# Patient Record
Sex: MALE | Race: WHITE | Employment: OTHER | RURAL
[De-identification: names, ages, dates, MRNs, and addresses within clinical notes are randomized per-mention and may not be internally consistent; named-entity substitution may affect disease eponyms.]

---

## 2017-01-09 ENCOUNTER — TELEPHONE (OUTPATIENT)
Dept: PRIMARY CARE CLINIC | Age: 72
End: 2017-01-09

## 2017-01-09 DIAGNOSIS — I10 ESSENTIAL HYPERTENSION: ICD-10-CM

## 2017-01-09 DIAGNOSIS — E08.00 DIABETES MELLITUS DUE TO UNDERLYING CONDITION WITH HYPEROSMOLARITY WITHOUT COMA, WITH LONG-TERM CURRENT USE OF INSULIN (HCC): ICD-10-CM

## 2017-01-09 DIAGNOSIS — Z79.4 DIABETES MELLITUS DUE TO UNDERLYING CONDITION WITH HYPEROSMOLARITY WITHOUT COMA, WITH LONG-TERM CURRENT USE OF INSULIN (HCC): ICD-10-CM

## 2017-01-11 RX ORDER — DOXAZOSIN MESYLATE 4 MG/1
4 TABLET ORAL NIGHTLY
Qty: 90 TABLET | Refills: 3 | Status: SHIPPED | OUTPATIENT
Start: 2017-01-11 | End: 2017-10-05 | Stop reason: SDUPTHER

## 2017-01-11 RX ORDER — PRASUGREL 10 MG/1
10 TABLET, FILM COATED ORAL DAILY
Qty: 90 TABLET | Refills: 3 | Status: SHIPPED | OUTPATIENT
Start: 2017-01-11 | End: 2017-10-05 | Stop reason: SDUPTHER

## 2017-01-11 RX ORDER — ATORVASTATIN CALCIUM 40 MG/1
40 TABLET, FILM COATED ORAL NIGHTLY
Qty: 90 TABLET | Refills: 3 | Status: SHIPPED | OUTPATIENT
Start: 2017-01-11 | End: 2017-10-05 | Stop reason: SDUPTHER

## 2017-01-11 RX ORDER — CARVEDILOL 12.5 MG/1
12.5 TABLET ORAL 2 TIMES DAILY WITH MEALS
Qty: 180 TABLET | Refills: 3 | Status: SHIPPED | OUTPATIENT
Start: 2017-01-11 | End: 2017-02-27 | Stop reason: SDUPTHER

## 2017-01-11 RX ORDER — PANTOPRAZOLE SODIUM 40 MG/1
TABLET, DELAYED RELEASE ORAL
Qty: 90 TABLET | Refills: 3 | Status: SHIPPED | OUTPATIENT
Start: 2017-01-11 | End: 2017-01-18 | Stop reason: SDUPTHER

## 2017-01-13 DIAGNOSIS — E08.00 DIABETES MELLITUS DUE TO UNDERLYING CONDITION WITH HYPEROSMOLARITY WITHOUT COMA, WITH LONG-TERM CURRENT USE OF INSULIN (HCC): ICD-10-CM

## 2017-01-13 DIAGNOSIS — I10 ESSENTIAL HYPERTENSION: ICD-10-CM

## 2017-01-13 DIAGNOSIS — Z79.4 DIABETES MELLITUS DUE TO UNDERLYING CONDITION WITH HYPEROSMOLARITY WITHOUT COMA, WITH LONG-TERM CURRENT USE OF INSULIN (HCC): ICD-10-CM

## 2017-01-13 RX ORDER — SITAGLIPTIN 100 MG/1
TABLET, FILM COATED ORAL
Qty: 90 TABLET | Refills: 3 | Status: SHIPPED | OUTPATIENT
Start: 2017-01-13 | End: 2018-02-15 | Stop reason: SDUPTHER

## 2017-01-18 DIAGNOSIS — I10 ESSENTIAL HYPERTENSION: ICD-10-CM

## 2017-01-18 DIAGNOSIS — E11.9 DM TYPE 2 WITHOUT RETINOPATHY (HCC): ICD-10-CM

## 2017-01-19 RX ORDER — GABAPENTIN 300 MG/1
300 CAPSULE ORAL NIGHTLY
Qty: 90 CAPSULE | Refills: 3 | Status: SHIPPED | OUTPATIENT
Start: 2017-01-19 | End: 2017-08-03 | Stop reason: SDUPTHER

## 2017-01-19 RX ORDER — FUROSEMIDE 20 MG/1
20 TABLET ORAL DAILY
Qty: 90 TABLET | Refills: 3 | Status: SHIPPED | OUTPATIENT
Start: 2017-01-19 | End: 2018-02-15 | Stop reason: SDUPTHER

## 2017-01-19 RX ORDER — FINASTERIDE 5 MG/1
5 TABLET, FILM COATED ORAL DAILY
Qty: 90 TABLET | Refills: 3 | Status: SHIPPED | OUTPATIENT
Start: 2017-01-19 | End: 2021-01-08 | Stop reason: SDUPTHER

## 2017-01-19 RX ORDER — CITALOPRAM 40 MG/1
40 TABLET ORAL DAILY
Qty: 90 TABLET | Refills: 3 | Status: SHIPPED | OUTPATIENT
Start: 2017-01-19 | End: 2017-10-05 | Stop reason: SDUPTHER

## 2017-01-19 RX ORDER — LISINOPRIL 40 MG/1
TABLET ORAL
Qty: 90 TABLET | Refills: 3 | Status: SHIPPED | OUTPATIENT
Start: 2017-01-19 | End: 2017-10-05 | Stop reason: SDUPTHER

## 2017-01-19 RX ORDER — PANTOPRAZOLE SODIUM 40 MG/1
TABLET, DELAYED RELEASE ORAL
Qty: 90 TABLET | Refills: 3 | Status: SHIPPED | OUTPATIENT
Start: 2017-01-19 | End: 2017-10-05 | Stop reason: SDUPTHER

## 2017-01-19 RX ORDER — AMLODIPINE BESYLATE 10 MG/1
10 TABLET ORAL DAILY
Qty: 90 TABLET | Refills: 3 | Status: SHIPPED | OUTPATIENT
Start: 2017-01-19 | End: 2017-10-05 | Stop reason: SDUPTHER

## 2017-02-22 ENCOUNTER — HOSPITAL ENCOUNTER (OUTPATIENT)
Dept: OTHER | Age: 72
Discharge: HOME | End: 2017-02-22
Attending: NURSE PRACTITIONER | Admitting: NURSE PRACTITIONER

## 2017-02-22 ENCOUNTER — TRANSCRIBE ORDERS (OUTPATIENT)
Dept: CT IMAGING | Facility: HOSPITAL | Age: 72
End: 2017-02-22

## 2017-02-22 DIAGNOSIS — D31.60 BENIGN NEOPLASM OF ORBIT, UNSPECIFIED LATERALITY: Primary | ICD-10-CM

## 2017-02-22 DIAGNOSIS — E78.00 PURE HYPERCHOLESTEROLEMIA: ICD-10-CM

## 2017-02-22 DIAGNOSIS — Z79.4 DIABETES MELLITUS DUE TO UNDERLYING CONDITION WITH HYPEROSMOLARITY WITHOUT COMA, WITH LONG-TERM CURRENT USE OF INSULIN (HCC): ICD-10-CM

## 2017-02-22 DIAGNOSIS — E08.00 DIABETES MELLITUS DUE TO UNDERLYING CONDITION WITH HYPEROSMOLARITY WITHOUT COMA, WITH LONG-TERM CURRENT USE OF INSULIN (HCC): ICD-10-CM

## 2017-02-22 LAB
A/G RATIO: 1.8 (ref 0.8–2)
ALBUMIN SERPL-MCNC: 4.2 G/DL (ref 3.4–4.8)
ALP BLD-CCNC: 44 U/L (ref 25–100)
ALT SERPL-CCNC: 19 U/L (ref 4–36)
ANION GAP SERPL CALCULATED.3IONS-SCNC: 14 MMOL/L (ref 3–16)
AST SERPL-CCNC: 14 U/L (ref 8–33)
BILIRUB SERPL-MCNC: 0.3 MG/DL (ref 0.3–1.2)
BUN BLDV-MCNC: 37 MG/DL (ref 6–20)
CALCIUM SERPL-MCNC: 9.4 MG/DL (ref 8.5–10.5)
CHLORIDE BLD-SCNC: 99 MMOL/L (ref 98–107)
CHOLESTEROL, TOTAL: 196 MG/DL (ref 0–200)
CO2: 27 MMOL/L (ref 20–30)
CREAT SERPL-MCNC: 1.5 MG/DL (ref 0.4–1.2)
GFR AFRICAN AMERICAN: 56
GFR NON-AFRICAN AMERICAN: 46
GLOBULIN: 2.4 G/DL
GLUCOSE BLD-MCNC: 174 MG/DL (ref 74–106)
HBA1C MFR BLD: 8.4 %
HCT VFR BLD CALC: 39.2 % (ref 40–54)
HDLC SERPL-MCNC: 35 MG/DL (ref 40–60)
HEMOGLOBIN: 12.9 G/DL (ref 13–18)
LDL CHOLESTEROL CALCULATED: 85 MG/DL
MCH RBC QN AUTO: 28.7 PG (ref 27–32)
MCHC RBC AUTO-ENTMCNC: 32.9 G/DL (ref 31–35)
MCV RBC AUTO: 87.1 FL (ref 80–100)
PDW BLD-RTO: 12.6 % (ref 11–16)
PLATELET # BLD: 313 K/UL (ref 150–400)
PMV BLD AUTO: 10.6 FL (ref 6–10)
POTASSIUM SERPL-SCNC: 4.3 MMOL/L (ref 3.4–5.1)
RBC # BLD: 4.5 M/UL (ref 4.5–6)
SODIUM BLD-SCNC: 140 MMOL/L (ref 136–145)
TOTAL PROTEIN: 6.6 G/DL (ref 6.4–8.3)
TRIGL SERPL-MCNC: 378 MG/DL (ref 0–249)
VLDLC SERPL CALC-MCNC: 76 MG/DL
WBC # BLD: 9.1 K/UL (ref 4–11)

## 2017-02-27 ENCOUNTER — OFFICE VISIT (OUTPATIENT)
Dept: PRIMARY CARE CLINIC | Age: 72
End: 2017-02-27
Payer: MEDICARE

## 2017-02-27 VITALS
OXYGEN SATURATION: 97 % | HEIGHT: 67 IN | SYSTOLIC BLOOD PRESSURE: 124 MMHG | HEART RATE: 64 BPM | WEIGHT: 214.4 LBS | DIASTOLIC BLOOD PRESSURE: 76 MMHG | BODY MASS INDEX: 33.65 KG/M2 | RESPIRATION RATE: 20 BRPM

## 2017-02-27 DIAGNOSIS — E11.9 TYPE 2 DIABETES MELLITUS WITHOUT COMPLICATION, WITH LONG-TERM CURRENT USE OF INSULIN (HCC): Primary | ICD-10-CM

## 2017-02-27 DIAGNOSIS — Z79.4 TYPE 2 DIABETES MELLITUS WITHOUT COMPLICATION, WITH LONG-TERM CURRENT USE OF INSULIN (HCC): Primary | ICD-10-CM

## 2017-02-27 DIAGNOSIS — I10 ESSENTIAL HYPERTENSION: ICD-10-CM

## 2017-02-27 PROCEDURE — 3017F COLORECTAL CA SCREEN DOC REV: CPT | Performed by: NURSE PRACTITIONER

## 2017-02-27 PROCEDURE — 3045F PR MOST RECENT HEMOGLOBIN A1C LEVEL 7.0-9.0%: CPT | Performed by: NURSE PRACTITIONER

## 2017-02-27 PROCEDURE — G8427 DOCREV CUR MEDS BY ELIG CLIN: HCPCS | Performed by: NURSE PRACTITIONER

## 2017-02-27 PROCEDURE — G8599 NO ASA/ANTIPLAT THER USE RNG: HCPCS | Performed by: NURSE PRACTITIONER

## 2017-02-27 PROCEDURE — 99214 OFFICE O/P EST MOD 30 MIN: CPT | Performed by: NURSE PRACTITIONER

## 2017-02-27 PROCEDURE — 1123F ACP DISCUSS/DSCN MKR DOCD: CPT | Performed by: NURSE PRACTITIONER

## 2017-02-27 PROCEDURE — 1036F TOBACCO NON-USER: CPT | Performed by: NURSE PRACTITIONER

## 2017-02-27 PROCEDURE — G8417 CALC BMI ABV UP PARAM F/U: HCPCS | Performed by: NURSE PRACTITIONER

## 2017-02-27 PROCEDURE — G8484 FLU IMMUNIZE NO ADMIN: HCPCS | Performed by: NURSE PRACTITIONER

## 2017-02-27 PROCEDURE — 4040F PNEUMOC VAC/ADMIN/RCVD: CPT | Performed by: NURSE PRACTITIONER

## 2017-02-27 RX ORDER — GLUCOSAMINE HCL/CHONDROITIN SU 500-400 MG
CAPSULE ORAL
Qty: 300 STRIP | Refills: 3 | Status: SHIPPED | OUTPATIENT
Start: 2017-02-27 | End: 2018-05-03 | Stop reason: SDUPTHER

## 2017-02-27 RX ORDER — PEN NEEDLE, DIABETIC 30 GX5/16"
NEEDLE, DISPOSABLE MISCELLANEOUS
Qty: 90 EACH | Refills: 3 | Status: SHIPPED | OUTPATIENT
Start: 2017-02-27 | End: 2017-11-30 | Stop reason: SDUPTHER

## 2017-02-27 RX ORDER — CARVEDILOL 12.5 MG/1
6.25 TABLET ORAL 2 TIMES DAILY
Qty: 180 TABLET | Refills: 3
Start: 2017-02-27 | End: 2018-02-15 | Stop reason: SDUPTHER

## 2017-02-27 ASSESSMENT — ENCOUNTER SYMPTOMS
RESPIRATORY NEGATIVE: 1
GASTROINTESTINAL NEGATIVE: 1
EYES NEGATIVE: 1

## 2017-02-28 ENCOUNTER — HOSPITAL ENCOUNTER (OUTPATIENT)
Dept: CT IMAGING | Facility: HOSPITAL | Age: 72
Discharge: HOME | End: 2017-02-28
Admitting: OPHTHALMOLOGY

## 2017-02-28 DIAGNOSIS — D31.60 BENIGN NEOPLASM OF ORBIT, UNSPECIFIED LATERALITY: ICD-10-CM

## 2017-02-28 LAB
ALBUMIN SERPL-MCNC: 4.3 G/DL (ref 3.5–5)
ALBUMIN/GLOB SERPL: 1.3 G/DL (ref 1–2)
ALP SERPL-CCNC: 46 U/L (ref 38–126)
ALT SERPL W P-5'-P-CCNC: 31 U/L (ref 13–69)
ANION GAP SERPL CALCULATED.3IONS-SCNC: 13.6 MMOL/L
AST SERPL-CCNC: 19 U/L (ref 15–46)
BILIRUB SERPL-MCNC: 0.5 MG/DL (ref 0.2–1.3)
BUN BLD-MCNC: 30 MG/DL (ref 7–20)
BUN/CREAT SERPL: 21.4 (ref 6.3–21.9)
CALCIUM SPEC-SCNC: 9.1 MG/DL (ref 8.4–10.2)
CHLORIDE SERPL-SCNC: 109 MMOL/L (ref 98–107)
CO2 SERPL-SCNC: 26 MMOL/L (ref 26–30)
CREAT BLD-MCNC: 1.4 MG/DL (ref 0.6–1.3)
GFR SERPL CREATININE-BSD FRML MDRD: 50 ML/MIN/1.73
GLOBULIN UR ELPH-MCNC: 3.2 GM/DL
GLUCOSE BLD-MCNC: 139 MG/DL (ref 74–98)
POTASSIUM BLD-SCNC: 4.6 MMOL/L (ref 3.5–5.1)
PROT SERPL-MCNC: 7.5 G/DL (ref 6.3–8.2)
SODIUM BLD-SCNC: 144 MMOL/L (ref 137–145)

## 2017-02-28 PROCEDURE — 80053 COMPREHEN METABOLIC PANEL: CPT | Performed by: OPHTHALMOLOGY

## 2017-02-28 PROCEDURE — 70481 CT ORBIT/EAR/FOSSA W/DYE: CPT

## 2017-02-28 PROCEDURE — 0 IOPAMIDOL 61 % SOLUTION: Performed by: OPHTHALMOLOGY

## 2017-02-28 RX ADMIN — IOPAMIDOL 100 ML: 612 INJECTION, SOLUTION INTRAVENOUS at 11:30

## 2017-03-03 ENCOUNTER — HOSPITAL ENCOUNTER (OUTPATIENT)
Dept: GENERAL RADIOLOGY | Facility: HOSPITAL | Age: 72
Discharge: HOME | End: 2017-03-03
Admitting: UROLOGY

## 2017-03-03 ENCOUNTER — TRANSCRIBE ORDERS (OUTPATIENT)
Dept: ADMINISTRATIVE | Facility: HOSPITAL | Age: 72
End: 2017-03-03

## 2017-03-03 ENCOUNTER — APPOINTMENT (OUTPATIENT)
Dept: LAB | Facility: HOSPITAL | Age: 72
End: 2017-03-03

## 2017-03-03 DIAGNOSIS — R97.20 ELEVATED PROSTATE SPECIFIC ANTIGEN (PSA): Primary | ICD-10-CM

## 2017-03-03 LAB
ALBUMIN SERPL-MCNC: 4.2 G/DL (ref 3.5–5)
ALBUMIN/GLOB SERPL: 1.4 G/DL (ref 1–2)
ALP SERPL-CCNC: 51 U/L (ref 38–126)
ALT SERPL W P-5'-P-CCNC: 30 U/L (ref 13–69)
ANION GAP SERPL CALCULATED.3IONS-SCNC: 11.9 MMOL/L
AST SERPL-CCNC: 20 U/L (ref 15–46)
BILIRUB SERPL-MCNC: 0.6 MG/DL (ref 0.2–1.3)
BUN BLD-MCNC: 27 MG/DL (ref 7–20)
BUN/CREAT SERPL: 20.8 (ref 6.3–21.9)
CALCIUM SPEC-SCNC: 9.1 MG/DL (ref 8.4–10.2)
CHLORIDE SERPL-SCNC: 106 MMOL/L (ref 98–107)
CO2 SERPL-SCNC: 30 MMOL/L (ref 26–30)
CREAT BLD-MCNC: 1.3 MG/DL (ref 0.6–1.3)
GFR SERPL CREATININE-BSD FRML MDRD: 54 ML/MIN/1.73
GLOBULIN UR ELPH-MCNC: 3.1 GM/DL
GLUCOSE BLD-MCNC: 259 MG/DL (ref 74–98)
POTASSIUM BLD-SCNC: 4.9 MMOL/L (ref 3.5–5.1)
PROT SERPL-MCNC: 7.3 G/DL (ref 6.3–8.2)
SODIUM BLD-SCNC: 143 MMOL/L (ref 137–145)

## 2017-03-03 PROCEDURE — 36415 COLL VENOUS BLD VENIPUNCTURE: CPT | Performed by: UROLOGY

## 2017-03-03 PROCEDURE — 74000 HC ABDOMEN KUB: CPT

## 2017-03-03 PROCEDURE — 84153 ASSAY OF PSA TOTAL: CPT | Performed by: UROLOGY

## 2017-03-03 PROCEDURE — 80053 COMPREHEN METABOLIC PANEL: CPT | Performed by: UROLOGY

## 2017-03-03 PROCEDURE — 84154 ASSAY OF PSA FREE: CPT | Performed by: UROLOGY

## 2017-03-04 LAB
PSA FREE MFR SERPL: 35.7 %
PSA FREE SERPL-MCNC: 0.25 NG/ML
PSA SERPL-MCNC: 0.7 NG/ML (ref 0–4)

## 2017-03-29 ENCOUNTER — TELEPHONE (OUTPATIENT)
Dept: PRIMARY CARE CLINIC | Age: 72
End: 2017-03-29

## 2017-05-18 ENCOUNTER — OFFICE VISIT (OUTPATIENT)
Dept: CARDIOLOGY | Facility: CLINIC | Age: 72
End: 2017-05-18

## 2017-05-18 VITALS
DIASTOLIC BLOOD PRESSURE: 50 MMHG | BODY MASS INDEX: 34.3 KG/M2 | SYSTOLIC BLOOD PRESSURE: 120 MMHG | HEIGHT: 66 IN | WEIGHT: 213.4 LBS

## 2017-05-18 DIAGNOSIS — E78.00 HYPERCHOLESTEROLEMIA: ICD-10-CM

## 2017-05-18 DIAGNOSIS — I25.810 CORONARY ARTERY DISEASE INVOLVING CORONARY BYPASS GRAFT OF NATIVE HEART WITHOUT ANGINA PECTORIS: Primary | ICD-10-CM

## 2017-05-18 DIAGNOSIS — I10 BENIGN HYPERTENSION: ICD-10-CM

## 2017-05-18 PROCEDURE — 99213 OFFICE O/P EST LOW 20 MIN: CPT | Performed by: INTERNAL MEDICINE

## 2017-05-18 RX ORDER — ATORVASTATIN CALCIUM 40 MG/1
40 TABLET, FILM COATED ORAL DAILY
COMMUNITY
End: 2018-06-15 | Stop reason: DRUGHIGH

## 2017-06-05 ENCOUNTER — HOSPITAL ENCOUNTER (OUTPATIENT)
Dept: OTHER | Age: 72
Discharge: HOME | End: 2017-06-05
Attending: NURSE PRACTITIONER | Admitting: NURSE PRACTITIONER

## 2017-06-05 ENCOUNTER — OFFICE VISIT (OUTPATIENT)
Dept: PRIMARY CARE CLINIC | Age: 72
End: 2017-06-05
Payer: MEDICARE

## 2017-06-05 VITALS
WEIGHT: 201.5 LBS | DIASTOLIC BLOOD PRESSURE: 76 MMHG | HEART RATE: 80 BPM | BODY MASS INDEX: 31.63 KG/M2 | SYSTOLIC BLOOD PRESSURE: 130 MMHG | HEIGHT: 67 IN | OXYGEN SATURATION: 98 % | RESPIRATION RATE: 20 BRPM

## 2017-06-05 DIAGNOSIS — Z79.4 TYPE 2 DIABETES MELLITUS WITHOUT COMPLICATION, WITH LONG-TERM CURRENT USE OF INSULIN (HCC): ICD-10-CM

## 2017-06-05 DIAGNOSIS — Z79.4 TYPE 2 DIABETES MELLITUS WITHOUT COMPLICATION, WITH LONG-TERM CURRENT USE OF INSULIN (HCC): Primary | ICD-10-CM

## 2017-06-05 DIAGNOSIS — E11.9 TYPE 2 DIABETES MELLITUS WITHOUT COMPLICATION, WITH LONG-TERM CURRENT USE OF INSULIN (HCC): Primary | ICD-10-CM

## 2017-06-05 DIAGNOSIS — I10 ESSENTIAL HYPERTENSION: ICD-10-CM

## 2017-06-05 DIAGNOSIS — E78.00 PURE HYPERCHOLESTEROLEMIA: ICD-10-CM

## 2017-06-05 DIAGNOSIS — E11.9 TYPE 2 DIABETES MELLITUS WITHOUT COMPLICATION, WITH LONG-TERM CURRENT USE OF INSULIN (HCC): ICD-10-CM

## 2017-06-05 LAB
A/G RATIO: 1.5 (ref 0.8–2)
ALBUMIN SERPL-MCNC: 4 G/DL (ref 3.4–4.8)
ALP BLD-CCNC: 43 U/L (ref 25–100)
ALT SERPL-CCNC: 20 U/L (ref 4–36)
ANION GAP SERPL CALCULATED.3IONS-SCNC: 13 MMOL/L (ref 3–16)
AST SERPL-CCNC: 15 U/L (ref 8–33)
BILIRUB SERPL-MCNC: 0.3 MG/DL (ref 0.3–1.2)
BUN BLDV-MCNC: 24 MG/DL (ref 6–20)
CALCIUM SERPL-MCNC: 9.1 MG/DL (ref 8.5–10.5)
CHLORIDE BLD-SCNC: 100 MMOL/L (ref 98–107)
CO2: 26 MMOL/L (ref 20–30)
CREAT SERPL-MCNC: 1.2 MG/DL (ref 0.4–1.2)
CREATININE URINE: 132.7 MG/DL (ref 1.5–300)
GFR AFRICAN AMERICAN: >59
GFR NON-AFRICAN AMERICAN: >59
GLOBULIN: 2.7 G/DL
GLUCOSE BLD-MCNC: 176 MG/DL (ref 74–106)
HBA1C MFR BLD: 8.3 %
HCT VFR BLD CALC: 37.2 % (ref 40–54)
HEMOGLOBIN: 12.8 G/DL (ref 13–18)
MCH RBC QN AUTO: 29.8 PG (ref 27–32)
MCHC RBC AUTO-ENTMCNC: 34.4 G/DL (ref 31–35)
MCV RBC AUTO: 86.5 FL (ref 80–100)
MICROALBUMIN UR-MCNC: 1.5 MG/DL (ref 0–22)
MICROALBUMIN/CREAT UR-RTO: 11.3 MG/G (ref 0–30)
PDW BLD-RTO: 12.7 % (ref 11–16)
PLATELET # BLD: 300 K/UL (ref 150–400)
PMV BLD AUTO: 10.5 FL (ref 6–10)
POTASSIUM SERPL-SCNC: 4.4 MMOL/L (ref 3.4–5.1)
RBC # BLD: 4.3 M/UL (ref 4.5–6)
SODIUM BLD-SCNC: 139 MMOL/L (ref 136–145)
TOTAL PROTEIN: 6.7 G/DL (ref 6.4–8.3)
WBC # BLD: 8.2 K/UL (ref 4–11)

## 2017-06-05 PROCEDURE — G8599 NO ASA/ANTIPLAT THER USE RNG: HCPCS | Performed by: NURSE PRACTITIONER

## 2017-06-05 PROCEDURE — G8417 CALC BMI ABV UP PARAM F/U: HCPCS | Performed by: NURSE PRACTITIONER

## 2017-06-05 PROCEDURE — 99213 OFFICE O/P EST LOW 20 MIN: CPT | Performed by: NURSE PRACTITIONER

## 2017-06-05 PROCEDURE — 1123F ACP DISCUSS/DSCN MKR DOCD: CPT | Performed by: NURSE PRACTITIONER

## 2017-06-05 PROCEDURE — 4040F PNEUMOC VAC/ADMIN/RCVD: CPT | Performed by: NURSE PRACTITIONER

## 2017-06-05 PROCEDURE — G8427 DOCREV CUR MEDS BY ELIG CLIN: HCPCS | Performed by: NURSE PRACTITIONER

## 2017-06-05 PROCEDURE — 3046F HEMOGLOBIN A1C LEVEL >9.0%: CPT | Performed by: NURSE PRACTITIONER

## 2017-06-05 PROCEDURE — 3017F COLORECTAL CA SCREEN DOC REV: CPT | Performed by: NURSE PRACTITIONER

## 2017-06-05 PROCEDURE — 1036F TOBACCO NON-USER: CPT | Performed by: NURSE PRACTITIONER

## 2017-06-05 ASSESSMENT — PATIENT HEALTH QUESTIONNAIRE - PHQ9
2. FEELING DOWN, DEPRESSED OR HOPELESS: 0
SUM OF ALL RESPONSES TO PHQ QUESTIONS 1-9: 1
1. LITTLE INTEREST OR PLEASURE IN DOING THINGS: 1
SUM OF ALL RESPONSES TO PHQ9 QUESTIONS 1 & 2: 1

## 2017-06-05 ASSESSMENT — ENCOUNTER SYMPTOMS
RESPIRATORY NEGATIVE: 1
EYES NEGATIVE: 1
GASTROINTESTINAL NEGATIVE: 1

## 2017-06-12 ENCOUNTER — TELEPHONE (OUTPATIENT)
Dept: PRIMARY CARE CLINIC | Age: 72
End: 2017-06-12

## 2017-07-10 ENCOUNTER — OFFICE VISIT (OUTPATIENT)
Dept: PRIMARY CARE CLINIC | Age: 72
End: 2017-07-10
Payer: MEDICARE

## 2017-07-10 VITALS
HEART RATE: 71 BPM | BODY MASS INDEX: 33.15 KG/M2 | HEIGHT: 67 IN | DIASTOLIC BLOOD PRESSURE: 60 MMHG | OXYGEN SATURATION: 97 % | RESPIRATION RATE: 20 BRPM | SYSTOLIC BLOOD PRESSURE: 110 MMHG | WEIGHT: 211.2 LBS

## 2017-07-10 DIAGNOSIS — R09.81 SINUS CONGESTION: ICD-10-CM

## 2017-07-10 DIAGNOSIS — M54.50 ACUTE LEFT-SIDED LOW BACK PAIN WITHOUT SCIATICA: Primary | ICD-10-CM

## 2017-07-10 PROCEDURE — 1036F TOBACCO NON-USER: CPT | Performed by: NURSE PRACTITIONER

## 2017-07-10 PROCEDURE — G8417 CALC BMI ABV UP PARAM F/U: HCPCS | Performed by: NURSE PRACTITIONER

## 2017-07-10 PROCEDURE — 1123F ACP DISCUSS/DSCN MKR DOCD: CPT | Performed by: NURSE PRACTITIONER

## 2017-07-10 PROCEDURE — 4040F PNEUMOC VAC/ADMIN/RCVD: CPT | Performed by: NURSE PRACTITIONER

## 2017-07-10 PROCEDURE — 99213 OFFICE O/P EST LOW 20 MIN: CPT | Performed by: NURSE PRACTITIONER

## 2017-07-10 PROCEDURE — G8599 NO ASA/ANTIPLAT THER USE RNG: HCPCS | Performed by: NURSE PRACTITIONER

## 2017-07-10 PROCEDURE — 3017F COLORECTAL CA SCREEN DOC REV: CPT | Performed by: NURSE PRACTITIONER

## 2017-07-10 PROCEDURE — G8427 DOCREV CUR MEDS BY ELIG CLIN: HCPCS | Performed by: NURSE PRACTITIONER

## 2017-07-10 RX ORDER — LORATADINE 10 MG/1
10 TABLET ORAL DAILY
Qty: 30 TABLET | Refills: 3 | Status: SHIPPED | OUTPATIENT
Start: 2017-07-10 | End: 2017-12-06 | Stop reason: SDUPTHER

## 2017-07-10 RX ORDER — HYDROCODONE BITARTRATE AND ACETAMINOPHEN 5; 325 MG/1; MG/1
1 TABLET ORAL EVERY 6 HOURS PRN
Qty: 12 TABLET | Refills: 0 | Status: SHIPPED | OUTPATIENT
Start: 2017-07-10 | End: 2017-07-13

## 2017-07-10 RX ORDER — BACLOFEN 10 MG/1
5 TABLET ORAL 2 TIMES DAILY
Qty: 20 TABLET | Refills: 0 | Status: SHIPPED | OUTPATIENT
Start: 2017-07-10 | End: 2017-07-20

## 2017-07-10 RX ORDER — BACLOFEN 10 MG/1
5 TABLET ORAL 2 TIMES DAILY
Qty: 20 TABLET | Refills: 0 | Status: SHIPPED | OUTPATIENT
Start: 2017-07-10 | End: 2017-07-10

## 2017-07-10 ASSESSMENT — ENCOUNTER SYMPTOMS
EYES NEGATIVE: 1
BACK PAIN: 1
RESPIRATORY NEGATIVE: 1
GASTROINTESTINAL NEGATIVE: 1

## 2017-08-04 RX ORDER — GABAPENTIN 300 MG/1
CAPSULE ORAL
Qty: 90 CAPSULE | Refills: 3 | Status: SHIPPED | OUTPATIENT
Start: 2017-08-04 | End: 2017-10-05 | Stop reason: SDUPTHER

## 2017-09-08 ENCOUNTER — HOSPITAL ENCOUNTER (OUTPATIENT)
Dept: GENERAL RADIOLOGY | Facility: HOSPITAL | Age: 72
Discharge: HOME | End: 2017-09-08
Admitting: UROLOGY

## 2017-09-08 ENCOUNTER — APPOINTMENT (OUTPATIENT)
Dept: LAB | Facility: HOSPITAL | Age: 72
End: 2017-09-08

## 2017-09-08 ENCOUNTER — TRANSCRIBE ORDERS (OUTPATIENT)
Dept: ADMINISTRATIVE | Facility: HOSPITAL | Age: 72
End: 2017-09-08

## 2017-09-08 DIAGNOSIS — N20.0 URIC ACID NEPHROLITHIASIS: Primary | ICD-10-CM

## 2017-09-08 LAB
ALBUMIN SERPL-MCNC: 4.1 G/DL (ref 3.5–5)
ALBUMIN/GLOB SERPL: 1.5 G/DL (ref 1–2)
ALP SERPL-CCNC: 46 U/L (ref 38–126)
ALT SERPL W P-5'-P-CCNC: 41 U/L (ref 13–69)
ANION GAP SERPL CALCULATED.3IONS-SCNC: 13.9 MMOL/L
AST SERPL-CCNC: 20 U/L (ref 15–46)
BILIRUB SERPL-MCNC: 0.5 MG/DL (ref 0.2–1.3)
BUN BLD-MCNC: 26 MG/DL (ref 7–20)
BUN/CREAT SERPL: 20 (ref 6.3–21.9)
CALCIUM SPEC-SCNC: 9.2 MG/DL (ref 8.4–10.2)
CHLORIDE SERPL-SCNC: 105 MMOL/L (ref 98–107)
CO2 SERPL-SCNC: 27 MMOL/L (ref 26–30)
CREAT BLD-MCNC: 1.3 MG/DL (ref 0.6–1.3)
GFR SERPL CREATININE-BSD FRML MDRD: 54 ML/MIN/1.73
GLOBULIN UR ELPH-MCNC: 2.8 GM/DL
GLUCOSE BLD-MCNC: 219 MG/DL (ref 74–98)
POTASSIUM BLD-SCNC: 4.9 MMOL/L (ref 3.5–5.1)
PROT SERPL-MCNC: 6.9 G/DL (ref 6.3–8.2)
PSA SERPL-MCNC: 0.57 NG/ML (ref 0.06–4)
SODIUM BLD-SCNC: 141 MMOL/L (ref 137–145)

## 2017-09-08 PROCEDURE — 74000 HC ABDOMEN KUB: CPT

## 2017-09-08 PROCEDURE — 36415 COLL VENOUS BLD VENIPUNCTURE: CPT | Performed by: UROLOGY

## 2017-09-08 PROCEDURE — 80053 COMPREHEN METABOLIC PANEL: CPT | Performed by: UROLOGY

## 2017-09-08 PROCEDURE — 84153 ASSAY OF PSA TOTAL: CPT | Performed by: UROLOGY

## 2017-10-05 ENCOUNTER — OFFICE VISIT (OUTPATIENT)
Dept: PRIMARY CARE CLINIC | Age: 72
End: 2017-10-05
Payer: MEDICARE

## 2017-10-05 VITALS
BODY MASS INDEX: 33.21 KG/M2 | HEIGHT: 67 IN | SYSTOLIC BLOOD PRESSURE: 126 MMHG | OXYGEN SATURATION: 97 % | RESPIRATION RATE: 20 BRPM | WEIGHT: 211.6 LBS | DIASTOLIC BLOOD PRESSURE: 70 MMHG | HEART RATE: 78 BPM

## 2017-10-05 DIAGNOSIS — E78.00 PURE HYPERCHOLESTEROLEMIA: ICD-10-CM

## 2017-10-05 DIAGNOSIS — I10 ESSENTIAL HYPERTENSION: ICD-10-CM

## 2017-10-05 DIAGNOSIS — E11.9 DM TYPE 2 WITHOUT RETINOPATHY (HCC): Primary | ICD-10-CM

## 2017-10-05 PROCEDURE — 3046F HEMOGLOBIN A1C LEVEL >9.0%: CPT | Performed by: NURSE PRACTITIONER

## 2017-10-05 PROCEDURE — 1123F ACP DISCUSS/DSCN MKR DOCD: CPT | Performed by: NURSE PRACTITIONER

## 2017-10-05 PROCEDURE — G8484 FLU IMMUNIZE NO ADMIN: HCPCS | Performed by: NURSE PRACTITIONER

## 2017-10-05 PROCEDURE — 3017F COLORECTAL CA SCREEN DOC REV: CPT | Performed by: NURSE PRACTITIONER

## 2017-10-05 PROCEDURE — G8599 NO ASA/ANTIPLAT THER USE RNG: HCPCS | Performed by: NURSE PRACTITIONER

## 2017-10-05 PROCEDURE — 1036F TOBACCO NON-USER: CPT | Performed by: NURSE PRACTITIONER

## 2017-10-05 PROCEDURE — G8427 DOCREV CUR MEDS BY ELIG CLIN: HCPCS | Performed by: NURSE PRACTITIONER

## 2017-10-05 PROCEDURE — 99214 OFFICE O/P EST MOD 30 MIN: CPT | Performed by: NURSE PRACTITIONER

## 2017-10-05 PROCEDURE — G8417 CALC BMI ABV UP PARAM F/U: HCPCS | Performed by: NURSE PRACTITIONER

## 2017-10-05 PROCEDURE — 4040F PNEUMOC VAC/ADMIN/RCVD: CPT | Performed by: NURSE PRACTITIONER

## 2017-10-05 RX ORDER — PANTOPRAZOLE SODIUM 40 MG/1
TABLET, DELAYED RELEASE ORAL
Qty: 90 TABLET | Refills: 3 | Status: SHIPPED | OUTPATIENT
Start: 2017-10-05 | End: 2018-02-15 | Stop reason: SDUPTHER

## 2017-10-05 RX ORDER — CITALOPRAM 40 MG/1
40 TABLET ORAL DAILY
Qty: 90 TABLET | Refills: 3 | Status: SHIPPED | OUTPATIENT
Start: 2017-10-05 | End: 2018-06-15 | Stop reason: SDUPTHER

## 2017-10-05 RX ORDER — DOXAZOSIN MESYLATE 4 MG/1
4 TABLET ORAL NIGHTLY
Qty: 90 TABLET | Refills: 3 | Status: SHIPPED | OUTPATIENT
Start: 2017-10-05 | End: 2018-08-15 | Stop reason: SDUPTHER

## 2017-10-05 RX ORDER — LISINOPRIL 40 MG/1
TABLET ORAL
Qty: 90 TABLET | Refills: 3 | Status: SHIPPED | OUTPATIENT
Start: 2017-10-05 | End: 2018-08-15 | Stop reason: SDUPTHER

## 2017-10-05 RX ORDER — GABAPENTIN 300 MG/1
CAPSULE ORAL
Qty: 90 CAPSULE | Refills: 3 | Status: SHIPPED | OUTPATIENT
Start: 2017-10-05 | End: 2018-02-15 | Stop reason: SDUPTHER

## 2017-10-05 RX ORDER — AMLODIPINE BESYLATE 10 MG/1
10 TABLET ORAL DAILY
Qty: 90 TABLET | Refills: 3 | Status: SHIPPED | OUTPATIENT
Start: 2017-10-05 | End: 2018-06-15 | Stop reason: SDUPTHER

## 2017-10-05 RX ORDER — PRASUGREL 10 MG/1
10 TABLET, FILM COATED ORAL DAILY
Qty: 90 TABLET | Refills: 3 | Status: SHIPPED | OUTPATIENT
Start: 2017-10-05 | End: 2018-06-15 | Stop reason: SDUPTHER

## 2017-10-05 RX ORDER — ATORVASTATIN CALCIUM 40 MG/1
40 TABLET, FILM COATED ORAL NIGHTLY
Qty: 90 TABLET | Refills: 3 | Status: SHIPPED | OUTPATIENT
Start: 2017-10-05 | End: 2018-06-15 | Stop reason: SDUPTHER

## 2017-10-05 NOTE — PROGRESS NOTES
SUBJECTIVE:    Patient ID: Goldie Gomez is a 71 y.o. male. Chief Complaint   Patient presents with    Hypertension    Hyperlipidemia    Diabetes         HPI:  He presents today for follow up on his diabetes hypertension hyperlipidemia. He's been doing well on his current medications. His wife is present with him today. She says his diet is about the same. She says he has been slightly more active he's been working on [employer]. He denies any problems with hyper hypo glycemic episodes. He does not routinely check his blood sugar. He does not routinely check his blood pressure. His wife states that he needs refills on his medications. Patient's medications, allergies, past medical, surgical, social and family histories were reviewed and updated as appropriate.      Current Outpatient Prescriptions:     prasugrel (EFFIENT) 10 MG TABS, Take 1 tablet by mouth daily, Disp: 90 tablet, Rfl: 3    gabapentin (NEURONTIN) 300 MG capsule, TAKE 1 CAPSULE NIGHTLY, Disp: 90 capsule, Rfl: 3    citalopram (CELEXA) 40 MG tablet, Take 1 tablet by mouth daily, Disp: 90 tablet, Rfl: 3    lisinopril (PRINIVIL;ZESTRIL) 40 MG tablet, TAKE 1 TABLET DAILY, Disp: 90 tablet, Rfl: 3    doxazosin (CARDURA) 4 MG tablet, Take 1 tablet by mouth nightly, Disp: 90 tablet, Rfl: 3    atorvastatin (LIPITOR) 40 MG tablet, Take 1 tablet by mouth nightly, Disp: 90 tablet, Rfl: 3    canagliflozin (INVOKANA) 300 MG TABS tablet, One po daily, Disp: 90 tablet, Rfl: 3    pantoprazole (PROTONIX) 40 MG tablet, TAKE 1 TABLET DAILY, Disp: 90 tablet, Rfl: 3    amLODIPine (NORVASC) 10 MG tablet, Take 1 tablet by mouth daily Indications: High Blood Pressure Disorder, Disp: 90 tablet, Rfl: 3    diclofenac sodium 1 % GEL, Apply 2 g topically 2 times daily, Disp: 1 Tube, Rfl: 3    loratadine (CLARITIN) 10 MG tablet, Take 1 tablet by mouth daily, Disp: 30 tablet, Rfl: 3    Insulin Pen Needle (PEN NEEDLES 3/16\") 31G X 5 MM MISC, Please dispense 31 G, 5 mm, mini 3/16 inch needles for use with Opti Click Pen. Patient to use once daily for 250.00, Disp: 90 each, Rfl: 3    Glucose Blood (BLOOD GLUCOSE TEST STRIPS) STRP, TID and prn DX: IDDM 250.01 True Metrix, Disp: 300 strip, Rfl: 3    SITagliptin (JANUVIA) 50 MG tablet, Take 1 tablet by mouth daily, Disp: 30 tablet, Rfl: 3    carvedilol (COREG) 12.5 MG tablet, Take 0.5 tablets by mouth 2 times daily, Disp: 180 tablet, Rfl: 3    furosemide (LASIX) 20 MG tablet, Take 1 tablet by mouth daily, Disp: 90 tablet, Rfl: 3    finasteride (PROSCAR) 5 MG tablet, Take 1 tablet by mouth daily, Disp: 90 tablet, Rfl: 3    JANUVIA 100 MG tablet, TAKE 1 TABLET DAILY, Disp: 90 tablet, Rfl: 3    insulin glargine (LANTUS) 100 UNIT/ML injection pen, 70 units nightly. 90 day supply, Disp: 3 Pen, Rfl: 3    nitroGLYCERIN (NITROSTAT) 0.4 MG SL tablet, Place 1 tablet under the tongue every 5 minutes as needed, Disp: 25 tablet, Rfl: 5    tamsulosin (FLOMAX) 0.4 MG capsule, Take 0.4 mg by mouth daily. , Disp: , Rfl:     MICROLET LANCETS MISC, 1 each by Does not apply route 3 times daily. DX: DMII, Disp: 400 each, Rfl: 3    [DISCONTINUED] omeprazole (PRILOSEC) 20 MG capsule, Take 1 capsule by mouth 2 times daily. , Disp: 180 capsule, Rfl: 3    aspirin  MG EC tablet, Take 1 tablet by mouth daily. , Disp: 30 tablet, Rfl: 11    mometasone (NASONEX) 50 MCG/ACT nasal spray, 2 sprays by Nasal route daily. , Disp: 1 Inhaler, Rfl: 5    therapeutic multivitamin-minerals (THERAGRAN-M) tablet, Take 1 tablet by mouth daily. , Disp: , Rfl:     Review of Systems   Constitutional: Negative. HENT: Negative. Eyes: Negative. Respiratory: Negative. Cardiovascular: Negative. Gastrointestinal: Negative. Genitourinary: Negative. Musculoskeletal: Negative. Skin: Negative. Neurological: Negative. Psychiatric/Behavioral: Negative.         OBJECTIVE:  /70  Pulse 78  Resp 20  Ht 5' 7\" (1.702 m)  Wt 211 lb 9.6 oz (96 kg)  SpO2 97%  BMI 33.14 kg/m2   Physical Exam   Constitutional: He is oriented to person, place, and time. He appears well-developed and well-nourished. HENT:   Head: Normocephalic and atraumatic. Eyes: Conjunctivae and EOM are normal. Pupils are equal, round, and reactive to light. Neck: Normal range of motion. Neck supple. No JVD present. No thyromegaly present. Cardiovascular: Normal rate and regular rhythm. Exam reveals no gallop and no friction rub. No murmur heard. Pulmonary/Chest: Effort normal and breath sounds normal. No respiratory distress. He has no wheezes. He has no rales. Abdominal: Soft. Bowel sounds are normal. He exhibits no distension. There is no tenderness. There is no guarding. Musculoskeletal: He exhibits no edema or tenderness. Neurological: He is alert and oriented to person, place, and time. Skin: Skin is warm and dry. No rash noted. Psychiatric: He has a normal mood and affect. Judgment normal.   Nursing note and vitals reviewed.       Results in Past 30 Days  Result Component Current Result Ref Range Previous Result Ref Range   Alb 3.9 (10/6/2017) 3.4 - 4.8 g/dL Not in Time Range    Albumin/Globulin Ratio 1.4 (10/6/2017) 0.8 - 2.0 Not in Time Range    Alkaline Phosphatase 41 (10/6/2017) 25 - 100 U/L Not in Time Range    ALT 17 (10/6/2017) 4 - 36 U/L Not in Time Range    AST 13 (10/6/2017) 8 - 33 U/L Not in Time Range    BUN 30 (H) (10/6/2017) 6 - 20 mg/dL Not in Time Range    Calcium 9.2 (10/6/2017) 8.5 - 10.5 mg/dL Not in Time Range    Chloride 104 (10/6/2017) 98 - 107 mmol/L Not in Time Range    CO2 28 (10/6/2017) 20 - 30 mmol/L Not in Time Range    CREATININE 1.4 (H) (10/6/2017) 0.4 - 1.2 mg/dL Not in Time Range    GFR  >59 (10/6/2017) >59 Not in Time Range    GFR Non- 50 (L) (10/6/2017) >59 Not in Time Range    Globulin 2.8 (10/6/2017) g/dL Not in Time Range    Glucose 134 (H) (10/6/2017) 74 - 106 mg/dL Not in Time Range Potassium 4.6 (10/6/2017) 3.4 - 5.1 mmol/L Not in Time Range    Sodium 142 (10/6/2017) 136 - 145 mmol/L Not in Time Range    Total Bilirubin 0.3 (10/6/2017) 0.3 - 1.2 mg/dL Not in Time Range    Total Protein 6.7 (10/6/2017) 6.4 - 8.3 g/dL Not in Time Range        Hemoglobin A1C (%)   Date Value   10/06/2017 7.7 (H)     MICROALBUMIN, RANDOM URINE (mg/dL)   Date Value   06/05/2017 1.50     LDL Calculated (mg/dL)   Date Value   10/06/2017 82         Lab Results   Component Value Date    WBC 8.1 10/06/2017    NEUTROABS 7.0 06/29/2015    HGB 12.5 10/06/2017    HCT 36.8 10/06/2017    MCV 87.0 10/06/2017     10/06/2017       Lab Results   Component Value Date    TSH 2.19 10/27/2016         ASSESSMENT/PLAN:     1. 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 I advised him regarding diabetic diet, exercise and weight control. Also, I advised him to stay on the current medication, monitor his fingerstick closely. I am going to check the A1c every few months. I will check microalbumin on a yearly basis. I have also advised him to have a yearly eye exam and to monitor his feet closely. Along with instruction of possible complications related to diabetes, even with good control. A1C will be checked  in few months. Lab Results   Component Value Date    LABA1C 7.7 (H) 10/06/2017          - canagliflozin (INVOKANA) 300 MG TABS tablet; One po daily  Dispense: 90 tablet; Refill: 3  - CBC; Future  - Comprehensive Metabolic Panel; Future  - Hemoglobin A1C; Future    2. Essential hypertension  BP is stable. I have advised him on low-sodium diet, exercise and weight control. I am going to continue current medication . Will monitor his renal function every few months, have advised him to check blood pressure frequently and to keep a record of this. - doxazosin (CARDURA) 4 MG tablet; Take 1 tablet by mouth nightly  Dispense: 90 tablet; Refill: 3  - amLODIPine (NORVASC) 10 MG tablet;  Take 1 tablet by mouth daily

## 2017-10-05 NOTE — MR AVS SNAPSHOT
cancers. BMI is not perfect. It may overestimate body fat in athletes and people who are more muscular. Even a small weight loss (between 5 and 10 percent of your current weight) by decreasing your calorie intake and becoming more physically active will help lower your risk of developing or worsening diseases associated with obesity. Learn more at: [employer]          Instructions    · Keep a list of your medicines with you. List all of the prescription medicines, nonprescription medicines, supplements, natural remedies, and vitamins that you take. Tell your healthcare providers who treat you about all of the products you are taking. Your provider can provide you with a form to keep track of them. Just ask. · Follow the directions that come with your medicine, including information about food or alcohol. Make sure you know how and when to take your medicine. Do not take more or less than you are supposed to take. · Keep all medicines out of the reach of children. · Store medicines according to the directions on the label. · Monitor yourself. Learn to know how your body reacts to your new medicine and keep track of how it makes you feel before attempting (If your provider has allowed you to do so) to drive or go to work. · Seek emergency medical attention if you think you have used too much of this medicine. An overdose of any prescription medicine can be fatal. Overdose symptoms may include extreme drowsiness, muscle weakness, confusion, cold and clammy skin, pinpoint pupils, shallow breathing, slow heart rate, fainting, or coma. · Don't share prescription medicines with others, even when they seem to have the same symptoms. What may be good for you may be harmful to others. · If you are no longer taking a prescribed medication and you have pills left please take your pills out of their original containers.  Mix crushed pills with an undesirable substance, such as cat litter or used coffee grounds. Put the mixture into a disposable container with a lid, such as an empty margarine tub, or into a sealable bag. Cover up or remove any of your personal information on the empty containers by covering it with black permanent marker or duct tape. Place the sealed container with the mixture, and the empty drug containers, in the trash. · If you use a medication that is in the form of a patch, dispose of used patches by folding them in half so that the sticky sides meet, and then flushing them down a toilet. They should not be placed in the household trash where children or pets can find them. · If you have any questions, ask your provider or pharmacist for more information. · Be sure to keep all appointments for provider visits or tests. We are committed to providing you with the best care possible. In order to help us achieve these goals please remember to bring all medications, herbal products, and over the counter supplements with you to each visit. If your provider has ordered testing for you, please be sure to follow up with our office if you have not received results within 7 days after the testing took place. *If you receive a survey after visiting one of our offices, please take time to share your experience concerning your physician office visit. These surveys are confidential and no health information about you is shared. We are eager to improve for you and we are counting on your feedback to help make that happen. Today's Medication Changes          These changes are accurate as of: 10/5/17 11:05 AM.  If you have any questions, ask your nurse or doctor. CHANGE how you take these medications           gabapentin 300 MG capsule   Commonly known as:  NEURONTIN   Instructions:  TAKE 1 CAPSULE NIGHTLY   Quantity:  90 capsule   Refills:  3   What changed:  See the new instructions. nitroGLYCERIN (NITROSTAT) 0.4 MG SL tablet Place 1 tablet under the tongue every 5 minutes as needed    tamsulosin (FLOMAX) 0.4 MG capsule Take 0.4 mg by mouth daily. MICROLET LANCETS MISC 1 each by Does not apply route 3 times daily. DX: DMII    omeprazole (PRILOSEC) 20 MG capsule (Discontinued) Take 1 capsule by mouth 2 times daily. aspirin  MG EC tablet Take 1 tablet by mouth daily. mometasone (NASONEX) 50 MCG/ACT nasal spray 2 sprays by Nasal route daily. therapeutic multivitamin-minerals (THERAGRAN-M) tablet Take 1 tablet by mouth daily. Allergies              Exenatide     nausea    Glucophage [Metformin Hydrochloride]     nausea    Zocor [Simvastatin]     Muscle pain         Additional Information        Basic Information     Date Of Birth Sex Race Ethnicity Preferred Language Preferred Written Language    1945 Male White Non-/Non  English English      Problem List as of 10/5/2017  Date Reviewed: 2/27/2017                Abdominal bloating    Type 2 diabetes mellitus without complication ([facility] [address]    Hyperlipidemia    CAD (coronary artery disease)    Hypertension    Depression      Preventive Care        Date Due    Hepatitis C screening is recommended for all adults regardless of risk factors born between [birth year] and 1965 at least once (lifetime) who have never been tested.  1945    Diabetic Foot Exam 2/27/1956    Tetanus Combination Vaccine (1 - Tdap) 2/27/1965    Pneumococcal Vaccines (two) for all adults aged 72 and over (1 of 2 - PCV13) 2/27/2011    Yearly Flu Vaccine (1) 9/1/2017    Eye Exam By An Eye Doctor 11/16/2017    Cholesterol Screening 2/22/2018    Hemoglobin A1C (Test For Long-Term Glucose Control) 6/5/2018    Urine Check For Kidney Problems 6/5/2018    Colonoscopy 9/12/2024            MyChart Signup           [employer] allows you to send messages to your doctor, view your test results, renew your prescriptions, schedule appointments, view visit

## 2017-10-05 NOTE — PROGRESS NOTES
I have recommended that this patient have a immunization for pneumonia and influenza but he declines at this time. I have discussed the risks and benefits of this examination with him. The patient verbalizes understanding.

## 2017-10-05 NOTE — PROGRESS NOTES
Have you seen any other physician or provider since your last visit? no    Have you had any other diagnostic tests since your last visit? no    Have you changed or stopped any medications since your last visit including any over-the-counter medicines, vitamins, or herbal medicines? no     Are you taking all your prescribed medications? Yes  If NO, why? -  N/A    Patient here for follow up on htn, hyperlipidemia and dm, he did not have labs done.

## 2017-10-06 ENCOUNTER — HOSPITAL ENCOUNTER (OUTPATIENT)
Dept: OTHER | Age: 72
Discharge: HOME | End: 2017-10-06
Attending: NURSE PRACTITIONER | Admitting: NURSE PRACTITIONER

## 2017-10-06 DIAGNOSIS — I10 ESSENTIAL HYPERTENSION: ICD-10-CM

## 2017-10-06 DIAGNOSIS — E11.9 DM TYPE 2 WITHOUT RETINOPATHY (HCC): ICD-10-CM

## 2017-10-06 LAB
A/G RATIO: 1.4 (ref 0.8–2)
ALBUMIN SERPL-MCNC: 3.9 G/DL (ref 3.4–4.8)
ALP BLD-CCNC: 41 U/L (ref 25–100)
ALT SERPL-CCNC: 17 U/L (ref 4–36)
ANION GAP SERPL CALCULATED.3IONS-SCNC: 10 MMOL/L (ref 3–16)
AST SERPL-CCNC: 13 U/L (ref 8–33)
BILIRUB SERPL-MCNC: 0.3 MG/DL (ref 0.3–1.2)
BUN BLDV-MCNC: 30 MG/DL (ref 6–20)
CALCIUM SERPL-MCNC: 9.2 MG/DL (ref 8.5–10.5)
CHLORIDE BLD-SCNC: 104 MMOL/L (ref 98–107)
CHOLESTEROL, TOTAL: 177 MG/DL (ref 0–200)
CO2: 28 MMOL/L (ref 20–30)
CREAT SERPL-MCNC: 1.4 MG/DL (ref 0.4–1.2)
GFR AFRICAN AMERICAN: >59
GFR NON-AFRICAN AMERICAN: 50
GLOBULIN: 2.8 G/DL
GLUCOSE BLD-MCNC: 134 MG/DL (ref 74–106)
HBA1C MFR BLD: 7.7 %
HCT VFR BLD CALC: 36.8 % (ref 40–54)
HDLC SERPL-MCNC: 35 MG/DL (ref 40–60)
HEMOGLOBIN: 12.5 G/DL (ref 13–18)
LDL CHOLESTEROL CALCULATED: 82 MG/DL
MCH RBC QN AUTO: 29.6 PG (ref 27–32)
MCHC RBC AUTO-ENTMCNC: 34 G/DL (ref 31–35)
MCV RBC AUTO: 87 FL (ref 80–100)
PDW BLD-RTO: 12.8 % (ref 11–16)
PLATELET # BLD: 293 K/UL (ref 150–400)
PMV BLD AUTO: 10.5 FL (ref 6–10)
POTASSIUM SERPL-SCNC: 4.6 MMOL/L (ref 3.4–5.1)
RBC # BLD: 4.23 M/UL (ref 4.5–6)
SODIUM BLD-SCNC: 142 MMOL/L (ref 136–145)
TOTAL PROTEIN: 6.7 G/DL (ref 6.4–8.3)
TRIGL SERPL-MCNC: 299 MG/DL (ref 0–249)
VLDLC SERPL CALC-MCNC: 60 MG/DL
WBC # BLD: 8.1 K/UL (ref 4–11)

## 2017-10-06 ASSESSMENT — ENCOUNTER SYMPTOMS
EYES NEGATIVE: 1
GASTROINTESTINAL NEGATIVE: 1
RESPIRATORY NEGATIVE: 1

## 2017-10-09 ENCOUNTER — TELEPHONE (OUTPATIENT)
Dept: PRIMARY CARE CLINIC | Age: 72
End: 2017-10-09

## 2017-11-30 DIAGNOSIS — Z79.4 TYPE 2 DIABETES MELLITUS WITHOUT COMPLICATION, WITH LONG-TERM CURRENT USE OF INSULIN (HCC): Primary | ICD-10-CM

## 2017-11-30 DIAGNOSIS — E11.9 TYPE 2 DIABETES MELLITUS WITHOUT COMPLICATION, WITH LONG-TERM CURRENT USE OF INSULIN (HCC): Primary | ICD-10-CM

## 2017-11-30 RX ORDER — BLOOD SUGAR DIAGNOSTIC
STRIP MISCELLANEOUS
Qty: 100 EACH | Refills: 3 | Status: SHIPPED | OUTPATIENT
Start: 2017-11-30 | End: 2018-11-09 | Stop reason: SDUPTHER

## 2017-12-06 DIAGNOSIS — R09.81 SINUS CONGESTION: ICD-10-CM

## 2017-12-08 RX ORDER — LORATADINE 10 MG/1
TABLET ORAL
Qty: 30 TABLET | Refills: 3 | Status: SHIPPED | OUTPATIENT
Start: 2017-12-08 | End: 2021-08-09 | Stop reason: ALTCHOICE

## 2018-02-01 ENCOUNTER — HOSPITAL ENCOUNTER (OUTPATIENT)
Dept: OTHER | Age: 73
Discharge: HOME | End: 2018-02-01
Attending: NURSE PRACTITIONER | Admitting: NURSE PRACTITIONER

## 2018-02-01 DIAGNOSIS — E78.5 HYPERLIPIDEMIA, UNSPECIFIED HYPERLIPIDEMIA TYPE: ICD-10-CM

## 2018-02-01 DIAGNOSIS — E11.8 TYPE 2 DIABETES MELLITUS WITH COMPLICATION, UNSPECIFIED LONG TERM INSULIN USE STATUS: ICD-10-CM

## 2018-02-01 DIAGNOSIS — I10 ESSENTIAL HYPERTENSION: ICD-10-CM

## 2018-02-01 DIAGNOSIS — E11.8 TYPE 2 DIABETES MELLITUS WITH COMPLICATION, UNSPECIFIED LONG TERM INSULIN USE STATUS: Primary | ICD-10-CM

## 2018-02-01 LAB
A/G RATIO: 1.4 (ref 0.8–2)
ALBUMIN SERPL-MCNC: 3.9 G/DL (ref 3.4–4.8)
ALP BLD-CCNC: 39 U/L (ref 25–100)
ALT SERPL-CCNC: 20 U/L (ref 4–36)
ANION GAP SERPL CALCULATED.3IONS-SCNC: 10 MMOL/L (ref 3–16)
AST SERPL-CCNC: 15 U/L (ref 8–33)
BILIRUB SERPL-MCNC: 0.4 MG/DL (ref 0.3–1.2)
BUN BLDV-MCNC: 27 MG/DL (ref 6–20)
CALCIUM SERPL-MCNC: 8.8 MG/DL (ref 8.5–10.5)
CHLORIDE BLD-SCNC: 103 MMOL/L (ref 98–107)
CHOLESTEROL, TOTAL: 182 MG/DL (ref 0–200)
CO2: 29 MMOL/L (ref 20–30)
CREAT SERPL-MCNC: 1.2 MG/DL (ref 0.4–1.2)
GFR AFRICAN AMERICAN: >59
GFR NON-AFRICAN AMERICAN: >59
GLOBULIN: 2.7 G/DL
GLUCOSE BLD-MCNC: 139 MG/DL (ref 74–106)
HBA1C MFR BLD: 7.9 %
HCT VFR BLD CALC: 37.3 % (ref 40–54)
HDLC SERPL-MCNC: 39 MG/DL (ref 40–60)
HEMOGLOBIN: 12.3 G/DL (ref 13–18)
LDL CHOLESTEROL CALCULATED: 82 MG/DL
MCH RBC QN AUTO: 29 PG (ref 27–32)
MCHC RBC AUTO-ENTMCNC: 33 G/DL (ref 31–35)
MCV RBC AUTO: 88 FL (ref 80–100)
PDW BLD-RTO: 12.3 % (ref 11–16)
PLATELET # BLD: 295 K/UL (ref 150–400)
PMV BLD AUTO: 10.1 FL (ref 6–10)
POTASSIUM SERPL-SCNC: 4.7 MMOL/L (ref 3.4–5.1)
RBC # BLD: 4.24 M/UL (ref 4.5–6)
SODIUM BLD-SCNC: 142 MMOL/L (ref 136–145)
TOTAL PROTEIN: 6.6 G/DL (ref 6.4–8.3)
TRIGL SERPL-MCNC: 305 MG/DL (ref 0–249)
VLDLC SERPL CALC-MCNC: 61 MG/DL
WBC # BLD: 7.5 K/UL (ref 4–11)

## 2018-02-02 RX ORDER — PANTOPRAZOLE SODIUM 40 MG/1
TABLET, DELAYED RELEASE ORAL
Qty: 90 TABLET | Refills: 5 | Status: SHIPPED | OUTPATIENT
Start: 2018-02-02 | End: 2018-06-15 | Stop reason: SDUPTHER

## 2018-02-05 ENCOUNTER — TRANSCRIBE ORDERS (OUTPATIENT)
Dept: ADMINISTRATIVE | Facility: HOSPITAL | Age: 73
End: 2018-02-05

## 2018-02-05 ENCOUNTER — APPOINTMENT (OUTPATIENT)
Dept: LAB | Facility: HOSPITAL | Age: 73
End: 2018-02-05

## 2018-02-05 ENCOUNTER — HOSPITAL ENCOUNTER (OUTPATIENT)
Dept: GENERAL RADIOLOGY | Facility: HOSPITAL | Age: 73
Discharge: HOME | End: 2018-02-05
Admitting: UROLOGY

## 2018-02-05 DIAGNOSIS — R33.8 HYPERPLASIA OF PROSTATE WITH URINARY RETENTION: Primary | ICD-10-CM

## 2018-02-05 DIAGNOSIS — N40.1 HYPERPLASIA OF PROSTATE WITH URINARY RETENTION: Primary | ICD-10-CM

## 2018-02-05 LAB
ALBUMIN SERPL-MCNC: 4 G/DL (ref 3.5–5)
ALBUMIN/GLOB SERPL: 1.4 G/DL (ref 1–2)
ALP SERPL-CCNC: 45 U/L (ref 38–126)
ALT SERPL W P-5'-P-CCNC: 36 U/L (ref 13–69)
ANION GAP SERPL CALCULATED.3IONS-SCNC: 15.7 MMOL/L
AST SERPL-CCNC: 24 U/L (ref 15–46)
BACTERIA UR QL AUTO: ABNORMAL /HPF
BILIRUB SERPL-MCNC: 0.5 MG/DL (ref 0.2–1.3)
BILIRUB UR QL STRIP: NEGATIVE
BUN BLD-MCNC: 25 MG/DL (ref 7–20)
BUN/CREAT SERPL: 20.8 (ref 6.3–21.9)
CALCIUM SPEC-SCNC: 9.1 MG/DL (ref 8.4–10.2)
CHLORIDE SERPL-SCNC: 104 MMOL/L (ref 98–107)
CLARITY UR: CLEAR
CO2 SERPL-SCNC: 28 MMOL/L (ref 26–30)
COLOR UR: YELLOW
CREAT BLD-MCNC: 1.2 MG/DL (ref 0.6–1.3)
GFR SERPL CREATININE-BSD FRML MDRD: 60 ML/MIN/1.73
GLOBULIN UR ELPH-MCNC: 2.9 GM/DL
GLUCOSE BLD-MCNC: 223 MG/DL (ref 74–98)
GLUCOSE UR STRIP-MCNC: ABNORMAL MG/DL
HGB UR QL STRIP.AUTO: NEGATIVE
HYALINE CASTS UR QL AUTO: ABNORMAL /LPF
KETONES UR QL STRIP: NEGATIVE
LEUKOCYTE ESTERASE UR QL STRIP.AUTO: NEGATIVE
MUCOUS THREADS URNS QL MICRO: ABNORMAL /HPF
NITRITE UR QL STRIP: NEGATIVE
PH UR STRIP.AUTO: <=5 [PH] (ref 5–8)
POTASSIUM BLD-SCNC: 4.7 MMOL/L (ref 3.5–5.1)
PROT SERPL-MCNC: 6.9 G/DL (ref 6.3–8.2)
PROT UR QL STRIP: NEGATIVE
RBC # UR: ABNORMAL /HPF
REF LAB TEST METHOD: ABNORMAL
SODIUM BLD-SCNC: 143 MMOL/L (ref 137–145)
SP GR UR STRIP: 1.01 (ref 1–1.03)
SQUAMOUS #/AREA URNS HPF: ABNORMAL /HPF
UROBILINOGEN UR QL STRIP: ABNORMAL
WBC UR QL AUTO: ABNORMAL /HPF

## 2018-02-05 PROCEDURE — 36415 COLL VENOUS BLD VENIPUNCTURE: CPT | Performed by: UROLOGY

## 2018-02-05 PROCEDURE — 80053 COMPREHEN METABOLIC PANEL: CPT | Performed by: UROLOGY

## 2018-02-05 PROCEDURE — 74018 RADEX ABDOMEN 1 VIEW: CPT

## 2018-02-05 PROCEDURE — 81001 URINALYSIS AUTO W/SCOPE: CPT | Performed by: UROLOGY

## 2018-02-15 ENCOUNTER — OFFICE VISIT (OUTPATIENT)
Dept: PRIMARY CARE CLINIC | Age: 73
End: 2018-02-15
Payer: MEDICARE

## 2018-02-15 VITALS
DIASTOLIC BLOOD PRESSURE: 50 MMHG | TEMPERATURE: 98.7 F | RESPIRATION RATE: 16 BRPM | HEIGHT: 67 IN | SYSTOLIC BLOOD PRESSURE: 112 MMHG | BODY MASS INDEX: 33.81 KG/M2 | OXYGEN SATURATION: 96 % | WEIGHT: 215.4 LBS | HEART RATE: 65 BPM

## 2018-02-15 DIAGNOSIS — E55.9 VITAMIN D DEFICIENCY: ICD-10-CM

## 2018-02-15 DIAGNOSIS — G47.30 SLEEP APNEA, UNSPECIFIED TYPE: ICD-10-CM

## 2018-02-15 DIAGNOSIS — M25.50 ARTHRALGIA, UNSPECIFIED JOINT: ICD-10-CM

## 2018-02-15 DIAGNOSIS — E08.00 DIABETES MELLITUS DUE TO UNDERLYING CONDITION WITH HYPEROSMOLARITY WITHOUT COMA, WITH LONG-TERM CURRENT USE OF INSULIN (HCC): Primary | ICD-10-CM

## 2018-02-15 DIAGNOSIS — Z79.4 DIABETES MELLITUS DUE TO UNDERLYING CONDITION WITH HYPEROSMOLARITY WITHOUT COMA, WITH LONG-TERM CURRENT USE OF INSULIN (HCC): Primary | ICD-10-CM

## 2018-02-15 DIAGNOSIS — I10 ESSENTIAL HYPERTENSION: ICD-10-CM

## 2018-02-15 PROCEDURE — 99214 OFFICE O/P EST MOD 30 MIN: CPT | Performed by: NURSE PRACTITIONER

## 2018-02-15 PROCEDURE — G8417 CALC BMI ABV UP PARAM F/U: HCPCS | Performed by: NURSE PRACTITIONER

## 2018-02-15 PROCEDURE — 4040F PNEUMOC VAC/ADMIN/RCVD: CPT | Performed by: NURSE PRACTITIONER

## 2018-02-15 PROCEDURE — G8484 FLU IMMUNIZE NO ADMIN: HCPCS | Performed by: NURSE PRACTITIONER

## 2018-02-15 PROCEDURE — 1123F ACP DISCUSS/DSCN MKR DOCD: CPT | Performed by: NURSE PRACTITIONER

## 2018-02-15 PROCEDURE — 3017F COLORECTAL CA SCREEN DOC REV: CPT | Performed by: NURSE PRACTITIONER

## 2018-02-15 PROCEDURE — 1036F TOBACCO NON-USER: CPT | Performed by: NURSE PRACTITIONER

## 2018-02-15 PROCEDURE — G8427 DOCREV CUR MEDS BY ELIG CLIN: HCPCS | Performed by: NURSE PRACTITIONER

## 2018-02-15 PROCEDURE — G8599 NO ASA/ANTIPLAT THER USE RNG: HCPCS | Performed by: NURSE PRACTITIONER

## 2018-02-15 RX ORDER — FUROSEMIDE 20 MG/1
20 TABLET ORAL DAILY
Qty: 90 TABLET | Refills: 3 | Status: SHIPPED | OUTPATIENT
Start: 2018-02-15 | End: 2018-06-28 | Stop reason: ALTCHOICE

## 2018-02-15 RX ORDER — CARVEDILOL 12.5 MG/1
6.25 TABLET ORAL 2 TIMES DAILY
Qty: 180 TABLET | Refills: 3 | Status: SHIPPED | OUTPATIENT
Start: 2018-02-15 | End: 2019-01-17 | Stop reason: SDUPTHER

## 2018-02-15 RX ORDER — LANCETS
1 EACH MISCELLANEOUS 3 TIMES DAILY
Qty: 400 EACH | Refills: 3 | Status: SHIPPED | OUTPATIENT
Start: 2018-02-15 | End: 2020-02-17 | Stop reason: SDUPTHER

## 2018-02-15 RX ORDER — GABAPENTIN 300 MG/1
CAPSULE ORAL
Qty: 90 CAPSULE | Refills: 3 | Status: SHIPPED | OUTPATIENT
Start: 2018-02-15 | End: 2018-06-15 | Stop reason: SDUPTHER

## 2018-03-04 ASSESSMENT — ENCOUNTER SYMPTOMS
GASTROINTESTINAL NEGATIVE: 1
EYES NEGATIVE: 1
RESPIRATORY NEGATIVE: 1

## 2018-03-05 NOTE — PROGRESS NOTES
SUBJECTIVE:    Patient ID: Melissa Loyd is a 68 y.o. male. Chief Complaint   Patient presents with    3 Month Follow-Up    Diabetes    Hypertension    Hyperlipidemia         HPI:  he presents for follow up on his diabetes his cholesterol is requesting refills today is blood pressures been doing pretty well he checks his blood sugar 2 to 3 times a week and it's been ranging hundred and 5.hundred and 29. He is having some insurance problems paying for his Lantus and he is also been using the same CPAP for the last 20 years series requested about getting a new CPAP machine. Patient's medications, allergies, past medical, surgical, social and family histories were reviewed and updated as appropriate.       Current Outpatient Prescriptions:     SITagliptin (JANUVIA) 100 MG tablet, TAKE 1 TABLET DAILY, Disp: 90 tablet, Rfl: 3    gabapentin (NEURONTIN) 300 MG capsule, TAKE 1 CAPSULE NIGHTLY., Disp: 90 capsule, Rfl: 3    furosemide (LASIX) 20 MG tablet, Take 1 tablet by mouth daily, Disp: 90 tablet, Rfl: 3    carvedilol (COREG) 12.5 MG tablet, Take 0.5 tablets by mouth 2 times daily, Disp: 180 tablet, Rfl: 3    MICROLET LANCETS MISC, 1 each by Does not apply route 3 times daily DX: DMII, Disp: 400 each, Rfl: 3    Insulin Degludec (TRESIBA FLEXTOUCH) 100 UNIT/ML SOPN, Inject 70 Units into the skin daily, Disp: 25 pen, Rfl: 3    diclofenac sodium 1 % GEL, Apply 4 g topically 2 times daily, Disp: 3 Tube, Rfl: 3    pantoprazole (PROTONIX) 40 MG tablet, TAKE 1 TABLET DAILY, Disp: 90 tablet, Rfl: 5    loratadine (CLARITIN) 10 MG tablet, TAKE ONE TABLET BY MOUTH EVERY DAY, Disp: 30 tablet, Rfl: 3    ADVOCATE INSULIN PEN NEEDLES 31G X 5 MM MISC, USE ONCE DAILY FOR INSULIN INJECTIONS, Disp: 100 each, Rfl: 3    Insulin Pen Needle 31G X 5 MM MISC, 1 each by Does not apply route daily Dx: e11.9, Disp: 100 each, Rfl: 5    prasugrel (EFFIENT) 10 MG TABS, Take 1 tablet by mouth daily, Disp: 90 tablet, Rfl: 3   Dispense: 400 each; Refill: 3  - Insulin Degludec (TRESIBA FLEXTOUCH) 100 UNIT/ML SOPN; Inject 70 Units into the skin daily  Dispense: 25 pen; Refill: 3  - CBC; Future  - Comprehensive Metabolic Panel; Future  - Hemoglobin A1C; Future    2. Essential hypertension  BP is stable. I have advised him on low-sodium diet, exercise and weight control. I am going to continue current medication . Will monitor his renal function every few months, have advised him to check blood pressure frequently and to keep a record of this.     - SITagliptin (JANUVIA) 100 MG tablet; TAKE 1 TABLET DAILY  Dispense: 90 tablet; Refill: 3  - furosemide (LASIX) 20 MG tablet; Take 1 tablet by mouth daily  Dispense: 90 tablet; Refill: 3  - carvedilol (COREG) 12.5 MG tablet; Take 0.5 tablets by mouth 2 times daily  Dispense: 180 tablet; Refill: 3  - Lipid Panel; Future    3. Sleep apnea, unspecified type    - External Referral To Pulmonology    4. Arthralgia, unspecified joint    - diclofenac sodium 1 % GEL; Apply 4 g topically 2 times daily  Dispense: 3 Tube; Refill: 3    5. Vitamin D deficiency    - Vitamin D 25 Hydroxy;  Future

## 2018-05-03 DIAGNOSIS — E11.9 TYPE 2 DIABETES MELLITUS WITHOUT COMPLICATION, WITH LONG-TERM CURRENT USE OF INSULIN (HCC): ICD-10-CM

## 2018-05-03 DIAGNOSIS — Z79.4 TYPE 2 DIABETES MELLITUS WITHOUT COMPLICATION, WITH LONG-TERM CURRENT USE OF INSULIN (HCC): ICD-10-CM

## 2018-05-03 RX ORDER — CALCIUM CITRATE/VITAMIN D3 200MG-6.25
TABLET ORAL
Qty: 300 STRIP | Refills: 3 | Status: SHIPPED | OUTPATIENT
Start: 2018-05-03 | End: 2019-07-29 | Stop reason: SDUPTHER

## 2018-06-12 ENCOUNTER — HOSPITAL ENCOUNTER (OUTPATIENT)
Dept: OTHER | Age: 73
Discharge: HOME | End: 2018-06-12
Attending: NURSE PRACTITIONER | Admitting: NURSE PRACTITIONER

## 2018-06-12 DIAGNOSIS — E08.00 DIABETES MELLITUS DUE TO UNDERLYING CONDITION WITH HYPEROSMOLARITY WITHOUT COMA, WITH LONG-TERM CURRENT USE OF INSULIN (HCC): ICD-10-CM

## 2018-06-12 DIAGNOSIS — I10 ESSENTIAL HYPERTENSION: ICD-10-CM

## 2018-06-12 DIAGNOSIS — Z79.4 DIABETES MELLITUS DUE TO UNDERLYING CONDITION WITH HYPEROSMOLARITY WITHOUT COMA, WITH LONG-TERM CURRENT USE OF INSULIN (HCC): ICD-10-CM

## 2018-06-12 DIAGNOSIS — E55.9 VITAMIN D DEFICIENCY: ICD-10-CM

## 2018-06-12 LAB
A/G RATIO: 1.7 (ref 0.8–2)
ALBUMIN SERPL-MCNC: 4.1 G/DL (ref 3.4–4.8)
ALP BLD-CCNC: 44 U/L (ref 25–100)
ALT SERPL-CCNC: 14 U/L (ref 4–36)
ANION GAP SERPL CALCULATED.3IONS-SCNC: 12 MMOL/L (ref 3–16)
AST SERPL-CCNC: 14 U/L (ref 8–33)
BILIRUB SERPL-MCNC: <0.2 MG/DL (ref 0.3–1.2)
BUN BLDV-MCNC: 34 MG/DL (ref 6–20)
CALCIUM SERPL-MCNC: 9.3 MG/DL (ref 8.5–10.5)
CHLORIDE BLD-SCNC: 102 MMOL/L (ref 98–107)
CHOLESTEROL, TOTAL: 194 MG/DL (ref 0–200)
CO2: 27 MMOL/L (ref 20–30)
CREAT SERPL-MCNC: 1.4 MG/DL (ref 0.4–1.2)
GFR AFRICAN AMERICAN: >59
GFR NON-AFRICAN AMERICAN: 50
GLOBULIN: 2.4 G/DL
GLUCOSE BLD-MCNC: 157 MG/DL (ref 74–106)
HBA1C MFR BLD: 7.3 %
HCT VFR BLD CALC: 37.7 % (ref 40–54)
HDLC SERPL-MCNC: 34 MG/DL (ref 40–60)
HEMOGLOBIN: 12.5 G/DL (ref 13–18)
LDL CHOLESTEROL CALCULATED: 98 MG/DL
MCH RBC QN AUTO: 29.5 PG (ref 27–32)
MCHC RBC AUTO-ENTMCNC: 33.2 G/DL (ref 31–35)
MCV RBC AUTO: 88.9 FL (ref 80–100)
PDW BLD-RTO: 12.4 % (ref 11–16)
PLATELET # BLD: 303 K/UL (ref 150–400)
PMV BLD AUTO: 10.3 FL (ref 6–10)
POTASSIUM SERPL-SCNC: 4.5 MMOL/L (ref 3.4–5.1)
RBC # BLD: 4.24 M/UL (ref 4.5–6)
SODIUM BLD-SCNC: 141 MMOL/L (ref 136–145)
TOTAL PROTEIN: 6.5 G/DL (ref 6.4–8.3)
TRIGL SERPL-MCNC: 312 MG/DL (ref 0–249)
VITAMIN D 25-HYDROXY: 23.8 (ref 32–100)
VLDLC SERPL CALC-MCNC: 62 MG/DL
WBC # BLD: 8.2 K/UL (ref 4–11)

## 2018-06-15 ENCOUNTER — OFFICE VISIT (OUTPATIENT)
Dept: PRIMARY CARE CLINIC | Age: 73
End: 2018-06-15
Payer: MEDICARE

## 2018-06-15 ENCOUNTER — OFFICE VISIT (OUTPATIENT)
Dept: CARDIOLOGY | Facility: CLINIC | Age: 73
End: 2018-06-15

## 2018-06-15 VITALS
HEART RATE: 59 BPM | WEIGHT: 216.2 LBS | BODY MASS INDEX: 33.93 KG/M2 | HEIGHT: 67 IN | SYSTOLIC BLOOD PRESSURE: 118 MMHG | DIASTOLIC BLOOD PRESSURE: 50 MMHG

## 2018-06-15 VITALS
OXYGEN SATURATION: 95 % | BODY MASS INDEX: 33.68 KG/M2 | SYSTOLIC BLOOD PRESSURE: 104 MMHG | DIASTOLIC BLOOD PRESSURE: 48 MMHG | HEART RATE: 55 BPM | WEIGHT: 214.6 LBS | TEMPERATURE: 98 F | HEIGHT: 67 IN | RESPIRATION RATE: 16 BRPM

## 2018-06-15 DIAGNOSIS — E78.5 HYPERLIPIDEMIA LDL GOAL <70: ICD-10-CM

## 2018-06-15 DIAGNOSIS — N18.2 CHRONIC RENAL FAILURE, STAGE 2 (MILD): ICD-10-CM

## 2018-06-15 DIAGNOSIS — F33.42 RECURRENT MAJOR DEPRESSIVE DISORDER, IN FULL REMISSION (HCC): ICD-10-CM

## 2018-06-15 DIAGNOSIS — M25.552 BILATERAL HIP PAIN: ICD-10-CM

## 2018-06-15 DIAGNOSIS — I10 ESSENTIAL HYPERTENSION: ICD-10-CM

## 2018-06-15 DIAGNOSIS — Z13.29 THYROID DISORDER SCREEN: ICD-10-CM

## 2018-06-15 DIAGNOSIS — M25.551 BILATERAL HIP PAIN: ICD-10-CM

## 2018-06-15 DIAGNOSIS — G62.9 NEUROPATHY: ICD-10-CM

## 2018-06-15 DIAGNOSIS — E78.5 HYPERLIPIDEMIA, UNSPECIFIED HYPERLIPIDEMIA TYPE: ICD-10-CM

## 2018-06-15 DIAGNOSIS — E11.9 TYPE 2 DIABETES MELLITUS WITHOUT COMPLICATION, WITH LONG-TERM CURRENT USE OF INSULIN (HCC): Primary | ICD-10-CM

## 2018-06-15 DIAGNOSIS — E55.9 VITAMIN D DEFICIENCY: ICD-10-CM

## 2018-06-15 DIAGNOSIS — I25.810 CORONARY ARTERY DISEASE INVOLVING CORONARY BYPASS GRAFT OF NATIVE HEART WITHOUT ANGINA PECTORIS: Primary | ICD-10-CM

## 2018-06-15 DIAGNOSIS — I10 BENIGN HYPERTENSION: ICD-10-CM

## 2018-06-15 DIAGNOSIS — F33.42 RECURRENT MAJOR DEPRESSION IN FULL REMISSION (HCC): ICD-10-CM

## 2018-06-15 DIAGNOSIS — Z79.4 TYPE 2 DIABETES MELLITUS WITHOUT COMPLICATION, WITH LONG-TERM CURRENT USE OF INSULIN (HCC): Primary | ICD-10-CM

## 2018-06-15 DIAGNOSIS — K21.9 GASTROESOPHAGEAL REFLUX DISEASE WITHOUT ESOPHAGITIS: ICD-10-CM

## 2018-06-15 PROCEDURE — G8427 DOCREV CUR MEDS BY ELIG CLIN: HCPCS | Performed by: NURSE PRACTITIONER

## 2018-06-15 PROCEDURE — 99213 OFFICE O/P EST LOW 20 MIN: CPT | Performed by: INTERNAL MEDICINE

## 2018-06-15 PROCEDURE — 1123F ACP DISCUSS/DSCN MKR DOCD: CPT | Performed by: NURSE PRACTITIONER

## 2018-06-15 PROCEDURE — G8599 NO ASA/ANTIPLAT THER USE RNG: HCPCS | Performed by: NURSE PRACTITIONER

## 2018-06-15 PROCEDURE — 2022F DILAT RTA XM EVC RTNOPTHY: CPT | Performed by: NURSE PRACTITIONER

## 2018-06-15 PROCEDURE — 1036F TOBACCO NON-USER: CPT | Performed by: NURSE PRACTITIONER

## 2018-06-15 PROCEDURE — 99214 OFFICE O/P EST MOD 30 MIN: CPT | Performed by: NURSE PRACTITIONER

## 2018-06-15 PROCEDURE — G8417 CALC BMI ABV UP PARAM F/U: HCPCS | Performed by: NURSE PRACTITIONER

## 2018-06-15 PROCEDURE — 3017F COLORECTAL CA SCREEN DOC REV: CPT | Performed by: NURSE PRACTITIONER

## 2018-06-15 PROCEDURE — 3045F PR MOST RECENT HEMOGLOBIN A1C LEVEL 7.0-9.0%: CPT | Performed by: NURSE PRACTITIONER

## 2018-06-15 PROCEDURE — 4040F PNEUMOC VAC/ADMIN/RCVD: CPT | Performed by: NURSE PRACTITIONER

## 2018-06-15 RX ORDER — PANTOPRAZOLE SODIUM 40 MG/1
TABLET, DELAYED RELEASE ORAL
Qty: 90 TABLET | Refills: 3 | Status: SHIPPED | OUTPATIENT
Start: 2018-06-15 | End: 2019-09-05 | Stop reason: ALTCHOICE

## 2018-06-15 RX ORDER — NITROGLYCERIN 0.4 MG/1
0.4 TABLET SUBLINGUAL
Qty: 25 TABLET | Refills: 11 | Status: SHIPPED | OUTPATIENT
Start: 2018-06-15

## 2018-06-15 RX ORDER — ATORVASTATIN CALCIUM 40 MG/1
40 TABLET, FILM COATED ORAL NIGHTLY
Qty: 90 TABLET | Refills: 3 | Status: SHIPPED | OUTPATIENT
Start: 2018-06-15 | End: 2019-04-17 | Stop reason: SDUPTHER

## 2018-06-15 RX ORDER — PRASUGREL 10 MG/1
10 TABLET, FILM COATED ORAL DAILY
Qty: 90 TABLET | Refills: 3 | Status: SHIPPED | OUTPATIENT
Start: 2018-06-15 | End: 2019-04-17 | Stop reason: SDUPTHER

## 2018-06-15 RX ORDER — GABAPENTIN 300 MG/1
CAPSULE ORAL
Qty: 90 CAPSULE | Refills: 3 | Status: SHIPPED | OUTPATIENT
Start: 2018-06-15 | End: 2019-01-17 | Stop reason: SDUPTHER

## 2018-06-15 RX ORDER — CITALOPRAM 40 MG/1
40 TABLET ORAL DAILY
Qty: 90 TABLET | Refills: 3 | Status: SHIPPED | OUTPATIENT
Start: 2018-06-15 | End: 2019-07-17 | Stop reason: SDUPTHER

## 2018-06-15 RX ORDER — ATORVASTATIN CALCIUM 80 MG/1
80 TABLET, FILM COATED ORAL DAILY
Qty: 90 TABLET | Refills: 1 | Status: SHIPPED | OUTPATIENT
Start: 2018-06-15 | End: 2022-03-17

## 2018-06-15 RX ORDER — AMLODIPINE BESYLATE 10 MG/1
10 TABLET ORAL DAILY
Qty: 90 TABLET | Refills: 3 | Status: SHIPPED | OUTPATIENT
Start: 2018-06-15 | End: 2019-04-17 | Stop reason: SDUPTHER

## 2018-06-15 ASSESSMENT — PATIENT HEALTH QUESTIONNAIRE - PHQ9
SUM OF ALL RESPONSES TO PHQ QUESTIONS 1-9: 0
2. FEELING DOWN, DEPRESSED OR HOPELESS: 0
1. LITTLE INTEREST OR PLEASURE IN DOING THINGS: 0
SUM OF ALL RESPONSES TO PHQ9 QUESTIONS 1 & 2: 0

## 2018-06-15 ASSESSMENT — ENCOUNTER SYMPTOMS
EYES NEGATIVE: 1
RESPIRATORY NEGATIVE: 1

## 2018-06-15 NOTE — PROGRESS NOTES
Donny Jerry  1945  [phone]      06/15/2018    Anum Alonso, APRREX    Chief Complaint   Patient presents with   • Coronary Artery Disease       Problem List:   1. Coronary artery disease:  a.  CABG x3 in 2001, [facility] by Dr. Peng. LIMA to the LAD, SVG to obtuse marginal, SVG to the PDA.  b. Stress echocardiogram, 11/13/2007, showing no wall motion  abnormalities.  No evidence of ischemia.  Normal EF.  c. A 2-D echocardiogram, 11/13/2007, mild concentric LVH, trace of MR and TR.    d. Adenosine Cardiolite, 05/07/2009:  Normal LVEF with a stress induced mid and distal inferior defect compatible with  ischemia.  e. [facility], 06/03/2009, Dr. Russo:  LVEF 45% to 50%.  Overlapping Cypher TAWANNA 2.5 x 28 and 2.5 x 18 to the SVG to obtuse marginal graft.  SVG to PDA graft  had a proximal stenosis of 60% with a distal stenosis of 50% to 60%.  f. [facility], 07/06/2009:  PTCA and Taxus TAWANNA (2.25 x 12 mm) to the mid PDA; Cypher TAWANNA (2.5 x 18mm) to the distal SVG to the PDA;  and Cypher TAWANNA (3.0 x 28mm) to the proximal SVG to the PDA.  g. [facility] for recurrent chest pain, 01/29/2010: Revealing patent stents. Ranexa initiated 500 mg q.12 h.   h. [facility],  08/16/2011: LVEF of 45% to 50% with an occluded SVG to an obtuse marginal branch which could not be revascularized, patent LIMA to the LAD, noncritical graft disease in the SVG to the PDA, multiple small areas of distal vessel disease and collateral flow were seen.   i. [facility] for recurrent anginal symptoms, 04/23/2010, with PTCA and Promus TAWANNA (3.0 x 28mm) to the proximal SVG to the PDA for in-stent restenosis.    j. [facility], 07/06/2010,  Dr. Russo:  EF 40% to 45%.  3.5 x 23 mm Promus drug-eluting stent in the proximal SVG to OM, 2.5 x 18 mm Promus drug-eluting stent to distal SVG to PDA due to in-stent restenosis.    k. Left heart catheterization, 11/16/2010, with placement of  a 3.0 x 16 mm Taxus drug-eluting stent to the SVG to the RCA proximally and a  2.5 x 16 mm paclitaxel stent distally in the SVG to the RCA.  l. Left heart catheterization, 3.0 x 24-mm Promus element drug-eluting stent to a 90% lesion in the mid portion  of the SVG to the PDA.    m. Left heart catheterization for recurrent angina, 06/24/2014, Dr. Russo:  PTCA of the SVG to the PDA using a 3 x 12 mm NC TREK balloon.    2. Benign hypertension.  3. Hypercholesterolemia.  4. Type 2 diabetes, diagnosed 2 years ago.  5. Obstructive sleep apnea/CPAP.  6. Enlarged prostate with anticipated  TURP with Dr. Bernal.  7. Obesity.  8. Depression.  9. Surgical history:  a.  CABG x3, Dr. Peng, [facility], 2001.  b. Negative colonoscopy, 2014.     Allergies   Allergen Reactions   • Bisoprolol Other (See Comments)     Agitation     • Byetta 10 Mcg Pen [Exenatide]      nausea   • Crestor [Rosuvastatin Calcium] Myalgia   • Metformin And Related    • Plavix [Clopidogrel Bisulfate] Other (See Comments)     resistance   • Zocor [Simvastatin] Myalgia       Current Medications:      Current Outpatient Prescriptions:   •  amLODIPine (NORVASC) 10 MG tablet, Take 10 mg by mouth Daily., Disp: , Rfl:   •  aspirin 325 MG tablet, Take 325 mg by mouth Daily., Disp: , Rfl:   •  atorvastatin (LIPITOR) 40 MG tablet, Take 40 mg by mouth Daily., Disp: , Rfl:   •  Canagliflozin (INVOKANA) 300 MG tablet, Take 300 mg by mouth Daily., Disp: , Rfl:   •  carvedilol (COREG) 12.5 MG tablet, Take 6.25 mg by mouth 2 (Two) Times a Day With Meals. Pt takes 0.5 tab BID, Disp: , Rfl:   •  citalopram (CeleXA) 40 MG tablet, Take 40 mg by mouth Daily., Disp: , Rfl:   •  doxazosin (CARDURA) 4 MG tablet, Take 4 mg by mouth Every Night., Disp: , Rfl:   •  finasteride (PROSCAR) 5 MG tablet, Take 5 mg by mouth Daily., Disp: , Rfl:   •  furosemide (LASIX) 20 MG tablet, Take 20 mg by mouth Daily., Disp: , Rfl:   •  gabapentin (NEURONTIN) 300 MG capsule, Take 300 mg by mouth Daily., Disp: , Rfl:   •  insulin degludec (TRESIBA FLEXTOUCH)  "100 UNIT/ML solution pen-injector injection, Inject 70 Units under the skin Every Night., Disp: , Rfl:   •  lisinopril (PRINIVIL,ZESTRIL) 40 MG tablet, Take 40 mg by mouth Daily., Disp: , Rfl:   •  Multiple Vitamin (MULTI VITAMIN DAILY PO), Take 1 tablet by mouth Daily., Disp: , Rfl:   •  nitroglycerin (NITROSTAT) 0.4 MG SL tablet, Place 0.4 mg under the tongue Every 5 (Five) Minutes As Needed for chest pain. Take no more than 3 doses in 15 minutes., Disp: , Rfl:   •  pantoprazole (PROTONIX) 40 MG EC tablet, Take 40 mg by mouth Daily., Disp: , Rfl:   •  prasugrel (EFFIENT) 10 MG tablet, Take 10 mg by mouth Daily., Disp: , Rfl:   •  SITagliptin (JANUVIA) 100 MG tablet, Take 100 mg by mouth Daily. 1/2 tab daily, Disp: , Rfl:   •  tamsulosin (FLOMAX) 0.4 MG capsule 24 hr capsule, Take 1 capsule by mouth Every Night., Disp: , Rfl:     HPI    Donny Jerry presents today for 12 month follow up of coronary artery disease, hypertension, and hyperlipidemia. Since last visit, patient has been feeling well overall from a cardiovascular standpoint. He denies chest pain or discomfort. He states that his breathing has been stable. Patient denies chest pain, palpitations, shortness of breath, edema, PND, orthopnea, dizziness, and syncope. He was told by ASHLEIGH Nava, that he is dehydrated. He will have labs drawn again in 4 months.    The following portions of the patient's history were reviewed and updated as appropriate: allergies, current medications and problem list.    Pertinent positives as listed in the HPI.  All other systems reviewed are negative.    Vitals:    06/15/18 1335   BP: 118/50   BP Location: Right arm   Patient Position: Sitting   Pulse: 59   Weight: 98.1 kg (216 lb 3.2 oz)   Height: 170.2 cm (67\")       Physical Exam:  General: Alert and oriented to person, place, and time.  Neck: Jugular venous pressure is within normal limits. Carotids have normal upstrokes without bruits.   Cardiovascular: Regular " rate and rhythm without murmur gallop or rub.  Lungs: Clear without rales or wheezes. Equal expansion is noted.   Extremities: Show no edema.  Skin: warm and dry.  Neurologic: nonfocal    Diagnostic Data:    Labs (6/12/2018):  · , Trig 312, HDL 34, LDL 98  · Sodium 141, Potassium 4.5, Glucose 157, BUN 34, Creatinine 1.4, AST 14, ALT 14  · HGB A1c 7.3  · WBC 8.2, RBC 4.24, HGB 12.5, HCT 37.7, MCV 88.9,     Procedures    Assessment:      ICD-10-CM ICD-9-CM   1. Coronary artery disease involving coronary bypass graft of native heart without angina pectoris I25.810 414.05   2. Benign hypertension I10 401.1   3. Hyperlipidemia LDL goal <70 E78.5 272.4       Plan:    1. Discontinue Lasix due to dehydration and poor kidney function. keep to take prn.  2. Increase Lipitor from 40 mg daily to 80 mg daily.  3. Continue current medications.  4. F/up in 12 months or sooner if needed.    Scribed for Liz Russo MD by Cedric Mix. 6/15/2018  1:47 PM     I Liz Russo MD personally performed the services described in this documentation as scribed by the above individual in my presence, and it is both accurate and complete.    Liz Russo MD, [facility]

## 2018-06-28 ENCOUNTER — OFFICE VISIT (OUTPATIENT)
Dept: PRIMARY CARE CLINIC | Age: 73
End: 2018-06-28
Payer: MEDICARE

## 2018-06-28 ENCOUNTER — HOSPITAL ENCOUNTER (OUTPATIENT)
Dept: OTHER | Age: 73
Discharge: HOME | End: 2018-06-28
Attending: NURSE PRACTITIONER | Admitting: NURSE PRACTITIONER

## 2018-06-28 VITALS
DIASTOLIC BLOOD PRESSURE: 48 MMHG | TEMPERATURE: 97.7 F | OXYGEN SATURATION: 96 % | HEIGHT: 67 IN | HEART RATE: 58 BPM | RESPIRATION RATE: 16 BRPM | SYSTOLIC BLOOD PRESSURE: 124 MMHG | WEIGHT: 216.8 LBS | BODY MASS INDEX: 34.03 KG/M2

## 2018-06-28 DIAGNOSIS — M62.838 MUSCLE SPASM: ICD-10-CM

## 2018-06-28 DIAGNOSIS — I10 ESSENTIAL HYPERTENSION: Primary | ICD-10-CM

## 2018-06-28 DIAGNOSIS — I10 ESSENTIAL HYPERTENSION: ICD-10-CM

## 2018-06-28 DIAGNOSIS — N18.30 CHRONIC RENAL FAILURE, STAGE 3 (MODERATE) (HCC): ICD-10-CM

## 2018-06-28 DIAGNOSIS — G89.29 CHRONIC NECK PAIN: ICD-10-CM

## 2018-06-28 DIAGNOSIS — M54.2 CHRONIC NECK PAIN: ICD-10-CM

## 2018-06-28 PROBLEM — F33.42 RECURRENT MAJOR DEPRESSIVE DISORDER, IN FULL REMISSION (HCC): Status: ACTIVE | Noted: 2018-06-28

## 2018-06-28 LAB
ANION GAP SERPL CALCULATED.3IONS-SCNC: 11 MMOL/L (ref 3–16)
BUN BLDV-MCNC: 31 MG/DL (ref 6–20)
CALCIUM SERPL-MCNC: 9.2 MG/DL (ref 8.5–10.5)
CHLORIDE BLD-SCNC: 104 MMOL/L (ref 98–107)
CO2: 26 MMOL/L (ref 20–30)
CREAT SERPL-MCNC: 1.2 MG/DL (ref 0.4–1.2)
GFR AFRICAN AMERICAN: >59
GFR NON-AFRICAN AMERICAN: >59
GLUCOSE BLD-MCNC: 129 MG/DL (ref 74–106)
POTASSIUM SERPL-SCNC: 5 MMOL/L (ref 3.4–5.1)
SODIUM BLD-SCNC: 141 MMOL/L (ref 136–145)

## 2018-06-28 PROCEDURE — G8599 NO ASA/ANTIPLAT THER USE RNG: HCPCS | Performed by: NURSE PRACTITIONER

## 2018-06-28 PROCEDURE — 3017F COLORECTAL CA SCREEN DOC REV: CPT | Performed by: NURSE PRACTITIONER

## 2018-06-28 PROCEDURE — 1123F ACP DISCUSS/DSCN MKR DOCD: CPT | Performed by: NURSE PRACTITIONER

## 2018-06-28 PROCEDURE — 99213 OFFICE O/P EST LOW 20 MIN: CPT | Performed by: NURSE PRACTITIONER

## 2018-06-28 PROCEDURE — 1036F TOBACCO NON-USER: CPT | Performed by: NURSE PRACTITIONER

## 2018-06-28 PROCEDURE — G8427 DOCREV CUR MEDS BY ELIG CLIN: HCPCS | Performed by: NURSE PRACTITIONER

## 2018-06-28 PROCEDURE — 4040F PNEUMOC VAC/ADMIN/RCVD: CPT | Performed by: NURSE PRACTITIONER

## 2018-06-28 PROCEDURE — G8417 CALC BMI ABV UP PARAM F/U: HCPCS | Performed by: NURSE PRACTITIONER

## 2018-06-28 RX ORDER — METHOCARBAMOL 500 MG/1
500 TABLET, FILM COATED ORAL NIGHTLY
Qty: 30 TABLET | Refills: 0 | Status: SHIPPED | OUTPATIENT
Start: 2018-06-28 | End: 2018-07-28

## 2018-06-28 RX ORDER — ACETAMINOPHEN 160 MG
2 TABLET,DISINTEGRATING ORAL DAILY
COMMUNITY

## 2018-06-28 ASSESSMENT — ENCOUNTER SYMPTOMS
ABDOMINAL DISTENTION: 1
BACK PAIN: 1

## 2018-07-07 NOTE — PROGRESS NOTES
(6/28/2018) 98 - 107 mmol/L 102 (6/12/2018) 98 - 107 mmol/L   CO2 26 (6/28/2018) 20 - 30 mmol/L 27 (6/12/2018) 20 - 30 mmol/L   CREATININE 1.2 (6/28/2018) 0.4 - 1.2 mg/dL 1.4 (H) (6/12/2018) 0.4 - 1.2 mg/dL   GFR  >59 (6/28/2018) >59 >59 (6/12/2018) >59   GFR Non- >59 (6/28/2018) >59 50 (L) (6/12/2018) >59   Globulin 2.4 (6/12/2018) g/dL Not in Time Range    Glucose 129 (H) (6/28/2018) 74 - 106 mg/dL 157 (H) (6/12/2018) 74 - 106 mg/dL   Potassium 5.0 (6/28/2018) 3.4 - 5.1 mmol/L 4.5 (6/12/2018) 3.4 - 5.1 mmol/L   Sodium 141 (6/28/2018) 136 - 145 mmol/L 141 (6/12/2018) 136 - 145 mmol/L   Total Bilirubin <0.2 (L) (6/12/2018) 0.3 - 1.2 mg/dL Not in Time Range    Total Protein 6.5 (6/12/2018) 6.4 - 8.3 g/dL Not in Time Range        Hemoglobin A1C (%)   Date Value   06/12/2018 7.3 (H)     Microalbumin, Random Urine (mg/dL)   Date Value   06/05/2017 1.50     LDL Calculated (mg/dL)   Date Value   06/12/2018 98         Lab Results   Component Value Date    WBC 8.2 06/12/2018    NEUTROABS 7.0 06/29/2015    HGB 12.5 06/12/2018    HCT 37.7 06/12/2018    MCV 88.9 06/12/2018     06/12/2018       Lab Results   Component Value Date    TSH 2.19 10/27/2016         ASSESSMENT/PLAN:     Morena Naylor was seen today for hypertension and edema. Diagnoses and all orders for this visit:    Essential hypertension  -     BASIC METABOLIC PANEL; Future    Chronic renal failure, stage 3 (moderate)  -     BASIC METABOLIC PANEL; Future    Chronic neck pain  -     methocarbamol (ROBAXIN) 500 MG tablet; Take 1 tablet by mouth nightly    Muscle spasm  -     methocarbamol (ROBAXIN) 500 MG tablet;  Take 1 tablet by mouth nightly      Medications Discontinued During This Encounter   Medication Reason    furosemide (LASIX) 20 MG tablet DISCONTINUED BY ANOTHER CLINICIAN

## 2018-08-11 ENCOUNTER — APPOINTMENT (OUTPATIENT)
Dept: GENERAL RADIOLOGY | Facility: HOSPITAL | Age: 73
End: 2018-08-11

## 2018-08-11 ENCOUNTER — HOSPITAL ENCOUNTER (EMERGENCY)
Facility: HOSPITAL | Age: 73
Discharge: HOME | End: 2018-08-11
Attending: EMERGENCY MEDICINE | Admitting: EMERGENCY MEDICINE

## 2018-08-11 VITALS
BODY MASS INDEX: 31.99 KG/M2 | TEMPERATURE: 98.9 F | OXYGEN SATURATION: 96 % | DIASTOLIC BLOOD PRESSURE: 64 MMHG | SYSTOLIC BLOOD PRESSURE: 157 MMHG | RESPIRATION RATE: 16 BRPM | WEIGHT: 216 LBS | HEIGHT: 69 IN | HEART RATE: 67 BPM

## 2018-08-11 DIAGNOSIS — R55 NEAR SYNCOPE: ICD-10-CM

## 2018-08-11 DIAGNOSIS — R07.81 RIB PAIN: ICD-10-CM
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
## 2018-08-11 LAB
ALBUMIN SERPL-MCNC: 3.3 G/DL (ref 3.5–5)
ALBUMIN/GLOB SERPL: 1.1 G/DL (ref 1–2)
ALP SERPL-CCNC: 38 U/L (ref 38–126)
ALT SERPL W P-5'-P-CCNC: 27 U/L (ref 13–69)
ANION GAP SERPL CALCULATED.3IONS-SCNC: 12.7 MMOL/L (ref 10–20)
AST SERPL-CCNC: 20 U/L (ref 15–46)
BASOPHILS # BLD AUTO: 0.07 10*3/MM3 (ref 0–0.2)
BASOPHILS NFR BLD AUTO: 0.9 % (ref 0–2.5)
BILIRUB SERPL-MCNC: 0.7 MG/DL (ref 0.2–1.3)
BILIRUB UR QL STRIP: NEGATIVE
BUN BLD-MCNC: 29 MG/DL (ref 7–20)
BUN/CREAT SERPL: 20.7 (ref 6.3–21.9)
CALCIUM SPEC-SCNC: 8.2 MG/DL (ref 8.4–10.2)
CHLORIDE SERPL-SCNC: 109 MMOL/L (ref 98–107)
CLARITY UR: CLEAR
CO2 SERPL-SCNC: 23 MMOL/L (ref 26–30)
COLOR UR: YELLOW
CREAT BLD-MCNC: 1.4 MG/DL (ref 0.6–1.3)
DEPRECATED RDW RBC AUTO: 39.6 FL (ref 37–54)
EOSINOPHIL # BLD AUTO: 0.18 10*3/MM3 (ref 0–0.7)
EOSINOPHIL NFR BLD AUTO: 2.2 % (ref 0–7)
ERYTHROCYTE [DISTWIDTH] IN BLOOD BY AUTOMATED COUNT: 12.2 % (ref 11.5–14.5)
GFR SERPL CREATININE-BSD FRML MDRD: 50 ML/MIN/1.73
GLOBULIN UR ELPH-MCNC: 3.1 GM/DL
GLUCOSE BLD-MCNC: 182 MG/DL (ref 74–98)
GLUCOSE UR STRIP-MCNC: ABNORMAL MG/DL
HCT VFR BLD AUTO: 31.9 % (ref 42–52)
HGB BLD-MCNC: 10.9 G/DL (ref 14–18)
HGB UR QL STRIP.AUTO: NEGATIVE
IMM GRANULOCYTES # BLD: 0.03 10*3/MM3 (ref 0–0.06)
IMM GRANULOCYTES NFR BLD: 0.4 % (ref 0–0.6)
KETONES UR QL STRIP: NEGATIVE
LEUKOCYTE ESTERASE UR QL STRIP.AUTO: NEGATIVE
LYMPHOCYTES # BLD AUTO: 1.13 10*3/MM3 (ref 0.6–3.4)
LYMPHOCYTES NFR BLD AUTO: 13.9 % (ref 10–50)
MCH RBC QN AUTO: 30.2 PG (ref 27–31)
MCHC RBC AUTO-ENTMCNC: 34.2 G/DL (ref 30–37)
MCV RBC AUTO: 88.4 FL (ref 80–94)
MONOCYTES # BLD AUTO: 0.56 10*3/MM3 (ref 0–0.9)
MONOCYTES NFR BLD AUTO: 6.9 % (ref 0–12)
NEUTROPHILS # BLD AUTO: 6.14 10*3/MM3 (ref 2–6.9)
NEUTROPHILS NFR BLD AUTO: 75.7 % (ref 37–80)
NITRITE UR QL STRIP: NEGATIVE
NRBC BLD MANUAL-RTO: 0 /100 WBC (ref 0–0)
PH UR STRIP.AUTO: 5.5 [PH] (ref 5–8)
PLATELET # BLD AUTO: 246 10*3/MM3 (ref 130–400)
PMV BLD AUTO: 10 FL (ref 6–12)
POTASSIUM BLD-SCNC: 4.7 MMOL/L (ref 3.5–5.1)
PROT SERPL-MCNC: 6.4 G/DL (ref 6.3–8.2)
PROT UR QL STRIP: NEGATIVE
RBC # BLD AUTO: 3.61 10*6/MM3 (ref 4.7–6.1)
SODIUM BLD-SCNC: 140 MMOL/L (ref 137–145)
SP GR UR STRIP: 1.02 (ref 1–1.03)
TROPONIN I SERPL-MCNC: <0.012 NG/ML (ref 0–0.03)
UROBILINOGEN UR QL STRIP: ABNORMAL
WBC NRBC COR # BLD: 8.11 10*3/MM3 (ref 4.8–10.8)

## 2018-08-11 PROCEDURE — 99284 EMERGENCY DEPT VISIT MOD MDM: CPT

## 2018-08-11 PROCEDURE — 84484 ASSAY OF TROPONIN QUANT: CPT | Performed by: EMERGENCY MEDICINE

## 2018-08-11 PROCEDURE — 85025 COMPLETE CBC W/AUTO DIFF WBC: CPT | Performed by: EMERGENCY MEDICINE

## 2018-08-11 PROCEDURE — 96360 HYDRATION IV INFUSION INIT: CPT

## 2018-08-11 PROCEDURE — 80053 COMPREHEN METABOLIC PANEL: CPT | Performed by: EMERGENCY MEDICINE

## 2018-08-11 PROCEDURE — 81003 URINALYSIS AUTO W/O SCOPE: CPT | Performed by: EMERGENCY MEDICINE

## 2018-08-11 PROCEDURE — 71101 X-RAY EXAM UNILAT RIBS/CHEST: CPT

## 2018-08-11 PROCEDURE — 93005 ELECTROCARDIOGRAM TRACING: CPT | Performed by: EMERGENCY MEDICINE

## 2018-08-11 RX ORDER — TRAMADOL HYDROCHLORIDE 50 MG/1
50 TABLET ORAL EVERY 8 HOURS PRN
Qty: 9 TABLET | Refills: 0 | Status: SHIPPED | OUTPATIENT
Start: 2018-08-11 | End: 2019-06-20

## 2018-08-11 RX ORDER — HYDROCODONE BITARTRATE AND ACETAMINOPHEN 5; 325 MG/1; MG/1
1 TABLET ORAL ONCE
Status: COMPLETED | OUTPATIENT
Start: 2018-08-11 | End: 2018-08-11

## 2018-08-11 RX ADMIN — HYDROCODONE BITARTRATE AND ACETAMINOPHEN 1 TABLET: 5; 325 TABLET ORAL at 17:40

## 2018-08-11 RX ADMIN — SODIUM CHLORIDE 1000 ML: 9 INJECTION, SOLUTION INTRAVENOUS at 16:32

## 2018-08-11 NOTE — ED PROVIDER NOTES
TRIAGE CHIEF COMPLAINT:     Nursing and triage notes reviewed    Chief Complaint   Patient presents with   • Syncope      HPI: Donny Jerry is a 73 y.o. male who presents to the emergency department complaining of a fall and left rib pain.  Patient states earlier in the day he felt dizzy which caused him to fall over and hit his ribs on the way down.  Patient denies hitting his head or losing consciousness.  When asked to describe the dizzy feeling patient just states that he walks too fast and fell over.  He denied feeling like he was going to pass out and he denied a sensation that the room was spinning around him.  He denies any complaints at all currently other than some pain in the left side of his ribs where he landed.  Denies pain in his extremities, abdomen.  He denies shortness of breath.    REVIEW OF SYSTEMS: All other systems reviewed and are negative     PAST MEDICAL HISTORY:   Past Medical History:   Diagnosis Date   • Benign hypertension    • Coronary artery disease    • Depression    • Enlarged prostate     Enlarged prostate with anticipated TURP with Dr. Bernal.   • GERD (gastroesophageal reflux disease)    • Hypercholesterolemia    • Obesity    • Obstructive sleep apnea on CPAP    • Type 2 diabetes mellitus (CMS/[facility])         FAMILY HISTORY:   Family History   Problem Relation Age of Onset   • Heart attack Mother    • Ulcers Father         SOCIAL HISTORY:   Social History     Social History   • Marital status:      Spouse name: N/A   • Number of children: N/A   • Years of education: N/A     Occupational History   • Not on file.     Social History Main Topics   • Smoking status: Never Smoker   • Smokeless tobacco: Never Used   • Alcohol use No   • Drug use: No   • Sexual activity: Defer     Other Topics Concern   • Not on file     Social History Narrative   • No narrative on file        SURGICAL HISTORY:   Past Surgical History:   Procedure Laterality Date   • CORONARY ANGIOPLASTY WITH STENT  PLACEMENT Left 06/03/2009    Left heart catheterization, 06/03/2009, Dr. Russo:  LVEF 45% to 50%.  Placement of overlapping Cypher drug-eluting stent 2.5 x 28 and 2.5 x 18 to the SVG to the obtuse marginal branch.  SVG to the PDA had a proximal stenosis estimated at 60% with a distal stenosis of 50% to 60%.   • CORONARY ANGIOPLASTY WITH STENT PLACEMENT Left 07/06/2009    Left heart catheterization, 07/06/2009:  PTCA and stent placement in the mid PDA using a 2.25 x 12 mm Taxus drug-eluting stent with stent placement in the distal SVG to the PDA using a 2.5 x 18 mm Cypher drug-eluting stent and stenting of the proximal SVG to the PDA using a 3.0 x 28 mm Cypher drug-eluting stent.   • CORONARY ANGIOPLASTY WITH STENT PLACEMENT  04/23/2010    Cardiac catheterization for recurrent anginal symptoms, 04/23/2010, with PTCA and stent placement in the proximal SVG to the PDA using 3.0 x 28 mm Promus drug-eluting stent for in-stent restenosis.   • CORONARY ANGIOPLASTY WITH STENT PLACEMENT Left 07/06/2010    Left heart catheterization, 07/06/2010, Dr. Russo:  EF 40% to 45%.  3.5 x 23 mm Promus drug-eluting stent in the proximal SVG to OM, 2.5 x 18 mm Promus drug-eluting stent to distal SVG to PDA due to in-stent restenosis.     • CORONARY ANGIOPLASTY WITH STENT PLACEMENT Left 11/16/2010    Left heart catheterization, 11/16/2010, with placement of a 3.0 x 16 mm Taxus drug-eluting stent to the SVG to the RCA proximally and a 2.5 x 16 mm paclitaxel stent distally in the SVG to the RCA.   • CORONARY ANGIOPLASTY WITH STENT PLACEMENT Left     Left heart catheterization, 3.0 x 24-mm Promus element drug-eluting stent to a 90% lesion in the mid portion of the SVG to the PDA.    • CORONARY ANGIOPLASTY WITH STENT PLACEMENT Left 06/24/2014    Left heart catheterization for recurrent angina, 06/24/2014, Dr. Russo:  PTCA of the SVG to the PDA using a 3 x 12 mm NC TREK balloon.     • CORONARY ARTERY BYPASS GRAFT       CABG x3, Dr. Peng, [facility], 2001.        CURRENT MEDICATIONS:      Medication List      ASK your doctor about these medications    amLODIPine 10 MG tablet  Commonly known as:  NORVASC     aspirin 325 MG tablet     atorvastatin 80 MG tablet  Commonly known as:  LIPITOR  Take 1 tablet by mouth Daily.     carvedilol 12.5 MG tablet  Commonly known as:  COREG     citalopram 40 MG tablet  Commonly known as:  CeleXA     doxazosin 4 MG tablet  Commonly known as:  CARDURA     finasteride 5 MG tablet  Commonly known as:  PROSCAR     furosemide 20 MG tablet  Commonly known as:  LASIX     gabapentin 300 MG capsule  Commonly known as:  NEURONTIN     INVOKANA 300 MG tablet  Generic drug:  Canagliflozin     lisinopril 40 MG tablet  Commonly known as:  PRINIVIL,ZESTRIL     MULTI VITAMIN DAILY PO     nitroglycerin 0.4 MG SL tablet  Commonly known as:  NITROSTAT  Place 1 tablet under the tongue Every 5 (Five) Minutes As Needed for Chest   Pain. Take no more than 3 doses in 15 minutes.     pantoprazole 40 MG EC tablet  Commonly known as:  PROTONIX     prasugrel 10 MG tablet  Commonly known as:  EFFIENT     SITagliptin 100 MG tablet  Commonly known as:  JANUVIA     tamsulosin 0.4 MG capsule 24 hr capsule  Commonly known as:  FLOMAX     TRESIBA FLEXTOUCH 100 UNIT/ML solution pen-injector injection  Generic drug:  insulin degludec           ALLERGIES: Bisoprolol; Byetta 10 mcg pen [exenatide]; Crestor [rosuvastatin calcium]; Metformin and related; Plavix [clopidogrel bisulfate]; and Zocor [simvastatin]     PHYSICAL EXAM:   VITAL SIGNS:   Vitals:    08/11/18 1529   BP: 91/46   Pulse: 63   Resp:    Temp:    SpO2:       CONSTITUTIONAL: Awake, oriented, appears non-toxic   HENT: Atraumatic, normocephalic, oral mucosa pink and moist, airway patent.   EYES: Conjunctiva clear, EOMI, PERRL, no obvious nystagmus with lateral gaze   NECK: Trachea midline, non-tender, supple   CARDIOVASCULAR: Normal heart rate, Normal rhythm, No  murmurs, rubs, gallops   PULMONARY/CHEST: Clear to auscultation, no rhonchi, wheezes, or rales. Symmetrical breath sounds.  Tenderness in the left lateral chest wall and ribs.  No obvious deformity is appreciated.  ABDOMINAL: Non-distended, soft, non-tender - no rebound or guarding.  NEUROLOGIC: Non-focal, moving all four extremities, no gross sensory or motor deficits.   EXTREMITIES: No clubbing, cyanosis, or edema   SKIN: Warm, Dry, No erythema, No rash     ED COURSE / MEDICAL DECISION MAKING:   Donny Jerry is a 73 y.o. male who presents to the emergency department for evaluation of fall.  Patient nondistressed on arrival was stable vital signs.  Exam is unremarkable aside from some mild tenderness in the left lateral chest wall.  EKG on arrival which I interpreted reveals sinus rhythm with rate of 59 bpm.  There are some nonspecific ST-T wave changes.  Abnormal EKG.  EKG is unchanged when compared to previous EKGs.  Chest x-ray does not reveal any acute abnormalities.  Laboratory testing including cardiac enzymes within normal ranges.  Patient remained asymptomatic in the emergency department.  Rib x-rays per radiology interpretation did not reveal any acute abnormalities.  At this point time patient requesting to go home.  Not sure patient had presyncope or if he fell, it is unclear by history.  Given patient remained asymptomatic I was agreeable to allowing him to return home but did ask that he return for any new or worsening symptoms.    DECISION TO DISCHARGE/ADMIT: see ED care timeline     FINAL IMPRESSION:   1 -- fall   2 -- pain  3 --     Electronically signed by: Tahmina Barksdale MD, 8/11/2018 3:35 PM       Tahmina Barksdale MD  08/11/18 [id number]

## 2018-08-15 ENCOUNTER — OFFICE VISIT (OUTPATIENT)
Dept: PRIMARY CARE CLINIC | Age: 73
End: 2018-08-15
Payer: MEDICARE

## 2018-08-15 VITALS
RESPIRATION RATE: 16 BRPM | OXYGEN SATURATION: 98 % | WEIGHT: 215.6 LBS | SYSTOLIC BLOOD PRESSURE: 112 MMHG | BODY MASS INDEX: 33.84 KG/M2 | TEMPERATURE: 98.2 F | DIASTOLIC BLOOD PRESSURE: 58 MMHG | HEIGHT: 67 IN | HEART RATE: 68 BPM

## 2018-08-15 DIAGNOSIS — I95.1 ORTHOSTATIC HYPOTENSION: ICD-10-CM

## 2018-08-15 DIAGNOSIS — R55 SYNCOPE, UNSPECIFIED SYNCOPE TYPE: Primary | ICD-10-CM

## 2018-08-15 DIAGNOSIS — E11.9 DM TYPE 2 WITHOUT RETINOPATHY (HCC): ICD-10-CM

## 2018-08-15 DIAGNOSIS — I10 ESSENTIAL HYPERTENSION: ICD-10-CM

## 2018-08-15 PROCEDURE — 99214 OFFICE O/P EST MOD 30 MIN: CPT | Performed by: NURSE PRACTITIONER

## 2018-08-15 PROCEDURE — 1123F ACP DISCUSS/DSCN MKR DOCD: CPT | Performed by: NURSE PRACTITIONER

## 2018-08-15 PROCEDURE — G8427 DOCREV CUR MEDS BY ELIG CLIN: HCPCS | Performed by: NURSE PRACTITIONER

## 2018-08-15 PROCEDURE — 2022F DILAT RTA XM EVC RTNOPTHY: CPT | Performed by: NURSE PRACTITIONER

## 2018-08-15 PROCEDURE — 3017F COLORECTAL CA SCREEN DOC REV: CPT | Performed by: NURSE PRACTITIONER

## 2018-08-15 PROCEDURE — 4040F PNEUMOC VAC/ADMIN/RCVD: CPT | Performed by: NURSE PRACTITIONER

## 2018-08-15 PROCEDURE — G8417 CALC BMI ABV UP PARAM F/U: HCPCS | Performed by: NURSE PRACTITIONER

## 2018-08-15 PROCEDURE — 1101F PT FALLS ASSESS-DOCD LE1/YR: CPT | Performed by: NURSE PRACTITIONER

## 2018-08-15 PROCEDURE — 1036F TOBACCO NON-USER: CPT | Performed by: NURSE PRACTITIONER

## 2018-08-15 PROCEDURE — 3045F PR MOST RECENT HEMOGLOBIN A1C LEVEL 7.0-9.0%: CPT | Performed by: NURSE PRACTITIONER

## 2018-08-15 PROCEDURE — G8599 NO ASA/ANTIPLAT THER USE RNG: HCPCS | Performed by: NURSE PRACTITIONER

## 2018-08-15 RX ORDER — LISINOPRIL 30 MG/1
30 TABLET ORAL DAILY
Qty: 30 TABLET | Refills: 0 | Status: SHIPPED | OUTPATIENT
Start: 2018-08-15 | End: 2018-09-13 | Stop reason: SDUPTHER

## 2018-08-15 RX ORDER — LISINOPRIL 40 MG/1
TABLET ORAL
Qty: 90 TABLET | Refills: 3 | Status: SHIPPED | OUTPATIENT
Start: 2018-08-15 | End: 2018-09-17 | Stop reason: DRUGHIGH

## 2018-08-15 RX ORDER — FUROSEMIDE 20 MG/1
20 TABLET ORAL SEE ADMIN INSTRUCTIONS
COMMUNITY
End: 2019-01-17 | Stop reason: SDUPTHER

## 2018-08-15 RX ORDER — DOXAZOSIN MESYLATE 4 MG/1
4 TABLET ORAL NIGHTLY
Qty: 90 TABLET | Refills: 3 | Status: SHIPPED | OUTPATIENT
Start: 2018-08-15 | End: 2019-07-17 | Stop reason: SDUPTHER

## 2018-08-15 ASSESSMENT — ENCOUNTER SYMPTOMS
RESPIRATORY NEGATIVE: 1
GASTROINTESTINAL NEGATIVE: 1
EYES NEGATIVE: 1

## 2018-08-15 NOTE — PROGRESS NOTES
SUBJECTIVE:    Patient ID: Hillary Pool is a 68 y.o. male. Chief Complaint   Patient presents with    Follow-Up from [address] [place] [address]         HPI:  His wife states that they went to [place]. He walked quickly in on a hot day and fell   Patient's medications, allergies, past medical, surgical, social and family histories were reviewed and updated as appropriate. .  Current Outpatient Prescriptions on File Prior to Visit   Medication Sig Dispense Refill    Cholecalciferol (VITAMIN D3) 2000 units CAPS Take 1 capsule by mouth daily      pantoprazole (PROTONIX) 40 MG tablet TAKE 1 TABLET DAILY 90 tablet 3    gabapentin (NEURONTIN) 300 MG capsule TAKE 1 CAPSULE NIGHTLY.  90 capsule 3    citalopram (CELEXA) 40 MG tablet Take 1 tablet by mouth daily 90 tablet 3    atorvastatin (LIPITOR) 40 MG tablet Take 1 tablet by mouth nightly 90 tablet 3    amLODIPine (NORVASC) 10 MG tablet Take 1 tablet by mouth daily 90 tablet 3    prasugrel (EFFIENT) 10 MG TABS Take 1 tablet by mouth daily 90 tablet 3    TRUE METRIX BLOOD GLUCOSE TEST strip USE TO TEST BLOOD SUGAR THREE TIMES DAILY AND AS NEEDED 300 strip 3    carvedilol (COREG) 12.5 MG tablet Take 0.5 tablets by mouth 2 times daily 180 tablet 3    MICROLET LANCETS MISC 1 each by Does not apply route 3 times daily DX: DMII 400 each 3    Insulin Degludec (TRESIBA FLEXTOUCH) 100 UNIT/ML SOPN Inject 70 Units into the skin daily 25 pen 3    loratadine (CLARITIN) 10 MG tablet TAKE ONE TABLET BY MOUTH EVERY DAY 30 tablet 3    ADVOCATE INSULIN PEN NEEDLES 31G X 5 MM MISC USE ONCE DAILY FOR INSULIN INJECTIONS 100 each 3    Insulin Pen Needle 31G X 5 MM MISC 1 each by Does not apply route daily Dx: e11.9 100 each 5    lisinopril (PRINIVIL;ZESTRIL) 40 MG tablet TAKE 1 TABLET DAILY 90 tablet 3    doxazosin (CARDURA) 4 MG tablet Take 1 tablet by mouth nightly 90 tablet 3    canagliflozin (INVOKANA) 300 MG TABS tablet One po daily 90 tablet 3   

## 2018-08-15 NOTE — PROGRESS NOTES
Pulmonary/Chest: Effort normal and breath sounds normal. No respiratory distress. He has no wheezes. Left breast exhibits tenderness (lateral ribs). Abdominal: Soft. Bowel sounds are normal.   Musculoskeletal: Normal range of motion. He exhibits no edema. Neurological: He is alert and oriented to person, place, and time. Skin: Skin is warm and dry. Psychiatric: He has a normal mood and affect. His behavior is normal. Judgment and thought content normal.       No results found for requested labs within last 30 days. Hemoglobin A1C (%)   Date Value   06/12/2018 7.3 (H)     Microalbumin, Random Urine (mg/dL)   Date Value   06/05/2017 1.50     LDL Calculated (mg/dL)   Date Value   06/12/2018 98         Lab Results   Component Value Date    WBC 8.2 06/12/2018    NEUTROABS 7.0 06/29/2015    HGB 12.5 06/12/2018    HCT 37.7 06/12/2018    MCV 88.9 06/12/2018     06/12/2018       Lab Results   Component Value Date    TSH 2.19 10/27/2016         ASSESSMENT/PLAN:     Liliana Burroughs was seen today for follow-up from hospital and fall. Diagnoses and all orders for this visit:    Syncope, unspecified syncope type  Will decrease the invokana may have contributed to his low blood pressure and so. Essential hypertension  -     doxazosin (CARDURA) 4 MG tablet; Take 1 tablet by mouth nightly  -     lisinopril (ZESTRIL) 30 MG tablet; Take 1 tablet by mouth daily  decreases blood pressure medicines have him monitor his blood pressure closely titrate blood pressure medication as needed. DM type 2 without retinopathy (HCC)  -    -     canagliflozin (INVOKANA) 100 MG TABS tablet; Take 1 tablet by mouth every morning (before breakfast)    Orthostatic hypotension  -     lisinopril (ZESTRIL) 30 MG tablet; Take 1 tablet by mouth daily  decreases blood pressure change something is diabetes medicine and monitors blood pressure closely.   Other orders  -     lisinopril (PRINIVIL;ZESTRIL) 40 MG tablet; TAKE 1 TABLET DAILY      Medications Discontinued During This Encounter   Medication Reason    lisinopril (PRINIVIL;ZESTRIL) 40 MG tablet REORDER    doxazosin (CARDURA) 4 MG tablet REORDER    canagliflozin (INVOKANA) 300 MG TABS tablet REORDER

## 2018-09-01 ENCOUNTER — APPOINTMENT (OUTPATIENT)
Dept: GENERAL RADIOLOGY | Facility: HOSPITAL | Age: 73
End: 2018-09-01
Payer: MEDICARE

## 2018-09-01 ENCOUNTER — HOSPITAL ENCOUNTER (EMERGENCY)
Facility: HOSPITAL | Age: 73
Discharge: HOME | End: 2018-09-01
Attending: HOSPITALIST
Payer: MEDICARE

## 2018-09-01 VITALS
BODY MASS INDEX: 33.74 KG/M2 | TEMPERATURE: 98.8 F | RESPIRATION RATE: 18 BRPM | OXYGEN SATURATION: 95 % | WEIGHT: 215 LBS | DIASTOLIC BLOOD PRESSURE: 58 MMHG | HEART RATE: 72 BPM | HEIGHT: 67 IN | SYSTOLIC BLOOD PRESSURE: 173 MMHG

## 2018-09-01 DIAGNOSIS — M25.531 RIGHT WRIST PAIN: Primary | ICD-10-CM

## 2018-09-01 PROCEDURE — 73110 X-RAY EXAM OF WRIST: CPT

## 2018-09-01 PROCEDURE — 99284 EMERGENCY DEPT VISIT MOD MDM: CPT

## 2018-09-01 ASSESSMENT — PAIN SCALES - GENERAL: PAINLEVEL_OUTOF10: 5

## 2018-09-01 ASSESSMENT — PAIN DESCRIPTION - LOCATION: LOCATION: WRIST

## 2018-09-01 ASSESSMENT — PAIN DESCRIPTION - PAIN TYPE: TYPE: ACUTE PAIN

## 2018-09-01 ASSESSMENT — PAIN DESCRIPTION - DESCRIPTORS: DESCRIPTORS: NAGGING

## 2018-09-01 ASSESSMENT — PAIN DESCRIPTION - FREQUENCY: FREQUENCY: CONTINUOUS

## 2018-09-01 ASSESSMENT — PAIN DESCRIPTION - ORIENTATION: ORIENTATION: RIGHT

## 2018-09-01 NOTE — ED PROVIDER NOTES
weakness  Psychiatric:  No anxiety  Genitourinary:  No dysuria, no hematuria    Except as noted above the remainder of the review of systems was reviewed and negative. PAST MEDICAL HISTORY     Past Medical History:   Diagnosis Date    Anxiety     CAD (coronary artery disease)     Cancer ([facility] [address]     malignant colon polyp    Depression     Enlarged prostate     GERD (gastroesophageal reflux disease)     Hyperlipidemia     Hypertension     Sleep apnea     Type II or unspecified type diabetes mellitus without mention of complication, not stated as uncontrolled          SURGICAL HISTORY       Past Surgical History:   Procedure Laterality Date    CORONARY ANGIOPLASTY WITH STENT PLACEMENT  04/2013    Dr. Zane Elkins, stents in 2009, 2011, 2013, ptca 2014    CORONARY ARTERY BYPASS GRAFT  2001    x3    POLYPECTOMY  1999    colon ca         CURRENT MEDICATIONS       Previous Medications    ADVOCATE INSULIN PEN NEEDLES 31G X 5 MM MISC    USE ONCE DAILY FOR INSULIN INJECTIONS    AMLODIPINE (NORVASC) 10 MG TABLET    Take 1 tablet by mouth daily    ASPIRIN  MG EC TABLET    Take 1 tablet by mouth daily. ATORVASTATIN (LIPITOR) 40 MG TABLET    Take 1 tablet by mouth nightly    CANAGLIFLOZIN (INVOKANA) 100 MG TABS TABLET    Take 1 tablet by mouth every morning (before breakfast)    CANAGLIFLOZIN (INVOKANA) 300 MG TABS TABLET    One po daily    CARVEDILOL (COREG) 12.5 MG TABLET    Take 0.5 tablets by mouth 2 times daily    CHOLECALCIFEROL (VITAMIN D3) 2000 UNITS CAPS    Take 1 capsule by mouth daily    CITALOPRAM (CELEXA) 40 MG TABLET    Take 1 tablet by mouth daily    DOXAZOSIN (CARDURA) 4 MG TABLET    Take 1 tablet by mouth nightly    FINASTERIDE (PROSCAR) 5 MG TABLET    Take 1 tablet by mouth daily    FUROSEMIDE (LASIX) 20 MG TABLET    Take 20 mg by mouth See Admin Instructions Twice a week     GABAPENTIN (NEURONTIN) 300 MG CAPSULE    TAKE 1 CAPSULE NIGHTLY.     GLUCOSE BLOOD VI TEST STRIPS (EXACTECH TEST) STRIP    Please dispense glucometer test strips for patient's device. Patient test bid and prn. Dx: 250.00    INSULIN DEGLUDEC (TRESIBA FLEXTOUCH) 100 UNIT/ML SOPN    Inject 70 Units into the skin daily    INSULIN PEN NEEDLE 31G X 5 MM MISC    1 each by Does not apply route daily Dx: e11.9    LISINOPRIL (PRINIVIL;ZESTRIL) 40 MG TABLET    TAKE 1 TABLET DAILY    LISINOPRIL (ZESTRIL) 30 MG TABLET    Take 1 tablet by mouth daily    LORATADINE (CLARITIN) 10 MG TABLET    TAKE ONE TABLET BY MOUTH EVERY DAY    MICROLET LANCETS MISC    1 each by Does not apply route 3 times daily DX: DMII    NITROGLYCERIN (NITROSTAT) 0.4 MG SL TABLET    Place 1 tablet under the tongue every 5 minutes as needed    PANTOPRAZOLE (PROTONIX) 40 MG TABLET    TAKE 1 TABLET DAILY    PRASUGREL (EFFIENT) 10 MG TABS    Take 1 tablet by mouth daily    SITAGLIPTIN (JANUVIA) 50 MG TABLET    Take 1 tablet by mouth daily    TAMSULOSIN (FLOMAX) 0.4 MG CAPSULE    Take 0.4 mg by mouth daily. THERAPEUTIC MULTIVITAMIN-MINERALS (THERAGRAN-M) TABLET    Take 1 tablet by mouth daily. TRUE METRIX BLOOD GLUCOSE TEST STRIP    USE TO TEST BLOOD SUGAR THREE TIMES DAILY AND AS NEEDED       ALLERGIES     Bisoprolol; Clopidogrel bisulfate; Exenatide; Glucophage [metformin hydrochloride];  Rosuvastatin calcium; and Zocor [simvastatin]    FAMILY HISTORY       Family History   Problem Relation Age of Onset    Heart Attack Mother     Heart Disease Father     Diabetes Sister           SOCIAL HISTORY       Social History     Social History    Marital status:      Spouse name: N/A    Number of children: N/A    Years of education: N/A     Social History Main Topics    Smoking status: Never Smoker    Smokeless tobacco: Never Used    Alcohol use No    Drug use: No    Sexual activity: Not Asked     Other Topics Concern    None     Social History Narrative    None         PHYSICAL EXAM    (up to 7 for level 4, 8 or more for level 5)     ED Triage Vitals

## 2018-09-01 NOTE — ED NOTES
Patient states that he fell at the [employer] in [place] about 3 weeks ago, patient went to [facility] was evaluated, had some bruised ribs, wasn't complaining of his right wrist, now he has pain in his right wrist.     Mickey Serrano RN  09/01/18 3959

## 2018-09-13 DIAGNOSIS — E11.9 DM TYPE 2 WITHOUT RETINOPATHY (HCC): ICD-10-CM

## 2018-09-13 DIAGNOSIS — I10 ESSENTIAL HYPERTENSION: ICD-10-CM

## 2018-09-13 DIAGNOSIS — I95.1 ORTHOSTATIC HYPOTENSION: ICD-10-CM

## 2018-09-13 RX ORDER — CANAGLIFLOZIN 100 MG/1
TABLET, FILM COATED ORAL
Qty: 30 TABLET | Refills: 0 | Status: SHIPPED | OUTPATIENT
Start: 2018-09-13 | End: 2018-09-17 | Stop reason: SDUPTHER

## 2018-09-13 RX ORDER — LISINOPRIL 30 MG/1
TABLET ORAL
Qty: 30 TABLET | Refills: 0 | Status: SHIPPED | OUTPATIENT
Start: 2018-09-13 | End: 2018-09-17 | Stop reason: SDUPTHER

## 2018-09-14 ENCOUNTER — HOSPITAL ENCOUNTER (OUTPATIENT)
Facility: HOSPITAL | Age: 73
Discharge: HOME | End: 2018-09-14
Payer: MEDICARE

## 2018-09-14 DIAGNOSIS — E55.9 VITAMIN D DEFICIENCY: ICD-10-CM

## 2018-09-14 DIAGNOSIS — Z13.29 THYROID DISORDER SCREEN: ICD-10-CM

## 2018-09-14 DIAGNOSIS — E78.5 HYPERLIPIDEMIA, UNSPECIFIED HYPERLIPIDEMIA TYPE: ICD-10-CM

## 2018-09-14 DIAGNOSIS — E11.9 TYPE 2 DIABETES MELLITUS WITHOUT COMPLICATION, WITH LONG-TERM CURRENT USE OF INSULIN (HCC): ICD-10-CM

## 2018-09-14 DIAGNOSIS — Z79.4 TYPE 2 DIABETES MELLITUS WITHOUT COMPLICATION, WITH LONG-TERM CURRENT USE OF INSULIN (HCC): ICD-10-CM

## 2018-09-14 LAB
A/G RATIO: 1.4 (ref 0.8–2)
ALBUMIN SERPL-MCNC: 3.8 G/DL (ref 3.4–4.8)
ALP BLD-CCNC: 48 U/L (ref 25–100)
ALT SERPL-CCNC: 17 U/L (ref 4–36)
ANION GAP SERPL CALCULATED.3IONS-SCNC: 10 MMOL/L (ref 3–16)
AST SERPL-CCNC: 17 U/L (ref 8–33)
BILIRUB SERPL-MCNC: 0.4 MG/DL (ref 0.3–1.2)
BUN BLDV-MCNC: 21 MG/DL (ref 6–20)
CALCIUM SERPL-MCNC: 9.2 MG/DL (ref 8.5–10.5)
CHLORIDE BLD-SCNC: 107 MMOL/L (ref 98–107)
CHOLESTEROL, TOTAL: 171 MG/DL (ref 0–200)
CO2: 27 MMOL/L (ref 20–30)
CREAT SERPL-MCNC: 1.2 MG/DL (ref 0.4–1.2)
GFR AFRICAN AMERICAN: >59
GFR NON-AFRICAN AMERICAN: >59
GLOBULIN: 2.8 G/DL
GLUCOSE BLD-MCNC: 107 MG/DL (ref 74–106)
HBA1C MFR BLD: 8.1 %
HCT VFR BLD CALC: 36 % (ref 40–54)
HDLC SERPL-MCNC: 36 MG/DL (ref 40–60)
HEMOGLOBIN: 11.7 G/DL (ref 13–18)
LDL CHOLESTEROL CALCULATED: 92 MG/DL
MCH RBC QN AUTO: 28.7 PG (ref 27–32)
MCHC RBC AUTO-ENTMCNC: 32.5 G/DL (ref 31–35)
MCV RBC AUTO: 88.2 FL (ref 80–100)
PDW BLD-RTO: 12.6 % (ref 11–16)
PLATELET # BLD: 308 K/UL (ref 150–400)
PMV BLD AUTO: 10.4 FL (ref 6–10)
POTASSIUM SERPL-SCNC: 4.4 MMOL/L (ref 3.4–5.1)
RBC # BLD: 4.08 M/UL (ref 4.5–6)
SODIUM BLD-SCNC: 144 MMOL/L (ref 136–145)
TOTAL PROTEIN: 6.6 G/DL (ref 6.4–8.3)
TRIGL SERPL-MCNC: 216 MG/DL (ref 0–249)
TSH SERPL DL<=0.05 MIU/L-ACNC: 2.16 UIU/ML (ref 0.35–5.5)
VITAMIN D 25-HYDROXY: 31.2 (ref 32–100)
VLDLC SERPL CALC-MCNC: 43 MG/DL
WBC # BLD: 7.9 K/UL (ref 4–11)

## 2018-09-14 PROCEDURE — 85027 COMPLETE CBC AUTOMATED: CPT

## 2018-09-14 PROCEDURE — 80061 LIPID PANEL: CPT

## 2018-09-14 PROCEDURE — 80053 COMPREHEN METABOLIC PANEL: CPT

## 2018-09-14 PROCEDURE — 83036 HEMOGLOBIN GLYCOSYLATED A1C: CPT

## 2018-09-14 PROCEDURE — 84443 ASSAY THYROID STIM HORMONE: CPT

## 2018-09-14 PROCEDURE — 36415 COLL VENOUS BLD VENIPUNCTURE: CPT

## 2018-09-14 PROCEDURE — 82306 VITAMIN D 25 HYDROXY: CPT

## 2018-09-17 ENCOUNTER — OFFICE VISIT (OUTPATIENT)
Dept: PRIMARY CARE CLINIC | Age: 73
End: 2018-09-17
Payer: MEDICARE

## 2018-09-17 ENCOUNTER — LAB (OUTPATIENT)
Dept: LAB | Facility: HOSPITAL | Age: 73
End: 2018-09-17

## 2018-09-17 ENCOUNTER — TRANSCRIBE ORDERS (OUTPATIENT)
Dept: ADMINISTRATIVE | Facility: HOSPITAL | Age: 73
End: 2018-09-17

## 2018-09-17 VITALS
TEMPERATURE: 98.1 F | SYSTOLIC BLOOD PRESSURE: 132 MMHG | BODY MASS INDEX: 34.72 KG/M2 | HEIGHT: 67 IN | HEART RATE: 60 BPM | DIASTOLIC BLOOD PRESSURE: 60 MMHG | WEIGHT: 221.2 LBS | OXYGEN SATURATION: 95 % | RESPIRATION RATE: 16 BRPM

## 2018-09-17 DIAGNOSIS — I95.1 ORTHOSTATIC HYPOTENSION: ICD-10-CM

## 2018-09-17 DIAGNOSIS — E55.9 VITAMIN D DEFICIENCY: ICD-10-CM

## 2018-09-17 DIAGNOSIS — E11.9 DM TYPE 2 WITHOUT RETINOPATHY (HCC): Primary | ICD-10-CM

## 2018-09-17 DIAGNOSIS — D64.9 ANEMIA, UNSPECIFIED TYPE: ICD-10-CM

## 2018-09-17 DIAGNOSIS — N19 RENAL FAILURE, UNSPECIFIED CHRONICITY: ICD-10-CM

## 2018-09-17 DIAGNOSIS — N19 RENAL FAILURE, UNSPECIFIED CHRONICITY: Primary | ICD-10-CM

## 2018-09-17 DIAGNOSIS — I10 ESSENTIAL HYPERTENSION: ICD-10-CM

## 2018-09-17 LAB — PSA SERPL-MCNC: 0.56 NG/ML (ref 0.06–4)

## 2018-09-17 PROCEDURE — 1036F TOBACCO NON-USER: CPT | Performed by: NURSE PRACTITIONER

## 2018-09-17 PROCEDURE — 84153 ASSAY OF PSA TOTAL: CPT

## 2018-09-17 PROCEDURE — 99214 OFFICE O/P EST MOD 30 MIN: CPT | Performed by: NURSE PRACTITIONER

## 2018-09-17 PROCEDURE — G8417 CALC BMI ABV UP PARAM F/U: HCPCS | Performed by: NURSE PRACTITIONER

## 2018-09-17 PROCEDURE — 36415 COLL VENOUS BLD VENIPUNCTURE: CPT

## 2018-09-17 PROCEDURE — 2022F DILAT RTA XM EVC RTNOPTHY: CPT | Performed by: NURSE PRACTITIONER

## 2018-09-17 PROCEDURE — G8599 NO ASA/ANTIPLAT THER USE RNG: HCPCS | Performed by: NURSE PRACTITIONER

## 2018-09-17 PROCEDURE — 3017F COLORECTAL CA SCREEN DOC REV: CPT | Performed by: NURSE PRACTITIONER

## 2018-09-17 PROCEDURE — 1123F ACP DISCUSS/DSCN MKR DOCD: CPT | Performed by: NURSE PRACTITIONER

## 2018-09-17 PROCEDURE — 3045F PR MOST RECENT HEMOGLOBIN A1C LEVEL 7.0-9.0%: CPT | Performed by: NURSE PRACTITIONER

## 2018-09-17 PROCEDURE — G8427 DOCREV CUR MEDS BY ELIG CLIN: HCPCS | Performed by: NURSE PRACTITIONER

## 2018-09-17 PROCEDURE — 4040F PNEUMOC VAC/ADMIN/RCVD: CPT | Performed by: NURSE PRACTITIONER

## 2018-09-17 PROCEDURE — 1101F PT FALLS ASSESS-DOCD LE1/YR: CPT | Performed by: NURSE PRACTITIONER

## 2018-09-17 RX ORDER — LISINOPRIL 30 MG/1
TABLET ORAL
Qty: 90 TABLET | Refills: 3 | Status: SHIPPED | OUTPATIENT
Start: 2018-09-17 | End: 2019-07-05 | Stop reason: SDUPTHER

## 2018-09-17 ASSESSMENT — ENCOUNTER SYMPTOMS
GASTROINTESTINAL NEGATIVE: 1
EYES NEGATIVE: 1
RESPIRATORY NEGATIVE: 1

## 2018-09-17 NOTE — PROGRESS NOTES
SUBJECTIVE:    Patient ID: Hillary Pool is a 68 y.o. male. Chief Complaint   Patient presents with    Follow-up     3 months    Hypertension    Diabetes         HPI:  He comes in for follow up on his hypertension in his diabetes. He has had some changes to his blood pressure medication because he was having some near syncopal episodes. He has recently been on vacation he hasn't had anymore syncopal type episodes. He is taking all his blood sugar medication as prescribed he hasn't been checking his blood sugar recently. He is not taking his Lasix daily anymore. He is taken it generally about twice a week. He has been slightly anemic for several years but unknown direct cause. He had a colonoscopy in 2014 no polyps on the test.   Patient's medications, allergies, past medical, surgical, social and family histories were reviewed and updated as appropriate. .  Current Outpatient Prescriptions on File Prior to Visit   Medication Sig Dispense Refill    doxazosin (CARDURA) 4 MG tablet Take 1 tablet by mouth nightly 90 tablet 3    furosemide (LASIX) 20 MG tablet Take 20 mg by mouth See Admin Instructions Twice a week       Cholecalciferol (VITAMIN D3) 2000 units CAPS Take 1 capsule by mouth daily      pantoprazole (PROTONIX) 40 MG tablet TAKE 1 TABLET DAILY 90 tablet 3    gabapentin (NEURONTIN) 300 MG capsule TAKE 1 CAPSULE NIGHTLY.  90 capsule 3    citalopram (CELEXA) 40 MG tablet Take 1 tablet by mouth daily 90 tablet 3    atorvastatin (LIPITOR) 40 MG tablet Take 1 tablet by mouth nightly 90 tablet 3    amLODIPine (NORVASC) 10 MG tablet Take 1 tablet by mouth daily 90 tablet 3    prasugrel (EFFIENT) 10 MG TABS Take 1 tablet by mouth daily 90 tablet 3    TRUE METRIX BLOOD GLUCOSE TEST strip USE TO TEST BLOOD SUGAR THREE TIMES DAILY AND AS NEEDED 300 strip 3    carvedilol (COREG) 12.5 MG tablet Take 0.5 tablets by mouth 2 times daily 180 tablet 3    MICROLET LANCETS MISC 1 each by Does not apply route 3 times daily DX: DMII 400 each 3    Insulin Degludec (TRESIBA FLEXTOUCH) 100 UNIT/ML SOPN Inject 70 Units into the skin daily 25 pen 3    loratadine (CLARITIN) 10 MG tablet TAKE ONE TABLET BY MOUTH EVERY DAY 30 tablet 3    ADVOCATE INSULIN PEN NEEDLES 31G X 5 MM MISC USE ONCE DAILY FOR INSULIN INJECTIONS 100 each 3    Insulin Pen Needle 31G X 5 MM MISC 1 each by Does not apply route daily Dx: e11.9 100 each 5    SITagliptin (JANUVIA) 50 MG tablet Take 1 tablet by mouth daily 30 tablet 3    finasteride (PROSCAR) 5 MG tablet Take 1 tablet by mouth daily 90 tablet 3    nitroGLYCERIN (NITROSTAT) 0.4 MG SL tablet Place 1 tablet under the tongue every 5 minutes as needed 25 tablet 5    tamsulosin (FLOMAX) 0.4 MG capsule Take 0.4 mg by mouth daily.  aspirin  MG EC tablet Take 1 tablet by mouth daily. 30 tablet 11    glucose blood VI test strips (EXACTECH TEST) strip Please dispense glucometer test strips for patient's device. Patient test bid and prn. Dx: 250.00 100 strip 5    therapeutic multivitamin-minerals (THERAGRAN-M) tablet Take 1 tablet by mouth daily.  [DISCONTINUED] omeprazole (PRILOSEC) 20 MG capsule Take 1 capsule by mouth 2 times daily. 180 capsule 3     No current facility-administered medications on file prior to visit. Review of Systems   Constitutional: Negative. HENT: Negative. Eyes: Negative. Respiratory: Negative. Cardiovascular: Negative. Gastrointestinal: Negative. Genitourinary: Negative. Musculoskeletal: Negative. Skin: Negative. Neurological: Negative. Psychiatric/Behavioral: Negative. OBJECTIVE:  /60 (Site: Right Upper Arm, Position: Sitting, Cuff Size: Medium Adult)   Pulse 60   Temp 98.1 °F (36.7 °C) (Oral)   Resp 16   Ht 5' 7\" (1.702 m)   Wt 221 lb 3.2 oz (100.3 kg)   SpO2 95% Comment: room air  BMI 34.64 kg/m²    Physical Exam   Constitutional: He is oriented to person, place, and time.  He appears Bilirubin 0.4 (9/14/2018) 0.3 - 1.2 mg/dL Not in Time Range    Total Protein 6.6 (9/14/2018) 6.4 - 8.3 g/dL Not in Time Range        Hemoglobin A1C (%)   Date Value   09/14/2018 8.1 (H)     Microalbumin, Random Urine (mg/dL)   Date Value   06/05/2017 1.50     LDL Calculated (mg/dL)   Date Value   09/14/2018 92         Lab Results   Component Value Date    WBC 7.9 09/14/2018    NEUTROABS 7.0 06/29/2015    HGB 11.7 09/14/2018    HCT 36.0 09/14/2018    MCV 88.2 09/14/2018     09/14/2018       Lab Results   Component Value Date    TSH 2.16 09/14/2018         ASSESSMENT/PLAN:     Jesus Marino was seen today for follow-up, hypertension and diabetes. Diagnoses and all orders for this visit:    DM type 2 without retinopathy ([facility]  -     canagliflozin (INVOKANA) 100 MG TABS tablet; TAKE ONE TABLET BY MOUTH EVERY MORNING BEFORE BREAKFAST  -     CBC; Future  -     Comprehensive Metabolic Panel; Future  -     Hemoglobin A1C; Future  He is doing well in the lower dose continue to monitor his blood sugars. Advised to be more diligent on his diet. Anemia, unspecified type  he has had a long history of anemia he's had extensive work up in the past. Will continue to monitor if it worsens will need to send him to the heme oncology doctor. Essential hypertension  -     lisinopril (PRINIVIL;ZESTRIL) 30 MG tablet; TAKE ONE TABLET BY MOUTH EVERY DAY  blood pressures been stable down the roof adjusted his medications I advised him to continue on his current blood pressure medicine monitor his renal function. Orthostatic hypotension  -     lisinopril (PRINIVIL;ZESTRIL) 30 MG tablet; TAKE ONE TABLET BY MOUTH EVERY DAY  stable he hasn't had anymore syncopal type episodes. Vitamin D deficiency  -     Vitamin D 25 Hydroxy;  Future      Medications Discontinued During This Encounter   Medication Reason    lisinopril (PRINIVIL;ZESTRIL) 40 MG tablet DOSE ADJUSTMENT    canagliflozin (INVOKANA) 300 MG TABS tablet DOSE ADJUSTMENT    INVOKANA 100 MG TABS tablet REORDER    lisinopril (PRINIVIL;ZESTRIL) 30 MG tablet REORDER

## 2018-10-04 ENCOUNTER — OFFICE VISIT (OUTPATIENT)
Dept: PRIMARY CARE CLINIC | Age: 73
End: 2018-10-04
Payer: MEDICARE

## 2018-10-04 VITALS
BODY MASS INDEX: 34.34 KG/M2 | DIASTOLIC BLOOD PRESSURE: 60 MMHG | RESPIRATION RATE: 16 BRPM | HEIGHT: 67 IN | SYSTOLIC BLOOD PRESSURE: 136 MMHG | TEMPERATURE: 98.4 F | WEIGHT: 218.8 LBS | HEART RATE: 65 BPM | OXYGEN SATURATION: 96 %

## 2018-10-04 DIAGNOSIS — Z00.00 MEDICARE ANNUAL WELLNESS VISIT, SUBSEQUENT: Primary | ICD-10-CM

## 2018-10-04 PROCEDURE — 4040F PNEUMOC VAC/ADMIN/RCVD: CPT | Performed by: NURSE PRACTITIONER

## 2018-10-04 PROCEDURE — G8599 NO ASA/ANTIPLAT THER USE RNG: HCPCS | Performed by: NURSE PRACTITIONER

## 2018-10-04 PROCEDURE — G8484 FLU IMMUNIZE NO ADMIN: HCPCS | Performed by: NURSE PRACTITIONER

## 2018-10-04 PROCEDURE — G0439 PPPS, SUBSEQ VISIT: HCPCS | Performed by: NURSE PRACTITIONER

## 2018-10-04 ASSESSMENT — LIFESTYLE VARIABLES: HOW OFTEN DO YOU HAVE A DRINK CONTAINING ALCOHOL: 0

## 2018-10-04 ASSESSMENT — PATIENT HEALTH QUESTIONNAIRE - PHQ9
SUM OF ALL RESPONSES TO PHQ QUESTIONS 1-9: 2
SUM OF ALL RESPONSES TO PHQ QUESTIONS 1-9: 2

## 2018-10-04 ASSESSMENT — ANXIETY QUESTIONNAIRES: GAD7 TOTAL SCORE: 0

## 2018-10-04 NOTE — PROGRESS NOTES
Medicare Annual Wellness Visit  Name: Jr Mullins Date: 10/4/2018   MRN: [id number] Sex: Male   Age: 68 y.o. Ethnicity: Non-/Non    : 1945 Race: James Morgan is here for Medicare AWV    Screenings for behavioral, psychosocial and functional/safety risks, and cognitive dysfunction are all negative except as indicated below. These results, as well as other patient data from the [address] [facility] [address] form, are documented in Flowsheets linked to this Encounter. Allergies   Allergen Reactions    Bisoprolol Other (See Comments)     Agitation    Clopidogrel Bisulfate Other (See Comments)     resistance    Exenatide      nausea    Glucophage [Metformin Hydrochloride]      nausea    Rosuvastatin Calcium Other (See Comments)    Zocor [Simvastatin]      Muscle pain     Prior to Visit Medications    Medication Sig Taking? Authorizing Provider   canagliflozin (INVOKANA) 100 MG TABS tablet TAKE ONE TABLET BY MOUTH EVERY MORNING BEFORE BREAKFAST Yes Maine Half, APRN   lisinopril (PRINIVIL;ZESTRIL) 30 MG tablet TAKE ONE TABLET BY MOUTH EVERY DAY Yes Humberto Half, APRN   doxazosin (CARDURA) 4 MG tablet Take 1 tablet by mouth nightly Yes Humberto Half, APRN   furosemide (LASIX) 20 MG tablet Take 20 mg by mouth See Admin Instructions Twice a week  Yes Historical Provider, MD   Cholecalciferol (VITAMIN D3) 2000 units CAPS Take 1 capsule by mouth daily Yes Historical Provider, MD   pantoprazole (PROTONIX) 40 MG tablet TAKE 1 TABLET DAILY Yes Maine Half, APRN   gabapentin (NEURONTIN) 300 MG capsule TAKE 1 CAPSULE NIGHTLY.  Yes Humberto Half, APRN   citalopram (CELEXA) 40 MG tablet Take 1 tablet by mouth daily Yes Humberto Half, APRN   atorvastatin (LIPITOR) 40 MG tablet Take 1 tablet by mouth nightly Yes Maine Half, APRN   amLODIPine (NORVASC) 10 MG tablet Take 1 tablet by mouth daily Yes Maine Half, APRN   prasugrel (EFFIENT) 10 MG TABS Take 1 tablet by mouth daily Yes MERCEDES Garza   TRUE METRIX BLOOD GLUCOSE TEST strip USE TO TEST BLOOD SUGAR THREE TIMES DAILY AND AS NEEDED Yes MERCEDES Garza   carvedilol (COREG) 12.5 MG tablet Take 0.5 tablets by mouth 2 times daily Yes MERCEDES Garza   MICROLET LANCETS MISC 1 each by Does not apply route 3 times daily DX: DMII Yes MERCEDES Garza   Insulin Degludec (TRESIBA FLEXTOUCH) 100 UNIT/ML SOPN Inject 70 Units into the skin daily Yes MERCEDES Garza   loratadine (CLARITIN) 10 MG tablet TAKE ONE TABLET BY MOUTH EVERY DAY Yes MERCEDES Garza   ADVOCATE INSULIN PEN NEEDLES 31G X 5 MM MISC USE ONCE DAILY FOR INSULIN INJECTIONS Yes MERCEDES Garza   Insulin Pen Needle 31G X 5 MM MISC 1 each by Does not apply route daily Dx: e11.9 Yes MERCEDES Garza   SITagliptin (JANUVIA) 50 MG tablet Take 1 tablet by mouth daily Yes MERCEDES Garza   finasteride (PROSCAR) 5 MG tablet Take 1 tablet by mouth daily Yes MERCEDES Garza   nitroGLYCERIN (NITROSTAT) 0.4 MG SL tablet Place 1 tablet under the tongue every 5 minutes as needed Yes MERCEDES Garza   tamsulosin (FLOMAX) 0.4 MG capsule Take 0.4 mg by mouth daily. Yes Historical Provider, MD   aspirin  MG EC tablet Take 1 tablet by mouth daily. Yes Aidee Lindsay MD   glucose blood VI test strips (EXACTECH TEST) strip Please dispense glucometer test strips for patient's device. Patient test bid and prn. Dx: 250.00 Yes Aidee Lindsay MD   therapeutic multivitamin-minerals (THERAGRAN-M) tablet Take 1 tablet by mouth daily. Yes Historical Provider, MD   omeprazole (PRILOSEC) 20 MG capsule Take 1 capsule by mouth 2 times daily.   Aidee Lindsay MD     Past Medical History:   Diagnosis Date    Anxiety     CAD (coronary artery disease)     Cancer ([facility] [address]     malignant colon polyp    Depression     Enlarged prostate     GERD (gastroesophageal reflux disease)     Hyperlipidemia     Hypertension     Sleep apnea     Type II or unspecified type diabetes mellitus without mention of complication, not stated as uncontrolled      Past Surgical History:   Procedure Laterality Date    CORONARY ANGIOPLASTY WITH STENT PLACEMENT  04/2013    Dr. Glen Yepez, stents in 2009, 2011, 2013, ptca 2014    CORONARY ARTERY BYPASS GRAFT  2001    x3    POLYPECTOMY  1999    colon ca     Family History   Problem Relation Age of Onset    Heart Attack Mother     Heart Disease Father     Diabetes Sister        CareTeam (Including outside providers/suppliers regularly involved in providing care):   Patient Care Team:  Layvonne Goldberg, APRN as PCP - Lucia Osborn MD as Consulting Physician (Cardiology)  Arnold Gracia MD as Consulting Physician  Lani Rico (Family Medicine)    Wt Readings from Last 3 Encounters:   10/04/18 218 lb 12.8 oz (99.2 kg)   09/17/18 221 lb 3.2 oz (100.3 kg)   09/01/18 215 lb (97.5 kg)     Vitals:    10/04/18 1411   BP: 136/60   Site: Right Upper Arm   Position: Sitting   Cuff Size: Medium Adult   Pulse: 65   Resp: 16   Temp: 98.4 °F (36.9 °C)   TempSrc: Oral   SpO2: 96%   Weight: 218 lb 12.8 oz (99.2 kg)   Height: 5' 7\" (1.702 m)     Body mass index is 34.27 kg/m².     General Appearance: alert and oriented to person, place and time, well developed and well- nourished, in no acute distress  Skin: warm and dry, no rash or erythema  Head: normocephalic and atraumatic  Eyes: pupils equal, round, and reactive to light, extraocular eye movements intact, conjunctivae normal  ENT: tympanic membrane, external ear and ear canal normal bilaterally, nose without deformity, nasal mucosa and turbinates normal without polyps  Neck: supple and non-tender without mass, no thyromegaly or thyroid nodules, no cervical lymphadenopathy  Pulmonary/Chest: clear to auscultation bilaterally- no wheezes, rales or rhonchi, normal air movement, no respiratory distress  Cardiovascular: normal rate, regular rhythm, normal S1 and S2, no

## 2018-11-09 DIAGNOSIS — Z79.4 TYPE 2 DIABETES MELLITUS WITHOUT COMPLICATION, WITH LONG-TERM CURRENT USE OF INSULIN (HCC): ICD-10-CM

## 2018-11-09 DIAGNOSIS — E11.9 TYPE 2 DIABETES MELLITUS WITHOUT COMPLICATION, WITH LONG-TERM CURRENT USE OF INSULIN (HCC): ICD-10-CM

## 2018-11-09 RX ORDER — BLOOD SUGAR DIAGNOSTIC
STRIP MISCELLANEOUS
Qty: 100 EACH | Refills: 3 | Status: SHIPPED | OUTPATIENT
Start: 2018-11-09 | End: 2020-01-27

## 2018-11-16 ENCOUNTER — HOSPITAL ENCOUNTER (OUTPATIENT)
Dept: SLEEP CENTER | Facility: HOSPITAL | Age: 73
Discharge: HOME | End: 2018-11-16
Payer: MEDICARE

## 2018-11-16 PROCEDURE — 95811 POLYSOM 6/>YRS CPAP 4/> PARM: CPT

## 2019-01-13 ENCOUNTER — OFFICE VISIT (OUTPATIENT)
Dept: PRIMARY CARE CLINIC | Age: 74
End: 2019-01-13
Payer: MEDICARE

## 2019-01-13 VITALS — OXYGEN SATURATION: 92 % | TEMPERATURE: 98.1 F | WEIGHT: 222 LBS | BODY MASS INDEX: 34.77 KG/M2 | HEART RATE: 81 BPM

## 2019-01-13 DIAGNOSIS — J06.9 UPPER RESPIRATORY TRACT INFECTION, UNSPECIFIED TYPE: Primary | ICD-10-CM

## 2019-01-13 PROCEDURE — G8484 FLU IMMUNIZE NO ADMIN: HCPCS | Performed by: NURSE PRACTITIONER

## 2019-01-13 PROCEDURE — 1036F TOBACCO NON-USER: CPT | Performed by: NURSE PRACTITIONER

## 2019-01-13 PROCEDURE — 4040F PNEUMOC VAC/ADMIN/RCVD: CPT | Performed by: NURSE PRACTITIONER

## 2019-01-13 PROCEDURE — 3017F COLORECTAL CA SCREEN DOC REV: CPT | Performed by: NURSE PRACTITIONER

## 2019-01-13 PROCEDURE — G8417 CALC BMI ABV UP PARAM F/U: HCPCS | Performed by: NURSE PRACTITIONER

## 2019-01-13 PROCEDURE — 1101F PT FALLS ASSESS-DOCD LE1/YR: CPT | Performed by: NURSE PRACTITIONER

## 2019-01-13 PROCEDURE — 1123F ACP DISCUSS/DSCN MKR DOCD: CPT | Performed by: NURSE PRACTITIONER

## 2019-01-13 PROCEDURE — 99213 OFFICE O/P EST LOW 20 MIN: CPT | Performed by: NURSE PRACTITIONER

## 2019-01-13 PROCEDURE — G8599 NO ASA/ANTIPLAT THER USE RNG: HCPCS | Performed by: NURSE PRACTITIONER

## 2019-01-13 PROCEDURE — G8427 DOCREV CUR MEDS BY ELIG CLIN: HCPCS | Performed by: NURSE PRACTITIONER

## 2019-01-13 RX ORDER — BENZONATATE 100 MG/1
100 CAPSULE ORAL 2 TIMES DAILY PRN
Qty: 20 CAPSULE | Refills: 0 | Status: SHIPPED | OUTPATIENT
Start: 2019-01-13 | End: 2019-01-20

## 2019-01-13 RX ORDER — CEFDINIR 300 MG/1
300 CAPSULE ORAL 2 TIMES DAILY
Qty: 20 CAPSULE | Refills: 0 | Status: SHIPPED | OUTPATIENT
Start: 2019-01-13 | End: 2019-01-23

## 2019-01-13 ASSESSMENT — ENCOUNTER SYMPTOMS
DIARRHEA: 0
ABDOMINAL DISTENTION: 0
WHEEZING: 0
BLOOD IN STOOL: 0
COUGH: 1
ABDOMINAL PAIN: 0
NAUSEA: 0
CHEST TIGHTNESS: 1
SINUS PAIN: 1
SORE THROAT: 1
BACK PAIN: 0
RHINORRHEA: 1
SHORTNESS OF BREATH: 0
SINUS PRESSURE: 0
EYE ITCHING: 0
CONSTIPATION: 0
EYE REDNESS: 0

## 2019-01-17 ENCOUNTER — OFFICE VISIT (OUTPATIENT)
Dept: PRIMARY CARE CLINIC | Age: 74
End: 2019-01-17
Payer: MEDICARE

## 2019-01-17 ENCOUNTER — HOSPITAL ENCOUNTER (OUTPATIENT)
Facility: HOSPITAL | Age: 74
Discharge: HOME | End: 2019-01-17
Payer: MEDICARE

## 2019-01-17 VITALS
RESPIRATION RATE: 16 BRPM | SYSTOLIC BLOOD PRESSURE: 120 MMHG | DIASTOLIC BLOOD PRESSURE: 52 MMHG | HEIGHT: 67 IN | BODY MASS INDEX: 34.37 KG/M2 | HEART RATE: 64 BPM | OXYGEN SATURATION: 94 % | WEIGHT: 219 LBS | TEMPERATURE: 97.5 F

## 2019-01-17 DIAGNOSIS — I10 ESSENTIAL HYPERTENSION: ICD-10-CM

## 2019-01-17 DIAGNOSIS — Z79.4 TYPE 2 DIABETES MELLITUS WITHOUT COMPLICATION, WITH LONG-TERM CURRENT USE OF INSULIN (HCC): ICD-10-CM

## 2019-01-17 DIAGNOSIS — J20.9 ACUTE BRONCHITIS, UNSPECIFIED ORGANISM: ICD-10-CM

## 2019-01-17 DIAGNOSIS — G62.9 NEUROPATHY: ICD-10-CM

## 2019-01-17 DIAGNOSIS — E11.9 DM TYPE 2 WITHOUT RETINOPATHY (HCC): ICD-10-CM

## 2019-01-17 DIAGNOSIS — E55.9 VITAMIN D DEFICIENCY: ICD-10-CM

## 2019-01-17 DIAGNOSIS — D64.9 ANEMIA, UNSPECIFIED TYPE: Primary | ICD-10-CM

## 2019-01-17 DIAGNOSIS — Z91.81 AT HIGH RISK FOR FALLS: ICD-10-CM

## 2019-01-17 DIAGNOSIS — E83.51 HYPOCALCEMIA: ICD-10-CM

## 2019-01-17 DIAGNOSIS — D64.9 ANEMIA, UNSPECIFIED TYPE: ICD-10-CM

## 2019-01-17 DIAGNOSIS — E11.42 TYPE 2 DIABETES MELLITUS WITH DIABETIC POLYNEUROPATHY, WITHOUT LONG-TERM CURRENT USE OF INSULIN (HCC): ICD-10-CM

## 2019-01-17 DIAGNOSIS — E11.9 TYPE 2 DIABETES MELLITUS WITHOUT COMPLICATION, WITH LONG-TERM CURRENT USE OF INSULIN (HCC): ICD-10-CM

## 2019-01-17 LAB
A/G RATIO: 1.4 (ref 0.8–2)
ALBUMIN SERPL-MCNC: 3.4 G/DL (ref 3.4–4.8)
ALP BLD-CCNC: 41 U/L (ref 25–100)
ALT SERPL-CCNC: 28 U/L (ref 4–36)
ANION GAP SERPL CALCULATED.3IONS-SCNC: 11 MMOL/L (ref 3–16)
AST SERPL-CCNC: 25 U/L (ref 8–33)
BASOPHILS ABSOLUTE: 0 K/UL (ref 0–0.1)
BASOPHILS RELATIVE PERCENT: 0.4 %
BILIRUB SERPL-MCNC: 0.5 MG/DL (ref 0.3–1.2)
BUN BLDV-MCNC: 27 MG/DL (ref 6–20)
CALCIUM SERPL-MCNC: 8.1 MG/DL (ref 8.5–10.5)
CHLORIDE BLD-SCNC: 103 MMOL/L (ref 98–107)
CO2: 25 MMOL/L (ref 20–30)
CREAT SERPL-MCNC: 1.5 MG/DL (ref 0.4–1.2)
EOSINOPHILS ABSOLUTE: 0.1 K/UL (ref 0–0.4)
EOSINOPHILS RELATIVE PERCENT: 0.7 %
GFR AFRICAN AMERICAN: 56
GFR NON-AFRICAN AMERICAN: 46
GLOBULIN: 2.5 G/DL
GLUCOSE BLD-MCNC: 186 MG/DL (ref 74–106)
HBA1C MFR BLD: 8.3 %
HCT VFR BLD CALC: 37 % (ref 40.5–52.5)
HEMOGLOBIN: 11.8 G/DL (ref 13–18)
IMMATURE GRANULOCYTES #: 0 K/UL
IMMATURE GRANULOCYTES %: 0.3 % (ref 0–5)
IMMATURE RETIC FRACT: 0.26 (ref 0.21–0.37)
LYMPHOCYTES ABSOLUTE: 0.9 K/UL (ref 1.5–4)
LYMPHOCYTES RELATIVE PERCENT: 12.2 %
MCH RBC QN AUTO: 28.4 PG (ref 27–32)
MCHC RBC AUTO-ENTMCNC: 31.5 G/DL (ref 31–35)
MCV RBC AUTO: 90.4 FL (ref 80–100)
MONOCYTES ABSOLUTE: 0.5 K/UL (ref 0.2–0.8)
MONOCYTES RELATIVE PERCENT: 7.5 %
NEUTROPHILS ABSOLUTE: 5.5 K/UL (ref 2–7.5)
NEUTROPHILS RELATIVE PERCENT: 78.9 %
PDW BLD-RTO: 12.3 % (ref 11–16)
PLATELET # BLD: 264 K/UL (ref 150–400)
PMV BLD AUTO: 10.6 FL (ref 6–10)
POTASSIUM SERPL-SCNC: 5.1 MMOL/L (ref 3.4–5.1)
RBC # BLD: 4.15 M/UL (ref 4.5–6)
RETICULOCYTE ABSOLUTE COUNT: 0.04 M/UL
RETICULOCYTE COUNT PCT: 0.87 % (ref 0.5–2.18)
SODIUM BLD-SCNC: 139 MMOL/L (ref 136–145)
TOTAL PROTEIN: 5.9 G/DL (ref 6.4–8.3)
VITAMIN D 25-HYDROXY: 29.4 (ref 32–100)
WBC # BLD: 7 K/UL (ref 4–11)

## 2019-01-17 PROCEDURE — 4040F PNEUMOC VAC/ADMIN/RCVD: CPT | Performed by: NURSE PRACTITIONER

## 2019-01-17 PROCEDURE — 2022F DILAT RTA XM EVC RTNOPTHY: CPT | Performed by: NURSE PRACTITIONER

## 2019-01-17 PROCEDURE — G8484 FLU IMMUNIZE NO ADMIN: HCPCS | Performed by: NURSE PRACTITIONER

## 2019-01-17 PROCEDURE — 3017F COLORECTAL CA SCREEN DOC REV: CPT | Performed by: NURSE PRACTITIONER

## 2019-01-17 PROCEDURE — G8427 DOCREV CUR MEDS BY ELIG CLIN: HCPCS | Performed by: NURSE PRACTITIONER

## 2019-01-17 PROCEDURE — 80053 COMPREHEN METABOLIC PANEL: CPT

## 2019-01-17 PROCEDURE — 1101F PT FALLS ASSESS-DOCD LE1/YR: CPT | Performed by: NURSE PRACTITIONER

## 2019-01-17 PROCEDURE — 99214 OFFICE O/P EST MOD 30 MIN: CPT | Performed by: NURSE PRACTITIONER

## 2019-01-17 PROCEDURE — 3045F PR MOST RECENT HEMOGLOBIN A1C LEVEL 7.0-9.0%: CPT | Performed by: NURSE PRACTITIONER

## 2019-01-17 PROCEDURE — 1036F TOBACCO NON-USER: CPT | Performed by: NURSE PRACTITIONER

## 2019-01-17 PROCEDURE — 85045 AUTOMATED RETICULOCYTE COUNT: CPT

## 2019-01-17 PROCEDURE — 83036 HEMOGLOBIN GLYCOSYLATED A1C: CPT

## 2019-01-17 PROCEDURE — 1123F ACP DISCUSS/DSCN MKR DOCD: CPT | Performed by: NURSE PRACTITIONER

## 2019-01-17 PROCEDURE — G8599 NO ASA/ANTIPLAT THER USE RNG: HCPCS | Performed by: NURSE PRACTITIONER

## 2019-01-17 PROCEDURE — G8417 CALC BMI ABV UP PARAM F/U: HCPCS | Performed by: NURSE PRACTITIONER

## 2019-01-17 PROCEDURE — 82306 VITAMIN D 25 HYDROXY: CPT

## 2019-01-17 PROCEDURE — 85025 COMPLETE CBC W/AUTO DIFF WBC: CPT

## 2019-01-17 RX ORDER — AMOXICILLIN AND CLAVULANATE POTASSIUM 875; 125 MG/1; MG/1
1 TABLET, FILM COATED ORAL 2 TIMES DAILY
Qty: 20 TABLET | Refills: 0 | Status: SHIPPED | OUTPATIENT
Start: 2019-01-17 | End: 2019-01-27

## 2019-01-17 RX ORDER — FUROSEMIDE 20 MG/1
20 TABLET ORAL SEE ADMIN INSTRUCTIONS
Qty: 180 TABLET | Refills: 3 | Status: SHIPPED | OUTPATIENT
Start: 2019-01-17 | End: 2019-10-23 | Stop reason: SDUPTHER

## 2019-01-17 RX ORDER — PREDNISONE 20 MG/1
TABLET ORAL
Qty: 7 TABLET | Refills: 0 | Status: SHIPPED | OUTPATIENT
Start: 2019-01-17 | End: 2019-04-17 | Stop reason: ALTCHOICE

## 2019-01-17 RX ORDER — GABAPENTIN 300 MG/1
CAPSULE ORAL
Qty: 90 CAPSULE | Refills: 3 | Status: SHIPPED | OUTPATIENT
Start: 2019-01-17 | End: 2019-07-17 | Stop reason: SDUPTHER

## 2019-01-17 RX ORDER — CARVEDILOL 12.5 MG/1
6.25 TABLET ORAL 2 TIMES DAILY
Qty: 180 TABLET | Refills: 3 | Status: SHIPPED | OUTPATIENT
Start: 2019-01-17 | End: 2019-10-23 | Stop reason: SDUPTHER

## 2019-01-21 ENCOUNTER — NURSE ONLY (OUTPATIENT)
Dept: PRIMARY CARE CLINIC | Age: 74
End: 2019-01-21
Payer: MEDICARE

## 2019-01-21 ENCOUNTER — HOSPITAL ENCOUNTER (OUTPATIENT)
Facility: HOSPITAL | Age: 74
Discharge: HOME | End: 2019-01-21
Payer: MEDICARE

## 2019-01-21 DIAGNOSIS — E83.51 HYPOCALCEMIA: ICD-10-CM

## 2019-01-21 DIAGNOSIS — Z12.11 COLON CANCER SCREENING: Primary | ICD-10-CM

## 2019-01-21 LAB
CONTROL: ABNORMAL
HEMOCCULT STL QL: ABNORMAL
PARATHYROID HORMONE INTACT: 24.1 PG/ML (ref 14–72)
PHOSPHORUS: 3.8 MG/DL (ref 2.5–4.5)

## 2019-01-21 PROCEDURE — 82274 ASSAY TEST FOR BLOOD FECAL: CPT | Performed by: NURSE PRACTITIONER

## 2019-01-21 PROCEDURE — 82330 ASSAY OF CALCIUM: CPT

## 2019-01-21 PROCEDURE — 84100 ASSAY OF PHOSPHORUS: CPT

## 2019-01-21 PROCEDURE — 83970 ASSAY OF PARATHORMONE: CPT

## 2019-01-23 LAB
CALCIUM IONIZED, CALC AT PH 7.4: 1.27 MMOL/L (ref 1.11–1.3)
CALCIUM IONIZED: 1.25 MMOL/L (ref 1.11–1.3)

## 2019-01-24 ENCOUNTER — TELEPHONE (OUTPATIENT)
Dept: PRIMARY CARE CLINIC | Age: 74
End: 2019-01-24

## 2019-01-24 DIAGNOSIS — R19.5 POSITIVE FIT (FECAL IMMUNOCHEMICAL TEST): Primary | ICD-10-CM

## 2019-01-27 ASSESSMENT — ENCOUNTER SYMPTOMS
CHEST TIGHTNESS: 1
COUGH: 1
SINUS PRESSURE: 1
EYES NEGATIVE: 1
GASTROINTESTINAL NEGATIVE: 1

## 2019-03-14 ENCOUNTER — TRANSCRIBE ORDERS (OUTPATIENT)
Dept: ADMINISTRATIVE | Facility: HOSPITAL | Age: 74
End: 2019-03-14

## 2019-03-14 ENCOUNTER — HOSPITAL ENCOUNTER (OUTPATIENT)
Dept: GENERAL RADIOLOGY | Facility: HOSPITAL | Age: 74
End: 2019-03-14

## 2019-03-14 ENCOUNTER — APPOINTMENT (OUTPATIENT)
Dept: LAB | Facility: HOSPITAL | Age: 74
End: 2019-03-14

## 2019-03-14 DIAGNOSIS — N40.1 ENLARGED PROSTATE WITH URINARY OBSTRUCTION: Primary | ICD-10-CM

## 2019-03-14 DIAGNOSIS — N13.8 ENLARGED PROSTATE WITH URINARY OBSTRUCTION: Primary | ICD-10-CM

## 2019-03-14 LAB
ALBUMIN SERPL-MCNC: 3.8 G/DL (ref 3.5–5)
ALBUMIN/GLOB SERPL: 1.4 G/DL (ref 1–2)
ALP SERPL-CCNC: 54 U/L (ref 38–126)
ALT SERPL W P-5'-P-CCNC: 26 U/L (ref 13–69)
ANION GAP SERPL CALCULATED.3IONS-SCNC: 11.9 MMOL/L (ref 10–20)
AST SERPL-CCNC: 18 U/L (ref 15–46)
BILIRUB SERPL-MCNC: 0.3 MG/DL (ref 0.2–1.3)
BUN BLD-MCNC: 28 MG/DL (ref 7–20)
BUN/CREAT SERPL: 21.5 (ref 6.3–21.9)
CALCIUM SPEC-SCNC: 9.1 MG/DL (ref 8.4–10.2)
CHLORIDE SERPL-SCNC: 103 MMOL/L (ref 98–107)
CO2 SERPL-SCNC: 28 MMOL/L (ref 26–30)
CREAT BLD-MCNC: 1.3 MG/DL (ref 0.6–1.3)
GFR SERPL CREATININE-BSD FRML MDRD: 54 ML/MIN/1.73
GLOBULIN UR ELPH-MCNC: 2.8 GM/DL
GLUCOSE BLD-MCNC: 300 MG/DL (ref 74–98)
POTASSIUM BLD-SCNC: 4.9 MMOL/L (ref 3.5–5.1)
PROT SERPL-MCNC: 6.6 G/DL (ref 6.3–8.2)
SODIUM BLD-SCNC: 138 MMOL/L (ref 137–145)

## 2019-03-14 PROCEDURE — 80053 COMPREHEN METABOLIC PANEL: CPT | Performed by: UROLOGY

## 2019-03-14 PROCEDURE — 36415 COLL VENOUS BLD VENIPUNCTURE: CPT | Performed by: UROLOGY

## 2019-03-15 ENCOUNTER — HOSPITAL ENCOUNTER (OUTPATIENT)
Dept: GENERAL RADIOLOGY | Facility: HOSPITAL | Age: 74
Discharge: HOME | End: 2019-03-15
Admitting: UROLOGY

## 2019-03-15 PROCEDURE — 74018 RADEX ABDOMEN 1 VIEW: CPT

## 2019-04-15 ENCOUNTER — HOSPITAL ENCOUNTER (OUTPATIENT)
Facility: HOSPITAL | Age: 74
Discharge: HOME | End: 2019-04-15
Payer: MEDICARE

## 2019-04-15 DIAGNOSIS — D64.9 ANEMIA, UNSPECIFIED TYPE: ICD-10-CM

## 2019-04-15 LAB
BASOPHILS ABSOLUTE: 0.1 K/UL (ref 0–0.1)
BASOPHILS RELATIVE PERCENT: 1.2 %
EOSINOPHILS ABSOLUTE: 0.3 K/UL (ref 0–0.4)
EOSINOPHILS RELATIVE PERCENT: 3.7 %
HCT VFR BLD CALC: 36.4 % (ref 40–54)
HEMOGLOBIN: 12.1 G/DL (ref 13–18)
IMMATURE GRANULOCYTES #: 0 K/UL
IMMATURE GRANULOCYTES %: 0.1 % (ref 0–5)
LYMPHOCYTES ABSOLUTE: 1.4 K/UL (ref 1.5–4)
LYMPHOCYTES RELATIVE PERCENT: 19.8 %
MCH RBC QN AUTO: 28.9 PG (ref 27–32)
MCHC RBC AUTO-ENTMCNC: 33.2 G/DL (ref 31–35)
MCV RBC AUTO: 87.1 FL (ref 80–100)
MONOCYTES ABSOLUTE: 0.7 K/UL (ref 0.2–0.8)
MONOCYTES RELATIVE PERCENT: 10.2 %
NEUTROPHILS ABSOLUTE: 4.7 K/UL (ref 2–7.5)
NEUTROPHILS RELATIVE PERCENT: 65 %
PDW BLD-RTO: 12.7 % (ref 11–16)
PLATELET # BLD: 269 K/UL (ref 150–400)
PMV BLD AUTO: 11 FL (ref 6–10)
RBC # BLD: 4.18 M/UL (ref 4.5–6)
WBC # BLD: 7.2 K/UL (ref 4–11)

## 2019-04-15 PROCEDURE — 85025 COMPLETE CBC W/AUTO DIFF WBC: CPT

## 2019-04-17 ENCOUNTER — OFFICE VISIT (OUTPATIENT)
Dept: PRIMARY CARE CLINIC | Age: 74
End: 2019-04-17
Payer: MEDICARE

## 2019-04-17 VITALS
HEIGHT: 67 IN | BODY MASS INDEX: 34.69 KG/M2 | RESPIRATION RATE: 16 BRPM | DIASTOLIC BLOOD PRESSURE: 52 MMHG | SYSTOLIC BLOOD PRESSURE: 146 MMHG | TEMPERATURE: 98.4 F | WEIGHT: 221 LBS | HEART RATE: 65 BPM | OXYGEN SATURATION: 96 %

## 2019-04-17 DIAGNOSIS — Z79.4 TYPE 2 DIABETES MELLITUS WITHOUT COMPLICATION, WITH LONG-TERM CURRENT USE OF INSULIN (HCC): Primary | ICD-10-CM

## 2019-04-17 DIAGNOSIS — I10 ESSENTIAL HYPERTENSION: ICD-10-CM

## 2019-04-17 DIAGNOSIS — R26.89 BALANCE PROBLEM: ICD-10-CM

## 2019-04-17 DIAGNOSIS — E78.5 HYPERLIPIDEMIA, UNSPECIFIED HYPERLIPIDEMIA TYPE: ICD-10-CM

## 2019-04-17 DIAGNOSIS — E11.9 TYPE 2 DIABETES MELLITUS WITHOUT COMPLICATION, WITH LONG-TERM CURRENT USE OF INSULIN (HCC): Primary | ICD-10-CM

## 2019-04-17 DIAGNOSIS — K21.9 GASTROESOPHAGEAL REFLUX DISEASE WITHOUT ESOPHAGITIS: ICD-10-CM

## 2019-04-17 LAB — HBA1C MFR BLD: 8.9 %

## 2019-04-17 PROCEDURE — G8417 CALC BMI ABV UP PARAM F/U: HCPCS | Performed by: NURSE PRACTITIONER

## 2019-04-17 PROCEDURE — 1123F ACP DISCUSS/DSCN MKR DOCD: CPT | Performed by: NURSE PRACTITIONER

## 2019-04-17 PROCEDURE — 83036 HEMOGLOBIN GLYCOSYLATED A1C: CPT | Performed by: NURSE PRACTITIONER

## 2019-04-17 PROCEDURE — 3017F COLORECTAL CA SCREEN DOC REV: CPT | Performed by: NURSE PRACTITIONER

## 2019-04-17 PROCEDURE — 2022F DILAT RTA XM EVC RTNOPTHY: CPT | Performed by: NURSE PRACTITIONER

## 2019-04-17 PROCEDURE — G8599 NO ASA/ANTIPLAT THER USE RNG: HCPCS | Performed by: NURSE PRACTITIONER

## 2019-04-17 PROCEDURE — 3045F PR MOST RECENT HEMOGLOBIN A1C LEVEL 7.0-9.0%: CPT | Performed by: NURSE PRACTITIONER

## 2019-04-17 PROCEDURE — 1036F TOBACCO NON-USER: CPT | Performed by: NURSE PRACTITIONER

## 2019-04-17 PROCEDURE — 99214 OFFICE O/P EST MOD 30 MIN: CPT | Performed by: NURSE PRACTITIONER

## 2019-04-17 PROCEDURE — G8427 DOCREV CUR MEDS BY ELIG CLIN: HCPCS | Performed by: NURSE PRACTITIONER

## 2019-04-17 PROCEDURE — 4040F PNEUMOC VAC/ADMIN/RCVD: CPT | Performed by: NURSE PRACTITIONER

## 2019-04-17 RX ORDER — PANTOPRAZOLE SODIUM 40 MG/1
TABLET, DELAYED RELEASE ORAL
Qty: 90 TABLET | Refills: 3 | Status: CANCELLED | OUTPATIENT
Start: 2019-04-17

## 2019-04-17 RX ORDER — AMLODIPINE BESYLATE 10 MG/1
10 TABLET ORAL DAILY
Qty: 90 TABLET | Refills: 3 | Status: SHIPPED | OUTPATIENT
Start: 2019-04-17 | End: 2020-06-17 | Stop reason: SDUPTHER

## 2019-04-17 RX ORDER — DEXLANSOPRAZOLE 60 MG/1
60 CAPSULE, DELAYED RELEASE ORAL DAILY
Qty: 90 CAPSULE | Refills: 3 | Status: SHIPPED | OUTPATIENT
Start: 2019-04-17 | End: 2020-02-17 | Stop reason: SDUPTHER

## 2019-04-17 RX ORDER — DICYCLOMINE HCL 20 MG
20 TABLET ORAL 3 TIMES DAILY PRN
Qty: 90 TABLET | Refills: 0 | Status: SHIPPED | OUTPATIENT
Start: 2019-04-17 | End: 2019-07-17 | Stop reason: ALTCHOICE

## 2019-04-17 RX ORDER — ATORVASTATIN CALCIUM 40 MG/1
40 TABLET, FILM COATED ORAL NIGHTLY
Qty: 90 TABLET | Refills: 3 | Status: SHIPPED | OUTPATIENT
Start: 2019-04-17 | End: 2020-06-17 | Stop reason: SDUPTHER

## 2019-04-17 RX ORDER — DEXLANSOPRAZOLE 60 MG/1
60 CAPSULE, DELAYED RELEASE ORAL DAILY
Qty: 30 CAPSULE | Refills: 1 | Status: SHIPPED | OUTPATIENT
Start: 2019-04-17 | End: 2019-05-08 | Stop reason: SDUPTHER

## 2019-04-17 RX ORDER — PRASUGREL 10 MG/1
10 TABLET, FILM COATED ORAL DAILY
Qty: 90 TABLET | Refills: 3 | Status: SHIPPED | OUTPATIENT
Start: 2019-04-17 | End: 2019-07-17 | Stop reason: SDUPTHER

## 2019-04-17 NOTE — PROGRESS NOTES
Ref SUBJECTIVE:    Patient ID: Yas Carter is a 69 y.o. male. Chief Complaint   Patient presents with    3 Month Follow-Up    Anemia         HPI:  He has been to get a colonoscopy due to a positive fit test.  He had an EGD. He had a bleeding ulcer that she was able to stop the bleeding. This most recent for his anemia. He is taking all his diabetic medication as prescribed doesn't really check his fingerstick that often. He denies any hypo or hyperglycemic events. He is on the pressure medicine he isn't having any chest pain he denies any shortness of breath. His wife's with him today she says he is not Gradie Corrente active. She tries to get him to do more than what he does. is complaining of balance problems. If you setup tries to do very much he says he has problems with his balance. He says this is been going on for a long time. His wife thinks that he has problems with weakness. He has not had any falls. ``  Patient's medications,allergies, past medical, surgical, social and family histories were reviewed and updated as appropriate. .  Current Outpatient Medications on File Prior to Visit   Medication Sig Dispense Refill    Insulin Degludec (TRESIBA FLEXTOUCH) 100 UNIT/ML SOPN Inject 70 Units into the skin daily 25 pen 3    Insulin Pen Needle 31G X 5 MM MISC 1 each by Does not apply route daily Dx: e11.9 300 each 3    furosemide (LASIX) 20 MG tablet Take 1 tablet by mouth See Admin Instructions Twice a week 180 tablet 3    carvedilol (COREG) 12.5 MG tablet Take 0.5 tablets by mouth 2 times daily 180 tablet 3    gabapentin (NEURONTIN) 300 MG capsule TAKE 1 CAPSULE NIGHTLY.  90 capsule 3    SITagliptin (JANUVIA) 50 MG tablet Take 1 tablet by mouth daily 90 tablet 3    ADVOCATE INSULIN PEN NEEDLES 31G X 5 MM MISC USE FOR ONCE DAILY INSULIN INJECTIONS 100 each 3    canagliflozin (INVOKANA) 100 MG TABS tablet TAKE ONE TABLET BY MOUTH EVERY MORNING BEFORE BREAKFAST 90 tablet 3    lisinopril (PRINIVIL;ZESTRIL) 30 MG tablet TAKE ONE TABLET BY MOUTH EVERY DAY 90 tablet 3    doxazosin (CARDURA) 4 MG tablet Take 1 tablet by mouth nightly 90 tablet 3    Cholecalciferol (VITAMIN D3) 2000 units CAPS Take 1 capsule by mouth daily      pantoprazole (PROTONIX) 40 MG tablet TAKE 1 TABLET DAILY 90 tablet 3    citalopram (CELEXA) 40 MG tablet Take 1 tablet by mouth daily 90 tablet 3    TRUE METRIX BLOOD GLUCOSE TEST strip USE TO TEST BLOOD SUGAR THREE TIMES DAILY AND AS NEEDED 300 strip 3    MICROLET LANCETS MISC 1 each by Does not apply route 3 times daily DX: DMII 400 each 3    loratadine (CLARITIN) 10 MG tablet TAKE ONE TABLET BY MOUTH EVERY DAY 30 tablet 3    finasteride (PROSCAR) 5 MG tablet Take 1 tablet by mouth daily 90 tablet 3    nitroGLYCERIN (NITROSTAT) 0.4 MG SL tablet Place 1 tablet under the tongue every 5 minutes as needed 25 tablet 5    tamsulosin (FLOMAX) 0.4 MG capsule Take 0.4 mg by mouth daily.  glucose blood VI test strips (EXACTECH TEST) strip Please dispense glucometer test strips for patient's device. Patient test bid and prn. Dx: 250.00 100 strip 5    therapeutic multivitamin-minerals (THERAGRAN-M) tablet Take 1 tablet by mouth daily.  [DISCONTINUED] omeprazole (PRILOSEC) 20 MG capsule Take 1 capsule by mouth 2 times daily. 180 capsule 3    aspirin  MG EC tablet Take 1 tablet by mouth daily. 30 tablet 11     No current facility-administered medications on file prior to visit. Review of Systems   Constitutional: Negative. HENT: Negative. Eyes: Negative. Respiratory: Negative. Cardiovascular: Negative. Gastrointestinal: Negative. Genitourinary: Negative. Musculoskeletal: Positive for gait problem. Skin: Negative. Psychiatric/Behavioral: Negative.         OBJECTIVE:  BP (!) 146/52 (Site: Right Upper Arm, Position: Sitting, Cuff Size: Medium Adult)   Pulse 65   Temp 98.4 °F (36.9 °C) (Oral)   Resp 16   Ht 5' 7\" (1.702 m)   Wt 221 lb (100.2 kg)   SpO2 96% Comment: room air  BMI 34.61 kg/m²    Physical Exam   Constitutional: He is oriented to person, place, and time. He appears well-developed and well-nourished. HENT:   Head: Normocephalic and atraumatic. Eyes: Pupils are equal, round, and reactive to light. Conjunctivae and EOM are normal.   Neck: Normal range of motion. Neck supple. No JVD present. No thyromegaly present. Cardiovascular: Normal rate and regular rhythm. Exam reveals no gallop and no friction rub. No murmur heard. Pulmonary/Chest: Effort normal and breath sounds normal. No respiratory distress. He has no wheezes. He has no rales. Abdominal: Soft. Bowel sounds are normal. He exhibits no distension. There is no tenderness. There is no guarding. Musculoskeletal: He exhibits no edema or tenderness. Neurological: He is alert and oriented to person, place, and time. No cranial nerve deficit or sensory deficit. Coordination (OVERALL WEAKNESS) and gait abnormal.   Skin: Skin is warm and dry. No rash noted. Psychiatric: He has a normal mood and affect. Judgment normal.   Nursing note and vitals reviewed. No results found for requested labs within last 30 days. Hemoglobin A1C (%)   Date Value   04/17/2019 8.9     Microalbumin, Random Urine (mg/dL)   Date Value   06/05/2017 1.50     LDL Calculated (mg/dL)   Date Value   09/14/2018 92         Lab Results   Component Value Date    WBC 7.2 04/15/2019    NEUTROABS 4.7 04/15/2019    HGB 12.1 04/15/2019    HCT 36.4 04/15/2019    MCV 87.1 04/15/2019     04/15/2019       Lab Results   Component Value Date    TSH 2.16 09/14/2018         ASSESSMENT/PLAN:     Kedar Aldridge was seen today for 3 month follow-up and anemia. Diagnoses and all orders for this visit:    Type 2 diabetes mellitus without complication, with long-term current use of insulin ([facility])  -     dapagliflozin (FARXIGA) 5 MG tablet;  Take 1 tablet by mouth every morning  -     Hemoglobin A1C; Future  -     Comprehensive Metabolic Panel; Future  -     CBC; Future  -     POCT glycosylated hemoglobin (Hb A1C)    Essential hypertension  -     prasugrel (EFFIENT) 10 MG TABS; Take 1 tablet by mouth daily  -     amLODIPine (NORVASC) 10 MG tablet; Take 1 tablet by mouth daily Indications: High Blood Pressure Disorder    Gastroesophageal reflux disease without esophagitis  Has undergone EGD. Will start on Dexilant. Hyperlipidemia, unspecified hyperlipidemia type  -     atorvastatin (LIPITOR) 40 MG tablet; Take 1 tablet by mouth nightly  -     Lipid Panel; Future    Balance problem  -     External Referral To Physical Therapy    Other orders  -     dexlansoprazole (DEXILANT) 60 MG CPDR delayed release capsule; Take 1 capsule by mouth daily  -     dicyclomine (BENTYL) 20 MG tablet; Take 1 tablet by mouth 3 times daily as needed (bloating)  -     dexlansoprazole (DEXILANT) 60 MG CPDR delayed release capsule;  Take 1 capsule by mouth daily      Medications Discontinued During This Encounter   Medication Reason    predniSONE (DELTASONE) 20 MG tablet Therapy completed    prasugrel (EFFIENT) 10 MG TABS REORDER    amLODIPine (NORVASC) 10 MG tablet REORDER    dapagliflozin (FARXIGA) 5 MG tablet REORDER    atorvastatin (LIPITOR) 40 MG tablet REORDER

## 2019-04-28 ASSESSMENT — ENCOUNTER SYMPTOMS
RESPIRATORY NEGATIVE: 1
EYES NEGATIVE: 1
GASTROINTESTINAL NEGATIVE: 1

## 2019-04-29 ENCOUNTER — HOSPITAL ENCOUNTER (OUTPATIENT)
Dept: PHYSICAL THERAPY | Facility: HOSPITAL | Age: 74
Setting detail: THERAPIES SERIES
Discharge: HOME | End: 2019-04-29
Payer: MEDICARE

## 2019-04-29 PROCEDURE — 97535 SELF CARE MNGMENT TRAINING: CPT

## 2019-04-29 PROCEDURE — 97161 PT EVAL LOW COMPLEX 20 MIN: CPT

## 2019-04-29 RX ORDER — SITAGLIPTIN 100 MG/1
TABLET, FILM COATED ORAL
Qty: 90 TABLET | Refills: 3 | Status: SHIPPED | OUTPATIENT
Start: 2019-04-29 | End: 2019-07-17 | Stop reason: SDUPTHER

## 2019-04-29 NOTE — PROGRESS NOTES
Physical Therapy  Initial Assessment  Date: 2019  Patient Name: Ananth Ivan  MRN: [id number]  : 1945     Treatment Diagnosis: Recent falls; balance disorder    Restrictions  Restrictions/Precautions  Restrictions/Precautions: General Precautions  Required Braces or Orthoses?: No    Subjective   General  Chart Reviewed: Yes  Patient assessed for rehabilitation services?: Yes  Response To Previous Treatment: Not applicable  Family / Caregiver Present: No  Referring Practitioner: MERCEDES Enriquez  Diagnosis: Balance Problem  Follows Commands: Within Functional Limits  PT Visit Information  PT Insurance Information: Medicare, [facility]  Subjective  Subjective: Patient reports: he gets off balance intermittently, occurring daily over the past several weeks -- notes he has had 2 falls, stating he lost his balance- no injuries; describes a \"swimmy-headed\" sensation, usually occurs when he gets up in the morning, and feels better as the day goes on; denies recent diagnostics for dizziness.   Pain Screening  Patient Currently in Pain: Denies  Vital Signs  Patient Currently in Pain: Denies    Vision/Hearing  Vision  Vision: Within Functional Limits  Hearing  Hearing: Within functional limits    Orientation  Orientation  Overall Orientation Status: Within Normal Limits    Social/Functional History  Social/Functional History  Lives With: Spouse  Ambulation Assistance: Independent  Transfer Assistance: Independent  Active : Yes  Mode of Transportation: Car  Leisure & Hobbies: watches TV    Objective     Observation/Palpation  Posture: Good  Observation: elderly male, nad; gait: mild wide CHRISTIN, steady    AROM RLE (degrees)  RLE AROM: WFL  AROM LLE (degrees)  LLE AROM : WFL  AROM RUE (degrees)  RUE AROM : WFL  AROM LUE (degrees)  LUE AROM : WFL    Strength RLE  Strength RLE: WFL  Strength LLE  Strength LLE: WFL  Tone RLE  RLE Tone: Normotonic  Motor Control  Gross Motor?: WFL  Additional 1: Report a 50%+ decrease in dizziness/balance deficit episodes. Long term goal 2: Report a 50% + decrease in fear of falling. Long term goal 3: Ambulate community distances and terrains without LOB or falls. Patient Goals   Patient goals : improve balance       Therapy Time   Individual Concurrent Group Co-treatment   Time In 2848         Time Out 1000         Minutes 41                 Electronically signed by Daisy Angelucci, PT on 4/29/2019 at 60:43 AM      Certification of Medical Necessity: It will be understood that this treatment plan is certified medically necessary by the documenting therapist and referring physician mentioned in this report. Unless the physician indicated otherwise through written correspondence with our office, all further referrals will act as certification of medical necessity on this treatment plan. Thank you for this referral.  If you have questions regarding this plan of care, please call [phone].           Revisions to this plan (optional):                     Please sign and return this plan to:   FAX: [phone]      Signature:                                 Date:

## 2019-05-01 ENCOUNTER — HOSPITAL ENCOUNTER (OUTPATIENT)
Dept: PHYSICAL THERAPY | Facility: HOSPITAL | Age: 74
Setting detail: THERAPIES SERIES
Discharge: HOME | End: 2019-05-01
Payer: MEDICARE

## 2019-05-01 PROCEDURE — 97110 THERAPEUTIC EXERCISES: CPT

## 2019-05-01 PROCEDURE — 97150 GROUP THERAPEUTIC PROCEDURES: CPT

## 2019-05-01 PROCEDURE — 97530 THERAPEUTIC ACTIVITIES: CPT

## 2019-05-01 NOTE — FLOWSHEET NOTE
Physical Therapy Daily Treatment Note   Date:  2019    TIme In:      930                Time Out:      7854    Patient Name:  Maria Luisa Alberto    :  1945  MRN: [id number]    Restrictions/Precautions:    Pertinent Medical History:  Medical/Treatment Diagnosis Information:  ·   Recent falls; balance disorder  ·    Insurance/Certification information:     Medicare, [facility]  Physician Information:    MERCEDES Lenz  Plan of care signed (Y/N):    Visit# / total visits:     2/    G-Code (if applicable):      Date / Visit # G-Code Applied:         Progress Note: []  Yes  [x]  No  Next due by: Visit #10      Pain level:   stiff/10  Subjective: Pt with no new complaints since IE.      Objective:  Observation:   Test measurements:    Palpation:    Exercises:  Exercise Resistance/Repetitions Other comments   Nustep 10' L5 1   Standing marching w/ support 20\" x 5 1   Sit to stand 5 x 5  1   Side stepping with support 1' x 5 1   Standing heel-toe raises 2x15 1   Seated trunk AROM: a/p, lat 3 x 10 each 1   SLS w/ support 15\" x 5 1   Seated cervical AROM: horizontal/vertical 3 x 10 each 1   Seated cervical AROM: horizontal/vertical with eyes fixed on dot (VOR) 3 x 10 each 1                    Other Therapeutic Activities:      Manual Treatments:      Modalities:        Timed Code Treatment Minutes:  55      Total Treatment Minutes:   65    Treatment/Activity Tolerance:  [x] Patient tolerated treatment well [] Patient limited by fatigue  [] Patient limited by pain  [] Patient limited by other medical complications  [] Other:     Pain after treatment:      0/10    Prognosis: [x] Good [] Fair  [] Poor    Patient Requires Follow-up: [x] Yes  [] No    Plan:   [x] Continue per plan of care [] Alter current plan (see comments)  [] Plan of care initiated [] Hold pending MD visit [] Discharge    Plan for Next Session:        Electronically signed by:  Elaine Bernard PTA

## 2019-05-06 ENCOUNTER — HOSPITAL ENCOUNTER (OUTPATIENT)
Dept: PHYSICAL THERAPY | Facility: HOSPITAL | Age: 74
Setting detail: THERAPIES SERIES
Discharge: HOME | End: 2019-05-06
Payer: MEDICARE

## 2019-05-06 PROCEDURE — 97110 THERAPEUTIC EXERCISES: CPT

## 2019-05-06 PROCEDURE — 97530 THERAPEUTIC ACTIVITIES: CPT

## 2019-05-06 PROCEDURE — 97150 GROUP THERAPEUTIC PROCEDURES: CPT

## 2019-05-06 NOTE — FLOWSHEET NOTE
Physical Therapy Daily Treatment Note   Date:  2019    TIme In:      830                Time Out:      935    Patient Name:  Kt Simeon    :  1945  MRN: [id number]    Restrictions/Precautions:    Pertinent Medical History:  Medical/Treatment Diagnosis Information:  ·   Recent falls; balance disorder     Insurance/Certification information:     Medicare, [facility]  Physician Information:    MERCEDES Victoria  Plan of care signed (Y/N):    Visit# / total visits:     3/    G-Code (if applicable):      Date / Visit # G-Code Applied:         Progress Note: []  Yes  [x]  No  Next due by: Visit #10      Pain level:   0/10  Subjective: Pt reported his balance in uneasy today. Objective:  Observation:   Test measurements:    Palpation:    Exercises:  Exercise Resistance/Repetitions Other comments   Nustep 10' L5 6   Standing marching w/ support 20\" x 5 6   Sit to stand 5 x 5  6   Side stepping with support 1' x 5 6   Standing heel-toe raises 2x15 6   Seated trunk AROM: a/p, lat 3 x 10 each 6   SLS w/ support 15\" x 5 6   Seated cervical AROM: horizontal/vertical 3 x 10 each 6   Seated cervical AROM: horizontal/vertical with eyes fixed on dot (VOR) 3 x 10 each 6                    Other Therapeutic Activities:      Manual Treatments:      Modalities:        Timed Code Treatment Minutes:  55      Total Treatment Minutes:   65    Treatment/Activity Tolerance:  [x] Patient tolerated treatment well [] Patient limited by fatigue  [] Patient limited by pain  [] Patient limited by other medical complications  [x] Other:  Pt required occasional rest breaks today.     Pain after treatment:      0/10    Prognosis: [x] Good [] Fair  [] Poor    Patient Requires Follow-up: [x] Yes  [] No    Plan:   [x] Continue per plan of care [] Alter current plan (see comments)  [] Plan of care initiated [] Hold pending MD visit [] Discharge    Plan for Next Session:        Electronically signed by:  Zina Man PTA

## 2019-05-08 ENCOUNTER — HOSPITAL ENCOUNTER (OUTPATIENT)
Dept: PHYSICAL THERAPY | Facility: HOSPITAL | Age: 74
Setting detail: THERAPIES SERIES
Discharge: HOME | End: 2019-05-08
Payer: MEDICARE

## 2019-05-08 DIAGNOSIS — Z79.4 TYPE 2 DIABETES MELLITUS WITHOUT COMPLICATION, WITH LONG-TERM CURRENT USE OF INSULIN (HCC): ICD-10-CM

## 2019-05-08 DIAGNOSIS — E11.9 TYPE 2 DIABETES MELLITUS WITHOUT COMPLICATION, WITH LONG-TERM CURRENT USE OF INSULIN (HCC): ICD-10-CM

## 2019-05-08 PROCEDURE — 97150 GROUP THERAPEUTIC PROCEDURES: CPT

## 2019-05-08 PROCEDURE — 97110 THERAPEUTIC EXERCISES: CPT

## 2019-05-08 PROCEDURE — 97530 THERAPEUTIC ACTIVITIES: CPT

## 2019-05-08 RX ORDER — DEXLANSOPRAZOLE 60 MG/1
60 CAPSULE, DELAYED RELEASE ORAL DAILY
Qty: 30 CAPSULE | Refills: 1 | Status: SHIPPED | OUTPATIENT
Start: 2019-05-08 | End: 2019-07-17 | Stop reason: SDUPTHER

## 2019-05-08 NOTE — FLOWSHEET NOTE
Physical Therapy Daily Treatment Note   Date:  2019    TIme In:      830                Time Out:      930    Patient Name:  Elder Sams    :  1945  MRN: [id number]    Restrictions/Precautions:    Pertinent Medical History:  Medical/Treatment Diagnosis Information:  ·   Recent falls; balance disorder     Insurance/Certification information:     Medicare, [facility]  Physician Information:    MERCEDES Valverde  Plan of care signed (Y/N):    Visit# / total visits:     4/    G-Code (if applicable):      Date / Visit # G-Code Applied:         Progress Note: []  Yes  [x]  No  Next due by: Visit #10      Pain level:   0/10  Subjective: Pt reported his hips are feeling better. Objective:  Observation:   Test measurements:    Palpation:    Exercises:  Exercise Resistance/Repetitions Other comments   Nustep 10' L5 8   Standing marching w/ support 20\" x 5 8   Sit to stand 5 x 5  8   Side stepping with support 1' x 5 8   Standing heel-toe raises 2x15 8   Seated trunk AROM: a/p, lat 3 x 10 each 8   SLS w/ support 15\" x 5 8   Seated cervical AROM: horizontal/vertical 3 x 10 each 8   Seated cervical AROM: horizontal/vertical with eyes fixed on dot (VOR) 3 x 10 each 8                    Other Therapeutic Activities:      Manual Treatments:      Modalities:        Timed Code Treatment Minutes:  55      Total Treatment Minutes:   60    Treatment/Activity Tolerance:  [x] Patient tolerated treatment well [] Patient limited by fatigue  [] Patient limited by pain  [] Patient limited by other medical complications  [x] Other:  Pt required occasional rest breaks.     Pain after treatment:      0/10    Prognosis: [x] Good [] Fair  [] Poor    Patient Requires Follow-up: [x] Yes  [] No    Plan:   [x] Continue per plan of care [] Alter current plan (see comments)  [] Plan of care initiated [] Hold pending MD visit [] Discharge    Plan for Next Session:        Electronically signed by:  Sakshi Cain

## 2019-05-13 ENCOUNTER — HOSPITAL ENCOUNTER (OUTPATIENT)
Dept: PHYSICAL THERAPY | Facility: HOSPITAL | Age: 74
Setting detail: THERAPIES SERIES
Discharge: HOME | End: 2019-05-13
Payer: MEDICARE

## 2019-05-13 PROCEDURE — 97110 THERAPEUTIC EXERCISES: CPT

## 2019-05-13 NOTE — FLOWSHEET NOTE
Physical Therapy Daily Treatment Note   Date:  2019    TIme In:      0900                Time Out:      7628    Patient Name:  Vania Dai    :  1945  MRN: [id number]    Restrictions/Precautions:    Pertinent Medical History:  Medical/Treatment Diagnosis Information:  ·   Recent falls; balance disorder     Insurance/Certification information:     Medicare, [facility]  Physician Information:    MERCEDES Desai  Plan of care signed (Y/N):    Visit# / total visits:     5 /    G-Code (if applicable):      Date / Visit # G-Code Applied:         Progress Note: []  Yes  [x]  No  Next due by: Visit #10      Pain level:   0/10    Subjective: Pt reports: feels a little \"wobbly\" this morning, but doing ok overall. Objective:  Observation:   Test measurements:    Palpation:    Exercises:  Exercise Resistance/Repetitions Other comments   Nustep 10' L5 13   Standing marching w/ support 20\" x 5 13   Sit to stand 5 x 5  13   Side stepping with support 1' x 5 13   Standing heel-toe raises 2x15 13   Seated trunk AROM: a/p, lat 3 x 10 each 13   SLS w/ support 15\" x 5 13   Seated cervical AROM: horizontal/vertical 3 x 10 each 13   Seated cervical AROM: horizontal/vertical with eyes fixed on dot (VOR) 3 x 10 each 13                    Other Therapeutic Activities:      Manual Treatments:      Modalities:        Timed Code Treatment Minutes:  40      Total Treatment Minutes:   46'    Treatment/Activity Tolerance:  [x] Patient tolerated treatment well [] Patient limited by fatigue  [] Patient limited by pain  [] Patient limited by other medical complications  [x] Other:  Pt required occasional rest breaks.     Pain after treatment:      0/10    Prognosis: [x] Good [] Fair  [] Poor    Patient Requires Follow-up: [x] Yes  [] No    Plan:   [x] Continue per plan of care [] Alter current plan (see comments)  [] Plan of care initiated [] Hold pending MD visit [] Discharge    Plan for Next Session: Electronically signed by:  Electronically signed by Pao Sauer PT on 5/14/2019 at 1:29 PM

## 2019-05-15 ENCOUNTER — HOSPITAL ENCOUNTER (OUTPATIENT)
Dept: PHYSICAL THERAPY | Facility: HOSPITAL | Age: 74
Setting detail: THERAPIES SERIES
Discharge: HOME | End: 2019-05-15
Payer: MEDICARE

## 2019-05-15 PROCEDURE — 97150 GROUP THERAPEUTIC PROCEDURES: CPT

## 2019-05-15 PROCEDURE — 97110 THERAPEUTIC EXERCISES: CPT

## 2019-05-20 ENCOUNTER — HOSPITAL ENCOUNTER (OUTPATIENT)
Dept: PHYSICAL THERAPY | Facility: HOSPITAL | Age: 74
Setting detail: THERAPIES SERIES
Discharge: HOME | End: 2019-05-20
Payer: MEDICARE

## 2019-05-20 PROCEDURE — 97110 THERAPEUTIC EXERCISES: CPT

## 2019-05-20 PROCEDURE — 97150 GROUP THERAPEUTIC PROCEDURES: CPT

## 2019-05-20 PROCEDURE — 97530 THERAPEUTIC ACTIVITIES: CPT

## 2019-05-20 NOTE — FLOWSHEET NOTE
Physical Therapy Daily Treatment Note   Date:  2019    TIme In:      825                Time Out:      9722    Patient Name:  Zay Lopez    :  1945  MRN: [id number]    Restrictions/Precautions:    Pertinent Medical History:  Medical/Treatment Diagnosis Information:  ·   Recent falls; balance disorder     Insurance/Certification information:     Medicare, [facility]  Physician Information:    MERCEDES De Luna  Plan of care signed (Y/N):    Visit# / total visits:     7/    G-Code (if applicable):      Date / Visit # G-Code Applied:         Progress Note: []  Yes  [x]  No  Next due by: Visit #10      Pain level:   0/10    Subjective: Pt reports: feels a little better; no new c/o. Objective:  Observation:   Test measurements:    Palpation:    Exercises:  Exercise Resistance/Repetitions Other comments   Nustep 10' L5 20   Standing marching w/ support 20\" x 5 20   Sit to stand 5 x 5  20   Side stepping with support 1' x 5 20   Standing heel-toe raises 2x15 20   Seated trunk AROM: a/p, lat 3 x 10 each 20   SLS w/ support 15\" x 5 20   Seated cervical AROM: horizontal/vertical 3 x 10 each 20   Seated cervical AROM: horizontal/vertical with eyes fixed on dot (VOR) 3 x 10 each 20                    Other Therapeutic Activities:      Manual Treatments:      Modalities:        Timed Code Treatment Minutes: 40 and rest grouped       Total Treatment Minutes:  60    Treatment/Activity Tolerance:  [x] Patient tolerated treatment well [] Patient limited by fatigue  [] Patient limited by pain  [] Patient limited by other medical complications  [x] Other:  Pt required occasional rest breaks.     Pain after treatment:      0/10    Prognosis: [x] Good [] Fair  [] Poor    Patient Requires Follow-up: [x] Yes  [] No    Plan:   [x] Continue per plan of care [] Alter current plan (see comments)  [] Plan of care initiated [] Hold pending MD visit [] Discharge    Plan for Next Session:        Electronically signed by:  Electronically signed by Wayne Redman PTA on 5/20/2019 at 4:28 PM

## 2019-05-22 ENCOUNTER — HOSPITAL ENCOUNTER (OUTPATIENT)
Dept: PHYSICAL THERAPY | Facility: HOSPITAL | Age: 74
Setting detail: THERAPIES SERIES
Discharge: HOME | End: 2019-05-22
Payer: MEDICARE

## 2019-05-22 PROCEDURE — 97150 GROUP THERAPEUTIC PROCEDURES: CPT

## 2019-05-22 PROCEDURE — 97530 THERAPEUTIC ACTIVITIES: CPT

## 2019-05-22 PROCEDURE — 97110 THERAPEUTIC EXERCISES: CPT

## 2019-05-28 ENCOUNTER — HOSPITAL ENCOUNTER (OUTPATIENT)
Dept: PHYSICAL THERAPY | Facility: HOSPITAL | Age: 74
Setting detail: THERAPIES SERIES
Discharge: HOME | End: 2019-05-28
Payer: MEDICARE

## 2019-05-28 PROCEDURE — 97150 GROUP THERAPEUTIC PROCEDURES: CPT

## 2019-05-28 PROCEDURE — 97110 THERAPEUTIC EXERCISES: CPT

## 2019-05-28 NOTE — FLOWSHEET NOTE
Physical Therapy Re-Assessment   Date:  2019    TIme In: 828                     Time Out:   915       Patient Name:  Milagro Alcaraz    :  1945  MRN: [id number]    Restrictions/Precautions:    Pertinent Medical History:  Medical/Treatment Diagnosis Information:  ·   Recent falls; balance disorder     Insurance/Certification information:     Medicare, [facility]  Physician Information:    MERCEDES Boone  Plan of care signed (Y/N):    Visit# / total visits:     9/    G-Code (if applicable):      Date / Visit # G-Code Applied:         Progress Note: [x]  Yes  []  No  Next due by: 19     Pain level:   10    Subjective: Pt reports: he has seen improvement since starting PT. He has not had any falls since starting PT. Objective:  Observation:   Test measurements:    Palpation:    Exercises:  Exercise Resistance/Repetitions Other comments   Nustep 10' L5 28   Standing marching w/ support 20\" x 5 28   Sit to stand 5 x 5  28   Side stepping with support 1' x 5 22   Standing heel-toe raises 2x15 22   Seated trunk AROM: a/p, lat 3 x 10 each 22   SLS w/ support 15\" x 5 22   Seated cervical AROM: horizontal/vertical 3 x 10 each 22   Seated cervical AROM: horizontal/vertical with eyes fixed on dot (VOR) 3 x 10 each 22                    Other Therapeutic Activities:      Manual Treatments:      Modalities:        Timed Code Treatment Minutes: 39' - 15' group      Total Treatment Minutes:  39'    Treatment/Activity Tolerance:  [x] Patient tolerated treatment well [] Patient limited by fatigue  [] Patient limited by pain  [] Patient limited by other medical complications  [x] Other:  Pt reports noticing at least a 50% subjective improvement in fear of falling and overall well being since starting PT. He will benefit from 2-4 more weeks of skilled PT to further address balance, strength, and coordination to restore optimal functional level.       Pain after treatment: 0/10    Prognosis: [x] Good [] Fair  [] Poor    Patient Requires Follow-up: [x] Yes  [] No    Plan:   [x] Continue per plan of care [] Alter current plan (see comments)  [] Plan of care initiated [] Hold pending MD visit [] Discharge    Plan for Next Session:      Goals  Long term goals  Time Frame for Long term goals : 4-6 weeks  Long term goal 1: Report a 50%+ decrease in dizziness/balance deficit episodes. -MET  Long term goal 2: Report a 50% + decrease in fear of falling. -MET  Long term goal 3: Ambulate community distances and terrains without LOB or falls.       Electronically signed by:  Electronically signed by Gil Mayes PT on 0/11/3011 at 8:44 AM

## 2019-05-30 ENCOUNTER — HOSPITAL ENCOUNTER (OUTPATIENT)
Dept: PHYSICAL THERAPY | Facility: HOSPITAL | Age: 74
Setting detail: THERAPIES SERIES
Discharge: HOME | End: 2019-05-30
Payer: MEDICARE

## 2019-05-30 PROCEDURE — 97110 THERAPEUTIC EXERCISES: CPT

## 2019-05-30 NOTE — FLOWSHEET NOTE
Physical Therapy Daily Note   Date:  2019    TIme In:  0832                    Time Out:      6480    Patient Name:  Gretchen Joyner    :  1945  MRN: [id number]    Restrictions/Precautions:    Pertinent Medical History:  Medical/Treatment Diagnosis Information:  ·   Recent falls; balance disorder     Insurance/Certification information:     Medicare, [facility]  Physician Information:    MERCEDES Miramontes  Plan of care signed (Y/N):    Visit# / total visits:     10/    G-Code (if applicable):      Date / Visit # G-Code Applied:         Progress Note: []  Yes  [x]  No  Next due by: 19     Pain level:   0/10    Subjective: Pt reports he gets tired easy. Objective:  Observation:   Test measurements:    Palpation:    Exercises:  Exercise Resistance/Repetitions Other comments   Nustep 10' L5 30   Standing marching w/ support 20\" x 5 30   Sit to stand 5 x 5  30   Side stepping with support 1' x 5 30   Standing heel-toe raises 2x15 30   Seated trunk AROM: a/p, lat 3 x 10 each 30   SLS w/ support 15\" x 5 30   Seated cervical AROM: horizontal/vertical 3 x 10 each 30   Seated cervical AROM: horizontal/vertical with eyes fixed on dot (VOR) 3 x 10 each 30                    Other Therapeutic Activities:      Manual Treatments:      Modalities:        Timed Code Treatment Minutes: 40      Total Treatment Minutes:  50    Treatment/Activity Tolerance:  [x] Patient tolerated treatment well [] Patient limited by fatigue  [] Patient limited by pain  [] Patient limited by other medical complications  [x] Other:  Pt completed tx with few rest breaks due to low endurance but had minimal loss of balance during todays activity.     Pain after treatment:      0/10    Prognosis: [x] Good [] Fair  [] Poor    Patient Requires Follow-up: [x] Yes  [] No    Plan:   [x] Continue per plan of care [] Alter current plan (see comments)  [] Plan of care initiated [] Hold pending MD visit [] Discharge    Plan for Next

## 2019-06-04 ENCOUNTER — HOSPITAL ENCOUNTER (OUTPATIENT)
Dept: PHYSICAL THERAPY | Facility: HOSPITAL | Age: 74
Setting detail: THERAPIES SERIES
Discharge: HOME | End: 2019-06-04
Payer: MEDICARE

## 2019-06-04 PROCEDURE — 97150 GROUP THERAPEUTIC PROCEDURES: CPT

## 2019-06-04 PROCEDURE — 97110 THERAPEUTIC EXERCISES: CPT

## 2019-06-04 NOTE — FLOWSHEET NOTE
Physical Therapy Daily Note   Date:  2019    TIme In:      830                    Time Out:      920    Patient Name:  Frantz Sykes    :  1945  MRN: [id number]    Restrictions/Precautions:    Pertinent Medical History:  Medical/Treatment Diagnosis Information:  ·   Recent falls; balance disorder     Insurance/Certification information:     Medicare, [facility]  Physician Information:    MERCEDES Mittal  Plan of care signed (Y/N):    Visit# / total visits:     11/    G-Code (if applicable):      Date / Visit # G-Code Applied:         Progress Note: []  Yes  [x]  No  Next due by: 19     Pain level:   0/10    Subjective: Pt reports he gets tired easy. Objective:  Observation:   Test measurements:    Palpation:    Exercises:  Exercise Resistance/Repetitions Other comments   Nustep 10' L5 4   Standing marching w/ support 20\" x 5 4   Sit to stand 5 x 5  4   Side stepping with support 1' x 5 4   Standing heel-toe raises 2x15 4   Seated trunk AROM: a/p, lat 3 x 10 each 4   SLS w/ support 15\" x 5 4   Seated cervical AROM: horizontal/vertical 3 x 10 each 4   Seated cervical AROM: horizontal/vertical with eyes fixed on dot (VOR) 3 x 10 each 4                    Other Therapeutic Activities:      Manual Treatments:      Modalities:        Timed Code Treatment Minutes: 40      Total Treatment Minutes:  50    Treatment/Activity Tolerance:  [x] Patient tolerated treatment well [] Patient limited by fatigue  [] Patient limited by pain  [] Patient limited by other medical complications  [x] Other:  Pt completed tx with fewer rest breaks, no LOB episode today.     Pain after treatment:      0/10    Prognosis: [x] Good [] Fair  [] Poor    Patient Requires Follow-up: [x] Yes  [] No    Plan:   [x] Continue per plan of care [] Alter current plan (see comments)  [] Plan of care initiated [] Hold pending MD visit [] Discharge    Plan for Next Session:      Goals  Long term goals  Time Frame for Long term goals : 4-6 weeks  Long term goal 1: Report a 50%+ decrease in dizziness/balance deficit episodes. -MET  Long term goal 2: Report a 50% + decrease in fear of falling. -MET  Long term goal 3: Ambulate community distances and terrains without LOB or falls.       Electronically signed by:  Electronically signed by Daysi Sanchez PTA on 6/4/2019 at 10:39 AM

## 2019-06-06 ENCOUNTER — HOSPITAL ENCOUNTER (OUTPATIENT)
Dept: PHYSICAL THERAPY | Facility: HOSPITAL | Age: 74
Setting detail: THERAPIES SERIES
Discharge: HOME | End: 2019-06-06
Payer: MEDICARE

## 2019-06-06 PROCEDURE — 97110 THERAPEUTIC EXERCISES: CPT

## 2019-06-06 PROCEDURE — 97112 NEUROMUSCULAR REEDUCATION: CPT

## 2019-06-11 ENCOUNTER — HOSPITAL ENCOUNTER (OUTPATIENT)
Dept: PHYSICAL THERAPY | Facility: HOSPITAL | Age: 74
Setting detail: THERAPIES SERIES
Discharge: HOME | End: 2019-06-11
Payer: MEDICARE

## 2019-06-11 PROCEDURE — 97150 GROUP THERAPEUTIC PROCEDURES: CPT

## 2019-06-11 PROCEDURE — 97110 THERAPEUTIC EXERCISES: CPT

## 2019-06-11 NOTE — FLOWSHEET NOTE
Physical Therapy Daily Note   Date:  2019    TIme In:      0930                    Time Out:      1010    Patient Name:  oJrdin Whitt    :  1945  MRN: [id number]    Restrictions/Precautions:    Pertinent Medical History:  Medical/Treatment Diagnosis Information:  ·   Recent falls; balance disorder     Insurance/Certification information:     Medicare, [facility]  Physician Information:    MERCEDES Nugent  Plan of care signed (Y/N):    Visit# / total visits:     13/    G-Code (if applicable):      Date / Visit # G-Code Applied:         Progress Note: []  Yes  [x]  No  Next due by: 19     Pain level:   0/10    Subjective: Pt reported mild c/o fatigue today.      Objective:  Observation:   Test measurements:    Palpation:    Exercises:  Exercise Resistance/Repetitions Other comments   Nustep 10' L5 11   Standing marching w/ support 20\" x 5 11   Sit to stand 5 x 5  11   Side stepping with support 1' x 5 11   Standing heel-toe raises 2x15 11   Seated trunk AROM: a/p, lat 3 x 10 each 11   SLS w/ support 15\" x 5 11   Seated cervical AROM: horizontal/vertical 3 x 10 each 11   Seated cervical AROM: horizontal/vertical with eyes fixed on dot (VOR) 3 x 10 each 11                    Other Therapeutic Activities:      Manual Treatments:      Modalities:        Timed Code Treatment Minutes: 40      Total Treatment Minutes:  40    Treatment/Activity Tolerance:  [x] Patient tolerated treatment well [] Patient limited by fatigue  [] Patient limited by pain  [] Patient limited by other medical complications  [] Other:      Pain after treatment:      0/10    Prognosis: [x] Good [] Fair  [] Poor    Patient Requires Follow-up: [x] Yes  [] No    Plan:   [x] Continue per plan of care [] Alter current plan (see comments)  [] Plan of care initiated [] Hold pending MD visit [] Discharge    Plan for Next Session:      Goals  Long term goals  Time Frame for Long term goals : 4-6 weeks  Long term goal 1: Report a 50%+ decrease in dizziness/balance deficit episodes. -MET  Long term goal 2: Report a 50% + decrease in fear of falling. -MET  Long term goal 3: Ambulate community distances and terrains without LOB or falls.       Electronically signed by:  Electronically signed by Luna Serrano PTA on 6/11/2019 at 10:27 AM

## 2019-06-13 ENCOUNTER — HOSPITAL ENCOUNTER (OUTPATIENT)
Dept: PHYSICAL THERAPY | Facility: HOSPITAL | Age: 74
Setting detail: THERAPIES SERIES
Discharge: HOME | End: 2019-06-13
Payer: MEDICARE

## 2019-06-13 PROCEDURE — 97150 GROUP THERAPEUTIC PROCEDURES: CPT

## 2019-06-13 NOTE — FLOWSHEET NOTE
Physical Therapy Daily Note   Date:  2019    TIme In:  7766                        Time Out:   [address]       Patient Name:  [place] Card    :  1945  MRN: [id number]    Restrictions/Precautions:    Pertinent Medical History:  Medical/Treatment Diagnosis Information:  ·   Recent falls; balance disorder     Insurance/Certification information:     Medicare, [facility]  Physician Information:    MERCEDES De Luna  Plan of care signed (Y/N):    Visit# / total visits:     14/    G-Code (if applicable):      Date / Visit # G-Code Applied:         Progress Note: []  Yes  [x]  No  Next due by: 19     Pain level:   0/10    Subjective: Pt reported he has been seeing improvement overall.       Objective:  Observation:   Test measurements:    Palpation:    Exercises:  Exercise Resistance/Repetitions Other comments   Nustep 10' L5 13   Standing marching w/ support 20\" x 5 13   Sit to stand 5 x 5  13   Side stepping with support 1' x 5 13   Standing heel-toe raises 2x15 13   Seated trunk AROM: a/p, lat 3 x 10 each 13   SLS w/ support 15\" x 5 13   Seated cervical AROM: horizontal/vertical 3 x 10 each 13   Seated cervical AROM: horizontal/vertical with eyes fixed on dot (VOR) 3 x 10 each 13                    Other Therapeutic Activities:      Manual Treatments:      Modalities:        Timed Code Treatment Minutes: 39 - all group      Total Treatment Minutes:  45    Treatment/Activity Tolerance:  [x] Patient tolerated treatment well [] Patient limited by fatigue  [] Patient limited by pain  [] Patient limited by other medical complications  [] Other:      Pain after treatment:      0/10    Prognosis: [x] Good [] Fair  [] Poor    Patient Requires Follow-up: [x] Yes  [] No    Plan:   [x] Continue per plan of care [] Alter current plan (see comments)  [] Plan of care initiated [] Hold pending MD visit [] Discharge    Plan for Next Session:      Goals  Long term goals  Time Frame for Long term goals : 4-6 weeks  Long term goal 1: Report a 50%+ decrease in dizziness/balance deficit episodes. -MET  Long term goal 2: Report a 50% + decrease in fear of falling. -MET  Long term goal 3: Ambulate community distances and terrains without LOB or falls.       Electronically signed by:  Electronically signed by Enrique Gonzalez PT on 0/73/8719 at 10:43 AM

## 2019-06-18 ENCOUNTER — HOSPITAL ENCOUNTER (OUTPATIENT)
Dept: PHYSICAL THERAPY | Facility: HOSPITAL | Age: 74
Setting detail: THERAPIES SERIES
Discharge: HOME | End: 2019-06-18
Payer: MEDICARE

## 2019-06-18 PROCEDURE — 97150 GROUP THERAPEUTIC PROCEDURES: CPT

## 2019-06-18 PROCEDURE — 97110 THERAPEUTIC EXERCISES: CPT

## 2019-06-18 NOTE — FLOWSHEET NOTE
Physical Therapy Daily Note   Date:  2019    TIme In:  830                        Time Out:   915      Patient Name:  Zay Lopez    :  1945  MRN: [id number]    Restrictions/Precautions:    Pertinent Medical History:  Medical/Treatment Diagnosis Information:  ·   Recent falls; balance disorder     Insurance/Certification information:     Medicare, [facility]  Physician Information:    MERCEDES De Luna  Plan of care signed (Y/N):    Visit# / total visits:     15/    G-Code (if applicable):      Date / Visit # G-Code Applied:         Progress Note: []  Yes  [x]  No  Next due by: 19     Pain level:   0/10    Subjective: Pt reported he has been seeing improvement.       Objective:  Observation:   Test measurements:    Palpation:    Exercises:  Exercise Resistance/Repetitions Other comments   Nustep 10' L5 18   Standing marching w/ support 20\" x 5 18   Sit to stand 5 x 5  18   Side stepping with support 1' x 5 18   Standing heel-toe raises 2x15 18   Seated trunk AROM: a/p, lat 3 x 10 each 18   SLS w/ support 15\" x 5 18   Seated cervical AROM: horizontal/vertical 3 x 10 each 18   Seated cervical AROM: horizontal/vertical with eyes fixed on dot (VOR) 3 x 10 each 18                    Other Therapeutic Activities:      Manual Treatments:      Modalities:        Timed Code Treatment Minutes:  25 and rest grouped      Total Treatment Minutes:  45    Treatment/Activity Tolerance:  [x] Patient tolerated treatment well [] Patient limited by fatigue  [] Patient limited by pain  [] Patient limited by other medical complications  [] Other:      Pain after treatment:      0/10    Prognosis: [x] Good [] Fair  [] Poor    Patient Requires Follow-up: [x] Yes  [] No    Plan:   [x] Continue per plan of care [] Alter current plan (see comments)  [] Plan of care initiated [] Hold pending MD visit [] Discharge    Plan for Next Session:      Goals  Long term goals  Time Frame for Long term goals : 4-6 weeks  Long term goal 1: Report a 50%+ decrease in dizziness/balance deficit episodes. -MET  Long term goal 2: Report a 50% + decrease in fear of falling. -MET  Long term goal 3: Ambulate community distances and terrains without LOB or falls.       Electronically signed by:  Electronically signed by Luly Velasquez PTA on 2019 at 10:35 AM

## 2019-06-20 ENCOUNTER — OFFICE VISIT (OUTPATIENT)
Dept: CARDIOLOGY | Facility: CLINIC | Age: 74
End: 2019-06-20

## 2019-06-20 ENCOUNTER — HOSPITAL ENCOUNTER (OUTPATIENT)
Dept: PHYSICAL THERAPY | Facility: HOSPITAL | Age: 74
Setting detail: THERAPIES SERIES
Discharge: HOME | End: 2019-06-20
Payer: MEDICARE

## 2019-06-20 VITALS
SYSTOLIC BLOOD PRESSURE: 154 MMHG | WEIGHT: 221 LBS | BODY MASS INDEX: 34.69 KG/M2 | HEART RATE: 71 BPM | DIASTOLIC BLOOD PRESSURE: 54 MMHG | OXYGEN SATURATION: 97 % | HEIGHT: 67 IN

## 2019-06-20 DIAGNOSIS — I25.810 CORONARY ARTERY DISEASE INVOLVING CORONARY BYPASS GRAFT OF NATIVE HEART WITHOUT ANGINA PECTORIS: Primary | ICD-10-CM

## 2019-06-20 DIAGNOSIS — I10 BENIGN HYPERTENSION: ICD-10-CM

## 2019-06-20 DIAGNOSIS — E78.5 HYPERLIPIDEMIA LDL GOAL <70: ICD-10-CM

## 2019-06-20 PROCEDURE — 97150 GROUP THERAPEUTIC PROCEDURES: CPT

## 2019-06-20 PROCEDURE — 99214 OFFICE O/P EST MOD 30 MIN: CPT | Performed by: NURSE PRACTITIONER

## 2019-06-20 PROCEDURE — 97110 THERAPEUTIC EXERCISES: CPT

## 2019-06-20 RX ORDER — DEXLANSOPRAZOLE 60 MG/1
60 CAPSULE, DELAYED RELEASE ORAL DAILY
COMMUNITY
End: 2022-03-17

## 2019-06-20 NOTE — PROGRESS NOTES
Donny Jerry  1945  [phone]  Work phone not available    06/20/2019    Anum Alonso, APRREX    Chief Complaint   Patient presents with   • Coronary Artery Disease     Problem List  1. Coronary artery disease:  a.  CABG x3 in 2001, [facility] by Dr. Peng. LIMA to the LAD, SVG to obtuse marginal, SVG to the PDA.  b. Stress echocardiogram, 11/13/2007, showing no wall motion  abnormalities.  No evidence of ischemia.  Normal EF.  c. A 2-D echocardiogram, 11/13/2007, mild concentric LVH, trace of MR and TR.    d. Adenosine Cardiolite, 05/07/2009:  Normal LVEF with a stress induced mid and distal inferior defect compatible with  ischemia.  e. [facility], 06/03/2009, Dr. Russo:  LVEF 45% to 50%.  Overlapping Cypher TAWANNA 2.5 x 28 and 2.5 x 18 to the SVG to obtuse marginal graft.  SVG to PDA graft  had a proximal stenosis of 60% with a distal stenosis of 50% to 60%.  f. [facility], 07/06/2009:  PTCA and Taxus TAWANNA (2.25 x 12 mm) to the mid PDA; Cypher TAWANNA (2.5 x 18mm) to the distal SVG to the PDA;  and Cypher TAWANNA (3.0 x 28mm) to the proximal SVG to the PDA.  g. [facility] for recurrent chest pain, 01/29/2010: Revealing patent stents. Ranexa initiated 500 mg q.12 h.   h. [facility],  08/16/2011: LVEF of 45% to 50% with an occluded SVG to an obtuse marginal branch which could not be revascularized, patent LIMA to the LAD, noncritical graft disease in the SVG to the PDA, multiple small areas of distal vessel disease and collateral flow were seen.   i. [facility] for recurrent anginal symptoms, 04/23/2010, with PTCA and Promus TAWANNA (3.0 x 28mm) to the proximal SVG to the PDA for in-stent restenosis.    j. [facility], 07/06/2010,  Dr. Russo:  EF 40% to 45%.  3.5 x 23 mm Promus drug-eluting stent in the proximal SVG to OM, 2.5 x 18 mm Promus drug-eluting stent to distal SVG to PDA due to in-stent restenosis.    k. Left heart catheterization, 11/16/2010, with placement of  a 3.0 x 16 mm Taxus drug-eluting stent to the SVG to the  RCA proximally and a 2.5 x 16 mm paclitaxel stent distally in the SVG to the RCA.  l. Left heart catheterization, 3.0 x 24-mm Promus element drug-eluting stent to a 90% lesion in the mid portion  of the SVG to the PDA.    m. Left heart catheterization for recurrent angina, 06/24/2014, Dr. Russo:  PTCA of the SVG to the PDA using a 3 x 12 mm NC TREK balloon.    2. Benign hypertension.  3. Hypercholesterolemia.  4. Type 2 diabetes, diagnosed 2 years ago.  5. Obstructive sleep apnea/CPAP.  6. Enlarged prostate with anticipated  TURP with Dr. Bernal.  7. Obesity.  8. Depression.  9. Surgical history:  a.  CABG x3, Dr. Peng, [facility], 2001.  b. Negative colonoscopy, 2014.     Allergies   Allergen Reactions   • Bisoprolol Other (See Comments)     Agitation     • Byetta 10 Mcg Pen [Exenatide]      nausea   • Crestor [Rosuvastatin Calcium] Myalgia   • Metformin And Related    • Plavix [Clopidogrel Bisulfate] Other (See Comments)     resistance   • Zocor [Simvastatin] Myalgia       Current Medications    Current Outpatient Medications:   •  amLODIPine (NORVASC) 10 MG tablet, Take 10 mg by mouth Daily., Disp: , Rfl:   •  atorvastatin (LIPITOR) 80 MG tablet, Take 1 tablet by mouth Daily., Disp: 90 tablet, Rfl: 1  •  carvedilol (COREG) 12.5 MG tablet, Take 6.25 mg by mouth 2 (Two) Times a Day With Meals. Pt takes 0.5 tab BID, Disp: , Rfl:   •  citalopram (CeleXA) 40 MG tablet, Take 40 mg by mouth Daily., Disp: , Rfl:   •  dapagliflozin (FARXIGA) 5 MG tablet tablet, Take 5 mg by mouth Daily., Disp: , Rfl:   •  dexlansoprazole (DEXILANT) 60 MG capsule, Take 60 mg by mouth Daily., Disp: , Rfl:   •  doxazosin (CARDURA) 4 MG tablet, Take 4 mg by mouth Every Night., Disp: , Rfl:   •  finasteride (PROSCAR) 5 MG tablet, Take 5 mg by mouth Daily., Disp: , Rfl:   •  furosemide (LASIX) 20 MG tablet, Take 20 mg by mouth Daily., Disp: , Rfl:   •  gabapentin (NEURONTIN) 300 MG capsule, Take 300 mg by mouth Daily., Disp:  , Rfl:   •  insulin degludec (TRESIBA FLEXTOUCH) 100 UNIT/ML solution pen-injector injection, Inject 70 Units under the skin Every Night., Disp: , Rfl:   •  lisinopril (PRINIVIL,ZESTRIL) 40 MG tablet, Take 40 mg by mouth Daily., Disp: , Rfl:   •  Multiple Vitamin (MULTI VITAMIN DAILY PO), Take 1 tablet by mouth Daily., Disp: , Rfl:   •  nitroglycerin (NITROSTAT) 0.4 MG SL tablet, Place 1 tablet under the tongue Every 5 (Five) Minutes As Needed for Chest Pain. Take no more than 3 doses in 15 minutes., Disp: 25 tablet, Rfl: 11  •  prasugrel (EFFIENT) 10 MG tablet, Take 10 mg by mouth Daily., Disp: , Rfl:   •  SITagliptin (JANUVIA) 100 MG tablet, Take 100 mg by mouth Daily. 1/2 tab daily, Disp: , Rfl:   •  tamsulosin (FLOMAX) 0.4 MG capsule 24 hr capsule, Take 1 capsule by mouth Every Night., Disp: , Rfl:     History of Present Illness   HPI  Patient is a pleasant 74-year-old male with the above-noted medical history presents today for his annual follow-up of coronary artery disease, hypertension and hyperlipidemia.  Since he was seen last he has been feeling well from the cardiac standpoint.  Unfortunately he has been off his aspirin since March of this year due to a bleeding ulcer.  He has however continued with Effient 10 mg daily.  I explained to he and his wife at that he must either remain on Effient or aspirin in regards to his coronary disease for cardiac protection.  He was told not to take any aspirin or aspirin containing products in leave of the situation.   He denies having any chest pain, shortness of breath, dyspnea on exertion, edema, fatigue, palpitations, dizziness and syncope.  He has quite a sedentary lifestyle and gets little to no exercise.  His blood pressure tends to run in the 130s to 140s at home.  According to this range, his blood pressure and heart rate are adequately controlled on current medical therapy.  Overall, he seems to be doing well from the cardiac standpoint.    The following  "portions of the patient's history were reviewed and updated as appropriate: allergies, current medications and problem list.    Pertinent positives as listed in the HPI.  All other systems reviewed are negative.    Vitals:    06/20/19 1353   BP: 154/54   BP Location: Left arm   Patient Position: Sitting   Pulse: 71   SpO2: 97%   Weight: 100 kg (221 lb)   Height: 170.2 cm (67\")       Physical Exam  GENERAL: well-developed, well-nourished; in no acute distress.   NECK: Carotid upstrokes are 2+ and  symmetrical without bruits.   LUNGS: Clear to auscultation bilaterally without wheezing, rhonchi, or rales noted.   CARDIOVASCULAR: The heart has a regular rate with a normal S1 and S2. There is no murmur, gallop, rub, or click appreciated. The PMI is nondisplaced.   ABDOMEN: Soft and nontender  NEUROLOGICAL: Nonfocal; Alert and oriented  PERIPHERAL VASCULAR:  Posterior tibial pulses are 2+ and symmetrical. There is trace peripheral edema.   SKIN:  Warm and dry  PSYCHIATRIC: normal mood and affect; behavior appropriate    Diagnostic Data:  Procedures    Assessment:      ICD-10-CM ICD-9-CM   1. Coronary artery disease involving coronary bypass graft of native heart without angina pectoris I25.810 414.05   2. Benign hypertension I10 401.1   3. Hyperlipidemia LDL goal <70 E78.5 272.4       Plan:  Continue Effient daily as he is not on aspirin 81 mg daily due to recent peptic ulcer bleed  Encouraged routine exercise and dietary modifications  Continue amlodipine, lisinopril and doxazosin for hypertension  Continue metoprolol for rate control  Continue all other medications as directed  F/up in 6 months or sooner if needed.      Seen independently by ASHLEIGH Gallardo on June 20, 2019 @ 3286      "

## 2019-06-20 NOTE — FLOWSHEET NOTE
Physical Therapy Daily Note   Date:  2019    TIme In:  830                        Time Out:   915      Patient Name:  Gilmar Crook    :  1945  MRN: [id number]    Restrictions/Precautions:    Pertinent Medical History:  Medical/Treatment Diagnosis Information:  ·   Recent falls; balance disorder     Insurance/Certification information:     Medicare, [facility]  Physician Information:    MERCEDES Hartley  Plan of care signed (Y/N):    Visit# / total visits:     16/    G-Code (if applicable):      Date / Visit # G-Code Applied:         Progress Note: []  Yes  [x]  No  Next due by: 19     Pain level:   0/10    Subjective: Pt reported no new complaints.      Objective:  Observation:   Test measurements:    Palpation:    Exercises:  Exercise Resistance/Repetitions Other comments   Nustep 10' L5 20   Standing marching w/ support 20\" x 5 20   Sit to stand 5 x 5  20   Side stepping with support 1' x 5 20   Standing heel-toe raises 2x15 20   Seated trunk AROM: a/p, lat 3 x 10 each 20   SLS w/ support 15\" x 5 20   Seated cervical AROM: horizontal/vertical 3 x 10 each 20   Seated cervical AROM: horizontal/vertical with eyes fixed on dot (VOR) 3 x 10 each 20                    Other Therapeutic Activities:      Manual Treatments:      Modalities:        Timed Code Treatment Minutes:  25 and rest grouped      Total Treatment Minutes:  45    Treatment/Activity Tolerance:  [x] Patient tolerated treatment well [] Patient limited by fatigue  [] Patient limited by pain  [] Patient limited by other medical complications  [] Other:      Pain after treatment:      0/10    Prognosis: [x] Good [] Fair  [] Poor    Patient Requires Follow-up: [x] Yes  [] No    Plan:   [x] Continue per plan of care [] Alter current plan (see comments)  [] Plan of care initiated [] Hold pending MD visit [] Discharge    Plan for Next Session:      Goals  Long term goals  Time Frame for Long term goals : 4-6 weeks  Long term goal 1: Report a 50%+ decrease in dizziness/balance deficit episodes. -MET  Long term goal 2: Report a 50% + decrease in fear of falling. -MET  Long term goal 3: Ambulate community distances and terrains without LOB or falls.       Electronically signed by:  Electronically signed by Zina aMn PTA on 6/20/2019 at 1:36 PM

## 2019-06-25 ENCOUNTER — HOSPITAL ENCOUNTER (OUTPATIENT)
Dept: PHYSICAL THERAPY | Facility: HOSPITAL | Age: 74
Setting detail: THERAPIES SERIES
End: 2019-06-25
Payer: MEDICARE

## 2019-06-27 ENCOUNTER — HOSPITAL ENCOUNTER (OUTPATIENT)
Dept: PHYSICAL THERAPY | Facility: HOSPITAL | Age: 74
Setting detail: THERAPIES SERIES
Discharge: HOME | End: 2019-06-27
Payer: MEDICARE

## 2019-06-27 PROCEDURE — 97150 GROUP THERAPEUTIC PROCEDURES: CPT

## 2019-06-27 NOTE — FLOWSHEET NOTE
Physical Therapy Daily Note   Date:  2019    TIme In:  0900                        Time Out:   4105     Patient Name:  Jordin Whitt    :  1945  MRN: [id number]    Restrictions/Precautions:    Pertinent Medical History:  Medical/Treatment Diagnosis Information:  ·   Recent falls; balance disorder     Insurance/Certification information:     Medicare, [facility]  Physician Information:    MERCEDES Nugent  Plan of care signed (Y/N):    Visit# / total visits:     17/    G-Code (if applicable):      Date / Visit # G-Code Applied:         Progress Note: []  Yes  [x]  No  Next due by: 19     Pain level:   0/10    Subjective: Pt reports doing well today. Objective:  Observation:   Test measurements:    Palpation:    Exercises:  Exercise Resistance/Repetitions Other comments   Nustep 10' L5 27   Standing marching w/ support 20\" x 5 27   Sit to stand 5 x 5  27   Side stepping with support 1' x 5 27   Standing heel-toe raises 2x15 27   Seated trunk AROM: a/p, lat 3 x 10 each 27   SLS w/ support 15\" x 5 27   Seated cervical AROM: horizontal/vertical 3 x 10 each 27   Seated cervical AROM: horizontal/vertical with eyes fixed on dot (VOR) 3 x 10 each 27                    Other Therapeutic Activities:      Manual Treatments:      Modalities:        Timed Code Treatment Minutes:  44 - all grouped      Total Treatment Minutes:  44    Treatment/Activity Tolerance:  [x] Patient tolerated treatment well [] Patient limited by fatigue  [] Patient limited by pain  [] Patient limited by other medical complications  [x] Other:  Pt progressed through activities well today; plan to re-assess at next visit.      Pain after treatment:      0/10    Prognosis: [x] Good [] Fair  [] Poor    Patient Requires Follow-up: [x] Yes  [] No    Plan:   [x] Continue per plan of care [] Alter current plan (see comments)  [] Plan of care initiated [] Hold pending MD visit [] Discharge    Plan for Next Session: Goals  Long term goals  Time Frame for Long term goals : 4-6 weeks  Long term goal 1: Report a 50%+ decrease in dizziness/balance deficit episodes. -MET  Long term goal 2: Report a 50% + decrease in fear of falling. -MET  Long term goal 3: Ambulate community distances and terrains without LOB or falls.       Electronically signed by:  Electronically signed by Shanta Araiza PT on 2/85/4755 at 5:47 PM

## 2019-07-02 ENCOUNTER — HOSPITAL ENCOUNTER (OUTPATIENT)
Dept: PHYSICAL THERAPY | Facility: HOSPITAL | Age: 74
Setting detail: THERAPIES SERIES
Discharge: HOME | End: 2019-07-02
Payer: MEDICARE

## 2019-07-02 PROCEDURE — 97110 THERAPEUTIC EXERCISES: CPT

## 2019-07-02 PROCEDURE — 97150 GROUP THERAPEUTIC PROCEDURES: CPT

## 2019-07-02 NOTE — FLOWSHEET NOTE
Physical Therapy Daily Note   Date:  2019    TIme In:  1100                        Time Out:   0940    Patient Name:  Nikhil Moya    :  1945  MRN: [id number]    Restrictions/Precautions:    Pertinent Medical History:  Medical/Treatment Diagnosis Information:  ·   Recent falls; balance disorder     Insurance/Certification information:     Medicare, [facility]  Physician Information:    MERCEDES Augustin  Plan of care signed (Y/N):    Visit# / total visits:     18/    G-Code (if applicable):      Date / Visit # G-Code Applied:         Progress Note: []  Yes  [x]  No  Next due by: 19     Pain level:   0/10    Subjective: Pt reports doing well today. Objective:  Observation:   Test measurements:    Palpation:    Exercises:  Exercise Resistance/Repetitions Other comments   Nustep 10' L5 2   Standing marching w/ support 20\" x 5 2   Sit to stand 5 x 5  2   Side stepping with support 1' x 5 2   Standing heel-toe raises 2x15 2   Seated trunk AROM: a/p, lat 3 x 10 each 2   SLS w/ support 15\" x 5 2   Seated cervical AROM: horizontal/vertical 3 x 10 each 2   Seated cervical AROM: horizontal/vertical with eyes fixed on dot (VOR) 3 x 10 each 2                    Other Therapeutic Activities:      Manual Treatments:      Modalities:        Timed Code Treatment Minutes:  10 and rest grouped      Total Treatment Minutes:  44    Treatment/Activity Tolerance:  [x] Patient tolerated treatment well [] Patient limited by fatigue  [] Patient limited by pain  [] Patient limited by other medical complications  [x] Other:  Pt progressed through activities well today; plan to re-assess at next visit.      Pain after treatment:      0/10    Prognosis: [x] Good [] Fair  [] Poor    Patient Requires Follow-up: [x] Yes  [] No    Plan:   [x] Continue per plan of care [] Alter current plan (see comments)  [] Plan of care initiated [] Hold pending MD visit [] Discharge    Plan for Next Session:      Goals  Long

## 2019-07-09 ENCOUNTER — HOSPITAL ENCOUNTER (OUTPATIENT)
Dept: PHYSICAL THERAPY | Facility: HOSPITAL | Age: 74
Setting detail: THERAPIES SERIES
Discharge: HOME | End: 2019-07-09
Payer: MEDICARE

## 2019-07-09 PROCEDURE — 97150 GROUP THERAPEUTIC PROCEDURES: CPT

## 2019-07-09 PROCEDURE — 97110 THERAPEUTIC EXERCISES: CPT

## 2019-07-11 ENCOUNTER — HOSPITAL ENCOUNTER (OUTPATIENT)
Dept: PHYSICAL THERAPY | Facility: HOSPITAL | Age: 74
Setting detail: THERAPIES SERIES
Discharge: HOME | End: 2019-07-11
Payer: MEDICARE

## 2019-07-11 PROCEDURE — 97150 GROUP THERAPEUTIC PROCEDURES: CPT

## 2019-07-11 PROCEDURE — 97530 THERAPEUTIC ACTIVITIES: CPT

## 2019-07-11 PROCEDURE — 97110 THERAPEUTIC EXERCISES: CPT

## 2019-07-11 NOTE — FLOWSHEET NOTE
Alter current plan (see comments)  [] Plan of care initiated [] Hold pending MD visit [] Discharge    Plan for Next Session:      Goals  Long term goals  Time Frame for Long term goals : 4-6 weeks  Long term goal 1: Report a 50%+ decrease in dizziness/balance deficit episodes. -MET  Long term goal 2: Report a 50% + decrease in fear of falling. -MET  Long term goal 3: Ambulate community distances and terrains without LOB or falls.  -MET      Electronically signed by:  Electronically signed by Tomi Kawasaki, PTA on 7/11/2019 at 1:42 PM

## 2019-07-15 ENCOUNTER — HOSPITAL ENCOUNTER (OUTPATIENT)
Facility: HOSPITAL | Age: 74
Discharge: HOME | End: 2019-07-15
Payer: MEDICARE

## 2019-07-15 DIAGNOSIS — E11.9 TYPE 2 DIABETES MELLITUS WITHOUT COMPLICATION, WITH LONG-TERM CURRENT USE OF INSULIN (HCC): ICD-10-CM

## 2019-07-15 DIAGNOSIS — Z79.4 TYPE 2 DIABETES MELLITUS WITHOUT COMPLICATION, WITH LONG-TERM CURRENT USE OF INSULIN (HCC): ICD-10-CM

## 2019-07-15 DIAGNOSIS — E78.5 HYPERLIPIDEMIA, UNSPECIFIED HYPERLIPIDEMIA TYPE: ICD-10-CM

## 2019-07-15 LAB
A/G RATIO: 1.7 (ref 0.8–2)
ALBUMIN SERPL-MCNC: 4 G/DL (ref 3.4–4.8)
ALP BLD-CCNC: 47 U/L (ref 25–100)
ALT SERPL-CCNC: 14 U/L (ref 4–36)
ANION GAP SERPL CALCULATED.3IONS-SCNC: 11 MMOL/L (ref 3–16)
AST SERPL-CCNC: 14 U/L (ref 8–33)
BILIRUB SERPL-MCNC: 0.3 MG/DL (ref 0.3–1.2)
BUN BLDV-MCNC: 21 MG/DL (ref 6–20)
CALCIUM SERPL-MCNC: 9.2 MG/DL (ref 8.5–10.5)
CHLORIDE BLD-SCNC: 105 MMOL/L (ref 98–107)
CHOLESTEROL, TOTAL: 160 MG/DL (ref 0–200)
CO2: 26 MMOL/L (ref 20–30)
CREAT SERPL-MCNC: 1.1 MG/DL (ref 0.4–1.2)
GFR AFRICAN AMERICAN: >59
GFR NON-AFRICAN AMERICAN: >59
GLOBULIN: 2.4 G/DL
GLUCOSE BLD-MCNC: 145 MG/DL (ref 74–106)
HBA1C MFR BLD: 9.3 %
HCT VFR BLD CALC: 38.2 % (ref 40–54)
HDLC SERPL-MCNC: 34 MG/DL (ref 40–60)
HEMOGLOBIN: 12.6 G/DL (ref 13–18)
LDL CHOLESTEROL CALCULATED: 79 MG/DL
MCH RBC QN AUTO: 28.9 PG (ref 27–32)
MCHC RBC AUTO-ENTMCNC: 33 G/DL (ref 31–35)
MCV RBC AUTO: 87.6 FL (ref 80–100)
PDW BLD-RTO: 12.8 % (ref 11–16)
PLATELET # BLD: 282 K/UL (ref 150–400)
PMV BLD AUTO: 11 FL (ref 6–10)
POTASSIUM SERPL-SCNC: 4.5 MMOL/L (ref 3.4–5.1)
RBC # BLD: 4.36 M/UL (ref 4.5–6)
SODIUM BLD-SCNC: 142 MMOL/L (ref 136–145)
TOTAL PROTEIN: 6.4 G/DL (ref 6.4–8.3)
TRIGL SERPL-MCNC: 235 MG/DL (ref 0–249)
VLDLC SERPL CALC-MCNC: 47 MG/DL
WBC # BLD: 7.7 K/UL (ref 4–11)

## 2019-07-15 PROCEDURE — 80053 COMPREHEN METABOLIC PANEL: CPT

## 2019-07-15 PROCEDURE — 36415 COLL VENOUS BLD VENIPUNCTURE: CPT

## 2019-07-15 PROCEDURE — 80061 LIPID PANEL: CPT

## 2019-07-15 PROCEDURE — 85027 COMPLETE CBC AUTOMATED: CPT

## 2019-07-15 PROCEDURE — 83036 HEMOGLOBIN GLYCOSYLATED A1C: CPT

## 2019-07-16 ENCOUNTER — APPOINTMENT (OUTPATIENT)
Dept: PHYSICAL THERAPY | Facility: HOSPITAL | Age: 74
End: 2019-07-16
Payer: MEDICARE

## 2019-07-17 ENCOUNTER — HOSPITAL ENCOUNTER (OUTPATIENT)
Facility: HOSPITAL | Age: 74
Discharge: HOME | End: 2019-07-17
Payer: MEDICARE

## 2019-07-17 ENCOUNTER — HOSPITAL ENCOUNTER (OUTPATIENT)
Dept: GENERAL RADIOLOGY | Facility: HOSPITAL | Age: 74
Discharge: HOME | End: 2019-07-17
Payer: MEDICARE

## 2019-07-17 ENCOUNTER — OFFICE VISIT (OUTPATIENT)
Dept: PRIMARY CARE CLINIC | Age: 74
End: 2019-07-17
Payer: MEDICARE

## 2019-07-17 VITALS
HEART RATE: 56 BPM | OXYGEN SATURATION: 99 % | TEMPERATURE: 97.5 F | WEIGHT: 218 LBS | RESPIRATION RATE: 16 BRPM | HEIGHT: 67 IN | SYSTOLIC BLOOD PRESSURE: 122 MMHG | BODY MASS INDEX: 34.21 KG/M2 | DIASTOLIC BLOOD PRESSURE: 52 MMHG

## 2019-07-17 DIAGNOSIS — M25.512 CHRONIC LEFT SHOULDER PAIN: ICD-10-CM

## 2019-07-17 DIAGNOSIS — G89.29 CHRONIC LEFT SHOULDER PAIN: ICD-10-CM

## 2019-07-17 DIAGNOSIS — G62.9 NEUROPATHY: ICD-10-CM

## 2019-07-17 DIAGNOSIS — E11.42 TYPE 2 DIABETES MELLITUS WITH DIABETIC POLYNEUROPATHY, WITHOUT LONG-TERM CURRENT USE OF INSULIN (HCC): Primary | ICD-10-CM

## 2019-07-17 DIAGNOSIS — I10 ESSENTIAL HYPERTENSION: ICD-10-CM

## 2019-07-17 DIAGNOSIS — F33.42 RECURRENT MAJOR DEPRESSIVE DISORDER, IN FULL REMISSION (HCC): ICD-10-CM

## 2019-07-17 DIAGNOSIS — I95.1 ORTHOSTATIC HYPOTENSION: ICD-10-CM

## 2019-07-17 PROCEDURE — 2022F DILAT RTA XM EVC RTNOPTHY: CPT | Performed by: NURSE PRACTITIONER

## 2019-07-17 PROCEDURE — 73030 X-RAY EXAM OF SHOULDER: CPT

## 2019-07-17 PROCEDURE — 99214 OFFICE O/P EST MOD 30 MIN: CPT | Performed by: NURSE PRACTITIONER

## 2019-07-17 PROCEDURE — G8417 CALC BMI ABV UP PARAM F/U: HCPCS | Performed by: NURSE PRACTITIONER

## 2019-07-17 PROCEDURE — 1036F TOBACCO NON-USER: CPT | Performed by: NURSE PRACTITIONER

## 2019-07-17 PROCEDURE — 3017F COLORECTAL CA SCREEN DOC REV: CPT | Performed by: NURSE PRACTITIONER

## 2019-07-17 PROCEDURE — 3046F HEMOGLOBIN A1C LEVEL >9.0%: CPT | Performed by: NURSE PRACTITIONER

## 2019-07-17 PROCEDURE — G8598 ASA/ANTIPLAT THER USED: HCPCS | Performed by: NURSE PRACTITIONER

## 2019-07-17 PROCEDURE — 1123F ACP DISCUSS/DSCN MKR DOCD: CPT | Performed by: NURSE PRACTITIONER

## 2019-07-17 PROCEDURE — 4040F PNEUMOC VAC/ADMIN/RCVD: CPT | Performed by: NURSE PRACTITIONER

## 2019-07-17 PROCEDURE — G8427 DOCREV CUR MEDS BY ELIG CLIN: HCPCS | Performed by: NURSE PRACTITIONER

## 2019-07-17 RX ORDER — LISINOPRIL 30 MG/1
TABLET ORAL
Qty: 90 TABLET | Refills: 3 | Status: SHIPPED | OUTPATIENT
Start: 2019-07-17 | End: 2019-07-17 | Stop reason: SDUPTHER

## 2019-07-17 RX ORDER — CITALOPRAM 40 MG/1
40 TABLET ORAL DAILY
Qty: 90 TABLET | Refills: 3 | Status: SHIPPED | OUTPATIENT
Start: 2019-07-17 | End: 2020-06-17 | Stop reason: SDUPTHER

## 2019-07-17 RX ORDER — DOXAZOSIN MESYLATE 4 MG/1
4 TABLET ORAL NIGHTLY
Qty: 90 TABLET | Refills: 3 | Status: SHIPPED | OUTPATIENT
Start: 2019-07-17 | End: 2019-07-17 | Stop reason: SDUPTHER

## 2019-07-17 RX ORDER — CITALOPRAM 40 MG/1
40 TABLET ORAL DAILY
Qty: 90 TABLET | Refills: 3 | Status: SHIPPED | OUTPATIENT
Start: 2019-07-17 | End: 2019-07-17 | Stop reason: SDUPTHER

## 2019-07-17 RX ORDER — GABAPENTIN 300 MG/1
CAPSULE ORAL
Qty: 90 CAPSULE | Refills: 3 | Status: SHIPPED | OUTPATIENT
Start: 2019-07-17 | End: 2019-07-17 | Stop reason: SDUPTHER

## 2019-07-17 RX ORDER — PRASUGREL 10 MG/1
10 TABLET, FILM COATED ORAL DAILY
Qty: 90 TABLET | Refills: 3 | Status: SHIPPED | OUTPATIENT
Start: 2019-07-17 | End: 2020-06-17 | Stop reason: SDUPTHER

## 2019-07-17 RX ORDER — LISINOPRIL 30 MG/1
TABLET ORAL
Qty: 90 TABLET | Refills: 3 | Status: SHIPPED | OUTPATIENT
Start: 2019-07-17 | End: 2020-09-15

## 2019-07-17 RX ORDER — DOXAZOSIN MESYLATE 4 MG/1
4 TABLET ORAL NIGHTLY
Qty: 90 TABLET | Refills: 3 | Status: SHIPPED | OUTPATIENT
Start: 2019-07-17 | End: 2020-06-17 | Stop reason: SDUPTHER

## 2019-07-17 RX ORDER — GABAPENTIN 300 MG/1
CAPSULE ORAL
Qty: 90 CAPSULE | Refills: 0 | Status: SHIPPED | OUTPATIENT
Start: 2019-07-17 | End: 2019-10-23 | Stop reason: SDUPTHER

## 2019-07-17 RX ORDER — PRASUGREL 10 MG/1
10 TABLET, FILM COATED ORAL DAILY
Qty: 90 TABLET | Refills: 3 | Status: SHIPPED | OUTPATIENT
Start: 2019-07-17 | End: 2019-07-17 | Stop reason: SDUPTHER

## 2019-07-18 ENCOUNTER — TELEPHONE (OUTPATIENT)
Dept: PRIMARY CARE CLINIC | Age: 74
End: 2019-07-18

## 2019-07-18 ENCOUNTER — APPOINTMENT (OUTPATIENT)
Dept: PHYSICAL THERAPY | Facility: HOSPITAL | Age: 74
End: 2019-07-18
Payer: MEDICARE

## 2019-07-18 ENCOUNTER — TELEPHONE (OUTPATIENT)
Dept: CARDIOLOGY | Facility: CLINIC | Age: 74
End: 2019-07-18

## 2019-07-18 NOTE — TELEPHONE ENCOUNTER
PT is seeing Dr. Bernal in August to eval for TURP- I have explained to their office that he can not stop his Effient-     They are requesting clearance for TURP so they can have it on file for the appointment-

## 2019-07-18 NOTE — TELEPHONE ENCOUNTER
Per Dr. Russo, pt will need a stress test prior to clearance- I relayed this to dottie- she would like to have him go ahead and proceed with stress test so that clearance is available at the appt-     I did reiterate the fact that PT would not be able to come off Effient for procedure and to please make sure that Dr. Bernal still wants to proceed on Effient- Dr. Russo did say that PT could switch to ASA for 1 week but he must be on either Effient or ASA. PT has a history of bleeding ulcer, but PT was told to not take any products containing ASA, so I am not sure that he would be willing to take ASA, even for a short period of time-     Dottie d/w Dr. Bernal and they have decided to meet with the PT first in the office and then will let me know if PT needs a stress test or not-

## 2019-07-29 DIAGNOSIS — Z79.4 TYPE 2 DIABETES MELLITUS WITHOUT COMPLICATION, WITH LONG-TERM CURRENT USE OF INSULIN (HCC): ICD-10-CM

## 2019-07-29 DIAGNOSIS — E11.9 TYPE 2 DIABETES MELLITUS WITHOUT COMPLICATION, WITH LONG-TERM CURRENT USE OF INSULIN (HCC): ICD-10-CM

## 2019-07-29 RX ORDER — CALCIUM CITRATE/VITAMIN D3 200MG-6.25
TABLET ORAL
Qty: 300 STRIP | Refills: 3 | Status: SHIPPED | OUTPATIENT
Start: 2019-07-29 | End: 2021-02-01

## 2019-08-01 ENCOUNTER — HOSPITAL ENCOUNTER (OUTPATIENT)
Dept: PHYSICAL THERAPY | Facility: HOSPITAL | Age: 74
Setting detail: THERAPIES SERIES
Discharge: HOME | End: 2019-08-01
Payer: MEDICARE

## 2019-08-01 PROCEDURE — 97140 MANUAL THERAPY 1/> REGIONS: CPT

## 2019-08-01 PROCEDURE — 97110 THERAPEUTIC EXERCISES: CPT

## 2019-08-01 PROCEDURE — 97161 PT EVAL LOW COMPLEX 20 MIN: CPT

## 2019-08-01 ASSESSMENT — PAIN DESCRIPTION - PAIN TYPE: TYPE: CHRONIC PAIN

## 2019-08-01 ASSESSMENT — PAIN DESCRIPTION - ONSET: ONSET: SUDDEN

## 2019-08-01 ASSESSMENT — PAIN DESCRIPTION - PROGRESSION: CLINICAL_PROGRESSION: GRADUALLY WORSENING

## 2019-08-01 ASSESSMENT — PAIN - FUNCTIONAL ASSESSMENT: PAIN_FUNCTIONAL_ASSESSMENT: PREVENTS OR INTERFERES SOME ACTIVE ACTIVITIES AND ADLS

## 2019-08-01 ASSESSMENT — PAIN DESCRIPTION - ORIENTATION: ORIENTATION: LEFT

## 2019-08-01 ASSESSMENT — PAIN DESCRIPTION - LOCATION: LOCATION: SHOULDER

## 2019-08-01 ASSESSMENT — PAIN SCALES - GENERAL: PAINLEVEL_OUTOF10: 3

## 2019-08-01 ASSESSMENT — PAIN DESCRIPTION - FREQUENCY: FREQUENCY: INTERMITTENT

## 2019-08-01 ASSESSMENT — PAIN DESCRIPTION - DESCRIPTORS: DESCRIPTORS: ACHING;SHARP;SORE;DISCOMFORT

## 2019-08-01 NOTE — PROGRESS NOTES
Tolerance: Patient Tolerated treatment well         Plan   Plan  Times per week: 2 x week  Plan weeks: 6-8 weeks  Current Treatment Recommendations: Strengthening, ROM, Neuromuscular Re-education, Pain Management, Home Exercise Program, Manual Therapy - Soft Tissue Mobilization, Manual Therapy - Joint Manipulation, Modalities               Goals  Long term goals  Time Frame for Long term goals : 6-8 weeks  Long term goal 1: Report a 75% decrease in left shoulder pain with routine daily activities. Long term goal 2: Achieve left shoulder AROM: FF = 145, ABd = 130, IR = 70, ER = 70. Long term goal 3: Achieve 4+/5 left shoulder strength in MMG's. Long term goal 4: Independent with HEP. Long term goal 5: Achieve a Quick DASH score of 30 or less. Patient Goals   Patient goals : reduce, alleviate left shoulder pain       Therapy Time   Individual Concurrent Group Co-treatment   Time In 1059         Time Out 2811         Minutes 49                 Electronically signed by Vic Workman PT on 8/1/2019 at 8:66 PM      Certification of Medical Necessity: It will be understood that this treatment plan is certified medically necessary by the documenting therapist and referring physician mentioned in this report. Unless the physician indicated otherwise through written correspondence with our office, all further referrals will act as certification of medical necessity on this treatment plan. Thank you for this referral.  If you have questions regarding this plan of care, please call [phone].           Revisions to this plan (optional):                     Please sign and return this plan to:   FAX: [phone]      Signature:                                 Date:

## 2019-08-04 ASSESSMENT — ENCOUNTER SYMPTOMS
RESPIRATORY NEGATIVE: 1
GASTROINTESTINAL NEGATIVE: 1
EYES NEGATIVE: 1

## 2019-08-05 ENCOUNTER — HOSPITAL ENCOUNTER (OUTPATIENT)
Dept: PHYSICAL THERAPY | Facility: HOSPITAL | Age: 74
Setting detail: THERAPIES SERIES
Discharge: HOME | End: 2019-08-05
Payer: MEDICARE

## 2019-08-05 PROCEDURE — G0283 ELEC STIM OTHER THAN WOUND: HCPCS

## 2019-08-05 PROCEDURE — 97140 MANUAL THERAPY 1/> REGIONS: CPT

## 2019-08-05 PROCEDURE — 97150 GROUP THERAPEUTIC PROCEDURES: CPT

## 2019-08-05 PROCEDURE — 97110 THERAPEUTIC EXERCISES: CPT

## 2019-08-07 ENCOUNTER — HOSPITAL ENCOUNTER (OUTPATIENT)
Dept: PHYSICAL THERAPY | Facility: HOSPITAL | Age: 74
Setting detail: THERAPIES SERIES
Discharge: HOME | End: 2019-08-07
Payer: MEDICARE

## 2019-08-07 PROCEDURE — 97110 THERAPEUTIC EXERCISES: CPT

## 2019-08-07 PROCEDURE — 97140 MANUAL THERAPY 1/> REGIONS: CPT

## 2019-08-07 NOTE — FLOWSHEET NOTE
Physical Therapy Daily Treatment Note   Date:  2019    TIme In:     1530                 Time Out:    1620    Patient Name:  Rob Sierra    :  1945  MRN: [id number]    Restrictions/Precautions:    Pertinent Medical History:  Medical/Treatment Diagnosis Information:  ·   Left shoulder pain, RTC tendinitis; joint stiffness     Insurance/Certification information:    Medicare / [facility]  Physician Information:    MERCEDES Aguirre  Plan of care signed (Y/N):    Visit# / total visits:     3/    G-Code (if applicable):      Date / Visit # G-Code Applied:         Progress Note: []  Yes  [x]  No  Next due by: Visit #10      Pain level:   0/10  Subjective: Pt stated he is sore from last visit.     Objective:  Observation:   Test measurements:    Palpation:    Exercises:  Exercise Resistance/Repetitions Other comments   Scap squeezes 2x15 7   Left bent over row w/ DB 2x15 2# 7   Pulleys 2'x2 7   Seated left shoulder ER AROM 2x15 7   Left shoulder isometrics ext/ER/IR 5x10 7   Pendulum  1' each CW/CCW 7                                   Other Therapeutic Activities:      Manual Treatments:  PROM, left shoulder mobs grade I-III, pec minor stretch; 15 min    Modalities:  IFC with moist heat, left shoulder -- not today      Timed Code Treatment Minutes:   40      Total Treatment Minutes:  45    Treatment/Activity Tolerance:  [x] Patient tolerated treatment well [] Patient limited by fatigue  [] Patient limited by pain  [] Patient limited by other medical complications  [] Other:     Pain after treatment:      0/10    Prognosis: [x] Good [] Fair  [] Poor    Patient Requires Follow-up: [x] Yes  [] No    Plan:   [x] Continue per plan of care [] Alter current plan (see comments)  [] Plan of care initiated [] Hold pending MD visit [] Discharge    Plan for Next Session:        Electronically signed by:  Broderick Bowie PTA

## 2019-08-13 ENCOUNTER — HOSPITAL ENCOUNTER (OUTPATIENT)
Dept: PHYSICAL THERAPY | Facility: HOSPITAL | Age: 74
Setting detail: THERAPIES SERIES
Discharge: HOME | End: 2019-08-13
Payer: MEDICARE

## 2019-08-13 PROCEDURE — 97140 MANUAL THERAPY 1/> REGIONS: CPT

## 2019-08-13 PROCEDURE — 97110 THERAPEUTIC EXERCISES: CPT

## 2019-08-15 ENCOUNTER — HOSPITAL ENCOUNTER (OUTPATIENT)
Dept: PHYSICAL THERAPY | Facility: HOSPITAL | Age: 74
Setting detail: THERAPIES SERIES
Discharge: HOME | End: 2019-08-15
Payer: MEDICARE

## 2019-08-15 PROCEDURE — 97150 GROUP THERAPEUTIC PROCEDURES: CPT

## 2019-08-15 PROCEDURE — 97110 THERAPEUTIC EXERCISES: CPT

## 2019-08-15 PROCEDURE — 97140 MANUAL THERAPY 1/> REGIONS: CPT

## 2019-08-15 NOTE — FLOWSHEET NOTE
Physical Therapy Daily Treatment Note   Date:  8/15/2019    TIme In:     1400                 Time Out:    [address]    Patient Name:  Steve Hamilton    :  1945  MRN: [id number]    Restrictions/Precautions:    Pertinent Medical History:  Medical/Treatment Diagnosis Information:  ·   Left shoulder pain, RTC tendinitis; joint stiffness     Insurance/Certification information:    Medicare / [facility]  Physician Information:    Aubry Essex, APRN  Plan of care signed (Y/N):    Visit# / total visits:     5 /    G-Code (if applicable):      Date / Visit # G-Code Applied:         Progress Note: []  Yes  [x]  No  Next due by: Visit #10      Pain level:   0 /10    Subjective:  Patient reports: mild shoulder pain today; no new c/o. Betha Lute     Objective:  Observation:   Test measurements:    Palpation:    Exercises:  Exercise Resistance/Repetitions Other comments   Scap squeezes 2x15 15   Left bent over row w/ DB 2x15 2# 15   Pulleys 2'x2 15   Seated left shoulder ER AROM 2x15 15   Left shoulder isometrics ext/ER/IR 5x10 15   Pendulum  1' each CW/CCW 15        Big ball roll on table - left shoulder FF 2 x 30 15                         Other Therapeutic Activities:      Manual Treatments:  PROM, left shoulder mobs grade I-III, pec minor stretch; 15 min    Modalities:  IFC with moist heat, left shoulder -- not today      Timed Code Treatment Minutes:   40      Total Treatment Minutes:  45    Treatment/Activity Tolerance:  [x] Patient tolerated treatment well [] Patient limited by fatigue  [] Patient limited by pain  [] Patient limited by other medical complications  [] Other:     Pain after treatment:      1/10    Prognosis: [x] Good [] Fair  [] Poor    Patient Requires Follow-up: [x] Yes  [] No    Plan:   [x] Continue per plan of care [] Alter current plan (see comments)  [] Plan of care initiated [] Hold pending MD visit [] Discharge    Plan for Next Session:        Electronically signed by:  Polly Guerrero

## 2019-08-21 ENCOUNTER — HOSPITAL ENCOUNTER (OUTPATIENT)
Dept: PHYSICAL THERAPY | Facility: HOSPITAL | Age: 74
Setting detail: THERAPIES SERIES
Discharge: HOME | End: 2019-08-21
Payer: MEDICARE

## 2019-08-21 PROCEDURE — 97140 MANUAL THERAPY 1/> REGIONS: CPT

## 2019-08-21 PROCEDURE — 97110 THERAPEUTIC EXERCISES: CPT

## 2019-08-21 NOTE — FLOWSHEET NOTE
Physical Therapy Daily Treatment Note   Date:  2019    TIme In:     0932                 Time Out:    1018    Patient Name:  Janine Conway    :  1945  MRN: [id number]    Restrictions/Precautions:    Pertinent Medical History:  Medical/Treatment Diagnosis Information:  ·   Left shoulder pain, RTC tendinitis; joint stiffness     Insurance/Certification information:    Medicare / [facility]  Physician Information:    MERCEDES Murry  Plan of care signed (Y/N):    Visit# / total visits:     5 /    G-Code (if applicable):      Date / Visit # G-Code Applied:         Progress Note: []  Yes  [x]  No  Next due by: Visit #10      Pain level:   3 /10    Subjective:  Patient reports: mild shoulder pain today; no new c/o. Bashir Guerrier     Objective:  Observation:   Test measurements:    Palpation:    Exercises:  Exercise Resistance/Repetitions Other comments   Scap squeezes 2x15 21   Left bent over row w/ DB 2x15 2# 21   Pulleys 2'x2 21   Seated left shoulder ER AROM 2x15 21   Left shoulder isometrics ext/ER/IR 5x10 21   Pendulum  1' each CW/CCW 21        Big ball roll on table - left shoulder FF 2 x 30 21                         Other Therapeutic Activities:      Manual Treatments:  PROM, left shoulder mobs grade I-III, pec minor stretch; 15 min    Modalities:  IFC with moist heat, left shoulder -- not today      Timed Code Treatment Minutes:   36'      Total Treatment Minutes:  55'    Treatment/Activity Tolerance:  [x] Patient tolerated treatment well [] Patient limited by fatigue  [] Patient limited by pain  [] Patient limited by other medical complications  [] Other:     Pain after treatment:      0/10    Prognosis: [x] Good [] Fair  [] Poor    Patient Requires Follow-up: [x] Yes  [] No    Plan:   [x] Continue per plan of care [] Alter current plan (see comments)  [] Plan of care initiated [] Hold pending MD visit [] Discharge    Plan for Next Session:        Electronically signed by:  Nolen Scheuermann,

## 2019-08-23 ENCOUNTER — HOSPITAL ENCOUNTER (OUTPATIENT)
Dept: PHYSICAL THERAPY | Facility: HOSPITAL | Age: 74
Setting detail: THERAPIES SERIES
Discharge: HOME | End: 2019-08-23
Payer: MEDICARE

## 2019-08-23 PROCEDURE — 97140 MANUAL THERAPY 1/> REGIONS: CPT

## 2019-08-23 PROCEDURE — 97110 THERAPEUTIC EXERCISES: CPT

## 2019-08-23 NOTE — FLOWSHEET NOTE
Physical Therapy Daily Treatment Note   Date:  2019    TIme In:     1030                 Time Out:    1015    Patient Name:  Neris Bangura    :  1945  MRN: [id number]    Restrictions/Precautions:    Pertinent Medical History:  Medical/Treatment Diagnosis Information:  ·   Left shoulder pain, RTC tendinitis; joint stiffness     Insurance/Certification information:    Medicare / [facility]  Physician Information:    MERCEDES George  Plan of care signed (Y/N):    Visit# / total visits:     6 /    G-Code (if applicable):      Date / Visit # G-Code Applied:         Progress Note: []  Yes  [x]  No  Next due by: Visit #10      Pain level:   0 /10    Subjective:  Patient reports: no shoulder pain today; sore when he wakes up because he sleeps mainly on left side.     Objective:  Observation:   Test measurements:    Palpation:    Exercises:  Exercise Resistance/Repetitions Other comments   Scap squeezes 2x15 23   Left bent over row w/ DB 2x15 2# 23   Pulleys 2'x2 23   Seated left shoulder ER AROM 2x15 23   Left shoulder isometrics ext/ER/IR 5x10 23   Pendulum  1' each CW/CCW 23        Big ball roll on table - left shoulder FF 2 x 30 23                         Other Therapeutic Activities:      Manual Treatments:  PROM, left shoulder mobs grade I-III, pec minor stretch; 15 min    Modalities:  IFC with moist heat, left shoulder -- not today      Timed Code Treatment Minutes:   40      Total Treatment Minutes:  45    Treatment/Activity Tolerance:  [x] Patient tolerated treatment well [] Patient limited by fatigue  [] Patient limited by pain  [] Patient limited by other medical complications  [] Other:     Pain after treatment:      1/10    Prognosis: [x] Good [] Fair  [] Poor    Patient Requires Follow-up: [x] Yes  [] No    Plan:   [x] Continue per plan of care [] Alter current plan (see comments)  [] Plan of care initiated [] Hold pending MD visit [] Discharge    Plan for Next Session: Electronically signed by:  Wayne Kirkpatrick PTA

## 2019-08-26 ENCOUNTER — HOSPITAL ENCOUNTER (OUTPATIENT)
Dept: PHYSICAL THERAPY | Facility: HOSPITAL | Age: 74
Setting detail: THERAPIES SERIES
Discharge: HOME | End: 2019-08-26
Payer: MEDICARE

## 2019-08-26 PROCEDURE — 97110 THERAPEUTIC EXERCISES: CPT

## 2019-08-26 PROCEDURE — 97140 MANUAL THERAPY 1/> REGIONS: CPT

## 2019-08-29 ENCOUNTER — HOSPITAL ENCOUNTER (OUTPATIENT)
Dept: PHYSICAL THERAPY | Facility: HOSPITAL | Age: 74
Setting detail: THERAPIES SERIES
Discharge: HOME | End: 2019-08-29
Payer: MEDICARE

## 2019-08-29 PROCEDURE — 97150 GROUP THERAPEUTIC PROCEDURES: CPT

## 2019-08-29 PROCEDURE — 97140 MANUAL THERAPY 1/> REGIONS: CPT

## 2019-08-29 PROCEDURE — 97110 THERAPEUTIC EXERCISES: CPT

## 2019-08-29 NOTE — FLOWSHEET NOTE
Physical Therapy Daily Treatment Note   Date:  2019    TIme In:   1100                  Time Out:  1145    Patient Name:  Omar Michaud    :  1945  MRN: [id number]    Restrictions/Precautions:    Pertinent Medical History:  Medical/Treatment Diagnosis Information:  ·   Left shoulder pain, RTC tendinitis; joint stiffness     Insurance/Certification information:    Medicare / [facility]  Physician Information:    Harman Frankel, APRN  Plan of care signed (Y/N):    Visit# / total visits:     8 /    G-Code (if applicable):      Date / Visit # G-Code Applied:         Progress Note: []  Yes  [x]  No  Next due by: Visit #10      Pain level:   0 /10    Subjective:  Patient reports: having mild pain currently, shoulder is getting better. Objective:  Observation:   Test measurements:    Palpation:    Exercises:  Exercise Resistance/Repetitions Other comments   Scap squeezes 2x15 29   Left bent over row w/ DB 2x15 2# 29   Pulleys 2'x2 29   Seated left shoulder ER AROM 2x15 29   Left shoulder isometrics ext/ER/IR 5x10 29   Pendulum  1' each CW/CCW 29     29   Big ball roll on table - left shoulder FF 2 x 30 29                         Other Therapeutic Activities:      Manual Treatments:  PROM, left shoulder mobs grade I-III, pec minor stretch; 15 min    Modalities:  IFC with moist heat, left shoulder -- not today      Timed Code Treatment Minutes: 40      Total Treatment Minutes:  40    Treatment/Activity Tolerance:  [x] Patient tolerated treatment well [] Patient limited by fatigue  [] Patient limited by pain  [] Patient limited by other medical complications  [x] Other: Pt responded well to treatment, no pain at the end of session today.     Pain after treatment:      0/10    Prognosis: [x] Good [] Fair  [] Poor    Patient Requires Follow-up: [x] Yes  [] No    Plan:   [x] Continue per plan of care [] Alter current plan (see comments)  [] Plan of care initiated [] Hold pending MD visit []

## 2019-09-04 ENCOUNTER — HOSPITAL ENCOUNTER (OUTPATIENT)
Dept: PHYSICAL THERAPY | Facility: HOSPITAL | Age: 74
Setting detail: THERAPIES SERIES
Discharge: HOME | End: 2019-09-04
Payer: MEDICARE

## 2019-09-04 PROCEDURE — 97140 MANUAL THERAPY 1/> REGIONS: CPT

## 2019-09-04 PROCEDURE — 97150 GROUP THERAPEUTIC PROCEDURES: CPT

## 2019-09-04 PROCEDURE — 97110 THERAPEUTIC EXERCISES: CPT

## 2019-09-05 ENCOUNTER — OFFICE VISIT (OUTPATIENT)
Dept: PRIMARY CARE CLINIC | Age: 74
End: 2019-09-05
Payer: MEDICARE

## 2019-09-05 VITALS
OXYGEN SATURATION: 98 % | HEART RATE: 74 BPM | WEIGHT: 215 LBS | BODY MASS INDEX: 33.67 KG/M2 | SYSTOLIC BLOOD PRESSURE: 108 MMHG | DIASTOLIC BLOOD PRESSURE: 60 MMHG

## 2019-09-05 DIAGNOSIS — R11.0 NAUSEA: ICD-10-CM

## 2019-09-05 DIAGNOSIS — R10.9 ABDOMINAL PAIN, UNSPECIFIED ABDOMINAL LOCATION: Primary | ICD-10-CM

## 2019-09-05 LAB
BILIRUBIN, POC: NORMAL
BLOOD URINE, POC: NORMAL
CLARITY, POC: CLEAR
COLOR, POC: YELLOW
GLUCOSE URINE, POC: NORMAL
KETONES, POC: NORMAL
LEUKOCYTE EST, POC: NORMAL
NITRITE, POC: NORMAL
PH, POC: 6
PROTEIN, POC: NORMAL
SPECIFIC GRAVITY, POC: 1
UROBILINOGEN, POC: 0.2

## 2019-09-05 PROCEDURE — 81002 URINALYSIS NONAUTO W/O SCOPE: CPT | Performed by: NURSE PRACTITIONER

## 2019-09-05 PROCEDURE — 99213 OFFICE O/P EST LOW 20 MIN: CPT | Performed by: NURSE PRACTITIONER

## 2019-09-05 PROCEDURE — G8427 DOCREV CUR MEDS BY ELIG CLIN: HCPCS | Performed by: NURSE PRACTITIONER

## 2019-09-05 PROCEDURE — 4040F PNEUMOC VAC/ADMIN/RCVD: CPT | Performed by: NURSE PRACTITIONER

## 2019-09-05 PROCEDURE — 1036F TOBACCO NON-USER: CPT | Performed by: NURSE PRACTITIONER

## 2019-09-05 PROCEDURE — 1123F ACP DISCUSS/DSCN MKR DOCD: CPT | Performed by: NURSE PRACTITIONER

## 2019-09-05 PROCEDURE — G8598 ASA/ANTIPLAT THER USED: HCPCS | Performed by: NURSE PRACTITIONER

## 2019-09-05 PROCEDURE — 3017F COLORECTAL CA SCREEN DOC REV: CPT | Performed by: NURSE PRACTITIONER

## 2019-09-05 PROCEDURE — G8417 CALC BMI ABV UP PARAM F/U: HCPCS | Performed by: NURSE PRACTITIONER

## 2019-09-05 RX ORDER — ONDANSETRON 4 MG/1
4 TABLET, FILM COATED ORAL 3 TIMES DAILY PRN
Qty: 15 TABLET | Refills: 0 | Status: SHIPPED | OUTPATIENT
Start: 2019-09-05 | End: 2019-10-07 | Stop reason: ALTCHOICE

## 2019-09-05 RX ORDER — CIPROFLOXACIN 500 MG/1
500 TABLET, FILM COATED ORAL 2 TIMES DAILY
Qty: 14 TABLET | Refills: 0 | Status: SHIPPED | OUTPATIENT
Start: 2019-09-05 | End: 2019-09-12

## 2019-09-05 ASSESSMENT — ENCOUNTER SYMPTOMS
EYE PAIN: 0
NAUSEA: 1
VOMITING: 0
SHORTNESS OF BREATH: 0
COUGH: 0
SORE THROAT: 0

## 2019-09-06 ENCOUNTER — HOSPITAL ENCOUNTER (OUTPATIENT)
Dept: PHYSICAL THERAPY | Facility: HOSPITAL | Age: 74
Setting detail: THERAPIES SERIES
Discharge: HOME | End: 2019-09-06
Payer: MEDICARE

## 2019-09-06 PROCEDURE — 97110 THERAPEUTIC EXERCISES: CPT

## 2019-09-06 PROCEDURE — 97140 MANUAL THERAPY 1/> REGIONS: CPT

## 2019-09-06 NOTE — FLOWSHEET NOTE
tolerated treatment well [] Patient limited by fatigue  [] Patient limited by pain  [] Patient limited by other medical complications  [x] Other: Pt making good progress with increased strength and ROM. Pain after treatment:      0/10    Prognosis: [x] Good [] Fair  [] Poor    Patient Requires Follow-up: [x] Yes  [] No       Goals  Long term goals  Time Frame for Long term goals : 6-8 weeks  Long term goal 1: Report a 75% decrease in left shoulder pain with routine daily activities. - met  Long term goal 2: Achieve left shoulder AROM: FF = 145, ABd = 130, IR = 70, ER = 70. - partially met  Long term goal 3: Achieve 4+/5 left shoulder strength in MMG's. - partially met  Long term goal 4: Independent with HEP. - met  Long term goal 5: Achieve a Quick DASH score of 30 or less. - not met  Patient Goals   Patient goals : reduce, alleviate left shoulder pain    Patient is showing steady progress; responding well to current treatment; has met or is progressing toward his LTG's.       Plan:   [x] Continue per plan of care [] Alter current plan (see comments)  [] Plan of care initiated [] Hold pending MD visit [] Discharge    Plan for Next Session:        Electronically signed by:  Kayla Cm PTA      Electronically signed by Dieter Bunn PT on 9/10/2019 at 1:22 PM

## 2019-09-13 ENCOUNTER — HOSPITAL ENCOUNTER (OUTPATIENT)
Dept: PHYSICAL THERAPY | Facility: HOSPITAL | Age: 74
Setting detail: THERAPIES SERIES
End: 2019-09-13
Payer: MEDICARE

## 2019-09-16 ASSESSMENT — ENCOUNTER SYMPTOMS: ABDOMINAL PAIN: 1

## 2019-09-16 NOTE — PROGRESS NOTES
Orders Placed This Encounter   Procedures    POCT Urinalysis no Micro     CT scan if no improvement in 2-3 days. Keep f/u.

## 2019-09-17 ENCOUNTER — HOSPITAL ENCOUNTER (OUTPATIENT)
Dept: PHYSICAL THERAPY | Facility: HOSPITAL | Age: 74
Setting detail: THERAPIES SERIES
Discharge: HOME | End: 2019-09-17
Payer: MEDICARE

## 2019-09-17 PROCEDURE — 97140 MANUAL THERAPY 1/> REGIONS: CPT

## 2019-09-17 PROCEDURE — 97110 THERAPEUTIC EXERCISES: CPT

## 2019-09-19 ENCOUNTER — HOSPITAL ENCOUNTER (OUTPATIENT)
Dept: PHYSICAL THERAPY | Facility: HOSPITAL | Age: 74
Setting detail: THERAPIES SERIES
Discharge: HOME | End: 2019-09-19
Payer: MEDICARE

## 2019-09-19 PROCEDURE — 97140 MANUAL THERAPY 1/> REGIONS: CPT

## 2019-09-19 PROCEDURE — 97110 THERAPEUTIC EXERCISES: CPT

## 2019-09-19 NOTE — FLOWSHEET NOTE
fatigue  [] Patient limited by pain  [] Patient limited by other medical complications  [] Other:     Pain after treatment:      0/10    Prognosis: [x] Good [] Fair  [] Poor    Patient Requires Follow-up: [x] Yes  [] No       Goals  Long term goals  Time Frame for Long term goals : 6-8 weeks  Long term goal 1: Report a 75% decrease in left shoulder pain with routine daily activities. - met  Long term goal 2: Achieve left shoulder AROM: FF = 145, ABd = 130, IR = 70, ER = 70. - partially met  Long term goal 3: Achieve 4+/5 left shoulder strength in MMG's. - partially met  Long term goal 4: Independent with HEP. - met  Long term goal 5: Achieve a Quick DASH score of 30 or less. - not met  Patient Goals   Patient goals : reduce, alleviate left shoulder pain    Patient is showing steady progress; responding well to current treatment; has met or is progressing toward his LTG's.       Plan:   [x] Continue per plan of care [] Alter current plan (see comments)  [] Plan of care initiated [] Hold pending MD visit [] Discharge    Plan for Next Session:        Electronically signed by:  Sai Oscar PTA      Electronically signed by Sai Oscar PTA on 9/19/2019 at 1:37 PM

## 2019-09-24 ENCOUNTER — HOSPITAL ENCOUNTER (OUTPATIENT)
Dept: PHYSICAL THERAPY | Facility: HOSPITAL | Age: 74
Setting detail: THERAPIES SERIES
Discharge: HOME | End: 2019-09-24
Payer: MEDICARE

## 2019-09-24 PROCEDURE — 97140 MANUAL THERAPY 1/> REGIONS: CPT

## 2019-09-24 PROCEDURE — 97110 THERAPEUTIC EXERCISES: CPT

## 2019-09-24 PROCEDURE — G0283 ELEC STIM OTHER THAN WOUND: HCPCS

## 2019-09-26 ENCOUNTER — HOSPITAL ENCOUNTER (OUTPATIENT)
Dept: PHYSICAL THERAPY | Facility: HOSPITAL | Age: 74
Setting detail: THERAPIES SERIES
Discharge: HOME | End: 2019-09-26
Payer: MEDICARE

## 2019-09-26 PROCEDURE — 97140 MANUAL THERAPY 1/> REGIONS: CPT

## 2019-09-26 PROCEDURE — G0283 ELEC STIM OTHER THAN WOUND: HCPCS

## 2019-09-26 PROCEDURE — 97110 THERAPEUTIC EXERCISES: CPT

## 2019-10-01 ENCOUNTER — HOSPITAL ENCOUNTER (OUTPATIENT)
Dept: PHYSICAL THERAPY | Facility: HOSPITAL | Age: 74
Setting detail: THERAPIES SERIES
Discharge: HOME | End: 2019-10-01
Payer: MEDICARE

## 2019-10-01 PROCEDURE — 97140 MANUAL THERAPY 1/> REGIONS: CPT

## 2019-10-01 PROCEDURE — 97110 THERAPEUTIC EXERCISES: CPT

## 2019-10-03 ENCOUNTER — HOSPITAL ENCOUNTER (OUTPATIENT)
Dept: PHYSICAL THERAPY | Facility: HOSPITAL | Age: 74
Setting detail: THERAPIES SERIES
Discharge: HOME | End: 2019-10-03
Payer: MEDICARE

## 2019-10-03 PROCEDURE — G0283 ELEC STIM OTHER THAN WOUND: HCPCS

## 2019-10-03 PROCEDURE — 97110 THERAPEUTIC EXERCISES: CPT

## 2019-10-03 PROCEDURE — 97140 MANUAL THERAPY 1/> REGIONS: CPT

## 2019-10-07 ENCOUNTER — OFFICE VISIT (OUTPATIENT)
Dept: PRIMARY CARE CLINIC | Age: 74
End: 2019-10-07
Payer: MEDICARE

## 2019-10-07 VITALS
OXYGEN SATURATION: 95 % | HEIGHT: 67 IN | SYSTOLIC BLOOD PRESSURE: 120 MMHG | DIASTOLIC BLOOD PRESSURE: 60 MMHG | TEMPERATURE: 97.8 F | HEART RATE: 56 BPM | RESPIRATION RATE: 16 BRPM | WEIGHT: 218.6 LBS | BODY MASS INDEX: 34.31 KG/M2

## 2019-10-07 DIAGNOSIS — Z00.00 ROUTINE GENERAL MEDICAL EXAMINATION AT A HEALTH CARE FACILITY: ICD-10-CM

## 2019-10-07 DIAGNOSIS — Z00.00 MEDICARE ANNUAL WELLNESS VISIT, SUBSEQUENT: Primary | ICD-10-CM

## 2019-10-07 DIAGNOSIS — Z79.4 TYPE 2 DIABETES MELLITUS WITHOUT COMPLICATION, WITH LONG-TERM CURRENT USE OF INSULIN (HCC): ICD-10-CM

## 2019-10-07 DIAGNOSIS — M25.512 CHRONIC LEFT SHOULDER PAIN: ICD-10-CM

## 2019-10-07 DIAGNOSIS — G89.29 CHRONIC LEFT SHOULDER PAIN: ICD-10-CM

## 2019-10-07 DIAGNOSIS — E78.5 HYPERLIPIDEMIA, UNSPECIFIED HYPERLIPIDEMIA TYPE: ICD-10-CM

## 2019-10-07 DIAGNOSIS — E11.9 TYPE 2 DIABETES MELLITUS WITHOUT COMPLICATION, WITH LONG-TERM CURRENT USE OF INSULIN (HCC): ICD-10-CM

## 2019-10-07 LAB — HBA1C MFR BLD: 8.8 %

## 2019-10-07 PROCEDURE — 1123F ACP DISCUSS/DSCN MKR DOCD: CPT | Performed by: NURSE PRACTITIONER

## 2019-10-07 PROCEDURE — 3017F COLORECTAL CA SCREEN DOC REV: CPT | Performed by: NURSE PRACTITIONER

## 2019-10-07 PROCEDURE — 4040F PNEUMOC VAC/ADMIN/RCVD: CPT | Performed by: NURSE PRACTITIONER

## 2019-10-07 PROCEDURE — G0438 PPPS, INITIAL VISIT: HCPCS | Performed by: NURSE PRACTITIONER

## 2019-10-07 PROCEDURE — 83036 HEMOGLOBIN GLYCOSYLATED A1C: CPT | Performed by: NURSE PRACTITIONER

## 2019-10-07 PROCEDURE — G8484 FLU IMMUNIZE NO ADMIN: HCPCS | Performed by: NURSE PRACTITIONER

## 2019-10-07 PROCEDURE — G8598 ASA/ANTIPLAT THER USED: HCPCS | Performed by: NURSE PRACTITIONER

## 2019-10-07 PROCEDURE — 3045F PR MOST RECENT HEMOGLOBIN A1C LEVEL 7.0-9.0%: CPT | Performed by: NURSE PRACTITIONER

## 2019-10-07 ASSESSMENT — LIFESTYLE VARIABLES: HOW OFTEN DO YOU HAVE A DRINK CONTAINING ALCOHOL: 0

## 2019-10-07 ASSESSMENT — PATIENT HEALTH QUESTIONNAIRE - PHQ9: SUM OF ALL RESPONSES TO PHQ QUESTIONS 1-9: 4

## 2019-10-08 ENCOUNTER — HOSPITAL ENCOUNTER (OUTPATIENT)
Dept: PHYSICAL THERAPY | Facility: HOSPITAL | Age: 74
Setting detail: THERAPIES SERIES
Discharge: HOME | End: 2019-10-08
Payer: MEDICARE

## 2019-10-08 PROCEDURE — 97150 GROUP THERAPEUTIC PROCEDURES: CPT

## 2019-10-08 PROCEDURE — 97140 MANUAL THERAPY 1/> REGIONS: CPT

## 2019-10-08 PROCEDURE — 97110 THERAPEUTIC EXERCISES: CPT

## 2019-10-10 ENCOUNTER — HOSPITAL ENCOUNTER (OUTPATIENT)
Dept: PHYSICAL THERAPY | Facility: HOSPITAL | Age: 74
Setting detail: THERAPIES SERIES
Discharge: HOME | End: 2019-10-10
Payer: MEDICARE

## 2019-10-10 PROCEDURE — 97110 THERAPEUTIC EXERCISES: CPT

## 2019-10-10 PROCEDURE — 97150 GROUP THERAPEUTIC PROCEDURES: CPT

## 2019-10-10 PROCEDURE — 97140 MANUAL THERAPY 1/> REGIONS: CPT

## 2019-10-21 ENCOUNTER — HOSPITAL ENCOUNTER (OUTPATIENT)
Facility: HOSPITAL | Age: 74
Discharge: HOME | End: 2019-10-21
Payer: MEDICARE

## 2019-10-21 DIAGNOSIS — E11.9 TYPE 2 DIABETES MELLITUS WITHOUT COMPLICATION, WITH LONG-TERM CURRENT USE OF INSULIN (HCC): ICD-10-CM

## 2019-10-21 DIAGNOSIS — E78.5 HYPERLIPIDEMIA, UNSPECIFIED HYPERLIPIDEMIA TYPE: ICD-10-CM

## 2019-10-21 DIAGNOSIS — Z79.4 TYPE 2 DIABETES MELLITUS WITHOUT COMPLICATION, WITH LONG-TERM CURRENT USE OF INSULIN (HCC): ICD-10-CM

## 2019-10-21 LAB
A/G RATIO: 1.6 (ref 0.8–2)
ALBUMIN SERPL-MCNC: 3.9 G/DL (ref 3.4–4.8)
ALP BLD-CCNC: 44 U/L (ref 25–100)
ALT SERPL-CCNC: 15 U/L (ref 4–36)
ANION GAP SERPL CALCULATED.3IONS-SCNC: 13 MMOL/L (ref 3–16)
AST SERPL-CCNC: 14 U/L (ref 8–33)
BILIRUB SERPL-MCNC: 0.3 MG/DL (ref 0.3–1.2)
BUN BLDV-MCNC: 28 MG/DL (ref 6–20)
CALCIUM SERPL-MCNC: 9.1 MG/DL (ref 8.5–10.5)
CHLORIDE BLD-SCNC: 105 MMOL/L (ref 98–107)
CHOLESTEROL, TOTAL: 163 MG/DL (ref 0–200)
CO2: 27 MMOL/L (ref 20–30)
CREAT SERPL-MCNC: 1.2 MG/DL (ref 0.4–1.2)
GFR AFRICAN AMERICAN: >59
GFR NON-AFRICAN AMERICAN: >59
GLOBULIN: 2.5 G/DL
GLUCOSE BLD-MCNC: 132 MG/DL (ref 74–106)
HCT VFR BLD CALC: 38.4 % (ref 40–54)
HDLC SERPL-MCNC: 35 MG/DL (ref 40–60)
HEMOGLOBIN: 12.7 G/DL (ref 13–18)
LDL CHOLESTEROL CALCULATED: 76 MG/DL
MCH RBC QN AUTO: 29.5 PG (ref 27–32)
MCHC RBC AUTO-ENTMCNC: 33.1 G/DL (ref 31–35)
MCV RBC AUTO: 89.1 FL (ref 80–100)
PDW BLD-RTO: 12.7 % (ref 11–16)
PLATELET # BLD: 272 K/UL (ref 150–400)
PMV BLD AUTO: 10.8 FL (ref 6–10)
POTASSIUM SERPL-SCNC: 4.6 MMOL/L (ref 3.4–5.1)
RBC # BLD: 4.31 M/UL (ref 4.5–6)
SODIUM BLD-SCNC: 145 MMOL/L (ref 136–145)
TOTAL PROTEIN: 6.4 G/DL (ref 6.4–8.3)
TRIGL SERPL-MCNC: 258 MG/DL (ref 0–249)
VLDLC SERPL CALC-MCNC: 52 MG/DL
WBC # BLD: 8.5 K/UL (ref 4–11)

## 2019-10-21 PROCEDURE — 80053 COMPREHEN METABOLIC PANEL: CPT

## 2019-10-21 PROCEDURE — 80061 LIPID PANEL: CPT

## 2019-10-21 PROCEDURE — 85027 COMPLETE CBC AUTOMATED: CPT

## 2019-10-23 ENCOUNTER — OFFICE VISIT (OUTPATIENT)
Dept: PRIMARY CARE CLINIC | Age: 74
End: 2019-10-23
Payer: MEDICARE

## 2019-10-23 VITALS
DIASTOLIC BLOOD PRESSURE: 54 MMHG | SYSTOLIC BLOOD PRESSURE: 138 MMHG | TEMPERATURE: 97.8 F | BODY MASS INDEX: 34.15 KG/M2 | RESPIRATION RATE: 16 BRPM | OXYGEN SATURATION: 95 % | HEIGHT: 67 IN | WEIGHT: 217.6 LBS | HEART RATE: 56 BPM

## 2019-10-23 DIAGNOSIS — E55.9 VITAMIN D DEFICIENCY: ICD-10-CM

## 2019-10-23 DIAGNOSIS — Z79.4 TYPE 2 DIABETES MELLITUS WITHOUT COMPLICATION, WITH LONG-TERM CURRENT USE OF INSULIN (HCC): Primary | ICD-10-CM

## 2019-10-23 DIAGNOSIS — E11.9 TYPE 2 DIABETES MELLITUS WITHOUT COMPLICATION, WITH LONG-TERM CURRENT USE OF INSULIN (HCC): Primary | ICD-10-CM

## 2019-10-23 DIAGNOSIS — I10 ESSENTIAL HYPERTENSION: ICD-10-CM

## 2019-10-23 DIAGNOSIS — G62.9 NEUROPATHY: ICD-10-CM

## 2019-10-23 PROCEDURE — 4040F PNEUMOC VAC/ADMIN/RCVD: CPT | Performed by: NURSE PRACTITIONER

## 2019-10-23 PROCEDURE — G8598 ASA/ANTIPLAT THER USED: HCPCS | Performed by: NURSE PRACTITIONER

## 2019-10-23 PROCEDURE — 1123F ACP DISCUSS/DSCN MKR DOCD: CPT | Performed by: NURSE PRACTITIONER

## 2019-10-23 PROCEDURE — 1036F TOBACCO NON-USER: CPT | Performed by: NURSE PRACTITIONER

## 2019-10-23 PROCEDURE — G8427 DOCREV CUR MEDS BY ELIG CLIN: HCPCS | Performed by: NURSE PRACTITIONER

## 2019-10-23 PROCEDURE — 3017F COLORECTAL CA SCREEN DOC REV: CPT | Performed by: NURSE PRACTITIONER

## 2019-10-23 PROCEDURE — 2022F DILAT RTA XM EVC RTNOPTHY: CPT | Performed by: NURSE PRACTITIONER

## 2019-10-23 PROCEDURE — 99214 OFFICE O/P EST MOD 30 MIN: CPT | Performed by: NURSE PRACTITIONER

## 2019-10-23 PROCEDURE — G8417 CALC BMI ABV UP PARAM F/U: HCPCS | Performed by: NURSE PRACTITIONER

## 2019-10-23 PROCEDURE — G8484 FLU IMMUNIZE NO ADMIN: HCPCS | Performed by: NURSE PRACTITIONER

## 2019-10-23 RX ORDER — GABAPENTIN 300 MG/1
CAPSULE ORAL
Qty: 90 CAPSULE | Refills: 2 | Status: SHIPPED | OUTPATIENT
Start: 2019-10-23 | End: 2020-02-17 | Stop reason: SDUPTHER

## 2019-10-23 RX ORDER — FUROSEMIDE 20 MG/1
20 TABLET ORAL SEE ADMIN INSTRUCTIONS
Qty: 180 TABLET | Refills: 3 | Status: SHIPPED | OUTPATIENT
Start: 2019-10-23 | End: 2020-02-17 | Stop reason: SDUPTHER

## 2019-10-23 RX ORDER — CARVEDILOL 12.5 MG/1
6.25 TABLET ORAL 2 TIMES DAILY
Qty: 180 TABLET | Refills: 3 | Status: SHIPPED | OUTPATIENT
Start: 2019-10-23 | End: 2021-01-08 | Stop reason: SDUPTHER

## 2019-10-28 ENCOUNTER — OFFICE VISIT (OUTPATIENT)
Dept: ORTHOPEDIC SURGERY | Facility: CLINIC | Age: 74
End: 2019-10-28

## 2019-10-28 VITALS — WEIGHT: 221 LBS | BODY MASS INDEX: 34.69 KG/M2 | RESPIRATION RATE: 18 BRPM | HEIGHT: 67 IN

## 2019-10-28 DIAGNOSIS — IMO0002 BURSITIS/TENDONITIS, SHOULDER: Primary | ICD-10-CM

## 2019-10-28 DIAGNOSIS — M19.012 PRIMARY LOCALIZED OSTEOARTHROSIS OF LEFT SHOULDER REGION: ICD-10-CM

## 2019-10-28 PROCEDURE — 99203 OFFICE O/P NEW LOW 30 MIN: CPT | Performed by: ORTHOPAEDIC SURGERY

## 2019-10-28 RX ORDER — ACETAMINOPHEN 160 MG
2 TABLET,DISINTEGRATING ORAL
COMMUNITY

## 2019-10-28 NOTE — PROGRESS NOTES
Subjective   Patient ID: Donny Jerry is a 74 y.o. male  Pain of the Left Shoulder (Patient is here today for left shoulder pain, he denies any recent injuries or trauma,but states last year he fell. Patients also says he has had therapy and just finished it last week with no improvement.)             History of Present Illness  74-year-old right-hand-dominant labile diabetic with severe cardiac disease unable to have surgical treatment even for an enlarged prostate due to his severity of his coronary artery disease, fell a year ago into a wall and his left side had some pain no initial bruising had subsequent pain with lifting and reaching underwent therapy x-rays at [facility] were negative for acute fracture but positive for glenohumeral osteoarthritis.  He continues to have lateral shoulder pain with lifting and reaching occasional night pain has improved range of motion after therapy has no associated neck pain or paresthesias does not complain of any weakness or restriction daily activities.      Review of Systems   Constitutional: Negative for fever.   HENT: Negative for dental problem and voice change.    Eyes: Negative for visual disturbance.   Respiratory: Negative for shortness of breath.    Cardiovascular: Negative for chest pain.   Gastrointestinal: Negative for abdominal pain.   Genitourinary: Negative for dysuria.   Musculoskeletal: Positive for arthralgias. Negative for gait problem and joint swelling.   Skin: Negative for rash.   Neurological: Negative for speech difficulty.   Hematological: Does not bruise/bleed easily.   Psychiatric/Behavioral: Negative for confusion.       Past Medical History:   Diagnosis Date   • Benign hypertension    • Coronary artery disease    • Depression    • Enlarged prostate     Enlarged prostate with anticipated TURP with Dr. Bernal.   • GERD (gastroesophageal reflux disease)    • Hypercholesterolemia    • Obesity    • Obstructive sleep apnea on CPAP    • Type 2  diabetes mellitus (CMS/[facility])         Past Surgical History:   Procedure Laterality Date   • CORONARY ANGIOPLASTY WITH STENT PLACEMENT Left 06/03/2009    Left heart catheterization, 06/03/2009, Dr. Russo:  LVEF 45% to 50%.  Placement of overlapping Cypher drug-eluting stent 2.5 x 28 and 2.5 x 18 to the SVG to the obtuse marginal branch.  SVG to the PDA had a proximal stenosis estimated at 60% with a distal stenosis of 50% to 60%.   • CORONARY ANGIOPLASTY WITH STENT PLACEMENT Left 07/06/2009    Left heart catheterization, 07/06/2009:  PTCA and stent placement in the mid PDA using a 2.25 x 12 mm Taxus drug-eluting stent with stent placement in the distal SVG to the PDA using a 2.5 x 18 mm Cypher drug-eluting stent and stenting of the proximal SVG to the PDA using a 3.0 x 28 mm Cypher drug-eluting stent.   • CORONARY ANGIOPLASTY WITH STENT PLACEMENT  04/23/2010    Cardiac catheterization for recurrent anginal symptoms, 04/23/2010, with PTCA and stent placement in the proximal SVG to the PDA using 3.0 x 28 mm Promus drug-eluting stent for in-stent restenosis.   • CORONARY ANGIOPLASTY WITH STENT PLACEMENT Left 07/06/2010    Left heart catheterization, 07/06/2010, Dr. Russo:  EF 40% to 45%.  3.5 x 23 mm Promus drug-eluting stent in the proximal SVG to OM, 2.5 x 18 mm Promus drug-eluting stent to distal SVG to PDA due to in-stent restenosis.     • CORONARY ANGIOPLASTY WITH STENT PLACEMENT Left 11/16/2010    Left heart catheterization, 11/16/2010, with placement of a 3.0 x 16 mm Taxus drug-eluting stent to the SVG to the RCA proximally and a 2.5 x 16 mm paclitaxel stent distally in the SVG to the RCA.   • CORONARY ANGIOPLASTY WITH STENT PLACEMENT Left     Left heart catheterization, 3.0 x 24-mm Promus element drug-eluting stent to a 90% lesion in the mid portion of the SVG to the PDA.    • CORONARY ANGIOPLASTY WITH STENT PLACEMENT Left 06/24/2014    Left heart catheterization for recurrent angina, 06/24/2014,  Dr. Russo:  PTCA of the SVG to the PDA using a 3 x 12 mm NC TREK balloon.     • CORONARY ARTERY BYPASS GRAFT      CABG x3, Dr. Peng, [facility], 2001.       Family History   Problem Relation Age of Onset   • Heart attack Mother    • Ulcers Father        Social History     Socioeconomic History   • Marital status:      Spouse name: Not on file   • Number of children: Not on file   • Years of education: Not on file   • Highest education level: Not on file   Tobacco Use   • Smoking status: Never Smoker   • Smokeless tobacco: Never Used   Substance and Sexual Activity   • Alcohol use: No   • Drug use: No   • Sexual activity: Defer       I have reviewed the above medical and surgical history, family history, social history, medications, allergies and review of systems.    Allergies   Allergen Reactions   • Bisoprolol Other (See Comments)     Agitation     • Byetta 10 Mcg Pen [Exenatide]      nausea   • Crestor [Rosuvastatin Calcium] Myalgia   • Metformin And Related    • Plavix [Clopidogrel Bisulfate] Other (See Comments)     resistance   • Zocor [Simvastatin] Myalgia         Current Outpatient Medications:   •  amLODIPine (NORVASC) 10 MG tablet, Take 10 mg by mouth Daily., Disp: , Rfl:   •  atorvastatin (LIPITOR) 80 MG tablet, Take 1 tablet by mouth Daily., Disp: 90 tablet, Rfl: 1  •  carvedilol (COREG) 12.5 MG tablet, Take 6.25 mg by mouth 2 (Two) Times a Day With Meals. Pt takes 0.5 tab BID, Disp: , Rfl:   •  Cholecalciferol (VITAMIN D3) 50 MCG (2000 UT) capsule, Take 2 capsules by mouth., Disp: , Rfl:   •  citalopram (CeleXA) 40 MG tablet, Take 40 mg by mouth Daily., Disp: , Rfl:   •  dapagliflozin (FARXIGA) 5 MG tablet tablet, Take 5 mg by mouth Daily., Disp: , Rfl:   •  dexlansoprazole (DEXILANT) 60 MG capsule, Take 60 mg by mouth Daily., Disp: , Rfl:   •  doxazosin (CARDURA) 4 MG tablet, Take 4 mg by mouth Every Night., Disp: , Rfl:   •  finasteride (PROSCAR) 5 MG tablet, Take 5 mg by  "mouth Daily., Disp: , Rfl:   •  furosemide (LASIX) 20 MG tablet, Take 20 mg by mouth Daily., Disp: , Rfl:   •  gabapentin (NEURONTIN) 300 MG capsule, Take 300 mg by mouth Daily., Disp: , Rfl:   •  insulin degludec (TRESIBA FLEXTOUCH) 100 UNIT/ML solution pen-injector injection, Inject 70 Units under the skin Every Night., Disp: , Rfl:   •  lisinopril (PRINIVIL,ZESTRIL) 40 MG tablet, Take 40 mg by mouth Daily., Disp: , Rfl:   •  Multiple Vitamin (MULTI VITAMIN DAILY PO), Take 1 tablet by mouth Daily., Disp: , Rfl:   •  nitroglycerin (NITROSTAT) 0.4 MG SL tablet, Place 1 tablet under the tongue Every 5 (Five) Minutes As Needed for Chest Pain. Take no more than 3 doses in 15 minutes., Disp: 25 tablet, Rfl: 11  •  prasugrel (EFFIENT) 10 MG tablet, Take 10 mg by mouth Daily., Disp: , Rfl:   •  SITagliptin (JANUVIA) 100 MG tablet, Take 100 mg by mouth Daily. 1/2 tab daily, Disp: , Rfl:   •  tamsulosin (FLOMAX) 0.4 MG capsule 24 hr capsule, Take 1 capsule by mouth Every Night., Disp: , Rfl:     Objective   Resp 18   Ht 170.2 cm (67\")   Wt 100 kg (221 lb)   BMI 34.61 kg/m²    Physical Exam  Constitutional: Patient is oriented to person, place, and time. Patient appears well-developed and well-nourished.   HENT:Head: Normocephalic and atraumatic.   Eyes: EOM are normal. Pupils are equal, round, and reactive to light.   Neck: Normal range of motion. Neck supple.   Cardiovascular: Normal rate.    Pulmonary/Chest: Effort normal and breath sounds normal.   Abdominal: Soft.   Neurological: Patient is alert and oriented to person, place, and time.   Skin: Skin is warm and dry.   Psychiatric: Patient has a normal mood and affect.   Nursing note and vitals reviewed.       [unfilled]   Left shoulder: Minimal tenderness anterolateral acromial area, no focal atrophy ecchymosis abrasions contusions skin appears normal.  Forward elevation 160 abduction 140 internal rotation near full external rotation 20 degrees loss with minimal " pain, no focal weakness minimal signs of impingement no tenderness proximal biceps tendon or AC joint.  Left arm neurovascularly intact.    Assessment/Plan   Review of Radiographic Studies:    No new imaging done today.  Outside radiographic images 3 views left shoulder were directly examined positive for glenohumeral osteoarthritis      Procedures     Donny was seen today for pain.    Diagnoses and all orders for this visit:    Bursitis/tendonitis, shoulder    Primary localized osteoarthrosis of left shoulder region       Orthopedic activities reviewed and patient expressed appreciation, Risk, benefits, and merits of treatment alternatives reviewed with the patient and questions answered and Using illustrations and models, the nature of the pathology was explained to the patient      Recommendations/Plan:   Work/Activity Status: May perform usual activities as tolerated    Patient agreeable to call or return sooner for any concerns.         Discussed the condition of glenohumeral osteoarthritis treatment options indications for surgical care.  Made recommendation for nonsurgical treatment Home exercise modification of activities.  Directly reviewed and made recommendations regarding home exercise    Impression:  Glenohumeral osteoarthritis, labile diabetes, cardiac condition precludes surgical treatment  Plan:  Home exercise modification of activities return when necessary

## 2019-11-09 ASSESSMENT — ENCOUNTER SYMPTOMS
BACK PAIN: 1
RESPIRATORY NEGATIVE: 1
EYES NEGATIVE: 1
GASTROINTESTINAL NEGATIVE: 1

## 2020-01-02 ENCOUNTER — OFFICE VISIT (OUTPATIENT)
Dept: CARDIOLOGY | Facility: CLINIC | Age: 75
End: 2020-01-02

## 2020-01-02 VITALS
WEIGHT: 223.4 LBS | HEIGHT: 66 IN | OXYGEN SATURATION: 96 % | BODY MASS INDEX: 35.9 KG/M2 | RESPIRATION RATE: 18 BRPM | SYSTOLIC BLOOD PRESSURE: 140 MMHG | HEART RATE: 55 BPM | DIASTOLIC BLOOD PRESSURE: 52 MMHG

## 2020-01-02 DIAGNOSIS — E78.5 HYPERLIPIDEMIA LDL GOAL <70: ICD-10-CM

## 2020-01-02 DIAGNOSIS — I10 ESSENTIAL HYPERTENSION: ICD-10-CM

## 2020-01-02 DIAGNOSIS — I25.810 CORONARY ARTERY DISEASE INVOLVING CORONARY BYPASS GRAFT OF NATIVE HEART WITHOUT ANGINA PECTORIS: Primary | ICD-10-CM

## 2020-01-02 DIAGNOSIS — R60.0 BILATERAL LOWER EXTREMITY EDEMA: ICD-10-CM

## 2020-01-02 DIAGNOSIS — R01.1 HEART MURMUR: ICD-10-CM

## 2020-01-02 DIAGNOSIS — E66.9 OBESITY, CLASS II, BMI 35-39.9: ICD-10-CM

## 2020-01-02 PROCEDURE — 99214 OFFICE O/P EST MOD 30 MIN: CPT | Performed by: INTERNAL MEDICINE

## 2020-01-02 NOTE — PROGRESS NOTES
[facility]  Donny Jerry  1945  74 y.o.  [phone]  [phone]      Date: 01/02/2020    PCP: Anum Alonso APRN    Chief Complaint   Patient presents with   • Coronary Artery Disease       Problem List:  1. Coronary artery disease:  a. CABG x3 in 2001, [facility] by Dr. Peng. LIMA to the LAD, SVG to obtuse marginal, SVG to the PDA.  b. Stress echocardiogram, 11/13/2007: No wall motion abnormalities. No evidence of ischemia. Normal EF.  c. Echocardiogram, 11/13/2007, mild concentric LVH, trace of MR and TR.    d. [facility], 05/07/2009:  Normal LVEF with a stress induced mid and distal inferior defect compatible with  ischemia.  e. [facility], 06/03/2009, Dr. Russo: EF 45-50%. Overlapping Cypher TAWANNA 2.5 x 28 and 2.5 x 18 to the SVG to OM. SVG to PDA had a proximal stenosis of 60% with a distal stenosis of 50-60%.  f. [facility], 07/06/2009: PTCA and Taxus TAWANNA (2.25 x 12 mm) to the mid PDA; Cypher TAWANNA (2.5 x 18mm) to the distal SVG to the PDA;  and Cypher TAWANNA (3.0 x 28mm) to the proximal SVG to the PDA.  g. [facility] for recurrent chest pain, 01/29/2010: Revealing patent stents. Ranexa initiated 500 mg q.12 h.   h. [facility],  08/16/2011: LVEF of 45% to 50% with an occluded SVG to an obtuse marginal branch which could not be revascularized, patent LIMA to the LAD, noncritical graft disease in the SVG to the PDA, multiple small areas of distal vessel disease and collateral flow were seen.   i. [facility] for recurrent anginal symptoms, 04/23/2010, with PTCA and Promus TAWANNA (3.0 x 28mm) to the proximal SVG to the PDA for in-stent restenosis.    j. [facility], 07/06/2010,  Dr. Russo: EF 40-45%. 3.5 x 23 mm Promus TAWANNA in the proximal SVG to OM, 2.5 x 18 mm Promus TAWANNA to distal SVG to PDA due to ISR.    k. [facility], 11/16/2010, with placement of  a 3.0 x 16 mm Taxus TAWANNA to the SVG to the RCA proximally and a 2.5 x 16 mm paclitaxel stent distally in the SVG to the  RCA.  l. [facility], 3.0 x 24-mm Promus element TAWANNA to a 90% lesion in the mid portion  of the SVG to the PDA.   [facility] Dr. Russo:  PTCA of the SVG to the PDA using a 3 x 12 mm NC TREK balloon.    2. Hypertension.  3. Hypercholesterolemia.  4. Type 2 diabetes, diagnosed 2 years ago.  5. Obstructive sleep apnea/CPAP.  6. Enlarged prostate with anticipated TURP with Dr. Bernal.  7. Obesity.  8. Depression.  9. Surgical history:  a.  CABG x3, Dr. Peng, [facility], 2001.  b. Negative colonoscopy, 2014.    Allergies   Allergen Reactions   • Bisoprolol Other (See Comments)     Agitation     • Byetta 10 Mcg Pen [Exenatide]      nausea   • Crestor [Rosuvastatin Calcium] Myalgia   • Metformin And Related    • Plavix [Clopidogrel Bisulfate] Other (See Comments)     resistance   • Zocor [Simvastatin] Myalgia       Current Medications:      Current Outpatient Medications:   •  amLODIPine (NORVASC) 10 MG tablet, Take 10 mg by mouth Daily., Disp: , Rfl:   •  atorvastatin (LIPITOR) 80 MG tablet, Take 1 tablet by mouth Daily. (Patient taking differently: Take 40 mg by mouth Daily.), Disp: 90 tablet, Rfl: 1  •  carvedilol (COREG) 12.5 MG tablet, Take 6.25 mg by mouth 2 (Two) Times a Day With Meals. Pt takes 0.5 tab BID, Disp: , Rfl:   •  Cholecalciferol (VITAMIN D3) 50 MCG (2000 UT) capsule, Take 2 capsules by mouth., Disp: , Rfl:   •  citalopram (CeleXA) 40 MG tablet, Take 40 mg by mouth Daily., Disp: , Rfl:   •  Dapagliflozin Propanediol (FARXIGA) 10 MG tablet, Take 10 mg by mouth Daily., Disp: , Rfl:   •  dexlansoprazole (DEXILANT) 60 MG capsule, Take 60 mg by mouth Daily., Disp: , Rfl:   •  doxazosin (CARDURA) 4 MG tablet, Take 4 mg by mouth Every Night., Disp: , Rfl:   •  finasteride (PROSCAR) 5 MG tablet, Take 5 mg by mouth Daily., Disp: , Rfl:   •  furosemide (LASIX) 20 MG tablet, Take 20 mg by mouth Daily. Monday, Wed,Friday, Disp: , Rfl:   •  gabapentin (NEURONTIN) 300 MG  "capsule, Take 300 mg by mouth Daily., Disp: , Rfl:   •  insulin degludec (TRESIBA FLEXTOUCH) 100 UNIT/ML solution pen-injector injection, Inject 70 Units under the skin Every Night., Disp: , Rfl:   •  lisinopril (PRINIVIL,ZESTRIL) 30 MG tablet, Take 30 mg by mouth Daily., Disp: , Rfl:   •  Multiple Vitamin (MULTI VITAMIN DAILY PO), Take 1 tablet by mouth Daily., Disp: , Rfl:   •  nitroglycerin (NITROSTAT) 0.4 MG SL tablet, Place 1 tablet under the tongue Every 5 (Five) Minutes As Needed for Chest Pain. Take no more than 3 doses in 15 minutes., Disp: 25 tablet, Rfl: 11  •  prasugrel (EFFIENT) 10 MG tablet, Take 10 mg by mouth Daily., Disp: , Rfl:   •  SITagliptin (JANUVIA) 100 MG tablet, Take 100 mg by mouth Daily. 1/2 tab daily, Disp: , Rfl:   •  tamsulosin (FLOMAX) 0.4 MG capsule 24 hr capsule, Take 1 capsule by mouth Every Night., Disp: , Rfl:     HPI    Donny Jerry is a 74 y.o. male who presents today for 6 month follow up of coronary artery dsiease, hypertension, and hyperlipidemia. Since last visit, he has been feeling well overall from a cardiovascular standpoint. He has been taking atorvastatin 40 mg daily instead of 80 mg because he feels that he is \"on too much medicine\". Pt admits he does not exercise, due to his hip pain which makes it difficult for him to walk. Patient denies chest pain, palpitations, shortness of breath, dizziness, and syncope.        The following portions of the patient's history were reviewed and updated as appropriate: allergies, current medications and problem list.    Pertinent positives as listed in the HPI.  All other systems reviewed are negative.    Vitals:    01/02/20 0954   BP: 140/52   BP Location: Left arm   Patient Position: Sitting   Pulse: 55   Resp: 18   SpO2: 96%   Weight: 101 kg (223 lb 6.4 oz)   Height: 167.6 cm (66\")       Physical Exam:    General: Alert and oriented.  Neck: Jugular venous pressure is within normal limits. Carotids have normal upstrokes without " bruits.   Cardiovascular: Heart has a nondisplaced focal PMI. Regular rate and rhythm with 1/6 ANGELES RUSB with slight radiation to the right carotid. No gallop or rub.  Lungs: Clear without rales or wheezes. Equal expansion is noted.   Extremities: Trace BLE edema.  Skin: Warm and dry.  Neurologic: Nonfocal.     Diagnostic Data:  Lab Results   Component Value Date    CHLPL 163 10/21/2019    TRIG 258 (H) 10/21/2019    HDL 35 (L) 10/21/2019    LDL 76 10/21/2019      Lab Results   Component Value Date    WBC 8.5 10/21/2019    HGB 12.7 (L) 10/21/2019    HCT 38.4 (L) 10/21/2019    MCV 89.1 10/21/2019     10/21/2019       Procedures    Assessment:      ICD-10-CM ICD-9-CM   1. Coronary artery disease involving coronary bypass graft of native heart without angina pectoris I25.810 414.05   2. Essential hypertension I10 401.9   3. Hyperlipidemia LDL goal <70 E78.5 272.4   4. Obesity, Class II, BMI 35-39.9 E66.9 278.00   5. Bilateral lower extremity edema R60.0 782.3   6. Heart murmur R01.1 785.2     Lab results found above were reviewed with the patient.    Plan:    1. Schedule echocardiogram for murmur.  2. Continue atorvastatin 40 mg for HLP  3. Begin routine aerobic exercise, gradually working up to 30 minutes, 4 days per week.   4. Continue Effient for coronary artery disease.  5. Continue Lasix for hypertension.  6. Continue amlodipine, carvedilol, doxazosin, and lisinopril for hypertension.  7. Continue all other current medications.  8. F/up in 12 months or sooner if needed.      Scribed for Liz Russo MD by Audrey Ratliff. 1/2/2020  10:23 AM     I Liz Russo MD personally performed the services described in this documentation as scribed by the above individual in my presence, and it is both accurate and complete.    Liz Russo MD, [facility]

## 2020-02-06 ENCOUNTER — LAB (OUTPATIENT)
Dept: LAB | Facility: HOSPITAL | Age: 75
End: 2020-02-06

## 2020-02-06 ENCOUNTER — TRANSCRIBE ORDERS (OUTPATIENT)
Dept: LAB | Facility: HOSPITAL | Age: 75
End: 2020-02-06

## 2020-02-06 ENCOUNTER — HOSPITAL ENCOUNTER (OUTPATIENT)
Dept: GENERAL RADIOLOGY | Facility: HOSPITAL | Age: 75
Discharge: HOME | End: 2020-02-06
Admitting: UROLOGY

## 2020-02-06 DIAGNOSIS — N40.1 ENLARGED PROSTATE WITH URINARY OBSTRUCTION: ICD-10-CM

## 2020-02-06 DIAGNOSIS — N13.8 ENLARGED PROSTATE WITH URINARY OBSTRUCTION: ICD-10-CM

## 2020-02-06 DIAGNOSIS — N40.1 ENLARGED PROSTATE WITH URINARY OBSTRUCTION: Primary | ICD-10-CM

## 2020-02-06 DIAGNOSIS — N13.8 ENLARGED PROSTATE WITH URINARY OBSTRUCTION: Primary | ICD-10-CM

## 2020-02-06 LAB
ALBUMIN SERPL-MCNC: 3.7 G/DL (ref 3.5–5.2)
ALBUMIN/GLOB SERPL: 1.5 G/DL
ALP SERPL-CCNC: 47 U/L (ref 39–117)
ALT SERPL W P-5'-P-CCNC: 16 U/L (ref 1–41)
ANION GAP SERPL CALCULATED.3IONS-SCNC: 11.8 MMOL/L (ref 5–15)
AST SERPL-CCNC: 16 U/L (ref 1–40)
BILIRUB SERPL-MCNC: 0.3 MG/DL (ref 0.2–1.2)
BUN BLD-MCNC: 27 MG/DL (ref 8–23)
BUN/CREAT SERPL: 19.9 (ref 7–25)
CALCIUM SPEC-SCNC: 8.7 MG/DL (ref 8.6–10.5)
CHLORIDE SERPL-SCNC: 103 MMOL/L (ref 98–107)
CO2 SERPL-SCNC: 22.2 MMOL/L (ref 22–29)
CREAT BLD-MCNC: 1.36 MG/DL (ref 0.76–1.27)
GFR SERPL CREATININE-BSD FRML MDRD: 51 ML/MIN/1.73
GLOBULIN UR ELPH-MCNC: 2.5 GM/DL
GLUCOSE BLD-MCNC: 161 MG/DL (ref 65–99)
POTASSIUM BLD-SCNC: 4.9 MMOL/L (ref 3.5–5.2)
PROT SERPL-MCNC: 6.2 G/DL (ref 6–8.5)
PSA SERPL-MCNC: 0.41 NG/ML (ref 0–4)
SODIUM BLD-SCNC: 137 MMOL/L (ref 136–145)

## 2020-02-06 PROCEDURE — G0103 PSA SCREENING: HCPCS

## 2020-02-06 PROCEDURE — 74018 RADEX ABDOMEN 1 VIEW: CPT

## 2020-02-06 PROCEDURE — 80053 COMPREHEN METABOLIC PANEL: CPT

## 2020-02-06 PROCEDURE — 36415 COLL VENOUS BLD VENIPUNCTURE: CPT

## 2020-02-08 ENCOUNTER — OFFICE VISIT (OUTPATIENT)
Dept: PRIMARY CARE CLINIC | Age: 75
End: 2020-02-08
Payer: MEDICARE

## 2020-02-08 VITALS
SYSTOLIC BLOOD PRESSURE: 145 MMHG | HEART RATE: 67 BPM | WEIGHT: 219 LBS | BODY MASS INDEX: 34.37 KG/M2 | DIASTOLIC BLOOD PRESSURE: 57 MMHG | OXYGEN SATURATION: 95 % | TEMPERATURE: 98.6 F | HEIGHT: 67 IN

## 2020-02-08 PROCEDURE — 1123F ACP DISCUSS/DSCN MKR DOCD: CPT | Performed by: NURSE PRACTITIONER

## 2020-02-08 PROCEDURE — 3017F COLORECTAL CA SCREEN DOC REV: CPT | Performed by: NURSE PRACTITIONER

## 2020-02-08 PROCEDURE — 1036F TOBACCO NON-USER: CPT | Performed by: NURSE PRACTITIONER

## 2020-02-08 PROCEDURE — G8427 DOCREV CUR MEDS BY ELIG CLIN: HCPCS | Performed by: NURSE PRACTITIONER

## 2020-02-08 PROCEDURE — 4040F PNEUMOC VAC/ADMIN/RCVD: CPT | Performed by: NURSE PRACTITIONER

## 2020-02-08 PROCEDURE — G8417 CALC BMI ABV UP PARAM F/U: HCPCS | Performed by: NURSE PRACTITIONER

## 2020-02-08 PROCEDURE — G8484 FLU IMMUNIZE NO ADMIN: HCPCS | Performed by: NURSE PRACTITIONER

## 2020-02-08 PROCEDURE — 99213 OFFICE O/P EST LOW 20 MIN: CPT | Performed by: NURSE PRACTITIONER

## 2020-02-08 RX ORDER — CEFDINIR 300 MG/1
300 CAPSULE ORAL 2 TIMES DAILY
Qty: 10 CAPSULE | Refills: 0 | Status: SHIPPED | OUTPATIENT
Start: 2020-02-08 | End: 2020-02-13

## 2020-02-08 RX ORDER — GUAIFENESIN 600 MG/1
1200 TABLET, EXTENDED RELEASE ORAL 2 TIMES DAILY PRN
Qty: 40 TABLET | Refills: 0 | COMMUNITY
Start: 2020-02-08 | End: 2020-02-18

## 2020-02-08 ASSESSMENT — PATIENT HEALTH QUESTIONNAIRE - PHQ9: DEPRESSION UNABLE TO ASSESS: URGENT/EMERGENT SITUATION

## 2020-02-08 ASSESSMENT — ENCOUNTER SYMPTOMS
COUGH: 1
EYES NEGATIVE: 1
GASTROINTESTINAL NEGATIVE: 1

## 2020-02-11 ENCOUNTER — HOSPITAL ENCOUNTER (OUTPATIENT)
Facility: HOSPITAL | Age: 75
Discharge: HOME | End: 2020-02-11
Payer: MEDICARE

## 2020-02-11 LAB
A/G RATIO: 1.6 (ref 0.8–2)
ALBUMIN SERPL-MCNC: 3.9 G/DL (ref 3.4–4.8)
ALP BLD-CCNC: 49 U/L (ref 25–100)
ALT SERPL-CCNC: 15 U/L (ref 4–36)
ANION GAP SERPL CALCULATED.3IONS-SCNC: 9 MMOL/L (ref 3–16)
AST SERPL-CCNC: 13 U/L (ref 8–33)
BILIRUB SERPL-MCNC: <0.2 MG/DL (ref 0.3–1.2)
BUN BLDV-MCNC: 25 MG/DL (ref 6–20)
CALCIUM SERPL-MCNC: 8.9 MG/DL (ref 8.5–10.5)
CHLORIDE BLD-SCNC: 108 MMOL/L (ref 98–107)
CHOLESTEROL, TOTAL: 164 MG/DL (ref 0–200)
CO2: 28 MMOL/L (ref 20–30)
CREAT SERPL-MCNC: 1.2 MG/DL (ref 0.4–1.2)
FOLATE: 19.86 NG/ML
GFR AFRICAN AMERICAN: >59
GFR NON-AFRICAN AMERICAN: >59
GLOBULIN: 2.5 G/DL
GLUCOSE BLD-MCNC: 119 MG/DL (ref 74–106)
HBA1C MFR BLD: 8.1 %
HCT VFR BLD CALC: 38.2 % (ref 40–54)
HDLC SERPL-MCNC: 32 MG/DL (ref 40–60)
HEMOGLOBIN: 12.3 G/DL (ref 13–18)
LDL CHOLESTEROL CALCULATED: 80 MG/DL
MCH RBC QN AUTO: 29 PG (ref 27–32)
MCHC RBC AUTO-ENTMCNC: 32.2 G/DL (ref 31–35)
MCV RBC AUTO: 90.1 FL (ref 80–100)
PDW BLD-RTO: 12.4 % (ref 11–16)
PLATELET # BLD: 317 K/UL (ref 150–400)
PMV BLD AUTO: 10.8 FL (ref 6–10)
POTASSIUM SERPL-SCNC: 4.6 MMOL/L (ref 3.4–5.1)
RBC # BLD: 4.24 M/UL (ref 4.5–6)
SODIUM BLD-SCNC: 145 MMOL/L (ref 136–145)
TOTAL PROTEIN: 6.4 G/DL (ref 6.4–8.3)
TRIGL SERPL-MCNC: 262 MG/DL (ref 0–249)
VITAMIN B-12: 878 PG/ML (ref 211–911)
VITAMIN D 25-HYDROXY: 40.5 (ref 32–100)
VLDLC SERPL CALC-MCNC: 52 MG/DL
WBC # BLD: 8.5 K/UL (ref 4–11)

## 2020-02-11 PROCEDURE — 82306 VITAMIN D 25 HYDROXY: CPT

## 2020-02-11 PROCEDURE — 80061 LIPID PANEL: CPT

## 2020-02-11 PROCEDURE — 82607 VITAMIN B-12: CPT

## 2020-02-11 PROCEDURE — 80053 COMPREHEN METABOLIC PANEL: CPT

## 2020-02-11 PROCEDURE — 85027 COMPLETE CBC AUTOMATED: CPT

## 2020-02-11 PROCEDURE — 83036 HEMOGLOBIN GLYCOSYLATED A1C: CPT

## 2020-02-11 PROCEDURE — 82746 ASSAY OF FOLIC ACID SERUM: CPT

## 2020-02-17 ENCOUNTER — OFFICE VISIT (OUTPATIENT)
Dept: PRIMARY CARE CLINIC | Age: 75
End: 2020-02-17
Payer: MEDICARE

## 2020-02-17 VITALS
RESPIRATION RATE: 18 BRPM | OXYGEN SATURATION: 98 % | HEART RATE: 61 BPM | TEMPERATURE: 97.9 F | BODY MASS INDEX: 34.55 KG/M2 | DIASTOLIC BLOOD PRESSURE: 48 MMHG | WEIGHT: 220.6 LBS | SYSTOLIC BLOOD PRESSURE: 120 MMHG

## 2020-02-17 PROCEDURE — 4040F PNEUMOC VAC/ADMIN/RCVD: CPT | Performed by: NURSE PRACTITIONER

## 2020-02-17 PROCEDURE — 3017F COLORECTAL CA SCREEN DOC REV: CPT | Performed by: NURSE PRACTITIONER

## 2020-02-17 PROCEDURE — G8417 CALC BMI ABV UP PARAM F/U: HCPCS | Performed by: NURSE PRACTITIONER

## 2020-02-17 PROCEDURE — 3052F HG A1C>EQUAL 8.0%<EQUAL 9.0%: CPT | Performed by: NURSE PRACTITIONER

## 2020-02-17 PROCEDURE — G8427 DOCREV CUR MEDS BY ELIG CLIN: HCPCS | Performed by: NURSE PRACTITIONER

## 2020-02-17 PROCEDURE — 2022F DILAT RTA XM EVC RTNOPTHY: CPT | Performed by: NURSE PRACTITIONER

## 2020-02-17 PROCEDURE — G8484 FLU IMMUNIZE NO ADMIN: HCPCS | Performed by: NURSE PRACTITIONER

## 2020-02-17 PROCEDURE — 1123F ACP DISCUSS/DSCN MKR DOCD: CPT | Performed by: NURSE PRACTITIONER

## 2020-02-17 PROCEDURE — 1036F TOBACCO NON-USER: CPT | Performed by: NURSE PRACTITIONER

## 2020-02-17 PROCEDURE — 99214 OFFICE O/P EST MOD 30 MIN: CPT | Performed by: NURSE PRACTITIONER

## 2020-02-17 RX ORDER — SUCRALFATE 1 G/1
1 TABLET ORAL 4 TIMES DAILY
Qty: 120 TABLET | Refills: 1 | Status: SHIPPED | OUTPATIENT
Start: 2020-02-17 | End: 2021-01-08

## 2020-02-17 RX ORDER — LANCETS
1 EACH MISCELLANEOUS 3 TIMES DAILY
Qty: 400 EACH | Refills: 3 | Status: SHIPPED | OUTPATIENT
Start: 2020-02-17

## 2020-02-17 RX ORDER — FUROSEMIDE 20 MG/1
20 TABLET ORAL SEE ADMIN INSTRUCTIONS
Qty: 180 TABLET | Refills: 3 | Status: SHIPPED | OUTPATIENT
Start: 2020-02-17 | End: 2021-01-08 | Stop reason: SDUPTHER

## 2020-02-17 RX ORDER — DEXLANSOPRAZOLE 60 MG/1
60 CAPSULE, DELAYED RELEASE ORAL DAILY
Qty: 90 CAPSULE | Refills: 3 | Status: SHIPPED | OUTPATIENT
Start: 2020-02-17 | End: 2021-05-06 | Stop reason: SDUPTHER

## 2020-02-17 RX ORDER — GABAPENTIN 300 MG/1
CAPSULE ORAL
Qty: 90 CAPSULE | Refills: 2 | Status: SHIPPED | OUTPATIENT
Start: 2020-02-17 | End: 2020-10-08 | Stop reason: SDUPTHER

## 2020-02-17 ASSESSMENT — ENCOUNTER SYMPTOMS
RESPIRATORY NEGATIVE: 1
ABDOMINAL PAIN: 1
EYES NEGATIVE: 1

## 2020-02-17 NOTE — PROGRESS NOTES
(LIPITOR) 40 MG tablet Take 1 tablet by mouth nightly 90 tablet 3    Cholecalciferol (VITAMIN D3) 2000 units CAPS Take 2 capsules by mouth daily       loratadine (CLARITIN) 10 MG tablet TAKE ONE TABLET BY MOUTH EVERY DAY 30 tablet 3    finasteride (PROSCAR) 5 MG tablet Take 1 tablet by mouth daily 90 tablet 3    tamsulosin (FLOMAX) 0.4 MG capsule Take 0.4 mg by mouth daily       therapeutic multivitamin-minerals (THERAGRAN-M) tablet Take 1 tablet by mouth daily.  guaiFENesin (MUCINEX) 600 MG extended release tablet Take 2 tablets by mouth 2 times daily as needed for Congestion (Patient not taking: Reported on 2/17/2020) 40 tablet 0    nitroGLYCERIN (NITROSTAT) 0.4 MG SL tablet Place 1 tablet under the tongue every 5 minutes as needed (Patient not taking: Reported on 2/8/2020) 25 tablet 5    [DISCONTINUED] omeprazole (PRILOSEC) 20 MG capsule Take 1 capsule by mouth 2 times daily. 180 capsule 3    glucose blood VI test strips (EXACTECH TEST) strip Please dispense glucometer test strips for patient's device. Patient test bid and prn. Dx: 250.00 100 strip 5     No current facility-administered medications on file prior to visit. Review of Systems   Constitutional: Negative. HENT: Negative. Eyes: Negative. Respiratory: Negative. Cardiovascular: Negative. Gastrointestinal: Positive for abdominal pain (bloat). Genitourinary: Negative. Musculoskeletal: Negative. Skin: Negative. Neurological: Negative. Psychiatric/Behavioral: Negative. OBJECTIVE:  BP (!) 120/48   Pulse 61   Temp 97.9 °F (36.6 °C) (Oral)   Resp 18   Wt 220 lb 9.6 oz (100.1 kg)   SpO2 98%   BMI 34.55 kg/m²    Physical Exam  Vitals signs and nursing note reviewed. Constitutional:       Appearance: He is well-developed. HENT:      Head: Normocephalic and atraumatic. Eyes:      Conjunctiva/sclera: Conjunctivae normal.      Pupils: Pupils are equal, round, and reactive to light.    Neck: Musculoskeletal: Normal range of motion and neck supple. Thyroid: No thyromegaly. Vascular: No JVD. Cardiovascular:      Rate and Rhythm: Normal rate and regular rhythm. Heart sounds: No murmur. No friction rub. No gallop. Pulmonary:      Effort: Pulmonary effort is normal. No respiratory distress. Breath sounds: Normal breath sounds. No wheezing or rales. Abdominal:      General: Bowel sounds are normal. There is no distension. Palpations: Abdomen is soft. Tenderness: There is no abdominal tenderness. There is no guarding. Musculoskeletal:         General: No tenderness. Skin:     General: Skin is warm and dry. Findings: No rash. Neurological:      Mental Status: He is alert and oriented to person, place, and time.    Psychiatric:         Judgment: Judgment normal.         Results in Past 30 Days  Result Component Current Result Ref Range Previous Result Ref Range   Alb 3.9 (2/11/2020) 3.4 - 4.8 g/dL Not in Time Range    Albumin/Globulin Ratio 1.6 (2/11/2020) 0.8 - 2.0 Not in Time Range    Alkaline Phosphatase 49 (2/11/2020) 25 - 100 U/L Not in Time Range    ALT 15 (2/11/2020) 4 - 36 U/L Not in Time Range    AST 13 (2/11/2020) 8 - 33 U/L Not in Time Range    BUN 25 (H) (2/11/2020) 6 - 20 mg/dL Not in Time Range    Calcium 8.9 (2/11/2020) 8.5 - 10.5 mg/dL Not in Time Range    Chloride 108 (H) (2/11/2020) 98 - 107 mmol/L Not in Time Range    CO2 28 (2/11/2020) 20 - 30 mmol/L Not in Time Range    CREATININE 1.2 (2/11/2020) 0.4 - 1.2 mg/dL Not in Time Range    GFR  >59 (2/11/2020) >59 Not in Time Range    GFR Non- >59 (2/11/2020) >59 Not in Time Range    Globulin 2.5 (2/11/2020) g/dL Not in Time Range    Glucose 119 (H) (2/11/2020) 74 - 106 mg/dL Not in Time Range    Potassium 4.6 (2/11/2020) 3.4 - 5.1 mmol/L Not in Time Range    Sodium 145 (2/11/2020) 136 - 145 mmol/L Not in Time Range    Total Bilirubin <0.2 (L) (2/11/2020) 0.3 - 1.2 mg/dL Not in Time Range    Total Protein 6.4 (2/11/2020) 6.4 - 8.3 g/dL Not in Time Range        Hemoglobin A1C (%)   Date Value   02/11/2020 8.1 (H)     Microalbumin, Random Urine (mg/dL)   Date Value   06/05/2017 1.50     LDL Calculated (mg/dL)   Date Value   02/11/2020 80           Lab Results   Component Value Date    TSH 2.16 09/14/2018         ASSESSMENT/PLAN:     Dev Triana was seen today for diabetes and hypertension. Diagnoses and all orders for this visit:    Gastroesophageal reflux disease with esophagitis  -     sucralfate (CARAFATE) 1 GM tablet; Take 1 tablet by mouth 4 times daily  Advised of dietary changes. Continue Dexilant and start Carafate. Type 2 diabetes mellitus with diabetic polyneuropathy, without long-term current use of insulin (HCC)  -     Insulin Degludec (TRESIBA FLEXTOUCH) 100 UNIT/ML SOPN; Inject 70 Units into the skin daily DX: E11.42 KX  -     CBC; Future  -     Comprehensive Metabolic Panel; Future  -     Hemoglobin A1C; Future  Stable. I advised him regarding diabetic diet, exercise and weight control. Also, I advised him to stay on the current medication, monitor his fingerstick closely. I am going to check the A1c every few months. I will check microalbumin on a yearly basis. I have also advised him to have a yearly eye exam and to monitor his feet closely. Along with instruction of possible complications related to diabetes, even with good control. A1C will be checked  in few months. Lab Results   Component Value Date    LABA1C 8.1 (H) 02/11/2020       Neuropathy  -     gabapentin (NEURONTIN) 300 MG capsule; TAKE 1 CAPSULE NIGHTLY    Swelling  -     furosemide (LASIX) 20 MG tablet; Take 1 tablet by mouth See Admin Instructions Indications: takes on monday and friday Twice a week    Vitamin D deficiency  -     Vitamin D 25 Hydroxy; Future    Thyroid disorder screen  -     TSH without Reflex; Future    Pure hypercholesterolemia  -     Lipid Panel;  Future    Other orders  - dexlansoprazole (DEXILANT) 60 MG CPDR delayed release capsule;  Take 1 capsule by mouth daily  -     Insulin Pen Needle (ADVOCATE INSULIN PEN NEEDLES) 31G X 5 MM MISC; USE TO INJECT INSULIN EVERY DAY DX: E11.42 KX  -     Microlet Lancets MISC; 1 each by Does not apply route 3 times daily DX: E11.42 KX      Medications Discontinued During This Encounter   Medication Reason    furosemide (LASIX) 20 MG tablet REORDER    gabapentin (NEURONTIN) 300 MG capsule REORDER    dexlansoprazole (DEXILANT) 60 MG CPDR delayed release capsule REORDER    Insulin Pen Needle (ADVOCATE INSULIN PEN NEEDLES) 31G X 5 MM MISC REORDER    Insulin Degludec (TRESIBA FLEXTOUCH) 100 UNIT/ML SOPN REORDER    MICROLET LANCETS MISC REORDER

## 2020-02-17 NOTE — PATIENT INSTRUCTIONS
dispose of used patches by folding them in half so that the sticky sides meet, and then flushing them down a toilet. They should not be placed in the household trash where children or pets can find them. · If you have any questions, ask your provider or pharmacist for more information. · Be sure to keep all appointments for provider visits or tests. Thank you for enrolling in [facility]. Please follow the instructions below to securely access your online medical record. [facility] allows you to send messages to your doctor, view your test results, renew your prescriptions, schedule appointments, and more. How Do I Sign Up? In your Internet browser, go to [email or web address]  Click on the Sign Up Now link in the Sign In box. You will see the New Member Sign Up page. Enter your [facility] Access Code exactly as it appears below. You will not need to use this code after youve completed the sign-up process. If you do not sign up before the expiration date, you must request a new code. [facility] Access Code: [email or web address]  Expires: 3/24/2020  3:31 PM    Enter your Social Security Number ([id number]) and Date of Birth (mm/dd/yyyy) as indicated and click Submit. You will be taken to the next sign-up page. Create a [facility] ID. This will be your [facility] login ID and cannot be changed, so think of one that is secure and easy to remember. Create a [facility] password. You can change your password at any time. Enter your Password Reset Question and Answer. This can be used at a later time if you forget your password. Enter your e-mail address. You will receive e-mail notification when new information is available in [facility]. Click Sign Up. You can now view your medical record. Additional Information  If you have questions, please contact your physician practice where you receive care. Remember, [facility] is NOT to be used for urgent needs. For medical emergencies, dial 911.   ·   ·

## 2020-05-18 ENCOUNTER — TELEPHONE (OUTPATIENT)
Dept: PRIMARY CARE CLINIC | Age: 75
End: 2020-05-18

## 2020-05-18 RX ORDER — CEPHALEXIN 500 MG/1
500 CAPSULE ORAL 3 TIMES DAILY
Qty: 21 CAPSULE | Refills: 0 | Status: SHIPPED | OUTPATIENT
Start: 2020-05-18 | End: 2020-05-25

## 2020-05-18 NOTE — TELEPHONE ENCOUNTER
Patients wife called and stated he has a infected ingrown toe nail and would like to have a antibiotic.

## 2020-06-08 ENCOUNTER — HOSPITAL ENCOUNTER (OUTPATIENT)
Facility: HOSPITAL | Age: 75
Discharge: HOME | End: 2020-06-08
Payer: MEDICARE

## 2020-06-08 LAB
A/G RATIO: 1.6 (ref 0.8–2)
ALBUMIN SERPL-MCNC: 4 G/DL (ref 3.4–4.8)
ALP BLD-CCNC: 48 U/L (ref 25–100)
ALT SERPL-CCNC: 14 U/L (ref 4–36)
ANION GAP SERPL CALCULATED.3IONS-SCNC: 12 MMOL/L (ref 3–16)
AST SERPL-CCNC: 13 U/L (ref 8–33)
BILIRUB SERPL-MCNC: 0.3 MG/DL (ref 0.3–1.2)
BUN BLDV-MCNC: 27 MG/DL (ref 6–20)
CALCIUM SERPL-MCNC: 9.1 MG/DL (ref 8.5–10.5)
CHLORIDE BLD-SCNC: 103 MMOL/L (ref 98–107)
CHOLESTEROL, TOTAL: 188 MG/DL (ref 0–200)
CO2: 26 MMOL/L (ref 20–30)
CREAT SERPL-MCNC: 1.1 MG/DL (ref 0.4–1.2)
GFR AFRICAN AMERICAN: >59
GFR NON-AFRICAN AMERICAN: >59
GLOBULIN: 2.5 G/DL
GLUCOSE BLD-MCNC: 161 MG/DL (ref 74–106)
HBA1C MFR BLD: 8.1 %
HCT VFR BLD CALC: 37.2 % (ref 40–54)
HDLC SERPL-MCNC: 32 MG/DL (ref 40–60)
HEMOGLOBIN: 12.3 G/DL (ref 13–18)
LDL CHOLESTEROL CALCULATED: ABNORMAL MG/DL
LDL CHOLESTEROL DIRECT: 78 MG/DL
MCH RBC QN AUTO: 28.6 PG (ref 27–32)
MCHC RBC AUTO-ENTMCNC: 33.1 G/DL (ref 31–35)
MCV RBC AUTO: 86.5 FL (ref 80–100)
PDW BLD-RTO: 12.5 % (ref 11–16)
PLATELET # BLD: 266 K/UL (ref 150–400)
PMV BLD AUTO: 9.9 FL (ref 6–10)
POTASSIUM SERPL-SCNC: 4.3 MMOL/L (ref 3.4–5.1)
RBC # BLD: 4.3 M/UL (ref 4.5–6)
SODIUM BLD-SCNC: 141 MMOL/L (ref 136–145)
TOTAL PROTEIN: 6.5 G/DL (ref 6.4–8.3)
TRIGL SERPL-MCNC: 452 MG/DL (ref 0–249)
TSH SERPL DL<=0.05 MIU/L-ACNC: 2.64 UIU/ML (ref 0.35–5.5)
VITAMIN D 25-HYDROXY: 37.1 (ref 32–100)
VLDLC SERPL CALC-MCNC: ABNORMAL MG/DL
WBC # BLD: 9 K/UL (ref 4–11)

## 2020-06-08 PROCEDURE — 80053 COMPREHEN METABOLIC PANEL: CPT

## 2020-06-08 PROCEDURE — 85027 COMPLETE CBC AUTOMATED: CPT

## 2020-06-08 PROCEDURE — 84443 ASSAY THYROID STIM HORMONE: CPT

## 2020-06-08 PROCEDURE — 36415 COLL VENOUS BLD VENIPUNCTURE: CPT

## 2020-06-08 PROCEDURE — 80061 LIPID PANEL: CPT

## 2020-06-08 PROCEDURE — 82306 VITAMIN D 25 HYDROXY: CPT

## 2020-06-08 PROCEDURE — 83036 HEMOGLOBIN GLYCOSYLATED A1C: CPT

## 2020-06-17 ENCOUNTER — OFFICE VISIT (OUTPATIENT)
Dept: PRIMARY CARE CLINIC | Age: 75
End: 2020-06-17
Payer: MEDICARE

## 2020-06-17 VITALS
RESPIRATION RATE: 16 BRPM | HEIGHT: 67 IN | HEART RATE: 53 BPM | BODY MASS INDEX: 33.97 KG/M2 | DIASTOLIC BLOOD PRESSURE: 50 MMHG | TEMPERATURE: 97.6 F | OXYGEN SATURATION: 97 % | WEIGHT: 216.4 LBS | SYSTOLIC BLOOD PRESSURE: 138 MMHG

## 2020-06-17 PROCEDURE — 99214 OFFICE O/P EST MOD 30 MIN: CPT | Performed by: NURSE PRACTITIONER

## 2020-06-17 PROCEDURE — 4040F PNEUMOC VAC/ADMIN/RCVD: CPT | Performed by: NURSE PRACTITIONER

## 2020-06-17 PROCEDURE — G8417 CALC BMI ABV UP PARAM F/U: HCPCS | Performed by: NURSE PRACTITIONER

## 2020-06-17 PROCEDURE — 3017F COLORECTAL CA SCREEN DOC REV: CPT | Performed by: NURSE PRACTITIONER

## 2020-06-17 PROCEDURE — 3052F HG A1C>EQUAL 8.0%<EQUAL 9.0%: CPT | Performed by: NURSE PRACTITIONER

## 2020-06-17 PROCEDURE — G8427 DOCREV CUR MEDS BY ELIG CLIN: HCPCS | Performed by: NURSE PRACTITIONER

## 2020-06-17 PROCEDURE — 2022F DILAT RTA XM EVC RTNOPTHY: CPT | Performed by: NURSE PRACTITIONER

## 2020-06-17 PROCEDURE — 1036F TOBACCO NON-USER: CPT | Performed by: NURSE PRACTITIONER

## 2020-06-17 PROCEDURE — 1123F ACP DISCUSS/DSCN MKR DOCD: CPT | Performed by: NURSE PRACTITIONER

## 2020-06-17 RX ORDER — DOXAZOSIN MESYLATE 4 MG/1
4 TABLET ORAL NIGHTLY
Qty: 90 TABLET | Refills: 3 | Status: SHIPPED | OUTPATIENT
Start: 2020-06-17 | End: 2021-06-23 | Stop reason: SDUPTHER

## 2020-06-17 RX ORDER — AMLODIPINE BESYLATE 10 MG/1
10 TABLET ORAL DAILY
Qty: 90 TABLET | Refills: 3 | Status: SHIPPED | OUTPATIENT
Start: 2020-06-17 | End: 2021-06-07 | Stop reason: SDUPTHER

## 2020-06-17 RX ORDER — ATORVASTATIN CALCIUM 40 MG/1
40 TABLET, FILM COATED ORAL NIGHTLY
Qty: 90 TABLET | Refills: 3 | Status: SHIPPED | OUTPATIENT
Start: 2020-06-17 | End: 2020-08-20 | Stop reason: SDUPTHER

## 2020-06-17 RX ORDER — CITALOPRAM 40 MG/1
40 TABLET ORAL DAILY
Qty: 90 TABLET | Refills: 3 | Status: SHIPPED | OUTPATIENT
Start: 2020-06-17 | End: 2020-10-08 | Stop reason: SDUPTHER

## 2020-06-17 RX ORDER — PRASUGREL 10 MG/1
10 TABLET, FILM COATED ORAL DAILY
Qty: 90 TABLET | Refills: 3 | Status: SHIPPED | OUTPATIENT
Start: 2020-06-17 | End: 2021-06-07 | Stop reason: SDUPTHER

## 2020-06-17 ASSESSMENT — ENCOUNTER SYMPTOMS
RESPIRATORY NEGATIVE: 1
EYES NEGATIVE: 1

## 2020-06-17 NOTE — PROGRESS NOTES
Chief Complaint   Patient presents with    Follow-up    Diabetes    Discuss Labs       Have you seen any other physician or provider since your last visit no    Have you had any other diagnostic tests since your last visit? yes - labs    Have you changed or stopped any medications since your last visit? no     I have recommended that this patient have a immunization for pneumonia but he declines at this time. I have discussed the risks and benefits of this examination with him. The patient verbalizes understanding. Diabetic retinal exam completed this year? No                       * If yes please have patient sign a records release to obtain record to update Health Maintenance                       * If no, please order referral for patient to be scheduled, patient has appointment with Dr Je Mathews July 2. Patient is here to follow up on labs for diabetes. His sugar has not been taken very often. He did take it last week and it was 98 fasting and 110 the next morning.

## 2020-06-28 ASSESSMENT — ENCOUNTER SYMPTOMS: ABDOMINAL PAIN: 1

## 2020-08-20 ENCOUNTER — TELEPHONE (OUTPATIENT)
Dept: PRIMARY CARE CLINIC | Age: 75
End: 2020-08-20

## 2020-08-20 RX ORDER — ATORVASTATIN CALCIUM 40 MG/1
40 TABLET, FILM COATED ORAL NIGHTLY
Qty: 90 TABLET | Refills: 1 | Status: SHIPPED | OUTPATIENT
Start: 2020-08-20 | End: 2021-06-07 | Stop reason: SDUPTHER

## 2020-09-15 RX ORDER — LISINOPRIL 30 MG/1
TABLET ORAL
Qty: 90 TABLET | Refills: 3 | Status: SHIPPED | OUTPATIENT
Start: 2020-09-15 | End: 2021-06-07 | Stop reason: ALTCHOICE

## 2020-10-07 ENCOUNTER — HOSPITAL ENCOUNTER (OUTPATIENT)
Facility: HOSPITAL | Age: 75
Discharge: HOME | End: 2020-10-07
Payer: MEDICARE

## 2020-10-07 LAB
A/G RATIO: 1.7 (ref 0.8–2)
ALBUMIN SERPL-MCNC: 4 G/DL (ref 3.4–4.8)
ALP BLD-CCNC: 52 U/L (ref 25–100)
ALT SERPL-CCNC: 16 U/L (ref 4–36)
ANION GAP SERPL CALCULATED.3IONS-SCNC: 9 MMOL/L (ref 3–16)
AST SERPL-CCNC: 12 U/L (ref 8–33)
BILIRUB SERPL-MCNC: 0.3 MG/DL (ref 0.3–1.2)
BUN BLDV-MCNC: 27 MG/DL (ref 6–20)
CALCIUM SERPL-MCNC: 9.2 MG/DL (ref 8.5–10.5)
CHLORIDE BLD-SCNC: 102 MMOL/L (ref 98–107)
CHOLESTEROL, TOTAL: 194 MG/DL (ref 0–200)
CO2: 29 MMOL/L (ref 20–30)
CREAT SERPL-MCNC: 1.3 MG/DL (ref 0.4–1.2)
GFR AFRICAN AMERICAN: >59
GFR NON-AFRICAN AMERICAN: 54
GLOBULIN: 2.4 G/DL
GLUCOSE BLD-MCNC: 183 MG/DL (ref 74–106)
HBA1C MFR BLD: 8.9 %
HCT VFR BLD CALC: 39.2 % (ref 40–54)
HDLC SERPL-MCNC: 33 MG/DL (ref 40–60)
HEMOGLOBIN: 12.6 G/DL (ref 13–18)
LDL CHOLESTEROL CALCULATED: ABNORMAL MG/DL
LDL CHOLESTEROL DIRECT: 82 MG/DL
MCH RBC QN AUTO: 29.2 PG (ref 27–32)
MCHC RBC AUTO-ENTMCNC: 32.1 G/DL (ref 31–35)
MCV RBC AUTO: 90.7 FL (ref 80–100)
PDW BLD-RTO: 12.4 % (ref 11–16)
PLATELET # BLD: 280 K/UL (ref 150–400)
PMV BLD AUTO: 10.6 FL (ref 6–10)
POTASSIUM SERPL-SCNC: 4.4 MMOL/L (ref 3.4–5.1)
RBC # BLD: 4.32 M/UL (ref 4.5–6)
SODIUM BLD-SCNC: 140 MMOL/L (ref 136–145)
TOTAL PROTEIN: 6.4 G/DL (ref 6.4–8.3)
TRIGL SERPL-MCNC: 423 MG/DL (ref 0–249)
TSH SERPL DL<=0.05 MIU/L-ACNC: 2.3 UIU/ML (ref 0.35–5.5)
VITAMIN D 25-HYDROXY: 41.9 (ref 32–100)
VLDLC SERPL CALC-MCNC: ABNORMAL MG/DL
WBC # BLD: 8.2 K/UL (ref 4–11)

## 2020-10-07 PROCEDURE — 82306 VITAMIN D 25 HYDROXY: CPT

## 2020-10-07 PROCEDURE — 85027 COMPLETE CBC AUTOMATED: CPT

## 2020-10-07 PROCEDURE — 80053 COMPREHEN METABOLIC PANEL: CPT

## 2020-10-07 PROCEDURE — 84443 ASSAY THYROID STIM HORMONE: CPT

## 2020-10-07 PROCEDURE — 80061 LIPID PANEL: CPT

## 2020-10-07 PROCEDURE — 83036 HEMOGLOBIN GLYCOSYLATED A1C: CPT

## 2020-10-08 ENCOUNTER — OFFICE VISIT (OUTPATIENT)
Dept: PRIMARY CARE CLINIC | Age: 75
End: 2020-10-08
Payer: MEDICARE

## 2020-10-08 VITALS
OXYGEN SATURATION: 96 % | TEMPERATURE: 98.2 F | HEIGHT: 67 IN | RESPIRATION RATE: 16 BRPM | DIASTOLIC BLOOD PRESSURE: 78 MMHG | WEIGHT: 219.2 LBS | BODY MASS INDEX: 34.4 KG/M2 | SYSTOLIC BLOOD PRESSURE: 138 MMHG | HEART RATE: 59 BPM

## 2020-10-08 PROCEDURE — 1123F ACP DISCUSS/DSCN MKR DOCD: CPT | Performed by: NURSE PRACTITIONER

## 2020-10-08 PROCEDURE — G0439 PPPS, SUBSEQ VISIT: HCPCS | Performed by: NURSE PRACTITIONER

## 2020-10-08 PROCEDURE — 4040F PNEUMOC VAC/ADMIN/RCVD: CPT | Performed by: NURSE PRACTITIONER

## 2020-10-08 PROCEDURE — 3052F HG A1C>EQUAL 8.0%<EQUAL 9.0%: CPT | Performed by: NURSE PRACTITIONER

## 2020-10-08 PROCEDURE — 3017F COLORECTAL CA SCREEN DOC REV: CPT | Performed by: NURSE PRACTITIONER

## 2020-10-08 PROCEDURE — G8484 FLU IMMUNIZE NO ADMIN: HCPCS | Performed by: NURSE PRACTITIONER

## 2020-10-08 RX ORDER — TAMSULOSIN HYDROCHLORIDE 0.4 MG/1
0.4 CAPSULE ORAL DAILY
Qty: 90 CAPSULE | Refills: 3 | Status: SHIPPED | OUTPATIENT
Start: 2020-10-08 | End: 2021-10-27

## 2020-10-08 RX ORDER — CITALOPRAM 40 MG/1
40 TABLET ORAL DAILY
Qty: 90 TABLET | Refills: 3 | Status: SHIPPED | OUTPATIENT
Start: 2020-10-08 | End: 2020-10-23 | Stop reason: SDUPTHER

## 2020-10-08 RX ORDER — GABAPENTIN 300 MG/1
CAPSULE ORAL
Qty: 90 CAPSULE | Refills: 2 | Status: SHIPPED | OUTPATIENT
Start: 2020-10-08 | End: 2021-04-16 | Stop reason: SDUPTHER

## 2020-10-08 ASSESSMENT — PATIENT HEALTH QUESTIONNAIRE - PHQ9
2. FEELING DOWN, DEPRESSED OR HOPELESS: 1
SUM OF ALL RESPONSES TO PHQ QUESTIONS 1-9: 2
1. LITTLE INTEREST OR PLEASURE IN DOING THINGS: 1
SUM OF ALL RESPONSES TO PHQ QUESTIONS 1-9: 2
SUM OF ALL RESPONSES TO PHQ9 QUESTIONS 1 & 2: 2

## 2020-10-08 ASSESSMENT — LIFESTYLE VARIABLES: HOW OFTEN DO YOU HAVE A DRINK CONTAINING ALCOHOL: 0

## 2020-10-08 NOTE — PATIENT INSTRUCTIONS
dispose of used patches by folding them in half so that the sticky sides meet, and then flushing them down a toilet. They should not be placed in the household trash where children or pets can find them. · If you have any questions, ask your provider or pharmacist for more information. · Be sure to keep all appointments for provider visits or tests. We are committed to providing you with the best care possible. In order to help us achieve these goals please remember to bring all medications, herbal products, and over the counter supplements with you to each visit. If your provider has ordered testing for you, please be sure to follow up with our office if you have not received results within 7 days after the testing took place. *If you receive a survey after visiting one of our offices, please take time to share your experience concerning your physician office visit. These surveys are confidential and no health information about you is shared. We are eager to improve for you and we are counting on your feedback to help make that happen. Personalized Preventive Plan for Ingrid Frank - 10/8/2020  Medicare offers a range of preventive health benefits. Some of the tests and screenings are paid in full while other may be subject to a deductible, co-insurance, and/or copay. Some of these benefits include a comprehensive review of your medical history including lifestyle, illnesses that may run in your family, and various assessments and screenings as appropriate. After reviewing your medical record and screening and assessments performed today your provider may have ordered immunizations, labs, imaging, and/or referrals for you. A list of these orders (if applicable) as well as your Preventive Care list are included within your After Visit Summary for your review.     Other Preventive Recommendations:    A preventive eye exam performed by an eye specialist is recommended every 1-2 years to screen for glaucoma; cataracts, macular degeneration, and other eye disorders. A preventive dental visit is recommended every 6 months. Try to get at least 150 minutes of exercise per week or 10,000 steps per day on a pedometer . Order or download the FREE \"Exercise & Physical Activity: Your Everyday Guide\" from [employer]. Call 3-405.463.4822 or search [employer] online. You need 0329-9477 mg of calcium and 4369-5195 IU of vitamin D per day. It is possible to meet your calcium requirement with diet alone, but a vitamin D supplement is usually necessary to meet this goal.  When exposed to the sun, use a sunscreen that protects against both UVA and UVB radiation with an SPF of 30 or greater. Reapply every 2 to 3 hours or after sweating, drying off with a towel, or swimming. Always wear a seat belt when traveling in a car. Always wear a helmet when riding a bicycle or motorcycle.

## 2020-10-08 NOTE — PROGRESS NOTES
Chief Complaint   Patient presents with    Medicare AWV       Have you seen any other physician or provider since your last visit yes - podiatrist     Have you had any other diagnostic tests since your last visit? no    Have you changed or stopped any medications since your last visit? no     I have recommended that this patient have a immunization for pneumonia and flu but he declines at this time. I have discussed the risks and benefits of this examination with him. The patient verbalizes understanding. Diabetic retinal exam completed this year? Yes                       * If yes please have patient sign a records release to obtain record to update Health Maintenance                       * If no, please order referral for patient to be scheduled     Patient is here for AWV. He is needing refills today.

## 2020-10-08 NOTE — PROGRESS NOTES
Medicare Annual Wellness Visit  Name: Lis Horton Date: 10/8/2020   MRN: [id number] Sex: Male   Age: 77 y.o. Ethnicity: Non-/Non    : 1945 Race: Pastor Clifford is here for Medicare AWV    Screenings for behavioral, psychosocial and functional/safety risks, and cognitive dysfunction are all negative except as indicated below. These results, as well as other patient data from the [address] form, are documented in Flowsheets linked to this Encounter. Allergies   Allergen Reactions    Bisoprolol Other (See Comments)     Agitation    Clopidogrel Bisulfate Other (See Comments)     resistance    Exenatide      nausea    Glucophage [Metformin Hydrochloride]      nausea    Rosuvastatin Calcium Other (See Comments)    Zocor [Simvastatin]      Muscle pain         Prior to Visit Medications    Medication Sig Taking?  Authorizing Provider   gabapentin (NEURONTIN) 300 MG capsule TAKE 1 CAPSULE NIGHTLY Yes MERCEDES Howard   citalopram (CELEXA) 40 MG tablet Take 1 tablet by mouth daily Yes MERCEDES Howard   SITagliptin (JANUVIA) 50 MG tablet Take 1 tablet by mouth daily Yes MERCEDES Howard   tamsulosin (FLOMAX) 0.4 MG capsule Take 1 capsule by mouth daily Yes MERCEDES Howard   lisinopril (PRINIVIL;ZESTRIL) 30 MG tablet TAKE 1 TABLET DAILY Yes MERCEDES Howard   atorvastatin (LIPITOR) 40 MG tablet Take 1 tablet by mouth nightly Yes MERCEDES Howard   Thalia, Zingiber officinalis, (THALIA ROOT) 250 MG CAPS TAKE 1 CAPSULE BY MOUTH TWO TIMES A DAY BEFORE MEALS Yes MERCEDES Howard   doxazosin (CARDURA) 4 MG tablet Take 1 tablet by mouth nightly Yes MERCEDES Howard   dapagliflozin (FARXIGA) 10 MG tablet Take 1 tablet by mouth every morning Yes MERCEDES Howard   amLODIPine (NORVASC) 10 MG tablet Take 1 tablet by mouth daily Indications: High Blood Pressure Disorder Yes MERCEDES Howard   prasugrel (EFFIENT) 10 MG TABS MOUTH EVERY DAY  Patient not taking: Reported on 10/8/2020  MERCEDES Collier   omeprazole (PRILOSEC) 20 MG capsule Take 1 capsule by mouth 2 times daily.   Raghav Laura MD         Past Medical History:   Diagnosis Date    Anemia 2019    Anxiety     Bleeding stomach ulcer 2019    Dr Jenny Ho CAD (coronary artery disease)     Cancer ([facility] [address]     malignant colon polyp    Depression     Enlarged prostate     GERD (gastroesophageal reflux disease)     Hyperlipidemia     Hypertension     Sleep apnea     Type II or unspecified type diabetes mellitus without mention of complication, not stated as uncontrolled        Past Surgical History:   Procedure Laterality Date    CORONARY ANGIOPLASTY WITH STENT PLACEMENT  04/2013    Dr. Radu Chiang, stents in 2009, 2011, 2013, ptca 2014    CORONARY ARTERY BYPASS GRAFT  2001    x3    POLYPECTOMY  1999    colon ca         Family History   Problem Relation Age of Onset    Heart Attack Mother     Heart Disease Father     Diabetes Sister        CareTeam (Including outside providers/suppliers regularly involved in providing care):   Patient Care Team:  MERCEDES Collier as PCP - General  MERCEDES Collier as PCP - [address] [place] [address] Provider  Mi Michael MD as Consulting Physician (Cardiology)  Yamile Washburn MD as Consulting Physician  Alexus Quezada (Family Medicine)  Yamile Washburn MD as Consulting Physician  Alexus Quezada (Urology)  [facility] Readings from Last 3 Encounters:   10/08/20 219 lb 3.2 oz (99.4 kg)   06/17/20 216 lb 6.4 oz (98.2 kg)   02/17/20 220 lb 9.6 oz (100.1 kg)     Vitals:    10/08/20 1405 10/08/20 1506   BP: (!) 150/42 138/78   Site: Left Upper Arm Left Upper Arm   Position: Sitting Sitting   Cuff Size: Medium Adult Medium Adult   Pulse: 59    Resp: 16    Temp: 98.2 °F (36.8 °C)    TempSrc: Temporal    SpO2: 96%    Weight: 219 lb 3.2 oz (99.4 kg)    Height: 5' 7\" (1.702 m)      Body mass index is 34.33 Interventions:  · Nutritional issues:  poor diet, and poor activity    Hearing/Vision:  No exam data present  Hearing/Vision  Do you or your family notice any trouble with your hearing?: (!) Yes  Do you have difficulty driving, watching TV, or doing any of your daily activities because of your eyesight?: No  Have you had an eye exam within the past year?: Yes(Dr Aniceto El)  Hearing/Vision Interventions:  · he has hearing aides but wont wear them. ADL:  ADLs  In the past 7 days, did you need help from others to perform any of the following everyday activities? Eating, dressing, grooming, bathing, toileting, or walking/balance?: None  In the past 7 days, did you need help from others to take care of any of the following? Laundry, housekeeping, banking/finances, shopping, telephone use, food preparation, transportation, or taking medications?: [employer], Housekeeping, Food Preparation  ADL Interventions:  · patients wife does every thing. Personalized Preventive Plan   Current Health Maintenance Status    There is no immunization history on file for this patient. Health Maintenance   Topic Date Due    Hepatitis C screen  1945    DTaP/Tdap/Td vaccine (1 - Tdap) 02/27/1965    Shingles Vaccine (1 of 2) 02/27/1996    Pneumococcal 65+ years Vaccine (1 of 1 - PPSV23) 02/27/2011    Annual Wellness Visit (AWV)  05/29/2019    Diabetic foot exam  07/17/2020    Flu vaccine (1) 09/01/2020    A1C test (Diabetic or Prediabetic)  06/08/2021    Lipid screen  06/08/2021    Potassium monitoring  06/08/2021    Creatinine monitoring  06/08/2021    Diabetic retinal exam  07/02/2021    Colon cancer screen colonoscopy  03/05/2024    Hepatitis A vaccine  Aged Out    Hib vaccine  Aged Out    Meningococcal (ACWY) vaccine  Aged Out     Recommendations for [employer] Due: see orders and patient instructions/AVS.  .   Recommended screening schedule for the next 5-10 years is provided to the patient in written form: see Patient Instructions/AVS.    Woodrow Gupta was seen today for medicare awv. Diagnoses and all orders for this visit:    Medicare annual wellness visit, subsequent    Need for prophylactic vaccination against Streptococcus pneumoniae (pneumococcus)    Neuropathy  -     gabapentin (NEURONTIN) 300 MG capsule; TAKE 1 CAPSULE NIGHTLY    Recurrent major depressive disorder, in full remission ([facility] [address]  -     citalopram (CELEXA) 40 MG tablet; Take 1 tablet by mouth daily    Type 2 diabetes mellitus without complication, with long-term current use of insulin ([facility])  -      DIABETES FOOT EXAM  -     SITagliptin (JANUVIA) 50 MG tablet; Take 1 tablet by mouth daily  -     CBC; Future  -     Comprehensive Metabolic Panel; Future  -     Hemoglobin A1C; Future    Chronic renal failure, stage 2 (mild)  -     tamsulosin (FLOMAX) 0.4 MG capsule; Take 1 capsule by mouth daily  -     BASIC METABOLIC PANEL; Future    Routine general medical examination at a health care facility    Vitamin D deficiency  -     Vitamin B12 & Folate; Future  -     Vitamin D 25 Hydroxy; Future    Pure hypercholesterolemia  -     Lipid Panel; Future    Thyroid disorder screen  -     TSH without Reflex;  Future

## 2020-10-23 RX ORDER — CITALOPRAM 40 MG/1
40 TABLET ORAL DAILY
Qty: 90 TABLET | Refills: 3 | Status: SHIPPED | OUTPATIENT
Start: 2020-10-23 | End: 2021-08-25 | Stop reason: ALTCHOICE

## 2020-11-09 RX ORDER — PEN NEEDLE, DIABETIC 31 GX5/16"
NEEDLE, DISPOSABLE MISCELLANEOUS
Qty: 100 EACH | Refills: 2 | Status: SHIPPED | OUTPATIENT
Start: 2020-11-09 | End: 2022-06-22 | Stop reason: SDUPTHER

## 2020-11-17 ENCOUNTER — TELEPHONE (OUTPATIENT)
Dept: PRIMARY CARE CLINIC | Age: 75
End: 2020-11-17

## 2020-11-25 RX ORDER — AMOXICILLIN AND CLAVULANATE POTASSIUM 875; 125 MG/1; MG/1
1 TABLET, FILM COATED ORAL 2 TIMES DAILY
Qty: 14 TABLET | Refills: 0 | Status: SHIPPED | OUTPATIENT
Start: 2020-11-25 | End: 2020-12-02

## 2020-12-15 ENCOUNTER — HOSPITAL ENCOUNTER (OUTPATIENT)
Facility: HOSPITAL | Age: 75
Discharge: HOME | End: 2020-12-15
Payer: MEDICARE

## 2020-12-15 LAB
A/G RATIO: 1.7 (ref 0.8–2)
ALBUMIN SERPL-MCNC: 3.9 G/DL (ref 3.4–4.8)
ALP BLD-CCNC: 55 U/L (ref 25–100)
ALT SERPL-CCNC: 14 U/L (ref 4–36)
ANION GAP SERPL CALCULATED.3IONS-SCNC: 8 MMOL/L (ref 3–16)
AST SERPL-CCNC: 15 U/L (ref 8–33)
BILIRUB SERPL-MCNC: 0.3 MG/DL (ref 0.3–1.2)
BUN BLDV-MCNC: 24 MG/DL (ref 6–20)
CALCIUM SERPL-MCNC: 8.7 MG/DL (ref 8.5–10.5)
CHLORIDE BLD-SCNC: 104 MMOL/L (ref 98–107)
CHOLESTEROL, TOTAL: 167 MG/DL (ref 0–200)
CO2: 28 MMOL/L (ref 20–30)
CREAT SERPL-MCNC: 1.3 MG/DL (ref 0.4–1.2)
FOLATE: >20 NG/ML
GFR AFRICAN AMERICAN: >59
GFR NON-AFRICAN AMERICAN: 54
GLOBULIN: 2.3 G/DL
GLUCOSE BLD-MCNC: 123 MG/DL (ref 74–106)
HCT VFR BLD CALC: 37.3 % (ref 40–54)
HDLC SERPL-MCNC: 35 MG/DL (ref 40–60)
HEMOGLOBIN: 12.1 G/DL (ref 13–18)
LDL CHOLESTEROL CALCULATED: 82 MG/DL
MCH RBC QN AUTO: 29 PG (ref 27–32)
MCHC RBC AUTO-ENTMCNC: 32.4 G/DL (ref 31–35)
MCV RBC AUTO: 89.4 FL (ref 80–100)
PDW BLD-RTO: 12.5 % (ref 11–16)
PLATELET # BLD: 295 K/UL (ref 150–400)
PMV BLD AUTO: 10.5 FL (ref 6–10)
POTASSIUM SERPL-SCNC: 4.4 MMOL/L (ref 3.4–5.1)
RBC # BLD: 4.17 M/UL (ref 4.5–6)
SODIUM BLD-SCNC: 140 MMOL/L (ref 136–145)
TOTAL PROTEIN: 6.2 G/DL (ref 6.4–8.3)
TRIGL SERPL-MCNC: 248 MG/DL (ref 0–249)
TSH SERPL DL<=0.05 MIU/L-ACNC: 1.9 UIU/ML (ref 0.27–4.2)
VITAMIN B-12: 786 PG/ML (ref 211–911)
VITAMIN D 25-HYDROXY: 45.1 (ref 32–100)
VLDLC SERPL CALC-MCNC: 50 MG/DL
WBC # BLD: 8.6 K/UL (ref 4–11)

## 2020-12-15 PROCEDURE — 82607 VITAMIN B-12: CPT

## 2020-12-15 PROCEDURE — 82306 VITAMIN D 25 HYDROXY: CPT

## 2020-12-15 PROCEDURE — 85027 COMPLETE CBC AUTOMATED: CPT

## 2020-12-15 PROCEDURE — 82746 ASSAY OF FOLIC ACID SERUM: CPT

## 2020-12-15 PROCEDURE — 80061 LIPID PANEL: CPT

## 2020-12-15 PROCEDURE — 84443 ASSAY THYROID STIM HORMONE: CPT

## 2020-12-15 PROCEDURE — 80053 COMPREHEN METABOLIC PANEL: CPT

## 2021-01-04 ENCOUNTER — HOSPITAL ENCOUNTER (OUTPATIENT)
Dept: NON INVASIVE DIAGNOSTICS | Facility: HOSPITAL | Age: 76
Discharge: HOME | End: 2021-01-04
Payer: MEDICARE

## 2021-01-04 DIAGNOSIS — R01.1 HEART MURMUR: Primary | ICD-10-CM

## 2021-01-04 PROCEDURE — 93306 TTE W/DOPPLER COMPLETE: CPT

## 2021-01-08 ENCOUNTER — OFFICE VISIT (OUTPATIENT)
Dept: PRIMARY CARE CLINIC | Age: 76
End: 2021-01-08
Payer: MEDICARE

## 2021-01-08 VITALS
WEIGHT: 224 LBS | HEART RATE: 76 BPM | RESPIRATION RATE: 16 BRPM | OXYGEN SATURATION: 97 % | SYSTOLIC BLOOD PRESSURE: 138 MMHG | BODY MASS INDEX: 35.16 KG/M2 | TEMPERATURE: 98.4 F | HEIGHT: 67 IN | DIASTOLIC BLOOD PRESSURE: 80 MMHG

## 2021-01-08 DIAGNOSIS — R60.9 SWELLING: ICD-10-CM

## 2021-01-08 DIAGNOSIS — I10 ESSENTIAL HYPERTENSION: ICD-10-CM

## 2021-01-08 DIAGNOSIS — E11.42 TYPE 2 DIABETES MELLITUS WITH DIABETIC POLYNEUROPATHY, WITHOUT LONG-TERM CURRENT USE OF INSULIN (HCC): ICD-10-CM

## 2021-01-08 PROCEDURE — 1036F TOBACCO NON-USER: CPT | Performed by: NURSE PRACTITIONER

## 2021-01-08 PROCEDURE — G8484 FLU IMMUNIZE NO ADMIN: HCPCS | Performed by: NURSE PRACTITIONER

## 2021-01-08 PROCEDURE — 2022F DILAT RTA XM EVC RTNOPTHY: CPT | Performed by: NURSE PRACTITIONER

## 2021-01-08 PROCEDURE — 4040F PNEUMOC VAC/ADMIN/RCVD: CPT | Performed by: NURSE PRACTITIONER

## 2021-01-08 PROCEDURE — 1123F ACP DISCUSS/DSCN MKR DOCD: CPT | Performed by: NURSE PRACTITIONER

## 2021-01-08 PROCEDURE — 3046F HEMOGLOBIN A1C LEVEL >9.0%: CPT | Performed by: NURSE PRACTITIONER

## 2021-01-08 PROCEDURE — 3017F COLORECTAL CA SCREEN DOC REV: CPT | Performed by: NURSE PRACTITIONER

## 2021-01-08 PROCEDURE — G8417 CALC BMI ABV UP PARAM F/U: HCPCS | Performed by: NURSE PRACTITIONER

## 2021-01-08 PROCEDURE — G8427 DOCREV CUR MEDS BY ELIG CLIN: HCPCS | Performed by: NURSE PRACTITIONER

## 2021-01-08 PROCEDURE — 99214 OFFICE O/P EST MOD 30 MIN: CPT | Performed by: NURSE PRACTITIONER

## 2021-01-08 RX ORDER — FINASTERIDE 5 MG/1
5 TABLET, FILM COATED ORAL DAILY
Qty: 90 TABLET | Refills: 3 | Status: SHIPPED | OUTPATIENT
Start: 2021-01-08

## 2021-01-08 RX ORDER — INSULIN DEGLUDEC INJECTION 100 U/ML
70 INJECTION, SOLUTION SUBCUTANEOUS DAILY
Qty: 25 PEN | Refills: 3 | Status: SHIPPED | OUTPATIENT
Start: 2021-01-08 | End: 2021-10-06 | Stop reason: SDUPTHER

## 2021-01-08 RX ORDER — CARVEDILOL 12.5 MG/1
6.25 TABLET ORAL 2 TIMES DAILY
Qty: 180 TABLET | Refills: 3 | Status: SHIPPED | OUTPATIENT
Start: 2021-01-08 | End: 2021-12-06 | Stop reason: SDUPTHER

## 2021-01-08 RX ORDER — FUROSEMIDE 20 MG/1
20 TABLET ORAL SEE ADMIN INSTRUCTIONS
Qty: 180 TABLET | Refills: 3 | Status: SHIPPED | OUTPATIENT
Start: 2021-01-08 | End: 2021-08-25 | Stop reason: DRUGHIGH

## 2021-01-08 ASSESSMENT — ENCOUNTER SYMPTOMS
EYES NEGATIVE: 1
GASTROINTESTINAL NEGATIVE: 1
RESPIRATORY NEGATIVE: 1

## 2021-01-08 NOTE — PROGRESS NOTES
(GINGER ROOT) 250 MG CAPS TAKE ONE CAPSULE BY MOUTH TWO TIMES A DAY BEFORE MEALS Yes MERCEDES Pearson   citalopram (CELEXA) 40 MG tablet Take 1 tablet by mouth daily Yes MERCEDES Pearson   gabapentin (NEURONTIN) 300 MG capsule TAKE 1 CAPSULE NIGHTLY Yes MERCEDES Pearson   tamsulosin (FLOMAX) 0.4 MG capsule Take 1 capsule by mouth daily Yes MERCEDES Pearson   lisinopril (PRINIVIL;ZESTRIL) 30 MG tablet TAKE 1 TABLET DAILY Yes MERCEDES Pearson   atorvastatin (LIPITOR) 40 MG tablet Take 1 tablet by mouth nightly Yes MERCEDES Pearson   doxazosin (CARDURA) 4 MG tablet Take 1 tablet by mouth nightly Yes MERCEDES Pearson   dapagliflozin (FARXIGA) 10 MG tablet Take 1 tablet by mouth every morning Yes MERCEDES Pearson   prasugrel (EFFIENT) 10 MG TABS Take 1 tablet by mouth daily Yes MERCEDES Pearson   dexlansoprazole (DEXILANT) 60 MG CPDR delayed release capsule Take 1 capsule by mouth daily Yes MERCEDES Pearson   Insulin Pen Needle (ADVOCATE INSULIN PEN NEEDLES) 31G X 5 MM MISC USE TO INJECT INSULIN EVERY DAY DX: E11.42 KX Yes MERCEDES Pearson   Microlet Lancets MISC 1 each by Does not apply route 3 times daily DX: E11.42 KX Yes MERCEDES Pearson   Insulin Pen Needle 31G X 5 MM MISC USE TO INJECT INSULIN EVERY DAY Yes MERCEDES Pearson   TRUE METRIX BLOOD GLUCOSE TEST strip USE TO TEST BLOOD SUGAR THREE TIMES DAILY AND AS NEEDED Yes MERCEDES Pearson   loratadine (CLARITIN) 10 MG tablet TAKE ONE TABLET BY MOUTH EVERY DAY Yes MERCEDES Pearson   nitroGLYCERIN (NITROSTAT) 0.4 MG SL tablet Place 1 tablet under the tongue every 5 minutes as needed Yes MERCEDES Pearson   glucose blood VI test strips (EXACTECH TEST) strip Please dispense glucometer test strips for patient's device. Patient test bid and prn. Dx: 250.00 Yes Aj Adhikari MD   therapeutic multivitamin-minerals (THERAGRAN-M) tablet Take 1 tablet by mouth daily.  Yes Historical Provider, MD B-D UF III MINI PEN NEEDLES 31G X 5 MM MISC USE TO INJECT INSULIN EVERYDAY  MERCEDES De La O   amLODIPine (NORVASC) 10 MG tablet Take 1 tablet by mouth daily Indications: High Blood Pressure Disorder  MERCEDES De La O   Cholecalciferol (VITAMIN D3) 2000 units CAPS Take 2 capsules by mouth daily   Historical Provider, MD   omeprazole (PRILOSEC) 20 MG capsule Take 1 capsule by mouth 2 times daily.   Bartolo Rico MD        Allergies   Allergen Reactions    Bisoprolol Other (See Comments)     Agitation    Clopidogrel Bisulfate Other (See Comments)     resistance    Exenatide      nausea    Glucophage [Metformin Hydrochloride]      nausea    Rosuvastatin Calcium Other (See Comments)    Zocor [Simvastatin]      Muscle pain       Past Medical History:   Diagnosis Date    Anemia 2019    Anxiety     Bleeding stomach ulcer 2019    Dr Evangelina Benavidez CAD (coronary artery disease)     Cancer ([facility] [address]     malignant colon polyp    Depression     Enlarged prostate     GERD (gastroesophageal reflux disease)     Hyperlipidemia     Hypertension     Sleep apnea     Type II or unspecified type diabetes mellitus without mention of complication, not stated as uncontrolled        Past Surgical History:   Procedure Laterality Date    CORONARY ANGIOPLASTY WITH STENT PLACEMENT  04/2013    Dr. Liane Unger, stents in 2009, 2011, 2013, ptca 2014    CORONARY ARTERY BYPASS GRAFT  2001    x3    POLYPECTOMY  1999    colon ca       Social History     Socioeconomic History    Marital status:      Spouse name: Not on file    Number of children: Not on file    Years of education: Not on file    Highest education level: Not on file   Occupational History    Not on file   Social Needs    Financial resource strain: Not on file    Food insecurity     Worry: Not on file     Inability: Not on file    Transportation needs     Medical: Not on file     Non-medical: Not on file   Tobacco Use    Smoking status: Never Smoker    Smokeless tobacco: Never Used   Substance and Sexual Activity    Alcohol use: No    Drug use: No    Sexual activity: Not on file   Lifestyle    Physical activity     Days per week: Not on file     Minutes per session: Not on file    Stress: Not on file   Relationships    Social connections     Talks on phone: Not on file     Gets together: Not on file     Attends [employer] service: Not on file     Active member of club or organization: Not on file     Attends meetings of clubs or organizations: Not on file     Relationship status: Not on file    Intimate partner violence     Fear of current or ex partner: Not on file     Emotionally abused: Not on file     Physically abused: Not on file     Forced sexual activity: Not on file   Other Topics Concern    Not on file   Social History Narrative    Not on file        Family History   Problem Relation Age of Onset    Heart Attack Mother     Heart Disease Father     Diabetes Sister        ADVANCE DIRECTIVE: N, <no information>    Vitals:    01/08/21 1351   BP: 138/80   Site: Right Upper Arm   Position: Sitting   Pulse: 76   Resp: 16   Temp: 98.4 °F (36.9 °C)   TempSrc: Temporal   SpO2: 97%   Weight: 224 lb (101.6 kg)   Height: 5' 7\" (1.702 m)     Estimated body mass index is 35.08 kg/m² as calculated from the following:    Height as of this encounter: 5' 7\" (1.702 m). Weight as of this encounter: 224 lb (101.6 kg). Physical Exam  Vitals signs and nursing note reviewed. Constitutional:       Appearance: He is well-developed. HENT:      Head: Normocephalic and atraumatic. Eyes:      Conjunctiva/sclera: Conjunctivae normal.      Pupils: Pupils are equal, round, and reactive to light. Neck:      Musculoskeletal: Normal range of motion and neck supple. Thyroid: No thyromegaly. Vascular: No JVD. Cardiovascular:      Rate and Rhythm: Normal rate and regular rhythm. Heart sounds: No murmur. No friction rub. No gallop. Pulmonary:      Effort: Pulmonary effort is normal. No respiratory distress. Breath sounds: Normal breath sounds. No wheezing or rales. Abdominal:      General: Bowel sounds are normal. There is no distension. Palpations: Abdomen is soft. Tenderness: There is no abdominal tenderness. There is no guarding. Musculoskeletal:         General: No tenderness. Skin:     General: Skin is warm and dry. Findings: No rash. Neurological:      Mental Status: He is alert and oriented to person, place, and time. Psychiatric:         Judgment: Judgment normal.         No flowsheet data found. Lab Results   Component Value Date    CHOL 167 12/15/2020    CHOL 194 10/07/2020    CHOL 188 06/08/2020    TRIG 248 12/15/2020    TRIG 423 10/07/2020    TRIG 452 06/08/2020    HDL 35 12/15/2020    HDL 33 10/07/2020    HDL 32 06/08/2020    LDLCHOLESTEROL 50.0 03/10/2011    LDLCALC 82 12/15/2020    LDLCALC see below 10/07/2020    LDLCALC see below 06/08/2020    GLUCOSE 123 12/15/2020    GLUCOSE 120 03/01/2011    LABA1C 8.9 10/07/2020    LABA1C 8.1 06/08/2020    LABA1C 8.1 02/11/2020       The 10-year ASCVD risk score (Benigno Chowdhury, et al., 2013) is: 52%    Values used to calculate the score:      Age: 77 years      Sex: Male      Is Non- : No      Diabetic: Yes      Tobacco smoker: No      Systolic Blood Pressure: 080 mmHg      Is BP treated: Yes      HDL Cholesterol: 35 mg/dL      Total Cholesterol: 167 mg/dL      There is no immunization history on file for this patient.     Health Maintenance   Topic Date Due    Hepatitis C screen  1945    DTaP/Tdap/Td vaccine (1 - Tdap) 02/27/1965    Shingles Vaccine (1 of 2) 02/27/1996    Pneumococcal 65+ years Vaccine (1 of 1 - PPSV23) 02/27/2011    Flu vaccine (1) 09/01/2020    Diabetic retinal exam  07/02/2021    A1C test (Diabetic or Prediabetic)  10/07/2021    Diabetic foot exam  10/08/2021    Annual Wellness Visit (AWV) 10/09/2021    Lipid screen  12/15/2021    Potassium monitoring  12/15/2021    Creatinine monitoring  12/15/2021    Colon cancer screen colonoscopy  03/05/2024    Hepatitis A vaccine  Aged Out    Hib vaccine  Aged Out    Meningococcal (ACWY) vaccine  Aged Out       ASSESSMENT/PLAN:  1. Type 2 diabetes mellitus with diabetic polyneuropathy, without long-term current use of insulin (HCC)  -     Insulin Degludec (TRESIBA FLEXTOUCH) 100 UNIT/ML SOPN; Inject 70 Units into the skin daily DX: E11.42 KX, Disp-25 pen, R-3Print  2. Essential hypertension  -     carvedilol (COREG) 12.5 MG tablet; Take 0.5 tablets by mouth 2 times daily, Disp-180 tablet, R-3Print  3. Swelling  -     furosemide (LASIX) 20 MG tablet; Take 1 tablet by mouth See Admin Instructions Indications: takes on monday and friday Twice a week, Disp-180 tablet, R-3Print      Return in about 4 months (around 5/8/2021). An electronic signature was used to authenticate this note.     --MERCEDES Kaur on 1/8/2021 at 4:55 PM

## 2021-01-08 NOTE — PROGRESS NOTES
Chief Complaint   Patient presents with    3 Month Follow-Up    Hypertension    Diabetes     Pt here today for 3 mo f/u on HTN and DM    Have you seen any other physician or provider since your last visit yes - Aaron Hutchinson, opthalmology    Have you had any other diagnostic tests since your last visit? no    Have you changed or stopped any medications since your last visit? no

## 2021-01-12 ENCOUNTER — HOSPITAL ENCOUNTER (OUTPATIENT)
Dept: NON INVASIVE DIAGNOSTICS | Facility: HOSPITAL | Age: 76
Discharge: HOME | End: 2021-01-12
Payer: MEDICARE

## 2021-01-12 ENCOUNTER — OUTSIDE FACILITY SERVICE (OUTPATIENT)
Dept: CARDIOLOGY | Facility: CLINIC | Age: 76
End: 2021-01-12

## 2021-01-12 LAB
LV EF: 58 %
LVEF MODALITY: NORMAL

## 2021-01-12 PROCEDURE — 93308 TTE F-UP OR LMTD: CPT | Performed by: INTERNAL MEDICINE

## 2021-01-12 PROCEDURE — 93306 TTE W/DOPPLER COMPLETE: CPT

## 2021-01-21 ENCOUNTER — OFFICE VISIT (OUTPATIENT)
Dept: CARDIOLOGY | Facility: CLINIC | Age: 76
End: 2021-01-21

## 2021-01-21 VITALS
HEIGHT: 66 IN | WEIGHT: 222 LBS | DIASTOLIC BLOOD PRESSURE: 40 MMHG | SYSTOLIC BLOOD PRESSURE: 160 MMHG | HEART RATE: 57 BPM | BODY MASS INDEX: 35.68 KG/M2

## 2021-01-21 DIAGNOSIS — E66.9 OBESITY, CLASS II, BMI 35-39.9: ICD-10-CM

## 2021-01-21 DIAGNOSIS — I25.810 CORONARY ARTERY DISEASE INVOLVING CORONARY BYPASS GRAFT OF NATIVE HEART WITHOUT ANGINA PECTORIS: Primary | ICD-10-CM

## 2021-01-21 DIAGNOSIS — E78.5 HYPERLIPIDEMIA LDL GOAL <70: ICD-10-CM

## 2021-01-21 DIAGNOSIS — I10 ESSENTIAL HYPERTENSION: ICD-10-CM

## 2021-01-21 PROCEDURE — 99214 OFFICE O/P EST MOD 30 MIN: CPT | Performed by: INTERNAL MEDICINE

## 2021-01-21 RX ORDER — ASPIRIN 81 MG/1
81 TABLET ORAL DAILY
Status: ON HOLD | COMMUNITY
End: 2021-10-19

## 2021-01-21 NOTE — PROGRESS NOTES
[facility] Cardiology    Patient ID: Donny Jerry is a 75 y.o. male.  : 1945   Contact: [phone]    Encounter date: 2021    PCP: Anum Alonso APRN      Chief complaint:   Chief Complaint   Patient presents with   • Coronary Artery Disease       Problem List:  1. Coronary artery disease:  a. CABG x3 in , [facility] by Dr. Peng. LIMA to the LAD, SVG to obtuse marginal, SVG to the PDA.  b. Stress echocardiogram, 2007: No wall motion abnormalities. No evidence of ischemia. Normal EF.  c. Echocardiogram, 2007, mild concentric LVH, trace of MR and TR.    d. [facility]en[facility]iol[facility], 2009:  Normal LVEF with a stress induced mid and distal inferior defect compatible with  ischemia.  e. [facility], 2009, Dr. Russo: EF 45-50%. Overlapping Cypher TAWANNA 2.5 x 28 and 2.5 x 18 to the SVG to OM. SVG to PDA had a proximal stenosis of 60% with a distal stenosis of 50-60%.  f. [facility], 2009: PTCA and Taxus TAWANNA (2.25 x 12 mm) to the mid PDA; Cypher TAWANNA (2.5 x 18mm) to the distal SVG to the PDA;  and Cypher TAWANNA (3.0 x 28mm) to the proximal SVG to the PDA.  g. [facility] for recurrent chest pain, 2010: Revealing patent stents. Ranexa initiated 500 mg q.12 h.   h. [facility],  2011: LVEF of 45% to 50% with an occluded SVG to an obtuse marginal branch which could not be revascularized, patent LIMA to the LAD, noncritical graft disease in the SVG to the PDA, multiple small areas of distal vessel disease and collateral flow were seen.   i. [facility] for recurrent anginal symptoms, 2010, with PTCA and Promus TAWANNA (3.0 x 28mm) to the proximal SVG to the PDA for in-stent restenosis.    j. [facility], 2010,  Dr. Russo: EF 40-45%. 3.5 x 23 mm Promus TAWANNA in the proximal SVG to OM, 2.5 x 18 mm Promus TAWANNA to distal SVG to PDA due to ISR.    k. [facility], 2010, with placement of  a 3.0 x 16 mm Taxus TAWANNA to the SVG to the RCA proximally and a 2.5 x 16  mm paclitaxel stent distally in the SVG to the RCA.  [facility] 24-mm Promus element TAWANNA to a 90% lesion in the mid portion  of the SVG to the PDA.   [facility] for recurrent angina, 06/24/2014, Dr. Russo:  PTCA of the SVG to the PDA using a 3 x 12 mm NC TREK balloon.    2. Hypertension.  3. Hypercholesterolemia.  4. Type 2 diabetes, diagnosed 2 years ago.  5. Obstructive sleep apnea/CPAP.  6. Enlarged prostate with anticipated TURP with Dr. Bernal.  7. Obesity.  8. Depression.  9. Surgical history:  a.  CABG x3, Dr. Peng, [facility], 2001.  b. Negative colonoscopy, 2014.    Allergies   Allergen Reactions   • Bisoprolol Other (See Comments)     Agitation     • Byetta 10 Mcg Pen [Exenatide]      nausea   • Crestor [Rosuvastatin Calcium] Myalgia   • Metformin And Related    • Plavix [Clopidogrel Bisulfate] Other (See Comments)     resistance   • Zocor [Simvastatin] Myalgia       Current Medications:    Current Outpatient Medications:   •  amLODIPine (NORVASC) 10 MG tablet, Take 10 mg by mouth Daily., Disp: , Rfl:   •  aspirin 81 MG EC tablet, Take 81 mg by mouth Daily., Disp: , Rfl:   •  atorvastatin (LIPITOR) 80 MG tablet, Take 1 tablet by mouth Daily. (Patient taking differently: Take 40 mg by mouth Daily.), Disp: 90 tablet, Rfl: 1  •  carvedilol (COREG) 12.5 MG tablet, Take 6.25 mg by mouth 2 (Two) Times a Day With Meals. Pt takes 0.5 tab BID, Disp: , Rfl:   •  Cholecalciferol (VITAMIN D3) 50 MCG (2000 UT) capsule, Take 2 capsules by mouth., Disp: , Rfl:   •  citalopram (CeleXA) 40 MG tablet, Take 40 mg by mouth Daily., Disp: , Rfl:   •  Dapagliflozin Propanediol (FARXIGA) 10 MG tablet, Take 10 mg by mouth Daily., Disp: , Rfl:   •  dexlansoprazole (DEXILANT) 60 MG capsule, Take 60 mg by mouth Daily., Disp: , Rfl:   •  doxazosin (CARDURA) 4 MG tablet, Take 4 mg by mouth Every Night., Disp: , Rfl:   •  finasteride (PROSCAR) 5 MG tablet, Take 5 mg by mouth Daily., Disp: , Rfl:   •  furosemide  (LASIX) 20 MG tablet, Take 20 mg by mouth Daily. Monday, Wed,Friday, Disp: , Rfl:   •  gabapentin (NEURONTIN) 300 MG capsule, Take 300 mg by mouth Daily., Disp: , Rfl:   •  insulin degludec (TRESIBA FLEXTOUCH) 100 UNIT/ML solution pen-injector injection, Inject 70 Units under the skin Every Night., Disp: , Rfl:   •  lisinopril (PRINIVIL,ZESTRIL) 30 MG tablet, Take 30 mg by mouth Daily., Disp: , Rfl:   •  Multiple Vitamin (MULTI VITAMIN DAILY PO), Take 1 tablet by mouth Daily., Disp: , Rfl:   •  nitroglycerin (NITROSTAT) 0.4 MG SL tablet, Place 1 tablet under the tongue Every 5 (Five) Minutes As Needed for Chest Pain. Take no more than 3 doses in 15 minutes., Disp: 25 tablet, Rfl: 11  •  prasugrel (EFFIENT) 10 MG tablet, Take 10 mg by mouth Daily., Disp: , Rfl:   •  SITagliptin (JANUVIA) 100 MG tablet, Take 100 mg by mouth Daily. 1/2 tab daily, Disp: , Rfl:   •  tamsulosin (FLOMAX) 0.4 MG capsule 24 hr capsule, Take 1 capsule by mouth Every Night., Disp: , Rfl:     HPI    Donny Jrery is a 75 y.o. male who presents today for an annual follow up of CAD, murmur, edema, and cardiac risk factors. Since last visit, he has been doing well from a cardiovascular standpoint. He has not been following an exercise routine and notes that this is due to hip pain. He has been taking atorvastatin 40mg rather than 80mg due to the higher dose causing muscle aches. He is scheduled for eye surgery on 2/9/2021 and came with questions regarding his medications that he should withhold prior to surgery. Patient otherwise denies chest pain, shortness of breath, PND, palpitations, syncope, or presyncope at this time.       The following portions of the patient's history were reviewed and updated as appropriate: allergies, current medications and problem list.    Pertinent positives as listed in the HPI.  All other systems reviewed are negative.         Vitals:    01/21/21 0953   BP: 160/40   BP Location: Right arm   Patient Position:  "Sitting   Pulse: 57   Weight: 101 kg (222 lb)   Height: 167.6 cm (66\")       Physical Exam:  General: Alert and oriented.  Neck: Jugular venous pressure is within normal limits. Carotids have normal upstrokes without bruits.   Cardiovascular: Heart has a nondisplaced focal PMI. Regular rate and rhythm. No gallop or rub. 1/6 SE murmur at RUSB.   Lungs: Clear, no rales or wheezes. Equal expansion is noted.   Extremities: 1+ edema on LE.   Skin: Warm and dry.  Neurologic: Nonfocal.     Diagnostic Data (reviewed with patient):  Lab Results   Component Value Date    GLUCOSE 161 (H) 02/06/2020    BUN 27 (H) 02/06/2020    CREATININE 1.36 (H) 02/06/2020    EGFRIFNONA 51 (L) 02/06/2020    BCR 19.9 02/06/2020     02/06/2020    K 4.9 02/06/2020     02/06/2020    CO2 22.2 02/06/2020    CALCIUM 8.7 02/06/2020    ALBUMIN 3.70 02/06/2020    ALKPHOS 47 02/06/2020    AST 16 02/06/2020    ALT 16 02/06/2020     Lab Results   Component Value Date    CHLPL 167 12/15/2020    TRIG 248 12/15/2020    HDL 35 (L) 12/15/2020    LDL 82 12/15/2020      Lab Results   Component Value Date    WBC 8.6 12/15/2020    RBC 4.17 (L) 12/15/2020    HGB 12.1 (L) 12/15/2020    HCT 37.3 (L) 12/15/2020    MCV 89.4 12/15/2020     12/15/2020         Procedures      Assessment:    ICD-10-CM ICD-9-CM   1. Coronary artery disease involving coronary bypass graft of native heart without angina pectoris  I25.810 414.05   2. Essential hypertension  I10 401.9   3. Hyperlipidemia LDL goal <70  E78.5 272.4   4. Obesity, Class II, BMI 35-39.9  E66.9 278.00         Plan:  1. Begin aerobic exercise for at least 30 minutes 5 days per week.   2. Withhold taking  Efient 10mg for 3 days prior and 3 days after eye surgery for APT.   3. Continue aspirin 81mg for CAD.   4. Continue amlodipine 10mg, carvedilol 12.5mg, doxazosin 4mg, Lasix 20mg, and lisinopril 30mg for hypertension.   5. Continue atorvastatin 80mg for hyperlipidemia.   6. Continue all other current " medications.  7. F/up in 12 months, sooner if needed.    Scribed for Liz Russo MD by Oscar Fofana. 1/21/2021  10:20 EST     I Liz Russo MD personally performed the services described in this documentation as scribed by the above individual in my presence, and it is both accurate and complete.    Liz Russo MD, FACC

## 2021-01-29 DIAGNOSIS — E11.9 TYPE 2 DIABETES MELLITUS WITHOUT COMPLICATION, WITH LONG-TERM CURRENT USE OF INSULIN (HCC): ICD-10-CM

## 2021-01-29 DIAGNOSIS — Z79.4 TYPE 2 DIABETES MELLITUS WITHOUT COMPLICATION, WITH LONG-TERM CURRENT USE OF INSULIN (HCC): ICD-10-CM

## 2021-02-01 RX ORDER — CALCIUM CITRATE/VITAMIN D3 200MG-6.25
TABLET ORAL
Qty: 300 STRIP | Refills: 3 | Status: SHIPPED | OUTPATIENT
Start: 2021-02-01 | End: 2021-09-20 | Stop reason: SDUPTHER

## 2021-02-02 ENCOUNTER — HOSPITAL ENCOUNTER (OUTPATIENT)
Facility: HOSPITAL | Age: 76
Discharge: HOME | End: 2021-02-02
Payer: MEDICARE

## 2021-02-02 LAB
ANION GAP SERPL CALCULATED.3IONS-SCNC: 8 MMOL/L (ref 3–16)
BUN BLDV-MCNC: 28 MG/DL (ref 6–20)
CALCIUM SERPL-MCNC: 9.2 MG/DL (ref 8.5–10.5)
CHLORIDE BLD-SCNC: 107 MMOL/L (ref 98–107)
CO2: 27 MMOL/L (ref 20–30)
CREAT SERPL-MCNC: 1.3 MG/DL (ref 0.4–1.2)
EKG ATRIAL RATE: 53 BPM
EKG DIAGNOSIS: NORMAL
EKG P AXIS: 52 DEGREES
EKG P-R INTERVAL: 178 MS
EKG Q-T INTERVAL: 444 MS
EKG QRS DURATION: 100 MS
EKG QTC CALCULATION (BAZETT): 416 MS
EKG R AXIS: 58 DEGREES
EKG T AXIS: 161 DEGREES
EKG VENTRICULAR RATE: 53 BPM
GFR AFRICAN AMERICAN: >59
GFR NON-AFRICAN AMERICAN: 54
GLUCOSE BLD-MCNC: 112 MG/DL (ref 74–106)
POTASSIUM SERPL-SCNC: 4.7 MMOL/L (ref 3.4–5.1)
SODIUM BLD-SCNC: 142 MMOL/L (ref 136–145)

## 2021-02-02 PROCEDURE — 80048 BASIC METABOLIC PNL TOTAL CA: CPT

## 2021-02-02 PROCEDURE — 93005 ELECTROCARDIOGRAM TRACING: CPT

## 2021-02-02 PROCEDURE — 36415 COLL VENOUS BLD VENIPUNCTURE: CPT

## 2021-03-10 ENCOUNTER — TELEPHONE (OUTPATIENT)
Dept: PRIMARY CARE CLINIC | Age: 76
End: 2021-03-10

## 2021-03-10 NOTE — TELEPHONE ENCOUNTER
Pt called stating that his CPAP is too strong and is asking that  Settings be lowered.  States he hasnt seen pulmonologist since Re[place] de [place]

## 2021-03-11 NOTE — TELEPHONE ENCOUNTER
I spoke with Adali Wheeler and his wife. He got the CPAP from [facility]  In [place] thru Dr Yanleis Abraham. He has not seen her in over a year. He states she was suppose to turn the settings dowm but turned them up instead. It is blowing his mouth open.

## 2021-03-11 NOTE — TELEPHONE ENCOUNTER
I spoke with Nikkie Clinton and he had his CPAP since 2018. They need an order with new settings from [place] and they can change the settings remotely.

## 2021-03-22 NOTE — TELEPHONE ENCOUNTER
Patient wife stated that she did not know if it was 15 or 15, but she is going to make him an appointment with Dr Jesus Damon.

## 2021-04-13 DIAGNOSIS — G62.9 NEUROPATHY: ICD-10-CM

## 2021-04-15 ENCOUNTER — OFFICE VISIT (OUTPATIENT)
Dept: PULMONOLOGY | Facility: CLINIC | Age: 76
End: 2021-04-15

## 2021-04-15 VITALS
WEIGHT: 225 LBS | BODY MASS INDEX: 36.16 KG/M2 | RESPIRATION RATE: 16 BRPM | HEART RATE: 53 BPM | OXYGEN SATURATION: 96 % | HEIGHT: 66 IN | DIASTOLIC BLOOD PRESSURE: 60 MMHG | SYSTOLIC BLOOD PRESSURE: 130 MMHG

## 2021-04-15 DIAGNOSIS — Z72.821 POOR SLEEP HYGIENE: ICD-10-CM

## 2021-04-15 DIAGNOSIS — G25.81 RESTLESS LEGS SYNDROME (RLS): ICD-10-CM

## 2021-04-15 DIAGNOSIS — G47.33 OSA (OBSTRUCTIVE SLEEP APNEA): Primary | ICD-10-CM

## 2021-04-15 PROCEDURE — 99214 OFFICE O/P EST MOD 30 MIN: CPT | Performed by: NURSE PRACTITIONER

## 2021-04-15 RX ORDER — PRAMIPEXOLE DIHYDROCHLORIDE 1 MG/1
1 TABLET ORAL NIGHTLY
Qty: 30 TABLET | Refills: 5 | Status: SHIPPED | OUTPATIENT
Start: 2021-04-15 | End: 2021-08-17 | Stop reason: SDUPTHER

## 2021-04-15 NOTE — PROGRESS NOTES
CONSULT NOTE    Requested by:   No ref. provider found   Anum Alonso APRN      Chief Complaint   Patient presents with   • Consult   • Sleeping Problem       Subjective:  Donny Jerry is a 76 y.o. male.     History of Present Illness   Patient comes in today for consultation of obstructive sleep apnea.    He previously followed with Dr. Antony and has been on CPAP therapy for around 5 years.    The patient says he uses his CPAP machine every night but he feels the pressure is too strong.  He states the pressure was increased by Dr. Antony's office about a year ago.    He states he continues to wake about every 2 hours even using the machine and his mouth gets extremely dry.    He goes to bed between 11 PM and 1 AM if he is watching TV.  He gets up between 6 and 7 AM.  He feels rested some days upon awakening.    He drinks 1 caffeinated beverage a day.    He is on gabapentin secondary to restless leg symptoms and states he has been on this medication 2 to 3 years.  The patient states his legs do not bother him at night but he does not know if this is due to the medication or not.  His wife states that the gabapentin does not seem to help because the patient continues to jerk his legs during sleep.    [place] sleep score 16/24.    He is also treated for hypertension, coronary artery disease, and diabetes mellitus.    The following portions of the patient's history were reviewed and updated as appropriate: allergies, current medications, past family history, past medical history, past social history and past surgical history.    Review of Systems   Constitutional: Negative for chills, fatigue and fever.   HENT: Positive for rhinorrhea, sinus pressure, sinus pain and sneezing. Negative for sore throat.    Respiratory: Negative for cough, chest tightness, shortness of breath and wheezing.    Cardiovascular: Negative for palpitations and leg swelling.   Psychiatric/Behavioral: Negative for sleep disturbance.  "  All other systems reviewed and are negative.      Past Medical History:   Diagnosis Date   • Benign hypertension    • Coronary artery disease    • Depression    • Enlarged prostate     Enlarged prostate with anticipated TURP with Dr. Bernal.   • GERD (gastroesophageal reflux disease)    • Hypercholesterolemia    • Obesity    • Obstructive sleep apnea on CPAP    • Type 2 diabetes mellitus (CMS/HCC)        Social History     Tobacco Use   • Smoking status: Never Smoker   • Smokeless tobacco: Never Used   Substance Use Topics   • Alcohol use: No         Objective:  Visit Vitals  /60   Pulse 53   Resp 16   Ht 167.6 cm (66\")   Wt 102 kg (225 lb)   SpO2 96%   BMI 36.32 kg/m²       Physical Exam  Vitals reviewed.   Constitutional:       Appearance: He is well-developed.   HENT:      Head: Atraumatic.      Mouth/Throat:      Mouth: Mucous membranes are moist.      Comments: Crowded oropharynx.  Dentures present.  Eyes:      Extraocular Movements: Extraocular movements intact.   Pulmonary:      Effort: Pulmonary effort is normal. No respiratory distress.      Breath sounds: No wheezing.   Abdominal:      Comments: Obese abdomen.   Musculoskeletal:      Comments: Gait slow.   Skin:     General: Skin is warm.   Neurological:      Mental Status: He is alert and oriented to person, place, and time.            Assessment/Plan:  Diagnoses and all orders for this visit:    1. USHA (obstructive sleep apnea) (Primary)    2. Poor sleep hygiene    3. Restless legs syndrome (RLS)    Other orders  -     pramipexole (Mirapex) 1 MG tablet; Take 1 tablet by mouth Every Night.  Dispense: 30 tablet; Refill: 5        Return in about 4 months (around 8/15/2021) for Recheck, For Dr. Bob.    DISCUSSION(if any):  I will asked the office staff to request records from Dr. Antony's office including the patient's last sleep study.    Continue treatment with CPAP at a pressure of 15, with a full-face mask.    Patient's compliance data was " reviewed and the compliance is 90%.  He continues to have an AHI above 5.  On his current compliance the AHI of 7.4/h.  There is some leak noted on the compliance report.    Humidification setup, hose and mask care discussed.  He does not know how to adjust the humidity so I have recommended he call [employer] and ask them to walk him through how to adjust the humidity setting.    Weight loss advised.    Use every night for at least 4 hours stressed.    For the restless leg issues I have recommended the patient wean off of gabapentin and start Mirapex.  I have asked him to take 1 tablet every other day for 1 week and stop the medication.  He is only taking this medication at bedtime for restless leg syndrome.    He is not UTD on flu and PNA vaccines. He does not want either.     Dictated utilizing Dragon dictation.    This document was electronically signed by ASHLEIGH Saavedra on 04/15/21 at 13:33 EDT

## 2021-04-16 RX ORDER — GABAPENTIN 300 MG/1
CAPSULE ORAL
Qty: 90 CAPSULE | Refills: 2 | Status: SHIPPED | OUTPATIENT
Start: 2021-04-16 | End: 2021-04-30

## 2021-04-30 ENCOUNTER — OFFICE VISIT (OUTPATIENT)
Dept: PRIMARY CARE CLINIC | Age: 76
End: 2021-04-30
Payer: MEDICARE

## 2021-04-30 VITALS
WEIGHT: 218.8 LBS | DIASTOLIC BLOOD PRESSURE: 55 MMHG | HEIGHT: 67 IN | BODY MASS INDEX: 34.34 KG/M2 | TEMPERATURE: 97.7 F | SYSTOLIC BLOOD PRESSURE: 160 MMHG | OXYGEN SATURATION: 97 % | HEART RATE: 56 BPM

## 2021-04-30 DIAGNOSIS — M62.838 TRAPEZIUS MUSCLE SPASM: ICD-10-CM

## 2021-04-30 DIAGNOSIS — M19.022 OSTEOARTHRITIS INVOLVING JOINT OF LEFT UPPER ARM: Primary | ICD-10-CM

## 2021-04-30 DIAGNOSIS — J30.1 SEASONAL ALLERGIC RHINITIS DUE TO POLLEN: ICD-10-CM

## 2021-04-30 PROCEDURE — 96372 THER/PROPH/DIAG INJ SC/IM: CPT | Performed by: PHYSICIAN ASSISTANT

## 2021-04-30 PROCEDURE — 99213 OFFICE O/P EST LOW 20 MIN: CPT | Performed by: PHYSICIAN ASSISTANT

## 2021-04-30 RX ORDER — BACLOFEN 10 MG/1
5 TABLET ORAL 3 TIMES DAILY PRN
Qty: 15 TABLET | Refills: 0 | Status: SHIPPED | OUTPATIENT
Start: 2021-04-30 | End: 2021-06-07 | Stop reason: ALTCHOICE

## 2021-04-30 RX ORDER — PRAMIPEXOLE DIHYDROCHLORIDE 1 MG/1
1 TABLET ORAL NIGHTLY
COMMUNITY
Start: 2021-04-15 | End: 2022-06-22 | Stop reason: SDUPTHER

## 2021-04-30 RX ORDER — ASPIRIN 81 MG/1
81 TABLET ORAL DAILY
COMMUNITY
End: 2021-04-30

## 2021-04-30 RX ORDER — KETOROLAC TROMETHAMINE 30 MG/ML
60 INJECTION, SOLUTION INTRAMUSCULAR; INTRAVENOUS ONCE
Status: COMPLETED | OUTPATIENT
Start: 2021-04-30 | End: 2021-04-30

## 2021-04-30 RX ADMIN — KETOROLAC TROMETHAMINE 60 MG: 30 INJECTION, SOLUTION INTRAMUSCULAR; INTRAVENOUS at 16:25

## 2021-04-30 NOTE — PROGRESS NOTES
Erin Solares 77 y.o. presents today for   Chief Complaint   Patient presents with    Joint Pain    Abdominal Pain        HPI:  Erin Solares states he hurt shoulder three years ago and has been having left shoulder, elbow and left wrist pain for the last few days. He also reports soreness/pain across his shoulders/upper back as well. Patient doesn't take chronic nsaids due to CKD and hx gastric ulcer a few years ago. He doesn't take aspirin either. Patient reports some abdominal pain (generalized), not severe due to coughing from allergies. Clear sputum production, no wheezing, never smoked. NO fever or fatigue.     Family History   Problem Relation Age of Onset    Heart Attack Mother     Heart Disease Father     Diabetes Sister         Social History     Socioeconomic History    Marital status:      Spouse name: Not on file    Number of children: Not on file    Years of education: Not on file    Highest education level: Not on file   Occupational History    Not on file   Social Needs    Financial resource strain: Not on file    Food insecurity     Worry: Not on file     Inability: Not on file    Transportation needs     Medical: Not on file     Non-medical: Not on file   Tobacco Use    Smoking status: Never Smoker    Smokeless tobacco: Never Used   Substance and Sexual Activity    Alcohol use: No    Drug use: No    Sexual activity: Not on file   Lifestyle    Physical activity     Days per week: Not on file     Minutes per session: Not on file    Stress: Not on file   Relationships    Social connections     Talks on phone: Not on file     Gets together: Not on file     Attends Shinto service: Not on file     Active member of club or organization: Not on file     Attends meetings of clubs or organizations: Not on file     Relationship status: Not on file    Intimate partner violence     Fear of current or ex partner: Not on file     Emotionally abused: Not on file Physically abused: Not on file     Forced sexual activity: Not on file   Other Topics Concern    Not on file   Social History Narrative    Not on file        Past Surgical History:   Procedure Laterality Date    CORONARY ANGIOPLASTY WITH STENT PLACEMENT  04/2013    Dr. Marielena Galindo, stents in 2009, 2011, 2013, ptca 2014   [address] GRAFT  2001    x3    POLYPECTOMY  1999    colon ca        Past Medical History:   Diagnosis Date    Anemia 2019    Anxiety     Bleeding stomach ulcer 2019    Dr Michela Barrow CAD (coronary artery disease)     Cancer ([facility] [address]     malignant colon polyp    Depression     Enlarged prostate     GERD (gastroesophageal reflux disease)     Hyperlipidemia     Hypertension     Sleep apnea     Type II or unspecified type diabetes mellitus without mention of complication, not stated as uncontrolled         Current Outpatient Medications   Medication Sig Dispense Refill    pramipexole (MIRAPEX) 1 MG tablet Take 1 mg by mouth nightly      baclofen (LIORESAL) 10 MG tablet Take 0.5 tablets by mouth 3 times daily as needed (muscle spasms) 15 tablet 0    TRUE METRIX BLOOD GLUCOSE TEST strip USE TO TEST BLOOD SUGAR THREE TIMES DAILY AND AS NEEDED 300 strip 3    furosemide (LASIX) 20 MG tablet Take 1 tablet by mouth See Admin Instructions Indications: takes on monday and friday Twice a week 180 tablet 3    Insulin Degludec (TRESIBA FLEXTOUCH) 100 UNIT/ML SOPN Inject 70 Units into the skin daily DX: E11.42 KX 25 pen 3    finasteride (PROSCAR) 5 MG tablet Take 1 tablet by mouth daily 90 tablet 3    carvedilol (COREG) 12.5 MG tablet Take 0.5 tablets by mouth 2 times daily 180 tablet 3    Ginger, Zingiber officinalis, (GINGER ROOT) 250 MG CAPS One po daily.  90 capsule 3    SITagliptin (JANUVIA) 50 MG tablet Take 1 tablet by mouth daily 90 tablet 3    Ginger, Zingiber officinalis, (GINGER ROOT) 250 MG CAPS TAKE ONE CAPSULE BY MOUTH TWO TIMES A DAY BEFORE MEALS 60 capsule 2    B-D UF III MINI PEN NEEDLES 31G X 5 MM MISC USE TO INJECT INSULIN EVERYDAY 100 each 2    citalopram (CELEXA) 40 MG tablet Take 1 tablet by mouth daily 90 tablet 3    tamsulosin (FLOMAX) 0.4 MG capsule Take 1 capsule by mouth daily 90 capsule 3    lisinopril (PRINIVIL;ZESTRIL) 30 MG tablet TAKE 1 TABLET DAILY 90 tablet 3    atorvastatin (LIPITOR) 40 MG tablet Take 1 tablet by mouth nightly 90 tablet 1    doxazosin (CARDURA) 4 MG tablet Take 1 tablet by mouth nightly 90 tablet 3    dapagliflozin (FARXIGA) 10 MG tablet Take 1 tablet by mouth every morning 90 tablet 3    amLODIPine (NORVASC) 10 MG tablet Take 1 tablet by mouth daily Indications: High Blood Pressure Disorder 90 tablet 3    prasugrel (EFFIENT) 10 MG TABS Take 1 tablet by mouth daily 90 tablet 3    dexlansoprazole (DEXILANT) 60 MG CPDR delayed release capsule Take 1 capsule by mouth daily 90 capsule 3    Insulin Pen Needle (ADVOCATE INSULIN PEN NEEDLES) 31G X 5 MM MISC USE TO INJECT INSULIN EVERY DAY DX: E11.42  each 3    Microlet Lancets MISC 1 each by Does not apply route 3 times daily DX: E11.42  each 3    Insulin Pen Needle 31G X 5 MM MISC USE TO INJECT INSULIN EVERY  each 3    Cholecalciferol (VITAMIN D3) 2000 units CAPS Take 2 capsules by mouth daily       loratadine (CLARITIN) 10 MG tablet TAKE ONE TABLET BY MOUTH EVERY DAY 30 tablet 3    nitroGLYCERIN (NITROSTAT) 0.4 MG SL tablet Place 1 tablet under the tongue every 5 minutes as needed 25 tablet 5    glucose blood VI test strips (EXACTECH TEST) strip Please dispense glucometer test strips for patient's device. Patient test bid and prn. Dx: 250.00 100 strip 5    therapeutic multivitamin-minerals (THERAGRAN-M) tablet Take 1 tablet by mouth daily.        Current Facility-Administered Medications   Medication Dose Route Frequency Provider Last Rate Last Admin    ketorolac (TORADOL) injection 60 mg  60 mg Intramuscular Once MARCIO De La Cruz            Review of involving joint of left upper arm  One dose of toradol for joint pain and muscle spasm, patient to continue to avoid chronic nsaids and aspirin  - ketorolac (TORADOL) injection 60 mg    2. Seasonal allergic rhinitis due to pollen  Recommended taking claritin daily to help with cough/drainage    3. Trapezius muscle spasm  OTC biofreeze for pain, massage, apply heat as needed    - baclofen (LIORESAL) 10 MG tablet; Take 0.5 tablets by mouth 3 times daily as needed (muscle spasms)  Dispense: 15 tablet;  Refill: 0  - ketorolac (TORADOL) injection 60 mg             [place], [place]

## 2021-05-06 DIAGNOSIS — K21.00 GASTROESOPHAGEAL REFLUX DISEASE WITH ESOPHAGITIS: ICD-10-CM

## 2021-05-06 RX ORDER — DEXLANSOPRAZOLE 60 MG/1
60 CAPSULE, DELAYED RELEASE ORAL DAILY
Qty: 90 CAPSULE | Refills: 1 | Status: SHIPPED | OUTPATIENT
Start: 2021-05-06 | End: 2021-08-25 | Stop reason: SDUPTHER

## 2021-05-22 ENCOUNTER — OFFICE VISIT (OUTPATIENT)
Dept: PRIMARY CARE CLINIC | Age: 76
End: 2021-05-22
Payer: MEDICARE

## 2021-05-22 VITALS
TEMPERATURE: 97.7 F | SYSTOLIC BLOOD PRESSURE: 167 MMHG | RESPIRATION RATE: 16 BRPM | OXYGEN SATURATION: 95 % | HEART RATE: 57 BPM | DIASTOLIC BLOOD PRESSURE: 58 MMHG

## 2021-05-22 DIAGNOSIS — M19.022 OSTEOARTHRITIS INVOLVING JOINT OF LEFT UPPER ARM: Primary | ICD-10-CM

## 2021-05-22 PROCEDURE — 99212 OFFICE O/P EST SF 10 MIN: CPT | Performed by: PHYSICIAN ASSISTANT

## 2021-05-22 NOTE — PROGRESS NOTES
Mary Alice Archer 77 y.o. presents today for   Chief Complaint   Patient presents with    Joint Pain        HPI:  Mary Alice Archer states he is still having joint pain left shoulder, wrist and elbow. He reports the shot I gave him last month helped a lot. Patient has just been using otc biofreeze for pain again without any po medications. Family History   Problem Relation Age of Onset    Heart Attack Mother     Heart Disease Father     Diabetes Sister         Social History     Socioeconomic History    Marital status:      Spouse name: Not on file    Number of children: Not on file    Years of education: Not on file    Highest education level: Not on file   Occupational History    Not on file   Tobacco Use    Smoking status: Never Smoker    Smokeless tobacco: Never Used   Substance and Sexual Activity    Alcohol use: No    Drug use: No    Sexual activity: Not on file   Other Topics Concern    Not on file   Social History Narrative    Not on file     Social Determinants of Health     Financial Resource Strain:     Difficulty of Paying Living Expenses:    Food Insecurity:     Worried About Running Out of Food in the Last Year:     [address] in the Last Year:    Transportation Needs:     Lack of Transportation (Medical):      Lack of Transportation (Non-Medical):    Physical Activity:     Days of Exercise per Week:     Minutes of Exercise per Session:    Stress:     Feeling of Stress :    Social Connections:     Frequency of Communication with Friends and Family:     Frequency of Social Gatherings with Friends and Family:     Attends [employer] Services:     Active Member of Clubs or Organizations:     Attends Club or Organization Meetings:     Marital Status:    Intimate Partner Violence:     Fear of Current or Ex-Partner:     Emotionally Abused:     Physically Abused:     Sexually Abused:         Past Surgical History:   Procedure Laterality Date    CORONARY ANGIOPLASTY WITH STENT PLACEMENT  04/2013    Dr. Gisela Reynoso, stents in 2009, 2011, 2013, ptca 2014    CORONARY ARTERY BYPASS GRAFT  2001    x3    POLYPECTOMY  1999    colon ca        Past Medical History:   Diagnosis Date    Anemia 2019    Anxiety     Bleeding stomach ulcer 2019    Dr Guanakito Arceo CAD (coronary artery disease)     Cancer ([facility] [address]     malignant colon polyp    Depression     Enlarged prostate     GERD (gastroesophageal reflux disease)     Hyperlipidemia     Hypertension     Sleep apnea     Type II or unspecified type diabetes mellitus without mention of complication, not stated as uncontrolled         Current Outpatient Medications   Medication Sig Dispense Refill    dexlansoprazole (DEXILANT) 60 MG CPDR delayed release capsule Take 1 capsule by mouth daily 90 capsule 1    pramipexole (MIRAPEX) 1 MG tablet Take 1 mg by mouth nightly      baclofen (LIORESAL) 10 MG tablet Take 0.5 tablets by mouth 3 times daily as needed (muscle spasms) 15 tablet 0    TRUE METRIX BLOOD GLUCOSE TEST strip USE TO TEST BLOOD SUGAR THREE TIMES DAILY AND AS NEEDED 300 strip 3    furosemide (LASIX) 20 MG tablet Take 1 tablet by mouth See Admin Instructions Indications: takes on monday and friday Twice a week 180 tablet 3    Insulin Degludec (TRESIBA FLEXTOUCH) 100 UNIT/ML SOPN Inject 70 Units into the skin daily DX: E11.42 KX 25 pen 3    finasteride (PROSCAR) 5 MG tablet Take 1 tablet by mouth daily 90 tablet 3    carvedilol (COREG) 12.5 MG tablet Take 0.5 tablets by mouth 2 times daily 180 tablet 3    Ginger, Zingiber officinalis, (GINGER ROOT) 250 MG CAPS One po daily.  90 capsule 3    SITagliptin (JANUVIA) 50 MG tablet Take 1 tablet by mouth daily 90 tablet 3    Ginger, Zingiber officinalis, (GINGER ROOT) 250 MG CAPS TAKE ONE CAPSULE BY MOUTH TWO TIMES A DAY BEFORE MEALS 60 capsule 2    B-D UF III MINI PEN NEEDLES 31G X 5 MM MISC USE TO INJECT INSULIN EVERYDAY 100 each 2    citalopram (CELEXA) 40 MG tablet Take 1 tablet by mouth daily 90 tablet 3    tamsulosin (FLOMAX) 0.4 MG capsule Take 1 capsule by mouth daily 90 capsule 3    lisinopril (PRINIVIL;ZESTRIL) 30 MG tablet TAKE 1 TABLET DAILY 90 tablet 3    atorvastatin (LIPITOR) 40 MG tablet Take 1 tablet by mouth nightly 90 tablet 1    doxazosin (CARDURA) 4 MG tablet Take 1 tablet by mouth nightly 90 tablet 3    dapagliflozin (FARXIGA) 10 MG tablet Take 1 tablet by mouth every morning 90 tablet 3    amLODIPine (NORVASC) 10 MG tablet Take 1 tablet by mouth daily Indications: High Blood Pressure Disorder 90 tablet 3    prasugrel (EFFIENT) 10 MG TABS Take 1 tablet by mouth daily 90 tablet 3    Insulin Pen Needle (ADVOCATE INSULIN PEN NEEDLES) 31G X 5 MM MISC USE TO INJECT INSULIN EVERY DAY DX: E11.42  each 3    Microlet Lancets MISC 1 each by Does not apply route 3 times daily DX: E11.42  each 3    Insulin Pen Needle 31G X 5 MM MISC USE TO INJECT INSULIN EVERY  each 3    Cholecalciferol (VITAMIN D3) 2000 units CAPS Take 2 capsules by mouth daily       loratadine (CLARITIN) 10 MG tablet TAKE ONE TABLET BY MOUTH EVERY DAY 30 tablet 3    nitroGLYCERIN (NITROSTAT) 0.4 MG SL tablet Place 1 tablet under the tongue every 5 minutes as needed 25 tablet 5    therapeutic multivitamin-minerals (THERAGRAN-M) tablet Take 1 tablet by mouth daily.  glucose blood VI test strips (EXACTECH TEST) strip Please dispense glucometer test strips for patient's device. Patient test bid and prn. Dx: 250.00 100 strip 5     No current facility-administered medications for this visit. Review of Systems   See above    BP (!) 167/58   Pulse 57   Temp 97.7 °F (36.5 °C) (Temporal)   Resp 16   SpO2 95%      Physical Exam  Constitutional:       Appearance: Normal appearance. He is obese. HENT:      Head: Normocephalic and atraumatic.       Right Ear: External ear normal.      Left Ear: External ear normal.      Nose: Nose normal.      Mouth/Throat: Mouth: Mucous membranes are moist.      Pharynx: Oropharynx is clear. Eyes:      Extraocular Movements: Extraocular movements intact. Conjunctiva/sclera: Conjunctivae normal.   Cardiovascular:      Rate and Rhythm: Normal rate and regular rhythm. Heart sounds: No murmur heard. No gallop. Pulmonary:      Effort: Pulmonary effort is normal.      Breath sounds: Normal breath sounds. No wheezing. Musculoskeletal:         General: No swelling or tenderness. Normal range of motion. Right lower leg: No edema. Left lower leg: No edema. Comments: No erythema at elbow, shoulder or wrist joints   Skin:     General: Skin is warm and dry. Neurological:      General: No focal deficit present. Mental Status: He is alert and oriented to person, place, and time. Psychiatric:         Mood and Affect: Mood normal.         Behavior: Behavior normal.          ASSESSMENT/PLAN    1. 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 I recommended otc tylenol 500 mg 1 tab po qid for arthritis pain. I mentioned low dose diclofenac gel, but want his pcp to decide if she thinks it's appropriate for this patient. Patient has fu in two weeks.           MARCIO Rojas

## 2021-06-07 ENCOUNTER — OFFICE VISIT (OUTPATIENT)
Dept: PRIMARY CARE CLINIC | Age: 76
End: 2021-06-07
Payer: MEDICARE

## 2021-06-07 VITALS
OXYGEN SATURATION: 98 % | TEMPERATURE: 97.6 F | BODY MASS INDEX: 34.88 KG/M2 | HEIGHT: 67 IN | RESPIRATION RATE: 16 BRPM | DIASTOLIC BLOOD PRESSURE: 46 MMHG | WEIGHT: 222.2 LBS | HEART RATE: 54 BPM | SYSTOLIC BLOOD PRESSURE: 150 MMHG

## 2021-06-07 DIAGNOSIS — E78.5 HYPERLIPIDEMIA, UNSPECIFIED HYPERLIPIDEMIA TYPE: ICD-10-CM

## 2021-06-07 DIAGNOSIS — F39 MOOD DISORDER (HCC): ICD-10-CM

## 2021-06-07 DIAGNOSIS — I10 ESSENTIAL HYPERTENSION: ICD-10-CM

## 2021-06-07 DIAGNOSIS — M19.90 ARTHRITIS: ICD-10-CM

## 2021-06-07 DIAGNOSIS — E55.9 VITAMIN D DEFICIENCY: ICD-10-CM

## 2021-06-07 DIAGNOSIS — Z13.29 THYROID DISORDER SCREEN: ICD-10-CM

## 2021-06-07 DIAGNOSIS — E11.42 TYPE 2 DIABETES MELLITUS WITH DIABETIC POLYNEUROPATHY, WITHOUT LONG-TERM CURRENT USE OF INSULIN (HCC): ICD-10-CM

## 2021-06-07 DIAGNOSIS — K31.84 GASTROPARESIS: Primary | ICD-10-CM

## 2021-06-07 DIAGNOSIS — I95.1 ORTHOSTATIC HYPOTENSION: ICD-10-CM

## 2021-06-07 PROCEDURE — 1123F ACP DISCUSS/DSCN MKR DOCD: CPT | Performed by: NURSE PRACTITIONER

## 2021-06-07 PROCEDURE — 99214 OFFICE O/P EST MOD 30 MIN: CPT | Performed by: NURSE PRACTITIONER

## 2021-06-07 PROCEDURE — 1036F TOBACCO NON-USER: CPT | Performed by: NURSE PRACTITIONER

## 2021-06-07 PROCEDURE — G8427 DOCREV CUR MEDS BY ELIG CLIN: HCPCS | Performed by: NURSE PRACTITIONER

## 2021-06-07 PROCEDURE — 3017F COLORECTAL CA SCREEN DOC REV: CPT | Performed by: NURSE PRACTITIONER

## 2021-06-07 PROCEDURE — 2022F DILAT RTA XM EVC RTNOPTHY: CPT | Performed by: NURSE PRACTITIONER

## 2021-06-07 PROCEDURE — 3046F HEMOGLOBIN A1C LEVEL >9.0%: CPT | Performed by: NURSE PRACTITIONER

## 2021-06-07 PROCEDURE — 4040F PNEUMOC VAC/ADMIN/RCVD: CPT | Performed by: NURSE PRACTITIONER

## 2021-06-07 PROCEDURE — G8417 CALC BMI ABV UP PARAM F/U: HCPCS | Performed by: NURSE PRACTITIONER

## 2021-06-07 RX ORDER — NITROGLYCERIN 0.4 MG/1
0.4 TABLET SUBLINGUAL EVERY 5 MIN PRN
Qty: 25 TABLET | Refills: 3 | Status: SHIPPED | OUTPATIENT
Start: 2021-06-07

## 2021-06-07 RX ORDER — PRASUGREL 10 MG/1
10 TABLET, FILM COATED ORAL DAILY
Qty: 90 TABLET | Refills: 3 | Status: SHIPPED | OUTPATIENT
Start: 2021-06-07 | End: 2022-06-22 | Stop reason: SDUPTHER

## 2021-06-07 RX ORDER — ATORVASTATIN CALCIUM 40 MG/1
40 TABLET, FILM COATED ORAL NIGHTLY
Qty: 90 TABLET | Refills: 3 | Status: SHIPPED | OUTPATIENT
Start: 2021-06-07 | End: 2022-06-22 | Stop reason: SDUPTHER

## 2021-06-07 RX ORDER — PRASUGREL 10 MG/1
10 TABLET, FILM COATED ORAL DAILY
Qty: 90 TABLET | Refills: 3 | Status: SHIPPED | OUTPATIENT
Start: 2021-06-07 | End: 2021-06-07 | Stop reason: SDUPTHER

## 2021-06-07 RX ORDER — LISINOPRIL 30 MG/1
TABLET ORAL
Qty: 90 TABLET | Refills: 3 | Status: SHIPPED | OUTPATIENT
Start: 2021-06-07 | End: 2021-06-07 | Stop reason: SDUPTHER

## 2021-06-07 RX ORDER — NITROGLYCERIN 0.4 MG/1
0.4 TABLET SUBLINGUAL EVERY 5 MIN PRN
Qty: 25 TABLET | Refills: 3 | Status: SHIPPED | OUTPATIENT
Start: 2021-06-07 | End: 2021-06-07 | Stop reason: SDUPTHER

## 2021-06-07 RX ORDER — ATORVASTATIN CALCIUM 40 MG/1
40 TABLET, FILM COATED ORAL NIGHTLY
Qty: 90 TABLET | Refills: 3 | Status: SHIPPED | OUTPATIENT
Start: 2021-06-07 | End: 2021-06-07 | Stop reason: SDUPTHER

## 2021-06-07 RX ORDER — AMLODIPINE BESYLATE 10 MG/1
10 TABLET ORAL DAILY
Qty: 90 TABLET | Refills: 3 | Status: SHIPPED | OUTPATIENT
Start: 2021-06-07 | End: 2021-07-23 | Stop reason: ALTCHOICE

## 2021-06-07 RX ORDER — AMLODIPINE BESYLATE 10 MG/1
10 TABLET ORAL DAILY
Qty: 90 TABLET | Refills: 3 | Status: SHIPPED | OUTPATIENT
Start: 2021-06-07 | End: 2021-06-07 | Stop reason: SDUPTHER

## 2021-06-07 RX ORDER — LISINOPRIL 30 MG/1
TABLET ORAL
Qty: 90 TABLET | Refills: 3 | Status: SHIPPED | OUTPATIENT
Start: 2021-06-07 | End: 2021-06-07 | Stop reason: ALTCHOICE

## 2021-06-07 RX ORDER — IRBESARTAN 150 MG/1
150 TABLET ORAL DAILY
Qty: 30 TABLET | Refills: 3 | Status: SHIPPED | OUTPATIENT
Start: 2021-06-07 | End: 2021-07-23 | Stop reason: ALTCHOICE

## 2021-06-07 ASSESSMENT — ENCOUNTER SYMPTOMS
NAUSEA: 1
EYES NEGATIVE: 1
BACK PAIN: 1
RESPIRATORY NEGATIVE: 1
ABDOMINAL DISTENTION: 1

## 2021-06-07 NOTE — PROGRESS NOTES
Chief Complaint   Patient presents with    Follow-up    Diabetes    Hypertension       Have you seen any other physician or provider since your last visit yes - Dr Jael Samuel, Dr Brent Goltz     Have you had any other diagnostic tests since your last visit? yes - labs for Dr Brent Goltz     Have you changed or stopped any medications since your last visit? yes - stopped Neurontin and started Mirapex      I have recommended that this patient have a immunization for pneumonia but he declines at this time. I have discussed the risks and benefits of this examination with him. The patient verbalizes understanding. Diabetic retinal exam completed this year? Yes                       * If yes please have patient sign a records release to obtain record to update Health Maintenance, Dr Brent Goltz                          Patient is here to follow up on diabetes and medication management. His sugar has been  in the mornings. Patient has been nervous and easily agitated.  Wife is asking for a change from Citalopram.

## 2021-06-07 NOTE — PROGRESS NOTES
CAPS One po daily. 90 capsule 3    B-D UF III MINI PEN NEEDLES 31G X 5 MM MISC USE TO INJECT INSULIN EVERYDAY 100 each 2    citalopram (CELEXA) 40 MG tablet Take 1 tablet by mouth daily 90 tablet 3    tamsulosin (FLOMAX) 0.4 MG capsule Take 1 capsule by mouth daily 90 capsule 3    doxazosin (CARDURA) 4 MG tablet Take 1 tablet by mouth nightly 90 tablet 3    Insulin Pen Needle (ADVOCATE INSULIN PEN NEEDLES) 31G X 5 MM MISC USE TO INJECT INSULIN EVERY DAY DX: E11.42  each 3    Microlet Lancets MISC 1 each by Does not apply route 3 times daily DX: E11.42  each 3    Insulin Pen Needle 31G X 5 MM MISC USE TO INJECT INSULIN EVERY  each 3    Cholecalciferol (VITAMIN D3) 2000 units CAPS Take 2 capsules by mouth daily       loratadine (CLARITIN) 10 MG tablet TAKE ONE TABLET BY MOUTH EVERY DAY 30 tablet 3    therapeutic multivitamin-minerals (THERAGRAN-M) tablet Take 1 tablet by mouth daily.  [DISCONTINUED] omeprazole (PRILOSEC) 20 MG capsule Take 1 capsule by mouth 2 times daily. 180 capsule 3     No current facility-administered medications on file prior to visit. Review of Systems   Constitutional: Negative. HENT: Negative. Eyes: Negative. Respiratory: Negative. Cardiovascular: Negative. Gastrointestinal: Positive for abdominal distention (bloating) and nausea. Genitourinary: Negative. Musculoskeletal: Positive for arthralgias (left shoulder) and back pain. Skin: Negative. Neurological: Negative. Psychiatric/Behavioral: Positive for agitation and behavioral problems. OBJECTIVE:  BP (!) 150/46 (Site: Right Upper Arm, Position: Sitting, Cuff Size: Medium Adult)   Pulse 54   Temp 97.6 °F (36.4 °C) (Temporal)   Resp 16   Ht 5' 7\" (1.702 m)   Wt 222 lb 3.2 oz (100.8 kg)   SpO2 98% Comment: room air  BMI 34.80 kg/m²    Physical Exam  Vitals and nursing note reviewed. Constitutional:       Appearance: He is well-developed.    HENT:      Head: Normocephalic and atraumatic. Eyes:      Conjunctiva/sclera: Conjunctivae normal.      Pupils: Pupils are equal, round, and reactive to light. Neck:      Thyroid: No thyromegaly. Vascular: No JVD. Cardiovascular:      Rate and Rhythm: Normal rate and regular rhythm. Heart sounds: No murmur heard. No friction rub. No gallop. Pulmonary:      Effort: Pulmonary effort is normal. No respiratory distress. Breath sounds: Normal breath sounds. No wheezing or rales. Abdominal:      General: Bowel sounds are normal. There is distension (mild). Palpations: Abdomen is soft. Tenderness: There is no abdominal tenderness. There is no guarding. Musculoskeletal:         General: No tenderness. Cervical back: Normal range of motion and neck supple. Skin:     General: Skin is warm and dry. Findings: No rash. Neurological:      Mental Status: He is alert and oriented to person, place, and time. Psychiatric:         Mood and Affect: Affect is flat. Judgment: Judgment normal.         No results found for requested labs within last 30 days. Hemoglobin A1C (%)   Date Value   10/07/2020 8.9 (H)     Microalbumin, Random Urine (mg/dL)   Date Value   06/05/2017 1.50     LDL Calculated (mg/dL)   Date Value   12/15/2020 82           Lab Results   Component Value Date    TSH 1.90 12/15/2020         ASSESSMENT/PLAN:     Cleola Schaumann was seen today for follow-up, diabetes and hypertension. Diagnoses and all orders for this visit:    Gastroparesis  -     lipase-protease-amylase (CREON) 10470-49794 units delayed release capsule; Take by mouth 3 times daily (with meals)    Essential hypertension  -    mouth daily Indications: High Blood Pressure Disorder  -     irbesartan (AVAPRO) 150 MG tablet; Take 1 tablet by mouth daily  -     Comprehensive Metabolic Panel;  Future  -     prasugrel (EFFIENT) 10 MG TABS; Take 1 tablet by mouth daily  -     Discontinue: lisinopril (PRINIVIL;ZESTRIL) 30 MG tablet; TAKE 1 TABLET DAILY  -     amLODIPine (NORVASC) 10 MG tablet; Take 1 tablet by mouth daily Indications: High Blood Pressure Disorder  Stop Lisionpril and start Avapro 150 mg may need 300 mg. Hyperlipidemia, unspecified hyperlipidemia type  -     Discontinue: atorvastatin (LIPITOR) 40 MG tablet; Take 1 tablet by mouth nightly  -     atorvastatin (LIPITOR) 40 MG tablet; Take 1 tablet by mouth nightly    ESTRIL) 30 MG tablet; TAKE 1 TABLET DAILY    Type 2 diabetes mellitus with diabetic polyneuropathy, without long-term current use of insulin ([facility] [address]  Stop Januvia as studies do not support the use of both Januvia and Phil Erichsen together.   -     CBC; Future  -     Lipid Panel; Future  -     Hemoglobin A1C; Future  -     dapagliflozin (FARXIGA) 10 MG tablet; Take 1 tablet by mouth every morning    Mood disorder (HCC)  -     sertraline (ZOLOFT) 50 MG tablet; Take 1 tablet by mouth daily    Arthritis  -     External Referral To Rheumatology    Thyroid disorder screen  -     TSH without Reflex; Future    Vitamin D deficiency  -     Vitamin B12 & Folate; Future  -     Vitamin D 25 Hydroxy;  Future    Other orders  -     Discontinue: nitroGLYCERIN (NITROSTAT) 0.4 MG SL tablet; Place 1 tablet under the tongue every 5 minutes as needed for Chest pain  -     nitroGLYCERIN (NITROSTAT) 0.4 MG SL tablet; Place 1 tablet under the tongue every 5 minutes as needed for Chest pain      Medications Discontinued During This Encounter   Medication Reason    Ginger, Zingiber officinalis, (GINGER ROOT) 432 MG CAPS DUPLICATE    glucose blood VI test strips (EXACTECH TEST) strip DUPLICATE    baclofen (LIORESAL) 10 MG tablet Therapy completed    SITagliptin (JANUVIA) 50 MG tablet Therapy completed    lisinopril (PRINIVIL;ZESTRIL) 30 MG tablet Therapy completed    nitroGLYCERIN (NITROSTAT) 0.4 MG SL tablet REORDER    dapagliflozin (FARXIGA) 10 MG tablet REORDER    amLODIPine (NORVASC) 10 MG tablet REORDER    prasugrel

## 2021-06-08 ENCOUNTER — TELEPHONE (OUTPATIENT)
Dept: PRIMARY CARE CLINIC | Age: 76
End: 2021-06-08

## 2021-06-23 DIAGNOSIS — I10 ESSENTIAL HYPERTENSION: ICD-10-CM

## 2021-06-23 RX ORDER — DOXAZOSIN MESYLATE 4 MG/1
4 TABLET ORAL NIGHTLY
Qty: 90 TABLET | Refills: 1 | Status: SHIPPED | OUTPATIENT
Start: 2021-06-23 | End: 2021-10-04 | Stop reason: SDUPTHER

## 2021-07-15 ENCOUNTER — HOSPITAL ENCOUNTER (OUTPATIENT)
Facility: HOSPITAL | Age: 76
Discharge: HOME | End: 2021-07-15
Payer: MEDICARE

## 2021-07-15 DIAGNOSIS — E11.42 TYPE 2 DIABETES MELLITUS WITH DIABETIC POLYNEUROPATHY, WITHOUT LONG-TERM CURRENT USE OF INSULIN (HCC): ICD-10-CM

## 2021-07-15 DIAGNOSIS — E55.9 VITAMIN D DEFICIENCY: ICD-10-CM

## 2021-07-15 DIAGNOSIS — Z13.29 THYROID DISORDER SCREEN: ICD-10-CM

## 2021-07-15 DIAGNOSIS — I10 ESSENTIAL HYPERTENSION: ICD-10-CM

## 2021-07-15 LAB
A/G RATIO: 1.5 (ref 0.8–2)
ALBUMIN SERPL-MCNC: 3.8 G/DL (ref 3.4–4.8)
ALP BLD-CCNC: 48 U/L (ref 25–100)
ALT SERPL-CCNC: 17 U/L (ref 4–36)
ANION GAP SERPL CALCULATED.3IONS-SCNC: 8 MMOL/L (ref 3–16)
AST SERPL-CCNC: 14 U/L (ref 8–33)
BILIRUB SERPL-MCNC: 0.3 MG/DL (ref 0.3–1.2)
BUN BLDV-MCNC: 26 MG/DL (ref 6–20)
CALCIUM SERPL-MCNC: 9.2 MG/DL (ref 8.5–10.5)
CHLORIDE BLD-SCNC: 104 MMOL/L (ref 98–107)
CHOLESTEROL, TOTAL: 159 MG/DL (ref 0–200)
CO2: 30 MMOL/L (ref 20–30)
CREAT SERPL-MCNC: 1.2 MG/DL (ref 0.4–1.2)
FOLATE: >20 NG/ML
GFR AFRICAN AMERICAN: >59
GFR NON-AFRICAN AMERICAN: 59
GLOBULIN: 2.5 G/DL
GLUCOSE BLD-MCNC: 155 MG/DL (ref 74–106)
HBA1C MFR BLD: 9.1 %
HCT VFR BLD CALC: 36.8 % (ref 40–54)
HDLC SERPL-MCNC: 31 MG/DL (ref 40–60)
HEMOGLOBIN: 12 G/DL (ref 13–18)
LDL CHOLESTEROL CALCULATED: 70 MG/DL
MCH RBC QN AUTO: 28.8 PG (ref 27–32)
MCHC RBC AUTO-ENTMCNC: 32.6 G/DL (ref 31–35)
MCV RBC AUTO: 88.2 FL (ref 80–100)
PDW BLD-RTO: 12.6 % (ref 11–16)
PLATELET # BLD: 283 K/UL (ref 150–400)
PMV BLD AUTO: 11.1 FL (ref 6–10)
POTASSIUM SERPL-SCNC: 4.5 MMOL/L (ref 3.4–5.1)
RBC # BLD: 4.17 M/UL (ref 4.5–6)
SODIUM BLD-SCNC: 142 MMOL/L (ref 136–145)
TOTAL PROTEIN: 6.3 G/DL (ref 6.4–8.3)
TRIGL SERPL-MCNC: 288 MG/DL (ref 0–249)
TSH SERPL DL<=0.05 MIU/L-ACNC: 1.79 UIU/ML (ref 0.27–4.2)
VITAMIN B-12: 730 PG/ML (ref 211–911)
VITAMIN D 25-HYDROXY: 44.3 (ref 32–100)
VLDLC SERPL CALC-MCNC: 58 MG/DL
WBC # BLD: 8.5 K/UL (ref 4–11)

## 2021-07-15 PROCEDURE — 80061 LIPID PANEL: CPT

## 2021-07-15 PROCEDURE — 83036 HEMOGLOBIN GLYCOSYLATED A1C: CPT

## 2021-07-15 PROCEDURE — 85027 COMPLETE CBC AUTOMATED: CPT

## 2021-07-15 PROCEDURE — 82607 VITAMIN B-12: CPT

## 2021-07-15 PROCEDURE — 84443 ASSAY THYROID STIM HORMONE: CPT

## 2021-07-15 PROCEDURE — 82746 ASSAY OF FOLIC ACID SERUM: CPT

## 2021-07-15 PROCEDURE — 82306 VITAMIN D 25 HYDROXY: CPT

## 2021-07-15 PROCEDURE — 80053 COMPREHEN METABOLIC PANEL: CPT

## 2021-07-19 ENCOUNTER — OFFICE VISIT (OUTPATIENT)
Dept: PRIMARY CARE CLINIC | Age: 76
End: 2021-07-19
Payer: MEDICARE

## 2021-07-19 VITALS
OXYGEN SATURATION: 98 % | TEMPERATURE: 97.6 F | BODY MASS INDEX: 35.27 KG/M2 | WEIGHT: 225.2 LBS | DIASTOLIC BLOOD PRESSURE: 50 MMHG | HEART RATE: 59 BPM | SYSTOLIC BLOOD PRESSURE: 172 MMHG

## 2021-07-19 DIAGNOSIS — E11.42 TYPE 2 DIABETES MELLITUS WITH DIABETIC POLYNEUROPATHY, WITHOUT LONG-TERM CURRENT USE OF INSULIN (HCC): ICD-10-CM

## 2021-07-19 DIAGNOSIS — F39 MOOD DISORDER (HCC): ICD-10-CM

## 2021-07-19 DIAGNOSIS — M25.50 ARTHRALGIA, UNSPECIFIED JOINT: ICD-10-CM

## 2021-07-19 DIAGNOSIS — E66.01 SEVERE OBESITY (BMI 35.0-35.9 WITH COMORBIDITY) (HCC): ICD-10-CM

## 2021-07-19 DIAGNOSIS — I10 ESSENTIAL HYPERTENSION: Primary | ICD-10-CM

## 2021-07-19 PROCEDURE — 4040F PNEUMOC VAC/ADMIN/RCVD: CPT | Performed by: NURSE PRACTITIONER

## 2021-07-19 PROCEDURE — 3017F COLORECTAL CA SCREEN DOC REV: CPT | Performed by: NURSE PRACTITIONER

## 2021-07-19 PROCEDURE — 3046F HEMOGLOBIN A1C LEVEL >9.0%: CPT | Performed by: NURSE PRACTITIONER

## 2021-07-19 PROCEDURE — 1123F ACP DISCUSS/DSCN MKR DOCD: CPT | Performed by: NURSE PRACTITIONER

## 2021-07-19 PROCEDURE — 1036F TOBACCO NON-USER: CPT | Performed by: NURSE PRACTITIONER

## 2021-07-19 PROCEDURE — 2022F DILAT RTA XM EVC RTNOPTHY: CPT | Performed by: NURSE PRACTITIONER

## 2021-07-19 PROCEDURE — 99214 OFFICE O/P EST MOD 30 MIN: CPT | Performed by: NURSE PRACTITIONER

## 2021-07-19 PROCEDURE — G8417 CALC BMI ABV UP PARAM F/U: HCPCS | Performed by: NURSE PRACTITIONER

## 2021-07-19 PROCEDURE — G8427 DOCREV CUR MEDS BY ELIG CLIN: HCPCS | Performed by: NURSE PRACTITIONER

## 2021-07-19 RX ORDER — HYDROCODONE BITARTRATE AND ACETAMINOPHEN 5; 325 MG/1; MG/1
1 TABLET ORAL EVERY 4 HOURS PRN
Qty: 18 TABLET | Refills: 0 | Status: SHIPPED | OUTPATIENT
Start: 2021-07-19 | End: 2021-07-22

## 2021-07-19 RX ORDER — IRBESARTAN 300 MG/1
300 TABLET ORAL DAILY
Qty: 90 TABLET | Refills: 3 | Status: SHIPPED | OUTPATIENT
Start: 2021-07-19 | End: 2021-10-04 | Stop reason: ALTCHOICE

## 2021-07-19 NOTE — PROGRESS NOTES
SUBJECTIVE:    Patient ID: Emigdio Wilkerson is a 77 y.o. male. Chief Complaint   Patient presents with    Follow-up    Diabetes    Hypertension    Hyperlipidemia         HPI:  He comes in with his wife about his diabetes hypertension hyperlipidemia. He is having more problems with his blood pressure. He is taking his blood pressure medication but continues to have elevated blood pressure readings. He denies any chest pain shortness of breath. Does have some elevated blood sugars. His wife says he refuses to work on his diet he eats [employer] almost daily he exist primarily on hamburgers and [employer] fries. By this his blood sugar has not been extremely elevated. He continues to complain of chronic joint pain he has and has his appointment for rheumatology scheduled yet. He does have his lab work completed. Patient's medications,allergies, past medical, surgical, social and family histories were reviewed and updated as appropriate.   .  Current Outpatient Medications on File Prior to Visit   Medication Sig Dispense Refill    doxazosin (CARDURA) 4 MG tablet Take 1 tablet by mouth nightly 90 tablet 1    prasugrel (EFFIENT) 10 MG TABS Take 1 tablet by mouth daily 90 tablet 3    atorvastatin (LIPITOR) 40 MG tablet Take 1 tablet by mouth nightly 90 tablet 3    nitroGLYCERIN (NITROSTAT) 0.4 MG SL tablet Place 1 tablet under the tongue every 5 minutes as needed for Chest pain 25 tablet 3    dapagliflozin (FARXIGA) 10 MG tablet Take 1 tablet by mouth every morning 90 tablet 3    dexlansoprazole (DEXILANT) 60 MG CPDR delayed release capsule Take 1 capsule by mouth daily 90 capsule 1    pramipexole (MIRAPEX) 1 MG tablet Take 1 mg by mouth nightly      TRUE METRIX BLOOD GLUCOSE TEST strip USE TO TEST BLOOD SUGAR THREE TIMES DAILY AND AS NEEDED 300 strip 3    furosemide (LASIX) 20 MG tablet Take 1 tablet by mouth See Admin Instructions Indications: takes on monday and friday Twice a week (Patient taking differently: Take 20 mg by mouth See Admin Instructions Indications: takes on monday and friday 40 mg three times a week) 180 tablet 3    Insulin Degludec (TRESIBA FLEXTOUCH) 100 UNIT/ML SOPN Inject 70 Units into the skin daily DX: E11.42 KX (Patient taking differently: Inject 70 Units into the skin daily 72 units) 25 pen 3    finasteride (PROSCAR) 5 MG tablet Take 1 tablet by mouth daily 90 tablet 3    carvedilol (COREG) 12.5 MG tablet Take 0.5 tablets by mouth 2 times daily 180 tablet 3    Thalia, Zingiber officinalis, (THALIA ROOT) 250 MG CAPS One po daily. 90 capsule 3    B-D UF III MINI PEN NEEDLES 31G X 5 MM MISC USE TO INJECT INSULIN EVERYDAY 100 each 2    tamsulosin (FLOMAX) 0.4 MG capsule Take 1 capsule by mouth daily 90 capsule 3    Insulin Pen Needle (ADVOCATE INSULIN PEN NEEDLES) 31G X 5 MM MISC USE TO INJECT INSULIN EVERY DAY DX: E11.42  each 3    Microlet Lancets MISC 1 each by Does not apply route 3 times daily DX: E11.42  each 3    Insulin Pen Needle 31G X 5 MM MISC USE TO INJECT INSULIN EVERY  each 3    Cholecalciferol (VITAMIN D3) 2000 units CAPS Take 2 capsules by mouth daily       loratadine (CLARITIN) 10 MG tablet TAKE ONE TABLET BY MOUTH EVERY DAY 30 tablet 3    therapeutic multivitamin-minerals (THERAGRAN-M) tablet Take 1 tablet by mouth daily.  lipase-protease-amylase (CREON) 13138-66014 units delayed release capsule Take by mouth 3 times daily (with meals) 90 capsule 2    citalopram (CELEXA) 40 MG tablet Take 1 tablet by mouth daily 90 tablet 3    [DISCONTINUED] omeprazole (PRILOSEC) 20 MG capsule Take 1 capsule by mouth 2 times daily. 180 capsule 3     No current facility-administered medications on file prior to visit.        Review of Systems    OBJECTIVE:  BP (!) 172/50 (Site: Left Upper Arm, Position: Sitting, Cuff Size: Large Adult)   Pulse 59   Temp 97.6 °F (36.4 °C) (Temporal)   Wt 225 lb 3.2 oz (102.2 kg)   SpO2 98% Comment: room air  BMI 35.27 kg/m²    Physical Exam    Results in Past 30 Days  Result Component Current Result Ref Range Previous Result Ref Range   Albumin/Globulin Ratio 1.5 (7/15/2021) 0.8 - 2.0 Not in Time Range    Albumin 3.8 (7/15/2021) 3.4 - 4.8 g/dL Not in Time Range    Alkaline Phosphatase 48 (7/15/2021) 25 - 100 U/L Not in Time Range    ALT 17 (7/15/2021) 4 - 36 U/L Not in Time Range    AST 14 (7/15/2021) 8 - 33 U/L Not in Time Range    BUN 26 (H) (7/15/2021) 6 - 20 mg/dL Not in Time Range    Calcium 9.2 (7/15/2021) 8.5 - 10.5 mg/dL Not in Time Range    Chloride 104 (7/15/2021) 98 - 107 mmol/L Not in Time Range    CO2 30 (7/15/2021) 20 - 30 mmol/L Not in Time Range    CREATININE 1.2 (7/15/2021) 0.4 - 1.2 mg/dL Not in Time Range    GFR  >59 (7/15/2021) >59 Not in Time Range    GFR Non- 59 (L) (7/15/2021) >59 Not in Time Range    Globulin 2.5 (7/15/2021) g/dL Not in Time Range    Glucose 155 (H) (7/15/2021) 74 - 106 mg/dL Not in Time Range    Potassium 4.5 (7/15/2021) 3.4 - 5.1 mmol/L Not in Time Range    Sodium 142 (7/15/2021) 136 - 145 mmol/L Not in Time Range    Total Bilirubin 0.3 (7/15/2021) 0.3 - 1.2 mg/dL Not in Time Range    Total Protein 6.3 (L) (7/15/2021) 6.4 - 8.3 g/dL Not in Time Range        Hemoglobin A1C (%)   Date Value   07/15/2021 9.1 (H)     Microalbumin, Random Urine (mg/dL)   Date Value   06/05/2017 1.50     LDL Calculated (mg/dL)   Date Value   07/15/2021 70           Lab Results   Component Value Date    TSH 1.79 07/15/2021         ASSESSMENT/PLAN:     Edis Whitehead was seen today for follow-up, diabetes, hypertension and hyperlipidemia. Diagnoses and all orders for this visit:    Essential hypertension  -     irbesartan (AVAPRO) 300 MG tablet; Take 1 tablet by mouth daily  We will increase his Avapro. And have him check his blood pressures at home. Type 2 diabetes mellitus with diabetic polyneuropathy, without long-term current use of insulin (HCC)  Stable.  I advised him regarding diabetic diet, exercise and weight control. Also, I advised him to stay on the current medication, monitor his fingerstick closely. I am going to check the A1c every few months. I will check microalbumin on a yearly basis. I have also advised him to have a yearly eye exam and to monitor his feet closely. Along with instruction of possible complications related to diabetes, even with good control. A1C will be checked  in few months. Lab Results   Component Value Date    LABA1C 9.1 (H) 07/15/2021       Arthralgia, unspecified joint  -     HYDROcodone-acetaminophen (NORCO) 5-325 MG per tablet; Take 1 tablet by mouth every 4 hours as needed for Pain for up to 3 days. Intended supply: 3 days. Take lowest dose possible to manage pain    Mood disorder (HCC)  -     sertraline (ZOLOFT) 50 MG tablet; Take 1 tablet by mouth daily    Severe obesity (BMI 35.0-35.9 with comorbidity) ([address]  Had a long discussion with him about his diet and needing to lose weight.     Medications Discontinued During This Encounter   Medication Reason    sertraline (ZOLOFT) 50 MG tablet REORDER

## 2021-07-19 NOTE — PROGRESS NOTES
Chief Complaint   Patient presents with    Follow-up    Diabetes    Hypertension    Hyperlipidemia       Have you seen any other physician or provider since your last visit Yes -     Have you had any other diagnostic tests since your last visit? yes - Labs    Have you changed or stopped any medications since your last visit? yes - Stopped Eugeniaon     Patient is here for 6 week follow-up medication changes. Patient is still waiting on Rheumatology appointment.

## 2021-07-23 ENCOUNTER — OFFICE VISIT (OUTPATIENT)
Dept: PRIMARY CARE CLINIC | Age: 76
End: 2021-07-23
Payer: MEDICARE

## 2021-07-23 VITALS
WEIGHT: 225 LBS | OXYGEN SATURATION: 97 % | DIASTOLIC BLOOD PRESSURE: 55 MMHG | SYSTOLIC BLOOD PRESSURE: 169 MMHG | HEART RATE: 60 BPM | TEMPERATURE: 97 F | BODY MASS INDEX: 35.24 KG/M2

## 2021-07-23 DIAGNOSIS — I10 ESSENTIAL HYPERTENSION: Primary | ICD-10-CM

## 2021-07-23 DIAGNOSIS — I10 ESSENTIAL HYPERTENSION: ICD-10-CM

## 2021-07-23 DIAGNOSIS — I50.9 ACUTE ON CHRONIC CONGESTIVE HEART FAILURE, UNSPECIFIED HEART FAILURE TYPE (HCC): ICD-10-CM

## 2021-07-23 PROCEDURE — 99214 OFFICE O/P EST MOD 30 MIN: CPT | Performed by: PHYSICIAN ASSISTANT

## 2021-07-23 RX ORDER — FUROSEMIDE 40 MG/1
40 TABLET ORAL DAILY
Qty: 90 TABLET | Refills: 1 | Status: SHIPPED | OUTPATIENT
Start: 2021-07-23 | End: 2021-08-25 | Stop reason: SDUPTHER

## 2021-07-23 RX ORDER — NIFEDIPINE 90 MG/1
90 TABLET, EXTENDED RELEASE ORAL DAILY
Qty: 90 TABLET | Refills: 1 | Status: SHIPPED | OUTPATIENT
Start: 2021-07-23 | End: 2021-10-27

## 2021-07-23 NOTE — PROGRESS NOTES
Sapna Spears 77 y.o. presents today for   Chief Complaint   Patient presents with    Leg Swelling    Hypertension        HPI:  Sapna Spears states he has had leg swelling for a month with elevated blood pressure and was told by pcp to return if symptoms hadn't improved. Patient has been taking increased dose of avapro and lasix 40 mg three times a week now but edema hasn't improved. Patient denies shortness of breath or chest pain. No leg redness or pain. Blood pressures 023-856 systolic at home. Family History   Problem Relation Age of Onset    Heart Attack Mother     Heart Disease Father     Diabetes Sister         Social History     Socioeconomic History    Marital status:      Spouse name: Not on file    Number of children: Not on file    Years of education: Not on file    Highest education level: Not on file   Occupational History    Not on file   Tobacco Use    Smoking status: Never Smoker    Smokeless tobacco: Never Used   Substance and Sexual Activity    Alcohol use: No    Drug use: No    Sexual activity: Not on file   Other Topics Concern    Not on file   Social History Narrative    Not on file     Social Determinants of Health     Financial Resource Strain:     Difficulty of Paying Living Expenses:    Food Insecurity:     Worried About Running Out of Food in the Last Year:     [address] in the Last Year:    Transportation Needs:     Lack of Transportation (Medical):      Lack of Transportation (Non-Medical):    Physical Activity:     Days of Exercise per Week:     Minutes of Exercise per Session:    Stress:     Feeling of Stress :    Social Connections:     Frequency of Communication with Friends and Family:     Frequency of Social Gatherings with Friends and Family:     Attends Sabianist Services:     Active Member of Clubs or Organizations:     Attends Club or Organization Meetings:     Marital Status:    Intimate Partner Violence:     Fear of Current or Ex-Partner:     Emotionally Abused:     Physically Abused:     Sexually Abused:         Past Surgical History:   Procedure Laterality Date    CORONARY ANGIOPLASTY WITH STENT PLACEMENT  04/2013    Dr. Cintia Talamantes, stents in 2009, 2011, 2013, ptca 2014    CORONARY ARTERY BYPASS GRAFT  2001    x3    EYE SURGERY Left     POLYPECTOMY  1999    colon ca        Past Medical History:   Diagnosis Date    Anemia 2019    Anxiety     Bleeding stomach ulcer 2019    Dr Sweetie Arias CAD (coronary artery disease)     Cancer ([facility] [address]     malignant colon polyp    Depression     Enlarged prostate     GERD (gastroesophageal reflux disease)     Hyperlipidemia     Hypertension     Sleep apnea     Type II or unspecified type diabetes mellitus without mention of complication, not stated as uncontrolled         Current Outpatient Medications   Medication Sig Dispense Refill    furosemide (LASIX) 40 MG tablet Take 1 tablet by mouth daily 90 tablet 1    NIFEdipine (PROCARDIA XL) 90 MG extended release tablet Take 1 tablet by mouth daily 90 tablet 1    irbesartan (AVAPRO) 300 MG tablet Take 1 tablet by mouth daily 90 tablet 3    sertraline (ZOLOFT) 50 MG tablet Take 1 tablet by mouth daily 90 tablet 3    doxazosin (CARDURA) 4 MG tablet Take 1 tablet by mouth nightly 90 tablet 1    lipase-protease-amylase (CREON) 15180-33083 units delayed release capsule Take by mouth 3 times daily (with meals) 90 capsule 2    prasugrel (EFFIENT) 10 MG TABS Take 1 tablet by mouth daily 90 tablet 3    atorvastatin (LIPITOR) 40 MG tablet Take 1 tablet by mouth nightly 90 tablet 3    nitroGLYCERIN (NITROSTAT) 0.4 MG SL tablet Place 1 tablet under the tongue every 5 minutes as needed for Chest pain 25 tablet 3    dapagliflozin (FARXIGA) 10 MG tablet Take 1 tablet by mouth every morning 90 tablet 3    dexlansoprazole (DEXILANT) 60 MG CPDR delayed release capsule Take 1 capsule by mouth daily 90 capsule 1    pramipexole (MIRAPEX) 1 MG tablet Take 1 mg by mouth nightly      TRUE METRIX BLOOD GLUCOSE TEST strip USE TO TEST BLOOD SUGAR THREE TIMES DAILY AND AS NEEDED 300 strip 3    furosemide (LASIX) 20 MG tablet Take 1 tablet by mouth See Admin Instructions Indications: takes on monday and friday Twice a week (Patient taking differently: Take 20 mg by mouth See Admin Instructions Indications: takes on monday and friday 40 mg three times a week) 180 tablet 3    Insulin Degludec (TRESIBA FLEXTOUCH) 100 UNIT/ML SOPN Inject 70 Units into the skin daily DX: E11.42 KX (Patient taking differently: Inject 70 Units into the skin daily 72 units) 25 pen 3    finasteride (PROSCAR) 5 MG tablet Take 1 tablet by mouth daily 90 tablet 3    carvedilol (COREG) 12.5 MG tablet Take 0.5 tablets by mouth 2 times daily 180 tablet 3    Ginger, Zingiber officinalis, (GINGER ROOT) 250 MG CAPS One po daily. 90 capsule 3    B-D UF III MINI PEN NEEDLES 31G X 5 MM MISC USE TO INJECT INSULIN EVERYDAY 100 each 2    citalopram (CELEXA) 40 MG tablet Take 1 tablet by mouth daily 90 tablet 3    tamsulosin (FLOMAX) 0.4 MG capsule Take 1 capsule by mouth daily 90 capsule 3    Insulin Pen Needle (ADVOCATE INSULIN PEN NEEDLES) 31G X 5 MM MISC USE TO INJECT INSULIN EVERY DAY DX: E11.42  each 3    Microlet Lancets MISC 1 each by Does not apply route 3 times daily DX: E11.42  each 3    Insulin Pen Needle 31G X 5 MM MISC USE TO INJECT INSULIN EVERY  each 3    Cholecalciferol (VITAMIN D3) 2000 units CAPS Take 2 capsules by mouth daily       loratadine (CLARITIN) 10 MG tablet TAKE ONE TABLET BY MOUTH EVERY DAY 30 tablet 3    therapeutic multivitamin-minerals (THERAGRAN-M) tablet Take 1 tablet by mouth daily. No current facility-administered medications for this visit.       Lab Results   Component Value Date     07/15/2021    K 4.5 07/15/2021     07/15/2021    CO2 30 07/15/2021    BUN 26 07/15/2021    CREATININE 1.2 07/15/2021 CREATININE 1.0 03/01/2011    GLUCOSE 155 07/15/2021    GLUCOSE 120 03/01/2011    CALCIUM 9.2 07/15/2021        Review of Systems   No chest pain or shortness of breath but diffuse muscle aches      BP (!) 169/55 (Site: Left Upper Arm, Position: Sitting, Cuff Size: Large Adult)   Pulse 60   Temp 97 °F (36.1 °C) (Temporal)   Wt 225 lb (102.1 kg)   SpO2 97% Comment: room air  BMI 35.24 kg/m²      Physical Exam  Constitutional:       Appearance: Normal appearance. He is obese. He is not ill-appearing or diaphoretic. HENT:      Head: Normocephalic and atraumatic. Right Ear: External ear normal.      Left Ear: External ear normal.      Nose: Nose normal.      Mouth/Throat:      Mouth: Mucous membranes are moist.      Pharynx: Oropharynx is clear. Eyes:      Extraocular Movements: Extraocular movements intact. Conjunctiva/sclera: Conjunctivae normal.   Neck:      Comments: No jvd  Cardiovascular:      Rate and Rhythm: Normal rate and regular rhythm. Pulses: Normal pulses. Pulmonary:      Effort: Pulmonary effort is normal.      Breath sounds: Normal breath sounds. Musculoskeletal:         General: Normal range of motion. Cervical back: Normal range of motion and neck supple. Right lower leg: Edema present. Left lower leg: Edema (1-2 plus bilaterally pitting) present. Skin:     General: Skin is warm and dry. Neurological:      General: No focal deficit present. Mental Status: He is alert and oriented to person, place, and time. Mental status is at baseline. Psychiatric:         Mood and Affect: Mood normal.         Thought Content: Thought content normal.         Judgment: Judgment normal.            ASSESSMENT/PLAN    1. Essential hypertension  Stop amlodipine and start procardia  Continue to monitor at home and return if not improving in seven days  - furosemide (LASIX) 40 MG tablet; Take 1 tablet by mouth daily  Dispense: 90 tablet;  Refill: 1  - NIFEdipine (PROCARDIA XL) 90 MG extended release tablet; Take 1 tablet by mouth daily  Dispense: 90 tablet; Refill: 1    2. Acute on chronic congestive heart failure, unspecified heart failure type ([facility]  Watch fluid intake, decrease soda intake, start taking lasix daily until edema has resolved, control blood pressure  - furosemide (LASIX) 40 MG tablet; Take 1 tablet by mouth daily  Dispense: 90 tablet;  Refill: [facility], [place]

## 2021-07-30 ENCOUNTER — TELEPHONE (OUTPATIENT)
Dept: PRIMARY CARE CLINIC | Age: 76
End: 2021-07-30

## 2021-07-30 DIAGNOSIS — I10 HYPERTENSION, UNCONTROLLED: Primary | ICD-10-CM

## 2021-07-30 RX ORDER — HYDRALAZINE HYDROCHLORIDE 25 MG/1
25 TABLET, FILM COATED ORAL 3 TIMES DAILY
Qty: 90 TABLET | Refills: 0 | Status: SHIPPED | OUTPATIENT
Start: 2021-07-30 | End: 2021-08-25 | Stop reason: ALTCHOICE

## 2021-07-30 NOTE — TELEPHONE ENCOUNTER
Aníbal Kirk called and spoke with patient's wife. She is afraid that Binta Patiño has Covid. Per Aníbal Kirk he is coming to the same day clinic tomorrow to get tested. Aníbal Kirk started him on Hydralazine 25 mg tid.

## 2021-07-30 NOTE — TELEPHONE ENCOUNTER
Patient wife called this morning stating that Aquilino's feet and legs were still swollen and he is no better.

## 2021-07-31 ENCOUNTER — OFFICE VISIT (OUTPATIENT)
Dept: PRIMARY CARE CLINIC | Age: 76
End: 2021-07-31
Payer: MEDICARE

## 2021-07-31 VITALS — OXYGEN SATURATION: 98 % | TEMPERATURE: 97.5 F | HEART RATE: 66 BPM

## 2021-07-31 DIAGNOSIS — Z20.822 CLOSE EXPOSURE TO COVID-19 VIRUS: Primary | ICD-10-CM

## 2021-07-31 DIAGNOSIS — J30.2 SEASONAL ALLERGIES: ICD-10-CM

## 2021-07-31 LAB
Lab: NORMAL
PERFORMING INSTRUMENT: NORMAL
QC PASS/FAIL: NORMAL
SARS-COV-2, POC: NORMAL

## 2021-07-31 PROCEDURE — 3017F COLORECTAL CA SCREEN DOC REV: CPT | Performed by: NURSE PRACTITIONER

## 2021-07-31 PROCEDURE — G8417 CALC BMI ABV UP PARAM F/U: HCPCS | Performed by: NURSE PRACTITIONER

## 2021-07-31 PROCEDURE — 1123F ACP DISCUSS/DSCN MKR DOCD: CPT | Performed by: NURSE PRACTITIONER

## 2021-07-31 PROCEDURE — G8427 DOCREV CUR MEDS BY ELIG CLIN: HCPCS | Performed by: NURSE PRACTITIONER

## 2021-07-31 PROCEDURE — 4040F PNEUMOC VAC/ADMIN/RCVD: CPT | Performed by: NURSE PRACTITIONER

## 2021-07-31 PROCEDURE — 1036F TOBACCO NON-USER: CPT | Performed by: NURSE PRACTITIONER

## 2021-07-31 PROCEDURE — 87426 SARSCOV CORONAVIRUS AG IA: CPT | Performed by: NURSE PRACTITIONER

## 2021-07-31 PROCEDURE — 99213 OFFICE O/P EST LOW 20 MIN: CPT | Performed by: NURSE PRACTITIONER

## 2021-07-31 RX ORDER — FLUTICASONE PROPIONATE 50 MCG
1 SPRAY, SUSPENSION (ML) NASAL DAILY
Qty: 2 BOTTLE | Refills: 1 | Status: SHIPPED | OUTPATIENT
Start: 2021-07-31

## 2021-07-31 NOTE — PROGRESS NOTES
Chief Complaint   Patient presents with    Covid Testing           2021    Ashley Martinez (:  1945) is a 77 y.o. male, here requesting COVID-19 testing    History of Present Illness  Close contact with a lab confirmed COVID-19 patient within 14 days of symptom onset     Vitals:    21 1149   BP: Comment: BP Cuff Unable to Read   Pulse: 66   Temp: 97.5 °F (36.4 °C)   TempSrc: Temporal   SpO2: 98%       ASSESSMENT  Screening for COVID-19/ Viral disease    PLAN  POCT influenza testing Not Tested  COVID-19 sample collected and submitted  Patient given detailed CDC instructions contained within After Visit Summary       An  electronic signature was used to authenticate this note.     --Reginaldo Pandya on 2021 at 11:49 AM

## 2021-08-04 ENCOUNTER — TELEPHONE (OUTPATIENT)
Dept: CARDIOLOGY | Facility: CLINIC | Age: 76
End: 2021-08-04

## 2021-08-04 NOTE — TELEPHONE ENCOUNTER
Tried to call pt/wife back regarding message she left- problems with BP-     No answer, no vm to leave a msg. Will try again later

## 2021-08-09 ENCOUNTER — OFFICE VISIT (OUTPATIENT)
Dept: PRIMARY CARE CLINIC | Age: 76
End: 2021-08-09
Payer: MEDICARE

## 2021-08-09 VITALS
HEART RATE: 65 BPM | DIASTOLIC BLOOD PRESSURE: 50 MMHG | RESPIRATION RATE: 16 BRPM | BODY MASS INDEX: 34.09 KG/M2 | HEIGHT: 67 IN | WEIGHT: 217.2 LBS | OXYGEN SATURATION: 95 % | TEMPERATURE: 97.6 F | SYSTOLIC BLOOD PRESSURE: 136 MMHG

## 2021-08-09 DIAGNOSIS — R60.9 SWELLING: ICD-10-CM

## 2021-08-09 DIAGNOSIS — E11.42 TYPE 2 DIABETES MELLITUS WITH DIABETIC POLYNEUROPATHY, WITHOUT LONG-TERM CURRENT USE OF INSULIN (HCC): ICD-10-CM

## 2021-08-09 DIAGNOSIS — I10 ESSENTIAL HYPERTENSION: Primary | ICD-10-CM

## 2021-08-09 PROCEDURE — 1123F ACP DISCUSS/DSCN MKR DOCD: CPT | Performed by: NURSE PRACTITIONER

## 2021-08-09 PROCEDURE — 4040F PNEUMOC VAC/ADMIN/RCVD: CPT | Performed by: NURSE PRACTITIONER

## 2021-08-09 PROCEDURE — 3017F COLORECTAL CA SCREEN DOC REV: CPT | Performed by: NURSE PRACTITIONER

## 2021-08-09 PROCEDURE — G8417 CALC BMI ABV UP PARAM F/U: HCPCS | Performed by: NURSE PRACTITIONER

## 2021-08-09 PROCEDURE — 1036F TOBACCO NON-USER: CPT | Performed by: NURSE PRACTITIONER

## 2021-08-09 PROCEDURE — G8427 DOCREV CUR MEDS BY ELIG CLIN: HCPCS | Performed by: NURSE PRACTITIONER

## 2021-08-09 PROCEDURE — 99214 OFFICE O/P EST MOD 30 MIN: CPT | Performed by: NURSE PRACTITIONER

## 2021-08-09 PROCEDURE — 2022F DILAT RTA XM EVC RTNOPTHY: CPT | Performed by: NURSE PRACTITIONER

## 2021-08-09 PROCEDURE — 3046F HEMOGLOBIN A1C LEVEL >9.0%: CPT | Performed by: NURSE PRACTITIONER

## 2021-08-09 RX ORDER — POTASSIUM CHLORIDE 20 MEQ/1
20 TABLET, EXTENDED RELEASE ORAL DAILY
Qty: 30 TABLET | Refills: 0 | Status: SHIPPED | OUTPATIENT
Start: 2021-08-09 | End: 2021-08-25 | Stop reason: SDUPTHER

## 2021-08-09 RX ORDER — HYDRALAZINE HYDROCHLORIDE 50 MG/1
50 TABLET, FILM COATED ORAL 3 TIMES DAILY
Qty: 90 TABLET | Refills: 3 | Status: SHIPPED | OUTPATIENT
Start: 2021-08-09 | End: 2021-08-25 | Stop reason: SDUPTHER

## 2021-08-09 SDOH — ECONOMIC STABILITY: FOOD INSECURITY: WITHIN THE PAST 12 MONTHS, THE FOOD YOU BOUGHT JUST DIDN'T LAST AND YOU DIDN'T HAVE MONEY TO GET MORE.: NEVER TRUE

## 2021-08-09 SDOH — ECONOMIC STABILITY: FOOD INSECURITY: WITHIN THE PAST 12 MONTHS, YOU WORRIED THAT YOUR FOOD WOULD RUN OUT BEFORE YOU GOT MONEY TO BUY MORE.: NEVER TRUE

## 2021-08-09 ASSESSMENT — SOCIAL DETERMINANTS OF HEALTH (SDOH): HOW HARD IS IT FOR YOU TO PAY FOR THE VERY BASICS LIKE FOOD, HOUSING, MEDICAL CARE, AND HEATING?: NOT HARD AT ALL

## 2021-08-09 ASSESSMENT — ENCOUNTER SYMPTOMS
GASTROINTESTINAL NEGATIVE: 1
EYES NEGATIVE: 1
RESPIRATORY NEGATIVE: 1

## 2021-08-09 NOTE — PROGRESS NOTES
SUBJECTIVE:    Patient ID: Dayna Espinosa is a 77 y.o. male. Chief Complaint   Patient presents with    Hypertension    Diabetes         HPI:  He is down 8 pounds from last visit. He is taking Lasix 40 mg dialy. He states he has been feeling much better since he got the fluid off. He is still having some elevated blood pressure and they have noticed that his blood pressure is higher in the left arm than the right. He is taking the new medication with any side effects or problems. He still continues to eat mostly fried food diet wants a hamburger and [employer] fries. He is taking the Zoloft but is still having some problems with irritability. Patient's medications,allergies, past medical, surgical, social and family histories were reviewed and updated as appropriate.   .  Current Outpatient Medications on File Prior to Visit   Medication Sig Dispense Refill    fluticasone (FLONASE) 50 MCG/ACT nasal spray 1 spray by Each Nostril route daily 2 Bottle 1    hydrALAZINE (APRESOLINE) 25 MG tablet Take 1 tablet by mouth 3 times daily 90 tablet 0    furosemide (LASIX) 40 MG tablet Take 1 tablet by mouth daily 90 tablet 1    NIFEdipine (PROCARDIA XL) 90 MG extended release tablet Take 1 tablet by mouth daily 90 tablet 1    irbesartan (AVAPRO) 300 MG tablet Take 1 tablet by mouth daily 90 tablet 3    sertraline (ZOLOFT) 50 MG tablet Take 1 tablet by mouth daily 90 tablet 3    doxazosin (CARDURA) 4 MG tablet Take 1 tablet by mouth nightly 90 tablet 1    prasugrel (EFFIENT) 10 MG TABS Take 1 tablet by mouth daily 90 tablet 3    atorvastatin (LIPITOR) 40 MG tablet Take 1 tablet by mouth nightly 90 tablet 3    nitroGLYCERIN (NITROSTAT) 0.4 MG SL tablet Place 1 tablet under the tongue every 5 minutes as needed for Chest pain 25 tablet 3    dapagliflozin (FARXIGA) 10 MG tablet Take 1 tablet by mouth every morning 90 tablet 3    dexlansoprazole (DEXILANT) 60 MG CPDR delayed release capsule Take 1 capsule by mouth daily 90 capsule 1    pramipexole (MIRAPEX) 1 MG tablet Take 1 mg by mouth nightly      TRUE METRIX BLOOD GLUCOSE TEST strip USE TO TEST BLOOD SUGAR THREE TIMES DAILY AND AS NEEDED 300 strip 3    Insulin Degludec (TRESIBA FLEXTOUCH) 100 UNIT/ML SOPN Inject 70 Units into the skin daily DX: E11.42 KX (Patient taking differently: Inject 70 Units into the skin daily 72 units) 25 pen 3    finasteride (PROSCAR) 5 MG tablet Take 1 tablet by mouth daily 90 tablet 3    carvedilol (COREG) 12.5 MG tablet Take 0.5 tablets by mouth 2 times daily 180 tablet 3    Ginger, Zingiber officinalis, (GINGER ROOT) 250 MG CAPS One po daily. 90 capsule 3    B-D UF III MINI PEN NEEDLES 31G X 5 MM MISC USE TO INJECT INSULIN EVERYDAY 100 each 2    tamsulosin (FLOMAX) 0.4 MG capsule Take 1 capsule by mouth daily 90 capsule 3    Insulin Pen Needle (ADVOCATE INSULIN PEN NEEDLES) 31G X 5 MM MISC USE TO INJECT INSULIN EVERY DAY DX: E11.42  each 3    Microlet Lancets MISC 1 each by Does not apply route 3 times daily DX: E11.42  each 3    Insulin Pen Needle 31G X 5 MM MISC USE TO INJECT INSULIN EVERY  each 3    Cholecalciferol (VITAMIN D3) 2000 units CAPS Take 2 capsules by mouth daily       therapeutic multivitamin-minerals (THERAGRAN-M) tablet Take 1 tablet by mouth daily.  furosemide (LASIX) 20 MG tablet Take 1 tablet by mouth See Admin Instructions Indications: takes on monday and friday Twice a week (Patient not taking: Reported on 8/9/2021) 180 tablet 3    citalopram (CELEXA) 40 MG tablet Take 1 tablet by mouth daily 90 tablet 3    [DISCONTINUED] omeprazole (PRILOSEC) 20 MG capsule Take 1 capsule by mouth 2 times daily. 180 capsule 3     No current facility-administered medications on file prior to visit. Review of Systems   Constitutional: Negative. HENT: Negative. Eyes: Negative. Respiratory: Negative. Cardiovascular: Negative. Gastrointestinal: Negative. Genitourinary: Negative. Musculoskeletal: Negative. Skin: Negative. Neurological: Negative. Psychiatric/Behavioral: Negative. OBJECTIVE:  BP (!) 136/50 (Site: Right Upper Arm, Position: Sitting, Cuff Size: Medium Adult)   Pulse 65   Temp 97.6 °F (36.4 °C) (Temporal)   Resp 16   Ht 5' 7\" (1.702 m)   Wt 217 lb 3.2 oz (98.5 kg)   SpO2 95%   BMI 34.02 kg/m²    Physical Exam  Vitals and nursing note reviewed. Constitutional:       Appearance: He is well-developed. HENT:      Head: Normocephalic and atraumatic. Eyes:      Conjunctiva/sclera: Conjunctivae normal.      Pupils: Pupils are equal, round, and reactive to light. Neck:      Thyroid: No thyromegaly. Vascular: No JVD. Cardiovascular:      Rate and Rhythm: Normal rate and regular rhythm. Heart sounds: No murmur heard. No friction rub. No gallop. Pulmonary:      Effort: Pulmonary effort is normal. No respiratory distress. Breath sounds: Normal breath sounds. No wheezing or rales. Abdominal:      General: Bowel sounds are normal. There is no distension. Palpations: Abdomen is soft. Tenderness: There is no abdominal tenderness. There is no guarding. Musculoskeletal:         General: No tenderness. Cervical back: Normal range of motion and neck supple. Skin:     General: Skin is warm and dry. Findings: No rash. Neurological:      Mental Status: He is alert and oriented to person, place, and time.    Psychiatric:         Judgment: Judgment normal.         Results in Past 30 Days  Result Component Current Result Ref Range Previous Result Ref Range   Albumin/Globulin Ratio 1.5 (7/15/2021) 0.8 - 2.0 Not in Time Range    Albumin 3.8 (7/15/2021) 3.4 - 4.8 g/dL Not in Time Range    Alkaline Phosphatase 48 (7/15/2021) 25 - 100 U/L Not in Time Range    ALT 17 (7/15/2021) 4 - 36 U/L Not in Time Range    AST 14 (7/15/2021) 8 - 33 U/L Not in Time Range    BUN 26 (H) (7/15/2021) 6 - 20 mg/dL Not in Time Range    Calcium 9.2 (7/15/2021) 8.5 - 10.5 mg/dL Not in Time Range    Chloride 104 (7/15/2021) 98 - 107 mmol/L Not in Time Range    CO2 30 (7/15/2021) 20 - 30 mmol/L Not in Time Range    CREATININE 1.2 (7/15/2021) 0.4 - 1.2 mg/dL Not in Time Range    GFR  >59 (7/15/2021) >59 Not in Time Range    GFR Non- 59 (L) (7/15/2021) >59 Not in Time Range    Globulin 2.5 (7/15/2021) g/dL Not in Time Range    Glucose 155 (H) (7/15/2021) 74 - 106 mg/dL Not in Time Range    Potassium 4.5 (7/15/2021) 3.4 - 5.1 mmol/L Not in Time Range    Sodium 142 (7/15/2021) 136 - 145 mmol/L Not in Time Range    Total Bilirubin 0.3 (7/15/2021) 0.3 - 1.2 mg/dL Not in Time Range    Total Protein 6.3 (L) (7/15/2021) 6.4 - 8.3 g/dL Not in Time Range        Hemoglobin A1C (%)   Date Value   07/15/2021 9.1 (H)     Microalbumin, Random Urine (mg/dL)   Date Value   06/05/2017 1.50     LDL Calculated (mg/dL)   Date Value   07/15/2021 70           Lab Results   Component Value Date    TSH 1.79 07/15/2021         ASSESSMENT/PLAN:     Natalie Villegas was seen today for hypertension and diabetes. Diagnoses and all orders for this visit:    Essential hypertension  -     hydrALAZINE (APRESOLINE) 50 MG tablet; Take 1 tablet by mouth 3 times daily  Advised to increase the hydralazine dose to 50 mg 3 times a day monitor blood pressure closely. Specifically go with the blood pressure is in the left arm since it has been more elevated than the right arm. Swelling  -     BASIC METABOLIC PANEL; Future  -     potassium chloride (KLOR-CON M) 20 MEQ extended release tablet; Take 1 tablet by mouth daily  I have advised his wife who manages his medications to decrease his Lasix to 40 mg 3 times a week and 20 mg otherwise if he continues to take on fluid to go back to the 40 mg. We will reevaluate in 2 weeks.   Type 2 diabetes mellitus with diabetic polyneuropathy, without long-term current use of insulin ([address]  Brings in with him readings today that are much better. Continue the insulin and the [place].     Medications Discontinued During This Encounter   Medication Reason    loratadine (CLARITIN) 10 MG tablet Alternate therapy    lipase-protease-amylase (CREON) 63948-51988 units delayed release capsule Therapy completed

## 2021-08-09 NOTE — PROGRESS NOTES
Chief Complaint   Patient presents with    Hypertension    Diabetes       Have you seen any other physician or provider since your last visit yes - weekend provider    Have you had any other diagnostic tests since your last visit? no    Have you changed or stopped any medications since your last visit? yes - increased lasix,Avapro,and Petra Yu     I have recommended that this patient have a immunization for pneumonia but he declines at this time. I have discussed the risks and benefits of this examination with him. The patient verbalizes understanding. Patient has been having difficulty controlling his sugar and blood pressure. His sugar readings have not changed much since increasing Tresiba to 72 units. His numbers have been [phone]. His blood pressure has been ranging 174//64.

## 2021-08-17 ENCOUNTER — OFFICE VISIT (OUTPATIENT)
Dept: PULMONOLOGY | Facility: CLINIC | Age: 76
End: 2021-08-17

## 2021-08-17 VITALS
HEART RATE: 61 BPM | WEIGHT: 220 LBS | HEIGHT: 66 IN | BODY MASS INDEX: 35.36 KG/M2 | DIASTOLIC BLOOD PRESSURE: 58 MMHG | RESPIRATION RATE: 16 BRPM | OXYGEN SATURATION: 97 % | SYSTOLIC BLOOD PRESSURE: 118 MMHG

## 2021-08-17 DIAGNOSIS — G47.33 OSA (OBSTRUCTIVE SLEEP APNEA): Primary | ICD-10-CM

## 2021-08-17 DIAGNOSIS — G25.81 RESTLESS LEGS SYNDROME (RLS): ICD-10-CM

## 2021-08-17 PROCEDURE — 99214 OFFICE O/P EST MOD 30 MIN: CPT | Performed by: INTERNAL MEDICINE

## 2021-08-17 RX ORDER — FLUTICASONE PROPIONATE 50 MCG
1 SPRAY, SUSPENSION (ML) NASAL
COMMUNITY
Start: 2021-07-31 | End: 2022-08-18 | Stop reason: SDUPTHER

## 2021-08-17 RX ORDER — IRBESARTAN 300 MG/1
300 TABLET ORAL
Status: ON HOLD | COMMUNITY
Start: 2021-07-19 | End: 2021-10-19

## 2021-08-17 RX ORDER — PRAMIPEXOLE DIHYDROCHLORIDE 1 MG/1
1 TABLET ORAL NIGHTLY
Qty: 90 TABLET | Refills: 2 | Status: SHIPPED | OUTPATIENT
Start: 2021-08-17 | End: 2021-12-16 | Stop reason: SDUPTHER

## 2021-08-17 RX ORDER — HYDRALAZINE HYDROCHLORIDE 50 MG/1
50 TABLET, FILM COATED ORAL 3 TIMES DAILY
Status: ON HOLD | COMMUNITY
Start: 2021-08-09 | End: 2021-10-19 | Stop reason: SDUPTHER

## 2021-08-17 RX ORDER — NIFEDIPINE 90 MG/1
90 TABLET, EXTENDED RELEASE ORAL
COMMUNITY
Start: 2021-07-23 | End: 2021-10-19 | Stop reason: HOSPADM

## 2021-08-17 NOTE — PROGRESS NOTES
"  Chief Complaint   Patient presents with   • Follow-up   • Sleeping Problem       Subjective   Donny Jerry is a 76 y.o. male.     History of Present Illness   Patient was evaluated today for follow up of Sleep apnea and RLS.     Patient doesn't report any issues with the PAP device. The patient describes no significant issues with his mask either.     Patient says that the compliance with the use of the equipment is good.     Patient says that his symptoms of fatigue & daytime sleepiness have been helped greatly with the use of PAP, as prescribed.     He is still having dryness, despite using the heated tubing and humidifier.    RLS is better with Mirapex.       The following portions of the patient's history were reviewed and updated as appropriate: allergies, current medications, past family history, past medical history, past social history and past surgical history.    Review of Systems   Constitutional: Negative for chills and fever.   HENT: Positive for rhinorrhea and sinus pressure. Negative for sneezing and sore throat.    Respiratory: Negative for cough, shortness of breath and wheezing.    Psychiatric/Behavioral: Negative for sleep disturbance.       Objective   Visit Vitals  /58   Pulse 61   Resp 16   Ht 167.6 cm (66\")   Wt 99.8 kg (220 lb)   SpO2 97%   BMI 35.51 kg/m²       Physical Exam  Vitals reviewed.   Constitutional:       Appearance: He is well-developed.   HENT:      Head: Atraumatic.      Mouth/Throat:      Comments: Oropharynx was crowded.  Musculoskeletal:      Comments: Gait was normal.   Neurological:      Mental Status: He is alert and oriented to person, place, and time.         Assessment/Plan   Diagnoses and all orders for this visit:    1. USHA (obstructive sleep apnea) (Primary)    2. Restless legs syndrome (RLS)    Other orders  -     pramipexole (Mirapex) 1 MG tablet; Take 1 tablet by mouth Every Night.  Dispense: 90 tablet; Refill: 2         Return in about 4 months (around " 12/17/2021) for Myriam/Carolann, ....Also 10 mths w/ Dr. Bob, Give Recall info.    DISCUSSION (if any):  We will try to obtain his last sleep study from either [facility] or Dr. Antony.     I told the patient that his symptoms are consistent with partially controlled sleep apnea.    The patient appears to have issues with excessive dryness which could be from multiple etiologies including his medications.  He is already on heated tubing.    We will empirically decrease the pressure by 1 cm, to a total of 14.    If he continues to have issues, either with dryness OR his AHI worsens or his symptoms worsen on 14, we may consider a BiPAP.     Patient was advised to continue using PAP for at least 4 hours every night.    Patient was advised to call this office with any issues.    We will continue Mirapex 1 mg PO QHS, as he has responded really well to it.     he was informed about the side effects in great detail.    Patient was informed about the voluntary recall issued by [employer] for some of their PAP devices.  Patient was also urged to register the device with [employer], on their website, to obtain up-to-date information on repair is a replacement.    It appears that as per the recent recommendations, the patient's need to avoid ozone based cleaning methods and also avoid exposure of the devices to high humidity or high temperatures.    Patients with certain diseases such as cardiovascular diseases, COPD, congestive heart failure, among others, are at high risk for complications if their obstructive sleep apnea remains untreated therefore it is believed that he should continue treatment with CPAP until replacement device can be obtained or device made by different company can be obtained.  he was encouraged to contact their DME company to inquire about available options.    It is not possible to make any predictions with regards to increase in the possibility of health related complications, in the event  "obstructive sleep apnea is not treated even for short time.    Patients were urged to contact their DME company, regarding their concern for their current PAP devices. We informed them, that we can only order \"PAP devices\" but have no control over which brand of device they receive/received from their DME company. A change in the device will have to be determined by their DME company and insurance company.     We will keep following American Academy of sleep medicine and American thoracic Society guidelines and update recommendations as they are released.      Dictated utilizing Dragon dictation.    This document was electronically signed by Hansel Bob MD on 08/17/21 at 11:46 EDT  "

## 2021-08-23 ENCOUNTER — HOSPITAL ENCOUNTER (OUTPATIENT)
Facility: HOSPITAL | Age: 76
Discharge: HOME | End: 2021-08-23
Payer: MEDICARE

## 2021-08-23 DIAGNOSIS — R60.9 SWELLING: ICD-10-CM

## 2021-08-23 LAB
ANION GAP SERPL CALCULATED.3IONS-SCNC: 11 MMOL/L (ref 3–16)
BUN BLDV-MCNC: 39 MG/DL (ref 6–20)
CALCIUM SERPL-MCNC: 9.2 MG/DL (ref 8.5–10.5)
CHLORIDE BLD-SCNC: 105 MMOL/L (ref 98–107)
CO2: 23 MMOL/L (ref 20–30)
CREAT SERPL-MCNC: 1.3 MG/DL (ref 0.4–1.2)
GFR AFRICAN AMERICAN: >59
GFR NON-AFRICAN AMERICAN: 54
GLUCOSE BLD-MCNC: 211 MG/DL (ref 74–106)
POTASSIUM SERPL-SCNC: 4.6 MMOL/L (ref 3.4–5.1)
SODIUM BLD-SCNC: 139 MMOL/L (ref 136–145)

## 2021-08-23 PROCEDURE — 80048 BASIC METABOLIC PNL TOTAL CA: CPT

## 2021-08-25 ENCOUNTER — OFFICE VISIT (OUTPATIENT)
Dept: PRIMARY CARE CLINIC | Age: 76
End: 2021-08-25
Payer: MEDICARE

## 2021-08-25 VITALS
SYSTOLIC BLOOD PRESSURE: 160 MMHG | HEIGHT: 67 IN | TEMPERATURE: 97.5 F | WEIGHT: 222.2 LBS | RESPIRATION RATE: 16 BRPM | HEART RATE: 63 BPM | OXYGEN SATURATION: 97 % | BODY MASS INDEX: 34.88 KG/M2 | DIASTOLIC BLOOD PRESSURE: 40 MMHG

## 2021-08-25 DIAGNOSIS — I10 ESSENTIAL HYPERTENSION: Primary | ICD-10-CM

## 2021-08-25 DIAGNOSIS — K21.00 GASTROESOPHAGEAL REFLUX DISEASE WITH ESOPHAGITIS WITHOUT HEMORRHAGE: ICD-10-CM

## 2021-08-25 DIAGNOSIS — R60.9 SWELLING: ICD-10-CM

## 2021-08-25 DIAGNOSIS — I50.9 ACUTE ON CHRONIC CONGESTIVE HEART FAILURE, UNSPECIFIED HEART FAILURE TYPE (HCC): ICD-10-CM

## 2021-08-25 PROCEDURE — G8427 DOCREV CUR MEDS BY ELIG CLIN: HCPCS | Performed by: NURSE PRACTITIONER

## 2021-08-25 PROCEDURE — G8417 CALC BMI ABV UP PARAM F/U: HCPCS | Performed by: NURSE PRACTITIONER

## 2021-08-25 PROCEDURE — 3017F COLORECTAL CA SCREEN DOC REV: CPT | Performed by: NURSE PRACTITIONER

## 2021-08-25 PROCEDURE — 1036F TOBACCO NON-USER: CPT | Performed by: NURSE PRACTITIONER

## 2021-08-25 PROCEDURE — 1123F ACP DISCUSS/DSCN MKR DOCD: CPT | Performed by: NURSE PRACTITIONER

## 2021-08-25 PROCEDURE — 99214 OFFICE O/P EST MOD 30 MIN: CPT | Performed by: NURSE PRACTITIONER

## 2021-08-25 PROCEDURE — 4040F PNEUMOC VAC/ADMIN/RCVD: CPT | Performed by: NURSE PRACTITIONER

## 2021-08-25 RX ORDER — POTASSIUM CHLORIDE 20 MEQ/1
20 TABLET, EXTENDED RELEASE ORAL DAILY
Qty: 30 TABLET | Refills: 0 | Status: SHIPPED | OUTPATIENT
Start: 2021-08-25 | End: 2021-10-04 | Stop reason: SDUPTHER

## 2021-08-25 RX ORDER — DEXLANSOPRAZOLE 60 MG/1
60 CAPSULE, DELAYED RELEASE ORAL DAILY
Qty: 90 CAPSULE | Refills: 1 | Status: SHIPPED | OUTPATIENT
Start: 2021-08-25 | End: 2021-10-04 | Stop reason: SDUPTHER

## 2021-08-25 RX ORDER — FUROSEMIDE 40 MG/1
40 TABLET ORAL DAILY
Qty: 90 TABLET | Refills: 1 | Status: SHIPPED | OUTPATIENT
Start: 2021-08-25 | End: 2022-06-22 | Stop reason: SDUPTHER

## 2021-08-25 RX ORDER — AZILSARTAN KAMEDOXOMIL 80 MG/1
TABLET ORAL
Qty: 30 TABLET | Refills: 0 | Status: SHIPPED | OUTPATIENT
Start: 2021-08-25 | End: 2021-08-27

## 2021-08-25 RX ORDER — HYDRALAZINE HYDROCHLORIDE 50 MG/1
50 TABLET, FILM COATED ORAL 3 TIMES DAILY
Qty: 270 TABLET | Refills: 3 | Status: SHIPPED | OUTPATIENT
Start: 2021-08-25 | End: 2021-10-27

## 2021-08-25 RX ORDER — CLONIDINE HYDROCHLORIDE 0.1 MG/1
0.1 TABLET ORAL DAILY
Qty: 30 TABLET | Refills: 0 | Status: SHIPPED | OUTPATIENT
Start: 2021-08-25 | End: 2021-10-04 | Stop reason: ALTCHOICE

## 2021-08-25 ASSESSMENT — ENCOUNTER SYMPTOMS
EYES NEGATIVE: 1
RESPIRATORY NEGATIVE: 1
ALLERGIC/IMMUNOLOGIC NEGATIVE: 1
GASTROINTESTINAL NEGATIVE: 1

## 2021-08-25 NOTE — PROGRESS NOTES
Chief Complaint   Patient presents with   [facility]    Hypertension    Arthritis     osteo       Have you seen any other physician or provider since your last visit yes - chiropractor and pulmonologist    Have you had any other diagnostic tests since your last visit? yes - labs    Have you changed or stopped any medications since your last visit? Yes stopped Celexa and started Zoloft      I have recommended that this patient have a sigmoidoscopy but he declines at this time. I have discussed the risks and benefits of this examination with him. The patient verbalizes understanding. SUBJECTIVE:    Patient ID:Aquilino Isbell Shown is a 77 y.o. male. Chief Complaint   Patient presents with    Discuss Labs    Hypertension    Arthritis     osteo     HPI:  Patient has had hypertension for several years. He has been compliant with taking medications, without side effects from it. He has been following a low-sodium, is not active and never exercises. Weight is stable, compared to last visit. His blood pressure is elevated 154/42 at this time. He has brought his BP readings with him today. His swelling is so much better. His wife who manages his medications says she has reduced his Lasix back down. He says he feels better since his swelling has improved. He has an appointment for rheumatology for his chronic pain but is not until October. He does have an appointment with a chiropractor about his hip. The wife states that he has been having a tremendous amount of hip pain. MENDOZA trujillo has had hyperlipidemia for several years. He has been compliant with low fat diet. He has been compliant with taking the medications, without side effects. His weight is up compared to last visit. He is not active, and never exercises. Patient's medications, allergies, past medical, surgical, social and family histories were reviewed and updated as appropriate. Review of Systems   Constitutional: Negative. Eyes: Negative. Respiratory: Negative. Gastrointestinal: Negative. Endocrine: Negative. Genitourinary: Negative. Musculoskeletal: Positive for arthralgias (Left hip) and back pain. Skin: Negative. Allergic/Immunologic: Negative. Neurological: Negative. Hematological: Negative. Psychiatric/Behavioral: Negative. OBJECTIVE:  BP (!) 160/40 (Site: Right Upper Arm, Position: Sitting, Cuff Size: Medium Adult)   Pulse 63   Temp 97.5 °F (36.4 °C) (Temporal)   Resp 16   Ht 5' 7\" (1.702 m)   Wt 222 lb 3.2 oz (100.8 kg)   SpO2 97% Comment: room air  BMI 34.80 kg/m²    Physical Exam  Vitals and nursing note reviewed. Constitutional:       Appearance: He is well-developed. HENT:      Head: Normocephalic and atraumatic. Eyes:      Conjunctiva/sclera: Conjunctivae normal.      Pupils: Pupils are equal, round, and reactive to light. Neck:      Thyroid: No thyromegaly. Vascular: No JVD. Cardiovascular:      Rate and Rhythm: Normal rate and regular rhythm. Heart sounds: No murmur heard. No friction rub. No gallop. Pulmonary:      Effort: Pulmonary effort is normal. No respiratory distress. Breath sounds: Normal breath sounds. No wheezing or rales. Abdominal:      General: Bowel sounds are normal. There is no distension. Palpations: Abdomen is soft. Tenderness: There is no abdominal tenderness. There is no guarding. Musculoskeletal:      Cervical back: Normal range of motion and neck supple. Left hip: Tenderness present. Decreased range of motion. Skin:     General: Skin is warm and dry. Findings: No rash. Neurological:      Mental Status: He is alert and oriented to person, place, and time.    Psychiatric:         Judgment: Judgment normal.         Results in Past 30 Days  Result Component Current Result Ref Range Previous Result Ref Range   BUN 39 (H) (8/23/2021) 6 - 20 mg/dL Not in Time Range    Calcium 9.2 (8/23/2021) 8.5 - 10.5 cloNIDine (CATAPRES) 0.1 MG tablet; Take 1 tablet by mouth daily  Dispense: 30 tablet; Refill: 0  - Azilsartan Medoxomil (EDARBI) 80 MG TABS; One po daily. Dispense: 30 tablet; Refill: 0    2. Swelling  Swelling has improved we will continue him with the potassium with his Lasix dose. - potassium chloride (KLOR-CON M) 20 MEQ extended release tablet; Take 1 tablet by mouth daily  Dispense: 30 tablet; Refill: 0    3. Acute on chronic congestive heart failure, unspecified heart failure type (HCC)    - furosemide (LASIX) 40 MG tablet; Take 1 tablet by mouth daily  Dispense: 90 tablet; Refill: 1    4. Gastroesophageal reflux disease with esophagitis    - dexlansoprazole (DEXILANT) 60 MG CPDR delayed release capsule; Take 1 capsule by mouth daily  Dispense: 90 capsule;  Refill: 1       Written by Priya NO, acting as a scribe for Allsion Mccarty on 8/25/2021 at 3:44 PM.

## 2021-08-26 DIAGNOSIS — I10 ESSENTIAL HYPERTENSION: ICD-10-CM

## 2021-08-26 ASSESSMENT — ENCOUNTER SYMPTOMS: BACK PAIN: 1

## 2021-08-27 ENCOUNTER — TELEPHONE (OUTPATIENT)
Dept: CARDIOLOGY | Facility: CLINIC | Age: 76
End: 2021-08-27

## 2021-08-27 RX ORDER — DOXAZOSIN MESYLATE 4 MG/1
4 TABLET ORAL EVERY 12 HOURS
Qty: 60 TABLET | Refills: 1 | Status: ON HOLD | OUTPATIENT
Start: 2021-08-27 | End: 2021-10-19 | Stop reason: SDUPTHER

## 2021-08-27 RX ORDER — AZILSARTAN KAMEDOXOMIL 80 MG/1
TABLET ORAL
Qty: 30 TABLET | Refills: 0 | Status: SHIPPED | OUTPATIENT
Start: 2021-08-27 | End: 2021-10-04 | Stop reason: SDUPTHER

## 2021-08-27 NOTE — TELEPHONE ENCOUNTER
PT's wife calls with concerns over elevated BP- she states that PCP has been trying to adjust meds but she is concerned- PCP has made some changes, including stopping Lisinopril and Amlodipine. Adding Nifedipine ER 90 mg Daily, hydralazine 50 mg TID and Clonidine 0.1 mg daily-     PT's wife reports that his BP is running 150's5/0's - did not report HR readings.     He is taking his meds all throughout the day. We discussed an eaiser med regimen. Will also have him to increase Doxazosin, decrease Hydralazine and holding clonidine.     New med regimen for BP    Cardura 4 mg every 12 hours (10&10)  Nifedipine ER 90 mg at noon  Coreg 6.25 mg every 12 hours (10&10)  Hydralazine 50 mg every 12 hours (10&10)    They will check BP and HR in both arms at noon, or around noon and then again in the evening. They will call me next Wednesday with readings or sooner if needed

## 2021-08-30 ENCOUNTER — HOSPITAL ENCOUNTER (OUTPATIENT)
Dept: ULTRASOUND IMAGING | Facility: HOSPITAL | Age: 76
Discharge: HOME | End: 2021-08-30
Payer: MEDICARE

## 2021-08-30 DIAGNOSIS — I10 ESSENTIAL HYPERTENSION: ICD-10-CM

## 2021-08-30 PROCEDURE — 76770 US EXAM ABDO BACK WALL COMP: CPT

## 2021-09-01 ENCOUNTER — TELEPHONE (OUTPATIENT)
Dept: PRIMARY CARE CLINIC | Age: 76
End: 2021-09-01

## 2021-09-01 NOTE — TELEPHONE ENCOUNTER
Patient wife called stating that she could not get an appointment for [facility] with Dr Nilda Mora. She talked to the PA on the phone and she changed his Doxazosin to 8 mg BID. She is afraid to start him on this without speaking to you. She is unhappy with the care that has been given by Dr Nilda Mora for her .

## 2021-09-02 NOTE — TELEPHONE ENCOUNTER
Do not increase the doxazosin ask her if there is a cardiologist she wants to see and will get her an appointment for him. Ask her if she has heard about the Edarbi blood pressure pill that I wanted him to switch to.

## 2021-09-20 DIAGNOSIS — Z79.4 TYPE 2 DIABETES MELLITUS WITHOUT COMPLICATION, WITH LONG-TERM CURRENT USE OF INSULIN (HCC): ICD-10-CM

## 2021-09-20 DIAGNOSIS — E11.9 TYPE 2 DIABETES MELLITUS WITHOUT COMPLICATION, WITH LONG-TERM CURRENT USE OF INSULIN (HCC): ICD-10-CM

## 2021-09-20 RX ORDER — CALCIUM CITRATE/VITAMIN D3 200MG-6.25
TABLET ORAL
Qty: 300 STRIP | Refills: 3 | Status: SHIPPED | OUTPATIENT
Start: 2021-09-20 | End: 2022-06-22 | Stop reason: SDUPTHER

## 2021-09-27 ENCOUNTER — TELEPHONE (OUTPATIENT)
Dept: CARDIOLOGY | Facility: CLINIC | Age: 76
End: 2021-09-27

## 2021-09-27 DIAGNOSIS — Z01.818 PRE-OPERATIVE CLEARANCE: ICD-10-CM

## 2021-09-27 DIAGNOSIS — I25.10 CAD IN NATIVE ARTERY: Primary | ICD-10-CM

## 2021-09-27 NOTE — TELEPHONE ENCOUNTER
Scheduled for TURP with green light laser under general anesthesia- Hx CAD- will need stress test and will go ahead and do echo that was ordered for 2022-     Celena aware that he will need to remain on his ASA, as he has stent placement- he will be able to hold Effient, as long as stress is normal- if abnormal and needs [facility], stent will need to be bare- metal -

## 2021-10-04 ENCOUNTER — OFFICE VISIT (OUTPATIENT)
Dept: PRIMARY CARE CLINIC | Age: 76
End: 2021-10-04
Payer: MEDICARE

## 2021-10-04 VITALS
BODY MASS INDEX: 35.12 KG/M2 | SYSTOLIC BLOOD PRESSURE: 132 MMHG | TEMPERATURE: 97.5 F | RESPIRATION RATE: 16 BRPM | OXYGEN SATURATION: 97 % | WEIGHT: 223.8 LBS | HEIGHT: 67 IN | HEART RATE: 53 BPM | DIASTOLIC BLOOD PRESSURE: 52 MMHG

## 2021-10-04 DIAGNOSIS — I10 ESSENTIAL HYPERTENSION: ICD-10-CM

## 2021-10-04 DIAGNOSIS — I73.9 PVD (PERIPHERAL VASCULAR DISEASE) WITH CLAUDICATION (HCC): ICD-10-CM

## 2021-10-04 DIAGNOSIS — Z79.4 TYPE 2 DIABETES MELLITUS WITHOUT COMPLICATION, WITH LONG-TERM CURRENT USE OF INSULIN (HCC): Primary | ICD-10-CM

## 2021-10-04 DIAGNOSIS — R60.9 SWELLING: ICD-10-CM

## 2021-10-04 DIAGNOSIS — K21.00 GASTROESOPHAGEAL REFLUX DISEASE WITH ESOPHAGITIS WITHOUT HEMORRHAGE: ICD-10-CM

## 2021-10-04 DIAGNOSIS — M79.89 LEG SWELLING: ICD-10-CM

## 2021-10-04 DIAGNOSIS — I73.9 INTERMITTENT CLAUDICATION (HCC): ICD-10-CM

## 2021-10-04 DIAGNOSIS — E11.9 TYPE 2 DIABETES MELLITUS WITHOUT COMPLICATION, WITH LONG-TERM CURRENT USE OF INSULIN (HCC): Primary | ICD-10-CM

## 2021-10-04 PROCEDURE — 3046F HEMOGLOBIN A1C LEVEL >9.0%: CPT | Performed by: NURSE PRACTITIONER

## 2021-10-04 PROCEDURE — 3017F COLORECTAL CA SCREEN DOC REV: CPT | Performed by: NURSE PRACTITIONER

## 2021-10-04 PROCEDURE — 2022F DILAT RTA XM EVC RTNOPTHY: CPT | Performed by: NURSE PRACTITIONER

## 2021-10-04 PROCEDURE — 1123F ACP DISCUSS/DSCN MKR DOCD: CPT | Performed by: NURSE PRACTITIONER

## 2021-10-04 PROCEDURE — G8484 FLU IMMUNIZE NO ADMIN: HCPCS | Performed by: NURSE PRACTITIONER

## 2021-10-04 PROCEDURE — 99214 OFFICE O/P EST MOD 30 MIN: CPT | Performed by: NURSE PRACTITIONER

## 2021-10-04 PROCEDURE — G8427 DOCREV CUR MEDS BY ELIG CLIN: HCPCS | Performed by: NURSE PRACTITIONER

## 2021-10-04 PROCEDURE — 1036F TOBACCO NON-USER: CPT | Performed by: NURSE PRACTITIONER

## 2021-10-04 PROCEDURE — G8417 CALC BMI ABV UP PARAM F/U: HCPCS | Performed by: NURSE PRACTITIONER

## 2021-10-04 PROCEDURE — 4040F PNEUMOC VAC/ADMIN/RCVD: CPT | Performed by: NURSE PRACTITIONER

## 2021-10-04 RX ORDER — AZILSARTAN KAMEDOXOMIL 80 MG/1
TABLET ORAL
Qty: 90 TABLET | Refills: 3 | Status: SHIPPED | OUTPATIENT
Start: 2021-10-04 | End: 2021-12-06 | Stop reason: SDUPTHER

## 2021-10-04 RX ORDER — DEXLANSOPRAZOLE 60 MG/1
60 CAPSULE, DELAYED RELEASE ORAL DAILY
Qty: 90 CAPSULE | Refills: 1 | Status: SHIPPED | OUTPATIENT
Start: 2021-10-04 | End: 2021-10-27 | Stop reason: ALTCHOICE

## 2021-10-04 RX ORDER — DOXAZOSIN MESYLATE 4 MG/1
4 TABLET ORAL NIGHTLY
Qty: 90 TABLET | Refills: 1 | Status: SHIPPED | OUTPATIENT
Start: 2021-10-04 | End: 2021-10-27

## 2021-10-04 RX ORDER — POTASSIUM CHLORIDE 20 MEQ/1
20 TABLET, EXTENDED RELEASE ORAL DAILY
Qty: 90 TABLET | Refills: 0 | Status: SHIPPED | OUTPATIENT
Start: 2021-10-04 | End: 2021-12-06 | Stop reason: SDUPTHER

## 2021-10-04 ASSESSMENT — ENCOUNTER SYMPTOMS
EYES NEGATIVE: 1
RESPIRATORY NEGATIVE: 1
GASTROINTESTINAL NEGATIVE: 1
ALLERGIC/IMMUNOLOGIC NEGATIVE: 1

## 2021-10-04 NOTE — PROGRESS NOTES
Chief Complaint   Patient presents with    Follow-up    Hypertension       Have you seen any other physician or provider since your last visit yes - Dr Shayna Alfaro     Have you had any other diagnostic tests since your last visit? yes - ultrasound,CT    Have you changed or stopped any medications since your last visit? Yes stopped Clonidine      I have recommended that this patient have a immunization for pneumonia but he declines at this time. I have discussed the risks and benefits of this examination with him. The patient verbalizes understanding. SUBJECTIVE:    Patient ID:Aquilino Cuellar is a 77 y.o. male. Chief Complaint   Patient presents with    Follow-up    Hypertension     HPI:  He has been to   Patient has had hypertension for several years. He has been compliant with taking medications, without side effects from it. He has been following a low-sodium, is active and never exercises. Weight is stable, compared to last visit. His blood pressure is stable 132/52 at this time. He has been on the [place] since August. His blood pressure has been doing better but is still elevated. Patient has had diabetes for the past several years. He has been compliant with the medications and denies any side effects from it. He has been monitoring fingersticks on a daily basis PRN. His fingerstick range is between 120. He denies any hypoglycemic symptoms. He has been following a diabetic diet and has been active. His last eye exam was than a year ago. He is using oral meds and Insulin. He is on [place] 72 units at bedtime. Patient's medications, allergies, past medical, surgical, social and family histories were reviewed and updated as appropriate. Review of Systems   Constitutional: Negative. HENT: Negative. Eyes: Negative. Respiratory: Negative. Cardiovascular: Negative. Gastrointestinal: Negative. Endocrine: Negative. Genitourinary: Negative.     Musculoskeletal: Positive for arthralgias, back pain and gait problem. Skin: Negative. Allergic/Immunologic: Negative. Neurological: Positive for weakness. Hematological: Negative. Psychiatric/Behavioral: Negative. OBJECTIVE:  BP (!) 132/52 (Site: Left Upper Arm, Position: Sitting, Cuff Size: Medium Adult)   Pulse 53   Temp 97.5 °F (36.4 °C) (Temporal)   Resp 16   Ht 5' 7\" (1.702 m)   Wt 223 lb 12.8 oz (101.5 kg)   SpO2 97% Comment: room air  BMI 35.05 kg/m²    Physical Exam  Vitals and nursing note reviewed. Constitutional:       Appearance: He is well-developed. HENT:      Head: Normocephalic and atraumatic. Eyes:      Conjunctiva/sclera: Conjunctivae normal.      Pupils: Pupils are equal, round, and reactive to light. Neck:      Thyroid: No thyromegaly. Vascular: No JVD. Cardiovascular:      Rate and Rhythm: Normal rate and regular rhythm. Heart sounds: No murmur heard. No friction rub. No gallop. Pulmonary:      Effort: Pulmonary effort is normal. No respiratory distress. Breath sounds: Normal breath sounds. No wheezing or rales. Abdominal:      General: Bowel sounds are normal. There is no distension. Palpations: Abdomen is soft. Tenderness: There is no abdominal tenderness. There is no guarding. Musculoskeletal:         General: No tenderness. Cervical back: Normal range of motion and neck supple. Skin:     General: Skin is warm and dry. Findings: No rash. Neurological:      Mental Status: He is alert and oriented to person, place, and time. Psychiatric:         Judgment: Judgment normal.         No results found for requested labs within last 30 days.        Hemoglobin A1C (%)   Date Value   07/15/2021 9.1 (H)     Microalbumin, Random Urine (mg/dL)   Date Value   06/05/2017 1.50     LDL Calculated (mg/dL)   Date Value   07/15/2021 70         Lab Results   Component Value Date    WBC 8.5 07/15/2021    NEUTROABS 4.7 04/15/2019    HGB 12.0 07/15/2021    HCT 36.8 07/15/2021    MCV 88.2 07/15/2021     07/15/2021       Lab Results   Component Value Date    TSH 1.79 07/15/2021         ASSESSMENT/PLAN:     1. Type 2 diabetes mellitus without complication, with long-term current use of insulin (HCC)  Stable. I advised him regarding diabetic diet, exercise and weight control. Also, I advised him to stay on the current medication, monitor his fingerstick closely. I am going to check the A1c every few months. I will check microalbumin on a yearly basis. I have also advised him to have a yearly eye exam and to monitor his feet closely. Along with instruction of possible complications related to diabetes, even with good control. A1C will be checked  in few months. Lab Results   Component Value Date    LABA1C 9.1 (H) 07/15/2021       - POCT glycosylated hemoglobin (Hb A1C)    2. Swelling    - potassium chloride (KLOR-CON M) 20 MEQ extended release tablet; Take 1 tablet by mouth daily  Dispense: 90 tablet; Refill: 0    3. Gastroesophageal reflux disease with esophagitis without hemorrhage    - dexlansoprazole (DEXILANT) 60 MG CPDR delayed release capsule; Take 1 capsule by mouth daily  Dispense: 90 capsule; Refill: 1    4. Essential hypertension  Add increase to Cardura to 8 mg.   - doxazosin (CARDURA) 4 MG tablet; Take 1 tablet by mouth nightly  Dispense: 90 tablet; Refill: 1  - Azilsartan Medoxomil (EDARBI) 80 MG TABS; TAKE ONE TABLET BY MOUTH EVERY DAY  Dispense: 90 tablet; Refill: 3    5. Intermittent claudication ([address]  Arterial vascular study. Has significant stenosis. Will need referral to vascular surgeon. 6. Leg swelling  Continue lasix every other day.         Written by Celia NO, acting as a scribe for Rut Fraser on 10/4/2021 at 11:23 PM.

## 2021-10-05 ENCOUNTER — HOSPITAL ENCOUNTER (OUTPATIENT)
Dept: NON INVASIVE DIAGNOSTICS | Facility: HOSPITAL | Age: 76
Discharge: HOME | End: 2021-10-05
Payer: MEDICARE

## 2021-10-06 DIAGNOSIS — E11.42 TYPE 2 DIABETES MELLITUS WITH DIABETIC POLYNEUROPATHY, WITHOUT LONG-TERM CURRENT USE OF INSULIN (HCC): ICD-10-CM

## 2021-10-06 RX ORDER — INSULIN DEGLUDEC INJECTION 100 U/ML
72 INJECTION, SOLUTION SUBCUTANEOUS DAILY
Qty: 25 PEN | Refills: 3 | Status: SHIPPED | OUTPATIENT
Start: 2021-10-06 | End: 2022-02-18

## 2021-10-07 ENCOUNTER — OUTSIDE FACILITY SERVICE (OUTPATIENT)
Dept: CARDIOLOGY | Facility: CLINIC | Age: 76
End: 2021-10-07

## 2021-10-07 ENCOUNTER — HOSPITAL ENCOUNTER (OUTPATIENT)
Dept: NON INVASIVE DIAGNOSTICS | Facility: HOSPITAL | Age: 76
Discharge: HOME | End: 2021-10-07
Payer: MEDICARE

## 2021-10-07 ENCOUNTER — HOSPITAL ENCOUNTER (OUTPATIENT)
Dept: ULTRASOUND IMAGING | Facility: HOSPITAL | Age: 76
Discharge: HOME | End: 2021-10-07
Payer: MEDICARE

## 2021-10-07 DIAGNOSIS — I73.9 INTERMITTENT CLAUDICATION (HCC): ICD-10-CM

## 2021-10-07 LAB
LV EF: 45 %
LVEF MODALITY: NORMAL

## 2021-10-07 PROCEDURE — 78452 HT MUSCLE IMAGE SPECT MULT: CPT

## 2021-10-07 PROCEDURE — 93018 CV STRESS TEST I&R ONLY: CPT | Performed by: INTERNAL MEDICINE

## 2021-10-07 PROCEDURE — 93925 LOWER EXTREMITY STUDY: CPT

## 2021-10-07 PROCEDURE — 6360000002 HC RX W HCPCS: Performed by: INTERNAL MEDICINE

## 2021-10-07 PROCEDURE — 93017 CV STRESS TEST TRACING ONLY: CPT

## 2021-10-07 PROCEDURE — 78452 HT MUSCLE IMAGE SPECT MULT: CPT | Performed by: INTERNAL MEDICINE

## 2021-10-07 PROCEDURE — 3430000000 HC RX DIAGNOSTIC RADIOPHARMACEUTICAL: Performed by: INTERNAL MEDICINE

## 2021-10-07 PROCEDURE — 96374 THER/PROPH/DIAG INJ IV PUSH: CPT

## 2021-10-07 PROCEDURE — A9500 TC99M SESTAMIBI: HCPCS | Performed by: INTERNAL MEDICINE

## 2021-10-07 RX ADMIN — TETRAKIS(2-METHOXYISOBUTYLISOCYANIDE)COPPER(I) TETRAFLUOROBORATE 9.6 MILLICURIE: 1 INJECTION, POWDER, LYOPHILIZED, FOR SOLUTION INTRAVENOUS at 11:59

## 2021-10-07 RX ADMIN — TETRAKIS(2-METHOXYISOBUTYLISOCYANIDE)COPPER(I) TETRAFLUOROBORATE 30.1 MILLICURIE: 1 INJECTION, POWDER, LYOPHILIZED, FOR SOLUTION INTRAVENOUS at 11:58

## 2021-10-07 RX ADMIN — REGADENOSON 0.4 MG: 0.08 INJECTION, SOLUTION INTRAVENOUS at 11:38

## 2021-10-07 ASSESSMENT — ENCOUNTER SYMPTOMS: BACK PAIN: 1

## 2021-10-08 ENCOUNTER — TELEPHONE (OUTPATIENT)
Dept: PRIMARY CARE CLINIC | Age: 76
End: 2021-10-08

## 2021-10-08 ENCOUNTER — TELEPHONE (OUTPATIENT)
Dept: CARDIOLOGY | Facility: CLINIC | Age: 76
End: 2021-10-08

## 2021-10-08 DIAGNOSIS — I25.10 CAD IN NATIVE ARTERY: Primary | ICD-10-CM

## 2021-10-08 DIAGNOSIS — R94.39 ABNORMAL STRESS TEST: ICD-10-CM

## 2021-10-08 RX ORDER — INSULIN DETEMIR 100 [IU]/ML
72 INJECTION, SOLUTION SUBCUTANEOUS NIGHTLY
Qty: 15 PEN | Refills: 1 | Status: SHIPPED | OUTPATIENT
Start: 2021-10-08 | End: 2021-12-06 | Stop reason: ALTCHOICE

## 2021-10-08 NOTE — TELEPHONE ENCOUNTER
Called pt to go over his stress test performed in [place]- it is abnormal and Dr. Russo recommends LHC prior to proceeding with any surgery. She did talk with Dr. Bernal, as they were planning for TURP- Dr. Bernal may be using green light laser, so he would be able to do the procedure on ASA and Effient.       I discussed the results with pt's wife, Chely. She verbalized understanding and they agree to proceed. PT is hard of hearing, so should talk with wife to schedule LHC.

## 2021-10-08 NOTE — TELEPHONE ENCOUNTER
Pts pharmacy called to report that Catherine Calderon is on backorder and have no ideal when they will get it, asking if you want to give alternative

## 2021-10-11 PROBLEM — R94.39 ABNORMAL STRESS TEST: Status: ACTIVE | Noted: 2021-10-11

## 2021-10-15 ENCOUNTER — PREP FOR SURGERY (OUTPATIENT)
Dept: OTHER | Facility: HOSPITAL | Age: 76
End: 2021-10-15

## 2021-10-15 DIAGNOSIS — R94.39 ABNORMAL STRESS TEST: ICD-10-CM

## 2021-10-15 DIAGNOSIS — R79.9 ABNORMAL FINDING OF BLOOD CHEMISTRY, UNSPECIFIED: ICD-10-CM

## 2021-10-15 DIAGNOSIS — I25.10 CAD IN NATIVE ARTERY: Primary | ICD-10-CM

## 2021-10-15 RX ORDER — SODIUM CHLORIDE 0.9 % (FLUSH) 0.9 %
3-10 SYRINGE (ML) INJECTION AS NEEDED
Status: CANCELLED | OUTPATIENT
Start: 2021-10-15

## 2021-10-15 RX ORDER — SODIUM CHLORIDE 0.9 % (FLUSH) 0.9 %
3 SYRINGE (ML) INJECTION EVERY 12 HOURS SCHEDULED
Status: CANCELLED | OUTPATIENT
Start: 2021-10-15

## 2021-10-19 ENCOUNTER — HOSPITAL ENCOUNTER (OUTPATIENT)
Facility: HOSPITAL | Age: 76
Setting detail: HOSPITAL OUTPATIENT SURGERY
Discharge: HOME | End: 2021-10-19
Attending: INTERNAL MEDICINE | Admitting: INTERNAL MEDICINE

## 2021-10-19 VITALS
BODY MASS INDEX: 35.62 KG/M2 | WEIGHT: 221.6 LBS | HEIGHT: 66 IN | RESPIRATION RATE: 16 BRPM | OXYGEN SATURATION: 95 % | DIASTOLIC BLOOD PRESSURE: 64 MMHG | SYSTOLIC BLOOD PRESSURE: 185 MMHG | HEART RATE: 56 BPM

## 2021-10-19 DIAGNOSIS — I25.10 CAD IN NATIVE ARTERY: ICD-10-CM

## 2021-10-19 DIAGNOSIS — R94.39 ABNORMAL STRESS TEST: ICD-10-CM

## 2021-10-19 LAB
ANION GAP SERPL CALCULATED.3IONS-SCNC: 12 MMOL/L (ref 5–15)
BASOPHILS # BLD AUTO: 0.07 10*3/MM3 (ref 0–0.2)
BASOPHILS NFR BLD AUTO: 0.8 % (ref 0–1.5)
BUN SERPL-MCNC: 36 MG/DL (ref 8–23)
BUN/CREAT SERPL: 25 (ref 7–25)
CALCIUM SPEC-SCNC: 8.5 MG/DL (ref 8.6–10.5)
CHLORIDE SERPL-SCNC: 107 MMOL/L (ref 98–107)
CHOLEST SERPL-MCNC: 160 MG/DL (ref 0–200)
CO2 SERPL-SCNC: 24 MMOL/L (ref 22–29)
CREAT BLDA-MCNC: 1.5 MG/DL (ref 0.6–1.3)
CREAT SERPL-MCNC: 1.44 MG/DL (ref 0.76–1.27)
DEPRECATED RDW RBC AUTO: 41.5 FL (ref 37–54)
EOSINOPHIL # BLD AUTO: 0.22 10*3/MM3 (ref 0–0.4)
EOSINOPHIL NFR BLD AUTO: 2.6 % (ref 0.3–6.2)
ERYTHROCYTE [DISTWIDTH] IN BLOOD BY AUTOMATED COUNT: 13 % (ref 12.3–15.4)
GFR SERPL CREATININE-BSD FRML MDRD: 48 ML/MIN/1.73
GLUCOSE BLDC GLUCOMTR-MCNC: 175 MG/DL (ref 70–130)
GLUCOSE SERPL-MCNC: 185 MG/DL (ref 65–99)
HBA1C MFR BLD: 10.1 % (ref 4.8–5.6)
HCT VFR BLD AUTO: 34.4 % (ref 37.5–51)
HDLC SERPL-MCNC: 31 MG/DL (ref 40–60)
HGB BLD-MCNC: 11.6 G/DL (ref 13–17.7)
IMM GRANULOCYTES # BLD AUTO: 0.03 10*3/MM3 (ref 0–0.05)
IMM GRANULOCYTES NFR BLD AUTO: 0.4 % (ref 0–0.5)
INR PPP: 1.01 (ref 0.85–1.16)
LDLC SERPL CALC-MCNC: 79 MG/DL (ref 0–100)
LDLC/HDLC SERPL: 2.21 {RATIO}
LYMPHOCYTES # BLD AUTO: 1.33 10*3/MM3 (ref 0.7–3.1)
LYMPHOCYTES NFR BLD AUTO: 16 % (ref 19.6–45.3)
MAGNESIUM SERPL-MCNC: 2.6 MG/DL (ref 1.6–2.4)
MCH RBC QN AUTO: 29.7 PG (ref 26.6–33)
MCHC RBC AUTO-ENTMCNC: 33.7 G/DL (ref 31.5–35.7)
MCV RBC AUTO: 88 FL (ref 79–97)
MONOCYTES # BLD AUTO: 0.64 10*3/MM3 (ref 0.1–0.9)
MONOCYTES NFR BLD AUTO: 7.7 % (ref 5–12)
NEUTROPHILS NFR BLD AUTO: 6.04 10*3/MM3 (ref 1.7–7)
NEUTROPHILS NFR BLD AUTO: 72.5 % (ref 42.7–76)
NRBC BLD AUTO-RTO: 0 /100 WBC (ref 0–0.2)
PLATELET # BLD AUTO: 245 10*3/MM3 (ref 140–450)
PMV BLD AUTO: 10.6 FL (ref 6–12)
POTASSIUM SERPL-SCNC: 4.4 MMOL/L (ref 3.5–5.2)
PROTHROMBIN TIME: 13 SECONDS (ref 11.4–14.4)
RBC # BLD AUTO: 3.91 10*6/MM3 (ref 4.14–5.8)
SODIUM SERPL-SCNC: 143 MMOL/L (ref 136–145)
TRIGL SERPL-MCNC: 303 MG/DL (ref 0–150)
VLDLC SERPL-MCNC: 50 MG/DL (ref 5–40)
WBC # BLD AUTO: 8.33 10*3/MM3 (ref 3.4–10.8)

## 2021-10-19 PROCEDURE — 80061 LIPID PANEL: CPT | Performed by: NURSE PRACTITIONER

## 2021-10-19 PROCEDURE — 83735 ASSAY OF MAGNESIUM: CPT | Performed by: NURSE PRACTITIONER

## 2021-10-19 PROCEDURE — 85610 PROTHROMBIN TIME: CPT | Performed by: NURSE PRACTITIONER

## 2021-10-19 PROCEDURE — C1894 INTRO/SHEATH, NON-LASER: HCPCS | Performed by: INTERNAL MEDICINE

## 2021-10-19 PROCEDURE — 25010000002 FENTANYL CITRATE (PF) 50 MCG/ML SOLUTION: Performed by: INTERNAL MEDICINE

## 2021-10-19 PROCEDURE — 25010000003 LIDOCAINE 1 % SOLUTION: Performed by: INTERNAL MEDICINE

## 2021-10-19 PROCEDURE — 80048 BASIC METABOLIC PNL TOTAL CA: CPT | Performed by: NURSE PRACTITIONER

## 2021-10-19 PROCEDURE — 25010000002 MIDAZOLAM PER 1 MG: Performed by: INTERNAL MEDICINE

## 2021-10-19 PROCEDURE — 82962 GLUCOSE BLOOD TEST: CPT

## 2021-10-19 PROCEDURE — 83036 HEMOGLOBIN GLYCOSYLATED A1C: CPT | Performed by: NURSE PRACTITIONER

## 2021-10-19 PROCEDURE — 93459 L HRT ART/GRFT ANGIO: CPT | Performed by: INTERNAL MEDICINE

## 2021-10-19 PROCEDURE — 85025 COMPLETE CBC W/AUTO DIFF WBC: CPT | Performed by: NURSE PRACTITIONER

## 2021-10-19 PROCEDURE — 82565 ASSAY OF CREATININE: CPT

## 2021-10-19 PROCEDURE — 0 IOPAMIDOL PER 1 ML: Performed by: INTERNAL MEDICINE

## 2021-10-19 RX ORDER — ERGOCALCIFEROL 1.25 MG/1
50000 CAPSULE ORAL WEEKLY
COMMUNITY
End: 2022-08-18

## 2021-10-19 RX ORDER — DOXAZOSIN 8 MG/1
8 TABLET ORAL EVERY 12 HOURS
Qty: 60 TABLET | Refills: 11 | Status: SHIPPED | OUTPATIENT
Start: 2021-10-19

## 2021-10-19 RX ORDER — LIDOCAINE HYDROCHLORIDE 10 MG/ML
INJECTION, SOLUTION INFILTRATION; PERINEURAL AS NEEDED
Status: DISCONTINUED | OUTPATIENT
Start: 2021-10-19 | End: 2021-10-19 | Stop reason: HOSPADM

## 2021-10-19 RX ORDER — SODIUM CHLORIDE 9 MG/ML
250 INJECTION, SOLUTION INTRAVENOUS ONCE AS NEEDED
Status: DISCONTINUED | OUTPATIENT
Start: 2021-10-19 | End: 2021-10-19 | Stop reason: HOSPADM

## 2021-10-19 RX ORDER — SODIUM CHLORIDE 9 MG/ML
100 INJECTION, SOLUTION INTRAVENOUS CONTINUOUS
Status: ACTIVE | OUTPATIENT
Start: 2021-10-19 | End: 2021-10-19

## 2021-10-19 RX ORDER — SODIUM CHLORIDE 0.9 % (FLUSH) 0.9 %
3-10 SYRINGE (ML) INJECTION AS NEEDED
Status: DISCONTINUED | OUTPATIENT
Start: 2021-10-19 | End: 2021-10-19 | Stop reason: HOSPADM

## 2021-10-19 RX ORDER — HYDROCODONE BITARTRATE AND ACETAMINOPHEN 7.5; 325 MG/1; MG/1
1 TABLET ORAL EVERY 4 HOURS PRN
Status: DISCONTINUED | OUTPATIENT
Start: 2021-10-19 | End: 2021-10-19 | Stop reason: HOSPADM

## 2021-10-19 RX ORDER — HYDRALAZINE HYDROCHLORIDE 100 MG/1
100 TABLET, FILM COATED ORAL 3 TIMES DAILY
Qty: 90 TABLET | Refills: 11 | Status: SHIPPED | OUTPATIENT
Start: 2021-10-19

## 2021-10-19 RX ORDER — MORPHINE SULFATE 2 MG/ML
1 INJECTION, SOLUTION INTRAMUSCULAR; INTRAVENOUS EVERY 4 HOURS PRN
Status: DISCONTINUED | OUTPATIENT
Start: 2021-10-19 | End: 2021-10-19 | Stop reason: HOSPADM

## 2021-10-19 RX ORDER — ALPRAZOLAM 0.25 MG/1
0.25 TABLET ORAL 3 TIMES DAILY PRN
Status: DISCONTINUED | OUTPATIENT
Start: 2021-10-19 | End: 2021-10-19 | Stop reason: HOSPADM

## 2021-10-19 RX ORDER — FENTANYL CITRATE 50 UG/ML
INJECTION, SOLUTION INTRAMUSCULAR; INTRAVENOUS AS NEEDED
Status: DISCONTINUED | OUTPATIENT
Start: 2021-10-19 | End: 2021-10-19 | Stop reason: HOSPADM

## 2021-10-19 RX ORDER — SODIUM CHLORIDE 0.9 % (FLUSH) 0.9 %
3 SYRINGE (ML) INJECTION EVERY 12 HOURS SCHEDULED
Status: DISCONTINUED | OUTPATIENT
Start: 2021-10-19 | End: 2021-10-19 | Stop reason: HOSPADM

## 2021-10-19 RX ORDER — ACETAMINOPHEN 325 MG/1
650 TABLET ORAL EVERY 4 HOURS PRN
Status: DISCONTINUED | OUTPATIENT
Start: 2021-10-19 | End: 2021-10-19 | Stop reason: HOSPADM

## 2021-10-19 RX ORDER — NALOXONE HCL 0.4 MG/ML
0.4 VIAL (ML) INJECTION
Status: DISCONTINUED | OUTPATIENT
Start: 2021-10-19 | End: 2021-10-19 | Stop reason: HOSPADM

## 2021-10-19 RX ORDER — MIDAZOLAM HYDROCHLORIDE 1 MG/ML
INJECTION INTRAMUSCULAR; INTRAVENOUS AS NEEDED
Status: DISCONTINUED | OUTPATIENT
Start: 2021-10-19 | End: 2021-10-19 | Stop reason: HOSPADM

## 2021-10-19 NOTE — PROGRESS NOTES
The patient's wife states that he has been more short of breath recently.  He is also had significant lower extremity edema.  He apparently had a significant issue with his blood pressure recently and nifedipine 90 mg was added.  He has a history of edema with amlodipine.    We will discharge him on doxazosin 8 mg every 12 hours and hydralazine 100 mg every 8 hours.  Will discontinue his nifedipine.    Liz Russo MD, FACC

## 2021-10-19 NOTE — H&P
Donny Jerry  [id number]  1945   LOS: 0 days   Patient Care Team:  HEALTHCARE PROVIDER: ASHLEIGH Segovia  UROLOGIST: Rubin Bernal MD   PULMONOLOGIST: Hansel Bob MD/ASHLEIGH Saavedra    Mr. Jerry is a 76-year-old  white male from [place], [place].    Chief Complaint: Abnormal stress test    Problem List:  1. Coronary artery disease:  a. CABG x3 in 2001, [facility] by Dr. Peng. LIMA to the LAD, SVG to obtuse marginal, SVG to the PDA.  b. Stress echocardiogram, 11/13/2007: No wall motion abnormalities. No evidence of ischemia. Normal EF.  c. Echocardiogram, 11/13/2007, mild concentric LVH, trace of MR and TR.  d. Adenosine Cardiolite, 05/07/2009:  Normal LVEF with a stress induced mid and distal inferior defect compatible with  ischemia.  e. [facility], 06/03/2009, Dr. Russo: EF 45-50%. Overlapping Cypher TAWANNA 2.5 x 28 and 2.5 x 18 to the SVG to OM. SVG to PDA had a proximal stenosis of 60% with a distal stenosis of 50-60%.  f. [facility], 07/06/2009: PTCA and Taxus TAWANNA (2.25 x 12 mm) to the mid PDA; Cypher TAWANNA (2.5 x 18mm) to the distal SVG to the PDA;  and Cypher TAWANNA (3.0 x 28mm) to the proximal SVG to the PDA.  g. [facility] for recurrent chest pain, 01/29/2010: Revealing patent stents. Ranexa initiated 500 mg q.12 h.   h. [facility],  08/16/2011: LVEF of 45% to 50% with an occluded SVG to an obtuse marginal branch which could not be revascularized, patent LIMA to the LAD, noncritical graft disease in the SVG to the PDA, multiple small areas of distal vessel disease and collateral flow were seen.   i. [facility] for recurrent anginal symptoms, 04/23/2010, with PTCA and Promus TAWANNA (3.0 x 28mm) to the proximal SVG to the PDA for in-stent restenosis.    j. [facility], 07/06/2010,  Dr. Russo: EF 40-45%. 3.5 x 23 mm Promus TAWANNA in the proximal SVG to OM, 2.5 x 18 mm Promus TAWANNA to distal SVG to PDA due to ISR.    k. [facility], 11/16/2010, with placement of  a 3.0 x 16 mm Taxus TAWANNA to  the SVG to the RCA proximally and a 2.5 x 16 mm paclitaxel stent distally in the SVG to the RCA.  l. LHC, 3.0 x 24-mm Promus element TAWANNA to a 90% lesion in the mid portion  of the SVG to the PDA.   [facility] for recurrent angina, 06/24/2014, Dr. Russo:  PTCA of the SVG to the PDA using a 3 x 12 mm NC TREK balloon.    n. Abnormal stress test 10/07/2021:mild ST depression with infusion and had a normal hemodynamic response to regadenoson.  He was found to have a large posterior lateral stress-induced defect.  Severe small fixed anterior defect, EF 45% with CCS class I chest discomfort/NYHA class II dyspnea on exertion symptoms  2. Hypertension.  3. Hypercholesterolemia.  4. Type 2 diabetes, diagnosed 2 years ago.  5. Obstructive sleep apnea/CPAP.  6. Enlarged prostate with anticipated TURP with Dr. Bernal with retroperitoneal ultrasound 8/31/2021 showing prostatomegaly (5.4 x 3.9 x 4.2 cm) with mass-effect on the base of the bladder, normal size kidneys  7. Obesity.  8. Depression.  9. Anemia.  10. Surgical history:  a. CABG x3, Dr. Peng, [facility], 2001.  b. Negative colonoscopy, 2014.       Allergies   Allergen Reactions   • Bisoprolol Other (See Comments)     Agitation     • Byetta 10 Mcg Pen [Exenatide]      nausea   • Crestor [Rosuvastatin Calcium] Myalgia   • Metformin And Related    • Plavix [Clopidogrel Bisulfate] Other (See Comments)     resistance   • Zocor [Simvastatin] Myalgia     Medications Prior to Admission   Medication Sig Dispense Refill Last Dose   • amLODIPine (NORVASC) 10 MG tablet Take 10 mg by mouth Daily.      • aspirin 81 MG EC tablet Take 81 mg by mouth Daily.      • atorvastatin (LIPITOR) 80 MG tablet Take 1 tablet by mouth Daily. (Patient taking differently: Take 40 mg by mouth Daily.) 90 tablet 1    • carvedilol (COREG) 12.5 MG tablet Take 6.25 mg by mouth 2 (Two) Times a Day With Meals. Pt takes 0.5 tab BID      • Cholecalciferol (VITAMIN D3) 50 MCG (2000 UT) capsule  Take 2 capsules by mouth.      • citalopram (CeleXA) 40 MG tablet Take 40 mg by mouth Daily.      • Dapagliflozin Propanediol (FARXIGA) 10 MG tablet Take 10 mg by mouth Daily.      • dexlansoprazole (DEXILANT) 60 MG capsule Take 60 mg by mouth Daily.      • doxazosin (CARDURA) 4 MG tablet Take 1 tablet by mouth Every 12 (Twelve) Hours. 60 tablet 1    • finasteride (PROSCAR) 5 MG tablet Take 5 mg by mouth Daily.      • fluticasone (FLONASE) 50 MCG/ACT nasal spray 1 spray.      • furosemide (LASIX) 20 MG tablet Take 20 mg by mouth Daily. Monday, Wed,Friday      • hydrALAZINE (APRESOLINE) 50 MG tablet Take 50 mg by mouth 3 (Three) Times a Day.      • insulin degludec (TRESIBA FLEXTOUCH) 100 UNIT/ML solution pen-injector injection Inject 70 Units under the skin Every Night.      • irbesartan (AVAPRO) 300 MG tablet Take 300 mg by mouth.      • lisinopril (PRINIVIL,ZESTRIL) 30 MG tablet Take 30 mg by mouth Daily.      • Multiple Vitamin (MULTI VITAMIN DAILY PO) Take 1 tablet by mouth Daily.      • NIFEdipine XL (PROCARDIA XL) 90 MG 24 hr tablet Take 90 mg by mouth.      • nitroglycerin (NITROSTAT) 0.4 MG SL tablet Place 1 tablet under the tongue Every 5 (Five) Minutes As Needed for Chest Pain. Take no more than 3 doses in 15 minutes. 25 tablet 11    • pramipexole (Mirapex) 1 MG tablet Take 1 tablet by mouth Every Night. 90 tablet 2    • prasugrel (EFFIENT) 10 MG tablet Take 10 mg by mouth Daily.      • sertraline (ZOLOFT) 50 MG tablet Take 50 mg by mouth.      • SITagliptin (JANUVIA) 100 MG tablet Take 100 mg by mouth Daily. 1/2 tab daily      • tamsulosin (FLOMAX) 0.4 MG capsule 24 hr capsule Take 1 capsule by mouth Every Night.        Scheduled Meds:  Continuous Infusions:No current facility-administered medications for this encounter.    PRN Meds:.       History of Present Illness:   This is a 76-year-old white male who presents to [facility] 10/19/2021 for scheduled left heart catheterization +/-catheter-based intervention  after having an abnormal stress test 10/7/2021.  Stress test demonstrated mild ST depression with infusion and had a normal hemodynamic response to regadenoson.  He was found to have a large posterior lateral stress-induced defect.  Severe small fixed anterior defect, EF 45%.  The patient has an upcoming TURP with greenlight laser with his urologist.  It was discussed with the patient via telephone call earlier this month that if he requires stent placement today, that it will need to be a bare-metal stent.  The patient denies any chest pain, palpitations, dizziness, presyncope, or syncope.  He has noticed more dyspnea on exertion particularly over the past few months.  Occasionally he will have some lower extremity swelling and claudication and has been told that he has blockages in both of his legs.  The patient has known sleep apnea and is compliant with CPAP nightly.    Cardiac risk factors: advanced age (older than 55 for men, 65 for women), diabetes mellitus, dyslipidemia, hypertension, male gender, obesity (BMI >= 30 kg/m2) and sedentary lifestyle.    Social History     Socioeconomic History   • Marital status:    Tobacco Use   • Smoking status: Never Smoker   • Smokeless tobacco: Never Used   Substance and Sexual Activity   • Alcohol use: No   • Drug use: No   • Sexual activity: Defer     Family History   Problem Relation Age of Onset   • Heart attack Mother    • Ulcers Father        Review of Systems  10 point review of systems was completed, positives outlined in the HPI, and otherwise all other systems are negative.      Objective:       Physical Exam  There were no vitals taken for this visit.  There were no vitals filed for this visit.  There is no height or weight on file to calculate BMI.  No intake or output data in the 24 hours ending 10/19/21 0705  bp 147/65, HR 58bpm, RR 14, 97% O2 sat on RA  General Appearance:  Alert, cooperative, no distress, appears stated age   Head:  Normocephalic,  without obvious abnormality, atraumatic   Neck: Supple, symmetrical, trachea midline, no adenopathy, thyroid: not enlarged, symmetric, no tenderness/mass/nodules, no carotid bruit or JVD   Lungs:    Diminished breath sounds to auscultation bilaterally, respirations unlabored   Heart:   Sinus bradycardia, S1, S2 normal, no murmur, rub or gallop   Abdomen:   Soft, non-tender, no masses, no organomegaly, bowel sounds audible x4   Extremities: No edema, normal range of motion   Pulses: 1+ and symmetric   Skin: Skin color, texture, turgor normal, no rashes or lesions   Neurologic: Normal       Cardiographics  EKG: Sinus bradycardia on telemetry    Imaging  Chest x-ray: No chest x-rays to review    Lab Review                           Assessment:   Patient with recent abnormal stress test showing mild ST depression with infusion and had a normal hemodynamic response to regadenoson.  He was found to have a large posterior lateral stress-induced defect, severe small fixed anterior defect, EF 45%.  Left heart catheterization procedures, risks, and complications discussed with the patient and he is agreeable to proceed.  If he requires stent placement, it will be a bare-metal stent, as the patient has an upcoming TURP surgery.  No new labs to review yet this morning.          Plan:   1. LHC +/-CBI  2. Follow up with Dr. Russo in the [place] office    Electronically signed by ASHLEIGH Castañeda, 10/19/21, 7:12 AM EDT.    I Liz Russo MD personally performed the services described in this documentation as scribed by the above individual in my presence, and it is both accurate and complete.    Liz Russo MD, FACC

## 2021-10-27 ENCOUNTER — NURSE TRIAGE (OUTPATIENT)
Dept: OTHER | Facility: CLINIC | Age: 76
End: 2021-10-27

## 2021-10-27 ENCOUNTER — OFFICE VISIT (OUTPATIENT)
Dept: PRIMARY CARE CLINIC | Age: 76
End: 2021-10-27
Payer: MEDICARE

## 2021-10-27 VITALS
DIASTOLIC BLOOD PRESSURE: 55 MMHG | SYSTOLIC BLOOD PRESSURE: 151 MMHG | TEMPERATURE: 96.9 F | OXYGEN SATURATION: 97 % | WEIGHT: 220 LBS | HEART RATE: 83 BPM | BODY MASS INDEX: 34.46 KG/M2

## 2021-10-27 DIAGNOSIS — R14.1 GAS PAIN: ICD-10-CM

## 2021-10-27 DIAGNOSIS — R06.02 SOB (SHORTNESS OF BREATH) ON EXERTION: ICD-10-CM

## 2021-10-27 DIAGNOSIS — K21.9 GASTROESOPHAGEAL REFLUX DISEASE, UNSPECIFIED WHETHER ESOPHAGITIS PRESENT: Primary | ICD-10-CM

## 2021-10-27 PROCEDURE — 1036F TOBACCO NON-USER: CPT | Performed by: NURSE PRACTITIONER

## 2021-10-27 PROCEDURE — 1123F ACP DISCUSS/DSCN MKR DOCD: CPT | Performed by: NURSE PRACTITIONER

## 2021-10-27 PROCEDURE — 99214 OFFICE O/P EST MOD 30 MIN: CPT | Performed by: NURSE PRACTITIONER

## 2021-10-27 PROCEDURE — G8427 DOCREV CUR MEDS BY ELIG CLIN: HCPCS | Performed by: NURSE PRACTITIONER

## 2021-10-27 PROCEDURE — G8417 CALC BMI ABV UP PARAM F/U: HCPCS | Performed by: NURSE PRACTITIONER

## 2021-10-27 PROCEDURE — G8484 FLU IMMUNIZE NO ADMIN: HCPCS | Performed by: NURSE PRACTITIONER

## 2021-10-27 PROCEDURE — 4040F PNEUMOC VAC/ADMIN/RCVD: CPT | Performed by: NURSE PRACTITIONER

## 2021-10-27 PROCEDURE — 3017F COLORECTAL CA SCREEN DOC REV: CPT | Performed by: NURSE PRACTITIONER

## 2021-10-27 RX ORDER — DOXAZOSIN 8 MG/1
TABLET ORAL
COMMUNITY
Start: 2021-10-19 | End: 2021-12-06 | Stop reason: SDUPTHER

## 2021-10-27 RX ORDER — PANTOPRAZOLE SODIUM 40 MG/1
40 TABLET, DELAYED RELEASE ORAL
Qty: 60 TABLET | Refills: 2 | Status: SHIPPED | OUTPATIENT
Start: 2021-10-27 | End: 2021-12-06 | Stop reason: SDUPTHER

## 2021-10-27 RX ORDER — SIMETHICONE 80 MG
80 TABLET,CHEWABLE ORAL EVERY 6 HOURS PRN
Qty: 60 TABLET | Refills: 1 | Status: SHIPPED | OUTPATIENT
Start: 2021-10-27 | End: 2021-12-06 | Stop reason: SDUPTHER

## 2021-10-27 RX ORDER — HYDRALAZINE HYDROCHLORIDE 100 MG/1
TABLET, FILM COATED ORAL
COMMUNITY
Start: 2021-10-19 | End: 2021-12-06 | Stop reason: SDUPTHER

## 2021-10-27 RX ORDER — ALBUTEROL SULFATE 90 UG/1
2 AEROSOL, METERED RESPIRATORY (INHALATION) EVERY 6 HOURS PRN
Qty: 18 G | Refills: 1 | Status: SHIPPED | OUTPATIENT
Start: 2021-10-27 | End: 2022-06-29

## 2021-10-27 NOTE — PROGRESS NOTES
Chief Complaint   Patient presents with    Shortness of Breath    Chest Pain       Have you seen any other physician or provider since your last visit yes - Dr. David Luna    Have you had any other diagnostic tests since your last visit? yes -     Have you changed or stopped any medications since your last visit?  yes -

## 2021-10-27 NOTE — TELEPHONE ENCOUNTER
Received call from [facility] with Red Flag Complaint. Brief description of triage: Pain in upper chest for a few weeks, negative stress test, negative heart cath      Triage indicates for patient to be seen today, Did advise of [facility] or walk in clinic if no appointments available. Care advice provided, patient verbalizes understanding; denies any other questions or concerns; instructed to call back for any new or worsening symptoms. Writer provided warm transfer to [facility] for appointment scheduling. Attention Provider: Thank you for allowing me to participate in the care of your patient. The patient was connected to triage in response to information provided to the ECC/PSC. Please do not respond through this encounter as the response is not directed to a shared pool. Reason for Disposition   Patient wants to be seen    Answer Assessment - Initial Assessment Questions  1. LOCATION: \"Where does it hurt? \"        Under his throat, in the middle of chest       2. RADIATION: \"Does the pain go anywhere else? \" (e.g., into neck, jaw, arms, back)      States he hurts all over doesn't know if it radiates     3. ONSET: \"When did the chest pain begin? \" (Minutes, hours or days)       A few weeks     4. PATTERN \"Does the pain come and go, or has it been constant since it started? \"  \"Does it get worse with exertion? \"       Comes and goes     5. DURATION: \"How long does it last\" (e.g., seconds, minutes, hours)      All day    6. SEVERITY: \"How bad is the pain? \"  (e.g., Scale 1-10; mild, moderate, or severe)     - MILD (1-3): doesn't interfere with normal activities      - MODERATE (4-7): interferes with normal activities or awakens from sleep     - SEVERE (8-10): excruciating pain, unable to do any normal activities        5/10    7. CARDIAC RISK FACTORS: \"Do you have any history of heart problems or risk factors for heart disease? \" (e.g., angina, prior heart attack; diabetes, high blood pressure, high cholesterol, smoker, or strong family history of heart disease)      Htn, diabetes     8. PULMONARY RISK FACTORS: \"Do you have any history of lung disease? \"  (e.g., blood clots in lung, asthma, emphysema, birth control pills)      Denies    9. CAUSE: \"What do you think is causing the chest pain? \"      Does not know     10. OTHER SYMPTOMS: \"Do you have any other symptoms? \" (e.g., dizziness, nausea, vomiting, sweating, fever, difficulty breathing, cough)        Sob, dizzy     11. PREGNANCY: \"Is there any chance you are pregnant? \" \"When was your last menstrual period? \"        N/a    Protocols used: CHEST PAIN-ADULT-[place]

## 2021-10-27 NOTE — PROGRESS NOTES
SUBJECTIVE:    Patient ID: Moriah Chamberlain is a 77 y. o.male. Chief Complaint   Patient presents with    Shortness of Breath    Chest Pain         HPI:    Pt c/o SOB, CP several months. Had stress test 10/9 and LHC 10/19 by Dr Benja Humphreys. Discharged stable after LHC and f/u cardiology [place] location. Med changes include doxazosin 8 mg BID and hydralazine 100 mg TID. Rates CP 5/10 occasional chest pressure. Does not radiate to jaw, arm. No vomiting, nausea or vertigo. No palpitations or syncope. Also c/o abdominal bloating and worsening GERD symptoms with heartburn. Of note: Abnormal stress test 10/07/2021:mild ST depression with infusion and had a normal hemodynamic response to regadenoson. He was found to have a large posterior lateral stress-induced defect. Severe small fixed anterior defect, EF 45% with CCS class I chest discomfort/NYHA class II dyspnea on exertion symptoms       Patient's medications, allergies, past medical, surgical, social and family histories were reviewed and updated as appropriate in electronic medical record. Outpatient Medications Marked as Taking for the 10/27/21 encounter (Office Visit) with MERCEDES Garcia CNP   Medication Sig Dispense Refill    hydrALAZINE (APRESOLINE) 100 MG tablet TAKE 1 TABLET BY MOUTH 3 (THREE) TIMES A DAY.  doxazosin (CARDURA) 8 MG tablet TAKE 1 TABLET BY MOUTH EVERY 12 (TWELVE) HOURS.       insulin detemir (LEVEMIR FLEXTOUCH) 100 UNIT/ML injection pen Inject 72 Units into the skin nightly 15 pen 1    Insulin Degludec (TRESIBA FLEXTOUCH) 100 UNIT/ML SOPN Inject 72 Units into the skin daily DX: E11.42 KX 25 pen 3    potassium chloride (KLOR-CON M) 20 MEQ extended release tablet Take 1 tablet by mouth daily 90 tablet 0    dexlansoprazole (DEXILANT) 60 MG CPDR delayed release capsule Take 1 capsule by mouth daily 90 capsule 1    Azilsartan Medoxomil (EDARBI) 80 MG TABS TAKE ONE TABLET BY MOUTH EVERY DAY 90 tablet 3    blood glucose test strips (TRUE METRIX BLOOD GLUCOSE TEST) strip USE TO TEST BLOOD SUGAR THREE TIMES DAILY AND AS NEEDED DX E11.9 300 strip 3    furosemide (LASIX) 40 MG tablet Take 1 tablet by mouth daily 90 tablet 1    fluticasone (FLONASE) 50 MCG/ACT nasal spray 1 spray by Each Nostril route daily 2 Bottle 1    sertraline (ZOLOFT) 50 MG tablet Take 1 tablet by mouth daily 90 tablet 3    prasugrel (EFFIENT) 10 MG TABS Take 1 tablet by mouth daily 90 tablet 3    atorvastatin (LIPITOR) 40 MG tablet Take 1 tablet by mouth nightly 90 tablet 3    nitroGLYCERIN (NITROSTAT) 0.4 MG SL tablet Place 1 tablet under the tongue every 5 minutes as needed for Chest pain 25 tablet 3    dapagliflozin (FARXIGA) 10 MG tablet Take 1 tablet by mouth every morning 90 tablet 3    pramipexole (MIRAPEX) 1 MG tablet Take 1 mg by mouth nightly      finasteride (PROSCAR) 5 MG tablet Take 1 tablet by mouth daily 90 tablet 3    carvedilol (COREG) 12.5 MG tablet Take 0.5 tablets by mouth 2 times daily 180 tablet 3    Ginger, Zingiber officinalis, (GINGER ROOT) 250 MG CAPS One po daily. 90 capsule 3    B-D UF III MINI PEN NEEDLES 31G X 5 MM MISC USE TO INJECT INSULIN EVERYDAY 100 each 2    Insulin Pen Needle (ADVOCATE INSULIN PEN NEEDLES) 31G X 5 MM MISC USE TO INJECT INSULIN EVERY DAY DX: E11.42  each 3    Microlet Lancets MISC 1 each by Does not apply route 3 times daily DX: E11.42  each 3    Insulin Pen Needle 31G X 5 MM MISC USE TO INJECT INSULIN EVERY  each 3    Cholecalciferol (VITAMIN D3) 2000 units CAPS Take 2 capsules by mouth daily       therapeutic multivitamin-minerals (THERAGRAN-M) tablet Take 1 tablet by mouth daily. Review of Systems   HENT: Negative. Respiratory: Positive for shortness of breath. Negative for cough. Cardiovascular: Positive for chest pain. Negative for palpitations and leg swelling. Gastrointestinal: Negative for diarrhea and nausea.         Gerd, abdominal bloating All other systems reviewed and are negative. Past Medical History:   Diagnosis Date    Anemia 2019    Anxiety     Bleeding stomach ulcer 2019    Dr Yair Cabello CAD (coronary artery disease)     Cancer ([facility] [address]     malignant colon polyp    Depression     Enlarged prostate     GERD (gastroesophageal reflux disease)     Hyperlipidemia     Hypertension     Sleep apnea     Type II or unspecified type diabetes mellitus without mention of complication, not stated as uncontrolled      Past Surgical History:   Procedure Laterality Date    CORONARY ANGIOPLASTY WITH STENT PLACEMENT  04/2013    Dr. Adán Gillis, stents in 2009, 2011, 2013, ptca 2014    CORONARY ARTERY BYPASS GRAFT  2001    x3    EYE SURGERY Left     POLYPECTOMY  1999    colon ca     Family History   Problem Relation Age of Onset    Heart Attack Mother     Heart Disease Father     Diabetes Sister       Social History     Tobacco Use   Smoking Status Never Smoker   Smokeless Tobacco Never Used       OBJECTIVE:   Wt Readings from Last 3 Encounters:   10/27/21 220 lb (99.8 kg)   10/04/21 223 lb 12.8 oz (101.5 kg)   08/25/21 222 lb 3.2 oz (100.8 kg)     BP Readings from Last 3 Encounters:   10/27/21 (!) 151/55   10/04/21 (!) 132/52   08/25/21 (!) 160/40       BP (!) 151/55   Pulse 83   Temp 96.9 °F (36.1 °C)   Wt 220 lb (99.8 kg)   SpO2 97%   BMI 34.46 kg/m²      Physical Exam  Vitals and nursing note reviewed. Constitutional:       General: He is not in acute distress. Appearance: Normal appearance. HENT:      Head: Normocephalic. Eyes:      Conjunctiva/sclera: Conjunctivae normal.      Pupils: Pupils are equal, round, and reactive to light. Cardiovascular:      Rate and Rhythm: Normal rate and regular rhythm. Pulmonary:      Effort: Pulmonary effort is normal. No respiratory distress. Breath sounds: Normal breath sounds. No wheezing or rales.    Abdominal:      General: Bowel sounds are normal.      Palpations: Abdomen is soft. Tenderness: There is abdominal tenderness. There is no guarding. Comments: Epigastric TTP   Musculoskeletal:         General: Normal range of motion. Cervical back: Normal range of motion. Right lower leg: No edema. Left lower leg: No edema. Lymphadenopathy:      Cervical: No cervical adenopathy. Neurological:      General: No focal deficit present. Mental Status: He is alert and oriented to person, place, and time. Motor: No weakness. Gait: Gait normal.   Psychiatric:         Mood and Affect: Mood normal.         Thought Content: Thought content normal.         Lab Results   Component Value Date     08/23/2021    K 4.6 08/23/2021     08/23/2021    CO2 23 08/23/2021    GLUCOSE 211 08/23/2021    GLUCOSE 120 03/01/2011    BUN 39 08/23/2021    CREATININE 1.3 08/23/2021    CREATININE 1.0 03/01/2011    CALCIUM 9.2 08/23/2021    PROT 6.3 07/15/2021    LABALBU 3.8 07/15/2021    LABALBU 4.2 11/14/2011    BILITOT 0.3 07/15/2021    BILITOT 0.4 11/14/2011    ALT 17 07/15/2021    AST 14 07/15/2021       Hemoglobin A1C (%)   Date Value   07/15/2021 9.1 (H)     Microalbumin, Random Urine (mg/dL)   Date Value   06/05/2017 1.50     LDL Calculated (mg/dL)   Date Value   07/15/2021 70         Lab Results   Component Value Date    WBC 8.5 07/15/2021    NEUTROABS 4.7 04/15/2019    HGB 12.0 07/15/2021    HCT 36.8 07/15/2021    MCV 88.2 07/15/2021     07/15/2021       Lab Results   Component Value Date    TSH 1.79 07/15/2021         ASSESSMENT/PLAN:     1. Gastroesophageal reflux disease, unspecified whether esophagitis present  Take medications as prescribed. Continue home medications. Discussed symptom management. Return to clinic S&S worsen or no improvement noted. Patient verbalized understanding.   - pantoprazole (PROTONIX) 40 MG tablet; Take 1 tablet by mouth 2 times daily (before meals)  Dispense: 60 tablet; Refill: 2    2.  Gas pain  - simethicone (MYLICON) 80 MG chewable tablet; Take 1 tablet by mouth every 6 hours as needed for Flatulence (gas pain) Take 3 times daily for first 2 days then as needed  Dispense: 60 tablet; Refill: 1    3. SOB (shortness of breath) on exertion  Inhaler as directed. Keep f/u cardiology. If worsening, ED.     - albuterol sulfate  (90 Base) MCG/ACT inhaler;  Inhale 2 puffs into the lungs every 6 hours as needed for Wheezing or Shortness of Breath  Dispense: 18 g; Refill: 1        Orders Placed This Encounter   Medications    pantoprazole (PROTONIX) 40 MG tablet     Sig: Take 1 tablet by mouth 2 times daily (before meals)     Dispense:  60 tablet     Refill:  2    simethicone (MYLICON) 80 MG chewable tablet     Sig: Take 1 tablet by mouth every 6 hours as needed for Flatulence (gas pain) Take 3 times daily for first 2 days then as needed     Dispense:  60 tablet     Refill:  1    albuterol sulfate  (90 Base) MCG/ACT inhaler     Sig: Inhale 2 puffs into the lungs every 6 hours as needed for Wheezing or Shortness of Breath     Dispense:  18 g     Refill:  1

## 2021-10-27 NOTE — PATIENT INSTRUCTIONS

## 2021-11-23 ASSESSMENT — ENCOUNTER SYMPTOMS
COUGH: 0
NAUSEA: 0
SHORTNESS OF BREATH: 1
DIARRHEA: 0

## 2021-12-06 ENCOUNTER — OFFICE VISIT (OUTPATIENT)
Dept: PRIMARY CARE CLINIC | Age: 76
End: 2021-12-06
Payer: MEDICARE

## 2021-12-06 VITALS
HEART RATE: 78 BPM | WEIGHT: 220 LBS | SYSTOLIC BLOOD PRESSURE: 130 MMHG | TEMPERATURE: 97.9 F | DIASTOLIC BLOOD PRESSURE: 42 MMHG | OXYGEN SATURATION: 96 % | RESPIRATION RATE: 16 BRPM | HEIGHT: 67 IN | BODY MASS INDEX: 34.53 KG/M2

## 2021-12-06 DIAGNOSIS — M17.0 PRIMARY OSTEOARTHRITIS OF BOTH KNEES: Primary | ICD-10-CM

## 2021-12-06 DIAGNOSIS — I10 ESSENTIAL HYPERTENSION: ICD-10-CM

## 2021-12-06 DIAGNOSIS — R60.9 SWELLING: ICD-10-CM

## 2021-12-06 DIAGNOSIS — E11.42 TYPE 2 DIABETES MELLITUS WITH DIABETIC POLYNEUROPATHY, WITHOUT LONG-TERM CURRENT USE OF INSULIN (HCC): ICD-10-CM

## 2021-12-06 DIAGNOSIS — K21.9 GASTROESOPHAGEAL REFLUX DISEASE, UNSPECIFIED WHETHER ESOPHAGITIS PRESENT: ICD-10-CM

## 2021-12-06 DIAGNOSIS — R14.1 GAS PAIN: ICD-10-CM

## 2021-12-06 PROCEDURE — 3017F COLORECTAL CA SCREEN DOC REV: CPT | Performed by: NURSE PRACTITIONER

## 2021-12-06 PROCEDURE — G8417 CALC BMI ABV UP PARAM F/U: HCPCS | Performed by: NURSE PRACTITIONER

## 2021-12-06 PROCEDURE — 1123F ACP DISCUSS/DSCN MKR DOCD: CPT | Performed by: NURSE PRACTITIONER

## 2021-12-06 PROCEDURE — G8427 DOCREV CUR MEDS BY ELIG CLIN: HCPCS | Performed by: NURSE PRACTITIONER

## 2021-12-06 PROCEDURE — 2022F DILAT RTA XM EVC RTNOPTHY: CPT | Performed by: NURSE PRACTITIONER

## 2021-12-06 PROCEDURE — 3046F HEMOGLOBIN A1C LEVEL >9.0%: CPT | Performed by: NURSE PRACTITIONER

## 2021-12-06 PROCEDURE — G8484 FLU IMMUNIZE NO ADMIN: HCPCS | Performed by: NURSE PRACTITIONER

## 2021-12-06 PROCEDURE — 1036F TOBACCO NON-USER: CPT | Performed by: NURSE PRACTITIONER

## 2021-12-06 PROCEDURE — 99214 OFFICE O/P EST MOD 30 MIN: CPT | Performed by: NURSE PRACTITIONER

## 2021-12-06 PROCEDURE — 4040F PNEUMOC VAC/ADMIN/RCVD: CPT | Performed by: NURSE PRACTITIONER

## 2021-12-06 RX ORDER — DULOXETIN HYDROCHLORIDE 30 MG/1
CAPSULE, DELAYED RELEASE ORAL
COMMUNITY
Start: 2021-11-08 | End: 2021-12-06 | Stop reason: SDUPTHER

## 2021-12-06 RX ORDER — POTASSIUM CHLORIDE 20 MEQ/1
20 TABLET, EXTENDED RELEASE ORAL DAILY
Qty: 90 TABLET | Refills: 3 | Status: SHIPPED | OUTPATIENT
Start: 2021-12-06 | End: 2022-10-24 | Stop reason: SDUPTHER

## 2021-12-06 RX ORDER — DULOXETIN HYDROCHLORIDE 30 MG/1
CAPSULE, DELAYED RELEASE ORAL
Qty: 90 CAPSULE | Refills: 3 | Status: SHIPPED | OUTPATIENT
Start: 2021-12-06 | End: 2022-02-16 | Stop reason: ALTCHOICE

## 2021-12-06 RX ORDER — CARVEDILOL 12.5 MG/1
6.25 TABLET ORAL 2 TIMES DAILY
Qty: 180 TABLET | Refills: 3 | Status: SHIPPED | OUTPATIENT
Start: 2021-12-06 | End: 2022-06-22 | Stop reason: SDUPTHER

## 2021-12-06 RX ORDER — DOXAZOSIN 8 MG/1
TABLET ORAL
Qty: 180 TABLET | Refills: 3 | Status: SHIPPED | OUTPATIENT
Start: 2021-12-06

## 2021-12-06 RX ORDER — HYDRALAZINE HYDROCHLORIDE 100 MG/1
TABLET, FILM COATED ORAL
Qty: 270 TABLET | Refills: 3 | Status: SHIPPED | OUTPATIENT
Start: 2021-12-06 | End: 2022-10-24 | Stop reason: SDUPTHER

## 2021-12-06 RX ORDER — PANTOPRAZOLE SODIUM 40 MG/1
40 TABLET, DELAYED RELEASE ORAL
Qty: 180 TABLET | Refills: 3 | Status: SHIPPED | OUTPATIENT
Start: 2021-12-06

## 2021-12-06 RX ORDER — AZILSARTAN KAMEDOXOMIL 80 MG/1
TABLET ORAL
Qty: 90 TABLET | Refills: 3 | Status: SHIPPED | OUTPATIENT
Start: 2021-12-06 | End: 2022-10-24 | Stop reason: SDUPTHER

## 2021-12-06 RX ORDER — SIMETHICONE 80 MG
80 TABLET,CHEWABLE ORAL EVERY 6 HOURS PRN
Qty: 180 TABLET | Refills: 3 | Status: SHIPPED | OUTPATIENT
Start: 2021-12-06 | End: 2022-02-08

## 2021-12-06 RX ORDER — FUROSEMIDE 20 MG/1
20 TABLET ORAL SEE ADMIN INSTRUCTIONS
COMMUNITY
End: 2022-06-22 | Stop reason: SDUPTHER

## 2021-12-06 RX ORDER — TAMSULOSIN HYDROCHLORIDE 0.4 MG/1
0.4 CAPSULE ORAL 2 TIMES DAILY
COMMUNITY
Start: 2021-11-09 | End: 2022-10-24 | Stop reason: SDUPTHER

## 2021-12-06 ASSESSMENT — ENCOUNTER SYMPTOMS
EYES NEGATIVE: 1
ALLERGIC/IMMUNOLOGIC NEGATIVE: 1
RESPIRATORY NEGATIVE: 1
GASTROINTESTINAL NEGATIVE: 1

## 2021-12-06 NOTE — PROGRESS NOTES
Chief Complaint   Patient presents with    Follow-up    Diabetes    Hypertension       Have you seen any other physician or provider since your last visit yes - urology,cardiology,rheumatology,vascular     Have you had any other diagnostic tests since your last visit? yes - echo,stress test,ultrasound duplex, labs for Dr Neymar Bangura and his A1c was 10.10    Have you changed or stopped any medications since your last visit? yes -  Stopped Zoloft    I have recommended that this patient have a immunization for pneumonia but he declines at this time. I have discussed the risks and benefits of this examination with him. The patient verbalizes understanding. Diabetic retinal exam completed this year? Yes                       * If yes please have patient sign a records release to obtain record to update Health Maintenance                       * If no, please order referral for patient to be scheduled           SUBJECTIVE:    Patient ID:Aquilino Flores is a 77 y.o. male. Chief Complaint   Patient presents with    Follow-up    Diabetes    Hypertension     HPI:  Patient has had hypertension for several years. He has been compliant with taking medications, without side effects from it. He has been following a low-sodium, is active and rarely exercises. Weight is stable, compared to last visit. His blood pressure is stable 130/42 at this time. Patient has had diabetes for the past several years. He has been ompliant with the medications and denies any side effects from it. He has been monitoring fingersticks on a daily basis. His fingerstick range is between 120. He denies any hypoglycemic symptoms. He has been following a diabetic diet and has been active. His last eye exam was less than a year ago. He is using oral meds and Insulin. He is on 72 units of Tresiba at bedtime. He does have leg blockage in both legs. He may have a procedure in the future. He had a heart cath.   He is getting collateral blood flow. He is going back in Feb about prostate surgery. He did go to Dr Annie English and started on Cymbalta and stopped the Zoloft   Patient's medications, allergies, past medical, surgical, social and family histories were reviewed and updated as appropriate. Review of Systems   Constitutional: Negative. HENT: Negative. Eyes: Negative. Respiratory: Negative. Cardiovascular: Negative. Gastrointestinal: Negative. Endocrine: Negative. Genitourinary: Negative. Musculoskeletal: Negative. Skin: Negative. Allergic/Immunologic: Negative. Neurological: Negative. Hematological: Negative. Psychiatric/Behavioral: Negative. OBJECTIVE:  BP (!) 130/42 (Site: Right Upper Arm, Position: Sitting, Cuff Size: Medium Adult)   Pulse 78   Temp 97.9 °F (36.6 °C) (Temporal)   Resp 16   Ht 5' 7\" (1.702 m)   Wt 220 lb (99.8 kg)   SpO2 96% Comment: room air  BMI 34.46 kg/m²    Physical Exam  Vitals and nursing note reviewed. Constitutional:       Appearance: He is well-developed. HENT:      Head: Normocephalic and atraumatic. Eyes:      Conjunctiva/sclera: Conjunctivae normal.      Pupils: Pupils are equal, round, and reactive to light. Neck:      Thyroid: No thyromegaly. Vascular: No JVD. Cardiovascular:      Rate and Rhythm: Normal rate and regular rhythm. Heart sounds: No murmur heard. No friction rub. No gallop. Pulmonary:      Effort: Pulmonary effort is normal. No respiratory distress. Breath sounds: Normal breath sounds. No wheezing or rales. Abdominal:      General: Bowel sounds are normal. There is no distension. Palpations: Abdomen is soft. Tenderness: There is no abdominal tenderness. There is no guarding. Musculoskeletal:         General: No tenderness. Cervical back: Normal range of motion and neck supple. Skin:     General: Skin is warm and dry. Findings: No rash.    Neurological:      Mental Status: He is alert and oriented to person, place, and time. Psychiatric:         Judgment: Judgment normal.         No results found for requested labs within last 30 days. Hemoglobin A1C (%)   Date Value   07/15/2021 9.1 (H)     Microalbumin, Random Urine (mg/dL)   Date Value   06/05/2017 1.50     LDL Calculated (mg/dL)   Date Value   07/15/2021 70         Lab Results   Component Value Date    WBC 8.5 07/15/2021    NEUTROABS 4.7 04/15/2019    HGB 12.0 07/15/2021    HCT 36.8 07/15/2021    MCV 88.2 07/15/2021     07/15/2021       Lab Results   Component Value Date    TSH 1.79 07/15/2021         ASSESSMENT/PLAN:     1. Essential hypertension    - carvedilol (COREG) 12.5 MG tablet; Take 0.5 tablets by mouth 2 times daily  Dispense: 180 tablet; Refill: 3  - Azilsartan Medoxomil (EDARBI) 80 MG TABS; TAKE ONE TABLET BY MOUTH EVERY DAY  Dispense: 90 tablet; Refill: 3  - Hemoglobin A1C; Future  - Lipid Panel; Future    2. Gas pain    - simethicone (MYLICON) 80 MG chewable tablet; Take 1 tablet by mouth every 6 hours as needed for Flatulence (gas pain) Take 3 times daily for first 2 days then as needed  Dispense: 180 tablet; Refill: 3    3. Swelling  Continue Lasix 40 g daily. - potassium chloride (KLOR-CON M) 20 MEQ extended release tablet; Take 1 tablet by mouth daily  Dispense: 90 tablet; Refill: 3    4. Type 2 diabetes mellitus with diabetic polyneuropathy, without long-term current use of insulin (HCC)  Stable. I advised him regarding diabetic diet, exercise and weight control. Also, I advised him to stay on the current medication, monitor his fingerstick closely. I am going to check the A1c every few months. I will check microalbumin on a yearly basis. I have also advised him to have a yearly eye exam and to monitor his feet closely. Along with instruction of possible complications related to diabetes, even with good control. A1C will be checked  in few months.    Lab Results   Component Value Date    LABA1C 9.1 (H) 07/15/2021       - dapagliflozin (FARXIGA) 10 MG tablet; Take 1 tablet by mouth every morning  Dispense: 90 tablet; Refill: 3  - Hemoglobin A1C; Future  - Comprehensive Metabolic Panel; Future  - CBC; Future  - Microalbumin / Creatinine Urine Ratio; Future    5. Gastroesophageal reflux disease, unspecified whether esophagitis present    - pantoprazole (PROTONIX) 40 MG tablet; Take 1 tablet by mouth 2 times daily (before meals)  Dispense: 180 tablet; Refill: 3    6. Primary osteoarthritis of both knees  . He is going to the orthopedist to have injections.        Written by Rhys NO, acting as a scribe for Delfin Donovan on 12/6/2021 at 10:08 PM.

## 2021-12-16 ENCOUNTER — OFFICE VISIT (OUTPATIENT)
Dept: PULMONOLOGY | Facility: CLINIC | Age: 76
End: 2021-12-16

## 2021-12-16 VITALS
WEIGHT: 222 LBS | SYSTOLIC BLOOD PRESSURE: 122 MMHG | HEIGHT: 66 IN | BODY MASS INDEX: 35.68 KG/M2 | DIASTOLIC BLOOD PRESSURE: 72 MMHG | RESPIRATION RATE: 18 BRPM | OXYGEN SATURATION: 96 % | HEART RATE: 76 BPM

## 2021-12-16 DIAGNOSIS — G47.33 OSA (OBSTRUCTIVE SLEEP APNEA): Primary | ICD-10-CM

## 2021-12-16 DIAGNOSIS — R06.02 SOB (SHORTNESS OF BREATH): ICD-10-CM

## 2021-12-16 DIAGNOSIS — R05.9 COUGH: ICD-10-CM

## 2021-12-16 DIAGNOSIS — G25.81 RESTLESS LEGS SYNDROME (RLS): ICD-10-CM

## 2021-12-16 PROCEDURE — 99214 OFFICE O/P EST MOD 30 MIN: CPT | Performed by: NURSE PRACTITIONER

## 2021-12-16 RX ORDER — PRAMIPEXOLE DIHYDROCHLORIDE 1 MG/1
1 TABLET ORAL NIGHTLY
Qty: 90 TABLET | Refills: 2 | Status: SHIPPED | OUTPATIENT
Start: 2021-12-16 | End: 2022-08-18 | Stop reason: SDUPTHER

## 2022-02-08 DIAGNOSIS — R14.1 GAS PAIN: ICD-10-CM

## 2022-02-08 RX ORDER — SIMETHICONE 80 MG/1
TABLET, CHEWABLE ORAL
Qty: 60 TABLET | Refills: 1 | Status: SHIPPED | OUTPATIENT
Start: 2022-02-08

## 2022-02-09 ENCOUNTER — OFFICE VISIT (OUTPATIENT)
Dept: ORTHOPEDIC SURGERY | Facility: CLINIC | Age: 77
End: 2022-02-09

## 2022-02-09 VITALS — TEMPERATURE: 98 F | BODY MASS INDEX: 35.35 KG/M2 | HEIGHT: 67 IN | WEIGHT: 225.2 LBS

## 2022-02-09 DIAGNOSIS — M17.0 PRIMARY OSTEOARTHRITIS OF BOTH KNEES: Primary | ICD-10-CM

## 2022-02-09 DIAGNOSIS — M25.561 ARTHRALGIA OF BOTH KNEES: ICD-10-CM

## 2022-02-09 DIAGNOSIS — M25.562 ARTHRALGIA OF BOTH KNEES: ICD-10-CM

## 2022-02-09 PROCEDURE — 99213 OFFICE O/P EST LOW 20 MIN: CPT | Performed by: PHYSICIAN ASSISTANT

## 2022-02-09 PROCEDURE — 20610 DRAIN/INJ JOINT/BURSA W/O US: CPT | Performed by: PHYSICIAN ASSISTANT

## 2022-02-09 RX ORDER — METHYLPREDNISOLONE ACETATE 40 MG/ML
40 INJECTION, SUSPENSION INTRA-ARTICULAR; INTRALESIONAL; INTRAMUSCULAR; SOFT TISSUE
Status: COMPLETED | OUTPATIENT
Start: 2022-02-09 | End: 2022-02-09

## 2022-02-09 RX ORDER — ATORVASTATIN CALCIUM 40 MG/1
40 TABLET, FILM COATED ORAL DAILY
COMMUNITY
Start: 2022-01-01

## 2022-02-09 RX ORDER — PANTOPRAZOLE SODIUM 40 MG/1
TABLET, DELAYED RELEASE ORAL
COMMUNITY
Start: 2021-12-23

## 2022-02-09 RX ORDER — LIDOCAINE HYDROCHLORIDE 10 MG/ML
2 INJECTION, SOLUTION INFILTRATION; PERINEURAL
Status: COMPLETED | OUTPATIENT
Start: 2022-02-09 | End: 2022-02-09

## 2022-02-09 RX ADMIN — LIDOCAINE HYDROCHLORIDE 2 ML: 10 INJECTION, SOLUTION INFILTRATION; PERINEURAL at 11:09

## 2022-02-09 RX ADMIN — METHYLPREDNISOLONE ACETATE 40 MG: 40 INJECTION, SUSPENSION INTRA-ARTICULAR; INTRALESIONAL; INTRAMUSCULAR; SOFT TISSUE at 11:09

## 2022-02-09 NOTE — PROGRESS NOTES
Subjective   Patient ID: Donny Jerry is a 76 y.o. right hand dominant male  Pain of the Left Knee and Pain of the Right Knee (Reports ashley knee pain ongoing for several years, seen at arthritis center but did not have injections due to having to bee seen elsewhere for other medical issues, no tx)         History of Present Illness  Patient presents with chronic bilateral knee pain ongoing for at least 2 years.   Denies recent injury/trauma, denies swelling or redness.  He reports morning stiffness upon awakening.                                                 Past Medical History:   Diagnosis Date   • Benign hypertension    • Coronary artery disease    • Depression    • Enlarged prostate     Enlarged prostate with anticipated TURP with Dr. Bernal.   • GERD (gastroesophageal reflux disease)    • Hypercholesterolemia    • Obesity    • Obstructive sleep apnea on CPAP    • Type 2 diabetes mellitus (HCC)         Past Surgical History:   Procedure Laterality Date   • CARDIAC CATHETERIZATION     • CARDIAC CATHETERIZATION Left 10/19/2021    Procedure: Left Heart Cath;  Surgeon: Liz Russo MD;  Location: [facility] CATH INVASIVE LOCATION;  Service: Cardiology;  Laterality: Left;   • COLONOSCOPY W/ POLYPECTOMY     • CORONARY ANGIOPLASTY WITH STENT PLACEMENT Left 06/03/2009    Left heart catheterization, 06/03/2009, Dr. Russo:  LVEF 45% to 50%.  Placement of overlapping Cypher drug-eluting stent 2.5 x 28 and 2.5 x 18 to the SVG to the obtuse marginal branch.  SVG to the PDA had a proximal stenosis estimated at 60% with a distal stenosis of 50% to 60%.   • CORONARY ANGIOPLASTY WITH STENT PLACEMENT Left 07/06/2009    Left heart catheterization, 07/06/2009:  PTCA and stent placement in the mid PDA using a 2.25 x 12 mm Taxus drug-eluting stent with stent placement in the distal SVG to the PDA using a 2.5 x 18 mm Cypher drug-eluting stent and stenting of the proximal SVG to the PDA using a 3.0 x 28 mm Cypher  drug-eluting stent.   • CORONARY ANGIOPLASTY WITH STENT PLACEMENT  04/23/2010    Cardiac catheterization for recurrent anginal symptoms, 04/23/2010, with PTCA and stent placement in the proximal SVG to the PDA using 3.0 x 28 mm Promus drug-eluting stent for in-stent restenosis.   • CORONARY ANGIOPLASTY WITH STENT PLACEMENT Left 07/06/2010    Left heart catheterization, 07/06/2010, Dr. Russo:  EF 40% to 45%.  3.5 x 23 mm Promus drug-eluting stent in the proximal SVG to OM, 2.5 x 18 mm Promus drug-eluting stent to distal SVG to PDA due to in-stent restenosis.     • CORONARY ANGIOPLASTY WITH STENT PLACEMENT Left 11/16/2010    Left heart catheterization, 11/16/2010, with placement of a 3.0 x 16 mm Taxus drug-eluting stent to the SVG to the RCA proximally and a 2.5 x 16 mm paclitaxel stent distally in the SVG to the RCA.   • CORONARY ANGIOPLASTY WITH STENT PLACEMENT Left     Left heart catheterization, 3.0 x 24-mm Promus element drug-eluting stent to a 90% lesion in the mid portion of the SVG to the PDA.    • CORONARY ANGIOPLASTY WITH STENT PLACEMENT Left 06/24/2014    Left heart catheterization for recurrent angina, 06/24/2014, Dr. Russo:  PTCA of the SVG to the PDA using a 3 x 12 mm NC TREK balloon.     • CORONARY ARTERY BYPASS GRAFT      CABG x3, Dr. Peng, [facility], 2001.       Family History   Problem Relation Age of Onset   • Heart attack Mother    • Ulcers Father        Social History     Socioeconomic History   • Marital status:    Tobacco Use   • Smoking status: Never Smoker   • Smokeless tobacco: Never Used   Vaping Use   • Vaping Use: Never used   Substance and Sexual Activity   • Alcohol use: No   • Drug use: No   • Sexual activity: Defer         Current Outpatient Medications:   •  atorvastatin (LIPITOR) 40 MG tablet, , Disp: , Rfl:   •  atorvastatin (LIPITOR) 80 MG tablet, Take 1 tablet by mouth Daily. (Patient taking differently: Take 40 mg by mouth Daily.), Disp: 90  tablet, Rfl: 1  •  azilsartan medoxomil (Edarbi) 80 MG tablet tablet, Take 80 mg by mouth Daily., Disp: , Rfl:   •  carvedilol (COREG) 12.5 MG tablet, Take 6.25 mg by mouth 2 (Two) Times a Day With Meals. Pt takes 0.5 tab BID, Disp: , Rfl:   •  Cholecalciferol (VITAMIN D3) 50 MCG (2000 UT) capsule, Take 2 capsules by mouth., Disp: , Rfl:   •  Dapagliflozin Propanediol (FARXIGA) 10 MG tablet, Take 10 mg by mouth Daily., Disp: , Rfl:   •  dexlansoprazole (DEXILANT) 60 MG capsule, Take 60 mg by mouth Daily., Disp: , Rfl:   •  doxazosin (CARDURA) 8 MG tablet, Take 1 tablet by mouth Every 12 (Twelve) Hours., Disp: 60 tablet, Rfl: 11  •  finasteride (PROSCAR) 5 MG tablet, Take 5 mg by mouth Daily., Disp: , Rfl:   •  fluticasone (FLONASE) 50 MCG/ACT nasal spray, 1 spray., Disp: , Rfl:   •  Fluticasone Furoate-Vilanterol (Breo Ellipta) 100-25 MCG/INH inhaler, Inhale 1 puff Daily. Rinse mouth with water after use., Disp: 1 each, Rfl: 5  •  furosemide (LASIX) 20 MG tablet, Take 20 mg by mouth Daily. Monday, Wed,Friday, Disp: , Rfl:   •  hydrALAZINE (APRESOLINE) 100 MG tablet, Take 1 tablet by mouth 3 (Three) Times a Day., Disp: 90 tablet, Rfl: 11  •  insulin degludec (TRESIBA FLEXTOUCH) 100 UNIT/ML solution pen-injector injection, Inject 70 Units under the skin Every Night., Disp: , Rfl:   •  Multiple Vitamin (MULTI VITAMIN DAILY PO), Take 1 tablet by mouth Daily., Disp: , Rfl:   •  nitroglycerin (NITROSTAT) 0.4 MG SL tablet, Place 1 tablet under the tongue Every 5 (Five) Minutes As Needed for Chest Pain. Take no more than 3 doses in 15 minutes., Disp: 25 tablet, Rfl: 11  •  pantoprazole (PROTONIX) 40 MG EC tablet, TAKE 1 TABLET BY MOUTH 2 TIMES DAILY (BEFORE MEALS), Disp: , Rfl:   •  pramipexole (Mirapex) 1 MG tablet, Take 1 tablet by mouth Every Night., Disp: 90 tablet, Rfl: 2  •  prasugrel (EFFIENT) 10 MG tablet, Take 10 mg by mouth Daily., Disp: , Rfl:   •  sertraline (ZOLOFT) 50 MG tablet, Take 50 mg by mouth., Disp: ,  "Rfl:   •  tamsulosin (FLOMAX) 0.4 MG capsule 24 hr capsule, Take 1 capsule by mouth Every Night., Disp: , Rfl:   •  vitamin D (ERGOCALCIFEROL) 1.25 MG (55970 UT) capsule capsule, Take 50,000 Units by mouth 1 (One) Time Per Week., Disp: , Rfl:     Allergies   Allergen Reactions   • Bisoprolol Other (See Comments)     Agitation     • Byetta 10 Mcg Pen [Exenatide]      nausea   • Crestor [Rosuvastatin Calcium] Myalgia   • Metformin And Related    • Plavix [Clopidogrel Bisulfate] Other (See Comments)     resistance   • Zocor [Simvastatin] Myalgia       Review of Systems   Constitutional: Negative for diaphoresis, fever and unexpected weight change.   HENT: Negative for dental problem and sore throat.    Eyes: Negative for visual disturbance.   Respiratory: Negative for shortness of breath.    Cardiovascular: Negative for chest pain.   Gastrointestinal: Negative for abdominal pain, constipation, diarrhea, nausea and vomiting.   Genitourinary: Negative for difficulty urinating and frequency.   Musculoskeletal: Positive for arthralgias (ashley knee).   Neurological: Negative for headaches.   Hematological: Does not bruise/bleed easily.       I have reviewed the medical and surgical history, family history, social history, medications, and/or allergies, and the review of systems of this report.    Objective   Temp 98 °F (36.7 °C)   Ht 170.2 cm (67\")   Wt 102 kg (225 lb 3.2 oz)   BMI 35.27 kg/m²    Physical Exam  Right Knee Exam     Range of Motion   Extension: 5   Flexion: 110       Left Knee Exam     Range of Motion   Extension: 5   Flexion: 100              Neurologic Exam     Positive bilateral knee patella crepitus  Bilateral knee extensor mechanisms are intact  Assessment/Plan   Independent Review of Radiographic Studies:    X-ray of bilateral knee 2 view weightbearing performed in the office for the evaluation knee pain.  No comparison films are available to view.  No acute fracture or dislocation.  Does appear to be " tricompartmental degenerative changes with patellofemoral degenerative changes      Large Joint Arthrocentesis: R knee  Date/Time: 2/9/2022 11:09 AM  Consent given by: patient  Site marked: site marked  Timeout: Immediately prior to procedure a time out was called to verify the correct patient, procedure, equipment, support staff and site/side marked as required   Supporting Documentation  Indications: pain   Procedure Details  Location: knee - R knee  Preparation: Patient was prepped and draped in the usual sterile fashion  Needle size: 22 G  Approach: anterolateral  Medications administered: 40 mg methylPREDNISolone acetate 40 MG/ML; 2 mL lidocaine 1 %  Patient tolerance: patient tolerated the procedure well with no immediate complications    Large Joint Arthrocentesis: L knee  Date/Time: 2/9/2022 11:09 AM  Consent given by: patient  Site marked: site marked  Timeout: Immediately prior to procedure a time out was called to verify the correct patient, procedure, equipment, support staff and site/side marked as required   Supporting Documentation  Indications: pain   Procedure Details  Location: knee - L knee  Preparation: Patient was prepped and draped in the usual sterile fashion  Needle size: 22 G  Approach: anterolateral  Medications administered: 40 mg methylPREDNISolone acetate 40 MG/ML; 2 mL lidocaine 1 %  Patient tolerance: patient tolerated the procedure well with no immediate complications             Diagnoses and all orders for this visit:    1. Arthralgia of both knees (Primary)  -     XR Knee 3 View Bilateral; Future    2. Primary osteoarthritis of both knees    Other orders  -     Large Joint Arthrocentesis: R knee  -     Large Joint Arthrocentesis: L knee       Orthopedic activities reviewed and patient expressed appreciation  Discussion of orthopedic goals  Risk, benefits, and merits of treatment alternatives reviewed with the patient and questions answered  Call or notify for any adverse effect  from injection therapy    Recommendations/Plan:  Exercise, medications, injections, other patient advice, and return appointment as noted.  Patient is encouraged to call or return for any issues or concerns.      Patient agreeable to call or return sooner for any concerns.  Follow-up in 3 months             EMR Dragon-transcription disclaimer:  This encounter note is an electronic transcription of spoken language to printed text.  Electronic transcription of spoken language may permit erroneous or at times nonsensical words or phrases to be inadvertently transcribed.  Although I have reviewed the note for such errors, some may still exist

## 2022-02-10 ENCOUNTER — HOSPITAL ENCOUNTER (OUTPATIENT)
Facility: HOSPITAL | Age: 77
Discharge: HOME | End: 2022-02-10
Payer: MEDICARE

## 2022-02-10 DIAGNOSIS — I10 ESSENTIAL HYPERTENSION: ICD-10-CM

## 2022-02-10 DIAGNOSIS — E11.42 TYPE 2 DIABETES MELLITUS WITH DIABETIC POLYNEUROPATHY, WITHOUT LONG-TERM CURRENT USE OF INSULIN (HCC): ICD-10-CM

## 2022-02-10 LAB
A/G RATIO: 1.6 (ref 0.8–2)
ALBUMIN SERPL-MCNC: 4.1 G/DL (ref 3.4–4.8)
ALP BLD-CCNC: 53 U/L (ref 25–100)
ALT SERPL-CCNC: 23 U/L (ref 4–36)
ANION GAP SERPL CALCULATED.3IONS-SCNC: 13 MMOL/L (ref 3–16)
AST SERPL-CCNC: 17 U/L (ref 8–33)
BILIRUB SERPL-MCNC: 0.4 MG/DL (ref 0.3–1.2)
BUN BLDV-MCNC: 39 MG/DL (ref 6–20)
CALCIUM SERPL-MCNC: 9.4 MG/DL (ref 8.5–10.5)
CHLORIDE BLD-SCNC: 100 MMOL/L (ref 98–107)
CHOLESTEROL, TOTAL: 194 MG/DL (ref 0–200)
CO2: 27 MMOL/L (ref 20–30)
CREAT SERPL-MCNC: 1.4 MG/DL (ref 0.4–1.2)
CREATININE URINE: 94.8 MG/DL (ref 1.5–300)
GFR AFRICAN AMERICAN: >59
GFR NON-AFRICAN AMERICAN: 49
GLOBULIN: 2.5 G/DL
GLUCOSE BLD-MCNC: 127 MG/DL (ref 74–106)
HBA1C MFR BLD: 11.3 %
HCT VFR BLD CALC: 35.8 % (ref 40–54)
HDLC SERPL-MCNC: 38 MG/DL (ref 40–60)
HEMOGLOBIN: 11.9 G/DL (ref 13–18)
LDL CHOLESTEROL CALCULATED: 91 MG/DL
MCH RBC QN AUTO: 29.2 PG (ref 27–32)
MCHC RBC AUTO-ENTMCNC: 33.2 G/DL (ref 31–35)
MCV RBC AUTO: 87.7 FL (ref 80–100)
MICROALBUMIN UR-MCNC: 8.7 MG/DL (ref 0–22)
MICROALBUMIN/CREAT UR-RTO: 91.8 MG/G (ref 0–30)
PDW BLD-RTO: 12.2 % (ref 11–16)
PLATELET # BLD: 259 K/UL (ref 150–400)
PMV BLD AUTO: 11.3 FL (ref 6–10)
POTASSIUM SERPL-SCNC: 4.4 MMOL/L (ref 3.4–5.1)
RBC # BLD: 4.08 M/UL (ref 4.5–6)
SODIUM BLD-SCNC: 140 MMOL/L (ref 136–145)
TOTAL PROTEIN: 6.6 G/DL (ref 6.4–8.3)
TRIGL SERPL-MCNC: 325 MG/DL (ref 0–249)
VLDLC SERPL CALC-MCNC: 65 MG/DL
WBC # BLD: 10.2 K/UL (ref 4–11)

## 2022-02-10 PROCEDURE — 83036 HEMOGLOBIN GLYCOSYLATED A1C: CPT

## 2022-02-10 PROCEDURE — 80053 COMPREHEN METABOLIC PANEL: CPT

## 2022-02-10 PROCEDURE — 80061 LIPID PANEL: CPT

## 2022-02-10 PROCEDURE — 82570 ASSAY OF URINE CREATININE: CPT

## 2022-02-10 PROCEDURE — 85027 COMPLETE CBC AUTOMATED: CPT

## 2022-02-10 PROCEDURE — 82043 UR ALBUMIN QUANTITATIVE: CPT

## 2022-02-16 ENCOUNTER — OFFICE VISIT (OUTPATIENT)
Dept: PRIMARY CARE CLINIC | Age: 77
End: 2022-02-16
Payer: MEDICARE

## 2022-02-16 VITALS
BODY MASS INDEX: 34.81 KG/M2 | RESPIRATION RATE: 16 BRPM | SYSTOLIC BLOOD PRESSURE: 136 MMHG | TEMPERATURE: 97.8 F | HEART RATE: 60 BPM | DIASTOLIC BLOOD PRESSURE: 51 MMHG | HEIGHT: 67 IN | WEIGHT: 221.8 LBS | OXYGEN SATURATION: 97 %

## 2022-02-16 DIAGNOSIS — E11.42 TYPE 2 DIABETES MELLITUS WITH DIABETIC POLYNEUROPATHY, WITHOUT LONG-TERM CURRENT USE OF INSULIN (HCC): Primary | ICD-10-CM

## 2022-02-16 DIAGNOSIS — I50.9 ACUTE ON CHRONIC CONGESTIVE HEART FAILURE, UNSPECIFIED HEART FAILURE TYPE (HCC): ICD-10-CM

## 2022-02-16 DIAGNOSIS — N18.2 CHRONIC RENAL FAILURE, STAGE 2 (MILD): ICD-10-CM

## 2022-02-16 DIAGNOSIS — I73.9 INTERMITTENT CLAUDICATION (HCC): ICD-10-CM

## 2022-02-16 DIAGNOSIS — F39 MOOD DISORDER (HCC): ICD-10-CM

## 2022-02-16 PROCEDURE — 1036F TOBACCO NON-USER: CPT | Performed by: NURSE PRACTITIONER

## 2022-02-16 PROCEDURE — 3017F COLORECTAL CA SCREEN DOC REV: CPT | Performed by: NURSE PRACTITIONER

## 2022-02-16 PROCEDURE — 2022F DILAT RTA XM EVC RTNOPTHY: CPT | Performed by: NURSE PRACTITIONER

## 2022-02-16 PROCEDURE — 99214 OFFICE O/P EST MOD 30 MIN: CPT | Performed by: NURSE PRACTITIONER

## 2022-02-16 PROCEDURE — G8427 DOCREV CUR MEDS BY ELIG CLIN: HCPCS | Performed by: NURSE PRACTITIONER

## 2022-02-16 PROCEDURE — 4040F PNEUMOC VAC/ADMIN/RCVD: CPT | Performed by: NURSE PRACTITIONER

## 2022-02-16 PROCEDURE — G8484 FLU IMMUNIZE NO ADMIN: HCPCS | Performed by: NURSE PRACTITIONER

## 2022-02-16 PROCEDURE — 3046F HEMOGLOBIN A1C LEVEL >9.0%: CPT | Performed by: NURSE PRACTITIONER

## 2022-02-16 PROCEDURE — G8417 CALC BMI ABV UP PARAM F/U: HCPCS | Performed by: NURSE PRACTITIONER

## 2022-02-16 PROCEDURE — 1123F ACP DISCUSS/DSCN MKR DOCD: CPT | Performed by: NURSE PRACTITIONER

## 2022-02-16 RX ORDER — DULAGLUTIDE 0.75 MG/.5ML
0.75 INJECTION, SOLUTION SUBCUTANEOUS WEEKLY
Qty: 4 PEN | Refills: 0 | Status: SHIPPED | OUTPATIENT
Start: 2022-02-16 | End: 2022-05-09 | Stop reason: SDUPTHER

## 2022-02-16 RX ORDER — INSULIN DEGLUDEC 200 U/ML
INJECTION, SOLUTION SUBCUTANEOUS
Qty: 20 PEN | Refills: 3 | Status: SHIPPED | OUTPATIENT
Start: 2022-02-16 | End: 2022-02-18

## 2022-02-16 RX ORDER — FLUTICASONE FUROATE AND VILANTEROL TRIFENATATE 100; 25 UG/1; UG/1
POWDER RESPIRATORY (INHALATION)
COMMUNITY
Start: 2022-01-20

## 2022-02-16 ASSESSMENT — ENCOUNTER SYMPTOMS
ALLERGIC/IMMUNOLOGIC NEGATIVE: 1
RESPIRATORY NEGATIVE: 1
EYES NEGATIVE: 1
GASTROINTESTINAL NEGATIVE: 1

## 2022-02-16 ASSESSMENT — PATIENT HEALTH QUESTIONNAIRE - PHQ9
7. TROUBLE CONCENTRATING ON THINGS, SUCH AS READING THE NEWSPAPER OR WATCHING TELEVISION: 0
8. MOVING OR SPEAKING SO SLOWLY THAT OTHER PEOPLE COULD HAVE NOTICED. OR THE OPPOSITE, BEING SO FIGETY OR RESTLESS THAT YOU HAVE BEEN MOVING AROUND A LOT MORE THAN USUAL: 0
10. IF YOU CHECKED OFF ANY PROBLEMS, HOW DIFFICULT HAVE THESE PROBLEMS MADE IT FOR YOU TO DO YOUR WORK, TAKE CARE OF THINGS AT HOME, OR GET ALONG WITH OTHER PEOPLE: 0
9. THOUGHTS THAT YOU WOULD BE BETTER OFF DEAD, OR OF HURTING YOURSELF: 0
3. TROUBLE FALLING OR STAYING ASLEEP: 0
SUM OF ALL RESPONSES TO PHQ QUESTIONS 1-9: 3
SUM OF ALL RESPONSES TO PHQ9 QUESTIONS 1 & 2: 2
SUM OF ALL RESPONSES TO PHQ QUESTIONS 1-9: 3
4. FEELING TIRED OR HAVING LITTLE ENERGY: 0
2. FEELING DOWN, DEPRESSED OR HOPELESS: 1
SUM OF ALL RESPONSES TO PHQ QUESTIONS 1-9: 3
6. FEELING BAD ABOUT YOURSELF - OR THAT YOU ARE A FAILURE OR HAVE LET YOURSELF OR YOUR FAMILY DOWN: 0
5. POOR APPETITE OR OVEREATING: 1
SUM OF ALL RESPONSES TO PHQ QUESTIONS 1-9: 3
1. LITTLE INTEREST OR PLEASURE IN DOING THINGS: 1

## 2022-02-16 NOTE — PROGRESS NOTES
Chief Complaint   Patient presents with    Follow-up    Diabetes    Hypertension    Discuss Labs       Have you seen any other physician or provider since your last visit yes - Dr Ronna Ahumada    Have you had any other diagnostic tests since your last visit? yes    Have you changed or stopped any medications since your last visit? yes -      I have recommended that this patient have a immunization for pneumonia and sigmoidoscopy but he declines at this time. I have discussed the risks and benefits of this examination with him. The patient verbalizes understanding. Diabetic retinal exam completed this year? No                       * If yes please have patient sign a records release to obtain record to update Health Maintenance                       * If no, please order referral for patient to be scheduled         SUBJECTIVE:    Patient ID:Aquilino Leon is a 69 y.o. male. Chief Complaint   Patient presents with    Follow-up    Diabetes    Hypertension    Discuss Labs     HPI:  Patient has had diabetes for the past few several years. He has been compliant with the medications and denies any side effects from it. He has been monitoring fingersticks on a daily basis. His fingerstick range is between . He denies any hypoglycemic symptoms. He has  been following a diabetic diet and has been active. His last eye exam was more than a year ago. He is using oral meds and Insulin. He is on 72 units of Tresiba at night. He has had cortisone injections. He says the shots have helped some. Dr. Carlos Segovia but him on Cymbalta and he stopped the Zoloft but he had to go back to the Zoloft due to agitation. Patient has had hypertension for several years. He has been compliant with taking medications, without side effects from it. He has been following a low-sodium, is active and rarely exercises. Weight is stable, compared to last visit. His blood pressure is stable 136/51 at this time.     Patient has had a nerve problem for long time. His sx had recently gotten worse, but patient wife stopped the Cymbalta and went back to Zoloft. He easily get agitated and nervous. He has no difficulties falling and maintaining sleep at time. He denies any suicidal ideation. He has supportive family. He is on Breo for the shortness of breath that is helping. Patient's medications, allergies, past medical, surgical, social and family histories were reviewed and updated as appropriate. Review of Systems   Constitutional: Negative. HENT: Positive for hearing loss. Eyes: Negative. Respiratory: Negative. Cardiovascular: Negative. Gastrointestinal: Negative. Endocrine: Negative. Genitourinary: Negative. Musculoskeletal: Negative. Skin: Negative. Allergic/Immunologic: Negative. Neurological: Negative. Hematological: Negative. Psychiatric/Behavioral: Positive for agitation. OBJECTIVE:  BP (!) 136/51 (Site: Right Upper Arm, Position: Sitting, Cuff Size: Medium Adult)   Pulse 60   Temp 97.8 °F (36.6 °C) (Temporal)   Resp 16   Ht 5' 7\" (1.702 m)   Wt 221 lb 12.8 oz (100.6 kg)   SpO2 97% Comment: room air  BMI 34.74 kg/m²    Physical Exam  Vitals and nursing note reviewed. Constitutional:       Appearance: He is well-developed. HENT:      Head: Normocephalic and atraumatic. Eyes:      Conjunctiva/sclera: Conjunctivae normal.      Pupils: Pupils are equal, round, and reactive to light. Neck:      Thyroid: No thyromegaly. Vascular: No JVD. Cardiovascular:      Rate and Rhythm: Normal rate and regular rhythm. Heart sounds: No murmur heard. No friction rub. No gallop. Pulmonary:      Effort: Pulmonary effort is normal. No respiratory distress. Breath sounds: Normal breath sounds. No wheezing or rales. Abdominal:      General: Bowel sounds are normal. There is no distension. Palpations: Abdomen is soft. Tenderness:  There is no abdominal tenderness. There is no guarding. Musculoskeletal:         General: No tenderness. Cervical back: Normal range of motion and neck supple. Skin:     General: Skin is warm and dry. Findings: No rash. Neurological:      Mental Status: He is alert and oriented to person, place, and time.    Psychiatric:         Judgment: Judgment normal.         Results in Past 30 Days  Result Component Current Result Ref Range Previous Result Ref Range   Albumin/Globulin Ratio 1.2 (3/3/2022) 0.8 - 2.0 1.6 (2/10/2022) 0.8 - 2.0   Albumin 3.7 (3/3/2022) 3.4 - 4.8 g/dL 4.1 (2/10/2022) 3.4 - 4.8 g/dL   Alkaline Phosphatase 51 (3/3/2022) 25 - 100 U/L 53 (2/10/2022) 25 - 100 U/L   ALT 21 (3/3/2022) 4 - 36 U/L 23 (2/10/2022) 4 - 36 U/L   AST 15 (3/3/2022) 8 - 33 U/L 17 (2/10/2022) 8 - 33 U/L   BUN 47 (H) (3/3/2022) 6 - 20 mg/dL 39 (H) (2/10/2022) 6 - 20 mg/dL   Calcium 8.8 (3/3/2022) 8.5 - 10.5 mg/dL 9.4 (2/10/2022) 8.5 - 10.5 mg/dL   Chloride 104 (3/3/2022) 98 - 107 mmol/L 100 (2/10/2022) 98 - 107 mmol/L   CO2 21 (3/3/2022) 20 - 30 mmol/L 27 (2/10/2022) 20 - 30 mmol/L   CREATININE 1.5 (H) (3/3/2022) 0.4 - 1.2 mg/dL 1.4 (H) (2/10/2022) 0.4 - 1.2 mg/dL   GFR  55 (L) (3/3/2022) >59 >59 (2/10/2022) >59   GFR Non- 45 (L) (3/3/2022) >59 49 (L) (2/10/2022) >59   Globulin 3.0 (3/3/2022) Not Established g/dL 2.5 (2/10/2022) Not Established g/dL   Glucose 268 (H) (3/3/2022) 74 - 106 mg/dL 127 (H) (2/10/2022) 74 - 106 mg/dL   Potassium 4.9 (3/3/2022) 3.4 - 5.1 mmol/L 4.4 (2/10/2022) 3.4 - 5.1 mmol/L   Sodium 133 (L) (3/3/2022) 136 - 145 mmol/L 140 (2/10/2022) 136 - 145 mmol/L   Total Bilirubin <0.2 (L) (3/3/2022) 0.3 - 1.2 mg/dL 0.4 (2/10/2022) 0.3 - 1.2 mg/dL   Total Protein 6.7 (3/3/2022) 6.4 - 8.3 g/dL 6.6 (2/10/2022) 6.4 - 8.3 g/dL       Hemoglobin A1C (%)   Date Value   02/10/2022 11.3 (H)     Microalbumin, Random Urine (mg/dL)   Date Value   02/10/2022 8.70     LDL Calculated (mg/dL)   Date Value 02/10/2022 91         Lab Results   Component Value Date    WBC 14.0 03/03/2022    NEUTROABS 12.0 03/03/2022    HGB 11.0 03/03/2022    HCT 33.9 03/03/2022    MCV 90.2 03/03/2022     03/03/2022       Lab Results   Component Value Date    TSH 1.79 07/15/2021         ASSESSMENT/PLAN:     1. Type 2 diabetes mellitus with diabetic polyneuropathy, without long-term current use of insulin (HCC)  Lab Results   Component Value Date    LABA1C 11.3 (H) 02/10/2022     No results found for: EAG  His A1c is worsening and his diet is poor we will continue on all medications and add Trulicity. We will advance as tolerated. - Dulaglutide (TRULICITY) 7.23 BI/9.3FN SOPN; Inject 0.75 mg into the skin once a week  Dispense: 4 pen; Refill: 0    2. Chronic renal failure, stage 2 (mild)  Baseline  Slightly worsening. I have advised him to avoid any nephrotoxic agents. I am going to monitor renal function periodically. I will make sure that the blood pressure and other medical problems are under good control. Will refer to nephrology if the renal function should begin to deteriorate. Patient to continue follow-up with nephrology. Creatinine will be checked today  in few weeks  months. Last Creatinine is   Lab Results   Component Value Date    CREATININE 1.5 (H) 03/03/2022     He is on Farxiga 10 mg.     3. Acute on chronic congestive heart failure, unspecified heart failure type ([address]  Stable on current medications follow with cardiology. 4. Intermittent claudication ([address]  He is on effietnt and ASA he is followed by vscular. 5. Mood disorder (HCC)  Improved on Zoloft 50 mg.         Written by Jaymie NO, acting as a scribe for Jose Guadalupe Nevarez on 2/16/2022 at 10:25 PM.

## 2022-02-18 ENCOUNTER — TELEPHONE (OUTPATIENT)
Dept: PRIMARY CARE CLINIC | Age: 77
End: 2022-02-18

## 2022-02-18 DIAGNOSIS — E11.42 TYPE 2 DIABETES MELLITUS WITH DIABETIC POLYNEUROPATHY, WITHOUT LONG-TERM CURRENT USE OF INSULIN (HCC): ICD-10-CM

## 2022-02-18 RX ORDER — INSULIN DEGLUDEC 200 U/ML
INJECTION, SOLUTION SUBCUTANEOUS
Qty: 20 PEN | Refills: 3 | Status: SHIPPED | OUTPATIENT
Start: 2022-02-18 | End: 2022-10-07 | Stop reason: SDUPTHER

## 2022-02-18 NOTE — TELEPHONE ENCOUNTER
----- Message from Flash Prakash sent at 2/18/2022  1:50 PM EST -----  Subject: Message to Provider    QUESTIONS  Information for Provider? [facility] pharmacy? has questions   allergy medication Trulicity, clearify instructions for [employer]   please call Usman Cantu @ [phone] ref# [id number]  ---------------------------------------------------------------------------  --------------  Jose Antonio Smith INFO  What is the best way for the office to contact you? OK to leave message on   voicemail  Preferred Call Back Phone Number? [phone]  ---------------------------------------------------------------------------  --------------  SCRIPT ANSWERS  Relationship to Patient? Third Party  Representative Name?  [facility]

## 2022-02-18 NOTE — TELEPHONE ENCOUNTER
Called and fixed the problem. Patient had nausea to one to the ingredients that is similar to trulicty.

## 2022-03-03 ENCOUNTER — APPOINTMENT (OUTPATIENT)
Dept: GENERAL RADIOLOGY | Facility: HOSPITAL | Age: 77
End: 2022-03-03
Payer: MEDICARE

## 2022-03-03 ENCOUNTER — HOSPITAL ENCOUNTER (EMERGENCY)
Facility: HOSPITAL | Age: 77
Discharge: HOME | End: 2022-03-03
Attending: EMERGENCY MEDICINE
Payer: MEDICARE

## 2022-03-03 VITALS
TEMPERATURE: 98.6 F | OXYGEN SATURATION: 98 % | BODY MASS INDEX: 34.69 KG/M2 | WEIGHT: 221 LBS | RESPIRATION RATE: 24 BRPM | SYSTOLIC BLOOD PRESSURE: 177 MMHG | DIASTOLIC BLOOD PRESSURE: 82 MMHG | HEIGHT: 67 IN | HEART RATE: 69 BPM

## 2022-03-03 DIAGNOSIS — I25.110 CORONARY ARTERY DISEASE INVOLVING NATIVE HEART WITH UNSTABLE ANGINA PECTORIS, UNSPECIFIED VESSEL OR LESION TYPE (HCC): Primary | ICD-10-CM

## 2022-03-03 DIAGNOSIS — R07.2 PRECORDIAL CHEST PAIN: ICD-10-CM

## 2022-03-03 LAB
A/G RATIO: 1.2 (ref 0.8–2)
ALBUMIN SERPL-MCNC: 3.7 G/DL (ref 3.4–4.8)
ALP BLD-CCNC: 51 U/L (ref 25–100)
ALT SERPL-CCNC: 21 U/L (ref 4–36)
ANION GAP SERPL CALCULATED.3IONS-SCNC: 8 MMOL/L (ref 3–16)
AST SERPL-CCNC: 15 U/L (ref 8–33)
BASOPHILS ABSOLUTE: 0.1 K/UL (ref 0–0.1)
BASOPHILS RELATIVE PERCENT: 0.4 %
BILIRUB SERPL-MCNC: <0.2 MG/DL (ref 0.3–1.2)
BUN BLDV-MCNC: 47 MG/DL (ref 6–20)
CALCIUM SERPL-MCNC: 8.8 MG/DL (ref 8.5–10.5)
CHLORIDE BLD-SCNC: 104 MMOL/L (ref 98–107)
CO2: 21 MMOL/L (ref 20–30)
CREAT SERPL-MCNC: 1.5 MG/DL (ref 0.4–1.2)
EOSINOPHILS ABSOLUTE: 0.1 K/UL (ref 0–0.4)
EOSINOPHILS RELATIVE PERCENT: 0.9 %
GFR AFRICAN AMERICAN: 55
GFR NON-AFRICAN AMERICAN: 45
GLOBULIN: 3 G/DL
GLUCOSE BLD-MCNC: 268 MG/DL (ref 74–106)
HCT VFR BLD CALC: 33.9 % (ref 40–54)
HEMOGLOBIN: 11 G/DL (ref 13–18)
IMMATURE GRANULOCYTES #: 0.1 K/UL
IMMATURE GRANULOCYTES %: 0.5 % (ref 0–5)
LYMPHOCYTES ABSOLUTE: 0.8 K/UL (ref 1.5–4)
LYMPHOCYTES RELATIVE PERCENT: 5.9 %
MCH RBC QN AUTO: 29.3 PG (ref 27–32)
MCHC RBC AUTO-ENTMCNC: 32.4 G/DL (ref 31–35)
MCV RBC AUTO: 90.2 FL (ref 80–100)
MONOCYTES ABSOLUTE: 0.9 K/UL (ref 0.2–0.8)
MONOCYTES RELATIVE PERCENT: 6.6 %
NEUTROPHILS ABSOLUTE: 12 K/UL (ref 2–7.5)
NEUTROPHILS RELATIVE PERCENT: 85.7 %
PDW BLD-RTO: 13 % (ref 11–16)
PLATELET # BLD: 241 K/UL (ref 150–400)
PMV BLD AUTO: 10.5 FL (ref 6–10)
POTASSIUM SERPL-SCNC: 4.9 MMOL/L (ref 3.4–5.1)
RBC # BLD: 3.76 M/UL (ref 4.5–6)
SODIUM BLD-SCNC: 133 MMOL/L (ref 136–145)
TOTAL PROTEIN: 6.7 G/DL (ref 6.4–8.3)
TROPONIN: <0.3 NG/ML
WBC # BLD: 14 K/UL (ref 4–11)

## 2022-03-03 PROCEDURE — 6370000000 HC RX 637 (ALT 250 FOR IP): Performed by: EMERGENCY MEDICINE

## 2022-03-03 PROCEDURE — 6370000000 HC RX 637 (ALT 250 FOR IP)

## 2022-03-03 PROCEDURE — 80053 COMPREHEN METABOLIC PANEL: CPT

## 2022-03-03 PROCEDURE — 84484 ASSAY OF TROPONIN QUANT: CPT

## 2022-03-03 PROCEDURE — 36415 COLL VENOUS BLD VENIPUNCTURE: CPT

## 2022-03-03 PROCEDURE — 93005 ELECTROCARDIOGRAM TRACING: CPT

## 2022-03-03 PROCEDURE — 85025 COMPLETE CBC W/AUTO DIFF WBC: CPT

## 2022-03-03 PROCEDURE — 71045 X-RAY EXAM CHEST 1 VIEW: CPT

## 2022-03-03 PROCEDURE — 99285 EMERGENCY DEPT VISIT HI MDM: CPT

## 2022-03-03 RX ORDER — NITROGLYCERIN 0.4 MG/1
0.4 TABLET SUBLINGUAL EVERY 5 MIN PRN
Status: COMPLETED | OUTPATIENT
Start: 2022-03-03 | End: 2022-03-03

## 2022-03-03 RX ORDER — NITROGLYCERIN 0.4 MG/1
TABLET SUBLINGUAL
Status: COMPLETED
Start: 2022-03-03 | End: 2022-03-03

## 2022-03-03 RX ADMIN — Medication 0.4 MG: at 18:35

## 2022-03-03 RX ADMIN — Medication 0.4 MG: at 18:46

## 2022-03-03 ASSESSMENT — PAIN SCALES - GENERAL
PAINLEVEL_OUTOF10: 5
PAINLEVEL_OUTOF10: 1
PAINLEVEL_OUTOF10: 1
PAINLEVEL_OUTOF10: 3

## 2022-03-03 ASSESSMENT — PAIN DESCRIPTION - LOCATION
LOCATION: CHEST

## 2022-03-03 ASSESSMENT — PAIN DESCRIPTION - ORIENTATION
ORIENTATION: MID

## 2022-03-03 ASSESSMENT — PAIN DESCRIPTION - FREQUENCY
FREQUENCY: CONTINUOUS

## 2022-03-03 ASSESSMENT — PAIN DESCRIPTION - DESCRIPTORS
DESCRIPTORS: DULL;PRESSURE
DESCRIPTORS: SHARP
DESCRIPTORS: DULL

## 2022-03-03 ASSESSMENT — PAIN DESCRIPTION - PAIN TYPE
TYPE: ACUTE PAIN

## 2022-03-03 ASSESSMENT — PAIN DESCRIPTION - DIRECTION: RADIATING_TOWARDS: TO BACK

## 2022-03-03 NOTE — ED NOTES
Pt reports that he took 1 SL nitro PTA. Reports that it helped with his pain.         Maryjo Whitaker RN  03/03/22 [id number]

## 2022-03-03 NOTE — ED PROVIDER NOTES
[address] [facility] ENCOUNTER      Pt Name: Yvette Suarez  MRN: [id number]  YOB: 1945  Date of evaluation: 3/3/2022  Provider: Jaqueline Perez [address] [place] [address]       Chief Complaint   Patient presents with    Chest Pain         HISTORY OF PRESENT ILLNESS  (Location/Symptom, Timing/Onset, Context/Setting, Quality, Duration, Modifying Factors, Severity.)   Yvette Suarez is a 69 y.o. male who presents to the emergency department with a chief complaint of chest pain on and off for the last 2 days. Patient had previous coronary artery bypass graft surgery and 8 stents. Patient had stress test in October by Dr. Marielena Galindo. Patient took nitroglycerin prior to coming to the emergency department states is helped some but patient still having pain. Nursing notes were reviewed. REVIEW OFSYSTEMS    (2-9 systems for level 4, 10 or more for level 5)   ROS:  General:  No fevers, no chills, no weakness  Cardiovascular:  No chest pain, no palpitations  Respiratory:  No shortness of breath, no cough, no wheezing  Gastrointestinal:  No pain, no nausea, no vomiting, no diarrhea  Musculoskeletal:  No muscle pain, no joint pain  Skin:  No rash, no easy bruising  Neurologic:  No speech problems, no headache, no extremity weakness  Psychiatric:  No anxiety  Genitourinary:  No dysuria, no hematuria    Except as noted above the remainder of the review of systems was reviewed and negative.        PAST MEDICAL HISTORY     Past Medical History:   Diagnosis Date    Anemia 2019    Anxiety     Bleeding stomach ulcer 2019    Dr Michela Barrow CAD (coronary artery disease)     Cancer ([facility] [address]     malignant colon polyp    Depression     Enlarged prostate     GERD (gastroesophageal reflux disease)     Hyperlipidemia     Hypertension     Sleep apnea     Type II or unspecified type diabetes mellitus without mention of complication, not stated as uncontrolled SURGICAL HISTORY       Past Surgical History:   Procedure Laterality Date    CORONARY ANGIOPLASTY WITH STENT PLACEMENT  04/2013    Dr. Ani Gooden, stents in 2009, 2011, 2013, ptca 2014    CORONARY ARTERY BYPASS GRAFT  2001    x3    EYE SURGERY Left     POLYPECTOMY  1999    colon ca         CURRENT MEDICATIONS       Previous Medications    ALBUTEROL SULFATE  (90 BASE) MCG/ACT INHALER    Inhale 2 puffs into the lungs every 6 hours as needed for Wheezing or Shortness of Breath    ATORVASTATIN (LIPITOR) 40 MG TABLET    Take 1 tablet by mouth nightly    AZILSARTAN MEDOXOMIL (EDARBI) 80 MG TABS    TAKE ONE TABLET BY MOUTH EVERY DAY    B-D UF III MINI PEN NEEDLES 31G X 5 MM MISC    USE TO INJECT INSULIN EVERYDAY    BLOOD GLUCOSE TEST STRIPS (TRUE METRIX BLOOD GLUCOSE TEST) STRIP    USE TO TEST BLOOD SUGAR THREE TIMES DAILY AND AS NEEDED DX E11.9    BREO ELLIPTA 100-25 MCG/INH AEPB INHALER        CARVEDILOL (COREG) 12.5 MG TABLET    Take 0.5 tablets by mouth 2 times daily    CHOLECALCIFEROL (VITAMIN D3) 2000 UNITS CAPS    Take 2 capsules by mouth daily     DAPAGLIFLOZIN (FARXIGA) 10 MG TABLET    Take 1 tablet by mouth every morning    DOXAZOSIN (CARDURA) 8 MG TABLET    TAKE 1 TABLET BY MOUTH EVERY 12 (TWELVE) HOURS. DULAGLUTIDE (TRULICITY) 1.30 OB/5.1KJ SOPN    Inject 0.75 mg into the skin once a week    FINASTERIDE (PROSCAR) 5 MG TABLET    Take 1 tablet by mouth daily    FLUTICASONE (FLONASE) 50 MCG/ACT NASAL SPRAY    1 spray by Each Nostril route daily    FUROSEMIDE (LASIX) 20 MG TABLET    Take 20 mg by mouth See Admin Instructions Take Tuesday and Thursday    FUROSEMIDE (LASIX) 40 MG TABLET    Take 1 tablet by mouth daily    HYDRALAZINE (APRESOLINE) 100 MG TABLET    TAKE 1 TABLET BY MOUTH 3 (THREE) TIMES A DAY.     INSULIN DEGLUDEC (TRESIBA FLEXTOUCH) 200 UNIT/ML SOPN    30 units in the am and 74 units in the pm    INSULIN PEN NEEDLE (ADVOCATE INSULIN PEN NEEDLES) 31G X 5 MM MISC    USE TO INJECT INSULIN EVERY DAY DX: E11.42 KX    INSULIN PEN NEEDLE 31G X 5 MM MISC    USE TO INJECT INSULIN EVERY DAY    MI-ACID GAS RELIEF 80 MG CHEWABLE TABLET    TAKE 1 TABLET BY MOUTH EVERY 6-8 HOURS FOR 2 DAYS, THEN AS NEEDED FOR FLATULENCE    MICROLET LANCETS MISC    1 each by Does not apply route 3 times daily DX: E11.42 KX    NITROGLYCERIN (NITROSTAT) 0.4 MG SL TABLET    Place 1 tablet under the tongue every 5 minutes as needed for Chest pain    PANTOPRAZOLE (PROTONIX) 40 MG TABLET    Take 1 tablet by mouth 2 times daily (before meals)    POTASSIUM CHLORIDE (KLOR-CON M) 20 MEQ EXTENDED RELEASE TABLET    Take 1 tablet by mouth daily    PRAMIPEXOLE (MIRAPEX) 1 MG TABLET    Take 1 mg by mouth nightly    PRASUGREL (EFFIENT) 10 MG TABS    Take 1 tablet by mouth daily    SERTRALINE (ZOLOFT) 50 MG TABLET    Take 50 mg by mouth daily    TAMSULOSIN (FLOMAX) 0.4 MG CAPSULE        THERAPEUTIC MULTIVITAMIN-MINERALS (THERAGRAN-M) TABLET    Take 1 tablet by mouth daily.        ALLERGIES     Bisoprolol, Clopidogrel bisulfate, Exenatide, Glucophage [metformin hydrochloride], Rosuvastatin calcium, and Zocor [simvastatin]    FAMILY HISTORY       Family History   Problem Relation Age of Onset    Heart Attack Mother     Heart Disease Father     Diabetes Sister           SOCIAL HISTORY       Social History     Socioeconomic History    Marital status:      Spouse name: None    Number of children: None    Years of education: None    Highest education level: None   Occupational History    None   Tobacco Use    Smoking status: Never Smoker    Smokeless tobacco: Never Used   Substance and Sexual Activity    Alcohol use: No    Drug use: No    Sexual activity: None   Other Topics Concern    None   Social History Narrative    None     Social Determinants of Health     Financial Resource Strain: Low Risk     Difficulty of Paying Living Expenses: Not hard at all   Food Insecurity: No Food Insecurity    Worried About Running Out of Food in the Last Year: Never true    [address] in the Last Year: Never true   Transportation Needs:     Lack of Transportation (Medical): Not on file    Lack of Transportation (Non-Medical): Not on file   Physical Activity:     Days of Exercise per Week: Not on file    Minutes of Exercise per Session: Not on file   Stress:     Feeling of Stress : Not on file   Social Connections:     Frequency of Communication with Friends and Family: Not on file    Frequency of Social Gatherings with Friends and Family: Not on file    Attends Jain Services: Not on file    Active Member of [address] [place] [address] or Organizations: Not on file    Attends Club or Organization Meetings: Not on file    Marital Status: Not on file   Intimate Partner Violence:     Fear of Current or Ex-Partner: Not on file    Emotionally Abused: Not on file    Physically Abused: Not on file    Sexually Abused: Not on file   Housing Stability:     Unable to Pay for Housing in the Last Year: Not on file    Number of [place] in the Last Year: Not on file    Unstable Housing in the Last Year: Not on file         PHYSICAL EXAM    (up to 7 for level 4, 8 or more for level 5)     ED Triage Vitals [03/03/22 1823]   BP Temp Temp src Pulse Resp SpO2 Height Weight   (!) 189/96 98.6 °F (37 °C) -- 83 -- 96 % -- --       Physical Exam  General :Patient is awake, alert, oriented, in no acute distress, nontoxic appearing  HEENT: Pupils are equally round and reactive to light, EOMI, conjunctivae clear. Oral mucosa is moist, no exudate. Uvula is midline  Neck: Neck is supple, full range of motion, trachea midline  Cardiac: Heart regular rate, rhythm, no murmurs, rubs, or gallops  Lungs: Lungs are clear to auscultation, there is no wheezing, rhonchi, or rales. There is no use of accessory muscles. Chest wall: There is no tenderness to palpation over the chest wall or over ribs  Abdomen: Abdomen is soft, nontender, nondistended.  There is no firm or pulsatile masses, no rebound rigidity or guarding. Musculoskeletal: Moves all 4 extremities. No focal muscle deficits are appreciated  Neuro: Motor intact, sensory intact, level of consciousness is normal.  Dermatology: Skin is warm and dry  Psych: Mentation is grossly normal, cognition is grossly normal. Affect is appropriate. DIAGNOSTIC RESULTS     EKG: All EKG's are interpreted by the Emergency Department Physician who either signs or Co-signs this chart in the [address] a cardiologist.    The EKG interpreted by me shows sinus rhythm rate of 87 right axis normal OR interval normal QT interval EKG was compared to previous EKG of 2/2/2021 ST depression was noted on EKG of 2-21 in leads II, III, and aVF as well as leads V4 through V6 ST depression was previously noted in these leads as well however this ST depression appears much more marked than previous also patient has had a change from normal axis to the right axis. Theses changes are concerning for ischemia.     RADIOLOGY:   Non-plain film images such as CT, Ultrasound and MRI are read by the radiologist. Plain radiographic images are visualized and preliminarily interpreted by the emergency physician with the below findings:      ? Radiologist's Report Reviewed:  XR CHEST PORTABLE    (Results Pending)         ED BEDSIDE ULTRASOUND:   Performed by ED Physician - none    LABS:    I have reviewed and interpreted all of the currently available lab results from this visit (ifapplicable):  Results for orders placed or performed during the hospital encounter of 03/03/22   CBC with Auto Differential   Result Value Ref Range    WBC 14.0 (H) 4.0 - 11.0 K/uL    RBC 3.76 (L) 4.50 - 6.00 M/uL    Hemoglobin 11.0 (L) 13.0 - 18.0 g/dL    Hematocrit 33.9 (L) 40.0 - 54.0 %    MCV 90.2 80.0 - 100.0 fL    MCH 29.3 27.0 - 32.0 pg    MCHC 32.4 31.0 - 35.0 g/dL    RDW 13.0 11.0 - 16.0 %    Platelets 938 367 - 538 K/uL    MPV 10.5 (H) 6.0 - 10.0 fL    Neutrophils % 85.7 % Immature Granulocytes % 0.5 0.0 - 5.0 %    Lymphocytes % 5.9 %    Monocytes % 6.6 %    Eosinophils % 0.9 %    Basophils % 0.4 %    Neutrophils Absolute 12.0 (H) 2.0 - 7.5 K/uL    Immature Granulocytes # 0.1 K/uL    Lymphocytes Absolute 0.8 (L) 1.5 - 4.0 K/uL    Monocytes Absolute 0.9 (H) 0.2 - 0.8 K/uL    Eosinophils Absolute 0.1 0.0 - 0.4 K/uL    Basophils Absolute 0.1 0.0 - 0.1 K/uL   Comprehensive Metabolic Panel   Result Value Ref Range    Sodium 133 (L) 136 - 145 mmol/L    Potassium 4.9 3.4 - 5.1 mmol/L    Chloride 104 98 - 107 mmol/L    CO2 21 20 - 30 mmol/L    Anion Gap 8 3 - 16    Glucose 268 (H) 74 - 106 mg/dL    BUN 47 (H) 6 - 20 mg/dL    CREATININE 1.5 (H) 0.4 - 1.2 mg/dL    GFR Non-African American 45 (L) >59    GFR  55 (L) >59    Calcium 8.8 8.5 - 10.5 mg/dL    Total Protein 6.7 6.4 - 8.3 g/dL    Albumin 3.7 3.4 - 4.8 g/dL    Albumin/Globulin Ratio 1.2 0.8 - 2.0    Total Bilirubin <0.2 (L) 0.3 - 1.2 mg/dL    Alkaline Phosphatase 51 25 - 100 U/L    ALT 21 4 - 36 U/L    AST 15 8 - 33 U/L    Globulin 3.0 Not Established g/dL   Troponin   Result Value Ref Range    Troponin <0.30 <0.30 ng/mL        All other labs were within normal range or not returned as of this dictation. EMERGENCY DEPARTMENT COURSE and DIFFERENTIAL DIAGNOSIS/MDM:   Vitals:    Vitals:    03/03/22 1830 03/03/22 1838 03/03/22 1845 03/03/22 1900   BP: (!) 171/58  (!) 156/52 (!) 155/50   Pulse: 78  77 80   Resp:       Temp:       SpO2: 99%  99% 99%   Weight:  221 lb (100.2 kg)     Height:  5' 7\" (1.702 m)         MEDICATIONS ADMINISTERED IN ED:  Medications   nitroGLYCERIN (NITROSTAT) SL tablet 0.4 mg (0.4 mg SubLINGual Given 3/3/22 3636)       Turned over to Dr. Bia Watson at shift change. Discussed all relevant history physical laboratory and EKG. Clinically patient has angina. With his history of concern for unstable angina.  Recommended either admission or transfer depending on repeat troponin and clinical course in ER.    The patient will follow-up with their PCP in 1-2 days for reevaluation. If the patient or family members have anyfurther concerns or any worsening symptoms they will return to the ED for reevaluation. CONSULTS:  None    PROCEDURES:  Procedures    CRITICAL CARE TIME    Total Critical Care time was 0 minutes, excluding separately reportable procedures. There was a high probability of clinically significant/life threatening deterioration in the patient's condition which required my urgent intervention. FINAL IMPRESSION      1. Unstable angina pectoris (HCC)          DISPOSITION/PLAN   DISPOSITION        PATIENT REFERRED TO:  No follow-up provider specified. DISCHARGE MEDICATIONS:  New Prescriptions    No medications on file       Comment: Please note this report has been produced using speech recognition software and may contain errorsrelated to that system including errors in grammar, punctuation, and spelling, as well as words and phrases that may be inappropriate. If there are any questions or concerns please feel free to contact the dictating providerfor clarification.     Ngozi Roberts DO  Attending Emergency Physician              Ngozi Roberts DO  03/03/22 [id number]

## 2022-03-04 NOTE — ED PROVIDER NOTES
8:39 PM EDUARDO Sanderson was checked out to me by Dr. Alonso Garvin. Please see his/her initial documentation for details of the patient's ED presentation, physical exam and completed studies. In brief, Gali Sanderson is a 69 y.o. male that presents to ED for having 2 day history of chest pain, pt describes it as central substernal, radiating into his back. Pt is a vasculopath with history of triple bypass and about 7 stents in the past. He is followed by Dr. Kenrick Estrada. He had recent left heart cath about 3 months ago and was cleared for surgery TURP. Pt ended up taking a nitro at home. He was still having mild chest pain and ended up with 2 more nitro here. He is unable to take plavix, he is also not taking aspirin cause he had a GI bleed and they told him \"to never take aspirin again\". Pt is still having slight 2/10 pain.  Some SOA but no change with exertion or at rest.     I have reviewed and interpreted all of the currently available lab results from this visit (if applicable):  Results for orders placed or performed during the hospital encounter of 03/03/22   CBC with Auto Differential   Result Value Ref Range    WBC 14.0 (H) 4.0 - 11.0 K/uL    RBC 3.76 (L) 4.50 - 6.00 M/uL    Hemoglobin 11.0 (L) 13.0 - 18.0 g/dL    Hematocrit 33.9 (L) 40.0 - 54.0 %    MCV 90.2 80.0 - 100.0 fL    MCH 29.3 27.0 - 32.0 pg    MCHC 32.4 31.0 - 35.0 g/dL    RDW 13.0 11.0 - 16.0 %    Platelets 269 888 - 460 K/uL    MPV 10.5 (H) 6.0 - 10.0 fL    Neutrophils % 85.7 %    Immature Granulocytes % 0.5 0.0 - 5.0 %    Lymphocytes % 5.9 %    Monocytes % 6.6 %    Eosinophils % 0.9 %    Basophils % 0.4 %    Neutrophils Absolute 12.0 (H) 2.0 - 7.5 K/uL    Immature Granulocytes # 0.1 K/uL    Lymphocytes Absolute 0.8 (L) 1.5 - 4.0 K/uL    Monocytes Absolute 0.9 (H) 0.2 - 0.8 K/uL    Eosinophils Absolute 0.1 0.0 - 0.4 K/uL    Basophils Absolute 0.1 0.0 - 0.1 K/uL   Comprehensive Metabolic Panel   Result Value Ref Range    Sodium 133 (L) 136 - 145 mmol/L    Potassium 4.9 3.4 - 5.1 mmol/L    Chloride 104 98 - 107 mmol/L    CO2 21 20 - 30 mmol/L    Anion Gap 8 3 - 16    Glucose 268 (H) 74 - 106 mg/dL    BUN 47 (H) 6 - 20 mg/dL    CREATININE 1.5 (H) 0.4 - 1.2 mg/dL    GFR Non-African American 45 (L) >59    GFR  55 (L) >59    Calcium 8.8 8.5 - 10.5 mg/dL    Total Protein 6.7 6.4 - 8.3 g/dL    Albumin 3.7 3.4 - 4.8 g/dL    Albumin/Globulin Ratio 1.2 0.8 - 2.0    Total Bilirubin <0.2 (L) 0.3 - 1.2 mg/dL    Alkaline Phosphatase 51 25 - 100 U/L    ALT 21 4 - 36 U/L    AST 15 8 - 33 U/L    Globulin 3.0 Not Established g/dL   Troponin   Result Value Ref Range    Troponin <0.30 <0.30 ng/mL       MDM:  Pt with negative Troponin. Pt with no acute STEMI on ECG but has LVH with reciprocal changes showing a global ischemia. Pt wanting to go home. Discussed that with chest pain that he hasn't been having \"except when he had his bypass\". Took 3 nitro to get his chest pain better. Would best be served to call Cardiology. Called [facility] and discussed case with them. Recommended admission but would really need higher level of care since he has such an extensive cardiac history with multiple risk factors. [facility] patient [facility] Pt wanting to go home and discussed it with his wife. Discussed risk of possible MI, unstable anginal symptoms since having it at rest despite multiple nitro and though [facility] was \"clear per patient that was told to them\", it showed significant disease. Discussed risk up to death and what to return for at any time. They agreed. Will still send home all paperwork to discharge with. Final Impression:  1. Coronary artery disease involving native heart with unstable angina pectoris, unspecified vessel or lesion type ([address]    2.  Precordial chest pain New Problem     Pt left AMA with recommendation of transfer to higher level of care      (Please note that portions of this note may have been completed with a voice recognition program. Efforts were made to edit the dictations but occasionally words are mis-transcribed.)    Kerri Mina, [address], DO  03/04/22 5353

## 2022-03-09 ENCOUNTER — OFFICE VISIT (OUTPATIENT)
Dept: PRIMARY CARE CLINIC | Age: 77
End: 2022-03-09
Payer: MEDICARE

## 2022-03-09 VITALS
HEART RATE: 61 BPM | OXYGEN SATURATION: 98 % | SYSTOLIC BLOOD PRESSURE: 159 MMHG | RESPIRATION RATE: 18 BRPM | HEIGHT: 67 IN | BODY MASS INDEX: 34.61 KG/M2 | TEMPERATURE: 98.1 F | DIASTOLIC BLOOD PRESSURE: 61 MMHG

## 2022-03-09 DIAGNOSIS — I25.119 CORONARY ARTERY DISEASE INVOLVING NATIVE HEART WITH ANGINA PECTORIS, UNSPECIFIED VESSEL OR LESION TYPE (HCC): Primary | ICD-10-CM

## 2022-03-09 DIAGNOSIS — M19.90 ARTHRITIS: ICD-10-CM

## 2022-03-09 DIAGNOSIS — I50.9 ACUTE ON CHRONIC CONGESTIVE HEART FAILURE, UNSPECIFIED HEART FAILURE TYPE (HCC): ICD-10-CM

## 2022-03-09 PROCEDURE — 1123F ACP DISCUSS/DSCN MKR DOCD: CPT | Performed by: NURSE PRACTITIONER

## 2022-03-09 PROCEDURE — 99214 OFFICE O/P EST MOD 30 MIN: CPT | Performed by: NURSE PRACTITIONER

## 2022-03-09 PROCEDURE — 1036F TOBACCO NON-USER: CPT | Performed by: NURSE PRACTITIONER

## 2022-03-09 PROCEDURE — 4040F PNEUMOC VAC/ADMIN/RCVD: CPT | Performed by: NURSE PRACTITIONER

## 2022-03-09 PROCEDURE — G8427 DOCREV CUR MEDS BY ELIG CLIN: HCPCS | Performed by: NURSE PRACTITIONER

## 2022-03-09 PROCEDURE — G8484 FLU IMMUNIZE NO ADMIN: HCPCS | Performed by: NURSE PRACTITIONER

## 2022-03-09 PROCEDURE — G8417 CALC BMI ABV UP PARAM F/U: HCPCS | Performed by: NURSE PRACTITIONER

## 2022-03-09 RX ORDER — LIDOCAINE 4 G/G
1 PATCH TOPICAL DAILY
Qty: 30 PATCH | Refills: 0 | Status: SHIPPED | OUTPATIENT
Start: 2022-03-09 | End: 2022-04-08

## 2022-03-09 NOTE — PROGRESS NOTES
SUBJECTIVE:    Patient ID: Nate Madrigal is a 69 y.o.male. Chief Complaint   Patient presents with    Follow-Up from Hospital    Generalized Body Aches         HPI:    Pt presents with wife for ED f/u for CP. Records reviewed. In ED, reported 2 day history of chest pain, pt describes it as central substernal, radiating into his back. Pt history of triple bypass and about 7 stents in the past. He is followed by Dr. Shayan Ortega. He had recent left heart cath about 3 months ago and was cleared for TURP surgery. Pt took nitro with little relief. Reports cannot take plavix, he is also not taking aspirin cause he had a GI bleed and they told him \"to never take aspirin again\". Reports intermittent chest pressure and rates it 2/10 today. Other S&S SOB, fatigue. No worsening CP. No palpitations, swelling, vomiting or diaphoresis. In ED, diagnosed CAD unstable angina and precordial CP. In ED, Pt left AMA with recommendation of transfer to higher level of care. Wife reports called cardiology Dr Shayan Ortega and unable to see patient for 1 month. Overall, CP better and trying to get into cardiology. Mainly having worsening arthritis pain now. C/o worsening arthritis. Seen dr Rochele Cabot and had cortisone shots and felt better but already wearing off. Patient's medications, allergies, past medical, surgical, social and family histories were reviewed and updated as appropriate in electronic medical record. No outpatient medications have been marked as taking for the 3/9/22 encounter (Appointment) with MERCEDES Canales CNP. Review of Systems   Constitutional: Positive for fatigue. Respiratory: Positive for shortness of breath. Cardiovascular: Positive for chest pain. Musculoskeletal: Positive for arthralgias and myalgias. All other systems reviewed and are negative.       Past Medical History:   Diagnosis Date    Anemia 2019    Anxiety     Bleeding stomach ulcer 2019    Dr Sumit White CAD (coronary artery disease)     Cancer (HCC)     malignant colon polyp    Depression     Enlarged prostate     GERD (gastroesophageal reflux disease)     Hyperlipidemia     Hypertension     Sleep apnea     Type II or unspecified type diabetes mellitus without mention of complication, not stated as uncontrolled      Past Surgical History:   Procedure Laterality Date    CORONARY ANGIOPLASTY WITH STENT PLACEMENT  04/2013    Dr. Fox Samples, stents in 2009, 2011, 2013, ptca 2014    CORONARY ARTERY BYPASS GRAFT  2001    x3    EYE SURGERY Left     POLYPECTOMY  1999    colon ca     Family History   Problem Relation Age of Onset    Heart Attack Mother     Heart Disease Father     Diabetes Sister       Social History     Tobacco Use   Smoking Status Never Smoker   Smokeless Tobacco Never Used       OBJECTIVE:   Wt Readings from Last 3 Encounters:   03/03/22 221 lb (100.2 kg)   02/16/22 221 lb 12.8 oz (100.6 kg)   12/06/21 220 lb (99.8 kg)     BP Readings from Last 3 Encounters:   03/03/22 (!) 177/82   02/16/22 (!) 136/51   12/06/21 (!) 130/42       BP (!) 159/61 (Site: Right Upper Arm, Position: Sitting, Cuff Size: Large Adult)   Pulse 61   Temp 98.1 °F (36.7 °C) (Temporal)   Resp 18   Ht 5' 7\" (1.702 m)   SpO2 98%   BMI 34.61 kg/m²      Physical Exam  Vitals and nursing note reviewed. Constitutional:       General: He is not in acute distress. Appearance: Normal appearance. HENT:      Head: Normocephalic. Eyes:      Extraocular Movements: Extraocular movements intact. Conjunctiva/sclera: Conjunctivae normal.      Pupils: Pupils are equal, round, and reactive to light. Cardiovascular:      Rate and Rhythm: Normal rate and regular rhythm. Pulmonary:      Effort: Pulmonary effort is normal.      Breath sounds: Normal breath sounds. Abdominal:      General: Bowel sounds are normal.      Palpations: Abdomen is soft. Tenderness: There is no guarding.    Musculoskeletal: General: Normal range of motion. Cervical back: Normal range of motion. Right lower leg: No edema. Left lower leg: No edema. Skin:     General: Skin is warm. Neurological:      General: No focal deficit present. Mental Status: He is alert and oriented to person, place, and time. Sensory: No sensory deficit. Motor: No weakness. Coordination: Coordination normal.      Gait: Gait normal.   Psychiatric:         Mood and Affect: Mood normal.         Behavior: Behavior normal.         Thought Content: Thought content normal.         Lab Results   Component Value Date     03/03/2022    K 4.9 03/03/2022     03/03/2022    CO2 21 03/03/2022    GLUCOSE 268 03/03/2022    GLUCOSE 120 03/01/2011    BUN 47 03/03/2022    CREATININE 1.5 03/03/2022    CREATININE 1.0 03/01/2011    CALCIUM 8.8 03/03/2022    PROT 6.7 03/03/2022    LABALBU 3.7 03/03/2022    LABALBU 4.2 11/14/2011    BILITOT <0.2 03/03/2022    BILITOT 0.4 11/14/2011    ALT 21 03/03/2022    AST 15 03/03/2022       Hemoglobin A1C (%)   Date Value   02/10/2022 11.3 (H)     Microalbumin, Random Urine (mg/dL)   Date Value   02/10/2022 8.70     LDL Calculated (mg/dL)   Date Value   02/10/2022 91         Lab Results   Component Value Date    WBC 14.0 03/03/2022    NEUTROABS 12.0 03/03/2022    HGB 11.0 03/03/2022    HCT 33.9 03/03/2022    MCV 90.2 03/03/2022     03/03/2022       Lab Results   Component Value Date    TSH 1.79 07/15/2021         ASSESSMENT/PLAN:     1. Arthritis  Cont home med. Add patch. Symptom management. F/u dr Mike Ogden for further plans of care. - lidocaine 4 % external patch; Place 1 patch onto the skin daily For pain  Dispense: 30 patch; Refill: 0    2. Coronary artery disease involving native heart with angina pectoris, unspecified vessel or lesion type ([facility] [address]  3. Acute on chronic congestive heart failure, unspecified heart failure type [facility])  F/u cardiology asap. If worsening S&S, ED.  Cont home meds. Cardiac diet.      - External Referral To Cardiology        Orders Placed This Encounter   Medications    lidocaine 4 % external patch     Sig: Place 1 patch onto the skin daily For pain     Dispense:  30 patch     Refill:  0

## 2022-03-11 ENCOUNTER — TELEPHONE (OUTPATIENT)
Dept: PRIMARY CARE CLINIC | Age: 77
End: 2022-03-11

## 2022-03-17 ENCOUNTER — OFFICE VISIT (OUTPATIENT)
Dept: CARDIOLOGY | Facility: CLINIC | Age: 77
End: 2022-03-17

## 2022-03-17 VITALS
BODY MASS INDEX: 36.41 KG/M2 | HEART RATE: 77 BPM | OXYGEN SATURATION: 96 % | SYSTOLIC BLOOD PRESSURE: 170 MMHG | HEIGHT: 67 IN | WEIGHT: 232 LBS | DIASTOLIC BLOOD PRESSURE: 90 MMHG

## 2022-03-17 DIAGNOSIS — E78.5 HYPERLIPIDEMIA LDL GOAL <70: ICD-10-CM

## 2022-03-17 DIAGNOSIS — I10 ESSENTIAL HYPERTENSION: ICD-10-CM

## 2022-03-17 DIAGNOSIS — I25.810 CORONARY ARTERY DISEASE INVOLVING CORONARY BYPASS GRAFT OF NATIVE HEART WITHOUT ANGINA PECTORIS: Primary | ICD-10-CM

## 2022-03-17 PROCEDURE — 99214 OFFICE O/P EST MOD 30 MIN: CPT | Performed by: INTERNAL MEDICINE

## 2022-03-17 RX ORDER — POTASSIUM CHLORIDE 20 MEQ/1
20 TABLET, EXTENDED RELEASE ORAL DAILY
COMMUNITY

## 2022-03-17 RX ORDER — FUROSEMIDE 40 MG/1
40 TABLET ORAL 3 TIMES WEEKLY
COMMUNITY

## 2022-03-17 RX ORDER — ALBUTEROL SULFATE 90 UG/1
2 AEROSOL, METERED RESPIRATORY (INHALATION) EVERY 4 HOURS PRN
COMMUNITY

## 2022-03-17 NOTE — PROGRESS NOTES
[facility] Cardiology    Patient ID: Donny Jerry is a 77 y.o. male.  : 1945   Contact: [phone]    Encounter date: 2022    PCP: Anum Alonso APRN      Chief complaint:   Chief Complaint   Patient presents with   • Coronary Artery Disease       Problem List:  1. Coronary artery disease:  a. CABG x3 in , [facility] by Dr. Peng. LIMA to the LAD, SVG to obtuse marginal, SVG to the PDA.  b. Stress echocardiogram, 2007: No wall motion abnormalities. No evidence of ischemia. Normal EF.  c. Echocardiogram, 2007, mild concentric LVH, trace of MR and TR.    d. Adenosine Cardiolite, 2009:  Normal LVEF with a stress induced mid and distal inferior defect compatible with  ischemia.  e. [facility], 2009, Dr. Russo: EF 45-50%. Overlapping Cypher TAWANNA 2.5 x 28 and 2.5 x 18 to the SVG to OM. SVG to PDA had a proximal stenosis of 60% with a distal stenosis of 50-60%.  f. [facility], 2009: PTCA and Taxus TAWANNA (2.25 x 12 mm) to the mid PDA; Cypher TAWANNA (2.5 x 18mm) to the distal SVG to the PDA;  and Cypher TAWANNA (3.0 x 28mm) to the proximal SVG to the PDA.  g. [facility] for recurrent chest pain, 2010: Revealing patent stents. Ranexa initiated 500 mg q.12 h.   h. [facility],  2011: LVEF of 45% to 50% with an occluded SVG to an obtuse marginal branch which could not be revascularized, patent LIMA to the LAD, noncritical graft disease in the SVG to the PDA, multiple small areas of distal vessel disease and collateral flow were seen.   i. [facility] for recurrent anginal symptoms, 2010, with PTCA and Promus TAWANNA (3.0 x 28mm) to the proximal SVG to the PDA for in-stent restenosis.    j. [facility], 2010,  Dr. Russo: EF 40-45%. 3.5 x 23 mm Promus TAWANNA in the proximal SVG to OM, 2.5 x 18 mm Promus TAWANNA to distal SVG to PDA due to ISR.    k. [facility], 2010, with placement of  a 3.0 x 16 mm Taxus TAWANNA to the SVG to the RCA proximally and a 2.5 x 16  mm paclitaxel stent distally in the SVG to the RCA.  l. [facility], 3.0 x 24-mm Promus element TAWANNA to a 90% lesion in the mid portion  of the SVG to the PDA.   m. [facility] for recurrent angina, 06/24/2014, Dr. Russo:  PTCA of the SVG to the PDA using a 3 x 12 mm NC TREK balloon.    n. Abnormal stress test, 10/07/2021: Mild ST depression with infusion and had a normal hemodynamic response to regadenoson. He was found to have a large posterior lateral stress-induced defect. Severe small fixed anterior defect, EF 45% with CCS class I chest discomfort/NYHA class II dyspnea on exertion symptoms.   o. [facility], 10/19/2021: EF 50%. LAD occluded following the takeoff of a first diagonal branch and a septal . and LCx occluded after the takeoff of a small-sized to moderate-sized, OM1 branch. RCA dominant for the posterior circulation. RCA was occluded in the mid-segment of the vessel. Collateral flow from one of the RV marginal branches was seen to supply the distal LCx including 2 OM branches. No significant disease appeared to be present within the distal LCx circulation. LIMA to the LAD was widely patent. SVG to LCx occluded at origin. SVG to PDA has been stented extensively. Area of mild in-stent narrowing in the mid segment of up to 40 to 50%. Good retrograde flow into posterolateral branch and into the circumflex vessels.  2. Hypertension.  3. Hypercholesterolemia.  4. Type 2 diabetes, diagnosed 2 years ago.  5. Obstructive sleep apnea/CPAP.  6. Enlarged prostate with anticipated TURP with Dr. Bernal with retroperitoneal ultrasound 8/31/2021 showing prostatomegaly (5.4 x 3.9 x 4.2 cm) with mass-effect on the base of the bladder, normal size kidneys  7. Obesity.  8. Depression.  9. Anemia  10. Surgical history:  a. CABG x3, Dr. Peng, [facility], 2001.  b. Negative colonoscopy, 2014.    Allergies   Allergen Reactions   • Bisoprolol Other (See Comments)     Agitation     • Byetta 10 Mcg Pen [Exenatide]       nausea   • Crestor [Rosuvastatin Calcium] Myalgia   • Metformin And Related    • Plavix [Clopidogrel Bisulfate] Other (See Comments)     resistance   • Zocor [Simvastatin] Myalgia       Current Medications:    Current Outpatient Medications:   •  albuterol sulfate  (90 Base) MCG/ACT inhaler, Inhale 2 puffs Every 4 (Four) Hours As Needed for Wheezing., Disp: , Rfl:   •  atorvastatin (LIPITOR) 40 MG tablet, Take 40 mg by mouth Daily., Disp: , Rfl:   •  azilsartan medoxomil (EDARBI) 80 MG tablet tablet, Take 80 mg by mouth Daily., Disp: , Rfl:   •  carvedilol (COREG) 12.5 MG tablet, Take 6.25 mg by mouth 2 (Two) Times a Day With Meals. Pt takes 0.5 tab BID, Disp: , Rfl:   •  Cholecalciferol (VITAMIN D3) 50 MCG (2000 UT) capsule, Take 2 capsules by mouth., Disp: , Rfl:   •  Dapagliflozin Propanediol 10 MG tablet, Take 10 mg by mouth Daily., Disp: , Rfl:   •  doxazosin (CARDURA) 8 MG tablet, Take 1 tablet by mouth Every 12 (Twelve) Hours., Disp: 60 tablet, Rfl: 11  •  finasteride (PROSCAR) 5 MG tablet, Take 5 mg by mouth Daily., Disp: , Rfl:   •  fluticasone (FLONASE) 50 MCG/ACT nasal spray, 1 spray., Disp: , Rfl:   •  Fluticasone Furoate-Vilanterol (Breo Ellipta) 100-25 MCG/INH inhaler, Inhale 1 puff Daily. Rinse mouth with water after use., Disp: 1 each, Rfl: 5  •  furosemide (LASIX) 20 MG tablet, Take 20 mg by mouth 2 (Two) Times a Week. Tuesday and Thursday, Disp: , Rfl:   •  furosemide (LASIX) 40 MG tablet, Take 40 mg by mouth 3 (Three) Times a Week. Monday, Wednesday, Friday, Disp: , Rfl:   •  hydrALAZINE (APRESOLINE) 100 MG tablet, Take 1 tablet by mouth 3 (Three) Times a Day., Disp: 90 tablet, Rfl: 11  •  insulin degludec (TRESIBA FLEXTOUCH) 100 UNIT/ML solution pen-injector injection, Inject 70 Units under the skin Every Night., Disp: , Rfl:   •  Multiple Vitamin (MULTI VITAMIN DAILY PO), Take 1 tablet by mouth Daily., Disp: , Rfl:   •  nitroglycerin (NITROSTAT) 0.4 MG SL tablet, Place 1 tablet under the  tongue Every 5 (Five) Minutes As Needed for Chest Pain. Take no more than 3 doses in 15 minutes., Disp: 25 tablet, Rfl: 11  •  pantoprazole (PROTONIX) 40 MG EC tablet, TAKE 1 TABLET BY MOUTH 2 TIMES DAILY (BEFORE MEALS), Disp: , Rfl:   •  potassium chloride (K-DUR,KLOR-CON) 20 MEQ CR tablet, Take 20 mEq by mouth Daily., Disp: , Rfl:   •  pramipexole (Mirapex) 1 MG tablet, Take 1 tablet by mouth Every Night., Disp: 90 tablet, Rfl: 2  •  prasugrel (EFFIENT) 10 MG tablet, Take 10 mg by mouth Daily., Disp: , Rfl:   •  sertraline (ZOLOFT) 50 MG tablet, Take 50 mg by mouth., Disp: , Rfl:   •  tamsulosin (FLOMAX) 0.4 MG capsule 24 hr capsule, Take 1 capsule by mouth Every Night., Disp: , Rfl:   •  vitamin D (ERGOCALCIFEROL) 1.25 MG (49424 UT) capsule capsule, Take 50,000 Units by mouth 1 (One) Time Per Week., Disp: , Rfl:     HPI    Donny Jerry is a 77 y.o. male who presents today for a 3 month follow up of coronary artery disease s/p [facility] (10/2021) and cardiac risk factors. Since last visit, patient presented to ED 3/3 with complaint of chest pain radiating to back and took three nitroglycerin. He is intolerant to Plavix and was not taking aspirin due to bleeding from ulcer. He is on Effient.   It was recommended he be admitted but patient declined due to the wait time for a bed. At home his systolic blood pressure ranges between 155-176 mmHg. He's had one episode of chest pain since ED. His PCP decreased carvedilol to half tablet twice a day due to fatigue. He's never been on clonidine. Patient denies shortness of breath, palpitations, edema, dizziness, and syncope.     The following portions of the patient's history were reviewed and updated as appropriate: allergies, current medications and problem list.    Pertinent positives as listed in the HPI.  All other systems reviewed are negative.         Vitals:    03/17/22 1509   BP: 170/90   BP Location: Left arm   Patient Position: Sitting   Pulse: 77   SpO2: 96%  "  Weight: 105 kg (232 lb)   Height: 170.2 cm (67\")       Physical Exam:  General: Alert and oriented.  Neck: Jugular venous pressure is within normal limits. Carotids have normal upstrokes without bruits.   Cardiovascular: Heart has a nondisplaced focal PMI. Regular rate and rhythm. No murmur, gallop or rub.  Lungs: Clear, no rales or wheezes. Equal expansion is noted.   Extremities: Show no edema.  Skin: Warm and dry.  Neurologic: Nonfocal.     Diagnostic Data (reviewed with patient):    Lab date: 3/3/2022  • CMP: Glu 268, BUN 47, Creat 1.5, eGF 45,Na 133, K 4.9, Cl 104, CO2 21, Ca 8.8, Alk Phos 51, AST 15, ALT 21    Lab Results   Component Value Date    CHLPL 194 02/10/2022    TRIG 325 (H) 02/10/2022    HDL 38 (L) 02/10/2022    LDL 91 02/10/2022      Lab Results   Component Value Date    WBC 14.0 (H) 03/03/2022    RBC 3.76 (L) 03/03/2022    HGB 11.0 (L) 03/03/2022    HCT 33.9 (L) 03/03/2022    MCV 90.2 03/03/2022     03/03/2022      Lab Results   Component Value Date    TSH 1.79 07/15/2021        Procedures      Assessment:    ICD-10-CM ICD-9-CM   1. Coronary artery disease involving coronary bypass graft of native heart without angina pectoris  I25.810 414.05   2. Essential hypertension  I10 401.9   3. Hyperlipidemia LDL goal <70  E78.5 272.4         Plan:  1. Resume carvedilol 12.5 mg BID and if it does not cause fatigue he should remain on full dose. He follows up with his PCP next week.  If SBPO remains > 140 he will call and we will make additional changes.  2. Continue on Effient 10 mg daily for antiplatelet therapy.   3. Continue on doxazosin 8 mg, Edarbi 80 mg, and hydralazine 100 mg for hypertension.   4. Continue on atorvastatin 40 mg daily for hyperlipidemia.   5. Continue all other current medications.  6. F/up in 4 months, sooner if needed.    Scribed for Liz Russo MD by Sarah Garcia. 3/17/2022 15:29 EDT      I Liz Russo MD personally performed the services described in " this documentation as scribed by the above individual in my presence, and it is both accurate and complete.    Liz Russo MD, FACC

## 2022-03-22 ENCOUNTER — OFFICE VISIT (OUTPATIENT)
Dept: PRIMARY CARE CLINIC | Age: 77
End: 2022-03-22
Payer: MEDICARE

## 2022-03-22 VITALS
SYSTOLIC BLOOD PRESSURE: 136 MMHG | OXYGEN SATURATION: 98 % | BODY MASS INDEX: 36.57 KG/M2 | WEIGHT: 233 LBS | TEMPERATURE: 97.9 F | RESPIRATION RATE: 15 BRPM | HEIGHT: 67 IN | DIASTOLIC BLOOD PRESSURE: 80 MMHG | HEART RATE: 78 BPM

## 2022-03-22 DIAGNOSIS — E66.01 SEVERE OBESITY (BMI 35.0-39.9) WITH COMORBIDITY (HCC): ICD-10-CM

## 2022-03-22 DIAGNOSIS — I10 PRIMARY HYPERTENSION: ICD-10-CM

## 2022-03-22 DIAGNOSIS — E78.5 HYPERLIPIDEMIA, UNSPECIFIED HYPERLIPIDEMIA TYPE: ICD-10-CM

## 2022-03-22 DIAGNOSIS — E11.42 TYPE 2 DIABETES MELLITUS WITH DIABETIC POLYNEUROPATHY, WITHOUT LONG-TERM CURRENT USE OF INSULIN (HCC): Primary | ICD-10-CM

## 2022-03-22 DIAGNOSIS — N18.2 CHRONIC RENAL FAILURE, STAGE 2 (MILD): ICD-10-CM

## 2022-03-22 PROCEDURE — 4040F PNEUMOC VAC/ADMIN/RCVD: CPT | Performed by: NURSE PRACTITIONER

## 2022-03-22 PROCEDURE — G8417 CALC BMI ABV UP PARAM F/U: HCPCS | Performed by: NURSE PRACTITIONER

## 2022-03-22 PROCEDURE — G8427 DOCREV CUR MEDS BY ELIG CLIN: HCPCS | Performed by: NURSE PRACTITIONER

## 2022-03-22 PROCEDURE — 3046F HEMOGLOBIN A1C LEVEL >9.0%: CPT | Performed by: NURSE PRACTITIONER

## 2022-03-22 PROCEDURE — G8484 FLU IMMUNIZE NO ADMIN: HCPCS | Performed by: NURSE PRACTITIONER

## 2022-03-22 PROCEDURE — 99214 OFFICE O/P EST MOD 30 MIN: CPT | Performed by: NURSE PRACTITIONER

## 2022-03-22 PROCEDURE — 1123F ACP DISCUSS/DSCN MKR DOCD: CPT | Performed by: NURSE PRACTITIONER

## 2022-03-22 PROCEDURE — 1036F TOBACCO NON-USER: CPT | Performed by: NURSE PRACTITIONER

## 2022-03-22 ASSESSMENT — ENCOUNTER SYMPTOMS
EYES NEGATIVE: 1
GASTROINTESTINAL NEGATIVE: 1
RESPIRATORY NEGATIVE: 1

## 2022-03-22 NOTE — PROGRESS NOTES
SUBJECTIVE:    Patient ID: Charls Eisenmenger is a 69 y.o. male. Chief Complaint   Patient presents with    Diabetes    Hypertension         HPI:  He comes in about his diabetes. He was taking 30 units of Tresiba and 70 units at night. His wife has been checking his blood sugar at night is lower she has decreased to 30 - 60 mg. He went to Dr. Yesenia Medley and his carvedilol was increased back to 12.5 mg. He says he can not tell the difference withthe fatigue. His wife says she thinks he has been doing better. She manages all his medications. She is baking his food. He still eats hamburger and fries for lunch but she says he did cut down to a small bai. He has been taking better control with his diet. He has been having some pain in his shoulder and the Lidocaine patch has been helping. He goes to Pulmonology next month. He is taking lasix 20 mg twice a week and 40 mg otherwise. Patient's medications,allergies, past medical, surgical, social and family histories were reviewed and updated as appropriate.   .  Current Outpatient Medications on File Prior to Visit   Medication Sig Dispense Refill    lidocaine 4 % external patch Place 1 patch onto the skin daily For pain 30 patch 0    Insulin Degludec (TRESIBA FLEXTOUCH) 200 UNIT/ML SOPN 30 units in the am and 74 units in the pm 20 pen 3    BREO ELLIPTA 100-25 MCG/INH AEPB inhaler       sertraline (ZOLOFT) 50 MG tablet Take 50 mg by mouth daily      Dulaglutide (TRULICITY) 7.49 KS/4.6HS SOPN Inject 0.75 mg into the skin once a week 4 pen 0    MI-ACID GAS RELIEF 80 MG chewable tablet TAKE 1 TABLET BY MOUTH EVERY 6-8 HOURS FOR 2 DAYS, THEN AS NEEDED FOR FLATULENCE 60 tablet 1    tamsulosin (FLOMAX) 0.4 MG capsule       furosemide (LASIX) 20 MG tablet Take 20 mg by mouth See Admin Instructions Take Tuesday and Thursday      carvedilol (COREG) 12.5 MG tablet Take 0.5 tablets by mouth 2 times daily 180 tablet 3    doxazosin (CARDURA) 8 MG tablet TAKE 1 TABLET BY MOUTH EVERY 12 (TWELVE) HOURS. 180 tablet 3    hydrALAZINE (APRESOLINE) 100 MG tablet TAKE 1 TABLET BY MOUTH 3 (THREE) TIMES A DAY.  270 tablet 3    potassium chloride (KLOR-CON M) 20 MEQ extended release tablet Take 1 tablet by mouth daily 90 tablet 3    dapagliflozin (FARXIGA) 10 MG tablet Take 1 tablet by mouth every morning 90 tablet 3    pantoprazole (PROTONIX) 40 MG tablet Take 1 tablet by mouth 2 times daily (before meals) 180 tablet 3    Azilsartan Medoxomil (EDARBI) 80 MG TABS TAKE ONE TABLET BY MOUTH EVERY DAY 90 tablet 3    albuterol sulfate  (90 Base) MCG/ACT inhaler Inhale 2 puffs into the lungs every 6 hours as needed for Wheezing or Shortness of Breath 18 g 1    blood glucose test strips (TRUE METRIX BLOOD GLUCOSE TEST) strip USE TO TEST BLOOD SUGAR THREE TIMES DAILY AND AS NEEDED DX E11.9 300 strip 3    furosemide (LASIX) 40 MG tablet Take 1 tablet by mouth daily 90 tablet 1    fluticasone (FLONASE) 50 MCG/ACT nasal spray 1 spray by Each Nostril route daily 2 Bottle 1    prasugrel (EFFIENT) 10 MG TABS Take 1 tablet by mouth daily 90 tablet 3    atorvastatin (LIPITOR) 40 MG tablet Take 1 tablet by mouth nightly 90 tablet 3    nitroGLYCERIN (NITROSTAT) 0.4 MG SL tablet Place 1 tablet under the tongue every 5 minutes as needed for Chest pain 25 tablet 3    pramipexole (MIRAPEX) 1 MG tablet Take 1 mg by mouth nightly      finasteride (PROSCAR) 5 MG tablet Take 1 tablet by mouth daily 90 tablet 3    B-D UF III MINI PEN NEEDLES 31G X 5 MM MISC USE TO INJECT INSULIN EVERYDAY 100 each 2    Insulin Pen Needle (ADVOCATE INSULIN PEN NEEDLES) 31G X 5 MM MISC USE TO INJECT INSULIN EVERY DAY DX: E11.42  each 3    Microlet Lancets MISC 1 each by Does not apply route 3 times daily DX: E11.42  each 3    Insulin Pen Needle 31G X 5 MM MISC USE TO INJECT INSULIN EVERY  each 3    Cholecalciferol (VITAMIN D3) 2000 units CAPS Take 2 capsules by mouth daily       therapeutic multivitamin-minerals (THERAGRAN-M) tablet Take 1 tablet by mouth daily.  [DISCONTINUED] omeprazole (PRILOSEC) 20 MG capsule Take 1 capsule by mouth 2 times daily. 180 capsule 3     No current facility-administered medications on file prior to visit. Review of Systems   Constitutional: Negative. HENT: Negative. Eyes: Negative. Respiratory: Negative. Cardiovascular: Negative. Gastrointestinal: Negative. Genitourinary: Negative. Musculoskeletal: Negative. Skin: Negative. Neurological: Negative. Psychiatric/Behavioral: Negative. OBJECTIVE:  /80 (Site: Left Upper Arm, Position: Sitting, Cuff Size: Large Adult)   Pulse 78   Temp 97.9 °F (36.6 °C) (Oral)   Resp 15   Ht 5' 7\" (1.702 m)   Wt 233 lb (105.7 kg)   SpO2 98% Comment: room air  BMI 36.49 kg/m²    Physical Exam  Vitals and nursing note reviewed. Constitutional:       Appearance: He is well-developed. HENT:      Head: Normocephalic and atraumatic. Eyes:      Conjunctiva/sclera: Conjunctivae normal.      Pupils: Pupils are equal, round, and reactive to light. Neck:      Thyroid: No thyromegaly. Vascular: No JVD. Cardiovascular:      Rate and Rhythm: Normal rate and regular rhythm. Heart sounds: No murmur heard. No friction rub. No gallop. Pulmonary:      Effort: Pulmonary effort is normal. No respiratory distress. Breath sounds: Normal breath sounds. No wheezing or rales. Abdominal:      General: Bowel sounds are normal. There is no distension. Palpations: Abdomen is soft. Tenderness: There is no abdominal tenderness. There is no guarding. Musculoskeletal:         General: No tenderness. Cervical back: Normal range of motion and neck supple. Skin:     General: Skin is warm and dry. Findings: No rash. Neurological:      Mental Status: He is alert and oriented to person, place, and time.    Psychiatric:         Judgment: Judgment normal. Results in Past 30 Days  Result Component Current Result Ref Range Previous Result Ref Range   Albumin/Globulin Ratio 1.2 (3/3/2022) 0.8 - 2.0 Not in Time Range    Albumin 3.7 (3/3/2022) 3.4 - 4.8 g/dL Not in Time Range    Alkaline Phosphatase 51 (3/3/2022) 25 - 100 U/L Not in Time Range    ALT 21 (3/3/2022) 4 - 36 U/L Not in Time Range    AST 15 (3/3/2022) 8 - 33 U/L Not in Time Range    BUN 47 (H) (3/3/2022) 6 - 20 mg/dL Not in Time Range    Calcium 8.8 (3/3/2022) 8.5 - 10.5 mg/dL Not in Time Range    Chloride 104 (3/3/2022) 98 - 107 mmol/L Not in Time Range    CO2 21 (3/3/2022) 20 - 30 mmol/L Not in Time Range    CREATININE 1.5 (H) (3/3/2022) 0.4 - 1.2 mg/dL Not in Time Range    GFR  55 (L) (3/3/2022) >59 Not in Time Range    GFR Non- 45 (L) (3/3/2022) >59 Not in Time Range    Globulin 3.0 (3/3/2022) Not Established g/dL Not in Time Range    Glucose 268 (H) (3/3/2022) 74 - 106 mg/dL Not in Time Range    Potassium 4.9 (3/3/2022) 3.4 - 5.1 mmol/L Not in Time Range    Sodium 133 (L) (3/3/2022) 136 - 145 mmol/L Not in Time Range    Total Bilirubin <0.2 (L) (3/3/2022) 0.3 - 1.2 mg/dL Not in Time Range    Total Protein 6.7 (3/3/2022) 6.4 - 8.3 g/dL Not in Time Range        Hemoglobin A1C (%)   Date Value   02/10/2022 11.3 (H)     Microalbumin, Random Urine (mg/dL)   Date Value   02/10/2022 8.70     LDL Calculated (mg/dL)   Date Value   02/10/2022 91           Lab Results   Component Value Date    TSH 1.79 07/15/2021         ASSESSMENT/PLAN:     Sol Rockwell was seen today for diabetes and hypertension. Diagnoses and all orders for this visit:    Type 2 diabetes mellitus with diabetic polyneuropathy, without long-term current use of insulin (HCC)  -     Hemoglobin A1C; Future  -     Comprehensive Metabolic Panel; Future  -     CBC; Future  Continue [place], Trulicity and Start on Farxiga 10 mg  Chronic renal failure, stage 2 (mild)  Start Farxiga 10 mg daily.    Lab Results Component Value Date    LABA1C 11.3 (H) 02/10/2022     No results found for: EAG    Hyperlipidemia, unspecified hyperlipidemia type  I have advised him on low-fat diet, exercise and weight control. I am going to continue on current medication. I have also advised him on the possible side effects from the medication. I will monitor his liver functions and lipid profile every few months. Lipid well controlled. Lab Results   Component Value Date    LDLCALC 91 02/10/2022    LDLCHOLESTEROL 50.0 03/10/2011    LDLDIRECT 82 10/07/2020     Follow with cardiology. Primary hypertension  He is on multiple medication he is on Coreg Cardura hydralazine Edarbi continues on Lasix 40 mg and 20 mg alternating. Severe obesity (BMI 35.0-39. 9) with comorbidity ([address]  Advised weight reduction dietary restrictions  There are no discontinued medications.

## 2022-03-22 NOTE — PROGRESS NOTES
Chief Complaint   Patient presents with    Diabetes    Hypertension       Have you seen any other physician or provider since your last visit yes     Have you had any other diagnostic tests since your last visit? no    Have you changed or stopped any medications since your last visit? no   .    Diabetic retinal exam completed this year?  Yes                       * If yes please have patient sign a records release to obtain record to update Health Maintenance                       * If no, please order referral for patient to be scheduled

## 2022-03-30 ASSESSMENT — ENCOUNTER SYMPTOMS: SHORTNESS OF BREATH: 1

## 2022-04-18 ENCOUNTER — TELEPHONE (OUTPATIENT)
Dept: PRIMARY CARE CLINIC | Age: 77
End: 2022-04-18

## 2022-04-18 ENCOUNTER — OFFICE VISIT (OUTPATIENT)
Dept: PULMONOLOGY | Facility: CLINIC | Age: 77
End: 2022-04-18

## 2022-04-18 VITALS
SYSTOLIC BLOOD PRESSURE: 118 MMHG | BODY MASS INDEX: 35.79 KG/M2 | WEIGHT: 228 LBS | OXYGEN SATURATION: 98 % | HEIGHT: 67 IN | DIASTOLIC BLOOD PRESSURE: 72 MMHG | HEART RATE: 66 BPM | RESPIRATION RATE: 18 BRPM

## 2022-04-18 DIAGNOSIS — J30.89 OTHER ALLERGIC RHINITIS: ICD-10-CM

## 2022-04-18 DIAGNOSIS — J45.40 MODERATE PERSISTENT ASTHMA WITHOUT COMPLICATION: ICD-10-CM

## 2022-04-18 DIAGNOSIS — G47.33 OSA (OBSTRUCTIVE SLEEP APNEA): ICD-10-CM

## 2022-04-18 DIAGNOSIS — G25.81 RESTLESS LEGS SYNDROME (RLS): ICD-10-CM

## 2022-04-18 DIAGNOSIS — R06.02 SOB (SHORTNESS OF BREATH): Primary | ICD-10-CM

## 2022-04-18 PROCEDURE — 99214 OFFICE O/P EST MOD 30 MIN: CPT | Performed by: INTERNAL MEDICINE

## 2022-04-18 NOTE — PROGRESS NOTES
"  Chief Complaint   Patient presents with   • Follow-up   • Sleeping Problem       Subjective   Donny Jeryr is a 77 y.o. male.     History of Present Illness   Patient was evaluated today for follow up of Sleep apnea, RLS and shortness of breath.     Patient doesn't report any issues with the PAP device. The patient describes no significant issues with his mask either.     Patient says that the compliance with the use of the equipment is good.     Is using Mirapex for RLS.     Patient also comes today for follow up of shortness of breath. Patient says that his symptoms have been similar to slightly worse since the last clinic visit.     He also has limited exercise capacity due to knee issues.     He is using Breo but not using albuterol as needed.     He also says that his bends down, his \"wind cuts off\".    Not using Flonase as prescribed.     The following portions of the patient's history were reviewed and updated as appropriate: allergies, current medications, past family history, past medical history, past social history and past surgical history.    Review of Systems   HENT: Negative for sinus pressure, sneezing and sore throat.    Respiratory: Positive for cough and shortness of breath. Negative for chest tightness and wheezing.        Objective   Visit Vitals  /72   Pulse 66   Resp 18   Ht 170.2 cm (67.01\")   Wt 103 kg (228 lb)   SpO2 98%   BMI 35.70 kg/m²       Physical Exam  Vitals reviewed.   Constitutional:       Appearance: He is well-developed.   HENT:      Head: Atraumatic.      Mouth/Throat:      Comments: Oropharynx was crowded.  Neck:      Comments: Increased neck circumference noted.  Abdominal:      Comments: Significant truncal obesity noted.    Musculoskeletal:      Comments: Gait was slow.   Neurological:      Mental Status: He is alert and oriented to person, place, and time.         Assessment/Plan   Diagnoses and all orders for this visit:    1. SOB (shortness of breath) " (Primary)  -     Pulmonary Function Test; Future    2. USHA (obstructive sleep apnea)    3. Restless legs syndrome (RLS)    4. Moderate persistent asthma without complication  -     Pulmonary Function Test; Future    5. Other allergic rhinitis    Other orders  -     Fluticasone-Umeclidin-Vilant (Trelegy Ellipta) 200-62.5-25 MCG/INH inhaler; Inhale 1 puff Daily. Rinse mouth with water after use.  Dispense: 60 each; Refill: 0           Return in about 4 months (around 8/18/2022) for Recheck, PFT F/U, For Carolann Chandler).    DISCUSSION (if any):  Trelegy instead of Breo for 2-4 weeks.     Patient was advised to use rescue inhaler for when necessary purposes    Patient was also advised to keep a log of the use of rescue inhaler.      I told the patient that although he could have possible asthma, especially given his FENO level of 20 upon last visit in December, I am not sure if asthma is the sole/primary etiology.    I do feel that his truncal obesity is definitely causing restriction and may be responsible for some of his symptoms as well.    PFTs will be ordered upon follow-up visit as it could not be performed today, as we unfortunately do not have respiratory therapist in the clinic at this time.    Patient was advised to continue his nasal spray on a seasonal basis, since his symptoms are consistent with seasonal allergic rhinitis.    I have asked my office staff to try to get sleep study from [employer] or his previous sleep physician.     Continue treatment with CPAP at a pressure of 15.    Patient seems to be compliant with PAP device, based on the available data and his account of improved symptoms.     Compliance data will be obtained from the [employer] device/DME company.    Sleep hygiene measures were discussed in great detail including need to follow a strict bedtime and to avoid electronic devices in bed and close to bedtime.    he was also asked to avoid caffeinated or alcoholic beverages within 4 to 6 hours  of bedtime.    Weight loss advised.    The patient was once again reminded to continue using the PAP device regularly, every night for atleast 4 hours.    The patient symptoms of restless leg syndrome are under fair control with Mirapex and I will continue the same.      Dictated utilizing Dragon dictation.    This document was electronically signed by Hansel Bob MD on 04/18/22 at 11:02 EDT

## 2022-04-18 NOTE — TELEPHONE ENCOUNTER
----- Message from Daniel Caballero sent at 4/15/2022 10:23 AM EDT -----  Subject: Message to Provider    QUESTIONS  Information for Provider? Patient calling with questions about the   strength of the Insulin Degludec (TRESIBA FLEXTOUCH) 200 UNIT/ML SOPN. Patient is supposed to take 100 unit and the prescription is written for   200. Please call back as soon as possible to clarify.   ---------------------------------------------------------------------------  --------------  CALL BACK INFO  What is the best way for the office to contact you? OK to leave message on   voicemail  Preferred Call Back Phone Number? [phone]  ---------------------------------------------------------------------------  --------------  SCRIPT ANSWERS  Relationship to Patient? Other  Representative Name? Evy Caraballo  Is the Representative on the appropriate HIPAA document in Epic?  Yes

## 2022-04-28 DIAGNOSIS — J45.40 MODERATE PERSISTENT ASTHMA WITHOUT COMPLICATION: Primary | ICD-10-CM

## 2022-05-09 DIAGNOSIS — E11.42 TYPE 2 DIABETES MELLITUS WITH DIABETIC POLYNEUROPATHY, WITHOUT LONG-TERM CURRENT USE OF INSULIN (HCC): ICD-10-CM

## 2022-05-09 RX ORDER — DULAGLUTIDE 0.75 MG/.5ML
0.75 INJECTION, SOLUTION SUBCUTANEOUS WEEKLY
Qty: 12 PEN | Refills: 1 | Status: SHIPPED | OUTPATIENT
Start: 2022-05-09 | End: 2022-10-24 | Stop reason: SDUPTHER

## 2022-05-25 ENCOUNTER — TELEPHONE (OUTPATIENT)
Dept: ORTHOPEDIC SURGERY | Facility: CLINIC | Age: 77
End: 2022-05-25

## 2022-05-25 ENCOUNTER — TELEPHONE (OUTPATIENT)
Dept: ORTHOPEDICS | Facility: OTHER | Age: 77
End: 2022-05-25

## 2022-05-25 DIAGNOSIS — J45.40 MODERATE PERSISTENT ASTHMA WITHOUT COMPLICATION: ICD-10-CM

## 2022-05-25 NOTE — TELEPHONE ENCOUNTER
Caller: Chely Jerry    Relationship to patient: Emergency Contact    Best call back number: [phone]    Chief complaint: BILATERAL KNEE PAIN    Type of visit: INJECTIONS    Requested date: UNKNOWN, PATIENT NOT SURE IF HE CAN HAVE MORE INJECTIONS YET BUT WOULD LIKE THEM.

## 2022-05-26 ENCOUNTER — OFFICE VISIT (OUTPATIENT)
Dept: ORTHOPEDIC SURGERY | Facility: CLINIC | Age: 77
End: 2022-05-26

## 2022-05-26 VITALS — WEIGHT: 231.6 LBS | HEIGHT: 67 IN | RESPIRATION RATE: 18 BRPM | BODY MASS INDEX: 36.35 KG/M2

## 2022-05-26 DIAGNOSIS — M25.561 ARTHRALGIA OF BOTH KNEES: ICD-10-CM

## 2022-05-26 DIAGNOSIS — M17.0 PRIMARY OSTEOARTHRITIS OF BOTH KNEES: Primary | ICD-10-CM

## 2022-05-26 DIAGNOSIS — M25.562 ARTHRALGIA OF BOTH KNEES: ICD-10-CM

## 2022-05-26 PROCEDURE — 20610 DRAIN/INJ JOINT/BURSA W/O US: CPT | Performed by: PHYSICIAN ASSISTANT

## 2022-05-26 RX ORDER — METHYLPREDNISOLONE ACETATE 40 MG/ML
40 INJECTION, SUSPENSION INTRA-ARTICULAR; INTRALESIONAL; INTRAMUSCULAR; SOFT TISSUE
Status: COMPLETED | OUTPATIENT
Start: 2022-05-26 | End: 2022-05-26

## 2022-05-26 RX ORDER — LIDOCAINE HYDROCHLORIDE 20 MG/ML
2 INJECTION, SOLUTION INFILTRATION; PERINEURAL
Status: COMPLETED | OUTPATIENT
Start: 2022-05-26 | End: 2022-05-26

## 2022-05-26 RX ORDER — DULAGLUTIDE 0.75 MG/.5ML
INJECTION, SOLUTION SUBCUTANEOUS
COMMUNITY
Start: 2022-05-23

## 2022-05-26 RX ORDER — INSULIN DEGLUDEC 200 U/ML
INJECTION, SOLUTION SUBCUTANEOUS
COMMUNITY
Start: 2022-04-29

## 2022-05-26 RX ADMIN — LIDOCAINE HYDROCHLORIDE 2 ML: 20 INJECTION, SOLUTION INFILTRATION; PERINEURAL at 13:22

## 2022-05-26 RX ADMIN — METHYLPREDNISOLONE ACETATE 40 MG: 40 INJECTION, SUSPENSION INTRA-ARTICULAR; INTRALESIONAL; INTRAMUSCULAR; SOFT TISSUE at 13:22

## 2022-05-26 RX ADMIN — METHYLPREDNISOLONE ACETATE 40 MG: 40 INJECTION, SUSPENSION INTRA-ARTICULAR; INTRALESIONAL; INTRAMUSCULAR; SOFT TISSUE at 13:21

## 2022-05-26 RX ADMIN — LIDOCAINE HYDROCHLORIDE 2 ML: 20 INJECTION, SOLUTION INFILTRATION; PERINEURAL at 13:21

## 2022-05-26 NOTE — PROGRESS NOTES
Subjective   Donny Jerry is a 77 y.o. male here today for injection therapy.    No chief complaint on file.      Past Medical History:   Diagnosis Date   • Benign hypertension    • Coronary artery disease    • Depression    • Enlarged prostate     Enlarged prostate with anticipated TURP with Dr. Bernal.   • GERD (gastroesophageal reflux disease)    • Hypercholesterolemia    • Obesity    • Obstructive sleep apnea on CPAP    • Type 2 diabetes mellitus (HCC)          Past Surgical History:   Procedure Laterality Date   • CARDIAC CATHETERIZATION     • CARDIAC CATHETERIZATION Left 10/19/2021    Procedure: Left Heart Cath;  Surgeon: Liz Russo MD;  [facility];  Service: Cardiology;  Laterality: Left;   • COLONOSCOPY W/ POLYPECTOMY     • CORONARY ANGIOPLASTY WITH STENT PLACEMENT Left 06/03/2009    Left heart catheterization, 06/03/2009, Dr. Russo:  LVEF 45% to 50%.  Placement of overlapping Cypher drug-eluting stent 2.5 x 28 and 2.5 x 18 to the SVG to the obtuse marginal branch.  SVG to the PDA had a proximal stenosis estimated at 60% with a distal stenosis of 50% to 60%.   • CORONARY ANGIOPLASTY WITH STENT PLACEMENT Left 07/06/2009    Left heart catheterization, 07/06/2009:  PTCA and stent placement in the mid PDA using a 2.25 x 12 mm Taxus drug-eluting stent with stent placement in the distal SVG to the PDA using a 2.5 x 18 mm Cypher drug-eluting stent and stenting of the proximal SVG to the PDA using a 3.0 x 28 mm Cypher drug-eluting stent.   • CORONARY ANGIOPLASTY WITH STENT PLACEMENT  04/23/2010    Cardiac catheterization for recurrent anginal symptoms, 04/23/2010, with PTCA and stent placement in the proximal SVG to the PDA using 3.0 x 28 mm Promus drug-eluting stent for in-stent restenosis.   • CORONARY ANGIOPLASTY WITH STENT PLACEMENT Left 07/06/2010    Left heart catheterization, 07/06/2010, Dr. Russo:  EF 40% to 45%.  3.5 x 23 mm Promus drug-eluting  "stent in the proximal SVG to OM, 2.5 x 18 mm Promus drug-eluting stent to distal SVG to PDA due to in-stent restenosis.     • CORONARY ANGIOPLASTY WITH STENT PLACEMENT Left 11/16/2010    Left heart catheterization, 11/16/2010, with placement of a 3.0 x 16 mm Taxus drug-eluting stent to the SVG to the RCA proximally and a 2.5 x 16 mm paclitaxel stent distally in the SVG to the RCA.   • CORONARY ANGIOPLASTY WITH STENT PLACEMENT Left     Left heart catheterization, 3.0 x 24-mm Promus element drug-eluting stent to a 90% lesion in the mid portion of the SVG to the PDA.    • CORONARY ANGIOPLASTY WITH STENT PLACEMENT Left 06/24/2014    Left heart catheterization for recurrent angina, 06/24/2014, Dr. Russo:  PTCA of the SVG to the PDA using a 3 x 12 mm NC TREK balloon.     • CORONARY ARTERY BYPASS GRAFT      CABG x3, Dr. Peng, [facility], 2001.       Allergies   Allergen Reactions   • Bisoprolol Other (See Comments)     Agitation     • Byetta 10 Mcg Pen [Exenatide]      nausea   • Crestor [Rosuvastatin Calcium] Myalgia   • Metformin And Related    • Plavix [Clopidogrel Bisulfate] Other (See Comments)     resistance   • Zocor [Simvastatin] Myalgia       Objective   There were no vitals taken for this visit.   Physical Exam    {16TC Visco PE:95883::\"Skin exam stable with no erythema, ecchymosis or rash.\",\"No new swelling.\",\"No motor or sensory changes.\",\"Distal pulse intact.\"}    Procedures    Assessment & Plan     No diagnosis found.    {18TC Post-Injection Advice:55891::\"Discussion of orthopaedic goals and activities and patient and/or guardian expressed appreciation.\",\"Guided on proper techniques for mobility, strength, agility and/or conditioning exercises\",\"Ice, heat, and/or modalities as beneficial\",\"Watch for signs and symptoms of infection\",\"Call or notify for any adverse effect from injection therapy\"}    Recommendations:  {16TC Work/Activity Status:92262::\"Usual activities,\",\"routine exercise as " "tolerated,\",\"light physical work as tolerated,\",\"no strenuous activity.\"}    No follow-ups on file.  Patient agreeable to call or return sooner for any concerns.       "

## 2022-05-26 NOTE — PROGRESS NOTES
Subjective   Donny Jerry is a 77 y.o. male here today for injection therapy.    Chief Complaint   Patient presents with   • Left Knee - Injections   • Right Knee - Injections   patient presents for repeat bilateral knee cortisone injections.    Past Medical History:   Diagnosis Date   • Benign hypertension    • Coronary artery disease    • Depression    • Enlarged prostate     Enlarged prostate with anticipated TURP with Dr. Bernal.   • GERD (gastroesophageal reflux disease)    • Hypercholesterolemia    • Obesity    • Obstructive sleep apnea on CPAP    • Type 2 diabetes mellitus (HCC)          Past Surgical History:   Procedure Laterality Date   • CARDIAC CATHETERIZATION     • CARDIAC CATHETERIZATION Left 10/19/2021    Procedure: Left Heart Cath;  Surgeon: Liz Russo MD;  Location: [facility] CATH INVASIVE LOCATION;  Service: Cardiology;  Laterality: Left;   • COLONOSCOPY W/ POLYPECTOMY     • CORONARY ANGIOPLASTY WITH STENT PLACEMENT Left 06/03/2009    Left heart catheterization, 06/03/2009, Dr. Russo:  LVEF 45% to 50%.  Placement of overlapping Cypher drug-eluting stent 2.5 x 28 and 2.5 x 18 to the SVG to the obtuse marginal branch.  SVG to the PDA had a proximal stenosis estimated at 60% with a distal stenosis of 50% to 60%.   • CORONARY ANGIOPLASTY WITH STENT PLACEMENT Left 07/06/2009    Left heart catheterization, 07/06/2009:  PTCA and stent placement in the mid PDA using a 2.25 x 12 mm Taxus drug-eluting stent with stent placement in the distal SVG to the PDA using a 2.5 x 18 mm Cypher drug-eluting stent and stenting of the proximal SVG to the PDA using a 3.0 x 28 mm Cypher drug-eluting stent.   • CORONARY ANGIOPLASTY WITH STENT PLACEMENT  04/23/2010    Cardiac catheterization for recurrent anginal symptoms, 04/23/2010, with PTCA and stent placement in the proximal SVG to the PDA using 3.0 x 28 mm Promus drug-eluting stent for in-stent restenosis.   • CORONARY ANGIOPLASTY WITH STENT  "PLACEMENT Left 07/06/2010    Left heart catheterization, 07/06/2010, Dr. Russo:  EF 40% to 45%.  3.5 x 23 mm Promus drug-eluting stent in the proximal SVG to OM, 2.5 x 18 mm Promus drug-eluting stent to distal SVG to PDA due to in-stent restenosis.     • CORONARY ANGIOPLASTY WITH STENT PLACEMENT Left 11/16/2010    Left heart catheterization, 11/16/2010, with placement of a 3.0 x 16 mm Taxus drug-eluting stent to the SVG to the RCA proximally and a 2.5 x 16 mm paclitaxel stent distally in the SVG to the RCA.   • CORONARY ANGIOPLASTY WITH STENT PLACEMENT Left     Left heart catheterization, 3.0 x 24-mm Promus element drug-eluting stent to a 90% lesion in the mid portion of the SVG to the PDA.    • CORONARY ANGIOPLASTY WITH STENT PLACEMENT Left 06/24/2014    Left heart catheterization for recurrent angina, 06/24/2014, Dr. Russo:  PTCA of the SVG to the PDA using a 3 x 12 mm NC TREK balloon.     • CORONARY ARTERY BYPASS GRAFT      CABG x3, Dr. Peng, [facility], 2001.       Allergies   Allergen Reactions   • Bisoprolol Other (See Comments)     Agitation     • Byetta 10 Mcg Pen [Exenatide]      nausea   • Crestor [Rosuvastatin Calcium] Myalgia   • Metformin And Related    • Plavix [Clopidogrel Bisulfate] Other (See Comments)     resistance   • Zocor [Simvastatin] Myalgia       Objective   Resp 18   Ht 170.2 cm (67.01\")   Wt 105 kg (231 lb 9.6 oz)   BMI 36.26 kg/m²    Physical Exam    Skin exam stable with no erythema, ecchymosis or rash.  No new swelling.  No motor or sensory changes.  Distal pulse intact.    Large Joint Arthrocentesis: R knee  Date/Time: 5/26/2022 1:21 PM  Consent given by: patient  Site marked: site marked  Timeout: Immediately prior to procedure a time out was called to verify the correct patient, procedure, equipment, support staff and site/side marked as required   Supporting Documentation  Indications: pain   Procedure Details  Location: knee - R knee  Preparation: " Patient was prepped and draped in the usual sterile fashion  Needle size: 22 G  Approach: anterolateral  Medications administered: 2 mL lidocaine 2%; 40 mg methylPREDNISolone acetate 40 MG/ML  Patient tolerance: patient tolerated the procedure well with no immediate complications    Large Joint Arthrocentesis: L knee  Date/Time: 5/26/2022 1:22 PM  Consent given by: patient  Site marked: site marked  Timeout: Immediately prior to procedure a time out was called to verify the correct patient, procedure, equipment, support staff and site/side marked as required   Supporting Documentation  Indications: pain   Procedure Details  Location: knee - L knee  Preparation: Patient was prepped and draped in the usual sterile fashion  Needle size: 22 G  Approach: anterolateral  Medications administered: 2 mL lidocaine 2%; 40 mg methylPREDNISolone acetate 40 MG/ML  Patient tolerance: patient tolerated the procedure well with no immediate complications          Assessment & Plan      Diagnosis Plan   1. Primary osteoarthritis of both knees  Large Joint Arthrocentesis: R knee    Large Joint Arthrocentesis: L knee   2. Arthralgia of both knees  Large Joint Arthrocentesis: R knee    Large Joint Arthrocentesis: L knee       Discussion of orthopaedic goals and activities and patient and/or guardian expressed appreciation.  Guided on proper techniques for mobility, strength, agility and/or conditioning exercises  Ice, heat, and/or modalities as beneficial  Watch for signs and symptoms of infection  Call or notify for any adverse effect from injection therapy    Recommendations:  Follow up in 3 months or prn  Patient agreeable to call or return sooner for any concerns.

## 2022-06-20 ENCOUNTER — HOSPITAL ENCOUNTER (OUTPATIENT)
Facility: HOSPITAL | Age: 77
Discharge: HOME | End: 2022-06-20
Payer: MEDICARE

## 2022-06-20 DIAGNOSIS — E11.42 TYPE 2 DIABETES MELLITUS WITH DIABETIC POLYNEUROPATHY, WITHOUT LONG-TERM CURRENT USE OF INSULIN (HCC): ICD-10-CM

## 2022-06-20 LAB
A/G RATIO: 1.6 (ref 0.8–2)
ALBUMIN SERPL-MCNC: 4.1 G/DL (ref 3.4–4.8)
ALP BLD-CCNC: 47 U/L (ref 25–100)
ALT SERPL-CCNC: 17 U/L (ref 4–36)
ANION GAP SERPL CALCULATED.3IONS-SCNC: 9 MMOL/L (ref 3–16)
AST SERPL-CCNC: 15 U/L (ref 8–33)
BILIRUB SERPL-MCNC: <0.2 MG/DL (ref 0.3–1.2)
BUN BLDV-MCNC: 46 MG/DL (ref 6–20)
CALCIUM SERPL-MCNC: 9.2 MG/DL (ref 8.5–10.5)
CHLORIDE BLD-SCNC: 106 MMOL/L (ref 98–107)
CO2: 27 MMOL/L (ref 20–30)
CREAT SERPL-MCNC: 1.5 MG/DL (ref 0.4–1.2)
GFR AFRICAN AMERICAN: 55
GFR NON-AFRICAN AMERICAN: 45
GLOBULIN: 2.5 G/DL
GLUCOSE BLD-MCNC: 70 MG/DL (ref 74–106)
HBA1C MFR BLD: 7.3 %
HCT VFR BLD CALC: 36.7 % (ref 40–54)
HEMOGLOBIN: 11.8 G/DL (ref 13–18)
MCH RBC QN AUTO: 29.1 PG (ref 27–32)
MCHC RBC AUTO-ENTMCNC: 32.2 G/DL (ref 31–35)
MCV RBC AUTO: 90.4 FL (ref 80–100)
PDW BLD-RTO: 13 % (ref 11–16)
PLATELET # BLD: 272 K/UL (ref 150–400)
PMV BLD AUTO: 10.6 FL (ref 6–10)
POTASSIUM SERPL-SCNC: 5.1 MMOL/L (ref 3.4–5.1)
RBC # BLD: 4.06 M/UL (ref 4.5–6)
SODIUM BLD-SCNC: 142 MMOL/L (ref 136–145)
TOTAL PROTEIN: 6.6 G/DL (ref 6.4–8.3)
WBC # BLD: 8.2 K/UL (ref 4–11)

## 2022-06-20 PROCEDURE — 83036 HEMOGLOBIN GLYCOSYLATED A1C: CPT

## 2022-06-20 PROCEDURE — 85027 COMPLETE CBC AUTOMATED: CPT

## 2022-06-20 PROCEDURE — 80053 COMPREHEN METABOLIC PANEL: CPT

## 2022-06-22 ENCOUNTER — OFFICE VISIT (OUTPATIENT)
Dept: PRIMARY CARE CLINIC | Age: 77
End: 2022-06-22
Payer: MEDICARE

## 2022-06-22 VITALS
TEMPERATURE: 97.6 F | DIASTOLIC BLOOD PRESSURE: 54 MMHG | BODY MASS INDEX: 34.94 KG/M2 | SYSTOLIC BLOOD PRESSURE: 137 MMHG | WEIGHT: 222.6 LBS | HEART RATE: 64 BPM | RESPIRATION RATE: 14 BRPM | OXYGEN SATURATION: 98 % | HEIGHT: 67 IN

## 2022-06-22 DIAGNOSIS — Z00.00 MEDICARE ANNUAL WELLNESS VISIT, SUBSEQUENT: Primary | ICD-10-CM

## 2022-06-22 DIAGNOSIS — E78.5 HYPERLIPIDEMIA, UNSPECIFIED HYPERLIPIDEMIA TYPE: ICD-10-CM

## 2022-06-22 DIAGNOSIS — E11.9 TYPE 2 DIABETES MELLITUS WITHOUT COMPLICATION, WITH LONG-TERM CURRENT USE OF INSULIN (HCC): ICD-10-CM

## 2022-06-22 DIAGNOSIS — I50.9 ACUTE ON CHRONIC CONGESTIVE HEART FAILURE, UNSPECIFIED HEART FAILURE TYPE (HCC): ICD-10-CM

## 2022-06-22 DIAGNOSIS — Z79.4 TYPE 2 DIABETES MELLITUS WITHOUT COMPLICATION, WITH LONG-TERM CURRENT USE OF INSULIN (HCC): ICD-10-CM

## 2022-06-22 DIAGNOSIS — I10 ESSENTIAL HYPERTENSION: ICD-10-CM

## 2022-06-22 PROCEDURE — 3051F HG A1C>EQUAL 7.0%<8.0%: CPT | Performed by: NURSE PRACTITIONER

## 2022-06-22 PROCEDURE — G0439 PPPS, SUBSEQ VISIT: HCPCS | Performed by: NURSE PRACTITIONER

## 2022-06-22 PROCEDURE — 1123F ACP DISCUSS/DSCN MKR DOCD: CPT | Performed by: NURSE PRACTITIONER

## 2022-06-22 RX ORDER — PRASUGREL 10 MG/1
10 TABLET, FILM COATED ORAL DAILY
Qty: 90 TABLET | Refills: 3 | Status: SHIPPED | OUTPATIENT
Start: 2022-06-22

## 2022-06-22 RX ORDER — FUROSEMIDE 20 MG/1
20 TABLET ORAL SEE ADMIN INSTRUCTIONS
Qty: 180 TABLET | Refills: 3 | Status: SHIPPED | OUTPATIENT
Start: 2022-06-22

## 2022-06-22 RX ORDER — FUROSEMIDE 40 MG/1
40 TABLET ORAL DAILY
Qty: 90 TABLET | Refills: 3 | Status: SHIPPED | OUTPATIENT
Start: 2022-06-22

## 2022-06-22 RX ORDER — PEN NEEDLE, DIABETIC 31 GX5/16"
NEEDLE, DISPOSABLE MISCELLANEOUS
Qty: 100 EACH | Refills: 3 | Status: SHIPPED | OUTPATIENT
Start: 2022-06-22

## 2022-06-22 RX ORDER — PRAMIPEXOLE DIHYDROCHLORIDE 1 MG/1
1 TABLET ORAL NIGHTLY
Qty: 90 TABLET | Refills: 3 | Status: SHIPPED | OUTPATIENT
Start: 2022-06-22

## 2022-06-22 RX ORDER — CALCIUM CITRATE/VITAMIN D3 200MG-6.25
TABLET ORAL
Qty: 300 STRIP | Refills: 3 | Status: SHIPPED | OUTPATIENT
Start: 2022-06-22

## 2022-06-22 RX ORDER — CARVEDILOL 12.5 MG/1
12.5 TABLET ORAL 2 TIMES DAILY
Qty: 180 TABLET | Refills: 3 | Status: SHIPPED | OUTPATIENT
Start: 2022-06-22

## 2022-06-22 RX ORDER — ATORVASTATIN CALCIUM 40 MG/1
40 TABLET, FILM COATED ORAL NIGHTLY
Qty: 90 TABLET | Refills: 3 | Status: SHIPPED | OUTPATIENT
Start: 2022-06-22

## 2022-06-22 RX ORDER — FLUTICASONE FUROATE, UMECLIDINIUM BROMIDE AND VILANTEROL TRIFENATATE 200; 62.5; 25 UG/1; UG/1; UG/1
POWDER RESPIRATORY (INHALATION)
COMMUNITY
Start: 2022-05-26

## 2022-06-22 ASSESSMENT — PATIENT HEALTH QUESTIONNAIRE - PHQ9
4. FEELING TIRED OR HAVING LITTLE ENERGY: 1
9. THOUGHTS THAT YOU WOULD BE BETTER OFF DEAD, OR OF HURTING YOURSELF: 0
2. FEELING DOWN, DEPRESSED OR HOPELESS: 0
1. LITTLE INTEREST OR PLEASURE IN DOING THINGS: 0
5. POOR APPETITE OR OVEREATING: 0
SUM OF ALL RESPONSES TO PHQ QUESTIONS 1-9: 1
3. TROUBLE FALLING OR STAYING ASLEEP: 0
SUM OF ALL RESPONSES TO PHQ QUESTIONS 1-9: 1
SUM OF ALL RESPONSES TO PHQ QUESTIONS 1-9: 1
10. IF YOU CHECKED OFF ANY PROBLEMS, HOW DIFFICULT HAVE THESE PROBLEMS MADE IT FOR YOU TO DO YOUR WORK, TAKE CARE OF THINGS AT HOME, OR GET ALONG WITH OTHER PEOPLE: 0
7. TROUBLE CONCENTRATING ON THINGS, SUCH AS READING THE NEWSPAPER OR WATCHING TELEVISION: 0
6. FEELING BAD ABOUT YOURSELF - OR THAT YOU ARE A FAILURE OR HAVE LET YOURSELF OR YOUR FAMILY DOWN: 0
SUM OF ALL RESPONSES TO PHQ QUESTIONS 1-9: 1
SUM OF ALL RESPONSES TO PHQ9 QUESTIONS 1 & 2: 0
8. MOVING OR SPEAKING SO SLOWLY THAT OTHER PEOPLE COULD HAVE NOTICED. OR THE OPPOSITE, BEING SO FIGETY OR RESTLESS THAT YOU HAVE BEEN MOVING AROUND A LOT MORE THAN USUAL: 0

## 2022-06-22 ASSESSMENT — LIFESTYLE VARIABLES: HOW OFTEN DO YOU HAVE A DRINK CONTAINING ALCOHOL: NEVER

## 2022-06-22 NOTE — PROGRESS NOTES
Chief Complaint   Patient presents with    Medicare AWV       Have you seen any other physician or provider since your last visit no    Have you had any other diagnostic tests since your last visit? yes - labs    Have you changed or stopped any medications since your last visit? no     I have recommended that this patient have a immunization for pneumonia but he declines at this time. I have discussed the risks and benefits of this examination with him. The patient verbalizes understanding. Diabetic retinal exam completed this year?  Yes                       * If yes please have patient sign a records release to obtain record to update Health Maintenance                       * If no, please order referral for patient to be scheduled

## 2022-06-22 NOTE — PATIENT INSTRUCTIONS
· Keep a list of your medicines with you. List all of the prescription medicines, nonprescription medicines, supplements, natural remedies, and vitamins that you take. Tell your healthcare providers who treat you about all of the products you are taking. Your provider can provide you with a form to keep track of them. Just ask. · Follow the directions that come with your medicine, including information about food or alcohol. Make sure you know how and when to take your medicine. Do not take more or less than you are supposed to take. · Keep all medicines out of the reach of children. · Store medicines according to the directions on the label. · Monitor yourself. Learn to know how your body reacts to your new medicine and keep track of how it makes you feel before attempting (If your provider has allowed you to do so) to drive or go to work. · Seek emergency medical attention if you think you have used too much of this medicine. An overdose of any prescription medicine can be fatal. Overdose symptoms may include extreme drowsiness, muscle weakness, confusion, cold and clammy skin, pinpoint pupils, shallow breathing, slow heart rate, fainting, or coma. · Don't share prescription medicines with others, even when they seem to have the same symptoms. What may be good for you may be harmful to others. · If you are no longer taking a prescribed medication and you have pills left please take your pills out of their original containers. Mix crushed pills with an undesirable substance, such as cat litter or used coffee grounds. Put the mixture into a disposable container with a lid, such as an empty margarine tub, or into a sealable bag. Cover up or remove any of your personal information on the empty containers by covering it with black permanent marker or duct tape. Place the sealed container with the mixture, and the empty drug containers, in the trash.    · If you use a medication that is in the form of a patch, dispose of used patches by folding them in half so that the sticky sides meet, and then flushing them down a toilet. They should not be placed in the household trash where children or pets can find them. · If you have any questions, ask your provider or pharmacist for more information. · Be sure to keep all appointments for provider visits or tests. We are committed to providing you with the best care possible. In order to help us achieve these goals please remember to bring all medications, herbal products, and over the counter supplements with you to each visit. If your provider has ordered testing for you, please be sure to follow up with our office if you have not received results within 7 days after the testing took place. *If you receive a survey after visiting one of our offices, please take time to share your experience concerning your physician office visit. These surveys are confidential and no health information about you is shared. We are eager to improve for you and we are counting on your feedback to help make that happen. Personalized Preventive Plan for [facility] - 6/22/2022  Medicare offers a range of preventive health benefits. Some of the tests and screenings are paid in full while other may be subject to a deductible, co-insurance, and/or copay. Some of these benefits include a comprehensive review of your medical history including lifestyle, illnesses that may run in your family, and various assessments and screenings as appropriate. After reviewing your medical record and screening and assessments performed today your provider may have ordered immunizations, labs, imaging, and/or referrals for you. A list of these orders (if applicable) as well as your Preventive Care list are included within your After Visit Summary for your review.     Other Preventive Recommendations:    A preventive eye exam performed by an eye specialist is recommended every 1-2 years to screen for glaucoma; cataracts, macular degeneration, and other eye disorders. A preventive dental visit is recommended every 6 months. Try to get at least 150 minutes of exercise per week or 10,000 steps per day on a pedometer . Order or download the FREE \"Exercise & Physical Activity: Your Everyday Guide\" from [employer]. Call 2-338.153.8058 or search [employer] online. You need 8420-0985 mg of calcium and 9249-3009 IU of vitamin D per day. It is possible to meet your calcium requirement with diet alone, but a vitamin D supplement is usually necessary to meet this goal.  When exposed to the sun, use a sunscreen that protects against both UVA and UVB radiation with an SPF of 30 or greater. Reapply every 2 to 3 hours or after sweating, drying off with a towel, or swimming. Always wear a seat belt when traveling in a car. Always wear a helmet when riding a bicycle or motorcycle.

## 2022-06-22 NOTE — PROGRESS NOTES
safety              General Health and ACP:  General  In general, how would you say your health is?: Fair  In the past 7 days, have you experienced any of the following: New or Increased Pain, New or Increased Fatigue, Loneliness, Social Isolation, Stress or Anger?: (!) Yes  Select all that apply: (!) New or Increased Pain  Do you get the social and emotional support that you need?: Yes  Do you have a Living Will?: Yes    Advance Directives     Power of  Living Will ACP-Advance Directive ACP-Power of     Not on File Filed on 09/25/19 Filed Not on File      General Health Risk Interventions:  · Fatigue: patient declines any further evaluation/treatment for this issue    Health Habits/Nutrition:     Physical Activity: Inactive    Days of Exercise per Week: 0 days    Minutes of Exercise per Session: 0 min     Have you lost any weight without trying in the past 3 months?: No  Body mass index: (!) 34.86  Have you seen the dentist within the past year?: N/A - wear dentures    Health Habits/Nutrition Interventions:  · Inadequate physical activity:  patient is not ready to increase his/her physical activity level at this time, encouraged ro walk             Objective   Vitals:    06/22/22 1017   BP: (!) 137/54   Site: Right Upper Arm   Position: Sitting   Cuff Size: Medium Adult   Pulse: 64   Resp: 14   Temp: 97.6 °F (36.4 °C)   TempSrc: Temporal   SpO2: 98%   Weight: 222 lb 9.6 oz (101 kg)   Height: 5' 7\" (1.702 m)      Body mass index is 34.86 kg/m². Allergies   Allergen Reactions    Bisoprolol Other (See Comments)     Agitation    Clopidogrel Bisulfate Other (See Comments)     resistance    Exenatide      nausea    Glucophage [Metformin Hydrochloride]      nausea    Rosuvastatin Calcium Other (See Comments)    Zocor [Simvastatin]      Muscle pain     Prior to Visit Medications    Medication Sig Taking?  Authorizing Provider   [address] [id number] MCG/INH AEPB  Yes Historical Provider, MD   blood glucose test strips (TRUE METRIX BLOOD GLUCOSE TEST) strip USE TO TEST BLOOD SUGAR THREE TIMES DAILY AND AS NEEDED DX E11.9 Yes MERCEDES Haile   Insulin Pen Needle (B-D UF III MINI PEN NEEDLES) 31G X 5 MM MISC USE TO INJECT INSULIN EVERYDAY Yes MERCEDES Haile   carvedilol (COREG) 12.5 MG tablet Take 1 tablet by mouth 2 times daily Yes MERCEDES Haile   furosemide (LASIX) 20 MG tablet Take 1 tablet by mouth See Admin Instructions Take Tuesday and Thursday Take 20 mg by mouth See Admin Instructions Take Tuesday and Thursday Yes MERCEDES Haile   furosemide (LASIX) 40 MG tablet Take 1 tablet by mouth daily Yes MERCEDES Haile   pramipexole (MIRAPEX) 1 MG tablet Take 1 tablet by mouth nightly Take 1 mg by mouth nightly Yes MERCEDES Haile   atorvastatin (LIPITOR) 40 MG tablet Take 1 tablet by mouth nightly Yes MERCEDES Haile   prasugrel (EFFIENT) 10 MG TABS Take 1 tablet by mouth daily Yes MERCEDES Haile   Dulaglutide (TRULICITY) 9.03 PV/7.7AU SOPN Inject 0.75 mg into the skin once a week Yes MERCEDES Haile   sertraline (ZOLOFT) 50 MG tablet Take 1 tablet by mouth daily Yes MERCEDES Haile   Insulin Degludec (TRESIBA FLEXTOUCH) 200 UNIT/ML SOPN 30 units in the am and 74 units in the pm Yes MERCEDES Haile   BREO ELLIPTA 100-25 MCG/INH AEPB inhaler  Yes Historical Provider, MD   MI-ACID GAS RELIEF 80 MG chewable tablet TAKE 1 TABLET BY MOUTH EVERY 6-8 HOURS FOR 2 DAYS, THEN AS NEEDED FOR FLATULENCE Yes MERCEDES Abbasi - CNP   tamsulosin (FLOMAX) 0.4 MG capsule Take 0.4 mg by mouth in the morning and at bedtime  Yes Historical Provider, MD   doxazosin (CARDURA) 8 MG tablet TAKE 1 TABLET BY MOUTH EVERY 12 (TWELVE) HOURS. Yes MERCEDES Haile   hydrALAZINE (APRESOLINE) 100 MG tablet TAKE 1 TABLET BY MOUTH 3 (THREE) TIMES A DAY.  Yes MERCEDES Haile   potassium chloride (KLOR-CON M) 20 MEQ extended release tablet Take 1 tablet by mouth daily Yes MERCEDES Bragg   dapagliflozin (FARXIGA) 10 MG tablet Take 1 tablet by mouth every morning Yes MERCEDES Bragg   pantoprazole (PROTONIX) 40 MG tablet Take 1 tablet by mouth 2 times daily (before meals) Yes MERCEDES Bragg   Azilsartan Medoxomil (EDARBI) 80 MG TABS TAKE ONE TABLET BY MOUTH EVERY DAY Yes MERCEDES Bragg   albuterol sulfate  (90 Base) MCG/ACT inhaler Inhale 2 puffs into the lungs every 6 hours as needed for Wheezing or Shortness of Breath Yes Will RiceMERCEDES - CNP   fluticasone (FLONASE) 50 MCG/ACT nasal spray 1 spray by Each Nostril route daily Yes Gildardo DarrenMERCEDES dacosta CNP   nitroGLYCERIN (NITROSTAT) 0.4 MG SL tablet Place 1 tablet under the tongue every 5 minutes as needed for Chest pain Yes MERCEDES Bragg   Insulin Pen Needle (ADVOCATE INSULIN PEN NEEDLES) 31G X 5 MM MISC USE TO INJECT INSULIN EVERY DAY DX: E11.42 KX Yes MERCEDES Bragg   Microlet Lancets MISC 1 each by Does not apply route 3 times daily DX: E11.42 KX Yes MERCEDES Bragg   Insulin Pen Needle 31G X 5 MM MISC USE TO INJECT INSULIN EVERY DAY Yes MERCEDES Bragg   Cholecalciferol (VITAMIN D3) 2000 units CAPS Take 2 capsules by mouth daily  Yes Historical Provider, MD   therapeutic multivitamin-minerals (THERAGRAN-M) tablet Take 1 tablet by mouth daily. Yes Historical Provider, MD   finasteride (PROSCAR) 5 MG tablet Take 1 tablet by mouth daily  Patient not taking: Reported on 6/22/2022  MERCEDES Bragg   omeprazole (PRILOSEC) 20 MG capsule Take 1 capsule by mouth 2 times daily.   Elmer Arnett MD       [facility] (Including outside providers/suppliers regularly involved in providing care):   Patient Care Team:  MERCEDES Bragg as PCP - General  MERCEDES Bragg as PCP - [facility] Empaneled Provider  Michel Branham MD as Consulting Physician (Cardiology)  Spencer Naqvi MD as Consulting Physician  Christelle Pereyra (Family Medicine)  Lupe Patton MD as Consulting Physician  Sue Guadalupe (Urology)  Nicko Bo     Reviewed and updated this visit:  Tobacco  Allergies  Meds  Med Hx  Surg Hx  Soc Hx  Fam Hx

## 2022-06-29 DIAGNOSIS — R06.02 SOB (SHORTNESS OF BREATH) ON EXERTION: ICD-10-CM

## 2022-06-29 RX ORDER — ALBUTEROL SULFATE 90 UG/1
2 AEROSOL, METERED RESPIRATORY (INHALATION) EVERY 6 HOURS PRN
Qty: 8.5 G | Refills: 1 | Status: SHIPPED | OUTPATIENT
Start: 2022-06-29

## 2022-07-27 ENCOUNTER — TELEPHONE (OUTPATIENT)
Dept: PRIMARY CARE CLINIC | Age: 77
End: 2022-07-27

## 2022-08-05 ENCOUNTER — HOSPITAL ENCOUNTER (OUTPATIENT)
Dept: GENERAL RADIOLOGY | Facility: HOSPITAL | Age: 77
Discharge: HOME | End: 2022-08-05
Payer: MEDICARE

## 2022-08-05 ENCOUNTER — HOSPITAL ENCOUNTER (OUTPATIENT)
Facility: HOSPITAL | Age: 77
Discharge: HOME | End: 2022-08-05
Payer: MEDICARE

## 2022-08-05 DIAGNOSIS — N40.1 ENLARGED PROSTATE WITH URINARY OBSTRUCTION: ICD-10-CM

## 2022-08-05 DIAGNOSIS — N13.8 ENLARGED PROSTATE WITH URINARY OBSTRUCTION: ICD-10-CM

## 2022-08-05 LAB
A/G RATIO: 1.9 (ref 0.8–2)
ALBUMIN SERPL-MCNC: 4.5 G/DL (ref 3.4–4.8)
ALP BLD-CCNC: 47 U/L (ref 25–100)
ALT SERPL-CCNC: 19 U/L (ref 4–36)
ANION GAP SERPL CALCULATED.3IONS-SCNC: 12 MMOL/L (ref 3–16)
AST SERPL-CCNC: 12 U/L (ref 8–33)
BILIRUB SERPL-MCNC: 0.3 MG/DL (ref 0.3–1.2)
BILIRUBIN URINE: NEGATIVE
BLOOD, URINE: NEGATIVE
BUN BLDV-MCNC: 42 MG/DL (ref 6–20)
CALCIUM SERPL-MCNC: 9.5 MG/DL (ref 8.5–10.5)
CHLORIDE BLD-SCNC: 101 MMOL/L (ref 98–107)
CLARITY: CLEAR
CO2: 28 MMOL/L (ref 20–30)
COLOR: YELLOW
CREAT SERPL-MCNC: 1.8 MG/DL (ref 0.4–1.2)
GFR AFRICAN AMERICAN: 45
GFR NON-AFRICAN AMERICAN: 37
GLOBULIN: 2.4 G/DL
GLUCOSE BLD-MCNC: 180 MG/DL (ref 74–106)
GLUCOSE URINE: 250 MG/DL
KETONES, URINE: NEGATIVE MG/DL
LEUKOCYTE ESTERASE, URINE: NEGATIVE
MICROSCOPIC EXAMINATION: ABNORMAL
NITRITE, URINE: NEGATIVE
PH UA: 5 (ref 5–8)
POTASSIUM SERPL-SCNC: 4.7 MMOL/L (ref 3.4–5.1)
PROSTATE SPECIFIC ANTIGEN: 1.53 NG/ML (ref 0–4)
PROTEIN UA: NEGATIVE MG/DL
SODIUM BLD-SCNC: 141 MMOL/L (ref 136–145)
SPECIFIC GRAVITY UA: 1.01 (ref 1–1.03)
TOTAL PROTEIN: 6.9 G/DL (ref 6.4–8.3)
URINE TYPE: ABNORMAL
UROBILINOGEN, URINE: 0.2 E.U./DL

## 2022-08-05 PROCEDURE — 74018 RADEX ABDOMEN 1 VIEW: CPT

## 2022-08-05 PROCEDURE — 81003 URINALYSIS AUTO W/O SCOPE: CPT

## 2022-08-05 PROCEDURE — 80053 COMPREHEN METABOLIC PANEL: CPT

## 2022-08-05 PROCEDURE — 84153 ASSAY OF PSA TOTAL: CPT

## 2022-08-05 PROCEDURE — 87086 URINE CULTURE/COLONY COUNT: CPT

## 2022-08-05 PROCEDURE — 36415 COLL VENOUS BLD VENIPUNCTURE: CPT

## 2022-08-06 ENCOUNTER — OFFICE VISIT (OUTPATIENT)
Dept: PRIMARY CARE CLINIC | Age: 77
End: 2022-08-06
Payer: MEDICARE

## 2022-08-06 VITALS
WEIGHT: 222 LBS | TEMPERATURE: 98.6 F | OXYGEN SATURATION: 97 % | RESPIRATION RATE: 13 BRPM | BODY MASS INDEX: 34.77 KG/M2

## 2022-08-06 DIAGNOSIS — N18.31 STAGE 3A CHRONIC KIDNEY DISEASE (HCC): ICD-10-CM

## 2022-08-06 DIAGNOSIS — R09.81 SINUS CONGESTION: ICD-10-CM

## 2022-08-06 DIAGNOSIS — I50.22 CHRONIC SYSTOLIC (CONGESTIVE) HEART FAILURE (HCC): ICD-10-CM

## 2022-08-06 DIAGNOSIS — J06.9 UPPER RESPIRATORY TRACT INFECTION, UNSPECIFIED TYPE: Primary | ICD-10-CM

## 2022-08-06 PROBLEM — N18.30 CHRONIC RENAL DISEASE, STAGE III (HCC): Status: ACTIVE | Noted: 2022-08-06

## 2022-08-06 PROCEDURE — 1123F ACP DISCUSS/DSCN MKR DOCD: CPT

## 2022-08-06 PROCEDURE — G8427 DOCREV CUR MEDS BY ELIG CLIN: HCPCS

## 2022-08-06 PROCEDURE — 1036F TOBACCO NON-USER: CPT

## 2022-08-06 PROCEDURE — G8417 CALC BMI ABV UP PARAM F/U: HCPCS

## 2022-08-06 PROCEDURE — 99213 OFFICE O/P EST LOW 20 MIN: CPT

## 2022-08-06 RX ORDER — GUAIFENESIN 600 MG/1
600 TABLET, EXTENDED RELEASE ORAL 2 TIMES DAILY
Qty: 30 TABLET | Refills: 0 | Status: SHIPPED | OUTPATIENT
Start: 2022-08-06 | End: 2022-08-21

## 2022-08-06 ASSESSMENT — ENCOUNTER SYMPTOMS
GASTROINTESTINAL NEGATIVE: 1
SINUS PAIN: 1
SORE THROAT: 0
SINUS PRESSURE: 1
RHINORRHEA: 0
RESPIRATORY NEGATIVE: 1
TROUBLE SWALLOWING: 0
EYES NEGATIVE: 1

## 2022-08-06 NOTE — PROGRESS NOTES
69year old with complaint of sinus pressure x 2 days, has been using flonase and otc allergy meds, says this does help some

## 2022-08-06 NOTE — PROGRESS NOTES
1999    colon ca        Past Medical History:   Diagnosis Date    Anemia 2019    Anxiety     Bleeding stomach ulcer 2019    Dr Ismael Granger    CAD (coronary artery disease)     Cancer [facility])     malignant colon polyp    Depression     Enlarged prostate     GERD (gastroesophageal reflux disease)     Hyperlipidemia     Hypertension     Sleep apnea     Type II or unspecified type diabetes mellitus without mention of complication, not stated as uncontrolled         Current Outpatient Medications   Medication Sig Dispense Refill    guaiFENesin (MUCINEX) 600 MG extended release tablet Take 1 tablet by mouth in the morning and 1 tablet before bedtime. Do all this for 15 days.  30 tablet 0    albuterol sulfate HFA (PROVENTIL;VENTOLIN;PROAIR) 108 (90 Base) MCG/ACT inhaler INHALE 2 PUFFS INTO THE LUNGS EVERY 6 HOURS AS NEEDED FOR WHEEZING OR SHORTNESS OF BREATH 8.5 g 1    TRELEGY ELLIPTA 200-62.5-25 MCG/INH AEPB       blood glucose test strips (TRUE METRIX BLOOD GLUCOSE TEST) strip USE TO TEST BLOOD SUGAR THREE TIMES DAILY AND AS NEEDED DX E11.9 300 strip 3    Insulin Pen Needle (B-D UF III MINI PEN NEEDLES) 31G X 5 MM MISC USE TO INJECT INSULIN EVERYDAY 100 each 3    carvedilol (COREG) 12.5 MG tablet Take 1 tablet by mouth 2 times daily 180 tablet 3    furosemide (LASIX) 20 MG tablet Take 1 tablet by mouth See Admin Instructions Take Tuesday and Thursday Take 20 mg by mouth See Admin Instructions Take Tuesday and Thursday 180 tablet 3    furosemide (LASIX) 40 MG tablet Take 1 tablet by mouth daily 90 tablet 3    pramipexole (MIRAPEX) 1 MG tablet Take 1 tablet by mouth nightly Take 1 mg by mouth nightly 90 tablet 3    atorvastatin (LIPITOR) 40 MG tablet Take 1 tablet by mouth nightly 90 tablet 3    prasugrel (EFFIENT) 10 MG TABS Take 1 tablet by mouth daily 90 tablet 3    Dulaglutide (TRULICITY) 8.80 AS/7.3IJ SOPN Inject 0.75 mg into the skin once a week 12 pen 1    sertraline (ZOLOFT) 50 MG tablet Take 1 tablet by mouth daily 90 tablet 1    Insulin Degludec (TRESIBA FLEXTOUCH) 200 UNIT/ML SOPN 30 units in the am and 74 units in the pm 20 pen 3    BREO ELLIPTA 100-25 MCG/INH AEPB inhaler       MI-ACID GAS RELIEF 80 MG chewable tablet TAKE 1 TABLET BY MOUTH EVERY 6-8 HOURS FOR 2 DAYS, THEN AS NEEDED FOR FLATULENCE 60 tablet 1    tamsulosin (FLOMAX) 0.4 MG capsule Take 0.4 mg by mouth in the morning and at bedtime       doxazosin (CARDURA) 8 MG tablet TAKE 1 TABLET BY MOUTH EVERY 12 (TWELVE) HOURS. 180 tablet 3    hydrALAZINE (APRESOLINE) 100 MG tablet TAKE 1 TABLET BY MOUTH 3 (THREE) TIMES A DAY. 270 tablet 3    potassium chloride (KLOR-CON M) 20 MEQ extended release tablet Take 1 tablet by mouth daily 90 tablet 3    dapagliflozin (FARXIGA) 10 MG tablet Take 1 tablet by mouth every morning 90 tablet 3    pantoprazole (PROTONIX) 40 MG tablet Take 1 tablet by mouth 2 times daily (before meals) 180 tablet 3    Azilsartan Medoxomil (EDARBI) 80 MG TABS TAKE ONE TABLET BY MOUTH EVERY DAY 90 tablet 3    fluticasone (FLONASE) 50 MCG/ACT nasal spray 1 spray by Each Nostril route daily 2 Bottle 1    nitroGLYCERIN (NITROSTAT) 0.4 MG SL tablet Place 1 tablet under the tongue every 5 minutes as needed for Chest pain 25 tablet 3    finasteride (PROSCAR) 5 MG tablet Take 1 tablet by mouth daily (Patient not taking: Reported on 6/22/2022) 90 tablet 3    Insulin Pen Needle (ADVOCATE INSULIN PEN NEEDLES) 31G X 5 MM MISC USE TO INJECT INSULIN EVERY DAY DX: E11.42  each 3    Microlet Lancets MISC 1 each by Does not apply route 3 times daily DX: E11.42  each 3    Insulin Pen Needle 31G X 5 MM MISC USE TO INJECT INSULIN EVERY  each 3    Cholecalciferol (VITAMIN D3) 2000 units CAPS Take 2 capsules by mouth daily       therapeutic multivitamin-minerals (THERAGRAN-M) tablet Take 1 tablet by mouth daily. No current facility-administered medications for this visit.         Review of Systems   Constitutional:  Negative for activity change, appetite change, chills, diaphoresis and fever. HENT:  Positive for congestion, postnasal drip, sinus pressure and sinus pain. Negative for ear pain, rhinorrhea, sore throat and trouble swallowing. Eyes: Negative. Respiratory: Negative. Cardiovascular: Negative. Gastrointestinal: Negative. Endocrine: Negative. Genitourinary: Negative. Musculoskeletal: Negative. Skin: Negative. Allergic/Immunologic: Positive for environmental allergies. Neurological: Negative. Hematological: Negative. Psychiatric/Behavioral: Negative. Temp 98.6 °F (37 °C) (Temporal)   Resp 13   Wt 222 lb (100.7 kg)   SpO2 97%   BMI 34.77 kg/m²      Physical Exam  Constitutional:       Appearance: Normal appearance. He is not ill-appearing. HENT:      Head: Normocephalic and atraumatic. Right Ear: Tympanic membrane, ear canal and external ear normal.      Left Ear: Tympanic membrane, ear canal and external ear normal.      Nose: Congestion present. No rhinorrhea. Mouth/Throat:      Mouth: Mucous membranes are moist.      Pharynx: Oropharynx is clear. Eyes:      Extraocular Movements: Extraocular movements intact. Conjunctiva/sclera: Conjunctivae normal.      Pupils: Pupils are equal, round, and reactive to light. Cardiovascular:      Rate and Rhythm: Normal rate and regular rhythm. Pulses: Normal pulses. Heart sounds: Normal heart sounds. Pulmonary:      Effort: Pulmonary effort is normal.      Breath sounds: Normal breath sounds. Musculoskeletal:         General: Normal range of motion. Cervical back: Normal range of motion and neck supple. Skin:     General: Skin is warm and dry. Neurological:      General: No focal deficit present. Mental Status: He is alert and oriented to person, place, and time. Psychiatric:         Mood and Affect: Mood normal.         Behavior: Behavior normal.        ASSESSMENT/PLAN    1.  Upper respiratory tract infection,

## 2022-08-07 LAB — URINE CULTURE, ROUTINE: NORMAL

## 2022-08-18 ENCOUNTER — OFFICE VISIT (OUTPATIENT)
Dept: PULMONOLOGY | Facility: CLINIC | Age: 77
End: 2022-08-18

## 2022-08-18 VITALS
DIASTOLIC BLOOD PRESSURE: 86 MMHG | HEART RATE: 59 BPM | HEIGHT: 67 IN | SYSTOLIC BLOOD PRESSURE: 132 MMHG | BODY MASS INDEX: 35.47 KG/M2 | OXYGEN SATURATION: 98 % | WEIGHT: 226 LBS | TEMPERATURE: 97 F

## 2022-08-18 DIAGNOSIS — J30.89 OTHER ALLERGIC RHINITIS: ICD-10-CM

## 2022-08-18 DIAGNOSIS — R06.02 SOB (SHORTNESS OF BREATH): ICD-10-CM

## 2022-08-18 DIAGNOSIS — J45.40 MODERATE PERSISTENT ASTHMA WITHOUT COMPLICATION: ICD-10-CM

## 2022-08-18 DIAGNOSIS — J98.4 RESTRICTIVE LUNG DISEASE: ICD-10-CM

## 2022-08-18 DIAGNOSIS — E66.9 OBESITY (BMI 30-39.9): ICD-10-CM

## 2022-08-18 DIAGNOSIS — J45.40 MODERATE PERSISTENT ASTHMA WITHOUT COMPLICATION: Primary | ICD-10-CM

## 2022-08-18 DIAGNOSIS — G47.33 OSA (OBSTRUCTIVE SLEEP APNEA): ICD-10-CM

## 2022-08-18 DIAGNOSIS — G25.81 RESTLESS LEGS SYNDROME (RLS): ICD-10-CM

## 2022-08-18 PROCEDURE — 94729 DIFFUSING CAPACITY: CPT | Performed by: INTERNAL MEDICINE

## 2022-08-18 PROCEDURE — 94618 PULMONARY STRESS TESTING: CPT | Performed by: NURSE PRACTITIONER

## 2022-08-18 PROCEDURE — 94726 PLETHYSMOGRAPHY LUNG VOLUMES: CPT | Performed by: INTERNAL MEDICINE

## 2022-08-18 PROCEDURE — 94060 EVALUATION OF WHEEZING: CPT | Performed by: INTERNAL MEDICINE

## 2022-08-18 PROCEDURE — 99214 OFFICE O/P EST MOD 30 MIN: CPT | Performed by: NURSE PRACTITIONER

## 2022-08-18 RX ORDER — HYDRALAZINE HYDROCHLORIDE 100 MG/1
TABLET, FILM COATED ORAL EVERY 12 HOURS SCHEDULED
COMMUNITY
End: 2022-08-18 | Stop reason: SDUPTHER

## 2022-08-18 RX ORDER — DOXAZOSIN 8 MG/1
TABLET ORAL
COMMUNITY
End: 2022-08-18 | Stop reason: SDUPTHER

## 2022-08-18 RX ORDER — FLUTICASONE PROPIONATE 50 MCG
1 SPRAY, SUSPENSION (ML) NASAL DAILY
Qty: 48 G | Refills: 2 | Status: SHIPPED | OUTPATIENT
Start: 2022-08-18

## 2022-08-18 RX ORDER — PRAMIPEXOLE DIHYDROCHLORIDE 1 MG/1
1 TABLET ORAL NIGHTLY
Qty: 90 TABLET | Refills: 2 | Status: SHIPPED | OUTPATIENT
Start: 2022-08-18

## 2022-08-18 NOTE — PROGRESS NOTES
Chief Complaint   Patient presents with   • Sleeping Problem     Follow-up 4 month         Subjective   Donny Jerry is a 77 y.o. male.     History of Present Illness   Patient is here today for follow up of asthma and shortness of breath.     Patient says that since the last office visit he has had no recent exacerbations. he denies any emergency room visits or hospitalizations, due to his asthma.     He was given Trelegy to try in place of Breo for a few weeks but he does not think this made a difference. He has continued using Trelegy, but he does not use it every day. His wife feels Trelegy has made a difference.    He does not use albuterol on a regular basis.     He does not use an inhaler regularly but does complain that he is SOB all the time.     His wife feels he can walk more without being as SOB since starting Trelegy.     He has never smoked.     The following portions of the patient's history were reviewed and updated as appropriate:   Current Outpatient Medications   Medication Sig Dispense Refill   • albuterol sulfate  (90 Base) MCG/ACT inhaler Inhale 2 puffs Every 4 (Four) Hours As Needed for Wheezing.     • atorvastatin (LIPITOR) 40 MG tablet Take 40 mg by mouth Daily.     • azilsartan medoxomil (EDARBI) 80 MG tablet tablet Take 80 mg by mouth Daily.     • carvedilol (COREG) 12.5 MG tablet Take 6.25 mg by mouth 2 (Two) Times a Day With Meals. Pt takes 0.5 tab BID     • Cholecalciferol (VITAMIN D3) 50 MCG (2000 UT) capsule Take 2 capsules by mouth.     • Dapagliflozin Propanediol 10 MG tablet Take 10 mg by mouth Daily.     • doxazosin (CARDURA) 8 MG tablet Take 1 tablet by mouth Every 12 (Twelve) Hours. 60 tablet 11   • fluticasone (FLONASE) 50 MCG/ACT nasal spray 1 spray into the nostril(s) as directed by provider Daily. 48 g 2   • Fluticasone-Umeclidin-Vilant (Trelegy Ellipta) 200-62.5-25 MCG/INH inhaler Inhale 1 puff Daily. Rinse mouth with water after use. 90 each 2   • furosemide  (LASIX) 20 MG tablet Take 20 mg by mouth 2 (Two) Times a Week. Tuesday and Thursday     • furosemide (LASIX) 40 MG tablet Take 40 mg by mouth 3 (Three) Times a Week. Monday, Wednesday, Friday     • hydrALAZINE (APRESOLINE) 100 MG tablet Take 1 tablet by mouth 3 (Three) Times a Day. 90 tablet 11   • Multiple Vitamin (MULTI VITAMIN DAILY PO) Take 1 tablet by mouth Daily.     • nitroglycerin (NITROSTAT) 0.4 MG SL tablet Place 1 tablet under the tongue Every 5 (Five) Minutes As Needed for Chest Pain. Take no more than 3 doses in 15 minutes. 25 tablet 11   • pantoprazole (PROTONIX) 40 MG EC tablet TAKE 1 TABLET BY MOUTH 2 TIMES DAILY (BEFORE MEALS)     • potassium chloride (K-DUR,KLOR-CON) 20 MEQ CR tablet Take 20 mEq by mouth Daily.     • pramipexole (Mirapex) 1 MG tablet Take 1 tablet by mouth Every Night. 90 tablet 2   • prasugrel (EFFIENT) 10 MG tablet Take 10 mg by mouth Daily.     • sertraline (ZOLOFT) 50 MG tablet Take 30 mg by mouth.     • tamsulosin (FLOMAX) 0.4 MG capsule 24 hr capsule Take 1 capsule by mouth Every Night.     • Tresiba FlexTouch 200 UNIT/ML solution pen-injector pen injection      • Trulicity 0.75 MG/0.5ML solution pen-injector      • finasteride (PROSCAR) 5 MG tablet Take 5 mg by mouth Daily.       No current facility-administered medications for this visit.   .    Review of Systems   HENT: Positive for dental problem and hearing loss. Negative for congestion, drooling, ear discharge, ear pain, facial swelling, mouth sores, nosebleeds, postnasal drip, rhinorrhea, sinus pressure, sinus pain, sneezing, sore throat, tinnitus, trouble swallowing and voice change.    Respiratory: Positive for apnea, shortness of breath and wheezing. Negative for cough, choking, chest tightness and stridor.    Cardiovascular: Negative for chest pain, palpitations and leg swelling.       Objective   Visit Vitals  /86 (BP Location: Left arm, Patient Position: Sitting, Cuff Size: Adult)   Pulse 59   Temp 97 °F  "(36.1 °C) (Temporal)   Ht 170.2 cm (67\")   Wt 103 kg (226 lb)   SpO2 98%   BMI 35.40 kg/m²     ============================  ============================    6 MINUTE WALK TEST    Donny Jerry   1945             BASELINE   SpO2%: 98 % RA   Heart Rate 59   Blood Pressure 132/86     EXERCISE SpO2% HEART RATE RA or O2 @ LPM   1 MINUTE 97 67 RA   2 MINUTES 96 76 RA   3 MINUTES N/A N/A N/A   4 MINUTES N/A N/A N/A   5 MINUTES N/A N/A N/A   6 MINUTES N/A N/A N/A   (Number of laps: 2 X 36 meters + Final partial lap:  meters = 72 meters)            Distance Walked:  72 Meters   SpO2% Post Exercise:  96 %   HR Post Exercise:  76     Reason to stop (if applicable):   ____ Chest Pain   ____ Light Headedness   ____ Dyspnea Unrelieved by Rest   ____ Abnormal Gait Pattern   ____ Severe Fatigue   ____ Other (Specify: __________________________)    Tech Comments (if any): Pt did not want to continue past the 2 minute jm      Test performed by: BB    ============================  ============================      Physical Exam  Vitals reviewed.   HENT:      Head: Atraumatic.      Mouth/Throat:      Mouth: Mucous membranes are moist.      Comments: Crowded oropharynx.  Edentulous.  Eyes:      Extraocular Movements: Extraocular movements intact.   Cardiovascular:      Rate and Rhythm: Normal rate and regular rhythm.   Pulmonary:      Effort: Pulmonary effort is normal. No respiratory distress.      Comments: No wheezing noted.  Minimal rales noted in bases.  Musculoskeletal:      Cervical back: Neck supple.      Comments: Gait slow.   Skin:     General: Skin is warm.   Neurological:      Mental Status: He is alert and oriented to person, place, and time.           Assessment & Plan   Diagnoses and all orders for this visit:    1. Moderate persistent asthma without complication (Primary)  -     Fluticasone-Umeclidin-Vilant (Trelegy Ellipta) 200-62.5-25 MCG/INH inhaler; Inhale 1 puff Daily. Rinse mouth with water after use.  " Dispense: 90 each; Refill: 2    2. USHA (obstructive sleep apnea)    3. Other allergic rhinitis    4. Restless legs syndrome (RLS)    5. Obesity (BMI 30-39.9)    6. Restrictive lung disease  -     CT Chest Hi Resolution Diagnostic; Future    7. SOB (shortness of breath)  -     Converted Six Minute Walk    Other orders  -     fluticasone (FLONASE) 50 MCG/ACT nasal spray; 1 spray into the nostril(s) as directed by provider Daily.  Dispense: 48 g; Refill: 2  -     pramipexole (Mirapex) 1 MG tablet; Take 1 tablet by mouth Every Night.  Dispense: 90 tablet; Refill: 2           Return in about 5 months (around 1/18/2023) for Recheck, For Dr. Bob.    DISCUSSION (if any):  I reviewed his PFT results today and discussed the results with him.  PFT does not show obstruction.  Restriction noted without air trapping suggested. Diffusion capacity decreased.    Will ask office staff to get echo from [facility] and sleep study from [facility]. He f/w Dr. Russo.     On 6 MWT, he did not display exercise hypoxemia.  However he only walked 72 m.  He states he has chronic hip pain.    I have discussed the breathing test in detail and suggested a high-resolution CT scan due to restriction noted on PFT.  The patient and his wife verbalizes understanding.    I have asked him to continue high-dose Trelegy since there has been improvement in his breathing and ability to walk according to his wife.  I have strongly encourage the patient to use it every single day.    I have discussed when to use the rescue inhaler so that he may get maximum benefit.  I feel they are likely opportunities for him to use the rescue inhaler.    He should continue using CPAP at the current pressure of 15.  He has apparently been on this pressure for a long time.  I will again asked the office staff to obtain his  sleep study.  The most likely can get this from his [employer] company since these companies have to have a copy of the sleep study as well.    He may need  changes to CPAP especially since his residual AHI is somewhat elevated at 9.    He does not complain of inability to tolerate CPAP or any issues during the night using the machine.  When asked if he feels rested he states he feels okay.  He does nap during the day on a regular basis according to his wife.    Dictated utilizing Dragon dictation.    This document was electronically signed by ASHLEIGH Leyva August 18, 2022  10:12 EDT

## 2022-08-30 ENCOUNTER — OFFICE VISIT (OUTPATIENT)
Dept: ORTHOPEDIC SURGERY | Facility: CLINIC | Age: 77
End: 2022-08-30

## 2022-08-30 VITALS — WEIGHT: 226 LBS | HEIGHT: 67 IN | BODY MASS INDEX: 35.47 KG/M2

## 2022-08-30 DIAGNOSIS — M25.561 ARTHRALGIA OF BOTH KNEES: ICD-10-CM

## 2022-08-30 DIAGNOSIS — M25.562 ARTHRALGIA OF BOTH KNEES: ICD-10-CM

## 2022-08-30 DIAGNOSIS — M17.0 PRIMARY OSTEOARTHRITIS OF BOTH KNEES: Primary | ICD-10-CM

## 2022-08-30 PROCEDURE — 20610 DRAIN/INJ JOINT/BURSA W/O US: CPT | Performed by: PHYSICIAN ASSISTANT

## 2022-08-30 NOTE — PROGRESS NOTES
Subjective   Donny Jerry is a 77 y.o. male here today for injection therapy.    Chief Complaint   Patient presents with   • Left Knee - Injections     Last injection 5/26/2022.   • Right Knee - Injections   patient would like to have visco injections.     Past Medical History:   Diagnosis Date   • Benign hypertension    • Coronary artery disease    • Depression    • Enlarged prostate     Enlarged prostate with anticipated TURP with Dr. Bernal.   • GERD (gastroesophageal reflux disease)    • Hypercholesterolemia    • Obesity    • Obstructive sleep apnea on CPAP    • Type 2 diabetes mellitus (HCC)          Past Surgical History:   Procedure Laterality Date   • CARDIAC CATHETERIZATION     • CARDIAC CATHETERIZATION Left 10/19/2021    Procedure: Left Heart Cath;  Surgeon: Liz Russo MD;  Location: [facility] CATH INVASIVE LOCATION;  Service: Cardiology;  Laterality: Left;   • COLONOSCOPY W/ POLYPECTOMY     • CORONARY ANGIOPLASTY WITH STENT PLACEMENT Left 06/03/2009    Left heart catheterization, 06/03/2009, Dr. Russo:  LVEF 45% to 50%.  Placement of overlapping Cypher drug-eluting stent 2.5 x 28 and 2.5 x 18 to the SVG to the obtuse marginal branch.  SVG to the PDA had a proximal stenosis estimated at 60% with a distal stenosis of 50% to 60%.   • CORONARY ANGIOPLASTY WITH STENT PLACEMENT Left 07/06/2009    Left heart catheterization, 07/06/2009:  PTCA and stent placement in the mid PDA using a 2.25 x 12 mm Taxus drug-eluting stent with stent placement in the distal SVG to the PDA using a 2.5 x 18 mm Cypher drug-eluting stent and stenting of the proximal SVG to the PDA using a 3.0 x 28 mm Cypher drug-eluting stent.   • CORONARY ANGIOPLASTY WITH STENT PLACEMENT  04/23/2010    Cardiac catheterization for recurrent anginal symptoms, 04/23/2010, with PTCA and stent placement in the proximal SVG to the PDA using 3.0 x 28 mm Promus drug-eluting stent for in-stent restenosis.   • CORONARY ANGIOPLASTY WITH  "STENT PLACEMENT Left 07/06/2010    Left heart catheterization, 07/06/2010, Dr. Russo:  EF 40% to 45%.  3.5 x 23 mm Promus drug-eluting stent in the proximal SVG to OM, 2.5 x 18 mm Promus drug-eluting stent to distal SVG to PDA due to in-stent restenosis.     • CORONARY ANGIOPLASTY WITH STENT PLACEMENT Left 11/16/2010    Left heart catheterization, 11/16/2010, with placement of a 3.0 x 16 mm Taxus drug-eluting stent to the SVG to the RCA proximally and a 2.5 x 16 mm paclitaxel stent distally in the SVG to the RCA.   • CORONARY ANGIOPLASTY WITH STENT PLACEMENT Left     Left heart catheterization, 3.0 x 24-mm Promus element drug-eluting stent to a 90% lesion in the mid portion of the SVG to the PDA.    • CORONARY ANGIOPLASTY WITH STENT PLACEMENT Left 06/24/2014    Left heart catheterization for recurrent angina, 06/24/2014, Dr. Russo:  PTCA of the SVG to the PDA using a 3 x 12 mm NC TREK balloon.     • CORONARY ARTERY BYPASS GRAFT      CABG x3, Dr. Peng, [facility], 2001.       Allergies   Allergen Reactions   • Zocor [Simvastatin] Myalgia   • Bisoprolol Other (See Comments)     Agitation     • Byetta 10 Mcg Pen [Exenatide] Nausea Only     nausea   • Crestor [Rosuvastatin Calcium] Myalgia   • Metformin And Related Nausea Only   • Plavix [Clopidogrel Bisulfate] Other (See Comments)     resistance       Objective   Ht 170.2 cm (67.01\")   Wt 103 kg (226 lb)   BMI 35.39 kg/m²    Physical Exam    Skin exam stable with no erythema, ecchymosis or rash.  No new swelling.  No motor or sensory changes.  Distal pulse intact.    Large Joint Arthrocentesis: R knee  Date/Time: 8/30/2022 11:22 AM  Consent given by: patient  Site marked: site marked  Timeout: Immediately prior to procedure a time out was called to verify the correct patient, procedure, equipment, support staff and site/side marked as required   Supporting Documentation  Indications: pain   Procedure Details  Location: knee - R " knee  Preparation: Patient was prepped and draped in the usual sterile fashion  Needle size: 22 G  Medications administered: 25 mg sodium hyaluronate 25 MG/2.5ML  Patient tolerance: patient tolerated the procedure well with no immediate complications    Large Joint Arthrocentesis: L knee  Date/Time: 8/30/2022 11:23 AM  Consent given by: patient  Site marked: site marked  Timeout: Immediately prior to procedure a time out was called to verify the correct patient, procedure, equipment, support staff and site/side marked as required   Supporting Documentation  Indications: pain   Procedure Details  Location: knee - L knee  Preparation: Patient was prepped and draped in the usual sterile fashion  Needle size: 22 G  Medications administered: 25 mg sodium hyaluronate 25 MG/2.5ML  Patient tolerance: patient tolerated the procedure well with no immediate complications      X-ray of bilateral knee 2 view weightbearing performed in the office for the evaluation knee pain.  No comparison films are available to view.  No acute fracture or dislocation.  Does appear to be tricompartmental degenerative changes with patellofemoral degenerative changes     Assessment & Plan      Diagnosis Plan   1. Primary osteoarthritis of both knees     2. Arthralgia of both knees         Discussion of orthopaedic goals and activities and patient expressed appreciation.  Guided on proper techniques for mobility, strength, agility and/or conditioning exercises  Ice, heat, and/or modalities as beneficial  Watch for signs and symptoms of infection  Call or notify for any adverse effect from injection therapy    Recommendations:  Follow up in 1 week    Patient agreeable to call or return sooner for any concerns.

## 2022-09-01 ENCOUNTER — OFFICE VISIT (OUTPATIENT)
Dept: CARDIOLOGY | Facility: CLINIC | Age: 77
End: 2022-09-01

## 2022-09-01 VITALS
OXYGEN SATURATION: 97 % | SYSTOLIC BLOOD PRESSURE: 142 MMHG | BODY MASS INDEX: 35.79 KG/M2 | HEIGHT: 67 IN | DIASTOLIC BLOOD PRESSURE: 64 MMHG | WEIGHT: 228 LBS | HEART RATE: 70 BPM

## 2022-09-01 DIAGNOSIS — E78.5 HYPERLIPIDEMIA LDL GOAL <70: ICD-10-CM

## 2022-09-01 DIAGNOSIS — I25.810 CORONARY ARTERY DISEASE INVOLVING CORONARY BYPASS GRAFT OF NATIVE HEART WITHOUT ANGINA PECTORIS: Primary | ICD-10-CM

## 2022-09-01 DIAGNOSIS — I10 ESSENTIAL HYPERTENSION: ICD-10-CM

## 2022-09-01 PROCEDURE — 99214 OFFICE O/P EST MOD 30 MIN: CPT | Performed by: INTERNAL MEDICINE

## 2022-09-01 RX ORDER — LORATADINE 10 MG/1
10 TABLET ORAL DAILY
COMMUNITY

## 2022-09-06 ENCOUNTER — HOSPITAL ENCOUNTER (OUTPATIENT)
Dept: CT IMAGING | Facility: HOSPITAL | Age: 77
Discharge: HOME | End: 2022-09-06
Admitting: NURSE PRACTITIONER

## 2022-09-06 DIAGNOSIS — J98.4 RESTRICTIVE LUNG DISEASE: ICD-10-CM

## 2022-09-06 PROCEDURE — 71250 CT THORAX DX C-: CPT

## 2022-09-07 ENCOUNTER — OFFICE VISIT (OUTPATIENT)
Dept: ORTHOPEDIC SURGERY | Facility: CLINIC | Age: 77
End: 2022-09-07

## 2022-09-07 VITALS — BODY MASS INDEX: 35.79 KG/M2 | TEMPERATURE: 97.2 F | WEIGHT: 228 LBS | HEIGHT: 67 IN

## 2022-09-07 DIAGNOSIS — M17.0 PRIMARY OSTEOARTHRITIS OF BOTH KNEES: Primary | ICD-10-CM

## 2022-09-07 DIAGNOSIS — M25.562 ARTHRALGIA OF BOTH KNEES: ICD-10-CM

## 2022-09-07 DIAGNOSIS — M25.561 ARTHRALGIA OF BOTH KNEES: ICD-10-CM

## 2022-09-07 PROCEDURE — 20610 DRAIN/INJ JOINT/BURSA W/O US: CPT | Performed by: PHYSICIAN ASSISTANT

## 2022-09-07 NOTE — PROGRESS NOTES
Subjective   Donny Jerry is a 77 y.o. male here today for injection therapy.    Chief Complaint   Patient presents with   • Left Knee - Injections     Supartz #2.   • Right Knee - Injections   patient presents for bilateral knee visco injection # 2    Past Medical History:   Diagnosis Date   • Benign hypertension    • Coronary artery disease    • Depression    • Enlarged prostate     Enlarged prostate with anticipated TURP with Dr. Bernal.   • GERD (gastroesophageal reflux disease)    • Hypercholesterolemia    • Obesity    • Obstructive sleep apnea on CPAP    • Type 2 diabetes mellitus (HCC)          Past Surgical History:   Procedure Laterality Date   • CARDIAC CATHETERIZATION     • CARDIAC CATHETERIZATION Left 10/19/2021    Procedure: Left Heart Cath;  Surgeon: Liz Russo MD;  Location: [facility] CATH INVASIVE LOCATION;  Service: Cardiology;  Laterality: Left;   • COLONOSCOPY W/ POLYPECTOMY     • CORONARY ANGIOPLASTY WITH STENT PLACEMENT Left 06/03/2009    Left heart catheterization, 06/03/2009, Dr. Russo:  LVEF 45% to 50%.  Placement of overlapping Cypher drug-eluting stent 2.5 x 28 and 2.5 x 18 to the SVG to the obtuse marginal branch.  SVG to the PDA had a proximal stenosis estimated at 60% with a distal stenosis of 50% to 60%.   • CORONARY ANGIOPLASTY WITH STENT PLACEMENT Left 07/06/2009    Left heart catheterization, 07/06/2009:  PTCA and stent placement in the mid PDA using a 2.25 x 12 mm Taxus drug-eluting stent with stent placement in the distal SVG to the PDA using a 2.5 x 18 mm Cypher drug-eluting stent and stenting of the proximal SVG to the PDA using a 3.0 x 28 mm Cypher drug-eluting stent.   • CORONARY ANGIOPLASTY WITH STENT PLACEMENT  04/23/2010    Cardiac catheterization for recurrent anginal symptoms, 04/23/2010, with PTCA and stent placement in the proximal SVG to the PDA using 3.0 x 28 mm Promus drug-eluting stent for in-stent restenosis.   • CORONARY ANGIOPLASTY WITH STENT  "PLACEMENT Left 07/06/2010    Left heart catheterization, 07/06/2010, Dr. Russo:  EF 40% to 45%.  3.5 x 23 mm Promus drug-eluting stent in the proximal SVG to OM, 2.5 x 18 mm Promus drug-eluting stent to distal SVG to PDA due to in-stent restenosis.     • CORONARY ANGIOPLASTY WITH STENT PLACEMENT Left 11/16/2010    Left heart catheterization, 11/16/2010, with placement of a 3.0 x 16 mm Taxus drug-eluting stent to the SVG to the RCA proximally and a 2.5 x 16 mm paclitaxel stent distally in the SVG to the RCA.   • CORONARY ANGIOPLASTY WITH STENT PLACEMENT Left     Left heart catheterization, 3.0 x 24-mm Promus element drug-eluting stent to a 90% lesion in the mid portion of the SVG to the PDA.    • CORONARY ANGIOPLASTY WITH STENT PLACEMENT Left 06/24/2014    Left heart catheterization for recurrent angina, 06/24/2014, Dr. Russo:  PTCA of the SVG to the PDA using a 3 x 12 mm NC TREK balloon.     • CORONARY ARTERY BYPASS GRAFT      CABG x3, Dr. Peng, [facility], 2001.       Allergies   Allergen Reactions   • Zocor [Simvastatin] Myalgia   • Bisoprolol Other (See Comments)     Agitation     • Byetta 10 Mcg Pen [Exenatide] Nausea Only     nausea   • Crestor [Rosuvastatin Calcium] Myalgia   • Metformin And Related Nausea Only   • Plavix [Clopidogrel Bisulfate] Other (See Comments)     resistance       Objective   Temp 97.2 °F (36.2 °C)   Ht 170.2 cm (67.01\")   Wt 103 kg (228 lb)   BMI 35.70 kg/m²    Physical Exam    Skin exam stable with no erythema, ecchymosis or rash.  No new swelling.  No motor or sensory changes.  Distal pulse intact.    Large Joint Arthrocentesis: R knee  Date/Time: 9/7/2022 10:38 AM  Consent given by: patient  Site marked: site marked  Timeout: Immediately prior to procedure a time out was called to verify the correct patient, procedure, equipment, support staff and site/side marked as required   Supporting Documentation  Indications: pain   Procedure Details  Location: " knee - R knee  Preparation: Patient was prepped and draped in the usual sterile fashion  Needle size: 22 G  Medications administered: 25 mg sodium hyaluronate 25 MG/2.5ML  Patient tolerance: patient tolerated the procedure well with no immediate complications    Large Joint Arthrocentesis: L knee  Date/Time: 9/7/2022 10:39 AM  Consent given by: patient  Site marked: site marked  Timeout: Immediately prior to procedure a time out was called to verify the correct patient, procedure, equipment, support staff and site/side marked as required   Supporting Documentation  Indications: pain   Procedure Details  Location: knee - L knee  Preparation: Patient was prepped and draped in the usual sterile fashion  Needle size: 22 G  Approach: anterolateral  Medications administered: 25 mg sodium hyaluronate 25 MG/2.5ML  Patient tolerance: patient tolerated the procedure well with no immediate complications          Assessment & Plan      Diagnosis Plan   1. Primary osteoarthritis of both knees     2. Arthralgia of both knees         Discussion of orthopaedic goals and activities and patient expressed appreciation.  Guided on proper techniques for mobility, strength, agility and/or conditioning exercises  Ice, heat, and/or modalities as beneficial  Watch for signs and symptoms of infection  Call or notify for any adverse effect from injection therapy    Recommendations:  Follow up in 1 week    Patient agreeable to call or return sooner for any concerns.

## 2022-09-07 NOTE — PROGRESS NOTES
Please call the patient regarding his CT result.  No suspicious findings on recent high-resolution CT scan.  No evidence of interstitial lung disease.

## 2022-09-08 DIAGNOSIS — Z79.4 TYPE 2 DIABETES MELLITUS WITHOUT COMPLICATION, WITH LONG-TERM CURRENT USE OF INSULIN (HCC): Primary | ICD-10-CM

## 2022-09-08 DIAGNOSIS — E11.9 TYPE 2 DIABETES MELLITUS WITHOUT COMPLICATION, WITH LONG-TERM CURRENT USE OF INSULIN (HCC): Primary | ICD-10-CM

## 2022-09-08 DIAGNOSIS — E11.42 TYPE 2 DIABETES MELLITUS WITH DIABETIC POLYNEUROPATHY, WITHOUT LONG-TERM CURRENT USE OF INSULIN (HCC): ICD-10-CM

## 2022-09-08 RX ORDER — LANCETS 30 GAUGE
1 EACH MISCELLANEOUS 3 TIMES DAILY
Qty: 200 EACH | Refills: 5 | Status: SHIPPED | OUTPATIENT
Start: 2022-09-08 | End: 2022-10-24 | Stop reason: SDUPTHER

## 2022-09-14 ENCOUNTER — OFFICE VISIT (OUTPATIENT)
Dept: ORTHOPEDIC SURGERY | Facility: CLINIC | Age: 77
End: 2022-09-14

## 2022-09-14 VITALS — HEIGHT: 67 IN | WEIGHT: 228 LBS | TEMPERATURE: 96.4 F | BODY MASS INDEX: 35.79 KG/M2

## 2022-09-14 DIAGNOSIS — M17.0 PRIMARY OSTEOARTHRITIS OF BOTH KNEES: Primary | ICD-10-CM

## 2022-09-14 DIAGNOSIS — M25.561 ARTHRALGIA OF BOTH KNEES: ICD-10-CM

## 2022-09-14 DIAGNOSIS — M25.562 ARTHRALGIA OF BOTH KNEES: ICD-10-CM

## 2022-09-14 PROCEDURE — 20610 DRAIN/INJ JOINT/BURSA W/O US: CPT | Performed by: PHYSICIAN ASSISTANT

## 2022-09-14 RX ORDER — GLUCOSAM/CHON-MSM1/C/MANG/BOSW 500-416.6
TABLET ORAL
COMMUNITY
Start: 2022-09-09

## 2022-09-14 NOTE — PROGRESS NOTES
Subjective   Donny Jerry is a 77 y.o. male here today for injection therapy.    Chief Complaint   Patient presents with   • Left Knee - Injections   • Right Knee - Injections     Supartz # 3   patient presents for bilateral knee visco injection # 3    Past Medical History:   Diagnosis Date   • Benign hypertension    • Coronary artery disease    • Depression    • Enlarged prostate     Enlarged prostate with anticipated TURP with Dr. Bernal.   • GERD (gastroesophageal reflux disease)    • Hypercholesterolemia    • Obesity    • Obstructive sleep apnea on CPAP    • Type 2 diabetes mellitus (HCC)          Past Surgical History:   Procedure Laterality Date   • CARDIAC CATHETERIZATION     • CARDIAC CATHETERIZATION Left 10/19/2021    Procedure: Left Heart Cath;  Surgeon: Liz Russo MD;  Location: [facility] CATH INVASIVE LOCATION;  Service: Cardiology;  Laterality: Left;   • COLONOSCOPY W/ POLYPECTOMY     • CORONARY ANGIOPLASTY WITH STENT PLACEMENT Left 06/03/2009    Left heart catheterization, 06/03/2009, Dr. Russo:  LVEF 45% to 50%.  Placement of overlapping Cypher drug-eluting stent 2.5 x 28 and 2.5 x 18 to the SVG to the obtuse marginal branch.  SVG to the PDA had a proximal stenosis estimated at 60% with a distal stenosis of 50% to 60%.   • CORONARY ANGIOPLASTY WITH STENT PLACEMENT Left 07/06/2009    Left heart catheterization, 07/06/2009:  PTCA and stent placement in the mid PDA using a 2.25 x 12 mm Taxus drug-eluting stent with stent placement in the distal SVG to the PDA using a 2.5 x 18 mm Cypher drug-eluting stent and stenting of the proximal SVG to the PDA using a 3.0 x 28 mm Cypher drug-eluting stent.   • CORONARY ANGIOPLASTY WITH STENT PLACEMENT  04/23/2010    Cardiac catheterization for recurrent anginal symptoms, 04/23/2010, with PTCA and stent placement in the proximal SVG to the PDA using 3.0 x 28 mm Promus drug-eluting stent for in-stent restenosis.   • CORONARY ANGIOPLASTY WITH STENT  "PLACEMENT Left 07/06/2010    Left heart catheterization, 07/06/2010, Dr. Russo:  EF 40% to 45%.  3.5 x 23 mm Promus drug-eluting stent in the proximal SVG to OM, 2.5 x 18 mm Promus drug-eluting stent to distal SVG to PDA due to in-stent restenosis.     • CORONARY ANGIOPLASTY WITH STENT PLACEMENT Left 11/16/2010    Left heart catheterization, 11/16/2010, with placement of a 3.0 x 16 mm Taxus drug-eluting stent to the SVG to the RCA proximally and a 2.5 x 16 mm paclitaxel stent distally in the SVG to the RCA.   • CORONARY ANGIOPLASTY WITH STENT PLACEMENT Left     Left heart catheterization, 3.0 x 24-mm Promus element drug-eluting stent to a 90% lesion in the mid portion of the SVG to the PDA.    • CORONARY ANGIOPLASTY WITH STENT PLACEMENT Left 06/24/2014    Left heart catheterization for recurrent angina, 06/24/2014, Dr. Russo:  PTCA of the SVG to the PDA using a 3 x 12 mm NC TREK balloon.     • CORONARY ARTERY BYPASS GRAFT      CABG x3, Dr. Peng, [facility], 2001.       Allergies   Allergen Reactions   • Zocor [Simvastatin] Myalgia   • Bisoprolol Other (See Comments)     Agitation     • Byetta 10 Mcg Pen [Exenatide] Nausea Only     nausea   • Crestor [Rosuvastatin Calcium] Myalgia   • Metformin And Related Nausea Only   • Plavix [Clopidogrel Bisulfate] Other (See Comments)     resistance       Objective   Temp 96.4 °F (35.8 °C)   Ht 170.2 cm (67.01\")   Wt 103 kg (228 lb)   BMI 35.70 kg/m²    Physical Exam    Skin exam stable with no erythema, ecchymosis or rash.  No new swelling.  No motor or sensory changes.  Distal pulse intact.    Large Joint Arthrocentesis: R knee  Date/Time: 9/14/2022 10:32 AM  Consent given by: patient  Site marked: site marked  Timeout: Immediately prior to procedure a time out was called to verify the correct patient, procedure, equipment, support staff and site/side marked as required   Supporting Documentation  Indications: pain   Procedure Details  Location: " knee - R knee  Preparation: Patient was prepped and draped in the usual sterile fashion  Needle size: 22 G  Approach: anterolateral  Medications administered: 25 mg sodium hyaluronate 25 MG/2.5ML  Patient tolerance: patient tolerated the procedure well with no immediate complications    Large Joint Arthrocentesis: L knee  Date/Time: 9/14/2022 10:32 AM  Consent given by: patient  Site marked: site marked  Timeout: Immediately prior to procedure a time out was called to verify the correct patient, procedure, equipment, support staff and site/side marked as required   Supporting Documentation  Indications: pain   Procedure Details  Location: knee - L knee  Preparation: Patient was prepped and draped in the usual sterile fashion  Needle size: 22 G  Approach: anterolateral  Medications administered: 25 mg sodium hyaluronate 25 MG/2.5ML  Patient tolerance: patient tolerated the procedure well with no immediate complications          Assessment & Plan      Diagnosis Plan   1. Primary osteoarthritis of both knees     2. Arthralgia of both knees         Discussion of orthopaedic goals and activities and patient expressed appreciation.  Guided on proper techniques for mobility, strength, agility and/or conditioning exercises  Ice, heat, and/or modalities as beneficial  Watch for signs and symptoms of infection  Call or notify for any adverse effect from injection therapy    Recommendations:  Follow up in 1 week    Patient agreeable to call or return sooner for any concerns.

## 2022-09-21 ENCOUNTER — OFFICE VISIT (OUTPATIENT)
Dept: ORTHOPEDIC SURGERY | Facility: CLINIC | Age: 77
End: 2022-09-21

## 2022-09-21 VITALS — HEIGHT: 67 IN | WEIGHT: 228 LBS | BODY MASS INDEX: 35.79 KG/M2

## 2022-09-21 DIAGNOSIS — M25.562 ARTHRALGIA OF BOTH KNEES: ICD-10-CM

## 2022-09-21 DIAGNOSIS — M17.0 PRIMARY OSTEOARTHRITIS OF BOTH KNEES: Primary | ICD-10-CM

## 2022-09-21 DIAGNOSIS — M25.561 ARTHRALGIA OF BOTH KNEES: ICD-10-CM

## 2022-09-21 PROCEDURE — 20610 DRAIN/INJ JOINT/BURSA W/O US: CPT | Performed by: PHYSICIAN ASSISTANT

## 2022-09-21 NOTE — PROGRESS NOTES
Subjective   Donny Jerry is a 77 y.o. male here today for injection therapy.    Chief Complaint   Patient presents with   • Left Knee - Injections   • Right Knee - Injections     Bilateral Supartz # 4     Patient presents for bilateral knee visco injection # 4      Past Medical History:   Diagnosis Date   • Benign hypertension    • Coronary artery disease    • Depression    • Enlarged prostate     Enlarged prostate with anticipated TURP with Dr. Bernal.   • GERD (gastroesophageal reflux disease)    • Hypercholesterolemia    • Obesity    • Obstructive sleep apnea on CPAP    • Type 2 diabetes mellitus (HCC)          Past Surgical History:   Procedure Laterality Date   • CARDIAC CATHETERIZATION     • CARDIAC CATHETERIZATION Left 10/19/2021    Procedure: Left Heart Cath;  Surgeon: Liz Russo MD;  Location: [facility] CATH INVASIVE LOCATION;  Service: Cardiology;  Laterality: Left;   • COLONOSCOPY W/ POLYPECTOMY     • CORONARY ANGIOPLASTY WITH STENT PLACEMENT Left 06/03/2009    Left heart catheterization, 06/03/2009, Dr. Russo:  LVEF 45% to 50%.  Placement of overlapping Cypher drug-eluting stent 2.5 x 28 and 2.5 x 18 to the SVG to the obtuse marginal branch.  SVG to the PDA had a proximal stenosis estimated at 60% with a distal stenosis of 50% to 60%.   • CORONARY ANGIOPLASTY WITH STENT PLACEMENT Left 07/06/2009    Left heart catheterization, 07/06/2009:  PTCA and stent placement in the mid PDA using a 2.25 x 12 mm Taxus drug-eluting stent with stent placement in the distal SVG to the PDA using a 2.5 x 18 mm Cypher drug-eluting stent and stenting of the proximal SVG to the PDA using a 3.0 x 28 mm Cypher drug-eluting stent.   • CORONARY ANGIOPLASTY WITH STENT PLACEMENT  04/23/2010    Cardiac catheterization for recurrent anginal symptoms, 04/23/2010, with PTCA and stent placement in the proximal SVG to the PDA using 3.0 x 28 mm Promus drug-eluting stent for in-stent restenosis.   • CORONARY  "ANGIOPLASTY WITH STENT PLACEMENT Left 07/06/2010    Left heart catheterization, 07/06/2010, Dr. Russo:  EF 40% to 45%.  3.5 x 23 mm Promus drug-eluting stent in the proximal SVG to OM, 2.5 x 18 mm Promus drug-eluting stent to distal SVG to PDA due to in-stent restenosis.     • CORONARY ANGIOPLASTY WITH STENT PLACEMENT Left 11/16/2010    Left heart catheterization, 11/16/2010, with placement of a 3.0 x 16 mm Taxus drug-eluting stent to the SVG to the RCA proximally and a 2.5 x 16 mm paclitaxel stent distally in the SVG to the RCA.   • CORONARY ANGIOPLASTY WITH STENT PLACEMENT Left     Left heart catheterization, 3.0 x 24-mm Promus element drug-eluting stent to a 90% lesion in the mid portion of the SVG to the PDA.    • CORONARY ANGIOPLASTY WITH STENT PLACEMENT Left 06/24/2014    Left heart catheterization for recurrent angina, 06/24/2014, Dr. Russo:  PTCA of the SVG to the PDA using a 3 x 12 mm NC TREK balloon.     • CORONARY ARTERY BYPASS GRAFT      CABG x3, Dr. Peng, [facility], 2001.       Allergies   Allergen Reactions   • Zocor [Simvastatin] Myalgia   • Bisoprolol Other (See Comments)     Agitation     • Byetta 10 Mcg Pen [Exenatide] Nausea Only     nausea   • Crestor [Rosuvastatin Calcium] Myalgia   • Metformin And Related Nausea Only   • Plavix [Clopidogrel Bisulfate] Other (See Comments)     resistance       Objective   Ht 170.2 cm (67.01\")   Wt 103 kg (228 lb)   BMI 35.70 kg/m²    Physical Exam    Skin exam stable with no erythema, ecchymosis or rash.  No new swelling.  No motor or sensory changes.  Distal pulse intact.    Large Joint Arthrocentesis: R knee  Date/Time: 9/21/2022 1:08 PM  Consent given by: patient  Site marked: site marked  Timeout: Immediately prior to procedure a time out was called to verify the correct patient, procedure, equipment, support staff and site/side marked as required   Supporting Documentation  Indications: pain   Procedure Details  Location: knee " - R knee  Preparation: Patient was prepped and draped in the usual sterile fashion  Needle gauge: 21g.  Medications administered: 25 mg sodium hyaluronate 25 MG/2.5ML  Patient tolerance: patient tolerated the procedure well with no immediate complications    Large Joint Arthrocentesis: L knee  Date/Time: 9/21/2022 1:08 PM  Consent given by: patient  Site marked: site marked  Timeout: Immediately prior to procedure a time out was called to verify the correct patient, procedure, equipment, support staff and site/side marked as required   Supporting Documentation  Indications: pain   Procedure Details  Location: knee - L knee  Preparation: Patient was prepped and draped in the usual sterile fashion  Needle gauge: 21g.  Medications administered: 25 mg sodium hyaluronate 25 MG/2.5ML  Patient tolerance: patient tolerated the procedure well with no immediate complications          Assessment & Plan      Diagnosis Plan   1. Primary osteoarthritis of both knees     2. Arthralgia of both knees         Discussion of orthopaedic goals and activities and patient expressed appreciation.  Guided on proper techniques for mobility, strength, agility and/or conditioning exercises  Ice, heat, and/or modalities as beneficial  Watch for signs and symptoms of infection  Call or notify for any adverse effect from injection therapy    Recommendations:    Patient agreeable to call or return sooner for any concerns.

## 2022-09-28 ENCOUNTER — OFFICE VISIT (OUTPATIENT)
Dept: ORTHOPEDIC SURGERY | Facility: CLINIC | Age: 77
End: 2022-09-28

## 2022-09-28 VITALS — HEIGHT: 67 IN | BODY MASS INDEX: 35.79 KG/M2 | TEMPERATURE: 96.9 F | WEIGHT: 228 LBS

## 2022-09-28 DIAGNOSIS — M17.0 PRIMARY OSTEOARTHRITIS OF BOTH KNEES: Primary | ICD-10-CM

## 2022-09-28 DIAGNOSIS — M25.561 ARTHRALGIA OF BOTH KNEES: ICD-10-CM

## 2022-09-28 DIAGNOSIS — M25.562 ARTHRALGIA OF BOTH KNEES: ICD-10-CM

## 2022-09-28 PROCEDURE — 20610 DRAIN/INJ JOINT/BURSA W/O US: CPT | Performed by: PHYSICIAN ASSISTANT

## 2022-09-28 NOTE — PROGRESS NOTES
Subjective   Donny Jerry is a 77 y.o. male here today for injection therapy.    Chief Complaint   Patient presents with   • Left Knee - Injections   • Right Knee - Injections     Supartz # 5 bilateral knee   patient presents for bilateral knee visco injection # 5      Past Medical History:   Diagnosis Date   • Benign hypertension    • Coronary artery disease    • Depression    • Enlarged prostate     Enlarged prostate with anticipated TURP with Dr. Bernal.   • GERD (gastroesophageal reflux disease)    • Hypercholesterolemia    • Obesity    • Obstructive sleep apnea on CPAP    • Type 2 diabetes mellitus (HCC)          Past Surgical History:   Procedure Laterality Date   • CARDIAC CATHETERIZATION     • CARDIAC CATHETERIZATION Left 10/19/2021    Procedure: Left Heart Cath;  Surgeon: Liz Russo MD;  Location: [facility] CATH INVASIVE LOCATION;  Service: Cardiology;  Laterality: Left;   • COLONOSCOPY W/ POLYPECTOMY     • CORONARY ANGIOPLASTY WITH STENT PLACEMENT Left 06/03/2009    Left heart catheterization, 06/03/2009, Dr. Russo:  LVEF 45% to 50%.  Placement of overlapping Cypher drug-eluting stent 2.5 x 28 and 2.5 x 18 to the SVG to the obtuse marginal branch.  SVG to the PDA had a proximal stenosis estimated at 60% with a distal stenosis of 50% to 60%.   • CORONARY ANGIOPLASTY WITH STENT PLACEMENT Left 07/06/2009    Left heart catheterization, 07/06/2009:  PTCA and stent placement in the mid PDA using a 2.25 x 12 mm Taxus drug-eluting stent with stent placement in the distal SVG to the PDA using a 2.5 x 18 mm Cypher drug-eluting stent and stenting of the proximal SVG to the PDA using a 3.0 x 28 mm Cypher drug-eluting stent.   • CORONARY ANGIOPLASTY WITH STENT PLACEMENT  04/23/2010    Cardiac catheterization for recurrent anginal symptoms, 04/23/2010, with PTCA and stent placement in the proximal SVG to the PDA using 3.0 x 28 mm Promus drug-eluting stent for in-stent restenosis.   • CORONARY  "ANGIOPLASTY WITH STENT PLACEMENT Left 07/06/2010    Left heart catheterization, 07/06/2010, Dr. Russo:  EF 40% to 45%.  3.5 x 23 mm Promus drug-eluting stent in the proximal SVG to OM, 2.5 x 18 mm Promus drug-eluting stent to distal SVG to PDA due to in-stent restenosis.     • CORONARY ANGIOPLASTY WITH STENT PLACEMENT Left 11/16/2010    Left heart catheterization, 11/16/2010, with placement of a 3.0 x 16 mm Taxus drug-eluting stent to the SVG to the RCA proximally and a 2.5 x 16 mm paclitaxel stent distally in the SVG to the RCA.   • CORONARY ANGIOPLASTY WITH STENT PLACEMENT Left     Left heart catheterization, 3.0 x 24-mm Promus element drug-eluting stent to a 90% lesion in the mid portion of the SVG to the PDA.    • CORONARY ANGIOPLASTY WITH STENT PLACEMENT Left 06/24/2014    Left heart catheterization for recurrent angina, 06/24/2014, Dr. Russo:  PTCA of the SVG to the PDA using a 3 x 12 mm NC TREK balloon.     • CORONARY ARTERY BYPASS GRAFT      CABG x3, Dr. Peng, [facility], 2001.       Allergies   Allergen Reactions   • Zocor [Simvastatin] Myalgia   • Bisoprolol Other (See Comments)     Agitation     • Byetta 10 Mcg Pen [Exenatide] Nausea Only     nausea   • Crestor [Rosuvastatin Calcium] Myalgia   • Metformin And Related Nausea Only   • Plavix [Clopidogrel Bisulfate] Other (See Comments)     resistance       Objective   Temp 96.9 °F (36.1 °C)   Ht 170.2 cm (67.01\")   Wt 103 kg (228 lb)   BMI 35.70 kg/m²    Physical Exam    Skin exam stable with no erythema, ecchymosis or rash.  No new swelling.  No motor or sensory changes.  Distal pulse intact.    Large Joint Arthrocentesis: R knee  Date/Time: 9/28/2022 1:44 PM  Consent given by: patient  Site marked: site marked  Timeout: Immediately prior to procedure a time out was called to verify the correct patient, procedure, equipment, support staff and site/side marked as required   Supporting Documentation  Indications: pain "   Procedure Details  Location: knee - R knee  Preparation: Patient was prepped and draped in the usual sterile fashion  Needle gauge: 21g.  Approach: anterolateral  Medications administered: 25 mg sodium hyaluronate 25 MG/2.5ML  Patient tolerance: patient tolerated the procedure well with no immediate complications    Large Joint Arthrocentesis: L knee  Date/Time: 9/28/2022 1:44 PM  Consent given by: patient  Site marked: site marked  Timeout: Immediately prior to procedure a time out was called to verify the correct patient, procedure, equipment, support staff and site/side marked as required   Supporting Documentation  Indications: pain   Procedure Details  Location: knee - L knee  Preparation: Patient was prepped and draped in the usual sterile fashion  Needle gauge: 21g.  Approach: anterolateral  Medications administered: 25 mg sodium hyaluronate 25 MG/2.5ML  Patient tolerance: patient tolerated the procedure well with no immediate complications          Assessment & Plan      Diagnosis Plan   1. Primary osteoarthritis of both knees     2. Arthralgia of both knees         Discussion of orthopaedic goals and activities and patient expressed appreciation.  Guided on proper techniques for mobility, strength, agility and/or conditioning exercises  Ice, heat, and/or modalities as beneficial  Watch for signs and symptoms of infection  Call or notify for any adverse effect from injection therapy    Recommendations:  Patient agreeable to call or return sooner for any concerns.

## 2022-10-03 ENCOUNTER — TELEPHONE (OUTPATIENT)
Dept: PRIMARY CARE CLINIC | Age: 77
End: 2022-10-03

## 2022-10-03 NOTE — TELEPHONE ENCOUNTER
----- Message from Burke Merlin sent at 10/3/2022 10:30 AM EDT -----  Subject: Referral Request    Reason for referral request? PT's wife called in to request imaging orders   for the PT's back. The [address] provider recommended he have x-rays done.   Provider patient wants to be referred to(if known):     Provider Phone Number(if known):[phone]    Additional Information for Provider?   ---------------------------------------------------------------------------  --------------  [address] [facility]    [phone]; OK to leave message on voicemail  ---------------------------------------------------------------------------  --------------

## 2022-10-06 ENCOUNTER — TELEPHONE (OUTPATIENT)
Dept: PRIMARY CARE CLINIC | Age: 77
End: 2022-10-06

## 2022-10-06 ENCOUNTER — OFFICE VISIT (OUTPATIENT)
Dept: PRIMARY CARE CLINIC | Age: 77
End: 2022-10-06
Payer: MEDICARE

## 2022-10-06 VITALS
OXYGEN SATURATION: 97 % | SYSTOLIC BLOOD PRESSURE: 156 MMHG | HEIGHT: 67 IN | HEART RATE: 72 BPM | TEMPERATURE: 97.8 F | BODY MASS INDEX: 35 KG/M2 | DIASTOLIC BLOOD PRESSURE: 56 MMHG | WEIGHT: 223 LBS

## 2022-10-06 DIAGNOSIS — M25.552 CHRONIC LEFT HIP PAIN: ICD-10-CM

## 2022-10-06 DIAGNOSIS — G89.29 CHRONIC LEFT HIP PAIN: ICD-10-CM

## 2022-10-06 DIAGNOSIS — M51.36 DDD (DEGENERATIVE DISC DISEASE), LUMBAR: Primary | ICD-10-CM

## 2022-10-06 PROCEDURE — G8484 FLU IMMUNIZE NO ADMIN: HCPCS | Performed by: NURSE PRACTITIONER

## 2022-10-06 PROCEDURE — 3078F DIAST BP <80 MM HG: CPT | Performed by: NURSE PRACTITIONER

## 2022-10-06 PROCEDURE — 1123F ACP DISCUSS/DSCN MKR DOCD: CPT | Performed by: NURSE PRACTITIONER

## 2022-10-06 PROCEDURE — 1036F TOBACCO NON-USER: CPT | Performed by: NURSE PRACTITIONER

## 2022-10-06 PROCEDURE — G8417 CALC BMI ABV UP PARAM F/U: HCPCS | Performed by: NURSE PRACTITIONER

## 2022-10-06 PROCEDURE — 99213 OFFICE O/P EST LOW 20 MIN: CPT | Performed by: NURSE PRACTITIONER

## 2022-10-06 PROCEDURE — 3074F SYST BP LT 130 MM HG: CPT | Performed by: NURSE PRACTITIONER

## 2022-10-06 PROCEDURE — G8427 DOCREV CUR MEDS BY ELIG CLIN: HCPCS | Performed by: NURSE PRACTITIONER

## 2022-10-06 RX ORDER — BACLOFEN 10 MG/1
10 TABLET ORAL 2 TIMES DAILY PRN
Qty: 30 TABLET | Refills: 1 | Status: SHIPPED | OUTPATIENT
Start: 2022-10-06

## 2022-10-06 RX ORDER — LIDOCAINE 4 G/G
1 PATCH TOPICAL DAILY
Qty: 30 PATCH | Refills: 0 | Status: SHIPPED | OUTPATIENT
Start: 2022-10-06 | End: 2022-11-05

## 2022-10-06 SDOH — ECONOMIC STABILITY: FOOD INSECURITY: WITHIN THE PAST 12 MONTHS, THE FOOD YOU BOUGHT JUST DIDN'T LAST AND YOU DIDN'T HAVE MONEY TO GET MORE.: NEVER TRUE

## 2022-10-06 SDOH — ECONOMIC STABILITY: FOOD INSECURITY: WITHIN THE PAST 12 MONTHS, YOU WORRIED THAT YOUR FOOD WOULD RUN OUT BEFORE YOU GOT MONEY TO BUY MORE.: NEVER TRUE

## 2022-10-06 ASSESSMENT — SOCIAL DETERMINANTS OF HEALTH (SDOH): HOW HARD IS IT FOR YOU TO PAY FOR THE VERY BASICS LIKE FOOD, HOUSING, MEDICAL CARE, AND HEATING?: NOT HARD AT ALL

## 2022-10-06 NOTE — PROGRESS NOTES
Chief Complaint   Patient presents with    Hip Pain     X 1 month. He has been going to chiropractor and it usually helps but hasn't this time.        Back Pain

## 2022-10-06 NOTE — TELEPHONE ENCOUNTER
----- Message from Sabrina Lang sent at 10/6/2022 10:46 AM EDT -----  Subject: Message to Provider    QUESTIONS  Information for Provider? Patient was seen by [facility] on 09/26 and provider   recommended he have x-rays done on his back due to having so much pain. The chiropractor informed the patient that he should reach out to his PCP   for an order for an x-ray. Can we please put this in asap someone put this   same message in on 10/3 but as a referral request. Please contact the   patient and advise. Please try both numbers that are on file.   ---------------------------------------------------------------------------  --------------  Vinny BULLARD  [phone]; OK to leave message on voicemail  ---------------------------------------------------------------------------  --------------  SCRIPT ANSWERS  Relationship to Patient? Other  Representative Name? wife   Is the Representative on the appropriate HIPAA document in Epic?  Yes

## 2022-10-06 NOTE — PROGRESS NOTES
SUBJECTIVE:    Patient ID: Mando Tello is a 69 y.o.male. Chief Complaint   Patient presents with    Hip Pain     X 1 month. He has been going to chiropractor and it usually helps but hasn't this time. Back Pain         HPI:    Pt c/o worsening left hip pain 1 month. Sees chiropractor; Adjusted by chiropractor and usually helpful but not this time. Sharp pain radiates across low back and down left leg as well. No numbness or tingling in legs. Last lumbar xray 5 years ago showing lumbar DDD. Would like xray. No fall or injury. No swelling. No bowel/bladder issues. No fevers. Followed by ortho for chronic knee pain and receives joint injections. SBP 150s and he reports due to pain. Patient's medications, allergies, past medical, surgical, social and family histories were reviewed and updated as appropriate in electronic medical record.         Outpatient Medications Marked as Taking for the 10/6/22 encounter (Office Visit) with MERCEDES Laguna CNP   Medication Sig Dispense Refill    Lancets MISC 1 each by Does not apply route 3 times daily 200 each 5    albuterol sulfate HFA (PROVENTIL;VENTOLIN;PROAIR) 108 (90 Base) MCG/ACT inhaler INHALE 2 PUFFS INTO THE LUNGS EVERY 6 HOURS AS NEEDED FOR WHEEZING OR SHORTNESS OF BREATH 8.5 g 1    TRELEGY ELLIPTA 200-62.5-25 MCG/INH AEPB       blood glucose test strips (TRUE METRIX BLOOD GLUCOSE TEST) strip USE TO TEST BLOOD SUGAR THREE TIMES DAILY AND AS NEEDED DX E11.9 300 strip 3    Insulin Pen Needle (B-D UF III MINI PEN NEEDLES) 31G X 5 MM MISC USE TO INJECT INSULIN EVERYDAY 100 each 3    carvedilol (COREG) 12.5 MG tablet Take 1 tablet by mouth 2 times daily 180 tablet 3    furosemide (LASIX) 20 MG tablet Take 1 tablet by mouth See Admin Instructions Take Tuesday and Thursday Take 20 mg by mouth See Admin Instructions Take Tuesday and Thursday 180 tablet 3    furosemide (LASIX) 40 MG tablet Take 1 tablet by mouth daily 90 tablet 3    pramipexole (MIRAPEX) 1 MG tablet Take 1 tablet by mouth nightly Take 1 mg by mouth nightly 90 tablet 3    atorvastatin (LIPITOR) 40 MG tablet Take 1 tablet by mouth nightly 90 tablet 3    prasugrel (EFFIENT) 10 MG TABS Take 1 tablet by mouth daily 90 tablet 3    Dulaglutide (TRULICITY) 8.83 QW/4.5BN SOPN Inject 0.75 mg into the skin once a week 12 pen 1    sertraline (ZOLOFT) 50 MG tablet Take 1 tablet by mouth daily 90 tablet 1    Insulin Degludec (TRESIBA FLEXTOUCH) 200 UNIT/ML SOPN 30 units in the am and 74 units in the pm 20 pen 3    BREO ELLIPTA 100-25 MCG/INH AEPB inhaler       MI-ACID GAS RELIEF 80 MG chewable tablet TAKE 1 TABLET BY MOUTH EVERY 6-8 HOURS FOR 2 DAYS, THEN AS NEEDED FOR FLATULENCE 60 tablet 1    tamsulosin (FLOMAX) 0.4 MG capsule Take 0.4 mg by mouth in the morning and at bedtime       doxazosin (CARDURA) 8 MG tablet TAKE 1 TABLET BY MOUTH EVERY 12 (TWELVE) HOURS. 180 tablet 3    hydrALAZINE (APRESOLINE) 100 MG tablet TAKE 1 TABLET BY MOUTH 3 (THREE) TIMES A DAY.  270 tablet 3    potassium chloride (KLOR-CON M) 20 MEQ extended release tablet Take 1 tablet by mouth daily 90 tablet 3    dapagliflozin (FARXIGA) 10 MG tablet Take 1 tablet by mouth every morning 90 tablet 3    pantoprazole (PROTONIX) 40 MG tablet Take 1 tablet by mouth 2 times daily (before meals) 180 tablet 3    Azilsartan Medoxomil (EDARBI) 80 MG TABS TAKE ONE TABLET BY MOUTH EVERY DAY 90 tablet 3    fluticasone (FLONASE) 50 MCG/ACT nasal spray 1 spray by Each Nostril route daily 2 Bottle 1    nitroGLYCERIN (NITROSTAT) 0.4 MG SL tablet Place 1 tablet under the tongue every 5 minutes as needed for Chest pain 25 tablet 3    finasteride (PROSCAR) 5 MG tablet Take 1 tablet by mouth daily 90 tablet 3    Insulin Pen Needle (ADVOCATE INSULIN PEN NEEDLES) 31G X 5 MM MISC USE TO INJECT INSULIN EVERY DAY DX: E11.42  each 3    Microlet Lancets MISC 1 each by Does not apply route 3 times daily DX: E11.42  each 3    Insulin Pen Needle 31G X 5 MM MISC USE TO INJECT INSULIN EVERY  each 3    Cholecalciferol (VITAMIN D3) 2000 units CAPS Take 2 capsules by mouth daily       therapeutic multivitamin-minerals (THERAGRAN-M) tablet Take 1 tablet by mouth daily. Review of Systems   Musculoskeletal:  Positive for arthralgias, back pain and myalgias. All other systems reviewed and are negative. Past Medical History:   Diagnosis Date    Anemia 2019    Anxiety     Bleeding stomach ulcer 2019    Dr Kamila Solano    CAD (coronary artery disease)     Cancer [facility])     malignant colon polyp    Depression     Enlarged prostate     GERD (gastroesophageal reflux disease)     Hyperlipidemia     Hypertension     Sleep apnea     Type II or unspecified type diabetes mellitus without mention of complication, not stated as uncontrolled      Past Surgical History:   Procedure Laterality Date    CORONARY ANGIOPLASTY WITH STENT PLACEMENT  04/2013    Dr. Georgia Bernard, stents in 2009, 2011, 2013, ptca 2014    CORONARY ARTERY BYPASS GRAFT  2001    x3    EYE SURGERY Left     POLYPECTOMY  1999    colon ca     Family History   Problem Relation Age of Onset    Heart Attack Mother     Heart Disease Father     Diabetes Sister       Social History     Tobacco Use   Smoking Status Never   Smokeless Tobacco Never       OBJECTIVE:   Wt Readings from Last 3 Encounters:   10/06/22 223 lb (101.2 kg)   08/06/22 222 lb (100.7 kg)   06/22/22 222 lb 9.6 oz (101 kg)     BP Readings from Last 3 Encounters:   10/06/22 (!) 156/56   06/22/22 (!) 137/54   03/22/22 136/80       BP (!) 156/56   Pulse 72   Temp 97.8 °F (36.6 °C)   Ht 5' 7\" (1.702 m)   Wt 223 lb (101.2 kg)   SpO2 97%   BMI 34.93 kg/m²      Physical Exam  Vitals and nursing note reviewed. Constitutional:       Appearance: Normal appearance. HENT:      Head: Normocephalic. Eyes:      Conjunctiva/sclera: Conjunctivae normal.   Cardiovascular:      Rate and Rhythm: Normal rate.    Pulmonary:      Effort: Pulmonary effort is normal. No respiratory distress. Musculoskeletal:         General: Tenderness present. No deformity or signs of injury. Cervical back: Normal range of motion. Right lower leg: No edema. Left lower leg: No edema. Comments: Left hip TTP with LROM due to pain. Skin:     General: Skin is warm. Capillary Refill: Capillary refill takes 2 to 3 seconds. Findings: No bruising, erythema or rash. Neurological:      General: No focal deficit present. Mental Status: He is alert and oriented to person, place, and time. Sensory: No sensory deficit. Motor: No weakness.       Coordination: Coordination normal.      Gait: Gait normal.   Psychiatric:         Mood and Affect: Mood normal.         Behavior: Behavior normal.       Lab Results   Component Value Date/Time     08/05/2022 02:20 PM    K 4.7 08/05/2022 02:20 PM     08/05/2022 02:20 PM    CO2 28 08/05/2022 02:20 PM    GLUCOSE 180 08/05/2022 02:20 PM    GLUCOSE 120 03/01/2011 12:00 AM    BUN 42 08/05/2022 02:20 PM    CREATININE 1.8 08/05/2022 02:20 PM    CREATININE 1.0 03/01/2011 12:00 AM    CALCIUM 9.5 08/05/2022 02:20 PM    PROT 6.9 08/05/2022 02:20 PM    LABALBU 4.5 08/05/2022 02:20 PM    LABALBU 4.2 11/14/2011 12:00 AM    BILITOT 0.3 08/05/2022 02:20 PM    BILITOT 0.4 11/14/2011 12:00 AM    ALT 19 08/05/2022 02:20 PM    AST 12 08/05/2022 02:20 PM       Hemoglobin A1C (%)   Date Value   06/20/2022 7.3 (H)     Microscopic Examination (no units)   Date Value   08/05/2022 Not Indicated     LDL Calculated (mg/dL)   Date Value   02/10/2022 91         Lab Results   Component Value Date/Time    WBC 8.2 06/20/2022 08:42 AM    NEUTROABS 12.0 03/03/2022 06:18 PM    HGB 11.8 06/20/2022 08:42 AM    HCT 36.7 06/20/2022 08:42 AM    MCV 90.4 06/20/2022 08:42 AM     06/20/2022 08:42 AM       Lab Results   Component Value Date    TSH 1.79 07/15/2021         3/7/2016 Lumbar xray     Impression   IMPRESSION:   1. 2+ L4-L5 degenerative disc disease. 2. Otherwise unremarkable exam.               ASSESSMENT/PLAN:     1. DDD (degenerative disc disease), lumbar  2. Chronic left hip pain  Xray today, will follow. Take medications as prescribed. Continue home medications. Discussed symptom management. Return to clinic S&S worsen or no improvement noted. Patient verbalized understanding.      - baclofen (LIORESAL) 10 MG tablet; Take 1 tablet by mouth 2 times daily as needed (back, hip pain, muscle spasms)  Dispense: 30 tablet; Refill: 1  - XR HIP 2-3 VW W PELVIS LEFT;  Future      Orders Placed This Encounter   Medications    baclofen (LIORESAL) 10 MG tablet     Sig: Take 1 tablet by mouth 2 times daily as needed (back, hip pain, muscle spasms)     Dispense:  30 tablet     Refill:  1    lidocaine 4 % external patch     Sig: Place 1 patch onto the skin daily     Dispense:  30 patch     Refill:  0

## 2022-10-07 ENCOUNTER — HOSPITAL ENCOUNTER (OUTPATIENT)
Dept: GENERAL RADIOLOGY | Facility: HOSPITAL | Age: 77
Discharge: HOME | End: 2022-10-07
Payer: MEDICARE

## 2022-10-07 ENCOUNTER — HOSPITAL ENCOUNTER (OUTPATIENT)
Facility: HOSPITAL | Age: 77
Discharge: HOME | End: 2022-10-07
Payer: MEDICARE

## 2022-10-07 DIAGNOSIS — E11.42 TYPE 2 DIABETES MELLITUS WITH DIABETIC POLYNEUROPATHY, WITHOUT LONG-TERM CURRENT USE OF INSULIN (HCC): ICD-10-CM

## 2022-10-07 DIAGNOSIS — M51.36 DDD (DEGENERATIVE DISC DISEASE), LUMBAR: ICD-10-CM

## 2022-10-07 DIAGNOSIS — G89.29 CHRONIC LEFT HIP PAIN: ICD-10-CM

## 2022-10-07 DIAGNOSIS — M25.552 CHRONIC LEFT HIP PAIN: ICD-10-CM

## 2022-10-07 PROCEDURE — 73502 X-RAY EXAM HIP UNI 2-3 VIEWS: CPT

## 2022-10-07 PROCEDURE — 72100 X-RAY EXAM L-S SPINE 2/3 VWS: CPT

## 2022-10-07 RX ORDER — INSULIN DEGLUDEC 200 U/ML
INJECTION, SOLUTION SUBCUTANEOUS
Qty: 20 ADJUSTABLE DOSE PRE-FILLED PEN SYRINGE | Refills: 1 | Status: SHIPPED | OUTPATIENT
Start: 2022-10-07

## 2022-10-24 ENCOUNTER — OFFICE VISIT (OUTPATIENT)
Dept: PRIMARY CARE CLINIC | Age: 77
End: 2022-10-24
Payer: MEDICARE

## 2022-10-24 VITALS
DIASTOLIC BLOOD PRESSURE: 52 MMHG | HEIGHT: 67 IN | HEART RATE: 65 BPM | SYSTOLIC BLOOD PRESSURE: 139 MMHG | BODY MASS INDEX: 35.85 KG/M2 | TEMPERATURE: 97.4 F | OXYGEN SATURATION: 98 % | WEIGHT: 228.4 LBS | RESPIRATION RATE: 16 BRPM

## 2022-10-24 DIAGNOSIS — N40.1 BENIGN PROSTATIC HYPERPLASIA WITH URINARY HESITANCY: ICD-10-CM

## 2022-10-24 DIAGNOSIS — E55.9 VITAMIN D DEFICIENCY: ICD-10-CM

## 2022-10-24 DIAGNOSIS — I10 ESSENTIAL HYPERTENSION: ICD-10-CM

## 2022-10-24 DIAGNOSIS — R39.11 BENIGN PROSTATIC HYPERPLASIA WITH URINARY HESITANCY: ICD-10-CM

## 2022-10-24 DIAGNOSIS — M25.551 CHRONIC PAIN OF BOTH HIPS: ICD-10-CM

## 2022-10-24 DIAGNOSIS — Z13.29 THYROID DISORDER SCREEN: ICD-10-CM

## 2022-10-24 DIAGNOSIS — I50.22 CHRONIC SYSTOLIC (CONGESTIVE) HEART FAILURE (HCC): ICD-10-CM

## 2022-10-24 DIAGNOSIS — R60.9 SWELLING: ICD-10-CM

## 2022-10-24 DIAGNOSIS — G89.29 CHRONIC PAIN OF BOTH HIPS: ICD-10-CM

## 2022-10-24 DIAGNOSIS — M25.552 CHRONIC PAIN OF BOTH HIPS: ICD-10-CM

## 2022-10-24 DIAGNOSIS — N18.2 CHRONIC RENAL FAILURE, STAGE 2 (MILD): ICD-10-CM

## 2022-10-24 DIAGNOSIS — E11.42 TYPE 2 DIABETES MELLITUS WITH DIABETIC POLYNEUROPATHY, WITHOUT LONG-TERM CURRENT USE OF INSULIN (HCC): Primary | ICD-10-CM

## 2022-10-24 PROCEDURE — G8427 DOCREV CUR MEDS BY ELIG CLIN: HCPCS | Performed by: NURSE PRACTITIONER

## 2022-10-24 PROCEDURE — 1123F ACP DISCUSS/DSCN MKR DOCD: CPT | Performed by: NURSE PRACTITIONER

## 2022-10-24 PROCEDURE — G8484 FLU IMMUNIZE NO ADMIN: HCPCS | Performed by: NURSE PRACTITIONER

## 2022-10-24 PROCEDURE — G8417 CALC BMI ABV UP PARAM F/U: HCPCS | Performed by: NURSE PRACTITIONER

## 2022-10-24 PROCEDURE — 3051F HG A1C>EQUAL 7.0%<8.0%: CPT | Performed by: NURSE PRACTITIONER

## 2022-10-24 PROCEDURE — 99214 OFFICE O/P EST MOD 30 MIN: CPT | Performed by: NURSE PRACTITIONER

## 2022-10-24 PROCEDURE — 1036F TOBACCO NON-USER: CPT | Performed by: NURSE PRACTITIONER

## 2022-10-24 RX ORDER — DULAGLUTIDE 0.75 MG/.5ML
0.75 INJECTION, SOLUTION SUBCUTANEOUS WEEKLY
Qty: 12 ADJUSTABLE DOSE PRE-FILLED PEN SYRINGE | Refills: 3 | Status: SHIPPED | OUTPATIENT
Start: 2022-10-24

## 2022-10-24 RX ORDER — AZILSARTAN KAMEDOXOMIL 80 MG/1
TABLET ORAL
Qty: 90 TABLET | Refills: 3 | Status: SHIPPED | OUTPATIENT
Start: 2022-10-24

## 2022-10-24 RX ORDER — POTASSIUM CHLORIDE 20 MEQ/1
20 TABLET, EXTENDED RELEASE ORAL DAILY
Qty: 90 TABLET | Refills: 3 | Status: SHIPPED | OUTPATIENT
Start: 2022-10-24

## 2022-10-24 RX ORDER — HYDRALAZINE HYDROCHLORIDE 100 MG/1
TABLET, FILM COATED ORAL
Qty: 270 TABLET | Refills: 3 | Status: SHIPPED | OUTPATIENT
Start: 2022-10-24

## 2022-10-24 RX ORDER — TRAMADOL HYDROCHLORIDE 50 MG/1
50 TABLET ORAL 2 TIMES DAILY PRN
Qty: 30 TABLET | Refills: 0 | Status: SHIPPED | OUTPATIENT
Start: 2022-10-24 | End: 2022-11-23

## 2022-10-24 RX ORDER — TAMSULOSIN HYDROCHLORIDE 0.4 MG/1
0.4 CAPSULE ORAL 2 TIMES DAILY
Qty: 90 CAPSULE | Refills: 3 | Status: SHIPPED | OUTPATIENT
Start: 2022-10-24

## 2022-10-24 ASSESSMENT — ENCOUNTER SYMPTOMS
RESPIRATORY NEGATIVE: 1
ALLERGIC/IMMUNOLOGIC NEGATIVE: 1
BACK PAIN: 1
GASTROINTESTINAL NEGATIVE: 1
EYES NEGATIVE: 1

## 2022-10-24 NOTE — PROGRESS NOTES
Chief Complaint   Patient presents with    Diabetes    Hypertension    Back Pain       Have you seen any other physician or provider since your last visit yes - Dr Cherie Quezada    Have you had any other diagnostic tests since your last visit? yes - labs and x rays    Have you changed or stopped any medications since your last visit? yes - not taking  Finasteride    I have recommended that this patient have a immunization for pneumonia and flu but he due to refusal reason: not comfortable with test   I have discussed the risks and benefits of this examination with him. The patient verbalizes understanding. Provider will be informed of refusal.      Diabetic retinal exam completed this year? Yes                       * If yes please have patient sign a records release to obtain record to update Health Maintenance                       * If no, please order referral for patient to be scheduled     SUBJECTIVE:    Patient ID:Aquilino Horton Mt is a 69 y.o. male. Chief Complaint   Patient presents with    Diabetes    Hypertension    Back Pain     HPI:  Patient has had hypertension for several years. He has been compliant with taking medications, without side effects from it. He has been following a low-sodium, is  active and never exercises. Weight is fluctuating a bit, compared to last visit. His blood pressure is stable 139/52 at this time. Patient has had diabetes for the past several years. He has been compliant with the medications and denies any side effects from it. He has been monitoring fingersticks on a daily basis. His fingerstick range is between . He denies any hypoglycemic symptoms. He has been following a diabetic diet and has been active. His last eye exam was less than a year ago. He is using oral meds and Insulin. Patient still has pain in his back and hip. He was recently seen by [facility] and had x rays. He complains about chronic back and hip pain.   Complains of stiffness. He says it is worse in the cold. He has been to Urology  suppose to be on Tamsolosin and Finasteride but the pharmacy doesn't want to fill both. Patient's medications, allergies, past medical, surgical, social and family histories were reviewed and updated as appropriate. Review of Systems   Constitutional: Negative. HENT: Negative. Eyes: Negative. Respiratory: Negative. Cardiovascular: Negative. Gastrointestinal: Negative. Endocrine: Negative. Genitourinary: Negative. Musculoskeletal:  Positive for arthralgias and back pain. Skin: Negative. Allergic/Immunologic: Negative. Neurological: Negative. Hematological: Negative. Psychiatric/Behavioral: Negative. OBJECTIVE:  BP (!) 139/52 (Site: Right Upper Arm, Position: Sitting, Cuff Size: Medium Adult)   Pulse 65   Temp 97.4 °F (36.3 °C) (Temporal)   Resp 16   Ht 5' 7\" (1.702 m)   Wt 228 lb 6.4 oz (103.6 kg)   SpO2 98% Comment: room air  BMI 35.77 kg/m²    Physical Exam  Vitals and nursing note reviewed. Constitutional:       Appearance: He is well-developed. HENT:      Head: Normocephalic and atraumatic. Eyes:      Conjunctiva/sclera: Conjunctivae normal.      Pupils: Pupils are equal, round, and reactive to light. Neck:      Thyroid: No thyromegaly. Vascular: No JVD. Cardiovascular:      Rate and Rhythm: Normal rate and regular rhythm. Heart sounds: No murmur heard. No friction rub. No gallop. Pulmonary:      Effort: Pulmonary effort is normal. No respiratory distress. Breath sounds: Normal breath sounds. No wheezing or rales. Abdominal:      General: Bowel sounds are normal. There is no distension. Palpations: Abdomen is soft. Tenderness: There is no abdominal tenderness. There is no guarding. Musculoskeletal:      Cervical back: Normal range of motion and neck supple. Lumbar back: Spasms and tenderness present. Decreased range of motion. Right hip: Tenderness present. Left hip: Tenderness present. Skin:     General: Skin is warm and dry. Findings: No rash. Neurological:      Mental Status: He is alert and oriented to person, place, and time. Psychiatric:         Judgment: Judgment normal.       No results found for requested labs within last 30 days. Hemoglobin A1C (%)   Date Value   06/20/2022 7.3 (H)     Microscopic Examination (no units)   Date Value   08/05/2022 Not Indicated     LDL Calculated (mg/dL)   Date Value   02/10/2022 91         Lab Results   Component Value Date/Time    WBC 8.2 06/20/2022 08:42 AM    NEUTROABS 12.0 03/03/2022 06:18 PM    HGB 11.8 06/20/2022 08:42 AM    HCT 36.7 06/20/2022 08:42 AM    MCV 90.4 06/20/2022 08:42 AM     06/20/2022 08:42 AM       Lab Results   Component Value Date    TSH 1.79 07/15/2021         ASSESSMENT/PLAN:     1. Type 2 diabetes mellitus with diabetic polyneuropathy, without long-term current use of insulin ([facility] [address]  Continue Charlene Greene and Trulicity. Hemoglobin A1C   Date Value Ref Range Status   06/20/2022 7.3 (H) See comment % Final     Comment:     Comment:  Diagnosis of Diabetes: > or = 6.5%  Increased risk of diabetes (Prediabetes): 5.7-6.4%  Glycemic Control: Nonpregnant Adults: <7.0%                    Pregnant: <6.0%           - Dulaglutide (TRULICITY) 6.28 VC/8.3ZO SOPN; Inject 0.75 mg into the skin once a week  Dispense: 12 Adjustable Dose Pre-filled Pen Syringe; Refill: 3    2. Essential hypertension    - hydrALAZINE (APRESOLINE) 100 MG tablet; TAKE 1 TABLET BY MOUTH 3 (THREE) TIMES A DAY. Dispense: 270 tablet; Refill: 3  - Azilsartan Medoxomil (EDARBI) 80 MG TABS; TAKE ONE TABLET BY MOUTH EVERY DAY  Dispense: 90 tablet; Refill: 3  - Lipid Panel; Future  - Comprehensive Metabolic Panel; Future  - CBC; Future    3. Chronic systolic (congestive) heart failure (HCC)  Coreg, Cardura, and Hydralazine.       4. Swelling  Lasix 40 mg add additional 20 mg as needed. - potassium chloride (KLOR-CON M) 20 MEQ extended release tablet; Take 1 tablet by mouth daily  Dispense: 90 tablet; Refill: 3    5. Chronic renal failure, stage 2 (mild)  Last cr more elevated. Monitor closely avoid nephrotoxic agents. 6. Chronic pain of both hips  Advised to try prn Tramadol.    - traMADol (ULTRAM) 50 MG tablet; Take 1 tablet by mouth 2 times daily as needed for Pain for up to 30 days. Intended supply: 7 days. Take lowest dose possible to manage pain  Dispense: 30 tablet; Refill: 0    7. Benign prostatic hyperplasia with urinary hesitancy    - tamsulosin (FLOMAX) 0.4 MG capsule; Take 1 capsule by mouth in the morning and at bedtime Take 0.4 mg by mouth in the morning and at bedtime  Dispense: 90 capsule; Refill: 3    8. Vitamin D deficiency    - Vitamin B12 & Folate; Future  - Vitamin D 25 Hydroxy; Future    9. Thyroid screen  - TSH;  Future     Written by Juana NO, acting as a scribe for [facility] on 10/24/2022 at 11:10 AM.

## 2022-10-27 DIAGNOSIS — E11.42 TYPE 2 DIABETES MELLITUS WITH DIABETIC POLYNEUROPATHY, WITHOUT LONG-TERM CURRENT USE OF INSULIN (HCC): ICD-10-CM

## 2022-10-28 RX ORDER — DAPAGLIFLOZIN 10 MG/1
TABLET, FILM COATED ORAL
Qty: 90 TABLET | Refills: 3 | OUTPATIENT
Start: 2022-10-28

## 2022-11-03 ASSESSMENT — ENCOUNTER SYMPTOMS: BACK PAIN: 1

## 2022-11-10 DIAGNOSIS — R06.02 SOB (SHORTNESS OF BREATH) ON EXERTION: ICD-10-CM

## 2022-11-10 RX ORDER — ALBUTEROL SULFATE 90 UG/1
2 AEROSOL, METERED RESPIRATORY (INHALATION) EVERY 6 HOURS PRN
Qty: 8.5 G | Refills: 1 | Status: SHIPPED | OUTPATIENT
Start: 2022-11-10

## 2022-12-21 DIAGNOSIS — R60.9 SWELLING: ICD-10-CM

## 2022-12-21 DIAGNOSIS — I10 ESSENTIAL HYPERTENSION: ICD-10-CM

## 2022-12-21 RX ORDER — POTASSIUM CHLORIDE 20 MEQ/1
20 TABLET, EXTENDED RELEASE ORAL DAILY
Qty: 90 TABLET | Refills: 3 | Status: SHIPPED | OUTPATIENT
Start: 2022-12-21

## 2022-12-21 RX ORDER — HYDRALAZINE HYDROCHLORIDE 100 MG/1
TABLET, FILM COATED ORAL
Qty: 270 TABLET | Refills: 3 | Status: SHIPPED | OUTPATIENT
Start: 2022-12-21

## 2022-12-27 DIAGNOSIS — K21.9 GASTROESOPHAGEAL REFLUX DISEASE, UNSPECIFIED WHETHER ESOPHAGITIS PRESENT: ICD-10-CM

## 2022-12-27 RX ORDER — DOXAZOSIN 8 MG/1
TABLET ORAL
Qty: 180 TABLET | Refills: 1 | Status: SHIPPED | OUTPATIENT
Start: 2022-12-27

## 2022-12-27 RX ORDER — PANTOPRAZOLE SODIUM 40 MG/1
TABLET, DELAYED RELEASE ORAL
Qty: 180 TABLET | Refills: 3 | OUTPATIENT
Start: 2022-12-27

## 2022-12-27 RX ORDER — PANTOPRAZOLE SODIUM 40 MG/1
40 TABLET, DELAYED RELEASE ORAL
Qty: 180 TABLET | Refills: 1 | Status: SHIPPED | OUTPATIENT
Start: 2022-12-27

## 2023-01-03 DIAGNOSIS — I10 ESSENTIAL HYPERTENSION: ICD-10-CM

## 2023-01-03 RX ORDER — AZILSARTAN KAMEDOXOMIL 80 MG/1
TABLET ORAL
Qty: 90 TABLET | Refills: 1 | Status: SHIPPED | OUTPATIENT
Start: 2023-01-03

## 2023-01-17 DIAGNOSIS — Z79.4 TYPE 2 DIABETES MELLITUS WITHOUT COMPLICATION, WITH LONG-TERM CURRENT USE OF INSULIN (HCC): ICD-10-CM

## 2023-01-17 DIAGNOSIS — E11.9 TYPE 2 DIABETES MELLITUS WITHOUT COMPLICATION, WITH LONG-TERM CURRENT USE OF INSULIN (HCC): ICD-10-CM

## 2023-01-17 RX ORDER — PEN NEEDLE, DIABETIC 31 GX5/16"
NEEDLE, DISPOSABLE MISCELLANEOUS
Qty: 180 EACH | Refills: 1 | Status: SHIPPED | OUTPATIENT
Start: 2023-01-17

## 2023-01-17 RX ORDER — CALCIUM CITRATE/VITAMIN D3 200MG-6.25
TABLET ORAL
Qty: 300 STRIP | Refills: 3 | Status: SHIPPED | OUTPATIENT
Start: 2023-01-17

## 2023-02-17 ENCOUNTER — HOSPITAL ENCOUNTER (OUTPATIENT)
Facility: HOSPITAL | Age: 78
Discharge: HOME | End: 2023-02-17
Payer: MEDICARE

## 2023-02-17 DIAGNOSIS — I10 ESSENTIAL HYPERTENSION: ICD-10-CM

## 2023-02-17 DIAGNOSIS — E55.9 VITAMIN D DEFICIENCY: ICD-10-CM

## 2023-02-17 LAB
A/G RATIO: 1.7 (ref 0.8–2)
ALBUMIN SERPL-MCNC: 4.2 G/DL (ref 3.4–4.8)
ALP BLD-CCNC: 46 U/L (ref 25–100)
ALT SERPL-CCNC: 22 U/L (ref 4–36)
ANION GAP SERPL CALCULATED.3IONS-SCNC: 12 MMOL/L (ref 3–16)
AST SERPL-CCNC: 15 U/L (ref 8–33)
BILIRUB SERPL-MCNC: 0.3 MG/DL (ref 0.3–1.2)
BUN BLDV-MCNC: 62 MG/DL (ref 6–20)
CALCIUM SERPL-MCNC: 9.3 MG/DL (ref 8.5–10.5)
CHLORIDE BLD-SCNC: 106 MMOL/L (ref 98–107)
CHOLESTEROL, TOTAL: 149 MG/DL (ref 0–200)
CO2: 26 MMOL/L (ref 20–30)
CREAT SERPL-MCNC: 1.9 MG/DL (ref 0.4–1.2)
FOLATE: >20 NG/ML
GFR SERPL CREATININE-BSD FRML MDRD: 36 ML/MIN/{1.73_M2}
GLOBULIN: 2.5 G/DL
GLUCOSE BLD-MCNC: 60 MG/DL (ref 74–106)
HCT VFR BLD CALC: 33.5 % (ref 40–54)
HDLC SERPL-MCNC: 30 MG/DL (ref 40–60)
HEMOGLOBIN: 10.7 G/DL (ref 13–18)
LDL CHOLESTEROL CALCULATED: 67 MG/DL
MCH RBC QN AUTO: 29 PG (ref 27–32)
MCHC RBC AUTO-ENTMCNC: 31.9 G/DL (ref 31–35)
MCV RBC AUTO: 90.8 FL (ref 80–100)
PDW BLD-RTO: 12.8 % (ref 11–16)
PLATELET # BLD: 276 K/UL (ref 150–400)
PMV BLD AUTO: 10 FL (ref 6–10)
POTASSIUM SERPL-SCNC: 5 MMOL/L (ref 3.4–5.1)
RBC # BLD: 3.69 M/UL (ref 4.5–6)
SODIUM BLD-SCNC: 144 MMOL/L (ref 136–145)
TOTAL PROTEIN: 6.7 G/DL (ref 6.4–8.3)
TRIGL SERPL-MCNC: 258 MG/DL (ref 0–249)
TSH SERPL DL<=0.05 MIU/L-ACNC: 2.69 UIU/ML (ref 0.27–4.2)
VITAMIN B-12: 917 PG/ML (ref 211–911)
VITAMIN D 25-HYDROXY: 41.4 (ref 32–100)
VLDLC SERPL CALC-MCNC: 52 MG/DL
WBC # BLD: 8.2 K/UL (ref 4–11)

## 2023-02-17 PROCEDURE — 80053 COMPREHEN METABOLIC PANEL: CPT

## 2023-02-17 PROCEDURE — 85027 COMPLETE CBC AUTOMATED: CPT

## 2023-02-17 PROCEDURE — 82746 ASSAY OF FOLIC ACID SERUM: CPT

## 2023-02-17 PROCEDURE — 82607 VITAMIN B-12: CPT

## 2023-02-17 PROCEDURE — 84443 ASSAY THYROID STIM HORMONE: CPT

## 2023-02-17 PROCEDURE — 80061 LIPID PANEL: CPT

## 2023-02-17 PROCEDURE — 82306 VITAMIN D 25 HYDROXY: CPT

## 2023-02-20 ENCOUNTER — OFFICE VISIT (OUTPATIENT)
Dept: PRIMARY CARE CLINIC | Age: 78
End: 2023-02-20
Payer: MEDICARE

## 2023-02-20 VITALS
BODY MASS INDEX: 36.41 KG/M2 | WEIGHT: 232 LBS | RESPIRATION RATE: 16 BRPM | HEART RATE: 64 BPM | SYSTOLIC BLOOD PRESSURE: 138 MMHG | DIASTOLIC BLOOD PRESSURE: 44 MMHG | HEIGHT: 67 IN | TEMPERATURE: 97.5 F | OXYGEN SATURATION: 99 %

## 2023-02-20 DIAGNOSIS — E66.01 SEVERE OBESITY (BMI 35.0-39.9) WITH COMORBIDITY (HCC): ICD-10-CM

## 2023-02-20 DIAGNOSIS — I50.22 CHRONIC SYSTOLIC (CONGESTIVE) HEART FAILURE (HCC): ICD-10-CM

## 2023-02-20 DIAGNOSIS — I25.119 CORONARY ARTERY DISEASE INVOLVING NATIVE HEART WITH ANGINA PECTORIS, UNSPECIFIED VESSEL OR LESION TYPE (HCC): ICD-10-CM

## 2023-02-20 DIAGNOSIS — N18.30 CRF (CHRONIC RENAL FAILURE), STAGE 3 (MODERATE) (HCC): ICD-10-CM

## 2023-02-20 DIAGNOSIS — Z79.4 TYPE 2 DIABETES MELLITUS WITHOUT COMPLICATION, WITH LONG-TERM CURRENT USE OF INSULIN (HCC): Primary | ICD-10-CM

## 2023-02-20 DIAGNOSIS — I73.9 INTERMITTENT CLAUDICATION (HCC): ICD-10-CM

## 2023-02-20 DIAGNOSIS — I10 ESSENTIAL HYPERTENSION: ICD-10-CM

## 2023-02-20 DIAGNOSIS — E11.42 TYPE 2 DIABETES MELLITUS WITH DIABETIC POLYNEUROPATHY, WITHOUT LONG-TERM CURRENT USE OF INSULIN (HCC): ICD-10-CM

## 2023-02-20 DIAGNOSIS — R39.11 BENIGN PROSTATIC HYPERPLASIA WITH URINARY HESITANCY: ICD-10-CM

## 2023-02-20 DIAGNOSIS — E11.9 TYPE 2 DIABETES MELLITUS WITHOUT COMPLICATION, WITH LONG-TERM CURRENT USE OF INSULIN (HCC): Primary | ICD-10-CM

## 2023-02-20 DIAGNOSIS — E11.22 TYPE 2 DIABETES MELLITUS WITH STAGE 3B CHRONIC KIDNEY DISEASE, WITHOUT LONG-TERM CURRENT USE OF INSULIN (HCC): ICD-10-CM

## 2023-02-20 DIAGNOSIS — N40.1 BENIGN PROSTATIC HYPERPLASIA WITH URINARY HESITANCY: ICD-10-CM

## 2023-02-20 DIAGNOSIS — N18.32 TYPE 2 DIABETES MELLITUS WITH STAGE 3B CHRONIC KIDNEY DISEASE, WITHOUT LONG-TERM CURRENT USE OF INSULIN (HCC): ICD-10-CM

## 2023-02-20 DIAGNOSIS — F39 MOOD DISORDER (HCC): ICD-10-CM

## 2023-02-20 LAB — HBA1C MFR BLD: 7.6 %

## 2023-02-20 PROCEDURE — 99214 OFFICE O/P EST MOD 30 MIN: CPT | Performed by: NURSE PRACTITIONER

## 2023-02-20 PROCEDURE — 3078F DIAST BP <80 MM HG: CPT | Performed by: NURSE PRACTITIONER

## 2023-02-20 PROCEDURE — 1123F ACP DISCUSS/DSCN MKR DOCD: CPT | Performed by: NURSE PRACTITIONER

## 2023-02-20 PROCEDURE — 3051F HG A1C>EQUAL 7.0%<8.0%: CPT | Performed by: NURSE PRACTITIONER

## 2023-02-20 PROCEDURE — G8484 FLU IMMUNIZE NO ADMIN: HCPCS | Performed by: NURSE PRACTITIONER

## 2023-02-20 PROCEDURE — 1036F TOBACCO NON-USER: CPT | Performed by: NURSE PRACTITIONER

## 2023-02-20 PROCEDURE — G8427 DOCREV CUR MEDS BY ELIG CLIN: HCPCS | Performed by: NURSE PRACTITIONER

## 2023-02-20 PROCEDURE — 3074F SYST BP LT 130 MM HG: CPT | Performed by: NURSE PRACTITIONER

## 2023-02-20 PROCEDURE — G8417 CALC BMI ABV UP PARAM F/U: HCPCS | Performed by: NURSE PRACTITIONER

## 2023-02-20 PROCEDURE — 83036 HEMOGLOBIN GLYCOSYLATED A1C: CPT | Performed by: NURSE PRACTITIONER

## 2023-02-20 RX ORDER — PEN NEEDLE, DIABETIC 31 GX5/16"
1 NEEDLE, DISPOSABLE MISCELLANEOUS 2 TIMES DAILY
Qty: 180 EACH | Refills: 3 | Status: SHIPPED | OUTPATIENT
Start: 2023-02-20

## 2023-02-20 RX ORDER — TAMSULOSIN HYDROCHLORIDE 0.4 MG/1
0.4 CAPSULE ORAL 2 TIMES DAILY
Qty: 180 CAPSULE | Refills: 3 | Status: SHIPPED | OUTPATIENT
Start: 2023-02-20

## 2023-02-20 RX ORDER — NITROGLYCERIN 0.4 MG/1
0.4 TABLET SUBLINGUAL EVERY 5 MIN PRN
Qty: 25 TABLET | Refills: 3 | Status: SHIPPED | OUTPATIENT
Start: 2023-02-20

## 2023-02-20 SDOH — ECONOMIC STABILITY: FOOD INSECURITY: WITHIN THE PAST 12 MONTHS, THE FOOD YOU BOUGHT JUST DIDN'T LAST AND YOU DIDN'T HAVE MONEY TO GET MORE.: NEVER TRUE

## 2023-02-20 SDOH — ECONOMIC STABILITY: INCOME INSECURITY: HOW HARD IS IT FOR YOU TO PAY FOR THE VERY BASICS LIKE FOOD, HOUSING, MEDICAL CARE, AND HEATING?: NOT HARD AT ALL

## 2023-02-20 SDOH — ECONOMIC STABILITY: HOUSING INSECURITY
IN THE LAST 12 MONTHS, WAS THERE A TIME WHEN YOU DID NOT HAVE A STEADY PLACE TO SLEEP OR SLEPT IN A SHELTER (INCLUDING NOW)?: NO

## 2023-02-20 SDOH — ECONOMIC STABILITY: FOOD INSECURITY: WITHIN THE PAST 12 MONTHS, YOU WORRIED THAT YOUR FOOD WOULD RUN OUT BEFORE YOU GOT MONEY TO BUY MORE.: NEVER TRUE

## 2023-02-20 ASSESSMENT — PATIENT HEALTH QUESTIONNAIRE - PHQ9
5. POOR APPETITE OR OVEREATING: 0
2. FEELING DOWN, DEPRESSED OR HOPELESS: 0
9. THOUGHTS THAT YOU WOULD BE BETTER OFF DEAD, OR OF HURTING YOURSELF: 0
SUM OF ALL RESPONSES TO PHQ QUESTIONS 1-9: 0
SUM OF ALL RESPONSES TO PHQ9 QUESTIONS 1 & 2: 0
SUM OF ALL RESPONSES TO PHQ QUESTIONS 1-9: 0
3. TROUBLE FALLING OR STAYING ASLEEP: 0
1. LITTLE INTEREST OR PLEASURE IN DOING THINGS: 0
10. IF YOU CHECKED OFF ANY PROBLEMS, HOW DIFFICULT HAVE THESE PROBLEMS MADE IT FOR YOU TO DO YOUR WORK, TAKE CARE OF THINGS AT HOME, OR GET ALONG WITH OTHER PEOPLE: 0
SUM OF ALL RESPONSES TO PHQ QUESTIONS 1-9: 0
SUM OF ALL RESPONSES TO PHQ QUESTIONS 1-9: 0
6. FEELING BAD ABOUT YOURSELF - OR THAT YOU ARE A FAILURE OR HAVE LET YOURSELF OR YOUR FAMILY DOWN: 0
4. FEELING TIRED OR HAVING LITTLE ENERGY: 0
7. TROUBLE CONCENTRATING ON THINGS, SUCH AS READING THE NEWSPAPER OR WATCHING TELEVISION: 0
8. MOVING OR SPEAKING SO SLOWLY THAT OTHER PEOPLE COULD HAVE NOTICED. OR THE OPPOSITE, BEING SO FIGETY OR RESTLESS THAT YOU HAVE BEEN MOVING AROUND A LOT MORE THAN USUAL: 0

## 2023-02-20 ASSESSMENT — ENCOUNTER SYMPTOMS
GASTROINTESTINAL NEGATIVE: 1
ALLERGIC/IMMUNOLOGIC NEGATIVE: 1
EYES NEGATIVE: 1
RESPIRATORY NEGATIVE: 1

## 2023-02-20 NOTE — PROGRESS NOTES
Chief Complaint   Patient presents with    Diabetes    Hypertension    Discuss Labs       Have you seen any other physician or provider since your last visit no    Have you had any other diagnostic tests since your last visit? yes - labs    Have you changed or stopped any medications since your last visit? no     I have recommended that this patient have a immunization for pneumonia but he due to refusal reason: not comfortable with test   I have discussed the risks and benefits of this examination with him. The patient verbalizes understanding. Provider will be informed of refusal.      Diabetic retinal exam completed this year? Yes                       * If yes please have patient sign a records release to obtain record to update Health Maintenance                       * If no, please order referral for patient to be scheduled   SUBJECTIVE:    Patient ID:Aquilino Rodriguez is a 69 y.o. male. Chief Complaint   Patient presents with    Diabetes    Hypertension    Discuss Labs     HPI:  Patient has had diabetes for the past several years. He has been compliant with the medications and denies any side effects from it. He has been monitoring fingersticks on a daily basis. His fingerstick range is between . He denies any hypoglycemic symptoms. He has been following a diabetic diet and has been active. His last eye exam was less than a year ago. He is using oral meds and Insulin. He is on Kacy Picket 30 units before breakfast, 0 units before lunch and 74 before dinner. He is also on Trulicity once a week 4.70 mg. He is due for a diabetic foot exam and needs a new order signed for diabetic shoes. Patient has had hypertension for several years. He has been compliant with taking medications, without side effects from it. He has been following a low-sodium, is active and rarely exercises. Weight is fluctuating a bit, compared to last visit. His blood pressure is stable 138/44 at this time.     He has an appt with Geovanni Butler and then Cardiology in March. Patient's medications, allergies, past medical, surgical, social and family histories were reviewed and updated as appropriate. Review of Systems   Constitutional: Negative. HENT: Negative. Eyes: Negative. Respiratory: Negative. Cardiovascular: Negative. Gastrointestinal: Negative. Endocrine: Negative. Genitourinary: Negative. Musculoskeletal: Negative. Skin: Negative. Allergic/Immunologic: Negative. Neurological: Negative. Hematological: Negative. Psychiatric/Behavioral: Negative. OBJECTIVE:  BP (!) 138/44 (Site: Right Upper Arm, Position: Sitting, Cuff Size: Medium Adult)   Pulse 64   Temp 97.5 °F (36.4 °C) (Temporal)   Resp 16   Ht 5' 7\" (1.702 m)   Wt 232 lb (105.2 kg)   SpO2 99% Comment: room air  BMI 36.34 kg/m²    Physical Exam  Vitals and nursing note reviewed. Constitutional:       Appearance: He is well-developed. HENT:      Head: Normocephalic and atraumatic. Eyes:      Conjunctiva/sclera: Conjunctivae normal.      Pupils: Pupils are equal, round, and reactive to light. Neck:      Thyroid: No thyromegaly. Vascular: No JVD. Cardiovascular:      Rate and Rhythm: Normal rate and regular rhythm. Heart sounds: No murmur heard. No friction rub. No gallop. Pulmonary:      Effort: Pulmonary effort is normal. No respiratory distress. Breath sounds: Normal breath sounds. No wheezing or rales. Abdominal:      General: Bowel sounds are normal. There is no distension. Palpations: Abdomen is soft. Tenderness: There is no abdominal tenderness. There is no guarding. Musculoskeletal:         General: No tenderness. Cervical back: Normal range of motion and neck supple. Skin:     General: Skin is warm and dry. Findings: No rash. Neurological:      Mental Status: He is alert and oriented to person, place, and time.    Psychiatric:         Judgment: Judgment normal.       Results in Past 30 Days  Result Component Current Result Ref Range Previous Result Ref Range   Albumin/Globulin Ratio 1.7 (2/21/2023) 0.8 - 2.0 1.7 (2/17/2023) 0.8 - 2.0   Albumin 4.0 (2/21/2023) 3.4 - 4.8 g/dL 4.2 (2/17/2023) 3.4 - 4.8 g/dL   Alkaline Phosphatase 45 (2/21/2023) 25 - 100 U/L 46 (2/17/2023) 25 - 100 U/L   ALT 21 (2/21/2023) 4 - 36 U/L 22 (2/17/2023) 4 - 36 U/L   AST 16 (2/21/2023) 8 - 33 U/L 15 (2/17/2023) 8 - 33 U/L   BUN 49 (H) (2/21/2023) 6 - 20 mg/dL 62 (H) (2/17/2023) 6 - 20 mg/dL   Calcium 9.0 (2/21/2023) 8.5 - 10.5 mg/dL 9.3 (2/17/2023) 8.5 - 10.5 mg/dL   Chloride 105 (2/21/2023) 98 - 107 mmol/L 106 (2/17/2023) 98 - 107 mmol/L   CO2 25 (2/21/2023) 20 - 30 mmol/L 26 (2/17/2023) 20 - 30 mmol/L   Creatinine 1.8 (H) (2/21/2023) 0.4 - 1.2 mg/dL 1.9 (H) (2/17/2023) 0.4 - 1.2 mg/dL   Est, Glom Filt Rate 38 (L) (2/21/2023) >59 36 (L) (2/17/2023) >59   Globulin 2.4 (2/21/2023) Not Established g/dL 2.5 (2/17/2023) Not Established g/dL   Glucose 61 (L) (2/21/2023) 74 - 106 mg/dL 60 (L) (2/17/2023) 74 - 106 mg/dL   Potassium 4.5 (2/21/2023) 3.4 - 5.1 mmol/L 5.0 (2/17/2023) 3.4 - 5.1 mmol/L   Sodium 141 (2/21/2023) 136 - 145 mmol/L 144 (2/17/2023) 136 - 145 mmol/L   Total Bilirubin 0.3 (2/21/2023) 0.3 - 1.2 mg/dL 0.3 (2/17/2023) 0.3 - 1.2 mg/dL   Total Protein 6.4 (2/21/2023) 6.4 - 8.3 g/dL 6.7 (2/17/2023) 6.4 - 8.3 g/dL       Hemoglobin A1C (%)   Date Value   02/20/2023 7.6     Microscopic Examination (no units)   Date Value   02/21/2023 Not Indicated     LDL Calculated (mg/dL)   Date Value   02/17/2023 67         Lab Results   Component Value Date/Time    WBC 8.2 02/17/2023 08:39 AM    NEUTROABS 12.0 03/03/2022 06:18 PM    HGB 10.7 02/17/2023 08:39 AM    HCT 33.5 02/17/2023 08:39 AM    MCV 90.8 02/17/2023 08:39 AM     02/17/2023 08:39 AM       Lab Results   Component Value Date    TSH 2.69 02/17/2023         ASSESSMENT/PLAN:     1.  Type 2 diabetes mellitus without complication, with long-term current use of insulin (HCC)  Hemoglobin A1C   Date Value Ref Range Status   02/20/2023 7.6 % Final     Contineu turlicity 7.63 mg and farxiga 10 mg   - Insulin Pen Needle (B-D UF III MINI PEN NEEDLES) 31G X 5 MM MISC; 1 each by In Vitro route 2 times daily USE TO INJECT INSULIN BID  Dispense: 180 each; Refill: 3    2. Benign prostatic hyperplasia with urinary hesitancy    - tamsulosin (FLOMAX) 0.4 MG capsule; Take 1 capsule by mouth in the morning and at bedtime Take 0.4 mg by mouth in the morning and at bedtime  Dispense: 180 capsule; Refill: 3    3. CRF (chronic renal failure), stage 3 (moderate) ([facility])  Recheck in 2 months and consider referral to Nephrology. - Basic Metabolic Panel; Future  - MICROALBUMIN / CREATININE URINE RATIO; Future    4. Essential hypertension  Cardura, hydralazine 100 mg coreg 12. 5 mg lasix 60 mg alternate with 40 mg.   Edarbi 80 mg   5. Chronic systolic (congestive) heart failure (HCC)  Alternate lasix     6. Type 2 diabetes mellitus with stage 3b chronic kidney disease, without long-term current use of insulin ([facility] [address]      7. Severe obesity (BMI 35.0-39. 9) with comorbidity ([facility] [address]      8. Mood disorder (HCC)  Zoloft 50 mg     9. Type 2 diabetes mellitus with diabetic polyneuropathy, without long-term current use of insulin (HCC)    - POCT glycosylated hemoglobin (Hb A1C)    10. Intermittent claudication (HCC)  Effient and follow with vascular     11.  Coronary artery disease involving native heart with angina pectoris, unspecified vessel or lesion type ([facility] [address]  Lipitor 49 mg        Written by Marina NO, acting as a scribe for [employer] on 2/20/2023 at 10:15 PM.

## 2023-02-21 ENCOUNTER — HOSPITAL ENCOUNTER (OUTPATIENT)
Facility: HOSPITAL | Age: 78
Discharge: HOME | End: 2023-02-21
Payer: MEDICARE

## 2023-02-21 LAB
A/G RATIO: 1.7 (ref 0.8–2)
ALBUMIN SERPL-MCNC: 4 G/DL (ref 3.4–4.8)
ALP BLD-CCNC: 45 U/L (ref 25–100)
ALT SERPL-CCNC: 21 U/L (ref 4–36)
ANION GAP SERPL CALCULATED.3IONS-SCNC: 11 MMOL/L (ref 3–16)
AST SERPL-CCNC: 16 U/L (ref 8–33)
BILIRUB SERPL-MCNC: 0.3 MG/DL (ref 0.3–1.2)
BILIRUBIN URINE: NEGATIVE
BLOOD, URINE: NEGATIVE
BUN BLDV-MCNC: 49 MG/DL (ref 6–20)
CALCIUM SERPL-MCNC: 9 MG/DL (ref 8.5–10.5)
CHLORIDE BLD-SCNC: 105 MMOL/L (ref 98–107)
CLARITY: CLEAR
CO2: 25 MMOL/L (ref 20–30)
COLOR: YELLOW
CREAT SERPL-MCNC: 1.8 MG/DL (ref 0.4–1.2)
GFR SERPL CREATININE-BSD FRML MDRD: 38 ML/MIN/{1.73_M2}
GLOBULIN: 2.4 G/DL
GLUCOSE BLD-MCNC: 61 MG/DL (ref 74–106)
GLUCOSE URINE: 250 MG/DL
KETONES, URINE: NEGATIVE MG/DL
LEUKOCYTE ESTERASE, URINE: NEGATIVE
MICROSCOPIC EXAMINATION: ABNORMAL
NITRITE, URINE: NEGATIVE
PH UA: 5 (ref 5–8)
POTASSIUM SERPL-SCNC: 4.5 MMOL/L (ref 3.4–5.1)
PROSTATE SPECIFIC ANTIGEN: 1.76 NG/ML (ref 0–4)
PROTEIN UA: NEGATIVE MG/DL
SODIUM BLD-SCNC: 141 MMOL/L (ref 136–145)
SPECIFIC GRAVITY UA: 1.02 (ref 1–1.03)
TOTAL PROTEIN: 6.4 G/DL (ref 6.4–8.3)
URINE TYPE: ABNORMAL
UROBILINOGEN, URINE: 0.2 E.U./DL

## 2023-02-21 PROCEDURE — 81003 URINALYSIS AUTO W/O SCOPE: CPT

## 2023-02-21 PROCEDURE — 80053 COMPREHEN METABOLIC PANEL: CPT

## 2023-02-21 PROCEDURE — 36415 COLL VENOUS BLD VENIPUNCTURE: CPT

## 2023-02-21 PROCEDURE — 87086 URINE CULTURE/COLONY COUNT: CPT

## 2023-02-21 PROCEDURE — 84153 ASSAY OF PSA TOTAL: CPT

## 2023-02-22 LAB — URINE CULTURE, ROUTINE: NORMAL

## 2023-03-02 ENCOUNTER — OFFICE VISIT (OUTPATIENT)
Dept: CARDIOLOGY | Facility: CLINIC | Age: 78
End: 2023-03-02
Payer: MEDICARE

## 2023-03-02 ENCOUNTER — HOSPITAL ENCOUNTER (OUTPATIENT)
Facility: HOSPITAL | Age: 78
Discharge: HOME | End: 2023-03-02
Payer: MEDICARE

## 2023-03-02 VITALS
HEART RATE: 63 BPM | DIASTOLIC BLOOD PRESSURE: 64 MMHG | SYSTOLIC BLOOD PRESSURE: 130 MMHG | WEIGHT: 230 LBS | BODY MASS INDEX: 36.1 KG/M2 | HEIGHT: 67 IN | OXYGEN SATURATION: 98 %

## 2023-03-02 DIAGNOSIS — I25.810 CORONARY ARTERY DISEASE INVOLVING CORONARY BYPASS GRAFT OF NATIVE HEART WITHOUT ANGINA PECTORIS: Primary | ICD-10-CM

## 2023-03-02 DIAGNOSIS — E78.5 HYPERLIPIDEMIA LDL GOAL <70: ICD-10-CM

## 2023-03-02 DIAGNOSIS — I10 ESSENTIAL HYPERTENSION: ICD-10-CM

## 2023-03-02 PROCEDURE — 93005 ELECTROCARDIOGRAM TRACING: CPT

## 2023-03-02 PROCEDURE — 93000 ELECTROCARDIOGRAM COMPLETE: CPT | Performed by: INTERNAL MEDICINE

## 2023-03-02 PROCEDURE — 99214 OFFICE O/P EST MOD 30 MIN: CPT | Performed by: INTERNAL MEDICINE

## 2023-03-02 NOTE — PROGRESS NOTES
[facility] Cardiology    Patient ID: Donny Jerry is a 78 y.o. male.  : 1945   Contact: [phone]    Encounter date: 2023    PCP: Anum Alonso APRN      Chief complaint:   Chief Complaint   Patient presents with   • Coronary Artery Disease       Problem List:  1. Coronary artery disease:  a. CABG x3 in , [facility] by Dr. Peng. LIMA to the LAD, SVG to obtuse marginal, SVG to the PDA.  b. Stress echocardiogram, 2007: No wall motion abnormalities. No evidence of ischemia. Normal EF.  c. Echocardiogram, 2007, mild concentric LVH, trace of MR and TR.    d. Adenosine Cardiolite, 2009:  Normal LVEF with a stress induced mid and distal inferior defect compatible with  ischemia.  e. [facility], 2009, Dr. Russo: EF 45-50%. Overlapping Cypher TAWANNA 2.5 x 28 and 2.5 x 18 to the SVG to OM. SVG to PDA had a proximal stenosis of 60% with a distal stenosis of 50-60%.  f. [facility], 2009: PTCA and Taxus TAWANNA (2.25 x 12 mm) to the mid PDA; Cypher TAWANNA (2.5 x 18mm) to the distal SVG to the PDA;  and Cypher TAWANNA (3.0 x 28mm) to the proximal SVG to the PDA.  g. [facility] for recurrent chest pain, 2010: Revealing patent stents. Ranexa initiated 500 mg q.12 h.   h. [facility],  2011: LVEF of 45% to 50% with an occluded SVG to an obtuse marginal branch which could not be revascularized, patent LIMA to the LAD, noncritical graft disease in the SVG to the PDA, multiple small areas of distal vessel disease and collateral flow were seen.   i. [facility] for recurrent anginal symptoms, 2010, with PTCA and Promus TAWANNA (3.0 x 28mm) to the proximal SVG to the PDA for in-stent restenosis.    j. [facility], 2010,  Dr. Russo: EF 40-45%. 3.5 x 23 mm Promus TAWANNA in the proximal SVG to OM, 2.5 x 18 mm Promus TAWANNA to distal SVG to PDA due to ISR.    k. [facility], 2010, with placement of  a 3.0 x 16 mm Taxus TAWANNA to the SVG to the RCA proximally and a 2.5 x 16  mm paclitaxel stent distally in the SVG to the RCA.  l. [facility], 3.0 x 24-mm Promus element TAWANNA to a 90% lesion in the mid portion  of the SVG to the PDA.   m. [facility] for recurrent angina, 06/24/2014, Dr. Russo:  PTCA of the SVG to the PDA using a 3 x 12 mm NC TREK balloon.    n. Abnormal stress test, 10/07/2021: Mild ST depression with infusion and had a normal hemodynamic response to regadenoson. He was found to have a large posterior lateral stress-induced defect. Severe small fixed anterior defect, EF 45% with CCS class I chest discomfort/NYHA class II dyspnea on exertion symptoms.   o. [facility], 10/19/2021: EF 50%. LAD occluded following the takeoff of a first diagonal branch and a septal . and LCx occluded after the takeoff of a small-sized to moderate-sized, OM1 branch. RCA dominant for the posterior circulation. RCA was occluded in the mid-segment of the vessel. Collateral flow from one of the RV marginal branches was seen to supply the distal LCx including 2 OM branches. No significant disease appeared to be present within the distal LCx circulation. LIMA to the LAD was widely patent. SVG to LCx occluded at origin. SVG to PDA has been stented extensively. Area of mild in-stent narrowing in the mid segment of up to 40 to 50%. Good retrograde flow into posterolateral branch and into the circumflex vessels.  2. Hypertension.  3. Hypercholesterolemia.  4. Type 2 diabetes, diagnosed 2 years ago.  5. Obstructive sleep apnea/CPAP.  6. Enlarged prostate with anticipated TURP with Dr. Bernal with retroperitoneal ultrasound 8/31/2021 showing prostatomegaly (5.4 x 3.9 x 4.2 cm) with mass-effect on the base of the bladder, normal size kidneys  7. Obesity.  8. Depression.  9. Anemia  10. Surgical history:  a. CABG x3, Dr. Peng, [facility], 2001.  b. Negative colonoscopy, 2014.    Allergies   Allergen Reactions   • Zocor [Simvastatin] Myalgia   • Bisoprolol Other (See Comments)     Agitation     •  Byetta 10 Mcg Pen [Exenatide] Nausea Only     nausea   • Crestor [Rosuvastatin Calcium] Myalgia   • Metformin And Related Nausea Only   • Plavix [Clopidogrel Bisulfate] Other (See Comments)     resistance       Current Medications:    Current Outpatient Medications:   •  albuterol sulfate  (90 Base) MCG/ACT inhaler, Inhale 2 puffs Every 4 (Four) Hours As Needed for Wheezing., Disp: , Rfl:   •  atorvastatin (LIPITOR) 40 MG tablet, Take 1 tablet by mouth Daily., Disp: , Rfl:   •  azilsartan medoxomil (EDARBI) 80 MG tablet tablet, Take 1 tablet by mouth Daily., Disp: , Rfl:   •  carvedilol (COREG) 12.5 MG tablet, Take 6.25 mg by mouth 2 (Two) Times a Day With Meals. Pt takes 0.5 tab BID, Disp: , Rfl:   •  Cholecalciferol (VITAMIN D3) 50 MCG (2000 UT) capsule, Take 2 capsules by mouth., Disp: , Rfl:   •  Dapagliflozin Propanediol 10 MG tablet, Take 10 mg by mouth Daily., Disp: , Rfl:   •  doxazosin (CARDURA) 8 MG tablet, Take 1 tablet by mouth Every 12 (Twelve) Hours., Disp: 60 tablet, Rfl: 11  •  finasteride (PROSCAR) 5 MG tablet, Take 1 tablet by mouth Daily., Disp: , Rfl:   •  fluticasone (FLONASE) 50 MCG/ACT nasal spray, 1 spray into the nostril(s) as directed by provider Daily., Disp: 48 g, Rfl: 2  •  Fluticasone-Umeclidin-Vilant (Trelegy Ellipta) 200-62.5-25 MCG/INH inhaler, Inhale 1 puff Daily. Rinse mouth with water after use., Disp: 90 each, Rfl: 2  •  furosemide (LASIX) 20 MG tablet, Take 1 tablet by mouth 2 (Two) Times a Week. Tuesday and Thursday, Disp: , Rfl:   •  furosemide (LASIX) 40 MG tablet, Take 1 tablet by mouth 3 (Three) Times a Week. Monday, Wednesday, Friday, Disp: , Rfl:   •  hydrALAZINE (APRESOLINE) 100 MG tablet, Take 1 tablet by mouth 3 (Three) Times a Day., Disp: 90 tablet, Rfl: 11  •  loratadine (CLARITIN) 10 MG tablet, Take 1 tablet by mouth Daily., Disp: , Rfl:   •  Multiple Vitamin (MULTI VITAMIN DAILY PO), Take 1 tablet by mouth Daily., Disp: , Rfl:   •  nitroglycerin (NITROSTAT)  "0.4 MG SL tablet, Place 1 tablet under the tongue Every 5 (Five) Minutes As Needed for Chest Pain. Take no more than 3 doses in 15 minutes., Disp: 25 tablet, Rfl: 11  •  pantoprazole (PROTONIX) 40 MG EC tablet, TAKE 1 TABLET BY MOUTH 2 TIMES DAILY (BEFORE MEALS), Disp: , Rfl:   •  potassium chloride (K-DUR,KLOR-CON) 20 MEQ CR tablet, Take 1 tablet by mouth Daily., Disp: , Rfl:   •  pramipexole (Mirapex) 1 MG tablet, Take 1 tablet by mouth Every Night., Disp: 90 tablet, Rfl: 2  •  prasugrel (EFFIENT) 10 MG tablet, Take 1 tablet by mouth Daily., Disp: , Rfl:   •  tamsulosin (FLOMAX) 0.4 MG capsule 24 hr capsule, Take 1 capsule by mouth Every Night., Disp: , Rfl:   •  Tresiba FlexTouch 200 UNIT/ML solution pen-injector pen injection, Inject  under the skin into the appropriate area as directed., Disp: , Rfl:   •  TRUEplus Lancets 28G misc, , Disp: , Rfl:   •  Trulicity 0.75 MG/0.5ML solution pen-injector, , Disp: , Rfl:     HPI    Donny Jerry is a 78 y.o. male who presents today for a 3 month follow up of coronary artery disease s/p [facility] (10/2021) and cardiac risk factors. Since last visit, the patient has been doing well overall from a cardiovascular standpoint. He states that all of his muscles hurt however his PCP has not done any specific tests to find a cause. He is not seeing a nephrologist. Patient denies chest pain, shortness of breath, orthopnea, palpitations, edema, dizziness, and syncope.      The following portions of the patient's history were reviewed and updated as appropriate: allergies, current medications and problem list.    Pertinent positives as listed in the HPI.  All other systems reviewed are negative.         Vitals:    03/02/23 1431   BP: 130/64   BP Location: Left arm   Patient Position: Sitting   Pulse: 63   SpO2: 98%   Weight: 104 kg (230 lb)   Height: 170.2 cm (67\")       Physical Exam:  General: Alert and oriented.  Neck: Jugular venous pressure is within normal limits. Carotids have " normal upstrokes without bruits.   Cardiovascular: Heart has a nondisplaced focal PMI. Regular rate and rhythm. No murmur, gallop or rub.  Lungs: Clear, no rales or wheezes. Equal expansion is noted.   Extremities: Show no edema.  Skin: Warm and dry.  Neurologic: Nonfocal.     Diagnostic Data (reviewed with patient):    Lab date: 02/21/2023  • CMP: Glu 61, BUN 49, Creat 1.8, eGFR 38, Na 141, K 4.5, Cl 105, CO2 25, Ca 9.0, Alk Phos 45, AST 16, ALT 21     Lab Results   Component Value Date    CHLPL 149 02/17/2023    TRIG 258 (H) 02/17/2023    HDL 30 (L) 02/17/2023    LDL 67 02/17/2023      Lab Results   Component Value Date    WBC 8.2 02/17/2023    RBC 3.69 (L) 02/17/2023    HGB 10.7 (L) 02/17/2023    HCT 33.5 (L) 02/17/2023    MCV 90.8 02/17/2023     02/17/2023      Lab Results   Component Value Date    TSH 2.69 02/17/2023        Advance Care Planning   ACP discussion was held with the patient during this visit. Patient has an advance directive in EMR which is still valid.          ECG 12 Lead    Date/Time: 3/2/2023 3:11 PM  Performed by: Liz Russo MD  Authorized by: Liz Russo MD   Comparison: compared with previous ECG from 6/24/2014  Comparison to previous ECG: Anterior and lateral T wave inversions are more prominent   Rhythm: sinus rhythm  BPM: 61  QRS axis: right  Other findings: non-specific ST-T wave changes    Clinical impression: abnormal EKG  Comments: Consider lateral ischemia               Assessment:    ICD-10-CM ICD-9-CM   1. Coronary artery disease involving coronary bypass graft of native heart without angina pectoris  I25.810 414.05   2. Essential hypertension  I10 401.9   3. Hyperlipidemia LDL goal <70  E78.5 272.4         Plan:  1. Decrease Lasix from 40 mg to 20 mg daily as kidney function is abnormal. After one week, decrease it to 5 days a week, then 3 days a week, etc.  If not needed do not take.  2. Begin routine aerobic exercise for at least 30 minutes 5  days per week.    3. Continue on atorvastatin 40 mg daily for hyperlipidemia.    4. Continue on carvedilol 6.25 mg BID, Edarbi 80 mg daily, hydralazine 100 mg, and doxazosin 8 mg q12h for hypertension  5. Continue on Effient 10 mg for antiplatelet therapy.    6. Continue all other current medications.  7. F/up in 6 months, sooner if needed.      Scribed for Liz Russo MD by Brisa Hope. 3/2/2023 15:29 EST       I Liz Russo MD personally performed the services described in this documentation as scribed by the above individual in my presence, and it is both accurate and complete.    Liz Russo MD, FACC

## 2023-03-06 RX ORDER — FUROSEMIDE 20 MG/1
20 TABLET ORAL DAILY
Qty: 30 TABLET | Refills: 5 | Status: SHIPPED | OUTPATIENT
Start: 2023-03-06

## 2023-03-23 ENCOUNTER — OFFICE VISIT (OUTPATIENT)
Dept: PRIMARY CARE CLINIC | Age: 78
End: 2023-03-23
Payer: MEDICARE

## 2023-03-23 VITALS
HEART RATE: 64 BPM | OXYGEN SATURATION: 97 % | SYSTOLIC BLOOD PRESSURE: 148 MMHG | TEMPERATURE: 97.9 F | DIASTOLIC BLOOD PRESSURE: 53 MMHG

## 2023-03-23 DIAGNOSIS — J01.40 ACUTE PANSINUSITIS, RECURRENCE NOT SPECIFIED: Primary | ICD-10-CM

## 2023-03-23 PROCEDURE — 1123F ACP DISCUSS/DSCN MKR DOCD: CPT | Performed by: NURSE PRACTITIONER

## 2023-03-23 PROCEDURE — 3078F DIAST BP <80 MM HG: CPT | Performed by: NURSE PRACTITIONER

## 2023-03-23 PROCEDURE — 99212 OFFICE O/P EST SF 10 MIN: CPT | Performed by: NURSE PRACTITIONER

## 2023-03-23 PROCEDURE — G8484 FLU IMMUNIZE NO ADMIN: HCPCS | Performed by: NURSE PRACTITIONER

## 2023-03-23 PROCEDURE — 1036F TOBACCO NON-USER: CPT | Performed by: NURSE PRACTITIONER

## 2023-03-23 PROCEDURE — G8417 CALC BMI ABV UP PARAM F/U: HCPCS | Performed by: NURSE PRACTITIONER

## 2023-03-23 PROCEDURE — G8427 DOCREV CUR MEDS BY ELIG CLIN: HCPCS | Performed by: NURSE PRACTITIONER

## 2023-03-23 PROCEDURE — 3074F SYST BP LT 130 MM HG: CPT | Performed by: NURSE PRACTITIONER

## 2023-03-23 RX ORDER — CEFDINIR 300 MG/1
300 CAPSULE ORAL 2 TIMES DAILY
Qty: 14 CAPSULE | Refills: 0 | Status: SHIPPED | OUTPATIENT
Start: 2023-03-23 | End: 2023-03-30

## 2023-03-23 NOTE — PROGRESS NOTES
Chief Complaint   Patient presents with    Dizziness    Congestion    Cough     X 2 days, weakness, neg sore throat, neg fever. Patient feels like he has a sinus infection.
Congestion present. Comments: Sinus ttp     Mouth/Throat:      Mouth: Mucous membranes are moist.      Pharynx: Oropharynx is clear. Posterior oropharyngeal erythema present. Eyes:      Extraocular Movements: Extraocular movements intact. Conjunctiva/sclera: Conjunctivae normal.      Pupils: Pupils are equal, round, and reactive to light. Cardiovascular:      Rate and Rhythm: Normal rate and regular rhythm. Pulmonary:      Effort: Pulmonary effort is normal. No respiratory distress. Breath sounds: Normal breath sounds. No wheezing. Abdominal:      Tenderness: There is no guarding. Musculoskeletal:      Cervical back: Normal range of motion. Neurological:      Mental Status: He is alert and oriented to person, place, and time. Psychiatric:         Mood and Affect: Mood normal.         Thought Content:  Thought content normal.       Lab Results   Component Value Date/Time     02/21/2023 09:06 AM    K 4.5 02/21/2023 09:06 AM     02/21/2023 09:06 AM    CO2 25 02/21/2023 09:06 AM    GLUCOSE 61 02/21/2023 09:06 AM    GLUCOSE 120 03/01/2011 12:00 AM    BUN 49 02/21/2023 09:06 AM    CREATININE 1.8 02/21/2023 09:06 AM    CREATININE 1.0 03/01/2011 12:00 AM    CALCIUM 9.0 02/21/2023 09:06 AM    PROT 6.4 02/21/2023 09:06 AM    LABALBU 4.0 02/21/2023 09:06 AM    LABALBU 4.2 11/14/2011 12:00 AM    BILITOT 0.3 02/21/2023 09:06 AM    BILITOT 0.4 11/14/2011 12:00 AM    ALT 21 02/21/2023 09:06 AM    AST 16 02/21/2023 09:06 AM       Hemoglobin A1C (%)   Date Value   02/20/2023 7.6     Microscopic Examination (no units)   Date Value   02/21/2023 Not Indicated     LDL Calculated (mg/dL)   Date Value   02/17/2023 67         Lab Results   Component Value Date/Time    WBC 8.2 02/17/2023 08:39 AM    NEUTROABS 12.0 03/03/2022 06:18 PM    HGB 10.7 02/17/2023 08:39 AM    HCT 33.5 02/17/2023 08:39 AM    MCV 90.8 02/17/2023 08:39 AM     02/17/2023 08:39 AM       Lab Results   Component Value Date

## 2023-03-29 ASSESSMENT — ENCOUNTER SYMPTOMS
SINUS PRESSURE: 1
SINUS PAIN: 1

## 2023-04-04 DIAGNOSIS — E11.42 TYPE 2 DIABETES MELLITUS WITH DIABETIC POLYNEUROPATHY, WITHOUT LONG-TERM CURRENT USE OF INSULIN (HCC): ICD-10-CM

## 2023-04-04 RX ORDER — INSULIN DEGLUDEC 200 U/ML
INJECTION, SOLUTION SUBCUTANEOUS
Qty: 54 ML | Refills: 1 | Status: SHIPPED | OUTPATIENT
Start: 2023-04-04

## 2023-04-17 ENCOUNTER — HOSPITAL ENCOUNTER (OUTPATIENT)
Facility: HOSPITAL | Age: 78
Discharge: HOME | End: 2023-04-17
Payer: MEDICARE

## 2023-04-17 DIAGNOSIS — N18.30 CRF (CHRONIC RENAL FAILURE), STAGE 3 (MODERATE) (HCC): ICD-10-CM

## 2023-04-17 LAB
ANION GAP SERPL CALCULATED.3IONS-SCNC: 7 MMOL/L (ref 3–16)
BUN SERPL-MCNC: 47 MG/DL (ref 6–20)
CALCIUM SERPL-MCNC: 9 MG/DL (ref 8.5–10.5)
CHLORIDE SERPL-SCNC: 105 MMOL/L (ref 98–107)
CO2 SERPL-SCNC: 29 MMOL/L (ref 20–30)
CREAT SERPL-MCNC: 1.7 MG/DL (ref 0.4–1.2)
CREAT UR-MCNC: 36.1 MG/DL (ref 1.5–300)
GFR SERPLBLD CREATININE-BSD FMLA CKD-EPI: 41 ML/MIN/{1.73_M2}
GLUCOSE SERPL-MCNC: 154 MG/DL (ref 74–106)
MICROALBUMIN UR DL<=1MG/L-MCNC: 1.7 MG/DL (ref 0–22)
MICROALBUMIN/CREAT UR: 47.1 MG/G (ref 0–30)
POTASSIUM SERPL-SCNC: 5.1 MMOL/L (ref 3.4–5.1)
SODIUM SERPL-SCNC: 141 MMOL/L (ref 136–145)

## 2023-04-17 PROCEDURE — 82043 UR ALBUMIN QUANTITATIVE: CPT

## 2023-04-17 PROCEDURE — 80048 BASIC METABOLIC PNL TOTAL CA: CPT

## 2023-04-17 PROCEDURE — 82570 ASSAY OF URINE CREATININE: CPT

## 2023-04-23 ENCOUNTER — HOSPITAL ENCOUNTER (INPATIENT)
Facility: HOSPITAL | Age: 78
LOS: 3 days | Discharge: HOME HEALTH | DRG: 177 | End: 2023-04-26
Attending: EMERGENCY MEDICINE | Admitting: INTERNAL MEDICINE
Payer: MEDICARE

## 2023-04-23 ENCOUNTER — APPOINTMENT (OUTPATIENT)
Dept: GENERAL RADIOLOGY | Facility: HOSPITAL | Age: 78
DRG: 177 | End: 2023-04-23
Payer: MEDICARE

## 2023-04-23 DIAGNOSIS — J96.01 ACUTE RESPIRATORY FAILURE WITH HYPOXIA: Primary | ICD-10-CM

## 2023-04-23 DIAGNOSIS — N17.9 ACUTE KIDNEY INJURY: ICD-10-CM

## 2023-04-23 DIAGNOSIS — D72.829 LEUKOCYTOSIS, UNSPECIFIED TYPE: ICD-10-CM

## 2023-04-23 DIAGNOSIS — B94.8 POST VIRAL DEBILITY: ICD-10-CM

## 2023-04-23 DIAGNOSIS — I50.9 ACUTE ON CHRONIC CONGESTIVE HEART FAILURE, UNSPECIFIED HEART FAILURE TYPE: ICD-10-CM

## 2023-04-23 DIAGNOSIS — R53.81 POST VIRAL DEBILITY: ICD-10-CM

## 2023-04-23 PROBLEM — R94.39 ABNORMAL STRESS TEST: Status: RESOLVED | Noted: 2021-10-11 | Resolved: 2023-04-23

## 2023-04-23 PROBLEM — U07.1 ACUTE HYPOXEMIC RESPIRATORY FAILURE DUE TO COVID-19: Status: ACTIVE | Noted: 2023-04-23

## 2023-04-23 PROBLEM — N18.30 CKD (CHRONIC KIDNEY DISEASE) STAGE 3, GFR 30-59 ML/MIN: Status: ACTIVE | Noted: 2023-04-23

## 2023-04-23 LAB
A-A DO2: 46.7 MMHG
ALBUMIN SERPL-MCNC: 3.6 G/DL (ref 3.5–5.2)
ALBUMIN SERPL-MCNC: 3.8 G/DL (ref 3.5–5.2)
ALBUMIN/GLOB SERPL: 1.3 G/DL
ALP SERPL-CCNC: 38 U/L (ref 39–117)
ALP SERPL-CCNC: 38 U/L (ref 39–117)
ALT SERPL W P-5'-P-CCNC: 31 U/L (ref 1–41)
ALT SERPL W P-5'-P-CCNC: 32 U/L (ref 1–41)
AMMONIA BLD-SCNC: 11 UMOL/L (ref 16–60)
ANION GAP SERPL CALCULATED.3IONS-SCNC: 10.1 MMOL/L (ref 5–15)
ARTERIAL PATENCY WRIST A: ABNORMAL
AST SERPL-CCNC: 46 U/L (ref 1–40)
AST SERPL-CCNC: 47 U/L (ref 1–40)
ATMOSPHERIC PRESS: 736 MMHG
B PARAPERT DNA SPEC QL NAA+PROBE: NOT DETECTED
B PERT DNA SPEC QL NAA+PROBE: NOT DETECTED
BASE EXCESS BLDA CALC-SCNC: 0.4 MMOL/L (ref 0–2)
BASOPHILS # BLD AUTO: 0.04 10*3/MM3 (ref 0–0.2)
BASOPHILS NFR BLD AUTO: 0.3 % (ref 0–1.5)
BDY SITE: ABNORMAL
BILIRUB CONJ SERPL-MCNC: <0.2 MG/DL (ref 0–0.3)
BILIRUB INDIRECT SERPL-MCNC: ABNORMAL MG/DL
BILIRUB SERPL-MCNC: 0.4 MG/DL (ref 0–1.2)
BILIRUB SERPL-MCNC: 0.4 MG/DL (ref 0–1.2)
BUN SERPL-MCNC: 43 MG/DL (ref 8–23)
BUN/CREAT SERPL: 22.4 (ref 7–25)
C PNEUM DNA NPH QL NAA+NON-PROBE: NOT DETECTED
CALCIUM SPEC-SCNC: 8.3 MG/DL (ref 8.6–10.5)
CHLORIDE SERPL-SCNC: 105 MMOL/L (ref 98–107)
CO2 SERPL-SCNC: 24.9 MMOL/L (ref 22–29)
COHGB MFR BLD: 1 % (ref 0–2)
CREAT SERPL-MCNC: 1.92 MG/DL (ref 0.76–1.27)
CREAT SERPL-MCNC: 2.01 MG/DL (ref 0.76–1.27)
D DIMER PPP FEU-MCNC: 0.59 MCGFEU/ML (ref 0–0.77)
D-LACTATE SERPL-SCNC: 1.1 MMOL/L (ref 0.5–2)
DEPRECATED RDW RBC AUTO: 41.1 FL (ref 37–54)
EGFRCR SERPLBLD CKD-EPI 2021: 33.5 ML/MIN/1.73
EGFRCR SERPLBLD CKD-EPI 2021: 35.4 ML/MIN/1.73
EOSINOPHIL # BLD AUTO: 0 10*3/MM3 (ref 0–0.4)
EOSINOPHIL NFR BLD AUTO: 0 % (ref 0.3–6.2)
ERYTHROCYTE [DISTWIDTH] IN BLOOD BY AUTOMATED COUNT: 12.7 % (ref 12.3–15.4)
FLUAV SUBTYP SPEC NAA+PROBE: NOT DETECTED
FLUBV RNA ISLT QL NAA+PROBE: NOT DETECTED
GAS FLOW AIRWAY: 2 LPM
GEN 5 2HR TROPONIN T REFLEX: 419 NG/L
GLOBULIN UR ELPH-MCNC: 2.7 GM/DL
GLUCOSE BLDC GLUCOMTR-MCNC: 157 MG/DL (ref 70–130)
GLUCOSE BLDC GLUCOMTR-MCNC: 72 MG/DL (ref 70–130)
GLUCOSE SERPL-MCNC: 84 MG/DL (ref 65–99)
HADV DNA SPEC NAA+PROBE: NOT DETECTED
HBA1C MFR BLD: 7.1 % (ref 4.8–5.6)
HCO3 BLDA-SCNC: 24.3 MMOL/L (ref 22–28)
HCOV 229E RNA SPEC QL NAA+PROBE: NOT DETECTED
HCOV HKU1 RNA SPEC QL NAA+PROBE: NOT DETECTED
HCOV NL63 RNA SPEC QL NAA+PROBE: NOT DETECTED
HCOV OC43 RNA SPEC QL NAA+PROBE: NOT DETECTED
HCT VFR BLD AUTO: 29.7 % (ref 37.5–51)
HCT VFR BLD CALC: 31.8 %
HGB BLD-MCNC: 9.8 G/DL (ref 13–17.7)
HMPV RNA NPH QL NAA+NON-PROBE: NOT DETECTED
HOLD SPECIMEN: NORMAL
HOLD SPECIMEN: NORMAL
HPIV1 RNA ISLT QL NAA+PROBE: NOT DETECTED
HPIV2 RNA SPEC QL NAA+PROBE: NOT DETECTED
HPIV3 RNA NPH QL NAA+PROBE: NOT DETECTED
HPIV4 P GENE NPH QL NAA+PROBE: NOT DETECTED
IMM GRANULOCYTES # BLD AUTO: 0.05 10*3/MM3 (ref 0–0.05)
IMM GRANULOCYTES NFR BLD AUTO: 0.4 % (ref 0–0.5)
LYMPHOCYTES # BLD AUTO: 0.54 10*3/MM3 (ref 0.7–3.1)
LYMPHOCYTES NFR BLD AUTO: 4.6 % (ref 19.6–45.3)
Lab: ABNORMAL
M PNEUMO IGG SER IA-ACNC: NOT DETECTED
MAGNESIUM SERPL-MCNC: 2.1 MG/DL (ref 1.6–2.4)
MCH RBC QN AUTO: 29.1 PG (ref 26.6–33)
MCHC RBC AUTO-ENTMCNC: 33 G/DL (ref 31.5–35.7)
MCV RBC AUTO: 88.1 FL (ref 79–97)
METHGB BLD QL: 0.8 % (ref 0–1.5)
MODALITY: ABNORMAL
MONOCYTES # BLD AUTO: 0.64 10*3/MM3 (ref 0.1–0.9)
MONOCYTES NFR BLD AUTO: 5.4 % (ref 5–12)
NEUTROPHILS NFR BLD AUTO: 10.49 10*3/MM3 (ref 1.7–7)
NEUTROPHILS NFR BLD AUTO: 89.3 % (ref 42.7–76)
NOTE: ABNORMAL
NRBC BLD AUTO-RTO: 0 /100 WBC (ref 0–0.2)
NT-PROBNP SERPL-MCNC: 5441 PG/ML (ref 0–1800)
OXYHGB MFR BLDV: 90.1 % (ref 94–99)
PCO2 BLDA: 35.6 MM HG (ref 35–45)
PCO2 TEMP ADJ BLD: ABNORMAL MM[HG]
PH BLDA: 7.44 PH UNITS (ref 7.35–7.45)
PH, TEMP CORRECTED: ABNORMAL
PHOSPHATE SERPL-MCNC: 4.2 MG/DL (ref 2.5–4.5)
PLATELET # BLD AUTO: 219 10*3/MM3 (ref 140–450)
PMV BLD AUTO: 10.5 FL (ref 6–12)
PO2 BLDA: 57.8 MM HG (ref 75–100)
PO2 TEMP ADJ BLD: ABNORMAL MM[HG]
POTASSIUM SERPL-SCNC: 4.6 MMOL/L (ref 3.5–5.2)
PROCALCITONIN SERPL-MCNC: 0.13 NG/ML (ref 0–0.25)
PROT SERPL-MCNC: 6.3 G/DL (ref 6–8.5)
PROT SERPL-MCNC: 6.6 G/DL (ref 6–8.5)
RBC # BLD AUTO: 3.37 10*6/MM3 (ref 4.14–5.8)
RHINOVIRUS RNA SPEC NAA+PROBE: NOT DETECTED
RSV RNA NPH QL NAA+NON-PROBE: NOT DETECTED
SAO2 % BLDCOA: 91.7 % (ref 94–100)
SARS-COV-2 RNA NPH QL NAA+NON-PROBE: DETECTED
SODIUM SERPL-SCNC: 140 MMOL/L (ref 136–145)
TROPONIN T DELTA: 1 NG/L
TROPONIN T SERPL HS-MCNC: 418 NG/L
VENTILATOR MODE: ABNORMAL
WBC NRBC COR # BLD: 11.76 10*3/MM3 (ref 3.4–10.8)
WHOLE BLOOD HOLD COAG: NORMAL
WHOLE BLOOD HOLD SPECIMEN: NORMAL

## 2023-04-23 PROCEDURE — 82248 BILIRUBIN DIRECT: CPT | Performed by: NURSE PRACTITIONER

## 2023-04-23 PROCEDURE — 82962 GLUCOSE BLOOD TEST: CPT

## 2023-04-23 PROCEDURE — 99223 1ST HOSP IP/OBS HIGH 75: CPT | Performed by: INTERNAL MEDICINE

## 2023-04-23 PROCEDURE — 93005 ELECTROCARDIOGRAM TRACING: CPT | Performed by: NURSE PRACTITIONER

## 2023-04-23 PROCEDURE — 83735 ASSAY OF MAGNESIUM: CPT | Performed by: NURSE PRACTITIONER

## 2023-04-23 PROCEDURE — 25010000002 ENOXAPARIN PER 10 MG: Performed by: INTERNAL MEDICINE

## 2023-04-23 PROCEDURE — 36415 COLL VENOUS BLD VENIPUNCTURE: CPT

## 2023-04-23 PROCEDURE — 83036 HEMOGLOBIN GLYCOSYLATED A1C: CPT | Performed by: INTERNAL MEDICINE

## 2023-04-23 PROCEDURE — 36600 WITHDRAWAL OF ARTERIAL BLOOD: CPT

## 2023-04-23 PROCEDURE — 82140 ASSAY OF AMMONIA: CPT | Performed by: NURSE PRACTITIONER

## 2023-04-23 PROCEDURE — 84100 ASSAY OF PHOSPHORUS: CPT | Performed by: NURSE PRACTITIONER

## 2023-04-23 PROCEDURE — 25010000002 FUROSEMIDE PER 20 MG: Performed by: INTERNAL MEDICINE

## 2023-04-23 PROCEDURE — 85025 COMPLETE CBC W/AUTO DIFF WBC: CPT | Performed by: NURSE PRACTITIONER

## 2023-04-23 PROCEDURE — 83605 ASSAY OF LACTIC ACID: CPT | Performed by: NURSE PRACTITIONER

## 2023-04-23 PROCEDURE — 99221 1ST HOSP IP/OBS SF/LOW 40: CPT | Performed by: INTERNAL MEDICINE

## 2023-04-23 PROCEDURE — 80053 COMPREHEN METABOLIC PANEL: CPT | Performed by: NURSE PRACTITIONER

## 2023-04-23 PROCEDURE — XW033E5 INTRODUCTION OF REMDESIVIR ANTI-INFECTIVE INTO PERIPHERAL VEIN, PERCUTANEOUS APPROACH, NEW TECHNOLOGY GROUP 5: ICD-10-PCS | Performed by: INTERNAL MEDICINE

## 2023-04-23 PROCEDURE — 25010000002 CEFTRIAXONE SODIUM-DEXTROSE 1-3.74 GM-%(50ML) RECONSTITUTED SOLUTION: Performed by: NURSE PRACTITIONER

## 2023-04-23 PROCEDURE — 82805 BLOOD GASES W/O2 SATURATION: CPT

## 2023-04-23 PROCEDURE — 83050 HGB METHEMOGLOBIN QUAN: CPT

## 2023-04-23 PROCEDURE — 71045 X-RAY EXAM CHEST 1 VIEW: CPT

## 2023-04-23 PROCEDURE — 25010000002 METHYLPREDNISOLONE PER 125 MG: Performed by: NURSE PRACTITIONER

## 2023-04-23 PROCEDURE — 0202U NFCT DS 22 TRGT SARS-COV-2: CPT | Performed by: NURSE PRACTITIONER

## 2023-04-23 PROCEDURE — 83880 ASSAY OF NATRIURETIC PEPTIDE: CPT | Performed by: NURSE PRACTITIONER

## 2023-04-23 PROCEDURE — 99285 EMERGENCY DEPT VISIT HI MDM: CPT

## 2023-04-23 PROCEDURE — 84484 ASSAY OF TROPONIN QUANT: CPT | Performed by: INTERNAL MEDICINE

## 2023-04-23 PROCEDURE — 84145 PROCALCITONIN (PCT): CPT | Performed by: NURSE PRACTITIONER

## 2023-04-23 PROCEDURE — 84484 ASSAY OF TROPONIN QUANT: CPT | Performed by: NURSE PRACTITIONER

## 2023-04-23 PROCEDURE — 82565 ASSAY OF CREATININE: CPT | Performed by: INTERNAL MEDICINE

## 2023-04-23 PROCEDURE — 85379 FIBRIN DEGRADATION QUANT: CPT | Performed by: NURSE PRACTITIONER

## 2023-04-23 PROCEDURE — 25010000002 REMDESIVIR 100 MG/20ML SOLUTION 1 EACH VIAL: Performed by: INTERNAL MEDICINE

## 2023-04-23 PROCEDURE — 82375 ASSAY CARBOXYHB QUANT: CPT

## 2023-04-23 PROCEDURE — 25010000002 FUROSEMIDE PER 20 MG: Performed by: NURSE PRACTITIONER

## 2023-04-23 RX ORDER — PRAMIPEXOLE DIHYDROCHLORIDE 1 MG/1
1 TABLET ORAL NIGHTLY
Status: DISCONTINUED | OUTPATIENT
Start: 2023-04-23 | End: 2023-04-26 | Stop reason: HOSPADM

## 2023-04-23 RX ORDER — GUAIFENESIN/DEXTROMETHORPHAN 100-10MG/5
20 SYRUP ORAL EVERY 4 HOURS PRN
Status: DISCONTINUED | OUTPATIENT
Start: 2023-04-23 | End: 2023-04-26 | Stop reason: HOSPADM

## 2023-04-23 RX ORDER — FUROSEMIDE 10 MG/ML
40 INJECTION INTRAMUSCULAR; INTRAVENOUS DAILY
Status: DISCONTINUED | OUTPATIENT
Start: 2023-04-23 | End: 2023-04-24

## 2023-04-23 RX ORDER — VALSARTAN 80 MG/1
320 TABLET ORAL
Status: DISCONTINUED | OUTPATIENT
Start: 2023-04-24 | End: 2023-04-24

## 2023-04-23 RX ORDER — ONDANSETRON 4 MG/1
4 TABLET, FILM COATED ORAL EVERY 6 HOURS PRN
Status: DISCONTINUED | OUTPATIENT
Start: 2023-04-23 | End: 2023-04-26 | Stop reason: HOSPADM

## 2023-04-23 RX ORDER — DEXAMETHASONE SODIUM PHOSPHATE 4 MG/ML
6 INJECTION, SOLUTION INTRA-ARTICULAR; INTRALESIONAL; INTRAMUSCULAR; INTRAVENOUS; SOFT TISSUE DAILY
Status: DISCONTINUED | OUTPATIENT
Start: 2023-04-24 | End: 2023-04-25

## 2023-04-23 RX ORDER — MELATONIN
4000 DAILY
Status: DISCONTINUED | OUTPATIENT
Start: 2023-04-24 | End: 2023-04-26 | Stop reason: HOSPADM

## 2023-04-23 RX ORDER — BUDESONIDE AND FORMOTEROL FUMARATE DIHYDRATE 160; 4.5 UG/1; UG/1
2 AEROSOL RESPIRATORY (INHALATION)
Status: DISCONTINUED | OUTPATIENT
Start: 2023-04-23 | End: 2023-04-26 | Stop reason: HOSPADM

## 2023-04-23 RX ORDER — CALCIUM CARBONATE 200(500)MG
2 TABLET,CHEWABLE ORAL 2 TIMES DAILY PRN
Status: DISCONTINUED | OUTPATIENT
Start: 2023-04-23 | End: 2023-04-26 | Stop reason: HOSPADM

## 2023-04-23 RX ORDER — PANTOPRAZOLE SODIUM 40 MG/1
40 TABLET, DELAYED RELEASE ORAL
Status: DISCONTINUED | OUTPATIENT
Start: 2023-04-23 | End: 2023-04-26 | Stop reason: HOSPADM

## 2023-04-23 RX ORDER — ACETAMINOPHEN 325 MG/1
650 TABLET ORAL EVERY 4 HOURS PRN
Status: DISCONTINUED | OUTPATIENT
Start: 2023-04-23 | End: 2023-04-26 | Stop reason: HOSPADM

## 2023-04-23 RX ORDER — ATORVASTATIN CALCIUM 40 MG/1
40 TABLET, FILM COATED ORAL DAILY
Status: DISCONTINUED | OUTPATIENT
Start: 2023-04-24 | End: 2023-04-26 | Stop reason: HOSPADM

## 2023-04-23 RX ORDER — METHYLPREDNISOLONE SODIUM SUCCINATE 125 MG/2ML
125 INJECTION, POWDER, LYOPHILIZED, FOR SOLUTION INTRAMUSCULAR; INTRAVENOUS ONCE
Status: COMPLETED | OUTPATIENT
Start: 2023-04-23 | End: 2023-04-23

## 2023-04-23 RX ORDER — INSULIN ASPART 100 [IU]/ML
0-14 INJECTION, SOLUTION INTRAVENOUS; SUBCUTANEOUS
Status: DISCONTINUED | OUTPATIENT
Start: 2023-04-23 | End: 2023-04-26 | Stop reason: HOSPADM

## 2023-04-23 RX ORDER — HYDRALAZINE HYDROCHLORIDE 25 MG/1
100 TABLET, FILM COATED ORAL 3 TIMES DAILY
Status: DISCONTINUED | OUTPATIENT
Start: 2023-04-23 | End: 2023-04-26 | Stop reason: HOSPADM

## 2023-04-23 RX ORDER — PRASUGREL 10 MG/1
10 TABLET, FILM COATED ORAL DAILY
Status: DISCONTINUED | OUTPATIENT
Start: 2023-04-24 | End: 2023-04-26 | Stop reason: HOSPADM

## 2023-04-23 RX ORDER — CARVEDILOL 6.25 MG/1
6.25 TABLET ORAL 2 TIMES DAILY WITH MEALS
Status: DISCONTINUED | OUTPATIENT
Start: 2023-04-23 | End: 2023-04-26 | Stop reason: HOSPADM

## 2023-04-23 RX ORDER — SODIUM CHLORIDE 0.9 % (FLUSH) 0.9 %
10 SYRINGE (ML) INJECTION AS NEEDED
Status: DISCONTINUED | OUTPATIENT
Start: 2023-04-23 | End: 2023-04-26 | Stop reason: HOSPADM

## 2023-04-23 RX ORDER — ENOXAPARIN SODIUM 100 MG/ML
40 INJECTION SUBCUTANEOUS DAILY
Status: DISCONTINUED | OUTPATIENT
Start: 2023-04-23 | End: 2023-04-24

## 2023-04-23 RX ORDER — SODIUM CHLORIDE 0.9 % (FLUSH) 0.9 %
10 SYRINGE (ML) INJECTION EVERY 12 HOURS SCHEDULED
Status: DISCONTINUED | OUTPATIENT
Start: 2023-04-23 | End: 2023-04-26 | Stop reason: HOSPADM

## 2023-04-23 RX ORDER — FLUTICASONE PROPIONATE 50 MCG
1 SPRAY, SUSPENSION (ML) NASAL DAILY
Status: DISCONTINUED | OUTPATIENT
Start: 2023-04-24 | End: 2023-04-26 | Stop reason: HOSPADM

## 2023-04-23 RX ORDER — CETIRIZINE HYDROCHLORIDE 10 MG/1
10 TABLET ORAL DAILY
Status: DISCONTINUED | OUTPATIENT
Start: 2023-04-24 | End: 2023-04-26 | Stop reason: HOSPADM

## 2023-04-23 RX ORDER — NITROGLYCERIN 0.4 MG/1
0.4 TABLET SUBLINGUAL
Status: DISCONTINUED | OUTPATIENT
Start: 2023-04-23 | End: 2023-04-26 | Stop reason: HOSPADM

## 2023-04-23 RX ORDER — MULTIPLE VITAMINS W/ MINERALS TAB 9MG-400MCG
1 TAB ORAL DAILY
Status: DISCONTINUED | OUTPATIENT
Start: 2023-04-23 | End: 2023-04-26 | Stop reason: HOSPADM

## 2023-04-23 RX ORDER — NICOTINE POLACRILEX 4 MG
15 LOZENGE BUCCAL
Status: DISCONTINUED | OUTPATIENT
Start: 2023-04-23 | End: 2023-04-26 | Stop reason: HOSPADM

## 2023-04-23 RX ORDER — FUROSEMIDE 10 MG/ML
80 INJECTION INTRAMUSCULAR; INTRAVENOUS ONCE
Status: COMPLETED | OUTPATIENT
Start: 2023-04-23 | End: 2023-04-23

## 2023-04-23 RX ORDER — ENOXAPARIN SODIUM 100 MG/ML
40 INJECTION SUBCUTANEOUS DAILY
Status: DISCONTINUED | OUTPATIENT
Start: 2023-04-23 | End: 2023-04-23

## 2023-04-23 RX ORDER — ONDANSETRON 2 MG/ML
4 INJECTION INTRAMUSCULAR; INTRAVENOUS EVERY 6 HOURS PRN
Status: DISCONTINUED | OUTPATIENT
Start: 2023-04-23 | End: 2023-04-26 | Stop reason: HOSPADM

## 2023-04-23 RX ORDER — TERAZOSIN 5 MG/1
5 CAPSULE ORAL EVERY 12 HOURS SCHEDULED
Status: DISCONTINUED | OUTPATIENT
Start: 2023-04-23 | End: 2023-04-26 | Stop reason: HOSPADM

## 2023-04-23 RX ORDER — CEFTRIAXONE 1 G/50ML
1 INJECTION, SOLUTION INTRAVENOUS ONCE
Status: COMPLETED | OUTPATIENT
Start: 2023-04-23 | End: 2023-04-23

## 2023-04-23 RX ORDER — SODIUM CHLORIDE 9 MG/ML
40 INJECTION, SOLUTION INTRAVENOUS AS NEEDED
Status: DISCONTINUED | OUTPATIENT
Start: 2023-04-23 | End: 2023-04-26 | Stop reason: HOSPADM

## 2023-04-23 RX ORDER — FLURBIPROFEN SODIUM 0.3 MG/ML
SOLUTION/ DROPS OPHTHALMIC
COMMUNITY
Start: 2023-03-26

## 2023-04-23 RX ORDER — CHOLECALCIFEROL (VITAMIN D3) 125 MCG
5 CAPSULE ORAL NIGHTLY
Status: DISCONTINUED | OUTPATIENT
Start: 2023-04-23 | End: 2023-04-26 | Stop reason: HOSPADM

## 2023-04-23 RX ORDER — TAMSULOSIN HYDROCHLORIDE 0.4 MG/1
0.4 CAPSULE ORAL NIGHTLY
Status: DISCONTINUED | OUTPATIENT
Start: 2023-04-23 | End: 2023-04-26 | Stop reason: HOSPADM

## 2023-04-23 RX ORDER — FINASTERIDE 5 MG/1
5 TABLET, FILM COATED ORAL DAILY
Status: DISCONTINUED | OUTPATIENT
Start: 2023-04-24 | End: 2023-04-26 | Stop reason: HOSPADM

## 2023-04-23 RX ORDER — ALBUTEROL SULFATE 90 UG/1
2 AEROSOL, METERED RESPIRATORY (INHALATION)
Status: DISCONTINUED | OUTPATIENT
Start: 2023-04-23 | End: 2023-04-26 | Stop reason: HOSPADM

## 2023-04-23 RX ORDER — DEXTROSE MONOHYDRATE 25 G/50ML
25 INJECTION, SOLUTION INTRAVENOUS
Status: DISCONTINUED | OUTPATIENT
Start: 2023-04-23 | End: 2023-04-26 | Stop reason: HOSPADM

## 2023-04-23 RX ADMIN — ALBUTEROL SULFATE 2 PUFF: 90 AEROSOL, METERED RESPIRATORY (INHALATION) at 21:29

## 2023-04-23 RX ADMIN — ENOXAPARIN SODIUM 40 MG: 100 INJECTION SUBCUTANEOUS at 17:32

## 2023-04-23 RX ADMIN — PANTOPRAZOLE SODIUM 40 MG: 40 TABLET, DELAYED RELEASE ORAL at 17:32

## 2023-04-23 RX ADMIN — FUROSEMIDE 40 MG: 10 INJECTION, SOLUTION INTRAMUSCULAR; INTRAVENOUS at 17:32

## 2023-04-23 RX ADMIN — CEFTRIAXONE 1 G: 1 INJECTION, SOLUTION INTRAVENOUS at 14:30

## 2023-04-23 RX ADMIN — PRAMIPEXOLE DIHYDROCHLORIDE 1 MG: 1 TABLET ORAL at 21:36

## 2023-04-23 RX ADMIN — TAMSULOSIN HYDROCHLORIDE 0.4 MG: 0.4 CAPSULE ORAL at 21:20

## 2023-04-23 RX ADMIN — BUDESONIDE AND FORMOTEROL FUMARATE DIHYDRATE 2 PUFF: 160; 4.5 AEROSOL RESPIRATORY (INHALATION) at 21:30

## 2023-04-23 RX ADMIN — FUROSEMIDE 80 MG: 10 INJECTION, SOLUTION INTRAMUSCULAR; INTRAVENOUS at 14:30

## 2023-04-23 RX ADMIN — Medication 10 ML: at 21:19

## 2023-04-23 RX ADMIN — TERAZOSIN HYDROCHLORIDE 5 MG: 5 CAPSULE ORAL at 21:21

## 2023-04-23 RX ADMIN — REMDESIVIR 200 MG: 100 INJECTION, POWDER, LYOPHILIZED, FOR SOLUTION INTRAVENOUS at 17:32

## 2023-04-23 RX ADMIN — MULTIPLE VITAMINS W/ MINERALS TAB 1 TABLET: TAB at 17:32

## 2023-04-23 RX ADMIN — HYDRALAZINE HYDROCHLORIDE 100 MG: 25 TABLET, FILM COATED ORAL at 21:20

## 2023-04-23 RX ADMIN — HYDRALAZINE HYDROCHLORIDE 100 MG: 25 TABLET, FILM COATED ORAL at 17:31

## 2023-04-23 RX ADMIN — Medication 5 MG: at 21:21

## 2023-04-23 RX ADMIN — CARVEDILOL 6.25 MG: 6.25 TABLET, FILM COATED ORAL at 17:32

## 2023-04-23 RX ADMIN — TIOTROPIUM BROMIDE INHALATION SPRAY 2 PUFF: 3.12 SPRAY, METERED RESPIRATORY (INHALATION) at 17:32

## 2023-04-23 RX ADMIN — METHYLPREDNISOLONE SODIUM SUCCINATE 125 MG: 125 INJECTION, POWDER, FOR SOLUTION INTRAMUSCULAR; INTRAVENOUS at 13:29

## 2023-04-23 NOTE — PLAN OF CARE
Goal Outcome Evaluation:      New admit from ED. VS noted. O2 sats >90% on 2LNC. Pt very unsteady, bed alarm at all times.

## 2023-04-23 NOTE — ED PROVIDER NOTES
Subjective  History of Present Illness:    Chief Complaint: Shortness of breath  History of Present Illness: This is a 78-year-old male patient comes into the ED today complaining of shortness of breath.  EMS states they were called to patient's residence due to patient having shortness of breath.  Upon arrival patient was placed on a monitor and had an oxygen saturation of 77% on room air.  EMS gave patient neb treatment and was placed on 5 L upon arrival to the emergency room.  Patient was satting in the mid 90s.  Patient states he does not wear oxygen at home except for CPAP at night.  Patient maintaining his oxygenation in the low 90s with 2 L nasal cannula      Nurses Notes reviewed and agree, including vitals, allergies, social history and prior medical history.       Allergies:    Zocor [simvastatin], Bisoprolol, Byetta 10 mcg pen [exenatide], Crestor [rosuvastatin calcium], Metformin and related, and Plavix [clopidogrel bisulfate]      Past Surgical History:   Procedure Laterality Date   • CARDIAC CATHETERIZATION     • CARDIAC CATHETERIZATION Left 10/19/2021    Procedure: Left Heart Cath;  Surgeon: Liz Russo MD;  Location: [facility] CATH INVASIVE LOCATION;  Service: Cardiology;  Laterality: Left;   • COLONOSCOPY W/ POLYPECTOMY     • CORONARY ANGIOPLASTY WITH STENT PLACEMENT Left 06/03/2009    Left heart catheterization, 06/03/2009, Dr. Russo:  LVEF 45% to 50%.  Placement of overlapping Cypher drug-eluting stent 2.5 x 28 and 2.5 x 18 to the SVG to the obtuse marginal branch.  SVG to the PDA had a proximal stenosis estimated at 60% with a distal stenosis of 50% to 60%.   • CORONARY ANGIOPLASTY WITH STENT PLACEMENT Left 07/06/2009    Left heart catheterization, 07/06/2009:  PTCA and stent placement in the mid PDA using a 2.25 x 12 mm Taxus drug-eluting stent with stent placement in the distal SVG to the PDA using a 2.5 x 18 mm Cypher drug-eluting stent and stenting of the proximal SVG to the  PDA using a 3.0 x 28 mm Cypher drug-eluting stent.   • CORONARY ANGIOPLASTY WITH STENT PLACEMENT  04/23/2010    Cardiac catheterization for recurrent anginal symptoms, 04/23/2010, with PTCA and stent placement in the proximal SVG to the PDA using 3.0 x 28 mm Promus drug-eluting stent for in-stent restenosis.   • CORONARY ANGIOPLASTY WITH STENT PLACEMENT Left 07/06/2010    Left heart catheterization, 07/06/2010, Dr. Russo:  EF 40% to 45%.  3.5 x 23 mm Promus drug-eluting stent in the proximal SVG to OM, 2.5 x 18 mm Promus drug-eluting stent to distal SVG to PDA due to in-stent restenosis.     • CORONARY ANGIOPLASTY WITH STENT PLACEMENT Left 11/16/2010    Left heart catheterization, 11/16/2010, with placement of a 3.0 x 16 mm Taxus drug-eluting stent to the SVG to the RCA proximally and a 2.5 x 16 mm paclitaxel stent distally in the SVG to the RCA.   • CORONARY ANGIOPLASTY WITH STENT PLACEMENT Left     Left heart catheterization, 3.0 x 24-mm Promus element drug-eluting stent to a 90% lesion in the mid portion of the SVG to the PDA.    • CORONARY ANGIOPLASTY WITH STENT PLACEMENT Left 06/24/2014    Left heart catheterization for recurrent angina, 06/24/2014, Dr. Russo:  PTCA of the SVG to the PDA using a 3 x 12 mm NC TREK balloon.     • CORONARY ARTERY BYPASS GRAFT      CABG x3, Dr. Peng, [facility], 2001.         Social History     Socioeconomic History   • Marital status:    Tobacco Use   • Smoking status: Never   • Smokeless tobacco: Never   Vaping Use   • Vaping Use: Never used   Substance and Sexual Activity   • Alcohol use: No   • Drug use: No   • Sexual activity: Defer         Family History   Problem Relation Age of Onset   • Heart attack Mother    • Ulcers Father        REVIEW OF SYSTEMS: All systems reviewed and not pertinent unless noted.    Review of Systems   Respiratory: Positive for chest tightness, shortness of breath and wheezing.    All other systems reviewed and are  negative.      Objective    Physical Exam  Vitals and nursing note reviewed.   Constitutional:       Appearance: Normal appearance.   HENT:      Head: Normocephalic and atraumatic.   Eyes:      Extraocular Movements: Extraocular movements intact.      Pupils: Pupils are equal, round, and reactive to light.   Cardiovascular:      Rate and Rhythm: Normal rate and regular rhythm.      Pulses: Normal pulses.      Heart sounds: Normal heart sounds.   Pulmonary:      Effort: Accessory muscle usage present.      Breath sounds: Examination of the right-lower field reveals decreased breath sounds. Examination of the left-lower field reveals decreased breath sounds. Decreased breath sounds present.      Comments: Decreased in the bases bilaterally, scattered wheezing throughout  Abdominal:      General: Bowel sounds are normal.      Palpations: Abdomen is soft.   Musculoskeletal:         General: Normal range of motion.      Cervical back: Normal range of motion and neck supple.      Right lower le+ Edema present.      Left lower le+ Edema present.   Skin:     General: Skin is warm and dry.      Capillary Refill: Capillary refill takes less than 2 seconds.   Neurological:      Mental Status: He is alert and oriented to person, place, and time.      GCS: GCS eye subscore is 4. GCS verbal subscore is 5. GCS motor subscore is 6.      Sensory: Sensation is intact.      Motor: Motor function is intact.   Psychiatric:         Attention and Perception: Attention and perception normal.         Mood and Affect: Mood and affect normal.         Speech: Speech normal.           Procedures    ED Course:    ED Course as of 23 1428   Sun 2023   1326 EKG interpreted by me: Sinus rhythm, normal rate, diffuse nonspecific ST/T changes, this is an abnormal EKG, this is unchanged compared to previous [MP]   1424 Spoke with Dr. Ardon, hospitalist about admission of patient due to acute respiratory failure with hypoxia, heart  failure exacerbation and possible pneumonia.  Dr. Ardon requested that I called Dr. Herbert, cardiology on call due to elevated troponin.  Spoke with Dr. Herbert, he states put him on his list and he will come see the patient in consult [KH]      ED Course User Index  [KH] Ned Jaime APRN  [MP] Quang Mendoza MD       Lab Results (last 24 hours)     Procedure Component Value Units Date/Time    CBC & Differential [[id number]] Collected: 04/23/23 1323    Specimen: Blood Updated: 04/23/23 1329    Narrative:      The following orders were created for panel order CBC & Differential.  Procedure                               Abnormality         Status                     ---------                               -----------         ------                     CBC Auto Differential[[id number]]                            In process                   Please view results for these tests on the individual orders.    Comprehensive Metabolic Panel [[id number]] Collected: 04/23/23 1323    Specimen: Blood Updated: 04/23/23 1329    BNP [[id number]] Collected: 04/23/23 1323    Specimen: Blood Updated: 04/23/23 1329    D-dimer, Quantitative [[id number]] Collected: 04/23/23 1323    Specimen: Blood Updated: 04/23/23 1329    High Sensitivity Troponin T [[id number]] Collected: 04/23/23 1323    Specimen: Blood Updated: 04/23/23 1329    Lactic Acid, Plasma [[id number]] Collected: 04/23/23 1323    Specimen: Blood Updated: 04/23/23 1329    Procalcitonin [[id number]] Collected: 04/23/23 1323    Specimen: Blood Updated: 04/23/23 1329    Ammonia [[id number]] Collected: 04/23/23 1323    Specimen: Blood Updated: 04/23/23 1329    Magnesium [[id number]] Collected: 04/23/23 1323    Specimen: Blood Updated: 04/23/23 1329    Phosphorus [[id number]] Collected: 04/23/23 1323    Specimen: Blood Updated: 04/23/23 1329    CBC Auto Differential [[id number]] Collected: 04/23/23 1323    Specimen: Blood Updated: 04/23/23 1329           No radiology results from  the last 24 hrs     Medical Decision Making  78-year-old male patient comes into the ED today complaining of shortness of breath.  GCS 15 alert and oriented x3 responds appropriately to questions cardiac regular rate and rhythm EKG similar to previous does show some ST changes and T wave inversions.  Respiratory patient is decreased in the bases bilaterally scattered wheezing throughout using some accessory muscles.  Abdomen mild distention, soft nontender to palpation positive umbilical hernia    DDX: includes but is not limited to: Pneumonia, COPD exacerbation, NSTEMI, unstable angina    Acute kidney injury: acute illness or injury  Acute on chronic congestive heart failure, unspecified heart failure type: acute illness or injury  Acute respiratory failure with hypoxia: acute illness or injury  Leukocytosis, unspecified type: acute illness or injury  Amount and/or Complexity of Data Reviewed  Labs: ordered. Decision-making details documented in ED Course.  Radiology: ordered. Decision-making details documented in ED Course.  ECG/medicine tests: ordered. Decision-making details documented in ED Course.  Discussion of management or test interpretation with external provider(s): Discussed assessment, treatment and plan with ER attending, hospitalist, cardiologist    Risk  Prescription drug management.  Decision regarding hospitalization.    Risk Details: After discussion with ER attending, hospitalist, and cardiology patient will be admitted to the hospital for acute respiratory failure with hypoxia, heart failure exacerbation, acute kidney injury, leukocytosis.  Patient was started on antibiotics in the emergency department for the leukocytosis.    Critical Care  Total time providing critical care: 35 minutes    35 minutes of critical care provided. This time excludes other billable procedures. Time does include preparation of documents, medical consultations, review of old records, and direct bedside care. Patient  is at high risk for life-threatening deterioration due to acute respiratory failure with hypoxia, new oxygen demand, heart failure exacerbation, acute kidney injury, leukocytosis.             Final diagnoses:   None        Ned Jaime, ASHLEIGH  04/23/23 1969

## 2023-04-23 NOTE — PHARMACY RECOMMENDATION
Pharmacokinetic Consult - Remdesivir Dosing  Donny Jerry is a 78 y.o. male who has been consulted to dose remdesivir for COVID-19. Patient is 1 day from symptom onset.       Allergies  Zocor [simvastatin], Bisoprolol, Byetta 10 mcg pen [exenatide], Crestor [rosuvastatin calcium], Metformin and related, and Plavix [clopidogrel bisulfate]    Relevant clinical data and objective history reviewed:  Estimated Creatinine Clearance: 36.7 mL/min (A) (by C-G formula based on SCr of 1.92 mg/dL (H)).    Results from last 7 days   Lab Units 04/23/23  1323   ALK PHOS U/L 38*   BILIRUBIN mg/dL 0.4   ALT (SGPT) U/L 31   AST (SGOT) U/L 46*       Results from last 7 days   Lab Units 04/23/23  1323   CREATININE mg/dL 1.92*       COVID LABS:  Results From Last 14 Days   Lab Units 04/23/23  1323   PROBNP pg/mL 5,441.0*   D DIMER QUANT MCGFEU/mL 0.59   LACTATE mmol/L 1.1   PROCALCITONIN ng/mL 0.13   HSTROP T ng/L 418*       Asessment/Plan  Will initiate remdesivir 200mg IV x 1 dose followed by remdesivir 100mg IV x 4 doses for a total of 5 days of therapy.  Pharmacy will monitor Mr. Jerry's clinical status as well as renal and hepatic function.     Thanks,   Marian Shaikh, PharmD  4/23/2023  16:15 EDT

## 2023-04-23 NOTE — CONSULTS
Inpatient Cardiology Consult  Consult performed by: Corey Suarez IV, MD  Consult ordered by: Ned Jaime APRN  Reason for consult: Elevated troponin            Cardiology Consult         IDENTIFICATION: 78-year-old gentleman who resides in [place], [place]    Primary cardiologist: Liz Russo MD      Active Hospital Problems    Diagnosis  POA   • **Acute hypoxemic respiratory failure due to COVID-19 [U07.1, J96.01]  Yes     Priority: High   • CKD (chronic kidney disease) stage 3, GFR 30-59 ml/min [N18.30]  Yes   • Coronary artery disease involving native coronary artery of native heart with angina pectoris [I25.119]  Yes     · CABG by Dr. Peng (2001): LIMA to LAD, SVG to OM, SVG to RPDA.  · Multiple cardiac catheterizations and repeat PCI's of SVG to RPDA and SVG to OM  · Cardiac catheterization (10/19/2021): Severe 2-vessel CAD (LAD, RCA).  Patent LIMA to LAD and patent SVG to RPDA with 50% ISR mid segment.     • Type 2 diabetes mellitus with stage 3 chronic kidney disease [E11.22, N18.30]  Yes   • Hyperlipidemia LDL goal <70 [E78.5]  Yes     • High intensity statin therapy indicated given the presence of CAD  • Intolerant to rosuvastatin and simvastatin                78-year-old gentleman with coronary artery disease who has not been feeling well for approximately 1-2 weeks.  He reports feeling weak and short of breath.  The shortness of breath became more frequent yesterday.  He denies chest pain like he is experienced prior to previous PCI and bypass.    In ER, high-sensitivity troponin 418, proBNP 5441.  Chest x-ray showing diffuse interstitial pattern and respiratory PCR positive for COVID-19.    Patient presently is breathing comfortably in bed.  He denies chest pain.    He saw Dr. Russo in clinic on 3/2/2023.  At that time, furosemide was decreased from 40 mg to 20 mg daily.    Allergies   Allergen Reactions   • Zocor [Simvastatin] Myalgia   • Bisoprolol Other (See Comments)      Agitation     • Byetta 10 Mcg Pen [Exenatide] Nausea Only     nausea   • Crestor [Rosuvastatin Calcium] Myalgia   • Metformin And Related Nausea Only   • Plavix [Clopidogrel Bisulfate] Other (See Comments)     resistance       Prior to Admission medications    Medication Sig Start Date End Date Taking? Authorizing Provider   albuterol sulfate  (90 Base) MCG/ACT inhaler Inhale 2 puffs Every 4 (Four) Hours As Needed for Wheezing.    Jacob Sotelo MD   atorvastatin (LIPITOR) 40 MG tablet Take 1 tablet by mouth Daily. 1/1/22   Jacob Sotelo MD   azilsartan medoxomil (EDARBI) 80 MG tablet tablet Take 1 tablet by mouth Daily.    Jacob Sotelo MD   carvedilol (COREG) 12.5 MG tablet Take 6.25 mg by mouth 2 (Two) Times a Day With Meals. Pt takes 0.5 tab BID    Jacob Sotelo MD   Cholecalciferol (VITAMIN D3) 50 MCG (2000 UT) capsule Take 2 capsules by mouth.    Jacob Sotelo MD   Dapagliflozin Propanediol 10 MG tablet Take 10 mg by mouth Daily.    Jacob Sotelo MD   doxazosin (CARDURA) 8 MG tablet Take 1 tablet by mouth Every 12 (Twelve) Hours. 10/19/21   Liz Russo MD   finasteride (PROSCAR) 5 MG tablet Take 1 tablet by mouth Daily.    Jacob Sotelo MD   fluticasone (FLONASE) 50 MCG/ACT nasal spray 1 spray into the nostril(s) as directed by provider Daily. 8/18/22   Carolann Franco APRN   Fluticasone-Umeclidin-Vilant (Trelegy Ellipta) 200-62.5-25 MCG/INH inhaler Inhale 1 puff Daily. Rinse mouth with water after use. 8/18/22   Carolann Franco APRN   furosemide (LASIX) 20 MG tablet Take 1 tablet by mouth Daily. 3/6/23   Liz Russo MD   furosemide (LASIX) 40 MG tablet Take 1 tablet by mouth 3 (Three) Times a Week. Monday, Wednesday, Friday    Jacob Sotelo MD   hydrALAZINE (APRESOLINE) 100 MG tablet Take 1 tablet by mouth 3 (Three) Times a Day. 10/19/21   Liz Russo MD   loratadine (CLARITIN)  10 MG tablet Take 1 tablet by mouth Daily.    Jacob Sotelo MD   Multiple Vitamin (MULTI VITAMIN DAILY PO) Take 1 tablet by mouth Daily.    Jacob Sotelo MD   nitroglycerin (NITROSTAT) 0.4 MG SL tablet Place 1 tablet under the tongue Every 5 (Five) Minutes As Needed for Chest Pain. Take no more than 3 doses in 15 minutes. 6/15/18   Liz Russo MD   pantoprazole (PROTONIX) 40 MG EC tablet TAKE 1 TABLET BY MOUTH 2 TIMES DAILY (BEFORE MEALS) 12/23/21   Jacob Sotelo MD   potassium chloride (K-DUR,KLOR-CON) 20 MEQ CR tablet Take 1 tablet by mouth Daily.    Jacob Sotelo MD   pramipexole (Mirapex) 1 MG tablet Take 1 tablet by mouth Every Night. 8/18/22   Carolann Franco APRN   prasugrel (EFFIENT) 10 MG tablet Take 1 tablet by mouth Daily.    Jacob Sotelo MD   tamsulosin (FLOMAX) 0.4 MG capsule 24 hr capsule Take 1 capsule by mouth Every Night.    Jacob Sotelo MD   Tresiba FlexTouch 200 UNIT/ML solution pen-injector pen injection Inject  under the skin into the appropriate area as directed. 4/29/22   Jacob Sotelo MD   TRUEplus Lancets 28G misc  9/9/22   Jacob Sotelo MD   Trulicity 0.75 MG/0.5ML solution pen-injector  5/23/22   Jacob Sotelo MD       Past Medical History:   Diagnosis Date   • Benign hypertension    • Coronary artery disease    • Depression    • Enlarged prostate     Enlarged prostate with anticipated TURP with Dr. Bernal.   • GERD (gastroesophageal reflux disease)    • Hypercholesterolemia    • Obesity    • Obstructive sleep apnea on CPAP    • Type 2 diabetes mellitus        Past Surgical History:   Procedure Laterality Date   • CARDIAC CATHETERIZATION     • CARDIAC CATHETERIZATION Left 10/19/2021    Procedure: Left Heart Cath;  Surgeon: Liz Russo MD;  [facility]: [facility] CATH INVASIVE LOC[facility];  Service: Cardiology;  Laterality: Left;   • COLONOSCOPY W/ POLYPECTOMY     • CORONARY ANGIOPLASTY WITH  STENT PLACEMENT Left 06/03/2009    Left heart catheterization, 06/03/2009, Dr. Russo:  LVEF 45% to 50%.  Placement of overlapping Cypher drug-eluting stent 2.5 x 28 and 2.5 x 18 to the SVG to the obtuse marginal branch.  SVG to the PDA had a proximal stenosis estimated at 60% with a distal stenosis of 50% to 60%.   • CORONARY ANGIOPLASTY WITH STENT PLACEMENT Left 07/06/2009    Left heart catheterization, 07/06/2009:  PTCA and stent placement in the mid PDA using a 2.25 x 12 mm Taxus drug-eluting stent with stent placement in the distal SVG to the PDA using a 2.5 x 18 mm Cypher drug-eluting stent and stenting of the proximal SVG to the PDA using a 3.0 x 28 mm Cypher drug-eluting stent.   • CORONARY ANGIOPLASTY WITH STENT PLACEMENT  04/23/2010    Cardiac catheterization for recurrent anginal symptoms, 04/23/2010, with PTCA and stent placement in the proximal SVG to the PDA using 3.0 x 28 mm Promus drug-eluting stent for in-stent restenosis.   • CORONARY ANGIOPLASTY WITH STENT PLACEMENT Left 07/06/2010    Left heart catheterization, 07/06/2010, Dr. Russo:  EF 40% to 45%.  3.5 x 23 mm Promus drug-eluting stent in the proximal SVG to OM, 2.5 x 18 mm Promus drug-eluting stent to distal SVG to PDA due to in-stent restenosis.     • CORONARY ANGIOPLASTY WITH STENT PLACEMENT Left 11/16/2010    Left heart catheterization, 11/16/2010, with placement of a 3.0 x 16 mm Taxus drug-eluting stent to the SVG to the RCA proximally and a 2.5 x 16 mm paclitaxel stent distally in the SVG to the RCA.   • CORONARY ANGIOPLASTY WITH STENT PLACEMENT Left     Left heart catheterization, 3.0 x 24-mm Promus element drug-eluting stent to a 90% lesion in the mid portion of the SVG to the PDA.    • CORONARY ANGIOPLASTY WITH STENT PLACEMENT Left 06/24/2014    Left heart catheterization for recurrent angina, 06/24/2014, Dr. Russo:  PTCA of the SVG to the PDA using a 3 x 12 mm NC TREK balloon.     • CORONARY ARTERY BYPASS GRAFT       CABG x3, Dr. Peng, [facility], 2001.       Family History   Problem Relation Age of Onset   • Heart attack Mother    • Ulcers Father        Social History     Tobacco Use   Smoking Status Never   Smokeless Tobacco Never       Social History     Substance and Sexual Activity   Alcohol Use No         Review of Systems:   Review of Systems   Constitutional: Negative for malaise/fatigue.   Eyes: Negative for vision loss in left eye and vision loss in right eye.   Cardiovascular: Positive for dyspnea on exertion. Negative for chest pain, near-syncope, orthopnea, palpitations, paroxysmal nocturnal dyspnea and syncope.   Respiratory: Positive for shortness of breath.    Musculoskeletal: Negative for myalgias.   Neurological: Negative for brief paralysis, excessive daytime sleepiness, focal weakness, numbness, paresthesias and weakness.   All other systems reviewed and are negative.           Vital Sign Min/Max for last 24 hours  Temp  Min: 98.4 °F (36.9 °C)  Max: 101.9 °F (38.8 °C)   BP  Min: 151/61  Max: 165/64   Pulse  Min: 78  Max: 85   Resp  Min: 18  Max: 20   SpO2  Min: 91 %  Max: 93 %   Flow (L/min)  Min: 3  Max: 3    No intake or output data in the 24 hours ending 04/23/23 1613        Tele: Sinus    Constitutional:       Appearance: Healthy appearance. Well-developed.   Eyes:      General: Lids are normal. No scleral icterus.     Conjunctiva/sclera: Conjunctivae normal.   HENT:      Head: Normocephalic and atraumatic.   Neck:      Thyroid: No thyromegaly.      Vascular: No carotid bruit or JVD.   Pulmonary:      Effort: Pulmonary effort is normal.      Breath sounds: Normal breath sounds. No wheezing. No rhonchi. No rales.   Cardiovascular:      Normal rate. Regular rhythm.      Murmurs: There is no murmur.      No gallop. No rub.   Pulses:     Intact distal pulses.   Edema:     Peripheral edema absent.   Abdominal:      General: There is no distension.      Palpations: Abdomen is soft. There is no  abdominal mass.   Musculoskeletal:      Cervical back: Normal range of motion. Skin:     General: Skin is warm and dry.      Findings: No rash.   Neurological:      General: No focal deficit present.      Mental Status: Alert and oriented to person, place, and time.      Gait: Gait is intact.   Psychiatric:         Attention and Perception: Attention normal.         Mood and Affect: Mood normal.         Behavior: Behavior normal.              DATA REVIEW:    EKG (4/23/2023): Sinus rhythm with LVH and LVH strain pattern.  Unchanged from EKG performed 3/2/2023        Results from last 7 days   Lab Units 04/23/23  1323   SODIUM mmol/L 140   POTASSIUM mmol/L 4.6   CHLORIDE mmol/L 105   BUN mg/dL 43*   CREATININE mg/dL 1.92*   MAGNESIUM mg/dL 2.1     Results from last 7 days   Lab Units 04/23/23  1323   HSTROP T ng/L 418*     Results from last 7 days   Lab Units 04/23/23  1323   WBC 10*3/mm3 11.76*   HEMOGLOBIN g/dL 9.8*   HEMATOCRIT % 29.7*   PLATELETS 10*3/mm3 219     Lab Results   Component Value Date    HGBA1C 7.3 (H) 06/20/2022     Lab Results   Component Value Date    CHOL 160 10/19/2021    TRIG 258 (H) 02/17/2023    HDL 30 (L) 02/17/2023    LDL 67 02/17/2023    AST 46 (H) 04/23/2023    ALT 31 04/23/2023       Lab Results   Component Value Date    TROPONINT 418 (C) 04/23/2023            Elevated troponin  · Suspect type II myocardial infarction from respiratory failure/COVID-19 pneumonia  · Repeat high-sensitivity troponin    Coronary artery disease  · Patient denies recent anginal symptoms  · Continue prasugrel, carvedilol, and atorvastatin    Essential hypertension  · Continue carvedilol, valsartan hydralazine, and terazosin    Hyperlipidemia goal LDL <70  · Well-controlled  · Continue atorvastatin  · Intolerant to rosuvastatin and simvastatin         · Repeat second high-sensitivity troponin to assess trend  · Defer systemic anticoagulation unless ACS pattern troponin elevation  · Otherwise continue present  medical therapy      Electronically signed by Corey Suarez IV, MD, 04/23/23, 3:56 PM EDT.

## 2023-04-23 NOTE — H&P
[facility]   HISTORY AND PHYSICAL      Name:  Donny Jerry   Age:  78 y.o.  Sex:  male  :  1945  MRN:  [id number]   Visit Number:  [id number]  Admission Date:  2023  Date Of Service:  23  Primary Care Physician:  Anum Alonso APRN    Chief Complaint:     Shortness of air    History Of Presenting Illness:      78-year-old gentleman past history of coronary disease status post CABG status post PCI several years back follows with Dr. Rsuso.  Reports was previously on diuretics but Dr. Russo had been weaning him off of these but he was still taking a low-dose 20 mg Lasix daily.  Reports for the last 1 week he has had increased weakness.  He is also had progressive shortness of breath which started 1 day ago.  He has had a chronic intermittent cough due to allergies which has not been worsened during this period.  He has had diarrhea and incontinence of watery stool since yesterday.  No sick contacts.  No history of similar complaints.  No fever at home.  Never been to short-term rehab.  Follows with urology Dr. Bernal for urology.  Has a CPAP.  History provided by patient as well as his daughter at the bedside.  He elects to be full code and has an advanced directive which is valid.  In the emergency department he was found to have COVID-19.    Pertinent findings: Arterial blood gas reviewed, proBNP 5441, highly sensitive troponin 418, creatinine 1.92, AST 46, WBC 11.76, respiratory PCR positive for COVID-19, chest x-ray showing a diffuse interstitial pattern with a left basilar effusion    ED Medications:    Medications   sodium chloride 0.9 % flush 10 mL (has no administration in time range)   methylPREDNISolone sodium succinate (SOLU-Medrol) injection 125 mg (125 mg Intravenous Given 23 1329)   cefTRIAXone (ROCEPHIN) IVPB 1 g/50ml dextrose (premix) (1 g Intravenous New Bag 23 1430)   furosemide (LASIX) injection 80 mg (80 mg  Intravenous Given 4/23/23 1430)           Edited by: Enrique Ardon DO at 4/23/2023 9354     Review Of Systems:    All systems were reviewed and negative except as mentioned in history of presenting illness, assessment and plan.    Past Medical History: Patient  has a past medical history of Benign hypertension, Coronary artery disease, Depression, Enlarged prostate, GERD (gastroesophageal reflux disease), Hypercholesterolemia, Obesity, Obstructive sleep apnea on CPAP, and Type 2 diabetes mellitus.    Past Surgical History: Patient  has a past surgical history that includes Coronary artery bypass graft; Coronary angioplasty with stent (Left, 06/03/2009); Coronary angioplasty with stent (Left, 07/06/2009); Coronary angioplasty with stent (04/23/2010); Coronary angioplasty with stent (Left, 07/06/2010); Coronary angioplasty with stent (Left, 11/16/2010); Coronary angioplasty with stent (Left); Coronary angioplasty with stent (Left, 06/24/2014); Cardiac catheterization; Colonoscopy w/ polypectomy; and Cardiac catheterization (Left, 10/19/2021).    Social History: Patient  reports that he has never smoked. He has never used smokeless tobacco. He reports that he does not drink alcohol and does not use drugs.    Family History:  Patient's family history has been reviewed and found to be noncontributory.     Allergies:      Zocor [simvastatin], Bisoprolol, Byetta 10 mcg pen [exenatide], Crestor [rosuvastatin calcium], Metformin and related, and Plavix [clopidogrel bisulfate]    Home Medications:    Prior to Admission Medications     Prescriptions Last Dose Informant Patient Reported? Taking?    albuterol sulfate  (90 Base) MCG/ACT inhaler   Yes No    Inhale 2 puffs Every 4 (Four) Hours As Needed for Wheezing.    atorvastatin (LIPITOR) 40 MG tablet   Yes No    Take 1 tablet by mouth Daily.    azilsartan medoxomil (EDARBI) 80 MG tablet tablet   Yes No    Take 1 tablet by mouth Daily.    carvedilol (COREG) 12.5  MG tablet   Yes No    Take 6.25 mg by mouth 2 (Two) Times a Day With Meals. Pt takes 0.5 tab BID    Cholecalciferol (VITAMIN D3) 50 MCG (2000 UT) capsule   Yes No    Take 2 capsules by mouth.    Dapagliflozin Propanediol 10 MG tablet   Yes No    Take 10 mg by mouth Daily.    doxazosin (CARDURA) 8 MG tablet   No No    Take 1 tablet by mouth Every 12 (Twelve) Hours.    finasteride (PROSCAR) 5 MG tablet   Yes No    Take 1 tablet by mouth Daily.    fluticasone (FLONASE) 50 MCG/ACT nasal spray   No No    1 spray into the nostril(s) as directed by provider Daily.    Fluticasone-Umeclidin-Vilant (Trelegy Ellipta) 200-62.5-25 MCG/INH inhaler   No No    Inhale 1 puff Daily. Rinse mouth with water after use.    furosemide (LASIX) 20 MG tablet   No No    Take 1 tablet by mouth Daily.    furosemide (LASIX) 40 MG tablet   Yes No    Take 1 tablet by mouth 3 (Three) Times a Week. Monday, Wednesday, Friday    hydrALAZINE (APRESOLINE) 100 MG tablet   No No    Take 1 tablet by mouth 3 (Three) Times a Day.    loratadine (CLARITIN) 10 MG tablet   Yes No    Take 1 tablet by mouth Daily.    Multiple Vitamin (MULTI VITAMIN DAILY PO)   Yes No    Take 1 tablet by mouth Daily.    nitroglycerin (NITROSTAT) 0.4 MG SL tablet   No No    Place 1 tablet under the tongue Every 5 (Five) Minutes As Needed for Chest Pain. Take no more than 3 doses in 15 minutes.    pantoprazole (PROTONIX) 40 MG EC tablet   Yes No    TAKE 1 TABLET BY MOUTH 2 TIMES DAILY (BEFORE MEALS)    potassium chloride (K-DUR,KLOR-CON) 20 MEQ CR tablet   Yes No    Take 1 tablet by mouth Daily.    pramipexole (Mirapex) 1 MG tablet   No No    Take 1 tablet by mouth Every Night.    prasugrel (EFFIENT) 10 MG tablet   Yes No    Take 1 tablet by mouth Daily.    tamsulosin (FLOMAX) 0.4 MG capsule 24 hr capsule   Yes No    Take 1 capsule by mouth Every Night.    Tresiba FlexTouch 200 UNIT/ML solution pen-injector pen injection   Yes No    Inject  under the skin into the appropriate area as  "directed.    TRUEplus Lancets 28G misc   Yes No    Trulicity 0.75 MG/0.5ML solution pen-injector   Yes No            Vital Signs:  Temp:  [98.4 °F (36.9 °C)-101.9 °F (38.8 °C)] 98.4 °F (36.9 °C)  Heart Rate:  [78-85] 82  Resp:  [18-20] 18  BP: (151-165)/(61-64) 151/61        04/23/23  1318 04/23/23  1539   Weight: 104 kg (230 lb) 102 kg (225 lb 5 oz)     Body mass index is 35.29 kg/m².    Physical Exam:     Most recent vital Signs: /61 (BP Location: Left arm, Patient Position: Lying)   Pulse 82   Temp 98.4 °F (36.9 °C) (Oral)   Resp 18   Ht 170.2 cm (67\")   Wt 102 kg (225 lb 5 oz)   SpO2 93%   BMI 35.29 kg/m²     Constitutional: Awake, alertEyes: PERRLA, sclerae anicteric, no conjunctival injection noted right eye lateral pterygium  HENT: NCAT, mucous membranes moist  Neck: Supple, no thyromegaly, no lymphadenopathy, trachea midline  Respiratory:  respiratory rate 22  Coarse bibasilar breath sounds without wheeze or rhonchi bilaterally, nonlabored respirations   Cardiovascular: Noted sternotomy scar RRR, no murmurs, rubs, or gallops, palpable pedal pulses bilaterally  Gastrointestinal: Positive bowel sounds, soft, nontender, nondistended  Musculoskeletal: +1 bilateral ankle edema, no clubbing or cyanosis to extremities  Psychiatric: Appropriate affect, cooperative  Neurologic: Oriented x 3, speech clear  Skin: No rashes  Edited by: Enrique Ardon DO at 4/23/2023 0405      Laboratory data:    I have reviewed the labs done in the emergency room.    Results from last 7 days   Lab Units 04/23/23  1323   SODIUM mmol/L 140   POTASSIUM mmol/L 4.6   CHLORIDE mmol/L 105   CO2 mmol/L 24.9   BUN mg/dL 43*   CREATININE mg/dL 1.92*   CALCIUM mg/dL 8.3*   BILIRUBIN mg/dL 0.4   ALK PHOS U/L 38*   ALT (SGPT) U/L 31   AST (SGOT) U/L 46*   GLUCOSE mg/dL 84     Results from last 7 days   Lab Units 04/23/23  1323   WBC 10*3/mm3 11.76*   HEMOGLOBIN g/dL 9.8*   HEMATOCRIT % 29.7*   PLATELETS 10*3/mm3 219       "   Results from last 7 days   Lab Units 04/23/23  1323   HSTROP T ng/L 418*     Results from last 7 days   Lab Units 04/23/23  1323   PROBNP pg/mL 5,441.0*             Results from last 7 days   Lab Units 04/23/23  1455   PH, ARTERIAL pH units 7.442   PO2 ART mm Hg 57.8*   PCO2, ARTERIAL mm Hg 35.6   HCO3 ART mmol/L 24.3           Invalid input(s): USDES,  BLOODU, NITRITITE, BACT, EP    Pain Management Panel          View : No data to display.                      EKG:      Sinus rhythm with diffuse ST-T wave segment changes which appear stable since EKG from March 2, 2023    Radiology:    No radiology results for the last 3 days    Assessment/Plan:      Acute hypoxemic respiratory failure due to COVID-19  --Detailed Medical Reasoning: Need 2 of 3 for Acute respiratory failure: pO2 less than 60 mm Hg (hypoxemia) or O2 sat <90% (yes). pCO2 greater than 50 mm Hg (hypercapnia) with pH less than 7.35 (no). Signs and symptoms of acute respiratory distress (RR greater than 20 or less than 10, wheezing, nasal flaring, accessory muscle use): (Yes respiratory rate was 22 when I was in the room). --(SIMON Wilkins, The Hospitalist, 2019)    --Plan: Will initiate COVID protocol with remdesivir Decadron inhalers we will give IV diuretics as tolerated and if any worsening in the creatinine will involve nephrology.    Elevated troponin  -Differential includes nonischemic myocardial injury type II MI and non-STEMI type I.  I do believe the patient is high risk due to COVID for thrombosis and may need ischemic evaluation since its been several years since his last stent.  We will have Dr. Suarez evaluate.      CKD (chronic kidney disease) stage 3, GFR 30-59 ml/min  We will follow his creatinine closely while inpatient and consider nephrology consult if it worsens    Disposition: Anticipate home in 2 to 3 days PT OT to assess    Prophylaxis: Lovenox    Edited by: Enrique Ardon DO at 4/23/2023 2014           Risk Assessment:  High  DVT Prophylaxis: Lovenox  Code Status:   Code Status and Medical Interventions:   Ordered at: 04/23/23 1553     Code Status (Patient has no pulse and is not breathing):    CPR (Attempt to Resuscitate)     Medical Interventions (Patient has pulse or is breathing):    Full Support      Diet:      Advance Care Planning   ACP discussion was held with the patient during this visit. Patient has an advance directive in EMR which is still valid.            Enrique Ardon DO  04/23/23  15:53 EDT    Dictated utilizing Dragon dictation.

## 2023-04-24 PROBLEM — R79.89 ELEVATED TROPONIN LEVEL NOT DUE MYOCARDIAL INFARCTION: Status: ACTIVE | Noted: 2023-04-24

## 2023-04-24 PROBLEM — R77.8 ELEVATED TROPONIN LEVEL NOT DUE MYOCARDIAL INFARCTION: Status: ACTIVE | Noted: 2023-04-24

## 2023-04-24 LAB
ALBUMIN SERPL-MCNC: 3.3 G/DL (ref 3.5–5.2)
ALBUMIN/GLOB SERPL: 1.2 G/DL
ALP SERPL-CCNC: 35 U/L (ref 39–117)
ALT SERPL W P-5'-P-CCNC: 29 U/L (ref 1–41)
ANION GAP SERPL CALCULATED.3IONS-SCNC: 14.5 MMOL/L (ref 5–15)
AST SERPL-CCNC: 46 U/L (ref 1–40)
BILIRUB CONJ SERPL-MCNC: <0.2 MG/DL (ref 0–0.3)
BILIRUB SERPL-MCNC: 0.2 MG/DL (ref 0–1.2)
BUN SERPL-MCNC: 64 MG/DL (ref 8–23)
BUN/CREAT SERPL: 26.9 (ref 7–25)
CALCIUM SPEC-SCNC: 8 MG/DL (ref 8.6–10.5)
CHLORIDE SERPL-SCNC: 101 MMOL/L (ref 98–107)
CO2 SERPL-SCNC: 21.5 MMOL/L (ref 22–29)
CREAT SERPL-MCNC: 2.38 MG/DL (ref 0.76–1.27)
DEPRECATED RDW RBC AUTO: 40.8 FL (ref 37–54)
EGFRCR SERPLBLD CKD-EPI 2021: 27.4 ML/MIN/1.73
ERYTHROCYTE [DISTWIDTH] IN BLOOD BY AUTOMATED COUNT: 12.7 % (ref 12.3–15.4)
GLOBULIN UR ELPH-MCNC: 2.8 GM/DL
GLUCOSE BLDC GLUCOMTR-MCNC: 140 MG/DL (ref 70–130)
GLUCOSE BLDC GLUCOMTR-MCNC: 179 MG/DL (ref 70–130)
GLUCOSE BLDC GLUCOMTR-MCNC: 185 MG/DL (ref 70–130)
GLUCOSE BLDC GLUCOMTR-MCNC: 261 MG/DL (ref 70–130)
GLUCOSE BLDC GLUCOMTR-MCNC: 286 MG/DL (ref 70–130)
GLUCOSE SERPL-MCNC: 276 MG/DL (ref 65–99)
HCT VFR BLD AUTO: 29.1 % (ref 37.5–51)
HGB BLD-MCNC: 9.5 G/DL (ref 13–17.7)
MCH RBC QN AUTO: 28.6 PG (ref 26.6–33)
MCHC RBC AUTO-ENTMCNC: 32.6 G/DL (ref 31.5–35.7)
MCV RBC AUTO: 87.7 FL (ref 79–97)
PLATELET # BLD AUTO: 221 10*3/MM3 (ref 140–450)
PMV BLD AUTO: 10.9 FL (ref 6–12)
POTASSIUM SERPL-SCNC: 4.1 MMOL/L (ref 3.5–5.2)
PROT SERPL-MCNC: 6.1 G/DL (ref 6–8.5)
RBC # BLD AUTO: 3.32 10*6/MM3 (ref 4.14–5.8)
SODIUM SERPL-SCNC: 137 MMOL/L (ref 136–145)
WBC NRBC COR # BLD: 9.76 10*3/MM3 (ref 3.4–10.8)

## 2023-04-24 PROCEDURE — 80053 COMPREHEN METABOLIC PANEL: CPT | Performed by: INTERNAL MEDICINE

## 2023-04-24 PROCEDURE — 99232 SBSQ HOSP IP/OBS MODERATE 35: CPT | Performed by: INTERNAL MEDICINE

## 2023-04-24 PROCEDURE — 82962 GLUCOSE BLOOD TEST: CPT

## 2023-04-24 PROCEDURE — 25010000002 FUROSEMIDE PER 20 MG: Performed by: INTERNAL MEDICINE

## 2023-04-24 PROCEDURE — 85027 COMPLETE CBC AUTOMATED: CPT | Performed by: INTERNAL MEDICINE

## 2023-04-24 PROCEDURE — 25010000002 ENOXAPARIN PER 10 MG: Performed by: INTERNAL MEDICINE

## 2023-04-24 PROCEDURE — 25010000002 REMDESIVIR 100 MG/20ML SOLUTION 1 EACH VIAL: Performed by: INTERNAL MEDICINE

## 2023-04-24 PROCEDURE — 97165 OT EVAL LOW COMPLEX 30 MIN: CPT

## 2023-04-24 PROCEDURE — 63710000001 DEXAMETHASONE PER 0.25 MG: Performed by: INTERNAL MEDICINE

## 2023-04-24 PROCEDURE — 63710000001 INSULIN ASPART PER 5 UNITS: Performed by: INTERNAL MEDICINE

## 2023-04-24 PROCEDURE — 82248 BILIRUBIN DIRECT: CPT | Performed by: INTERNAL MEDICINE

## 2023-04-24 RX ORDER — VALSARTAN 80 MG/1
160 TABLET ORAL
Status: DISCONTINUED | OUTPATIENT
Start: 2023-04-25 | End: 2023-04-25

## 2023-04-24 RX ORDER — FUROSEMIDE 20 MG/1
20 TABLET ORAL DAILY
Status: DISCONTINUED | OUTPATIENT
Start: 2023-04-24 | End: 2023-04-24

## 2023-04-24 RX ORDER — ENOXAPARIN SODIUM 100 MG/ML
30 INJECTION SUBCUTANEOUS DAILY
Status: DISCONTINUED | OUTPATIENT
Start: 2023-04-24 | End: 2023-04-25

## 2023-04-24 RX ADMIN — EMPAGLIFLOZIN 10 MG: 10 TABLET, FILM COATED ORAL at 08:56

## 2023-04-24 RX ADMIN — ALBUTEROL SULFATE 2 PUFF: 90 AEROSOL, METERED RESPIRATORY (INHALATION) at 17:16

## 2023-04-24 RX ADMIN — TAMSULOSIN HYDROCHLORIDE 0.4 MG: 0.4 CAPSULE ORAL at 20:25

## 2023-04-24 RX ADMIN — ALBUTEROL SULFATE 2 PUFF: 90 AEROSOL, METERED RESPIRATORY (INHALATION) at 12:49

## 2023-04-24 RX ADMIN — HYDRALAZINE HYDROCHLORIDE 100 MG: 25 TABLET, FILM COATED ORAL at 17:17

## 2023-04-24 RX ADMIN — ENOXAPARIN SODIUM 30 MG: 30 INJECTION SUBCUTANEOUS at 08:56

## 2023-04-24 RX ADMIN — VALSARTAN 320 MG: 80 TABLET, FILM COATED ORAL at 08:56

## 2023-04-24 RX ADMIN — TERAZOSIN HYDROCHLORIDE 5 MG: 5 CAPSULE ORAL at 20:25

## 2023-04-24 RX ADMIN — PRAMIPEXOLE DIHYDROCHLORIDE 1 MG: 1 TABLET ORAL at 20:25

## 2023-04-24 RX ADMIN — FLUTICASONE PROPIONATE 1 SPRAY: 50 SPRAY, METERED NASAL at 08:58

## 2023-04-24 RX ADMIN — CARVEDILOL 6.25 MG: 6.25 TABLET, FILM COATED ORAL at 08:56

## 2023-04-24 RX ADMIN — INSULIN ASPART 3 UNITS: 100 INJECTION, SOLUTION INTRAVENOUS; SUBCUTANEOUS at 11:48

## 2023-04-24 RX ADMIN — TERAZOSIN HYDROCHLORIDE 5 MG: 5 CAPSULE ORAL at 08:56

## 2023-04-24 RX ADMIN — CETIRIZINE HYDROCHLORIDE 10 MG: 10 TABLET, FILM COATED ORAL at 08:56

## 2023-04-24 RX ADMIN — FINASTERIDE 5 MG: 5 TABLET, FILM COATED ORAL at 08:57

## 2023-04-24 RX ADMIN — MULTIPLE VITAMINS W/ MINERALS TAB 1 TABLET: TAB at 08:57

## 2023-04-24 RX ADMIN — FUROSEMIDE 20 MG: 20 TABLET ORAL at 12:49

## 2023-04-24 RX ADMIN — Medication 10 ML: at 08:57

## 2023-04-24 RX ADMIN — Medication 10 ML: at 20:24

## 2023-04-24 RX ADMIN — SERTRALINE HYDROCHLORIDE 50 MG: 50 TABLET ORAL at 17:17

## 2023-04-24 RX ADMIN — Medication 4000 UNITS: at 08:56

## 2023-04-24 RX ADMIN — ATORVASTATIN CALCIUM 40 MG: 40 TABLET, FILM COATED ORAL at 08:57

## 2023-04-24 RX ADMIN — HYDRALAZINE HYDROCHLORIDE 100 MG: 25 TABLET, FILM COATED ORAL at 20:24

## 2023-04-24 RX ADMIN — TIOTROPIUM BROMIDE INHALATION SPRAY 2 PUFF: 3.12 SPRAY, METERED RESPIRATORY (INHALATION) at 08:58

## 2023-04-24 RX ADMIN — BUDESONIDE AND FORMOTEROL FUMARATE DIHYDRATE 2 PUFF: 160; 4.5 AEROSOL RESPIRATORY (INHALATION) at 08:58

## 2023-04-24 RX ADMIN — Medication 5 MG: at 20:24

## 2023-04-24 RX ADMIN — REMDESIVIR 100 MG: 100 INJECTION, POWDER, LYOPHILIZED, FOR SOLUTION INTRAVENOUS at 17:41

## 2023-04-24 RX ADMIN — PRASUGREL 10 MG: 10 TABLET, FILM COATED ORAL at 08:57

## 2023-04-24 RX ADMIN — DEXAMETHASONE 6 MG: 4 TABLET ORAL at 08:57

## 2023-04-24 RX ADMIN — FUROSEMIDE 40 MG: 10 INJECTION, SOLUTION INTRAMUSCULAR; INTRAVENOUS at 09:34

## 2023-04-24 RX ADMIN — INSULIN ASPART 8 UNITS: 100 INJECTION, SOLUTION INTRAVENOUS; SUBCUTANEOUS at 06:34

## 2023-04-24 RX ADMIN — PANTOPRAZOLE SODIUM 40 MG: 40 TABLET, DELAYED RELEASE ORAL at 17:17

## 2023-04-24 RX ADMIN — PANTOPRAZOLE SODIUM 40 MG: 40 TABLET, DELAYED RELEASE ORAL at 06:34

## 2023-04-24 RX ADMIN — GUAIFENESIN AND DEXTROMETHORPHAN 20 ML: 100; 10 SYRUP ORAL at 06:34

## 2023-04-24 RX ADMIN — HYDRALAZINE HYDROCHLORIDE 100 MG: 25 TABLET, FILM COATED ORAL at 08:57

## 2023-04-24 RX ADMIN — ALBUTEROL SULFATE 2 PUFF: 90 AEROSOL, METERED RESPIRATORY (INHALATION) at 08:58

## 2023-04-24 NOTE — PLAN OF CARE
Goal Outcome Evaluation:  Plan of Care Reviewed With: patient        Progress: no change  Outcome Evaluation: OT eval completed. Patient supine in bed, satting 96% on room air, no complaints of pain, Ox4. Patient performed bed mobility with mod ind. Performed bathing task with SBA-CGA, able to heaven socks with S/U. Patient performed sit to stand with SBA, walked 40' in room without AD SBA-CGA. Patient requires SBA-CGA for toileting task. Patient is expected to benefit from continued OT services prior to DC.

## 2023-04-24 NOTE — THERAPY EVALUATION
Patient Name: Donny Jerry  : 1945    MRN: [id number]                              Today's Date: 2023       Admit Date: 2023    Visit Dx:     ICD-10-CM ICD-9-CM   1. Acute respiratory failure with hypoxia  J96.01 518.81   2. Acute on chronic congestive heart failure, unspecified heart failure type  I50.9 428.0   3. Leukocytosis, unspecified type  D72.829 288.60   4. 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 pectoris   • Essential hypertension   • Hyperlipidemia LDL goal <70   • Type 2 diabetes mellitus with stage 3 chronic kidney disease   • Obstructive sleep apnea on CPAP   • Enlarged prostate   • Obesity, Class II, BMI 35-39.9   • Depression   • Acute hypoxemic respiratory failure due to COVID-19   • CKD (chronic kidney disease) stage 3, GFR 30-59 ml/min   • Elevated troponin level not due myocardial infarction     Past Medical History:   Diagnosis Date   • Benign hypertension    • Coronary artery disease    • Depression    • Enlarged prostate     Enlarged prostate with anticipated TURP with Dr. Bernal.   • GERD (gastroesophageal reflux disease)    • Hypercholesterolemia    • Obesity    • Obstructive sleep apnea on CPAP    • Type 2 diabetes mellitus      Past Surgical History:   Procedure Laterality Date   • CARDIAC CATHETERIZATION     • CARDIAC CATHETERIZATION Left 10/19/2021    Procedure: Left Heart Cath;  Surgeon: Liz Russo MD;  Location: [facility] CATH INVASIVE LOCATION;  Service: Cardiology;  Laterality: Left;   • COLONOSCOPY W/ POLYPECTOMY     • CORONARY ANGIOPLASTY WITH STENT PLACEMENT Left 2009    Left heart catheterization, 2009, Dr. Russo:  LVEF 45% to 50%.  Placement of overlapping Cypher drug-eluting stent 2.5 x 28 and 2.5 x 18 to the SVG to the obtuse marginal branch.  SVG to the PDA had a proximal stenosis estimated at 60% with  a distal stenosis of 50% to 60%.   • CORONARY ANGIOPLASTY WITH STENT PLACEMENT Left 07/06/2009    Left heart catheterization, 07/06/2009:  PTCA and stent placement in the mid PDA using a 2.25 x 12 mm Taxus drug-eluting stent with stent placement in the distal SVG to the PDA using a 2.5 x 18 mm Cypher drug-eluting stent and stenting of the proximal SVG to the PDA using a 3.0 x 28 mm Cypher drug-eluting stent.   • CORONARY ANGIOPLASTY WITH STENT PLACEMENT  04/23/2010    Cardiac catheterization for recurrent anginal symptoms, 04/23/2010, with PTCA and stent placement in the proximal SVG to the PDA using 3.0 x 28 mm Promus drug-eluting stent for in-stent restenosis.   • CORONARY ANGIOPLASTY WITH STENT PLACEMENT Left 07/06/2010    Left heart catheterization, 07/06/2010, Dr. Russo:  EF 40% to 45%.  3.5 x 23 mm Promus drug-eluting stent in the proximal SVG to OM, 2.5 x 18 mm Promus drug-eluting stent to distal SVG to PDA due to in-stent restenosis.     • CORONARY ANGIOPLASTY WITH STENT PLACEMENT Left 11/16/2010    Left heart catheterization, 11/16/2010, with placement of a 3.0 x 16 mm Taxus drug-eluting stent to the SVG to the RCA proximally and a 2.5 x 16 mm paclitaxel stent distally in the SVG to the RCA.   • CORONARY ANGIOPLASTY WITH STENT PLACEMENT Left     Left heart catheterization, 3.0 x 24-mm Promus element drug-eluting stent to a 90% lesion in the mid portion of the SVG to the PDA.    • CORONARY ANGIOPLASTY WITH STENT PLACEMENT Left 06/24/2014    Left heart catheterization for recurrent angina, 06/24/2014, Dr. Russo:  PTCA of the SVG to the PDA using a 3 x 12 mm NC TREK balloon.     • CORONARY ARTERY BYPASS GRAFT      CABG x3, Dr. Peng, [facility], 2001.      General Information     Row Name 04/24/23 1556          OT Time and Intention    Document Type evaluation  -SD     Mode of Treatment occupational therapy  -SD     Row Name 04/24/23 1558          General Information    Patient  Profile Reviewed yes  -SD     Prior Level of Function independent:;all household mobility;ADL's  No DME at home  -SD     Existing Precautions/Restrictions no known precautions/restrictions  -SD     Barriers to Rehab none identified  -SD     Row Name 04/24/23 1556          Living Environment    People in Home spouse  -SD     Row Name 04/24/23 1556          Home Main Entrance    Number of Stairs, Main Entrance two  -SD     Row Name 04/24/23 1556          Stairs Within Home, Primary    Stairs, Within Home, Primary 1 level  -SD     Row Name 04/24/23 1556          Cognition    Orientation Status (Cognition) oriented x 4;verbal cues/prompts needed for orientation  -SD     Row Name 04/24/23 1556          Safety Issues, Functional Mobility    Safety Issues Affecting Function (Mobility) safety precautions follow-through/compliance;safety precaution awareness  -SD     Impairments Affecting Function (Mobility) endurance/activity tolerance;balance  -SD           User Key  (r) = Recorded By, (t) = Taken By, (c) = Cosigned By    Initials Name Provider Type    SD Celena Hernandez OT Occupational Therapist                 Mobility/ADL's     [facility] Name 04/24/23 1559          Bed Mobility    Bed Mobility supine-sit  -SD     Supine-Sit [place] (Bed Mobility) modified independence  -SD     Assistive Device (Bed Mobility) bed rails;head of bed elevated  -SD     [facility] Name 04/24/23 1559          Transfers    Transfers sit-stand transfer  -SD     [facility] Name 04/24/23 1559          Sit-Stand Transfer    Sit-Stand [place] (Transfers) standby assist  -SD     Row Name 04/24/23 1559          Functional Mobility    Functional Mobility- Ind. Level standby assist;contact guard assist  -SD     Functional Mobility-Distance (Feet) 40  -SD     Functional Mobility- Comment no AD needed  -SD     [facility] Name 04/24/23 1559          Activities of Daily Living    BADL Assessment/Intervention bathing;upper body dressing;lower body  dressing;grooming;feeding;toileting  -SD     [facility] Name 04/24/23 1559          Bathing Assessment/Intervention    [place] Level (Bathing) contact guard assist;standby assist  -SD     [facility] Name 04/24/23 1559          Upper Body Dressing Assessment/Training    [place] Level (Upper Body Dressing) set up  -SD     Row Name 04/24/23 1559          Lower Body Dressing Assessment/Training    [place] Level (Lower Body Dressing) don;socks;set up  -SD     Position (Lower Body Dressing) edge of bed sitting  -SD     [facility] Name 04/24/23 1559          Grooming Assessment/Training    [place] Level (Grooming) set up  -SD     Row Name 04/24/23 1559          Self-Feeding Assessment/Training    [place] Level (Feeding) set up  -SD     Row Name 04/24/23 1559          Toileting Assessment/Training    [place] Level (Toileting) contact guard assist  -SD     Assistive Devices (Toileting) commode  -SD           User Key  (r) = Recorded By, (t) = Taken By, (c) = Cosigned By    Initials Name Provider Type    Celena Blood OT Occupational Therapist               Obj/Interventions     Row Name 04/24/23 1600          Range of Motion Comprehensive    General Range of Motion bilateral upper extremity ROM WFL  -SD     Row Name 04/24/23 1600          Strength Comprehensive (MMT)    General Manual Muscle Testing (MMT) Assessment upper extremity strength deficits identified  -SD     Comment, General Manual Muscle Testing (MMT) Assessment UB 4/5  -SD           User Key  (r) = Recorded By, (t) = Taken By, (c) = Cosigned By    Initials Name Provider Type    Celena Blood OT Occupational Therapist               Goals/Plan     Row Name 04/24/23 1608          Transfer Goal 1 (OT)    Activity/Assistive Device (Transfer Goal 1, OT) sit-to-stand/stand-to-sit  -SD     [place] Level/Cues Needed (Transfer Goal 1, OT) modified independence  -SD     Time Frame (Transfer Goal 1, OT) long term goal (LTG)  -SD      Progress/Outcome (Transfer Goal 1, OT) goal ongoing  -SD     Row Name 04/24/23 1608          Dressing Goal 1 (OT)    Activity/Device (Dressing Goal 1, OT) lower body dressing  -SD     [place]/Cues Needed (Dressing Goal 1, OT) set-up required  -SD     Time Frame (Dressing Goal 1, OT) 2 weeks  -SD     Progress/Outcome (Dressing Goal 1, OT) goal ongoing  -SD     Row Name 04/24/23 1608          Toileting Goal 1 (OT)    Activity/Device (Toileting Goal 1, OT) toileting skills, all;commode  -SD     [place] Level/Cues Needed (Toileting Goal 1, OT) supervision required  -SD     Time Frame (Toileting Goal 1, OT) 2 weeks  -SD     Progress/Outcome (Toileting Goal 1, OT) goal ongoing  -SD     Row Name 04/24/23 1608          Strength Goal 1 (OT)    Strength Goal 1 (OT) Patient to perform UB ther ex as tolerated  -SD     Time Frame (Strength Goal 1, OT) long term goal (LTG)  -SD     Progress/Outcome (Strength Goal 1, OT) goal ongoing  -SD     Row Name 04/24/23 1608          Therapy Assessment/Plan (OT)    Planned Therapy Interventions (OT) activity tolerance training;adaptive equipment training;BADL retraining;patient/caregiver education/training;ROM/therapeutic exercise;strengthening exercise;transfer/mobility retraining  -SD           User Key  (r) = Recorded By, (t) = Taken By, (c) = Cosigned By    Initials Name Provider Type    Celena Blood OT Occupational Therapist               Clinical Impression     Row Name 04/24/23 1601          Pain Assessment    Pretreatment Pain Rating 0/10 - no pain  -SD     Posttreatment Pain Rating 0/10 - no pain  -SD     Row Name 04/24/23 1601          Plan of Care Review    Plan of Care Reviewed With patient  -SD     Progress no change  -SD     Outcome Evaluation OT eval completed. Patient supine in bed, satting 96% on room air, no complaints of pain, Ox4. Patient performed bed mobility with mod ind. Performed bathing task with SBA-CGA, able to heaven socks with S/U. Patient  performed sit to stand with SBA, walked 40' in room without AD SBA-CGA. Patient requires SBA-CGA for toileting task. Patient is expected to benefit from continued OT services prior to DC.  -SD     Row Name 04/24/23 1601          Therapy Assessment/Plan (OT)    Patient/Family Therapy Goal Statement (OT) home  -SD     Rehab Potential (OT) good, to achieve stated therapy goals  -SD     Criteria for Skilled Therapeutic Interventions Met (OT) skilled treatment is necessary  -SD     Therapy Frequency (OT) 3 times/wk  5 times if indicated  -SD     Row Name 04/24/23 1601          Therapy Plan Review/Discharge Plan (OT)    Anticipated Discharge Disposition (OT) other (see comments)  unknown as patient's wife is immunocompromised  -SD     Row Name 04/24/23 1601          Vital Signs    Pre SpO2 (%) 96  -SD     O2 Delivery Pre Treatment room air  -SD     O2 Delivery Intra Treatment room air  -SD     Post SpO2 (%) 96  -SD     O2 Delivery Post Treatment room air  -SD     Row Name 04/24/23 1601          Positioning and Restraints    Pre-Treatment Position in bed  -SD     Post Treatment Position chair  -SD     In Chair sitting;call light within reach;encouraged to call for assist;exit alarm on  -SD           User Key  (r) = Recorded By, (t) = Taken By, (c) = Cosigned By    Initials Name Provider Type    Celena Blood OT Occupational Therapist               Outcome Measures     Row Name 04/24/23 1609          How much help from another is currently needed...    Putting on and taking off regular lower body clothing? 3  -SD     Bathing (including washing, rinsing, and drying) 3  -SD     Toileting (which includes using toilet bed pan or urinal) 3  -SD     Putting on and taking off regular upper body clothing 3  -SD     Taking care of personal grooming (such as brushing teeth) 3  -SD     Eating meals 4  -SD     AM-PAC 6 Clicks Score (OT) 19  -SD     Row Name 04/24/23 0800          How much help from another person do you  currently need...    Turning from your back to your side while in flat bed without using bedrails? 4  -CS     Moving from lying on back to sitting on the side of a flat bed without bedrails? 4  -CS     Moving to and from a bed to a chair (including a wheelchair)? 3  -CS     Standing up from a chair using your arms (e.g., wheelchair, bedside chair)? 3  -CS     Climbing 3-5 steps with a railing? 3  -CS     To walk in hospital room? 2  -CS     AM-PAC 6 Clicks Score (PT) 19  -CS     Highest level of mobility 6 --> Walked 10 steps or more  -CS     Row Name 04/24/23 1609          Functional Assessment    Outcome Measure Options AM-PAC 6 Clicks Daily Activity (OT)  -SD           User Key  (r) = Recorded By, (t) = Taken By, (c) = Cosigned By    Initials Name Provider Type    Celena Blood OT Occupational Therapist    Kelley Blackburn LPN Licensed Nurse                Occupational Therapy Education     Title: PT OT SLP Therapies (In Progress)     Topic: Occupational Therapy (In Progress)     Point: ADL training (Done)     Description:   Instruct learner(s) on proper safety adaptation and remediation techniques during self care or transfers.   Instruct in proper use of assistive devices.              Learning Progress Summary           Patient Acceptance, E,TB, VU by SD at 4/24/2023 5829    Comment: OT POC                   Point: Home exercise program (Not Started)     Description:   Instruct learner(s) on appropriate technique for monitoring, assisting and/or progressing therapeutic exercises/activities.              Learner Progress:  Not documented in this visit.          Point: Precautions (Not Started)     Description:   Instruct learner(s) on prescribed precautions during self-care and functional transfers.              Learner Progress:  Not documented in this visit.          Point: Body mechanics (Not Started)     Description:   Instruct learner(s) on proper positioning and spine alignment during  self-care, functional mobility activities and/or exercises.              Learner Progress:  Not documented in this visit.                      User Key     Initials Effective Dates Name Provider Type Discipline    SD 06/16/21 -  Celena Hernandez OT Occupational Therapist OT              OT Recommendation and Plan  Planned Therapy Interventions (OT): activity tolerance training, adaptive equipment training, BADL retraining, patient/caregiver education/training, ROM/therapeutic exercise, strengthening exercise, transfer/mobility retraining  Therapy Frequency (OT): 3 times/wk (5 times if indicated)  Plan of Care Review  Plan of Care Reviewed With: patient  Progress: no change  Outcome Evaluation: OT eval completed. Patient supine in bed, satting 96% on room air, no complaints of pain, Ox4. Patient performed bed mobility with mod ind. Performed bathing task with SBA-CGA, able to heaven socks with S/U. Patient performed sit to stand with SBA, walked 40' in room without AD SBA-CGA. Patient requires SBA-CGA for toileting task. Patient is expected to benefit from continued OT services prior to DC.     Time Calculation:    Time Calculation- OT     Row Name 04/24/23 1610             Time Calculation- OT    OT Start Time 1350  -SD      OT Received On 04/24/23  -SD      OT Goal Re-Cert Due Date 05/04/23  -SD         Untimed Charges    OT Eval/Re-eval Minutes 45  -SD         Total Minutes    Untimed Charges Total Minutes 45  -SD       Total Minutes 45  -SD            User Key  (r) = Recorded By, (t) = Taken By, (c) = Cosigned By    Initials Name Provider Type    SD Celena Hernandez OT Occupational Therapist              Therapy Charges for Today     Code Description Service Date Service Provider Modifiers Qty    [id number]  OT EVAL LOW COMPLEXITY 3 4/24/2023 Celena Hernandez OT GO 1               Celena Hernandez OT  4/24/2023

## 2023-04-24 NOTE — PROGRESS NOTES
[facility]    Name:  Donny Jerry   Age:  78 y.o.  Sex:  male  :  1945  MRN:  [id number]   Visit Number:  [id number]  Admission Date:  2023  Date Of Service:  23  Primary Care Physician:  Anum Alonso APRN     LOS: 1 day :    Chief Complaint:      Weakness    Subjective:    : off oxygen this morning.  Discussed with patient as well as his wife Chely by telephone.  Unfortunately she has leukemia had a lot of questions about him possibly coming home with COVID.  Did not have any family members that he could stay with.  Is agreeable to having him go to short-term rehab if he qualifies.  Also with the creatinine rising she appreciated a nephrology consult.    HISTORY OF PRESENTING ILLNESS  78-year-old gentleman past history of coronary disease status post CABG status post PCI several years back follows with Dr. Russo.  Reports was previously on diuretics but Dr. Russo had been weaning him off of these but he was still taking a low-dose 20 mg Lasix daily.  Reports for the last 1 week he has had increased weakness.  He is also had progressive shortness of breath which started 1 day ago.  He has had a chronic intermittent cough due to allergies which has not been worsened during this period.  He is also had diarrhea since yesterday.  History provided by patient as well as his daughter at the bedside.  He elects to be full code and has an advanced directive which is valid.  In the emergency department he was found to have COVID-19.    Pertinent findings: Arterial blood gas reviewed, proBNP 5441, highly sensitive troponin 418, creatinine 1.92, AST 46, WBC 11.76, respiratory PCR positive for COVID-19, chest x-ray showing a diffuse interstitial pattern with a left basilar effusion    ED Medications:    Medications   sodium chloride 0.9 % flush 10 mL (has no administration in time range)   methylPREDNISolone sodium succinate  (SOLU-Medrol) injection 125 mg (125 mg Intravenous Given 4/23/23 1329)   cefTRIAXone (ROCEPHIN) IVPB 1 g/50ml dextrose (premix) (1 g Intravenous New Bag 4/23/23 1430)   furosemide (LASIX) injection 80 mg (80 mg Intravenous Given 4/23/23 1430)           Edited by: Enrique Ardon DO at 4/24/2023 1307     Review of Systems:     All systems were reviewed and negative except as mentioned in subjective, assessment and plan.    Vital Signs:    Temp:  [97.3 °F (36.3 °C)-99 °F (37.2 °C)] 97.3 °F (36.3 °C)  Heart Rate:  [59-85] 60  Resp:  [18-22] 18  BP: (119-165)/(47-64) 126/49    Intake and output:    I/O last 3 completed shifts:  In: 940 [P.O.:940]  Out: 2850 [Urine:2850]  I/O this shift:  In: 480 [P.O.:480]  Out: 1075 [Urine:1075]    Physical Examination:    Constitutional: Awake, alert  Eyes: PERRLA, sclerae anicteric, no conjunctival injection noted right eye lateral pterygium  HENT: NCAT, mucous membranes moist  Neck: Supple, no thyromegaly, no lymphadenopathy, trachea midline  Respiratory:  respiratory rate normal  Coarse bibasilar breath sounds without wheeze or rhonchi bilaterally, nonlabored respirations   Cardiovascular: Noted sternotomy scar RRR, no murmurs, rubs, or gallops, palpable pedal pulses bilaterally  Gastrointestinal: Positive bowel sounds, soft, nontender, nondistended  Musculoskeletal: Trace bilateral ankle edema, no clubbing or cyanosis to extremities  Psychiatric: Appropriate affect, cooperative  Neurologic: Oriented x 3, speech clear  Skin: No rashes  Edited by: Enrique Ardon DO at 4/24/2023 1340     Laboratory results:    Results from last 7 days   Lab Units 04/24/23  0604 04/23/23  1619 04/23/23  1323   SODIUM mmol/L 137  --  140   POTASSIUM mmol/L 4.1  --  4.6   CHLORIDE mmol/L 101  --  105   CO2 mmol/L 21.5*  --  24.9   BUN mg/dL 64*  --  43*   CREATININE mg/dL 2.38* 2.01* 1.92*   CALCIUM mg/dL 8.0*  --  8.3*   BILIRUBIN mg/dL 0.2 0.4 0.4   ALK PHOS U/L 35* 38* 38*   ALT (SGPT) U/L  29 32 31   AST (SGOT) U/L 46* 47* 46*   GLUCOSE mg/dL 276*  --  84     Results from last 7 days   Lab Units 04/24/23  0604 04/23/23  1323   WBC 10*3/mm3 9.76 11.76*   HEMOGLOBIN g/dL 9.5* 9.8*   HEMATOCRIT % 29.1* 29.7*   PLATELETS 10*3/mm3 221 219         Results from last 7 days   Lab Units 04/23/23  1619 04/23/23  1323   HSTROP T ng/L 419* 418*         Recent Labs     04/23/23  1455   PHART 7.442   [id number] 35.6   PO2ART 57.8*   [id number] 24.3   BASEEXCESS 0.4      I have reviewed the patient's laboratory results.    Radiology results:    XR Chest 1 View    Result Date: 4/23/2023  PORTABLE CHEST  4/23/2023 1:48 PM  HISTORY: Shortness of breath  COMPARISON:  August 11, 2018  FINDINGS: Prior median sternotomy. The cardiac silhouette is enlarged. Pulmonary vascular congestion.  Diffuse basilar predominant interstitial opacities. Probable small bilateral pleural effusions. There is no pneumothorax. The visualized osseous structures demonstrate no acute abnormalities.      Impression: Cardiomegaly with pulmonary vascular congestion and diffuse interstitial/alveolar edema. Atypical pneumonia in the appropriate clinical setting not excluded      This report was signed and finalized on 4/23/2023 4:50 PM by Jaspreet June MD.    I have reviewed the patient's radiology reports.    Medication Review:     I have reviewed the patient's active and prn medications.     Problem List:      Acute hypoxemic respiratory failure due to COVID-19    Coronary artery disease involving native coronary artery of native heart with angina pectoris    Hyperlipidemia LDL goal <70    Type 2 diabetes mellitus with stage 3 chronic kidney disease    CKD (chronic kidney disease) stage 3, GFR 30-59 ml/min    Elevated troponin level not due myocardial infarction      Assessment/Plan:      Acute hypoxemic respiratory failure due to COVID-19  --Detailed Medical Reasoning: Need 2 of 3 for Acute respiratory failure: pO2 less than 60 mm Hg (hypoxemia) or  O2 sat <90% (yes). pCO2 greater than 50 mm Hg (hypercapnia) with pH less than 7.35 (no). Signs and symptoms of acute respiratory distress (RR greater than 20 or less than 10, wheezing, nasal flaring, accessory muscle use): (Yes respiratory rate was 22 when I was in the room). --(SIMON Wilkins, The Hospitalist, 2019)    --Plan: Will initiate COVID protocol with remdesivir Decadron inhalers we will transition iv lasix back to pills due to increasing creatinine.    Elevated troponin  -Differential includes nonischemic myocardial injury type II MI and non-STEMI type I.  I do believe the patient is high risk due to COVID for thrombosis and may need ischemic evaluation since its been several years since his last stent.  We will have Dr. Suarez evaluate. Troponin stable. Not suggestive of ACS.  More consistent with nonischemic myocardial injury due to COVID.      CKD (chronic kidney disease) stage 3, GFR 30-59 ml/min  --Detailed Medical Reasoning: baseline 1.7 to 1.9 now 2.3.  --Plan: with worsening creatinine will have nephrology see.      Disposition: Anticipate STR in 2 to 3 days PT OT to assess    Prophylaxis: Lovenox    Edited by: Enrique Ardon,  at 4/24/2023 1340     DVT Prophylaxis: Lovenox  Code Status:   Code Status and Medical Interventions:   Ordered at: 04/23/23 4483     Code Status (Patient has no pulse and is not breathing):    CPR (Attempt to Resuscitate)     Medical Interventions (Patient has pulse or is breathing):    Full Support      Diet:   Dietary Orders (From admission, onward)     Start     Ordered    04/23/23 6877  Diet: Cardiac Diets, Diabetic Diets, Renal Diets; Healthy Heart (2-3 Na+); Consistent Carbohydrate; Low Sodium (2-3g), Low Potassium, Low Phosphorus; Texture: Regular Texture (IDDSI 7); Fluid Consistency: Thin (IDDSI 0)  Diet Effective Now        References:    Diet Order [place]   Question Answer Comment   Diets: Cardiac Diets    Diets: Diabetic Diets    Diets: Renal Diets     Cardiac Diet: Healthy Heart (2-3 Na+)    Diabetic Diet: Consistent Carbohydrate    Renal Diet: Low Sodium (2-3g)    Renal Diet: Low Potassium    Renal Diet: Low Phosphorus    Texture: Regular Texture (IDDSI 7)    Fluid Consistency: Thin (IDDSI 0)        04/23/23 8106               Discharge Plan: Anticipate short-term rehab in 2 to 3 days    Enrique Ardon DO  04/24/23  13:40 EDT    Dictated utilizing Dragon dictation.

## 2023-04-24 NOTE — PLAN OF CARE
Goal Outcome Evaluation:      VS noted. O2 sats >90% on RA. No other events to report this shift

## 2023-04-24 NOTE — PLAN OF CARE
Goal Outcome Evaluation:  Plan of Care Reviewed With: patient        Progress: improving  Outcome Evaluation: VSS,  O2 AT 2 LITERS N/C TO MAINTAIN SATS >90%,  NO ACUTE EVENTS OVERNIGHT.

## 2023-04-24 NOTE — THERAPY EVALUATION
PT order received.  Evaluation held as patient is tired after working with OT.  PT will follow up tomorrow.

## 2023-04-24 NOTE — CASE MANAGEMENT/SOCIAL WORK
Discharge Planning Assessment   [facility]     Patient Name: Donny Jerry  MRN: [id number]  Today's Date: 4/24/2023    Admit Date: 4/23/2023    Plan: Pt plans to dc home with family.  Family to transport.  No needs at dc.   Discharge Needs Assessment     Row Name 04/24/23 1442       Living Environment    People in Home spouse    Name(s) of People in Home Chely Jerry    Current Living Arrangements home    Potentially Unsafe Housing Conditions unable to assess    Primary Care Provided by self    Provides Primary Care For no one    Family Caregiver if Needed spouse    Quality of Family Relationships helpful;involved    Able to Return to Prior Arrangements yes       Resource/Environmental Concerns    Transportation Concerns none       Food Insecurity    Within the past 12 months, you worried that your food would run out before you got the money to buy more. Never true    Within the past 12 months, the food you bought just didn't last and you didn't have money to get more. Never true       Transition Planning    Patient/Family Anticipates Transition to home    Patient/Family Anticipated Services at Transition none    Transportation Anticipated family or friend will provide       Discharge Needs Assessment    Readmission Within the Last 30 Days no previous admission in last 30 days    Equipment Currently Used at Home cpap  Chely states CPAP is in good working condition. Not very old.    Concerns to be Addressed no discharge needs identified    Anticipated Changes Related to Illness none    Equipment Needed After Discharge none    Provided Post Acute Provider List? N/A    Provided Post Acute Provider Quality & Resource List? N/A               Discharge Plan     Row Name 04/24/23 4774       Plan    Plan Pt plans to dc home with family.  Family to transport.  No needs at dc.    Patient/Family in Agreement with Plan yes    Plan Comments Sw spoke to pt's wife Chely, to initiate discharge planning. Chely rodriguez  name//address/pcp.  States pt's only DME is a CPAP and it is in good working condition.  [facility] [place]  Independent with ADL's at home.  Chely states sometimes pt will shuffle feet but walks good.  No barriers to discharge were identified during discharge.    Final Discharge Disposition Code 30 - still a patient              Continued Care and Services - Admitted Since 2023    Coordination has not been started for this encounter.       Expected Discharge Date and Time     Expected Discharge Date Expected Discharge Time    2023          Demographic Summary     Row Name 23 1440       General Information    Admission Type inpatient    Arrived From home    Referral Source admission list    Reason for Consult discharge planning    Preferred Language English       Contact Information    Permission Granted to Share Info With family/designee    Contact Information Obtained for                Functional Status     Row Name 23 1440       Functional Status    Usual Activity Tolerance good    Current Activity Tolerance good       Functional Status, IADL    Medications independent    Meal Preparation independent    Housekeeping independent    Laundry independent    Shopping independent    IADL Comments Pt independent with ADL's at baseline       Mental Status    General Appearance WDL WDL       Mental Status Summary    Recent Changes in Mental Status/Cognitive Functioning no changes       Employment/    Employment Status retired    Current or Previous Occupation factory work  [employer]               Psychosocial     Row Name 23 1441       Values/Beliefs    Spiritual, Cultural Beliefs, Christianity Practices, Values that Affect Care no       Behavior WDL    Behavior WDL WDL       Emotion Mood WDL    Emotion/Mood/Affect WDL WDL       Speech WDL    Speech WDL WDL       Perceptual State WDL    Perceptual State WDL WDL       Thought Process WDL    Thought  Process WDL WDL       Intellectual Performance WDL    Intellectual Performance WDL WDL       Coping/Stress    Major Change/Loss/Stressor none    Patient Personal Strengths able to adapt;strong support system    Sources of Support adult child(lazara);spouse    Techniques to [place] with Loss/Stress/Change not applicable    Reaction to Health Status realistic    Understanding of Condition and Treatment adequate understanding of medical condition       Developmental Stage (Eriksson's)    Developmental Stage Stage 8 (66 years-death/Late Adulthood) Integrity vs. Despair       C-SSRS (Recent)    Q1 Wished to be Dead (Past Month) no    Q2 Suicidal Thoughts (Past Month) no    Q6 Suicide Behavior (Lifetime) no       Violence Risk    Feels Like Hurting Others no    Previous Attempt to Harm Others no               Abuse/Neglect    No documentation.                Legal    No documentation.                Substance Abuse    No documentation.                Patient Forms    No documentation.                   Elvie Carlisle

## 2023-04-25 ENCOUNTER — APPOINTMENT (OUTPATIENT)
Dept: ULTRASOUND IMAGING | Facility: HOSPITAL | Age: 78
DRG: 177 | End: 2023-04-25
Payer: MEDICARE

## 2023-04-25 LAB
ALBUMIN SERPL-MCNC: 3.2 G/DL (ref 3.5–5.2)
ALBUMIN/GLOB SERPL: 1.1 G/DL
ALP SERPL-CCNC: 35 U/L (ref 39–117)
ALT SERPL W P-5'-P-CCNC: 33 U/L (ref 1–41)
ANION GAP SERPL CALCULATED.3IONS-SCNC: 12.7 MMOL/L (ref 5–15)
AST SERPL-CCNC: 39 U/L (ref 1–40)
BILIRUB SERPL-MCNC: 0.3 MG/DL (ref 0–1.2)
BILIRUB UR QL STRIP: NEGATIVE
BUN SERPL-MCNC: 87 MG/DL (ref 8–23)
BUN/CREAT SERPL: 36.3 (ref 7–25)
CALCIUM SPEC-SCNC: 8.2 MG/DL (ref 8.6–10.5)
CHLORIDE SERPL-SCNC: 102 MMOL/L (ref 98–107)
CLARITY UR: CLEAR
CO2 SERPL-SCNC: 25.3 MMOL/L (ref 22–29)
COLOR UR: YELLOW
CREAT SERPL-MCNC: 2.4 MG/DL (ref 0.76–1.27)
CREAT UR-MCNC: 50.3 MG/DL
DEPRECATED RDW RBC AUTO: 40.7 FL (ref 37–54)
EGFRCR SERPLBLD CKD-EPI 2021: 27.1 ML/MIN/1.73
ERYTHROCYTE [DISTWIDTH] IN BLOOD BY AUTOMATED COUNT: 12.6 % (ref 12.3–15.4)
GLOBULIN UR ELPH-MCNC: 2.9 GM/DL
GLUCOSE BLDC GLUCOMTR-MCNC: 206 MG/DL (ref 70–130)
GLUCOSE BLDC GLUCOMTR-MCNC: 231 MG/DL (ref 70–130)
GLUCOSE BLDC GLUCOMTR-MCNC: 238 MG/DL (ref 70–130)
GLUCOSE BLDC GLUCOMTR-MCNC: 293 MG/DL (ref 70–130)
GLUCOSE SERPL-MCNC: 251 MG/DL (ref 65–99)
GLUCOSE UR STRIP-MCNC: ABNORMAL MG/DL
HCT VFR BLD AUTO: 31.8 % (ref 37.5–51)
HGB BLD-MCNC: 10.6 G/DL (ref 13–17.7)
HGB UR QL STRIP.AUTO: NEGATIVE
KETONES UR QL STRIP: NEGATIVE
LEUKOCYTE ESTERASE UR QL STRIP.AUTO: NEGATIVE
MAGNESIUM SERPL-MCNC: 2.5 MG/DL (ref 1.6–2.4)
MCH RBC QN AUTO: 29.1 PG (ref 26.6–33)
MCHC RBC AUTO-ENTMCNC: 33.3 G/DL (ref 31.5–35.7)
MCV RBC AUTO: 87.4 FL (ref 79–97)
NITRITE UR QL STRIP: NEGATIVE
PH UR STRIP.AUTO: <=5 [PH] (ref 5–8)
PHOSPHATE SERPL-MCNC: 5.8 MG/DL (ref 2.5–4.5)
PLATELET # BLD AUTO: 257 10*3/MM3 (ref 140–450)
PMV BLD AUTO: 10.8 FL (ref 6–12)
POTASSIUM SERPL-SCNC: 4.3 MMOL/L (ref 3.5–5.2)
PROT ?TM UR-MCNC: 5.1 MG/DL
PROT SERPL-MCNC: 6.1 G/DL (ref 6–8.5)
PROT UR QL STRIP: NEGATIVE
PROT/CREAT UR: 101.4 MG/G CREA (ref 0–200)
RBC # BLD AUTO: 3.64 10*6/MM3 (ref 4.14–5.8)
SODIUM SERPL-SCNC: 140 MMOL/L (ref 136–145)
SODIUM UR-SCNC: 39 MMOL/L
SP GR UR STRIP: 1.02 (ref 1–1.03)
UROBILINOGEN UR QL STRIP: ABNORMAL
WBC NRBC COR # BLD: 13.87 10*3/MM3 (ref 3.4–10.8)

## 2023-04-25 PROCEDURE — 97161 PT EVAL LOW COMPLEX 20 MIN: CPT

## 2023-04-25 PROCEDURE — 84100 ASSAY OF PHOSPHORUS: CPT | Performed by: INTERNAL MEDICINE

## 2023-04-25 PROCEDURE — 97535 SELF CARE MNGMENT TRAINING: CPT

## 2023-04-25 PROCEDURE — 99233 SBSQ HOSP IP/OBS HIGH 50: CPT | Performed by: INTERNAL MEDICINE

## 2023-04-25 PROCEDURE — 80053 COMPREHEN METABOLIC PANEL: CPT | Performed by: INTERNAL MEDICINE

## 2023-04-25 PROCEDURE — 81003 URINALYSIS AUTO W/O SCOPE: CPT | Performed by: INTERNAL MEDICINE

## 2023-04-25 PROCEDURE — 82570 ASSAY OF URINE CREATININE: CPT | Performed by: INTERNAL MEDICINE

## 2023-04-25 PROCEDURE — 25010000002 ENOXAPARIN PER 10 MG: Performed by: INTERNAL MEDICINE

## 2023-04-25 PROCEDURE — 63710000001 DEXAMETHASONE PER 0.25 MG: Performed by: INTERNAL MEDICINE

## 2023-04-25 PROCEDURE — 84300 ASSAY OF URINE SODIUM: CPT | Performed by: INTERNAL MEDICINE

## 2023-04-25 PROCEDURE — 84156 ASSAY OF PROTEIN URINE: CPT | Performed by: INTERNAL MEDICINE

## 2023-04-25 PROCEDURE — 82962 GLUCOSE BLOOD TEST: CPT

## 2023-04-25 PROCEDURE — 63710000001 INSULIN ASPART PER 5 UNITS: Performed by: INTERNAL MEDICINE

## 2023-04-25 PROCEDURE — 83735 ASSAY OF MAGNESIUM: CPT | Performed by: INTERNAL MEDICINE

## 2023-04-25 PROCEDURE — 85027 COMPLETE CBC AUTOMATED: CPT | Performed by: INTERNAL MEDICINE

## 2023-04-25 PROCEDURE — 76775 US EXAM ABDO BACK WALL LIM: CPT

## 2023-04-25 PROCEDURE — 97110 THERAPEUTIC EXERCISES: CPT

## 2023-04-25 RX ORDER — HEPARIN SODIUM 5000 [USP'U]/ML
5000 INJECTION, SOLUTION INTRAVENOUS; SUBCUTANEOUS EVERY 12 HOURS SCHEDULED
Status: DISCONTINUED | OUTPATIENT
Start: 2023-04-26 | End: 2023-04-26 | Stop reason: HOSPADM

## 2023-04-25 RX ADMIN — Medication 4000 UNITS: at 08:42

## 2023-04-25 RX ADMIN — BUDESONIDE AND FORMOTEROL FUMARATE DIHYDRATE 2 PUFF: 160; 4.5 AEROSOL RESPIRATORY (INHALATION) at 08:48

## 2023-04-25 RX ADMIN — FLUTICASONE PROPIONATE 1 SPRAY: 50 SPRAY, METERED NASAL at 08:48

## 2023-04-25 RX ADMIN — ATORVASTATIN CALCIUM 40 MG: 40 TABLET, FILM COATED ORAL at 08:42

## 2023-04-25 RX ADMIN — ALBUTEROL SULFATE 2 PUFF: 90 AEROSOL, METERED RESPIRATORY (INHALATION) at 16:56

## 2023-04-25 RX ADMIN — FINASTERIDE 5 MG: 5 TABLET, FILM COATED ORAL at 08:42

## 2023-04-25 RX ADMIN — PANTOPRAZOLE SODIUM 40 MG: 40 TABLET, DELAYED RELEASE ORAL at 16:54

## 2023-04-25 RX ADMIN — INSULIN ASPART 5 UNITS: 100 INJECTION, SOLUTION INTRAVENOUS; SUBCUTANEOUS at 16:55

## 2023-04-25 RX ADMIN — CARVEDILOL 6.25 MG: 6.25 TABLET, FILM COATED ORAL at 08:42

## 2023-04-25 RX ADMIN — ALBUTEROL SULFATE 2 PUFF: 90 AEROSOL, METERED RESPIRATORY (INHALATION) at 11:37

## 2023-04-25 RX ADMIN — HYDRALAZINE HYDROCHLORIDE 100 MG: 25 TABLET, FILM COATED ORAL at 08:42

## 2023-04-25 RX ADMIN — PRAMIPEXOLE DIHYDROCHLORIDE 1 MG: 1 TABLET ORAL at 22:22

## 2023-04-25 RX ADMIN — HYDRALAZINE HYDROCHLORIDE 100 MG: 25 TABLET, FILM COATED ORAL at 22:23

## 2023-04-25 RX ADMIN — VALSARTAN 160 MG: 80 TABLET, FILM COATED ORAL at 08:43

## 2023-04-25 RX ADMIN — PRASUGREL 10 MG: 10 TABLET, FILM COATED ORAL at 08:42

## 2023-04-25 RX ADMIN — SERTRALINE HYDROCHLORIDE 50 MG: 50 TABLET ORAL at 08:43

## 2023-04-25 RX ADMIN — HYDRALAZINE HYDROCHLORIDE 100 MG: 25 TABLET, FILM COATED ORAL at 16:54

## 2023-04-25 RX ADMIN — MULTIPLE VITAMINS W/ MINERALS TAB 1 TABLET: TAB at 08:42

## 2023-04-25 RX ADMIN — ENOXAPARIN SODIUM 30 MG: 30 INJECTION SUBCUTANEOUS at 08:43

## 2023-04-25 RX ADMIN — TAMSULOSIN HYDROCHLORIDE 0.4 MG: 0.4 CAPSULE ORAL at 22:23

## 2023-04-25 RX ADMIN — Medication 10 ML: at 22:23

## 2023-04-25 RX ADMIN — TERAZOSIN HYDROCHLORIDE 5 MG: 5 CAPSULE ORAL at 22:23

## 2023-04-25 RX ADMIN — INSULIN ASPART 5 UNITS: 100 INJECTION, SOLUTION INTRAVENOUS; SUBCUTANEOUS at 06:39

## 2023-04-25 RX ADMIN — CETIRIZINE HYDROCHLORIDE 10 MG: 10 TABLET, FILM COATED ORAL at 08:43

## 2023-04-25 RX ADMIN — Medication 10 ML: at 08:43

## 2023-04-25 RX ADMIN — TERAZOSIN HYDROCHLORIDE 5 MG: 5 CAPSULE ORAL at 08:43

## 2023-04-25 RX ADMIN — ALBUTEROL SULFATE 2 PUFF: 90 AEROSOL, METERED RESPIRATORY (INHALATION) at 08:48

## 2023-04-25 RX ADMIN — CARVEDILOL 6.25 MG: 6.25 TABLET, FILM COATED ORAL at 18:00

## 2023-04-25 RX ADMIN — INSULIN ASPART 5 UNITS: 100 INJECTION, SOLUTION INTRAVENOUS; SUBCUTANEOUS at 11:35

## 2023-04-25 RX ADMIN — PANTOPRAZOLE SODIUM 40 MG: 40 TABLET, DELAYED RELEASE ORAL at 06:39

## 2023-04-25 RX ADMIN — DEXAMETHASONE 6 MG: 4 TABLET ORAL at 08:42

## 2023-04-25 RX ADMIN — Medication 5 MG: at 22:23

## 2023-04-25 NOTE — THERAPY EVALUATION
Patient Name: Donny Jerry  : 1945    MRN: [id number]                              Today's Date: 2023       Admit Date: 2023    Visit Dx:     ICD-10-CM ICD-9-CM   1. Acute respiratory failure with hypoxia  J96.01 518.81   2. Acute on chronic congestive heart failure, unspecified heart failure type  I50.9 428.0   3. Leukocytosis, unspecified type  D72.829 288.60   4. 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 pectoris   • Essential hypertension   • Hyperlipidemia LDL goal <70   • Type 2 diabetes mellitus with stage 3 chronic kidney disease   • Obstructive sleep apnea on CPAP   • Enlarged prostate   • Obesity, Class II, BMI 35-39.9   • Depression   • Acute hypoxemic respiratory failure due to COVID-19   • CKD (chronic kidney disease) stage 3, GFR 30-59 ml/min   • Elevated troponin level not due myocardial infarction     Past Medical History:   Diagnosis Date   • Benign hypertension    • Coronary artery disease    • Depression    • Enlarged prostate     Enlarged prostate with anticipated TURP with Dr. Bernal.   • GERD (gastroesophageal reflux disease)    • Hypercholesterolemia    • Obesity    • Obstructive sleep apnea on CPAP    • Type 2 diabetes mellitus      Past Surgical History:   Procedure Laterality Date   • CARDIAC CATHETERIZATION     • CARDIAC CATHETERIZATION Left 10/19/2021    Procedure: Left Heart Cath;  Surgeon: Liz Russo MD;  Location: [facility] CATH INVASIVE LOCATION;  Service: Cardiology;  Laterality: Left;   • COLONOSCOPY W/ POLYPECTOMY     • CORONARY ANGIOPLASTY WITH STENT PLACEMENT Left 2009    Left heart catheterization, 2009, Dr. Russo:  LVEF 45% to 50%.  Placement of overlapping Cypher drug-eluting stent 2.5 x 28 and 2.5 x 18 to the SVG to the obtuse marginal branch.  SVG to the PDA had a proximal stenosis estimated at 60% with  a distal stenosis of 50% to 60%.   • CORONARY ANGIOPLASTY WITH STENT PLACEMENT Left 07/06/2009    Left heart catheterization, 07/06/2009:  PTCA and stent placement in the mid PDA using a 2.25 x 12 mm Taxus drug-eluting stent with stent placement in the distal SVG to the PDA using a 2.5 x 18 mm Cypher drug-eluting stent and stenting of the proximal SVG to the PDA using a 3.0 x 28 mm Cypher drug-eluting stent.   • CORONARY ANGIOPLASTY WITH STENT PLACEMENT  04/23/2010    Cardiac catheterization for recurrent anginal symptoms, 04/23/2010, with PTCA and stent placement in the proximal SVG to the PDA using 3.0 x 28 mm Promus drug-eluting stent for in-stent restenosis.   • CORONARY ANGIOPLASTY WITH STENT PLACEMENT Left 07/06/2010    Left heart catheterization, 07/06/2010, Dr. Russo:  EF 40% to 45%.  3.5 x 23 mm Promus drug-eluting stent in the proximal SVG to OM, 2.5 x 18 mm Promus drug-eluting stent to distal SVG to PDA due to in-stent restenosis.     • CORONARY ANGIOPLASTY WITH STENT PLACEMENT Left 11/16/2010    Left heart catheterization, 11/16/2010, with placement of a 3.0 x 16 mm Taxus drug-eluting stent to the SVG to the RCA proximally and a 2.5 x 16 mm paclitaxel stent distally in the SVG to the RCA.   • CORONARY ANGIOPLASTY WITH STENT PLACEMENT Left     Left heart catheterization, 3.0 x 24-mm Promus element drug-eluting stent to a 90% lesion in the mid portion of the SVG to the PDA.    • CORONARY ANGIOPLASTY WITH STENT PLACEMENT Left 06/24/2014    Left heart catheterization for recurrent angina, 06/24/2014, Dr. Russo:  PTCA of the SVG to the PDA using a 3 x 12 mm NC TREK balloon.     • CORONARY ARTERY BYPASS GRAFT      CABG x3, Dr. Peng, [facility], 2001.      General Information     Row Name 04/25/23 1423          Physical Therapy Time and Intention    Document Type evaluation  -MS     Mode of Treatment physical therapy  -MS     Row Name 04/25/23 1426          General Information     Patient Profile Reviewed yes  -MS     Existing Precautions/Restrictions no known precautions/restrictions  -MS     Barriers to Rehab none identified  -MS     Row Name 04/25/23 1423          Living Environment    People in Home spouse  -MS     Row Name 04/25/23 1423          Home Main Entrance    Number of Stairs, Main Entrance two  -MS     Row Name 04/25/23 1423          Cognition    Orientation Status (Cognition) oriented x 4;verbal cues/prompts needed for orientation  -MS     Row Name 04/25/23 1423          Safety Issues, Functional Mobility    Safety Issues Affecting Function (Mobility) safety precautions follow-through/compliance;safety precaution awareness  -MS     Impairments Affecting Function (Mobility) endurance/activity tolerance;balance  -MS           User Key  (r) = Recorded By, (t) = Taken By, (c) = Cosigned By    Initials Name Provider Type    Drew Simmons PT Physical Therapist               Mobility     Row Name 04/25/23 1423          Bed Mobility    Bed Mobility supine-sit  -MS     Supine-Sit [place] (Bed Mobility) modified independence  -MS     Assistive Device (Bed Mobility) bed rails;head of bed elevated  -MS     Row Name 04/25/23 1423          Sit-Stand Transfer    Sit-Stand [place] (Transfers) standby assist  -MS     Assistive Device (Sit-Stand Transfers) other (see comments)  gait belt  -MS     Row Name 04/25/23 1423          Gait/Stairs (Locomotion)    [place] Level (Gait) standby assist  -MS     Assistive Device (Gait) other (see comments)  gait belt  -MS     Distance in Feet (Gait) 84  -MS     Deviations/Abnormal Patterns (Gait) griselda decreased  -MS           User Key  (r) = Recorded By, (t) = Taken By, (c) = Cosigned By    Initials Name Provider Type    Drew Simmons PT Physical Therapist               Obj/Interventions     Row Name 04/25/23 1426          Range of Motion Comprehensive    General Range of Motion bilateral lower extremity ROM WFL  -MS     Row  Name 04/25/23 1426          Strength Comprehensive (MMT)    General Manual Muscle Testing (MMT) Assessment lower extremity strength deficits identified  -MS     Comment, General Manual Muscle Testing (MMT) Assessment B LE 4-/5  -MS     Row Name 04/25/23 1426          Sensory Assessment (Somatosensory)    Sensory Assessment (Somatosensory) sensation intact  -MS           User Key  (r) = Recorded By, (t) = Taken By, (c) = Cosigned By    Initials Name Provider Type    Drew Simmons, PT Physical Therapist               Goals/Plan     Row Name 04/25/23 1432          Bed Mobility Goal 1 (PT)    Activity/Assistive Device (Bed Mobility Goal 1, PT) bed mobility activities, all  -MS     [place] Level/Cues Needed (Bed Mobility Goal 1, PT) standby assist  -MS     Time Frame (Bed Mobility Goal 1, PT) long term goal (LTG);2 weeks  -MS     Row Name 04/25/23 1432          Transfer Goal 1 (PT)    Activity/Assistive Device (Transfer Goal 1, PT) transfers, all;sit-to-stand/stand-to-sit  -MS     [place] Level/Cues Needed (Transfer Goal 1, PT) standby assist  -MS     Time Frame (Transfer Goal 1, PT) long term goal (LTG);2 weeks  -MS     Row Name 04/25/23 1432          Gait Training Goal 1 (PT)    Activity/Assistive Device (Gait Training Goal 1, PT) gait (walking locomotion);assistive device use  -MS     [place] Level (Gait Training Goal 1, PT) standby assist  -MS     Distance (Gait Training Goal 1, PT) 200  -MS     Time Frame (Gait Training Goal 1, PT) long term goal (LTG);2 weeks  -MS     Row Name 04/25/23 1432          Patient Education Goal (PT)    Activity (Patient Education Goal, PT) ther exer x15 reps ea  -MS     [place]/Cues/Accuracy (Memory Goal 2, PT) demonstrates adequately  -MS     Time Frame (Patient Education Goal, PT) long term goal (LTG);2 weeks  -MS     Row Name 04/25/23 1432          Therapy Assessment/Plan (PT)    Planned Therapy Interventions (PT) balance training;bed mobility training;gait  training;home exercise program;transfer training;ROM (range of motion);stair training;strengthening;patient/family education  -MS           User Key  (r) = Recorded By, (t) = Taken By, (c) = Cosigned By    Initials Name Provider Type    Drew Simmons, PT Physical Therapist               Clinical Impression     Row Name 04/25/23 1428          Pain    Pretreatment Pain Rating 0/10 - no pain  -MS     Posttreatment Pain Rating 0/10 - no pain  -MS     Row Name 04/25/23 1428          Plan of Care Review    Plan of Care Reviewed With patient  -MS     Progress improving  -MS     Outcome Evaluation Pt participated in PT eval this date. Pt transferred to EOB with MI. Pt performed STS transfer with SBA. Pt then ambulated 84ft with CGA and no AD. Pt did have mild balance deficits, reports pain in the R LE. Pt may benefit from use of cane. Pt is expected to benefit from skilled PTx during this inpatient stay to address balance deficits.  -MS     Row Name 04/25/23 1428          Therapy Assessment/Plan (PT)    Rehab Potential (PT) good, to achieve stated therapy goals  -MS     Criteria for Skilled Interventions Met (PT) yes;meets criteria  -MS     Therapy Frequency (PT) 5 times/wk  -MS     Row Name 04/25/23 1428          Vital Signs    O2 Delivery Pre Treatment room air  -MS     O2 Delivery Intra Treatment room air  -MS     O2 Delivery Post Treatment room air  -MS     Pre Patient Position Supine  -MS     Intra Patient Position Standing  -MS     Post Patient Position Supine  -MS     Row Name 04/25/23 1428          Positioning and Restraints    Pre-Treatment Position in bed  -MS     Post Treatment Position bed  -MS     In Bed call light within reach;encouraged to call for assist;supine  -MS           User Key  (r) = Recorded By, (t) = Taken By, (c) = Cosigned By    Initials Name Provider Type    Drew Simmons, PT Physical Therapist               Outcome Measures     Row Name 04/25/23 1436 04/25/23 0800       How much  help from another person do you currently need...    Turning from your back to your side while in flat bed without using bedrails? 4  -MS 4  -MR    Moving from lying on back to sitting on the side of a flat bed without bedrails? 4  -MS 4  -MR    Moving to and from a bed to a chair (including a wheelchair)? 3  -MS 3  -MR    Standing up from a chair using your arms (e.g., wheelchair, bedside chair)? 3  -MS 3  -MR    Climbing 3-5 steps with a railing? 3  -MS 3  -MR    To walk in hospital room? 3  -MS 3  -MR    AM-PAC 6 Clicks Score (PT) 20  -MS 20  -MR    Highest level of mobility 6 --> Walked 10 steps or more  -MS 6 --> Walked 10 steps or more  -MR    Row Name 04/25/23 1155          Functional Assessment    Outcome Measure Options AM-PAC 6 Clicks Daily Activity (OT)  -SD           User Key  (r) = Recorded By, (t) = Taken By, (c) = Cosigned By    Initials Name Provider Type    SD Celena Hernandez, OT Occupational Therapist    Drew Simmons, PT Physical Therapist    Farhana Stafford, RN Registered Nurse                             Physical Therapy Education     Title: PT OT SLP Therapies (In Progress)     Topic: Physical Therapy (In Progress)     Point: Mobility training (Done)     Learning Progress Summary           Patient Acceptance, E, VU by MS at 4/25/2023 1437    Comment: importance of mobility                   Point: Home exercise program (Done)     Learning Progress Summary           Patient Acceptance, E, VU by MS at 4/25/2023 1437    Comment: importance of mobility                   Point: Body mechanics (Not Started)     Learner Progress:  Not documented in this visit.          Point: Precautions (Not Started)     Learner Progress:  Not documented in this visit.                      User Key     Initials Effective Dates Name Provider Type Discipline    MS 07/01/22 -  Drew Patton, NONA Physical Therapist PT              PT Recommendation and Plan  Planned Therapy Interventions (PT): balance training,  bed mobility training, gait training, home exercise program, transfer training, ROM (range of motion), stair training, strengthening, patient/family education  Plan of Care Reviewed With: patient  Progress: improving  Outcome Evaluation: Pt participated in PT eval this date. Pt transferred to EOB with MI. Pt performed STS transfer with SBA. Pt then ambulated 84ft with CGA and no AD. Pt did have mild balance deficits, reports pain in the R LE. Pt may benefit from use of cane. Pt is expected to benefit from skilled PTx during this inpatient stay to address balance deficits.     Time Calculation:    PT Charges     Row Name 04/25/23 1437             Time Calculation    Start Time 1408  -MS      PT Received On 04/25/23  -MS      PT Goal Re-Cert Due Date 05/05/23  -MS         Untimed Charges    PT Eval/Re-eval Minutes 40  -MS         Total Minutes    Untimed Charges Total Minutes 40  -MS       Total Minutes 40  -MS            User Key  (r) = Recorded By, (t) = Taken By, (c) = Cosigned By    Initials Name Provider Type    Drew Simmons PT Physical Therapist              Therapy Charges for Today     Code Description Service Date Service Provider Modifiers Qty    [id number]  PT EVAL LOW COMPLEXITY 3 4/25/2023 Drew Patton PT GP 1          PT G-Codes  Outcome Measure Options: AM-PAC 6 Clicks Daily Activity (OT)  AM-PAC 6 Clicks Score (PT): 20  AM-PAC 6 Clicks Score (OT): 21  PT Discharge Summary  Anticipated Discharge Disposition (PT): home with assist, home with home health    Drew Patton PT  4/25/2023

## 2023-04-25 NOTE — THERAPY TREATMENT NOTE
Patient Name: Donny Jerry  : 1945    MRN: [id number]                              Today's Date: 2023       Admit Date: 2023    Visit Dx:     ICD-10-CM ICD-9-CM   1. Acute respiratory failure with hypoxia  J96.01 518.81   2. Acute on chronic congestive heart failure, unspecified heart failure type  I50.9 428.0   3. Leukocytosis, unspecified type  D72.829 288.60   4. 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 pectoris   • Essential hypertension   • Hyperlipidemia LDL goal <70   • Type 2 diabetes mellitus with stage 3 chronic kidney disease   • Obstructive sleep apnea on CPAP   • Enlarged prostate   • Obesity, Class II, BMI 35-39.9   • Depression   • Acute hypoxemic respiratory failure due to COVID-19   • CKD (chronic kidney disease) stage 3, GFR 30-59 ml/min   • Elevated troponin level not due myocardial infarction     Past Medical History:   Diagnosis Date   • Benign hypertension    • Coronary artery disease    • Depression    • Enlarged prostate     Enlarged prostate with anticipated TURP with Dr. Bernal.   • GERD (gastroesophageal reflux disease)    • Hypercholesterolemia    • Obesity    • Obstructive sleep apnea on CPAP    • Type 2 diabetes mellitus      Past Surgical History:   Procedure Laterality Date   • CARDIAC CATHETERIZATION     • CARDIAC CATHETERIZATION Left 10/19/2021    Procedure: Left Heart Cath;  Surgeon: Liz Russo MD;  Location: [facility] CATH INVASIVE LOCATION;  Service: Cardiology;  Laterality: Left;   • COLONOSCOPY W/ POLYPECTOMY     • CORONARY ANGIOPLASTY WITH STENT PLACEMENT Left 2009    Left heart catheterization, 2009, Dr. Russo:  LVEF 45% to 50%.  Placement of overlapping Cypher drug-eluting stent 2.5 x 28 and 2.5 x 18 to the SVG to the obtuse marginal branch.  SVG to the PDA had a proximal stenosis estimated at 60% with  a distal stenosis of 50% to 60%.   • CORONARY ANGIOPLASTY WITH STENT PLACEMENT Left 07/06/2009    Left heart catheterization, 07/06/2009:  PTCA and stent placement in the mid PDA using a 2.25 x 12 mm Taxus drug-eluting stent with stent placement in the distal SVG to the PDA using a 2.5 x 18 mm Cypher drug-eluting stent and stenting of the proximal SVG to the PDA using a 3.0 x 28 mm Cypher drug-eluting stent.   • CORONARY ANGIOPLASTY WITH STENT PLACEMENT  04/23/2010    Cardiac catheterization for recurrent anginal symptoms, 04/23/2010, with PTCA and stent placement in the proximal SVG to the PDA using 3.0 x 28 mm Promus drug-eluting stent for in-stent restenosis.   • CORONARY ANGIOPLASTY WITH STENT PLACEMENT Left 07/06/2010    Left heart catheterization, 07/06/2010, Dr. Russo:  EF 40% to 45%.  3.5 x 23 mm Promus drug-eluting stent in the proximal SVG to OM, 2.5 x 18 mm Promus drug-eluting stent to distal SVG to PDA due to in-stent restenosis.     • CORONARY ANGIOPLASTY WITH STENT PLACEMENT Left 11/16/2010    Left heart catheterization, 11/16/2010, with placement of a 3.0 x 16 mm Taxus drug-eluting stent to the SVG to the RCA proximally and a 2.5 x 16 mm paclitaxel stent distally in the SVG to the RCA.   • CORONARY ANGIOPLASTY WITH STENT PLACEMENT Left     Left heart catheterization, 3.0 x 24-mm Promus element drug-eluting stent to a 90% lesion in the mid portion of the SVG to the PDA.    • CORONARY ANGIOPLASTY WITH STENT PLACEMENT Left 06/24/2014    Left heart catheterization for recurrent angina, 06/24/2014, Dr. Russo:  PTCA of the SVG to the PDA using a 3 x 12 mm NC TREK balloon.     • CORONARY ARTERY BYPASS GRAFT      CABG x3, Dr. Peng, [facility], 2001.      General Information     Row Name 04/25/23 1151          OT Time and Intention    Document Type therapy note (daily note)  -SD     Mode of Treatment occupational therapy  -SD     Row Name 04/25/23 1151          General Information     Patient Profile Reviewed yes  -SD           User Key  (r) = Recorded By, (t) = Taken By, (c) = Cosigned By    Initials Name Provider Type    Celena Blood OT Occupational Therapist                 Mobility/ADL's     Row Name 04/25/23 1151          Bed Mobility    Comment, (Bed Mobility) in chair  -SD     Row Name 04/25/23 1151          Transfers    Transfers sit-stand transfer;toilet transfer  -SD     Row Name 04/25/23 1151          Sit-Stand Transfer    Sit-Stand Willacy (Transfers) standby assist  -SD     Row Name 04/25/23 1151          Toilet Transfer    Type (Toilet Transfer) sit-stand;stand-sit  -SD     Willacy Level (Toilet Transfer) standby assist  -SD     Assistive Device (Toilet Transfer) commode;grab bars/safety frame  -SD     Row Name 04/25/23 1151          Functional Mobility    Functional Mobility- Ind. Level standby assist  -SD     Functional Mobility-Distance (Feet) 58  -SD     Functional Mobility- Comment room air  -SD     Row Name 04/25/23 1151          Bathing Assessment/Intervention    Willacy Level (Bathing) upper body;upper extremities;set up;perineal area;standby assist  -SD     Row Name 04/25/23 1151          Upper Body Dressing Assessment/Training    Willacy Level (Upper Body Dressing) set up  -SD     Row Name 04/25/23 1151          Lower Body Dressing Assessment/Training    Willacy Level (Lower Body Dressing) don;pants/bottoms;standby assist  -SD     Row Name 04/25/23 1151          Grooming Assessment/Training    Willacy Level (Grooming) wash face, hands;standby assist  -SD     Position (Grooming) sink side;unsupported standing  -SD     Row Name 04/25/23 1151          Toileting Assessment/Training    Willacy Level (Toileting) standby assist;contact guard assist  -SD     Assistive Devices (Toileting) commode;grab bar/safety frame  -SD           User Key  (r) = Recorded By, (t) = Taken By, (c) = Cosigned By    Initials Name Provider Type    SD  Celena Hernandez OT Occupational Therapist               Obj/Interventions     Row Name 04/25/23 1152          Shoulder (Therapeutic Exercise)    Shoulder (Therapeutic Exercise) strengthening exercise  -SD     Shoulder Strengthening (Therapeutic Exercise) bilateral;flexion;extension;aBduction;aDduction;horizontal aBduction/aDduction;yellow;15 repititions;resistance band  -SD     Row Name 04/25/23 1152          Elbow/Forearm (Therapeutic Exercise)    Elbow/Forearm (Therapeutic Exercise) strengthening exercise  -SD     Elbow/Forearm Strengthening (Therapeutic Exercise) bilateral;flexion;extension;resistance band;yellow;15 repititions  -SD     Row Name 04/25/23 1152          Motor Skills    Therapeutic Exercise shoulder;elbow/forearm  -SD           User Key  (r) = Recorded By, (t) = Taken By, (c) = Cosigned By    Initials Name Provider Type    SD Celena Hernandez OT Occupational Therapist               Goals/Plan    No documentation.                Clinical Impression     Row Name 04/25/23 1152          Pain Assessment    Pretreatment Pain Rating 0/10 - no pain  -SD     Posttreatment Pain Rating 0/10 - no pain  -SD     Row Name 04/25/23 1152          Plan of Care Review    Plan of Care Reviewed With patient  -SD     Progress improving  -SD     Outcome Evaluation OT tx completed. Patient sitting in chair, on room air satting 98%. Patient performed sit to stand with SBA, walked to bathroom with SBA. Performed toileting with SBA, U/LB bathing and dressing with SBA, moved to sink and performed grooming tasks with SBA. Patient walked additional 40' in room, satting 96% following activity. Patient performed BUE ther ex using yellow theraband. Continue OT POC  -SD     Row Name 04/25/23 1152          Vital Signs    Pre SpO2 (%) 98  -SD     O2 Delivery Pre Treatment room air  -SD     Intra SpO2 (%) 96  -SD     O2 Delivery Intra Treatment room air  -SD     Post SpO2 (%) 98  -SD     O2 Delivery Post Treatment room air  -SD      Row Name 04/25/23 1152          Positioning and Restraints    Pre-Treatment Position sitting in chair/recliner  -SD     Post Treatment Position chair  -SD     In Chair sitting;call light within reach;encouraged to call for assist;exit alarm on  -SD           User Key  (r) = Recorded By, (t) = Taken By, (c) = Cosigned By    Initials Name Provider Type    Celena Blood OT Occupational Therapist               Outcome Measures     Row Name 04/25/23 1155          How much help from another is currently needed...    Putting on and taking off regular lower body clothing? 3  -SD     Bathing (including washing, rinsing, and drying) 3  -SD     Toileting (which includes using toilet bed pan or urinal) 3  -SD     Putting on and taking off regular upper body clothing 4  -SD     Taking care of personal grooming (such as brushing teeth) 4  -SD     Eating meals 4  -SD     AM-PAC 6 Clicks Score (OT) 21  -SD     Row Name 04/25/23 1155          Functional Assessment    Outcome Measure Options AM-PAC 6 Clicks Daily Activity (OT)  -SD           User Key  (r) = Recorded By, (t) = Taken By, (c) = Cosigned By    Initials Name Provider Type    Celena Blood OT Occupational Therapist                Occupational Therapy Education     Title: PT OT SLP Therapies (In Progress)     Topic: Occupational Therapy (In Progress)     Point: ADL training (Done)     Description:   Instruct learner(s) on proper safety adaptation and remediation techniques during self care or transfers.   Instruct in proper use of assistive devices.              Learning Progress Summary           Patient Acceptance, E,TB, VU by SD at 4/25/2023 1156    Comment: Safety and sequencing during tf and mobility    Acceptance, E,TB, VU by SD at 4/24/2023 1609    Comment: OT POC                   Point: Home exercise program (Not Started)     Description:   Instruct learner(s) on appropriate technique for monitoring, assisting and/or progressing therapeutic  exercises/activities.              Learner Progress:  Not documented in this visit.          Point: Precautions (Not Started)     Description:   Instruct learner(s) on prescribed precautions during self-care and functional transfers.              Learner Progress:  Not documented in this visit.          Point: Body mechanics (Not Started)     Description:   Instruct learner(s) on proper positioning and spine alignment during self-care, functional mobility activities and/or exercises.              Learner Progress:  Not documented in this visit.                      User Key     Initials Effective Dates Name Provider Type Discipline    SD 06/16/21 -  Celena Hernandez OT Occupational Therapist OT              OT Recommendation and Plan  Planned Therapy Interventions (OT): activity tolerance training, adaptive equipment training, BADL retraining, patient/caregiver education/training, ROM/therapeutic exercise, strengthening exercise, transfer/mobility retraining  Therapy Frequency (OT): 3 times/wk (5 times if indicated)  Plan of Care Review  Plan of Care Reviewed With: patient  Progress: improving  Outcome Evaluation: OT tx completed. Patient sitting in chair, on room air satting 98%. Patient performed sit to stand with SBA, walked to bathroom with SBA. Performed toileting with SBA, U/LB bathing and dressing with SBA, moved to sink and performed grooming tasks with SBA. Patient walked additional 40' in room, satting 96% following activity. Patient performed BUE ther ex using yellow theraband. Continue OT POC     Time Calculation:    Time Calculation- OT     Row Name 04/25/23 1156             Time Calculation- OT    OT Start Time 1001  -SD      OT Stop Time 1028  -SD      OT Time Calculation (min) 27 min  -SD      OT Received On 04/25/23  -SD      OT Goal Re-Cert Due Date 05/04/23  -SD         Timed Charges    91152 - OT Therapeutic Exercise Minutes 8  -SD      74106 - OT Therapeutic Activity Minutes 7  -SD      88167 -  OT Self Care/Mgmt Minutes 12  -SD         Total Minutes    Timed Charges Total Minutes 27  -SD       Total Minutes 27  -SD            User Key  (r) = Recorded By, (t) = Taken By, (c) = Cosigned By    Initials Name Provider Type    Celena Blood OT Occupational Therapist              Therapy Charges for Today     Code Description Service Date Service Provider Modifiers Qty    57953868593  OT EVAL LOW COMPLEXITY 3 4/24/2023 Celena Hernandez OT GO 1    [id number]  OT THER PROC EA 15 MIN 4/25/2023 Celena Hernandez OT GO 1    [id number]  OT SELF CARE/MGMT/TRAIN EA 15 MIN 4/25/2023 Celena Hernandez OT GO 1               Celena Hrenandez OT  4/25/2023   Yes

## 2023-04-25 NOTE — PLAN OF CARE
Goal Outcome Evaluation:  Plan of Care Reviewed With: patient        Progress: improving  Outcome Evaluation: OT tx completed. Patient sitting in chair, on room air satting 98%. Patient performed sit to stand with SBA, walked to bathroom with SBA. Performed toileting with SBA, U/LB bathing and dressing with SBA, moved to sink and performed grooming tasks with SBA. Patient walked additional 40' in room, satting 96% following activity. Patient performed BUE ther ex using yellow theraband. Continue OT POC

## 2023-04-25 NOTE — PLAN OF CARE
Goal Outcome Evaluation:            VSS. Pt on room air. Medications administered per orders. No acute events today.

## 2023-04-25 NOTE — PROGRESS NOTES
[facility]    Name:  Donny eJrry   Age:  78 y.o.  Sex:  male  :  1945  MRN:  [id number]   Visit Number:  [id number]  Admission Date:  2023  Date Of Service:  23  Primary Care Physician:  Anum Alonso APRN     LOS: 2 days :    Chief Complaint:      Generalized weakness    Subjective:    Patient seen and evaluated today.  Resting comfortably in bedside chair.  Nonhypoxic on room air.  Denied complaints.  Has been ambulating around the room independently.  No nausea vomiting or diarrhea.  No acute events overnight per nursing staff.    Discussed with patient that he would likely be going home today as he did not qualify for short-term rehab based on mobility.  Unfortunately however patient's renal function has continued to worsen which will necessitate further hospital days.    Hospital Course:    78-year-old gentleman past history of coronary disease status post CABG status post PCI several years back follows with Dr. Russo.  Reports was previously on diuretics but Dr. Russo had been weaning him off of these but he was still taking a low-dose 20 mg Lasix daily.  Reports for the last 1 week he has had increased weakness.  He is also had progressive shortness of breath which started 1 day ago.  He has had a chronic intermittent cough due to allergies which has not been worsened during this period.  He is also had diarrhea since yesterday.  History provided by patient as well as his daughter at the bedside.  He elects to be full code and has an advanced directive which is valid.  In the emergency department he was found to have COVID-19.    Review of Systems:     All systems were reviewed and negative except as mentioned in subjective, assessment and plan.    Vital Signs:    Temp:  [97.6 °F (36.4 °C)-98.5 °F (36.9 °C)] 97.6 °F (36.4 °C)  Heart Rate:  [54-71] 71  Resp:  [18-20] 18  BP: (119-148)/(48-59) 148/57    Intake and  output:    I/O last 3 completed shifts:  In: 2020 [P.O.:2020]  Out: 5450 [Urine:5450]  I/O this shift:  In: 360 [P.O.:360]  Out: -     Physical Examination:    General Appearance:  Alert and cooperative.    Head:  Atraumatic and normocephalic.   Eyes: Conjunctivae and sclerae normal, no icterus. No pallor.   Throat: No oral lesions, no thrush, oral mucosa moist.   Neck: Supple, trachea midline, no thyromegaly.   Lungs:   Breath sounds heard bilaterally equally.  No wheezing or crackles. No Pleural rub or bronchial breathing.   Heart:  Normal S1 and S2, no murmur, no gallop, no rub. No JVD.   Abdomen:   Normal bowel sounds, no masses, no organomegaly. Soft, nontender, nondistended, no rebound tenderness.   Extremities: Supple, no edema, no cyanosis, no clubbing.   Skin: No bleeding or rash.   Neurologic: Alert and oriented x 3. No facial asymmetry. Moves all four limbs. No tremors.      Laboratory results:    Results from last 7 days   Lab Units 04/25/23  0731 04/24/23  0604 04/23/23  1619 04/23/23  1323   SODIUM mmol/L 140 137  --  140   POTASSIUM mmol/L 4.3 4.1  --  4.6   CHLORIDE mmol/L 102 101  --  105   CO2 mmol/L 25.3 21.5*  --  24.9   BUN mg/dL 87* 64*  --  43*   CREATININE mg/dL 2.40* 2.38* 2.01* 1.92*   CALCIUM mg/dL 8.2* 8.0*  --  8.3*   BILIRUBIN mg/dL 0.3 0.2 0.4 0.4   ALK PHOS U/L 35* 35* 38* 38*   ALT (SGPT) U/L 33 29 32 31   AST (SGOT) U/L 39 46* 47* 46*   GLUCOSE mg/dL 251* 276*  --  84     Results from last 7 days   Lab Units 04/25/23  0731 04/24/23  0604 04/23/23  1323   WBC 10*3/mm3 13.87* 9.76 11.76*   HEMOGLOBIN g/dL 10.6* 9.5* 9.8*   HEMATOCRIT % 31.8* 29.1* 29.7*   PLATELETS 10*3/mm3 257 221 219         Results from last 7 days   Lab Units 04/23/23  1619 04/23/23  1323   HSTROP T ng/L 419* 418*         Recent Labs     04/23/23  1455   PHART 7.442   WWI0HYN 35.6   PO2ART 57.8*   EHQ8UIL 24.3   BASEEXCESS 0.4      I have reviewed the patient's laboratory results.    Radiology results:    XR Chest  1 View    Result Date: 4/23/2023  PORTABLE CHEST  4/23/2023 1:48 PM  HISTORY: Shortness of breath  COMPARISON:  August 11, 2018  FINDINGS: Prior median sternotomy. The cardiac silhouette is enlarged. Pulmonary vascular congestion.  Diffuse basilar predominant interstitial opacities. Probable small bilateral pleural effusions. There is no pneumothorax. The visualized osseous structures demonstrate no acute abnormalities.      Impression: Cardiomegaly with pulmonary vascular congestion and diffuse interstitial/alveolar edema. Atypical pneumonia in the appropriate clinical setting not excluded      This report was signed and finalized on 4/23/2023 4:50 PM by Jaspreet June MD.    I have reviewed the patient's radiology reports.    Medication Review:     I have reviewed the patient's active and prn medications.     Problem List:      Acute hypoxemic respiratory failure due to COVID-19    Coronary artery disease involving native coronary artery of native heart with angina pectoris    Hyperlipidemia LDL goal <70    Type 2 diabetes mellitus with stage 3 chronic kidney disease    CKD (chronic kidney disease) stage 3, GFR 30-59 ml/min    Elevated troponin level not due myocardial infarction      Assessment:    1. Acute hypoxic respiratory failure secondary to COVID-19, POA  2. Generalized weakness secondary to 1, POA  3. Elevated troponin  4. IMANI on CKD 3B  5. CAD  6. Type 2 diabetes  7. BPH  8. Hypertension  9. Hyperlipidemia    Plan:    Acute hypoxic respiratory failure  COVID-19  - Hypoxia has resolved.  Patient maintaining appropriate saturation on room air.  -Continue to encourage ambulation  - Supportive care for COVID-19, no indication for remdesivir which is in fact contraindicated given kidney disease.  -We will continue with Decadron to complete 5 days.  -No indication for antibiotics    IMANI on CKDIIb  -Patient has had progressive worsening of his renal function since admission yet remained on several  medications known to be contraindicated in the setting of IMANI  -As such, have discontinued remedesivir, valsartan and holding po Lasix  -UA, urine studies and renal US pending  -Nephrology consulted and appreciate recommendations  -I have also changed his DVTp from lovenox to subq heparin    Supportive care.  Further orders as clinical course dictates.  Continue home medications as warranted.    DVT Prophylaxis: Heparin subcu  Code Status: Full  Diet: Cardiac/diabetic/renal  Discharge Plan: Pending, likely home in 1 to 2 days    Luisito Hawkins DO  04/25/23  11:02 EDT    Dictated utilizing Dragon dictation.

## 2023-04-25 NOTE — PLAN OF CARE
Goal Outcome Evaluation:  Plan of Care Reviewed With: patient        Progress: improving  Outcome Evaluation: Pt participated in PT eval this date. Pt transferred to EOB with MI. Pt performed STS transfer with SBA. Pt then ambulated 84ft with CGA and no AD. Pt did have mild balance deficits, reports pain in the R LE. Pt may benefit from use of cane. Pt is expected to benefit from skilled PTx during this inpatient stay to address balance deficits.

## 2023-04-25 NOTE — CONSULTS
[facility] Consult Note      Patient Name: Donny Jerry  : 1945  MRN: [id number]  Primary Care Physician:  Anum Alonso APRN  Referring Physician: No ref. provider found  Date of admission: 2023    Subjective     Reason for Consult: IMANI on CKD stage IIIb    HPI:   Donny Jerry is a 78 y.o. male with underlying CKD stage IIIb with a baseline creatinine around 1.7 to 1.9 mg/dL.  Admitted with acute hypoxemic respiratory failure related to COVID-19.  Was febrile on admission, now improved.  Currently on room air and not requiring oxygen even on ambulation.  Chest x-ray done on admission showed vascular congestion possibly inflammatory in nature versus volume related.  He is on Lasix 20 mg p.o. daily at home.  Also on Jardiance.  On Edarbi.  Has underlying history of BPH, currently on terazosin, Flomax and finasteride.  No significant LUTS  Serum creatinine had slowly risen up to 2.4 mg/dL  Currently on remdesivir and IV steroids    Review of Systems:   14 point review of systems is otherwise negative except for mentioned above on HPI    Personal History     Past Medical History:   Diagnosis Date   • Benign hypertension    • Coronary artery disease    • Depression    • Enlarged prostate     Enlarged prostate with anticipated TURP with Dr. Bernal.   • GERD (gastroesophageal reflux disease)    • Hypercholesterolemia    • Obesity    • Obstructive sleep apnea on CPAP    • Type 2 diabetes mellitus        Past Surgical History:   Procedure Laterality Date   • CARDIAC CATHETERIZATION     • CARDIAC CATHETERIZATION Left 10/19/2021    Procedure: Left Heart Cath;  Surgeon: Liz Russo MD;  Location: [facility] CATH INVASIVE LOCATION;  Service: Cardiology;  Laterality: Left;   • COLONOSCOPY W/ POLYPECTOMY     • CORONARY ANGIOPLASTY WITH STENT PLACEMENT Left 2009    Left heart catheterization, 2009, Dr. Russo:  LVEF 45% to 50%.  Placement of overlapping  Cypher drug-eluting stent 2.5 x 28 and 2.5 x 18 to the SVG to the obtuse marginal branch.  SVG to the PDA had a proximal stenosis estimated at 60% with a distal stenosis of 50% to 60%.   • CORONARY ANGIOPLASTY WITH STENT PLACEMENT Left 07/06/2009    Left heart catheterization, 07/06/2009:  PTCA and stent placement in the mid PDA using a 2.25 x 12 mm Taxus drug-eluting stent with stent placement in the distal SVG to the PDA using a 2.5 x 18 mm Cypher drug-eluting stent and stenting of the proximal SVG to the PDA using a 3.0 x 28 mm Cypher drug-eluting stent.   • CORONARY ANGIOPLASTY WITH STENT PLACEMENT  04/23/2010    Cardiac catheterization for recurrent anginal symptoms, 04/23/2010, with PTCA and stent placement in the proximal SVG to the PDA using 3.0 x 28 mm Promus drug-eluting stent for in-stent restenosis.   • CORONARY ANGIOPLASTY WITH STENT PLACEMENT Left 07/06/2010    Left heart catheterization, 07/06/2010, Dr. Russo:  EF 40% to 45%.  3.5 x 23 mm Promus drug-eluting stent in the proximal SVG to OM, 2.5 x 18 mm Promus drug-eluting stent to distal SVG to PDA due to in-stent restenosis.     • CORONARY ANGIOPLASTY WITH STENT PLACEMENT Left 11/16/2010    Left heart catheterization, 11/16/2010, with placement of a 3.0 x 16 mm Taxus drug-eluting stent to the SVG to the RCA proximally and a 2.5 x 16 mm paclitaxel stent distally in the SVG to the RCA.   • CORONARY ANGIOPLASTY WITH STENT PLACEMENT Left     Left heart catheterization, 3.0 x 24-mm Promus element drug-eluting stent to a 90% lesion in the mid portion of the SVG to the PDA.    • CORONARY ANGIOPLASTY WITH STENT PLACEMENT Left 06/24/2014    Left heart catheterization for recurrent angina, 06/24/2014, Dr. Russo:  PTCA of the SVG to the PDA using a 3 x 12 mm NC TREK balloon.     • CORONARY ARTERY BYPASS GRAFT      CABG x3, Dr. Peng, [facility], 2001.       Family History: family history includes Heart attack in his mother; Ulcers in  his father.    Social History:  reports that he has never smoked. He has never used smokeless tobacco. He reports that he does not drink alcohol and does not use drugs.    Home Medications:  Prior to Admission medications    Medication Sig Start Date End Date Taking? Authorizing Provider   albuterol sulfate  (90 Base) MCG/ACT inhaler Inhale 2 puffs Every 4 (Four) Hours As Needed for Wheezing.   Yes Jacob Sotelo MD   atorvastatin (LIPITOR) 40 MG tablet Take 1 tablet by mouth Daily. 1/1/22  Yes Jacob Sotelo MD   azilsartan medoxomil (EDARBI) 80 MG tablet tablet Take 1 tablet by mouth Daily.   Yes Jacob Sotelo MD B-D UF III MINI PEN NEEDLES 31G X 5 MM misc  3/26/23  Yes Jacob Sotelo MD   carvedilol (COREG) 12.5 MG tablet Take 6.25 mg by mouth 2 (Two) Times a Day With Meals. Pt takes 0.5 tab BID   Yes Jacob Sotelo MD   Cholecalciferol (VITAMIN D3) 50 MCG (2000 UT) capsule Take 2 capsules by mouth.   Yes Jacob Sotelo MD   Dapagliflozin Propanediol 10 MG tablet Take 10 mg by mouth Daily.   Yes Jacob Sotelo MD   doxazosin (CARDURA) 8 MG tablet Take 1 tablet by mouth Every 12 (Twelve) Hours. 10/19/21  Yes Liz Russo MD   finasteride (PROSCAR) 5 MG tablet Take 1 tablet by mouth Daily.   Yes Jacob Sotelo MD   furosemide (LASIX) 20 MG tablet Take 1 tablet by mouth Daily. 3/6/23  Yes Liz Russo MD   hydrALAZINE (APRESOLINE) 100 MG tablet Take 1 tablet by mouth 3 (Three) Times a Day. 10/19/21  Yes Liz Russo MD   Multiple Vitamin (MULTI VITAMIN DAILY PO) Take 1 tablet by mouth Daily.   Yes Jacob Sotelo MD   pantoprazole (PROTONIX) 40 MG EC tablet TAKE 1 TABLET BY MOUTH 2 TIMES DAILY (BEFORE MEALS) 12/23/21  Yes Jacob Sotelo MD   potassium chloride (K-DUR,KLOR-CON) 20 MEQ CR tablet Take 1 tablet by mouth Daily.   Yes Jacob Sotelo MD   pramipexole (Mirapex) 1 MG tablet Take 1 tablet by  mouth Every Night. 8/18/22  Yes Carolann Franco APRN   prasugrel (EFFIENT) 10 MG tablet Take 1 tablet by mouth Daily.   Yes Provider, MD Jacob   sertraline (ZOLOFT) 50 MG tablet Take 1 tablet by mouth Daily. 4/4/23  Yes Provider, MD Jacob   tamsulosin (FLOMAX) 0.4 MG capsule 24 hr capsule Take 1 capsule by mouth 2 (Two) Times a Day.   Yes Provider, MD Jacob   Tresiba FlexTouch 200 UNIT/ML solution pen-injector pen injection Inject  under the skin into the appropriate area as directed. 4/29/22  Yes Provider, MD Jacob   TRUEplus Lancets 28G misc  9/9/22  Yes Provider, MD Jacob   Trulicity 0.75 MG/0.5ML solution pen-injector  5/23/22  Yes Provider, MD Jacob   fluticasone (FLONASE) 50 MCG/ACT nasal spray 1 spray into the nostril(s) as directed by provider Daily. 8/18/22   Carolann Franco APRN   Fluticasone-Umeclidin-Vilant (Trelegy Ellipta) 200-62.5-25 MCG/INH inhaler Inhale 1 puff Daily. Rinse mouth with water after use. 8/18/22   Carolann Franco APRN   nitroglycerin (NITROSTAT) 0.4 MG SL tablet Place 1 tablet under the tongue Every 5 (Five) Minutes As Needed for Chest Pain. Take no more than 3 doses in 15 minutes. 6/15/18   Liz Russo MD       Allergies:  Allergies   Allergen Reactions   • Zocor [Simvastatin] Myalgia   • Bisoprolol Other (See Comments)     Agitation     • Byetta 10 Mcg Pen [Exenatide] Nausea Only     nausea   • Crestor [Rosuvastatin Calcium] Myalgia   • Metformin And Related Nausea Only   • Plavix [Clopidogrel Bisulfate] Other (See Comments)     resistance       Objective     Vitals:   Temp:  [97.3 °F (36.3 °C)-98.5 °F (36.9 °C)] 97.6 °F (36.4 °C)  Heart Rate:  [54-71] 71  Resp:  [18-20] 18  BP: (119-148)/(48-59) 148/57  Flow (L/min):  [2] 2    Intake/Output Summary (Last 24 hours) at 4/25/2023 1056  Last data filed at 4/25/2023 0900  Gross per 24 hour   Intake 1680 ml   Output 2925 ml   Net -1245 ml       Physical  Exam:    General Appearance: NAD  HEENT: oral mucosa normal, nonicteric sclera  Neck: supple, no JVD  Lungs: Decreased breath sounds bilaterally  Heart: RRR, normal S1 and S2  Abdomen: soft, nondistended  Extremities: no edema  Neuro: Awake and alert and moving all extremities    Scheduled Meds:     albuterol sulfate HFA, 2 puff, Inhalation, 4x Daily - RT  atorvastatin, 40 mg, Oral, Daily  budesonide-formoterol, 2 puff, Inhalation, BID - RT   And  tiotropium bromide monohydrate, 2 puff, Inhalation, Daily - RT  carvedilol, 6.25 mg, Oral, BID With Meals  cetirizine, 10 mg, Oral, Daily  cholecalciferol, 4,000 Units, Oral, Daily  dexamethasone, 6 mg, Oral, Daily   Or  dexamethasone, 6 mg, Intravenous, Daily  enoxaparin, 30 mg, Subcutaneous, Daily  finasteride, 5 mg, Oral, Daily  fluticasone, 1 spray, Nasal, Daily  hydrALAZINE, 100 mg, Oral, TID  Insulin Aspart, 0-14 Units, Subcutaneous, TID AC  melatonin, 5 mg, Oral, Nightly  multivitamin with minerals, 1 tablet, Oral, Daily  pantoprazole, 40 mg, Oral, BID AC  pramipexole, 1 mg, Oral, Nightly  prasugrel, 10 mg, Oral, Daily  sertraline, 50 mg, Oral, Daily  sodium chloride, 10 mL, Intravenous, Q12H  tamsulosin, 0.4 mg, Oral, Nightly  terazosin, 5 mg, Oral, Q12H  valsartan, 160 mg, Oral, Q24H      IV Meds:        Results Reviewed:   I have personally reviewed the results from the time of this admission to 4/25/2023 10:56 EDT     Lab Results   Component Value Date    GLUCOSE 251 (H) 04/25/2023    CALCIUM 8.2 (L) 04/25/2023     04/25/2023    K 4.3 04/25/2023    CO2 25.3 04/25/2023     04/25/2023    BUN 87 (H) 04/25/2023    CREATININE 2.40 (H) 04/25/2023    EGFRIFNONA 48 (L) 10/19/2021    BCR 36.3 (H) 04/25/2023    ANIONGAP 12.7 04/25/2023      Lab Results   Component Value Date    MG 2.5 (H) 04/25/2023    PHOS 5.8 (H) 04/25/2023    ALBUMIN 3.2 (L) 04/25/2023           Assessment / Plan     ASSESSMENT:  -IMANI on CKD stage IIIb (baseline creatinine around 1.7 to 1.9  mg/dL, likely hemodynamic in nature resulting in prerenal state/ATN in light of being on Lasix/Jardiance/valsartan with relatively poor oral intake on presentation  -Acute hypoxic respiratory failure due to COVID-19 pneumonia  -History of hypertension  -Type 2 diabetes  -History of CAD  -Chronic diastolic congestive heart failure with underlying EF around 55 to 60%    PLAN:  -Transiently hold Jardiance  -Transiently hold Lasix as patient appears clinically euvolemic.  As renal function stabilizes, will reintroduce in the next 24 to 48 hours  -Can stay on valsartan (replacing Edarbi and patient) but will decrease dose from 320 mg daily to 160 mg daily.  If renal function worsens, we may need to hold altogether  -Strict intake and output and bladder scan to ensure no urinary retention in light of history of BPH  -UA with microscopy, urine sodium, urine protein/creatinine ratio  -Renal panel and magnesium in a.m.    Thank you for the consult!    Neha Heath MD  04/25/23  10:56 EDT    [facility]  [phone]

## 2023-04-25 NOTE — PLAN OF CARE
Goal Outcome Evaluation:      Pt alert and oriented. Pt able to use urinal with minimal assist. Pt did call out saying he was short of air, RN applied 2L nasal cannula. Pt reported improvement in breathing. Vital signs WDL's.

## 2023-04-26 ENCOUNTER — READMISSION MANAGEMENT (OUTPATIENT)
Dept: CALL CENTER | Facility: HOSPITAL | Age: 78
End: 2023-04-26
Payer: MEDICARE

## 2023-04-26 VITALS
WEIGHT: 225.31 LBS | TEMPERATURE: 97.7 F | RESPIRATION RATE: 18 BRPM | SYSTOLIC BLOOD PRESSURE: 143 MMHG | DIASTOLIC BLOOD PRESSURE: 58 MMHG | OXYGEN SATURATION: 93 % | BODY MASS INDEX: 35.36 KG/M2 | HEIGHT: 67 IN | HEART RATE: 54 BPM

## 2023-04-26 PROBLEM — R53.81 POST VIRAL DEBILITY: Status: ACTIVE | Noted: 2023-04-26

## 2023-04-26 PROBLEM — B94.8 POST VIRAL DEBILITY: Status: ACTIVE | Noted: 2023-04-26

## 2023-04-26 LAB
ALBUMIN SERPL-MCNC: 3.4 G/DL (ref 3.5–5.2)
ALBUMIN/GLOB SERPL: 1.2 G/DL
ALP SERPL-CCNC: 36 U/L (ref 39–117)
ALT SERPL W P-5'-P-CCNC: 41 U/L (ref 1–41)
ANION GAP SERPL CALCULATED.3IONS-SCNC: 14.2 MMOL/L (ref 5–15)
AST SERPL-CCNC: 32 U/L (ref 1–40)
BASOPHILS # BLD AUTO: 0.01 10*3/MM3 (ref 0–0.2)
BASOPHILS NFR BLD AUTO: 0.1 % (ref 0–1.5)
BILIRUB SERPL-MCNC: 0.3 MG/DL (ref 0–1.2)
BUN SERPL-MCNC: 93 MG/DL (ref 8–23)
BUN/CREAT SERPL: 43.3 (ref 7–25)
CALCIUM SPEC-SCNC: 8.1 MG/DL (ref 8.6–10.5)
CHLORIDE SERPL-SCNC: 102 MMOL/L (ref 98–107)
CO2 SERPL-SCNC: 21.8 MMOL/L (ref 22–29)
CREAT SERPL-MCNC: 2.15 MG/DL (ref 0.76–1.27)
CREAT UR-MCNC: 56 MG/DL
DEPRECATED RDW RBC AUTO: 40.7 FL (ref 37–54)
EGFRCR SERPLBLD CKD-EPI 2021: 30.9 ML/MIN/1.73
EOSINOPHIL # BLD AUTO: 0 10*3/MM3 (ref 0–0.4)
EOSINOPHIL NFR BLD AUTO: 0 % (ref 0.3–6.2)
ERYTHROCYTE [DISTWIDTH] IN BLOOD BY AUTOMATED COUNT: 12.4 % (ref 12.3–15.4)
GLOBULIN UR ELPH-MCNC: 2.9 GM/DL
GLUCOSE BLDC GLUCOMTR-MCNC: 242 MG/DL (ref 70–130)
GLUCOSE BLDC GLUCOMTR-MCNC: 247 MG/DL (ref 70–130)
GLUCOSE SERPL-MCNC: 222 MG/DL (ref 65–99)
HCT VFR BLD AUTO: 33.5 % (ref 37.5–51)
HGB BLD-MCNC: 11.1 G/DL (ref 13–17.7)
IMM GRANULOCYTES # BLD AUTO: 0.06 10*3/MM3 (ref 0–0.05)
IMM GRANULOCYTES NFR BLD AUTO: 0.5 % (ref 0–0.5)
LYMPHOCYTES # BLD AUTO: 1.01 10*3/MM3 (ref 0.7–3.1)
LYMPHOCYTES NFR BLD AUTO: 8.4 % (ref 19.6–45.3)
MAGNESIUM SERPL-MCNC: 2.7 MG/DL (ref 1.6–2.4)
MCH RBC QN AUTO: 29.7 PG (ref 26.6–33)
MCHC RBC AUTO-ENTMCNC: 33.1 G/DL (ref 31.5–35.7)
MCV RBC AUTO: 89.6 FL (ref 79–97)
MONOCYTES # BLD AUTO: 0.58 10*3/MM3 (ref 0.1–0.9)
MONOCYTES NFR BLD AUTO: 4.8 % (ref 5–12)
NEUTROPHILS NFR BLD AUTO: 10.34 10*3/MM3 (ref 1.7–7)
NEUTROPHILS NFR BLD AUTO: 86.2 % (ref 42.7–76)
NRBC BLD AUTO-RTO: 0 /100 WBC (ref 0–0.2)
PHOSPHATE SERPL-MCNC: 4.8 MG/DL (ref 2.5–4.5)
PLATELET # BLD AUTO: 273 10*3/MM3 (ref 140–450)
PMV BLD AUTO: 11 FL (ref 6–12)
POTASSIUM SERPL-SCNC: 4 MMOL/L (ref 3.5–5.2)
PROT ?TM UR-MCNC: 6.6 MG/DL
PROT SERPL-MCNC: 6.3 G/DL (ref 6–8.5)
PROT/CREAT UR: 117.9 MG/G CREA (ref 0–200)
RBC # BLD AUTO: 3.74 10*6/MM3 (ref 4.14–5.8)
SODIUM SERPL-SCNC: 138 MMOL/L (ref 136–145)
WBC NRBC COR # BLD: 12 10*3/MM3 (ref 3.4–10.8)

## 2023-04-26 PROCEDURE — 85025 COMPLETE CBC W/AUTO DIFF WBC: CPT | Performed by: INTERNAL MEDICINE

## 2023-04-26 PROCEDURE — 82962 GLUCOSE BLOOD TEST: CPT

## 2023-04-26 PROCEDURE — 80053 COMPREHEN METABOLIC PANEL: CPT | Performed by: INTERNAL MEDICINE

## 2023-04-26 PROCEDURE — 99239 HOSP IP/OBS DSCHRG MGMT >30: CPT | Performed by: INTERNAL MEDICINE

## 2023-04-26 PROCEDURE — 83735 ASSAY OF MAGNESIUM: CPT | Performed by: INTERNAL MEDICINE

## 2023-04-26 PROCEDURE — 84100 ASSAY OF PHOSPHORUS: CPT | Performed by: INTERNAL MEDICINE

## 2023-04-26 PROCEDURE — 63710000001 DEXAMETHASONE PER 0.25 MG: Performed by: INTERNAL MEDICINE

## 2023-04-26 PROCEDURE — 63710000001 INSULIN ASPART PER 5 UNITS: Performed by: INTERNAL MEDICINE

## 2023-04-26 RX ORDER — DEXAMETHASONE 6 MG/1
6 TABLET ORAL DAILY
Qty: 2 TABLET | Refills: 0 | Status: SHIPPED | OUTPATIENT
Start: 2023-04-27 | End: 2023-04-29

## 2023-04-26 RX ADMIN — TERAZOSIN HYDROCHLORIDE 5 MG: 5 CAPSULE ORAL at 09:26

## 2023-04-26 RX ADMIN — Medication 10 ML: at 09:29

## 2023-04-26 RX ADMIN — ATORVASTATIN CALCIUM 40 MG: 40 TABLET, FILM COATED ORAL at 09:27

## 2023-04-26 RX ADMIN — MULTIPLE VITAMINS W/ MINERALS TAB 1 TABLET: TAB at 09:27

## 2023-04-26 RX ADMIN — FINASTERIDE 5 MG: 5 TABLET, FILM COATED ORAL at 09:25

## 2023-04-26 RX ADMIN — PANTOPRAZOLE SODIUM 40 MG: 40 TABLET, DELAYED RELEASE ORAL at 09:27

## 2023-04-26 RX ADMIN — Medication 4000 UNITS: at 09:26

## 2023-04-26 RX ADMIN — DEXAMETHASONE 6 MG: 4 TABLET ORAL at 09:27

## 2023-04-26 RX ADMIN — PRASUGREL 10 MG: 10 TABLET, FILM COATED ORAL at 09:27

## 2023-04-26 RX ADMIN — CETIRIZINE HYDROCHLORIDE 10 MG: 10 TABLET, FILM COATED ORAL at 09:25

## 2023-04-26 RX ADMIN — HYDRALAZINE HYDROCHLORIDE 100 MG: 25 TABLET, FILM COATED ORAL at 09:26

## 2023-04-26 RX ADMIN — SERTRALINE HYDROCHLORIDE 50 MG: 50 TABLET ORAL at 09:28

## 2023-04-26 RX ADMIN — CARVEDILOL 6.25 MG: 6.25 TABLET, FILM COATED ORAL at 09:27

## 2023-04-26 RX ADMIN — INSULIN ASPART 5 UNITS: 100 INJECTION, SOLUTION INTRAVENOUS; SUBCUTANEOUS at 06:52

## 2023-04-26 NOTE — DISCHARGE SUMMARY
[facility]   DISCHARGE SUMMARY      Name:  Donny Jerry   Age:  78 y.o.  Sex:  male  :  1945  MRN:  [id number]   Visit Number:  [id number]    Admission Date:  2023  Date of Discharge:  2023  Primary Care Physician:  Anum Alonso, APRN      Discharge Diagnoses:     1. Acute hypoxic respiratory failure secondary to COVID-19, POA  2. Generalized weakness secondary to 1, POA  3. Elevated troponin  4. IMANI on CKD 3B  5. CAD  6. Type 2 diabetes  7. BPH  8. Hypertension  9. 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     Presenting Problem:    Chief Complaint   Patient presents with   • Shortness of Breath     Pt has been soa for 3 days, ems had sats of 76% in house,. Pt to the truck and neb started. Pt sats up to 96% on 5 lpm nc.       Consults:     Consulting Physician(s)     Provider Relationship Specialty    Destin Wilkes MD, FARHANA Consulting Physician Nephrology    Rupesh Parr MD Consulting Physician Cardiology        Procedures Performed:        History of presenting illness/Hospital Course:    78-year-old gentleman past history of coronary disease status post CABG status post PCI several years back follows with Dr. Russo.  Reports was previously on diuretics but Dr. Russo had been weaning him off of these but he was still taking a  low-dose 20 mg Lasix daily.  Reports for the last 1 week he has had increased weakness.  He is also had progressive shortness of breath which started 1 day ago.  He has had a chronic intermittent cough due to allergies which has not been worsened during this period.  He is also had diarrhea since yesterday.  History provided by patient as well as his daughter at the bedside.  He elects to be full code and has an advanced directive which is valid.  In the emergency department he was found to have COVID-19.  Patient was admitted to the hospital and started on COVID-19 therapy including Decadron 6 mg daily and remdesivir.  He was continued on several home medications including Farxiga and ARB.  Patient did develop IMANI on CKD.  Remdesivir was discontinued as this was not really indicated given minimal symptoms and patient had already been weaned off supplemental oxygen and had maintained appropriate saturation on room air.  His ARB was also discontinued.  Nephrology consulted.  Renal ultrasound did not show evidence of obstructive uropathy.  Discussed case with consulting nephrologist who agreed with discharge with close outpatient follow-up, appointment with nephrology scheduled for 1 week postdischarge.  Instructed patient to discontinue ARB and Farxiga and hold chronic Lasix for 2 to 3 days.  We will also schedule follow-up with primary physician. Discharged also with homehealth.     Vital Signs:    Temp:  [97.4 °F (36.3 °C)-98.3 °F (36.8 °C)] 97.7 °F (36.5 °C)  Heart Rate:  [51-66] 54  Resp:  [18-20] 18  BP: (122-144)/(53-60) 143/58    Physical Exam:    General Appearance:  Alert and cooperative.    Head:  Atraumatic and normocephalic.   Eyes: Conjunctivae and sclerae normal, no icterus. No pallor.   Ears:  Ears with no abnormalities noted.   Throat: No oral lesions, no thrush, oral mucosa moist.   Neck: Supple, trachea midline, no thyromegaly.       Lungs:   Breath sounds heard bilaterally equally.  No crackles or  wheezing. No Pleural rub or bronchial breathing.   Heart:  Normal S1 and S2, no murmur, no gallop, no rub. No JVD.   Abdomen:   Normal bowel sounds, no masses, no organomegaly. Soft, nontender, nondistended, no rebound tenderness.   Extremities: Supple, no edema, no cyanosis, no clubbing.   Pulses: Pulses palpable bilaterally.   Skin: No bleeding or rash.   Neurologic: Alert and oriented x 3.      Pertinent Lab Results:     Results from last 7 days   Lab Units 04/26/23  0700 04/25/23  0731 04/24/23  0604   SODIUM mmol/L 138 140 137   POTASSIUM mmol/L 4.0 4.3 4.1   CHLORIDE mmol/L 102 102 101   CO2 mmol/L 21.8* 25.3 21.5*   BUN mg/dL 93* 87* 64*   CREATININE mg/dL 2.15* 2.40* 2.38*   CALCIUM mg/dL 8.1* 8.2* 8.0*   BILIRUBIN mg/dL 0.3 0.3 0.2   ALK PHOS U/L 36* 35* 35*   ALT (SGPT) U/L 41 33 29   AST (SGOT) U/L 32 39 46*   GLUCOSE mg/dL 222* 251* 276*     Results from last 7 days   Lab Units 04/26/23  0700 04/25/23  0731 04/24/23  0604   WBC 10*3/mm3 12.00* 13.87* 9.76   HEMOGLOBIN g/dL 11.1* 10.6* 9.5*   HEMATOCRIT % 33.5* 31.8* 29.1*   PLATELETS 10*3/mm3 273 257 221         Results from last 7 days   Lab Units 04/23/23  1619 04/23/23  1323   HSTROP T ng/L 419* 418*     Results from last 7 days   Lab Units 04/23/23  1323   PROBNP pg/mL 5,441.0*             Results from last 7 days   Lab Units 04/23/23  1455   PH, ARTERIAL pH units 7.442   PO2 ART mm Hg 57.8*   PCO2, ARTERIAL mm Hg 35.6   HCO3 ART mmol/L 24.3           Pertinent Radiology Results:    Imaging Results (All)     Procedure Component Value Units Date/Time    US Renal Bilateral [[id number]] Collected: 04/25/23 1502     Updated: 04/25/23 1506    Narrative:      RENAL ULTRASOUND     HISTORY: jaci; J96.01-Acute respiratory failure with hypoxia; I50.9-Heart  failure, unspecified; D72.829-Elevated white blood cell count,  unspecified; N17.9-Acute kidney failure, unspecified Renal failure.     COMPARISON: None.     PROCEDURE: Ultrasound images of the kidneys were  obtained.     FINDINGS:    Limited images of the liver parenchyma demonstrate normal  echogenicity.        The right kidney measures 14.9 cm in length. Mildly increased cortical  echogenicity. There is no hydronephrosis. No suspicious contour  deforming masses or stones identified. There is diffuse cortical  thinning.     The left kidney measures 10.5 cm in length. Mildly increased cortical  echogenicity. There is no hydronephrosis.  No suspicious contour  deforming masses or stones identified. There is diffuse cortical  thinning. There is 1.3 cm simple cyst in the left kidney midpole.       Impression:      No evidence of obstructive uropathy. Bilateral diffuse cortical thinning  with mildly increased cortical echogenicity, suggestive of medical renal  disease.           Images personally reviewed, interpreted and dictated by RAMÓN Falcon.     This report was signed and finalized on 4/25/2023 3:04 PM by RAMÓN Falcon.    XR Chest 1 View [[id number]] Collected: 04/23/23 1648     Updated: 04/23/23 1652    Narrative:      PORTABLE CHEST  4/23/2023 1:48 PM     HISTORY: Shortness of breath     COMPARISON:  August 11, 2018     FINDINGS: Prior median sternotomy. The cardiac silhouette is enlarged.  Pulmonary vascular congestion.  Diffuse basilar predominant interstitial  opacities. Probable small bilateral pleural effusions. There is no  pneumothorax. The visualized osseous structures demonstrate no acute  abnormalities.       Impression:      Cardiomegaly with pulmonary vascular congestion and diffuse  interstitial/alveolar edema. Atypical pneumonia in the appropriate  clinical setting not excluded                 This report was signed and finalized on 4/23/2023 4:50 PM by Jaspreet June MD.          Echo:      Condition on Discharge:      Stable.    Code status during the hospital stay:    Code Status and Medical Interventions:   Ordered at: 04/23/23 1553     Code Status (Patient has no pulse and  is not breathing):    CPR (Attempt to Resuscitate)     Medical Interventions (Patient has pulse or is breathing):    Full Support     Discharge Disposition:    [place] [facility]    Discharge Medications:       Discharge Medications      New Medications      Instructions Start Date   dexamethasone 6 MG tablet  Commonly known as: DECADRON   Take 1 tablet by mouth Daily for 2 doses   Start Date: April 27, 2023        Continue These Medications      Instructions Start Date   albuterol sulfate  (90 Base) MCG/ACT inhaler  Commonly known as: PROVENTIL HFA;VENTOLIN HFA;PROAIR HFA   2 puffs, Inhalation, Every 4 Hours PRN      atorvastatin 40 MG tablet  Commonly known as: LIPITOR   40 mg, Oral, Daily      B-D UF III MINI PEN NEEDLES 31G X 5 MM misc  Generic drug: Insulin Pen Needle   No dose, route, or frequency recorded.      carvedilol 12.5 MG tablet  Commonly known as: COREG   6.25 mg, Oral, 2 Times Daily With Meals, Pt takes 0.5 tab BID      doxazosin 8 MG tablet  Commonly known as: CARDURA   8 mg, Oral, Every 12 Hours      finasteride 5 MG tablet  Commonly known as: PROSCAR   5 mg, Oral, Daily      fluticasone 50 MCG/ACT nasal spray  Commonly known as: FLONASE   1 spray, Nasal, Daily      furosemide 20 MG tablet  Commonly known as: LASIX   20 mg, Oral, Daily      hydrALAZINE 100 MG tablet  Commonly known as: APRESOLINE   100 mg, Oral, 3 Times Daily      multivitamin tablet tablet  Commonly known as: THERAGRAN   1 tablet, Oral, Daily      nitroglycerin 0.4 MG SL tablet  Commonly known as: NITROSTAT   0.4 mg, Sublingual, Every 5 Minutes PRN, Take no more than 3 doses in 15 minutes.       pantoprazole 40 MG EC tablet  Commonly known as: PROTONIX   TAKE 1 TABLET BY MOUTH 2 TIMES DAILY (BEFORE MEALS)      pramipexole 1 MG tablet  Commonly known as: Mirapex   1 mg, Oral, Nightly      prasugrel 10 MG tablet  Commonly known as: EFFIENT   10 mg, Oral, Daily      sertraline 50 MG tablet  Commonly known as: ZOLOFT   1  tablet, Oral, Daily      tamsulosin 0.4 MG capsule 24 hr capsule  Commonly known as: FLOMAX   1 capsule, Oral, 2 Times Daily      Trelegy Ellipta 200-62.5-25 MCG/ACT aerosol powder   Generic drug: Fluticasone-Umeclidin-Vilant   1 puff, Inhalation, Daily, Rinse mouth with water after use.      Tresiba FlexTouch 200 UNIT/ML solution pen-injector pen injection  Generic drug: Insulin Degludec   Subcutaneous, 30 units every morning and 74 units in the evening      TRUEplus Lancets 28G misc   No dose, route, or frequency recorded.      Trulicity 0.75 MG/0.5ML solution pen-injector  Generic drug: Dulaglutide   0.75 mg, Injection, Weekly      Vitamin D3 50 MCG (2000 UT) capsule   2 capsules, Oral         Stop These Medications    azilsartan medoxomil 80 MG tablet tablet  Commonly known as: EDARBI     dapagliflozin Propanediol 10 MG tablet     potassium chloride 20 MEQ CR tablet  Commonly known as: K-DUR,KLOR-CON          Discharge Diet:     Diet Instructions     Diet: Cardiac Diets, Diabetic Diets; Healthy Heart (2-3 Na+); Regular Texture (IDDSI 7); Thin (IDDSI 0); Consistent Carbohydrate      Discharge Diet:  Cardiac Diets  Diabetic Diets       Cardiac Diet: Healthy Heart (2-3 Na+)    Texture: Regular Texture (IDDSI 7)    Fluid Consistency: Thin (IDDSI 0)    Diabetic Diet: Consistent Carbohydrate        Activity at Discharge:     Activity Instructions     Activity as Tolerated          Follow-up Appointments:    Additional Instructions for the Follow-ups that You Need to Schedule     Ambulatory Referral to Home Health   As directed      Face to Face Visit Date: 4/26/2023    Follow-up provider for Plan of Care?: I treated the patient in an acute care facility and will not continue treatment after discharge.    Follow-up provider: MIKE COREA [[id number]]    Reason/Clinical Findings: debility, covid, weakness    Describe mobility limitations that make leaving home difficult: debility, covid, weakness    Nursing/Therapeutic  Services Requested: Physical Therapy Occupational Therapy    PT orders: Total joint pathway Strengthening Therapeutic exercise Gait Training Transfer training    Weight Bearing Status: As Tolerated    Occupational orders: Activities of daily living Home safety assessment Energy conservation Strengthening Cognition Fine motor    Frequency: 1 Week 1         Discharge Follow-up with PCP   As directed       Currently Documented PCP:    Anum Alonso APRN    PCP Phone Number:    [phone]     Follow Up Details: 2 weeks         Discharge Follow-up with Specified Provider: Katrin; 1 Week   As directed      To: Katrin    Follow Up: 1 Week            Follow-up Information     Anum Alonso APRN Follow up on 5/3/2023.    Specialty: Family Medicine  Why: @3:45pm  Contact information:  [facility] [address]  [phone]                       Future Appointments   Date Time Provider Department Center   10/5/2023  9:45 AM Liz Russo MD [facility]     Test Results Pending at Discharge:    Pending Labs     Order Current Status    Protein / Creatinine Ratio, Urine - Urine, Clean Catch In process             Luisito Hawkins DO  04/26/23  11:37 EDT    Time: I spent >30 minutes on this discharge activity which included: face-to-face encounter with the patient, reviewing the data in the system, coordination of the care with the nursing staff as well as consultants, documentation, and entering orders.     Dictated utilizing Dragon dictation.

## 2023-04-26 NOTE — CASE MANAGEMENT/SOCIAL WORK
Case Management/Social Work    Patient Name:  Donny Jerry  YOB: 1945  MRN: [id number]  Admit Date:  4/23/2023    Sw met with pt at bedside to discuss IMM and home health orders. IMM explained to pt in detail with verbal understanding received. States he does not want to appeal the discharge, he is ready to go home. Pt declines home health and denies any other needs at this time. States his two daughters will help him and his wife when he gets home.  Transportation to be provided by family.        Electronically signed by:  Elvie Carlisle  04/26/23 09:47 EDT

## 2023-04-26 NOTE — CASE MANAGEMENT/SOCIAL WORK
Case Management Discharge Note           Provided Post Acute Provider List?: N/A  Provided Post Acute Provider Quality & Resource List?: N/A    Selected Continued Care - Admitted Since 4/23/2023     Destination    No services have been selected for the patient.              Durable Medical Equipment    No services have been selected for the patient.              Dialysis/Infusion    No services have been selected for the patient.              Home Medical Care    No services have been selected for the patient.              Therapy    No services have been selected for the patient.              Community Resources    No services have been selected for the patient.              Community & DME    No services have been selected for the patient.                  Transportation Services  Private: Car    Final Discharge Disposition Code: 01 - home or self-care

## 2023-04-26 NOTE — PROGRESS NOTES
[facility] Progress Note      Patient Name: Donny Jerry  : 1945  MRN: [id number]  Primary Care Physician:  Anum Alonso APRN  Date of admission: 2023    Subjective     Interval History:   Follow-up on IMANI on CKD stage IIIb  Feeling much better overall  Blood pressure is reasonably controlled  Good reported urine output  Renal function stable to improved  Currently on room air  Wants to go home    Review of Systems:   As noted above    Objective     Vitals:   Temp:  [97.4 °F (36.3 °C)-98.3 °F (36.8 °C)] 97.7 °F (36.5 °C)  Heart Rate:  [51-66] 54  Resp:  [18-20] 18  BP: (122-144)/(53-60) 143/58    Intake/Output Summary (Last 24 hours) at 2023 0921  Last data filed at 2023 0900  Gross per 24 hour   Intake 1200 ml   Output 1400 ml   Net -200 ml       Physical Exam:    General Appearance: NAD  HEENT: oral mucosa normal, nonicteric sclera  Neck: supple, no JVD  Lungs: Decreased breath sounds bilaterally  Heart: RRR, normal S1 and S2  Abdomen: soft, nondistended  Extremities: no edema  Neuro: Awake and alert and moving all extremities    Scheduled Meds:     albuterol sulfate HFA, 2 puff, Inhalation, 4x Daily - RT  atorvastatin, 40 mg, Oral, Daily  budesonide-formoterol, 2 puff, Inhalation, BID - RT   And  tiotropium bromide monohydrate, 2 puff, Inhalation, Daily - RT  carvedilol, 6.25 mg, Oral, BID With Meals  cetirizine, 10 mg, Oral, Daily  cholecalciferol, 4,000 Units, Oral, Daily  dexamethasone, 6 mg, Oral, Daily  finasteride, 5 mg, Oral, Daily  fluticasone, 1 spray, Nasal, Daily  heparin (porcine), 5,000 Units, Subcutaneous, Q12H  hydrALAZINE, 100 mg, Oral, TID  Insulin Aspart, 0-14 Units, Subcutaneous, TID AC  melatonin, 5 mg, Oral, Nightly  multivitamin with minerals, 1 tablet, Oral, Daily  pantoprazole, 40 mg, Oral, BID AC  pramipexole, 1 mg, Oral, Nightly  prasugrel, 10 mg, Oral, Daily  sertraline, 50 mg, Oral, Daily  sodium chloride, 10 mL,  Intravenous, Q12H  tamsulosin, 0.4 mg, Oral, Nightly  terazosin, 5 mg, Oral, Q12H      IV Meds:        Results Reviewed:   I have personally reviewed the results from the time of this admission to 4/26/2023 09:21 EDT     Results from last 7 days   Lab Units 04/26/23  0700 04/25/23  0731 04/24/23  0604   SODIUM mmol/L 138 140 137   POTASSIUM mmol/L 4.0 4.3 4.1   CHLORIDE mmol/L 102 102 101   CO2 mmol/L 21.8* 25.3 21.5*   BUN mg/dL 93* 87* 64*   CREATININE mg/dL 2.15* 2.40* 2.38*   CALCIUM mg/dL 8.1* 8.2* 8.0*   BILIRUBIN mg/dL 0.3 0.3 0.2   ALK PHOS U/L 36* 35* 35*   ALT (SGPT) U/L 41 33 29   AST (SGOT) U/L 32 39 46*   GLUCOSE mg/dL 222* 251* 276*     Estimated Creatinine Clearance: 32.8 mL/min (A) (by C-G formula based on SCr of 2.15 mg/dL (H)).  Results from last 7 days   Lab Units 04/26/23  0700 04/25/23  0731 04/23/23  1323   MAGNESIUM mg/dL 2.7* 2.5* 2.1   PHOSPHORUS mg/dL 4.8* 5.8* 4.2         Results from last 7 days   Lab Units 04/26/23  0700 04/25/23  0731 04/24/23  0604 04/23/23  1323   WBC 10*3/mm3 12.00* 13.87* 9.76 11.76*   HEMOGLOBIN g/dL 11.1* 10.6* 9.5* 9.8*   PLATELETS 10*3/mm3 273 257 221 219           Assessment / Plan     ASSESSMENT:  -IMANI on CKD stage IIIb (baseline creatinine around 1.7 to 1.9 mg/dL, likely hemodynamic in nature resulting in prerenal state/ATN in light of being on Lasix/Jardiance/valsartan with relatively poor oral intake on presentation  -Acute hypoxic respiratory failure due to COVID-19 pneumonia  -History of hypertension  -Type 2 diabetes  -History of CAD  -Chronic diastolic congestive heart failure with underlying EF around 55 to 60%    PLAN:  -With renal function improving and stabilizing, it would be okay to DC from a renal standpoint with outpatient follow-up in renal clinic in 1 to 2 weeks which we will arrange through our office.  Upon discharge, please continue to hold Edarbi and Jardiance.  Patient can resume Lasix 20 mg p.o. daily for volume control in the next 48  hours as his oral intake picks up  Discussed with Dr. Ross Heath MD  04/26/23  09:21 EDT    [facility]  [phone]

## 2023-04-26 NOTE — PLAN OF CARE
"Goal Outcome Evaluation:   Pt pleasant, on room air. Pt stated he felt \"great\". Pt using his urinal independent. Vitals WDLs.               "

## 2023-04-27 ENCOUNTER — TELEPHONE (OUTPATIENT)
Dept: PRIMARY CARE CLINIC | Age: 78
End: 2023-04-27

## 2023-04-27 ENCOUNTER — CARE COORDINATION (OUTPATIENT)
Dept: CARE COORDINATION | Age: 78
End: 2023-04-27

## 2023-04-27 NOTE — TELEPHONE ENCOUNTER
Wife called stating that pt was discharged from [facility] yesterday and reports that they told hm to stop taking the [place], but she reports that his sugar is staying over 400, and asking for your advice. Also states they told him to f/u with urology and asking for referral (hospital f/u with you 5/3) Did not want to bring him here today r/t her being dx with Covid.  I have called and requested discharge summary

## 2023-04-27 NOTE — OUTREACH NOTE
Prep Survey    Flowsheet Row Responses   Religious facility patient discharged from? [facility]   Is LACE score < 7 ? No   Eligibility Readm Mgmt   Discharge diagnosis Acute hypoxic respiratory failure secondary to COVID-19, POA   Does the patient have one of the following disease processes/diagnoses(primary or secondary)? Pneumonia   Does the patient have Home health ordered? No   Is there a DME ordered? No   Prep survey completed? Yes          Carolann TY - Registered Nurse

## 2023-04-28 ENCOUNTER — CARE COORDINATION (OUTPATIENT)
Dept: CARE COORDINATION | Age: 78
End: 2023-04-28

## 2023-04-28 NOTE — CARE COORDINATION
[facility] Care Transitions Initial Follow Up Call    Call within 2 business days of discharge: Yes    Patient Current Location:  Home: [address] [place] [address]    Care Transition Nurse contacted the patient and family by telephone to perform post hospital discharge assessment. Verified name and  with patient and family as identifiers. Provided introduction to self, and explanation of the Care Transition Nurse role. Patient: Yelena Carr Patient : 1945   MRN: [id number]  Reason for Admission: COVID with respiratory failure, CHF, RAFFI  Discharge Date: 3/3/22 RARS: No data recorded    Last Discharge  Street       Date Complaint Diagnosis Description Type Department Provider    3/3/22 Chest Pain Coronary artery disease involving native heart with unstable angina pectoris, unspecified vessel or lesion type ([facility] [address] . .. ED [facility]) [facility] CHILDREN ED Robbie Guevara, DO; Deni Zhang, DO            Was this an external facility discharge? Yes, 23  Discharge Facility: [address] [place] [address] to be reviewed by the provider   Additional needs identified to be addressed with provider: Yes  medications-changes  Blood sugar               Method of communication with provider: face to face. Mrs Vikki Reynaga states that Mei Velasco is doing ok although she is now sick with COVID. They are concerned about his blood sugar running very high. She had already sent a message to his PCP abouat BS of 400-500 and that his Eliezer Slate was stopped. They were instructed to start back with Eliezer Slate. We discussed watching his diet very closely and that the steroids he is on are making a difference. We discussed high HFU appointments as well as med changes. We made a plan to check on his blood sugar in the AM.      Care Transition Nurse reviewed discharge instructions with patient and family who verbalized understanding.  The patient and family was given an opportunity to ask questions and does not have any further

## 2023-04-28 NOTE — CARE COORDINATION
Claudine Marcelino, RN  Manager Care Coordination  [phone]     I placed a call to Anamika Johnson to fu on his blood sugar. Mrs. Rachel Heck tells me he was 270 this morning and 320 last night. She gives his trulicity today. He started [place] back yesterday. I let her know that his PCP Angelina Gonzalez ask him to please increase his water/ fluids today to help the [place] bring his blood sugar down. She also tells me that he eating better. Contact information reviewed.

## 2023-05-02 ENCOUNTER — READMISSION MANAGEMENT (OUTPATIENT)
Dept: CALL CENTER | Facility: HOSPITAL | Age: 78
End: 2023-05-02
Payer: MEDICARE

## 2023-05-02 NOTE — OUTREACH NOTE
COPD/PN Week 1 Survey    Flowsheet Row Responses   [facility] facility patient discharged from? [facility]   Does the patient have one of the following disease processes/diagnoses(primary or secondary)? Pneumonia   Week 1 attempt successful? No   Unsuccessful attempts Attempt 1          Hayder TY - Registered Nurse

## 2023-05-03 ENCOUNTER — HOSPITAL ENCOUNTER (OUTPATIENT)
Facility: HOSPITAL | Age: 78
Discharge: HOME | End: 2023-05-03
Payer: MEDICARE

## 2023-05-03 ENCOUNTER — OFFICE VISIT (OUTPATIENT)
Dept: PRIMARY CARE CLINIC | Age: 78
End: 2023-05-03

## 2023-05-03 VITALS
DIASTOLIC BLOOD PRESSURE: 66 MMHG | BODY MASS INDEX: 35.38 KG/M2 | OXYGEN SATURATION: 97 % | HEART RATE: 63 BPM | WEIGHT: 225.4 LBS | TEMPERATURE: 97.2 F | SYSTOLIC BLOOD PRESSURE: 146 MMHG | HEIGHT: 67 IN | RESPIRATION RATE: 20 BRPM

## 2023-05-03 DIAGNOSIS — N18.9 ACUTE KIDNEY INJURY SUPERIMPOSED ON CKD (HCC): ICD-10-CM

## 2023-05-03 DIAGNOSIS — U07.1 COVID-19: ICD-10-CM

## 2023-05-03 DIAGNOSIS — N17.9 ACUTE KIDNEY INJURY SUPERIMPOSED ON CKD (HCC): ICD-10-CM

## 2023-05-03 DIAGNOSIS — I10 ESSENTIAL HYPERTENSION: ICD-10-CM

## 2023-05-03 DIAGNOSIS — E11.42 TYPE 2 DIABETES MELLITUS WITH DIABETIC POLYNEUROPATHY, WITHOUT LONG-TERM CURRENT USE OF INSULIN (HCC): ICD-10-CM

## 2023-05-03 DIAGNOSIS — R09.02 HYPOXIA: Primary | ICD-10-CM

## 2023-05-03 PROCEDURE — 80048 BASIC METABOLIC PNL TOTAL CA: CPT

## 2023-05-04 LAB
ANION GAP SERPL CALCULATED.3IONS-SCNC: 11 MMOL/L (ref 3–16)
BUN SERPL-MCNC: 56 MG/DL (ref 6–20)
CALCIUM SERPL-MCNC: 9.1 MG/DL (ref 8.5–10.5)
CHLORIDE SERPL-SCNC: 105 MMOL/L (ref 98–107)
CO2 SERPL-SCNC: 21 MMOL/L (ref 20–30)
CREAT SERPL-MCNC: 1.7 MG/DL (ref 0.4–1.2)
GFR SERPLBLD CREATININE-BSD FMLA CKD-EPI: 41 ML/MIN/{1.73_M2}
GLUCOSE SERPL-MCNC: 223 MG/DL (ref 74–106)
POTASSIUM SERPL-SCNC: 5.4 MMOL/L (ref 3.4–5.1)
SODIUM SERPL-SCNC: 137 MMOL/L (ref 136–145)

## 2023-05-05 ENCOUNTER — READMISSION MANAGEMENT (OUTPATIENT)
Dept: CALL CENTER | Facility: HOSPITAL | Age: 78
End: 2023-05-05
Payer: MEDICARE

## 2023-05-05 NOTE — OUTREACH NOTE
COPD/PN Week 1 Survey    Flowsheet Row Responses   [facility] facility patient discharged from? [facility]   Does the patient have one of the following disease processes/diagnoses(primary or secondary)? Pneumonia   Week 1 attempt successful? No   Unsuccessful attempts Attempt 2          [facility] - Registered Nurse

## 2023-05-09 ENCOUNTER — READMISSION MANAGEMENT (OUTPATIENT)
Dept: CALL CENTER | Facility: HOSPITAL | Age: 78
End: 2023-05-09
Payer: MEDICARE

## 2023-05-09 NOTE — OUTREACH NOTE
COPD/PN Week 1 Survey    Flowsheet Row Responses   [facility] patient discharged from? [facility]   Does the patient have one of the following disease processes/diagnoses(primary or secondary)? Pneumonia   Week 1 attempt successful? Yes   Call start time 0847   Call end time 0849   Discharge diagnosis Acute hypoxic respiratory failure secondary to COVID-19, POA   Person spoke with today (if not patient) and relationship Wife   Meds reviewed with patient/caregiver? Yes   Is the patient taking all medications as directed (includes completed medication regime)? Yes   Medication comments PCP put him back on EDARBI   Does the patient have a primary care provider?  Yes   Does the patient have an appointment with their PCP or specialist within 7 days of discharge? Yes   Has the patient kept scheduled appointments due by today? Yes   Has home health visited the patient within 72 hours of discharge? N/A   Pulse Ox monitoring None   Psychosocial issues? No   Did the patient receive a copy of their discharge instructions? Yes   What is the patient's perception of their health status since discharge? Improving   Is the patient/caregiver able to teach back the hierarchy of who to call/visit for symptoms/problems? PCP, Specialist, Home health nurse, Urgent Care, ED, 911 Yes   Additional teach back comments Wearing CPAP   Is the patient/caregiver able to teach back signs and symptoms of worsening condition: Fever/chills, Shortness of breath, Chest pain   Week 1 call completed? Yes   Graduated/Revoked comments Pt doing well . No needs.           NISHANT GORDON - Registered Nurse

## 2023-05-18 ENCOUNTER — TRANSCRIBE ORDERS (OUTPATIENT)
Dept: LAB | Facility: HOSPITAL | Age: 78
End: 2023-05-18
Payer: MEDICARE

## 2023-05-18 ENCOUNTER — HOSPITAL ENCOUNTER (OUTPATIENT)
Dept: GENERAL RADIOLOGY | Facility: HOSPITAL | Age: 78
Discharge: HOME | End: 2023-05-18
Payer: MEDICARE

## 2023-05-18 ENCOUNTER — LAB (OUTPATIENT)
Dept: LAB | Facility: HOSPITAL | Age: 78
End: 2023-05-18
Payer: MEDICARE

## 2023-05-18 DIAGNOSIS — N13.8 ENLARGED PROSTATE WITH URINARY OBSTRUCTION: Primary | ICD-10-CM

## 2023-05-18 DIAGNOSIS — N40.1 BENIGN PROSTATIC HYPERPLASIA WITH LOWER URINARY TRACT SYMPTOMS, SYMPTOM DETAILS UNSPECIFIED: ICD-10-CM

## 2023-05-18 DIAGNOSIS — N40.1 ENLARGED PROSTATE WITH URINARY OBSTRUCTION: Primary | ICD-10-CM

## 2023-05-18 LAB
BACTERIA UR QL AUTO: NORMAL /HPF
BILIRUB UR QL STRIP: NEGATIVE
CLARITY UR: CLEAR
COLOR UR: YELLOW
GLUCOSE UR STRIP-MCNC: ABNORMAL MG/DL
HGB UR QL STRIP.AUTO: NEGATIVE
HYALINE CASTS UR QL AUTO: NORMAL /LPF
KETONES UR QL STRIP: NEGATIVE
LEUKOCYTE ESTERASE UR QL STRIP.AUTO: NEGATIVE
NITRITE UR QL STRIP: NEGATIVE
PH UR STRIP.AUTO: 6 [PH] (ref 5–8)
PROT UR QL STRIP: NEGATIVE
PSA SERPL-MCNC: 1.1 NG/ML (ref 0–4)
RBC # UR STRIP: NORMAL /HPF
REF LAB TEST METHOD: NORMAL
SP GR UR STRIP: 1.01 (ref 1–1.03)
SQUAMOUS #/AREA URNS HPF: NORMAL /HPF
UROBILINOGEN UR QL STRIP: ABNORMAL
WBC # UR STRIP: NORMAL /HPF

## 2023-05-18 PROCEDURE — 74018 RADEX ABDOMEN 1 VIEW: CPT

## 2023-05-18 PROCEDURE — 84153 ASSAY OF PSA TOTAL: CPT

## 2023-05-18 PROCEDURE — 87086 URINE CULTURE/COLONY COUNT: CPT

## 2023-05-18 PROCEDURE — 81001 URINALYSIS AUTO W/SCOPE: CPT

## 2023-05-18 PROCEDURE — 36415 COLL VENOUS BLD VENIPUNCTURE: CPT

## 2023-05-19 LAB — BACTERIA SPEC AEROBE CULT: NO GROWTH

## 2023-05-29 DIAGNOSIS — K21.9 GASTROESOPHAGEAL REFLUX DISEASE, UNSPECIFIED WHETHER ESOPHAGITIS PRESENT: ICD-10-CM

## 2023-05-29 DIAGNOSIS — E11.42 TYPE 2 DIABETES MELLITUS WITH DIABETIC POLYNEUROPATHY, WITHOUT LONG-TERM CURRENT USE OF INSULIN (HCC): ICD-10-CM

## 2023-05-30 ENCOUNTER — HOSPITAL ENCOUNTER (OUTPATIENT)
Facility: HOSPITAL | Age: 78
Discharge: HOME | End: 2023-05-30
Payer: MEDICARE

## 2023-05-30 ENCOUNTER — OFFICE VISIT (OUTPATIENT)
Dept: PRIMARY CARE CLINIC | Age: 78
End: 2023-05-30
Payer: MEDICARE

## 2023-05-30 VITALS
DIASTOLIC BLOOD PRESSURE: 64 MMHG | WEIGHT: 227 LBS | HEART RATE: 60 BPM | BODY MASS INDEX: 35.63 KG/M2 | HEIGHT: 67 IN | SYSTOLIC BLOOD PRESSURE: 139 MMHG | TEMPERATURE: 98.4 F | RESPIRATION RATE: 16 BRPM | OXYGEN SATURATION: 95 %

## 2023-05-30 DIAGNOSIS — E87.5 HYPERKALEMIA: ICD-10-CM

## 2023-05-30 DIAGNOSIS — R06.02 SOB (SHORTNESS OF BREATH) ON EXERTION: ICD-10-CM

## 2023-05-30 DIAGNOSIS — E11.42 TYPE 2 DIABETES MELLITUS WITH DIABETIC POLYNEUROPATHY, WITHOUT LONG-TERM CURRENT USE OF INSULIN (HCC): ICD-10-CM

## 2023-05-30 DIAGNOSIS — I10 ESSENTIAL HYPERTENSION: ICD-10-CM

## 2023-05-30 DIAGNOSIS — I50.22 CHRONIC SYSTOLIC (CONGESTIVE) HEART FAILURE (HCC): ICD-10-CM

## 2023-05-30 DIAGNOSIS — N18.30 CRF (CHRONIC RENAL FAILURE), STAGE 3 (MODERATE) (HCC): ICD-10-CM

## 2023-05-30 DIAGNOSIS — E83.42 HYPOMAGNESEMIA: Primary | ICD-10-CM

## 2023-05-30 DIAGNOSIS — G89.4 CHRONIC PAIN SYNDROME: ICD-10-CM

## 2023-05-30 DIAGNOSIS — E83.42 HYPOMAGNESEMIA: ICD-10-CM

## 2023-05-30 PROCEDURE — 1036F TOBACCO NON-USER: CPT | Performed by: NURSE PRACTITIONER

## 2023-05-30 PROCEDURE — 80048 BASIC METABOLIC PNL TOTAL CA: CPT

## 2023-05-30 PROCEDURE — 1123F ACP DISCUSS/DSCN MKR DOCD: CPT | Performed by: NURSE PRACTITIONER

## 2023-05-30 PROCEDURE — 99214 OFFICE O/P EST MOD 30 MIN: CPT | Performed by: NURSE PRACTITIONER

## 2023-05-30 PROCEDURE — 83735 ASSAY OF MAGNESIUM: CPT

## 2023-05-30 PROCEDURE — 3074F SYST BP LT 130 MM HG: CPT | Performed by: NURSE PRACTITIONER

## 2023-05-30 PROCEDURE — G8417 CALC BMI ABV UP PARAM F/U: HCPCS | Performed by: NURSE PRACTITIONER

## 2023-05-30 PROCEDURE — 3078F DIAST BP <80 MM HG: CPT | Performed by: NURSE PRACTITIONER

## 2023-05-30 PROCEDURE — 3051F HG A1C>EQUAL 7.0%<8.0%: CPT | Performed by: NURSE PRACTITIONER

## 2023-05-30 PROCEDURE — G8427 DOCREV CUR MEDS BY ELIG CLIN: HCPCS | Performed by: NURSE PRACTITIONER

## 2023-05-30 RX ORDER — DAPAGLIFLOZIN 10 MG/1
TABLET, FILM COATED ORAL
Qty: 90 TABLET | Refills: 1 | Status: SHIPPED | OUTPATIENT
Start: 2023-05-30

## 2023-05-30 RX ORDER — FUROSEMIDE 20 MG/1
20 TABLET ORAL DAILY
Qty: 90 TABLET | Refills: 3 | Status: SHIPPED | OUTPATIENT
Start: 2023-05-30

## 2023-05-30 RX ORDER — PANTOPRAZOLE SODIUM 40 MG/1
TABLET, DELAYED RELEASE ORAL
Qty: 180 TABLET | Refills: 1 | Status: SHIPPED | OUTPATIENT
Start: 2023-05-30

## 2023-05-30 RX ORDER — CALCIUM CARBONATE 300MG(750)
TABLET,CHEWABLE ORAL
Qty: 30 TABLET | Refills: 3 | Status: SHIPPED | OUTPATIENT
Start: 2023-05-30

## 2023-05-30 RX ORDER — AZILSARTAN KAMEDOXOMIL 80 MG/1
80 TABLET ORAL EVERY MORNING
COMMUNITY

## 2023-05-30 ASSESSMENT — ENCOUNTER SYMPTOMS
EYES NEGATIVE: 1
ALLERGIC/IMMUNOLOGIC NEGATIVE: 1
SHORTNESS OF BREATH: 1
GASTROINTESTINAL NEGATIVE: 1

## 2023-05-31 LAB
ANION GAP SERPL CALCULATED.3IONS-SCNC: 11 MMOL/L (ref 3–16)
BUN SERPL-MCNC: 45 MG/DL (ref 6–20)
CALCIUM SERPL-MCNC: 9.7 MG/DL (ref 8.5–10.5)
CHLORIDE SERPL-SCNC: 102 MMOL/L (ref 98–107)
CO2 SERPL-SCNC: 27 MMOL/L (ref 20–30)
CREAT SERPL-MCNC: 1.7 MG/DL (ref 0.4–1.2)
GFR SERPLBLD CREATININE-BSD FMLA CKD-EPI: 41 ML/MIN/{1.73_M2}
GLUCOSE SERPL-MCNC: 180 MG/DL (ref 74–106)
MAGNESIUM SERPL-MCNC: 2.3 MG/DL (ref 1.7–2.4)
POTASSIUM SERPL-SCNC: 4.7 MMOL/L (ref 3.4–5.1)
SODIUM SERPL-SCNC: 140 MMOL/L (ref 136–145)

## 2023-05-31 RX ORDER — ALBUTEROL SULFATE 90 UG/1
AEROSOL, METERED RESPIRATORY (INHALATION)
Qty: 6.7 G | Refills: 1 | Status: SHIPPED | OUTPATIENT
Start: 2023-05-31

## 2023-06-01 NOTE — FLOWSHEET NOTE
term goals : 4-6 weeks  Long term goal 1: Report a 50%+ decrease in dizziness/balance deficit episodes. -MET  Long term goal 2: Report a 50% + decrease in fear of falling. -MET  Long term goal 3: Ambulate community distances and terrains without LOB or falls.       Electronically signed by:  Electronically signed by Zoltan Manzanares PT on 6/6/2019 at 9:24 AM No.

## 2023-06-19 RX ORDER — DOXAZOSIN 8 MG/1
TABLET ORAL
Qty: 180 TABLET | Refills: 1 | Status: SHIPPED | OUTPATIENT
Start: 2023-06-19

## 2023-06-26 ENCOUNTER — HOSPITAL ENCOUNTER (OUTPATIENT)
Facility: HOSPITAL | Age: 78
Discharge: HOME | End: 2023-06-26
Payer: MEDICARE

## 2023-06-26 ENCOUNTER — OFFICE VISIT (OUTPATIENT)
Dept: PRIMARY CARE CLINIC | Age: 78
End: 2023-06-26
Payer: MEDICARE

## 2023-06-26 VITALS
OXYGEN SATURATION: 95 % | HEART RATE: 61 BPM | RESPIRATION RATE: 16 BRPM | SYSTOLIC BLOOD PRESSURE: 122 MMHG | TEMPERATURE: 97.8 F | DIASTOLIC BLOOD PRESSURE: 63 MMHG | WEIGHT: 230 LBS | BODY MASS INDEX: 36.1 KG/M2 | HEIGHT: 67 IN

## 2023-06-26 DIAGNOSIS — M19.90 ARTHRITIS: ICD-10-CM

## 2023-06-26 DIAGNOSIS — E87.6 HYPOKALEMIA: ICD-10-CM

## 2023-06-26 DIAGNOSIS — Z00.00 MEDICARE ANNUAL WELLNESS VISIT, SUBSEQUENT: Primary | ICD-10-CM

## 2023-06-26 DIAGNOSIS — I10 ESSENTIAL HYPERTENSION: ICD-10-CM

## 2023-06-26 LAB
ANION GAP SERPL CALCULATED.3IONS-SCNC: 9 MMOL/L (ref 3–16)
BUN SERPL-MCNC: 53 MG/DL (ref 6–20)
CALCIUM SERPL-MCNC: 8.9 MG/DL (ref 8.5–10.5)
CHLORIDE SERPL-SCNC: 104 MMOL/L (ref 98–107)
CO2 SERPL-SCNC: 25 MMOL/L (ref 20–30)
CREAT SERPL-MCNC: 1.8 MG/DL (ref 0.4–1.2)
GFR SERPLBLD CREATININE-BSD FMLA CKD-EPI: 38 ML/MIN/{1.73_M2}
GLUCOSE SERPL-MCNC: 263 MG/DL (ref 74–106)
POTASSIUM SERPL-SCNC: 4.9 MMOL/L (ref 3.4–5.1)
SODIUM SERPL-SCNC: 138 MMOL/L (ref 136–145)

## 2023-06-26 PROCEDURE — 3074F SYST BP LT 130 MM HG: CPT | Performed by: NURSE PRACTITIONER

## 2023-06-26 PROCEDURE — 80048 BASIC METABOLIC PNL TOTAL CA: CPT

## 2023-06-26 PROCEDURE — G0439 PPPS, SUBSEQ VISIT: HCPCS | Performed by: NURSE PRACTITIONER

## 2023-06-26 PROCEDURE — 1123F ACP DISCUSS/DSCN MKR DOCD: CPT | Performed by: NURSE PRACTITIONER

## 2023-06-26 PROCEDURE — 3078F DIAST BP <80 MM HG: CPT | Performed by: NURSE PRACTITIONER

## 2023-06-26 RX ORDER — METHYLPREDNISOLONE 4 MG/1
TABLET ORAL
Qty: 21 TABLET | Refills: 0 | Status: SHIPPED | OUTPATIENT
Start: 2023-06-26 | End: 2023-07-02

## 2023-06-26 RX ORDER — PRASUGREL 10 MG/1
TABLET, FILM COATED ORAL
Qty: 90 TABLET | Refills: 3 | Status: SHIPPED | OUTPATIENT
Start: 2023-06-26

## 2023-06-26 RX ORDER — CARVEDILOL 12.5 MG/1
TABLET ORAL
Qty: 180 TABLET | Refills: 3 | Status: SHIPPED | OUTPATIENT
Start: 2023-06-26

## 2023-06-26 ASSESSMENT — PATIENT HEALTH QUESTIONNAIRE - PHQ9
9. THOUGHTS THAT YOU WOULD BE BETTER OFF DEAD, OR OF HURTING YOURSELF: 0
SUM OF ALL RESPONSES TO PHQ QUESTIONS 1-9: 0
1. LITTLE INTEREST OR PLEASURE IN DOING THINGS: 0
10. IF YOU CHECKED OFF ANY PROBLEMS, HOW DIFFICULT HAVE THESE PROBLEMS MADE IT FOR YOU TO DO YOUR WORK, TAKE CARE OF THINGS AT HOME, OR GET ALONG WITH OTHER PEOPLE: 0
SUM OF ALL RESPONSES TO PHQ9 QUESTIONS 1 & 2: 0
8. MOVING OR SPEAKING SO SLOWLY THAT OTHER PEOPLE COULD HAVE NOTICED. OR THE OPPOSITE, BEING SO FIGETY OR RESTLESS THAT YOU HAVE BEEN MOVING AROUND A LOT MORE THAN USUAL: 0
SUM OF ALL RESPONSES TO PHQ QUESTIONS 1-9: 0
4. FEELING TIRED OR HAVING LITTLE ENERGY: 0
6. FEELING BAD ABOUT YOURSELF - OR THAT YOU ARE A FAILURE OR HAVE LET YOURSELF OR YOUR FAMILY DOWN: 0
3. TROUBLE FALLING OR STAYING ASLEEP: 0
7. TROUBLE CONCENTRATING ON THINGS, SUCH AS READING THE NEWSPAPER OR WATCHING TELEVISION: 0
5. POOR APPETITE OR OVEREATING: 0
SUM OF ALL RESPONSES TO PHQ QUESTIONS 1-9: 0
SUM OF ALL RESPONSES TO PHQ QUESTIONS 1-9: 0
2. FEELING DOWN, DEPRESSED OR HOPELESS: 0

## 2023-06-26 ASSESSMENT — LIFESTYLE VARIABLES: HOW OFTEN DO YOU HAVE A DRINK CONTAINING ALCOHOL: NEVER

## 2023-06-29 PROBLEM — R07.9 CHEST PAIN, UNSPECIFIED TYPE: Status: ACTIVE | Noted: 2023-06-29

## 2023-07-05 ENCOUNTER — CARE COORDINATION (OUTPATIENT)
Dept: CARE COORDINATION | Age: 78
End: 2023-07-05

## 2023-07-05 NOTE — CARE COORDINATION
[facility] Care Transitions Initial Follow Up Call    Call within 2 business days of discharge: Yes    Patient Current Location:  Home: [address] [place] [address]    Care Transition Nurse contacted the patient by telephone to perform post hospital discharge assessment. Verified name and  with patient as identifiers. Provided introduction to self, and explanation of the Care Transition Nurse role. Patient: Elyssa Curiel Patient : 1945   MRN: [id number]  Reason for Admission: Chest Pain, CHF  Discharge Date: 3/3/22 RARS: No data recorded    Last Discharge [address]       Date Complaint Diagnosis Description Type Department Provider    3/3/22 Chest Pain Coronary artery disease involving native heart with unstable angina pectoris, unspecified vessel or lesion type ([facility]) . .. ED [facility]) [facility] CHILDREN ED Jessica Rudd, DO; Cathryn Hazel Green, DO            Was this an external facility discharge? Yes, 23  Discharge Facility: [address] [place] [address] to be reviewed by the provider   Additional needs identified to be addressed with provider: Yes  medications-lasix dosage change               Method of communication with provider: face to face. Mrs. Marcela Brock states that Oanh Celis has not had any CP since dc. We discussed his change in lasix dosage. She says the swelling in his feet and ankles is gone, no SOA. We discussed weighing daily to monitor fluid. We went over [address] appointment. Care Transition Nurse reviewed discharge instructions with family who verbalized understanding. The family was given an opportunity to ask questions and does not have any further questions or concerns at this time. Were discharge instructions available to patient? Yes. Reviewed appropriate site of care based on symptoms and resources available to patient including: PCP. The family agrees to contact the PCP office for questions related to their healthcare.      Advance Care Planning:   Does patient have an Advance

## 2023-07-07 ENCOUNTER — OFFICE VISIT (OUTPATIENT)
Dept: PRIMARY CARE CLINIC | Age: 78
End: 2023-07-07

## 2023-07-07 VITALS
HEIGHT: 67 IN | SYSTOLIC BLOOD PRESSURE: 129 MMHG | TEMPERATURE: 98 F | HEART RATE: 66 BPM | BODY MASS INDEX: 34.53 KG/M2 | DIASTOLIC BLOOD PRESSURE: 61 MMHG | RESPIRATION RATE: 18 BRPM | OXYGEN SATURATION: 98 % | WEIGHT: 220 LBS

## 2023-07-07 DIAGNOSIS — N18.32 CHRONIC RENAL FAILURE, STAGE 3B (HCC): ICD-10-CM

## 2023-07-07 DIAGNOSIS — E78.5 HYPERLIPIDEMIA, UNSPECIFIED HYPERLIPIDEMIA TYPE: ICD-10-CM

## 2023-07-07 DIAGNOSIS — I50.22 CHRONIC SYSTOLIC (CONGESTIVE) HEART FAILURE (HCC): ICD-10-CM

## 2023-07-07 DIAGNOSIS — E87.5 HYPERKALEMIA: Primary | ICD-10-CM

## 2023-07-07 DIAGNOSIS — I10 ESSENTIAL HYPERTENSION: ICD-10-CM

## 2023-07-07 RX ORDER — ATORVASTATIN CALCIUM 40 MG/1
40 TABLET, FILM COATED ORAL NIGHTLY
Qty: 90 TABLET | Refills: 3 | Status: SHIPPED | OUTPATIENT
Start: 2023-07-07

## 2023-07-07 RX ORDER — MAGNESIUM OXIDE 400 MG/1
400 TABLET ORAL DAILY
COMMUNITY

## 2023-07-07 RX ORDER — CARVEDILOL 12.5 MG/1
12.5 TABLET ORAL 2 TIMES DAILY
Qty: 180 TABLET | Refills: 3 | Status: SHIPPED | OUTPATIENT
Start: 2023-07-07

## 2023-07-07 RX ORDER — SODIUM POLYSTYRENE SULFONATE 15 G/60ML
15 SUSPENSION ORAL; RECTAL 2 TIMES DAILY
Qty: 120 ML | Refills: 1 | Status: SHIPPED | OUTPATIENT
Start: 2023-07-07

## 2023-07-07 RX ORDER — METHYLPREDNISOLONE 4 MG/1
TABLET ORAL
COMMUNITY
Start: 2023-06-26

## 2023-07-07 RX ORDER — FEXOFENADINE HCL 180 MG/1
180 TABLET ORAL DAILY
COMMUNITY

## 2023-07-07 RX ORDER — FUROSEMIDE 40 MG/1
40 TABLET ORAL DAILY
COMMUNITY
Start: 2023-07-01

## 2023-07-07 RX ORDER — FUROSEMIDE 20 MG/1
40 TABLET ORAL DAILY
Qty: 90 TABLET | Refills: 2 | Status: SHIPPED | OUTPATIENT
Start: 2023-07-07

## 2023-07-07 RX ORDER — PRASUGREL 10 MG/1
10 TABLET, FILM COATED ORAL DAILY
Qty: 90 TABLET | Refills: 3 | Status: SHIPPED | OUTPATIENT
Start: 2023-07-07

## 2023-07-10 ENCOUNTER — HOSPITAL ENCOUNTER (OUTPATIENT)
Facility: HOSPITAL | Age: 78
Discharge: HOME | End: 2023-07-10
Payer: MEDICARE

## 2023-07-10 DIAGNOSIS — E87.5 HYPERKALEMIA: ICD-10-CM

## 2023-07-10 LAB
ANION GAP SERPL CALCULATED.3IONS-SCNC: 10 MMOL/L (ref 3–16)
BUN SERPL-MCNC: 50 MG/DL (ref 6–20)
CALCIUM SERPL-MCNC: 8.9 MG/DL (ref 8.5–10.5)
CHLORIDE SERPL-SCNC: 107 MMOL/L (ref 98–107)
CO2 SERPL-SCNC: 25 MMOL/L (ref 20–30)
CREAT SERPL-MCNC: 1.6 MG/DL (ref 0.4–1.2)
GFR SERPLBLD CREATININE-BSD FMLA CKD-EPI: 44 ML/MIN/{1.73_M2}
GLUCOSE SERPL-MCNC: 156 MG/DL (ref 74–106)
POTASSIUM SERPL-SCNC: 4 MMOL/L (ref 3.4–5.1)
SODIUM SERPL-SCNC: 142 MMOL/L (ref 136–145)

## 2023-07-10 PROCEDURE — 80048 BASIC METABOLIC PNL TOTAL CA: CPT

## 2023-07-14 ENCOUNTER — HOSPITAL ENCOUNTER (EMERGENCY)
Facility: HOSPITAL | Age: 78
Discharge: HOME | DRG: 246 | End: 2023-07-14
Attending: EMERGENCY MEDICINE | Admitting: STUDENT IN AN ORGANIZED HEALTH CARE EDUCATION/TRAINING PROGRAM
Payer: MEDICARE

## 2023-07-14 ENCOUNTER — APPOINTMENT (OUTPATIENT)
Dept: GENERAL RADIOLOGY | Facility: HOSPITAL | Age: 78
DRG: 246 | End: 2023-07-14
Payer: MEDICARE

## 2023-07-14 VITALS
WEIGHT: 230 LBS | SYSTOLIC BLOOD PRESSURE: 147 MMHG | TEMPERATURE: 97.9 F | HEART RATE: 63 BPM | RESPIRATION RATE: 18 BRPM | BODY MASS INDEX: 36.96 KG/M2 | DIASTOLIC BLOOD PRESSURE: 77 MMHG | OXYGEN SATURATION: 96 % | HEIGHT: 66 IN

## 2023-07-14 DIAGNOSIS — I50.9 ACUTE ON CHRONIC CONGESTIVE HEART FAILURE, UNSPECIFIED HEART FAILURE TYPE: ICD-10-CM

## 2023-07-14 DIAGNOSIS — R07.9 CHEST PAIN, UNSPECIFIED TYPE: Primary | ICD-10-CM

## 2023-07-14 LAB
ALBUMIN SERPL-MCNC: 3.8 G/DL (ref 3.5–5.2)
ALBUMIN/GLOB SERPL: 1.4 G/DL
ALP SERPL-CCNC: 46 U/L (ref 39–117)
ALT SERPL W P-5'-P-CCNC: 25 U/L (ref 1–41)
ANION GAP SERPL CALCULATED.3IONS-SCNC: 14.5 MMOL/L (ref 5–15)
AST SERPL-CCNC: 19 U/L (ref 1–40)
BASOPHILS # BLD AUTO: 0.07 10*3/MM3 (ref 0–0.2)
BASOPHILS NFR BLD AUTO: 0.6 % (ref 0–1.5)
BILIRUB SERPL-MCNC: 0.2 MG/DL (ref 0–1.2)
BUN SERPL-MCNC: 43 MG/DL (ref 8–23)
BUN/CREAT SERPL: 25.4 (ref 7–25)
CALCIUM SPEC-SCNC: 8.4 MG/DL (ref 8.6–10.5)
CHLORIDE SERPL-SCNC: 105 MMOL/L (ref 98–107)
CO2 SERPL-SCNC: 19.5 MMOL/L (ref 22–29)
CREAT SERPL-MCNC: 1.69 MG/DL (ref 0.76–1.27)
DEPRECATED RDW RBC AUTO: 40.7 FL (ref 37–54)
EGFRCR SERPLBLD CKD-EPI 2021: 41.3 ML/MIN/1.73
EOSINOPHIL # BLD AUTO: 0.16 10*3/MM3 (ref 0–0.4)
EOSINOPHIL NFR BLD AUTO: 1.4 % (ref 0.3–6.2)
ERYTHROCYTE [DISTWIDTH] IN BLOOD BY AUTOMATED COUNT: 13.2 % (ref 12.3–15.4)
GEN 5 2HR TROPONIN T REFLEX: 122 NG/L
GLOBULIN UR ELPH-MCNC: 2.7 GM/DL
GLUCOSE SERPL-MCNC: 315 MG/DL (ref 65–99)
HCT VFR BLD AUTO: 29.3 % (ref 37.5–51)
HGB BLD-MCNC: 9.9 G/DL (ref 13–17.7)
HOLD SPECIMEN: NORMAL
HOLD SPECIMEN: NORMAL
IMM GRANULOCYTES # BLD AUTO: 0.05 10*3/MM3 (ref 0–0.05)
IMM GRANULOCYTES NFR BLD AUTO: 0.4 % (ref 0–0.5)
LYMPHOCYTES # BLD AUTO: 1.11 10*3/MM3 (ref 0.7–3.1)
LYMPHOCYTES NFR BLD AUTO: 9.7 % (ref 19.6–45.3)
MCH RBC QN AUTO: 28.9 PG (ref 26.6–33)
MCHC RBC AUTO-ENTMCNC: 33.8 G/DL (ref 31.5–35.7)
MCV RBC AUTO: 85.7 FL (ref 79–97)
MONOCYTES # BLD AUTO: 0.59 10*3/MM3 (ref 0.1–0.9)
MONOCYTES NFR BLD AUTO: 5.2 % (ref 5–12)
NEUTROPHILS NFR BLD AUTO: 82.7 % (ref 42.7–76)
NEUTROPHILS NFR BLD AUTO: 9.43 10*3/MM3 (ref 1.7–7)
NRBC BLD AUTO-RTO: 0 /100 WBC (ref 0–0.2)
NT-PROBNP SERPL-MCNC: 1625 PG/ML (ref 0–1800)
PLATELET # BLD AUTO: 287 10*3/MM3 (ref 140–450)
PMV BLD AUTO: 10.8 FL (ref 6–12)
POTASSIUM SERPL-SCNC: 4.4 MMOL/L (ref 3.5–5.2)
PROT SERPL-MCNC: 6.5 G/DL (ref 6–8.5)
RBC # BLD AUTO: 3.42 10*6/MM3 (ref 4.14–5.8)
SODIUM SERPL-SCNC: 139 MMOL/L (ref 136–145)
TROPONIN T DELTA: 10 NG/L
TROPONIN T SERPL HS-MCNC: 112 NG/L
WBC NRBC COR # BLD: 11.41 10*3/MM3 (ref 3.4–10.8)
WHOLE BLOOD HOLD COAG: NORMAL
WHOLE BLOOD HOLD SPECIMEN: NORMAL

## 2023-07-14 PROCEDURE — 71045 X-RAY EXAM CHEST 1 VIEW: CPT

## 2023-07-14 PROCEDURE — 99284 EMERGENCY DEPT VISIT MOD MDM: CPT

## 2023-07-14 PROCEDURE — 85025 COMPLETE CBC W/AUTO DIFF WBC: CPT | Performed by: EMERGENCY MEDICINE

## 2023-07-14 PROCEDURE — 96374 THER/PROPH/DIAG INJ IV PUSH: CPT

## 2023-07-14 PROCEDURE — 36415 COLL VENOUS BLD VENIPUNCTURE: CPT

## 2023-07-14 PROCEDURE — 80053 COMPREHEN METABOLIC PANEL: CPT | Performed by: EMERGENCY MEDICINE

## 2023-07-14 PROCEDURE — 83880 ASSAY OF NATRIURETIC PEPTIDE: CPT | Performed by: PHYSICIAN ASSISTANT

## 2023-07-14 PROCEDURE — 25010000002 MORPHINE PER 10 MG: Performed by: EMERGENCY MEDICINE

## 2023-07-14 PROCEDURE — 93005 ELECTROCARDIOGRAM TRACING: CPT | Performed by: EMERGENCY MEDICINE

## 2023-07-14 PROCEDURE — 96375 TX/PRO/DX INJ NEW DRUG ADDON: CPT

## 2023-07-14 PROCEDURE — 25010000002 FUROSEMIDE PER 20 MG: Performed by: PHYSICIAN ASSISTANT

## 2023-07-14 PROCEDURE — 84484 ASSAY OF TROPONIN QUANT: CPT | Performed by: EMERGENCY MEDICINE

## 2023-07-14 RX ORDER — SODIUM CHLORIDE 0.9 % (FLUSH) 0.9 %
10 SYRINGE (ML) INJECTION AS NEEDED
Status: DISCONTINUED | OUTPATIENT
Start: 2023-07-14 | End: 2023-07-15 | Stop reason: HOSPADM

## 2023-07-14 RX ORDER — FUROSEMIDE 10 MG/ML
80 INJECTION INTRAMUSCULAR; INTRAVENOUS ONCE
Status: COMPLETED | OUTPATIENT
Start: 2023-07-14 | End: 2023-07-14

## 2023-07-14 RX ORDER — ASPIRIN 325 MG
325 TABLET ORAL ONCE
Status: DISCONTINUED | OUTPATIENT
Start: 2023-07-14 | End: 2023-07-15 | Stop reason: HOSPADM

## 2023-07-14 RX ADMIN — FUROSEMIDE 80 MG: 10 INJECTION, SOLUTION INTRAMUSCULAR; INTRAVENOUS at 21:42

## 2023-07-14 RX ADMIN — MORPHINE SULFATE 4 MG: 4 INJECTION, SOLUTION INTRAMUSCULAR; INTRAVENOUS at 19:27

## 2023-07-14 NOTE — ED PROVIDER NOTES
Subjective  History of Present Illness:    Chief Complaint:   Chief Complaint   Patient presents with    Chest Pain      History of Present Illness: Donny Jerry is a 78 y.o. male who presents to the emergency department complaining of chest pain. He has a history of CAD status post CABG and PCI, chronic systolic CHF, CKD stage III, diabetes mellitus type 2, BPH, hypertension, hyperlipidemia.  Patient states over the past couple days he has had multiple episodes of chest pressure with associated shortness of breath that has at times radiated to his bilateral upper arms.  He took nitro around 10:00 this morning which relieved his pain temporarily however it has returned, currently rates it a 6 out of 10.  He does have some edema to his bilateral lower extremities which she states is at his baseline.  Onset: 2 to 3 days ago  Duration: Intermittent  Exacerbating / Alleviating factors: Worse with activity  Associated symptoms:       Nurses Notes reviewed and agree, including vitals, allergies, social history and prior medical history.     Review of Systems   Respiratory:  Positive for shortness of breath.    Cardiovascular:  Positive for chest pain.     Past Medical History:   Diagnosis Date    Benign hypertension     Coronary artery disease     Depression     Enlarged prostate     Enlarged prostate with anticipated TURP with Dr. Bernal.    GERD (gastroesophageal reflux disease)     Hypercholesterolemia     Obesity     Obstructive sleep apnea on CPAP     Type 2 diabetes mellitus        Allergies:    Zocor [simvastatin], Bisoprolol, Byetta 10 mcg pen [exenatide], Crestor [rosuvastatin calcium], Metformin and related, and Plavix [clopidogrel bisulfate]      Past Surgical History:   Procedure Laterality Date    CARDIAC CATHETERIZATION      CARDIAC CATHETERIZATION Left 10/19/2021    Procedure: Left Heart Cath;  Surgeon: Liz Russo MD;  Location: [facility] CATH INVASIVE LOCATION;  Service: Cardiology;   Laterality: Left;    COLONOSCOPY W/ POLYPECTOMY      CORONARY ANGIOPLASTY WITH STENT PLACEMENT Left 06/03/2009    Left heart catheterization, 06/03/2009, Dr. Russo:  LVEF 45% to 50%.  Placement of overlapping Cypher drug-eluting stent 2.5 x 28 and 2.5 x 18 to the SVG to the obtuse marginal branch.  SVG to the PDA had a proximal stenosis estimated at 60% with a distal stenosis of 50% to 60%.    CORONARY ANGIOPLASTY WITH STENT PLACEMENT Left 07/06/2009    Left heart catheterization, 07/06/2009:  PTCA and stent placement in the mid PDA using a 2.25 x 12 mm Taxus drug-eluting stent with stent placement in the distal SVG to the PDA using a 2.5 x 18 mm Cypher drug-eluting stent and stenting of the proximal SVG to the PDA using a 3.0 x 28 mm Cypher drug-eluting stent.    CORONARY ANGIOPLASTY WITH STENT PLACEMENT  04/23/2010    Cardiac catheterization for recurrent anginal symptoms, 04/23/2010, with PTCA and stent placement in the proximal SVG to the PDA using 3.0 x 28 mm Promus drug-eluting stent for in-stent restenosis.    CORONARY ANGIOPLASTY WITH STENT PLACEMENT Left 07/06/2010    Left heart catheterization, 07/06/2010, Dr. Russo:  EF 40% to 45%.  3.5 x 23 mm Promus drug-eluting stent in the proximal SVG to OM, 2.5 x 18 mm Promus drug-eluting stent to distal SVG to PDA due to in-stent restenosis.      CORONARY ANGIOPLASTY WITH STENT PLACEMENT Left 11/16/2010    Left heart catheterization, 11/16/2010, with placement of a 3.0 x 16 mm Taxus drug-eluting stent to the SVG to the RCA proximally and a 2.5 x 16 mm paclitaxel stent distally in the SVG to the RCA.    CORONARY ANGIOPLASTY WITH STENT PLACEMENT Left     Left heart catheterization, 3.0 x 24-mm Promus element drug-eluting stent to a 90% lesion in the mid portion of the SVG to the PDA.     CORONARY ANGIOPLASTY WITH STENT PLACEMENT Left 06/24/2014    Left heart catheterization for recurrent angina, 06/24/2014, Dr. Russo:  PTCA of the SVG to the  "PDA using a 3 x 12 mm NC TREK balloon.      CORONARY ARTERY BYPASS GRAFT      CABG x3, Dr. Peng, [facility], 2001.         Social History     Socioeconomic History    Marital status:    Tobacco Use    Smoking status: Never    Smokeless tobacco: Never   Vaping Use    Vaping Use: Never used   Substance and Sexual Activity    Alcohol use: No    Drug use: No    Sexual activity: Defer         Family History   Problem Relation Age of Onset    Heart attack Mother     Ulcers Father        Objective  Physical Exam:  /70   Pulse 65   Temp 97.9 °F (36.6 °C) (Oral)   Resp 18   Ht 167.6 cm (66\")   Wt 104 kg (230 lb)   SpO2 96%   BMI 37.12 kg/m²      Physical Exam  Vitals and nursing note reviewed.   Constitutional:       General: He is not in acute distress.     Appearance: He is well-developed. He is obese. He is not ill-appearing, toxic-appearing or diaphoretic.   HENT:      Head: Normocephalic and atraumatic.   Cardiovascular:      Rate and Rhythm: Normal rate and regular rhythm.      Heart sounds: Normal heart sounds.   Pulmonary:      Effort: Pulmonary effort is normal.      Breath sounds: Normal breath sounds. No decreased breath sounds, wheezing, rhonchi or rales.   Abdominal:      Palpations: Abdomen is soft.   Musculoskeletal:         General: Normal range of motion.      Right lower leg: Edema present.      Left lower leg: Edema present.   Neurological:      General: No focal deficit present.      Mental Status: He is alert.   Psychiatric:         Mood and Affect: Mood normal.         Behavior: Behavior normal.         Procedures    ED Course:    ED Course as of 07/14/23 2301 Fri Jul 14, 2023 1918 EKG interpreted by me, left bundle branch with normal sinus rhythm, rate of 84, no signs concerning for Sgarbossa criteria, abnormal EKG [JE]      ED Course User Index  [JE] Oscar Bass MD       Lab Results (last 24 hours)       Procedure Component Value Units Date/Time    CBC & " Differential [[id number]]  (Abnormal) Collected: 07/14/23 1849    Specimen: Blood Updated: 07/14/23 1901    Narrative:      The following orders were created for panel order CBC & Differential.  Procedure                               Abnormality         Status                     ---------                               -----------         ------                     CBC Auto Differential[[id number]]        Abnormal            Final result                 Please view results for these tests on the individual orders.    Comprehensive Metabolic Panel [[id number]]  (Abnormal) Collected: 07/14/23 1849    Specimen: Blood Updated: 07/14/23 1918     Glucose 315 mg/dL      Comment: Glucose >180, Hemoglobin A1C recommended.        BUN 43 mg/dL      Creatinine 1.69 mg/dL      Sodium 139 mmol/L      Potassium 4.4 mmol/L      Comment: Slight hemolysis detected by analyzer. Results may be affected.        Chloride 105 mmol/L      CO2 19.5 mmol/L      Calcium 8.4 mg/dL      Total Protein 6.5 g/dL      Albumin 3.8 g/dL      ALT (SGPT) 25 U/L      AST (SGOT) 19 U/L      Alkaline Phosphatase 46 U/L      Total Bilirubin 0.2 mg/dL      Globulin 2.7 gm/dL      A/G Ratio 1.4 g/dL      BUN/Creatinine Ratio 25.4     Anion Gap 14.5 mmol/L      eGFR 41.3 mL/min/1.73     Narrative:      GFR Normal >60  Chronic Kidney Disease <60  Kidney Failure <15    The GFR formula is only valid for adults with stable renal function between ages 18 and 70.    High Sensitivity Troponin T [[id number]]  (Abnormal) Collected: 07/14/23 1849    Specimen: Blood Updated: 07/14/23 1933     HS Troponin T 112 ng/L     Narrative:      High Sensitive Troponin T Reference Range:  <10.0 ng/L- Negative Female for AMI  <15.0 ng/L- Negative Male for AMI  >=10 - Abnormal Female indicating possible myocardial injury.  >=15 - Abnormal Male indicating possible myocardial injury.   Clinicians would have to utilize clinical acumen, EKG, Troponin, and serial changes to determine if it  is an Acute Myocardial Infarction or myocardial injury due to an underlying chronic condition.         CBC Auto Differential [[id number]]  (Abnormal) Collected: 07/14/23 1849    Specimen: Blood Updated: 07/14/23 1901     WBC 11.41 10*3/mm3      RBC 3.42 10*6/mm3      Hemoglobin 9.9 g/dL      Hematocrit 29.3 %      MCV 85.7 fL      MCH 28.9 pg      MCHC 33.8 g/dL      RDW 13.2 %      RDW-SD 40.7 fl      MPV 10.8 fL      Platelets 287 10*3/mm3      Neutrophil % 82.7 %      Lymphocyte % 9.7 %      Monocyte % 5.2 %      Eosinophil % 1.4 %      Basophil % 0.6 %      Immature Grans % 0.4 %      Neutrophils, Absolute 9.43 10*3/mm3      Lymphocytes, Absolute 1.11 10*3/mm3      Monocytes, Absolute 0.59 10*3/mm3      Eosinophils, Absolute 0.16 10*3/mm3      Basophils, Absolute 0.07 10*3/mm3      Immature Grans, Absolute 0.05 10*3/mm3      nRBC 0.0 /100 WBC     BNP [[id number]]  (Normal) Collected: 07/14/23 1849    Specimen: Blood Updated: 07/14/23 1956     proBNP 1,625.0 pg/mL     Narrative:      Among patients with dyspnea, NT-proBNP is highly sensitive for the detection of acute congestive heart failure. In addition NT-proBNP of <300 pg/ml effectively rules out acute congestive heart failure with 99% negative predictive value.    Results may be falsely decreased if patient taking Biotin.      High Sensitivity Troponin T 2Hr [[id number]]  (Abnormal) Collected: 07/14/23 2039    Specimen: Blood Updated: 07/14/23 2117     HS Troponin T 122 ng/L      Troponin T Delta 10 ng/L     Narrative:      High Sensitive Troponin T Reference Range:  <10.0 ng/L- Negative Female for AMI  <15.0 ng/L- Negative Male for AMI  >=10 - Abnormal Female indicating possible myocardial injury.  >=15 - Abnormal Male indicating possible myocardial injury.   Clinicians would have to utilize clinical acumen, EKG, Troponin, and serial changes to determine if it is an Acute Myocardial Infarction or myocardial injury due to an underlying chronic condition.                   No radiology results from the last 24 hrs       Medical Decision Making  Problems Addressed:  Acute on chronic congestive heart failure, unspecified heart failure type: complicated acute illness or injury  Chest pain, unspecified type: complicated acute illness or injury    Amount and/or Complexity of Data Reviewed  Labs: ordered.  Radiology: ordered.  ECG/medicine tests: ordered.    Risk  OTC drugs.  Prescription drug management.        Donny Jerry is a 78 y.o. male who presents to the emergency department for evaluation of chest pain    Differential diagnosis includes ACS, volume overload, pneumonia among other etiologies.    CBC, CMP, troponin x2, BNP, chest x-ray, EKG ordered for further evaluation of the patient's presentation.    Chart review if available included outside testing, previous visits, prior labs, prior imaging, available notes from prior evaluations or visits with specialists, medication list, allergies, past medical history, past surgical history when applicable.    Patient was treated with morphine and Lasix    Consulted With Dr. Alegre regarding elevated troponin and delta of 10.  Made him aware of most recent heart cath in 2021 showing a clear LAD graft, an occluded circumflex graft and posterior descending artery stented extensively with 40 to 50% stent narrowing with good retrograde flow.  Patient does have some edema into his BLE and family and he stated his abdomen looks and feels a little distended  Did give patient 80 of Lasix.  Dr. Alegre recommends patient to be discharged home if he diuresis well and is feeling much better and close outpatient follow-up with his neurologist, Dr. Russo.  However Dr. Alegre does recommend if patient does not diurese well or continues to have symptoms that he be admitted and cardiology will consult in the morning.  Patient is wishing for discharge home we will see how he responds to the Lasix.  Currently at 2200 hrs. just after receiving  Lasix patient states he feels much improved his chest pain is nearly resolved    2258 hrs.  Patient's blood pressure 140/40, he states he feels remarkably better.  No chest pain, no shortness of breath, he has diuresed 600 cc of urine.  Did offer admission given his history, HEART score of 5 and elevated troponin with a delta of 10. He is wishing for discharge home.  Case/management discussed with Dr. Bass.  We will have him follow-up with his cardiologist, Dr. Russo on Thursday as scheduled.  Will have him increase his Lasix to 40 mg a day until he sees his cardiologist.    Heart score of 5    Plan for disposition is charged home.  Patient/family comfortable with and understanding of the plan.      Final diagnoses:   Chest pain, unspecified type   Acute on chronic congestive heart failure, unspecified heart failure type          Hay Alfonso PA-C  07/14/23 2300       Hay Alfonso PA-C  07/14/23 2301

## 2023-07-15 NOTE — DISCHARGE INSTRUCTIONS
Please take 40 mg of Lasix daily until you see your cardiologist on Thursday.  Return to the ER for any return of chest pain or any new or worsening symptoms.

## 2023-07-17 ENCOUNTER — APPOINTMENT (OUTPATIENT)
Dept: GENERAL RADIOLOGY | Facility: HOSPITAL | Age: 78
DRG: 246 | End: 2023-07-17
Payer: MEDICARE

## 2023-07-17 ENCOUNTER — HOSPITAL ENCOUNTER (INPATIENT)
Facility: HOSPITAL | Age: 78
LOS: 2 days | Discharge: HOME | DRG: 246 | End: 2023-07-19
Attending: EMERGENCY MEDICINE
Payer: MEDICARE

## 2023-07-17 DIAGNOSIS — I21.4 NSTEMI (NON-ST ELEVATED MYOCARDIAL INFARCTION): Primary | ICD-10-CM

## 2023-07-17 LAB
ALBUMIN SERPL-MCNC: 3.8 G/DL (ref 3.5–5.2)
ALBUMIN/GLOB SERPL: 1.3 G/DL
ALP SERPL-CCNC: 45 U/L (ref 39–117)
ALT SERPL W P-5'-P-CCNC: 25 U/L (ref 1–41)
ANION GAP SERPL CALCULATED.3IONS-SCNC: 14.4 MMOL/L (ref 5–15)
APTT PPP: 24 SECONDS (ref 70–100)
AST SERPL-CCNC: 21 U/L (ref 1–40)
BASOPHILS # BLD AUTO: 0.07 10*3/MM3 (ref 0–0.2)
BASOPHILS NFR BLD AUTO: 0.6 % (ref 0–1.5)
BILIRUB SERPL-MCNC: 0.2 MG/DL (ref 0–1.2)
BUN SERPL-MCNC: 46 MG/DL (ref 8–23)
BUN/CREAT SERPL: 26.4 (ref 7–25)
CALCIUM SPEC-SCNC: 8.7 MG/DL (ref 8.6–10.5)
CHLORIDE SERPL-SCNC: 102 MMOL/L (ref 98–107)
CO2 SERPL-SCNC: 21.6 MMOL/L (ref 22–29)
CREAT SERPL-MCNC: 1.74 MG/DL (ref 0.76–1.27)
DEPRECATED RDW RBC AUTO: 41.3 FL (ref 37–54)
EGFRCR SERPLBLD CKD-EPI 2021: 39.9 ML/MIN/1.73
EOSINOPHIL # BLD AUTO: 0.16 10*3/MM3 (ref 0–0.4)
EOSINOPHIL NFR BLD AUTO: 1.3 % (ref 0.3–6.2)
ERYTHROCYTE [DISTWIDTH] IN BLOOD BY AUTOMATED COUNT: 13.2 % (ref 12.3–15.4)
GLOBULIN UR ELPH-MCNC: 2.9 GM/DL
GLUCOSE SERPL-MCNC: 344 MG/DL (ref 65–99)
HCT VFR BLD AUTO: 30.2 % (ref 37.5–51)
HGB BLD-MCNC: 9.9 G/DL (ref 13–17.7)
IMM GRANULOCYTES # BLD AUTO: 0.05 10*3/MM3 (ref 0–0.05)
IMM GRANULOCYTES NFR BLD AUTO: 0.4 % (ref 0–0.5)
INR PPP: 1 (ref 0.9–1.1)
LYMPHOCYTES # BLD AUTO: 1.26 10*3/MM3 (ref 0.7–3.1)
LYMPHOCYTES NFR BLD AUTO: 10.2 % (ref 19.6–45.3)
MCH RBC QN AUTO: 28.4 PG (ref 26.6–33)
MCHC RBC AUTO-ENTMCNC: 32.8 G/DL (ref 31.5–35.7)
MCV RBC AUTO: 86.8 FL (ref 79–97)
MONOCYTES # BLD AUTO: 0.66 10*3/MM3 (ref 0.1–0.9)
MONOCYTES NFR BLD AUTO: 5.3 % (ref 5–12)
NEUTROPHILS NFR BLD AUTO: 10.15 10*3/MM3 (ref 1.7–7)
NEUTROPHILS NFR BLD AUTO: 82.2 % (ref 42.7–76)
NRBC BLD AUTO-RTO: 0 /100 WBC (ref 0–0.2)
NT-PROBNP SERPL-MCNC: 2119 PG/ML (ref 0–1800)
PLATELET # BLD AUTO: 269 10*3/MM3 (ref 140–450)
PMV BLD AUTO: 10.7 FL (ref 6–12)
POTASSIUM SERPL-SCNC: 4.3 MMOL/L (ref 3.5–5.2)
PROT SERPL-MCNC: 6.7 G/DL (ref 6–8.5)
PROTHROMBIN TIME: 13.7 SECONDS (ref 12.3–15.1)
RBC # BLD AUTO: 3.48 10*6/MM3 (ref 4.14–5.8)
SODIUM SERPL-SCNC: 138 MMOL/L (ref 136–145)
TROPONIN T SERPL HS-MCNC: 605 NG/L
UFH PPP CHRO-ACNC: 0.1 IU/ML (ref 0.3–0.7)
WBC NRBC COR # BLD: 12.35 10*3/MM3 (ref 3.4–10.8)

## 2023-07-17 PROCEDURE — 85730 THROMBOPLASTIN TIME PARTIAL: CPT | Performed by: NURSE PRACTITIONER

## 2023-07-17 PROCEDURE — 84484 ASSAY OF TROPONIN QUANT: CPT | Performed by: EMERGENCY MEDICINE

## 2023-07-17 PROCEDURE — 71045 X-RAY EXAM CHEST 1 VIEW: CPT

## 2023-07-17 PROCEDURE — 93005 ELECTROCARDIOGRAM TRACING: CPT | Performed by: EMERGENCY MEDICINE

## 2023-07-17 PROCEDURE — 99284 EMERGENCY DEPT VISIT MOD MDM: CPT

## 2023-07-17 PROCEDURE — 85520 HEPARIN ASSAY: CPT | Performed by: NURSE PRACTITIONER

## 2023-07-17 PROCEDURE — 99223 1ST HOSP IP/OBS HIGH 75: CPT | Performed by: FAMILY MEDICINE

## 2023-07-17 PROCEDURE — 93005 ELECTROCARDIOGRAM TRACING: CPT | Performed by: NURSE PRACTITIONER

## 2023-07-17 PROCEDURE — 83880 ASSAY OF NATRIURETIC PEPTIDE: CPT | Performed by: NURSE PRACTITIONER

## 2023-07-17 PROCEDURE — 85610 PROTHROMBIN TIME: CPT | Performed by: NURSE PRACTITIONER

## 2023-07-17 PROCEDURE — 85025 COMPLETE CBC W/AUTO DIFF WBC: CPT | Performed by: EMERGENCY MEDICINE

## 2023-07-17 PROCEDURE — 80053 COMPREHEN METABOLIC PANEL: CPT | Performed by: EMERGENCY MEDICINE

## 2023-07-17 RX ORDER — HEPARIN SODIUM 1000 [USP'U]/ML
2000 INJECTION, SOLUTION INTRAVENOUS; SUBCUTANEOUS AS NEEDED
Status: DISCONTINUED | OUTPATIENT
Start: 2023-07-17 | End: 2023-07-18

## 2023-07-17 RX ORDER — NITROGLYCERIN 0.4 MG/1
0.4 TABLET SUBLINGUAL
Status: DISCONTINUED | OUTPATIENT
Start: 2023-07-17 | End: 2023-07-18

## 2023-07-17 RX ORDER — HEPARIN SODIUM 1000 [USP'U]/ML
4000 INJECTION, SOLUTION INTRAVENOUS; SUBCUTANEOUS ONCE
Status: COMPLETED | OUTPATIENT
Start: 2023-07-17 | End: 2023-07-18

## 2023-07-17 RX ORDER — HEPARIN SODIUM 10000 [USP'U]/100ML
10.4 INJECTION, SOLUTION INTRAVENOUS
Status: DISCONTINUED | OUTPATIENT
Start: 2023-07-17 | End: 2023-07-18

## 2023-07-17 RX ORDER — HEPARIN SODIUM 1000 [USP'U]/ML
4000 INJECTION, SOLUTION INTRAVENOUS; SUBCUTANEOUS AS NEEDED
Status: DISCONTINUED | OUTPATIENT
Start: 2023-07-17 | End: 2023-07-18

## 2023-07-17 RX ORDER — ASPIRIN 81 MG/1
324 TABLET, CHEWABLE ORAL ONCE
Status: COMPLETED | OUTPATIENT
Start: 2023-07-17 | End: 2023-07-17

## 2023-07-17 RX ORDER — SODIUM CHLORIDE 0.9 % (FLUSH) 0.9 %
10 SYRINGE (ML) INJECTION AS NEEDED
Status: DISCONTINUED | OUTPATIENT
Start: 2023-07-17 | End: 2023-07-19 | Stop reason: HOSPADM

## 2023-07-17 RX ADMIN — NITROGLYCERIN 0.4 MG: 0.4 TABLET SUBLINGUAL at 23:34

## 2023-07-17 RX ADMIN — ASPIRIN 81 MG CHEWABLE TABLET 324 MG: 81 TABLET CHEWABLE at 23:34

## 2023-07-17 NOTE — Clinical Note
Stent balloon removed intact. W Plasty Text: The lesion was extirpated to the level of the fat with a #15 scalpel blade.  Given the location of the defect, shape of the defect and the proximity to free margins a W-plasty was deemed most appropriate for repair.  Using a sterile surgical marker, the appropriate transposition arms of the W-plasty were drawn incorporating the defect and placing the expected incisions within the relaxed skin tension lines where possible.    The area thus outlined was incised deep to adipose tissue with a #15 scalpel blade.  The skin margins were undermined to an appropriate distance in all directions utilizing iris scissors.  The opposing transposition arms were then transposed into place in opposite direction and anchored with interrupted buried subcutaneous sutures.

## 2023-07-17 NOTE — Clinical Note
First balloon inflation max pressure = 16 reena. First balloon inflation duration = 10 seconds. Second inflation of balloon - Max pressure = 12 reena. 2nd Inflation of balloon - Duration = 5 seconds. Third inflation of balloon - Max pressure = 12 reena. 3rd Inflation of balloon - Duration = 5 seconds.

## 2023-07-17 NOTE — Clinical Note
Hemostasis started on the left radial artery. R-Band was used in achieving hemostasis. Radial compression device applied to vessel. Hemostasis achieved successfully. Closure device additional comment: 13 CC

## 2023-07-17 NOTE — Clinical Note
A 6 fr sheath was  inserted with ultrasound guidance into the left radial artery. Sheath insertion not delayed.

## 2023-07-18 ENCOUNTER — APPOINTMENT (OUTPATIENT)
Dept: CARDIOLOGY | Facility: HOSPITAL | Age: 78
DRG: 246 | End: 2023-07-18
Payer: MEDICARE

## 2023-07-18 PROBLEM — I50.22 CHRONIC SYSTOLIC HEART FAILURE: Status: ACTIVE | Noted: 2023-07-18

## 2023-07-18 PROBLEM — I21.9 TYPE 1 MYOCARDIAL INFARCTION: Status: ACTIVE | Noted: 2023-07-18

## 2023-07-18 LAB
ACT BLD: 209 SECONDS (ref 82–152)
ANION GAP SERPL CALCULATED.3IONS-SCNC: 13.8 MMOL/L (ref 5–15)
BASOPHILS # BLD AUTO: 0.07 10*3/MM3 (ref 0–0.2)
BASOPHILS NFR BLD AUTO: 0.6 % (ref 0–1.5)
BUN SERPL-MCNC: 46 MG/DL (ref 8–23)
BUN/CREAT SERPL: 27.4 (ref 7–25)
CALCIUM SPEC-SCNC: 8.8 MG/DL (ref 8.6–10.5)
CHLORIDE SERPL-SCNC: 106 MMOL/L (ref 98–107)
CHOLEST SERPL-MCNC: 145 MG/DL (ref 0–200)
CO2 SERPL-SCNC: 23.2 MMOL/L (ref 22–29)
CREAT SERPL-MCNC: 1.68 MG/DL (ref 0.76–1.27)
DEPRECATED RDW RBC AUTO: 41.8 FL (ref 37–54)
EGFRCR SERPLBLD CKD-EPI 2021: 41.6 ML/MIN/1.73
EOSINOPHIL # BLD AUTO: 0.17 10*3/MM3 (ref 0–0.4)
EOSINOPHIL NFR BLD AUTO: 1.5 % (ref 0.3–6.2)
ERYTHROCYTE [DISTWIDTH] IN BLOOD BY AUTOMATED COUNT: 13.1 % (ref 12.3–15.4)
GEN 5 2HR TROPONIN T REFLEX: 666 NG/L
GLUCOSE BLDC GLUCOMTR-MCNC: 155 MG/DL (ref 70–130)
GLUCOSE BLDC GLUCOMTR-MCNC: 156 MG/DL (ref 70–130)
GLUCOSE BLDC GLUCOMTR-MCNC: 190 MG/DL (ref 70–130)
GLUCOSE BLDC GLUCOMTR-MCNC: 196 MG/DL (ref 70–130)
GLUCOSE SERPL-MCNC: 249 MG/DL (ref 65–99)
HCT VFR BLD AUTO: 29.6 % (ref 37.5–51)
HDLC SERPL-MCNC: 37 MG/DL (ref 40–60)
HGB BLD-MCNC: 9.7 G/DL (ref 13–17.7)
HOLD SPECIMEN: NORMAL
HOLD SPECIMEN: NORMAL
IMM GRANULOCYTES # BLD AUTO: 0.05 10*3/MM3 (ref 0–0.05)
IMM GRANULOCYTES NFR BLD AUTO: 0.4 % (ref 0–0.5)
LDLC SERPL CALC-MCNC: 80 MG/DL (ref 0–100)
LDLC/HDLC SERPL: 2.05 {RATIO}
LYMPHOCYTES # BLD AUTO: 1.78 10*3/MM3 (ref 0.7–3.1)
LYMPHOCYTES NFR BLD AUTO: 15.3 % (ref 19.6–45.3)
MAGNESIUM SERPL-MCNC: 2.3 MG/DL (ref 1.6–2.4)
MCH RBC QN AUTO: 28.8 PG (ref 26.6–33)
MCHC RBC AUTO-ENTMCNC: 32.8 G/DL (ref 31.5–35.7)
MCV RBC AUTO: 87.8 FL (ref 79–97)
MONOCYTES # BLD AUTO: 0.81 10*3/MM3 (ref 0.1–0.9)
MONOCYTES NFR BLD AUTO: 6.9 % (ref 5–12)
NEUTROPHILS NFR BLD AUTO: 75.3 % (ref 42.7–76)
NEUTROPHILS NFR BLD AUTO: 8.79 10*3/MM3 (ref 1.7–7)
NRBC BLD AUTO-RTO: 0 /100 WBC (ref 0–0.2)
PLATELET # BLD AUTO: 263 10*3/MM3 (ref 140–450)
PMV BLD AUTO: 10.9 FL (ref 6–12)
POTASSIUM SERPL-SCNC: 4.3 MMOL/L (ref 3.5–5.2)
RBC # BLD AUTO: 3.37 10*6/MM3 (ref 4.14–5.8)
SODIUM SERPL-SCNC: 143 MMOL/L (ref 136–145)
TRIGL SERPL-MCNC: 160 MG/DL (ref 0–150)
TROPONIN T DELTA: 61 NG/L
TROPONIN T SERPL HS-MCNC: 1148 NG/L
UFH PPP CHRO-ACNC: 0.26 IU/ML (ref 0.3–0.7)
VLDLC SERPL-MCNC: 28 MG/DL (ref 5–40)
WBC NRBC COR # BLD: 11.67 10*3/MM3 (ref 3.4–10.8)
WHOLE BLOOD HOLD COAG: NORMAL
WHOLE BLOOD HOLD SPECIMEN: NORMAL

## 2023-07-18 PROCEDURE — 80061 LIPID PANEL: CPT | Performed by: FAMILY MEDICINE

## 2023-07-18 PROCEDURE — 25010000002 HEPARIN (PORCINE) PER 1000 UNITS: Performed by: NURSE PRACTITIONER

## 2023-07-18 PROCEDURE — 0 LIDOCAINE 1 % SOLUTION: Performed by: INTERNAL MEDICINE

## 2023-07-18 PROCEDURE — 0 NITROGLYCERIN 100-5 MCG/ML-% SOLUTION: Performed by: INTERNAL MEDICINE

## 2023-07-18 PROCEDURE — 85520 HEPARIN ASSAY: CPT | Performed by: FAMILY MEDICINE

## 2023-07-18 PROCEDURE — C1894 INTRO/SHEATH, NON-LASER: HCPCS | Performed by: INTERNAL MEDICINE

## 2023-07-18 PROCEDURE — B2111ZZ FLUOROSCOPY OF MULTIPLE CORONARY ARTERIES USING LOW OSMOLAR CONTRAST: ICD-10-PCS | Performed by: INTERNAL MEDICINE

## 2023-07-18 PROCEDURE — C1874 STENT, COATED/COV W/DEL SYS: HCPCS | Performed by: INTERNAL MEDICINE

## 2023-07-18 PROCEDURE — C1887 CATHETER, GUIDING: HCPCS | Performed by: INTERNAL MEDICINE

## 2023-07-18 PROCEDURE — 85025 COMPLETE CBC W/AUTO DIFF WBC: CPT | Performed by: NURSE PRACTITIONER

## 2023-07-18 PROCEDURE — 84484 ASSAY OF TROPONIN QUANT: CPT | Performed by: EMERGENCY MEDICINE

## 2023-07-18 PROCEDURE — 25010000002 MIDAZOLAM PER 1MG: Performed by: INTERNAL MEDICINE

## 2023-07-18 PROCEDURE — 80048 BASIC METABOLIC PNL TOTAL CA: CPT | Performed by: FAMILY MEDICINE

## 2023-07-18 PROCEDURE — 82948 REAGENT STRIP/BLOOD GLUCOSE: CPT

## 2023-07-18 PROCEDURE — C1769 GUIDE WIRE: HCPCS | Performed by: INTERNAL MEDICINE

## 2023-07-18 PROCEDURE — 99152 MOD SED SAME PHYS/QHP 5/>YRS: CPT | Performed by: INTERNAL MEDICINE

## 2023-07-18 PROCEDURE — 93455 CORONARY ART/GRFT ANGIO S&I: CPT | Performed by: INTERNAL MEDICINE

## 2023-07-18 PROCEDURE — 94799 UNLISTED PULMONARY SVC/PX: CPT

## 2023-07-18 PROCEDURE — 93306 TTE W/DOPPLER COMPLETE: CPT

## 2023-07-18 PROCEDURE — 93306 TTE W/DOPPLER COMPLETE: CPT | Performed by: INTERNAL MEDICINE

## 2023-07-18 PROCEDURE — 92937 PRQ TRLUML REVSC CAB GRF 1: CPT | Performed by: INTERNAL MEDICINE

## 2023-07-18 PROCEDURE — 94640 AIRWAY INHALATION TREATMENT: CPT

## 2023-07-18 PROCEDURE — B2131ZZ FLUOROSCOPY OF MULTIPLE CORONARY ARTERY BYPASS GRAFTS USING LOW OSMOLAR CONTRAST: ICD-10-PCS | Performed by: INTERNAL MEDICINE

## 2023-07-18 PROCEDURE — 027035Z DILATION OF CORONARY ARTERY, ONE ARTERY WITH TWO DRUG-ELUTING INTRALUMINAL DEVICES, PERCUTANEOUS APPROACH: ICD-10-PCS | Performed by: INTERNAL MEDICINE

## 2023-07-18 PROCEDURE — 85347 COAGULATION TIME ACTIVATED: CPT

## 2023-07-18 PROCEDURE — 99232 SBSQ HOSP IP/OBS MODERATE 35: CPT | Performed by: FAMILY MEDICINE

## 2023-07-18 PROCEDURE — 63710000001 INSULIN ASPART PER 5 UNITS: Performed by: INTERNAL MEDICINE

## 2023-07-18 PROCEDURE — 25010000002 HEPARIN (PORCINE) PER 1000 UNITS: Performed by: INTERNAL MEDICINE

## 2023-07-18 PROCEDURE — C9604 PERC D-E COR REVASC T CABG S: HCPCS | Performed by: INTERNAL MEDICINE

## 2023-07-18 PROCEDURE — 84484 ASSAY OF TROPONIN QUANT: CPT | Performed by: FAMILY MEDICINE

## 2023-07-18 PROCEDURE — 83735 ASSAY OF MAGNESIUM: CPT | Performed by: FAMILY MEDICINE

## 2023-07-18 PROCEDURE — 99153 MOD SED SAME PHYS/QHP EA: CPT | Performed by: INTERNAL MEDICINE

## 2023-07-18 PROCEDURE — 99222 1ST HOSP IP/OBS MODERATE 55: CPT | Performed by: INTERNAL MEDICINE

## 2023-07-18 PROCEDURE — 25010000002 FENTANYL CITRATE (PF) 50 MCG/ML SOLUTION: Performed by: INTERNAL MEDICINE

## 2023-07-18 PROCEDURE — 25510000001 IOPAMIDOL PER 1 ML: Performed by: INTERNAL MEDICINE

## 2023-07-18 DEVICE — XIENCE SKYPOINT™ EVEROLIMUS ELUTING CORONARY STENT SYSTEM 4.00 MM X 38 MM / RAPID-EXCHANGE
Type: IMPLANTABLE DEVICE | Site: HEART | Status: FUNCTIONAL
Brand: XIENCE SKYPOINT™

## 2023-07-18 DEVICE — XIENCE SKYPOINT™ EVEROLIMUS ELUTING CORONARY STENT SYSTEM 4.00 MM X 15 MM / RAPID-EXCHANGE
Type: IMPLANTABLE DEVICE | Site: HEART | Status: FUNCTIONAL
Brand: XIENCE SKYPOINT™

## 2023-07-18 RX ORDER — HEPARIN SODIUM 1000 [USP'U]/ML
INJECTION, SOLUTION INTRAVENOUS; SUBCUTANEOUS
Status: DISCONTINUED | OUTPATIENT
Start: 2023-07-18 | End: 2023-07-18 | Stop reason: HOSPADM

## 2023-07-18 RX ORDER — INSULIN ASPART 100 [IU]/ML
2-9 INJECTION, SOLUTION INTRAVENOUS; SUBCUTANEOUS
Status: DISCONTINUED | OUTPATIENT
Start: 2023-07-18 | End: 2023-07-19 | Stop reason: HOSPADM

## 2023-07-18 RX ORDER — ACETAMINOPHEN 650 MG/1
650 SUPPOSITORY RECTAL EVERY 4 HOURS PRN
Status: DISCONTINUED | OUTPATIENT
Start: 2023-07-18 | End: 2023-07-19 | Stop reason: HOSPADM

## 2023-07-18 RX ORDER — BISACODYL 10 MG
10 SUPPOSITORY, RECTAL RECTAL DAILY PRN
Status: DISCONTINUED | OUTPATIENT
Start: 2023-07-18 | End: 2023-07-19 | Stop reason: HOSPADM

## 2023-07-18 RX ORDER — ACETAMINOPHEN 325 MG/1
650 TABLET ORAL EVERY 4 HOURS PRN
Status: DISCONTINUED | OUTPATIENT
Start: 2023-07-18 | End: 2023-07-19 | Stop reason: HOSPADM

## 2023-07-18 RX ORDER — NICOTINE POLACRILEX 4 MG
15 LOZENGE BUCCAL
Status: DISCONTINUED | OUTPATIENT
Start: 2023-07-18 | End: 2023-07-19 | Stop reason: HOSPADM

## 2023-07-18 RX ORDER — FUROSEMIDE 40 MG/1
40 TABLET ORAL DAILY
Status: DISCONTINUED | OUTPATIENT
Start: 2023-07-18 | End: 2023-07-19 | Stop reason: HOSPADM

## 2023-07-18 RX ORDER — ATORVASTATIN CALCIUM 40 MG/1
40 TABLET, FILM COATED ORAL DAILY
Status: DISCONTINUED | OUTPATIENT
Start: 2023-07-18 | End: 2023-07-18

## 2023-07-18 RX ORDER — DIPHENHYDRAMINE HYDROCHLORIDE 50 MG/ML
25 INJECTION INTRAMUSCULAR; INTRAVENOUS EVERY 6 HOURS PRN
Status: DISCONTINUED | OUTPATIENT
Start: 2023-07-18 | End: 2023-07-19 | Stop reason: HOSPADM

## 2023-07-18 RX ORDER — MIDAZOLAM HYDROCHLORIDE 2 MG/2ML
INJECTION, SOLUTION INTRAMUSCULAR; INTRAVENOUS
Status: DISCONTINUED | OUTPATIENT
Start: 2023-07-18 | End: 2023-07-18 | Stop reason: HOSPADM

## 2023-07-18 RX ORDER — HYDROCODONE BITARTRATE AND ACETAMINOPHEN 5; 325 MG/1; MG/1
1 TABLET ORAL EVERY 4 HOURS PRN
Status: DISCONTINUED | OUTPATIENT
Start: 2023-07-18 | End: 2023-07-19 | Stop reason: HOSPADM

## 2023-07-18 RX ORDER — TAMSULOSIN HYDROCHLORIDE 0.4 MG/1
0.4 CAPSULE ORAL DAILY
Status: DISCONTINUED | OUTPATIENT
Start: 2023-07-18 | End: 2023-07-19 | Stop reason: HOSPADM

## 2023-07-18 RX ORDER — POLYETHYLENE GLYCOL 3350 17 G/17G
17 POWDER, FOR SOLUTION ORAL DAILY PRN
Status: DISCONTINUED | OUTPATIENT
Start: 2023-07-18 | End: 2023-07-19 | Stop reason: HOSPADM

## 2023-07-18 RX ORDER — SODIUM CHLORIDE 9 MG/ML
10 INJECTION, SOLUTION INTRAVENOUS CONTINUOUS
Status: DISCONTINUED | OUTPATIENT
Start: 2023-07-18 | End: 2023-07-18

## 2023-07-18 RX ORDER — PRASUGREL 10 MG/1
10 TABLET, FILM COATED ORAL DAILY
Status: DISCONTINUED | OUTPATIENT
Start: 2023-07-18 | End: 2023-07-19 | Stop reason: HOSPADM

## 2023-07-18 RX ORDER — CARVEDILOL 12.5 MG/1
12.5 TABLET ORAL 2 TIMES DAILY WITH MEALS
Status: DISCONTINUED | OUTPATIENT
Start: 2023-07-18 | End: 2023-07-19 | Stop reason: HOSPADM

## 2023-07-18 RX ORDER — HYDRALAZINE HYDROCHLORIDE 25 MG/1
100 TABLET, FILM COATED ORAL 3 TIMES DAILY
Status: DISCONTINUED | OUTPATIENT
Start: 2023-07-18 | End: 2023-07-19 | Stop reason: HOSPADM

## 2023-07-18 RX ORDER — LIDOCAINE HYDROCHLORIDE 10 MG/ML
INJECTION, SOLUTION INFILTRATION; PERINEURAL
Status: DISCONTINUED | OUTPATIENT
Start: 2023-07-18 | End: 2023-07-18 | Stop reason: HOSPADM

## 2023-07-18 RX ORDER — CETIRIZINE HYDROCHLORIDE 10 MG/1
10 TABLET ORAL DAILY
Status: DISCONTINUED | OUTPATIENT
Start: 2023-07-18 | End: 2023-07-19 | Stop reason: HOSPADM

## 2023-07-18 RX ORDER — ALPRAZOLAM 0.25 MG/1
0.25 TABLET ORAL 3 TIMES DAILY PRN
Status: DISCONTINUED | OUTPATIENT
Start: 2023-07-18 | End: 2023-07-18

## 2023-07-18 RX ORDER — ATORVASTATIN CALCIUM 80 MG/1
80 TABLET, FILM COATED ORAL DAILY
Status: DISCONTINUED | OUTPATIENT
Start: 2023-07-19 | End: 2023-07-19 | Stop reason: HOSPADM

## 2023-07-18 RX ORDER — FINASTERIDE 5 MG/1
5 TABLET, FILM COATED ORAL DAILY
Status: DISCONTINUED | OUTPATIENT
Start: 2023-07-18 | End: 2023-07-19 | Stop reason: HOSPADM

## 2023-07-18 RX ORDER — PRASUGREL 10 MG/1
TABLET, FILM COATED ORAL
Status: DISCONTINUED | OUTPATIENT
Start: 2023-07-18 | End: 2023-07-18 | Stop reason: HOSPADM

## 2023-07-18 RX ORDER — CHOLECALCIFEROL (VITAMIN D3) 125 MCG
5 CAPSULE ORAL NIGHTLY PRN
Status: DISCONTINUED | OUTPATIENT
Start: 2023-07-18 | End: 2023-07-19 | Stop reason: HOSPADM

## 2023-07-18 RX ORDER — ASPIRIN 325 MG
TABLET ORAL
Status: DISCONTINUED | OUTPATIENT
Start: 2023-07-18 | End: 2023-07-18 | Stop reason: HOSPADM

## 2023-07-18 RX ORDER — NITROGLYCERIN 0.4 MG/1
0.4 TABLET SUBLINGUAL
Status: DISCONTINUED | OUTPATIENT
Start: 2023-07-18 | End: 2023-07-19 | Stop reason: HOSPADM

## 2023-07-18 RX ORDER — TEMAZEPAM 15 MG/1
15 CAPSULE ORAL NIGHTLY PRN
Status: DISCONTINUED | OUTPATIENT
Start: 2023-07-18 | End: 2023-07-19 | Stop reason: HOSPADM

## 2023-07-18 RX ORDER — ONDANSETRON 4 MG/1
4 TABLET, FILM COATED ORAL EVERY 6 HOURS PRN
Status: DISCONTINUED | OUTPATIENT
Start: 2023-07-18 | End: 2023-07-19 | Stop reason: HOSPADM

## 2023-07-18 RX ORDER — ONDANSETRON 2 MG/ML
4 INJECTION INTRAMUSCULAR; INTRAVENOUS EVERY 6 HOURS PRN
Status: DISCONTINUED | OUTPATIENT
Start: 2023-07-18 | End: 2023-07-19 | Stop reason: HOSPADM

## 2023-07-18 RX ORDER — NALOXONE HCL 0.4 MG/ML
0.4 VIAL (ML) INJECTION
Status: DISCONTINUED | OUTPATIENT
Start: 2023-07-18 | End: 2023-07-19 | Stop reason: HOSPADM

## 2023-07-18 RX ORDER — SODIUM CHLORIDE 9 MG/ML
40 INJECTION, SOLUTION INTRAVENOUS AS NEEDED
Status: DISCONTINUED | OUTPATIENT
Start: 2023-07-18 | End: 2023-07-19 | Stop reason: HOSPADM

## 2023-07-18 RX ORDER — ACETAMINOPHEN 160 MG/5ML
650 SOLUTION ORAL EVERY 4 HOURS PRN
Status: DISCONTINUED | OUTPATIENT
Start: 2023-07-18 | End: 2023-07-19 | Stop reason: HOSPADM

## 2023-07-18 RX ORDER — ASPIRIN 81 MG/1
81 TABLET ORAL DAILY
Status: DISCONTINUED | OUTPATIENT
Start: 2023-07-18 | End: 2023-07-19 | Stop reason: HOSPADM

## 2023-07-18 RX ORDER — SODIUM CHLORIDE 0.9 % (FLUSH) 0.9 %
10 SYRINGE (ML) INJECTION EVERY 12 HOURS SCHEDULED
Status: DISCONTINUED | OUTPATIENT
Start: 2023-07-18 | End: 2023-07-19 | Stop reason: HOSPADM

## 2023-07-18 RX ORDER — BISACODYL 5 MG/1
5 TABLET, DELAYED RELEASE ORAL DAILY PRN
Status: DISCONTINUED | OUTPATIENT
Start: 2023-07-18 | End: 2023-07-19 | Stop reason: HOSPADM

## 2023-07-18 RX ORDER — ISOSORBIDE MONONITRATE 60 MG/1
60 TABLET, EXTENDED RELEASE ORAL
Status: DISCONTINUED | OUTPATIENT
Start: 2023-07-18 | End: 2023-07-19 | Stop reason: HOSPADM

## 2023-07-18 RX ORDER — HEPARIN SODIUM 5000 [USP'U]/ML
5000 INJECTION, SOLUTION INTRAVENOUS; SUBCUTANEOUS EVERY 12 HOURS SCHEDULED
Status: DISCONTINUED | OUTPATIENT
Start: 2023-07-18 | End: 2023-07-19 | Stop reason: HOSPADM

## 2023-07-18 RX ORDER — FENTANYL CITRATE 50 UG/ML
INJECTION, SOLUTION INTRAMUSCULAR; INTRAVENOUS
Status: DISCONTINUED | OUTPATIENT
Start: 2023-07-18 | End: 2023-07-18 | Stop reason: HOSPADM

## 2023-07-18 RX ORDER — VERAPAMIL HYDROCHLORIDE 2.5 MG/ML
INJECTION, SOLUTION INTRAVENOUS
Status: DISCONTINUED | OUTPATIENT
Start: 2023-07-18 | End: 2023-07-18 | Stop reason: HOSPADM

## 2023-07-18 RX ORDER — PRAMIPEXOLE DIHYDROCHLORIDE 1 MG/1
1 TABLET ORAL NIGHTLY
Status: DISCONTINUED | OUTPATIENT
Start: 2023-07-18 | End: 2023-07-19 | Stop reason: HOSPADM

## 2023-07-18 RX ORDER — DEXTROSE MONOHYDRATE 25 G/50ML
25 INJECTION, SOLUTION INTRAVENOUS
Status: DISCONTINUED | OUTPATIENT
Start: 2023-07-18 | End: 2023-07-19 | Stop reason: HOSPADM

## 2023-07-18 RX ORDER — AMOXICILLIN 250 MG
2 CAPSULE ORAL 2 TIMES DAILY
Status: DISCONTINUED | OUTPATIENT
Start: 2023-07-18 | End: 2023-07-19 | Stop reason: HOSPADM

## 2023-07-18 RX ORDER — CARVEDILOL 6.25 MG/1
6.25 TABLET ORAL 2 TIMES DAILY WITH MEALS
Status: DISCONTINUED | OUTPATIENT
Start: 2023-07-18 | End: 2023-07-18

## 2023-07-18 RX ORDER — SODIUM CHLORIDE 0.9 % (FLUSH) 0.9 %
10 SYRINGE (ML) INJECTION AS NEEDED
Status: DISCONTINUED | OUTPATIENT
Start: 2023-07-18 | End: 2023-07-19 | Stop reason: HOSPADM

## 2023-07-18 RX ORDER — BUDESONIDE AND FORMOTEROL FUMARATE DIHYDRATE 160; 4.5 UG/1; UG/1
2 AEROSOL RESPIRATORY (INHALATION)
Status: DISCONTINUED | OUTPATIENT
Start: 2023-07-18 | End: 2023-07-19 | Stop reason: HOSPADM

## 2023-07-18 RX ORDER — PANTOPRAZOLE SODIUM 40 MG/1
40 TABLET, DELAYED RELEASE ORAL
Status: DISCONTINUED | OUTPATIENT
Start: 2023-07-18 | End: 2023-07-19 | Stop reason: HOSPADM

## 2023-07-18 RX ORDER — ALBUTEROL SULFATE 2.5 MG/3ML
2.5 SOLUTION RESPIRATORY (INHALATION) EVERY 6 HOURS PRN
Status: DISCONTINUED | OUTPATIENT
Start: 2023-07-18 | End: 2023-07-19 | Stop reason: HOSPADM

## 2023-07-18 RX ORDER — RANOLAZINE 500 MG/1
500 TABLET, EXTENDED RELEASE ORAL EVERY 12 HOURS SCHEDULED
Status: DISCONTINUED | OUTPATIENT
Start: 2023-07-18 | End: 2023-07-19 | Stop reason: HOSPADM

## 2023-07-18 RX ORDER — MORPHINE SULFATE 4 MG/ML
4 INJECTION, SOLUTION INTRAMUSCULAR; INTRAVENOUS
Status: DISCONTINUED | OUTPATIENT
Start: 2023-07-18 | End: 2023-07-19 | Stop reason: HOSPADM

## 2023-07-18 RX ADMIN — CETIRIZINE HYDROCHLORIDE 10 MG: 10 TABLET, FILM COATED ORAL at 12:05

## 2023-07-18 RX ADMIN — RANOLAZINE 500 MG: 500 TABLET, FILM COATED, EXTENDED RELEASE ORAL at 21:06

## 2023-07-18 RX ADMIN — HEPARIN SODIUM AND DEXTROSE 9.4 UNITS/KG/HR: 10000; 5 INJECTION INTRAVENOUS at 00:46

## 2023-07-18 RX ADMIN — INSULIN ASPART 2 UNITS: 100 INJECTION, SOLUTION INTRAVENOUS; SUBCUTANEOUS at 17:48

## 2023-07-18 RX ADMIN — RANOLAZINE 500 MG: 500 TABLET, FILM COATED, EXTENDED RELEASE ORAL at 12:07

## 2023-07-18 RX ADMIN — SERTRALINE 50 MG: 50 TABLET, FILM COATED ORAL at 12:08

## 2023-07-18 RX ADMIN — ALBUTEROL SULFATE 2.5 MG: 2.5 SOLUTION RESPIRATORY (INHALATION) at 03:24

## 2023-07-18 RX ADMIN — HEPARIN SODIUM 4000 UNITS: 1000 INJECTION INTRAVENOUS; SUBCUTANEOUS at 00:49

## 2023-07-18 RX ADMIN — INSULIN ASPART 2 UNITS: 100 INJECTION, SOLUTION INTRAVENOUS; SUBCUTANEOUS at 13:20

## 2023-07-18 RX ADMIN — ISOSORBIDE MONONITRATE 60 MG: 60 TABLET, EXTENDED RELEASE ORAL at 12:08

## 2023-07-18 RX ADMIN — FINASTERIDE 5 MG: 5 TABLET, FILM COATED ORAL at 12:06

## 2023-07-18 RX ADMIN — FUROSEMIDE 40 MG: 40 TABLET ORAL at 12:08

## 2023-07-18 RX ADMIN — CARVEDILOL 12.5 MG: 12.5 TABLET, FILM COATED ORAL at 12:06

## 2023-07-18 RX ADMIN — Medication 10 ML: at 21:07

## 2023-07-18 RX ADMIN — PRAMIPEXOLE DIHYDROCHLORIDE 1 MG: 1 TABLET ORAL at 21:06

## 2023-07-18 RX ADMIN — BUDESONIDE AND FORMOTEROL FUMARATE DIHYDRATE 2 PUFF: 160; 4.5 AEROSOL RESPIRATORY (INHALATION) at 19:19

## 2023-07-18 RX ADMIN — HYDRALAZINE HYDROCHLORIDE 100 MG: 25 TABLET, FILM COATED ORAL at 12:05

## 2023-07-18 RX ADMIN — HYDRALAZINE HYDROCHLORIDE 100 MG: 25 TABLET, FILM COATED ORAL at 15:19

## 2023-07-18 RX ADMIN — TAMSULOSIN HYDROCHLORIDE 0.4 MG: 0.4 CAPSULE ORAL at 12:05

## 2023-07-18 RX ADMIN — SACUBITRIL AND VALSARTAN 1 TABLET: 24; 26 TABLET, FILM COATED ORAL at 12:07

## 2023-07-18 RX ADMIN — SACUBITRIL AND VALSARTAN 1 TABLET: 24; 26 TABLET, FILM COATED ORAL at 21:05

## 2023-07-18 RX ADMIN — Medication 10 ML: at 07:53

## 2023-07-18 RX ADMIN — CARVEDILOL 12.5 MG: 12.5 TABLET, FILM COATED ORAL at 17:48

## 2023-07-18 NOTE — PLAN OF CARE
Goal Outcome Evaluation:  Plan of Care Reviewed With: patient        Pt was a new admit from ED. Heparin drip infusing per order. NPO since coming to floor for probable heart cath.

## 2023-07-18 NOTE — ED PROVIDER NOTES
Subjective  History of Present Illness:    Chief Complaint: Chest pain  History of Present Illness: This is a 78-year-old male patient comes into the ED today complaining of chest pain that started around 9:00 this morning is progressively worsened over the interval.  Patient describes his pain as a dull continual ache pressure to the left side of his chest does not improve with rest.  Patient has been seen multiple times and diagnosed with chest pain unspecified with elevated troponins.      Nurses Notes reviewed and agree, including vitals, allergies, social history and prior medical history.       Allergies:    Zocor [simvastatin], Bisoprolol, Byetta 10 mcg pen [exenatide], Crestor [rosuvastatin calcium], Metformin and related, and Plavix [clopidogrel bisulfate]      Past Surgical History:   Procedure Laterality Date    CARDIAC CATHETERIZATION      CARDIAC CATHETERIZATION Left 10/19/2021    Procedure: Left Heart Cath;  Surgeon: Liz Russo MD;  Location: [facility] CATH INVASIVE LOCATION;  Service: Cardiology;  Laterality: Left;    COLONOSCOPY W/ POLYPECTOMY      CORONARY ANGIOPLASTY WITH STENT PLACEMENT Left 06/03/2009    Left heart catheterization, 06/03/2009, Dr. Russo:  LVEF 45% to 50%.  Placement of overlapping Cypher drug-eluting stent 2.5 x 28 and 2.5 x 18 to the SVG to the obtuse marginal branch.  SVG to the PDA had a proximal stenosis estimated at 60% with a distal stenosis of 50% to 60%.    CORONARY ANGIOPLASTY WITH STENT PLACEMENT Left 07/06/2009    Left heart catheterization, 07/06/2009:  PTCA and stent placement in the mid PDA using a 2.25 x 12 mm Taxus drug-eluting stent with stent placement in the distal SVG to the PDA using a 2.5 x 18 mm Cypher drug-eluting stent and stenting of the proximal SVG to the PDA using a 3.0 x 28 mm Cypher drug-eluting stent.    CORONARY ANGIOPLASTY WITH STENT PLACEMENT  04/23/2010    Cardiac catheterization for recurrent anginal symptoms, 04/23/2010,  with PTCA and stent placement in the proximal SVG to the PDA using 3.0 x 28 mm Promus drug-eluting stent for in-stent restenosis.    CORONARY ANGIOPLASTY WITH STENT PLACEMENT Left 07/06/2010    Left heart catheterization, 07/06/2010, Dr. Russo:  EF 40% to 45%.  3.5 x 23 mm Promus drug-eluting stent in the proximal SVG to OM, 2.5 x 18 mm Promus drug-eluting stent to distal SVG to PDA due to in-stent restenosis.      CORONARY ANGIOPLASTY WITH STENT PLACEMENT Left 11/16/2010    Left heart catheterization, 11/16/2010, with placement of a 3.0 x 16 mm Taxus drug-eluting stent to the SVG to the RCA proximally and a 2.5 x 16 mm paclitaxel stent distally in the SVG to the RCA.    CORONARY ANGIOPLASTY WITH STENT PLACEMENT Left     Left heart catheterization, 3.0 x 24-mm Promus element drug-eluting stent to a 90% lesion in the mid portion of the SVG to the PDA.     CORONARY ANGIOPLASTY WITH STENT PLACEMENT Left 06/24/2014    Left heart catheterization for recurrent angina, 06/24/2014, Dr. Russo:  PTCA of the SVG to the PDA using a 3 x 12 mm NC TREK balloon.      CORONARY ARTERY BYPASS GRAFT      CABG x3, Dr. Peng, [facility], 2001.         Social History     Socioeconomic History    Marital status:    Tobacco Use    Smoking status: Never    Smokeless tobacco: Never   Vaping Use    Vaping Use: Never used   Substance and Sexual Activity    Alcohol use: No    Drug use: No    Sexual activity: Defer         Family History   Problem Relation Age of Onset    Heart attack Mother     Ulcers Father        REVIEW OF SYSTEMS: All systems reviewed and not pertinent unless noted.    Review of Systems   Cardiovascular:  Positive for chest pain.   Psychiatric/Behavioral:  Positive for confusion.    All other systems reviewed and are negative.    Objective    Physical Exam  Vitals and nursing note reviewed.   Constitutional:       Appearance: Normal appearance.   HENT:      Head: Normocephalic and atraumatic.    Eyes:      Extraocular Movements: Extraocular movements intact.      Pupils: Pupils are equal, round, and reactive to light.   Cardiovascular:      Rate and Rhythm: Normal rate and regular rhythm.      Pulses: Normal pulses.           Carotid pulses are 1+ on the right side and 1+ on the left side.       Radial pulses are 1+ on the right side and 1+ on the left side.        Dorsalis pedis pulses are 1+ on the right side and 1+ on the left side.        Posterior tibial pulses are 1+ on the right side and 1+ on the left side.      Heart sounds: Normal heart sounds.   Pulmonary:      Effort: Pulmonary effort is normal.      Breath sounds: Examination of the right-lower field reveals decreased breath sounds and wheezing. Examination of the left-lower field reveals decreased breath sounds and wheezing. Decreased breath sounds present.   Abdominal:      General: Bowel sounds are normal.      Palpations: Abdomen is soft.   Musculoskeletal:         General: Normal range of motion.      Cervical back: Normal range of motion and neck supple.      Right lower leg: Edema present.      Left lower leg: Edema present.   Skin:     General: Skin is warm and dry.      Capillary Refill: Capillary refill takes less than 2 seconds.   Neurological:      General: No focal deficit present.      Mental Status: He is alert. He is disoriented.      GCS: GCS eye subscore is 4. GCS verbal subscore is 5. GCS motor subscore is 6.      Sensory: Sensation is intact.      Motor: Motor function is intact.   Psychiatric:         Attention and Perception: Attention and perception normal.         Mood and Affect: Mood and affect normal.         Speech: Speech normal.     HEART Score for Major Cardiac Events - MDCalc  Calculated on Jul 17 2023 11:35 PM  7 points -> High Score (7-10 points) Risk of MACE of 50-65%.    Procedures    ED Course:    ED Course as of 07/17/23 2339   Mon Jul 17, 2023   9928 Spoke with Dr. Parr, cardiology on call about  patient's elevated troponin and worsening chest pain.  Breeding has agreed to see patient in consult. [KH]      ED Course User Index  [KH] Ned Jaime APRN       Lab Results (last 24 hours)       Procedure Component Value Units Date/Time    CBC & Differential [[id number]]  (Abnormal) Collected: 07/17/23 2249    Specimen: Blood Updated: 07/17/23 2255    Narrative:      The following orders were created for panel order CBC & Differential.  Procedure                               Abnormality         Status                     ---------                               -----------         ------                     CBC Auto Differential[[id number]]        Abnormal            Final result                 Please view results for these tests on the individual orders.    Comprehensive Metabolic Panel [[id number]]  (Abnormal) Collected: 07/17/23 2249    Specimen: Blood Updated: 07/17/23 2313     Glucose 344 mg/dL      Comment: Glucose >180, Hemoglobin A1C recommended.        BUN 46 mg/dL      Creatinine 1.74 mg/dL      Sodium 138 mmol/L      Potassium 4.3 mmol/L      Chloride 102 mmol/L      CO2 21.6 mmol/L      Calcium 8.7 mg/dL      Total Protein 6.7 g/dL      Albumin 3.8 g/dL      ALT (SGPT) 25 U/L      AST (SGOT) 21 U/L      Alkaline Phosphatase 45 U/L      Total Bilirubin 0.2 mg/dL      Globulin 2.9 gm/dL      A/G Ratio 1.3 g/dL      BUN/Creatinine Ratio 26.4     Anion Gap 14.4 mmol/L      eGFR 39.9 mL/min/1.73     Narrative:      GFR Normal >60  Chronic Kidney Disease <60  Kidney Failure <15    The GFR formula is only valid for adults with stable renal function between ages 18 and 70.    High Sensitivity Troponin T [[id number]]  (Abnormal) Collected: 07/17/23 2249    Specimen: Blood Updated: 07/17/23 2329     HS Troponin T 605 ng/L     Narrative:      High Sensitive Troponin T Reference Range:  <10.0 ng/L- Negative Female for AMI  <15.0 ng/L- Negative Male for AMI  >=10 - Abnormal Female indicating possible myocardial  injury.  >=15 - Abnormal Male indicating possible myocardial injury.   Clinicians would have to utilize clinical acumen, EKG, Troponin, and serial changes to determine if it is an Acute Myocardial Infarction or myocardial injury due to an underlying chronic condition.         CBC Auto Differential [[id number]]  (Abnormal) Collected: 07/17/23 2249    Specimen: Blood Updated: 07/17/23 2255     WBC 12.35 10*3/mm3      RBC 3.48 10*6/mm3      Hemoglobin 9.9 g/dL      Hematocrit 30.2 %      MCV 86.8 fL      MCH 28.4 pg      MCHC 32.8 g/dL      RDW 13.2 %      RDW-SD 41.3 fl      MPV 10.7 fL      Platelets 269 10*3/mm3      Neutrophil % 82.2 %      Lymphocyte % 10.2 %      Monocyte % 5.3 %      Eosinophil % 1.3 %      Basophil % 0.6 %      Immature Grans % 0.4 %      Neutrophils, Absolute 10.15 10*3/mm3      Lymphocytes, Absolute 1.26 10*3/mm3      Monocytes, Absolute 0.66 10*3/mm3      Eosinophils, Absolute 0.16 10*3/mm3      Basophils, Absolute 0.07 10*3/mm3      Immature Grans, Absolute 0.05 10*3/mm3      nRBC 0.0 /100 WBC     BNP [[id number]]  (Abnormal) Collected: 07/17/23 2249    Specimen: Blood Updated: 07/17/23 2319     proBNP 2,119.0 pg/mL     Narrative:      Among patients with dyspnea, NT-proBNP is highly sensitive for the detection of acute congestive heart failure. In addition NT-proBNP of <300 pg/ml effectively rules out acute congestive heart failure with 99% negative predictive value.    Results may be falsely decreased if patient taking Biotin.      Protime-INR [[id number]] Collected: 07/17/23 2249    Specimen: Blood Updated: 07/17/23 2339    aPTT [[id number]] Collected: 07/17/23 2249    Specimen: Blood Updated: 07/17/23 2339             No radiology results from the last 24 hrs     Medical Decision Making  78-year-old male patient comes into the ED today complaining of chest pain that is described as a dull continual ache and pressure to the left side of his chest does not radiate to his arms or  neck.    Physical examination neuro GCS 14, mild confusion, respiratory clear to auscultation decreased in the bases some scattered wheezing in the bases abdomen soft nontender to palpation cardiac positive pulses bilateral upper and lower extremity +1 edema bilateral lower extremity    DDX: includes but is not limited to: NSTEMI, heart failure, COPD exacerbation, chest pain unspecified, other    Amount and/or Complexity of Data Reviewed  Labs: ordered. Decision-making details documented in ED Course.  Radiology: ordered. Decision-making details documented in ED Course.  ECG/medicine tests: ordered. Decision-making details documented in ED Course.  Discussion of management or test interpretation with external provider(s): Discussed assessment, treatment and plan with ER attending, cardiology on-call, hospitalist    Risk  Prescription drug management.  Risk Details: Patient has been admitted to the hospital full admission for NSTEMI.  Patient started on heparin drip due to elevated troponin.                  Final diagnoses:   NSTEMI (non-ST elevated myocardial infarction)          Ned Jaime, APRN  07/17/23 5643

## 2023-07-18 NOTE — PROGRESS NOTES
[facility]    PROGRESS NOTE    Name:  Donny Jerry   Age:  78 y.o.  Sex:  male  :  1945  MRN:  [id number]   Visit Number:  [id number]  Admission Date:  2023  Date Of Service:  23  Primary Care Physician:  Anum Alonso APRN     LOS: 1 day :    Chief Complaint:      Follow-up NSTEMI    Subjective:    Patient seen after heart cath today.  Denied any chest pain or shortness of breath.  Discussed plan of care with his granddaughter at bedside.    Hospital Course:    78-year-old with history of prior CAD status post CABG, PCI with previous 8 stenting, chronic systolic CHF, CKD stage III, type 2 diabetes, BPH, hypertension, hyperlipidemia, who presented to emergency room with complaints of chest pain and shortness of breath.  Patient was admitted with concern for an NSTEMI and was hypertensive upon arrival with elevated troponins.  He was seen by cardiology in consultation and started on a heparin drip.  The patient was taken for coronary angiography this morning on 2023 with evidence of severe three-vessel CAD, severe diabetic type arteriopathy, with patency of 2 of 3 bypass grafts, he underwent catheter-based revascularization multiple lesions in the saphenous vein graft to RCA.      Review of Systems:     All systems were reviewed and negative except as mentioned in subjective, assessment and plan.    Vital Signs:    Temp:  [97.8 °F (36.6 °C)-98.6 °F (37 °C)] 97.8 °F (36.6 °C)  Heart Rate:  [60-87] 64  Resp:  [18-24] 20  BP: (141-181)/(53-76) 150/68    Intake and output:    I/O last 3 completed shifts:  In: -   Out: 300 [Urine:300]  No intake/output data recorded.    Physical Examination:    General Appearance:  Alert and cooperative.  No acute distress   Head:  Atraumatic and normocephalic.   Eyes: Conjunctivae and sclerae normal, no icterus. No pallor.   Throat: No oral lesions, no thrush, oral mucosa moist.   Neck: Supple, trachea midline, no  thyromegaly.   Lungs:   Breath sounds heard bilaterally equally.  No wheezing or crackles. No Pleural rub or bronchial breathing.   Heart:  Normal S1 and S2, no murmur, no gallop, no rub. No JVD.   Abdomen:   Normal bowel sounds, no masses, no organomegaly. Soft, nontender, nondistended, no rebound tenderness.   Extremities: Supple, no edema, no cyanosis, no clubbing.   Skin: No bleeding or rash.   Neurologic: Alert and oriented x 3. No facial asymmetry. Moves all four limbs. No tremors.      Laboratory results:    Results from last 7 days   Lab Units 07/18/23 0522 07/17/23 2249 07/14/23  1849   SODIUM mmol/L 143 138 139   POTASSIUM mmol/L 4.3 4.3 4.4   CHLORIDE mmol/L 106 102 105   CO2 mmol/L 23.2 21.6* 19.5*   BUN mg/dL 46* 46* 43*   CREATININE mg/dL 1.68* 1.74* 1.69*   CALCIUM mg/dL 8.8 8.7 8.4*   BILIRUBIN mg/dL  --  0.2 0.2   ALK PHOS U/L  --  45 46   ALT (SGPT) U/L  --  25 25   AST (SGOT) U/L  --  21 19   GLUCOSE mg/dL 249* 344* 315*     Results from last 7 days   Lab Units 07/18/23 0522 07/17/23 2249 07/14/23  1849   WBC 10*3/mm3 11.67* 12.35* 11.41*   HEMOGLOBIN g/dL 9.7* 9.9* 9.9*   HEMATOCRIT % 29.6* 30.2* 29.3*   PLATELETS 10*3/mm3 263 269 287     Results from last 7 days   Lab Units 07/17/23  2249   INR  1.00     Results from last 7 days   Lab Units 07/18/23  0522 07/18/23  0035 07/17/23 2249   HSTROP T ng/L 1,148* 666* 605*         Recent Labs     04/23/23  1455   PHART 7.442   OQR5RDZ 35.6   PO2ART 57.8*   GIV5DRZ 24.3   BASEEXCESS 0.4      I have reviewed the patient's laboratory results.    Radiology results:    Cardiac Catheterization/Vascular Study    Result Date: 7/18/2023  Successful PCI to saphenous vein graft->RCA for multifocal in-stent restenosis. Severe disease and multiple small branch vessels not amenable to any form of revascularization.     XR Chest 1 View    Result Date: 7/18/2023  PROCEDURE: XR CHEST 1 VW-    HISTORY: Chest Pain Triage Protocol, midsternal chest pain  COMPARISON:  July 14, 2023.  FINDINGS: The heart is mildly enlarged. The patient is status post median sternotomy.  There is pulmonary vascular congestion. Bilateral interstitial disease is possibly due to edema. Findings are similar to the prior exam.  There is no pneumothorax. There are no acute osseous abnormalities.      Impression: Mild CHF, similar to the prior exam.        Images were reviewed, interpreted, and dictated by Dr. Mayra Da Silva MD Transcribed by Saba Beltran PA-C.  This report was signed and finalized on 7/18/2023 9:43 AM by Mayra Da Silva MD.   I have reviewed the patient's radiology reports.    Medication Review:     I have reviewed the patient's active and prn medications.     Problem List:      NSTEMI (non-ST elevated myocardial infarction)    Coronary artery disease involving native coronary artery of native heart with angina pectoris    Essential hypertension    Type 2 diabetes mellitus with stage 3 chronic kidney disease    CKD (chronic kidney disease) stage 3, GFR 30-59 ml/min    Chronic systolic heart failure    Type 1 myocardial infarction      Assessment:    Non-STEMI, POA  Chest pain, POA  Acute on chronic combined systolic/diastolic heart failure, POA  CAD status post CABG/PCI  CKD stage III  Diabetes mellitus type 2  Hypertension  Hyperlipidemia  BPH  Chronic anemia  Obstructive sleep apnea  Obesity, BMI 35    Plan:    Non-STEMI  Underwent angiography with Dr. Parr today.  Recommendation for dual antiplatelet therapy, guideline therapy for CHF and high intensity statin.  Evidence of 2 out of 3 bypass graft patency use, ended up with balloon angioplasty and TAWANNA to the SVG to RCA x2     Acute on chronic CHF  Has been restarted on home medications for left ventricular systolic heart failure.  Currently on Entresto.  Kidney function will need monitored     CKD stage III  Will need monitored postcardiac catheterization.  Follows with Dr. Wilkes chronically.     Diabetes mellitus type 2  -Sliding  scale insulin  -Hemoglobin A1c 8.3 on 6/29/2023    We will monitor kidney function overnight, if stable will likely anticipate discharge tomorrow.    DVT Prophylaxis: Heparin  Code Status: DNR  Diet: Cardiac/carb consistent  Discharge Plan: Home in 1 to 2 days    Mary Beth Cat DO  07/18/23  13:26 EDT    Dictated utilizing Dragon dictation.

## 2023-07-18 NOTE — H&P
[facility] HOSPITALIST   HISTORY AND PHYSICAL      Name:  Donny Jerry   Age:  78 y.o.  Sex:  male  :  1945  MRN:  [id number]   Visit Number:  [id number]  Admission Date:  2023  Date Of Service:  23  Primary Care Physician:  Anum Alonso APRN    Chief Complaint:     Chest pain    History Of Presenting Illness:      Patient is a 78 years old male with a past medical history of CAD status post CABG  and PCI with 8 stents, chronic systolic CHF, CKD stage III, diabetes mellitus type 2, BPH, hypertension, hyperlipidemia who presented to the ER with a chief complaint of chest pain.  He reports that he has been having chest pain over the past few days however got worse this morning at 9 AM and became persistent.  He describes pain as ache and pressure in the left side of his chest with no radiation.  Patient was recently admitted to the hospital with CHF exacerbation and chest pain on  and was discharged on , he was evaluated by cardiology during the admission and Dr. Alegre recommended considering repeat ischemic evaluation if no improvement.  Patient was also seen in the ER on  with similar complaints, however he did not want to be admitted at that point and was discharged on increased dose of Lasix.  He denies cough, abdominal pain, nausea, vomiting or fever.    On ER evaluation, Patient was slightly hypertensive with a blood pressure 178/68 otherwise afebrile on room air.  His work-up was significant for HS Troponin of 605, proBNP 2119, creatinine 1.74, BUN 46, glucose 344, WBC 12.3, hemoglobin 9.9.  Chest x-ray with chronic changes and bilateral pulmonary congestion per my read.  Case discussed by ER provider with Dr. Parr with cardiology who recommended admission and will see patient in consult.  Patient started on heparin drip in the ER.  Hospitalist consulted for admission, further evaluation and treatment.    Review Of Systems:    All systems were  reviewed and negative except as mentioned in history of presenting illness, assessment and plan.    Past Medical History: Patient  has a past medical history of Benign hypertension, Coronary artery disease, Depression, Enlarged prostate, GERD (gastroesophageal reflux disease), Hypercholesterolemia, Obesity, Obstructive sleep apnea on CPAP, and Type 2 diabetes mellitus.    Past Surgical History: Patient  has a past surgical history that includes Coronary artery bypass graft; Coronary angioplasty with stent (Left, 06/03/2009); Coronary angioplasty with stent (Left, 07/06/2009); Coronary angioplasty with stent (04/23/2010); Coronary angioplasty with stent (Left, 07/06/2010); Coronary angioplasty with stent (Left, 11/16/2010); Coronary angioplasty with stent (Left); Coronary angioplasty with stent (Left, 06/24/2014); Cardiac catheterization; Colonoscopy w/ polypectomy; and Cardiac catheterization (Left, 10/19/2021).    Social History: Patient  reports that he has never smoked. He has never used smokeless tobacco. He reports that he does not drink alcohol and does not use drugs.    Family History:  Patient's family history has been reviewed and found to be noncontributory.     Allergies:      Zocor [simvastatin], Bisoprolol, Byetta 10 mcg pen [exenatide], Crestor [rosuvastatin calcium], Metformin and related, and Plavix [clopidogrel bisulfate]    Home Medications:    Prior to Admission Medications       Prescriptions Last Dose Informant Patient Reported? Taking?    albuterol sulfate  (90 Base) MCG/ACT inhaler   Yes No    Inhale 2 puffs Every 4 (Four) Hours As Needed for Wheezing.    atorvastatin (LIPITOR) 40 MG tablet   Yes No    Take 1 tablet by mouth Daily.    azilsartan medoxomil (Edarbi) 80 MG tablet tablet  Spouse/Significant Other Yes No    Take 1 tablet by mouth Daily.    B-D UF III MINI PEN NEEDLES 31G X 5 MM misc   Yes No    carvedilol (COREG) 12.5 MG tablet   Yes No    Take 6.25 mg by mouth 2 (Two) Times  a Day With Meals. Pt takes 0.5 tab BID    Cholecalciferol (VITAMIN D3) 50 MCG (2000 UT) capsule   Yes No    Take 2 capsules by mouth.    dapagliflozin Propanediol (Farxiga) 10 MG tablet  Spouse/Significant Other Yes No    Take  by mouth.    doxazosin (CARDURA) 8 MG tablet   No No    Take 1 tablet by mouth Every 12 (Twelve) Hours.    fexofenadine (ALLEGRA) 180 MG tablet  Spouse/Significant Other Yes No    Take 1 tablet by mouth Daily.    finasteride (PROSCAR) 5 MG tablet   Yes No    Take 1 tablet by mouth Daily.    fluticasone (FLONASE) 50 MCG/ACT nasal spray   No No    1 spray into the nostril(s) as directed by provider Daily.    Fluticasone-Umeclidin-Vilant (Trelegy Ellipta) 200-62.5-25 MCG/INH inhaler   No No    Inhale 1 puff Daily. Rinse mouth with water after use.    furosemide (LASIX) 40 MG tablet   No No    Take 1 tablet by mouth Daily.    hydrALAZINE (APRESOLINE) 100 MG tablet   No No    Take 1 tablet by mouth 3 (Three) Times a Day.    magnesium oxide (MAG-OX) 400 MG tablet   Yes No    Take 1 tablet by mouth Daily.    Multiple Vitamin (MULTI VITAMIN DAILY PO)   Yes No    Take 1 tablet by mouth Daily.    nitroglycerin (NITROSTAT) 0.4 MG SL tablet   No No    Place 1 tablet under the tongue Every 5 (Five) Minutes As Needed for Chest Pain. Take no more than 3 doses in 15 minutes.    pantoprazole (PROTONIX) 40 MG EC tablet   Yes No    TAKE 1 TABLET BY MOUTH 2 TIMES DAILY (BEFORE MEALS)    pramipexole (Mirapex) 1 MG tablet   No No    Take 1 tablet by mouth Every Night.    prasugrel (EFFIENT) 10 MG tablet   Yes No    Take 1 tablet by mouth Daily.    sertraline (ZOLOFT) 50 MG tablet   Yes No    Take 1 tablet by mouth Daily.    tamsulosin (FLOMAX) 0.4 MG capsule 24 hr capsule   Yes No    Take 1 capsule by mouth 2 (Two) Times a Day.    Tresiba FlexTouch 200 UNIT/ML solution pen-injector pen injection   Yes No    Inject  under the skin into the appropriate area as directed. 30 units every morning and 74 units in the  "evening    TRUEplus Lancets 28G misc   Yes No    Trulicity 0.75 MG/0.5ML solution pen-injector   Yes No    Inject 0.75 mg as directed 1 (One) Time Per Week.          ED Medications:    Medications   sodium chloride 0.9 % flush 10 mL (has no administration in time range)   aspirin chewable tablet 324 mg (has no administration in time range)   nitroglycerin (NITROSTAT) SL tablet 0.4 mg (has no administration in time range)     Vital Signs:  Temp:  [97.8 °F (36.6 °C)-98.6 °F (37 °C)] 98.6 °F (37 °C)  Heart Rate:  [86-87] 86  Resp:  [18] 18  BP: (142-178)/(60-70) 146/70        07/17/23  2249   Weight: 106 kg (234 lb)     Body mass index is 37.77 kg/m².    Physical Exam:     Most recent vital Signs: /70   Pulse 86   Temp 98.6 °F (37 °C) (Oral)   Resp 18   Ht 167.6 cm (66\")   Wt 106 kg (234 lb)   SpO2 96%   BMI 37.77 kg/m²     Physical Exam  Vitals and nursing note reviewed.   Constitutional:       General: He is not in acute distress.     Appearance: He is ill-appearing.      Comments: Chronically ill-appearing   HENT:      Head: Normocephalic and atraumatic.      Right Ear: External ear normal.      Left Ear: External ear normal.      Nose: Nose normal.      Mouth/Throat:      Mouth: Mucous membranes are moist.   Eyes:      Extraocular Movements: Extraocular movements intact.      Conjunctiva/sclera: Conjunctivae normal.      Pupils: Pupils are equal, round, and reactive to light.   Cardiovascular:      Rate and Rhythm: Normal rate and regular rhythm.      Pulses: Normal pulses.      Heart sounds: Normal heart sounds.   Pulmonary:      Effort: Pulmonary effort is normal. No respiratory distress.      Breath sounds: Normal breath sounds. Decreased air movement present. No wheezing or rhonchi.   Abdominal:      General: Bowel sounds are normal. There is no distension.      Palpations: Abdomen is soft.      Tenderness: There is no abdominal tenderness.   Musculoskeletal:         General: Normal range of " motion.      Cervical back: Normal range of motion and neck supple.      Right lower le+ Edema present.      Left lower le+ Edema present.   Skin:     General: Skin is warm and dry.      Findings: No rash.   Neurological:      General: No focal deficit present.      Mental Status: He is alert and oriented to person, place, and time. Mental status is at baseline.      Motor: No weakness.   Psychiatric:         Mood and Affect: Mood normal.         Behavior: Behavior normal.       Laboratory data:    I have reviewed the labs done in the emergency room.    Results from last 7 days   Lab Units 23  1849   SODIUM mmol/L 138 139   POTASSIUM mmol/L 4.3 4.4   CHLORIDE mmol/L 102 105   CO2 mmol/L 21.6* 19.5*   BUN mg/dL 46* 43*   CREATININE mg/dL 1.74* 1.69*   CALCIUM mg/dL 8.7 8.4*   BILIRUBIN mg/dL 0.2 0.2   ALK PHOS U/L 45 46   ALT (SGPT) U/L 25 25   AST (SGOT) U/L 21 19   GLUCOSE mg/dL 344* 315*     Results from last 7 days   Lab Units 23  184   WBC 10*3/mm3 12.35* 11.41*   HEMOGLOBIN g/dL 9.9* 9.9*   HEMATOCRIT % 30.2* 29.3*   PLATELETS 10*3/mm3 269 287         Results from last 7 days   Lab Units 23  184   HSTROP T ng/L 605* 122* 112*     Results from last 7 days   Lab Units 23   PROBNP pg/mL 2,119.0*                       Invalid input(s): USDES,  BLOODU, NITRITITE, BACT, EP    Pain Management Panel          Latest Ref Rng & Units 2023   Pain Management Panel   Creatinine, Urine mg/dL 56.0  50.3        EKG:      EKG done in the ER and showed sinus rhythm, heart rate 90, T wave inversions with ST depressions in multiple leads noted otherwise nonspecific ST/T wave changes.  No change in morphology compared to previous EKG done on 2033    Radiology:    XR Chest 1 View    Result Date: 7/15/2023  PROCEDURE: XR CHEST 1 VW-  INDICATION:  Chest Pain Triage Protocol  FINDINGS:  A portable view of the chest was obtained.   Comparison is made to a prior exam dated 6/29/2023.   Patient is status post median sternotomy. The heart is enlarged. There is pulmonary vascular congestion. The lungs are otherwise clear. No pleural effusion or pneumothorax.      Cardiomegaly and pulmonary vascular congestion.  This report was signed and finalized on 7/15/2023 9:06 AM by Radha Moreland MD.     Assessment:    Non-STEMI, POA  Chest pain, POA  Acute on chronic combined systolic/diastolic heart failure, POA  CAD status post CABG/PCI  CKD stage III  Diabetes mellitus type 2  Hypertension  Hyperlipidemia  BPH  Chronic anemia  Obstructive sleep apnea  Obesity, BMI 35    Plan:    Patient is admitted to telemetry for further evaluation and treatment.     Non-STEMI  -Currently chest pain improved after nitro.  -On heparin drip  -Consulting cardiology, appreciate their recommendations.  -Will keep patient n.p.o.  -on prasugrel, carvedilol, and atorvastatin.    Acute on chronic CHF  -Daily weight, strict ins and outs.  -Continue Lasix 40 mg po daily.  -Most recent 2D echo on file from 6/30/2023 with EF 45 to 50% and grade 1 diastolic dysfunction.  -Continue home meds.  -Consulted cardiology, appreciate their recommendations.     CKD stage III  -Avoid nephrotoxic drugs.  -Monitor kidney function.    Diabetes mellitus type 2  -Sliding scale insulin  -Hemoglobin A1c 8.3 on 6/29/2023     Further orders as indicated per clinical course.  I confirmed with the patient and his wife that he wants to stay as DNR/DNI.     Risk Assessment: High  DVT Prophylaxis: Heparin drip  Code Status: DNR/DNI  Diet: N.p.o.    Advance Care Planning   ACP discussion was held with the patient during this visit. Patient does not have an advance directive, declines further assistance.     Jono Rg MD  07/17/23  23:34 EDT    Dictated utilizing Dragon dictation.

## 2023-07-18 NOTE — PHARMACY RECOMMENDATION
HEPARIN INFUSION  Donny Jerry is a  78 y.o. male receiving heparin infusion.     Therapy for (VTE/Cardiac):   Cardiac  Patient Weight: 106 kg  Initial Bolus (Y/N):   y  Any Bolus (Y/N):   y        Signs or Symptoms of Bleeding: none    Cardiac or Other (Not VTE)   Initial Bolus: 60 units/kg (Max 4,000 units)  Initial rate: 12 units/kg/hr (Max 1,000 units/hr)   Anti Xa Rebolus Infusion Hold time Change infusion Dose (Units/kg/hr) Next Anti Xa or aPTT Level Due   < 0.11 50 Units/kg  (4000 Units Max) None Increase by  3 Units/kg/hr 6 hours   0.11- 0.19 25 Units/kg  (2000 Units Max) None Increase by  2 Units/kg/hr 6 hours   0.2 - 0.29 0 None Increase by  1 Units/kg/hr 6 hours   0.3 - 0.5 0 None No Change 6 hours (after 2 consecutive levels in range check qAM)   0.51 - 0.6 0 None Decrease by  1 Units/kg/hr 6 hours   0.61 - 0.8 0 30 Minutes Decrease by  2 Units/kg/hr 6 hours   0.81 - 1 0 60 Minutes Decrease by  3 Units/kg/hr 6 hours   >1 0 Hold  After Anti Xa less than 0.5 decrease previous rate by  4 Units/kg/hr  Every 2 hours until Anti Xa  less than 0.5 then when infusion restarts in 6 hours         Recommend anti-Xa every 6 hours.     Results from last 7 days   Lab Units 07/18/23  0522 07/17/23  2249 07/14/23  1849   INR   --  1.00  --    HEMOGLOBIN g/dL 9.7* 9.9* 9.9*   HEMATOCRIT % 29.6* 30.2* 29.3*   PLATELETS 10*3/mm3 263 269 287          Date   Time   Anti-Xa Current Rate (Unit/kg/hr) Bolus   (Units) Rate Change   (Unit/kg/hr) New Rate (Unit/kg/hr) Next   Anti-Xa Comments  Pump Check Daily   7/18/23 0010 0.1 0 4000 +9.4 9.4 0600 D/W LISANDRA Chase   7/18/23 0642 0.26 9.4 N/A +1 10.4 1300 D/W Rena, RN                                                                                                                                                                                                                       Pharmacy will continue to follow anti-Xa results and monitor for signs and symptoms of bleeding or  thrombosis.    Marian Shaikh, PharmD  07/18/23 06:35 EDT

## 2023-07-18 NOTE — CONSULTS
[facility]-Cardiology Consult Note    Referring Provider: Ifrah  Reason for Consultation: NSTEMI    Patient Care Team:  Anum Alonso APRN as PCP - General  Rubin Bernal MD as Consulting Physician (Urology)  Liz Russo MD as Consulting Physician (Cardiology)    Chief complaint : Chest pain    Subjective:    History of present illness: This is a 78-year-old male patient with known coronary artery disease who presents to the emergency room for worsening chest discomfort and shortness of breath.  The patient was seen in the emergency room 4 days earlier for the same symptoms but was not admitted.  The patient was admitted here approximately 2 weeks ago for the same presentation.  The patient describes a diffuse anterior pressure sensation associated with shortness of breath.  He indicates the quality of this discomfort is identical to that which she was experiencing prior to coronary revascularization.  The patient had a three-vessel CABG in 2001 at [facility] by Dr. Peng.  Since that time he has had multiple coronary PCI procedures.  His vein graft to the OM is chronically occluded.  He has had several stents placed in a vein graft to the RCA.  His native RCA is known to be occluded.  He has a patent LIMA graft.  Left ventricular ejection fraction is felt to be 40-45%.  He has poorly controlled type 2 diabetes mellitus, severe obesity and obstructive sleep apnea.  He has chronic renal insufficiency, hypertension and dyslipidemia.  He is a lifelong non-smoker.  His twelve-lead electrocardiogram shows prominent QS complexes in the right mid precordial leads with QRS widening and a left bundle branch block pattern.  There is a right axis deviation.  He has prominent repolarization changes in the inferior and lateral leads which is unchanged.  His initial cardiac troponins were elevated and have trended upward.  proBNP was elevated.    Review of Systems   Review of Systems    Constitutional: Negative for chills, diaphoresis, fever, malaise/fatigue, weight gain and weight loss.   HENT:  Negative for ear discharge, hearing loss, hoarse voice and nosebleeds.    Eyes:  Negative for discharge, double vision, pain and photophobia.   Cardiovascular:  Positive for chest pain, dyspnea on exertion, leg swelling and orthopnea. Negative for claudication, cyanosis, irregular heartbeat, near-syncope, palpitations, paroxysmal nocturnal dyspnea and syncope.   Respiratory:  Positive for shortness of breath. Negative for cough, hemoptysis, sputum production and wheezing.    Endocrine: Negative for cold intolerance, heat intolerance, polydipsia, polyphagia and polyuria.   Hematologic/Lymphatic: Negative for adenopathy and bleeding problem. Does not bruise/bleed easily.   Skin:  Negative for color change, flushing, itching and rash.   Musculoskeletal:  Negative for muscle cramps, muscle weakness, myalgias and stiffness.   Gastrointestinal:  Negative for abdominal pain, diarrhea, hematemesis, hematochezia, nausea and vomiting.   Genitourinary:  Negative for dysuria, frequency and nocturia.   Neurological:  Negative for focal weakness, loss of balance, numbness, paresthesias and seizures.   Psychiatric/Behavioral:  Negative for altered mental status, hallucinations and suicidal ideas.    Allergic/Immunologic: Negative for HIV exposure, hives and persistent infections.     History  Past Medical History:   Diagnosis Date    Benign hypertension     Coronary artery disease     Depression     Enlarged prostate     Enlarged prostate with anticipated TURP with Dr. Bernal.    GERD (gastroesophageal reflux disease)     Hypercholesterolemia     Obesity     Obstructive sleep apnea on CPAP     Type 2 diabetes mellitus    ,   Past Surgical History:   Procedure Laterality Date    CARDIAC CATHETERIZATION      CARDIAC CATHETERIZATION Left 10/19/2021    Procedure: Left Heart Cath;  Surgeon: Liz Russo MD;   Location: [facility] INVASIVE LOCATION;  Service: Cardiology;  Laterality: Left;    COLONOSCOPY W/ POLYPECTOMY      CORONARY ANGIOPLASTY WITH STENT PLACEMENT Left 06/03/2009    Left heart catheterization, 06/03/2009, Dr. Russo:  LVEF 45% to 50%.  Placement of overlapping Cypher drug-eluting stent 2.5 x 28 and 2.5 x 18 to the SVG to the obtuse marginal branch.  SVG to the PDA had a proximal stenosis estimated at 60% with a distal stenosis of 50% to 60%.    CORONARY ANGIOPLASTY WITH STENT PLACEMENT Left 07/06/2009    Left heart catheterization, 07/06/2009:  PTCA and stent placement in the mid PDA using a 2.25 x 12 mm Taxus drug-eluting stent with stent placement in the distal SVG to the PDA using a 2.5 x 18 mm Cypher drug-eluting stent and stenting of the proximal SVG to the PDA using a 3.0 x 28 mm Cypher drug-eluting stent.    CORONARY ANGIOPLASTY WITH STENT PLACEMENT  04/23/2010    Cardiac catheterization for recurrent anginal symptoms, 04/23/2010, with PTCA and stent placement in the proximal SVG to the PDA using 3.0 x 28 mm Promus drug-eluting stent for in-stent restenosis.    CORONARY ANGIOPLASTY WITH STENT PLACEMENT Left 07/06/2010    Left heart catheterization, 07/06/2010, Dr. Russo:  EF 40% to 45%.  3.5 x 23 mm Promus drug-eluting stent in the proximal SVG to OM, 2.5 x 18 mm Promus drug-eluting stent to distal SVG to PDA due to in-stent restenosis.      CORONARY ANGIOPLASTY WITH STENT PLACEMENT Left 11/16/2010    Left heart catheterization, 11/16/2010, with placement of a 3.0 x 16 mm Taxus drug-eluting stent to the SVG to the RCA proximally and a 2.5 x 16 mm paclitaxel stent distally in the SVG to the RCA.    CORONARY ANGIOPLASTY WITH STENT PLACEMENT Left     Left heart catheterization, 3.0 x 24-mm Promus element drug-eluting stent to a 90% lesion in the mid portion of the SVG to the PDA.     CORONARY ANGIOPLASTY WITH STENT PLACEMENT Left 06/24/2014    Left heart catheterization for recurrent  angina, 06/24/2014, Dr. Russo:  PTCA of the SVG to the PDA using a 3 x 12 mm NC TREK balloon.      CORONARY ARTERY BYPASS GRAFT      CABG x3, Dr. Peng, [facility], 2001.   ,   Family History   Problem Relation Age of Onset    Heart attack Mother     Ulcers Father    ,   Social History     Tobacco Use    Smoking status: Never    Smokeless tobacco: Never   Vaping Use    Vaping Use: Never used   Substance Use Topics    Alcohol use: No    Drug use: No   ,   Medications Prior to Admission   Medication Sig Dispense Refill Last Dose    albuterol sulfate  (90 Base) MCG/ACT inhaler Inhale 2 puffs Every 4 (Four) Hours As Needed for Wheezing.   Past Week    atorvastatin (LIPITOR) 40 MG tablet Take 1 tablet by mouth Daily.   7/17/2023    azilsartan medoxomil (EDARBI) 80 MG tablet tablet Take 20 mg by mouth Daily.   Patient Taking Differently    B-D UF III MINI PEN NEEDLES 31G X 5 MM misc    7/17/2023    carvedilol (COREG) 12.5 MG tablet Take 6.25 mg by mouth 2 (Two) Times a Day With Meals. Pt takes 0.5 tab BID   7/17/2023    Cholecalciferol (VITAMIN D3) 50 MCG (2000 UT) capsule Take 2 capsules by mouth.   7/17/2023    dapagliflozin Propanediol (Farxiga) 10 MG tablet Take  by mouth.   7/17/2023    doxazosin (CARDURA) 8 MG tablet Take 1 tablet by mouth Every 12 (Twelve) Hours. 60 tablet 11 7/17/2023    fexofenadine (ALLEGRA) 180 MG tablet Take 1 tablet by mouth Daily.   7/17/2023    finasteride (PROSCAR) 5 MG tablet Take 1 tablet by mouth Daily.   7/17/2023    fluticasone (FLONASE) 50 MCG/ACT nasal spray 1 spray into the nostril(s) as directed by provider Daily. 48 g 2 7/17/2023    Fluticasone-Umeclidin-Vilant (Trelegy Ellipta) 200-62.5-25 MCG/INH inhaler Inhale 1 puff Daily. Rinse mouth with water after use. 90 each 2 7/17/2023    furosemide (LASIX) 40 MG tablet Take 1 tablet by mouth Daily. 30 tablet 0 7/17/2023    hydrALAZINE (APRESOLINE) 100 MG tablet Take 1 tablet by mouth 3 (Three) Times a Day. 90  "tablet 11 7/17/2023    magnesium oxide (MAG-OX) 400 MG tablet Take 1 tablet by mouth Daily.   7/17/2023    Multiple Vitamin (MULTI VITAMIN DAILY PO) Take 1 tablet by mouth Daily.   7/17/2023    nitroglycerin (NITROSTAT) 0.4 MG SL tablet Place 1 tablet under the tongue Every 5 (Five) Minutes As Needed for Chest Pain. Take no more than 3 doses in 15 minutes. 25 tablet 11 7/18/2023    pantoprazole (PROTONIX) 40 MG EC tablet TAKE 1 TABLET BY MOUTH 2 TIMES DAILY (BEFORE MEALS)   7/17/2023    pramipexole (Mirapex) 1 MG tablet Take 1 tablet by mouth Every Night. 90 tablet 2 7/17/2023    prasugrel (EFFIENT) 10 MG tablet Take 1 tablet by mouth Daily.   7/17/2023    sertraline (ZOLOFT) 50 MG tablet Take 1 tablet by mouth Daily.   7/17/2023    tamsulosin (FLOMAX) 0.4 MG capsule 24 hr capsule Take 1 capsule by mouth 2 (Two) Times a Day.   7/17/2023    Tresiba FlexTouch 200 UNIT/ML solution pen-injector pen injection Inject  under the skin into the appropriate area as directed. 30 units every morning and 74 units in the evening   7/17/2023    TRUEplus Lancets 28G misc    7/17/2023    Trulicity 0.75 MG/0.5ML solution pen-injector Inject 0.75 mg as directed 1 (One) Time Per Week.   7/17/2023    and Allergies:  Zocor [simvastatin], Bisoprolol, Byetta 10 mcg pen [exenatide], Crestor [rosuvastatin calcium], Metformin and related, and Plavix [clopidogrel bisulfate]    Objective:    Vital Sign Min/Max for last 24 hours  Temp  Min: 97.8 °F (36.6 °C)  Max: 98.6 °F (37 °C)   BP  Min: 141/53  Max: 178/68   Pulse  Min: 68  Max: 87   Resp  Min: 18  Max: 24   SpO2  Min: 93 %  Max: 96 %   No data recorded   Weight  Min: 104 kg (229 lb 4.5 oz)  Max: 106 kg (234 lb 6.4 oz)     Flowsheet Rows      Flowsheet Row First Filed Value   Admission Height 167.6 cm (66\") Documented at 07/17/2023 2249   Admission Weight 106 kg (234 lb) Documented at 07/17/2023 2249                 Physical Exam:   Vitals and nursing note reviewed.   Constitutional:       " Appearance: Healthy appearance. Not in distress.   Neck:      Vascular: No JVR. JVD normal.   Pulmonary:      Effort: Pulmonary effort is normal.      Breath sounds: Normal breath sounds. No wheezing. No rhonchi. No rales.   Chest:      Chest wall: Not tender to palpatation.   Cardiovascular:      PMI at left midclavicular line. Normal rate. Regular rhythm. Normal S1. Normal S2.       Murmurs: There is no murmur.      No gallop.  No click. No rub.   Pulses:     Intact distal pulses.   Edema:     Peripheral edema present.  Abdominal:      General: Bowel sounds are normal.      Palpations: Abdomen is soft.      Tenderness: There is no abdominal tenderness.   Musculoskeletal: Normal range of motion.         General: No tenderness. Skin:     General: Skin is warm and dry.   Neurological:      General: No focal deficit present.      Mental Status: Alert and oriented to person, place and time.       Results Review:   I reviewed the patient's new clinical results.  Results from last 7 days   Lab Units 07/18/23 0522 07/17/23 2249 07/14/23 1849   WBC 10*3/mm3 11.67* 12.35* 11.41*   HEMOGLOBIN g/dL 9.7* 9.9* 9.9*   HEMATOCRIT % 29.6* 30.2* 29.3*   PLATELETS 10*3/mm3 263 269 287     Results from last 7 days   Lab Units 07/18/23 0522 07/17/23 2249 07/14/23 1849   SODIUM mmol/L 143 138 139   POTASSIUM mmol/L 4.3 4.3 4.4   CHLORIDE mmol/L 106 102 105   CO2 mmol/L 23.2 21.6* 19.5*   BUN mg/dL 46* 46* 43*   CREATININE mg/dL 1.68* 1.74* 1.69*   GLUCOSE mg/dL 249* 344* 315*   CALCIUM mg/dL 8.8 8.7 8.4*     Lab Results   Lab Value Date/Time    TROPONINT 1,148 (C) 07/18/2023 0522    TROPONINT 666 (C) 07/18/2023 0035    TROPONINT 605 (C) 07/17/2023 2249    TROPONINT 122 (C) 07/14/2023 2039    TROPONINT 112 (C) 07/14/2023 1849    TROPONINT 126 (C) 06/30/2023 0524    TROPONINT 125 (C) 06/29/2023 2037    TROPONINT 128 (C) 06/29/2023 1806    TROPONINT 419 (C) 04/23/2023 1619    TROPONINT 418 (C) 04/23/2023 1323     Results from last 7  days   Lab Units 07/18/23  0522   CHOLESTEROL mg/dL 145   TRIGLYCERIDES mg/dL 160*   HDL CHOL mg/dL 37*   LDL CHOL mg/dL 80         Assessment/Plan:      NSTEMI (non-ST elevated myocardial infarction)    Coronary artery disease involving native coronary artery of native heart with angina pectoris    Essential hypertension    Type 2 diabetes mellitus with stage 3 chronic kidney disease    CKD (chronic kidney disease) stage 3, GFR 30-59 ml/min      I have recommended coronary angiography with interventional standby.  Mr. Moncada has been engaged in a patient level discussion explaining the rationale for proceeding with invasive testing/procedures.  The procedure of coronary angiography has been explained in detail, using layman's terminology, including risks benefits and alternatives.  He expresses understanding and desire to proceed.  We have emphasized the potential for contrast-induced worsening renal failure potentially leading to the need for at least temporary hemodialysis.  He understands this risk and is willing to proceed.  Further recommendations will be predicated on the results of his catheterization findings.    I have recommended increasing his Coreg.  I have recommended adding Entresto 24-26 mg orally twice daily.  Close monitoring of renal function will be necessary.    Diuretic therapy management deferred to nephrology.    I have recommended a repeat echocardiogram.    I discussed the patient's findings and my recommendations with patient, family, and consulting provider    Rupesh Parr MD  07/18/23  07:51 EDT

## 2023-07-18 NOTE — THERAPY EVALUATION
OT attempted to see pt forOT evaluation, per EHR pt is in the cath lab. OT will follow up as schedule permits.

## 2023-07-18 NOTE — THERAPY EVALUATION
Attempted to see patient for PT eval but patient is in cath lab. PT will follow up with patient later as schedule permits.

## 2023-07-19 VITALS
BODY MASS INDEX: 37.27 KG/M2 | RESPIRATION RATE: 18 BRPM | DIASTOLIC BLOOD PRESSURE: 51 MMHG | TEMPERATURE: 98.6 F | HEART RATE: 66 BPM | HEIGHT: 66 IN | WEIGHT: 231.92 LBS | OXYGEN SATURATION: 95 % | SYSTOLIC BLOOD PRESSURE: 131 MMHG

## 2023-07-19 PROBLEM — I50.22 CHRONIC HFREF (HEART FAILURE WITH REDUCED EJECTION FRACTION): Status: ACTIVE | Noted: 2023-07-19

## 2023-07-19 PROBLEM — I21.4 NSTEMI (NON-ST ELEVATED MYOCARDIAL INFARCTION): Status: RESOLVED | Noted: 2023-07-17 | Resolved: 2023-07-19

## 2023-07-19 LAB
ANION GAP SERPL CALCULATED.3IONS-SCNC: 13.8 MMOL/L (ref 5–15)
BH CV ECHO MEAS - AO MAX PG: 22.5 MMHG
BH CV ECHO MEAS - AO MEAN PG: 12 MMHG
BH CV ECHO MEAS - AO ROOT DIAM: 3 CM
BH CV ECHO MEAS - AO V2 MAX: 237 CM/SEC
BH CV ECHO MEAS - AO V2 VTI: 48.1 CM
BH CV ECHO MEAS - AVA(I,D): 1.34 CM2
BH CV ECHO MEAS - EDV(CUBED): 140.6 ML
BH CV ECHO MEAS - EDV(MOD-SP2): 127 ML
BH CV ECHO MEAS - EDV(MOD-SP4): 133 ML
BH CV ECHO MEAS - EF(MOD-BP): 46.4 %
BH CV ECHO MEAS - EF(MOD-SP2): 45 %
BH CV ECHO MEAS - EF(MOD-SP4): 49.6 %
BH CV ECHO MEAS - ESV(CUBED): 96.7 ML
BH CV ECHO MEAS - ESV(MOD-SP2): 69.8 ML
BH CV ECHO MEAS - ESV(MOD-SP4): 67 ML
BH CV ECHO MEAS - FS: 11.7 %
BH CV ECHO MEAS - IVS/LVPW: 1.15 CM
BH CV ECHO MEAS - IVSD: 1.34 CM
BH CV ECHO MEAS - LA DIMENSION: 4.3 CM
BH CV ECHO MEAS - LAT PEAK E' VEL: 8.5 CM/SEC
BH CV ECHO MEAS - LV MASS(C)D: 264.9 GRAMS
BH CV ECHO MEAS - LV MAX PG: 7.5 MMHG
BH CV ECHO MEAS - LV MEAN PG: 4 MMHG
BH CV ECHO MEAS - LV V1 MAX: 137 CM/SEC
BH CV ECHO MEAS - LV V1 VTI: 28.4 CM
BH CV ECHO MEAS - LVIDD: 5.2 CM
BH CV ECHO MEAS - LVIDS: 4.6 CM
BH CV ECHO MEAS - LVOT AREA: 2.27 CM2
BH CV ECHO MEAS - LVOT DIAM: 1.7 CM
BH CV ECHO MEAS - LVPWD: 1.3 CM
BH CV ECHO MEAS - MED PEAK E' VEL: 5 CM/SEC
BH CV ECHO MEAS - MR MAX PG: 67.3 MMHG
BH CV ECHO MEAS - MR MAX VEL: 410 CM/SEC
BH CV ECHO MEAS - MV A MAX VEL: 95.8 CM/SEC
BH CV ECHO MEAS - MV DEC SLOPE: 952 CM/SEC2
BH CV ECHO MEAS - MV DEC TIME: 0.15 MSEC
BH CV ECHO MEAS - MV E MAX VEL: 139 CM/SEC
BH CV ECHO MEAS - MV E/A: 1.45
BH CV ECHO MEAS - MV MAX PG: 9.2 MMHG
BH CV ECHO MEAS - MV MEAN PG: 4 MMHG
BH CV ECHO MEAS - MV V2 VTI: 36.8 CM
BH CV ECHO MEAS - MVA(VTI): 1.75 CM2
BH CV ECHO MEAS - PA ACC TIME: 0.06 SEC
BH CV ECHO MEAS - PA V2 MAX: 135 CM/SEC
BH CV ECHO MEAS - PI END-D VEL: 97.8 CM/SEC
BH CV ECHO MEAS - PULM A REVS DUR: 0.11 SEC
BH CV ECHO MEAS - PULM A REVS VEL: 26.1 CM/SEC
BH CV ECHO MEAS - PULM DIAS VEL: 101 CM/SEC
BH CV ECHO MEAS - PULM S/D: 0.57
BH CV ECHO MEAS - PULM SYS VEL: 57.1 CM/SEC
BH CV ECHO MEAS - QP/QS: 2.08
BH CV ECHO MEAS - RAP SYSTOLE: 3 MMHG
BH CV ECHO MEAS - RV MAX PG: 4 MMHG
BH CV ECHO MEAS - RV V1 MAX: 100 CM/SEC
BH CV ECHO MEAS - RV V1 VTI: 20.4 CM
BH CV ECHO MEAS - RVOT DIAM: 2.9 CM
BH CV ECHO MEAS - RVSP: 15.4 MMHG
BH CV ECHO MEAS - SV(LVOT): 64.5 ML
BH CV ECHO MEAS - SV(MOD-SP2): 57.2 ML
BH CV ECHO MEAS - SV(MOD-SP4): 66 ML
BH CV ECHO MEAS - SV(RVOT): 133.8 ML
BH CV ECHO MEAS - TAPSE (>1.6): 1.48 CM
BH CV ECHO MEAS - TR MAX PG: 12.4 MMHG
BH CV ECHO MEAS - TR MAX VEL: 176 CM/SEC
BH CV ECHO MEASUREMENTS AVERAGE E/E' RATIO: 20.59
BH CV XLRA - RV BASE: 4 CM
BH CV XLRA - RV LENGTH: 8 CM
BH CV XLRA - RV MID: 2.34 CM
BH CV XLRA - TDI S': 6.9 CM/SEC
BUN SERPL-MCNC: 42 MG/DL (ref 8–23)
BUN/CREAT SERPL: 26.8 (ref 7–25)
CALCIUM SPEC-SCNC: 8.5 MG/DL (ref 8.6–10.5)
CHLORIDE SERPL-SCNC: 105 MMOL/L (ref 98–107)
CHOLEST SERPL-MCNC: 130 MG/DL (ref 0–200)
CO2 SERPL-SCNC: 22.2 MMOL/L (ref 22–29)
CREAT SERPL-MCNC: 1.57 MG/DL (ref 0.76–1.27)
DEPRECATED RDW RBC AUTO: 41 FL (ref 37–54)
EGFRCR SERPLBLD CKD-EPI 2021: 45.1 ML/MIN/1.73
ERYTHROCYTE [DISTWIDTH] IN BLOOD BY AUTOMATED COUNT: 13.2 % (ref 12.3–15.4)
GLUCOSE BLDC GLUCOMTR-MCNC: 157 MG/DL (ref 70–130)
GLUCOSE SERPL-MCNC: 125 MG/DL (ref 65–99)
HCT VFR BLD AUTO: 27.2 % (ref 37.5–51)
HDLC SERPL-MCNC: 35 MG/DL (ref 40–60)
HGB BLD-MCNC: 9.2 G/DL (ref 13–17.7)
LDLC SERPL CALC-MCNC: 70 MG/DL (ref 0–100)
LDLC/HDLC SERPL: 1.92 {RATIO}
LEFT ATRIUM VOLUME INDEX: 38 ML/M2
LV EF 2D ECHO EST: 50 %
MCH RBC QN AUTO: 28.9 PG (ref 26.6–33)
MCHC RBC AUTO-ENTMCNC: 33.8 G/DL (ref 31.5–35.7)
MCV RBC AUTO: 85.5 FL (ref 79–97)
PLATELET # BLD AUTO: 252 10*3/MM3 (ref 140–450)
PMV BLD AUTO: 10.8 FL (ref 6–12)
POTASSIUM SERPL-SCNC: 4.1 MMOL/L (ref 3.5–5.2)
QT INTERVAL: 428 MS
QTC INTERVAL: 458 MS
RBC # BLD AUTO: 3.18 10*6/MM3 (ref 4.14–5.8)
SODIUM SERPL-SCNC: 141 MMOL/L (ref 136–145)
TRIGL SERPL-MCNC: 139 MG/DL (ref 0–150)
VLDLC SERPL-MCNC: 25 MG/DL (ref 5–40)
WBC NRBC COR # BLD: 10.46 10*3/MM3 (ref 3.4–10.8)

## 2023-07-19 PROCEDURE — 99238 HOSP IP/OBS DSCHRG MGMT 30/<: CPT | Performed by: FAMILY MEDICINE

## 2023-07-19 PROCEDURE — 80061 LIPID PANEL: CPT | Performed by: INTERNAL MEDICINE

## 2023-07-19 PROCEDURE — 94799 UNLISTED PULMONARY SVC/PX: CPT

## 2023-07-19 PROCEDURE — 85027 COMPLETE CBC AUTOMATED: CPT | Performed by: INTERNAL MEDICINE

## 2023-07-19 PROCEDURE — 93005 ELECTROCARDIOGRAM TRACING: CPT | Performed by: INTERNAL MEDICINE

## 2023-07-19 PROCEDURE — 63710000001 INSULIN ASPART PER 5 UNITS: Performed by: INTERNAL MEDICINE

## 2023-07-19 PROCEDURE — 25010000002 HEPARIN (PORCINE) PER 1000 UNITS: Performed by: FAMILY MEDICINE

## 2023-07-19 PROCEDURE — 80048 BASIC METABOLIC PNL TOTAL CA: CPT | Performed by: INTERNAL MEDICINE

## 2023-07-19 PROCEDURE — 82948 REAGENT STRIP/BLOOD GLUCOSE: CPT

## 2023-07-19 RX ORDER — ASPIRIN 81 MG/1
81 TABLET ORAL DAILY
Qty: 30 TABLET | Refills: 0 | Status: SHIPPED | OUTPATIENT
Start: 2023-07-19 | End: 2023-07-19 | Stop reason: SDUPTHER

## 2023-07-19 RX ORDER — RANOLAZINE 500 MG/1
500 TABLET, EXTENDED RELEASE ORAL EVERY 12 HOURS SCHEDULED
Qty: 60 TABLET | Refills: 0 | Status: SHIPPED | OUTPATIENT
Start: 2023-07-19 | End: 2023-07-19 | Stop reason: SDUPTHER

## 2023-07-19 RX ORDER — RANOLAZINE 500 MG/1
500 TABLET, EXTENDED RELEASE ORAL EVERY 12 HOURS SCHEDULED
Qty: 60 TABLET | Refills: 0 | Status: SHIPPED | OUTPATIENT
Start: 2023-07-19 | End: 2023-08-18

## 2023-07-19 RX ORDER — ASPIRIN 81 MG/1
81 TABLET ORAL DAILY
Qty: 30 TABLET | Refills: 0 | Status: SHIPPED | OUTPATIENT
Start: 2023-07-19 | End: 2023-08-18

## 2023-07-19 RX ADMIN — SENNOSIDES AND DOCUSATE SODIUM 2 TABLET: 50; 8.6 TABLET ORAL at 09:05

## 2023-07-19 RX ADMIN — ASPIRIN 81 MG: 81 TABLET, COATED ORAL at 09:06

## 2023-07-19 RX ADMIN — FINASTERIDE 5 MG: 5 TABLET, FILM COATED ORAL at 09:03

## 2023-07-19 RX ADMIN — TIOTROPIUM BROMIDE INHALATION SPRAY 2 PUFF: 3.12 SPRAY, METERED RESPIRATORY (INHALATION) at 07:16

## 2023-07-19 RX ADMIN — Medication 10 ML: at 09:08

## 2023-07-19 RX ADMIN — SERTRALINE 50 MG: 50 TABLET, FILM COATED ORAL at 09:06

## 2023-07-19 RX ADMIN — PANTOPRAZOLE SODIUM 40 MG: 40 TABLET, DELAYED RELEASE ORAL at 05:41

## 2023-07-19 RX ADMIN — SACUBITRIL AND VALSARTAN 1 TABLET: 24; 26 TABLET, FILM COATED ORAL at 09:06

## 2023-07-19 RX ADMIN — TAMSULOSIN HYDROCHLORIDE 0.4 MG: 0.4 CAPSULE ORAL at 09:04

## 2023-07-19 RX ADMIN — ISOSORBIDE MONONITRATE 60 MG: 60 TABLET, EXTENDED RELEASE ORAL at 09:04

## 2023-07-19 RX ADMIN — BUDESONIDE AND FORMOTEROL FUMARATE DIHYDRATE 2 PUFF: 160; 4.5 AEROSOL RESPIRATORY (INHALATION) at 07:15

## 2023-07-19 RX ADMIN — PRASUGREL 10 MG: 10 TABLET, FILM COATED ORAL at 09:06

## 2023-07-19 RX ADMIN — CETIRIZINE HYDROCHLORIDE 10 MG: 10 TABLET, FILM COATED ORAL at 09:07

## 2023-07-19 RX ADMIN — ATORVASTATIN CALCIUM 80 MG: 80 TABLET, FILM COATED ORAL at 09:06

## 2023-07-19 RX ADMIN — INSULIN ASPART 2 UNITS: 100 INJECTION, SOLUTION INTRAVENOUS; SUBCUTANEOUS at 09:07

## 2023-07-19 RX ADMIN — RANOLAZINE 500 MG: 500 TABLET, FILM COATED, EXTENDED RELEASE ORAL at 09:03

## 2023-07-19 RX ADMIN — HEPARIN SODIUM 5000 UNITS: 5000 INJECTION INTRAVENOUS; SUBCUTANEOUS at 09:07

## 2023-07-19 NOTE — PLAN OF CARE
Goal Outcome Evaluation:  Plan of Care Reviewed With: patient, family           Outcome Evaluation: vitals stable. Plan for discharge home with family today.

## 2023-07-19 NOTE — PLAN OF CARE
Goal Outcome Evaluation:  Plan of Care Reviewed With: patient        Progress: no change  Outcome Evaluation: VSS overnight. Pt rested well overnight without c/o CP. Pt likely to d/c home today.

## 2023-07-19 NOTE — CASE MANAGEMENT/SOCIAL WORK
Case Management Discharge Note           Provided Post Acute Provider List?: N/A  Provided Post Acute Provider Quality & Resource List?: N/A    Selected Continued Care - Admitted Since 7/17/2023       Destination    No services have been selected for the patient.                Durable Medical Equipment    No services have been selected for the patient.                Dialysis/Infusion    No services have been selected for the patient.                Home Medical Care    No services have been selected for the patient.                Therapy    No services have been selected for the patient.                Community Resources    No services have been selected for the patient.                Community & DME    No services have been selected for the patient.                    Transportation Services  Private: Car    Final Discharge Disposition Code: 01 - home or self-care

## 2023-07-19 NOTE — CASE MANAGEMENT/SOCIAL WORK
Discharge Planning Assessment   Erick     Patient Name: Donny Jerry  MRN: [id number]  Today's Date: 7/19/2023    Admit Date: 7/17/2023    Plan: Pt plans to dc home with spouse. Spouse will provide transportation.  DME at home is bipap.  No hh or str necessary.   Discharge Needs Assessment       Row Name 07/19/23 0909       Living Environment    People in Home spouse    Current Living Arrangements home    Potentially Unsafe Housing Conditions unable to assess    Primary Care Provided by self;spouse/significant other    Provides Primary Care For no one    Family Caregiver if Needed spouse    Quality of Family Relationships helpful;involved    Able to Return to Prior Arrangements yes       Food Insecurity    Within the past 12 months, you worried that your food would run out before you got the money to buy more. Never true    Within the past 12 months, the food you bought just didn't last and you didn't have money to get more. Never true       Transition Planning    Patient/Family Anticipates Transition to home    Patient/Family Anticipated Services at Transition none    Transportation Anticipated family or friend will provide       Discharge Needs Assessment    Readmission Within the Last 30 Days no previous admission in last 30 days    Equipment Currently Used at Home bipap    Concerns to be Addressed no discharge needs identified    Equipment Needed After Discharge none    Provided Post Acute Provider List? N/A    Provided Post Acute Provider Quality & Resource List? N/A                   Discharge Plan       Row Name 07/19/23 0910       Plan    Plan Pt plans to dc home with spouse. Spouse will provide transportation.  DME at home is bipap.  No hh or str necessary.    Roadmap to Recovery Yes    Patient/Family in Agreement with Plan yes    Final Discharge Disposition Code 01 - home or self-care                  Continued Care and Services - Admitted Since 7/17/2023    Coordination has not been started for  this encounter.       Expected Discharge Date and Time       Expected Discharge Date Expected Discharge Time    Jul 19, 2023            Demographic Summary       Row Name 07/19/23 0908       General Information    Admission Type inpatient    Arrived From home    Required Notices Provided Important Message from Medicare    Referral Source admission list    Reason for Consult discharge planning    Preferred Language English       Contact Information    Permission Granted to Share Info With                    Functional Status       Row Name 07/19/23 0908       Functional Status    Usual Activity Tolerance good    Current Activity Tolerance good       Mental Status Summary    Recent Changes in Mental Status/Cognitive Functioning no changes                   Psychosocial       Row Name 07/19/23 0908       Values/Beliefs    Spiritual, Cultural Beliefs, Restorationism Practices, Values that Affect Care no       Behavior WDL    Behavior WDL WDL       Emotion Mood WDL    Emotion/Mood/Affect WDL WDL       Speech WDL    Speech WDL WDL       Perceptual State WDL    Perceptual State WDL WDL       Thought Process WDL    Thought Process WDL WDL       Intellectual Performance WDL    Intellectual Performance WDL WDL    Level of Consciousness Alert       Coping/Stress    Major Change/Loss/Stressor illness    Patient Personal Strengths strong support system    Sources of Support spouse    Techniques to [place] with Loss/Stress/Change not applicable    Reaction to Health Status accepting    Understanding of Condition and Treatment adequate understanding of medical condition       Developmental Stage (Eriksson's)    Developmental Stage Stage 8 (66 years-death/Late Adulthood) Integrity vs. Despair       C-SSRS (Recent)    Q1 Wished to be Dead (Past Month) no    Q2 Suicidal Thoughts (Past Month) no    Q6 Suicide Behavior (Lifetime) no       Violence Risk    Feels Like Hurting Others no    Previous Attempt to Harm Others no                    Abuse/Neglect    No documentation.                  Legal    No documentation.                  Substance Abuse    No documentation.                  Patient Forms    No documentation.                     Elvie Carlisle

## 2023-07-19 NOTE — DISCHARGE SUMMARY
[facility]      Name:  Donny Jerry   Age:  78 y.o.  Sex:  male  :  1945  MRN:  [id number]   Visit Number:  [id number]    Admission Date:  2023  Date of Discharge:  2023  Primary Care Physician:  Anum Alonso APRN    Important issues to note:    Patient needs follow-up with his primary cardiologist tomorrow  Recommend he discussed with Dr. 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, POA  CAD status post CABG/PCI  CKD stage III  Diabetes mellitus type 2  Hypertension  Hyperlipidemia  BPH  Chronic anemia  Obstructive sleep apnea  Obesity, BMI 35    Problem List:     Active Hospital Problems    Diagnosis  POA    Chronic systolic heart failure [I50.22]  Yes    Type 1 myocardial infarction [I21.9]  Yes    CKD (chronic kidney disease) stage 3, GFR 30-59 ml/min [N18.30]  Yes    Type 2 diabetes mellitus with stage 3 chronic kidney disease [E11.22, N18.30]  Yes    Essential hypertension [I10]  Yes    Coronary artery disease involving native coronary artery of native heart with angina pectoris [I25.119]  Yes      Resolved Hospital Problems    Diagnosis Date Resolved POA    NSTEMI (non-ST elevated myocardial infarction) [I21.4] 2023 Yes     Presenting Problem:    Chief Complaint   Patient presents with    Chest Pain      Consults:     Consulting Physician(s)       Provider Relationship Specialty    Rupesh Parr MD Consulting Physician Cardiology          Procedures Performed:    Procedure(s):  Coronary angiography  Percutaneous Coronary Intervention    History of presenting illness/Hospital Course:    78-year-old with history of prior CAD status post CABG, PCI with previous 8 stenting, chronic systolic CHF, CKD stage III, type 2 diabetes,  BPH, hypertension, hyperlipidemia, who presented to emergency room with complaints of chest pain and shortness of breath. Patient was admitted with concern for an NSTEMI and was hypertensive upon arrival with elevated troponins. He was seen by cardiology in consultation and started on a heparin drip. The patient was taken for coronary angiography the morning on 7/18/2023 with evidence of severe three-vessel CAD, severe diabetic type arteriopathy, with patency of 2 of 3 bypass grafts, he underwent catheter-based revascularization multiple lesions in the saphenous vein graft to RCA.  He has done well post cath.  His kidney function is stable and at baseline this morning.    Discussed with Dr. Parr who recommends patient follow-up with his primary cardiologist Dr. Russo, actually has an appointment tomorrow which is recommended to keep.  He should be on aspirin and Effient upon discharge.  He may need to be started on Entresto, will have him discuss this with Dr. Russo.  A order for cardiac rehab referral was made patient to decide on this.  I do recommend he follow-up with his nephrologist as well as his primary care provider.  Patient is obviously high risk for recurrent angina and was started on Ranexa during hospitalization.    Vital Signs:    Temp:  [97.8 °F (36.6 °C)-98.7 °F (37.1 °C)] 98.6 °F (37 °C)  Heart Rate:  [60-75] 66  Resp:  [16-20] 18  BP: (114-165)/(51-77) 131/51    Physical Exam:    General Appearance:  Alert and cooperative.  NAD   Head:  Atraumatic and normocephalic.   Eyes: Conjunctivae and sclerae normal, no icterus. No pallor.   Ears:  Ears with no abnormalities noted.   Throat: No oral lesions, no thrush, oral mucosa moist.   Neck: Supple, trachea midline, no thyromegaly.   Back:   No kyphoscoliosis present. No tenderness to palpation.   Lungs:   Breath sounds heard bilaterally equally.  No crackles or wheezing. No Pleural rub or bronchial breathing.   Heart:  Normal S1 and  S2, no murmur, no gallop, no rub. No JVD.   Abdomen:   Normal bowel sounds, no masses, no organomegaly. Soft, nontender, nondistended, no rebound tenderness.   Extremities: Supple, no edema, no cyanosis, no clubbing.   Pulses: Pulses palpable bilaterally.   Skin: No bleeding or rash.   Neurologic: Alert and oriented x 3. No facial asymmetry. Moves all four limbs. No tremors.     Pertinent Lab Results:     Results from last 7 days   Lab Units 07/19/23  0610 07/18/23  0522 07/17/23 2249 07/14/23  1849   SODIUM mmol/L 141 143 138 139   POTASSIUM mmol/L 4.1 4.3 4.3 4.4   CHLORIDE mmol/L 105 106 102 105   CO2 mmol/L 22.2 23.2 21.6* 19.5*   BUN mg/dL 42* 46* 46* 43*   CREATININE mg/dL 1.57* 1.68* 1.74* 1.69*   CALCIUM mg/dL 8.5* 8.8 8.7 8.4*   BILIRUBIN mg/dL  --   --  0.2 0.2   ALK PHOS U/L  --   --  45 46   ALT (SGPT) U/L  --   --  25 25   AST (SGOT) U/L  --   --  21 19   GLUCOSE mg/dL 125* 249* 344* 315*     Results from last 7 days   Lab Units 07/19/23  0610 07/18/23  0522 07/17/23 2249   WBC 10*3/mm3 10.46 11.67* 12.35*   HEMOGLOBIN g/dL 9.2* 9.7* 9.9*   HEMATOCRIT % 27.2* 29.6* 30.2*   PLATELETS 10*3/mm3 252 263 269     Results from last 7 days   Lab Units 07/17/23  2249   INR  1.00     Results from last 7 days   Lab Units 07/18/23  0522 07/18/23  0035 07/17/23 2249   HSTROP T ng/L 1,148* 666* 605*     Results from last 7 days   Lab Units 07/17/23 2249   PROBNP pg/mL 2,119.0*                       Pertinent Radiology Results:    Imaging Results (All)       Procedure Component Value Units Date/Time    XR Chest 1 View [[id number]] Collected: 07/18/23 0931     Updated: 07/18/23 0945    Narrative:      PROCEDURE: XR CHEST 1 VW-        HISTORY: Chest Pain Triage Protocol, midsternal chest pain     COMPARISON: July 14, 2023.     FINDINGS: The heart is mildly enlarged. The patient is status post  median sternotomy.  There is pulmonary vascular congestion. Bilateral  interstitial disease is possibly due to edema.  Findings are similar to  the prior exam.  There is no pneumothorax. There are no acute osseous  abnormalities.       Impression:      Mild CHF, similar to the prior exam.                       Images were reviewed, interpreted, and dictated by Dr. Mayra Da Silva MD  Transcribed by Saba Beltran PA-C.     This report was signed and finalized on 7/18/2023 9:43 AM by Mayra Da Silva MD.            Echo:    Results for orders placed during the hospital encounter of 06/29/23    Adult Transthoracic Echo Complete W/ Cont if Necessary Per Protocol    Interpretation Summary  1.  Normal left ventricular size with mildly reduced LV systolic function, LVEF 45-50%.  2.  Mild concentric LVH.  3.  Grade 1 diastolic dysfunction.  4.  Normal right ventricular size and systolic function.  5.  Normal left atrial volume index.  6.  Mild calcification aortic valve without significant stenosis.  7.  Mild PI.    Condition on Discharge:      Stable.    Code status during the hospital stay:    Code Status and Medical Interventions:   Ordered at: 07/18/23 0059     Medical Intervention Limits:    NO intubation (DNI)     Level Of Support Discussed With:    Patient    Next of Kin (If No Surrogate)     Code Status (Patient has no pulse and is not breathing):    No CPR (Do Not Attempt to Resuscitate)     Medical Interventions (Patient has pulse or is breathing):    Limited Support     Discharge Disposition:    Home or Self Care    Discharge Medications:       Discharge Medications        New Medications        Instructions Start Date   aspirin 81 MG EC tablet   81 mg, Oral, Daily      ranolazine 500 MG 12 hr tablet  Commonly known as: RANEXA   500 mg, Oral, Every 12 Hours Scheduled             Continue These Medications        Instructions Start Date   albuterol sulfate  (90 Base) MCG/ACT inhaler  Commonly known as: PROVENTIL HFA;VENTOLIN HFA;PROAIR HFA   2 puffs, Inhalation, Every 4 Hours PRN      atorvastatin 40 MG tablet  Commonly known  as: LIPITOR   40 mg, Oral, Daily      azilsartan medoxomil 80 MG tablet tablet  Commonly known as: EDARBI   40 mg, Oral, Daily      B-D UF III MINI PEN NEEDLES 31G X 5 MM misc  Generic drug: Insulin Pen Needle   No dose, route, or frequency recorded.      carvedilol 12.5 MG tablet  Commonly known as: COREG   6.25 mg, Oral, 2 Times Daily With Meals, Pt takes 0.5 tab BID      doxazosin 8 MG tablet  Commonly known as: CARDURA   8 mg, Oral, Every 12 Hours      Farxiga 10 MG tablet  Generic drug: dapagliflozin Propanediol   10 mg, Oral, Daily      fexofenadine 180 MG tablet  Commonly known as: ALLEGRA   180 mg, Oral, Daily      finasteride 5 MG tablet  Commonly known as: PROSCAR   5 mg, Oral, Daily      fluticasone 50 MCG/ACT nasal spray  Commonly known as: FLONASE   1 spray, Nasal, Daily      furosemide 40 MG tablet  Commonly known as: LASIX   40 mg, Oral, Daily      hydrALAZINE 100 MG tablet  Commonly known as: APRESOLINE   100 mg, Oral, 3 Times Daily      magnesium oxide 400 MG tablet  Commonly known as: MAG-OX   400 mg, Oral, Daily      multivitamin tablet tablet  Commonly known as: THERAGRAN   1 tablet, Oral, Daily      nitroglycerin 0.4 MG SL tablet  Commonly known as: NITROSTAT   0.4 mg, Sublingual, Every 5 Minutes PRN, Take no more than 3 doses in 15 minutes.       pantoprazole 40 MG EC tablet  Commonly known as: PROTONIX   TAKE 1 TABLET BY MOUTH 2 TIMES DAILY (BEFORE MEALS)      pramipexole 1 MG tablet  Commonly known as: Mirapex   1 mg, Oral, Nightly      prasugrel 10 MG tablet  Commonly known as: EFFIENT   10 mg, Oral, Daily      sertraline 50 MG tablet  Commonly known as: ZOLOFT   1 tablet, Oral, Daily      tamsulosin 0.4 MG capsule 24 hr capsule  Commonly known as: FLOMAX   1 capsule, Oral, 2 Times Daily      Trelegy Ellipta 200-62.5-25 MCG/ACT aerosol powder   Generic drug: Fluticasone-Umeclidin-Vilant   1 puff, Inhalation, Daily, Rinse mouth with water after use.      Tresiba FlexTouch 200 UNIT/ML solution  pen-injector pen injection  Generic drug: Insulin Degludec   Subcutaneous, 30 units every morning and 74 units in the evening      TRUEplus Lancets 28G misc   No dose, route, or frequency recorded.      Trulicity 0.75 MG/0.5ML solution pen-injector  Generic drug: Dulaglutide   0.75 mg, Injection, Weekly      Vitamin D3 50 MCG (2000 UT) capsule   2 capsules, Oral, Daily             Discharge Diet:   Carb consistent/cardiac    Activity at Discharge:   As tolerated    Follow-up Appointments:    Additional Instructions for the Follow-ups that You Need to Schedule       Ambulatory Referral to Cardiac Rehab   As directed             Follow-up Information       Anum Alonso APRN .    Specialty: Family Medicine  Contact information:  [address]   [facility] [address] [phone]               Dr. Russo Follow up.    Why: Has appt tomorrow needs to keep                         Future Appointments   Date Time Provider Department Center   7/20/2023 11:45 AM Liz Russo MD [facility]   7/24/2023 11:00 AM Isma Bush PA-C [facility]   7/26/2023 10:00 AM Hansel Bob MD [facility]   7/31/2023 11:00 AM Isma Bush PA-C [facility]   8/7/2023 11:00 AM Isma Bush PA-C [facility]   8/14/2023 11:00 AM Isma Bush PA-C [facility]     Test Results Pending at Discharge:           Mary Beth Cat DO  07/19/23  08:47 EDT    Time: I spent 25 minutes on this discharge activity which included: face-to-face encounter with the patient, reviewing the data in the system, coordination of the care with the nursing staff as well as consultants, documentation, and entering orders.     Dictated utilizing Dragon dictation.

## 2023-07-20 PROBLEM — I50.32 CHRONIC DIASTOLIC HEART FAILURE: Status: ACTIVE | Noted: 2023-07-19

## 2023-07-20 PROBLEM — R60.0 BILATERAL LOWER EXTREMITY EDEMA: Status: ACTIVE | Noted: 2023-07-20

## 2023-07-21 NOTE — PROGRESS NOTES
Enter Query Response Below      Query Response:     Heart failure due to CAD Electronically signed by Mary Beth Cat, , 23, 4:13 PM EDT.           If applicable, please update the problem list.     Patient: Donny Curtis        : 1945  Account: [id number]           Admit Date: 2023        How to Respond to this query:       a. Click New Note     b. Answer query within the yellow box.                c. Update the Problem List, if applicable.      If you have any questions about this query contact me at:  [email or web address]    :    78 year old admitted with NSTEMI with PCI intervention per DR Parr. Note Acute on chronic combined systolic/diastolic heart failure, POA per your notes. Note history of Hypertension and Coronary artery disease. Patient was on Aspirin, Coreg, Verapamil, Lasix po and Dialysis done. After study can you please clarify the etiology of the patient’s heart failure:    Heart failure due to hypertension  Heart failure due to  coronary artery disease  Heart failure due to hypertension and valvular disease  Other- specify__________  Unable to determine    By submitting this query, we are merely seeking further clarification of documentation to accurately reflect all conditions that you are monitoring, evaluating, treating or that extend the hospitalization or utilize additional resources of care. Please utilize your independent clinical judgment when addressing the question(s) above.     This query and your response, once completed, will be entered into the legal medical record.    Sincerely,  Pennie Francisco RN  [facility]  Clinical Documentation Integrity Program

## 2023-07-24 ENCOUNTER — READMISSION MANAGEMENT (OUTPATIENT)
Dept: CALL CENTER | Facility: HOSPITAL | Age: 78
End: 2023-07-24
Payer: MEDICARE

## 2023-07-24 ENCOUNTER — CLINICAL SUPPORT (OUTPATIENT)
Dept: ORTHOPEDIC SURGERY | Facility: CLINIC | Age: 78
End: 2023-07-24
Payer: MEDICARE

## 2023-07-24 VITALS
DIASTOLIC BLOOD PRESSURE: 70 MMHG | SYSTOLIC BLOOD PRESSURE: 122 MMHG | HEIGHT: 66 IN | WEIGHT: 235 LBS | BODY MASS INDEX: 37.77 KG/M2 | TEMPERATURE: 97.7 F

## 2023-07-24 DIAGNOSIS — M17.0 PRIMARY OSTEOARTHRITIS OF BOTH KNEES: Primary | ICD-10-CM

## 2023-07-24 DIAGNOSIS — M25.562 ARTHRALGIA OF BOTH KNEES: ICD-10-CM

## 2023-07-24 DIAGNOSIS — M25.561 ARTHRALGIA OF BOTH KNEES: ICD-10-CM

## 2023-07-24 NOTE — PROGRESS NOTES
Subjective   Donny Jerry is a 78 y.o. male here today for injection therapy.    Chief Complaint   Patient presents with    Left Knee - Injections     Supartz #2    Right Knee - Injections     Supartz #2     Patient presents for bilateral knee visco injection # 2    Past Medical History:   Diagnosis Date    Benign hypertension     Coronary artery disease     Depression     Enlarged prostate     Enlarged prostate with anticipated TURP with Dr. Bernal.    GERD (gastroesophageal reflux disease)     Hypercholesterolemia     Myocardial infarction     Obesity     Obstructive sleep apnea on CPAP     Type 2 diabetes mellitus          Past Surgical History:   Procedure Laterality Date    CARDIAC CATHETERIZATION      CARDIAC CATHETERIZATION Left 10/19/2021    Procedure: Left Heart Cath;  Surgeon: Liz Russo MD;  Location:  [facility];  Service: Cardiology;  Laterality: Left;    CARDIAC CATHETERIZATION N/A 7/18/2023    Procedure: Coronary angiography;  Surgeon: Rupesh Parr MD;  Location:  [facility];  Service: Cardiology;  Laterality: N/A;    CARDIAC CATHETERIZATION N/A 7/18/2023    Procedure: Percutaneous Coronary Intervention;  Surgeon: Rupesh Parr MD;  Location:  [facility];  Service: Cardiology;  Laterality: N/A;    COLONOSCOPY W/ POLYPECTOMY      CORONARY ANGIOPLASTY WITH STENT PLACEMENT Left 06/03/2009    Left heart catheterization, 06/03/2009, Dr. Russo:  LVEF 45% to 50%.  Placement of overlapping Cypher drug-eluting stent 2.5 x 28 and 2.5 x 18 to the SVG to the obtuse marginal branch.  SVG to the PDA had a proximal stenosis estimated at 60% with a distal stenosis of 50% to 60%.    CORONARY ANGIOPLASTY WITH STENT PLACEMENT Left 07/06/2009    Left heart catheterization, 07/06/2009:  PTCA and stent placement in the mid PDA using a 2.25 x 12 mm Taxus drug-eluting stent with stent placement in the distal SVG to the PDA using a 2.5 x  "18 mm Cypher drug-eluting stent and stenting of the proximal SVG to the PDA using a 3.0 x 28 mm Cypher drug-eluting stent.    CORONARY ANGIOPLASTY WITH STENT PLACEMENT  04/23/2010    Cardiac catheterization for recurrent anginal symptoms, 04/23/2010, with PTCA and stent placement in the proximal SVG to the PDA using 3.0 x 28 mm Promus drug-eluting stent for in-stent restenosis.    CORONARY ANGIOPLASTY WITH STENT PLACEMENT Left 07/06/2010    Left heart catheterization, 07/06/2010, Dr. Russo:  EF 40% to 45%.  3.5 x 23 mm Promus drug-eluting stent in the proximal SVG to OM, 2.5 x 18 mm Promus drug-eluting stent to distal SVG to PDA due to in-stent restenosis.      CORONARY ANGIOPLASTY WITH STENT PLACEMENT Left 11/16/2010    Left heart catheterization, 11/16/2010, with placement of a 3.0 x 16 mm Taxus drug-eluting stent to the SVG to the RCA proximally and a 2.5 x 16 mm paclitaxel stent distally in the SVG to the RCA.    CORONARY ANGIOPLASTY WITH STENT PLACEMENT Left     Left heart catheterization, 3.0 x 24-mm Promus element drug-eluting stent to a 90% lesion in the mid portion of the SVG to the PDA.     CORONARY ANGIOPLASTY WITH STENT PLACEMENT Left 06/24/2014    Left heart catheterization for recurrent angina, 06/24/2014, Dr. Russo:  PTCA of the SVG to the PDA using a 3 x 12 mm NC TREK balloon.      CORONARY ARTERY BYPASS GRAFT      CABG x3, Dr. Peng, [facility], 2001.       Allergies   Allergen Reactions    Zocor [Simvastatin] Myalgia    Bisoprolol Other (See Comments)     Agitation      Byetta 10 Mcg Pen [Exenatide] Nausea Only     nausea    Crestor [Rosuvastatin Calcium] Myalgia    Metformin And Related Nausea Only    Plavix [Clopidogrel Bisulfate] Other (See Comments)     resistance       Objective   /70   Temp 97.7 °F (36.5 °C)   Ht 167.6 cm (66\")   Wt 107 kg (235 lb)   BMI 37.93 kg/m²    Physical Exam    Skin exam stable with no erythema, ecchymosis or rash.  No new " swelling.  No motor or sensory changes.  Distal pulse intact.    Large Joint Arthrocentesis: R knee  Date/Time: 7/24/2023 11:08 AM  Consent given by: patient  Site marked: site marked  Timeout: Immediately prior to procedure a time out was called to verify the correct patient, procedure, equipment, support staff and site/side marked as required   Supporting Documentation  Indications: pain   Procedure Details  Location: knee - R knee  Preparation: Patient was prepped and draped in the usual sterile fashion  Needle gauge: 21 G.  Approach: anterolateral  Medications administered: 25 mg sodium hyaluronate 25 MG/2.5ML  Patient tolerance: patient tolerated the procedure well with no immediate complications      Large Joint Arthrocentesis: L knee  Date/Time: 7/24/2023 11:09 AM  Consent given by: patient  Site marked: site marked  Timeout: Immediately prior to procedure a time out was called to verify the correct patient, procedure, equipment, support staff and site/side marked as required   Supporting Documentation  Indications: pain   Procedure Details  Location: knee - L knee  Preparation: Patient was prepped and draped in the usual sterile fashion  Needle gauge: 21 G.  Approach: anterolateral  Medications administered: 25 mg sodium hyaluronate 25 MG/2.5ML  Patient tolerance: patient tolerated the procedure well with no immediate complications        Assessment & Plan      Diagnosis Plan   1. Primary osteoarthritis of both knees        2. Arthralgia of both knees            Discussion of orthopaedic goals and activities and patient expressed appreciation.  Guided on proper techniques for mobility, strength, agility and/or conditioning exercises  Ice, heat, and/or modalities as beneficial  Watch for signs and symptoms of infection  Call or notify for any adverse effect from injection therapy    Recommendations:    Return in about 1 week (around 7/31/2023) for Supartz #3/B knees/office provided.  Patient agreeable to call  or return sooner for any concerns.

## 2023-07-24 NOTE — OUTREACH NOTE
AMI Week 1 Survey      Flowsheet Row Responses   [facility] patient discharged from? [facility]   Does the patient have one of the following disease processes/diagnoses(primary or secondary)? Acute MI (STEMI,NSTEMI)   Week 1 attempt successful? Yes   Call start time 1552   Call end time 1554   General alerts for this patient Patient is [place].   Discharge diagnosis Non-STEMI, POA   Person spoke with today (if not patient) and relationship Chely-spouse.   Meds reviewed with patient/caregiver? Yes   Is the patient having any side effects they believe may be caused by any medication additions or changes? No   Does the patient have all prescriptions related to this admission filled (includes statins,anticoagulants,HTN meds,anti-arrhythmia meds) Yes   Is the patient taking all medications as directed (includes completed medication regime)? Yes   Does the patient have a primary care provider?  Yes   Has the patient kept scheduled appointments due by today? Yes   Has home health visited the patient within 72 hours of discharge? N/A   Psychosocial issues? No   Did the patient receive a copy of their discharge instructions? Yes   Nursing interventions Reviewed instructions with patient   What is the patient's perception of their health status since discharge? Improving   Nursing interventions Nurse provided patient education   Is the patient/caregiver able to teach back signs and symptoms of when to call for help immediately: Sudden chest discomfort, Sudden discomfort in arms, back, neck or jaw, Shortness of breath at any time, Sudden sweating or clammy skin, Nausea or vomiting, Dizziness or lightheadedness, Irregular or rapid heart rate   Nursing interventions Nurse provided patient education   Is the patient/caregiver able to teach back lifestyle changes to help prevent MIs Heart healthy diet, Reducing stress   Is the patient/caregiver able to teach back ways to prevent a second heart attack: Take medications, Follow up  with MD   Is the patient/caregiver able to teach back the hierarchy of who to call/visit for symptoms/problems? PCP, Specialist, Home health nurse, Urgent Care, ED, 911 Yes   Week 1 call completed? Yes   Wrap up additional comments Wife reports Pt is doing bett er and cath site looks fine.   Call end time [phone]            NISHANT GORDON - Registered Nurse

## 2023-07-26 ENCOUNTER — OFFICE VISIT (OUTPATIENT)
Dept: PULMONOLOGY | Facility: CLINIC | Age: 78
End: 2023-07-26
Payer: MEDICARE

## 2023-07-26 VITALS
OXYGEN SATURATION: 97 % | DIASTOLIC BLOOD PRESSURE: 67 MMHG | HEART RATE: 58 BPM | RESPIRATION RATE: 18 BRPM | SYSTOLIC BLOOD PRESSURE: 120 MMHG | WEIGHT: 229.2 LBS | HEIGHT: 67 IN | BODY MASS INDEX: 35.97 KG/M2

## 2023-07-26 DIAGNOSIS — G47.33 OSA (OBSTRUCTIVE SLEEP APNEA): ICD-10-CM

## 2023-07-26 DIAGNOSIS — J45.40 MODERATE PERSISTENT ASTHMA WITHOUT COMPLICATION: Primary | ICD-10-CM

## 2023-07-26 DIAGNOSIS — G25.81 RESTLESS LEGS SYNDROME (RLS): ICD-10-CM

## 2023-07-26 DIAGNOSIS — J30.89 OTHER ALLERGIC RHINITIS: ICD-10-CM

## 2023-07-26 PROCEDURE — 3074F SYST BP LT 130 MM HG: CPT | Performed by: INTERNAL MEDICINE

## 2023-07-26 PROCEDURE — 99214 OFFICE O/P EST MOD 30 MIN: CPT | Performed by: INTERNAL MEDICINE

## 2023-07-26 PROCEDURE — 3078F DIAST BP <80 MM HG: CPT | Performed by: INTERNAL MEDICINE

## 2023-07-26 RX ORDER — PRAMIPEXOLE DIHYDROCHLORIDE 1 MG/1
1 TABLET ORAL NIGHTLY
Qty: 90 TABLET | Refills: 2 | Status: SHIPPED | OUTPATIENT
Start: 2023-07-26

## 2023-07-26 RX ORDER — FLUTICASONE PROPIONATE 50 MCG
1 SPRAY, SUSPENSION (ML) NASAL DAILY
Qty: 48 G | Refills: 2 | Status: SHIPPED | OUTPATIENT
Start: 2023-07-26

## 2023-07-26 RX ORDER — ALBUTEROL SULFATE 90 UG/1
2 AEROSOL, METERED RESPIRATORY (INHALATION) EVERY 4 HOURS PRN
Qty: 18 G | Refills: 5 | Status: SHIPPED | OUTPATIENT
Start: 2023-07-26

## 2023-07-26 NOTE — PROGRESS NOTES
"  Chief Complaint   Patient presents with    Sleeping Problem    Follow-up       Subjective   Donny Jerry is a 78 y.o. male.   Patient was evaluated today for follow up of asthma, allergic rhinitis and Sleep apnea.     Patient doesn't report any issues with the PAP device. The patient describes no significant issues with his mask either.     Patient says that the compliance with the use of the equipment is good.     Patient says that his symptoms of fatigue & daytime sleepiness have been helped greatly with the use of PAP, as prescribed.     He is taking Mirapex on a regular basis and says that it has definitely helped his symptoms.    Patient does not report any recent exacerbations requiring emergency room visits or hospitalizations, for asthma!     Patient is compliant with pulmonary medicines, as prescribed.     he is currently on Trelegy. he is using the rescue inhalers minimally.     Patient says that he has been using his nasal sprays on a regular basis and describes no significant ongoing issues other than occasional congestion.     He was recently hospitalized for myocardial infarction and says that he received \"2 stents\".  He does complain of occasional lower extremity swelling although feels that it is improving.    The following portions of the patient's history were reviewed and updated as appropriate: allergies, current medications, past family history, past medical history, past social history, and past surgical history.    Review of Systems   HENT:  Negative for sinus pressure, sneezing and sore throat.    Respiratory:  Positive for cough, chest tightness, shortness of breath and wheezing.    Psychiatric/Behavioral:  Negative for sleep disturbance.      Objective   Visit Vitals  /67   Pulse 58   Resp 18   Ht 170.2 cm (67\")   Wt 104 kg (229 lb 3.2 oz)   SpO2 97%   BMI 35.90 kg/m²       Physical Exam  Vitals reviewed.   Constitutional:       Appearance: He is well-developed.   HENT:      Head: " Atraumatic.      Mouth/Throat:      Comments: Oropharynx was crowded.  Neck:      Comments: Increased neck circumference noted.  Cardiovascular:      Rate and Rhythm: Normal rate and regular rhythm.      Comments: CABG scar noted.  Pulmonary:      Effort: Pulmonary effort is normal.      Breath sounds: Normal breath sounds. No wheezing.   Abdominal:      Comments: Significant truncal obesity noted.    Musculoskeletal:      Comments: Gait was slow.   Neurological:      Mental Status: He is alert and oriented to person, place, and time.       Assessment & Plan   Diagnoses and all orders for this visit:    1. Moderate persistent asthma without complication (Primary)  -     Fluticasone-Umeclidin-Vilant (Trelegy Ellipta) 200-62.5-25 MCG/ACT aerosol powder ; Inhale 1 puff Daily. Rinse mouth with water after use.  Dispense: 90 each; Refill: 2    2. USHA (obstructive sleep apnea)    3. Other allergic rhinitis    4. Restless legs syndrome (RLS)    Other orders  -     fluticasone (FLONASE) 50 MCG/ACT nasal spray; 1 spray into the nostril(s) as directed by provider Daily.  Dispense: 48 g; Refill: 2  -     albuterol sulfate  (90 Base) MCG/ACT inhaler; Inhale 2 puffs Every 4 (Four) Hours As Needed for Wheezing.  Dispense: 18 g; Refill: 5  -     pramipexole (Mirapex) 1 MG tablet; Take 1 tablet by mouth Every Night.  Dispense: 90 tablet; Refill: 2           Return in about 8 months (around 3/26/2024) for Recheck, For Carolann Chandler).    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  Images reviewed personally.   Results for orders placed during the hospital encounter of 07/17/23    XR Chest 1  View    Narrative  PROCEDURE: XR CHEST 1 VW-    HISTORY: Chest Pain Triage Protocol, midsternal chest pain    COMPARISON: July 14, 2023.    FINDINGS: The heart is mildly enlarged. The patient is status post  median sternotomy.  There is pulmonary vascular congestion. Bilateral  interstitial disease is possibly due to edema. Findings are similar to  the prior exam.  There is no pneumothorax. There are no acute osseous  abnormalities.    Impression  Mild CHF, similar to the prior exam.          Images were reviewed, interpreted, and dictated by Dr. Mayra Da Silva MD  Transcribed by Saba Beltran PA-C.    This report was signed and finalized on 7/18/2023 9:43 AM by Mayra Da Silva MD.        Last CT scan results was reviewed in great detail with the patient.  Results for orders placed during the hospital encounter of 09/06/22    CT Chest Hi Resolution Diagnostic    Narrative  CT SCAN OF THE CHEST WITHOUT CONTRAST 9/6/2022 10:55 AM    HISTORY:  Interstitial lung disease; J98.4-Other disorders of lung    PROCEDURE:  Axial CT images were obtained from the lung apex to the mid abdomen  without IV contrast. Coronal and sagittal reformatted images generated  from the axial data set and provided for interpretation. This study was  performed with techniques to keep radiation doses as low as reasonably  achievable, (ALARA). Individualized dose reduction techniques using  automated exposure control or adjustment of mA and/or kV according to  the patient size were employed. The exam was performed during  inspiration, expiration and prone positions.    COMPARISON:  Radiographs 08/11/2018.    FINDINGS:    CHEST:  Lack of IV contrast somewhat limits evaluation of the thoracic viscera.    Lungs/Pleura:  No evidence of interstitial lung disease. No evidence of traction  bronchiectasis, honeycombing, reticulation, or parenchymal architectural  distortion or air trapping/mosaic attenuation. Clear lungs without  suspicious pulmonary  nodules or consolidation. Minimal dependent lung  atelectasis, which is resolved on the prone imaging. Scattered calcific  pulmonary granuloma. No pneumothorax or pleural effusion.    Heart, vessels and mediastinum:  The heart is normal in size. No pericardial effusion. The aorta and  pulmonary arteries are normal in caliber. Coronary artery disease status  post CABG.    Lymph nodes:  No pathologically enlarged thoracic lymph nodes within the limits of a  noncontrast-enhanced examination.    Chest wall:  No acute findings.    Bones:  No acute fracture.    Upper abdomen:  No acute findings in the upper abdomen.    Impression  No acute findings in the chest. No evidence of interstitial lung  disease.          Images personally reviewed, interpreted and dictated by RAMÓN Falcon..      242.67 mGy.cm      This study was performed with techniques to keep radiation doses as low  as reasonably achievable (ALARA). Individualized dose reduction  techniques using automated exposure control or adjustment of mA and/or  kV according to the patient size were employed.              This report was signed and finalized on 9/6/2022 12:53 PM by RAMÓN Falcon.    I also reviewed his last echocardiogram and shared the results with him.   Results for orders placed during the hospital encounter of 07/17/23    Adult Transthoracic Echo Complete W/ Cont if Necessary Per Protocol    Interpretation Summary    Left ventricular systolic function is normal. Estimated left ventricular EF = 50% Left ventricular ejection fraction appears to be 46 - 50%.    Left ventricular wall thickness is consistent with mild concentric hypertrophy.    Left ventricular diastolic function is consistent with (grade II w/high LAP) pseudonormalization.    The left atrial cavity is mildly dilated.    Left atrial volume is mildly increased.    Mild aortic valve stenosis is present.    Estimated right ventricular systolic pressure from tricuspid  regurgitation is normal (<35 mmHg).      Results for orders placed during the hospital encounter of 06/29/23    Adult Transthoracic Echo Complete W/ Cont if Necessary Per Protocol    Interpretation Summary  1.  Normal left ventricular size with mildly reduced LV systolic function, LVEF 45-50%.  2.  Mild concentric LVH.  3.  Grade 1 diastolic dysfunction.  4.  Normal right ventricular size and systolic function.  5.  Normal left atrial volume index.  6.  Mild calcification aortic valve without significant stenosis.  7.  Mild PI.      I have also reviewed laboratory data.  Lab Results   Component Value Date    EOSABS 0.17 07/18/2023    EOSABS 0.16 07/17/2023    EOSABS 0.16 07/14/2023    &    Lab Results   Component Value Date    CO2 22.2 07/19/2023       Lab Results   Component Value Date    PHART 7.442 04/23/2023    ZGG0RIF 35.6 04/23/2023    PO2ART 57.8 (L) 04/23/2023    D0HOPJVZ 91.7 (L) 04/23/2023    CARBOXYHGB 1.0 04/23/2023     ===========================  ===========================    It appears that his symptoms of asthma are under good control with the current regimen.    Patient's medications for underlying asthma were reviewed in great detail.    Compliance with medications stressed.     Side effects of prescribed medications discussed with the patient.    The need to continue to be aware of triggers that may cause asthma exacerbation versus progression of disease, was also discussed.    I have discussed asthma action plan with him.    The patient was asked to call this office if the symptoms worsen.    Patient was advised to continue his nasal spray, especially given improvement in symptoms overall.    Sleep study performed in June 2012  AHI was 54 / hour.     Latest PAP device provided in June 2022  DME company: [employer]    PAP settings: 15  Mask type: Dream wear FFM.     Compliance data was obtained from the [employer] device/DME company.    Continue PAP device on a regular basis, at least 4 hours or more per  night.    Sleep hygiene measures were discussed    Weight loss advised.      Dictated utilizing Dragon dictation.    This document was electronically signed by Hansel Bob MD on 07/26/23 at 10:26 EDT

## 2023-07-29 DIAGNOSIS — R39.11 BENIGN PROSTATIC HYPERPLASIA WITH URINARY HESITANCY: ICD-10-CM

## 2023-07-29 DIAGNOSIS — N40.1 BENIGN PROSTATIC HYPERPLASIA WITH URINARY HESITANCY: ICD-10-CM

## 2023-07-31 ENCOUNTER — TELEPHONE (OUTPATIENT)
Dept: PRIMARY CARE CLINIC | Age: 78
End: 2023-07-31

## 2023-07-31 ENCOUNTER — CLINICAL SUPPORT (OUTPATIENT)
Dept: ORTHOPEDIC SURGERY | Facility: CLINIC | Age: 78
End: 2023-07-31
Payer: MEDICARE

## 2023-07-31 VITALS
BODY MASS INDEX: 35.94 KG/M2 | WEIGHT: 229 LBS | HEIGHT: 67 IN | DIASTOLIC BLOOD PRESSURE: 67 MMHG | SYSTOLIC BLOOD PRESSURE: 140 MMHG | HEART RATE: 68 BPM

## 2023-07-31 DIAGNOSIS — M25.561 ARTHRALGIA OF BOTH KNEES: ICD-10-CM

## 2023-07-31 DIAGNOSIS — M17.0 PRIMARY OSTEOARTHRITIS OF BOTH KNEES: Primary | ICD-10-CM

## 2023-07-31 DIAGNOSIS — M25.562 ARTHRALGIA OF BOTH KNEES: ICD-10-CM

## 2023-07-31 NOTE — TELEPHONE ENCOUNTER
Lawyer Trejo said Dion Lua needs an rx for a Rolator walker  Wants sent to [facility], please and thank you

## 2023-08-01 RX ORDER — TAMSULOSIN HYDROCHLORIDE 0.4 MG/1
CAPSULE ORAL
Qty: 90 CAPSULE | Refills: 1 | Status: SHIPPED | OUTPATIENT
Start: 2023-08-01

## 2023-08-02 ENCOUNTER — READMISSION MANAGEMENT (OUTPATIENT)
Dept: CALL CENTER | Facility: HOSPITAL | Age: 78
End: 2023-08-02
Payer: MEDICARE

## 2023-08-07 ENCOUNTER — CLINICAL SUPPORT (OUTPATIENT)
Dept: ORTHOPEDIC SURGERY | Facility: CLINIC | Age: 78
End: 2023-08-07
Payer: MEDICARE

## 2023-08-07 VITALS
HEART RATE: 57 BPM | WEIGHT: 229 LBS | BODY MASS INDEX: 35.94 KG/M2 | DIASTOLIC BLOOD PRESSURE: 57 MMHG | SYSTOLIC BLOOD PRESSURE: 170 MMHG | HEIGHT: 67 IN

## 2023-08-07 DIAGNOSIS — M17.0 PRIMARY OSTEOARTHRITIS OF BOTH KNEES: Primary | ICD-10-CM

## 2023-08-07 DIAGNOSIS — M25.562 ARTHRALGIA OF BOTH KNEES: ICD-10-CM

## 2023-08-07 DIAGNOSIS — M25.561 ARTHRALGIA OF BOTH KNEES: ICD-10-CM

## 2023-08-07 NOTE — PROGRESS NOTES
Subjective   Donny Jerry is a 78 y.o. male here today for injection therapy.    Chief Complaint   Patient presents with    Left Knee - Injections     Supartz injection # 4 bilateral knee    Right Knee - Injections   Patient presents for bilateral knee visco injection # 4      Past Medical History:   Diagnosis Date    Benign hypertension     Coronary artery disease     Depression     Enlarged prostate     Enlarged prostate with anticipated TURP with Dr. Bernal.    GERD (gastroesophageal reflux disease)     Heart attack     Hypercholesterolemia     Myocardial infarction     Obesity     Obstructive sleep apnea on CPAP     Type 2 diabetes mellitus          Past Surgical History:   Procedure Laterality Date    CARDIAC CATHETERIZATION      CARDIAC CATHETERIZATION Left 10/19/2021    Procedure: Left Heart Cath;  Surgeon: Liz Russo MD;  Location:  [facility];  Service: Cardiology;  Laterality: Left;    CARDIAC CATHETERIZATION N/A 7/18/2023    Procedure: Coronary angiography;  Surgeon: Rupesh Parr MD;  Location:  [facility];  Service: Cardiology;  Laterality: N/A;    CARDIAC CATHETERIZATION N/A 7/18/2023    Procedure: Percutaneous Coronary Intervention;  Surgeon: Rupesh Parr MD;  Location:  [facility];  Service: Cardiology;  Laterality: N/A;    COLONOSCOPY W/ POLYPECTOMY      CORONARY ANGIOPLASTY WITH STENT PLACEMENT Left 06/03/2009    Left heart catheterization, 06/03/2009, Dr. Russo:  LVEF 45% to 50%.  Placement of overlapping Cypher drug-eluting stent 2.5 x 28 and 2.5 x 18 to the SVG to the obtuse marginal branch.  SVG to the PDA had a proximal stenosis estimated at 60% with a distal stenosis of 50% to 60%.    CORONARY ANGIOPLASTY WITH STENT PLACEMENT Left 07/06/2009    Left heart catheterization, 07/06/2009:  PTCA and stent placement in the mid PDA using a 2.25 x 12 mm Taxus drug-eluting stent with stent placement in the distal  "SVG to the PDA using a 2.5 x 18 mm Cypher drug-eluting stent and stenting of the proximal SVG to the PDA using a 3.0 x 28 mm Cypher drug-eluting stent.    CORONARY ANGIOPLASTY WITH STENT PLACEMENT  04/23/2010    Cardiac catheterization for recurrent anginal symptoms, 04/23/2010, with PTCA and stent placement in the proximal SVG to the PDA using 3.0 x 28 mm Promus drug-eluting stent for in-stent restenosis.    CORONARY ANGIOPLASTY WITH STENT PLACEMENT Left 07/06/2010    Left heart catheterization, 07/06/2010, Dr. Russo:  EF 40% to 45%.  3.5 x 23 mm Promus drug-eluting stent in the proximal SVG to OM, 2.5 x 18 mm Promus drug-eluting stent to distal SVG to PDA due to in-stent restenosis.      CORONARY ANGIOPLASTY WITH STENT PLACEMENT Left 11/16/2010    Left heart catheterization, 11/16/2010, with placement of a 3.0 x 16 mm Taxus drug-eluting stent to the SVG to the RCA proximally and a 2.5 x 16 mm paclitaxel stent distally in the SVG to the RCA.    CORONARY ANGIOPLASTY WITH STENT PLACEMENT Left     Left heart catheterization, 3.0 x 24-mm Promus element drug-eluting stent to a 90% lesion in the mid portion of the SVG to the PDA.     CORONARY ANGIOPLASTY WITH STENT PLACEMENT Left 06/24/2014    Left heart catheterization for recurrent angina, 06/24/2014, Dr. Russo:  PTCA of the SVG to the PDA using a 3 x 12 mm NC TREK balloon.      CORONARY ARTERY BYPASS GRAFT      CABG x3, Dr. Peng, [facility], 2001.       Allergies   Allergen Reactions    Zocor [Simvastatin] Myalgia    Bisoprolol Other (See Comments)     Agitation      Byetta 10 Mcg Pen [Exenatide] Nausea Only     nausea    Crestor [Rosuvastatin Calcium] Myalgia    Metformin And Related Nausea Only    Plavix [Clopidogrel Bisulfate] Other (See Comments)     resistance       Objective   /57 (BP Location: Left arm, Patient Position: Sitting, Cuff Size: Adult)   Pulse 57   Ht 170.2 cm (67\")   Wt 104 kg (229 lb)   BMI 35.87 kg/mý  "   Physical Exam    Skin exam stable with no erythema, ecchymosis or rash.  No new swelling.  No motor or sensory changes.  Distal pulse intact.    Large Joint Arthrocentesis: R knee  Date/Time: 8/7/2023 11:01 AM  Consent given by: patient  Site marked: site marked  Timeout: Immediately prior to procedure a time out was called to verify the correct patient, procedure, equipment, support staff and site/side marked as required   Supporting Documentation  Indications: pain   Procedure Details  Location: knee - R knee  Preparation: Patient was prepped and draped in the usual sterile fashion  Needle size: 22 G  Approach: anterolateral  Medications administered: 25 mg sodium hyaluronate 25 MG/2.5ML  Patient tolerance: patient tolerated the procedure well with no immediate complications      Large Joint Arthrocentesis: L knee  Date/Time: 8/7/2023 11:02 AM  Consent given by: patient  Site marked: site marked  Timeout: Immediately prior to procedure a time out was called to verify the correct patient, procedure, equipment, support staff and site/side marked as required   Supporting Documentation  Indications: pain   Procedure Details  Location: knee - L knee  Preparation: Patient was prepped and draped in the usual sterile fashion  Needle size: 22 G  Approach: anterolateral  Medications administered: 25 mg sodium hyaluronate 25 MG/2.5ML  Patient tolerance: patient tolerated the procedure well with no immediate complications        Assessment & Plan      Diagnosis Plan   1. Primary osteoarthritis of both knees        2. Arthralgia of both knees            Discussion of orthopaedic goals and activities and patient expressed appreciation.  Guided on proper techniques for mobility, strength, agility and/or conditioning exercises  Ice, heat, and/or modalities as beneficial  Watch for signs and symptoms of infection  Call or notify for any adverse effect from injection therapy    Recommendations:    Return in about 1 week (around  8/14/2023), or Supartz injection # 5 bilateral knee.  Patient agreeable to call or return sooner for any concerns.

## 2023-08-08 ENCOUNTER — OFFICE VISIT (OUTPATIENT)
Dept: PRIMARY CARE CLINIC | Age: 78
End: 2023-08-08
Payer: MEDICARE

## 2023-08-08 VITALS
OXYGEN SATURATION: 95 % | BODY MASS INDEX: 35.44 KG/M2 | HEIGHT: 67 IN | SYSTOLIC BLOOD PRESSURE: 131 MMHG | DIASTOLIC BLOOD PRESSURE: 61 MMHG | RESPIRATION RATE: 16 BRPM | TEMPERATURE: 97.8 F | WEIGHT: 225.8 LBS | HEART RATE: 72 BPM

## 2023-08-08 DIAGNOSIS — I50.22 CHRONIC SYSTOLIC (CONGESTIVE) HEART FAILURE (HCC): ICD-10-CM

## 2023-08-08 DIAGNOSIS — I10 ESSENTIAL HYPERTENSION: ICD-10-CM

## 2023-08-08 DIAGNOSIS — I21.4 NON-STEMI (NON-ST ELEVATED MYOCARDIAL INFARCTION) (HCC): Primary | ICD-10-CM

## 2023-08-08 PROCEDURE — G8427 DOCREV CUR MEDS BY ELIG CLIN: HCPCS | Performed by: NURSE PRACTITIONER

## 2023-08-08 PROCEDURE — 1036F TOBACCO NON-USER: CPT | Performed by: NURSE PRACTITIONER

## 2023-08-08 PROCEDURE — G8417 CALC BMI ABV UP PARAM F/U: HCPCS | Performed by: NURSE PRACTITIONER

## 2023-08-08 PROCEDURE — 3078F DIAST BP <80 MM HG: CPT | Performed by: NURSE PRACTITIONER

## 2023-08-08 PROCEDURE — 99214 OFFICE O/P EST MOD 30 MIN: CPT | Performed by: NURSE PRACTITIONER

## 2023-08-08 PROCEDURE — 3074F SYST BP LT 130 MM HG: CPT | Performed by: NURSE PRACTITIONER

## 2023-08-08 PROCEDURE — 1123F ACP DISCUSS/DSCN MKR DOCD: CPT | Performed by: NURSE PRACTITIONER

## 2023-08-08 RX ORDER — AZILSARTAN KAMEDOXOMIL 40 MG/1
40 TABLET ORAL DAILY
COMMUNITY
Start: 2023-07-12

## 2023-08-08 RX ORDER — RANOLAZINE 500 MG/1
TABLET, EXTENDED RELEASE ORAL
COMMUNITY
Start: 2023-07-19

## 2023-08-08 RX ORDER — ASPIRIN 81 MG/1
81 TABLET ORAL DAILY
COMMUNITY

## 2023-08-08 ASSESSMENT — ENCOUNTER SYMPTOMS
RESPIRATORY NEGATIVE: 1
EYES NEGATIVE: 1
ALLERGIC/IMMUNOLOGIC NEGATIVE: 1
GASTROINTESTINAL NEGATIVE: 1

## 2023-08-09 RX ORDER — FUROSEMIDE 40 MG/1
40 TABLET ORAL DAILY
Qty: 30 TABLET | Refills: 0 | OUTPATIENT
Start: 2023-08-09

## 2023-08-09 RX ORDER — FUROSEMIDE 40 MG/1
40 TABLET ORAL DAILY
Qty: 30 TABLET | Refills: 0 | Status: SHIPPED | OUTPATIENT
Start: 2023-08-09

## 2023-08-13 DIAGNOSIS — Z79.4 TYPE 2 DIABETES MELLITUS WITHOUT COMPLICATION, WITH LONG-TERM CURRENT USE OF INSULIN (HCC): ICD-10-CM

## 2023-08-13 DIAGNOSIS — E11.9 TYPE 2 DIABETES MELLITUS WITHOUT COMPLICATION, WITH LONG-TERM CURRENT USE OF INSULIN (HCC): ICD-10-CM

## 2023-08-14 ENCOUNTER — CLINICAL SUPPORT (OUTPATIENT)
Dept: ORTHOPEDIC SURGERY | Facility: CLINIC | Age: 78
End: 2023-08-14
Payer: MEDICARE

## 2023-08-14 VITALS
BODY MASS INDEX: 35.94 KG/M2 | SYSTOLIC BLOOD PRESSURE: 164 MMHG | HEART RATE: 65 BPM | HEIGHT: 67 IN | DIASTOLIC BLOOD PRESSURE: 51 MMHG | WEIGHT: 229 LBS

## 2023-08-14 DIAGNOSIS — M17.0 PRIMARY OSTEOARTHRITIS OF BOTH KNEES: Primary | ICD-10-CM

## 2023-08-14 DIAGNOSIS — M25.562 ARTHRALGIA OF BOTH KNEES: ICD-10-CM

## 2023-08-14 DIAGNOSIS — M25.561 ARTHRALGIA OF BOTH KNEES: ICD-10-CM

## 2023-08-14 RX ORDER — FLURBIPROFEN SODIUM 0.3 MG/ML
SOLUTION/ DROPS OPHTHALMIC
Qty: 100 EACH | Refills: 3 | Status: SHIPPED | OUTPATIENT
Start: 2023-08-14

## 2023-08-14 NOTE — PROGRESS NOTES
Subjective   Donny Jerry is a 78 y.o. male here today for injection therapy.    Chief Complaint   Patient presents with    Left Knee - Injections     Supartz #5.    Right Knee - Injections     Patient presents for visco injection # 5 ashley. Knee    Past Medical History:   Diagnosis Date    Benign hypertension     Coronary artery disease     Depression     Enlarged prostate     Enlarged prostate with anticipated TURP with Dr. Bernal.    GERD (gastroesophageal reflux disease)     Heart attack     Hypercholesterolemia     Myocardial infarction     Obesity     Obstructive sleep apnea on CPAP     Type 2 diabetes mellitus          Past Surgical History:   Procedure Laterality Date    CARDIAC CATHETERIZATION      CARDIAC CATHETERIZATION Left 10/19/2021    Procedure: Left Heart Cath;  Surgeon: Liz Russo MD;  Location:  [facility];  Service: Cardiology;  Laterality: Left;    CARDIAC CATHETERIZATION N/A 7/18/2023    Procedure: Coronary angiography;  Surgeon: Rupesh Parr MD;  Location:  [facility];  Service: Cardiology;  Laterality: N/A;    CARDIAC CATHETERIZATION N/A 7/18/2023    Procedure: Percutaneous Coronary Intervention;  Surgeon: Rupesh Parr MD;  Location:  [facility];  Service: Cardiology;  Laterality: N/A;    COLONOSCOPY W/ POLYPECTOMY      CORONARY ANGIOPLASTY WITH STENT PLACEMENT Left 06/03/2009    Left heart catheterization, 06/03/2009, Dr. Russo:  LVEF 45% to 50%.  Placement of overlapping Cypher drug-eluting stent 2.5 x 28 and 2.5 x 18 to the SVG to the obtuse marginal branch.  SVG to the PDA had a proximal stenosis estimated at 60% with a distal stenosis of 50% to 60%.    CORONARY ANGIOPLASTY WITH STENT PLACEMENT Left 07/06/2009    Left heart catheterization, 07/06/2009:  PTCA and stent placement in the mid PDA using a 2.25 x 12 mm Taxus drug-eluting stent with stent placement in the distal SVG to the PDA using a 2.5 x 18  "mm Cypher drug-eluting stent and stenting of the proximal SVG to the PDA using a 3.0 x 28 mm Cypher drug-eluting stent.    CORONARY ANGIOPLASTY WITH STENT PLACEMENT  04/23/2010    Cardiac catheterization for recurrent anginal symptoms, 04/23/2010, with PTCA and stent placement in the proximal SVG to the PDA using 3.0 x 28 mm Promus drug-eluting stent for in-stent restenosis.    CORONARY ANGIOPLASTY WITH STENT PLACEMENT Left 07/06/2010    Left heart catheterization, 07/06/2010, Dr. Russo:  EF 40% to 45%.  3.5 x 23 mm Promus drug-eluting stent in the proximal SVG to OM, 2.5 x 18 mm Promus drug-eluting stent to distal SVG to PDA due to in-stent restenosis.      CORONARY ANGIOPLASTY WITH STENT PLACEMENT Left 11/16/2010    Left heart catheterization, 11/16/2010, with placement of a 3.0 x 16 mm Taxus drug-eluting stent to the SVG to the RCA proximally and a 2.5 x 16 mm paclitaxel stent distally in the SVG to the RCA.    CORONARY ANGIOPLASTY WITH STENT PLACEMENT Left     Left heart catheterization, 3.0 x 24-mm Promus element drug-eluting stent to a 90% lesion in the mid portion of the SVG to the PDA.     CORONARY ANGIOPLASTY WITH STENT PLACEMENT Left 06/24/2014    Left heart catheterization for recurrent angina, 06/24/2014, Dr. Russo:  PTCA of the SVG to the PDA using a 3 x 12 mm NC TREK balloon.      CORONARY ARTERY BYPASS GRAFT      CABG x3, Dr. Peng, [facility], 2001.       Allergies   Allergen Reactions    Zocor [Simvastatin] Myalgia    Bisoprolol Other (See Comments)     Agitation      Byetta 10 Mcg Pen [Exenatide] Nausea Only     nausea    Crestor [Rosuvastatin Calcium] Myalgia    Metformin And Related Nausea Only    Plavix [Clopidogrel Bisulfate] Other (See Comments)     resistance       Objective   /51   Pulse 65   Ht 170.2 cm (67.01\")   Wt 104 kg (229 lb)   BMI 35.86 kg/mý    Physical Exam    Skin exam stable with no erythema, ecchymosis or rash.  No new swelling.  No motor or " sensory changes.  Distal pulse intact.    Large Joint Arthrocentesis: R knee  Date/Time: 8/14/2023 11:00 AM  Consent given by: patient  Site marked: site marked  Timeout: Immediately prior to procedure a time out was called to verify the correct patient, procedure, equipment, support staff and site/side marked as required   Supporting Documentation  Indications: pain   Procedure Details  Location: knee - R knee  Preparation: Patient was prepped and draped in the usual sterile fashion  Needle size: 22 G  Approach: anterolateral  Medications administered: 25 mg sodium hyaluronate 25 MG/2.5ML  Patient tolerance: patient tolerated the procedure well with no immediate complications      Large Joint Arthrocentesis: L knee  Date/Time: 8/14/2023 11:01 AM  Consent given by: patient  Site marked: site marked  Timeout: Immediately prior to procedure a time out was called to verify the correct patient, procedure, equipment, support staff and site/side marked as required   Supporting Documentation  Indications: pain   Procedure Details  Location: knee - L knee  Preparation: Patient was prepped and draped in the usual sterile fashion  Needle size: 22 G  Approach: anterolateral  Medications administered: 25 mg sodium hyaluronate 25 MG/2.5ML  Patient tolerance: patient tolerated the procedure well with no immediate complications      Assessment & Plan      Diagnosis Plan   1. Primary osteoarthritis of both knees        2. Arthralgia of both knees            Discussion of orthopaedic goals and activities and patient expressed appreciation.  Guided on proper techniques for mobility, strength, agility and/or conditioning exercises  Ice, heat, and/or modalities as beneficial  Watch for signs and symptoms of infection  Call or notify for any adverse effect from injection therapy    Recommendations:  Follow up in 6 months or prn    Patient agreeable to call or return sooner for any concerns.

## 2023-08-21 ENCOUNTER — TELEPHONE (OUTPATIENT)
Dept: CARDIOLOGY | Facility: CLINIC | Age: 78
End: 2023-08-21
Payer: MEDICARE

## 2023-08-21 RX ORDER — RANOLAZINE 500 MG/1
500 TABLET, EXTENDED RELEASE ORAL EVERY 12 HOURS SCHEDULED
Qty: 60 TABLET | Refills: 3 | Status: SHIPPED | OUTPATIENT
Start: 2023-08-21 | End: 2023-12-19

## 2023-08-21 NOTE — TELEPHONE ENCOUNTER
Caller: Chely Jerry    Relationship: Emergency Contact    Best call back number: [phone]    What is the best time to reach you: ANY    Who are you requesting to speak with (clinical staff, provider,  specific staff member): ANY    What was the call regarding: PT IS ALMOST OUT OF RANOLAZINE 500MG AND WOULD LIKE TO KNOW IF WE CAN START REFILLING IT. IT SAYS NO REFILLS ON THE BOTTLE AND  IN EPIC. WIFE WOULD LIKE A CALL BACK. PT WILL BE OUT OF MEDICATION THIS WEEK AND BABAR TOLD HIM TO KEEP TAKING THAT MEDICATION AFTER HAVING STENT PUT IN    Is it okay if the provider responds through MyChart: PHONE CALL

## 2023-09-12 DIAGNOSIS — E11.42 TYPE 2 DIABETES MELLITUS WITH DIABETIC POLYNEUROPATHY, WITHOUT LONG-TERM CURRENT USE OF INSULIN (HCC): ICD-10-CM

## 2023-09-12 RX ORDER — DULAGLUTIDE 0.75 MG/.5ML
INJECTION, SOLUTION SUBCUTANEOUS
Qty: 6 ML | Refills: 3 | Status: SHIPPED | OUTPATIENT
Start: 2023-09-12

## 2023-09-12 RX ORDER — INSULIN DEGLUDEC 200 U/ML
INJECTION, SOLUTION SUBCUTANEOUS
Qty: 54 ML | Refills: 1 | Status: SHIPPED | OUTPATIENT
Start: 2023-09-12

## 2023-09-21 RX ORDER — FUROSEMIDE 40 MG/1
40 TABLET ORAL DAILY
Qty: 30 TABLET | Refills: 0 | Status: SHIPPED | OUTPATIENT
Start: 2023-09-21

## 2023-09-25 RX ORDER — RANOLAZINE 500 MG/1
500 TABLET, EXTENDED RELEASE ORAL EVERY 12 HOURS SCHEDULED
Qty: 60 TABLET | Refills: 5 | Status: SHIPPED | OUTPATIENT
Start: 2023-09-25 | End: 2024-03-23

## 2023-09-26 DIAGNOSIS — J45.40 MODERATE PERSISTENT ASTHMA WITHOUT COMPLICATION: ICD-10-CM

## 2023-09-26 RX ORDER — FLUTICASONE FUROATE, UMECLIDINIUM BROMIDE AND VILANTEROL TRIFENATATE 200; 62.5; 25 UG/1; UG/1; UG/1
1 POWDER RESPIRATORY (INHALATION) DAILY
Qty: 60 EACH | Refills: 2 | Status: SHIPPED | OUTPATIENT
Start: 2023-09-26

## 2023-09-26 NOTE — TELEPHONE ENCOUNTER
Caller: Chely Jerry    Relationship: Emergency Contact    Best call back number: [phone] (home)       Requested Prescriptions:   Requested Prescriptions     Pending Prescriptions Disp Refills    Fluticasone-Umeclidin-Vilant (Trelegy Ellipta) 200-62.5-25 MCG/ACT aerosol powder  90 each 2     Sig: Inhale 1 puff Daily. Rinse mouth with water after use.        Pharmacy where request should be sent:  [facility], 3 MONTHS SUPPLY    Last office visit with prescribing clinician: 7/26/2023   Last telemedicine visit with prescribing clinician: Visit date not found   Next office visit with prescribing clinician: Visit date not found     Additional details provided by patient:     Does the patient have less than a 3 day supply:  [] Yes  [x] No    Would you like a call back once the refill request has been completed: [] Yes [x] No    If the office needs to give you a call back, can they leave a voicemail: [] Yes [x] No    Hamilton Hawk   09/26/23 10:35 EDT

## 2023-10-09 ENCOUNTER — OFFICE VISIT (OUTPATIENT)
Dept: PRIMARY CARE CLINIC | Age: 78
End: 2023-10-09
Payer: MEDICARE

## 2023-10-09 VITALS
BODY MASS INDEX: 35.53 KG/M2 | OXYGEN SATURATION: 96 % | DIASTOLIC BLOOD PRESSURE: 54 MMHG | WEIGHT: 226.4 LBS | RESPIRATION RATE: 16 BRPM | HEIGHT: 67 IN | HEART RATE: 72 BPM | SYSTOLIC BLOOD PRESSURE: 113 MMHG | TEMPERATURE: 98.1 F

## 2023-10-09 DIAGNOSIS — R10.84 COLICKY ABDOMINAL PAIN: ICD-10-CM

## 2023-10-09 DIAGNOSIS — N18.32 CHRONIC RENAL FAILURE, STAGE 3B (HCC): ICD-10-CM

## 2023-10-09 DIAGNOSIS — E11.42 TYPE 2 DIABETES MELLITUS WITH DIABETIC POLYNEUROPATHY, WITHOUT LONG-TERM CURRENT USE OF INSULIN (HCC): Primary | ICD-10-CM

## 2023-10-09 DIAGNOSIS — E55.9 VITAMIN D DEFICIENCY: ICD-10-CM

## 2023-10-09 DIAGNOSIS — I10 ESSENTIAL HYPERTENSION: ICD-10-CM

## 2023-10-09 DIAGNOSIS — Z13.29 THYROID DISORDER SCREEN: ICD-10-CM

## 2023-10-09 DIAGNOSIS — I50.22 CHRONIC SYSTOLIC (CONGESTIVE) HEART FAILURE (HCC): ICD-10-CM

## 2023-10-09 PROCEDURE — 3051F HG A1C>EQUAL 7.0%<8.0%: CPT | Performed by: NURSE PRACTITIONER

## 2023-10-09 PROCEDURE — 1036F TOBACCO NON-USER: CPT | Performed by: NURSE PRACTITIONER

## 2023-10-09 PROCEDURE — G8427 DOCREV CUR MEDS BY ELIG CLIN: HCPCS | Performed by: NURSE PRACTITIONER

## 2023-10-09 PROCEDURE — 3078F DIAST BP <80 MM HG: CPT | Performed by: NURSE PRACTITIONER

## 2023-10-09 PROCEDURE — 1123F ACP DISCUSS/DSCN MKR DOCD: CPT | Performed by: NURSE PRACTITIONER

## 2023-10-09 PROCEDURE — G8417 CALC BMI ABV UP PARAM F/U: HCPCS | Performed by: NURSE PRACTITIONER

## 2023-10-09 PROCEDURE — 3074F SYST BP LT 130 MM HG: CPT | Performed by: NURSE PRACTITIONER

## 2023-10-09 PROCEDURE — 99214 OFFICE O/P EST MOD 30 MIN: CPT | Performed by: NURSE PRACTITIONER

## 2023-10-09 PROCEDURE — G8484 FLU IMMUNIZE NO ADMIN: HCPCS | Performed by: NURSE PRACTITIONER

## 2023-10-09 RX ORDER — DICYCLOMINE HYDROCHLORIDE 10 MG/1
10 CAPSULE ORAL
Qty: 90 CAPSULE | Refills: 1 | Status: SHIPPED | OUTPATIENT
Start: 2023-10-09 | End: 2023-11-08

## 2023-10-09 RX ORDER — RANOLAZINE 500 MG/1
TABLET, EXTENDED RELEASE ORAL
Qty: 180 TABLET | Refills: 3 | Status: SHIPPED | OUTPATIENT
Start: 2023-10-09

## 2023-10-09 ASSESSMENT — ENCOUNTER SYMPTOMS
NAUSEA: 1
ALLERGIC/IMMUNOLOGIC NEGATIVE: 1
EYES NEGATIVE: 1
RESPIRATORY NEGATIVE: 1

## 2023-10-20 DIAGNOSIS — N40.1 BENIGN PROSTATIC HYPERPLASIA WITH URINARY HESITANCY: ICD-10-CM

## 2023-10-20 DIAGNOSIS — R39.11 BENIGN PROSTATIC HYPERPLASIA WITH URINARY HESITANCY: ICD-10-CM

## 2023-10-20 RX ORDER — TAMSULOSIN HYDROCHLORIDE 0.4 MG/1
CAPSULE ORAL
Qty: 90 CAPSULE | Refills: 1 | Status: SHIPPED | OUTPATIENT
Start: 2023-10-20

## 2023-10-20 RX ORDER — PRAMIPEXOLE DIHYDROCHLORIDE 1 MG/1
1 TABLET ORAL NIGHTLY
Qty: 90 TABLET | Refills: 2 | Status: SHIPPED | OUTPATIENT
Start: 2023-10-20

## 2023-11-27 DIAGNOSIS — K21.9 GASTROESOPHAGEAL REFLUX DISEASE, UNSPECIFIED WHETHER ESOPHAGITIS PRESENT: ICD-10-CM

## 2023-11-28 RX ORDER — PANTOPRAZOLE SODIUM 40 MG/1
TABLET, DELAYED RELEASE ORAL
Qty: 180 TABLET | Refills: 1 | Status: SHIPPED | OUTPATIENT
Start: 2023-11-28

## 2023-12-07 ENCOUNTER — HOSPITAL ENCOUNTER (OUTPATIENT)
Facility: HOSPITAL | Age: 78
Discharge: HOME | End: 2023-12-07
Payer: MEDICARE

## 2023-12-07 DIAGNOSIS — E11.42 TYPE 2 DIABETES MELLITUS WITH DIABETIC POLYNEUROPATHY, WITHOUT LONG-TERM CURRENT USE OF INSULIN (HCC): ICD-10-CM

## 2023-12-07 LAB
25(OH)D3 SERPL-MCNC: 45.1 NG/ML (ref 32–100)
ALBUMIN SERPL-MCNC: 3.9 G/DL (ref 3.4–4.8)
ALBUMIN/GLOB SERPL: 2 {RATIO} (ref 0.8–2)
ALP SERPL-CCNC: 42 U/L (ref 25–100)
ALT SERPL-CCNC: 21 U/L (ref 4–36)
ANION GAP SERPL CALCULATED.3IONS-SCNC: 9 MMOL/L (ref 3–16)
AST SERPL-CCNC: 18 U/L (ref 8–33)
BILIRUB SERPL-MCNC: <0.2 MG/DL (ref 0.3–1.2)
BUN SERPL-MCNC: 46 MG/DL (ref 6–20)
CALCIUM SERPL-MCNC: 8.6 MG/DL (ref 8.5–10.5)
CHLORIDE SERPL-SCNC: 104 MMOL/L (ref 98–107)
CHOLEST SERPL-MCNC: 141 MG/DL (ref 0–200)
CO2 SERPL-SCNC: 30 MMOL/L (ref 20–30)
CREAT SERPL-MCNC: 2.1 MG/DL (ref 0.4–1.2)
ERYTHROCYTE [DISTWIDTH] IN BLOOD BY AUTOMATED COUNT: 12.8 % (ref 11–16)
FOLATE SERPL-MCNC: >20 NG/ML
GFR SERPLBLD CREATININE-BSD FMLA CKD-EPI: 32 ML/MIN/{1.73_M2}
GLOBULIN SER CALC-MCNC: 2 G/DL
GLUCOSE SERPL-MCNC: 74 MG/DL (ref 74–106)
HBA1C MFR BLD: 5.8 %
HCT VFR BLD AUTO: 31.5 % (ref 40–54)
HDLC SERPL-MCNC: 40 MG/DL (ref 40–60)
HGB BLD-MCNC: 10 G/DL (ref 13–18)
LDLC SERPL CALC-MCNC: 71 MG/DL
MCH RBC QN AUTO: 29.2 PG (ref 27–32)
MCHC RBC AUTO-ENTMCNC: 31.7 G/DL (ref 31–35)
MCV RBC AUTO: 91.8 FL (ref 80–100)
PLATELET # BLD AUTO: 265 K/UL (ref 150–400)
PMV BLD AUTO: 10.7 FL (ref 6–10)
POTASSIUM SERPL-SCNC: 4.1 MMOL/L (ref 3.4–5.1)
PROT SERPL-MCNC: 5.9 G/DL (ref 6.4–8.3)
RBC # BLD AUTO: 3.43 M/UL (ref 4.5–6)
SODIUM SERPL-SCNC: 143 MMOL/L (ref 136–145)
TRIGL SERPL-MCNC: 149 MG/DL (ref 0–249)
TSH SERPL DL<=0.005 MIU/L-ACNC: 2.28 UIU/ML (ref 0.27–4.2)
VIT B12 SERPL-MCNC: 803 PG/ML (ref 211–911)
VLDLC SERPL CALC-MCNC: 30 MG/DL
WBC # BLD AUTO: 7.5 K/UL (ref 4–11)

## 2023-12-07 PROCEDURE — 82306 VITAMIN D 25 HYDROXY: CPT

## 2023-12-07 PROCEDURE — 80053 COMPREHEN METABOLIC PANEL: CPT

## 2023-12-07 PROCEDURE — 82746 ASSAY OF FOLIC ACID SERUM: CPT

## 2023-12-07 PROCEDURE — 85027 COMPLETE CBC AUTOMATED: CPT

## 2023-12-07 PROCEDURE — 82607 VITAMIN B-12: CPT

## 2023-12-07 PROCEDURE — 80061 LIPID PANEL: CPT

## 2023-12-07 PROCEDURE — 84443 ASSAY THYROID STIM HORMONE: CPT

## 2023-12-07 PROCEDURE — 83036 HEMOGLOBIN GLYCOSYLATED A1C: CPT

## 2023-12-18 ENCOUNTER — HOSPITAL ENCOUNTER (OUTPATIENT)
Facility: HOSPITAL | Age: 78
Setting detail: OBSERVATION
Discharge: HOME | End: 2023-12-21
Attending: EMERGENCY MEDICINE | Admitting: FAMILY MEDICINE
Payer: MEDICARE

## 2023-12-18 ENCOUNTER — APPOINTMENT (OUTPATIENT)
Dept: GENERAL RADIOLOGY | Facility: HOSPITAL | Age: 78
End: 2023-12-18
Payer: MEDICARE

## 2023-12-18 DIAGNOSIS — D64.9 ANEMIA, UNSPECIFIED TYPE: ICD-10-CM

## 2023-12-18 DIAGNOSIS — N18.9 ACUTE ON CHRONIC RENAL INSUFFICIENCY: ICD-10-CM

## 2023-12-18 DIAGNOSIS — I50.43 ACUTE ON CHRONIC COMBINED SYSTOLIC AND DIASTOLIC CONGESTIVE HEART FAILURE: ICD-10-CM

## 2023-12-18 DIAGNOSIS — R79.89 ELEVATED TROPONIN: ICD-10-CM

## 2023-12-18 DIAGNOSIS — I50.9 ACUTE ON CHRONIC CONGESTIVE HEART FAILURE, UNSPECIFIED HEART FAILURE TYPE: Primary | ICD-10-CM

## 2023-12-18 DIAGNOSIS — N28.9 ACUTE ON CHRONIC RENAL INSUFFICIENCY: ICD-10-CM

## 2023-12-18 LAB
ALBUMIN SERPL-MCNC: 3.7 G/DL (ref 3.5–5.2)
ALBUMIN/GLOB SERPL: 1.2 G/DL
ALP SERPL-CCNC: 52 U/L (ref 39–117)
ALT SERPL W P-5'-P-CCNC: 30 U/L (ref 1–41)
ANION GAP SERPL CALCULATED.3IONS-SCNC: 12.7 MMOL/L (ref 5–15)
AST SERPL-CCNC: 21 U/L (ref 1–40)
BASOPHILS # BLD AUTO: 0.06 10*3/MM3 (ref 0–0.2)
BASOPHILS NFR BLD AUTO: 0.6 % (ref 0–1.5)
BILIRUB SERPL-MCNC: 0.3 MG/DL (ref 0–1.2)
BUN SERPL-MCNC: 46 MG/DL (ref 8–23)
BUN/CREAT SERPL: 19.9 (ref 7–25)
CALCIUM SPEC-SCNC: 8.7 MG/DL (ref 8.6–10.5)
CHLORIDE SERPL-SCNC: 102 MMOL/L (ref 98–107)
CO2 SERPL-SCNC: 23.3 MMOL/L (ref 22–29)
CREAT SERPL-MCNC: 2.31 MG/DL (ref 0.76–1.27)
DEPRECATED RDW RBC AUTO: 43.6 FL (ref 37–54)
EGFRCR SERPLBLD CKD-EPI 2021: 28.4 ML/MIN/1.73
EOSINOPHIL # BLD AUTO: 0.21 10*3/MM3 (ref 0–0.4)
EOSINOPHIL NFR BLD AUTO: 2.2 % (ref 0.3–6.2)
ERYTHROCYTE [DISTWIDTH] IN BLOOD BY AUTOMATED COUNT: 13.2 % (ref 12.3–15.4)
FLUAV SUBTYP SPEC NAA+PROBE: NOT DETECTED
FLUBV RNA ISLT QL NAA+PROBE: NOT DETECTED
GEN 5 2HR TROPONIN T REFLEX: 141 NG/L
GLOBULIN UR ELPH-MCNC: 3 GM/DL
GLUCOSE BLDC GLUCOMTR-MCNC: 126 MG/DL (ref 70–130)
GLUCOSE SERPL-MCNC: 180 MG/DL (ref 65–99)
HCT VFR BLD AUTO: 26.4 % (ref 37.5–51)
HGB BLD-MCNC: 8.8 G/DL (ref 13–17.7)
HOLD SPECIMEN: NORMAL
HOLD SPECIMEN: NORMAL
IMM GRANULOCYTES # BLD AUTO: 0.05 10*3/MM3 (ref 0–0.05)
IMM GRANULOCYTES NFR BLD AUTO: 0.5 % (ref 0–0.5)
LYMPHOCYTES # BLD AUTO: 0.88 10*3/MM3 (ref 0.7–3.1)
LYMPHOCYTES NFR BLD AUTO: 9.3 % (ref 19.6–45.3)
MAGNESIUM SERPL-MCNC: 2.6 MG/DL (ref 1.6–2.4)
MCH RBC QN AUTO: 30 PG (ref 26.6–33)
MCHC RBC AUTO-ENTMCNC: 33.3 G/DL (ref 31.5–35.7)
MCV RBC AUTO: 90.1 FL (ref 79–97)
MONOCYTES # BLD AUTO: 0.58 10*3/MM3 (ref 0.1–0.9)
MONOCYTES NFR BLD AUTO: 6.1 % (ref 5–12)
NEUTROPHILS NFR BLD AUTO: 7.73 10*3/MM3 (ref 1.7–7)
NEUTROPHILS NFR BLD AUTO: 81.3 % (ref 42.7–76)
NRBC BLD AUTO-RTO: 0 /100 WBC (ref 0–0.2)
NT-PROBNP SERPL-MCNC: 4964 PG/ML (ref 0–1800)
PLATELET # BLD AUTO: 359 10*3/MM3 (ref 140–450)
PMV BLD AUTO: 9.7 FL (ref 6–12)
POTASSIUM SERPL-SCNC: 4.9 MMOL/L (ref 3.5–5.2)
PROCALCITONIN SERPL-MCNC: 0.09 NG/ML (ref 0–0.25)
PROT SERPL-MCNC: 6.7 G/DL (ref 6–8.5)
RBC # BLD AUTO: 2.93 10*6/MM3 (ref 4.14–5.8)
SARS-COV-2 RNA RESP QL NAA+PROBE: NOT DETECTED
SODIUM SERPL-SCNC: 138 MMOL/L (ref 136–145)
TROPONIN T DELTA: 5 NG/L
TROPONIN T SERPL HS-MCNC: 136 NG/L
WBC NRBC COR # BLD AUTO: 9.51 10*3/MM3 (ref 3.4–10.8)
WHOLE BLOOD HOLD COAG: NORMAL
WHOLE BLOOD HOLD SPECIMEN: NORMAL

## 2023-12-18 PROCEDURE — 25010000002 HEPARIN (PORCINE) PER 1000 UNITS: Performed by: FAMILY MEDICINE

## 2023-12-18 PROCEDURE — G0378 HOSPITAL OBSERVATION PER HR: HCPCS

## 2023-12-18 PROCEDURE — 96375 TX/PRO/DX INJ NEW DRUG ADDON: CPT

## 2023-12-18 PROCEDURE — 99284 EMERGENCY DEPT VISIT MOD MDM: CPT

## 2023-12-18 PROCEDURE — 25010000002 FUROSEMIDE PER 20 MG

## 2023-12-18 PROCEDURE — 93005 ELECTROCARDIOGRAM TRACING: CPT | Performed by: EMERGENCY MEDICINE

## 2023-12-18 PROCEDURE — 71045 X-RAY EXAM CHEST 1 VIEW: CPT

## 2023-12-18 PROCEDURE — 96372 THER/PROPH/DIAG INJ SC/IM: CPT

## 2023-12-18 PROCEDURE — 85025 COMPLETE CBC W/AUTO DIFF WBC: CPT | Performed by: EMERGENCY MEDICINE

## 2023-12-18 PROCEDURE — 83735 ASSAY OF MAGNESIUM: CPT

## 2023-12-18 PROCEDURE — 36415 COLL VENOUS BLD VENIPUNCTURE: CPT

## 2023-12-18 PROCEDURE — 87636 SARSCOV2 & INF A&B AMP PRB: CPT

## 2023-12-18 PROCEDURE — 63710000001 INSULIN DETEMIR PER 5 UNITS: Performed by: FAMILY MEDICINE

## 2023-12-18 PROCEDURE — 83880 ASSAY OF NATRIURETIC PEPTIDE: CPT | Performed by: EMERGENCY MEDICINE

## 2023-12-18 PROCEDURE — 80053 COMPREHEN METABOLIC PANEL: CPT | Performed by: EMERGENCY MEDICINE

## 2023-12-18 PROCEDURE — 84484 ASSAY OF TROPONIN QUANT: CPT | Performed by: EMERGENCY MEDICINE

## 2023-12-18 PROCEDURE — 94660 CPAP INITIATION&MGMT: CPT

## 2023-12-18 PROCEDURE — 99223 1ST HOSP IP/OBS HIGH 75: CPT | Performed by: FAMILY MEDICINE

## 2023-12-18 PROCEDURE — 84145 PROCALCITONIN (PCT): CPT

## 2023-12-18 PROCEDURE — 82948 REAGENT STRIP/BLOOD GLUCOSE: CPT

## 2023-12-18 RX ORDER — SODIUM CHLORIDE 0.9 % (FLUSH) 0.9 %
10 SYRINGE (ML) INJECTION AS NEEDED
Status: DISCONTINUED | OUTPATIENT
Start: 2023-12-18 | End: 2023-12-21 | Stop reason: HOSPADM

## 2023-12-18 RX ORDER — POLYETHYLENE GLYCOL 3350 17 G/17G
17 POWDER, FOR SOLUTION ORAL DAILY PRN
Status: DISCONTINUED | OUTPATIENT
Start: 2023-12-18 | End: 2023-12-21 | Stop reason: HOSPADM

## 2023-12-18 RX ORDER — FUROSEMIDE 10 MG/ML
80 INJECTION INTRAMUSCULAR; INTRAVENOUS ONCE
Status: COMPLETED | OUTPATIENT
Start: 2023-12-18 | End: 2023-12-18

## 2023-12-18 RX ORDER — PANTOPRAZOLE SODIUM 40 MG/1
40 TABLET, DELAYED RELEASE ORAL
Status: DISCONTINUED | OUTPATIENT
Start: 2023-12-19 | End: 2023-12-21 | Stop reason: HOSPADM

## 2023-12-18 RX ORDER — SODIUM CHLORIDE 0.9 % (FLUSH) 0.9 %
10 SYRINGE (ML) INJECTION EVERY 12 HOURS SCHEDULED
Status: DISCONTINUED | OUTPATIENT
Start: 2023-12-18 | End: 2023-12-21 | Stop reason: HOSPADM

## 2023-12-18 RX ORDER — DEXTROSE MONOHYDRATE 25 G/50ML
10-50 INJECTION, SOLUTION INTRAVENOUS
Status: DISCONTINUED | OUTPATIENT
Start: 2023-12-18 | End: 2023-12-21 | Stop reason: HOSPADM

## 2023-12-18 RX ORDER — ASPIRIN 81 MG/1
81 TABLET, CHEWABLE ORAL DAILY
COMMUNITY

## 2023-12-18 RX ORDER — ACETAMINOPHEN 650 MG/1
650 SUPPOSITORY RECTAL EVERY 4 HOURS PRN
Status: DISCONTINUED | OUTPATIENT
Start: 2023-12-18 | End: 2023-12-21 | Stop reason: HOSPADM

## 2023-12-18 RX ORDER — INSULIN ASPART 100 [IU]/ML
1-200 INJECTION, SOLUTION INTRAVENOUS; SUBCUTANEOUS AS NEEDED
Status: DISCONTINUED | OUTPATIENT
Start: 2023-12-18 | End: 2023-12-21 | Stop reason: HOSPADM

## 2023-12-18 RX ORDER — BUDESONIDE AND FORMOTEROL FUMARATE DIHYDRATE 160; 4.5 UG/1; UG/1
2 AEROSOL RESPIRATORY (INHALATION)
Status: DISCONTINUED | OUTPATIENT
Start: 2023-12-18 | End: 2023-12-21 | Stop reason: HOSPADM

## 2023-12-18 RX ORDER — ONDANSETRON 4 MG/1
4 TABLET, ORALLY DISINTEGRATING ORAL EVERY 6 HOURS PRN
Status: DISCONTINUED | OUTPATIENT
Start: 2023-12-18 | End: 2023-12-21 | Stop reason: HOSPADM

## 2023-12-18 RX ORDER — SODIUM CHLORIDE 9 MG/ML
40 INJECTION, SOLUTION INTRAVENOUS AS NEEDED
Status: DISCONTINUED | OUTPATIENT
Start: 2023-12-18 | End: 2023-12-21 | Stop reason: HOSPADM

## 2023-12-18 RX ORDER — PRAMIPEXOLE DIHYDROCHLORIDE 1 MG/1
1 TABLET ORAL NIGHTLY
Status: DISCONTINUED | OUTPATIENT
Start: 2023-12-18 | End: 2023-12-21 | Stop reason: HOSPADM

## 2023-12-18 RX ORDER — RANOLAZINE 500 MG/1
500 TABLET, EXTENDED RELEASE ORAL EVERY 12 HOURS SCHEDULED
Status: DISCONTINUED | OUTPATIENT
Start: 2023-12-18 | End: 2023-12-21 | Stop reason: HOSPADM

## 2023-12-18 RX ORDER — ACETAMINOPHEN 325 MG/1
650 TABLET ORAL EVERY 4 HOURS PRN
Status: DISCONTINUED | OUTPATIENT
Start: 2023-12-18 | End: 2023-12-21 | Stop reason: HOSPADM

## 2023-12-18 RX ORDER — AMOXICILLIN 250 MG
2 CAPSULE ORAL 2 TIMES DAILY
Status: DISCONTINUED | OUTPATIENT
Start: 2023-12-18 | End: 2023-12-21 | Stop reason: HOSPADM

## 2023-12-18 RX ORDER — ACETAMINOPHEN 160 MG/5ML
650 SOLUTION ORAL EVERY 4 HOURS PRN
Status: DISCONTINUED | OUTPATIENT
Start: 2023-12-18 | End: 2023-12-21 | Stop reason: HOSPADM

## 2023-12-18 RX ORDER — INSULIN ASPART 100 [IU]/ML
1-200 INJECTION, SOLUTION INTRAVENOUS; SUBCUTANEOUS
Status: DISCONTINUED | OUTPATIENT
Start: 2023-12-18 | End: 2023-12-21 | Stop reason: HOSPADM

## 2023-12-18 RX ORDER — CARVEDILOL 6.25 MG/1
12.5 TABLET ORAL 2 TIMES DAILY WITH MEALS
Status: DISCONTINUED | OUTPATIENT
Start: 2023-12-18 | End: 2023-12-21 | Stop reason: HOSPADM

## 2023-12-18 RX ORDER — NITROGLYCERIN 0.4 MG/1
0.4 TABLET SUBLINGUAL
Status: DISCONTINUED | OUTPATIENT
Start: 2023-12-18 | End: 2023-12-21 | Stop reason: HOSPADM

## 2023-12-18 RX ORDER — TERAZOSIN 1 MG/1
2 CAPSULE ORAL NIGHTLY
Status: DISCONTINUED | OUTPATIENT
Start: 2023-12-18 | End: 2023-12-21 | Stop reason: HOSPADM

## 2023-12-18 RX ORDER — HEPARIN SODIUM 5000 [USP'U]/ML
5000 INJECTION, SOLUTION INTRAVENOUS; SUBCUTANEOUS EVERY 12 HOURS SCHEDULED
Status: DISCONTINUED | OUTPATIENT
Start: 2023-12-18 | End: 2023-12-21 | Stop reason: HOSPADM

## 2023-12-18 RX ORDER — ATORVASTATIN CALCIUM 40 MG/1
40 TABLET, FILM COATED ORAL DAILY
Status: DISCONTINUED | OUTPATIENT
Start: 2023-12-19 | End: 2023-12-21 | Stop reason: HOSPADM

## 2023-12-18 RX ORDER — NICOTINE POLACRILEX 4 MG
15 LOZENGE BUCCAL
Status: DISCONTINUED | OUTPATIENT
Start: 2023-12-18 | End: 2023-12-21 | Stop reason: HOSPADM

## 2023-12-18 RX ORDER — VALSARTAN 80 MG/1
320 TABLET ORAL
Status: DISCONTINUED | OUTPATIENT
Start: 2023-12-19 | End: 2023-12-18

## 2023-12-18 RX ORDER — HYDRALAZINE HYDROCHLORIDE 25 MG/1
100 TABLET, FILM COATED ORAL 3 TIMES DAILY
Status: DISCONTINUED | OUTPATIENT
Start: 2023-12-18 | End: 2023-12-19

## 2023-12-18 RX ORDER — ALUMINA, MAGNESIA, AND SIMETHICONE 2400; 2400; 240 MG/30ML; MG/30ML; MG/30ML
15 SUSPENSION ORAL EVERY 6 HOURS PRN
Status: DISCONTINUED | OUTPATIENT
Start: 2023-12-18 | End: 2023-12-21 | Stop reason: HOSPADM

## 2023-12-18 RX ORDER — BISACODYL 5 MG/1
5 TABLET, DELAYED RELEASE ORAL DAILY PRN
Status: DISCONTINUED | OUTPATIENT
Start: 2023-12-18 | End: 2023-12-21 | Stop reason: HOSPADM

## 2023-12-18 RX ORDER — ONDANSETRON 2 MG/ML
4 INJECTION INTRAMUSCULAR; INTRAVENOUS EVERY 6 HOURS PRN
Status: DISCONTINUED | OUTPATIENT
Start: 2023-12-18 | End: 2023-12-21 | Stop reason: HOSPADM

## 2023-12-18 RX ORDER — BISACODYL 10 MG
10 SUPPOSITORY, RECTAL RECTAL DAILY PRN
Status: DISCONTINUED | OUTPATIENT
Start: 2023-12-18 | End: 2023-12-21 | Stop reason: HOSPADM

## 2023-12-18 RX ORDER — FUROSEMIDE 10 MG/ML
80 INJECTION INTRAMUSCULAR; INTRAVENOUS 2 TIMES DAILY
Status: DISCONTINUED | OUTPATIENT
Start: 2023-12-19 | End: 2023-12-19

## 2023-12-18 RX ORDER — PRASUGREL 10 MG/1
10 TABLET, FILM COATED ORAL DAILY
Status: DISCONTINUED | OUTPATIENT
Start: 2023-12-19 | End: 2023-12-21 | Stop reason: HOSPADM

## 2023-12-18 RX ORDER — IBUPROFEN 600 MG/1
1 TABLET ORAL
Status: DISCONTINUED | OUTPATIENT
Start: 2023-12-18 | End: 2023-12-21 | Stop reason: HOSPADM

## 2023-12-18 RX ADMIN — RANOLAZINE 500 MG: 500 TABLET, FILM COATED, EXTENDED RELEASE ORAL at 22:11

## 2023-12-18 RX ADMIN — PRAMIPEXOLE DIHYDROCHLORIDE 1 MG: 1 TABLET ORAL at 22:12

## 2023-12-18 RX ADMIN — HYDRALAZINE HYDROCHLORIDE 100 MG: 25 TABLET, FILM COATED ORAL at 22:11

## 2023-12-18 RX ADMIN — FUROSEMIDE 80 MG: 10 INJECTION, SOLUTION INTRAMUSCULAR; INTRAVENOUS at 16:59

## 2023-12-18 RX ADMIN — HEPARIN SODIUM 5000 UNITS: 5000 INJECTION, SOLUTION INTRAVENOUS; SUBCUTANEOUS at 22:12

## 2023-12-18 RX ADMIN — TERAZOSIN HYDROCHLORIDE 2 MG: 1 CAPSULE ORAL at 22:12

## 2023-12-18 RX ADMIN — INSULIN DETEMIR 17 UNITS: 100 INJECTION, SOLUTION SUBCUTANEOUS at 22:12

## 2023-12-18 RX ADMIN — CARVEDILOL 12.5 MG: 6.25 TABLET, FILM COATED ORAL at 22:11

## 2023-12-18 NOTE — ED PROVIDER NOTES
Subjective  History of Present Illness:    This is a 78-year-old chronically ill male with a history of type 2 diabetes mellitus, congestive heart failure, coronary artery disease, myocardial infarction, presenting the emergency room today for evaluation of dyspnea.  Dyspnea has been ongoing for the last day to 2 days.  He reports some orthopnea.  He does not wear oxygen at home.  He takes Lasix 20 daily except for Tuesdays and Thursdays where he takes 40.  he was told by his primary care to double up on his Lasix.  He took 40 oral earlier today.  No cough no runny nose.  Has a history of 2 previous cardiac stents.  No fevers.  No chest pain.  Wife reports weight gain from 227 to 235 pounds over the last few days and he seems to be retaining a lot of fluid.  He does follow with a pulmonologist and has 2 inhalers at home but wife denies history of COPD.      Nurses Notes reviewed and agree, including vitals, allergies, social history and prior medical history.     REVIEW OF SYSTEMS: All systems reviewed and not pertinent unless noted.  Review of Systems   Constitutional:  Negative for fever.   HENT:  Negative for congestion and rhinorrhea.    Respiratory:  Positive for shortness of breath. Negative for cough.    Cardiovascular:  Positive for leg swelling. Negative for chest pain and palpitations.   Gastrointestinal:  Negative for abdominal pain.   All other systems reviewed and are negative.      Past Medical History:   Diagnosis Date    Benign hypertension     Coronary artery disease     Depression     Enlarged prostate     Enlarged prostate with anticipated TURP with Dr. Bernal.    GERD (gastroesophageal reflux disease)     Heart attack     Hypercholesterolemia     Myocardial infarction     Obesity     Obstructive sleep apnea on CPAP     Type 2 diabetes mellitus        Allergies:    Zocor [simvastatin], Bisoprolol, Byetta 10 mcg pen [exenatide], Crestor [rosuvastatin calcium], Metformin and related, and Plavix  [clopidogrel bisulfate]      Past Surgical History:   Procedure Laterality Date    CARDIAC CATHETERIZATION      CARDIAC CATHETERIZATION Left 10/19/2021    Procedure: Left Heart Cath;  Surgeon: Liz Russo MD;  Location:  [facility];  Service: Cardiology;  Laterality: Left;    CARDIAC CATHETERIZATION N/A 7/18/2023    Procedure: Coronary angiography;  Surgeon: Rupesh Parr MD;  Location:  [facility];  Service: Cardiology;  Laterality: N/A;    CARDIAC CATHETERIZATION N/A 7/18/2023    Procedure: Percutaneous Coronary Intervention;  Surgeon: Rupesh Parr MD;  Location:  [facility];  Service: Cardiology;  Laterality: N/A;    COLONOSCOPY W/ POLYPECTOMY      CORONARY ANGIOPLASTY WITH STENT PLACEMENT Left 06/03/2009    Left heart catheterization, 06/03/2009, Dr. Russo:  LVEF 45% to 50%.  Placement of overlapping Cypher drug-eluting stent 2.5 x 28 and 2.5 x 18 to the SVG to the obtuse marginal branch.  SVG to the PDA had a proximal stenosis estimated at 60% with a distal stenosis of 50% to 60%.    CORONARY ANGIOPLASTY WITH STENT PLACEMENT Left 07/06/2009    Left heart catheterization, 07/06/2009:  PTCA and stent placement in the mid PDA using a 2.25 x 12 mm Taxus drug-eluting stent with stent placement in the distal SVG to the PDA using a 2.5 x 18 mm Cypher drug-eluting stent and stenting of the proximal SVG to the PDA using a 3.0 x 28 mm Cypher drug-eluting stent.    CORONARY ANGIOPLASTY WITH STENT PLACEMENT  04/23/2010    Cardiac catheterization for recurrent anginal symptoms, 04/23/2010, with PTCA and stent placement in the proximal SVG to the PDA using 3.0 x 28 mm Promus drug-eluting stent for in-stent restenosis.    CORONARY ANGIOPLASTY WITH STENT PLACEMENT Left 07/06/2010    Left heart catheterization, 07/06/2010, Dr. Russo:  EF 40% to 45%.  3.5 x 23 mm Promus drug-eluting stent in the proximal SVG to OM, 2.5 x 18 mm Promus  "drug-eluting stent to distal SVG to PDA due to in-stent restenosis.      CORONARY ANGIOPLASTY WITH STENT PLACEMENT Left 11/16/2010    Left heart catheterization, 11/16/2010, with placement of a 3.0 x 16 mm Taxus drug-eluting stent to the SVG to the RCA proximally and a 2.5 x 16 mm paclitaxel stent distally in the SVG to the RCA.    CORONARY ANGIOPLASTY WITH STENT PLACEMENT Left     Left heart catheterization, 3.0 x 24-mm Promus element drug-eluting stent to a 90% lesion in the mid portion of the SVG to the PDA.     CORONARY ANGIOPLASTY WITH STENT PLACEMENT Left 06/24/2014    Left heart catheterization for recurrent angina, 06/24/2014, Dr. Russo:  PTCA of the SVG to the PDA using a 3 x 12 mm NC TREK balloon.      CORONARY ARTERY BYPASS GRAFT      CABG x3, Dr. Peng, [place], 2001.         Social History     Socioeconomic History    Marital status:    Tobacco Use    Smoking status: Never    Smokeless tobacco: Never   Vaping Use    Vaping Use: Never used   Substance and Sexual Activity    Alcohol use: No    Drug use: No    Sexual activity: Defer         Family History   Problem Relation Age of Onset    Heart attack Mother     Ulcers Father        Objective  Physical Exam:  /62   Pulse 82   Temp 97.5 °F (36.4 °C) (Oral)   Resp 26   Ht 170.2 cm (67\")   Wt 109 kg (240 lb)   SpO2 95%   BMI 37.59 kg/m²      Physical Exam  Vitals and nursing note reviewed.   Constitutional:       General: He is not in acute distress.     Appearance: He is well-developed. He is obese. He is not toxic-appearing or diaphoretic.      Comments: Chronically ill-appearing   HENT:      Head: Normocephalic and atraumatic.      Mouth/Throat:      Mouth: Mucous membranes are moist.      Pharynx: Oropharynx is clear.   Eyes:      Extraocular Movements: Extraocular movements intact.   Cardiovascular:      Rate and Rhythm: Normal rate and regular rhythm.   Pulmonary:      Effort: Tachypnea present.      Breath " sounds: Decreased breath sounds present. No wheezing, rhonchi or rales.   Chest:      Chest wall: No tenderness.   Abdominal:      Palpations: Abdomen is soft.      Tenderness: There is no guarding.      Comments: Umbilical hernia noted, nontender and reducible..  Multiple areas of ecchymosis where the patient has had subcutaneous shots.   Musculoskeletal:         General: Normal range of motion.      Cervical back: Normal range of motion.      Right lower leg: Edema present.      Left lower leg: Edema present.   Skin:     General: Skin is warm and dry.      Capillary Refill: Capillary refill takes less than 2 seconds.   Neurological:      General: No focal deficit present.      Mental Status: He is alert and oriented to person, place, and time.   Psychiatric:         Mood and Affect: Mood normal.         Behavior: Behavior normal.               Procedures    ED Course:    ED Course as of 12/18/23 2041   Mon Dec 18, 2023   1623 EKG interpreted by me reveals sinus rhythm with a rate of 89 bpm.  There is intraventricular conduction delay.  EKG is similar in appearance when compared to previous.  This is an abnormal appearing EKG. [TB]      ED Course User Index  [TB] Tahmina Barksdale MD       Lab Results (last 24 hours)       Procedure Component Value Units Date/Time    CBC & Differential [[id number]]  (Abnormal) Collected: 12/18/23 1641    Specimen: Blood Updated: 12/18/23 1650    Narrative:      The following orders were created for panel order CBC & Differential.  Procedure                               Abnormality         Status                     ---------                               -----------         ------                     CBC Auto Differential[[id number]]        Abnormal            Final result                 Please view results for these tests on the individual orders.    Comprehensive Metabolic Panel [[id number]]  (Abnormal) Collected: 12/18/23 1641    Specimen: Blood Updated: 12/18/23 1706      Glucose 180 mg/dL      Comment: Glucose >180, Hemoglobin A1C recommended.        BUN 46 mg/dL      Creatinine 2.31 mg/dL      Sodium 138 mmol/L      Potassium 4.9 mmol/L      Chloride 102 mmol/L      CO2 23.3 mmol/L      Calcium 8.7 mg/dL      Total Protein 6.7 g/dL      Albumin 3.7 g/dL      ALT (SGPT) 30 U/L      AST (SGOT) 21 U/L      Alkaline Phosphatase 52 U/L      Total Bilirubin 0.3 mg/dL      Globulin 3.0 gm/dL      A/G Ratio 1.2 g/dL      BUN/Creatinine Ratio 19.9     Anion Gap 12.7 mmol/L      eGFR 28.4 mL/min/1.73     Narrative:      GFR Normal >60  Chronic Kidney Disease <60  Kidney Failure <15    The GFR formula is only valid for adults with stable renal function between ages 18 and 70.    BNP [[id number]]  (Abnormal) Collected: 12/18/23 1641    Specimen: Blood Updated: 12/18/23 1711     proBNP 4,964.0 pg/mL     Narrative:      This assay is used as an aid in the diagnosis of individuals suspected of having heart failure. It can be used as an aid in the diagnosis of acute decompensated heart failure (ADHF) in patients presenting with signs and symptoms of ADHF to the emergency department (ED). In addition, NT-proBNP of <300 pg/mL indicates ADHF is not likely.    Age Range Result Interpretation  NT-proBNP Concentration (pg/mL:      <50             Positive            >450                   Gray                 300-450                    Negative             <300    50-75           Positive            >900                  Gray                300-900                  Negative            <300      >75             Positive            >1800                  Gray                300-1800                  Negative            <300    Single High Sensitivity Troponin T [[id number]]  (Abnormal) Collected: 12/18/23 1641    Specimen: Blood Updated: 12/18/23 1725     HS Troponin T 136 ng/L     Narrative:      High Sensitive Troponin T Reference Range:  <14.0 ng/L- Negative Female for AMI  <22.0 ng/L- Negative Male for  AMI  >=14 - Abnormal Female indicating possible myocardial injury.  >=22 - Abnormal Male indicating possible myocardial injury.   Clinicians would have to utilize clinical acumen, EKG, Troponin, and serial changes to determine if it is an Acute Myocardial Infarction or myocardial injury due to an underlying chronic condition.         CBC Auto Differential [[id number]]  (Abnormal) Collected: 12/18/23 1641    Specimen: Blood Updated: 12/18/23 1650     WBC 9.51 10*3/mm3      RBC 2.93 10*6/mm3      Hemoglobin 8.8 g/dL      Hematocrit 26.4 %      MCV 90.1 fL      MCH 30.0 pg      MCHC 33.3 g/dL      RDW 13.2 %      RDW-SD 43.6 fl      MPV 9.7 fL      Platelets 359 10*3/mm3      Neutrophil % 81.3 %      Lymphocyte % 9.3 %      Monocyte % 6.1 %      Eosinophil % 2.2 %      Basophil % 0.6 %      Immature Grans % 0.5 %      Neutrophils, Absolute 7.73 10*3/mm3      Lymphocytes, Absolute 0.88 10*3/mm3      Monocytes, Absolute 0.58 10*3/mm3      Eosinophils, Absolute 0.21 10*3/mm3      Basophils, Absolute 0.06 10*3/mm3      Immature Grans, Absolute 0.05 10*3/mm3      nRBC 0.0 /100 WBC     Magnesium [[id number]]  (Abnormal) Collected: 12/18/23 1641    Specimen: Blood Updated: 12/18/23 1716     Magnesium 2.6 mg/dL     Procalcitonin [[id number]]  (Normal) Collected: 12/18/23 1641    Specimen: Blood Updated: 12/18/23 1806     Procalcitonin 0.09 ng/mL     Narrative:      As a Marker for Sepsis (Non-Neonates):    1. <0.5 ng/mL represents a low risk of severe sepsis and/or septic shock.  2. >2 ng/mL represents a high risk of severe sepsis and/or septic shock.    As a Marker for Lower Respiratory Tract Infections that require antibiotic therapy:    PCT on Admission    Antibiotic Therapy       6-12 Hrs later    >0.5                Strongly Recommended  >0.25 - <0.5        Recommended   0.1 - 0.25          Discouraged              Remeasure/reassess PCT  <0.1                Strongly Discouraged     Remeasure/reassess PCT    As 28 day  "mortality risk marker: \"Change in Procalcitonin Result\" (>80% or <=80%) if Day 0 (or Day 1) and Day 4 values are available. Refer to [email or web address]-pct-calculator.[email or web address]    Change in PCT <=80%  A decrease of PCT levels below or equal to 80% defines a positive change in PCT test result representing a higher risk for 28-day all-cause mortality of patients diagnosed with severe sepsis for septic shock.    Change in PCT >80%  A decrease of PCT levels of more than 80% defines a negative change in PCT result representing a lower risk for 28-day all-cause mortality of patients diagnosed with severe sepsis or septic shock.       COVID-19 and FLU A/B PCR, 1 HR TAT - Swab, Nasopharynx [[id number]]  (Normal) Collected: 12/18/23 1741    Specimen: Swab from Nasopharynx Updated: 12/18/23 1828     COVID19 Not Detected     Influenza A PCR Not Detected     Influenza B PCR Not Detected    Narrative:      Fact sheet for providers: https://www.fda.gov/media/646362/download    Fact sheet for patients: https://www.fda.gov/media/770955/download    Test performed by PCR.    High Sensitivity Troponin T 2Hr [[id number]]  (Abnormal) Collected: 12/18/23 1903    Specimen: Blood Updated: 12/18/23 1948     HS Troponin T 141 ng/L      Troponin T Delta 5 ng/L     Narrative:      High Sensitive Troponin T Reference Range:  <14.0 ng/L- Negative Female for AMI  <22.0 ng/L- Negative Male for AMI  >=14 - Abnormal Female indicating possible myocardial injury.  >=22 - Abnormal Male indicating possible myocardial injury.   Clinicians would have to utilize clinical acumen, EKG, Troponin, and serial changes to determine if it is an Acute Myocardial Infarction or myocardial injury due to an underlying chronic condition.                  No radiology results from the last 24 hrs       MDM     Amount and/or Complexity of Data Reviewed  Decide to obtain previous medical records or to obtain history from someone other than the patient: yes        Initial impression " of presenting illness: 78-year male present emergency room today for evaluation of dyspnea over the last 2 days.    DDX: includes but is not limited to: COVID, flu, pneumonia, pulmonary vascular congestion, pulmonary edema, congestive heart failure exacerbation, COPD exacerbation, ACS, arrhythmia, electrolyte abnormality, anemia, others    Patient arrives hemodynamically stable afebrile nontachycardic nonhypoxic on room air but he is slightly tachypneic with vitals interpreted by myself.     Pertinent features from physical exam: Chronically ill-appearing 78-year-old male.  He is mildly tachypneic but no respiratory distress at this time.  He is laying down on the stretcher supine as this is where he feels most comfortable.  Abdomen does have a small reducible umbilical hernia without tenderness to palpation.  He does have multiple areas of ecchymosis where subcutaneous injections have been performed on his abdomen.  He does have 2+ pitting edema of the lower bilateral extremities.  Decreased breath sounds bilaterally without overt wheezing..    Initial diagnostic plan: CBC CMP magnesium troponin BNP COVID flu chest x-ray    Results from initial plan were reviewed and interpreted by me revealing CBC with a stable anemia hemoglobin of 8.8 which is not significantly unchanged compared to priors.  EKG revealed sinus rhythm rate of 89 with intraventricular conduction delay similar appearance to previous.  CMP with a creatinine of 2.31 which is elevated from a baseline of 1.57.  BUN of 46, glucose of 180.  proBNP acutely elevated at 4964, troponin of 136,, however patient has multiple troponins that are significantly more elevated than this in the past.  EKG revealed sinus rhythm rate of 89 interventricular conduction delay.  Chest x-ray reveals cardiomegaly and pulmonary vascular congestion my interpretation.  Delta troponin of 5.  He is anemic, likely secondary to anemia of chronic disease in the setting of his chronic  kidney disease without any rectal bleeding or bloody stools or hemoptysis or hematemesis or hematuria.    Diagnostic information from other sources: Old records reviewed    Interventions / Re-evaluation: 80 of Lasix he reports his breathing has improved and has produced some good urinary output here in the ED although he still appears dyspneic to me.  He has not required supplemental oxygen throughout his stay here.    Results/clinical rationale were discussed with patient at bedside.  Agreeable to admission.    Consultations/Discussion of results with other physicians: Discussed with hospitalist, Dr. Cat, agreeable to accept the patient to admission to the hospital service    Disposition plan: Admit to observation status.  -----    Final diagnoses:   Acute on chronic congestive heart failure, unspecified heart failure type   Elevated troponin   Acute on chronic renal insufficiency   Anemia, unspecified type          Ronal Michael PA-C  12/18/23 2042

## 2023-12-19 LAB
ANION GAP SERPL CALCULATED.3IONS-SCNC: 9.4 MMOL/L (ref 5–15)
BUN SERPL-MCNC: 50 MG/DL (ref 8–23)
BUN/CREAT SERPL: 19.8 (ref 7–25)
CALCIUM SPEC-SCNC: 8.8 MG/DL (ref 8.6–10.5)
CHLORIDE SERPL-SCNC: 106 MMOL/L (ref 98–107)
CO2 SERPL-SCNC: 26.6 MMOL/L (ref 22–29)
CREAT SERPL-MCNC: 2.53 MG/DL (ref 0.76–1.27)
CREAT UR-MCNC: 26.3 MG/DL
EGFRCR SERPLBLD CKD-EPI 2021: 25.4 ML/MIN/1.73
GLUCOSE BLDC GLUCOMTR-MCNC: 131 MG/DL (ref 70–130)
GLUCOSE BLDC GLUCOMTR-MCNC: 132 MG/DL (ref 70–130)
GLUCOSE BLDC GLUCOMTR-MCNC: 189 MG/DL (ref 70–130)
GLUCOSE BLDC GLUCOMTR-MCNC: 58 MG/DL (ref 70–130)
GLUCOSE BLDC GLUCOMTR-MCNC: 71 MG/DL (ref 70–130)
GLUCOSE SERPL-MCNC: 60 MG/DL (ref 65–99)
IRON 24H UR-MRATE: 34 MCG/DL (ref 59–158)
IRON SATN MFR SERPL: 13 % (ref 20–50)
POTASSIUM SERPL-SCNC: 4.1 MMOL/L (ref 3.5–5.2)
PROT ?TM UR-MCNC: 5 MG/DL
SODIUM SERPL-SCNC: 142 MMOL/L (ref 136–145)
SODIUM UR-SCNC: 120 MMOL/L
TIBC SERPL-MCNC: 259 MCG/DL (ref 298–536)
TRANSFERRIN SERPL-MCNC: 174 MG/DL (ref 200–360)

## 2023-12-19 PROCEDURE — 84300 ASSAY OF URINE SODIUM: CPT | Performed by: FAMILY MEDICINE

## 2023-12-19 PROCEDURE — 82570 ASSAY OF URINE CREATININE: CPT | Performed by: FAMILY MEDICINE

## 2023-12-19 PROCEDURE — 84466 ASSAY OF TRANSFERRIN: CPT | Performed by: INTERNAL MEDICINE

## 2023-12-19 PROCEDURE — 63710000001 INSULIN ASPART PER 5 UNITS: Performed by: FAMILY MEDICINE

## 2023-12-19 PROCEDURE — 83540 ASSAY OF IRON: CPT | Performed by: INTERNAL MEDICINE

## 2023-12-19 PROCEDURE — 99232 SBSQ HOSP IP/OBS MODERATE 35: CPT | Performed by: INTERNAL MEDICINE

## 2023-12-19 PROCEDURE — 94799 UNLISTED PULMONARY SVC/PX: CPT

## 2023-12-19 PROCEDURE — 82948 REAGENT STRIP/BLOOD GLUCOSE: CPT

## 2023-12-19 PROCEDURE — 84156 ASSAY OF PROTEIN URINE: CPT | Performed by: FAMILY MEDICINE

## 2023-12-19 PROCEDURE — 25010000002 HEPARIN (PORCINE) PER 1000 UNITS: Performed by: FAMILY MEDICINE

## 2023-12-19 PROCEDURE — G0378 HOSPITAL OBSERVATION PER HR: HCPCS

## 2023-12-19 PROCEDURE — 94640 AIRWAY INHALATION TREATMENT: CPT

## 2023-12-19 PROCEDURE — 97161 PT EVAL LOW COMPLEX 20 MIN: CPT

## 2023-12-19 PROCEDURE — 96376 TX/PRO/DX INJ SAME DRUG ADON: CPT

## 2023-12-19 PROCEDURE — 25010000002 FUROSEMIDE PER 20 MG: Performed by: FAMILY MEDICINE

## 2023-12-19 PROCEDURE — 94660 CPAP INITIATION&MGMT: CPT

## 2023-12-19 PROCEDURE — 94664 DEMO&/EVAL PT USE INHALER: CPT

## 2023-12-19 PROCEDURE — 96372 THER/PROPH/DIAG INJ SC/IM: CPT

## 2023-12-19 PROCEDURE — 94761 N-INVAS EAR/PLS OXIMETRY MLT: CPT

## 2023-12-19 PROCEDURE — 80048 BASIC METABOLIC PNL TOTAL CA: CPT | Performed by: FAMILY MEDICINE

## 2023-12-19 RX ORDER — FUROSEMIDE 10 MG/ML
80 INJECTION INTRAMUSCULAR; INTRAVENOUS EVERY 4 HOURS
Status: COMPLETED | OUTPATIENT
Start: 2023-12-20 | End: 2023-12-20

## 2023-12-19 RX ORDER — SPIRONOLACTONE 25 MG/1
50 TABLET ORAL ONCE
Status: COMPLETED | OUTPATIENT
Start: 2023-12-20 | End: 2023-12-20

## 2023-12-19 RX ORDER — HYDRALAZINE HYDROCHLORIDE 25 MG/1
50 TABLET, FILM COATED ORAL 3 TIMES DAILY
Status: DISCONTINUED | OUTPATIENT
Start: 2023-12-19 | End: 2023-12-21 | Stop reason: HOSPADM

## 2023-12-19 RX ADMIN — INSULIN ASPART 5 UNITS: 100 INJECTION, SOLUTION INTRAVENOUS; SUBCUTANEOUS at 12:29

## 2023-12-19 RX ADMIN — Medication 10 ML: at 21:10

## 2023-12-19 RX ADMIN — HEPARIN SODIUM 5000 UNITS: 5000 INJECTION, SOLUTION INTRAVENOUS; SUBCUTANEOUS at 21:09

## 2023-12-19 RX ADMIN — PANTOPRAZOLE SODIUM 40 MG: 40 TABLET, DELAYED RELEASE ORAL at 06:11

## 2023-12-19 RX ADMIN — PRASUGREL 10 MG: 10 TABLET, FILM COATED ORAL at 09:04

## 2023-12-19 RX ADMIN — CARVEDILOL 12.5 MG: 6.25 TABLET, FILM COATED ORAL at 17:23

## 2023-12-19 RX ADMIN — CARVEDILOL 12.5 MG: 6.25 TABLET, FILM COATED ORAL at 09:05

## 2023-12-19 RX ADMIN — Medication 10 ML: at 09:06

## 2023-12-19 RX ADMIN — MAGNESIUM OXIDE TAB 400 MG (241.3 MG ELEMENTAL MG) 400 MG: 400 (241.3 MG) TAB at 09:04

## 2023-12-19 RX ADMIN — PRAMIPEXOLE DIHYDROCHLORIDE 1 MG: 1 TABLET ORAL at 21:10

## 2023-12-19 RX ADMIN — FUROSEMIDE 80 MG: 10 INJECTION, SOLUTION INTRAMUSCULAR; INTRAVENOUS at 09:05

## 2023-12-19 RX ADMIN — RANOLAZINE 500 MG: 500 TABLET, FILM COATED, EXTENDED RELEASE ORAL at 21:09

## 2023-12-19 RX ADMIN — TERAZOSIN HYDROCHLORIDE 2 MG: 1 CAPSULE ORAL at 21:09

## 2023-12-19 RX ADMIN — ATORVASTATIN CALCIUM 40 MG: 40 TABLET, FILM COATED ORAL at 09:03

## 2023-12-19 RX ADMIN — HEPARIN SODIUM 5000 UNITS: 5000 INJECTION, SOLUTION INTRAVENOUS; SUBCUTANEOUS at 09:05

## 2023-12-19 RX ADMIN — BUDESONIDE AND FORMOTEROL FUMARATE DIHYDRATE 2 PUFF: 160; 4.5 AEROSOL RESPIRATORY (INHALATION) at 07:06

## 2023-12-19 RX ADMIN — TIOTROPIUM BROMIDE INHALATION SPRAY 2 PUFF: 3.12 SPRAY, METERED RESPIRATORY (INHALATION) at 07:06

## 2023-12-19 RX ADMIN — BUDESONIDE AND FORMOTEROL FUMARATE DIHYDRATE 2 PUFF: 160; 4.5 AEROSOL RESPIRATORY (INHALATION) at 19:32

## 2023-12-19 RX ADMIN — INSULIN ASPART 5 UNITS: 100 INJECTION, SOLUTION INTRAVENOUS; SUBCUTANEOUS at 17:23

## 2023-12-19 RX ADMIN — RANOLAZINE 500 MG: 500 TABLET, FILM COATED, EXTENDED RELEASE ORAL at 09:03

## 2023-12-19 RX ADMIN — HYDRALAZINE HYDROCHLORIDE 100 MG: 25 TABLET, FILM COATED ORAL at 09:03

## 2023-12-19 RX ADMIN — SERTRALINE HYDROCHLORIDE 50 MG: 50 TABLET ORAL at 09:02

## 2023-12-19 NOTE — CONSULTS
[facility]      Nephrology Consultation      Referring Provider:   No ref. provider found    Reason for Consultation:  Acute Kidney Injury on chronic kidney disease 3b and associated problems.      Subjective:  Chief complaint   Chief Complaint   Patient presents with    Shortness of Breath     History of present illness:    Patient is 78-year-old male with multimedical problems including coronary artery disease status post multiple stents, chronic kidney disease stage IIIb with a GFR in the 30s, recurrent episodes of systolic congestive heart failure, hypertension, type 2 diabetes, obstructive sleep apnea, and anemia.  He presented to the emergency room after he noted that he has been having increasing lower extremity edema, he tried to double his diuretic dose still did not have significant improvement and finally ended up coming to the emergency room, he feels like he had about 10 pounds extra on him.  Currently patient is laying in the bed awake alert and interactive, he feels like that he is doing much better since his receive the IV diuretics and has made fair amount of urine.  He denies having any chest pain shortness of breath at this significantly better, edema is also improving.  Denies any nausea vomiting abdominal pain.  He did mention that he has been compliant with his medications as well as BiPAP.  No fever or chills.  I have reviewed labs/imaging/records from this hospitalization, including ER staff and admitting/attending physicians H/P's and progress notes to establish a comprehensive understanding of this patient's clinical hospital course, as well as to establish plan of care appropriately.     Past Medical History:   Diagnosis Date    Benign hypertension     Coronary artery disease     Depression     Enlarged prostate     Enlarged prostate with anticipated TURP with Dr. Bernal.    GERD (gastroesophageal reflux disease)     Heart attack     Hypercholesterolemia     Myocardial  infarction     Obesity     Obstructive sleep apnea on CPAP     Type 2 diabetes mellitus        Past Surgical History:   Procedure Laterality Date    CARDIAC CATHETERIZATION      CARDIAC CATHETERIZATION Left 10/19/2021    Procedure: Left Heart Cath;  Surgeon: Liz Russo MD;  Location:  [facility];  Service: Cardiology;  Laterality: Left;    CARDIAC CATHETERIZATION N/A 7/18/2023    Procedure: Coronary angiography;  Surgeon: Rupesh Parr MD;  Location:  [facility];  Service: Cardiology;  Laterality: N/A;    CARDIAC CATHETERIZATION N/A 7/18/2023    Procedure: Percutaneous Coronary Intervention;  Surgeon: Rupesh Parr MD;  Location:  [facility];  Service: Cardiology;  Laterality: N/A;    COLONOSCOPY W/ POLYPECTOMY      CORONARY ANGIOPLASTY WITH STENT PLACEMENT Left 06/03/2009    Left heart catheterization, 06/03/2009, Dr. Russo:  LVEF 45% to 50%.  Placement of overlapping Cypher drug-eluting stent 2.5 x 28 and 2.5 x 18 to the SVG to the obtuse marginal branch.  SVG to the PDA had a proximal stenosis estimated at 60% with a distal stenosis of 50% to 60%.    CORONARY ANGIOPLASTY WITH STENT PLACEMENT Left 07/06/2009    Left heart catheterization, 07/06/2009:  PTCA and stent placement in the mid PDA using a 2.25 x 12 mm Taxus drug-eluting stent with stent placement in the distal SVG to the PDA using a 2.5 x 18 mm Cypher drug-eluting stent and stenting of the proximal SVG to the PDA using a 3.0 x 28 mm Cypher drug-eluting stent.    CORONARY ANGIOPLASTY WITH STENT PLACEMENT  04/23/2010    Cardiac catheterization for recurrent anginal symptoms, 04/23/2010, with PTCA and stent placement in the proximal SVG to the PDA using 3.0 x 28 mm Promus drug-eluting stent for in-stent restenosis.    CORONARY ANGIOPLASTY WITH STENT PLACEMENT Left 07/06/2010    Left heart catheterization, 07/06/2010, Dr. Russo:  EF 40% to 45%.  3.5 x 23 mm Promus  drug-eluting stent in the proximal SVG to OM, 2.5 x 18 mm Promus drug-eluting stent to distal SVG to PDA due to in-stent restenosis.      CORONARY ANGIOPLASTY WITH STENT PLACEMENT Left 11/16/2010    Left heart catheterization, 11/16/2010, with placement of a 3.0 x 16 mm Taxus drug-eluting stent to the SVG to the RCA proximally and a 2.5 x 16 mm paclitaxel stent distally in the SVG to the RCA.    CORONARY ANGIOPLASTY WITH STENT PLACEMENT Left     Left heart catheterization, 3.0 x 24-mm Promus element drug-eluting stent to a 90% lesion in the mid portion of the SVG to the PDA.     CORONARY ANGIOPLASTY WITH STENT PLACEMENT Left 06/24/2014    Left heart catheterization for recurrent angina, 06/24/2014, Dr. Russo:  PTCA of the SVG to the PDA using a 3 x 12 mm NC TREK balloon.      CORONARY ARTERY BYPASS GRAFT      CABG x3, Dr. Peng, [facility], 2001.     Family History   Problem Relation Age of Onset    Heart attack Mother     Ulcers Father      negative h/o ESRD     Social History     Tobacco Use    Smoking status: Never    Smokeless tobacco: Never   Vaping Use    Vaping Use: Never used   Substance Use Topics    Alcohol use: No    Drug use: No     Home medications:   Prior to Admission Medications       Prescriptions Last Dose Informant Patient Reported? Taking?    albuterol sulfate  (90 Base) MCG/ACT inhaler Past Week  No Yes    Inhale 2 puffs Every 4 (Four) Hours As Needed for Wheezing.    aspirin 81 MG chewable tablet 12/18/2023 Self, Spouse/Significant Other Yes Yes    Chew 1 tablet Daily.    atorvastatin (LIPITOR) 40 MG tablet 12/18/2023  Yes Yes    Take 1 tablet by mouth Daily.    carvedilol (COREG) 12.5 MG tablet 12/18/2023  Yes Yes    Take 1 tablet by mouth 2 (Two) Times a Day With Meals.    Cholecalciferol (VITAMIN D3) 50 MCG (2000 UT) capsule 12/18/2023  Yes Yes    Take 2 capsules by mouth Daily.    dapagliflozin Propanediol (Farxiga) 10 MG tablet 12/18/2023 Spouse/Significant Other  Yes Yes    Take 10 mg by mouth Daily.    doxazosin (CARDURA) 8 MG tablet 12/18/2023  No Yes    Take 1 tablet by mouth Every 12 (Twelve) Hours.    fexofenadine (ALLEGRA) 180 MG tablet 12/18/2023 Spouse/Significant Other Yes Yes    Take 1 tablet by mouth Daily.    fluticasone (FLONASE) 50 MCG/ACT nasal spray 12/18/2023  No Yes    1 spray into the nostril(s) as directed by provider Daily.    furosemide (LASIX) 40 MG tablet   No Yes    Take 1 tablet by mouth Daily.    hydrALAZINE (APRESOLINE) 100 MG tablet 12/18/2023  No Yes    Take 1 tablet by mouth 3 (Three) Times a Day.    magnesium oxide (MAG-OX) 400 MG tablet 12/18/2023  Yes Yes    Take 1 tablet by mouth Daily.    Multiple Vitamin (MULTI VITAMIN DAILY PO) 12/18/2023  Yes Yes    Take 1 tablet by mouth Daily.    nitroglycerin (NITROSTAT) 0.4 MG SL tablet Past Month  No Yes    Place 1 tablet under the tongue Every 5 (Five) Minutes As Needed for Chest Pain. Take no more than 3 doses in 15 minutes.    pantoprazole (PROTONIX) 40 MG EC tablet 12/18/2023  Yes Yes    TAKE 1 TABLET BY MOUTH 2 TIMES DAILY (BEFORE MEALS)    pramipexole (MIRAPEX) 1 MG tablet 12/17/2023  No Yes    TAKE 1 TABLET EVERY NIGHT    prasugrel (EFFIENT) 10 MG tablet 12/18/2023  Yes Yes    Take 1 tablet by mouth Daily.    ranolazine (RANEXA) 500 MG 12 hr tablet 12/18/2023  No Yes    Take 1 tablet by mouth Every 12 (Twelve) Hours for 180 days.    sertraline (ZOLOFT) 50 MG tablet 12/18/2023 Spouse/Significant Other, Self Yes Yes    Take 2 tablets by mouth Daily.    tamsulosin (FLOMAX) 0.4 MG capsule 24 hr capsule 12/18/2023  Yes Yes    Take 1 capsule by mouth 2 (Two) Times a Day.    Trulicity 0.75 MG/0.5ML solution pen-injector Past Week  Yes Yes    Inject 0.75 mg as directed 1 (One) Time Per Week.    azilsartan medoxomil (EDARBI) 80 MG tablet tablet Not Taking Spouse/Significant Other Yes No    Take 0.5 tablets by mouth Daily.    Patient not taking:  Reported on 12/18/2023    B-D UF III MINI PEN NEEDLES  31G X 5 MM misc   Yes No    finasteride (PROSCAR) 5 MG tablet   Yes No    Take 1 tablet by mouth Daily.    Fluticasone-Umeclidin-Vilant (Trelegy Ellipta) 200-62.5-25 MCG/ACT aerosol powder    No No    Inhale 1 puff Daily. Rinse mouth with water after use.    Fluticasone-Umeclidin-Vilant (Trelegy Ellipta) 200-62.5-25 MCG/ACT aerosol powder    No No    Inhale 1 puff Daily. Rinse mouth with water after use.    Tresiba FlexTouch 200 UNIT/ML solution pen-injector pen injection   Yes No    Inject  under the skin into the appropriate area as directed. 30 units every morning and 74 units in the evening    TRUEplus Lancets 28G misc   Yes No          Emergency department medications:   Medications   sodium chloride 0.9 % flush 10 mL (has no administration in time range)   sertraline (ZOLOFT) tablet 50 mg (has no administration in time range)   ranolazine (RANEXA) 12 hr tablet 500 mg (500 mg Oral Given 12/18/23 2211)   prasugrel (EFFIENT) tablet 10 mg (has no administration in time range)   pramipexole (MIRAPEX) tablet 1 mg (1 mg Oral Given 12/18/23 2212)   pantoprazole (PROTONIX) EC tablet 40 mg (40 mg Oral Given 12/19/23 0611)   magnesium oxide (MAG-OX) tablet 400 mg (has no administration in time range)   budesonide-formoterol (SYMBICORT) 160-4.5 MCG/ACT inhaler 2 puff (2 puffs Inhalation Given 12/19/23 0706)     And   tiotropium (SPIRIVA RESPIMAT) 2.5 mcg/act aerosol solution inhaler (2 puffs Inhalation Given 12/19/23 0706)   carvedilol (COREG) tablet 12.5 mg (12.5 mg Oral Given 12/18/23 2211)   atorvastatin (LIPITOR) tablet 40 mg (has no administration in time range)   terazosin (HYTRIN) capsule 2 mg (2 mg Oral Given 12/18/23 2212)   hydrALAZINE (APRESOLINE) tablet 100 mg (100 mg Oral Given 12/18/23 2211)   sodium chloride 0.9 % flush 10 mL ( Intravenous Canceled Entry 12/18/23 7214)   sodium chloride 0.9 % flush 10 mL (has no administration in time range)   sodium chloride 0.9 % infusion 40 mL (has no administration in  time range)   sennosides-docusate (PERICOLACE) 8.6-50 MG per tablet 2 tablet (2 tablets Oral Not Given 12/18/23 2221)     And   polyethylene glycol (MIRALAX) packet 17 g (has no administration in time range)     And   bisacodyl (DULCOLAX) EC tablet 5 mg (has no administration in time range)     And   bisacodyl (DULCOLAX) suppository 10 mg (has no administration in time range)   heparin (porcine) 5000 UNIT/ML injection 5,000 Units (5,000 Units Subcutaneous Given 12/18/23 2212)   furosemide (LASIX) injection 80 mg (has no administration in time range)   insulin detemir (LEVEMIR) injection 1-200 Units (17 Units Subcutaneous Given 12/18/23 2212)   insulin aspart (novoLOG) injection 1-200 Units ( Subcutaneous Not Given 12/18/23 2154)   insulin aspart (novoLOG) injection 1-200 Units (has no administration in time range)   dextrose (GLUTOSE) oral gel 15 g (has no administration in time range)   dextrose (D50W) (25 g/50 mL) IV injection 10-50 mL (has no administration in time range)   Glucagon (GLUCAGEN) injection 1 mg (has no administration in time range)   nitroglycerin (NITROSTAT) SL tablet 0.4 mg (has no administration in time range)   acetaminophen (TYLENOL) tablet 650 mg (has no administration in time range)     Or   acetaminophen (TYLENOL) 160 MG/5ML oral solution 650 mg (has no administration in time range)     Or   acetaminophen (TYLENOL) suppository 650 mg (has no administration in time range)   aluminum-magnesium hydroxide-simethicone (MAALOX MAX) 400-400-40 MG/5ML suspension 15 mL (has no administration in time range)   ondansetron ODT (ZOFRAN-ODT) disintegrating tablet 4 mg (has no administration in time range)     Or   ondansetron (ZOFRAN) injection 4 mg (has no administration in time range)   furosemide (LASIX) injection 80 mg (80 mg Intravenous Given 12/18/23 1659)       Allergies:  Zocor [simvastatin], Bisoprolol, Byetta 10 mcg pen [exenatide], Crestor [rosuvastatin calcium], Metformin and related, and  "Plavix [clopidogrel bisulfate]    Review of Systems  All review of system were reviewed and are negative except as mentioned in the history of present illness and assessment and plan.      Physical Exam:  Objective:  Vital Signs  /62 (BP Location: Right arm, Patient Position: Lying)   Pulse 69   Temp 98 °F (36.7 °C) (Oral)   Resp 16   Ht 170.2 cm (67\")   Wt 107 kg (236 lb 5.3 oz)   SpO2 97%   BMI 37.02 kg/m²   Objective    I/O this shift:  In: 120 [P.O.:120]  Out: -     Intake/Output Summary (Last 24 hours) at 12/19/2023 0806  Last data filed at 12/19/2023 0722  Gross per 24 hour   Intake 360 ml   Output 725 ml   Net -365 ml        Physical Exam     Constitutional: Awake, alert, and interactive.  Eyes: sclerae anicteric, no conjunctival injection  HEENT: mucous membranes dry  Neck: Supple, no thyromegaly, no lymphadenopathy, trachea midline, No JVD  Respiratory: Clear to auscultation bilaterally, nonlabored respirations, with occasional basal crackles.  Cardiovascular: RRR, positive murmurs, no rubs, or gallops.  Gastrointestinal: Positive bowel sounds, obese, soft, nontender, nondistended  Musculoskeletal: Trace to 1+ edema, no clubbing or cyanosis  Psychiatric: Appropriate affect, cooperative  Neurologic: Oriented x 3, moving all extremities, Cranial Nerves grossly intact, speech clear  Skin: warm and dry, no rashes            Results Review:   Results from last 7 days   Lab Units 12/19/23  0547 12/18/23  1641   SODIUM mmol/L 142 138   POTASSIUM mmol/L 4.1 4.9   CHLORIDE mmol/L 106 102   CO2 mmol/L 26.6 23.3   BUN mg/dL 50* 46*   CREATININE mg/dL 2.53* 2.31*   CALCIUM mg/dL 8.8 8.7   ALBUMIN g/dL  --  3.7   BILIRUBIN mg/dL  --  0.3   ALK PHOS U/L  --  52   ALT (SGPT) U/L  --  30   AST (SGOT) U/L  --  21   GLUCOSE mg/dL 60* 180*     Estimated Creatinine Clearance: 28.5 mL/min (A) (by C-G formula based on SCr of 2.53 mg/dL (H)).  Results from last 7 days   Lab Units 12/18/23  1641   MAGNESIUM mg/dL 2.6* " "        Results from last 7 days   Lab Units 12/18/23  1641   WBC 10*3/mm3 9.51   HEMOGLOBIN g/dL 8.8*   PLATELETS 10*3/mm3 359         Brief Urine Lab Results  (Last result in the past 365 days)        Color   Clarity   Blood   Leuk Est   Nitrite   Protein   CREAT   Urine HCG        05/18/23 1114 Yellow   Clear   Negative   Negative   Negative   Negative                 No results found for: \"UTPCR\"  Imaging Results (Last 24 Hours)       Procedure Component Value Units Date/Time    XR Chest 1 View [[id number]] Resulted: 12/18/23 1712     Updated: 12/18/23 1748          atorvastatin, 40 mg, Oral, Daily  budesonide-formoterol, 2 puff, Inhalation, BID - RT   And  tiotropium bromide monohydrate, 2 puff, Inhalation, Daily - RT  carvedilol, 12.5 mg, Oral, BID With Meals  furosemide, 80 mg, Intravenous, BID  heparin (porcine), 5,000 Units, Subcutaneous, Q12H  hydrALAZINE, 100 mg, Oral, TID  insulin aspart, 1-200 Units, Subcutaneous, 4x Daily With Meals & Nightly  insulin detemir, 1-200 Units, Subcutaneous, Nightly - Glucommander  magnesium oxide, 400 mg, Oral, Daily  pantoprazole, 40 mg, Oral, Q AM  pramipexole, 1 mg, Oral, Nightly  prasugrel, 10 mg, Oral, Daily  ranolazine, 500 mg, Oral, Q12H  senna-docusate sodium, 2 tablet, Oral, BID  sertraline, 50 mg, Oral, Daily  sodium chloride, 10 mL, Intravenous, Q12H  terazosin, 2 mg, Oral, Nightly           Assessment/Plan:      Acute exacerbation of congestive heart failure    Acute kidney injury: Patient does appear to have some worsening of his renal function.  It is likely all hemodynamically mediated secondary to volume issues as well as angiotensin receptor blocker.  Once volume status stabilizes likely will go back to his baseline.  Continue to optimize medications.  Chronic kidney disease stage IIIb: Baseline diabetic and hypertensive glomerulosclerosis with minimal proteinuria.  He has been on optimize medications.  Type 2 diabetes: Continue with the sliding scale most " recent hemoglobin A1c of 5.8 noted.  Hypertension: Optimize volume status before putting him on oral multiple medications.  Anemia: Check iron stores, may end up requiring PAOLA.  CHF/CAD: Optimize volume status  COPD: Continue home medications.  Obstructive sleep apnea: Patient said he has been compliant with his BiPAP.  Dyslipidemia: Continue statin  BPH: Continue home medications.      Risk and complexity: High      Plan:  Agree with continuing 80 mg of Lasix IV for at least another 24 hours.  Will reassess in for oral diuretic dose, once he is euvolemic..  I think he will need bigger dose than what he is taking at home.  Depending on his volume status tomorrow we will consider giving given higher doses than 80 of Lasix.  Hold Edarbi for now can be restarted later once his volume status is stable.  Continue with rest of the current treatment plan and surveillance labs.  Details were discussed with the patient no family in the room.    Details were also discussed with the hospitalist service.   Further recommendations will depend on clinical course of the patient during the current hospitalization.    I also discussed the details with the nursing staff.  Rest as ordered.    In closing, I sincerely appreciate opportunity to participate in care of this patient. If I can be of any further assistance with the management of this patient, please don’t hesitate to contact me.    Destin Wilkes MD, FARHANA    12/19/23  08:06 EST    Dictated using Dragon.

## 2023-12-19 NOTE — PLAN OF CARE
Goal Outcome Evaluation:      Daily Care Plan Summary: Heart Failure    Diuretic in use (IV or PO): IV        Daily weight (up or down):  Down      Output > Intake (yes/no):Yes      O2 Requirements (current, any change?): room air      Symptoms noted with Activity (Respiratory Tolerance, functional state):  NA      Anticipated Discharge Plans: TBD

## 2023-12-19 NOTE — PLAN OF CARE
Goal Outcome Evaluation:            VSS. RA. No reports of pain or soa. No acute overnight events. Continue plan of care

## 2023-12-19 NOTE — PROGRESS NOTES
[facility]    Name:  Donny Jerry   Age:  78 y.o.  Sex:  male  :  1945  MRN:  [id number]   Visit Number:  [id number]  Admission Date:  2023  Date Of Service:  23  Primary Care Physician:  Anum Alonso APRN     LOS: 0 days :    Chief Complaint:      Dyspnea    Subjective:    2023: Patient with improved edema improved dyspnea.  Discussed with Dr. Wilkes desires continue diuresis for the patient.    History Of Presenting Illness:       Patient is a 78-year-old gentleman with a history of CAD with multiple stents, chronic systolic congestive heart failure, chronic kidney disease stage IIIb, hypertension, diabetes, USHA, who presented to the emergency room with complaints of increasing shortness of breath and edema.  Patient is followed by Dr. Khan for his cardiologist and Dr. Wilkes's office for nephrology.  He notes he typically takes 20 mg of Lasix 5 days a week and 40 mg 2 other days.  Had actually doubled his Lasix the last couple days and still having edema.  Wife notes he may be up about 7 to 8 pounds or more based off his weights.  He notes they did cut his Edarbi back in half about a month ago or so.  He is compliant with his medications otherwise.     In the ER, he was mildly hypertensive.  Had a proBNP of 4900, creatinine was 2.31 to BUN of 46.  Elevated troponins noted.  Chest x-ray with pulmonary vascular engorgement.  Patient received 80 mg of IV Lasix.  We were asked admit thereafter.  Edited by: Enrique Ardon DO at 2023 2445     Review of Systems:     All systems were reviewed and negative except as mentioned in subjective, assessment and plan.    Vital Signs:    Temp:  [97.6 °F (36.4 °C)-98.2 °F (36.8 °C)] 98.1 °F (36.7 °C)  Heart Rate:  [67-97] 73  Resp:  [16-18] 18  BP: (106-164)/(25-75) 151/53    Intake and output:    I/O last 3 completed shifts:  In: 240 [P.O.:240]  Out: 725 [Urine:725]  I/O this  shift:  In: 960 [P.O.:960]  Out: 1200 [Urine:1200]    Physical Examination:    Constitutional:       General: He is not in acute distress.     Appearance: He is obese. He is not toxic-appearing.   HENT:      Mouth/Throat:      Mouth: Mucous membranes are dry.   Eyes:      Pupils: Pupils are equal, round, and reactive to light.   Cardiovascular:      Rate and Rhythm: Normal rate and regular rhythm.      Pulses: Normal pulses.      Heart sounds: No murmur heard.     No gallop.   Pulmonary:      Effort: Pulmonary effort is normal.      Breath sounds: Subtle Rales present.   Abdominal:      General: Bowel sounds are normal. There is distension.      Palpations: There is no mass.      Tenderness: There is no guarding or rebound.      Hernia: No hernia is present.   Musculoskeletal:      Right lower le+ edema present.      Left lower le+ edema present.   Skin:     General: Skin is warm.      Capillary Refill: Capillary refill takes less than 2 seconds.      Findings: No bruising or lesion.   Neurological:      General: No focal deficit present.      Mental Status: He is alert. Mental status is at baseline.   Psychiatric:         Mood and Affect: Mood normal.         Thought Content: Thought content normal.   Edited by: Enrique Ardon DO at 2023 1621     Laboratory results:    Results from last 7 days   Lab Units 23  0547 23  1641   SODIUM mmol/L 142 138   POTASSIUM mmol/L 4.1 4.9   CHLORIDE mmol/L 106 102   CO2 mmol/L 26.6 23.3   BUN mg/dL 50* 46*   CREATININE mg/dL 2.53* 2.31*   CALCIUM mg/dL 8.8 8.7   BILIRUBIN mg/dL  --  0.3   ALK PHOS U/L  --  52   ALT (SGPT) U/L  --  30   AST (SGOT) U/L  --  21   GLUCOSE mg/dL 60* 180*     Results from last 7 days   Lab Units 23  1641   WBC 10*3/mm3 9.51   HEMOGLOBIN g/dL 8.8*   HEMATOCRIT % 26.4*   PLATELETS 10*3/mm3 359         Results from last 7 days   Lab Units 23  1903 23  1641   HSTROP T ng/L 141* 136*         Recent Labs      04/23/23  1455   PHART 7.442   [id number] 35.6   PO2ART 57.8*   UUV8BMC 24.3   BASEEXCESS 0.4      I have reviewed the patient's laboratory results.    Radiology results:    XR Chest 1 View    Result Date: 12/19/2023  CLINICAL INDICATION:  SOA Triage Protocol shortness of breath.  EXAMINATION TECHNIQUE: XR CHEST 1 VW-  COMPARISON: Radiograph 7/17/2023.  FINDINGS: Prior median sternotomy. Stable cardiomegaly. Vascular engorgement, cephalization and probable mild interstitial edema. No pneumothorax. Small bilateral pleural effusions. No acute osseous or soft tissue abnormality. No free air under hemidiaphragms.      Impression: Findings are compatible with congestive heart failure/mild cardiogenic pulmonary edema, similar to prior exam.  Images personally reviewed, interpreted and dictated by RAMÓN Falcon.     This report was signed and finalized on 12/19/2023 8:41 AM by RAMÓN Faclon.     I have reviewed the patient's radiology reports.    Medication Review:     I have reviewed the patient's active and prn medications.     Problem List:      Acute exacerbation of congestive heart failure      Assessment/Plan:    Acute on chronic CHF with diastolic failure and moderately reduced systolic failure NYHA class II  IMANI on CKD 3B  Continue IV diuresis per Dr. Wilkes.  Recently had Edarbi dose reduced to hyperkalemia     CAD/hypertension/hyperlipidemia/diabetes/USHA:  Complicates all aspects of care.  Follows with Dr. Khan.         Edited by: Enrique Ardon DO at 12/19/2023 1621     DVT Prophylaxis: Heparin  Code Status:   Code Status and Medical Interventions:   Ordered at: 12/18/23 2051     Medical Intervention Limits:    NO intubation (DNI)    NO cardioversion     Code Status (Patient has no pulse and is not breathing):    No CPR (Do Not Attempt to Resuscitate)     Medical Interventions (Patient has pulse or is breathing):    Limited Support      Diet:   Dietary Orders (From admission, onward)        Start     Ordered    12/18/23 2052  Patient is on Glucommander  (Glucommander™ SQ Insulin - Select Dosing Option)  Continuous        Question Answer Comment   Tray Note: Glucommander    Patient is on Glucommander: Yes        12/18/23 2051 12/18/23 2052  Diet: Cardiac Diets, Diabetic Diets, Renal Diets; Healthy Heart (2-3 Na+); Consistent Carbohydrate; Low Sodium (2-3g), Low Potassium, Low Phosphorus; Texture: Regular Texture (IDDSI 7); Fluid Consistency: Thin (IDDSI 0)  Diet Effective Now        References:    Diet Order [facility]   Question Answer Comment   Diets: Cardiac Diets    Diets: Diabetic Diets    Diets: Renal Diets    Cardiac Diet: Healthy Heart (2-3 Na+)    Diabetic Diet: Consistent Carbohydrate    Renal Diet: Low Sodium (2-3g)    Renal Diet: Low Potassium    Renal Diet: Low Phosphorus    Texture: Regular Texture (IDDSI 7)    Fluid Consistency: Thin (IDDSI 0)        12/18/23 2051                   Discharge Plan: PT consult pending anticipate home with home health in 1 to 2 days    Enrique Ardon DO  12/19/23  16:25 EST    Dictated utilizing Dragon dictation.

## 2023-12-19 NOTE — H&P
[facility]      Name:  Donny Jerry   Age:  78 y.o.  Sex:  male  :  1945  MRN:  [id number]   Visit Number:  [id number]  Admission Date:  2023  Date Of Service:  23  Primary Care Physician:  Anum Alonso APRN    Chief Complaint:     Short of breath    History Of Presenting Illness:      Patient is a 78-year-old gentleman with a history of CAD with multiple stents, chronic systolic congestive heart failure, chronic kidney disease stage IIIb, hypertension, diabetes, USHA, who presented to the emergency room with complaints of increasing shortness of breath and edema.  Patient is followed by Dr. Khan for his cardiologist and Dr. Wilkes's office for nephrology.  He notes he typically takes 20 mg of Lasix 5 days a week and 40 mg 2 other days.  Had actually doubled his Lasix the last couple days and still having edema.  Wife notes he may be up about 7 to 8 pounds or more based off his weights.  He notes they did cut his Edarbi back in half about a month ago or so.  He is compliant with his medications otherwise.    In the ER, he was mildly hypertensive.  Had a proBNP of 4900, creatinine was 2.31 to BUN of 46.  Elevated troponins noted.  Chest x-ray with pulmonary vascular engorgement.  Patient received 80 mg of IV Lasix.  We were asked admit thereafter.    Review Of Systems:    All systems were reviewed and negative except as mentioned in history of presenting illness, assessment and plan.    Past Medical History: Patient  has a past medical history of Benign hypertension, Coronary artery disease, Depression, Enlarged prostate, GERD (gastroesophageal reflux disease), Heart attack, Hypercholesterolemia, Myocardial infarction, Obesity, Obstructive sleep apnea on CPAP, and Type 2 diabetes mellitus.    Past Surgical History: Patient  has a past surgical history that includes Coronary artery bypass graft; Coronary angioplasty with stent  (Left, 06/03/2009); Coronary angioplasty with stent (Left, 07/06/2009); Coronary angioplasty with stent (04/23/2010); Coronary angioplasty with stent (Left, 07/06/2010); Coronary angioplasty with stent (Left, 11/16/2010); Coronary angioplasty with stent (Left); Coronary angioplasty with stent (Left, 06/24/2014); Cardiac catheterization; Colonoscopy w/ polypectomy; Cardiac catheterization (Left, 10/19/2021); Cardiac catheterization (N/A, 7/18/2023); and Cardiac catheterization (N/A, 7/18/2023).    Social History: Patient  reports that he has never smoked. He has never used smokeless tobacco. He reports that he does not drink alcohol and does not use drugs.    Family History:  Patient's family history has been reviewed and found to be noncontributory.     Allergies:      Zocor [simvastatin], Bisoprolol, Byetta 10 mcg pen [exenatide], Crestor [rosuvastatin calcium], Metformin and related, and Plavix [clopidogrel bisulfate]    Home Medications:    Prior to Admission Medications       Prescriptions Last Dose Informant Patient Reported? Taking?    albuterol sulfate  (90 Base) MCG/ACT inhaler   No No    Inhale 2 puffs Every 4 (Four) Hours As Needed for Wheezing.    atorvastatin (LIPITOR) 40 MG tablet   Yes No    Take 1 tablet by mouth Daily.    azilsartan medoxomil (EDARBI) 80 MG tablet tablet  Spouse/Significant Other Yes No    Take 0.5 tablets by mouth Daily.    B-D UF III MINI PEN NEEDLES 31G X 5 MM misc   Yes No    carvedilol (COREG) 12.5 MG tablet   Yes No    Take 1 tablet by mouth 2 (Two) Times a Day With Meals.    Cholecalciferol (VITAMIN D3) 50 MCG (2000 UT) capsule   Yes No    Take 2 capsules by mouth Daily.    dapagliflozin Propanediol (Farxiga) 10 MG tablet  Spouse/Significant Other Yes No    Take 10 mg by mouth Daily.    doxazosin (CARDURA) 8 MG tablet   No No    Take 1 tablet by mouth Every 12 (Twelve) Hours.    fexofenadine (ALLEGRA) 180 MG tablet  Spouse/Significant Other Yes No    Take 1 tablet by  mouth Daily.    finasteride (PROSCAR) 5 MG tablet   Yes No    Take 1 tablet by mouth Daily.    fluticasone (FLONASE) 50 MCG/ACT nasal spray   No No    1 spray into the nostril(s) as directed by provider Daily.    Fluticasone-Umeclidin-Vilant (Trelegy Ellipta) 200-62.5-25 MCG/ACT aerosol powder    No No    Inhale 1 puff Daily. Rinse mouth with water after use.    Fluticasone-Umeclidin-Vilant (Trelegy Ellipta) 200-62.5-25 MCG/ACT aerosol powder    No No    Inhale 1 puff Daily. Rinse mouth with water after use.    furosemide (LASIX) 40 MG tablet   No No    Take 1 tablet by mouth Daily.    hydrALAZINE (APRESOLINE) 100 MG tablet   No No    Take 1 tablet by mouth 3 (Three) Times a Day.    magnesium oxide (MAG-OX) 400 MG tablet   Yes No    Take 1 tablet by mouth Daily.    Multiple Vitamin (MULTI VITAMIN DAILY PO)   Yes No    Take 1 tablet by mouth Daily.    nitroglycerin (NITROSTAT) 0.4 MG SL tablet   No No    Place 1 tablet under the tongue Every 5 (Five) Minutes As Needed for Chest Pain. Take no more than 3 doses in 15 minutes.    pantoprazole (PROTONIX) 40 MG EC tablet   Yes No    TAKE 1 TABLET BY MOUTH 2 TIMES DAILY (BEFORE MEALS)    pramipexole (MIRAPEX) 1 MG tablet   No No    TAKE 1 TABLET EVERY NIGHT    prasugrel (EFFIENT) 10 MG tablet   Yes No    Take 1 tablet by mouth Daily.    ranolazine (RANEXA) 500 MG 12 hr tablet   No No    Take 1 tablet by mouth Every 12 (Twelve) Hours for 180 days.    sertraline (ZOLOFT) 50 MG tablet   Yes No    Take 1 tablet by mouth Daily.    tamsulosin (FLOMAX) 0.4 MG capsule 24 hr capsule   Yes No    Take 1 capsule by mouth 2 (Two) Times a Day.    Tresiba FlexTouch 200 UNIT/ML solution pen-injector pen injection   Yes No    Inject  under the skin into the appropriate area as directed. 30 units every morning and 74 units in the evening    TRUEplus Lancets 28G misc   Yes No    Trulicity 0.75 MG/0.5ML solution pen-injector   Yes No    Inject 0.75 mg as directed 1 (One) Time Per Week.       "    ED Medications:    Medications   sodium chloride 0.9 % flush 10 mL (has no administration in time range)   furosemide (LASIX) injection 80 mg (80 mg Intravenous Given 12/18/23 1659)     Vital Signs:  Temp:  [97.5 °F (36.4 °C)] 97.5 °F (36.4 °C)  Heart Rate:  [82-97] 82  Resp:  [26] 26  BP: (120-164)/(49-75) 145/62        12/18/23  1609   Weight: 109 kg (240 lb)     Body mass index is 37.59 kg/m².    Physical Exam:     Most recent vital Signs: /62   Pulse 82   Temp 97.5 °F (36.4 °C) (Oral)   Resp 26   Ht 170.2 cm (67\")   Wt 109 kg (240 lb)   SpO2 95%   BMI 37.59 kg/m²     Physical Exam  Constitutional:       General: He is not in acute distress.     Appearance: He is obese. He is not toxic-appearing.   HENT:      Mouth/Throat:      Mouth: Mucous membranes are dry.   Eyes:      Pupils: Pupils are equal, round, and reactive to light.   Cardiovascular:      Rate and Rhythm: Normal rate and regular rhythm.      Pulses: Normal pulses.      Heart sounds: No murmur heard.     No gallop.   Pulmonary:      Effort: Pulmonary effort is normal.      Breath sounds: Rales present.   Abdominal:      General: Bowel sounds are normal. There is distension.      Palpations: There is no mass.      Tenderness: There is no guarding or rebound.      Hernia: No hernia is present.   Musculoskeletal:      Right lower leg: Edema present.      Left lower leg: Edema present.   Skin:     General: Skin is warm.      Capillary Refill: Capillary refill takes less than 2 seconds.      Findings: No bruising or lesion.   Neurological:      General: No focal deficit present.      Mental Status: He is alert. Mental status is at baseline.   Psychiatric:         Mood and Affect: Mood normal.         Thought Content: Thought content normal.         Laboratory data:    I have reviewed the labs done in the emergency room.    Results from last 7 days   Lab Units 12/18/23  1641   SODIUM mmol/L 138   POTASSIUM mmol/L 4.9   CHLORIDE mmol/L 102 " "  CO2 mmol/L 23.3   BUN mg/dL 46*   CREATININE mg/dL 2.31*   CALCIUM mg/dL 8.7   BILIRUBIN mg/dL 0.3   ALK PHOS U/L 52   ALT (SGPT) U/L 30   AST (SGOT) U/L 21   GLUCOSE mg/dL 180*     Results from last 7 days   Lab Units 12/18/23  1641   WBC 10*3/mm3 9.51   HEMOGLOBIN g/dL 8.8*   HEMATOCRIT % 26.4*   PLATELETS 10*3/mm3 359         Results from last 7 days   Lab Units 12/18/23  1903 12/18/23  1641   HSTROP T ng/L 141* 136*     Results from last 7 days   Lab Units 12/18/23  1641   PROBNP pg/mL 4,964.0*                       Invalid input(s): \"USDES\", \"NITRITITE\", \"BACT\", \"EP\"    Pain Management Panel          Latest Ref Rng & Units 4/25/2023   Pain Management Panel   Creatinine, Urine mg/dL 56.0  50.3        EKG:      Sinus rhythm, rate of 99, no acute ST elevations.  No significant change from prior EKG from July    Radiology:    No radiology results for the last 3 days    Assessment:    Acute on chronic systolic congestive heart failure, POA  Acute on chronic renal failure stage IIIb, POA  CAD  Hypertension  Dyslipidemia  Diabetes   Obesity  USHA    Plan:    Admit for observation    CHF/renal failure:  Patient with evidence of increased weight gain of at least 7 to 10 pounds over the last week or so.  His creatinine is noted to be up to 2.3, baseline of 1.7-2 range.  He does follow with Dr. Wilkes's office will consult him.  He is urinating fairly well already with Lasix given in the ER, will start on 80 mg IV twice a day.  Will resume home medications once reconciled, noted to cut Edarbi in half recently due to hyperkalemia per wife.  In regards to CHF, not a candidate for Entresto.  Will continue other guideline medications as able.  The patient was told by Dr. Khan he has no further revascularization strategies as well.    CAD/hypertension/hyperlipidemia/diabetes/USHA:  Complicates all aspects of care.    The patient otherwise meets observation level of care with anticipated stay less than 2 midnights.  " Further recommendation will be depend upon improvement of clinical condition.  Plan of care discussed with the patient and wife at bedside.    Risk Assessment: High  DVT Prophylaxis: Heparin  Code Status: DNR  Diet: Carb consistent/cardiac/renal    Advance Care Planning   ACP discussion was held with the patient during this visit. Patient has an advance directive in EMR which is still valid.            Mary Beth Cat DO  12/18/23  20:39 EST    Dictated utilizing Dragon dictation.

## 2023-12-19 NOTE — CASE MANAGEMENT/SOCIAL WORK
Discharge Planning Assessment   [facility]     Patient Name: Donny Jerry  MRN: [id number]  Today's Date: 12/19/2023    Admit Date: 12/18/2023    Plan: Home with family   Discharge Needs Assessment       Row Name 12/19/23 1333       Living Environment    People in Home spouse       Discharge Needs Assessment    Equipment Currently Used at Home CPAP    Concerns to be Addressed discharge planning      Row Name 12/19/23 1214       Living Environment    People in Home spouse    Current Living Arrangements home    Potentially Unsafe Housing Conditions none    Primary Care Provided by self    Provides Primary Care For no one    Family Caregiver if Needed spouse    Quality of Family Relationships involved       Resource/Environmental Concerns    Resource/Environmental Concerns none       Food Insecurity    Within the past 12 months, you worried that your food would run out before you got the money to buy more. Never true    Within the past 12 months, the food you bought just didn't last and you didn't have money to get more. Never true       Transition Planning    Patient/Family Anticipates Transition to home with family    Patient/Family Anticipated Services at Transition none    Transportation Anticipated family or friend will provide                   Discharge Plan       Row Name 12/19/23 7019       Plan    Plan Home with family    Plan Comments Spoke to pt regarding discharge plans .Confirmed address and phone number as being correct on face sheet .He is independent with ADLS ..Has a CPAP with [facility] .Has a Health Surrogate  .Plan is to return home Denies any needs                  Continued Care and Services - Admitted Since 12/18/2023    Coordination has not been started for this encounter.          Demographic Summary       Row Name 12/19/23 1218       General Information    Admission Type observation    Required Notices Provided Observation Status Notice    Referral Source admission list                    Functional Status       Row Name 12/19/23 1214       Functional Status    Usual Activity Tolerance good       Functional Status, IADL    Medications completely dependent    Meal Preparation assistive person    Housekeeping assistive person    Laundry assistive person    Shopping assistive person       Mental Status    General Appearance WDL WDL                   Psychosocial    No documentation.                  Abuse/Neglect    No documentation.                  Legal    No documentation.                  Substance Abuse    No documentation.                  Patient Forms    No documentation.                     Isabelle Pena RN

## 2023-12-20 LAB
ANION GAP SERPL CALCULATED.3IONS-SCNC: 9 MMOL/L (ref 5–15)
BUN SERPL-MCNC: 54 MG/DL (ref 8–23)
BUN/CREAT SERPL: 21.5 (ref 7–25)
CALCIUM SPEC-SCNC: 8.9 MG/DL (ref 8.6–10.5)
CHLORIDE SERPL-SCNC: 103 MMOL/L (ref 98–107)
CO2 SERPL-SCNC: 26 MMOL/L (ref 22–29)
CREAT SERPL-MCNC: 2.51 MG/DL (ref 0.76–1.27)
DEPRECATED RDW RBC AUTO: 43.3 FL (ref 37–54)
EGFRCR SERPLBLD CKD-EPI 2021: 25.7 ML/MIN/1.73
ERYTHROCYTE [DISTWIDTH] IN BLOOD BY AUTOMATED COUNT: 13 % (ref 12.3–15.4)
GLUCOSE BLDC GLUCOMTR-MCNC: 127 MG/DL (ref 70–130)
GLUCOSE BLDC GLUCOMTR-MCNC: 132 MG/DL (ref 70–130)
GLUCOSE BLDC GLUCOMTR-MCNC: 188 MG/DL (ref 70–130)
GLUCOSE BLDC GLUCOMTR-MCNC: 80 MG/DL (ref 70–130)
GLUCOSE SERPL-MCNC: 87 MG/DL (ref 65–99)
HCT VFR BLD AUTO: 27.7 % (ref 37.5–51)
HGB BLD-MCNC: 8.8 G/DL (ref 13–17.7)
MCH RBC QN AUTO: 28.9 PG (ref 26.6–33)
MCHC RBC AUTO-ENTMCNC: 31.8 G/DL (ref 31.5–35.7)
MCV RBC AUTO: 91.1 FL (ref 79–97)
PLATELET # BLD AUTO: 366 10*3/MM3 (ref 140–450)
PMV BLD AUTO: 10 FL (ref 6–12)
POTASSIUM SERPL-SCNC: 4.3 MMOL/L (ref 3.5–5.2)
RBC # BLD AUTO: 3.04 10*6/MM3 (ref 4.14–5.8)
SODIUM SERPL-SCNC: 138 MMOL/L (ref 136–145)
WBC NRBC COR # BLD AUTO: 7.99 10*3/MM3 (ref 3.4–10.8)

## 2023-12-20 PROCEDURE — 94799 UNLISTED PULMONARY SVC/PX: CPT

## 2023-12-20 PROCEDURE — 25810000003 SODIUM CHLORIDE 0.9 % SOLUTION 250 ML FLEX CONT: Performed by: INTERNAL MEDICINE

## 2023-12-20 PROCEDURE — 96372 THER/PROPH/DIAG INJ SC/IM: CPT

## 2023-12-20 PROCEDURE — 25010000002 HEPARIN (PORCINE) PER 1000 UNITS: Performed by: FAMILY MEDICINE

## 2023-12-20 PROCEDURE — 25010000002 FUROSEMIDE PER 20 MG: Performed by: INTERNAL MEDICINE

## 2023-12-20 PROCEDURE — 99232 SBSQ HOSP IP/OBS MODERATE 35: CPT | Performed by: INTERNAL MEDICINE

## 2023-12-20 PROCEDURE — 85027 COMPLETE CBC AUTOMATED: CPT | Performed by: INTERNAL MEDICINE

## 2023-12-20 PROCEDURE — 82948 REAGENT STRIP/BLOOD GLUCOSE: CPT

## 2023-12-20 PROCEDURE — 63710000001 INSULIN ASPART PER 5 UNITS: Performed by: FAMILY MEDICINE

## 2023-12-20 PROCEDURE — 94660 CPAP INITIATION&MGMT: CPT

## 2023-12-20 PROCEDURE — 96376 TX/PRO/DX INJ SAME DRUG ADON: CPT

## 2023-12-20 PROCEDURE — 25010000002 NA FERRIC GLUC CPLX PER 12.5 MG: Performed by: INTERNAL MEDICINE

## 2023-12-20 PROCEDURE — 96365 THER/PROPH/DIAG IV INF INIT: CPT

## 2023-12-20 PROCEDURE — 94664 DEMO&/EVAL PT USE INHALER: CPT

## 2023-12-20 PROCEDURE — 80048 BASIC METABOLIC PNL TOTAL CA: CPT | Performed by: INTERNAL MEDICINE

## 2023-12-20 PROCEDURE — 96366 THER/PROPH/DIAG IV INF ADDON: CPT

## 2023-12-20 PROCEDURE — G0378 HOSPITAL OBSERVATION PER HR: HCPCS

## 2023-12-20 RX ORDER — IRON POLYSACCHARIDE COMPLEX 150 MG
150 CAPSULE ORAL DAILY
Status: DISCONTINUED | OUTPATIENT
Start: 2023-12-20 | End: 2023-12-21 | Stop reason: HOSPADM

## 2023-12-20 RX ADMIN — SERTRALINE HYDROCHLORIDE 50 MG: 50 TABLET ORAL at 08:41

## 2023-12-20 RX ADMIN — TIOTROPIUM BROMIDE INHALATION SPRAY 2 PUFF: 3.12 SPRAY, METERED RESPIRATORY (INHALATION) at 06:44

## 2023-12-20 RX ADMIN — ATORVASTATIN CALCIUM 40 MG: 40 TABLET, FILM COATED ORAL at 08:41

## 2023-12-20 RX ADMIN — BUDESONIDE AND FORMOTEROL FUMARATE DIHYDRATE 2 PUFF: 160; 4.5 AEROSOL RESPIRATORY (INHALATION) at 06:44

## 2023-12-20 RX ADMIN — TERAZOSIN HYDROCHLORIDE 2 MG: 1 CAPSULE ORAL at 22:03

## 2023-12-20 RX ADMIN — FUROSEMIDE 80 MG: 10 INJECTION, SOLUTION INTRAMUSCULAR; INTRAVENOUS at 11:11

## 2023-12-20 RX ADMIN — HEPARIN SODIUM 5000 UNITS: 5000 INJECTION, SOLUTION INTRAVENOUS; SUBCUTANEOUS at 22:03

## 2023-12-20 RX ADMIN — HYDRALAZINE HYDROCHLORIDE 50 MG: 25 TABLET, FILM COATED ORAL at 15:22

## 2023-12-20 RX ADMIN — PRAMIPEXOLE DIHYDROCHLORIDE 1 MG: 1 TABLET ORAL at 22:03

## 2023-12-20 RX ADMIN — FUROSEMIDE 80 MG: 10 INJECTION, SOLUTION INTRAMUSCULAR; INTRAVENOUS at 06:24

## 2023-12-20 RX ADMIN — HYDRALAZINE HYDROCHLORIDE 50 MG: 25 TABLET, FILM COATED ORAL at 22:03

## 2023-12-20 RX ADMIN — CARVEDILOL 12.5 MG: 6.25 TABLET, FILM COATED ORAL at 08:41

## 2023-12-20 RX ADMIN — RANOLAZINE 500 MG: 500 TABLET, FILM COATED, EXTENDED RELEASE ORAL at 08:41

## 2023-12-20 RX ADMIN — INSULIN ASPART 4 UNITS: 100 INJECTION, SOLUTION INTRAVENOUS; SUBCUTANEOUS at 12:58

## 2023-12-20 RX ADMIN — SPIRONOLACTONE 50 MG: 25 TABLET, FILM COATED ORAL at 06:24

## 2023-12-20 RX ADMIN — Medication 150 MG: at 14:14

## 2023-12-20 RX ADMIN — FUROSEMIDE 80 MG: 10 INJECTION, SOLUTION INTRAMUSCULAR; INTRAVENOUS at 14:14

## 2023-12-20 RX ADMIN — HYDRALAZINE HYDROCHLORIDE 50 MG: 25 TABLET, FILM COATED ORAL at 08:41

## 2023-12-20 RX ADMIN — CARVEDILOL 12.5 MG: 6.25 TABLET, FILM COATED ORAL at 17:34

## 2023-12-20 RX ADMIN — SODIUM CHLORIDE 250 MG: 9 INJECTION, SOLUTION INTRAVENOUS at 14:14

## 2023-12-20 RX ADMIN — PRASUGREL 10 MG: 10 TABLET, FILM COATED ORAL at 08:40

## 2023-12-20 RX ADMIN — HEPARIN SODIUM 5000 UNITS: 5000 INJECTION, SOLUTION INTRAVENOUS; SUBCUTANEOUS at 08:41

## 2023-12-20 RX ADMIN — Medication 10 ML: at 08:42

## 2023-12-20 RX ADMIN — DOCUSATE SODIUM 50MG AND SENNOSIDES 8.6MG 2 TABLET: 8.6; 5 TABLET, FILM COATED ORAL at 22:03

## 2023-12-20 RX ADMIN — RANOLAZINE 500 MG: 500 TABLET, FILM COATED, EXTENDED RELEASE ORAL at 22:03

## 2023-12-20 RX ADMIN — Medication 10 ML: at 22:10

## 2023-12-20 RX ADMIN — BUDESONIDE AND FORMOTEROL FUMARATE DIHYDRATE 2 PUFF: 160; 4.5 AEROSOL RESPIRATORY (INHALATION) at 19:10

## 2023-12-20 RX ADMIN — PANTOPRAZOLE SODIUM 40 MG: 40 TABLET, DELAYED RELEASE ORAL at 06:24

## 2023-12-20 RX ADMIN — DOCUSATE SODIUM 50MG AND SENNOSIDES 8.6MG 2 TABLET: 8.6; 5 TABLET, FILM COATED ORAL at 08:41

## 2023-12-20 NOTE — PLAN OF CARE
Goal Outcome Evaluation:  Plan of Care Reviewed With: patient        Progress: no change  Outcome Evaluation: Patient participated well in PT evaluation and demonstrated safe, independent functional mobility.  He does not require the skills of PT at this time.  PT will sign off at this time.      Anticipated Discharge Disposition (PT): home

## 2023-12-20 NOTE — PROGRESS NOTES
[facility] Progress Note  [facility]. [place]        Patient Name: Donny Jerry  : 1945  MRN: [id number]   LOS: 0 days    Patient Care Team:  Anum Alonso APRN as PCP - Rubin Sal MD as Consulting Physician (Urology)  Liz Russo MD as Consulting Physician (Cardiology)    Chief Complaint:    Chief Complaint   Patient presents with    Shortness of Breath     Primary Care Physician:  Anum Alonso APRN  Date of admission: 2023    Subjective     Interval History:   Follow-up acute on chronic kidney disease stage IIIb.  Events noted from last 24 hours.  Patient is sitting up in the chair feels like that he is less short of breath was able to walk to the bathroom and back without getting short of breath.  Denies any chest pain.  He feels like that his edema is significantly better.    Review of Systems:   As noted above.    Objective     Vitals:   Temp:  [97.5 °F (36.4 °C)-98.1 °F (36.7 °C)] 97.5 °F (36.4 °C)  Heart Rate:  [68-81] 69  Resp:  [16-18] 18  BP: (124-151)/(25-74) 141/74    Intake/Output Summary (Last 24 hours) at 2023 0811  Last data filed at 2023 0700  Gross per 24 hour   Intake 1320 ml   Output 3300 ml   Net -1980 ml       Physical Exam:    General Appearance: alert, oriented x 3, no acute distress   Skin: warm and dry  HEENT: oral mucosa normal, nonicteric sclera  Neck: supple, no JVD  Lungs: Decreased breath sounds with occasional basal crackles.  Heart: RRR, normal S1 and S2  Abdomen: Obese, soft, nontender, nondistended and positive bowel sounds  : no palpable bladder  Extremities: Trace to 1+ edema, no cyanosis or clubbing  Neuro: normal speech and mental status     Scheduled Meds:     Current Facility-Administered Medications   Medication Dose Route Frequency Provider Last Rate Last Admin    acetaminophen (TYLENOL) tablet 650 mg  650 mg Oral Q4H PRN Mary Beth Cat,         Or     acetaminophen (TYLENOL) 160 MG/5ML oral solution 650 mg  650 mg Oral Q4H PRN Mary Beth Cat DO        Or    acetaminophen (TYLENOL) suppository 650 mg  650 mg Rectal Q4H PRN Mary Beth Cat DO        aluminum-magnesium hydroxide-simethicone (MAALOX MAX) 400-400-40 MG/5ML suspension 15 mL  15 mL Oral Q6H PRN Mary Beth Cat DO        atorvastatin (LIPITOR) tablet 40 mg  40 mg Oral Daily Mary Beth Cat DO   40 mg at 12/19/23 0903    sennosides-docusate (PERICOLACE) 8.6-50 MG per tablet 2 tablet  2 tablet Oral BID Mary Beth Cat DO        And    polyethylene glycol (MIRALAX) packet 17 g  17 g Oral Daily PRN Mary Beth Cat DO        And    bisacodyl (DULCOLAX) EC tablet 5 mg  5 mg Oral Daily PRN Mary Beth Cat DO        And    bisacodyl (DULCOLAX) suppository 10 mg  10 mg Rectal Daily PRN Mary Beth Cat DO        budesonide-formoterol (SYMBICORT) 160-4.5 MCG/ACT inhaler 2 puff  2 puff Inhalation BID - RT Mary Beth Cat DO   2 puff at 12/20/23 0644    And    tiotropium (SPIRIVA RESPIMAT) 2.5 mcg/act aerosol solution inhaler  2 puff Inhalation Daily - RT Mary Beth Cat DO   2 puff at 12/20/23 0644    carvedilol (COREG) tablet 12.5 mg  12.5 mg Oral BID With Meals Mary Beth Cat DO   12.5 mg at 12/19/23 1723    dextrose (D50W) (25 g/50 mL) IV injection 10-50 mL  10-50 mL Intravenous Q15 Min PRN Mary Beth Cat DO        dextrose (GLUTOSE) oral gel 15 g  15 g Oral Q15 Min PRN Mary Beth Cat DO        furosemide (LASIX) injection 80 mg  80 mg Intravenous Q4H Destin Wilkes MD, FASN   80 mg at 12/20/23 0624    Glucagon (GLUCAGEN) injection 1 mg  1 mg Intramuscular Q15 Min PRN Mary Beth Cat DO        heparin (porcine) 5000 UNIT/ML injection 5,000 Units  5,000 Units Subcutaneous Q12H Mary Beth Cat DO   5,000 Units at 12/19/23 2109    hydrALAZINE (APRESOLINE) tablet 50 mg  50 mg Oral TID Katrin  MD Destin, FASN        insulin aspart (novoLOG) injection 1-200 Units  1-200 Units Subcutaneous 4x Daily With Meals & Nightly Mary Beth Cat DO   5 Units at 12/19/23 1723    insulin aspart (novoLOG) injection 1-200 Units  1-200 Units Subcutaneous PRN Mary Beth Cat,         insulin detemir (LEVEMIR) injection 1-200 Units  1-200 Units Subcutaneous Nightly - Glucommander Mary Beth Cat, DO   17 Units at 12/18/23 2212    nitroglycerin (NITROSTAT) SL tablet 0.4 mg  0.4 mg Sublingual Q5 Min PRN Mary Beth Cat,         ondansetron ODT (ZOFRAN-ODT) disintegrating tablet 4 mg  4 mg Oral Q6H PRN Mary Beth Cat,         Or    ondansetron (ZOFRAN) injection 4 mg  4 mg Intravenous Q6H PRN Mary Beth Cat, DO        pantoprazole (PROTONIX) EC tablet 40 mg  40 mg Oral Q AM Mary Beth Cat, DO   40 mg at 12/20/23 0624    pramipexole (MIRAPEX) tablet 1 mg  1 mg Oral Nightly Mary Beth Cat, DO   1 mg at 12/19/23 2110    prasugrel (EFFIENT) tablet 10 mg  10 mg Oral Daily Mary Beth Cat, DO   10 mg at 12/19/23 0904    ranolazine (RANEXA) 12 hr tablet 500 mg  500 mg Oral Q12H Mary Beth Cat, DO   500 mg at 12/19/23 2109    sertraline (ZOLOFT) tablet 50 mg  50 mg Oral Daily Mary Beth Cat, DO   50 mg at 12/19/23 0902    sodium chloride 0.9 % flush 10 mL  10 mL Intravenous PRN Mary Beth Cat, DO        sodium chloride 0.9 % flush 10 mL  10 mL Intravenous Q12H Mary Beth Cat, DO   10 mL at 12/19/23 2110    sodium chloride 0.9 % flush 10 mL  10 mL Intravenous PRN Mary Beth Cat, DO        sodium chloride 0.9 % infusion 40 mL  40 mL Intravenous PRN Mary Beth Cat DO        terazosin (HYTRIN) capsule 2 mg  2 mg Oral Nightly Mary Beth Cat DO   2 mg at 12/19/23 2109       atorvastatin, 40 mg, Oral, Daily  budesonide-formoterol, 2 puff, Inhalation, BID - RT   And  tiotropium bromide monohydrate, 2 puff,  Inhalation, Daily - RT  carvedilol, 12.5 mg, Oral, BID With Meals  furosemide, 80 mg, Intravenous, Q4H  heparin (porcine), 5,000 Units, Subcutaneous, Q12H  hydrALAZINE, 50 mg, Oral, TID  insulin aspart, 1-200 Units, Subcutaneous, 4x Daily With Meals & Nightly  insulin detemir, 1-200 Units, Subcutaneous, Nightly - Glucommander  pantoprazole, 40 mg, Oral, Q AM  pramipexole, 1 mg, Oral, Nightly  prasugrel, 10 mg, Oral, Daily  ranolazine, 500 mg, Oral, Q12H  senna-docusate sodium, 2 tablet, Oral, BID  sertraline, 50 mg, Oral, Daily  sodium chloride, 10 mL, Intravenous, Q12H  terazosin, 2 mg, Oral, Nightly        IV Meds:        Results Reviewed:   I have personally reviewed the results from the time of this admission to 12/20/2023 08:11 EST     Results from last 7 days   Lab Units 12/20/23  0553 12/19/23  0547 12/18/23  1641   SODIUM mmol/L 138 142 138   POTASSIUM mmol/L 4.3 4.1 4.9   CHLORIDE mmol/L 103 106 102   CO2 mmol/L 26.0 26.6 23.3   BUN mg/dL 54* 50* 46*   CREATININE mg/dL 2.51* 2.53* 2.31*   CALCIUM mg/dL 8.9 8.8 8.7   BILIRUBIN mg/dL  --   --  0.3   ALK PHOS U/L  --   --  52   ALT (SGPT) U/L  --   --  30   AST (SGOT) U/L  --   --  21   GLUCOSE mg/dL 87 60* 180*       Estimated Creatinine Clearance: 28.8 mL/min (A) (by C-G formula based on SCr of 2.51 mg/dL (H)).    Results from last 7 days   Lab Units 12/18/23  1641   MAGNESIUM mg/dL 2.6*             Results from last 7 days   Lab Units 12/20/23  0553 12/18/23  1641   WBC 10*3/mm3 7.99 9.51   HEMOGLOBIN g/dL 8.8* 8.8*   PLATELETS 10*3/mm3 366 359            Latest Reference Range & Units 12/19/23 05:47   Iron 59 - 158 mcg/dL 34 (L)   Iron Saturation (TSAT) 20 - 50 % 13 (L)   Transferrin 200 - 360 mg/dL 174 (L)   TIBC 298 - 536 mcg/dL 259 (L)   (L): Data is abnormally low    Brief Urine Lab Results  (Last result in the past 365 days)        Color   Clarity   Blood   Leuk Est   Nitrite   Protein   CREAT   Urine HCG        12/19/23 1142             26.3       "           No results found for: \"UTPCR\"    Imaging Results (Last 24 Hours)       Procedure Component Value Units Date/Time    XR Chest 1 View [[id number]] Collected: 12/19/23 0840     Updated: 12/19/23 0843    Narrative:      CLINICAL INDICATION:    SOA Triage Protocol shortness of breath.     EXAMINATION TECHNIQUE:  XR CHEST 1 VW-     COMPARISON:  Radiograph 7/17/2023.     FINDINGS:  Prior median sternotomy. Stable cardiomegaly. Vascular engorgement,  cephalization and probable mild interstitial edema. No pneumothorax.  Small bilateral pleural effusions. No acute osseous or soft tissue  abnormality. No free air under hemidiaphragms.       Impression:      Findings are compatible with congestive heart failure/mild cardiogenic  pulmonary edema, similar to prior exam.     Images personally reviewed, interpreted and dictated by RAMÓN Falcon.              This report was signed and finalized on 12/19/2023 8:41 AM by RAMÓN Falcon.                   Assessment / Plan     ASSESSMENT:    Acute exacerbation of congestive heart failure    Acute kidney injury: Patient does appear to have some worsening of his renal function.  It is likely all hemodynamically mediated secondary to volume issues as well as angiotensin receptor blocker.  Once volume status stabilizes likely will go back to his baseline.  Continue to optimize medications.  Renal function is slightly better.  Chronic kidney disease stage IIIb: Baseline diabetic and hypertensive glomerulosclerosis with minimal proteinuria.  He has been on optimize medications.  Type 2 diabetes: Continue with the sliding scale most recent hemoglobin A1c of 5.8 noted.  Hypertension: Optimize volume status before putting him on oral multiple medications.  Anemia: Check iron stores, may end up requiring PAOLA.  CHF/CAD: Optimize volume status  COPD: Continue home medications.  Obstructive sleep apnea: Patient said he has been compliant with his BiPAP.  Dyslipidemia: " Continue statin  BPH: Continue home medications.        PLAN:  I will change Lasix to 80 mg every 4 hours x 3 doses give him 1 dose of spironolactone 50 mg.  Will try to figure out a good dose of diuretics that he can take at home.  While aggressively diuresing we will decrease the hydralazine to 50 mg 3 times a day.  His magnesium is on the high side we will stop the magnesium supplement as well.  Iron stores are noted to be on the low side we will go ahead and give him 1 dose of IV iron today.  I will also start him on oral iron supplements.  Details were discussed with the patient no family in the room.    Details were also discussed with the hospitalist service.  Clinically stable may be able to go home tomorrow.  He will need close follow-up in the office.  Continue with rest of the current treatment plan, and monitor with surveillance labs.  Further recommendations will depend on clinical course of the patient during the current hospitalization.   I have reviewed the copied text, the changes made as needed.  It is accurate to the point, when the note was signed.     Thank you for involving us in the care of Donny Jerry.  Please feel free to call with any questions.    Destin Wilkes MD, FARHANA  12/20/23  08:11 [facility] [[email or web address]]    [facility]  [phone]      Part of this note may be an electronic transcription/translation of spoken language to printed text using the Dragon Dictation System.

## 2023-12-20 NOTE — PLAN OF CARE
"Goal Outcome Evaluation:      VSS on RA- Pt. Endorses improvement with \"work of breathing\" with ambulation. Adequate UOP noted post IV diuresis. IV iron and PO iron initiated today. Tolerating PO diet. Pt. Endorses \"slight\" improvement with BLE edema. Possible discharge home tomorrow.       Daily Care Plan Summary: Heart Failure    Diuretic in use (IV or PO):   IV        Daily weight (up or down):    loss: 2lbs      Output > Intake (yes/no):Yes      O2 Requirements (current, any change?): room air      Symptoms noted with Activity (Respiratory Tolerance, functional state):     Work of breathing improved with ambulation      Anticipated Discharge Plans:     Possible discharge home tomorrow.                 "

## 2023-12-20 NOTE — PLAN OF CARE
Problem: Adjustment to Illness (Heart Failure)  Goal: Optimal Coping  Outcome: Ongoing, Progressing     Problem: Cardiac Output Decreased (Heart Failure)  Goal: Optimal Cardiac Output  Outcome: Ongoing, Progressing     Problem: Dysrhythmia (Heart Failure)  Goal: Stable Heart Rate and Rhythm  Outcome: Ongoing, Progressing     Problem: Fluid Imbalance (Heart Failure)  Goal: Fluid Balance  Outcome: Ongoing, Progressing     Problem: Functional Ability Impaired (Heart Failure)  Goal: Optimal Functional Ability  Outcome: Ongoing, Progressing  Intervention: Optimize Functional Ability  Recent Flowsheet Documentation  Taken 12/20/2023 0200 by Veronique Patricia RN  Activity Management: activity encouraged  Taken 12/20/2023 0000 by Veronique Patricia RN  Activity Management: activity encouraged  Taken 12/19/2023 2200 by Veronique Patricia, RN  Activity Management: activity encouraged  Taken 12/19/2023 2000 by Veronique Patricia RN  Activity Management: activity encouraged     Problem: Oral Intake Inadequate (Heart Failure)  Goal: Optimal Nutrition Intake  Outcome: Ongoing, Progressing     Problem: Respiratory Compromise (Heart Failure)  Goal: Effective Oxygenation and Ventilation  Outcome: Ongoing, Progressing     Problem: Sleep Disordered Breathing (Heart Failure)  Goal: Effective Breathing Pattern During Sleep  Outcome: Ongoing, Progressing     Problem: Noninvasive Ventilation Acute  Goal: Effective Unassisted Ventilation and Oxygenation  Outcome: Ongoing, Progressing  Goal: Effective Unassisted Ventilation and Oxygenation  Outcome: Ongoing, Progressing     Problem: Diabetes Comorbidity  Goal: Blood Glucose Level Within Targeted Range  Outcome: Ongoing, Progressing     Problem: Heart Failure Comorbidity  Goal: Maintenance of Heart Failure Symptom Control  Outcome: Ongoing, Progressing     Problem: Hypertension Comorbidity  Goal: Blood Pressure in Desired Range  Outcome: Ongoing, Progressing     Problem: Obstructive Sleep Apnea Risk  or Actual Comorbidity Management  Goal: Unobstructed Breathing During Sleep  Outcome: Ongoing, Progressing     Problem: Fall Injury Risk  Goal: Absence of Fall and Fall-Related Injury  Outcome: Ongoing, Progressing  Intervention: Promote Injury-Free Environment  Recent Flowsheet Documentation  Taken 12/20/2023 0200 by Veronique Patricia RN  Safety Promotion/Fall Prevention: safety round/check completed  Taken 12/20/2023 0000 by Veronique Patricia RN  Safety Promotion/Fall Prevention: safety round/check completed  Taken 12/19/2023 2200 by Veronique Patricia RN  Safety Promotion/Fall Prevention: safety round/check completed  Taken 12/19/2023 2000 by Veronique Patricia, RN  Safety Promotion/Fall Prevention: safety round/check completed   Goal Outcome Evaluation:

## 2023-12-20 NOTE — CASE MANAGEMENT/SOCIAL WORK
Case Management/Social Work    Patient Name:  Donny Jerry  YOB: 1945  MRN: [id number]  Admit Date:  12/18/2023      Amanda continuing to follow pt for discharge needs.  Dc plan is for pt to return home with family providing transport.  Pt has a CPAP through [employer] which is in good working condition.  Anticipate pt will dc to home with home health.      Electronically signed by:  Elvie Carlisle  12/20/23 15:21 EST

## 2023-12-20 NOTE — PROGRESS NOTES
[facility]    Name:  Donny Jerry   Age:  78 y.o.  Sex:  male  :  1945  MRN:  [id number]   Visit Number:  [id number]  Admission Date:  2023  Date Of Service:  23  Primary Care Physician:  Anum Alonso APRN     LOS: 0 days :    Chief Complaint:      swelling    Subjective:    2023: Discussed with Dr. Wilkes swelling improving but still has a ways to go needs further diuresis.  Discussed with patient and wife at the bedside patient is feeling better with the diuresis.  Walking easier.  Case management notes reviewed PT notes reviewed.    History Of Presenting Illness:       Patient is a 78-year-old gentleman with a history of CAD with multiple stents, chronic systolic congestive heart failure, chronic kidney disease stage IIIb, hypertension, diabetes, USHA, who presented to the emergency room with complaints of increasing shortness of breath and edema.  Patient is followed by Dr. Khan for his cardiologist and Dr. Wilkes's office for nephrology.  He notes he typically takes 20 mg of Lasix 5 days a week and 40 mg 2 other days.  Had actually doubled his Lasix the last couple days and still having edema.  Wife notes he may be up about 7 to 8 pounds or more based off his weights.  He notes they did cut his Edarbi back in half about a month ago or so.  He is compliant with his medications otherwise.     In the ER, he was mildly hypertensive.  Had a proBNP of 4900, creatinine was 2.31 to BUN of 46.  Elevated troponins noted.  Chest x-ray with pulmonary vascular engorgement.  Patient received 80 mg of IV Lasix.  We were asked admit thereafter.  Edited by: Enrique Ardon DO at 2023 1642     Review of Systems:     All systems were reviewed and negative except as mentioned in subjective, assessment and plan.    Vital Signs:    Temp:  [97.5 °F (36.4 °C)-97.9 °F (36.6 °C)] 97.9 °F (36.6 °C)  Heart Rate:  [67-78] 67  Resp:  [18]  18  BP: ()/(40-80) 99/80    Intake and output:    I/O last 3 completed shifts:  In: 1680 [P.O.:1680]  Out: 4025 [Urine:4025]  I/O this shift:  In: 320 [P.O.:320]  Out: 1550 [Urine:1550]    Physical Examination:    Constitutional:       General: He is not in acute distress.     Appearance: He is obese. He is not toxic-appearing.   HENT:      Mouth/Throat:      Mouth: Mucous membranes are dry.   Eyes:      Pupils: Pupils are equal, round, and reactive to light.   Cardiovascular:      Rate and Rhythm: Normal rate and regular rhythm.      Pulses: Normal pulses.      Heart sounds: No murmur heard.     No gallop.   Pulmonary:      Effort: Pulmonary effort is normal.      Breath sounds: Subtle Rales present.   Abdominal:      General: Bowel sounds are normal. There is less distension.      Palpations: There is no mass.      Tenderness: There is no guarding or rebound.      Hernia: No hernia is present.   Musculoskeletal:      Right lower le+ edema present.      Left lower le+ edema present.   Skin:     General: Skin is warm.      Capillary Refill: Capillary refill takes less than 2 seconds.      Findings: No bruising or lesion.   Neurological:      General: No focal deficit present.      Mental Status: He is alert. Mental status is at baseline.   Psychiatric:         Mood and Affect: Mood normal.         Thought Content: Thought content normal.   Edited by: Enrique Ardon DO at 2023 1642     Laboratory results:    Results from last 7 days   Lab Units 23  0553 23  0547 23  1641   SODIUM mmol/L 138 142 138   POTASSIUM mmol/L 4.3 4.1 4.9   CHLORIDE mmol/L 103 106 102   CO2 mmol/L 26.0 26.6 23.3   BUN mg/dL 54* 50* 46*   CREATININE mg/dL 2.51* 2.53* 2.31*   CALCIUM mg/dL 8.9 8.8 8.7   BILIRUBIN mg/dL  --   --  0.3   ALK PHOS U/L  --   --  52   ALT (SGPT) U/L  --   --  30   AST (SGOT) U/L  --   --  21   GLUCOSE mg/dL 87 60* 180*     Results from last 7 days   Lab Units 23  0553  12/18/23  1641   WBC 10*3/mm3 7.99 9.51   HEMOGLOBIN g/dL 8.8* 8.8*   HEMATOCRIT % 27.7* 26.4*   PLATELETS 10*3/mm3 366 359         Results from last 7 days   Lab Units 12/18/23  1903 12/18/23  1641   HSTROP T ng/L 141* 136*         Recent Labs     04/23/23  1455   PHART 7.442   SWK5USM 35.6   PO2ART 57.8*   ILZ2AGL 24.3   BASEEXCESS 0.4      I have reviewed the patient's laboratory results.    Radiology results:    XR Chest 1 View    Result Date: 12/19/2023  CLINICAL INDICATION:  SOA Triage Protocol shortness of breath.  EXAMINATION TECHNIQUE: XR CHEST 1 VW-  COMPARISON: Radiograph 7/17/2023.  FINDINGS: Prior median sternotomy. Stable cardiomegaly. Vascular engorgement, cephalization and probable mild interstitial edema. No pneumothorax. Small bilateral pleural effusions. No acute osseous or soft tissue abnormality. No free air under hemidiaphragms.      Impression: Findings are compatible with congestive heart failure/mild cardiogenic pulmonary edema, similar to prior exam.  Images personally reviewed, interpreted and dictated by RAMÓN Falcon.     This report was signed and finalized on 12/19/2023 8:41 AM by RAMÓN Falcon.     I have reviewed the patient's radiology reports.    Medication Review:     I have reviewed the patient's active and prn medications.     Problem List:      Acute exacerbation of congestive heart failure      Assessment/Plan:    Acute on chronic CHF with diastolic failure and moderately reduced systolic failure NYHA class II  IMANI on CKD 3B  Continue IV diuresis per Dr. Wilkes.       CAD/hypertension/hyperlipidemia/diabetes/USHA:  Complicates all aspects of care.  Follows with Dr. Khan.         Edited by: Enrique Ardon DO at 12/20/2023 1642     DVT Prophylaxis: heparin  Code Status:   Code Status and Medical Interventions:   Ordered at: 12/18/23 2051     Medical Intervention Limits:    NO intubation (DNI)    NO cardioversion     Code Status (Patient has no pulse  and is not breathing):    No CPR (Do Not Attempt to Resuscitate)     Medical Interventions (Patient has pulse or is breathing):    Limited Support      Diet:   Dietary Orders (From admission, onward)       Start     Ordered    12/18/23 2052  Patient is on Glucommander  (Glucommander™ SQ Insulin - Select Dosing Option)  Continuous        Question Answer Comment   Tray Note: Glucommander    Patient is on Glucommander: Yes        12/18/23 2051 12/18/23 2052  Diet: Cardiac Diets, Diabetic Diets, Renal Diets; Healthy Heart (2-3 Na+); Consistent Carbohydrate; Low Sodium (2-3g), Low Potassium, Low Phosphorus; Texture: Regular Texture (IDDSI 7); Fluid Consistency: Thin (IDDSI 0)  Diet Effective Now        References:    Diet Order [facility]   Question Answer Comment   Diets: Cardiac Diets    Diets: Diabetic Diets    Diets: Renal Diets    Cardiac Diet: Healthy Heart (2-3 Na+)    Diabetic Diet: Consistent Carbohydrate    Renal Diet: Low Sodium (2-3g)    Renal Diet: Low Potassium    Renal Diet: Low Phosphorus    Texture: Regular Texture (IDDSI 7)    Fluid Consistency: Thin (IDDSI 0)        12/18/23 2051                   Discharge Plan: home independently in 1 to 2 days    Enrique Ardon DO  12/20/23  16:43 EST    Dictated utilizing Dragon dictation.

## 2023-12-20 NOTE — THERAPY DISCHARGE NOTE
Patient Name: Donny Jerry  : 1945    MRN: [id number]                              Today's Date: 2023       Admit Date: 2023    Visit Dx:     ICD-10-CM ICD-9-CM   1. Acute on chronic congestive heart failure, unspecified heart failure type  I50.9 428.0   2. Elevated troponin  R79.89 790.6   3. Acute on chronic renal insufficiency  N28.9 593.9    N18.9 585.9   4. Anemia, unspecified type  D64.9 285.9     Patient Active Problem List   Diagnosis    Coronary artery disease involving native coronary artery of native heart with angina pectoris    Essential hypertension    Hyperlipidemia LDL goal <70    Type 2 diabetes mellitus with stage 3 chronic kidney disease    Obstructive sleep apnea on CPAP    Enlarged prostate    Obesity, Class II, BMI 35-39.9    Depression    Acute hypoxemic respiratory failure due to COVID-19    CKD (chronic kidney disease) stage 3, GFR 30-59 ml/min    Elevated troponin level not due myocardial infarction    Post viral debility    Chest pain, unspecified type    Chronic systolic heart failure    Type 1 myocardial infarction    Chronic diastolic heart failure    Bilateral lower extremity edema    Acute exacerbation of congestive heart failure     Past Medical History:   Diagnosis Date    Benign hypertension     Coronary artery disease     Depression     Enlarged prostate     Enlarged prostate with anticipated TURP with Dr. Bernal.    GERD (gastroesophageal reflux disease)     Heart attack     Hypercholesterolemia     Myocardial infarction     Obesity     Obstructive sleep apnea on CPAP     Type 2 diabetes mellitus      Past Surgical History:   Procedure Laterality Date    CARDIAC CATHETERIZATION      CARDIAC CATHETERIZATION Left 10/19/2021    Procedure: Left Heart Cath;  Surgeon: Liz Russo MD;  Location: [facility] CATH INVASIVE LOCATION;  Service: Cardiology;  Laterality: Left;    CARDIAC CATHETERIZATION N/A 2023    Procedure: Coronary angiography;  Surgeon:  Rupesh Parr MD;  Location:  [facility] CATH INVASIVE LOCATION;  Service: Cardiology;  Laterality: N/A;    CARDIAC CATHETERIZATION N/A 7/18/2023    Procedure: Percutaneous Coronary Intervention;  Surgeon: Rupesh Parr MD;  Location:  [facility] CATH INVASIVE LOCATION;  Service: Cardiology;  Laterality: N/A;    COLONOSCOPY W/ POLYPECTOMY      CORONARY ANGIOPLASTY WITH STENT PLACEMENT Left 06/03/2009    Left heart catheterization, 06/03/2009, Dr. Russo:  LVEF 45% to 50%.  Placement of overlapping Cypher drug-eluting stent 2.5 x 28 and 2.5 x 18 to the SVG to the obtuse marginal branch.  SVG to the PDA had a proximal stenosis estimated at 60% with a distal stenosis of 50% to 60%.    CORONARY ANGIOPLASTY WITH STENT PLACEMENT Left 07/06/2009    Left heart catheterization, 07/06/2009:  PTCA and stent placement in the mid PDA using a 2.25 x 12 mm Taxus drug-eluting stent with stent placement in the distal SVG to the PDA using a 2.5 x 18 mm Cypher drug-eluting stent and stenting of the proximal SVG to the PDA using a 3.0 x 28 mm Cypher drug-eluting stent.    CORONARY ANGIOPLASTY WITH STENT PLACEMENT  04/23/2010    Cardiac catheterization for recurrent anginal symptoms, 04/23/2010, with PTCA and stent placement in the proximal SVG to the PDA using 3.0 x 28 mm Promus drug-eluting stent for in-stent restenosis.    CORONARY ANGIOPLASTY WITH STENT PLACEMENT Left 07/06/2010    Left heart catheterization, 07/06/2010, Dr. Russo:  EF 40% to 45%.  3.5 x 23 mm Promus drug-eluting stent in the proximal SVG to OM, 2.5 x 18 mm Promus drug-eluting stent to distal SVG to PDA due to in-stent restenosis.      CORONARY ANGIOPLASTY WITH STENT PLACEMENT Left 11/16/2010    Left heart catheterization, 11/16/2010, with placement of a 3.0 x 16 mm Taxus drug-eluting stent to the SVG to the RCA proximally and a 2.5 x 16 mm paclitaxel stent distally in the SVG to the RCA.    CORONARY ANGIOPLASTY WITH STENT PLACEMENT Left     Left  heart catheterization, 3.0 x 24-mm Promus element drug-eluting stent to a 90% lesion in the mid portion of the SVG to the PDA.     CORONARY ANGIOPLASTY WITH STENT PLACEMENT Left 06/24/2014    Left heart catheterization for recurrent angina, 06/24/2014, Dr. Russo:  PTCA of the SVG to the PDA using a 3 x 12 mm NC TREK balloon.      CORONARY ARTERY BYPASS GRAFT      CABG x3, Dr. Peng, [facility], 2001.      General Information       Row Name 12/19/23 1541          Physical Therapy Time and Intention    Document Type discharge evaluation/summary  -     Mode of Treatment physical therapy  -       Row Name 12/19/23 1541          General Information    Patient Profile Reviewed yes  -JR     Prior Level of Function independent:;community mobility  -     Existing Precautions/Restrictions fall;oxygen therapy device and L/min  -     Barriers to Rehab none identified  -       Row Name 12/19/23 1541          Living Environment    People in Home spouse  -       Row Name 12/19/23 1541          Home Main Entrance    Number of Stairs, Main Entrance none  -       Row Name 12/19/23 1541          Stairs Within Home, Primary    Number of Stairs, Within Home, Primary none  -       Row Name 12/19/23 1541          Cognition    Orientation Status (Cognition) oriented x 4  -JR               User Key  (r) = Recorded By, (t) = Taken By, (c) = Cosigned By      Initials Name Provider Type    JR Ellyn Aldana, PT Physical Therapist                   Mobility       Row Name 12/19/23 1541          Bed Mobility    Bed Mobility bed mobility (all) activities  -     All Activities, [place] (Bed Mobility) modified independence  -     Assistive Device (Bed Mobility) head of bed elevated;bed rails  -       Row Name 12/19/23 1541          Sit-Stand Transfer    Sit-Stand [place] (Transfers) independent  -       Row Name 12/19/23 1541          Gait/Stairs (Locomotion)    [place] Level (Gait)  independent  -JR     Distance in Feet (Gait) 5  -JR     Comment, (Gait/Stairs) Patient declines walking out of the room  -JR               User Key  (r) = Recorded By, (t) = Taken By, (c) = Cosigned By      Initials Name Provider Type    Ellyn Armando PT Physical Therapist                   Obj/Interventions       Row Name 12/19/23 1541          Range of Motion Comprehensive    General Range of Motion no range of motion deficits identified  -[facility] Name 12/19/23 1541          Strength Comprehensive (MMT)    General Manual Muscle Testing (MMT) Assessment no strength deficits identified  -[facility] Name 12/19/23 1541          Balance    Balance Assessment sitting static balance;sitting dynamic balance;sit to stand dynamic balance;standing static balance;standing dynamic balance  -JR     Static Sitting Balance independent  -JR     Dynamic Sitting Balance independent  -JR     Position, Sitting Balance unsupported;sitting edge of bed  -JR     Sit to Stand Dynamic Balance supervision  -JR     Static Standing Balance supervision  -JR     Dynamic Standing Balance supervision  -JR               User Key  (r) = Recorded By, (t) = Taken By, (c) = Cosigned By      Initials Name Provider Type    Ellyn Armando PT Physical Therapist                   Goals/Plan    No documentation.                  Clinical Impression       [facility] Name 12/19/23 1541          Pain    Pretreatment Pain Rating 0/10 - no pain  -JR     Posttreatment Pain Rating 0/10 - no pain  -[facility] Name 12/19/23 1541          Plan of Care Review    Plan of Care Reviewed With patient  -     Progress no change  -     Outcome Evaluation Patient participated well in PT evaluation and demonstrated safe, independent functional mobility.  He does not require the skills of PT at this time.  PT will sign off at this time.  -       Row Name 12/19/23 1541          Therapy Assessment/Plan (PT)    Patient/Family Therapy Goals Statement (PT) Patient is  eager to go home  -     Criteria for Skilled Interventions Met (PT) no;skilled treatment is necessary;no problems identified which require skilled intervention  -     Therapy Frequency (PT) evaluation only  -       Row Name 12/19/23 1541          Positioning and Restraints    Pre-Treatment Position in bed  -JR     Post Treatment Position bed  -JR     In Bed sitting EOB;call light within reach;encouraged to call for assist;notified nsg  -               User Key  (r) = Recorded By, (t) = Taken By, (c) = Cosigned By      Initials Name Provider Type    Ellyn Armando, NONA Physical Therapist                   Outcome Measures       Row Name 12/19/23 1541          How much help from another person do you currently need...    Turning from your back to your side while in flat bed without using bedrails? 4  -JR     Moving from lying on back to sitting on the side of a flat bed without bedrails? 4  -JR     Moving to and from a bed to a chair (including a wheelchair)? 4  -JR     Standing up from a chair using your arms (e.g., wheelchair, bedside chair)? 4  -JR     Climbing 3-5 steps with a railing? 4  -JR     To walk in hospital room? 4  -JR     AM-PAC 6 Clicks Score (PT) 24  -JR     Highest Level of Mobility Goal 8 --> Walked 250 feet or more  -       Row Name 12/19/23 1541          Functional Assessment    Outcome Measure Options AM-PAC 6 Clicks Basic Mobility (PT)  -               User Key  (r) = Recorded By, (t) = Taken By, (c) = Cosigned By      Initials Name Provider Type    Ellyn Armando PT Physical Therapist                  Physical Therapy Education       Title: PT OT SLP Therapies (In Progress)       Topic: Physical Therapy (In Progress)       Point: Mobility training (Done)       Learning Progress Summary             Patient Acceptance, E,TB, VU by  at 12/19/2023 2116    Comment: Role of PT and POC                         Point: Home exercise program (Not Started)       Learner Progress:  Not  documented in this visit.              Point: Body mechanics (Not Started)       Learner Progress:  Not documented in this visit.              Point: Precautions (Not Started)       Learner Progress:  Not documented in this visit.                              User Key       Initials Effective Dates Name Provider Type [facility] 08/22/23 -  Ellyn Aldana PT Physical Therapist PT                  PT Recommendation and Plan     Plan of Care Reviewed With: patient  Progress: no change  Outcome Evaluation: Patient participated well in PT evaluation and demonstrated safe, independent functional mobility.  He does not require the skills of PT at this time.  PT will sign off at this time.     Time Calculation:   PT Evaluation Complexity  History, PT Evaluation Complexity: 1-2 personal factors and/or comorbidities  Examination of Body Systems (PT Eval Complexity): 1-2 elements  Clinical Presentation (PT Evaluation Complexity): evolving  Clinical Decision Making (PT Evaluation Complexity): low complexity  Overall Complexity (PT Evaluation Complexity): low complexity     PT Charges       Row Name 12/19/23 1541             Time Calculation    Start Time 1541  -JR      PT Received On 12/19/23  -      PT Goal Re-Cert Due Date 12/29/23  -         Untimed Charges    PT Eval/Re-eval Minutes 40  -JR         Total Minutes    Untimed Charges Total Minutes 40  -JR       Total Minutes 40  -JR                User Key  (r) = Recorded By, (t) = Taken By, (c) = Cosigned By      Initials Name Provider Type     Ellyn Aldana PT Physical Therapist                  Therapy Charges for Today       Code Description Service Date Service Provider Modifiers Qty    52296011522 HC PT EVAL LOW COMPLEXITY 3 12/19/2023 Ellyn Aldana, PT GP 1            PT G-Codes  Outcome Measure Options: AM-PAC 6 Clicks Basic Mobility (PT)  AM-PAC 6 Clicks Score (PT): 24    PT Discharge Summary  Anticipated Discharge Disposition (PT): home    Ellyn Aldana  PT  12/19/2023

## 2023-12-21 ENCOUNTER — READMISSION MANAGEMENT (OUTPATIENT)
Dept: CALL CENTER | Facility: HOSPITAL | Age: 78
End: 2023-12-21
Payer: MEDICARE

## 2023-12-21 VITALS
SYSTOLIC BLOOD PRESSURE: 150 MMHG | TEMPERATURE: 97.8 F | BODY MASS INDEX: 35.85 KG/M2 | WEIGHT: 228.4 LBS | DIASTOLIC BLOOD PRESSURE: 53 MMHG | RESPIRATION RATE: 18 BRPM | HEART RATE: 65 BPM | HEIGHT: 67 IN | OXYGEN SATURATION: 96 %

## 2023-12-21 PROBLEM — I50.23 ACUTE ON CHRONIC HFREF (HEART FAILURE WITH REDUCED EJECTION FRACTION): Status: ACTIVE | Noted: 2023-12-21

## 2023-12-21 PROBLEM — I50.23 ACUTE ON CHRONIC SYSTOLIC HEART FAILURE: Status: ACTIVE | Noted: 2023-12-21

## 2023-12-21 LAB
ANION GAP SERPL CALCULATED.3IONS-SCNC: 10.5 MMOL/L (ref 5–15)
BUN SERPL-MCNC: 63 MG/DL (ref 8–23)
BUN/CREAT SERPL: 21.4 (ref 7–25)
CALCIUM SPEC-SCNC: 9.5 MG/DL (ref 8.6–10.5)
CHLORIDE SERPL-SCNC: 100 MMOL/L (ref 98–107)
CO2 SERPL-SCNC: 29.5 MMOL/L (ref 22–29)
CREAT SERPL-MCNC: 2.94 MG/DL (ref 0.76–1.27)
DEPRECATED RDW RBC AUTO: 42 FL (ref 37–54)
EGFRCR SERPLBLD CKD-EPI 2021: 21.3 ML/MIN/1.73
ERYTHROCYTE [DISTWIDTH] IN BLOOD BY AUTOMATED COUNT: 12.9 % (ref 12.3–15.4)
GLUCOSE SERPL-MCNC: 143 MG/DL (ref 65–99)
HCT VFR BLD AUTO: 30.5 % (ref 37.5–51)
HGB BLD-MCNC: 10 G/DL (ref 13–17.7)
MCH RBC QN AUTO: 29.3 PG (ref 26.6–33)
MCHC RBC AUTO-ENTMCNC: 32.8 G/DL (ref 31.5–35.7)
MCV RBC AUTO: 89.4 FL (ref 79–97)
PLATELET # BLD AUTO: 410 10*3/MM3 (ref 140–450)
PMV BLD AUTO: 10.2 FL (ref 6–12)
POTASSIUM SERPL-SCNC: 4 MMOL/L (ref 3.5–5.2)
RBC # BLD AUTO: 3.41 10*6/MM3 (ref 4.14–5.8)
SODIUM SERPL-SCNC: 140 MMOL/L (ref 136–145)
WBC NRBC COR # BLD AUTO: 8.73 10*3/MM3 (ref 3.4–10.8)

## 2023-12-21 PROCEDURE — 80048 BASIC METABOLIC PNL TOTAL CA: CPT | Performed by: INTERNAL MEDICINE

## 2023-12-21 PROCEDURE — 85027 COMPLETE CBC AUTOMATED: CPT | Performed by: INTERNAL MEDICINE

## 2023-12-21 PROCEDURE — 99239 HOSP IP/OBS DSCHRG MGMT >30: CPT | Performed by: INTERNAL MEDICINE

## 2023-12-21 PROCEDURE — 94799 UNLISTED PULMONARY SVC/PX: CPT

## 2023-12-21 PROCEDURE — 94660 CPAP INITIATION&MGMT: CPT

## 2023-12-21 PROCEDURE — G0378 HOSPITAL OBSERVATION PER HR: HCPCS

## 2023-12-21 PROCEDURE — 94664 DEMO&/EVAL PT USE INHALER: CPT

## 2023-12-21 RX ORDER — IRON POLYSACCHARIDE COMPLEX 150 MG
150 CAPSULE ORAL DAILY
Qty: 30 CAPSULE | Refills: 0 | Status: SHIPPED | OUTPATIENT
Start: 2023-12-21 | End: 2024-01-20

## 2023-12-21 RX ADMIN — PRASUGREL 10 MG: 10 TABLET, FILM COATED ORAL at 09:49

## 2023-12-21 RX ADMIN — TIOTROPIUM BROMIDE INHALATION SPRAY 2 PUFF: 3.12 SPRAY, METERED RESPIRATORY (INHALATION) at 07:09

## 2023-12-21 RX ADMIN — PANTOPRAZOLE SODIUM 40 MG: 40 TABLET, DELAYED RELEASE ORAL at 05:45

## 2023-12-21 RX ADMIN — ATORVASTATIN CALCIUM 40 MG: 40 TABLET, FILM COATED ORAL at 09:50

## 2023-12-21 RX ADMIN — CARVEDILOL 12.5 MG: 6.25 TABLET, FILM COATED ORAL at 09:49

## 2023-12-21 RX ADMIN — RANOLAZINE 500 MG: 500 TABLET, FILM COATED, EXTENDED RELEASE ORAL at 09:49

## 2023-12-21 RX ADMIN — Medication 10 ML: at 09:50

## 2023-12-21 RX ADMIN — BUDESONIDE AND FORMOTEROL FUMARATE DIHYDRATE 2 PUFF: 160; 4.5 AEROSOL RESPIRATORY (INHALATION) at 07:09

## 2023-12-21 RX ADMIN — HYDRALAZINE HYDROCHLORIDE 50 MG: 25 TABLET, FILM COATED ORAL at 09:49

## 2023-12-21 RX ADMIN — SERTRALINE HYDROCHLORIDE 50 MG: 50 TABLET ORAL at 09:49

## 2023-12-21 RX ADMIN — Medication 150 MG: at 09:49

## 2023-12-21 NOTE — OUTREACH NOTE
Prep Survey      Flowsheet Row Responses   [facility] patient [facility]? [facility]   Is LACE score < 7 ? No   Eligibility Readm Mgmt   Discharge diagnosis Acute exacerbation of congestive heart failure   Does the patient have one of the following disease processes/diagnoses(primary or secondary)? CHF   Does the patient have Home health ordered? No   Is there a DME ordered? No   Prep survey completed? Yes            DANYEL HINOJOSA - Registered Nurse

## 2023-12-21 NOTE — DISCHARGE SUMMARY
[facility]      Name:  Donny Jerry   Age:  78 y.o.  Sex:  male  :  1945  MRN:  [id number]   Visit Number:  [id number]    Admission Date:  2023  Date of Discharge:  2023  Primary Care Physician:  Anum Alonso APRN    Important issues to note:    Start: Iron polysaccharides  Stop: Edarbi  Follow up: PCP and nephrology  Brief Summary: Presented with dyspnea due to due to volume overload related to CHF and CKD.  Responded well to diuresis with improvement in dyspnea and swelling by the time of discharge.    Discharge Diagnoses:       Acute exacerbation of congestive heart failure    Acute on chronic HFrEF (heart failure with reduced ejection fraction)        Problem List:     Active Hospital Problems    Diagnosis  POA    **Acute exacerbation of congestive heart failure [I50.9]  Yes    Acute on chronic HFrEF (heart failure with reduced ejection fraction) [I50.23]  Yes      Resolved Hospital Problems   No resolved problems to display.     Presenting Problem:    Chief Complaint   Patient presents with    Shortness of Breath      Consults:     Consulting Physician(s)         Provider   Role Specialty     Destin Wilkes MD, FARHANA  Consulting Physician Nephrology          History Of Presenting Illness:       Patient is a 78-year-old gentleman with a history of CAD with multiple stents, chronic systolic congestive heart failure, chronic kidney disease stage IIIb, hypertension, diabetes, USHA, who presented to the emergency room with complaints of increasing shortness of breath and edema.  Patient is followed by Dr. Khan for his cardiologist and Dr. Wilkes's office for nephrology.  He notes he typically takes 20 mg of Lasix 5 days a week and 40 mg 2 other days.  Had actually doubled his Lasix the last couple days and still having edema.  Wife notes he may be up about 7 to 8 pounds or more based off his weights.  He notes they did cut his  Edarbi back in half about a month ago or so.  He is compliant with his medications otherwise.     In the ER, he was mildly hypertensive.  Had a proBNP of 4900, creatinine was 2.31 to BUN of 46.  Elevated troponins noted.  Chest x-ray with pulmonary vascular engorgement.  Patient received 80 mg of IV Lasix.  We were asked admit thereafter.  Edited by: Enrique Ardon DO at 2023 1642      Hospital course:     Acute on chronic CHF with diastolic failure and moderately reduced systolic failure NYHA class II  IMANI on CKD 3B  Was continued on IV diuresis while inpatient transition back to his home oral diuretics at discharge creatinine was monitored daily nephrology followed and made recommendations Dr. Wilkes.  Edarbi was held per nephrology recommendation.       CAD/hypertension/hyperlipidemia/diabetes/USHA:  Overall stable while inpatient       Vital Signs:    Temp:  [97.7 °F (36.5 °C)-98.4 °F (36.9 °C)] 97.8 °F (36.6 °C)  Heart Rate:  [65-70] 65  Resp:  [18] 18  BP: ()/(46-80) 150/53    Physical Exam:    Constitutional:       General: He is not in acute distress.     Appearance: He is obese. He is not toxic-appearing.   HENT:      Mouth/Throat:      Mouth: Mucous membranes are dry.   Eyes:      Pupils: Pupils are equal, round, and reactive to light.   Cardiovascular:      Rate and Rhythm: Normal rate and regular rhythm.      Pulses: Normal pulses.      Heart sounds: No murmur heard.     No gallop.   Pulmonary:      Effort: Pulmonary effort is normal.      Breath sounds: Subtle Rales present.   Abdominal:      General: Bowel sounds are normal. There is less distension.      Palpations: There is no mass.      Tenderness: There is no guarding or rebound.      Hernia: No hernia is present.   Musculoskeletal:      Right lower le+ edema present.      Left lower le+ edema present.   Skin:     General: Skin is warm.      Capillary Refill: Capillary refill takes less than 2 seconds.      Findings: No  bruising or lesion.   Neurological:      General: No focal deficit present.      Mental Status: He is alert. Mental status is at baseline.   Psychiatric:         Mood and Affect: Mood normal.         Thought Content: Thought content normal.   Exam stable 12/21/2023    Pertinent Lab Results:     Results from last 7 days   Lab Units 12/21/23  0607 12/20/23  0553 12/19/23  0547 12/18/23  1641   SODIUM mmol/L 140 138 142 138   POTASSIUM mmol/L 4.0 4.3 4.1 4.9   CHLORIDE mmol/L 100 103 106 102   CO2 mmol/L 29.5* 26.0 26.6 23.3   BUN mg/dL 63* 54* 50* 46*   CREATININE mg/dL 2.94* 2.51* 2.53* 2.31*   CALCIUM mg/dL 9.5 8.9 8.8 8.7   BILIRUBIN mg/dL  --   --   --  0.3   ALK PHOS U/L  --   --   --  52   ALT (SGPT) U/L  --   --   --  30   AST (SGOT) U/L  --   --   --  21   GLUCOSE mg/dL 143* 87 60* 180*     Results from last 7 days   Lab Units 12/21/23  0607 12/20/23  0553 12/18/23  1641   WBC 10*3/mm3 8.73 7.99 9.51   HEMOGLOBIN g/dL 10.0* 8.8* 8.8*   HEMATOCRIT % 30.5* 27.7* 26.4*   PLATELETS 10*3/mm3 410 366 359         Results from last 7 days   Lab Units 12/18/23  1903 12/18/23  1641   HSTROP T ng/L 141* 136*     Results from last 7 days   Lab Units 12/18/23  1641   PROBNP pg/mL 4,964.0*                       Pertinent Radiology Results:    Imaging Results (All)       Procedure Component Value Units Date/Time    XR Chest 1 View [[id number]] Collected: 12/19/23 0840     Updated: 12/19/23 0843    Narrative:      CLINICAL INDICATION:    SOA Triage Protocol shortness of breath.     EXAMINATION TECHNIQUE:  XR CHEST 1 VW-     COMPARISON:  Radiograph 7/17/2023.     FINDINGS:  Prior median sternotomy. Stable cardiomegaly. Vascular engorgement,  cephalization and probable mild interstitial edema. No pneumothorax.  Small bilateral pleural effusions. No acute osseous or soft tissue  abnormality. No free air under hemidiaphragms.       Impression:      Findings are compatible with congestive heart failure/mild cardiogenic  pulmonary  edema, similar to prior exam.     Images personally reviewed, interpreted and dictated by RAMÓN Falcon.              This report was signed and finalized on 12/19/2023 8:41 AM by RAMÓN Falcon.               Echo:    Results for orders placed during the hospital encounter of 07/17/23    Adult Transthoracic Echo Complete W/ Cont if Necessary Per Protocol    Interpretation Summary    Left ventricular systolic function is normal. Estimated left ventricular EF = 50% Left ventricular ejection fraction appears to be 46 - 50%.    Left ventricular wall thickness is consistent with mild concentric hypertrophy.    Left ventricular diastolic function is consistent with (grade II w/high LAP) pseudonormalization.    The left atrial cavity is mildly dilated.    Left atrial volume is mildly increased.    Mild aortic valve stenosis is present.    Estimated right ventricular systolic pressure from tricuspid regurgitation is normal (<35 mmHg).    Condition on Discharge:      Stable.    Code status during the hospital stay:    Code Status and Medical Interventions:   Ordered at: 12/18/23 2051     Medical Intervention Limits:    NO intubation (DNI)    NO cardioversion     Code Status (Patient has no pulse and is not breathing):    No CPR (Do Not Attempt to Resuscitate)     Medical Interventions (Patient has pulse or is breathing):    Limited Support     Discharge Disposition:    Home or Self Care    Discharge Medications:       Discharge Medications        New Medications        Instructions Start Date   iron polysaccharides 150 MG capsule  Commonly known as: NIFEREX   150 mg, Oral, Daily             Changes to Medications        Instructions Start Date   Fluticasone-Umeclidin-Vilant 200-62.5-25 MCG/ACT aerosol powder   Commonly known as: Trelegy Ellipta  What changed: Another medication with the same name was removed. Continue taking this medication, and follow the directions you see here.   1 puff, Inhalation,  Daily, Rinse mouth with water after use.             Continue These Medications        Instructions Start Date   albuterol sulfate  (90 Base) MCG/ACT inhaler  Commonly known as: PROVENTIL HFA;VENTOLIN HFA;PROAIR HFA   2 puffs, Inhalation, Every 4 Hours PRN      aspirin 81 MG chewable tablet   81 mg, Oral, Daily      atorvastatin 40 MG tablet  Commonly known as: LIPITOR   40 mg, Oral, Daily      B-D UF III MINI PEN NEEDLES 31G X 5 MM misc  Generic drug: Insulin Pen Needle   No dose, route, or frequency recorded.      carvedilol 12.5 MG tablet  Commonly known as: COREG   12.5 mg, Oral, 2 Times Daily With Meals      doxazosin 8 MG tablet  Commonly known as: CARDURA   8 mg, Oral, Every 12 Hours      Farxiga 10 MG tablet  Generic drug: dapagliflozin Propanediol   10 mg, Oral, Daily      fexofenadine 180 MG tablet  Commonly known as: ALLEGRA   180 mg, Oral, Daily      finasteride 5 MG tablet  Commonly known as: PROSCAR   5 mg, Oral, Daily      fluticasone 50 MCG/ACT nasal spray  Commonly known as: FLONASE   1 spray, Nasal, Daily      furosemide 40 MG tablet  Commonly known as: LASIX   40 mg, Oral, Daily      hydrALAZINE 100 MG tablet  Commonly known as: APRESOLINE   100 mg, Oral, 3 Times Daily      magnesium oxide 400 MG tablet  Commonly known as: MAG-OX   400 mg, Oral, Daily      multivitamin tablet tablet  Commonly known as: THERAGRAN   1 tablet, Oral, Daily      nitroglycerin 0.4 MG SL tablet  Commonly known as: NITROSTAT   0.4 mg, Sublingual, Every 5 Minutes PRN, Take no more than 3 doses in 15 minutes.       pantoprazole 40 MG EC tablet  Commonly known as: PROTONIX   TAKE 1 TABLET BY MOUTH 2 TIMES DAILY (BEFORE MEALS)      pramipexole 1 MG tablet  Commonly known as: MIRAPEX   1 mg, Oral, Nightly      prasugrel 10 MG tablet  Commonly known as: EFFIENT   10 mg, Oral, Daily      ranolazine 500 MG 12 hr tablet  Commonly known as: RANEXA   500 mg, Oral, Every 12 Hours Scheduled      sertraline 50 MG  tablet  Commonly known as: ZOLOFT   100 mg, Oral, Daily      tamsulosin 0.4 MG capsule 24 hr capsule  Commonly known as: FLOMAX   1 capsule, Oral, 2 Times Daily      Tresiba FlexTouch 200 UNIT/ML solution pen-injector pen injection  Generic drug: Insulin Degludec   Subcutaneous, 30 units every morning and 74 units in the evening      TRUEplus Lancets 28G misc   No dose, route, or frequency recorded.      Trulicity 0.75 MG/0.5ML solution pen-injector  Generic drug: Dulaglutide   0.75 mg, Injection, Weekly      Vitamin D3 50 MCG (2000 UT) capsule   2 capsules, Oral, Daily             Stop These Medications      azilsartan medoxomil 80 MG tablet tablet  Commonly known as: EDARBI            Discharge Diet:     Diet Instructions       Diet: Cardiac Diets, Diabetic Diets, Renal Diets; Low Sodium (2g); Consistent Carbohydrate; Low Sodium (2-3g)      Discharge Diet:  Cardiac Diets  Diabetic Diets  Renal Diets       Cardiac Diet: Low Sodium (2g)    Diabetic Diet: Consistent Carbohydrate    Renal Diet: Low Sodium (2-3g)    Texture: Regular Texture (IDDSI 7)    Fluid Consistency: Thin (IDDSI 0)          Activity at Discharge:       Follow-up Appointments:    Additional Instructions for the Follow-ups that You Need to Schedule       Discharge Follow-up with PCP   As directed       Currently Documented PCP:    Anum Alonso APRN    PCP Phone Number:    [phone]     Follow Up Details: 1 week        Discharge Follow-up with Specified Provider: Dr. Wilkes 7-10 days   As directed      To: Dr. Wilkes 7-10 days               Follow-up Information       Liz Russo MD .    Specialties: Cardiology, Interventional Cardiology  Contact information:  [address]  [facility] [address]  [facility] [address] [phone]               Anum Alonso APRN .    Specialty: Family Medicine  Why: 1 week  Contact information:  [address] [facility] [address] [phone]                           Future  Appointments   Date Time Provider Department Center   2/15/2024 10:30 AM Liz Russo MD [facility]   3/26/2024 10:00 AM Carolann Franco APRN [facility]     Test Results Pending at Discharge:           Enrique Ardon DO  12/21/23  09:28 EST    Time: I spent 45 minutes on this discharge activity which included: face-to-face encounter with the patient, reviewing the data in the system, coordination of the care with the nursing staff as well as consultants, documentation, and entering orders.     Dictated utilizing Dragon dictation.

## 2023-12-21 NOTE — CASE MANAGEMENT/SOCIAL WORK
Case Management Discharge Note                Selected Continued Care - Admitted Since 12/18/2023       Destination    No services have been selected for the patient.                Durable Medical Equipment    No services have been selected for the patient.                Dialysis/Infusion    No services have been selected for the patient.                Home Medical Care    No services have been selected for the patient.                Therapy    No services have been selected for the patient.                Community Resources    No services have been selected for the patient.                Community & [facility]    No services have been selected for the patient.                    Transportation Services  Private: Car    Final Discharge Disposition Code: 01 - home or self-care

## 2023-12-21 NOTE — PROGRESS NOTES
[facility]"" Progress Note  [facility]. [place]        Patient Name: Donny Jerry  : 1945  MRN: [id number]   LOS: 0 days    Patient Care Team:  Anum Alonso APRN as PCP - Rubin Sal MD as Consulting Physician (Urology)  Liz Russo MD as Consulting Physician (Cardiology)    Chief Complaint:    Chief Complaint   Patient presents with    Shortness of Breath     Primary Care Physician:  Anum Alonso APRN  Date of admission: 2023    Subjective     Interval History:   Follow-up acute on chronic kidney disease stage IIIb.  Events noted from last 24 hours.  Patient is sitting up in the chair feels like that he is less short of breath was able to walk to the bathroom and back without getting short of breath.  Denies any chest pain.  He feels like that his edema is significantly better.  He said he feels good enough and wants to go home today.    Review of Systems:   As noted above.    Objective     Vitals:   Temp:  [97.7 °F (36.5 °C)-98.4 °F (36.9 °C)] 97.8 °F (36.6 °C)  Heart Rate:  [65-70] 65  Resp:  [18] 18  BP: ()/(46-97) 128/97    Intake/Output Summary (Last 24 hours) at 2023 0745  Last data filed at 2023 0300  Gross per 24 hour   Intake 720 ml   Output 2750 ml   Net -2030 ml       Physical Exam:    General Appearance: alert, oriented x 3, no acute distress   Skin: warm and dry  HEENT: oral mucosa normal, nonicteric sclera  Neck: supple, no JVD  Lungs: Decreased breath sounds with occasional basal crackles.  Heart: RRR, normal S1 and S2  Abdomen: Obese, soft, nontender, nondistended and positive bowel sounds  : no palpable bladder  Extremities: Trace to 1+ edema, no cyanosis or clubbing  Neuro: normal speech and mental status     Scheduled Meds:     Current Facility-Administered Medications   Medication Dose Route Frequency Provider Last Rate Last Admin    acetaminophen (TYLENOL) tablet 650 mg  650 mg  Oral Q4H PRN Mary Beth Cat DO        Or    acetaminophen (TYLENOL) 160 MG/5ML oral solution 650 mg  650 mg Oral Q4H PRN Mary Beth Cat DO        Or    acetaminophen (TYLENOL) suppository 650 mg  650 mg Rectal Q4H PRN Mary Beth Cat DO        aluminum-magnesium hydroxide-simethicone (MAALOX MAX) 400-400-40 MG/5ML suspension 15 mL  15 mL Oral Q6H PRN Mary Beth Cat DO        atorvastatin (LIPITOR) tablet 40 mg  40 mg Oral Daily Mary Beth Cat DO   40 mg at 12/20/23 0841    sennosides-docusate (PERICOLACE) 8.6-50 MG per tablet 2 tablet  2 tablet Oral BID Mary Beth Cat DO   2 tablet at 12/20/23 2203    And    polyethylene glycol (MIRALAX) packet 17 g  17 g Oral Daily PRN Mary Beth Cat DO        And    bisacodyl (DULCOLAX) EC tablet 5 mg  5 mg Oral Daily PRN Mary Beth Cat DO        And    bisacodyl (DULCOLAX) suppository 10 mg  10 mg Rectal Daily PRN Mary Beth Cat DO        budesonide-formoterol (SYMBICORT) 160-4.5 MCG/ACT inhaler 2 puff  2 puff Inhalation BID - RT Mary Beth Cat DO   2 puff at 12/21/23 0709    And    tiotropium (SPIRIVA RESPIMAT) 2.5 mcg/act aerosol solution inhaler  2 puff Inhalation Daily - RT Mary Beth Cat DO   2 puff at 12/21/23 0709    carvedilol (COREG) tablet 12.5 mg  12.5 mg Oral BID With Meals Mary Beth Cat DO   12.5 mg at 12/20/23 1734    dextrose (D50W) (25 g/50 mL) IV injection 10-50 mL  10-50 mL Intravenous Q15 Min PRN Mary Beth Cat DO        dextrose (GLUTOSE) oral gel 15 g  15 g Oral Q15 Min PRN Mary Beth Cat DO        Glucagon (GLUCAGEN) injection 1 mg  1 mg Intramuscular Q15 Min PRN Mary Beth Cat DO        heparin (porcine) 5000 UNIT/ML injection 5,000 Units  5,000 Units Subcutaneous Q12H Mary Beth Cat DO   5,000 Units at 12/20/23 2203    hydrALAZINE (APRESOLINE) tablet 50 mg  50 mg Oral TID Destin Wilkes MD, FASN   50 mg at  12/20/23 2203    insulin aspart (novoLOG) injection 1-200 Units  1-200 Units Subcutaneous 4x Daily With Meals & Nightly Mary Beth Cat DO   4 Units at 12/20/23 1258    insulin aspart (novoLOG) injection 1-200 Units  1-200 Units Subcutaneous PRN Mary Beth Cat,         insulin detemir (LEVEMIR) injection 1-200 Units  1-200 Units Subcutaneous Nightly - Glucommander Mary Beth Cat, DO   17 Units at 12/18/23 2212    iron polysaccharides (NIFEREX) capsule 150 mg  150 mg Oral Daily Destin Wilkes MD, FASN   150 mg at 12/20/23 1414    nitroglycerin (NITROSTAT) SL tablet 0.4 mg  0.4 mg Sublingual Q5 Min PRN Mary Beth Cat DO        ondansetron ODT (ZOFRAN-ODT) disintegrating tablet 4 mg  4 mg Oral Q6H PRN Mary Beth Cat DO        Or    ondansetron (ZOFRAN) injection 4 mg  4 mg Intravenous Q6H PRN Mary Beth Cat,         pantoprazole (PROTONIX) EC tablet 40 mg  40 mg Oral Q AM Mary Beth Cat DO   40 mg at 12/21/23 0545    pramipexole (MIRAPEX) tablet 1 mg  1 mg Oral Nightly Mary Beth Cat DO   1 mg at 12/20/23 2203    prasugrel (EFFIENT) tablet 10 mg  10 mg Oral Daily Mary Beth aCt, DO   10 mg at 12/20/23 0840    ranolazine (RANEXA) 12 hr tablet 500 mg  500 mg Oral Q12H Mary Beth Cat, DO   500 mg at 12/20/23 2203    sertraline (ZOLOFT) tablet 50 mg  50 mg Oral Daily Mary Beth Cat, DO   50 mg at 12/20/23 0841    sodium chloride 0.9 % flush 10 mL  10 mL Intravenous PRN Mary Beth Cat,         sodium chloride 0.9 % flush 10 mL  10 mL Intravenous Q12H Mary Beth Cat, DO   10 mL at 12/20/23 2210    sodium chloride 0.9 % flush 10 mL  10 mL Intravenous PRN Mary Beth Cat DO        sodium chloride 0.9 % infusion 40 mL  40 mL Intravenous PRN Mary Beth Cta DO        terazosin (HYTRIN) capsule 2 mg  2 mg Oral Nightly Mary Beth Cat DO   2 mg at 12/20/23 2203       atorvastatin, 40 mg, Oral,  Daily  budesonide-formoterol, 2 puff, Inhalation, BID - RT   And  tiotropium bromide monohydrate, 2 puff, Inhalation, Daily - RT  carvedilol, 12.5 mg, Oral, BID With Meals  heparin (porcine), 5,000 Units, Subcutaneous, Q12H  hydrALAZINE, 50 mg, Oral, TID  insulin aspart, 1-200 Units, Subcutaneous, 4x Daily With Meals & Nightly  insulin detemir, 1-200 Units, Subcutaneous, Nightly - Glucommander  iron polysaccharides, 150 mg, Oral, Daily  pantoprazole, 40 mg, Oral, Q AM  pramipexole, 1 mg, Oral, Nightly  prasugrel, 10 mg, Oral, Daily  ranolazine, 500 mg, Oral, Q12H  senna-docusate sodium, 2 tablet, Oral, BID  sertraline, 50 mg, Oral, Daily  sodium chloride, 10 mL, Intravenous, Q12H  terazosin, 2 mg, Oral, Nightly        IV Meds:        Results Reviewed:   I have personally reviewed the results from the time of this admission to 12/21/2023 07:45 EST     Results from last 7 days   Lab Units 12/21/23  0607 12/20/23  0553 12/19/23  0547 12/18/23  1641   SODIUM mmol/L 140 138 142 138   POTASSIUM mmol/L 4.0 4.3 4.1 4.9   CHLORIDE mmol/L 100 103 106 102   CO2 mmol/L 29.5* 26.0 26.6 23.3   BUN mg/dL 63* 54* 50* 46*   CREATININE mg/dL 2.94* 2.51* 2.53* 2.31*   CALCIUM mg/dL 9.5 8.9 8.8 8.7   BILIRUBIN mg/dL  --   --   --  0.3   ALK PHOS U/L  --   --   --  52   ALT (SGPT) U/L  --   --   --  30   AST (SGOT) U/L  --   --   --  21   GLUCOSE mg/dL 143* 87 60* 180*       Estimated Creatinine Clearance: 24.2 mL/min (A) (by C-G formula based on SCr of 2.94 mg/dL (H)).    Results from last 7 days   Lab Units 12/18/23  1641   MAGNESIUM mg/dL 2.6*             Results from last 7 days   Lab Units 12/21/23  0607 12/20/23  0553 12/18/23  1641   WBC 10*3/mm3 8.73 7.99 9.51   HEMOGLOBIN g/dL 10.0* 8.8* 8.8*   PLATELETS 10*3/mm3 410 366 359            Latest Reference Range & Units 12/19/23 05:47   Iron 59 - 158 mcg/dL 34 (L)   Iron Saturation (TSAT) 20 - 50 % 13 (L)   Transferrin 200 - 360 mg/dL 174 (L)   TIBC 298 - 536 mcg/dL 259 (L)   (L):  "Data is abnormally low    Brief Urine Lab Results  (Last result in the past 365 days)        Color   Clarity   Blood   Leuk Est   Nitrite   Protein   CREAT   Urine HCG        12/19/23 1142             26.3                 No results found for: \"UTPCR\"    Imaging Results (Last 24 Hours)       ** No results found for the last 24 hours. **                Assessment / Plan     ASSESSMENT:    Acute exacerbation of congestive heart failure    Acute kidney injury: Patient does appear to have some worsening of his renal function.  It is likely all hemodynamically mediated secondary to volume issues as well as angiotensin receptor blocker.  Once volume status stabilizes likely will go back to his baseline.  Continue to optimize medications.  Renal function is slightly better.  Chronic kidney disease stage IIIb: Baseline diabetic and hypertensive glomerulosclerosis with minimal proteinuria.  He has been on optimize medications.  Type 2 diabetes: Continue with the sliding scale most recent hemoglobin A1c of 5.8 noted.  Hypertension: Optimize volume status before putting him on oral multiple medications.  Anemia: Check iron stores, may end up requiring PAOLA.  CHF/CAD: Optimize volume status  COPD: Continue home medications.  Obstructive sleep apnea: Patient said he has been compliant with his BiPAP.  Dyslipidemia: Continue statin  BPH: Continue home medications.        PLAN:  He can be restarted on all his home medications except Edarbi.  He will need iron supplements at the time of discharge.  He can resume his home dose of diuretics.  Details were discussed with the patient no family in the room.    Details were also discussed with the hospitalist service.  Follow-up with me within 7 to 10 days.  Continue with rest of the current treatment plan, and monitor with surveillance labs.  Further recommendations will depend on clinical course of the patient during the current hospitalization.   I have reviewed the copied text, the " changes made as needed.  It is accurate to the point, when the note was signed.     Thank you for involving us in the care of Donny Jerry.  Please feel free to call with any questions.    Destin Wilkes MD, FARHANA  12/21/23  07:45 EST    [facility]  [phone]      Part of this note may be an electronic transcription/translation of spoken language to printed text using the Dragon Dictation System.

## 2023-12-21 NOTE — CASE MANAGEMENT/SOCIAL WORK
Case Management/Social Work    Patient Name:  Donny Jerry  YOB: 1945  MRN: [id number]  Admit Date:  12/18/2023        09:04 EST  Met with patient at bedside. No new CM needs identified at this time.        Electronically signed by:  Isma Schmidt RN  12/21/23 09:04 EST

## 2023-12-21 NOTE — PLAN OF CARE
Problem: Adjustment to Illness (Heart Failure)  Goal: Optimal Coping  Outcome: Ongoing, Progressing     Problem: Cardiac Output Decreased (Heart Failure)  Goal: Optimal Cardiac Output  Outcome: Ongoing, Progressing     Problem: Dysrhythmia (Heart Failure)  Goal: Stable Heart Rate and Rhythm  Outcome: Ongoing, Progressing     Problem: Fluid Imbalance (Heart Failure)  Goal: Fluid Balance  Outcome: Ongoing, Progressing     Problem: Functional Ability Impaired (Heart Failure)  Goal: Optimal Functional Ability  Outcome: Ongoing, Progressing  Intervention: Optimize Functional Ability  Recent Flowsheet Documentation  Taken 12/21/2023 0000 by Veronique Patricia RN  Activity Management: up ad leila  Taken 12/20/2023 2200 by Veronique Patricia, RN  Activity Management: up ad leila  Taken 12/20/2023 2000 by Veronique Patricia, RN  Activity Management: up ad leila     Problem: Oral Intake Inadequate (Heart Failure)  Goal: Optimal Nutrition Intake  Outcome: Ongoing, Progressing     Problem: Respiratory Compromise (Heart Failure)  Goal: Effective Oxygenation and Ventilation  Outcome: Ongoing, Progressing     Problem: Sleep Disordered Breathing (Heart Failure)  Goal: Effective Breathing Pattern During Sleep  Outcome: Ongoing, Progressing     Problem: Noninvasive Ventilation Acute  Goal: Effective Unassisted Ventilation and Oxygenation  Outcome: Ongoing, Progressing  Goal: Effective Unassisted Ventilation and Oxygenation  Outcome: Ongoing, Progressing     Problem: Diabetes Comorbidity  Goal: Blood Glucose Level Within Targeted Range  Outcome: Ongoing, Progressing     Problem: Heart Failure Comorbidity  Goal: Maintenance of Heart Failure Symptom Control  Outcome: Ongoing, Progressing     Problem: Hypertension Comorbidity  Goal: Blood Pressure in Desired Range  Outcome: Ongoing, Progressing     Problem: Obstructive Sleep Apnea Risk or Actual Comorbidity Management  Goal: Unobstructed Breathing During Sleep  Outcome: Ongoing, Progressing      Problem: Fall Injury Risk  Goal: Absence of Fall and Fall-Related Injury  Outcome: Ongoing, Progressing  Intervention: Promote Injury-Free Environment  Recent Flowsheet Documentation  Taken 12/21/2023 0000 by Veronique Patricia RN  Safety Promotion/Fall Prevention: safety round/check completed  Taken 12/20/2023 2200 by Veronique Patricia, RN  Safety Promotion/Fall Prevention: safety round/check completed  Taken 12/20/2023 2000 by Veronique Patricia, RN  Safety Promotion/Fall Prevention: safety round/check completed   Goal Outcome Evaluation:

## 2023-12-22 ENCOUNTER — READMISSION MANAGEMENT (OUTPATIENT)
Dept: CALL CENTER | Facility: HOSPITAL | Age: 78
End: 2023-12-22
Payer: MEDICARE

## 2023-12-22 NOTE — OUTREACH NOTE
CHF Week 1 Survey      Flowsheet Row Responses   [facility] patient discharged from? [facility]   Does the patient have one of the following disease processes/diagnoses(primary or secondary)? CHF   CHF Week 1 attempt successful? Yes   Call start time 1454   Call end time 1457   Discharge diagnosis Acute exacerbation of congestive heart failure   Person spoke with today (if not patient) and relationship wife   Meds reviewed with patient/caregiver? Yes   Is the patient having any side effects they believe may be caused by any medication additions or changes? No   Does the patient have all medications ordered at discharge? Yes   Is the patient taking all medications as directed (includes completed medication regime)? Yes   Does the patient have a primary care provider?  Yes   Does the patient have an appointment with their PCP within 7 days of discharge? Yes   Has the patient kept scheduled appointments due by today? N/A   DME comments wearing CPAP   Pulse Ox monitoring None   Comments Per wife the pt's SOA has returned to baseline today.   Did the patient receive a copy of their discharge instructions? Yes   Nursing interventions Reviewed instructions with patient   What is the patient's perception of their health status since discharge? Returned to baseline/stable   Nursing interventions Nurse provided patient education   Is the patient able to teach back signs and symptoms of worsening condition? (i.e. weight gain, shortness of air, etc.) Yes   If the patient is a current smoker, are they able to teach back resources for cessation? Not a smoker   Is the patient/caregiver able to teach back the hierarchy of who to call/visit for symptoms/problems? PCP, Specialist, Home health nurse, Urgent Care, ED, 911 Yes   CHF Zone this Call Green Zone   Green Zone Patient reports doing well, No new or worsening shortness of breath, Physical activity level is normal for you, Weight check stable, No chest pain   Green Zone  Interventions Daily weight check, Meds as directed, Low sodium diet, Follow up visits planned    CHF Week 1 call completed? Yes   Revoked No further contact(revokes)-requires comment   Is the patient interested in additional calls from an ambulatory ? No   Would this patient benefit from a Referral to [facility] Social Work? No   Call end time [phone]            Kristine BARBOSA - Registered Nurse

## 2023-12-26 ENCOUNTER — OFFICE VISIT (OUTPATIENT)
Dept: PRIMARY CARE CLINIC | Age: 78
End: 2023-12-26

## 2023-12-26 VITALS
RESPIRATION RATE: 16 BRPM | SYSTOLIC BLOOD PRESSURE: 137 MMHG | HEIGHT: 67 IN | TEMPERATURE: 98 F | HEART RATE: 63 BPM | DIASTOLIC BLOOD PRESSURE: 65 MMHG | OXYGEN SATURATION: 98 % | WEIGHT: 228 LBS | BODY MASS INDEX: 35.79 KG/M2

## 2023-12-26 DIAGNOSIS — N18.4 CHRONIC RENAL FAILURE, STAGE 4 (SEVERE) (HCC): ICD-10-CM

## 2023-12-26 DIAGNOSIS — I50.22 CHRONIC SYSTOLIC (CONGESTIVE) HEART FAILURE (HCC): Primary | ICD-10-CM

## 2023-12-26 DIAGNOSIS — I10 ESSENTIAL HYPERTENSION: ICD-10-CM

## 2023-12-26 RX ORDER — FERROUS GLUCONATE 324(37.5)
324 TABLET ORAL DAILY
Qty: 30 TABLET | Refills: 1 | OUTPATIENT
Start: 2023-12-26

## 2023-12-26 NOTE — PROGRESS NOTES
Chief Complaint   Patient presents with    Follow-Up from Hospital     [facility]    Congestive Heart Failure       Have you seen any other physician or provider since your last visit yes - [facility]    Have you had any other diagnostic tests since your last visit? yes - labs,CXR    Have you changed or stopped any medications since your last visit?  Yes stopped [facility]         Post-Discharge Transitional Care Management Services      Lisa Hull   YOB: 1945  He was admitted on 12/18/2023  Date of Discharge 12/21/2023  Date of Visit:  12/26/2023  30 Day Post-Discharge Date: 01/21/2024    Allergies   Allergen Reactions    Rosuvastatin Myalgia and Other (See Comments)    Rosuvastatin Calcium Myalgia    Bisoprolol Other (See Comments)     Agitation    Clopidogrel Bisulfate Other (See Comments)     resistance    Exenatide      nausea    Glucophage [Metformin Hydrochloride]      nausea    Rosuvastatin Calcium Other (See Comments)    Zocor [Simvastatin]      Muscle pain     Outpatient Medications Marked as Taking for the 12/26/23 encounter (Office Visit) with MERCEDES Prakash   Medication Sig Dispense Refill    ferrous gluconate 324 (37.5 Fe) MG TABS Take 1 tablet by mouth daily 30 tablet 1    sertraline (ZOLOFT) 100 MG tablet Take 1 tablet by mouth daily 30 tablet 0    pantoprazole (PROTONIX) 40 MG tablet TAKE 1 TABLET TWICE DAILY  BEFORE MEALS 180 tablet 1    tamsulosin (FLOMAX) 0.4 MG capsule TAKE 1 CAPSULE EVERY       MORNING AND AT BEDTIME 90 capsule 1    ranolazine (RANEXA) 500 MG extended release tablet TAKE ONE TABLET BY MOUTH EVERY 12 HOURS 180 tablet 3    dicyclomine (BENTYL) 10 MG capsule Take 1 capsule by mouth 3 times daily (before meals) 90 capsule 1    furosemide (LASIX) 40 MG tablet TAKE ONE TABLET BY MOUTH EVERY DAY (Patient taking differently: Take 1 tablet by mouth daily Indications: takes twice weekly) 30 tablet 0    TRULICITY 3.95 DP/2.3XH SOPN INJECT 0.5ML (=0.75 MG)    SUBCUTANEOUSLY ONCE

## 2023-12-27 DIAGNOSIS — I10 ESSENTIAL HYPERTENSION: ICD-10-CM

## 2023-12-27 DIAGNOSIS — E11.42 TYPE 2 DIABETES MELLITUS WITH DIABETIC POLYNEUROPATHY, WITHOUT LONG-TERM CURRENT USE OF INSULIN (HCC): ICD-10-CM

## 2023-12-27 RX ORDER — HYDRALAZINE HYDROCHLORIDE 100 MG/1
TABLET, FILM COATED ORAL
Qty: 270 TABLET | Refills: 1 | Status: SHIPPED | OUTPATIENT
Start: 2023-12-27

## 2023-12-27 RX ORDER — DOXAZOSIN 8 MG/1
TABLET ORAL
Qty: 180 TABLET | Refills: 1 | Status: SHIPPED | OUTPATIENT
Start: 2023-12-27

## 2023-12-27 RX ORDER — DAPAGLIFLOZIN 10 MG/1
TABLET, FILM COATED ORAL
Qty: 90 TABLET | Refills: 1 | Status: SHIPPED | OUTPATIENT
Start: 2023-12-27

## 2024-01-09 ENCOUNTER — HOSPITAL ENCOUNTER (EMERGENCY)
Facility: HOSPITAL | Age: 79
Discharge: HOME | DRG: 312 | End: 2024-01-09
Attending: EMERGENCY MEDICINE | Admitting: EMERGENCY MEDICINE
Payer: MEDICARE

## 2024-01-09 ENCOUNTER — APPOINTMENT (OUTPATIENT)
Dept: GENERAL RADIOLOGY | Facility: HOSPITAL | Age: 79
DRG: 312 | End: 2024-01-09
Payer: MEDICARE

## 2024-01-09 VITALS
HEART RATE: 72 BPM | OXYGEN SATURATION: 94 % | TEMPERATURE: 98 F | WEIGHT: 225 LBS | SYSTOLIC BLOOD PRESSURE: 138 MMHG | HEIGHT: 67 IN | RESPIRATION RATE: 16 BRPM | DIASTOLIC BLOOD PRESSURE: 72 MMHG | BODY MASS INDEX: 35.31 KG/M2

## 2024-01-09 DIAGNOSIS — R55 SYNCOPE, UNSPECIFIED SYNCOPE TYPE: ICD-10-CM

## 2024-01-09 DIAGNOSIS — I50.9 ACUTE ON CHRONIC CONGESTIVE HEART FAILURE, UNSPECIFIED HEART FAILURE TYPE: Primary | ICD-10-CM

## 2024-01-09 LAB
ALBUMIN SERPL-MCNC: 4 G/DL (ref 3.5–5.2)
ALBUMIN/GLOB SERPL: 1.3 G/DL
ALP SERPL-CCNC: 49 U/L (ref 39–117)
ALT SERPL W P-5'-P-CCNC: 26 U/L (ref 1–41)
ANION GAP SERPL CALCULATED.3IONS-SCNC: 11.2 MMOL/L (ref 5–15)
AST SERPL-CCNC: 21 U/L (ref 1–40)
BASOPHILS # BLD AUTO: 0.04 10*3/MM3 (ref 0–0.2)
BASOPHILS NFR BLD AUTO: 0.5 % (ref 0–1.5)
BILIRUB SERPL-MCNC: 0.3 MG/DL (ref 0–1.2)
BUN SERPL-MCNC: 54 MG/DL (ref 8–23)
BUN/CREAT SERPL: 26.3 (ref 7–25)
CALCIUM SPEC-SCNC: 8.3 MG/DL (ref 8.6–10.5)
CHLORIDE SERPL-SCNC: 102 MMOL/L (ref 98–107)
CO2 SERPL-SCNC: 24.8 MMOL/L (ref 22–29)
CREAT SERPL-MCNC: 2.05 MG/DL (ref 0.76–1.27)
DEPRECATED RDW RBC AUTO: 44 FL (ref 37–54)
EGFRCR SERPLBLD CKD-EPI 2021: 32.8 ML/MIN/1.73
EOSINOPHIL # BLD AUTO: 0.13 10*3/MM3 (ref 0–0.4)
EOSINOPHIL NFR BLD AUTO: 1.7 % (ref 0.3–6.2)
ERYTHROCYTE [DISTWIDTH] IN BLOOD BY AUTOMATED COUNT: 13.4 % (ref 12.3–15.4)
GEN 5 2HR TROPONIN T REFLEX: 202 NG/L
GLOBULIN UR ELPH-MCNC: 3 GM/DL
GLUCOSE SERPL-MCNC: 179 MG/DL (ref 65–99)
HCT VFR BLD AUTO: 26.5 % (ref 37.5–51)
HGB BLD-MCNC: 8.8 G/DL (ref 13–17.7)
HOLD SPECIMEN: NORMAL
HOLD SPECIMEN: NORMAL
IMM GRANULOCYTES # BLD AUTO: 0.02 10*3/MM3 (ref 0–0.05)
IMM GRANULOCYTES NFR BLD AUTO: 0.3 % (ref 0–0.5)
LYMPHOCYTES # BLD AUTO: 0.88 10*3/MM3 (ref 0.7–3.1)
LYMPHOCYTES NFR BLD AUTO: 11.6 % (ref 19.6–45.3)
MAGNESIUM SERPL-MCNC: 2.6 MG/DL (ref 1.6–2.4)
MCH RBC QN AUTO: 29.7 PG (ref 26.6–33)
MCHC RBC AUTO-ENTMCNC: 33.2 G/DL (ref 31.5–35.7)
MCV RBC AUTO: 89.5 FL (ref 79–97)
MONOCYTES # BLD AUTO: 0.55 10*3/MM3 (ref 0.1–0.9)
MONOCYTES NFR BLD AUTO: 7.3 % (ref 5–12)
NEUTROPHILS NFR BLD AUTO: 5.95 10*3/MM3 (ref 1.7–7)
NEUTROPHILS NFR BLD AUTO: 78.6 % (ref 42.7–76)
NRBC BLD AUTO-RTO: 0 /100 WBC (ref 0–0.2)
NT-PROBNP SERPL-MCNC: 4683 PG/ML (ref 0–1800)
PLATELET # BLD AUTO: 241 10*3/MM3 (ref 140–450)
PMV BLD AUTO: 11.2 FL (ref 6–12)
POTASSIUM SERPL-SCNC: 4.5 MMOL/L (ref 3.5–5.2)
PROT SERPL-MCNC: 7 G/DL (ref 6–8.5)
RBC # BLD AUTO: 2.96 10*6/MM3 (ref 4.14–5.8)
SODIUM SERPL-SCNC: 138 MMOL/L (ref 136–145)
TROPONIN T DELTA: 4 NG/L
TROPONIN T SERPL HS-MCNC: 198 NG/L
TROPONIN T SERPL HS-MCNC: 207 NG/L
WBC NRBC COR # BLD AUTO: 7.57 10*3/MM3 (ref 3.4–10.8)
WHOLE BLOOD HOLD COAG: NORMAL
WHOLE BLOOD HOLD SPECIMEN: NORMAL

## 2024-01-09 PROCEDURE — 84484 ASSAY OF TROPONIN QUANT: CPT | Performed by: NURSE PRACTITIONER

## 2024-01-09 PROCEDURE — 25010000002 FUROSEMIDE PER 20 MG: Performed by: NURSE PRACTITIONER

## 2024-01-09 PROCEDURE — 99284 EMERGENCY DEPT VISIT MOD MDM: CPT

## 2024-01-09 PROCEDURE — 93005 ELECTROCARDIOGRAM TRACING: CPT

## 2024-01-09 PROCEDURE — 80053 COMPREHEN METABOLIC PANEL: CPT | Performed by: EMERGENCY MEDICINE

## 2024-01-09 PROCEDURE — 96374 THER/PROPH/DIAG INJ IV PUSH: CPT

## 2024-01-09 PROCEDURE — 93005 ELECTROCARDIOGRAM TRACING: CPT | Performed by: EMERGENCY MEDICINE

## 2024-01-09 PROCEDURE — 36415 COLL VENOUS BLD VENIPUNCTURE: CPT

## 2024-01-09 PROCEDURE — 83880 ASSAY OF NATRIURETIC PEPTIDE: CPT | Performed by: NURSE PRACTITIONER

## 2024-01-09 PROCEDURE — 71045 X-RAY EXAM CHEST 1 VIEW: CPT

## 2024-01-09 PROCEDURE — 85025 COMPLETE CBC W/AUTO DIFF WBC: CPT

## 2024-01-09 PROCEDURE — 83735 ASSAY OF MAGNESIUM: CPT | Performed by: EMERGENCY MEDICINE

## 2024-01-09 PROCEDURE — 84484 ASSAY OF TROPONIN QUANT: CPT | Performed by: EMERGENCY MEDICINE

## 2024-01-09 RX ORDER — FUROSEMIDE 10 MG/ML
80 INJECTION INTRAMUSCULAR; INTRAVENOUS ONCE
Status: COMPLETED | OUTPATIENT
Start: 2024-01-09 | End: 2024-01-09

## 2024-01-09 RX ORDER — SODIUM CHLORIDE 0.9 % (FLUSH) 0.9 %
10 SYRINGE (ML) INJECTION AS NEEDED
Status: DISCONTINUED | OUTPATIENT
Start: 2024-01-09 | End: 2024-01-09 | Stop reason: HOSPADM

## 2024-01-09 RX ADMIN — FUROSEMIDE 80 MG: 10 INJECTION, SOLUTION INTRAMUSCULAR; INTRAVENOUS at 14:34

## 2024-01-09 NOTE — ED PROVIDER NOTES
Subjective:  History of Present Illness:    Patient is a 78-year-old male with history of CHF, T2DM, MI and CAD.  Presents to the ER today status post syncopal episode.  Patient reports pain with chiropractor.  Rolled on his belly felt short of breath and passed out.  He was for 2 minutes.  He was alert and oriented upon waking.  He denies chest pain during this incident.  Reports that he was momentarily short of breath however feels that he is at baseline at this point.  Denies OTC medication home remedy.  Denies alleviating or exacerbating factors.    Nurses Notes reviewed and agree, including vitals, allergies, social history and prior medical history.     REVIEW OF SYSTEMS: All systems reviewed and not pertinent unless noted.  Review of Systems   Neurological:  Positive for syncope.   All other systems reviewed and are negative.      Past Medical History:   Diagnosis Date    Benign hypertension     Coronary artery disease     Depression     Enlarged prostate     Enlarged prostate with anticipated TURP with Dr. Bernal.    GERD (gastroesophageal reflux disease)     Heart attack     Hypercholesterolemia     Myocardial infarction     Obesity     Obstructive sleep apnea on CPAP     Type 2 diabetes mellitus        Allergies:    Zocor [simvastatin], Bisoprolol, Byetta 10 mcg pen [exenatide], Crestor [rosuvastatin calcium], Metformin and related, and Plavix [clopidogrel bisulfate]      Past Surgical History:   Procedure Laterality Date    CARDIAC CATHETERIZATION      CARDIAC CATHETERIZATION Left 10/19/2021    Procedure: Left Heart Cath;  Surgeon: Liz Russo MD;  Location:  [facility]ASIVE [facility];  Service: Cardiology;  Laterality: Left;    CARDIAC CATHETERIZATION N/A 7/18/2023    Procedure: Coronary angiography;  Surgeon: Rupesh Parr MD;  Location:  [facility];  Service: Cardiology;  Laterality: N/A;    CARDIAC CATHETERIZATION N/A 7/18/2023    Procedure: Percutaneous Coronary  Intervention;  Surgeon: Rupesh Parr MD;  Location: [facility] CATH INVASIVE LOCATION;  Service: Cardiology;  Laterality: N/A;    COLONOSCOPY W/ POLYPECTOMY      CORONARY ANGIOPLASTY WITH STENT PLACEMENT Left 06/03/2009    Left heart catheterization, 06/03/2009, Dr. Russo:  LVEF 45% to 50%.  Placement of overlapping Cypher drug-eluting stent 2.5 x 28 and 2.5 x 18 to the SVG to the obtuse marginal branch.  SVG to the PDA had a proximal stenosis estimated at 60% with a distal stenosis of 50% to 60%.    CORONARY ANGIOPLASTY WITH STENT PLACEMENT Left 07/06/2009    Left heart catheterization, 07/06/2009:  PTCA and stent placement in the mid PDA using a 2.25 x 12 mm Taxus drug-eluting stent with stent placement in the distal SVG to the PDA using a 2.5 x 18 mm Cypher drug-eluting stent and stenting of the proximal SVG to the PDA using a 3.0 x 28 mm Cypher drug-eluting stent.    CORONARY ANGIOPLASTY WITH STENT PLACEMENT  04/23/2010    Cardiac catheterization for recurrent anginal symptoms, 04/23/2010, with PTCA and stent placement in the proximal SVG to the PDA using 3.0 x 28 mm Promus drug-eluting stent for in-stent restenosis.    CORONARY ANGIOPLASTY WITH STENT PLACEMENT Left 07/06/2010    Left heart catheterization, 07/06/2010, Dr. Russo:  EF 40% to 45%.  3.5 x 23 mm Promus drug-eluting stent in the proximal SVG to OM, 2.5 x 18 mm Promus drug-eluting stent to distal SVG to PDA due to in-stent restenosis.      CORONARY ANGIOPLASTY WITH STENT PLACEMENT Left 11/16/2010    Left heart catheterization, 11/16/2010, with placement of a 3.0 x 16 mm Taxus drug-eluting stent to the SVG to the RCA proximally and a 2.5 x 16 mm paclitaxel stent distally in the SVG to the RCA.    CORONARY ANGIOPLASTY WITH STENT PLACEMENT Left     Left heart catheterization, 3.0 x 24-mm Promus element drug-eluting stent to a 90% lesion in the mid portion of the SVG to the PDA.     CORONARY ANGIOPLASTY WITH STENT PLACEMENT Left  "06/24/2014    Left heart catheterization for recurrent angina, 06/24/2014, Dr. Russo:  PTCA of the SVG to the PDA using a 3 x 12 mm NC TREK balloon.      CORONARY ARTERY BYPASS GRAFT      CABG x3, Dr. Peng, [facility], 2001.         Social History     Socioeconomic History    Marital status:    Tobacco Use    Smoking status: Never    Smokeless tobacco: Never   Vaping Use    Vaping Use: Never used   Substance and Sexual Activity    Alcohol use: No    Drug use: No    Sexual activity: Defer         Family History   Problem Relation Age of Onset    Heart attack Mother     Ulcers Father        Objective  Physical Exam:  /65   Pulse 73   Temp 98 °F (36.7 °C) (Oral)   Resp 16   Ht 170.2 cm (67\")   Wt 102 kg (225 lb)   SpO2 93%   BMI 35.24 kg/m²      Physical Exam  Vitals and nursing note reviewed.   Constitutional:       Appearance: Normal appearance. He is normal weight.   HENT:      Head: Normocephalic and atraumatic.      Nose: Nose normal.      Mouth/Throat:      Mouth: Mucous membranes are moist.      Pharynx: Oropharynx is clear.   Eyes:      Extraocular Movements: Extraocular movements intact.      Conjunctiva/sclera: Conjunctivae normal.      Pupils: Pupils are equal, round, and reactive to light.   Cardiovascular:      Rate and Rhythm: Normal rate and regular rhythm.   Pulmonary:      Effort: Pulmonary effort is normal.      Breath sounds: Normal breath sounds.   Abdominal:      General: Abdomen is flat. Bowel sounds are normal.   Musculoskeletal:         General: Normal range of motion.      Cervical back: Normal range of motion and neck supple.   Skin:     General: Skin is warm and dry.      Capillary Refill: Capillary refill takes less than 2 seconds.   Neurological:      General: No focal deficit present.      Mental Status: He is alert and oriented to person, place, and time. Mental status is at baseline.   Psychiatric:         Mood and Affect: Mood normal.         " Behavior: Behavior normal.         Thought Content: Thought content normal.         Judgment: Judgment normal.         Procedures    ED Course:    ED Course as of 01/09/24 1833   Tue Jan 09, 2024   1341 EKG interpreted by me reveals sinus rhythm with rate of 78 bpm.  There are ST-T wave changes in inferior and lateral leads.  This is an abnormal appearing EKG. [TB]   1651 High Sensitivity Troponin T 2Hr(!!) [TW]      ED Course User Index  [TB] Tahmina Barksdale MD  [TW] Hay Coleman, ASHLEIGH       Lab Results (last 24 hours)       Procedure Component Value Units Date/Time    CBC & Differential [[id number]]  (Abnormal) Collected: 01/09/24 1247    Specimen: Blood Updated: 01/09/24 1253    Narrative:      The following orders were created for panel order CBC & Differential.  Procedure                               Abnormality         Status                     ---------                               -----------         ------                     CBC Auto Differential[[id number]]        Abnormal            Final result                 Please view results for these tests on the individual orders.    Comprehensive Metabolic Panel [[id number]]  (Abnormal) Collected: 01/09/24 1247    Specimen: Blood Updated: 01/09/24 1318     Glucose 179 mg/dL      BUN 54 mg/dL      Creatinine 2.05 mg/dL      Sodium 138 mmol/L      Potassium 4.5 mmol/L      Comment: Slight hemolysis detected by analyzer. Result may be falsely elevated.        Chloride 102 mmol/L      CO2 24.8 mmol/L      Calcium 8.3 mg/dL      Total Protein 7.0 g/dL      Albumin 4.0 g/dL      ALT (SGPT) 26 U/L      AST (SGOT) 21 U/L      Alkaline Phosphatase 49 U/L      Total Bilirubin 0.3 mg/dL      Globulin 3.0 gm/dL      A/G Ratio 1.3 g/dL      BUN/Creatinine Ratio 26.3     Anion Gap 11.2 mmol/L      eGFR 32.8 mL/min/1.73     Narrative:      GFR Normal >60  Chronic Kidney Disease <60  Kidney Failure <15    The GFR formula is only valid for adults with stable renal  function between ages 18 and 70.    Magnesium [[id number]]  (Abnormal) Collected: 01/09/24 1247    Specimen: Blood Updated: 01/09/24 1318     Magnesium 2.6 mg/dL     Single High Sensitivity Troponin T [[id number]]  (Abnormal) Collected: 01/09/24 1247    Specimen: Blood Updated: 01/09/24 1340     HS Troponin T 198 ng/L     Narrative:      High Sensitive Troponin T Reference Range:  <14.0 ng/L- Negative Female for AMI  <22.0 ng/L- Negative Male for AMI  >=14 - Abnormal Female indicating possible myocardial injury.  >=22 - Abnormal Male indicating possible myocardial injury.   Clinicians would have to utilize clinical acumen, EKG, Troponin, and serial changes to determine if it is an Acute Myocardial Infarction or myocardial injury due to an underlying chronic condition.         CBC Auto Differential [[id number]]  (Abnormal) Collected: 01/09/24 1247    Specimen: Blood Updated: 01/09/24 1253     WBC 7.57 10*3/mm3      RBC 2.96 10*6/mm3      Hemoglobin 8.8 g/dL      Hematocrit 26.5 %      MCV 89.5 fL      MCH 29.7 pg      MCHC 33.2 g/dL      RDW 13.4 %      RDW-SD 44.0 fl      MPV 11.2 fL      Platelets 241 10*3/mm3      Neutrophil % 78.6 %      Lymphocyte % 11.6 %      Monocyte % 7.3 %      Eosinophil % 1.7 %      Basophil % 0.5 %      Immature Grans % 0.3 %      Neutrophils, Absolute 5.95 10*3/mm3      Lymphocytes, Absolute 0.88 10*3/mm3      Monocytes, Absolute 0.55 10*3/mm3      Eosinophils, Absolute 0.13 10*3/mm3      Basophils, Absolute 0.04 10*3/mm3      Immature Grans, Absolute 0.02 10*3/mm3      nRBC 0.0 /100 WBC     BNP [[id number]]  (Abnormal) Collected: 01/09/24 1247    Specimen: Blood Updated: 01/09/24 1412     proBNP 4,683.0 pg/mL     Narrative:      This assay is used as an aid in the diagnosis of individuals suspected of having heart failure. It can be used as an aid in the diagnosis of acute decompensated heart failure (ADHF) in patients presenting with signs and symptoms of ADHF to the emergency department  (ED). In addition, NT-proBNP of <300 pg/mL indicates ADHF is not likely.    Age Range Result Interpretation  NT-proBNP Concentration (pg/mL:      <50             Positive            >450                   Gray                 300-450                    Negative             <300    50-75           Positive            >900                  Gray                300-900                  Negative            <300      >75             Positive            >1800                  Gray                300-1800                  Negative            <300    High Sensitivity Troponin T 2Hr [[id number]]  (Abnormal) Collected: 01/09/24 1434    Specimen: Blood Updated: 01/09/24 1517     HS Troponin T 202 ng/L      Troponin T Delta 4 ng/L     Narrative:      High Sensitive Troponin T Reference Range:  <14.0 ng/L- Negative Female for AMI  <22.0 ng/L- Negative Male for AMI  >=14 - Abnormal Female indicating possible myocardial injury.  >=22 - Abnormal Male indicating possible myocardial injury.   Clinicians would have to utilize clinical acumen, EKG, Troponin, and serial changes to determine if it is an Acute Myocardial Infarction or myocardial injury due to an underlying chronic condition.         Single High Sensitivity Troponin T [[id number]]  (Abnormal) Collected: 01/09/24 1711    Specimen: Blood Updated: 01/09/24 1801     HS Troponin T 207 ng/L     Narrative:      High Sensitive Troponin T Reference Range:  <14.0 ng/L- Negative Female for AMI  <22.0 ng/L- Negative Male for AMI  >=14 - Abnormal Female indicating possible myocardial injury.  >=22 - Abnormal Male indicating possible myocardial injury.   Clinicians would have to utilize clinical acumen, EKG, Troponin, and serial changes to determine if it is an Acute Myocardial Infarction or myocardial injury due to an underlying chronic condition.                  XR Chest 1 View    Result Date: 1/9/2024  PROCEDURE: XR CHEST 1 VW-    HISTORY: syncope  COMPARISON: December 18, 2023.   FINDINGS: The heart is mildly enlarged, but stable. The patient is status post median sternotomy. The lungs are clear. There is no pneumothorax.      Impression: No acute cardiopulmonary process.        Images were reviewed, interpreted, and dictated by Dr. Brett Hernandez MD Transcribed by Saba Beltran PA-C.  This report was signed and finalized on 1/9/2024 2:56 PM by Brett Hernandez MD.          MDM      Initial impression of presenting illness: Patient is a 78-year-old male with history of CHF, T2DM, MI and CAD.  Presents to the ER today status post syncopal episode.  Patient reports pain with chiropractor.  Rolled on his belly felt short of breath and passed out.  He was for 2 minutes.  He was alert and oriented upon waking.  He denies chest pain during this incident.  Reports that he was momentarily short of breath however feels that he is at baseline at this point.  Denies OTC medication home remedy.  Denies alleviating or exacerbating factors.    DDX: includes but is not limited to: CHF exacerbation, myocardial infarction, respiratory distress or other    Patient arrives stable with vitals interpreted by myself.     Pertinent features from physical exam: Lung sounds are clear bilaterally throughout.  Abdomen soft nontender.  Bowel sounds normal cardiac sounds normal..    Initial diagnostic plan: CBC, CMP, troponin, BNP, chest x-ray,    Results from initial plan were reviewed and interpreted by me revealing CBC is with mild anemia otherwise normal.  CMP with mild elevation in serum creatinine.  Troponin was elevated at 198 initially 202 repeat.  BNP is 4683.  Chest x-ray is clear.    Diagnostic information from other sources: Chart review    Interventions / Re-evaluation: Signs stable throughout encounter.  Patient received 80 mg of Lasix.  Patient diuresed approximately 1000 mL while in ER    Results/clinical rationale were discussed with patient    Consultations/Discussion of results with other physicians:  Spoke with Dr. Hawkins hospitalist.  Discussed concern about mild elevation in serum troponin.  However patient is renal patient and he is currently chest pain-free.  Dr. Hawkins and myself both agree that troponin is most likely from renal source we will have him follow-up closely with outpatient cardiologist and nephrologist.    Disposition plan: Patient is hemodynamically stable nontoxic-appearing appropriate for discharge.  Patient to follow-up with his PCP in 1 week.  Follow-up with ER for new or worse symptoms.  -----        Final diagnoses:   Acute on chronic congestive heart failure, unspecified heart failure type   Syncope, unspecified syncope type          Hay Coleman, APRN  01/09/24 0769

## 2024-01-09 NOTE — DISCHARGE INSTRUCTIONS
Please follow-up with your cardiologist and nephrologist.  Please follow-up with ER for new or worsening symptoms.  Follow-up with PCP as well.

## 2024-01-10 ENCOUNTER — TELEPHONE (OUTPATIENT)
Dept: CARDIOLOGY | Facility: CLINIC | Age: 79
End: 2024-01-10
Payer: MEDICARE

## 2024-01-10 NOTE — TELEPHONE ENCOUNTER
Patients wife left voice mail message stating the patient had been taken to [facility] for a syncopal episode.  She wanted to move his appointment sooner than February in [place].  Appointment made for tomorrow at 10:30AM.  Spoke with patients wife and daughter.  They will come to [place] for appointment tomorrow.  Address and directions given.  They verbalize understanding.

## 2024-01-11 ENCOUNTER — HOSPITAL ENCOUNTER (INPATIENT)
Facility: HOSPITAL | Age: 79
LOS: 2 days | Discharge: HOME | DRG: 312 | End: 2024-01-14
Attending: STUDENT IN AN ORGANIZED HEALTH CARE EDUCATION/TRAINING PROGRAM | Admitting: STUDENT IN AN ORGANIZED HEALTH CARE EDUCATION/TRAINING PROGRAM
Payer: MEDICARE

## 2024-01-11 ENCOUNTER — APPOINTMENT (OUTPATIENT)
Dept: CARDIOLOGY | Facility: HOSPITAL | Age: 79
DRG: 312 | End: 2024-01-11
Payer: MEDICARE

## 2024-01-11 ENCOUNTER — APPOINTMENT (OUTPATIENT)
Dept: CT IMAGING | Facility: HOSPITAL | Age: 79
DRG: 312 | End: 2024-01-11
Payer: MEDICARE

## 2024-01-11 ENCOUNTER — APPOINTMENT (OUTPATIENT)
Dept: GENERAL RADIOLOGY | Facility: HOSPITAL | Age: 79
DRG: 312 | End: 2024-01-11
Payer: MEDICARE

## 2024-01-11 DIAGNOSIS — N28.9 RENAL INSUFFICIENCY: ICD-10-CM

## 2024-01-11 DIAGNOSIS — R55 SYNCOPE, UNSPECIFIED SYNCOPE TYPE: Primary | ICD-10-CM

## 2024-01-11 DIAGNOSIS — S09.90XA INJURY OF HEAD, INITIAL ENCOUNTER: ICD-10-CM

## 2024-01-11 DIAGNOSIS — R79.89 ELEVATED TROPONIN: ICD-10-CM

## 2024-01-11 LAB
ALBUMIN SERPL-MCNC: 3.6 G/DL (ref 3.5–5.2)
ALBUMIN/GLOB SERPL: 1.3 G/DL
ALP SERPL-CCNC: 45 U/L (ref 39–117)
ALT SERPL W P-5'-P-CCNC: 27 U/L (ref 1–41)
ANION GAP SERPL CALCULATED.3IONS-SCNC: 11 MMOL/L (ref 5–15)
AST SERPL-CCNC: 24 U/L (ref 1–40)
BASOPHILS # BLD AUTO: 0.06 10*3/MM3 (ref 0–0.2)
BASOPHILS NFR BLD AUTO: 0.9 % (ref 0–1.5)
BILIRUB SERPL-MCNC: 0.4 MG/DL (ref 0–1.2)
BUN SERPL-MCNC: 62 MG/DL (ref 8–23)
BUN/CREAT SERPL: 28.2 (ref 7–25)
CALCIUM SPEC-SCNC: 8.7 MG/DL (ref 8.6–10.5)
CHLORIDE SERPL-SCNC: 106 MMOL/L (ref 98–107)
CO2 SERPL-SCNC: 25 MMOL/L (ref 22–29)
CREAT SERPL-MCNC: 2.2 MG/DL (ref 0.76–1.27)
DEPRECATED RDW RBC AUTO: 45.6 FL (ref 37–54)
EGFRCR SERPLBLD CKD-EPI 2021: 30.1 ML/MIN/1.73
EOSINOPHIL # BLD AUTO: 0.21 10*3/MM3 (ref 0–0.4)
EOSINOPHIL NFR BLD AUTO: 3.1 % (ref 0.3–6.2)
ERYTHROCYTE [DISTWIDTH] IN BLOOD BY AUTOMATED COUNT: 13.3 % (ref 12.3–15.4)
FERRITIN SERPL-MCNC: 356.2 NG/ML (ref 30–400)
GEN 5 2HR TROPONIN T REFLEX: 172 NG/L
GLOBULIN UR ELPH-MCNC: 2.8 GM/DL
GLUCOSE BLDC GLUCOMTR-MCNC: 147 MG/DL (ref 70–130)
GLUCOSE BLDC GLUCOMTR-MCNC: 176 MG/DL (ref 70–130)
GLUCOSE BLDC GLUCOMTR-MCNC: 60 MG/DL (ref 70–130)
GLUCOSE BLDC GLUCOMTR-MCNC: 65 MG/DL (ref 70–130)
GLUCOSE BLDC GLUCOMTR-MCNC: 97 MG/DL (ref 70–130)
GLUCOSE SERPL-MCNC: 56 MG/DL (ref 65–99)
HCT VFR BLD AUTO: 27.4 % (ref 37.5–51)
HGB BLD-MCNC: 8.8 G/DL (ref 13–17.7)
HOLD SPECIMEN: NORMAL
IMM GRANULOCYTES # BLD AUTO: 0.03 10*3/MM3 (ref 0–0.05)
IMM GRANULOCYTES NFR BLD AUTO: 0.4 % (ref 0–0.5)
IRON 24H UR-MRATE: 28 MCG/DL (ref 59–158)
IRON 24H UR-MRATE: 36 MCG/DL (ref 59–158)
IRON SATN MFR SERPL: 12 % (ref 20–50)
LYMPHOCYTES # BLD AUTO: 0.83 10*3/MM3 (ref 0.7–3.1)
LYMPHOCYTES NFR BLD AUTO: 12.2 % (ref 19.6–45.3)
MAGNESIUM SERPL-MCNC: 2.8 MG/DL (ref 1.6–2.4)
MCH RBC QN AUTO: 29.7 PG (ref 26.6–33)
MCHC RBC AUTO-ENTMCNC: 32.1 G/DL (ref 31.5–35.7)
MCV RBC AUTO: 92.6 FL (ref 79–97)
MONOCYTES # BLD AUTO: 0.56 10*3/MM3 (ref 0.1–0.9)
MONOCYTES NFR BLD AUTO: 8.2 % (ref 5–12)
NEUTROPHILS NFR BLD AUTO: 5.12 10*3/MM3 (ref 1.7–7)
NEUTROPHILS NFR BLD AUTO: 75.2 % (ref 42.7–76)
NRBC BLD AUTO-RTO: 0 /100 WBC (ref 0–0.2)
NT-PROBNP SERPL-MCNC: 5539 PG/ML (ref 0–1800)
PLATELET # BLD AUTO: 249 10*3/MM3 (ref 140–450)
PMV BLD AUTO: 11.2 FL (ref 6–12)
POTASSIUM SERPL-SCNC: 4.5 MMOL/L (ref 3.5–5.2)
PROT SERPL-MCNC: 6.4 G/DL (ref 6–8.5)
RBC # BLD AUTO: 2.96 10*6/MM3 (ref 4.14–5.8)
RETICS # AUTO: 0.04 10*6/MM3 (ref 0.02–0.13)
RETICS/RBC NFR AUTO: 1.47 % (ref 0.7–1.9)
SODIUM SERPL-SCNC: 142 MMOL/L (ref 136–145)
TIBC SERPL-MCNC: 291 MCG/DL (ref 298–536)
TRANSFERRIN SERPL-MCNC: 195 MG/DL (ref 200–360)
TROPONIN T DELTA: -6 NG/L
TROPONIN T SERPL HS-MCNC: 178 NG/L
TSH SERPL DL<=0.05 MIU/L-ACNC: 2.51 UIU/ML (ref 0.27–4.2)
WBC NRBC COR # BLD AUTO: 6.81 10*3/MM3 (ref 3.4–10.8)
WHOLE BLOOD HOLD COAG: NORMAL
WHOLE BLOOD HOLD SPECIMEN: NORMAL

## 2024-01-11 PROCEDURE — 84484 ASSAY OF TROPONIN QUANT: CPT | Performed by: STUDENT IN AN ORGANIZED HEALTH CARE EDUCATION/TRAINING PROGRAM

## 2024-01-11 PROCEDURE — 70486 CT MAXILLOFACIAL W/O DYE: CPT

## 2024-01-11 PROCEDURE — 82948 REAGENT STRIP/BLOOD GLUCOSE: CPT

## 2024-01-11 PROCEDURE — 85045 AUTOMATED RETICULOCYTE COUNT: CPT | Performed by: NURSE PRACTITIONER

## 2024-01-11 PROCEDURE — G0378 HOSPITAL OBSERVATION PER HR: HCPCS

## 2024-01-11 PROCEDURE — 84466 ASSAY OF TRANSFERRIN: CPT | Performed by: NURSE PRACTITIONER

## 2024-01-11 PROCEDURE — 25010000002 NA FERRIC GLUC CPLX PER 12.5 MG: Performed by: FAMILY MEDICINE

## 2024-01-11 PROCEDURE — 83735 ASSAY OF MAGNESIUM: CPT | Performed by: STUDENT IN AN ORGANIZED HEALTH CARE EDUCATION/TRAINING PROGRAM

## 2024-01-11 PROCEDURE — 82668 ASSAY OF ERYTHROPOIETIN: CPT | Performed by: NURSE PRACTITIONER

## 2024-01-11 PROCEDURE — 71045 X-RAY EXAM CHEST 1 VIEW: CPT

## 2024-01-11 PROCEDURE — 99285 EMERGENCY DEPT VISIT HI MDM: CPT

## 2024-01-11 PROCEDURE — 0 DEXTROSE 5 % SOLUTION: Performed by: FAMILY MEDICINE

## 2024-01-11 PROCEDURE — 99222 1ST HOSP IP/OBS MODERATE 55: CPT | Performed by: FAMILY MEDICINE

## 2024-01-11 PROCEDURE — 80053 COMPREHEN METABOLIC PANEL: CPT | Performed by: STUDENT IN AN ORGANIZED HEALTH CARE EDUCATION/TRAINING PROGRAM

## 2024-01-11 PROCEDURE — 36415 COLL VENOUS BLD VENIPUNCTURE: CPT

## 2024-01-11 PROCEDURE — 83880 ASSAY OF NATRIURETIC PEPTIDE: CPT | Performed by: FAMILY MEDICINE

## 2024-01-11 PROCEDURE — 93005 ELECTROCARDIOGRAM TRACING: CPT

## 2024-01-11 PROCEDURE — 70450 CT HEAD/BRAIN W/O DYE: CPT

## 2024-01-11 PROCEDURE — 85025 COMPLETE CBC W/AUTO DIFF WBC: CPT | Performed by: STUDENT IN AN ORGANIZED HEALTH CARE EDUCATION/TRAINING PROGRAM

## 2024-01-11 PROCEDURE — 25010000002 MAGNESIUM SULFATE IN D5W 1G/100ML (PREMIX) 1-5 GM/100ML-% SOLUTION: Performed by: NURSE PRACTITIONER

## 2024-01-11 PROCEDURE — 82728 ASSAY OF FERRITIN: CPT | Performed by: NURSE PRACTITIONER

## 2024-01-11 PROCEDURE — 99214 OFFICE O/P EST MOD 30 MIN: CPT | Performed by: INTERNAL MEDICINE

## 2024-01-11 PROCEDURE — 93005 ELECTROCARDIOGRAM TRACING: CPT | Performed by: STUDENT IN AN ORGANIZED HEALTH CARE EDUCATION/TRAINING PROGRAM

## 2024-01-11 PROCEDURE — 84443 ASSAY THYROID STIM HORMONE: CPT | Performed by: NURSE PRACTITIONER

## 2024-01-11 PROCEDURE — 83540 ASSAY OF IRON: CPT | Performed by: NURSE PRACTITIONER

## 2024-01-11 RX ORDER — BISACODYL 5 MG/1
5 TABLET, DELAYED RELEASE ORAL DAILY PRN
Status: DISCONTINUED | OUTPATIENT
Start: 2024-01-11 | End: 2024-01-14 | Stop reason: HOSPADM

## 2024-01-11 RX ORDER — SERTRALINE HYDROCHLORIDE 100 MG/1
100 TABLET, FILM COATED ORAL DAILY
Status: DISCONTINUED | OUTPATIENT
Start: 2024-01-11 | End: 2024-01-11

## 2024-01-11 RX ORDER — FUROSEMIDE 20 MG/1
20 TABLET ORAL DAILY
COMMUNITY

## 2024-01-11 RX ORDER — POLYETHYLENE GLYCOL 3350 17 G/17G
17 POWDER, FOR SOLUTION ORAL DAILY PRN
Status: DISCONTINUED | OUTPATIENT
Start: 2024-01-11 | End: 2024-01-14 | Stop reason: HOSPADM

## 2024-01-11 RX ORDER — CETIRIZINE HYDROCHLORIDE 10 MG/1
10 TABLET ORAL DAILY
Status: DISCONTINUED | OUTPATIENT
Start: 2024-01-12 | End: 2024-01-14 | Stop reason: HOSPADM

## 2024-01-11 RX ORDER — DEXTROSE MONOHYDRATE 50 MG/ML
50 INJECTION, SOLUTION INTRAVENOUS CONTINUOUS
Status: ACTIVE | OUTPATIENT
Start: 2024-01-11 | End: 2024-01-11

## 2024-01-11 RX ORDER — CARVEDILOL 6.25 MG/1
6.25 TABLET ORAL EVERY 12 HOURS SCHEDULED
Status: DISCONTINUED | OUTPATIENT
Start: 2024-01-11 | End: 2024-01-14 | Stop reason: HOSPADM

## 2024-01-11 RX ORDER — SODIUM CHLORIDE 0.9 % (FLUSH) 0.9 %
10 SYRINGE (ML) INJECTION AS NEEDED
Status: DISCONTINUED | OUTPATIENT
Start: 2024-01-11 | End: 2024-01-14 | Stop reason: HOSPADM

## 2024-01-11 RX ORDER — PRAMIPEXOLE DIHYDROCHLORIDE 0.25 MG/1
1 TABLET ORAL NIGHTLY
Status: DISCONTINUED | OUTPATIENT
Start: 2024-01-11 | End: 2024-01-14 | Stop reason: HOSPADM

## 2024-01-11 RX ORDER — NICOTINE POLACRILEX 4 MG
15 LOZENGE BUCCAL
Status: DISCONTINUED | OUTPATIENT
Start: 2024-01-11 | End: 2024-01-14 | Stop reason: HOSPADM

## 2024-01-11 RX ORDER — RANOLAZINE 500 MG/1
500 TABLET, EXTENDED RELEASE ORAL EVERY 12 HOURS SCHEDULED
Status: DISCONTINUED | OUTPATIENT
Start: 2024-01-11 | End: 2024-01-14 | Stop reason: HOSPADM

## 2024-01-11 RX ORDER — TAMSULOSIN HYDROCHLORIDE 0.4 MG/1
0.4 CAPSULE ORAL DAILY
Status: DISCONTINUED | OUTPATIENT
Start: 2024-01-11 | End: 2024-01-12

## 2024-01-11 RX ORDER — FINASTERIDE 5 MG/1
5 TABLET, FILM COATED ORAL DAILY
Status: DISCONTINUED | OUTPATIENT
Start: 2024-01-12 | End: 2024-01-14 | Stop reason: HOSPADM

## 2024-01-11 RX ORDER — ASPIRIN 81 MG/1
81 TABLET, CHEWABLE ORAL DAILY
Status: DISCONTINUED | OUTPATIENT
Start: 2024-01-11 | End: 2024-01-11

## 2024-01-11 RX ORDER — IBUPROFEN 600 MG/1
1 TABLET ORAL
Status: DISCONTINUED | OUTPATIENT
Start: 2024-01-11 | End: 2024-01-14 | Stop reason: HOSPADM

## 2024-01-11 RX ORDER — MAGNESIUM SULFATE 1 G/100ML
1 INJECTION INTRAVENOUS ONCE
Status: COMPLETED | OUTPATIENT
Start: 2024-01-11 | End: 2024-01-11

## 2024-01-11 RX ORDER — ACETAMINOPHEN 325 MG/1
650 TABLET ORAL EVERY 4 HOURS PRN
Status: DISCONTINUED | OUTPATIENT
Start: 2024-01-11 | End: 2024-01-14 | Stop reason: HOSPADM

## 2024-01-11 RX ORDER — PRASUGREL 10 MG/1
10 TABLET, FILM COATED ORAL DAILY
Status: DISCONTINUED | OUTPATIENT
Start: 2024-01-11 | End: 2024-01-11

## 2024-01-11 RX ORDER — SODIUM CHLORIDE 0.9 % (FLUSH) 0.9 %
10 SYRINGE (ML) INJECTION EVERY 12 HOURS SCHEDULED
Status: DISCONTINUED | OUTPATIENT
Start: 2024-01-11 | End: 2024-01-14 | Stop reason: HOSPADM

## 2024-01-11 RX ORDER — SODIUM CHLORIDE 9 MG/ML
40 INJECTION, SOLUTION INTRAVENOUS AS NEEDED
Status: DISCONTINUED | OUTPATIENT
Start: 2024-01-11 | End: 2024-01-14 | Stop reason: HOSPADM

## 2024-01-11 RX ORDER — ONDANSETRON 2 MG/ML
4 INJECTION INTRAMUSCULAR; INTRAVENOUS EVERY 6 HOURS PRN
Status: DISCONTINUED | OUTPATIENT
Start: 2024-01-11 | End: 2024-01-14 | Stop reason: HOSPADM

## 2024-01-11 RX ORDER — HYDRALAZINE HYDROCHLORIDE 50 MG/1
50 TABLET, FILM COATED ORAL EVERY 8 HOURS SCHEDULED
Status: DISCONTINUED | OUTPATIENT
Start: 2024-01-12 | End: 2024-01-14 | Stop reason: HOSPADM

## 2024-01-11 RX ORDER — CETIRIZINE HYDROCHLORIDE 10 MG/1
10 TABLET ORAL DAILY
Status: DISCONTINUED | OUTPATIENT
Start: 2024-01-11 | End: 2024-01-11

## 2024-01-11 RX ORDER — ASPIRIN 81 MG/1
81 TABLET, CHEWABLE ORAL DAILY
Status: DISCONTINUED | OUTPATIENT
Start: 2024-01-12 | End: 2024-01-14 | Stop reason: HOSPADM

## 2024-01-11 RX ORDER — AMOXICILLIN 250 MG
2 CAPSULE ORAL 2 TIMES DAILY
Status: DISCONTINUED | OUTPATIENT
Start: 2024-01-11 | End: 2024-01-14 | Stop reason: HOSPADM

## 2024-01-11 RX ORDER — FLUTICASONE PROPIONATE 50 MCG
1 SPRAY, SUSPENSION (ML) NASAL DAILY
Status: DISCONTINUED | OUTPATIENT
Start: 2024-01-12 | End: 2024-01-14 | Stop reason: HOSPADM

## 2024-01-11 RX ORDER — FINASTERIDE 5 MG/1
5 TABLET, FILM COATED ORAL DAILY
Status: DISCONTINUED | OUTPATIENT
Start: 2024-01-11 | End: 2024-01-11

## 2024-01-11 RX ORDER — SERTRALINE HYDROCHLORIDE 100 MG/1
100 TABLET, FILM COATED ORAL DAILY
Status: DISCONTINUED | OUTPATIENT
Start: 2024-01-12 | End: 2024-01-14 | Stop reason: HOSPADM

## 2024-01-11 RX ORDER — PRASUGREL 10 MG/1
10 TABLET, FILM COATED ORAL DAILY
Status: DISCONTINUED | OUTPATIENT
Start: 2024-01-12 | End: 2024-01-14 | Stop reason: HOSPADM

## 2024-01-11 RX ORDER — PANTOPRAZOLE SODIUM 40 MG/1
40 TABLET, DELAYED RELEASE ORAL
Status: DISCONTINUED | OUTPATIENT
Start: 2024-01-12 | End: 2024-01-14 | Stop reason: HOSPADM

## 2024-01-11 RX ORDER — BISACODYL 10 MG
10 SUPPOSITORY, RECTAL RECTAL DAILY PRN
Status: DISCONTINUED | OUTPATIENT
Start: 2024-01-11 | End: 2024-01-14 | Stop reason: HOSPADM

## 2024-01-11 RX ORDER — ATORVASTATIN CALCIUM 40 MG/1
40 TABLET, FILM COATED ORAL DAILY
Status: DISCONTINUED | OUTPATIENT
Start: 2024-01-12 | End: 2024-01-14 | Stop reason: HOSPADM

## 2024-01-11 RX ORDER — INSULIN LISPRO 100 [IU]/ML
2-9 INJECTION, SOLUTION INTRAVENOUS; SUBCUTANEOUS
Status: DISCONTINUED | OUTPATIENT
Start: 2024-01-11 | End: 2024-01-14 | Stop reason: HOSPADM

## 2024-01-11 RX ORDER — ATORVASTATIN CALCIUM 40 MG/1
40 TABLET, FILM COATED ORAL DAILY
Status: DISCONTINUED | OUTPATIENT
Start: 2024-01-11 | End: 2024-01-11

## 2024-01-11 RX ORDER — DEXTROSE MONOHYDRATE 25 G/50ML
25 INJECTION, SOLUTION INTRAVENOUS
Status: DISCONTINUED | OUTPATIENT
Start: 2024-01-11 | End: 2024-01-14 | Stop reason: HOSPADM

## 2024-01-11 RX ADMIN — TAMSULOSIN HYDROCHLORIDE 0.4 MG: 0.4 CAPSULE ORAL at 17:16

## 2024-01-11 RX ADMIN — CARVEDILOL 6.25 MG: 6.25 TABLET, FILM COATED ORAL at 22:50

## 2024-01-11 RX ADMIN — SODIUM CHLORIDE 125 MG: 9 INJECTION, SOLUTION INTRAVENOUS at 17:16

## 2024-01-11 RX ADMIN — PRAMIPEXOLE DIHYDROCHLORIDE 1 MG: 0.25 TABLET ORAL at 22:50

## 2024-01-11 RX ADMIN — ACETAMINOPHEN 650 MG: 325 TABLET ORAL at 22:50

## 2024-01-11 RX ADMIN — Medication 10 ML: at 22:51

## 2024-01-11 RX ADMIN — RANOLAZINE 500 MG: 500 TABLET, EXTENDED RELEASE ORAL at 22:50

## 2024-01-11 RX ADMIN — MAGNESIUM SULFATE HEPTAHYDRATE 1 G: 1 INJECTION, SOLUTION INTRAVENOUS at 11:02

## 2024-01-11 RX ADMIN — Medication 10 ML: at 14:38

## 2024-01-11 RX ADMIN — DEXTROSE MONOHYDRATE 50 ML/HR: 50 INJECTION, SOLUTION INTRAVENOUS at 17:17

## 2024-01-11 NOTE — PLAN OF CARE
Goal Outcome Evaluation:      Patient stable. Bed alarm on for precautions due to admission for syncope. Patient educated on calling before getting up and verbalized understanding. Cardiology consulted. Plan of care ongoing.

## 2024-01-11 NOTE — ED NOTES
Donny Jerry    Nursing Report ED to Floor:  Mental status: a/o x4  Ambulatory status: assist x1  Oxygen Therapy:  RA  Cardiac Rhythm: NSR  Admitted from: ED  Safety Concerns:  none  Social Issues: none  ED Room #:  18    ED Nurse Phone Extension - [phone] or may call [phone].      HPI:   Chief Complaint   Patient presents with    Syncope       Past Medical History:  Past Medical History:   Diagnosis Date    Benign hypertension     Coronary artery disease     Depression     Enlarged prostate     Enlarged prostate with anticipated TURP with Dr. Bernal.    GERD (gastroesophageal reflux disease)     Heart attack     Hypercholesterolemia     Myocardial infarction     Obesity     Obstructive sleep apnea on CPAP     Type 2 diabetes mellitus         Past Surgical History:  Past Surgical History:   Procedure Laterality Date    CARDIAC CATHETERIZATION      CARDIAC CATHETERIZATION Left 10/19/2021    Procedure: Left Heart Cath;  Surgeon: Liz Russo MD;  Location:  [facility];  Service: Cardiology;  Laterality: Left;    CARDIAC CATHETERIZATION N/A 7/18/2023    Procedure: Coronary angiography;  Surgeon: Rupesh Parr MD;  Location:  [facility];  Service: Cardiology;  Laterality: N/A;    CARDIAC CATHETERIZATION N/A 7/18/2023    Procedure: Percutaneous Coronary Intervention;  Surgeon: Rupesh Parr MD;  Location:  [facility];  Service: Cardiology;  Laterality: N/A;    COLONOSCOPY W/ POLYPECTOMY      CORONARY ANGIOPLASTY WITH STENT PLACEMENT Left 06/03/2009    Left heart catheterization, 06/03/2009, Dr. Russo:  LVEF 45% to 50%.  Placement of overlapping Cypher drug-eluting stent 2.5 x 28 and 2.5 x 18 to the SVG to the obtuse marginal branch.  SVG to the PDA had a proximal stenosis estimated at 60% with a distal stenosis of 50% to 60%.    CORONARY ANGIOPLASTY WITH STENT PLACEMENT Left 07/06/2009    Left heart catheterization, 07/06/2009:  PTCA and  stent placement in the mid PDA using a 2.25 x 12 mm Taxus drug-eluting stent with stent placement in the distal SVG to the PDA using a 2.5 x 18 mm Cypher drug-eluting stent and stenting of the proximal SVG to the PDA using a 3.0 x 28 mm Cypher drug-eluting stent.    CORONARY ANGIOPLASTY WITH STENT PLACEMENT  04/23/2010    Cardiac catheterization for recurrent anginal symptoms, 04/23/2010, with PTCA and stent placement in the proximal SVG to the PDA using 3.0 x 28 mm Promus drug-eluting stent for in-stent restenosis.    CORONARY ANGIOPLASTY WITH STENT PLACEMENT Left 07/06/2010    Left heart catheterization, 07/06/2010, Dr. Russo:  EF 40% to 45%.  3.5 x 23 mm Promus drug-eluting stent in the proximal SVG to OM, 2.5 x 18 mm Promus drug-eluting stent to distal SVG to PDA due to in-stent restenosis.      CORONARY ANGIOPLASTY WITH STENT PLACEMENT Left 11/16/2010    Left heart catheterization, 11/16/2010, with placement of a 3.0 x 16 mm Taxus drug-eluting stent to the SVG to the RCA proximally and a 2.5 x 16 mm paclitaxel stent distally in the SVG to the RCA.    CORONARY ANGIOPLASTY WITH STENT PLACEMENT Left     Left heart catheterization, 3.0 x 24-mm Promus element drug-eluting stent to a 90% lesion in the mid portion of the SVG to the PDA.     CORONARY ANGIOPLASTY WITH STENT PLACEMENT Left 06/24/2014    Left heart catheterization for recurrent angina, 06/24/2014, Dr. Russo:  PTCA of the SVG to the PDA using a 3 x 12 mm NC TREK balloon.      CORONARY ARTERY BYPASS GRAFT      CABG x3, Dr. Peng, [facility], 2001.        Admitting Doctor:   IRWIN Moses DO    Consulting Provider(s):  Consults       Date and Time Order Name Status Description    1/11/2024  2:15 PM Inpatient Cardiology Consult      12/18/2023  8:51 PM Inpatient Nephrology Consult Completed              Admitting Diagnosis:   The primary encounter diagnosis was Syncope, unspecified syncope type. Diagnoses of Injury of head,  initial encounter, Renal insufficiency, and Elevated troponin were also pertinent to this visit.    Most Recent Vitals:   Vitals:    01/11/24 1226 01/11/24 1229 01/11/24 1300 01/11/24 1330   BP: 144/80 (!) 71/46 (!) 140/108 123/65   BP Location: Right arm Right arm     Patient Position: Sitting Standing     Pulse:   64 61   Resp:       Temp:       TempSrc:       SpO2:   97% 96%   Weight:       Height:           Active LDAs/IV Access:   Lines, Drains & Airways       Active LDAs       Name Placement date Placement time Site Days    Peripheral IV 01/11/24 1031 Left Antecubital 01/11/24  1031  Antecubital  less than 1                    Labs (abnormal labs have a star):   Labs Reviewed   COMPREHENSIVE METABOLIC PANEL - Abnormal; Notable for the following components:       Result Value    Glucose 56 (*)     BUN 62 (*)     Creatinine 2.20 (*)     BUN/Creatinine Ratio 28.2 (*)     eGFR 30.1 (*)     All other components within normal limits    Narrative:     GFR Normal >60  Chronic Kidney Disease <60  Kidney Failure <15    The GFR formula is only valid for adults with stable renal function between ages 18 and 70.   MAGNESIUM - Abnormal; Notable for the following components:    Magnesium 2.8 (*)     All other components within normal limits   SINGLE HSTROPONIN T - Abnormal; Notable for the following components:    HS Troponin T 178 (*)     All other components within normal limits    Narrative:     High Sensitive Troponin T Reference Range:  <14.0 ng/L- Negative Female for AMI  <22.0 ng/L- Negative Male for AMI  >=14 - Abnormal Female indicating possible myocardial injury.  >=22 - Abnormal Male indicating possible myocardial injury.   Clinicians would have to utilize clinical acumen, EKG, Troponin, and serial changes to determine if it is an Acute Myocardial Infarction or myocardial injury due to an underlying chronic condition.        CBC WITH AUTO DIFFERENTIAL - Abnormal; Notable for the following components:    RBC 2.96  (*)     Hemoglobin 8.8 (*)     Hematocrit 27.4 (*)     Lymphocyte % 12.2 (*)     All other components within normal limits   HIGH SENSITIVITIY TROPONIN T 2HR - Abnormal; Notable for the following components:    HS Troponin T 172 (*)     Troponin T Delta -6 (*)     All other components within normal limits    Narrative:     High Sensitive Troponin T Reference Range:  <14.0 ng/L- Negative Female for AMI  <22.0 ng/L- Negative Male for AMI  >=14 - Abnormal Female indicating possible myocardial injury.  >=22 - Abnormal Male indicating possible myocardial injury.   Clinicians would have to utilize clinical acumen, EKG, Troponin, and serial changes to determine if it is an Acute Myocardial Infarction or myocardial injury due to an underlying chronic condition.        IRON PROFILE - Abnormal; Notable for the following components:    Iron 36 (*)     Iron Saturation (TSAT) 12 (*)     Transferrin 195 (*)     TIBC 291 (*)     All other components within normal limits   TSH - Normal   FERRITIN - Normal    Narrative:     Results may be falsely decreased if patient taking Biotin.     RETICULOCYTES - Normal   RAINBOW DRAW    Narrative:     The following orders were created for panel order [place] Draw.  Procedure                               Abnormality         Status                     ---------                               -----------         ------                     Green Top (Gel)[[id number]]                                  Final result               Lavender Top[[id number]]                                     Final result               Gold Top - SST[[id number]]                                   Final result               Carlisle Top[[id number]]                                         Final result               Light Blue Top[[id number]]                                   Final result                 Please view results for these tests on the individual orders.   IRON   ERYTHROPOIETIN   PROBNP (REFERENCE)   POCT GLUCOSE  FINGERSTICK   CBC AND DIFFERENTIAL    Narrative:     The following orders were created for panel order CBC & Differential.  Procedure                               Abnormality         Status                     ---------                               -----------         ------                     CBC Auto Differential[[id number]]        Abnormal            Final result                 Please view results for these tests on the individual orders.   GREEN TOP   LAVENDER TOP   GOLD TOP - SST   GRAY TOP   LIGHT BLUE TOP       Meds Given in ED:   Medications   sodium chloride 0.9 % flush 10 mL (has no administration in time range)   sodium chloride 0.9 % flush 10 mL (has no administration in time range)   sodium chloride 0.9 % flush 10 mL (has no administration in time range)   sodium chloride 0.9 % infusion 40 mL (has no administration in time range)   sennosides-docusate (PERICOLACE) 8.6-50 MG per tablet 2 tablet (has no administration in time range)     And   polyethylene glycol (MIRALAX) packet 17 g (has no administration in time range)     And   bisacodyl (DULCOLAX) EC tablet 5 mg (has no administration in time range)     And   bisacodyl (DULCOLAX) suppository 10 mg (has no administration in time range)   Potassium Replacement - Follow Nurse / BPA Driven Protocol (has no administration in time range)   Magnesium Cardiology Dose Replacement - Follow Nurse / BPA Driven Protocol (has no administration in time range)   Phosphorus Replacement - Follow Nurse / BPA Driven Protocol (has no administration in time range)   Calcium Replacement - Follow Nurse / BPA Driven Protocol (has no administration in time range)   acetaminophen (TYLENOL) tablet 650 mg (has no administration in time range)   ondansetron (ZOFRAN) injection 4 mg (has no administration in time range)   magnesium sulfate in D5W 1g/100mL (PREMIX) (0 g Intravenous Stopped 1/11/24 1216)

## 2024-01-11 NOTE — CONSULTS
[facility] at [facility]   Consult Note    Referring Provider: Mick SOTELO    Reason for Consultation: syncope    Patient Care Team:  Anum Alonso APRN as PCP - General  Rubin Bernal MD as Consulting Physician (Urology)  Liz Russo MD as Consulting Physician (Cardiology)      Problem List:  Coronary artery disease/NSTEMI:  CABG x3 in 2001, [facility] by Dr. Peng. LIMA to the LAD, SVG to obtuse marginal, SVG to the PDA.  Stress echo, 11/13/2007: No wall motion abnormalities. No evidence of ischemia. Normal EF.  Echo, 11/13/2007, mild concentric LVH, trace of MR and TR.    Adenosine Cardiolite, 05/07/2009:  Normal LVEF with a stress induced mid and distal inferior defect compatible with  ischemia.  [facility], 06/03/2009, Dr. Russo: EF 45-50%. Overlapping Cypher TAWANNA 2.5 x 28 and 2.5 x 18 to the SVG to OM. SVG to PDA had a proximal stenosis of 60% with a distal stenosis of 50-60%.  [facility], 07/06/2009: PTCA and Taxus TAWANNA (2.25 x 12 mm) to the mid PDA; Cypher TAWANNA (2.5 x 18mm) to the distal SVG to the PDA;  and Cypher TAWANNA (3.0 x 28mm) to the proximal SVG to the PDA.  [facility] for recurrent chest pain, 01/29/2010: Revealing patent stents. Ranexa initiated 500 mg q.12 h.   [facility],  08/16/2011: LVEF of 45% to 50% with an occluded SVG to an obtuse marginal branch which could not be revascularized, patent LIMA to the LAD, noncritical graft disease in the SVG to the PDA, multiple small areas of distal vessel disease and collateral flow were seen.   [facility] for recurrent anginal symptoms, 04/23/2010, with PTCA and Promus TAWANNA (3.0 x 28mm) to the proximal SVG to the PDA for in-stent restenosis.    [facility], 07/06/2010,  Dr. Russo: EF 40-45%. 3.5 x 23 mm Promus TAWANNA in the proximal SVG to OM, 2.5 x 18 mm Promus TAWANNA to distal SVG to PDA due to ISR.    [facility], 11/16/2010, with placement of  a 3.0 x 16 mm Taxus TAWANNA to the SVG to the RCA proximally and a 2.5 x 16 mm paclitaxel stent distally in  the SVG to the RCA.  [facility], 3.0 x 24-mm Promus element TAWANNA to a 90% lesion in the mid portion  of the SVG to the PDA.   [facility] for recurrent angina, 06/24/2014, Dr. Russo:  PTCA of the SVG to the PDA using a 3 x 12 mm NC TREK balloon.    Abnormal stress test, 10/07/2021: Mild ST depression with infusion and had a normal hemodynamic response to regadenoson. He was found to have a large posterior lateral stress-induced defect. Severe small fixed anterior defect, EF 45% with CCS class I chest discomfort/NYHA class II dyspnea on exertion symptoms.   [facility], 10/19/2021: EF 50%. LAD occluded following the takeoff of a first diagonal branch and a septal . and LCx occluded after the takeoff of a small-sized to moderate-sized, OM1 branch. RCA dominant for the posterior circulation. RCA was occluded in the mid-segment of the vessel. Collateral flow from one of the RV marginal branches was seen to supply the distal LCx including 2 OM branches. No significant disease appeared to be present within the distal LCx circulation. LIMA to the LAD was widely patent. SVG to LCx occluded at origin. SVG to PDA has been stented extensively. Area of mild in-stent narrowing in the mid segment of up to 40 to 50%. Good retrograde flow into posterolateral branch and into the circumflex vessels.  Echo, 06/30/2023: EF 45-50%. Mild concentric LVH. Grade 1 diastolic dysfunction. Normal right ventricular size and systolic function. Normal left atrial volume index. Mild calcification aortic valve without significant stenosis. Mild PI.  [facility] ED admission, 06/29/2023: Presented with chest pain and shortness of breath due to volume overload from heart failure.   [facility] (07/18/2023): MAXINE Suarez. Dr. Parr. Successful PCI to saphenous vein graft->RCA for multifocal in-stent restenosis. Severe disease and multiple small branch vessels not amenable to any form of revascularization.   Echo (07/18/2023): EF 46-50%.  LV wall thickness is consistent  with (grade 2 with high lap) pseudonormalization.  LA cavity is mildly dilated.  LA volume is mildly increased.  Mild AS.  RVSP 15.4 mmHg. Ao pk janee 237 cm/s. Ao max PG 22.5 mmHg. Ao mean PG 12 mmHg. Ao V2 VTI 48.1 cm. CODY 1.34 cm2.  Chronic diastolic heart failure/HFpEF  Echo (07/18/2023): EF 46-50%.  LV wall thickness is consistent with (grade 2 with high lap) pseudonormalization.   Hypertension  Hypercholesterolemia.  His cholesterol is followed and treated by his PCP.  Aortic stenosis  Echo (07/18/2023): EF 46-50%.  LV wall thickness is consistent with (grade 2 with high lap) pseudonormalization.  LA cavity is mildly dilated.  LA volume is mildly increased.  Mild AS.  RVSP 15.4 mmHg. Ao pk janee 237 cm/s. Ao max PG 22.5 mmHg. Ao mean PG 12 mmHg. Ao V2 VTI 48.1 cm. CODY 1.34 cm2.  Chest pain  [facility] (06/29/2023-07/01/2023): Presented to R shortness of air and chest tightness was deemed to be secondary due to volume overload from his chronic diastolic heart failure.  Diuresed and later stable for discharge home.  [facility] ED (07/14/2023): Presented to [facility] complaining of chest pain and lower extremity edema which was found to be volume overloaded secondary to left heart failure.  Diuresed and given pain medication which resolved his chest pain allowing him to be stable for discharge home.  Type 2 diabetes, diagnosed 2 years ago.  Obstructive sleep apnea, on CPAP.  Chronic iron-deficiency anemia  Enlarged prostate with anticipated TURP with Dr. Bernal with retroperitoneal ultrasound 8/31/2021 showing prostatomegaly (5.4 x 3.9 x 4.2 cm) with mass-effect on the base of the bladder, normal size kidneys  Obesity  Depression  10. Surgical history:  CABG x3, Dr. Peng, [facility], 2001.  Negative colonoscopy, 2014.          Allergies   Allergen Reactions    Zocor [Simvastatin] Myalgia    Bisoprolol Other (See Comments)     Agitation      Byetta 10 Mcg Pen [Exenatide] Nausea Only     nausea    Crestor  [Rosuvastatin Calcium] Myalgia    Metformin And Related Nausea Only    Plavix [Clopidogrel Bisulfate] Other (See Comments)     resistance           Current Facility-Administered Medications:     sodium chloride 0.9 % flush 10 mL, 10 mL, Intravenous, PRN, Quinten Almonte MD    Current Outpatient Medications:     albuterol sulfate  (90 Base) MCG/ACT inhaler, Inhale 2 puffs Every 4 (Four) Hours As Needed for Wheezing., Disp: 18 g, Rfl: 5    aspirin 81 MG chewable tablet, Chew 1 tablet Daily., Disp: , Rfl:     atorvastatin (LIPITOR) 40 MG tablet, Take 1 tablet by mouth Daily., Disp: , Rfl:     B-D UF III MINI PEN NEEDLES 31G X 5 MM misc, , Disp: , Rfl:     carvedilol (COREG) 12.5 MG tablet, Take 1 tablet by mouth 2 (Two) Times a Day With Meals., Disp: , Rfl:     Cholecalciferol (VITAMIN D3) 50 MCG (2000 UT) capsule, Take 2 capsules by mouth Daily., Disp: , Rfl:     dapagliflozin Propanediol (Farxiga) 10 MG tablet, Take 10 mg by mouth Daily., Disp: , Rfl:     doxazosin (CARDURA) 8 MG tablet, Take 1 tablet by mouth Every 12 (Twelve) Hours., Disp: 60 tablet, Rfl: 11    fexofenadine (ALLEGRA) 180 MG tablet, Take 1 tablet by mouth Daily., Disp: , Rfl:     finasteride (PROSCAR) 5 MG tablet, Take 1 tablet by mouth Daily., Disp: , Rfl:     fluticasone (FLONASE) 50 MCG/ACT nasal spray, 1 spray into the nostril(s) as directed by provider Daily., Disp: 48 g, Rfl: 2    Fluticasone-Umeclidin-Vilant (Trelegy Ellipta) 200-62.5-25 MCG/ACT aerosol powder , Inhale 1 puff Daily. Rinse mouth with water after use., Disp: 90 each, Rfl: 2    furosemide (LASIX) 40 MG tablet, Take 1 tablet by mouth Daily., Disp: 30 tablet, Rfl: 0    hydrALAZINE (APRESOLINE) 100 MG tablet, Take 1 tablet by mouth 3 (Three) Times a Day., Disp: 90 tablet, Rfl: 11    iron polysaccharides (NIFEREX) 150 MG capsule, Take 1 capsule by mouth Daily for 30 days., Disp: 30 capsule, Rfl: 0    magnesium oxide (MAG-OX) 400 MG tablet, Take 1 tablet by mouth  Daily., Disp: , Rfl:     Multiple Vitamin (MULTI VITAMIN DAILY PO), Take 1 tablet by mouth Daily., Disp: , Rfl:     nitroglycerin (NITROSTAT) 0.4 MG SL tablet, Place 1 tablet under the tongue Every 5 (Five) Minutes As Needed for Chest Pain. Take no more than 3 doses in 15 minutes., Disp: 25 tablet, Rfl: 11    pantoprazole (PROTONIX) 40 MG EC tablet, TAKE 1 TABLET BY MOUTH 2 TIMES DAILY (BEFORE MEALS), Disp: , Rfl:     pramipexole (MIRAPEX) 1 MG tablet, TAKE 1 TABLET EVERY NIGHT, Disp: 90 tablet, Rfl: 2    prasugrel (EFFIENT) 10 MG tablet, Take 1 tablet by mouth Daily., Disp: , Rfl:     ranolazine (RANEXA) 500 MG 12 hr tablet, Take 1 tablet by mouth Every 12 (Twelve) Hours for 180 days., Disp: 60 tablet, Rfl: 5    sertraline (ZOLOFT) 50 MG tablet, Take 2 tablets by mouth Daily., Disp: , Rfl:     tamsulosin (FLOMAX) 0.4 MG capsule 24 hr capsule, Take 1 capsule by mouth 2 (Two) Times a Day., Disp: , Rfl:     Tresiba FlexTouch 200 UNIT/ML solution pen-injector pen injection, Inject  under the skin into the appropriate area as directed. 30 units every morning and 74 units in the evening, Disp: , Rfl:     TRUEplus Lancets 28G misc, , Disp: , Rfl:     Trulicity 0.75 MG/0.5ML solution pen-injector, Inject 0.75 mg as directed 1 (One) Time Per Week., Disp: , Rfl:          (Not in a hospital admission)        Subjective .   History of present illness:      Patient is a 78-year-old  male who we are asked to see today for further evaluation of syncope.  Patient has previous history of coronary artery disease, mild aortic stenosis, chronic HFpEF and hypertension.  Patient underwent admission and subsequent cardiac catheterization with intervention to his VG to RCA back in July.  Patient and family note that he felt improved for couple of months.  He was admitted in December with congestive heart failure. Within the last month his hemoglobin and hematocrit have been lower than his baseline.  Patient denies any chest pain,  pressure, tightness.  However, does endorse some increasing shortness of breath over the last few weeks.  Patient suffered a syncopal event on January 9 while he was at the chiropractor.  Patient family reports that he was instructed to turn over onto his stomach and at that time he subsequently had a syncopal episode.  Within the last week or 2 patient's blood pressure has been running lower than normal.  Patient also reports that at the ER he was treated with IV Lasix.  They had a recent follow-up with their nephrologist who was concerned about possible intravascular dehydration.  After being released from the ER the patient's family contacted his primary cardiologist to seek appointment.  They were scheduled to have that appointment today but as the patient was walking into the facility he had an abrupt syncopal event.  Per patient he did not have any significant warning.  Note that he hit his head and face.  Evaluation thus far in the ER does not show any acute trauma.  We were asked to see the patient for further evaluation of his syncope.  Orthostatic vitals were checked with significant drop from sitting to standing.  Patient did report some dizziness.  Does currently admit to some orthopnea.      Social History     Socioeconomic History    Marital status:    Tobacco Use    Smoking status: Never    Smokeless tobacco: Never   Vaping Use    Vaping Use: Never used   Substance and Sexual Activity    Alcohol use: No    Drug use: No    Sexual activity: Defer     Family History   Problem Relation Age of Onset    Heart attack Mother     Ulcers Father          Review of Systems:  Review of Systems   Constitutional: Positive for malaise/fatigue and weight gain. Negative for fever.   HENT:  Negative for nosebleeds.    Eyes:  Negative for redness and visual disturbance.   Cardiovascular:  Positive for dyspnea on exertion, orthopnea and syncope. Negative for palpitations and paroxysmal nocturnal dyspnea.  "  Respiratory:  Positive for shortness of breath. Negative for cough, snoring, sputum production and wheezing.    Hematologic/Lymphatic: Negative for bleeding problem.   Skin:  Negative for flushing, itching and rash.   Musculoskeletal:  Positive for arthritis and muscle weakness. Negative for falls, joint pain and muscle cramps.   Gastrointestinal:  Negative for abdominal pain, diarrhea, heartburn, nausea and vomiting.   Genitourinary:  Negative for hematuria.   Neurological:  Positive for dizziness. Negative for excessive daytime sleepiness, headaches, tremors and weakness.   Psychiatric/Behavioral:  Negative for substance abuse. The patient is not nervous/anxious.           Objective   Vitals:  /65   Pulse 61   Temp 97.7 °F (36.5 °C) (Oral)   Resp 16   Ht 170.2 cm (67\")   Wt 102 kg (224 lb 13.9 oz)   SpO2 96%   BMI 35.22 kg/m²      Intake/Output Summary (Last 24 hours) at 1/11/2024 1343  Last data filed at 1/11/2024 1216  Gross per 24 hour   Intake 100 ml   Output --   Net 100 ml       Vitals reviewed.   Constitutional:       Appearance: Well-developed and not in distress.   Neck:      Vascular: No JVD.      Trachea: No tracheal deviation.   Pulmonary:      Effort: Pulmonary effort is normal.      Comments: Decreased bases  Cardiovascular:      Normal rate. Regularly irregular rhythm.      Murmurs: There is no murmur.   Edema:     Peripheral edema present.     Ankle: bilateral trace edema of the ankle.  Abdominal:      General: Bowel sounds are normal.      Palpations: Abdomen is soft.      Tenderness: There is no abdominal tenderness.   Musculoskeletal:         General: No deformity. Skin:     General: Skin is warm and dry.   Neurological:      Mental Status: Alert and oriented to person, place, and time.       I have examined the patient and agree with the above         Results Review:  I reviewed the patient's new clinical results.  Results from last 7 days   Lab Units 01/11/24  1026   WBC " 10*3/mm3 6.81   HEMOGLOBIN g/dL 8.8*   HEMATOCRIT % 27.4*   PLATELETS 10*3/mm3 249     Results from last 7 days   Lab Units 01/11/24  1026   SODIUM mmol/L 142   POTASSIUM mmol/L 4.5   CHLORIDE mmol/L 106   CO2 mmol/L 25.0   BUN mg/dL 62*   CREATININE mg/dL 2.20*   CALCIUM mg/dL 8.7   BILIRUBIN mg/dL 0.4   ALK PHOS U/L 45   ALT (SGPT) U/L 27   AST (SGOT) U/L 24   GLUCOSE mg/dL 56*     Results from last 7 days   Lab Units 01/11/24  1026   SODIUM mmol/L 142   POTASSIUM mmol/L 4.5   CHLORIDE mmol/L 106   CO2 mmol/L 25.0   BUN mg/dL 62*   CREATININE mg/dL 2.20*   GLUCOSE mg/dL 56*   CALCIUM mg/dL 8.7         Lab Results   Lab Value Date/Time    TROPONINT 172 (C) 01/11/2024 1242    TROPONINT 178 (C) 01/11/2024 1026    TROPONINT 207 (C) 01/09/2024 1711    TROPONINT 202 (C) 01/09/2024 1434    TROPONINT 198 (C) 01/09/2024 1247    TROPONINT 141 (C) 12/18/2023 1903    TROPONINT 136 (C) 12/18/2023 1641    TROPONINT 1,148 (C) 07/18/2023 0522    TROPONINT 666 (C) 07/18/2023 0035    TROPONINT 605 (C) 07/17/2023 2249    TROPONINT 122 (C) 07/14/2023 2039    TROPONINT 112 (C) 07/14/2023 1849    TROPONINT 126 (C) 06/30/2023 0524    TROPONINT 125 (C) 06/29/2023 2037    TROPONINT 128 (C) 06/29/2023 1806    TROPONINT 419 (C) 04/23/2023 1619    TROPONINT 418 (C) 04/23/2023 1323     Results from last 7 days   Lab Units 01/11/24  1026   TSH uIU/mL 2.510         Results from last 7 days   Lab Units 01/09/24  1247   PROBNP pg/mL 4,683.0*           Tele:  SR with intermittent Bigeminy    EKG: SR with frequent PVC's and chronic ST TW changes.       Assessment & Plan     Syncope, suspect multifactorial related to anemia, intravascular dehydration, oral blood pressure medications.  Patient significantly orthostatic, and is the main  of his syncope.  Coronary artery disease, chronically elevated troponin.  No current suspicion of active ischemia.  However patient reported increasing shortness of breath over the last few months.  Acute on  chronic HFpEF  Last EF 50% in July.  Evidence of extravascular overload but possibly intravascularly dry.  Could be worsened by recent anemia.  Hypertension, now with recent hypotension at home and subsequent syncope.  On extremely high-dose doxazosin (8 mg twice daily) as well as high-dose hydralazine  Chronic anemia with mild worsening over the course of the last month.  Currently on oral iron prescription.  CKD  Ventricular bigeminy, do not suspect that this is the cause of his syncope.      Plan:    Recommend hospitalist admission.  Will monitor to rule out ventricular arrhythmias as a cause of his symptoms, however the significant orthostatic in the emergency room  Would decrease hydralazine to 75 mg p.o. 3 times daily.  Continue to monitor rhythm  Would not continue home hydralazine (decreased dose) or doxazosin.  Continue carvedilol for now.  Would likely benefit from nephrology consultation for input regarding treatment of his anemia.  May need Epogen, or RBCs given his orthostasis, anemia, yet still total body volume mildly overloaded  At this time did not plan for ischemic evaluation given his lack of symptoms, and flat troponins from previous admissions.        ASHLEIGH Cabezas obtained past medical, family history, social history, review of systems and functioned as a scribe for the remainder of the dictation for Dr. Arreguin.     I have seen and examined the patient, I have reviewed the note, discussed the case with the advance practice clinician, made necessary changes and I agree with the final note.    Todd Arreguin MD  01/11/24  16:37 EST      Dictated utilizing Dragon dictation

## 2024-01-11 NOTE — ED PROVIDER NOTES
Subjective   History of Present Illness  Pleasant patient presents the ER after having a syncopal episode on the way to see his cardiologist Dr. Russo.  He reports that his did occur in the hospital while using patient's rollator.  He did strike the ground with his head or face on the rollator or the ground.  This is his second syncopal episode.  Previous 1 occurred within the last 7 days and was evaluated at [facility].  Does have a history of heart failure coronary artery disease with stents and diabetes.  Patient currently denies any chest pain.  Denies any headache.  He tells me he has a little bit of soreness to his nose.        Review of Systems    Past Medical History:   Diagnosis Date    Benign hypertension     Coronary artery disease     Depression     Enlarged prostate     Enlarged prostate with anticipated TURP with Dr. Bernal.    GERD (gastroesophageal reflux disease)     Heart attack     Hypercholesterolemia     Myocardial infarction     Obesity     Obstructive sleep apnea on CPAP     Type 2 diabetes mellitus        Allergies   Allergen Reactions    Zocor [Simvastatin] Myalgia    Bisoprolol Other (See Comments)     Agitation      Byetta 10 Mcg Pen [Exenatide] Nausea Only     nausea    Crestor [Rosuvastatin Calcium] Myalgia    Metformin And Related Nausea Only    Plavix [Clopidogrel Bisulfate] Other (See Comments)     resistance       Past Surgical History:   Procedure Laterality Date    CARDIAC CATHETERIZATION      CARDIAC CATHETERIZATION Left 10/19/2021    Procedure: Left Heart Cath;  Surgeon: Liz Russo MD;  Location:  [facility] CATH INVASIVE LOCATION;  Service: Cardiology;  Laterality: Left;    CARDIAC CATHETERIZATION N/A 7/18/2023    Procedure: Coronary angiography;  Surgeon: Rupesh Parr MD;  Location:  [facility] CATH INVASIVE LOCATION;  Service: Cardiology;  Laterality: N/A;    CARDIAC CATHETERIZATION N/A 7/18/2023    Procedure: Percutaneous Coronary Intervention;   Surgeon: Rupesh Parr MD;  Location: [facility] CATH INVASIVE LOCATION;  Service: Cardiology;  Laterality: N/A;    COLONOSCOPY W/ POLYPECTOMY      CORONARY ANGIOPLASTY WITH STENT PLACEMENT Left 06/03/2009    Left heart catheterization, 06/03/2009, Dr. Russo:  LVEF 45% to 50%.  Placement of overlapping Cypher drug-eluting stent 2.5 x 28 and 2.5 x 18 to the SVG to the obtuse marginal branch.  SVG to the PDA had a proximal stenosis estimated at 60% with a distal stenosis of 50% to 60%.    CORONARY ANGIOPLASTY WITH STENT PLACEMENT Left 07/06/2009    Left heart catheterization, 07/06/2009:  PTCA and stent placement in the mid PDA using a 2.25 x 12 mm Taxus drug-eluting stent with stent placement in the distal SVG to the PDA using a 2.5 x 18 mm Cypher drug-eluting stent and stenting of the proximal SVG to the PDA using a 3.0 x 28 mm Cypher drug-eluting stent.    CORONARY ANGIOPLASTY WITH STENT PLACEMENT  04/23/2010    Cardiac catheterization for recurrent anginal symptoms, 04/23/2010, with PTCA and stent placement in the proximal SVG to the PDA using 3.0 x 28 mm Promus drug-eluting stent for in-stent restenosis.    CORONARY ANGIOPLASTY WITH STENT PLACEMENT Left 07/06/2010    Left heart catheterization, 07/06/2010, Dr. Russo:  EF 40% to 45%.  3.5 x 23 mm Promus drug-eluting stent in the proximal SVG to OM, 2.5 x 18 mm Promus drug-eluting stent to distal SVG to PDA due to in-stent restenosis.      CORONARY ANGIOPLASTY WITH STENT PLACEMENT Left 11/16/2010    Left heart catheterization, 11/16/2010, with placement of a 3.0 x 16 mm Taxus drug-eluting stent to the SVG to the RCA proximally and a 2.5 x 16 mm paclitaxel stent distally in the SVG to the RCA.    CORONARY ANGIOPLASTY WITH STENT PLACEMENT Left     Left heart catheterization, 3.0 x 24-mm Promus element drug-eluting stent to a 90% lesion in the mid portion of the SVG to the PDA.     CORONARY ANGIOPLASTY WITH STENT PLACEMENT Left 06/24/2014    Left  heart catheterization for recurrent angina, 06/24/2014, Dr. Russo:  PTCA of the SVG to the PDA using a 3 x 12 mm NC TREK balloon.      CORONARY ARTERY BYPASS GRAFT      CABG x3, Dr. Peng, [facility], 2001.       Family History   Problem Relation Age of Onset    Heart attack Mother     Ulcers Father        Social History     Socioeconomic History    Marital status:    Tobacco Use    Smoking status: Never    Smokeless tobacco: Never   Vaping Use    Vaping Use: Never used   Substance and Sexual Activity    Alcohol use: No    Drug use: No    Sexual activity: Defer           Objective   Physical Exam  Constitutional:       Appearance: He is well-developed.   HENT:      Head: Normocephalic and atraumatic.      Comments: Slight bruising to his nose.  No obvious deformity.  No septal hematoma.     Right Ear: External ear normal.      Left Ear: External ear normal.      Nose: Nose normal.   Eyes:      Conjunctiva/sclera: Conjunctivae normal.      Pupils: Pupils are equal, round, and reactive to light.   Cardiovascular:      Rate and Rhythm: Normal rate and regular rhythm.      Heart sounds: Normal heart sounds.   Pulmonary:      Effort: Pulmonary effort is normal.      Breath sounds: Normal breath sounds.   Abdominal:      General: Bowel sounds are normal.      Palpations: Abdomen is soft.   Musculoskeletal:         General: Normal range of motion.      Cervical back: Normal range of motion and neck supple.      Comments: No pain in his spine or pelvis.  No pain in his paravertebral cervical spine as well.  Good range of motion.   Skin:     General: Skin is warm and dry.   Neurological:      Mental Status: He is alert and oriented to person, place, and time.   Psychiatric:         Behavior: Behavior normal.         Judgment: Judgment normal.         Procedures           ED Course  ED Course as of 01/11/24 1346   Thu Jan 11, 2024   1029 Case discussed with Dr. Almonte who reviewed and interpreted the  EKG.  We will consult cardiology to discuss as this is a second episode of syncope within the last week.  We also need get some imaging of his head and face as he did fall hitting his nose.  Family unsure if he hit his face on the ground or on his rollator.  He denies any chest pain. [JM]   1030 Patient denies any neck tenderness.  QTc long.  Will also give magnesium.  Awaiting labs. [JM]   1043 [facility] consulted via [place] [[facility]]      ED Course User Index  [JM] Torres Bhatti APRN                                        XR Chest 1 View   Final Result   Impression:   Mild CHF/volume overload features similar to prior exam.         Electronically Signed: Prosper Angeles MD     1/11/2024 10:08 AM CST     Workstation ID: [id number]      CT Head Without Contrast   Final Result   Impression:   Atrophy. No acute process of the brain or facial bones.            Electronically Signed: Ronda Sanon MD     1/11/2024 10:56 AM EST     Workstation ID: [id number]      CT Maxillofacial Without Contrast   Final Result   Impression:   Atrophy. No acute process of the brain or facial bones.            Electronically Signed: Ronda Sanon MD     1/11/2024 10:56 AM EST     Workstation ID: [id number]               Medical Decision Making  Problems Addressed:  Elevated troponin: complicated acute illness or injury  Injury of head, initial encounter: complicated acute illness or injury  Renal insufficiency: complicated acute illness or injury  Syncope, unspecified syncope type: complicated acute illness or injury    Amount and/or Complexity of Data Reviewed  Labs: ordered.  Radiology: ordered.  ECG/medicine tests: ordered.    Risk  Prescription drug management.        Final diagnoses:   Syncope, unspecified syncope type   Injury of head, initial encounter   Renal insufficiency   Elevated troponin       ED Disposition  ED Disposition       ED Disposition   Decision to Admit    Condition   --    Comment   Level of Care: Telemetry [5]    Diagnosis: Syncope [812157]                 No follow-up provider specified.       Medication List      No changes were made to your prescriptions during this visit.            Torres Bhatti, APRN  01/11/24 2205

## 2024-01-11 NOTE — H&P
[facility] Medicine Services  HISTORY AND PHYSICAL    Patient Name: Donny Jerry  : 1945  MRN: [id number]  Primary Care Physician: Anum Alonso APRN  Date of admission: 2024      Subjective   Subjective     Chief Complaint:  Syncope     HPI:  Donny Jerry is a 78 y.o. male that was actually going for a cardiology follow up appointment today when he had a syncopal episode while walking towards Dr. Russo office. Patient has had another similar episode and was seen in the [facility] ED on  for similar episode and resulted in his follow up with cardiology at this time. Patient reports he fell into his rolator walker and hit his face and lower chest but does not remember much about the incident until it was over. Patient's wife also at bedside and was present during the episode. He denies any other areas of pain, he has been evaluated by cardiology and plans are for admission for further syncopal workup and telemetry monitoring at this time. Patient denies any recent known illness, no fever, chills, headache, episodes of chest pain, N/V, abdominal pain. He does endorse some occasional SOA and both episode appear to have occurred with movement, first while rolling at chiropractor office and this after getting up and ambulating out of the car. Plans for orthostatic hypotension checks and repeat echo at this time. Continue to follow with cardiology.       Personal History     Past Medical History:   Diagnosis Date    Benign hypertension     Coronary artery disease     Depression     Enlarged prostate     Enlarged prostate with anticipated TURP with Dr. Bernal.    GERD (gastroesophageal reflux disease)     Heart attack     Hypercholesterolemia     Myocardial infarction     Obesity     Obstructive sleep apnea on CPAP     Type 2 diabetes mellitus            Past Surgical History:   Procedure Laterality Date    CARDIAC CATHETERIZATION      CARDIAC  CATHETERIZATION Left 10/19/2021    Procedure: Left Heart Cath;  Surgeon: Liz Russo MD;  Location:  [facility] CATH INVASIVE LOCATION;  Service: Cardiology;  Laterality: Left;    CARDIAC CATHETERIZATION N/A 7/18/2023    Procedure: Coronary angiography;  Surgeon: Rupesh Parr MD;  Location:  [facility] CATH INVASIVE LOCATION;  Service: Cardiology;  Laterality: N/A;    CARDIAC CATHETERIZATION N/A 7/18/2023    Procedure: Percutaneous Coronary Intervention;  Surgeon: Rupesh Parr MD;  Location:  [facility] CATH INVASIVE LOCATION;  Service: Cardiology;  Laterality: N/A;    COLONOSCOPY W/ POLYPECTOMY      CORONARY ANGIOPLASTY WITH STENT PLACEMENT Left 06/03/2009    Left heart catheterization, 06/03/2009, Dr. Russo:  LVEF 45% to 50%.  Placement of overlapping Cypher drug-eluting stent 2.5 x 28 and 2.5 x 18 to the SVG to the obtuse marginal branch.  SVG to the PDA had a proximal stenosis estimated at 60% with a distal stenosis of 50% to 60%.    CORONARY ANGIOPLASTY WITH STENT PLACEMENT Left 07/06/2009    Left heart catheterization, 07/06/2009:  PTCA and stent placement in the mid PDA using a 2.25 x 12 mm Taxus drug-eluting stent with stent placement in the distal SVG to the PDA using a 2.5 x 18 mm Cypher drug-eluting stent and stenting of the proximal SVG to the PDA using a 3.0 x 28 mm Cypher drug-eluting stent.    CORONARY ANGIOPLASTY WITH STENT PLACEMENT  04/23/2010    Cardiac catheterization for recurrent anginal symptoms, 04/23/2010, with PTCA and stent placement in the proximal SVG to the PDA using 3.0 x 28 mm Promus drug-eluting stent for in-stent restenosis.    CORONARY ANGIOPLASTY WITH STENT PLACEMENT Left 07/06/2010    Left heart catheterization, 07/06/2010, Dr. Russo:  EF 40% to 45%.  3.5 x 23 mm Promus drug-eluting stent in the proximal SVG to OM, 2.5 x 18 mm Promus drug-eluting stent to distal SVG to PDA due to in-stent restenosis.      CORONARY ANGIOPLASTY WITH STENT PLACEMENT Left  11/16/2010    Left heart catheterization, 11/16/2010, with placement of a 3.0 x 16 mm Taxus drug-eluting stent to the SVG to the RCA proximally and a 2.5 x 16 mm paclitaxel stent distally in the SVG to the RCA.    CORONARY ANGIOPLASTY WITH STENT PLACEMENT Left     Left heart catheterization, 3.0 x 24-mm Promus element drug-eluting stent to a 90% lesion in the mid portion of the SVG to the PDA.     CORONARY ANGIOPLASTY WITH STENT PLACEMENT Left 06/24/2014    Left heart catheterization for recurrent angina, 06/24/2014, Dr. Russo:  PTCA of the SVG to the PDA using a 3 x 12 mm NC TREK balloon.      CORONARY ARTERY BYPASS GRAFT      CABG x3, Dr. Peng, [facility], 2001.       Family History: family history includes Heart attack in his mother; Ulcers in his father.     Social History:  reports that he has never smoked. He has never used smokeless tobacco. He reports that he does not drink alcohol and does not use drugs.  Social History     Social History Narrative    Right hand dominant       Medications:  Available home medication information reviewed.  Dulaglutide, Fluticasone-Umeclidin-Vilant, Insulin Degludec, Insulin Pen Needle, TRUEplus Lancets 28G, Vitamin D3, albuterol sulfate HFA, aspirin, atorvastatin, carvedilol, dapagliflozin Propanediol, doxazosin, fexofenadine, finasteride, fluticasone, furosemide, hydrALAZINE, iron polysaccharides, magnesium oxide, multivitamin, nitroglycerin, pantoprazole, pramipexole, prasugrel, ranolazine, sertraline, and tamsulosin    Allergies   Allergen Reactions    Zocor [Simvastatin] Myalgia    Bisoprolol Other (See Comments)     Agitation      Byetta 10 Mcg Pen [Exenatide] Nausea Only     nausea    Crestor [Rosuvastatin Calcium] Myalgia    Metformin And Related Nausea Only    Plavix [Clopidogrel Bisulfate] Other (See Comments)     resistance       Objective   Objective     Vital Signs:   Temp:  [97.7 °F (36.5 °C)] 97.7 °F (36.5 °C)  Heart Rate:  [61-79] 61  Resp:   [16] 16  BP: ()/() 123/65       Physical Exam   Constitutional: No acute distress, awake, alert, currently on RA during my exam  HENT: NCAT, nares patent, mucous membranes moist  Respiratory: Decreased BS bilaterally, no obvious rhonchi, some crackles noted in right lower lung, no wheezing, respiratory effort normal   Cardiovascular: NSR but noted to have occasional PVCs, no murmurs, rubs, or gallops  Gastrointestinal: Positive bowel sounds, soft, nontender, minor distention of abdomen noted.   Musculoskeletal: 1-2+ bilateral ankle edema, no clubbing or cyanosis.   Psychiatric: Appropriate affect, cooperative  Neurologic: Oriented x 3, strength symmetric in all extremities, Cranial Nerves grossly intact to confrontation, speech clear  Skin: No rashes      Result Review:  I have personally reviewed the results from the time of this admission to 1/11/2024 14:22 EST and agree with these findings:  [x]  Laboratory list / accordion  []  Microbiology  [x]  Radiology  [x]  EKG/Telemetry   []  Cardiology/Vascular   []  Pathology  [x]  Old records  []  Other:  Most notable findings include: Troponin 178-172   Cr 2.2 (Baseline CKD III around 1.9)   Hgb 8.8 (previous baseline around 10)   Iron 36 TSAT 12 Transferring 195 TIBC 291   CXR- Mild CHF/volume overload   CTH/maxillofacial- Atrophy, no acute process of brain or facial bones         LAB RESULTS:      Lab 01/11/24  1026 01/09/24  1247   WBC 6.81 7.57   HEMOGLOBIN 8.8* 8.8*   HEMATOCRIT 27.4* 26.5*   PLATELETS 249 241   NEUTROS ABS 5.12 5.95   IMMATURE GRANS (ABS) 0.03 0.02   LYMPHS ABS 0.83 0.88   MONOS ABS 0.56 0.55   EOS ABS 0.21 0.13   MCV 92.6 89.5         Lab 01/11/24  1026 01/09/24  1247   SODIUM 142 138   POTASSIUM 4.5 4.5   CHLORIDE 106 102   CO2 25.0 24.8   ANION GAP 11.0 11.2   BUN 62* 54*   CREATININE 2.20* 2.05*   EGFR 30.1* 32.8*   GLUCOSE 56* 179*   CALCIUM 8.7 8.3*   MAGNESIUM 2.8* 2.6*   TSH 2.510  --          Lab 01/11/24  1026  01/09/24  1247   TOTAL PROTEIN 6.4 7.0   ALBUMIN 3.6 4.0   GLOBULIN 2.8 3.0   ALT (SGPT) 27 26   AST (SGOT) 24 21   BILIRUBIN 0.4 0.3   ALK PHOS 45 49         Lab 01/11/24  1242 01/11/24  1026 01/09/24  1711 01/09/24  1434 01/09/24  1247   PROBNP  --   --   --   --  4,683.0*   HSTROP T 172* 178* 207* 202* 198*                 UA          2/21/2023    09:06 4/25/2023    23:27 5/18/2023    11:14   Urinalysis   Squamous Epithelial Cells, UA   0-2    Specific Gravity, UA 1.020     1.022  1.013    Ketones, UA  Negative  Negative    Blood, UA Negative     Negative  Negative    Leukocytes, UA Negative     Negative  Negative    Nitrite, UA Negative     Negative  Negative    RBC, UA   0-2    WBC, UA   0-2    Bacteria, UA   None Seen       Details          This result is from an external source.               Microbiology Results (last 10 days)       ** No results found for the last 240 hours. **            XR Chest 1 View    Result Date: 1/11/2024  XR CHEST 1 VW Date of Exam: 1/11/2024 9:40 AM CST Indication: syncope Comparison: 1/9/2024 Findings: Cardiomediastinal silhouette is enlarged, similar prior examination. Persistent pulmonary vascular congestion. No airspace disease, pneumothorax, nor pleural effusion. No acute osseous abnormality identified.     Impression: Impression: Mild CHF/volume overload features similar to prior exam. Electronically Signed: Prosper Angeles MD  1/11/2024 10:08 AM CST  Workstation ID: [id number]    CT Head Without Contrast    Result Date: 1/11/2024  CT HEAD WO CONTRAST, CT MAXILLOFACIAL WO CONT Date of Exam: 1/11/2024 10:46 AM EST Indication: syncope head injury. Comparison: None available. Technique: Axial CT images were obtained of the head without contrast administration.  Automated exposure control and iterative construction methods were used. Findings: CT brain: There is severe atrophy. There is no acute mass effect or edema. There are no findings suspicious for acute CVA or hemorrhage.  There is no skull fracture. CT FACIAL BONES: No facial bone fractures are identified. There are no air-fluid levels in the paranasal sinuses. There is mild mucosal thickening in several bilateral ethmoid air cells and of the maxillary sinuses.     Impression: Impression: Atrophy. No acute process of the brain or facial bones. Electronically Signed: Ronda Sanon MD  1/11/2024 10:56 AM EST  Workstation ID: [id number]    CT Maxillofacial Without Contrast    Result Date: 1/11/2024  CT HEAD WO CONTRAST, CT MAXILLOFACIAL WO CONT Date of Exam: 1/11/2024 10:46 AM EST Indication: syncope head injury. Comparison: None available. Technique: Axial CT images were obtained of the head without contrast administration.  Automated exposure control and iterative construction methods were used. Findings: CT brain: There is severe atrophy. There is no acute mass effect or edema. There are no findings suspicious for acute CVA or hemorrhage. There is no skull fracture. CT FACIAL BONES: No facial bone fractures are identified. There are no air-fluid levels in the paranasal sinuses. There is mild mucosal thickening in several bilateral ethmoid air cells and of the maxillary sinuses.     Impression: Impression: Atrophy. No acute process of the brain or facial bones. Electronically Signed: Ronda Sanon MD  1/11/2024 10:56 AM EST  Workstation ID: [id number]    XR Chest 1 View    Result Date: 1/9/2024  PROCEDURE: XR CHEST 1 VW-    HISTORY: syncope  COMPARISON: December 18, 2023.  FINDINGS: The heart is mildly enlarged, but stable. The patient is status post median sternotomy. The lungs are clear. There is no pneumothorax.      Impression: No acute cardiopulmonary process.        Images were reviewed, interpreted, and dictated by Dr. Brett Hernandez MD Transcribed by Saba Beltran PA-C.  This report was signed and finalized on 1/9/2024 2:56 PM by Brett Hernandez MD.       Results for orders placed during the hospital encounter of  07/17/23    Adult Transthoracic Echo Complete W/ Cont if Necessary Per Protocol    Interpretation Summary    Left ventricular systolic function is normal. Estimated left ventricular EF = 50% Left ventricular ejection fraction appears to be 46 - 50%.    Left ventricular wall thickness is consistent with mild concentric hypertrophy.    Left ventricular diastolic function is consistent with (grade II w/high LAP) pseudonormalization.    The left atrial cavity is mildly dilated.    Left atrial volume is mildly increased.    Mild aortic valve stenosis is present.    Estimated right ventricular systolic pressure from tricuspid regurgitation is normal (<35 mmHg).      Assessment & Plan   Assessment & Plan       Syncope    Coronary artery disease involving native coronary artery of native heart with angina pectoris    Essential hypertension    Type 2 diabetes mellitus with stage 3 chronic kidney disease    Obstructive sleep apnea on CPAP    CKD (chronic kidney disease) stage 3, GFR 30-59 ml/min    Acute on chronic HFrEF (heart failure with reduced ejection fraction)        Donny Jerry is a 77 yo M that presented to [facility] ED on 1/11 secondary to a syncopal episode while walking to his cardiologist office. Patient had similar episode on 1/9 while at chiropractor while being repositioned on the table. Patient had syncopal episode while recently getting out of the car and while walking with his rollator walker. He passed out and feel forward into his walker and hit his face and chest, other than soreness no other acute injuries at this time. Patient seen by cardiology in the ED. Plans are for admission, continued telemetry monitoring and further workup for syncope at this time.      Syncope   Suspected acute on chronic CHF   Elevated troponin   Hypoglycemia   - Cardiology consulted and following at this time  - Patient with elevated troponin but no chest pain and trending down at this time   - Echo is ordered and pending,  elevated BNP from two days ago, repeat pending as well  - PT/OT consultation, orthostatic vitals q shift.   - TSH WNL   - Likely will hold patient's home carvedilol for now   - Reviewed cardiology recommendations and also decreased his home hydralazine for the AM.   - Will plan to give patient light D5W at 50 cc/hr for next 6 hours, continue to monitor, cardiac diet has been ordered and patietn hungry     Iron deficiency   Anemia   - previous baseline Hgb ~10, currently 8.8 on admission   - Iron deficiency on iron studies, plans for IV iron transfusion   - May need possible colonoscopy in near future     CAD   - As above, cardiology following   - Continue regular home medications     CKD stage III  - Baseline Cr around 1.9, currently 2.2 on admission, has been higher     USHA  - CPAP at night     Type II DM   HTN   - FSBS qachs c SSI coverage   - Home home long acting insulin, continue to monitor with above hypoglycemia, may need further adjustments to his regular home Diabetic regimen as may also be contributing to the above.       DVT prophylaxis:  Mechanical DVT prophylaxis orders are present.      CODE STATUS:    Code Status and Medical Interventions:   Ordered at: 01/11/24 1415     Medical Intervention Limits:    NO intubation (DNI)    NO cardioversion     Level Of Support Discussed With:    Patient    Next of Kin (If No Surrogate)     Code Status (Patient has no pulse and is not breathing):    No CPR (Do Not Attempt to Resuscitate)     Medical Interventions (Patient has pulse or is breathing):    Limited Support       Expected Discharge   Expected Discharge Date: 1/13/2024; Expected Discharge Time:      AR Moses, DO  01/11/24

## 2024-01-12 ENCOUNTER — APPOINTMENT (OUTPATIENT)
Dept: CARDIOLOGY | Facility: HOSPITAL | Age: 79
DRG: 312 | End: 2024-01-12
Payer: MEDICARE

## 2024-01-12 LAB
ALBUMIN SERPL-MCNC: 3.8 G/DL (ref 3.5–5.2)
ALBUMIN/GLOB SERPL: 1.7 G/DL
ALP SERPL-CCNC: 47 U/L (ref 39–117)
ALT SERPL W P-5'-P-CCNC: 24 U/L (ref 1–41)
ANION GAP SERPL CALCULATED.3IONS-SCNC: 9 MMOL/L (ref 5–15)
AST SERPL-CCNC: 18 U/L (ref 1–40)
BASOPHILS # BLD AUTO: 0.06 10*3/MM3 (ref 0–0.2)
BASOPHILS NFR BLD AUTO: 0.8 % (ref 0–1.5)
BH CV ECHO MEAS - AO MAX PG: 18.4 MMHG
BH CV ECHO MEAS - AO MEAN PG: 10.5 MMHG
BH CV ECHO MEAS - AO ROOT DIAM: 3.2 CM
BH CV ECHO MEAS - AO V2 MAX: 214 CM/SEC
BH CV ECHO MEAS - AO V2 VTI: 48.3 CM
BH CV ECHO MEAS - AVA(I,D): 1.41 CM2
BH CV ECHO MEAS - EDV(CUBED): 132.7 ML
BH CV ECHO MEAS - EDV(MOD-SP2): 166 ML
BH CV ECHO MEAS - EDV(MOD-SP4): 198 ML
BH CV ECHO MEAS - EF(MOD-BP): 45.8 %
BH CV ECHO MEAS - EF(MOD-SP2): 44.7 %
BH CV ECHO MEAS - EF(MOD-SP4): 50.1 %
BH CV ECHO MEAS - ESV(CUBED): 97.3 ML
BH CV ECHO MEAS - ESV(MOD-SP2): 91.8 ML
BH CV ECHO MEAS - ESV(MOD-SP4): 98.9 ML
BH CV ECHO MEAS - FS: 9.8 %
BH CV ECHO MEAS - IVS/LVPW: 1 CM
BH CV ECHO MEAS - IVSD: 1.2 CM
BH CV ECHO MEAS - LA DIMENSION: 4.7 CM
BH CV ECHO MEAS - LAT PEAK E' VEL: 8.7 CM/SEC
BH CV ECHO MEAS - LV MASS(C)D: 241.2 GRAMS
BH CV ECHO MEAS - LV MAX PG: 3.9 MMHG
BH CV ECHO MEAS - LV MEAN PG: 2 MMHG
BH CV ECHO MEAS - LV V1 MAX: 98.5 CM/SEC
BH CV ECHO MEAS - LV V1 VTI: 21.7 CM
BH CV ECHO MEAS - LVIDD: 5.1 CM
BH CV ECHO MEAS - LVIDS: 4.6 CM
BH CV ECHO MEAS - LVOT AREA: 3.1 CM2
BH CV ECHO MEAS - LVOT DIAM: 2 CM
BH CV ECHO MEAS - LVPWD: 1.2 CM
BH CV ECHO MEAS - MED PEAK E' VEL: 4 CM/SEC
BH CV ECHO MEAS - MV A MAX VEL: 86.2 CM/SEC
BH CV ECHO MEAS - MV DEC TIME: 0.2 SEC
BH CV ECHO MEAS - MV E MAX VEL: 123 CM/SEC
BH CV ECHO MEAS - MV E/A: 1.43
BH CV ECHO MEAS - MV MAX PG: 8.8 MMHG
BH CV ECHO MEAS - MV MEAN PG: 3 MMHG
BH CV ECHO MEAS - MV V2 VTI: 44.2 CM
BH CV ECHO MEAS - MVA(VTI): 1.54 CM2
BH CV ECHO MEAS - PA ACC TIME: 0.1 SEC
BH CV ECHO MEAS - PA V2 MAX: 156 CM/SEC
BH CV ECHO MEAS - RAP SYSTOLE: 3 MMHG
BH CV ECHO MEAS - RVSP: 24 MMHG
BH CV ECHO MEAS - SV(LVOT): 68.2 ML
BH CV ECHO MEAS - SV(MOD-SP2): 74.2 ML
BH CV ECHO MEAS - SV(MOD-SP4): 99.1 ML
BH CV ECHO MEAS - TAPSE (>1.6): 1.68 CM
BH CV ECHO MEAS - TR MAX PG: 20.8 MMHG
BH CV ECHO MEAS - TR MAX VEL: 228 CM/SEC
BH CV ECHO MEASUREMENTS AVERAGE E/E' RATIO: 19.37
BH CV VAS BP LEFT ARM: NORMAL MMHG
BH CV XLRA - TDI S': 7.4 CM/SEC
BILIRUB SERPL-MCNC: 0.3 MG/DL (ref 0–1.2)
BUN SERPL-MCNC: 66 MG/DL (ref 8–23)
BUN/CREAT SERPL: 26.4 (ref 7–25)
CALCIUM SPEC-SCNC: 8.7 MG/DL (ref 8.6–10.5)
CHLORIDE SERPL-SCNC: 105 MMOL/L (ref 98–107)
CO2 SERPL-SCNC: 28 MMOL/L (ref 22–29)
CREAT SERPL-MCNC: 2.5 MG/DL (ref 0.76–1.27)
DEPRECATED RDW RBC AUTO: 45.1 FL (ref 37–54)
EGFRCR SERPLBLD CKD-EPI 2021: 25.8 ML/MIN/1.73
EOSINOPHIL # BLD AUTO: 0.3 10*3/MM3 (ref 0–0.4)
EOSINOPHIL NFR BLD AUTO: 4 % (ref 0.3–6.2)
ERYTHROCYTE [DISTWIDTH] IN BLOOD BY AUTOMATED COUNT: 13.3 % (ref 12.3–15.4)
GLOBULIN UR ELPH-MCNC: 2.2 GM/DL
GLUCOSE BLDC GLUCOMTR-MCNC: 122 MG/DL (ref 70–130)
GLUCOSE BLDC GLUCOMTR-MCNC: 201 MG/DL (ref 70–130)
GLUCOSE BLDC GLUCOMTR-MCNC: 240 MG/DL (ref 70–130)
GLUCOSE BLDC GLUCOMTR-MCNC: 72 MG/DL (ref 70–130)
GLUCOSE BLDC GLUCOMTR-MCNC: 98 MG/DL (ref 70–130)
GLUCOSE SERPL-MCNC: 85 MG/DL (ref 65–99)
HCT VFR BLD AUTO: 26.9 % (ref 37.5–51)
HGB BLD-MCNC: 8.6 G/DL (ref 13–17.7)
IMM GRANULOCYTES # BLD AUTO: 0.03 10*3/MM3 (ref 0–0.05)
IMM GRANULOCYTES NFR BLD AUTO: 0.4 % (ref 0–0.5)
LEFT ATRIUM VOLUME INDEX: 45.7 ML/M2
LYMPHOCYTES # BLD AUTO: 1.23 10*3/MM3 (ref 0.7–3.1)
LYMPHOCYTES NFR BLD AUTO: 16.4 % (ref 19.6–45.3)
MAGNESIUM SERPL-MCNC: 3 MG/DL (ref 1.6–2.4)
MCH RBC QN AUTO: 29.3 PG (ref 26.6–33)
MCHC RBC AUTO-ENTMCNC: 32 G/DL (ref 31.5–35.7)
MCV RBC AUTO: 91.5 FL (ref 79–97)
MONOCYTES # BLD AUTO: 0.7 10*3/MM3 (ref 0.1–0.9)
MONOCYTES NFR BLD AUTO: 9.3 % (ref 5–12)
NEUTROPHILS NFR BLD AUTO: 5.17 10*3/MM3 (ref 1.7–7)
NEUTROPHILS NFR BLD AUTO: 69.1 % (ref 42.7–76)
NRBC BLD AUTO-RTO: 0 /100 WBC (ref 0–0.2)
PLATELET # BLD AUTO: 263 10*3/MM3 (ref 140–450)
PMV BLD AUTO: 11.1 FL (ref 6–12)
POTASSIUM SERPL-SCNC: 4.6 MMOL/L (ref 3.5–5.2)
PROT SERPL-MCNC: 6 G/DL (ref 6–8.5)
QT INTERVAL: 430 MS
QT INTERVAL: 454 MS
QTC INTERVAL: 486 MS
QTC INTERVAL: 511 MS
RBC # BLD AUTO: 2.94 10*6/MM3 (ref 4.14–5.8)
SODIUM SERPL-SCNC: 142 MMOL/L (ref 136–145)
WBC NRBC COR # BLD AUTO: 7.49 10*3/MM3 (ref 3.4–10.8)

## 2024-01-12 PROCEDURE — 82948 REAGENT STRIP/BLOOD GLUCOSE: CPT

## 2024-01-12 PROCEDURE — 99232 SBSQ HOSP IP/OBS MODERATE 35: CPT | Performed by: STUDENT IN AN ORGANIZED HEALTH CARE EDUCATION/TRAINING PROGRAM

## 2024-01-12 PROCEDURE — 25010000002 NA FERRIC GLUC CPLX PER 12.5 MG: Performed by: FAMILY MEDICINE

## 2024-01-12 PROCEDURE — 83735 ASSAY OF MAGNESIUM: CPT | Performed by: FAMILY MEDICINE

## 2024-01-12 PROCEDURE — 80053 COMPREHEN METABOLIC PANEL: CPT | Performed by: FAMILY MEDICINE

## 2024-01-12 PROCEDURE — 97116 GAIT TRAINING THERAPY: CPT

## 2024-01-12 PROCEDURE — 63710000001 INSULIN LISPRO (HUMAN) PER 5 UNITS: Performed by: FAMILY MEDICINE

## 2024-01-12 PROCEDURE — 25010000002 EPOETIN ALFA-EPBX 10000 UNIT/ML SOLUTION: Performed by: INTERNAL MEDICINE

## 2024-01-12 PROCEDURE — 93306 TTE W/DOPPLER COMPLETE: CPT

## 2024-01-12 PROCEDURE — 97165 OT EVAL LOW COMPLEX 30 MIN: CPT

## 2024-01-12 PROCEDURE — 63710000001 INSULIN DETEMIR PER 5 UNITS: Performed by: STUDENT IN AN ORGANIZED HEALTH CARE EDUCATION/TRAINING PROGRAM

## 2024-01-12 PROCEDURE — 99232 SBSQ HOSP IP/OBS MODERATE 35: CPT

## 2024-01-12 PROCEDURE — 85025 COMPLETE CBC W/AUTO DIFF WBC: CPT | Performed by: FAMILY MEDICINE

## 2024-01-12 PROCEDURE — 93306 TTE W/DOPPLER COMPLETE: CPT | Performed by: INTERNAL MEDICINE

## 2024-01-12 PROCEDURE — 97161 PT EVAL LOW COMPLEX 20 MIN: CPT

## 2024-01-12 RX ORDER — TAMSULOSIN HYDROCHLORIDE 0.4 MG/1
0.4 CAPSULE ORAL DAILY
Status: DISCONTINUED | OUTPATIENT
Start: 2024-01-13 | End: 2024-01-14 | Stop reason: HOSPADM

## 2024-01-12 RX ADMIN — PRASUGREL 10 MG: 10 TABLET, FILM COATED ORAL at 08:58

## 2024-01-12 RX ADMIN — INSULIN LISPRO 4 UNITS: 100 INJECTION, SOLUTION INTRAVENOUS; SUBCUTANEOUS at 17:39

## 2024-01-12 RX ADMIN — FLUTICASONE PROPIONATE 1 SPRAY: 50 SPRAY, METERED NASAL at 08:41

## 2024-01-12 RX ADMIN — Medication 10 ML: at 20:52

## 2024-01-12 RX ADMIN — RANOLAZINE 500 MG: 500 TABLET, EXTENDED RELEASE ORAL at 08:40

## 2024-01-12 RX ADMIN — TAMSULOSIN HYDROCHLORIDE 0.4 MG: 0.4 CAPSULE ORAL at 08:58

## 2024-01-12 RX ADMIN — SERTRALINE HYDROCHLORIDE 100 MG: 100 TABLET ORAL at 08:58

## 2024-01-12 RX ADMIN — MAGNESIUM OXIDE TAB 400 MG (241.3 MG ELEMENTAL MG) 400 MG: 400 (241.3 MG) TAB at 08:58

## 2024-01-12 RX ADMIN — CARVEDILOL 6.25 MG: 6.25 TABLET, FILM COATED ORAL at 20:52

## 2024-01-12 RX ADMIN — PRAMIPEXOLE DIHYDROCHLORIDE 1 MG: 0.25 TABLET ORAL at 20:52

## 2024-01-12 RX ADMIN — EMPAGLIFLOZIN 10 MG: 10 TABLET, FILM COATED ORAL at 10:36

## 2024-01-12 RX ADMIN — CETIRIZINE HYDROCHLORIDE 10 MG: 10 TABLET, FILM COATED ORAL at 08:40

## 2024-01-12 RX ADMIN — SODIUM CHLORIDE 125 MG: 9 INJECTION, SOLUTION INTRAVENOUS at 08:49

## 2024-01-12 RX ADMIN — INSULIN LISPRO 4 UNITS: 100 INJECTION, SOLUTION INTRAVENOUS; SUBCUTANEOUS at 20:51

## 2024-01-12 RX ADMIN — Medication 10 ML: at 08:49

## 2024-01-12 RX ADMIN — INSULIN DETEMIR 5 UNITS: 100 INJECTION, SOLUTION SUBCUTANEOUS at 20:51

## 2024-01-12 RX ADMIN — FINASTERIDE 5 MG: 5 TABLET, FILM COATED ORAL at 08:41

## 2024-01-12 RX ADMIN — CARVEDILOL 6.25 MG: 6.25 TABLET, FILM COATED ORAL at 08:37

## 2024-01-12 RX ADMIN — RANOLAZINE 500 MG: 500 TABLET, EXTENDED RELEASE ORAL at 20:52

## 2024-01-12 RX ADMIN — PANTOPRAZOLE SODIUM 40 MG: 40 TABLET, DELAYED RELEASE ORAL at 06:58

## 2024-01-12 RX ADMIN — EPOETIN ALFA-EPBX 10000 UNITS: 10000 INJECTION, SOLUTION INTRAVENOUS; SUBCUTANEOUS at 23:14

## 2024-01-12 RX ADMIN — ATORVASTATIN CALCIUM 40 MG: 40 TABLET, FILM COATED ORAL at 08:37

## 2024-01-12 NOTE — PLAN OF CARE
Goal Outcome Evaluation:  Plan of Care Reviewed With: patient           Outcome Evaluation: PT eval completed. Patient alert and oriented x3. Presenting deficits in general BLE strength, standing dynamic balance, and functional endurance effecting functional mobility below baseline. Of note, BP with measureable decrease following activity without sx. Patient will benefit from skilled IP PT services to address impairments in order to return to PLOF. Recommend home with assist and HHPT at dc.      Anticipated Discharge Disposition (PT): home with assist, home with home health

## 2024-01-12 NOTE — CONSULTS
Patient Care Team:  Anum Alonso APRN as PCP - General  Rubin Bernal MD as Consulting Physician (Urology)  Liz Russo MD as Consulting Physician (Cardiology)    Chief complaint: CKD stage III  Anemia of chronic disease  Syncope    History of Present Illness: Donny Jerry is a 78 y.o. male that was actually going for a cardiology follow up appointment today when he had a syncopal episode while walking towards cardiology office. Per the wife patient has been having recurrent syncopal episode. Nephrology has been consulted for evaluation of CKD and ACD. Patient normally follows with Dr Destin Wilkes in [facility]. He has known hx of CKD stage III presumed due to nephrosclerosis +/- diabetic nephropathy. Patient is currently on RA. Does have trace LE edema. Ofelia any SOB or CP. On admission patient was found to have hb 8.2-8.8. Ofelia any BRBPR.Cr btw 2.0-2.5mg/dl.     Review of Systems   Constitutional: Negative.    HENT: Negative.     Respiratory:  Positive for shortness of breath.    Cardiovascular:  Positive for leg swelling.   Gastrointestinal: Negative.    Genitourinary: Negative.    Musculoskeletal: Negative.    Skin: Negative.    Neurological:  Positive for syncope.   Hematological: Negative.    Psychiatric/Behavioral: Negative.          Past Medical History:   Diagnosis Date    Benign hypertension     Coronary artery disease     Depression     Enlarged prostate     Enlarged prostate with anticipated TURP with Dr. Bernal.    GERD (gastroesophageal reflux disease)     Heart attack     Hypercholesterolemia     Myocardial infarction     Obesity     Obstructive sleep apnea on CPAP     Type 2 diabetes mellitus    ,   Past Surgical History:   Procedure Laterality Date    CARDIAC CATHETERIZATION      CARDIAC CATHETERIZATION Left 10/19/2021    Procedure: Left Heart Cath;  Surgeon: Liz Russo MD;  Location: [facility] CATH INVASIVE LOCATION;  Service: Cardiology;  Laterality:  Left;    CARDIAC CATHETERIZATION N/A 7/18/2023    Procedure: Coronary angiography;  Surgeon: Rupesh Parr MD;  Location:  [facility] CATH INVASIVE LOCATION;  Service: Cardiology;  Laterality: N/A;    CARDIAC CATHETERIZATION N/A 7/18/2023    Procedure: Percutaneous Coronary Intervention;  Surgeon: Rupesh Parr MD;  Location:  [facility] CATH INVASIVE LOCATION;  Service: Cardiology;  Laterality: N/A;    COLONOSCOPY W/ POLYPECTOMY      CORONARY ANGIOPLASTY WITH STENT PLACEMENT Left 06/03/2009    Left heart catheterization, 06/03/2009, Dr. Russo:  LVEF 45% to 50%.  Placement of overlapping Cypher drug-eluting stent 2.5 x 28 and 2.5 x 18 to the SVG to the obtuse marginal branch.  SVG to the PDA had a proximal stenosis estimated at 60% with a distal stenosis of 50% to 60%.    CORONARY ANGIOPLASTY WITH STENT PLACEMENT Left 07/06/2009    Left heart catheterization, 07/06/2009:  PTCA and stent placement in the mid PDA using a 2.25 x 12 mm Taxus drug-eluting stent with stent placement in the distal SVG to the PDA using a 2.5 x 18 mm Cypher drug-eluting stent and stenting of the proximal SVG to the PDA using a 3.0 x 28 mm Cypher drug-eluting stent.    CORONARY ANGIOPLASTY WITH STENT PLACEMENT  04/23/2010    Cardiac catheterization for recurrent anginal symptoms, 04/23/2010, with PTCA and stent placement in the proximal SVG to the PDA using 3.0 x 28 mm Promus drug-eluting stent for in-stent restenosis.    CORONARY ANGIOPLASTY WITH STENT PLACEMENT Left 07/06/2010    Left heart catheterization, 07/06/2010, Dr. Russo:  EF 40% to 45%.  3.5 x 23 mm Promus drug-eluting stent in the proximal SVG to OM, 2.5 x 18 mm Promus drug-eluting stent to distal SVG to PDA due to in-stent restenosis.      CORONARY ANGIOPLASTY WITH STENT PLACEMENT Left 11/16/2010    Left heart catheterization, 11/16/2010, with placement of a 3.0 x 16 mm Taxus drug-eluting stent to the SVG to the RCA proximally and a 2.5 x 16 mm paclitaxel stent  distally in the SVG to the RCA.    CORONARY ANGIOPLASTY WITH STENT PLACEMENT Left     Left heart catheterization, 3.0 x 24-mm Promus element drug-eluting stent to a 90% lesion in the mid portion of the SVG to the PDA.     CORONARY ANGIOPLASTY WITH STENT PLACEMENT Left 06/24/2014    Left heart catheterization for recurrent angina, 06/24/2014, Dr. Russo:  PTCA of the SVG to the PDA using a 3 x 12 mm NC TREK balloon.      CORONARY ARTERY BYPASS GRAFT      CABG x3, Dr. Peng, [facility], 2001.   ,   Family History   Problem Relation Age of Onset    Heart attack Mother     Ulcers Father    ,   Social History     Socioeconomic History    Marital status:    Tobacco Use    Smoking status: Never    Smokeless tobacco: Never   Vaping Use    Vaping Use: Never used   Substance and Sexual Activity    Alcohol use: No    Drug use: No    Sexual activity: Defer     E-cigarette/Vaping    E-cigarette/Vaping Use Never User     Passive Exposure No     Counseling Given No      E-cigarette/Vaping Substances    Nicotine No     THC No     CBD No     Flavoring No      E-cigarette/Vaping Devices    Disposable No     Pre-filled or Refillable Cartridge No     Refillable Tank No     Pre-filled Pod No          ,   Medications Prior to Admission   Medication Sig Dispense Refill Last Dose    aspirin 81 MG chewable tablet Chew 1 tablet Daily.   1/11/2024    atorvastatin (LIPITOR) 40 MG tablet Take 1 tablet by mouth Daily.   1/10/2024    carvedilol (COREG) 12.5 MG tablet Take 1 tablet by mouth 2 (Two) Times a Day With Meals.   1/11/2024    Cholecalciferol (VITAMIN D3) 50 MCG (2000 UT) capsule Take 2 capsules by mouth Daily.   1/11/2024    dapagliflozin Propanediol (Farxiga) 10 MG tablet Take 10 mg by mouth Daily.   1/11/2024    doxazosin (CARDURA) 8 MG tablet Take 1 tablet by mouth Every 12 (Twelve) Hours. 60 tablet 11 1/11/2024    fexofenadine (ALLEGRA) 180 MG tablet Take 1 tablet by mouth Daily.   1/11/2024    finasteride  (PROSCAR) 5 MG tablet Take 1 tablet by mouth Daily.   1/10/2024    fluticasone (FLONASE) 50 MCG/ACT nasal spray 1 spray into the nostril(s) as directed by provider Daily. 48 g 2 1/11/2024    hydrALAZINE (APRESOLINE) 100 MG tablet Take 1 tablet by mouth 3 (Three) Times a Day. 90 tablet 11 1/11/2024    iron polysaccharides (NIFEREX) 150 MG capsule Take 1 capsule by mouth Daily for 30 days. 30 capsule 0 1/11/2024    Multiple Vitamin (MULTI VITAMIN DAILY PO) Take 1 tablet by mouth Daily.   1/11/2024    pantoprazole (PROTONIX) 40 MG EC tablet TAKE 1 TABLET BY MOUTH 2 TIMES DAILY (BEFORE MEALS)   1/11/2024    prasugrel (EFFIENT) 10 MG tablet Take 1 tablet by mouth Daily.   1/11/2024    ranolazine (RANEXA) 500 MG 12 hr tablet Take 1 tablet by mouth Every 12 (Twelve) Hours for 180 days. 60 tablet 5 1/11/2024    sertraline (ZOLOFT) 50 MG tablet Take 2 tablets by mouth Daily.   1/11/2024    tamsulosin (FLOMAX) 0.4 MG capsule 24 hr capsule Take 1 capsule by mouth 2 (Two) Times a Day.   1/11/2024    albuterol sulfate  (90 Base) MCG/ACT inhaler Inhale 2 puffs Every 4 (Four) Hours As Needed for Wheezing. 18 g 5     B-D UF III MINI PEN NEEDLES 31G X 5 MM misc        Fluticasone-Umeclidin-Vilant (Trelegy Ellipta) 200-62.5-25 MCG/ACT aerosol powder  Inhale 1 puff Daily. Rinse mouth with water after use. 90 each 2     furosemide (LASIX) 20 MG tablet Take 1 tablet by mouth 2 (Two) Times a Day. Sun Mon Wed FRI SAT       furosemide (LASIX) 40 MG tablet Take 1 tablet by mouth Daily. (Patient taking differently: Take 1 tablet by mouth 1 (One) Time Per Week. Tues Thurs) 30 tablet 0     magnesium oxide (MAG-OX) 400 MG tablet Take 1 tablet by mouth Daily.       nitroglycerin (NITROSTAT) 0.4 MG SL tablet Place 1 tablet under the tongue Every 5 (Five) Minutes As Needed for Chest Pain. Take no more than 3 doses in 15 minutes. 25 tablet 11     pramipexole (MIRAPEX) 1 MG tablet TAKE 1 TABLET EVERY NIGHT 90 tablet 2     Tresiba FlexTouch  200 UNIT/ML solution pen-injector pen injection Inject  under the skin into the appropriate area as directed. 30 units every morning and 74 units in the evening       TRUEplus Lancets 28G misc        Trulicity 0.75 MG/0.5ML solution pen-injector Inject 0.75 mg as directed 1 (One) Time Per Week.      , Scheduled Meds:  aspirin, 81 mg, Oral, Daily  atorvastatin, 40 mg, Oral, Daily  carvedilol, 6.25 mg, Oral, Q12H  cetirizine, 10 mg, Oral, Daily  empagliflozin, 10 mg, Oral, Daily  finasteride, 5 mg, Oral, Daily  fluticasone, 1 spray, Nasal, Daily  hydrALAZINE, 50 mg, Oral, Q8H  insulin lispro, 2-9 Units, Subcutaneous, 4x Daily AC & at Bedtime  magnesium oxide, 400 mg, Oral, Daily  pantoprazole, 40 mg, Oral, Q AM  pramipexole, 1 mg, Oral, Nightly  prasugrel, 10 mg, Oral, Daily  ranolazine, 500 mg, Oral, Q12H  senna-docusate sodium, 2 tablet, Oral, BID  sertraline, 100 mg, Oral, Daily  sodium chloride, 10 mL, Intravenous, Q12H  tamsulosin, 0.4 mg, Oral, Daily   , and Continuous Infusions:       Objective     Vital Signs  Temp:  [97.5 °F (36.4 °C)-98.5 °F (36.9 °C)] 98.4 °F (36.9 °C)  Heart Rate:  [60-77] 64  Resp:  [18-20] 18  BP: (114-155)/() 124/58    I/O this shift:  In: 840 [P.O.:840]  Out: -   I/O last 3 completed shifts:  In: 654.2 [P.O.:240; I.V.:414.2]  Out: -     Physical Exam  Constitutional:       Appearance: Normal appearance. He is not ill-appearing.   HENT:      Head: Normocephalic.      Nose: Nose normal. No congestion or rhinorrhea.   Eyes:      Pupils: Pupils are equal, round, and reactive to light.   Cardiovascular:      Rate and Rhythm: Normal rate and regular rhythm.      Pulses: Normal pulses.      Heart sounds: Normal heart sounds. No murmur heard.     No friction rub.   Pulmonary:      Effort: Pulmonary effort is normal. No respiratory distress.      Breath sounds: Normal breath sounds. No stridor.   Abdominal:      General: Abdomen is flat.   Musculoskeletal:      Cervical back: Normal range  "of motion.      Right lower leg: Edema present.      Left lower leg: Edema present.   Skin:     General: Skin is dry.   Neurological:      General: No focal deficit present.      Mental Status: He is alert and oriented to person, place, and time. Mental status is at baseline.         Results Review:    I reviewed the patient's new clinical results.    WBC WBC   Date Value Ref Range Status   01/12/2024 7.49 3.40 - 10.80 10*3/mm3 Final   01/11/2024 6.81 3.40 - 10.80 10*3/mm3 Final      HGB Hemoglobin   Date Value Ref Range Status   01/12/2024 8.6 (L) 13.0 - 17.7 g/dL Final   01/11/2024 8.8 (L) 13.0 - 17.7 g/dL Final      HCT Hematocrit   Date Value Ref Range Status   01/12/2024 26.9 (L) 37.5 - 51.0 % Final   01/11/2024 27.4 (L) 37.5 - 51.0 % Final      Platlets No results found for: \"LABPLAT\"   MCV MCV   Date Value Ref Range Status   01/12/2024 91.5 79.0 - 97.0 fL Final   01/11/2024 92.6 79.0 - 97.0 fL Final          Sodium Sodium   Date Value Ref Range Status   01/12/2024 142 136 - 145 mmol/L Final   01/11/2024 142 136 - 145 mmol/L Final      Potassium Potassium   Date Value Ref Range Status   01/12/2024 4.6 3.5 - 5.2 mmol/L Final   01/11/2024 4.5 3.5 - 5.2 mmol/L Final     Comment:     Slight hemolysis detected by analyzer. Result may be falsely elevated.      Chloride Chloride   Date Value Ref Range Status   01/12/2024 105 98 - 107 mmol/L Final   01/11/2024 106 98 - 107 mmol/L Final      CO2 CO2   Date Value Ref Range Status   01/12/2024 28.0 22.0 - 29.0 mmol/L Final   01/11/2024 25.0 22.0 - 29.0 mmol/L Final      BUN BUN   Date Value Ref Range Status   01/12/2024 66 (H) 8 - 23 mg/dL Final   01/11/2024 62 (H) 8 - 23 mg/dL Final      Creatinine Creatinine   Date Value Ref Range Status   01/12/2024 2.50 (H) 0.76 - 1.27 mg/dL Final   01/11/2024 2.20 (H) 0.76 - 1.27 mg/dL Final      Calcium Calcium   Date Value Ref Range Status   01/12/2024 8.7 8.6 - 10.5 mg/dL Final   01/11/2024 8.7 8.6 - 10.5 mg/dL Final      PO4 No " "results found for: \"CAPO4\"   Albumin Albumin   Date Value Ref Range Status   01/12/2024 3.8 3.5 - 5.2 g/dL Final   01/11/2024 3.6 3.5 - 5.2 g/dL Final      Magnesium Magnesium   Date Value Ref Range Status   01/12/2024 3.0 (H) 1.6 - 2.4 mg/dL Final   01/11/2024 2.8 (H) 1.6 - 2.4 mg/dL Final      Uric Acid No results found for: \"URICACID\"         Assessment & Plan       Syncope    Coronary artery disease involving native coronary artery of native heart with angina pectoris    Essential hypertension    Type 2 diabetes mellitus with stage 3 chronic kidney disease    Obstructive sleep apnea on CPAP    CKD (chronic kidney disease) stage 3, GFR 30-59 ml/min    Acute on chronic HFrEF (heart failure with reduced ejection fraction)      Assessment & Plan    CKD stage IIIB:  Follows with Dr Helm in [facility]. Cr 2.0-2.5mg/dl on this admission close to patient's baseline. 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    ACD:: Anemia of chronic disease due to CKD. Screen for monoclonal gammopathy. Fe sat 12% ferritin >200. S/p IV iron infusion on this admission.     Volume status: Hx of CHF EF 46-50%. Diastolic HF grade II. Trace LE edmea noted.     Syncope: Recurrent issues. Presenting issue. Per the wife he was orthostatic in ER.     Hx of Type 2 DM: On SGLT-2inh as outpatient.       Recs  Renal function close to baseline. Presenting with recurrent syncope. Would recommend holding sglt-2inh at present. Other antiHTN meds adjusted per cardiology. ACD could be due to CKD. Will start procrit on this visit. He will need outpatient procrit 1-2 x monthly with Dr Wilkes. Check SPEP and free light chain ratio. Monitor renal function. No indication of dialysis.     I discussed the patients findings and my recommendations with patient    Jonathon Walker MD  01/12/24  16:17 EST            "

## 2024-01-12 NOTE — PROGRESS NOTES
[facility] Medicine Services  PROGRESS NOTE    Patient Name: Donny Jerry  : 1945  MRN: [id number]    Date of Admission: 2024  Primary Care Physician: Anum Alonso APRN    Subjective   Subjective     CC:  Syncope    HPI:  Evaluated patient this morning. Patient felt well today. Patient is not the best historian, wife and daughter present at bedside. Wife reports that patient was dizzy yesterday when getting out of his car to go to his doctor's appointment. He walked through the front door of the building and subsequently had a syncopal episode.      Objective   Objective     Vital Signs:   Temp:  [97.5 °F (36.4 °C)-98.5 °F (36.9 °C)] 98.4 °F (36.9 °C)  Heart Rate:  [60-77] 64  Resp:  [18-20] 18  BP: (114-155)/() 124/58     Physical Exam:  Constitutional: Awake, alert, resting comfortably  HENT: NCAT, mucous membranes moist  Respiratory: Clear to auscultation bilaterally, respiratory effort normal   Cardiovascular: RRR, no murmurs, rubs, or gallops  Gastrointestinal: Positive bowel sounds, soft, nontender, nondistended  Musculoskeletal: +1-2 pitting edema in bilateral lower extremities  Neurologic: Alert and oriented x 3, no focal deficits, speech clear  Skin: No rashes      Results Reviewed:  LAB RESULTS:      Lab 24  0458 24  1026 24  1247   WBC 7.49 6.81 7.57   HEMOGLOBIN 8.6* 8.8* 8.8*   HEMATOCRIT 26.9* 27.4* 26.5*   PLATELETS 263 249 241   NEUTROS ABS 5.17 5.12 5.95   IMMATURE GRANS (ABS) 0.03 0.03 0.02   LYMPHS ABS 1.23 0.83 0.88   MONOS ABS 0.70 0.56 0.55   EOS ABS 0.30 0.21 0.13   MCV 91.5 92.6 89.5         Lab 24  0458 24  1026 24  1247   SODIUM 142 142 138   POTASSIUM 4.6 4.5 4.5   CHLORIDE 105 106 102   CO2 28.0 25.0 24.8   ANION GAP 9.0 11.0 11.2   BUN 66* 62* 54*   CREATININE 2.50* 2.20* 2.05*   EGFR 25.8* 30.1* 32.8*   GLUCOSE 85 56* 179*   CALCIUM 8.7 8.7 8.3*   MAGNESIUM 3.0* 2.8* 2.6*   TSH  --  2.510  --           Lab 01/12/24  0458 01/11/24  1026 01/09/24  1247   TOTAL PROTEIN 6.0 6.4 7.0   ALBUMIN 3.8 3.6 4.0   GLOBULIN 2.2 2.8 3.0   ALT (SGPT) 24 27 26   AST (SGOT) 18 24 21   BILIRUBIN 0.3 0.4 0.3   ALK PHOS 47 45 49         Lab 01/11/24  1242 01/11/24  1026 01/09/24  1711 01/09/24  1434 01/09/24  1247   PROBNP 5,539.0*  --   --   --  4,683.0*   HSTROP T 172* 178* 207* 202* 198*             Lab 01/11/24  1627 01/11/24  1026   IRON 28* 36*   IRON SATURATION (TSAT)  --  12*   TIBC  --  291*   TRANSFERRIN  --  195*   FERRITIN  --  356.20         Brief Urine Lab Results  (Last result in the past 365 days)        Color   Clarity   Blood   Leuk Est   Nitrite   Protein   CREAT   Urine HCG        12/19/23 1142             26.3                 Microbiology Results Abnormal       None            Adult Transthoracic Echo Complete w/ Color, Spectral and Contrast if necessary per protocol    Result Date: 1/12/2024    Left ventricular systolic function is low normal. Calculated left ventricular EF = 45.8% Left ventricular ejection fraction appears to be 46 - 50%.   Left ventricular wall thickness is consistent with mild concentric hypertrophy.   Left ventricular diastolic function is consistent with (grade II w/high LAP) pseudonormalization.   Left atrial volume is moderately increased.   Mild aortic valve stenosis is present.   Estimated right ventricular systolic pressure from tricuspid regurgitation is normal (<35 mmHg). No significant change compared to 7/2023 echo     XR Chest 1 View    Result Date: 1/11/2024  XR CHEST 1 VW Date of Exam: 1/11/2024 9:40 AM CST Indication: syncope Comparison: 1/9/2024 Findings: Cardiomediastinal silhouette is enlarged, similar prior examination. Persistent pulmonary vascular congestion. No airspace disease, pneumothorax, nor pleural effusion. No acute osseous abnormality identified.     Impression: Impression: Mild CHF/volume overload features similar to prior exam. Electronically Signed:  Prosper Angeles MD  1/11/2024 10:08 AM [facility]  Workstation ID: [id number]    CT Head Without Contrast    Result Date: 1/11/2024  CT HEAD WO CONTRAST, CT MAXILLOFACIAL WO CONT Date of Exam: 1/11/2024 10:46 AM EST Indication: syncope head injury. Comparison: None available. Technique: Axial CT images were obtained of the head without contrast administration.  Automated exposure control and iterative construction methods were used. Findings: CT brain: There is severe atrophy. There is no acute mass effect or edema. There are no findings suspicious for acute CVA or hemorrhage. There is no skull fracture. CT FACIAL BONES: No facial bone fractures are identified. There are no air-fluid levels in the paranasal sinuses. There is mild mucosal thickening in several bilateral ethmoid air cells and of the maxillary sinuses.     Impression: Impression: Atrophy. No acute process of the brain or facial bones. Electronically Signed: Ronda Sanon MD  1/11/2024 10:56 AM EST  Workstation ID: [id number]    CT Maxillofacial Without Contrast    Result Date: 1/11/2024  CT HEAD WO CONTRAST, CT MAXILLOFACIAL WO CONT Date of Exam: 1/11/2024 10:46 AM EST Indication: syncope head injury. Comparison: None available. Technique: Axial CT images were obtained of the head without contrast administration.  Automated exposure control and iterative construction methods were used. Findings: CT brain: There is severe atrophy. There is no acute mass effect or edema. There are no findings suspicious for acute CVA or hemorrhage. There is no skull fracture. CT FACIAL BONES: No facial bone fractures are identified. There are no air-fluid levels in the paranasal sinuses. There is mild mucosal thickening in several bilateral ethmoid air cells and of the maxillary sinuses.     Impression: Impression: Atrophy. No acute process of the brain or facial bones. Electronically Signed: Ronda Sanon MD  1/11/2024 10:56 AM EST  Workstation ID: [id number]     Results for  orders placed during the hospital encounter of 01/11/24    Adult Transthoracic Echo Complete w/ Color, Spectral and Contrast if necessary per protocol    Interpretation Summary    Left ventricular systolic function is low normal. Calculated left ventricular EF = 45.8% Left ventricular ejection fraction appears to be 46 - 50%.    Left ventricular wall thickness is consistent with mild concentric hypertrophy.    Left ventricular diastolic function is consistent with (grade II w/high LAP) pseudonormalization.    Left atrial volume is moderately increased.    Mild aortic valve stenosis is present.    Estimated right ventricular systolic pressure from tricuspid regurgitation is normal (<35 mmHg).    No significant change compared to 7/2023 echo      Current medications:  Scheduled Meds:aspirin, 81 mg, Oral, Daily  atorvastatin, 40 mg, Oral, Daily  carvedilol, 6.25 mg, Oral, Q12H  cetirizine, 10 mg, Oral, Daily  epoetin ese/ese-epbx, 10,000 Units, Subcutaneous, Once  finasteride, 5 mg, Oral, Daily  fluticasone, 1 spray, Nasal, Daily  hydrALAZINE, 50 mg, Oral, Q8H  insulin lispro, 2-9 Units, Subcutaneous, 4x Daily AC & at Bedtime  magnesium oxide, 400 mg, Oral, Daily  pantoprazole, 40 mg, Oral, Q AM  pramipexole, 1 mg, Oral, Nightly  prasugrel, 10 mg, Oral, Daily  ranolazine, 500 mg, Oral, Q12H  senna-docusate sodium, 2 tablet, Oral, BID  sertraline, 100 mg, Oral, Daily  sodium chloride, 10 mL, Intravenous, Q12H  [START ON 1/13/2024] tamsulosin, 0.4 mg, Oral, Daily      Continuous Infusions:   PRN Meds:.  acetaminophen    senna-docusate sodium **AND** polyethylene glycol **AND** bisacodyl **AND** bisacodyl    Calcium Replacement - Follow Nurse / BPA Driven Protocol    dextrose    dextrose    glucagon (human recombinant)    Magnesium Cardiology Dose Replacement - Follow Nurse / BPA Driven Protocol    ondansetron    Phosphorus Replacement - Follow Nurse / BPA Driven Protocol    Potassium Replacement - Follow Nurse / BPA  Driven Protocol    sodium chloride    sodium chloride    sodium chloride    Assessment & Plan   Assessment & Plan     Active Hospital Problems    Diagnosis  POA    **Syncope [R55]  Yes    Acute on chronic HFrEF (heart failure with reduced ejection fraction) [I50.23]  Yes    CKD (chronic kidney disease) stage 3, GFR 30-59 ml/min [N18.30]  Yes    Coronary artery disease involving native coronary artery of native heart with angina pectoris [I25.119]  Yes    Essential hypertension [I10]  Yes    Obstructive sleep apnea on CPAP [G47.33]  Yes    Type 2 diabetes mellitus with stage 3 chronic kidney disease [E11.22, N18.30]  Yes      Resolved Hospital Problems   No resolved problems to display.        Brief Hospital Course to date:  Donny Jerry is a 78 y.o. male with PMHx of CAD s/p CABG, CKDIII, HTN, USHA, DM2, chronic HFrEF who presented to [facility] [place] ED on 1/11 secondary to a syncopal episode while walking to his cardiologist's office. Patient had similar episode on 1/9 while at chiropractor while being repositioned on the table. Admitted for syncope work-up.     Recurrent syncope   -Unclear etiology but suspect will component of orthostasis, intravascular depletion, of note received IV Lasix earlier this week while in the ED after syncopal episode.  -orthostatic vital signs positive on admission, negative on 1/12 however SBP decreased to 90s after walking with physical therapy.  -Echo with EF 46-50%, grade 2 diastolic dysfunction, no significant change compared to prior echo  -s/p gentle hydration on admission  -Cardiology evaluated, recommended to continue carvedilol and hydralazine. Stopped doxazosin. Recommended 14-day cardiac event monitor on discharge to quantify PVC burden.    Acute on chronic HFmrEF  -proBNP 5K on admission, CXR with persistent pulmonary congestion  -Evidence of extravascular volume overload, however suspect intravascularly depleted  -Hold off on further diuresis for now given patient on room air.  Holding SGLT2i given syncope.     Elevated troponin   -suspect demand ischemia in setting of CKD  -troponin 178->172, no chest pain  -EKG with NSR and frequent PVCs    Type 2 DM with Hypoglycemia   -BG 56 on admission, A1c 5.8% on admission (likely low in setting of anemia)  -Per wife, takes Tresiba 30u AM and 74u PM which is way too much for him.   -Levemir 5u nightly with moderate SSI for now. Monitor closely.     CKD stage III  -Baseline Cr around 1.9, Cr 2.2 on admission  -Follows with Dr Wilkes   -Nephrology following here.    Iron deficiency   Anemia   -previous baseline Hgb ~10, 8.8 on admission   -Iron deficiency on iron studies, initiated IV iron x3 doses.  -Nephrology evaluated, recommended to start Procrit this admission. Will need outpatient Procrit 1-2 times per month with outpatient nephrologist. Checking SPEP and free light chain ratio.     CAD s/p CABG  -Continue ASA, prasugrel, atorvastatin, carvedilol, ranolazine.    USHA  -CPAP at night     RLS  -Continue pramipexole.      BPH  -Continue tamsulosin.     GERD  -Continue PPI.    Anxiety/depression  -Continue home Zoloft.    Expected Discharge Location and Transportation: Home  Expected Discharge   Expected Discharge Date: 1/13/2024; Expected Discharge Time:      DVT prophylaxis:  Mechanical DVT prophylaxis orders are present.     AM-PAC 6 Clicks Score (PT): 18 (01/12/24 1008)    CODE STATUS:   Code Status and Medical Interventions:   Ordered at: 01/11/24 1415     Medical Intervention Limits:    NO intubation (DNI)    NO cardioversion     Level Of Support Discussed With:    Patient    Next of Kin (If No Surrogate)     Code Status (Patient has no pulse and is not breathing):    No CPR (Do Not Attempt to Resuscitate)     Medical Interventions (Patient has pulse or is breathing):    Limited Support       Vicky Pierre, DO  01/12/24

## 2024-01-12 NOTE — THERAPY EVALUATION
Patient Name: Donny Jerry  : 1945    MRN: [id number]                              Today's Date: 2024       Admit Date: 2024    Visit Dx:     ICD-10-CM ICD-9-CM   1. Syncope, unspecified syncope type  R55 780.2   2. Injury of head, initial encounter  S09.90XA 959.01   3. Renal insufficiency  N28.9 593.9   4. Elevated troponin  R79.89 790.6     Patient Active Problem List   Diagnosis    Coronary artery disease involving native coronary artery of native heart with angina pectoris    Essential hypertension    Hyperlipidemia LDL goal <70    Type 2 diabetes mellitus with stage 3 chronic kidney disease    Obstructive sleep apnea on CPAP    Enlarged prostate    Obesity, Class II, BMI 35-39.9    Depression    Acute hypoxemic respiratory failure due to COVID-19    CKD (chronic kidney disease) stage 3, GFR 30-59 ml/min    Elevated troponin level not due myocardial infarction    Post viral debility    Chest pain, unspecified type    Chronic systolic heart failure    Type 1 myocardial infarction    Chronic diastolic heart failure    Bilateral lower extremity edema    Acute exacerbation of congestive heart failure    Acute on chronic HFrEF (heart failure with reduced ejection fraction)    Acute on chronic systolic heart failure    Syncope     Past Medical History:   Diagnosis Date    Benign hypertension     Coronary artery disease     Depression     Enlarged prostate     Enlarged prostate with anticipated TURP with Dr. Bernal.    GERD (gastroesophageal reflux disease)     Heart attack     Hypercholesterolemia     Myocardial infarction     Obesity     Obstructive sleep apnea on CPAP     Type 2 diabetes mellitus      Past Surgical History:   Procedure Laterality Date    CARDIAC CATHETERIZATION      CARDIAC CATHETERIZATION Left 10/19/2021    Procedure: Left Heart Cath;  Surgeon: Liz Russo MD;  Location: [facility] CATH INVASIVE LOCATION;  Service: Cardiology;  Laterality: Left;    CARDIAC  CATHETERIZATION N/A 7/18/2023    Procedure: Coronary angiography;  Surgeon: Rupesh Parr MD;  Location:  [facility] CATH INVASIVE LOCATION;  Service: Cardiology;  Laterality: N/A;    CARDIAC CATHETERIZATION N/A 7/18/2023    Procedure: Percutaneous Coronary Intervention;  Surgeon: Rupesh Parr MD;  Location: [facility] CATH INVASIVE LOCATION;  Service: Cardiology;  Laterality: N/A;    COLONOSCOPY W/ POLYPECTOMY      CORONARY ANGIOPLASTY WITH STENT PLACEMENT Left 06/03/2009    Left heart catheterization, 06/03/2009, Dr. Russo:  LVEF 45% to 50%.  Placement of overlapping Cypher drug-eluting stent 2.5 x 28 and 2.5 x 18 to the SVG to the obtuse marginal branch.  SVG to the PDA had a proximal stenosis estimated at 60% with a distal stenosis of 50% to 60%.    CORONARY ANGIOPLASTY WITH STENT PLACEMENT Left 07/06/2009    Left heart catheterization, 07/06/2009:  PTCA and stent placement in the mid PDA using a 2.25 x 12 mm Taxus drug-eluting stent with stent placement in the distal SVG to the PDA using a 2.5 x 18 mm Cypher drug-eluting stent and stenting of the proximal SVG to the PDA using a 3.0 x 28 mm Cypher drug-eluting stent.    CORONARY ANGIOPLASTY WITH STENT PLACEMENT  04/23/2010    Cardiac catheterization for recurrent anginal symptoms, 04/23/2010, with PTCA and stent placement in the proximal SVG to the PDA using 3.0 x 28 mm Promus drug-eluting stent for in-stent restenosis.    CORONARY ANGIOPLASTY WITH STENT PLACEMENT Left 07/06/2010    Left heart catheterization, 07/06/2010, Dr. Russo:  EF 40% to 45%.  3.5 x 23 mm Promus drug-eluting stent in the proximal SVG to OM, 2.5 x 18 mm Promus drug-eluting stent to distal SVG to PDA due to in-stent restenosis.      CORONARY ANGIOPLASTY WITH STENT PLACEMENT Left 11/16/2010    Left heart catheterization, 11/16/2010, with placement of a 3.0 x 16 mm Taxus drug-eluting stent to the SVG to the RCA proximally and a 2.5 x 16 mm paclitaxel stent distally in the SVG  to the RCA.    CORONARY ANGIOPLASTY WITH STENT PLACEMENT Left     Left heart catheterization, 3.0 x 24-mm Promus element drug-eluting stent to a 90% lesion in the mid portion of the SVG to the PDA.     CORONARY ANGIOPLASTY WITH STENT PLACEMENT Left 06/24/2014    Left heart catheterization for recurrent angina, 06/24/2014, Dr. Russo:  PTCA of the SVG to the PDA using a 3 x 12 mm NC TREK balloon.      CORONARY ARTERY BYPASS GRAFT      CABG x3, Dr. Peng, [facility], 2001.      General Information       Row Name 01/12/24 0955          Physical Therapy Time and Intention    Document Type evaluation  -LO     Mode of Treatment physical therapy  -LO       Row Name 01/12/24 0955          General Information    Patient Profile Reviewed yes  -LO     Prior Level of Function independent:;community mobility;gait;transfer;all household mobility  ambulates with a rollator walker at baseline for community ambulation and without AD at home  -LO     Existing Precautions/Restrictions fall;orthostatic hypotension  -LO     Barriers to Rehab medically complex;previous functional deficit  -LO       Row Name 01/12/24 0955          Living Environment    People in Home spouse  -LO       Row Name 01/12/24 0955          Home Main Entrance    Number of Stairs, Main Entrance two  -LO     Stair Railings, Main Entrance railings on both sides of stairs  -LO       Row Name 01/12/24 0955          Stairs Within Home, Primary    Number of Stairs, Within Home, Primary none  -LO       Row Name 01/12/24 0955          Cognition    Orientation Status (Cognition) oriented x 3  -LO       Row Name 01/12/24 0955          Safety Issues, Functional Mobility    Safety Issues Affecting Function (Mobility) at risk behavior observed;insight into deficits/self-awareness  -LO     Impairments Affecting Function (Mobility) balance;endurance/activity tolerance;strength  -LO               User Key  (r) = Recorded By, (t) = Taken By, (c) = Cosigned By       Initials Name Provider Type    Laurnece Titus, PT Physical Therapist                   Mobility       Row Name 01/12/24 0957          Bed Mobility    Bed Mobility supine-sit  -LO     Supine-Sit [place] (Bed Mobility) contact guard  -LO     Assistive Device (Bed Mobility) head of bed elevated;bed rails  -LO     Comment, (Bed Mobility) vc for use of bed rails, no physical assistance required  -LO       Row Name 01/12/24 0957          Transfers    Comment, (Transfers) EOB>FWW>commode in BR>recliner; vc for HP  -LO       Row Name 01/12/24 0957          Sit-Stand Transfer    Sit-Stand [place] (Transfers) minimum assist (75% patient effort)  -LO     Assistive Device (Sit-Stand Transfers) walker, front-wheeled  -LO       Row Name 01/12/24 0957          Gait/Stairs (Locomotion)    [place] Level (Gait) contact guard  -LO     Assistive Device (Gait) walker, front-wheeled  -LO     Distance in Feet (Gait) 12+12  -LO     Deviations/Abnormal Patterns (Gait) griselda decreased;gait speed decreased;stride length decreased  -LO     Bilateral Gait Deviations forward flexed posture;heel strike decreased  -LO     Comment, (Gait/Stairs) Patient ambulates with FWW CGA with reduced step length, height, and speed. Demonstrating fwd flexed posture.  -LO               User Key  (r) = Recorded By, (t) = Taken By, (c) = Cosigned By      Initials Name Provider Type    Laurence Titus, NONA Physical Therapist                   Obj/Interventions       Row Name 01/12/24 1001          Range of Motion Comprehensive    General Range of Motion bilateral lower extremity ROM WNL  -LO       Row Name 01/12/24 1001          Strength Comprehensive (MMT)    General Manual Muscle Testing (MMT) Assessment lower extremity strength deficits identified  -LO     Comment, General Manual Muscle Testing (MMT) Assessment BLE grossly 4/5  -LO       Row Name 01/12/24 1001          Motor Skills    Motor Skills functional endurance  -LO     Functional  Endurance fair, reduced from baseline  -LO       Row Name 01/12/24 1001          Balance    Balance Assessment sitting static balance;sitting dynamic balance;standing static balance;standing dynamic balance  -LO     Static Sitting Balance supervision  -LO     Dynamic Sitting Balance contact guard  -LO     Position, Sitting Balance unsupported;sitting edge of bed  -LO     Static Standing Balance contact guard  -LO     Dynamic Standing Balance contact guard  -LO     Position/Device Used, Standing Balance supported;walker, front-wheeled  -LO     Comment, Balance FWW CGA for safety in standing  -LO       Row Name 01/12/24 1001          Sensory Assessment (Somatosensory)    Sensory Assessment (Somatosensory) other (see comments)  reports occassional numbess lateral L foot  -LO               User Key  (r) = Recorded By, (t) = Taken By, (c) = Cosigned By      Initials Name Provider Type    Laurence Titus, PT Physical Therapist                   Goals/Plan       [facility] Name 01/12/24 1007          Bed Mobility Goal 1 (PT)    Activity/Assistive Device (Bed Mobility Goal 1, PT) rolling to left;rolling to right;scooting;sit to supine;supine to sit  -LO     [place] Level/Cues Needed (Bed Mobility Goal 1, PT) independent  -LO     Time Frame (Bed Mobility Goal 1, PT) long term goal (LTG);10 days  -[facility] Name 01/12/24 1007          Transfer Goal 1 (PT)    Activity/Assistive Device (Transfer Goal 1, PT) sit-to-stand/stand-to-sit;bed-to-chair/chair-to-bed;car transfer  -LO     [place] Level/Cues Needed (Transfer Goal 1, PT) modified independence  -LO     Time Frame (Transfer Goal 1, PT) long term goal (LTG);10 days  -[facility] Name 01/12/24 1007          Gait Training Goal 1 (PT)    Activity/Assistive Device (Gait Training Goal 1, PT) gait (walking locomotion);assistive device use;decrease fall risk;diminish gait deviation;improve balance and speed;increase endurance/gait distance;walker, rolling  -LO     Distance  (Gait Training Goal 1, PT) 50  -LO     Time Frame (Gait Training Goal 1, PT) long term goal (LTG);10 days  -LO       Row Name 01/12/24 1007          Stairs Goal 1 (PT)    Activity/Assistive Device (Stairs Goal 1, PT) ascending stairs;descending stairs  -LO     [place] Level/Cues Needed (Stairs Goal 1, PT) standby assist  -LO     Number of Stairs (Stairs Goal 1, PT) 2  -LO     Time Frame (Stairs Goal 1, PT) long term goal (LTG);10 days  -LO       Row Name 01/12/24 1007          Therapy Assessment/Plan (PT)    Planned Therapy Interventions (PT) balance training;bed mobility training;gait training;home exercise program;neuromuscular re-education;transfer training;strengthening;stair training;patient/family education  -LO               User Key  (r) = Recorded By, (t) = Taken By, (c) = Cosigned By      Initials Name Provider Type    Laurence Titus, PT Physical Therapist                   Clinical Impression       Row Name 01/12/24 1003          Pain    Pretreatment Pain Rating 0/10 - no pain  -LO     Posttreatment Pain Rating 0/10 - no pain  -LO       Row Name 01/12/24 1003          Plan of Care Review    Plan of Care Reviewed With patient  -LO     Outcome Evaluation PT eval completed. Patient alert and oriented x3. Presenting deficits in general BLE strength, standing dynamic balance, and functional endurance effecting functional mobility below baseline. Of note, BP with measureable decrease following activity without sx. Patient will benefit from skilled IP PT services to address impairments in order to return to OF. Recommend home with assist and HHPT at [place].  -       Row Name 01/12/24 1003          Therapy Assessment/Plan (PT)    Patient/Family Therapy Goals Statement (PT) home  -LO     Rehab Potential (PT) good, to achieve stated therapy goals  -LO     Criteria for Skilled Interventions Met (PT) yes;meets criteria;skilled treatment is necessary  -LO     Therapy Frequency (PT) daily  -LO       Row Name  01/12/24 1003          Vital Signs    Pre Systolic BP Rehab 132  supine  -LO     Pre Treatment Diastolic BP 92  -LO     Intra Systolic BP Rehab 136  sit  -LO     Intra Treatment Diastolic BP 88  -LO     Post Systolic BP Rehab 91  after returning from bathroom.  -LO     Post Treatment Diastolic BP 36  -LO     Pretreatment Heart Rate (beats/min) 66  -LO     Posttreatment Heart Rate (beats/min) 64  -LO     Pre SpO2 (%) 93  -LO     O2 Delivery Pre Treatment room air  -LO     O2 Delivery Intra Treatment room air  -LO     O2 Delivery Post Treatment room air  -LO     Pre Patient Position Supine  -LO     Intra Patient Position Standing  -LO     Post Patient Position Sitting  -LO       Row Name 01/12/24 1003          Positioning and Restraints    Pre-Treatment Position in bed  -LO     Post Treatment Position chair  -LO     In Chair notified nsg;reclined;call light within reach;encouraged to call for assist;exit alarm on;with family/caregiver;waffle cushion  -LO               User Key  (r) = Recorded By, (t) = Taken By, (c) = Cosigned By      Initials Name Provider Type    Laurence Titus, PT Physical Therapist                   Outcome Measures       Row Name 01/12/24 1008 01/11/24 9684       How much help from another person do you currently need...    Turning from your back to your side while in flat bed without using bedrails? 4  -LO 4  -ANIA    Moving from lying on back to sitting on the side of a flat bed without bedrails? 3  -LO 4  -ANIA    Moving to and from a bed to a chair (including a wheelchair)? 3  -LO 3  -ANIA    Standing up from a chair using your arms (e.g., wheelchair, bedside chair)? 3  -LO 3  -ANIA    Climbing 3-5 steps with a railing? 2  -LO 2  -ANIA    To walk in hospital room? 3  -LO 3  -ANIA    AM-PAC 6 Clicks Score (PT) 18  -LO 19  -ANIA    Highest Level of Mobility Goal 6 --> Walk 10 steps or more  -LO 6 --> Walk 10 steps or more  -ANIA      Row Name 01/12/24 1008          Functional Assessment    Outcome Measure  Options AM-PAC 6 Clicks Basic Mobility (PT)  -               User Key  (r) = Recorded By, (t) = Taken By, (c) = Cosigned By      Initials Name Provider Type    Trupti Camara, RN Registered Nurse    Laurence Titus, PT Physical Therapist                                 Physical Therapy Education       Title: PT OT SLP Therapies (Done)       Topic: Physical Therapy (Done)       Point: Mobility training (Done)       Learning Progress Summary             Patient Acceptance, E, VU,NR by KIRSTEN at 1/12/2024 0922    Comment: PT POC                         Point: Home exercise program (Done)       Learning Progress Summary             Patient Acceptance, E, VU,NR by KIRSTEN at 1/12/2024 0922    Comment: PT POC                         Point: Body mechanics (Done)       Learning Progress Summary             Patient Acceptance, E, VU,NR by KIRSTEN at 1/12/2024 0922    Comment: PT POC                         Point: Precautions (Done)       Learning Progress Summary             Patient Acceptance, E, VU,NR by KIRSTEN at 1/12/2024 0922    Comment: PT POC                                         User Key       Initials Effective Dates Name Provider Type Discipline     06/16/21 -  Laurence Mcdonough, PT Physical Therapist PT                  PT Recommendation and Plan  Planned Therapy Interventions (PT): balance training, bed mobility training, gait training, home exercise program, neuromuscular re-education, transfer training, strengthening, stair training, patient/family education  Plan of Care Reviewed With: patient  Outcome Evaluation: PT eval completed. Patient alert and oriented x3. Presenting deficits in general BLE strength, standing dynamic balance, and functional endurance effecting functional mobility below baseline. Of note, BP with measureable decrease following activity without sx. Patient will benefit from skilled IP PT services to address impairments in order to return to PLOF. Recommend home with assist and HHPT at [place].     Time  Calculation:   PT Evaluation Complexity  History, PT Evaluation Complexity: 1-2 personal factors and/or comorbidities  Examination of Body Systems (PT Eval Complexity): 1-2 elements  Clinical Presentation (PT Evaluation Complexity): evolving  Clinical Decision Making (PT Evaluation Complexity): low complexity  Overall Complexity (PT Evaluation Complexity): low complexity     PT Charges       Row Name 01/12/24 0922             Time Calculation    Start Time 0922  -LO      PT Received On 01/12/24  -LO      PT Goal Re-Cert Due Date 01/22/24  -LO         Timed Charges    42451 - Gait Training Minutes  10  -LO      22181 - PT Therapeutic Activity Minutes 6  -LO         Untimed Charges    PT Eval/Re-eval Minutes 38  -LO         Total Minutes    Timed Charges Total Minutes 16  -LO      Untimed Charges Total Minutes 38  -LO       Total Minutes 54  -LO                User Key  (r) = Recorded By, (t) = Taken By, (c) = Cosigned By      Initials Name Provider Type    LO Laurence Mcdonough, PT Physical Therapist                  Therapy Charges for Today       Code Description Service Date Service Provider Modifiers Qty    41413453765 HC GAIT TRAINING EA 15 MIN 1/12/2024 Laurence Mcdonough, PT GP 1    50577005796 HC PT EVAL LOW COMPLEXITY 3 1/12/2024 Laurence Mcdonough, PT GP 1            PT G-Codes  Outcome Measure Options: AM-PAC 6 Clicks Basic Mobility (PT)  AM-PAC 6 Clicks Score (PT): 18  PT Discharge Summary  Anticipated Discharge Disposition (PT): home with assist, home with home health    Laurence Mcdonough, PT  1/12/2024

## 2024-01-12 NOTE — PLAN OF CARE
Goal Outcome Evaluation:        Problem: Adult Inpatient Plan of Care  Goal: Plan of Care Review  Outcome: Ongoing, Progressing  Goal: Patient-Specific Goal (Individualized)  Outcome: Ongoing, Progressing  Goal: Absence of Hospital-Acquired Illness or Injury  Outcome: Ongoing, Progressing  Intervention: Identify and Manage Fall Risk  Recent Flowsheet Documentation  Taken 1/12/2024 1800 by Karla Nowak RN  Safety Promotion/Fall Prevention:   activity supervised   assistive device/personal items within reach   clutter free environment maintained   fall prevention program maintained  Taken 1/12/2024 1600 by Karla Nowak RN  Safety Promotion/Fall Prevention:   activity supervised   assistive device/personal items within reach   clutter free environment maintained   fall prevention program maintained  Taken 1/12/2024 1400 by Karla Nowak RN  Safety Promotion/Fall Prevention:   activity supervised   assistive device/personal items within reach   clutter free environment maintained   fall prevention program maintained  Taken 1/12/2024 1257 by Karla Nowak RN  Safety Promotion/Fall Prevention:   activity supervised   assistive device/personal items within reach   clutter free environment maintained   fall prevention program maintained  Taken 1/12/2024 1000 by Karla Nowak RN  Safety Promotion/Fall Prevention:   activity supervised   assistive device/personal items within reach   clutter free environment maintained   fall prevention program maintained  Taken 1/12/2024 0800 by Karla Nowak RN  Safety Promotion/Fall Prevention:   activity supervised   clutter free environment maintained   assistive device/personal items within reach   fall prevention program maintained  Intervention: Prevent Skin Injury  Recent Flowsheet Documentation  Taken 1/12/2024 1600 by Karla Nowak RN  Body Position: position changed independently  Taken 1/12/2024 1400 by Karla Nowak RN  Body Position:  position changed independently  Taken 1/12/2024 1257 by Karla Nowak RN  Body Position: position changed independently  Taken 1/12/2024 1000 by Karla Nowak RN  Body Position: position changed independently  Taken 1/12/2024 0800 by Karla Nowak RN  Body Position: position changed independently  Skin Protection:   adhesive use limited   tubing/devices free from skin contact  Intervention: Prevent and Manage VTE (Venous Thromboembolism) Risk  Recent Flowsheet Documentation  Taken 1/12/2024 1800 by Karla Nowak RN  Activity Management: activity encouraged  Taken 1/12/2024 1600 by Karla Nowak RN  Activity Management: activity encouraged  Taken 1/12/2024 1400 by Karla Nowak RN  Activity Management: activity encouraged  Taken 1/12/2024 1257 by Karla Nowak RN  Activity Management: activity encouraged  Taken 1/12/2024 1000 by Karla Nowak RN  Activity Management: up in chair  Taken 1/12/2024 0800 by Karla Nowak RN  Activity Management: activity minimized  Range of Motion: active ROM (range of motion) encouraged  Intervention: Prevent Infection  Recent Flowsheet Documentation  Taken 1/12/2024 1800 by Karla Nowak RN  Infection Prevention:   environmental surveillance performed   equipment surfaces disinfected   hand hygiene promoted  Taken 1/12/2024 1600 by Karla Nowak RN  Infection Prevention:   environmental surveillance performed   equipment surfaces disinfected   hand hygiene promoted  Taken 1/12/2024 1400 by Karla Nowak RN  Infection Prevention:   environmental surveillance performed   equipment surfaces disinfected   hand hygiene promoted  Taken 1/12/2024 1257 by Karla Nowak RN  Infection Prevention:   environmental surveillance performed   equipment surfaces disinfected   hand hygiene promoted  Taken 1/12/2024 1000 by Karla Nowak RN  Infection Prevention:   environmental surveillance performed   equipment surfaces disinfected    hand hygiene promoted  Taken 1/12/2024 0800 by Karla Nowak RN  Infection Prevention:   environmental surveillance performed   equipment surfaces disinfected   hand hygiene promoted  Goal: Optimal Comfort and Wellbeing  Outcome: Ongoing, Progressing  Intervention: Provide Person-Centered Care  Recent Flowsheet Documentation  Taken 1/12/2024 0800 by Karla Nowak RN  Trust Relationship/Rapport:   care explained   choices provided  Goal: Readiness for Transition of Care  Outcome: Ongoing, Progressing     Problem: Skin Injury Risk Increased  Goal: Skin Health and Integrity  Outcome: Ongoing, Progressing  Intervention: Optimize Skin Protection  Recent Flowsheet Documentation  Taken 1/12/2024 1600 by Karla Nowak RN  Head of Bed (HOB) Positioning: HOB elevated  Taken 1/12/2024 1400 by Karla Nowak RN  Head of Bed (HOB) Positioning: HOB elevated  Taken 1/12/2024 1257 by Karla Nowak RN  Head of Bed (HOB) Positioning: HOB elevated  Taken 1/12/2024 1000 by Karla Nowak RN  Head of Bed (HOB) Positioning: HOB elevated  Taken 1/12/2024 0800 by Karla Nowak RN  Pressure Reduction Techniques: frequent weight shift encouraged  Head of Bed (HOB) Positioning: HOB elevated  Pressure Reduction Devices: pressure-redistributing mattress utilized  Skin Protection:   adhesive use limited   tubing/devices free from skin contact     Problem: Diabetes Comorbidity  Goal: Blood Glucose Level Within Targeted Range  Outcome: Ongoing, Progressing     Problem: Heart Failure Comorbidity  Goal: Maintenance of Heart Failure Symptom Control  Outcome: Ongoing, Progressing  Intervention: Maintain Heart Failure-Management  Recent Flowsheet Documentation  Taken 1/12/2024 1800 by Karla Nowak RN  Medication Review/Management: medications reviewed  Taken 1/12/2024 1600 by Karla Nowak RN  Medication Review/Management: medications reviewed  Taken 1/12/2024 1400 by Karla Nowak RN  Medication  Review/Management: medications reviewed  Taken 1/12/2024 1257 by Karla Nowak RN  Medication Review/Management: medications reviewed  Taken 1/12/2024 1000 by Karla Nowak RN  Medication Review/Management: medications reviewed  Taken 1/12/2024 0800 by Karla Nowak RN  Medication Review/Management: medications reviewed     Problem: Hypertension Comorbidity  Goal: Blood Pressure in Desired Range  Outcome: Ongoing, Progressing  Intervention: Maintain Blood Pressure Management  Recent Flowsheet Documentation  Taken 1/12/2024 1800 by Karla Nowak RN  Medication Review/Management: medications reviewed  Taken 1/12/2024 1600 by Karla Nowak RN  Medication Review/Management: medications reviewed  Taken 1/12/2024 1400 by Karla Nowak RN  Medication Review/Management: medications reviewed  Taken 1/12/2024 1257 by Karla Nowak RN  Medication Review/Management: medications reviewed  Taken 1/12/2024 1000 by Karla Nowak RN  Medication Review/Management: medications reviewed  Taken 1/12/2024 0800 by Karla Nowak RN  Medication Review/Management: medications reviewed     Problem: Obstructive Sleep Apnea Risk or Actual Comorbidity Management  Goal: Unobstructed Breathing During Sleep  Outcome: Ongoing, Progressing     Problem: Pain Chronic (Persistent) (Comorbidity Management)  Goal: Acceptable Pain Control and Functional Ability  Outcome: Ongoing, Progressing  Intervention: Manage Persistent Pain  Recent Flowsheet Documentation  Taken 1/12/2024 1800 by Karla Nowak RN  Medication Review/Management: medications reviewed  Taken 1/12/2024 1600 by Karla Nowak RN  Medication Review/Management: medications reviewed  Taken 1/12/2024 1400 by aKrla Nowak RN  Medication Review/Management: medications reviewed  Taken 1/12/2024 1257 by Karal Nowak RN  Medication Review/Management: medications reviewed  Taken 1/12/2024 1000 by Karla Nowak RN  Medication  Review/Management: medications reviewed  Taken 1/12/2024 0800 by Karla Nowak RN  Medication Review/Management: medications reviewed  Intervention: Optimize Psychosocial Wellbeing  Recent Flowsheet Documentation  Taken 1/12/2024 0800 by Karla Nowak RN  Supportive Measures:   active listening utilized   verbalization of feelings encouraged     Problem: Fall Injury Risk  Goal: Absence of Fall and Fall-Related Injury  Outcome: Ongoing, Progressing  Intervention: Identify and Manage Contributors  Recent Flowsheet Documentation  Taken 1/12/2024 1800 by Karla Nowak RN  Medication Review/Management: medications reviewed  Taken 1/12/2024 1600 by Karla Nowak RN  Medication Review/Management: medications reviewed  Taken 1/12/2024 1400 by Karla Nowak RN  Medication Review/Management: medications reviewed  Taken 1/12/2024 1257 by Karla Nowak RN  Medication Review/Management: medications reviewed  Taken 1/12/2024 1000 by Karla Nowak RN  Medication Review/Management: medications reviewed  Taken 1/12/2024 0800 by Karla Nowak RN  Medication Review/Management: medications reviewed  Intervention: Promote Injury-Free Environment  Recent Flowsheet Documentation  Taken 1/12/2024 1800 by Karla Nowak RN  Safety Promotion/Fall Prevention:   activity supervised   assistive device/personal items within reach   clutter free environment maintained   fall prevention program maintained  Taken 1/12/2024 1600 by Karla Nowak RN  Safety Promotion/Fall Prevention:   activity supervised   assistive device/personal items within reach   clutter free environment maintained   fall prevention program maintained  Taken 1/12/2024 1400 by Karla Nowak RN  Safety Promotion/Fall Prevention:   activity supervised   assistive device/personal items within reach   clutter free environment maintained   fall prevention program maintained  Taken 1/12/2024 1257 by Karla Nowak RN  Safety  Promotion/Fall Prevention:   activity supervised   assistive device/personal items within reach   clutter free environment maintained   fall prevention program maintained  Taken 1/12/2024 1000 by Karla Nowak, RN  Safety Promotion/Fall Prevention:   activity supervised   assistive device/personal items within reach   clutter free environment maintained   fall prevention program maintained  Taken 1/12/2024 0800 by Karla Nowak, RN  Safety Promotion/Fall Prevention:   activity supervised   clutter free environment maintained   assistive device/personal items within reach   fall prevention program maintained

## 2024-01-12 NOTE — CASE MANAGEMENT/SOCIAL WORK
Discharge Planning Assessment  [facility]     Patient Name: Donny Jerry  MRN: [id number]  Today's Date: 1/12/2024    Admit Date: 1/11/2024    Plan: Home at DC   Discharge Needs Assessment       Row Name 01/12/24 1023       Living Environment    People in Home spouse    Current Living Arrangements home    Living Arrangement Comments Lives in a one level home with his spouse. A few steps to enter. Daughter is supportive. He performs his own personal care. Spouse cooks and cleans but he helps if needed.       Discharge Needs Assessment    Equipment Currently Used at Home cpap;cane, quad;rollator    Equipment Needed After Discharge none    Discharge Coordination/Progress Not current with  or outpt services. Spouse may private pay for toilet grab bars and a r walker. She will obtain this from an Agent Partner [facility]. Just got a rolator thru insurance. Does not want a BSC.                   Discharge Plan       Row Name 01/12/24 1026       Plan    Plan Home at DC    Patient/Family in Agreement with Plan yes    Plan Comments I spoke with the pt, his spouse and daughter in the room. They deny DC needs at this time.    Final Discharge Disposition Code 01 - home or self-care      Row Name 01/12/24 0910       Plan    Final Discharge Disposition Code 01 - home or self-care                  Continued Care and Services - Admitted Since 1/11/2024    Coordination has not been started for this encounter.       Expected Discharge Date and Time       Expected Discharge Date Expected Discharge Time    Jan 13, 2024            Demographic Summary    No documentation.                  Functional Status       Row Name 01/12/24 1023       Functional Status    Usual Activity Tolerance good       Functional Status, IADL    Medications independent    Meal Preparation assistive person    Housekeeping assistive person    Laundry assistive person    Shopping assistive person                   Psychosocial    No documentation.                   Abuse/Neglect    No documentation.                  Legal    No documentation.                  Substance Abuse    No documentation.                  Patient Forms    No documentation.                     Anahi Lion RN

## 2024-01-12 NOTE — PROGRESS NOTES
"  [facility]  PROGRESS NOTE    Date of Admission: 1/11/2024  Date of Service: 01/12/24    Primary Care Physician: Anum Alonso APRN    Chief Complaint: follow up syncope     Problem List:   Syncope    Coronary artery disease involving native coronary artery of native heart with angina pectoris    Essential hypertension    Type 2 diabetes mellitus with stage 3 chronic kidney disease    Obstructive sleep apnea on CPAP    CKD (chronic kidney disease) stage 3, GFR 30-59 ml/min    Acute on chronic HFrEF (heart failure with reduced ejection fraction)      Subjective      No acute events overnight.  No chest pain or shortness of breath.  Unsure if he has ambulated.  Does not recall any dizziness.  Orthostatics negative last night. Frequent PVCs on telemetry, no runs.      Objective   Vitals: /63 (BP Location: Right arm, Patient Position: Lying)   Pulse 60   Temp 97.9 °F (36.6 °C) (Oral)   Resp 18   Ht 170.2 cm (67\")   Wt 102 kg (224 lb 13.9 oz)   SpO2 95%   BMI 35.22 kg/m²     Physical Exam:  GENERAL:in no acute distress.   HEENT:  no jugular venous distention  HEART: Irregular rhythm, normal rate, and no murmurs, gallops, or rubs.   LUNGS: Clear to auscultation bilaterally. No wheezing, rales or rhonchi.  EXTREMITIES: Trace peripheral edema    Results:  Results from last 7 days   Lab Units 01/12/24  0458 01/11/24  1026 01/09/24  1247   WBC 10*3/mm3 7.49 6.81 7.57   HEMOGLOBIN g/dL 8.6* 8.8* 8.8*   HEMATOCRIT % 26.9* 27.4* 26.5*   PLATELETS 10*3/mm3 263 249 241     Results from last 7 days   Lab Units 01/12/24  0458 01/11/24  1026 01/09/24  1247   SODIUM mmol/L 142 142 138   POTASSIUM mmol/L 4.6 4.5 4.5   CHLORIDE mmol/L 105 106 102   CO2 mmol/L 28.0 25.0 24.8   BUN mg/dL 66* 62* 54*   CREATININE mg/dL 2.50* 2.20* 2.05*   GLUCOSE mg/dL 85 56* 179*      Lab Results   Component Value Date    CHOL 130 07/19/2023    TRIG 149 12/07/2023    HDL 40 12/07/2023    LDL 71 " 12/07/2023    AST 18 01/12/2024    ALT 24 01/12/2024             Results from last 7 days   Lab Units 01/11/24  1026   TSH uIU/mL 2.510             Results from last 7 days   Lab Units 01/11/24  1242 01/11/24  1026 01/09/24  1711   HSTROP T ng/L 172* 178* 207*     Results from last 7 days   Lab Units 01/11/24  1242   PROBNP pg/mL 5,539.0*         Intake/Output Summary (Last 24 hours) at 1/12/2024 0810  Last data filed at 1/11/2024 2334  Gross per 24 hour   Intake 654.17 ml   Output --   Net 654.17 ml       I personally reviewed the patient's EKG/Telemetry data    Radiology Data:   Results for orders placed during the hospital encounter of 07/17/23    Adult Transthoracic Echo Complete W/ Cont if Necessary Per Protocol    Interpretation Summary    Left ventricular systolic function is normal. Estimated left ventricular EF = 50% Left ventricular ejection fraction appears to be 46 - 50%.    Left ventricular wall thickness is consistent with mild concentric hypertrophy.    Left ventricular diastolic function is consistent with (grade II w/high LAP) pseudonormalization.    The left atrial cavity is mildly dilated.    Left atrial volume is mildly increased.    Mild aortic valve stenosis is present.    Estimated right ventricular systolic pressure from tricuspid regurgitation is normal (<35 mmHg).      Current Medications:  aspirin, 81 mg, Oral, Daily  atorvastatin, 40 mg, Oral, Daily  carvedilol, 6.25 mg, Oral, Q12H  cetirizine, 10 mg, Oral, Daily  empagliflozin, 25 mg, Oral, Daily  ferric gluconate, 125 mg, Intravenous, Daily  finasteride, 5 mg, Oral, Daily  fluticasone, 1 spray, Nasal, Daily  hydrALAZINE, 50 mg, Oral, Q8H  insulin lispro, 2-9 Units, Subcutaneous, 4x Daily AC & at Bedtime  magnesium oxide, 400 mg, Oral, Daily  pantoprazole, 40 mg, Oral, Q AM  pramipexole, 1 mg, Oral, Nightly  prasugrel, 10 mg, Oral, Daily  ranolazine, 500 mg, Oral, Q12H  senna-docusate sodium, 2 tablet, Oral, BID  sertraline, 100 mg,  Oral, Daily  sodium chloride, 10 mL, Intravenous, Q12H  tamsulosin, 0.4 mg, Oral, Daily           Assessment:  Syncope, suspect multifactorial related to anemia, intravascular dehydration, oral blood pressure medications.  Patient significantly orthostatic, and is the main  of his syncope.  Coronary artery disease, chronically elevated troponin.  No current suspicion of active ischemia.  However patient reported increasing shortness of breath over the last few months.  Acute on chronic HFpEF  Last EF 50% in July.  Evidence of extravascular overload but possibly intravascularly dry.  Could be worsened by recent anemia.  Hypertension, now with recent hypotension at home and subsequent syncope.  On extremely high-dose doxazosin (8 mg twice daily) as well as high-dose hydralazine  Chronic anemia with mild worsening over the course of the last month.  Currently on oral iron prescription.  CKD, creatinine increased to 2.5 today  Ventricular bigeminy, do not suspect that this is the cause of his syncope.        Plan:  Orthostatics negative last night. Recheck orthostatics.   Continue aspirin, effient and statin for CAD  Continue coreg 6.25mg BID for HR control. If he continues to have issues with orthostatic hypotension, will change coreg to toprol.  Remain off doxazosin. Continue hydralazine at current dose. Can decrease to 25mg TID if necessary pending orthostatics.   Monitor telemetry for arrhythmia. Will need 14 day MCOT at discharge.       Yodit Bills PA-C    Seen independently by MONTEZ Madden MD, [facility]

## 2024-01-12 NOTE — PLAN OF CARE
Problem: Adult Inpatient Plan of Care  Goal: Plan of Care Review  Outcome: Ongoing, Progressing  Flowsheets (Taken 1/12/2024 0537)  Progress: no change  Plan of Care Reviewed With: patient  Outcome Evaluation:   VSS throughout shift. Complaints of pain medicated per PRN MAR   effective per pt. No acute events during shift.  Goal: Patient-Specific Goal (Individualized)  Outcome: Ongoing, Progressing  Goal: Absence of Hospital-Acquired Illness or Injury  Outcome: Ongoing, Progressing  Intervention: Identify and Manage Fall Risk  Recent Flowsheet Documentation  Taken 1/12/2024 0428 by Trupti Mcgraw RN  Safety Promotion/Fall Prevention:   activity supervised   assistive device/personal items within reach   clutter free environment maintained   nonskid shoes/slippers when out of bed   room organization consistent   safety round/check completed  Taken 1/12/2024 0200 by Trupti Mcgraw RN  Safety Promotion/Fall Prevention:   assistive device/personal items within reach   activity supervised   clutter free environment maintained   nonskid shoes/slippers when out of bed   room organization consistent   safety round/check completed  Taken 1/12/2024 0000 by Trupti Mcgraw RN  Safety Promotion/Fall Prevention:   activity supervised   assistive device/personal items within reach   clutter free environment maintained   nonskid shoes/slippers when out of bed   room organization consistent   safety round/check completed  Taken 1/11/2024 2244 by Trupti Mcgraw RN  Safety Promotion/Fall Prevention:   activity supervised   clutter free environment maintained   assistive device/personal items within reach   nonskid shoes/slippers when out of bed   room organization consistent   safety round/check completed  Taken 1/11/2024 2015 by Trupti Mcgraw RN  Safety Promotion/Fall Prevention:   activity supervised   assistive device/personal items within reach   clutter free environment maintained   nonskid  shoes/slippers when out of bed   room organization consistent   safety round/check completed  Intervention: Prevent Skin Injury  Recent Flowsheet Documentation  Taken 1/12/2024 0428 by Trupti Mcgraw RN  Body Position: position changed independently  Skin Protection:   adhesive use limited   incontinence pads utilized   transparent dressing maintained   tubing/devices free from skin contact  Taken 1/12/2024 0200 by Trupti Mcgraw RN  Body Position: position changed independently  Skin Protection:   adhesive use limited   incontinence pads utilized   transparent dressing maintained   tubing/devices free from skin contact  Taken 1/12/2024 0000 by Trupti Mcgraw RN  Body Position: position changed independently  Skin Protection:   adhesive use limited   incontinence pads utilized   transparent dressing maintained   tubing/devices free from skin contact  Taken 1/11/2024 2244 by Trupti Mcgraw RN  Body Position: position changed independently  Skin Protection:   adhesive use limited   transparent dressing maintained   tubing/devices free from skin contact  Taken 1/11/2024 2015 by Trupti Mcgraw RN  Body Position: position changed independently  Skin Protection:   adhesive use limited   transparent dressing maintained   tubing/devices free from skin contact  Intervention: Prevent and Manage VTE (Venous Thromboembolism) Risk  Recent Flowsheet Documentation  Taken 1/12/2024 0428 by Trupti Mcgraw RN  Activity Management: activity minimized  Taken 1/12/2024 0200 by Trupti Mcgraw RN  Activity Management: activity minimized  Taken 1/12/2024 0000 by Trupti Mcgraw RN  Activity Management: activity minimized  Taken 1/11/2024 2244 by Trupti Mcgraw RN  Activity Management: activity minimized  VTE Prevention/Management:   bilateral   sequential compression devices off  Range of Motion: active ROM (range of motion) encouraged  Taken 1/11/2024 2015 by Trupti Mcgraw  RN  Activity Management: activity minimized  Intervention: Prevent Infection  Recent Flowsheet Documentation  Taken 1/12/2024 0428 by Trupti Mcgraw RN  Infection Prevention:   environmental surveillance performed   hand hygiene promoted   rest/sleep promoted  Taken 1/12/2024 0200 by Trupti Mcgraw RN  Infection Prevention:   environmental surveillance performed   hand hygiene promoted   rest/sleep promoted  Taken 1/12/2024 0000 by Trupti Mcgraw RN  Infection Prevention:   environmental surveillance performed   hand hygiene promoted   rest/sleep promoted  Taken 1/11/2024 2244 by Trupti Mcgraw RN  Infection Prevention:   environmental surveillance performed   hand hygiene promoted   rest/sleep promoted  Taken 1/11/2024 2015 by Trupti Mcgraw RN  Infection Prevention:   hand hygiene promoted   environmental surveillance performed   rest/sleep promoted  Goal: Optimal Comfort and Wellbeing  Outcome: Ongoing, Progressing  Intervention: Monitor Pain and Promote Comfort  Recent Flowsheet Documentation  Taken 1/11/2024 2244 by Trupti Mcgraw RN  Pain Management Interventions: see MAR  Intervention: Provide Person-Centered Care  Recent Flowsheet Documentation  Taken 1/11/2024 2244 by Trupti Mcgraw RN  Trust Relationship/Rapport:   care explained   choices provided   thoughts/feelings acknowledged  Goal: Readiness for Transition of Care  Outcome: Ongoing, Progressing     Problem: Skin Injury Risk Increased  Goal: Skin Health and Integrity  Outcome: Ongoing, Progressing  Intervention: Promote and Optimize Oral Intake  Recent Flowsheet Documentation  Taken 1/11/2024 2244 by Trupti Mcgraw RN  Oral Nutrition Promotion: rest periods promoted  Intervention: Optimize Skin Protection  Recent Flowsheet Documentation  Taken 1/12/2024 0428 by Trupti Mcgraw RN  Pressure Reduction Techniques: frequent weight shift encouraged  Head of Bed (HOB) Positioning: HOB elevated  Pressure  Reduction Devices: pressure-redistributing mattress utilized  Skin Protection:   adhesive use limited   incontinence pads utilized   transparent dressing maintained   tubing/devices free from skin contact  Taken 1/12/2024 0200 by Trupti Mcgraw RN  Pressure Reduction Techniques: frequent weight shift encouraged  Head of Bed ([facility]) Positioning: [facility] elevated  Pressure Reduction Devices: pressure-redistributing mattress utilized  Skin Protection:   adhesive use limited   incontinence pads utilized   transparent dressing maintained   tubing/devices free from skin contact  Taken 1/12/2024 0000 by Trupti Mcgraw RN  Pressure Reduction Techniques: frequent weight shift encouraged  Head of Bed ([facility]) Positioning: [facility] elevated  Pressure Reduction Devices: pressure-redistributing mattress utilized  Skin Protection:   adhesive use limited   incontinence pads utilized   transparent dressing maintained   tubing/devices free from skin contact  Taken 1/11/2024 2244 by Trupti Mcgraw RN  Pressure Reduction Techniques: frequent weight shift encouraged  Head of Bed ([facility]) Positioning: [facility] elevated  Pressure Reduction Devices: pressure-redistributing mattress utilized  Skin Protection:   adhesive use limited   transparent dressing maintained   tubing/devices free from skin contact  Taken 1/11/2024 2015 by Trupti Mcgraw RN  Pressure Reduction Techniques: frequent weight shift encouraged  Head of Bed ([facility]) Positioning: [facility] elevated  Pressure Reduction Devices: pressure-redistributing mattress utilized  Skin Protection:   adhesive use limited   transparent dressing maintained   tubing/devices free from skin contact     Problem: Diabetes Comorbidity  Goal: Blood Glucose Level Within Targeted Range  Outcome: Ongoing, Progressing  Intervention: Monitor and Manage Glycemia  Recent Flowsheet Documentation  Taken 1/11/2024 2244 by Trupti Mcgraw RN  Glycemic Management: blood glucose monitored     Problem: Heart  Failure Comorbidity  Goal: Maintenance of Heart Failure Symptom Control  Outcome: Ongoing, Progressing  Intervention: Maintain Heart Failure-Management  Recent Flowsheet Documentation  Taken 1/11/2024 2244 by Trupti Mcgraw RN  Medication Review/Management: medications reviewed     Problem: Hypertension Comorbidity  Goal: Blood Pressure in Desired Range  Outcome: Ongoing, Progressing  Intervention: Maintain Blood Pressure Management  Recent Flowsheet Documentation  Taken 1/11/2024 2244 by Trupti Mcgraw RN  Syncope Management: position changed slowly  Medication Review/Management: medications reviewed     Problem: Obstructive Sleep Apnea Risk or Actual Comorbidity Management  Goal: Unobstructed Breathing During Sleep  Outcome: Ongoing, Progressing     Problem: Pain Chronic (Persistent) (Comorbidity Management)  Goal: Acceptable Pain Control and Functional Ability  Outcome: Ongoing, Progressing  Intervention: Manage Persistent Pain  Recent Flowsheet Documentation  Taken 1/11/2024 2244 by Trupti Mcgraw RN  Bowel Elimination Promotion: adequate fluid intake promoted  Sleep/Rest Enhancement:   awakenings minimized   regular sleep/rest pattern promoted   relaxation techniques promoted   room darkened  Medication Review/Management: medications reviewed  Intervention: Develop Pain Management Plan  Recent Flowsheet Documentation  Taken 1/11/2024 2244 by Trupti Mcgraw RN  Pain Management Interventions: see MAR  Intervention: Optimize Psychosocial Wellbeing  Recent Flowsheet Documentation  Taken 1/11/2024 2244 by Trupti Mcgraw RN  Supportive Measures: active listening utilized  Diversional Activities: television     Problem: Fall Injury Risk  Goal: Absence of Fall and Fall-Related Injury  Outcome: Ongoing, Progressing  Intervention: Identify and Manage Contributors  Recent Flowsheet Documentation  Taken 1/11/2024 2244 by Trupti Mcgraw RN  Medication Review/Management: medications  reviewed  Intervention: Promote Injury-Free Environment  Recent Flowsheet Documentation  Taken 1/12/2024 0428 by Trupti Mcgraw RN  Safety Promotion/Fall Prevention:   activity supervised   assistive device/personal items within reach   clutter free environment maintained   nonskid shoes/slippers when out of bed   room organization consistent   safety round/check completed  Taken 1/12/2024 0200 by Trupti Mcgraw RN  Safety Promotion/Fall Prevention:   assistive device/personal items within reach   activity supervised   clutter free environment maintained   nonskid shoes/slippers when out of bed   room organization consistent   safety round/check completed  Taken 1/12/2024 0000 by Trupti Mcgraw RN  Safety Promotion/Fall Prevention:   activity supervised   assistive device/personal items within reach   clutter free environment maintained   nonskid shoes/slippers when out of bed   room organization consistent   safety round/check completed  Taken 1/11/2024 2244 by Trupti Mcgraw RN  Safety Promotion/Fall Prevention:   activity supervised   clutter free environment maintained   assistive device/personal items within reach   nonskid shoes/slippers when out of bed   room organization consistent   safety round/check completed  Taken 1/11/2024 2015 by Trupti Mcgraw RN  Safety Promotion/Fall Prevention:   activity supervised   assistive device/personal items within reach   clutter free environment maintained   nonskid shoes/slippers when out of bed   room organization consistent   safety round/check completed   Goal Outcome Evaluation:  Plan of Care Reviewed With: patient        Progress: no change  Outcome Evaluation: VSS throughout shift. Complaints of pain medicated per PRN MAR; effective per pt. No acute events during shift.

## 2024-01-12 NOTE — THERAPY EVALUATION
Patient Name: Donny Jerry  : 1945    MRN: [id number]                              Today's Date: 2024       Admit Date: 2024    Visit Dx:     ICD-10-CM ICD-9-CM   1. Syncope, unspecified syncope type  R55 780.2   2. Injury of head, initial encounter  S09.90XA 959.01   3. Renal insufficiency  N28.9 593.9   4. Elevated troponin  R79.89 790.6     Patient Active Problem List   Diagnosis    Coronary artery disease involving native coronary artery of native heart with angina pectoris    Essential hypertension    Hyperlipidemia LDL goal <70    Type 2 diabetes mellitus with stage 3 chronic kidney disease    Obstructive sleep apnea on CPAP    Enlarged prostate    Obesity, Class II, BMI 35-39.9    Depression    Acute hypoxemic respiratory failure due to COVID-19    CKD (chronic kidney disease) stage 3, GFR 30-59 ml/min    Elevated troponin level not due myocardial infarction    Post viral debility    Chest pain, unspecified type    Chronic systolic heart failure    Type 1 myocardial infarction    Chronic diastolic heart failure    Bilateral lower extremity edema    Acute exacerbation of congestive heart failure    Acute on chronic HFrEF (heart failure with reduced ejection fraction)    Acute on chronic systolic heart failure    Syncope     Past Medical History:   Diagnosis Date    Benign hypertension     Coronary artery disease     Depression     Enlarged prostate     Enlarged prostate with anticipated TURP with Dr. Bernal.    GERD (gastroesophageal reflux disease)     Heart attack     Hypercholesterolemia     Myocardial infarction     Obesity     Obstructive sleep apnea on CPAP     Type 2 diabetes mellitus      Past Surgical History:   Procedure Laterality Date    CARDIAC CATHETERIZATION      CARDIAC CATHETERIZATION Left 10/19/2021    Procedure: Left Heart Cath;  Surgeon: Liz Russo MD;  Location: [facility] CATH INVASIVE LOCATION;  Service: Cardiology;  Laterality: Left;    CARDIAC  CATHETERIZATION N/A 7/18/2023    Procedure: Coronary angiography;  Surgeon: Rupesh Parr MD;  Location:  [facility] CATH INVASIVE LOCATION;  Service: Cardiology;  Laterality: N/A;    CARDIAC CATHETERIZATION N/A 7/18/2023    Procedure: Percutaneous Coronary Intervention;  Surgeon: Rupesh Parr MD;  Location: [facility] CATH INVASIVE LOCATION;  Service: Cardiology;  Laterality: N/A;    COLONOSCOPY W/ POLYPECTOMY      CORONARY ANGIOPLASTY WITH STENT PLACEMENT Left 06/03/2009    Left heart catheterization, 06/03/2009, Dr. Russo:  LVEF 45% to 50%.  Placement of overlapping Cypher drug-eluting stent 2.5 x 28 and 2.5 x 18 to the SVG to the obtuse marginal branch.  SVG to the PDA had a proximal stenosis estimated at 60% with a distal stenosis of 50% to 60%.    CORONARY ANGIOPLASTY WITH STENT PLACEMENT Left 07/06/2009    Left heart catheterization, 07/06/2009:  PTCA and stent placement in the mid PDA using a 2.25 x 12 mm Taxus drug-eluting stent with stent placement in the distal SVG to the PDA using a 2.5 x 18 mm Cypher drug-eluting stent and stenting of the proximal SVG to the PDA using a 3.0 x 28 mm Cypher drug-eluting stent.    CORONARY ANGIOPLASTY WITH STENT PLACEMENT  04/23/2010    Cardiac catheterization for recurrent anginal symptoms, 04/23/2010, with PTCA and stent placement in the proximal SVG to the PDA using 3.0 x 28 mm Promus drug-eluting stent for in-stent restenosis.    CORONARY ANGIOPLASTY WITH STENT PLACEMENT Left 07/06/2010    Left heart catheterization, 07/06/2010, Dr. Russo:  EF 40% to 45%.  3.5 x 23 mm Promus drug-eluting stent in the proximal SVG to OM, 2.5 x 18 mm Promus drug-eluting stent to distal SVG to PDA due to in-stent restenosis.      CORONARY ANGIOPLASTY WITH STENT PLACEMENT Left 11/16/2010    Left heart catheterization, 11/16/2010, with placement of a 3.0 x 16 mm Taxus drug-eluting stent to the SVG to the RCA proximally and a 2.5 x 16 mm paclitaxel stent distally in the SVG  to the RCA.    CORONARY ANGIOPLASTY WITH STENT PLACEMENT Left     Left heart catheterization, 3.0 x 24-mm Promus element drug-eluting stent to a 90% lesion in the mid portion of the SVG to the PDA.     CORONARY ANGIOPLASTY WITH STENT PLACEMENT Left 06/24/2014    Left heart catheterization for recurrent angina, 06/24/2014, Dr. Russo:  PTCA of the SVG to the PDA using a 3 x 12 mm NC TREK balloon.      CORONARY ARTERY BYPASS GRAFT      CABG x3, Dr. Peng, [facility], 2001.      General Information       Row Name 01/12/24 1053          OT Time and Intention    Document Type evaluation  -AC     Mode of Treatment occupational therapy  -       Row Name 01/12/24 1053          General Information    Patient Profile Reviewed yes  -AC     Prior Level of Function independent:;all household mobility;feeding;grooming;bathing;mod assist:;dressing;driving  spouse assits with LBD, uses rollator for cmty mobility  -     Existing Precautions/Restrictions fall;orthostatic hypotension  I[place]  -     Barriers to Rehab medically complex;previous functional deficit;hearing deficit  -       Row Name 01/12/24 1053          Occupational Profile    Environmental Supports and Barriers (Occupational Profile) tub shower  -       Row Name 01/12/24 1053          Living Environment    People in Home spouse  -       Row Name 01/12/24 1053          Home Main Entrance    Number of Stairs, Main Entrance two  -AC     Stair Railings, Main Entrance railings on both sides of stairs  -       Row Name 01/12/24 1053          Stairs Within Home, Primary    Number of Stairs, Within Home, Primary none  -       Row Name 01/12/24 1053          Cognition    Orientation Status (Cognition) oriented x 3  -       Row Name 01/12/24 1053          Safety Issues, Functional Mobility    Safety Issues Affecting Function (Mobility) insight into deficits/self-awareness;safety precaution awareness  -     Impairments Affecting Function  (Mobility) balance;endurance/activity tolerance;strength  -               User Key  (r) = Recorded By, (t) = Taken By, (c) = Cosigned By      Initials Name Provider Type    Chula Be OT Occupational Therapist                     Mobility/ADL's       Row Name 01/12/24 1056          Bed Mobility    Bed Mobility supine-sit  -     Supine-Sit Cascade (Bed Mobility) contact guard  -     Assistive Device (Bed Mobility) head of bed elevated;bed rails  -     Comment, (Bed Mobility) VCs to sequence  -       Row Name 01/12/24 1056          Transfers    Transfers sit-stand transfer;toilet transfer  -       Row Name 01/12/24 1056          Sit-Stand Transfer    Sit-Stand Cascade (Transfers) minimum assist (75% patient effort)  -     Assistive Device (Sit-Stand Transfers) walker, front-wheeled  -       Row Name 01/12/24 1056          Toilet Transfer    Type (Toilet Transfer) sit-stand;stand-sit  -     [place] Level (Toilet Transfer) verbal cues;minimum assist (75% patient effort)  -     Assistive Device (Toilet Transfer) walker, front-wheeled  -       Row Name 01/12/24 1056          Functional Mobility    Functional Mobility- Ind. Level verbal cues required;contact guard assist  -     Functional Mobility- Device walker, front-wheeled  -     Functional Mobility-Distance (Feet) --  12 + 12  -     Functional Mobility- Safety Issues step length decreased  -       Row Name 01/12/24 1056          Activities of Daily Living    BADL Assessment/Intervention lower body dressing;toileting  -       Row Name 01/12/24 1056          Lower Body Dressing Assessment/Training    [place] Level (Lower Body Dressing) don;socks;maximum assist (25% patient effort)  -     Position (Lower Body Dressing) edge of bed sitting  -     Comment, (Lower Body Dressing) wife assists at baseline  -       Row Name 01/12/24 1056          Toileting Assessment/Training    [place] Level (Toileting)  adjust/manage clothing;perform perineal hygiene;minimum assist (75% patient effort)  -     Assistive Devices (Toileting) commode;grab bar/safety frame  -     Position (Toileting) unsupported sitting;supported standing  -AC               User Key  (r) = Recorded By, (t) = Taken By, (c) = Cosigned By      Initials Name Provider Type     Chula Kat, OT Occupational Therapist                   Obj/Interventions       Row Name 01/12/24 1057          Sensory Assessment (Somatosensory)    Sensory Assessment (Somatosensory) UE sensation intact  -       Row Name 01/12/24 1057          Vision Assessment/Intervention    Visual Impairment/Limitations WFL  -       Row Name 01/12/24 1057          Range of Motion Comprehensive    General Range of Motion bilateral upper extremity ROM WNL  -       Row Name 01/12/24 1057          Strength Comprehensive (MMT)    Comment, General Manual Muscle Testing (MMT) Assessment BUE grossly 4/5  -       Row Name 01/12/24 1057          Balance    Balance Assessment sitting static balance;sitting dynamic balance;standing static balance;standing dynamic balance  -AC     Static Sitting Balance standby assist  -AC     Dynamic Sitting Balance contact guard  -AC     Position, Sitting Balance unsupported;sitting edge of bed  -AC     Static Standing Balance contact guard  -AC     Dynamic Standing Balance contact guard  -AC     Position/Device Used, Standing Balance supported;walker, front-wheeled  -AC               User Key  (r) = Recorded By, (t) = Taken By, (c) = Cosigned By      Initials Name Provider Type     Chula Kat, OT Occupational Therapist                   Goals/Plan       Row Name 01/12/24 1101          Transfer Goal 1 (OT)    Activity/Assistive Device (Transfer Goal 1, OT) bed-to-chair/chair-to-bed;toilet;walker, rolling  -AC     [place] Level/Cues Needed (Transfer Goal 1, OT) standby assist  -AC     Time Frame (Transfer Goal 1, OT) by discharge  -      Progress/Outcome (Transfer Goal 1, OT) new goal;goal ongoing  -       Row Name 01/12/24 1101          Toileting Goal 1 (OT)    Activity/Device (Toileting Goal 1, OT) adjust/manage clothing;perform perineal hygiene;commode;grab bar/safety frame  -     [place] Level/Cues Needed (Toileting Goal 1, OT) standby assist  -AC     Time Frame (Toileting Goal 1, OT) by discharge  -AC     Progress/Outcome (Toileting Goal 1, OT) new goal;goal ongoing  -AC       Row Name 01/12/24 1101          Grooming Goal 1 (OT)    Activity/Device (Grooming Goal 1, OT) oral care  -AC     [place] (Grooming Goal 1, OT) standby assist  -AC     Time Frame (Grooming Goal 1, OT) by discharge  -AC     Strategies/Barriers (Grooming Goal 1, OT) standing sink side  -       Row Name 01/12/24 1101          Therapy Assessment/Plan (OT)    Planned Therapy Interventions (OT) activity tolerance training;BADL retraining;functional balance retraining;occupation/activity based interventions;patient/caregiver education/training;transfer/mobility retraining  -               User Key  (r) = Recorded By, (t) = Taken By, (c) = Cosigned By      Initials Name Provider Type     Chula Kat, OT Occupational Therapist                   Clinical Impression       Row Name 01/12/24 1058          Pain Assessment    Pretreatment Pain Rating 0/10 - no pain  -     Posttreatment Pain Rating 0/10 - no pain  -       Row Name 01/12/24 1058          Plan of Care Review    Plan of Care Reviewed With patient  -     Outcome Evaluation Pt presents below baseline with self care/mobility d/t decr balance and activity tolerance.  Pt min A toilet transfer, min A toileting tasks,  CGA to ambulate in room with RW.  Pt with drop in BP with change in position with no dizziness reported.  OT will follow to advance pt toward PLOF.  Recommend home with assist and HHOT upon d/c.  -       Row Name 01/12/24 1052          Therapy Assessment/Plan (OT)    Rehab Potential  (OT) good, to achieve stated therapy goals  -AC     Criteria for Skilled Therapeutic Interventions Met (OT) yes;skilled treatment is necessary  -AC     Therapy Frequency (OT) daily  -AC       Row Name 01/12/24 1058          Therapy Plan Review/Discharge Plan (OT)    Anticipated Discharge Disposition (OT) home with assist;home with home health  -AC       Row Name 01/12/24 1058          Vital Signs    Pre Systolic BP Rehab 132  -AC     Pre Treatment Diastolic BP 92  -AC     Intra Systolic BP Rehab 136  -AC     Intra Treatment Diastolic BP 88  -AC     Post Systolic BP Rehab 91  -AC     Post Treatment Diastolic BP 36  -AC     Pretreatment Heart Rate (beats/min) 66  -AC     Posttreatment Heart Rate (beats/min) 64  -AC     Pre SpO2 (%) 93  -AC     O2 Delivery Pre Treatment room air  -AC     O2 Delivery Intra Treatment room air  -AC     Post SpO2 (%) 95  -AC     O2 Delivery Post Treatment room air  -AC     Pre Patient Position Supine  -AC     Post Patient Position Sitting  -AC       Row Name 01/12/24 1058          Positioning and Restraints    Pre-Treatment Position in bed  -AC     Post Treatment Position chair  -AC     In Chair notified nsg;reclined;call light within reach;encouraged to call for assist;exit alarm on  -AC               User Key  (r) = Recorded By, (t) = Taken By, (c) = Cosigned By      Initials Name Provider Type    AC Chula Kat, OT Occupational Therapist                   Outcome Measures       Row Name 01/12/24 1102          How much help from another is currently needed...    Putting on and taking off regular lower body clothing? 2  -AC     Bathing (including washing, rinsing, and drying) 3  -AC     Toileting (which includes using toilet bed pan or urinal) 3  -AC     Putting on and taking off regular upper body clothing 3  -AC     Taking care of personal grooming (such as brushing teeth) 3  -AC     Eating meals 4  -AC     AM-PAC 6 Clicks Score (OT) 18  -AC       Row Name 01/12/24 1008          How  much help from another person do you currently need...    Turning from your back to your side while in flat bed without using bedrails? 4  -LO     Moving from lying on back to sitting on the side of a flat bed without bedrails? 3  -LO     Moving to and from a bed to a chair (including a wheelchair)? 3  -LO     Standing up from a chair using your arms (e.g., wheelchair, bedside chair)? 3  -LO     Climbing 3-5 steps with a railing? 2  -LO     To walk in hospital room? 3  -LO     AM-PAC 6 Clicks Score (PT) 18  -LO     Highest Level of Mobility Goal 6 --> Walk 10 steps or more  -LO       Row Name 01/12/24 1102 01/12/24 1008       Functional Assessment    Outcome Measure Options AM-PAC 6 Clicks Daily Activity (OT)  -AC AM-PAC 6 Clicks Basic Mobility (PT)  -              User Key  (r) = Recorded By, (t) = Taken By, (c) = Cosigned By      Initials Name Provider Type    Chula Be, OT Occupational Therapist    LO Laurence Mcdonough, PT Physical Therapist                    Occupational Therapy Education       Title: PT OT SLP Therapies (In Progress)       Topic: Occupational Therapy (In Progress)       Point: ADL training (In Progress)       Description:   Instruct learner(s) on proper safety adaptation and remediation techniques during self care or transfers.   Instruct in proper use of assistive devices.                  Learning Progress Summary             Patient Acceptance, E, NR by  at 1/12/2024 1103                         Point: Home exercise program (Not Started)       Description:   Instruct learner(s) on appropriate technique for monitoring, assisting and/or progressing therapeutic exercises/activities.                  Learner Progress:  Not documented in this visit.              Point: Precautions (Not Started)       Description:   Instruct learner(s) on prescribed precautions during self-care and functional transfers.                  Learner Progress:  Not documented in this visit.              Point:  Body mechanics (Not Started)       Description:   Instruct learner(s) on proper positioning and spine alignment during self-care, functional mobility activities and/or exercises.                  Learner Progress:  Not documented in this visit.                              User Key       Initials Effective Dates Name Provider Type Discipline     02/03/23 -  Chula Kat, OT Occupational Therapist OT                  OT Recommendation and Plan  Planned Therapy Interventions (OT): activity tolerance training, BADL retraining, functional balance retraining, occupation/activity based interventions, patient/caregiver education/training, transfer/mobility retraining  Therapy Frequency (OT): daily  Plan of Care Review  Plan of Care Reviewed With: patient  Outcome Evaluation: Pt presents below baseline with self care/mobility d/t decr balance and activity tolerance.  Pt min A toilet transfer, min A toileting tasks,  CGA to ambulate in room with RW.  Pt with drop in BP with change in position with no dizziness reported.  OT will follow to advance pt toward PLOF.  Recommend home with assist and HHOT upon d/c.     Time Calculation:         Time Calculation- OT       Row Name 01/12/24 0922 01/12/24 0920          Time Calculation- OT    OT Start Time -- 0920  -     OT Received On -- 01/12/24  -     OT Goal Re-Cert Due Date -- 01/22/24  -        Timed Charges    24483 - Gait Training Minutes  10  -LO --        Untimed Charges    OT Eval/Re-eval Minutes -- 51  -AC        Total Minutes    Timed Charges Total Minutes 10  -LO --     Untimed Charges Total Minutes -- 51  -AC      Total Minutes 10  -LO 51  -AC               User Key  (r) = Recorded By, (t) = Taken By, (c) = Cosigned By      Initials Name Provider Type     Chula Kat, OT Occupational Therapist    Laurence Titus, PT Physical Therapist                  Therapy Charges for Today       Code Description Service Date Service Provider Modifiers Qty     [id number]  OT EVAL LOW COMPLEXITY 4 1/12/2024 Chula Kat, OT GO 1                 Chula Kat, OT  1/12/2024

## 2024-01-13 ENCOUNTER — APPOINTMENT (OUTPATIENT)
Dept: GENERAL RADIOLOGY | Facility: HOSPITAL | Age: 79
DRG: 312 | End: 2024-01-13
Payer: MEDICARE

## 2024-01-13 LAB
ALBUMIN SERPL-MCNC: 3.4 G/DL (ref 3.5–5.2)
ANION GAP SERPL CALCULATED.3IONS-SCNC: 9 MMOL/L (ref 5–15)
BUN SERPL-MCNC: 64 MG/DL (ref 8–23)
BUN/CREAT SERPL: 28.7 (ref 7–25)
CALCIUM SPEC-SCNC: 8.3 MG/DL (ref 8.6–10.5)
CHLORIDE SERPL-SCNC: 107 MMOL/L (ref 98–107)
CO2 SERPL-SCNC: 25 MMOL/L (ref 22–29)
CREAT SERPL-MCNC: 2.23 MG/DL (ref 0.76–1.27)
DEPRECATED RDW RBC AUTO: 43.1 FL (ref 37–54)
EGFRCR SERPLBLD CKD-EPI 2021: 29.6 ML/MIN/1.73
ERYTHROCYTE [DISTWIDTH] IN BLOOD BY AUTOMATED COUNT: 13.2 % (ref 12.3–15.4)
GLUCOSE BLDC GLUCOMTR-MCNC: 126 MG/DL (ref 70–130)
GLUCOSE BLDC GLUCOMTR-MCNC: 131 MG/DL (ref 70–130)
GLUCOSE BLDC GLUCOMTR-MCNC: 151 MG/DL (ref 70–130)
GLUCOSE BLDC GLUCOMTR-MCNC: 211 MG/DL (ref 70–130)
GLUCOSE BLDC GLUCOMTR-MCNC: 75 MG/DL (ref 70–130)
GLUCOSE SERPL-MCNC: 65 MG/DL (ref 65–99)
HCT VFR BLD AUTO: 25.8 % (ref 37.5–51)
HGB BLD-MCNC: 8.4 G/DL (ref 13–17.7)
MAGNESIUM SERPL-MCNC: 2.8 MG/DL (ref 1.6–2.4)
MCH RBC QN AUTO: 29 PG (ref 26.6–33)
MCHC RBC AUTO-ENTMCNC: 32.6 G/DL (ref 31.5–35.7)
MCV RBC AUTO: 89 FL (ref 79–97)
PHOSPHATE SERPL-MCNC: 3.5 MG/DL (ref 2.5–4.5)
PLATELET # BLD AUTO: 256 10*3/MM3 (ref 140–450)
PMV BLD AUTO: 10.9 FL (ref 6–12)
POTASSIUM SERPL-SCNC: 4.2 MMOL/L (ref 3.5–5.2)
RBC # BLD AUTO: 2.9 10*6/MM3 (ref 4.14–5.8)
SODIUM SERPL-SCNC: 141 MMOL/L (ref 136–145)
WBC NRBC COR # BLD AUTO: 7.92 10*3/MM3 (ref 3.4–10.8)

## 2024-01-13 PROCEDURE — 71045 X-RAY EXAM CHEST 1 VIEW: CPT

## 2024-01-13 PROCEDURE — 84165 PROTEIN E-PHORESIS SERUM: CPT | Performed by: INTERNAL MEDICINE

## 2024-01-13 PROCEDURE — 99231 SBSQ HOSP IP/OBS SF/LOW 25: CPT | Performed by: NURSE PRACTITIONER

## 2024-01-13 PROCEDURE — 83735 ASSAY OF MAGNESIUM: CPT | Performed by: NURSE PRACTITIONER

## 2024-01-13 PROCEDURE — 85027 COMPLETE CBC AUTOMATED: CPT | Performed by: STUDENT IN AN ORGANIZED HEALTH CARE EDUCATION/TRAINING PROGRAM

## 2024-01-13 PROCEDURE — 82948 REAGENT STRIP/BLOOD GLUCOSE: CPT

## 2024-01-13 PROCEDURE — 63710000001 INSULIN LISPRO (HUMAN) PER 5 UNITS: Performed by: FAMILY MEDICINE

## 2024-01-13 PROCEDURE — 25010000002 FUROSEMIDE PER 20 MG: Performed by: STUDENT IN AN ORGANIZED HEALTH CARE EDUCATION/TRAINING PROGRAM

## 2024-01-13 PROCEDURE — 99232 SBSQ HOSP IP/OBS MODERATE 35: CPT | Performed by: STUDENT IN AN ORGANIZED HEALTH CARE EDUCATION/TRAINING PROGRAM

## 2024-01-13 PROCEDURE — 80069 RENAL FUNCTION PANEL: CPT | Performed by: STUDENT IN AN ORGANIZED HEALTH CARE EDUCATION/TRAINING PROGRAM

## 2024-01-13 PROCEDURE — 83521 IG LIGHT CHAINS FREE EACH: CPT | Performed by: INTERNAL MEDICINE

## 2024-01-13 PROCEDURE — 84155 ASSAY OF PROTEIN SERUM: CPT | Performed by: INTERNAL MEDICINE

## 2024-01-13 PROCEDURE — 94640 AIRWAY INHALATION TREATMENT: CPT

## 2024-01-13 RX ORDER — FUROSEMIDE 10 MG/ML
40 INJECTION INTRAMUSCULAR; INTRAVENOUS ONCE
Status: COMPLETED | OUTPATIENT
Start: 2024-01-13 | End: 2024-01-13

## 2024-01-13 RX ORDER — IPRATROPIUM BROMIDE AND ALBUTEROL SULFATE 2.5; .5 MG/3ML; MG/3ML
3 SOLUTION RESPIRATORY (INHALATION) ONCE
Status: COMPLETED | OUTPATIENT
Start: 2024-01-13 | End: 2024-01-13

## 2024-01-13 RX ORDER — IPRATROPIUM BROMIDE AND ALBUTEROL SULFATE 2.5; .5 MG/3ML; MG/3ML
3 SOLUTION RESPIRATORY (INHALATION) EVERY 6 HOURS PRN
Status: DISCONTINUED | OUTPATIENT
Start: 2024-01-13 | End: 2024-01-14 | Stop reason: HOSPADM

## 2024-01-13 RX ADMIN — CARVEDILOL 6.25 MG: 6.25 TABLET, FILM COATED ORAL at 21:05

## 2024-01-13 RX ADMIN — MAGNESIUM OXIDE TAB 400 MG (241.3 MG ELEMENTAL MG) 400 MG: 400 (241.3 MG) TAB at 08:08

## 2024-01-13 RX ADMIN — Medication 10 ML: at 08:09

## 2024-01-13 RX ADMIN — Medication 10 ML: at 21:06

## 2024-01-13 RX ADMIN — PRASUGREL 10 MG: 10 TABLET, FILM COATED ORAL at 08:08

## 2024-01-13 RX ADMIN — CETIRIZINE HYDROCHLORIDE 10 MG: 10 TABLET, FILM COATED ORAL at 08:08

## 2024-01-13 RX ADMIN — PANTOPRAZOLE SODIUM 40 MG: 40 TABLET, DELAYED RELEASE ORAL at 05:22

## 2024-01-13 RX ADMIN — FUROSEMIDE 40 MG: 10 INJECTION, SOLUTION INTRAMUSCULAR; INTRAVENOUS at 08:43

## 2024-01-13 RX ADMIN — TAMSULOSIN HYDROCHLORIDE 0.4 MG: 0.4 CAPSULE ORAL at 08:07

## 2024-01-13 RX ADMIN — RANOLAZINE 500 MG: 500 TABLET, EXTENDED RELEASE ORAL at 21:05

## 2024-01-13 RX ADMIN — HYDRALAZINE HYDROCHLORIDE 50 MG: 50 TABLET, FILM COATED ORAL at 05:24

## 2024-01-13 RX ADMIN — IPRATROPIUM BROMIDE AND ALBUTEROL SULFATE 3 ML: 2.5; .5 SOLUTION RESPIRATORY (INHALATION) at 09:35

## 2024-01-13 RX ADMIN — FINASTERIDE 5 MG: 5 TABLET, FILM COATED ORAL at 08:10

## 2024-01-13 RX ADMIN — RANOLAZINE 500 MG: 500 TABLET, EXTENDED RELEASE ORAL at 08:11

## 2024-01-13 RX ADMIN — SERTRALINE HYDROCHLORIDE 100 MG: 100 TABLET ORAL at 08:07

## 2024-01-13 RX ADMIN — SENNOSIDES AND DOCUSATE SODIUM 2 TABLET: 8.6; 5 TABLET ORAL at 21:05

## 2024-01-13 RX ADMIN — PRAMIPEXOLE DIHYDROCHLORIDE 1 MG: 0.25 TABLET ORAL at 21:05

## 2024-01-13 RX ADMIN — INSULIN LISPRO 2 UNITS: 100 INJECTION, SOLUTION INTRAVENOUS; SUBCUTANEOUS at 17:31

## 2024-01-13 RX ADMIN — ATORVASTATIN CALCIUM 40 MG: 40 TABLET, FILM COATED ORAL at 08:08

## 2024-01-13 RX ADMIN — HYDRALAZINE HYDROCHLORIDE 50 MG: 50 TABLET, FILM COATED ORAL at 21:05

## 2024-01-13 RX ADMIN — INSULIN LISPRO 4 UNITS: 100 INJECTION, SOLUTION INTRAVENOUS; SUBCUTANEOUS at 21:05

## 2024-01-13 RX ADMIN — FLUTICASONE PROPIONATE 1 SPRAY: 50 SPRAY, METERED NASAL at 08:11

## 2024-01-13 NOTE — PLAN OF CARE
Problem: Adult Inpatient Plan of Care  Goal: Plan of Care Review  Outcome: Ongoing, Progressing  Flowsheets  Taken 1/13/2024 0646 by Trupti Mcgraw RN  Progress: no change  Outcome Evaluation: VSS throughout shift. Pt with increased SOA during shift. Required 2L NC. Provider notified and CXR completed. PRN nebs placed by provider.  Taken 1/12/2024 1058 by Chula Kat OT  Plan of Care Reviewed With: patient  Goal: Patient-Specific Goal (Individualized)  Outcome: Ongoing, Progressing  Goal: Absence of Hospital-Acquired Illness or Injury  Outcome: Ongoing, Progressing  Intervention: Identify and Manage Fall Risk  Recent Flowsheet Documentation  Taken 1/13/2024 0600 by Trupti Mcgraw RN  Safety Promotion/Fall Prevention:   activity supervised   assistive device/personal items within reach   clutter free environment maintained   nonskid shoes/slippers when out of bed   safety round/check completed   room organization consistent  Taken 1/13/2024 0400 by Trupti Mcgraw RN  Safety Promotion/Fall Prevention:   activity supervised   assistive device/personal items within reach   clutter free environment maintained   nonskid shoes/slippers when out of bed   room organization consistent   safety round/check completed  Taken 1/13/2024 0200 by Trupti Mcgraw RN  Safety Promotion/Fall Prevention:   activity supervised   assistive device/personal items within reach   clutter free environment maintained   nonskid shoes/slippers when out of bed   room organization consistent   safety round/check completed  Taken 1/13/2024 0000 by Trupti Mcgraw RN  Safety Promotion/Fall Prevention:   activity supervised   assistive device/personal items within reach   clutter free environment maintained   nonskid shoes/slippers when out of bed   room organization consistent   safety round/check completed  Taken 1/12/2024 2200 by Trupti Mcgraw RN  Safety Promotion/Fall Prevention:   activity supervised    assistive device/personal items within reach   clutter free environment maintained   nonskid shoes/slippers when out of bed   room organization consistent   safety round/check completed  Taken 1/12/2024 2047 by Trupti Mcgraw RN  Safety Promotion/Fall Prevention:   activity supervised   assistive device/personal items within reach   clutter free environment maintained   nonskid shoes/slippers when out of bed   room organization consistent   safety round/check completed  Intervention: Prevent Skin Injury  Recent Flowsheet Documentation  Taken 1/13/2024 0600 by Trupti Mcgraw RN  Body Position: position changed independently  Skin Protection:   adhesive use limited   incontinence pads utilized   transparent dressing maintained   tubing/devices free from skin contact  Taken 1/13/2024 0400 by Trupti Mcgraw RN  Body Position: position changed independently  Skin Protection:   adhesive use limited   incontinence pads utilized   transparent dressing maintained   tubing/devices free from skin contact  Taken 1/13/2024 0200 by Trupti Mcgraw RN  Body Position: position changed independently  Skin Protection:   adhesive use limited   incontinence pads utilized   transparent dressing maintained   tubing/devices free from skin contact  Taken 1/13/2024 0000 by Trupti Mcgraw RN  Body Position: position changed independently  Skin Protection:   adhesive use limited   incontinence pads utilized   transparent dressing maintained   tubing/devices free from skin contact  Taken 1/12/2024 2200 by Trupti Mcgraw RN  Body Position: position changed independently  Skin Protection:   adhesive use limited   incontinence pads utilized   transparent dressing maintained   tubing/devices free from skin contact  Taken 1/12/2024 2047 by Trupti Mcgraw RN  Body Position: position changed independently  Skin Protection:   adhesive use limited   incontinence pads utilized   transparent dressing maintained    tubing/devices free from skin contact  Intervention: Prevent and Manage VTE (Venous Thromboembolism) Risk  Recent Flowsheet Documentation  Taken 1/13/2024 0600 by Trupti Mcgraw RN  Activity Management: activity encouraged  Taken 1/13/2024 0400 by Trupti Mcgraw RN  Activity Management: activity encouraged  Taken 1/13/2024 0200 by Trupti Mcgraw RN  Activity Management: activity encouraged  Taken 1/13/2024 0000 by Trupti Mcgraw RN  Activity Management: activity encouraged  Taken 1/12/2024 2200 by Trupti Mcgraw RN  Activity Management: activity encouraged  Taken 1/12/2024 2047 by Trupti Mcgraw RN  Activity Management: activity encouraged  VTE Prevention/Management:   bilateral   sequential compression devices off  Range of Motion: active ROM (range of motion) encouraged  Intervention: Prevent Infection  Recent Flowsheet Documentation  Taken 1/13/2024 0600 by Trupti Mcgraw RN  Infection Prevention:   environmental surveillance performed   hand hygiene promoted   rest/sleep promoted  Taken 1/13/2024 0400 by Trupti Mcgraw RN  Infection Prevention:   environmental surveillance performed   hand hygiene promoted   rest/sleep promoted  Taken 1/13/2024 0200 by Trupti Mcgraw RN  Infection Prevention:   environmental surveillance performed   hand hygiene promoted   rest/sleep promoted  Taken 1/13/2024 0000 by Trupti Mcgraw RN  Infection Prevention:   environmental surveillance performed   hand hygiene promoted   rest/sleep promoted  Taken 1/12/2024 2200 by Trupti Mcgraw RN  Infection Prevention:   environmental surveillance performed   hand hygiene promoted   rest/sleep promoted  Taken 1/12/2024 2047 by Trupti Mcgraw RN  Infection Prevention:   environmental surveillance performed   hand hygiene promoted   rest/sleep promoted  Goal: Optimal Comfort and Wellbeing  Outcome: Ongoing, Progressing  Intervention: Provide Person-Centered Care  Recent  Flowsheet Documentation  Taken 1/12/2024 2047 by Trupti Mcgraw RN  Trust Relationship/Rapport:   care explained   choices provided   thoughts/feelings acknowledged  Goal: Readiness for Transition of Care  Outcome: Ongoing, Progressing     Problem: Skin Injury Risk Increased  Goal: Skin Health and Integrity  Outcome: Ongoing, Progressing  Intervention: Promote and Optimize Oral Intake  Recent Flowsheet Documentation  Taken 1/12/2024 2047 by Trupti Mcgraw RN  Oral Nutrition Promotion: rest periods promoted  Intervention: Optimize Skin Protection  Recent Flowsheet Documentation  Taken 1/13/2024 0600 by Trupti Mcgraw RN  Pressure Reduction Techniques: frequent weight shift encouraged  Head of Bed (HOB) Positioning: [facility] elevated  Pressure Reduction Devices: pressure-redistributing mattress utilized  Skin Protection:   adhesive use limited   incontinence pads utilized   transparent dressing maintained   tubing/devices free from skin contact  Taken 1/13/2024 0400 by Trupti Mcgraw RN  Pressure Reduction Techniques: frequent weight shift encouraged  Head of Bed (HOB) Positioning: [facility] elevated  Pressure Reduction Devices: pressure-redistributing mattress utilized  Skin Protection:   adhesive use limited   incontinence pads utilized   transparent dressing maintained   tubing/devices free from skin contact  Taken 1/13/2024 0200 by Trupti Mcgraw RN  Pressure Reduction Techniques: frequent weight shift encouraged  Head of Bed (HOB) Positioning: [facility] elevated  Pressure Reduction Devices: pressure-redistributing mattress utilized  Skin Protection:   adhesive use limited   incontinence pads utilized   transparent dressing maintained   tubing/devices free from skin contact  Taken 1/13/2024 0000 by Trupti Mcgraw RN  Pressure Reduction Techniques: frequent weight shift encouraged  Head of Bed (HOB) Positioning: [facility] elevated  Pressure Reduction Devices: pressure-redistributing mattress  utilized  Skin Protection:   adhesive use limited   incontinence pads utilized   transparent dressing maintained   tubing/devices free from skin contact  Taken 1/12/2024 2200 by Trupti Mcgraw RN  Pressure Reduction Techniques: frequent weight shift encouraged  Head of Bed (HOB) Positioning: HOB elevated  Pressure Reduction Devices: pressure-redistributing mattress utilized  Skin Protection:   adhesive use limited   incontinence pads utilized   transparent dressing maintained   tubing/devices free from skin contact  Taken 1/12/2024 2047 by Trupti Mcgraw RN  Pressure Reduction Techniques: frequent weight shift encouraged  Head of Bed (HOB) Positioning: HOB elevated  Pressure Reduction Devices: pressure-redistributing mattress utilized  Skin Protection:   adhesive use limited   incontinence pads utilized   transparent dressing maintained   tubing/devices free from skin contact     Problem: Diabetes Comorbidity  Goal: Blood Glucose Level Within Targeted Range  Outcome: Ongoing, Progressing  Intervention: Monitor and Manage Glycemia  Recent Flowsheet Documentation  Taken 1/12/2024 2047 by Trupti Mcgraw RN  Glycemic Management: blood glucose monitored     Problem: Heart Failure Comorbidity  Goal: Maintenance of Heart Failure Symptom Control  Outcome: Ongoing, Progressing  Intervention: Maintain Heart Failure-Management  Recent Flowsheet Documentation  Taken 1/12/2024 2047 by Trupti Mcgraw RN  Medication Review/Management: medications reviewed     Problem: Hypertension Comorbidity  Goal: Blood Pressure in Desired Range  Outcome: Ongoing, Progressing  Intervention: Maintain Blood Pressure Management  Recent Flowsheet Documentation  Taken 1/12/2024 2047 by Trupti Mcgraw RN  Syncope Management: position changed slowly  Medication Review/Management: medications reviewed     Problem: Obstructive Sleep Apnea Risk or Actual Comorbidity Management  Goal: Unobstructed Breathing During  Sleep  Outcome: Ongoing, Progressing     Problem: Pain Chronic (Persistent) (Comorbidity Management)  Goal: Acceptable Pain Control and Functional Ability  Outcome: Ongoing, Progressing  Intervention: Manage Persistent Pain  Recent Flowsheet Documentation  Taken 1/12/2024 2047 by Trupti Mcgraw RN  Bowel Elimination Promotion: adequate fluid intake promoted  Sleep/Rest Enhancement:   awakenings minimized   regular sleep/rest pattern promoted   relaxation techniques promoted   room darkened  Medication Review/Management: medications reviewed  Intervention: Optimize Psychosocial Wellbeing  Recent Flowsheet Documentation  Taken 1/12/2024 2047 by Trupti Mcgraw RN  Supportive Measures: active listening utilized  Diversional Activities: television     Problem: Fall Injury Risk  Goal: Absence of Fall and Fall-Related Injury  Outcome: Ongoing, Progressing  Intervention: Identify and Manage Contributors  Recent Flowsheet Documentation  Taken 1/12/2024 2047 by Trupti Mcgraw RN  Medication Review/Management: medications reviewed  Self-Care Promotion: independence encouraged  Intervention: Promote Injury-Free Environment  Recent Flowsheet Documentation  Taken 1/13/2024 0600 by Trupti Mcgraw RN  Safety Promotion/Fall Prevention:   activity supervised   assistive device/personal items within reach   clutter free environment maintained   nonskid shoes/slippers when out of bed   safety round/check completed   room organization consistent  Taken 1/13/2024 0400 by Trupti Mcgraw RN  Safety Promotion/Fall Prevention:   activity supervised   assistive device/personal items within reach   clutter free environment maintained   nonskid shoes/slippers when out of bed   room organization consistent   safety round/check completed  Taken 1/13/2024 0200 by Trupti Mcgraw RN  Safety Promotion/Fall Prevention:   activity supervised   assistive device/personal items within reach   clutter free environment  maintained   nonskid shoes/slippers when out of bed   room organization consistent   safety round/check completed  Taken 1/13/2024 0000 by Trupti Mcgraw RN  Safety Promotion/Fall Prevention:   activity supervised   assistive device/personal items within reach   clutter free environment maintained   nonskid shoes/slippers when out of bed   room organization consistent   safety round/check completed  Taken 1/12/2024 2200 by Trupti Mcgraw, RN  Safety Promotion/Fall Prevention:   activity supervised   assistive device/personal items within reach   clutter free environment maintained   nonskid shoes/slippers when out of bed   room organization consistent   safety round/check completed  Taken 1/12/2024 2047 by Trupti Mcgraw RN  Safety Promotion/Fall Prevention:   activity supervised   assistive device/personal items within reach   clutter free environment maintained   nonskid shoes/slippers when out of bed   room organization consistent   safety round/check completed   Goal Outcome Evaluation:  Plan of Care Reviewed With: patient        Progress: no change  Outcome Evaluation: VSS throughout shift. Pt with increased SOA during shift. Required 2L NC. Provider notified and CXR completed. PRN nebs placed by provider.

## 2024-01-13 NOTE — PROGRESS NOTES
[facility] Medicine Services  PROGRESS NOTE    Patient Name: Donny Jerry  : 1945  MRN: [id number]    Date of Admission: 2024  Primary Care Physician: Anum Alonso APRN    Subjective   Subjective     CC:  Syncope    HPI:  Evaluated patient this morning. Patient reported feeling a little bit more short of breath, breathing was a little bit more labored today. Patient denied chest pain. Reported that he slept well.      Objective   Objective     Vital Signs:   Temp:  [98 °F (36.7 °C)-98.9 °F (37.2 °C)] 98.9 °F (37.2 °C)  Heart Rate:  [64-87] 76  Resp:  [17-20] 17  BP: ()/() 155/71  Flow (L/min):  [2] 2     Physical Exam:  Constitutional: Awake, alert, resting comfortably  HENT: NCAT, mucous membranes moist  Respiratory: Minich breath sounds bilaterally in upper and lower lung fields, no wheezes.  Cardiovascular: RRR, no murmurs, rubs, or gallops  Gastrointestinal: Positive bowel sounds, soft, nontender, nondistended  Musculoskeletal: +1-2 pitting edema in bilateral lower extremities  Neurologic: Alert and oriented x 3, no focal deficits, speech clear  Skin: No rashes      Results Reviewed:  LAB RESULTS:      Lab 24  1247   WBC 7.92 7.49 6.81 7.57   HEMOGLOBIN 8.4* 8.6* 8.8* 8.8*   HEMATOCRIT 25.8* 26.9* 27.4* 26.5*   PLATELETS 256 263 249 241   NEUTROS ABS  --  5.17 5.12 5.95   IMMATURE GRANS (ABS)  --  0.03 0.03 0.02   LYMPHS ABS  --  1.23 0.83 0.88   MONOS ABS  --  0.70 0.56 0.55   EOS ABS  --  0.30 0.21 0.13   MCV 89.0 91.5 92.6 89.5         Lab 24  0802 248 24  1026 24  1247   SODIUM 141 142 142 138   POTASSIUM 4.2 4.6 4.5 4.5   CHLORIDE 107 105 106 102   CO2 25.0 28.0 25.0 24.8   ANION GAP 9.0 9.0 11.0 11.2   BUN 64* 66* 62* 54*   CREATININE 2.23* 2.50* 2.20* 2.05*   EGFR 29.6* 25.8* 30.1* 32.8*   GLUCOSE 65 85 56* 179*   CALCIUM 8.3* 8.7 8.7 8.3*   MAGNESIUM 2.8* 3.0* 2.8*  2.6*   PHOSPHORUS 3.5  --   --   --    TSH  --   --  2.510  --          Lab 01/13/24  0802 01/12/24  0458 01/11/24  1026 01/09/24  1247   TOTAL PROTEIN  --  6.0 6.4 7.0   ALBUMIN 3.4* 3.8 3.6 4.0   GLOBULIN  --  2.2 2.8 3.0   ALT (SGPT)  --  24 27 26   AST (SGOT)  --  18 24 21   BILIRUBIN  --  0.3 0.4 0.3   ALK PHOS  --  47 45 49         Lab 01/11/24  1242 01/11/24  1026 01/09/24  1711 01/09/24  1434 01/09/24  1247   PROBNP 5,539.0*  --   --   --  4,683.0*   HSTROP T 172* 178* 207* 202* 198*             Lab 01/11/24  1627 01/11/24  1026   IRON 28* 36*   IRON SATURATION (TSAT)  --  12*   TIBC  --  291*   TRANSFERRIN  --  195*   FERRITIN  --  356.20         Brief Urine Lab Results  (Last result in the past 365 days)        Color   Clarity   Blood   Leuk Est   Nitrite   Protein   CREAT   Urine HCG        12/19/23 1142             26.3                 Microbiology Results Abnormal       None            XR Chest 1 View    Result Date: 1/13/2024  XR CHEST 1 VW Date of Exam: 1/13/2024 2:09 AM EST Indication: sob Comparison: Chest radiograph dated January 11, 2024 Findings: There are postoperative changes from midline sternotomy. There is mild pulmonary vascular congestion. There is no pneumothorax. There are no focal consolidations. The pleural spaces appear clear.     Impression: Impression: Cardiomegaly and mild pulmonary edema. Electronically Signed: John Moss MD  1/13/2024 2:26 AM EST  Workstation ID: [id number]    Adult Transthoracic Echo Complete w/ Color, Spectral and Contrast if necessary per protocol    Result Date: 1/12/2024    Left ventricular systolic function is low normal. Calculated left ventricular EF = 45.8% Left ventricular ejection fraction appears to be 46 - 50%.   Left ventricular wall thickness is consistent with mild concentric hypertrophy.   Left ventricular diastolic function is consistent with (grade II w/high LAP) pseudonormalization.   Left atrial volume is moderately increased.   Mild aortic  valve stenosis is present.   Estimated right ventricular systolic pressure from tricuspid regurgitation is normal (<35 mmHg). No significant change compared to 7/2023 echo      Results for orders placed during the hospital encounter of 01/11/24    Adult Transthoracic Echo Complete w/ Color, Spectral and Contrast if necessary per protocol    Interpretation Summary    Left ventricular systolic function is low normal. Calculated left ventricular EF = 45.8% Left ventricular ejection fraction appears to be 46 - 50%.    Left ventricular wall thickness is consistent with mild concentric hypertrophy.    Left ventricular diastolic function is consistent with (grade II w/high LAP) pseudonormalization.    Left atrial volume is moderately increased.    Mild aortic valve stenosis is present.    Estimated right ventricular systolic pressure from tricuspid regurgitation is normal (<35 mmHg).    No significant change compared to 7/2023 echo      Current medications:  Scheduled Meds:aspirin, 81 mg, Oral, Daily  atorvastatin, 40 mg, Oral, Daily  carvedilol, 6.25 mg, Oral, Q12H  cetirizine, 10 mg, Oral, Daily  finasteride, 5 mg, Oral, Daily  fluticasone, 1 spray, Nasal, Daily  hydrALAZINE, 50 mg, Oral, Q8H  insulin lispro, 2-9 Units, Subcutaneous, 4x Daily AC & at Bedtime  magnesium oxide, 400 mg, Oral, Daily  pantoprazole, 40 mg, Oral, Q AM  pramipexole, 1 mg, Oral, Nightly  prasugrel, 10 mg, Oral, Daily  ranolazine, 500 mg, Oral, Q12H  senna-docusate sodium, 2 tablet, Oral, BID  sertraline, 100 mg, Oral, Daily  sodium chloride, 10 mL, Intravenous, Q12H  tamsulosin, 0.4 mg, Oral, Daily      Continuous Infusions:   PRN Meds:.  acetaminophen    senna-docusate sodium **AND** polyethylene glycol **AND** bisacodyl **AND** bisacodyl    Calcium Replacement - Follow Nurse / BPA Driven Protocol    dextrose    dextrose    glucagon (human recombinant)    ipratropium-albuterol    Magnesium Cardiology Dose Replacement - Follow Nurse / BPA Driven  Protocol    ondansetron    Phosphorus Replacement - Follow Nurse / BPA Driven Protocol    Potassium Replacement - Follow Nurse / BPA Driven Protocol    sodium chloride    sodium chloride    sodium chloride    Assessment & Plan   Assessment & Plan     Active Hospital Problems    Diagnosis  POA    **Syncope [R55]  Yes    Acute on chronic HFrEF (heart failure with reduced ejection fraction) [I50.23]  Yes    CKD (chronic kidney disease) stage 3, GFR 30-59 ml/min [N18.30]  Yes    Coronary artery disease involving native coronary artery of native heart with angina pectoris [I25.119]  Yes    Essential hypertension [I10]  Yes    Obstructive sleep apnea on CPAP [G47.33]  Yes    Type 2 diabetes mellitus with stage 3 chronic kidney disease [E11.22, N18.30]  Yes      Resolved Hospital Problems   No resolved problems to display.        Brief Hospital Course to date:  Donny Jerry is a 78 y.o. male with PMHx of CAD s/p CABG, CKDIII, HTN, USHA, DM2, chronic HFrEF who presented to [facility] ED on 1/11 secondary to a syncopal episode while walking to his cardiologist's office. Patient had similar episode on 1/9 while at chiropractor while being repositioned on the table. Admitted for syncope work-up.     Recurrent syncope   -Unclear etiology but suspect component of orthostasis, intravascular depletion; of note received IV Lasix earlier this week while in the ED after syncopal episode - perhaps leading to further orthostasis; also possibly secondary to high PVC burden  -orthostatic vital signs positive on admission, negative on 1/12 however SBP decreased to 90s after walking with physical therapy.  -Echo with EF 46-50%, grade 2 diastolic dysfunction, no significant change compared to prior echo  -s/p gentle hydration on admission  -Cardiology evaluated, recommended to continue carvedilol and hydralazine. Stopped doxazosin. Recommended 14-day cardiac event monitor on discharge to quantify PVC burden.    Acute on chronic  HFmrEF  Hypoxia  -Evidence of extravascular volume overload, however suspect intravascularly depleted  -proBNP 5K on admission  -CXR 1/13 with cardiomegaly and mild pulmonary edema  -Ordered IV Lasix 40 mg x 1 today given patient with more labored breathing. Holding SGLT2i given syncope.     Elevated troponin   -suspect demand ischemia in setting of CKD  -troponin 178->172, no chest pain  -EKG with NSR and frequent PVCs    Type 2 DM with Hypoglycemia   -BG 56 on admission, A1c 5.8% on admission (likely low in setting of anemia)  -Per wife, takes Tresiba 30u AM and 74u PM which is way too much for him.   -Levemir 5u nightly with moderate SSI for now. Monitor closely.     CKD stage III  -Baseline Cr around 1.9, Cr 2.2 on admission  -Follows with Dr. Wilkes   -Nephrology following here.     Iron deficiency   Anemia   -previous baseline Hgb ~10, 8.8 on admission   -Iron deficiency on iron studies, initiated IV iron x3 doses.  -Nephrology evaluated, recommended to start Procrit this admission. Will need outpatient Procrit 1-2 times per month with outpatient nephrologist. Checking SPEP and free light chain ratio.     CAD s/p CABG  -Continue ASA, prasugrel, atorvastatin, carvedilol, ranolazine.    USHA  -CPAP at night     RLS  -Continue pramipexole.      BPH  -Continue tamsulosin.     GERD  -Continue PPI.    Anxiety/depression  -Continue home Zoloft.    Expected Discharge Location and Transportation: Home  Expected Discharge   Expected Discharge Date: 1/13/2024; Expected Discharge Time:      DVT prophylaxis:  Mechanical DVT prophylaxis orders are present.     AM-PAC 6 Clicks Score (PT): 13 (01/13/24 0800)    CODE STATUS:   Code Status and Medical Interventions:   Ordered at: 01/11/24 1415     Medical Intervention Limits:    NO intubation (DNI)    NO cardioversion     Level Of Support Discussed With:    Patient    Next of Kin (If No Surrogate)     Code Status (Patient has no pulse and is not breathing):    No CPR (Do Not  Attempt to Resuscitate)     Medical Interventions (Patient has pulse or is breathing):    Limited Support       Vicky Pierre,   01/13/24

## 2024-01-13 NOTE — SIGNIFICANT NOTE
S  Called by nurse for increased restlessness, audible wheeze, mild soa- worse with exertion.  RN placed pt on 2L, never dropped sats but symptoms improved.    O  Constitutional: Awake, alert, no complaint, feels ok with no complaints  Respiratory: Clear to auscultation bilaterally, diminished bases, nonlabored respirations 98% on 2Lnc  Cardiovascular: irreg rhythm. Bigeminy on monitor with 2-3beat run of vt. , no murmurs, rubs, or gallops, palpable pedal pulses bilaterally    A  Bigeminy, soa    P  CXR complete, will hold off diuresis for now, appears stable from respiratory standpoint, leave on 2L, add nebs for wheezing prn.  Will draw am labs early to check electrolytes for frequent ectopy.

## 2024-01-13 NOTE — PROGRESS NOTES
Donny Jerry  [id number]  1945   LOS: 1 day   Patient Care Team:  Anum Alonso APRN as PCP - General  Rubin Bernal MD as Consulting Physician (Urology)  Liz Russo MD as Consulting Physician (Cardiology)    Chief Complaint: Follow-up on syncope    Subjective Patient had episode overnight of increased restlessness and wheezing.  He had mild shortness of breath worse with exertion and was placed on 2 L O2 NC.  Oxygen saturations did not drop.  He had a 2-3 beat run of VT and has frequent PVCs.  Chest x-ray obtained with plans to hold off on diuresis and add nebs.  The patient is now lying flat in bed without oxygen therapy.  He says he has some mild wheezing at times and has not had a neb treatment yet.  He denies any chest pain, palpitations, or recurrent syncope.    Objective     Vital Sign Min/Max for last 24 hours  Temp  Min: 98.3 °F (36.8 °C)  Max: 98.9 °F (37.2 °C)   BP  Min: 95/58  Max: 151/119   Pulse  Min: 63  Max: 81   Resp  Min: 18  Max: 20   SpO2  Min: 91 %  Max: 97 %   No data recorded   No data recorded         01/11/24  0958   Weight: 102 kg (224 lb 13.9 oz)         Intake/Output Summary (Last 24 hours) at 1/13/2024 0725  Last data filed at 1/13/2024 0600  Gross per 24 hour   Intake 1440 ml   Output 275 ml   Net 1165 ml       Physical Exam:     General Appearance:    Alert, cooperative, in no acute distress   Lungs:   Faint crackles to auscultation,respirations regular, even and                unlabored    Heart:    Irregular and normal rate with PVCs, normal S1 and S2, no            murmur, no gallop, no rub, no click   Abdomen:  Extremities:   Soft, nontender, bowel sounds audible x4  1+ edema, normal range of motion   Pulses:   Pulses palpable and equal bilaterally      Results Review:   Results from last 7 days   Lab Units 01/12/24  0458 01/11/24  1026 01/09/24  1247   SODIUM mmol/L 142 142 138   POTASSIUM mmol/L 4.6 4.5 4.5   CHLORIDE mmol/L 105 106 102   CO2 mmol/L  28.0 25.0 24.8   BUN mg/dL 66* 62* 54*   CREATININE mg/dL 2.50* 2.20* 2.05*   GLUCOSE mg/dL 85 56* 179*   CALCIUM mg/dL 8.7 8.7 8.3*     Results from last 7 days   Lab Units 01/12/24  0458 01/11/24  1026 01/09/24  1247   WBC 10*3/mm3 7.49 6.81 7.57   HEMOGLOBIN g/dL 8.6* 8.8* 8.8*   HEMATOCRIT % 26.9* 27.4* 26.5*   PLATELETS 10*3/mm3 263 249 241             Results from last 7 days   Lab Units 01/11/24  1242 01/11/24  1026 01/09/24  1711   HSTROP T ng/L 172* 178* 207*   Chest x-ray 1/13/2024:  Cardiomegaly and mild pulmonary edema.     No new ECG to review    Echocardiogram 1/12/2024:    Left ventricular systolic function is low normal. Calculated left ventricular EF = 45.8% Left ventricular ejection fraction appears to be 46 - 50%.    Left ventricular wall thickness is consistent with mild concentric hypertrophy.    Left ventricular diastolic function is consistent with (grade II w/high LAP) pseudonormalization.    Left atrial volume is moderately increased.    Mild aortic valve stenosis is present.    Estimated right ventricular systolic pressure from tricuspid regurgitation is normal (<35 mmHg).  No significant change compared to 7/2023 echo    Medication Review: Reviewed    Assessment & Plan   Patient with syncope in the setting of dehydration, IMANI/CKD, and anemia.  Patient with worsening renal function but no new labs to review yet this morning.  Patient's baseline creatinine is ~2; nephrology following.  Patient will need a MCOT at discharge.  Would switch to Toprol if patient continues to have orthostatic hypotension.  Nephrology with recommendations to hold SGLT2 inhibitor currently.  Orthostatics negative on 1/13/2024 (140-150mmHg).  Echocardiogram yesterday with no significant change from July 2023 echocardiogram.      Syncope    Coronary artery disease involving native coronary artery of native heart with angina pectoris    Essential hypertension    Type 2 diabetes mellitus with stage 3 chronic kidney  disease    Obstructive sleep apnea on CPAP    CKD (chronic kidney disease) stage 3, GFR 30-59 ml/min    Acute on chronic HFrEF (heart failure with reduced ejection fraction)    Electronically signed by ASHLEIGH Bob, 01/13/24, 7:44 AM EST.     01/13/24  07:25 EST

## 2024-01-13 NOTE — PROGRESS NOTES
" LOS: 1 day   Patient Care Team:  Anum Alonso APRN as PCP - General  Rubin Bernal MD as Consulting Physician (Urology)  Liz Russo MD as Consulting Physician (Cardiology)    Chief Complaint: Syncope      Subjective:  Symptoms:  Stable.  No shortness of breath, chest pain or chest pressure.        History taken from: patient    Objective     Vital Sign Min/Max for last 24 hours  Temp  Min: 98 °F (36.7 °C)  Max: 98.9 °F (37.2 °C)   BP  Min: 95/58  Max: 151/119   Pulse  Min: 64  Max: 87   Resp  Min: 18  Max: 20   SpO2  Min: 91 %  Max: 97 %   Flow (L/min)  Min: 2  Max: 2   No data recorded     Flowsheet Rows      Flowsheet Row First Filed Value   Admission Height 170.2 cm (67\") Documented at 01/11/2024 0958   Admission Weight 102 kg (224 lb 13.9 oz) Documented at 01/11/2024 0958            No intake/output data recorded.  I/O last 3 completed shifts:  In: 1994.2 [P.O.:1680; I.V.:314.2]  Out: 275 [Urine:275]    Objective:  General Appearance:  Comfortable.    Vital signs: (most recent): Blood pressure 119/54, pulse 87, temperature 98 °F (36.7 °C), temperature source Oral, resp. rate 18, height 170.2 cm (67\"), weight 102 kg (224 lb 13.9 oz), SpO2 94%.  Vital signs are normal.    Output: Producing urine.    HEENT: Normal HEENT exam.    Lungs:  Normal effort and normal respiratory rate.  Breath sounds clear to auscultation.  No decreased breath sounds or wheezes.    Heart: Normal rate.  Regular rhythm.  S1 normal and S2 normal.  No murmur or gallop.   Abdomen: Abdomen is soft.  Bowel sounds are normal.     Extremities: There is no dependent edema or local swelling.    Pulses: Distal pulses are intact.    Neurological: Patient is alert and oriented to person, place and time.  Normal strength.    Pupils:  Pupils are equal, round, and reactive to light.                Results Review:     I reviewed the patient's new clinical results.    WBC WBC   Date Value Ref Range Status   01/13/2024 7.92 3.40 - " "10.80 10*3/mm3 Final   01/12/2024 7.49 3.40 - 10.80 10*3/mm3 Final   01/11/2024 6.81 3.40 - 10.80 10*3/mm3 Final      HGB Hemoglobin   Date Value Ref Range Status   01/13/2024 8.4 (L) 13.0 - 17.7 g/dL Final   01/12/2024 8.6 (L) 13.0 - 17.7 g/dL Final   01/11/2024 8.8 (L) 13.0 - 17.7 g/dL Final      HCT Hematocrit   Date Value Ref Range Status   01/13/2024 25.8 (L) 37.5 - 51.0 % Final   01/12/2024 26.9 (L) 37.5 - 51.0 % Final   01/11/2024 27.4 (L) 37.5 - 51.0 % Final      Platlets No results found for: \"LABPLAT\"   MCV MCV   Date Value Ref Range Status   01/13/2024 89.0 79.0 - 97.0 fL Final   01/12/2024 91.5 79.0 - 97.0 fL Final   01/11/2024 92.6 79.0 - 97.0 fL Final          Sodium Sodium   Date Value Ref Range Status   01/13/2024 141 136 - 145 mmol/L Final   01/12/2024 142 136 - 145 mmol/L Final   01/11/2024 142 136 - 145 mmol/L Final      Potassium Potassium   Date Value Ref Range Status   01/13/2024 4.2 3.5 - 5.2 mmol/L Final   01/12/2024 4.6 3.5 - 5.2 mmol/L Final   01/11/2024 4.5 3.5 - 5.2 mmol/L Final     Comment:     Slight hemolysis detected by analyzer. Result may be falsely elevated.      Chloride Chloride   Date Value Ref Range Status   01/13/2024 107 98 - 107 mmol/L Final   01/12/2024 105 98 - 107 mmol/L Final   01/11/2024 106 98 - 107 mmol/L Final      CO2 CO2   Date Value Ref Range Status   01/13/2024 25.0 22.0 - 29.0 mmol/L Final   01/12/2024 28.0 22.0 - 29.0 mmol/L Final   01/11/2024 25.0 22.0 - 29.0 mmol/L Final      BUN BUN   Date Value Ref Range Status   01/13/2024 64 (H) 8 - 23 mg/dL Final   01/12/2024 66 (H) 8 - 23 mg/dL Final   01/11/2024 62 (H) 8 - 23 mg/dL Final      Creatinine Creatinine   Date Value Ref Range Status   01/13/2024 2.23 (H) 0.76 - 1.27 mg/dL Final   01/12/2024 2.50 (H) 0.76 - 1.27 mg/dL Final   01/11/2024 2.20 (H) 0.76 - 1.27 mg/dL Final      Calcium Calcium   Date Value Ref Range Status   01/13/2024 8.3 (L) 8.6 - 10.5 mg/dL Final   01/12/2024 8.7 8.6 - 10.5 mg/dL Final " "  01/11/2024 8.7 8.6 - 10.5 mg/dL Final      PO4 No results found for: \"CAPO4\"   Albumin Albumin   Date Value Ref Range Status   01/13/2024 3.4 (L) 3.5 - 5.2 g/dL Final   01/12/2024 3.8 3.5 - 5.2 g/dL Final   01/11/2024 3.6 3.5 - 5.2 g/dL Final      Magnesium Magnesium   Date Value Ref Range Status   01/13/2024 2.8 (H) 1.6 - 2.4 mg/dL Final   01/12/2024 3.0 (H) 1.6 - 2.4 mg/dL Final   01/11/2024 2.8 (H) 1.6 - 2.4 mg/dL Final      Uric Acid No results found for: \"URICACID\"     Medication Review: yes    Assessment & Plan       Syncope    Coronary artery disease involving native coronary artery of native heart with angina pectoris    Essential hypertension    Type 2 diabetes mellitus with stage 3 chronic kidney disease    Obstructive sleep apnea on CPAP    CKD (chronic kidney disease) stage 3, GFR 30-59 ml/min    Acute on chronic HFrEF (heart failure with reduced ejection fraction)      Assessment & Plan    CKD stage IIIB:  Follows with Dr Helm in [facility]. Cr 2.0-2.5mg/dl on this admission close to patient's baseline. 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     ACD:: Anemia of chronic disease due to CKD. Screen for monoclonal gammopathy. Fe sat 12% ferritin >200. S/p IV iron infusion on this admission.      Volume status: Hx of CHF EF 46-50%. Diastolic HF grade II. Trace LE edmea noted.      Syncope: Recurrent issues. Presenting issue. Per the wife he was orthostatic in ER.      Hx of Type 2 DM: On SGLT-2inh as outpatient.         Recs  Renal function close to baseline. Presenting with recurrent syncope. Continue to  hold sglt-2inh at present. Other antiHTN meds adjusted per cardiology. ACD could be due to CKD. Received 1x PAOLA on this visit.  He will need outpatient procrit 1-2 x monthly with Dr Wilkes. Check SPEP and free light chain ratio. Monitor renal function. No indication of dialysis.        Jonathon Walker MD  01/13/24  13:56 EST            "

## 2024-01-14 ENCOUNTER — READMISSION MANAGEMENT (OUTPATIENT)
Dept: CALL CENTER | Facility: HOSPITAL | Age: 79
End: 2024-01-14
Payer: MEDICARE

## 2024-01-14 VITALS
BODY MASS INDEX: 35.29 KG/M2 | RESPIRATION RATE: 17 BRPM | OXYGEN SATURATION: 93 % | HEART RATE: 72 BPM | SYSTOLIC BLOOD PRESSURE: 129 MMHG | DIASTOLIC BLOOD PRESSURE: 56 MMHG | WEIGHT: 224.87 LBS | TEMPERATURE: 97.8 F | HEIGHT: 67 IN

## 2024-01-14 LAB
ALBUMIN SERPL-MCNC: 3.8 G/DL (ref 3.5–5.2)
ANION GAP SERPL CALCULATED.3IONS-SCNC: 10 MMOL/L (ref 5–15)
BUN SERPL-MCNC: 62 MG/DL (ref 8–23)
BUN/CREAT SERPL: 30.8 (ref 7–25)
CALCIUM SPEC-SCNC: 9 MG/DL (ref 8.6–10.5)
CHLORIDE SERPL-SCNC: 106 MMOL/L (ref 98–107)
CO2 SERPL-SCNC: 27 MMOL/L (ref 22–29)
CREAT SERPL-MCNC: 2.01 MG/DL (ref 0.76–1.27)
DEPRECATED RDW RBC AUTO: 44.2 FL (ref 37–54)
EGFRCR SERPLBLD CKD-EPI 2021: 33.5 ML/MIN/1.73
ERYTHROCYTE [DISTWIDTH] IN BLOOD BY AUTOMATED COUNT: 13.4 % (ref 12.3–15.4)
GLUCOSE BLDC GLUCOMTR-MCNC: 205 MG/DL (ref 70–130)
GLUCOSE BLDC GLUCOMTR-MCNC: 74 MG/DL (ref 70–130)
GLUCOSE SERPL-MCNC: 72 MG/DL (ref 65–99)
HCT VFR BLD AUTO: 28.3 % (ref 37.5–51)
HGB BLD-MCNC: 9.2 G/DL (ref 13–17.7)
MCH RBC QN AUTO: 29.8 PG (ref 26.6–33)
MCHC RBC AUTO-ENTMCNC: 32.5 G/DL (ref 31.5–35.7)
MCV RBC AUTO: 91.6 FL (ref 79–97)
PHOSPHATE SERPL-MCNC: 3.7 MG/DL (ref 2.5–4.5)
PLATELET # BLD AUTO: 303 10*3/MM3 (ref 140–450)
PMV BLD AUTO: 11 FL (ref 6–12)
POTASSIUM SERPL-SCNC: 4.2 MMOL/L (ref 3.5–5.2)
RBC # BLD AUTO: 3.09 10*6/MM3 (ref 4.14–5.8)
SODIUM SERPL-SCNC: 143 MMOL/L (ref 136–145)
WBC NRBC COR # BLD AUTO: 8.85 10*3/MM3 (ref 3.4–10.8)

## 2024-01-14 PROCEDURE — 85027 COMPLETE CBC AUTOMATED: CPT | Performed by: STUDENT IN AN ORGANIZED HEALTH CARE EDUCATION/TRAINING PROGRAM

## 2024-01-14 PROCEDURE — 99232 SBSQ HOSP IP/OBS MODERATE 35: CPT | Performed by: PHYSICIAN ASSISTANT

## 2024-01-14 PROCEDURE — 63710000001 INSULIN LISPRO (HUMAN) PER 5 UNITS: Performed by: FAMILY MEDICINE

## 2024-01-14 PROCEDURE — 80069 RENAL FUNCTION PANEL: CPT | Performed by: STUDENT IN AN ORGANIZED HEALTH CARE EDUCATION/TRAINING PROGRAM

## 2024-01-14 PROCEDURE — 82948 REAGENT STRIP/BLOOD GLUCOSE: CPT

## 2024-01-14 PROCEDURE — 99239 HOSP IP/OBS DSCHRG MGMT >30: CPT | Performed by: STUDENT IN AN ORGANIZED HEALTH CARE EDUCATION/TRAINING PROGRAM

## 2024-01-14 RX ORDER — FUROSEMIDE 40 MG/1
40 TABLET ORAL WEEKLY
Start: 2024-01-14

## 2024-01-14 RX ORDER — CARVEDILOL 6.25 MG/1
6.25 TABLET ORAL 2 TIMES DAILY WITH MEALS
Qty: 60 TABLET | Refills: 0 | Status: SHIPPED | OUTPATIENT
Start: 2024-01-14

## 2024-01-14 RX ORDER — HYDRALAZINE HYDROCHLORIDE 50 MG/1
50 TABLET, FILM COATED ORAL 3 TIMES DAILY
Qty: 90 TABLET | Refills: 0 | Status: SHIPPED | OUTPATIENT
Start: 2024-01-14

## 2024-01-14 RX ADMIN — TAMSULOSIN HYDROCHLORIDE 0.4 MG: 0.4 CAPSULE ORAL at 08:19

## 2024-01-14 RX ADMIN — CETIRIZINE HYDROCHLORIDE 10 MG: 10 TABLET, FILM COATED ORAL at 08:19

## 2024-01-14 RX ADMIN — ATORVASTATIN CALCIUM 40 MG: 40 TABLET, FILM COATED ORAL at 08:19

## 2024-01-14 RX ADMIN — FINASTERIDE 5 MG: 5 TABLET, FILM COATED ORAL at 08:19

## 2024-01-14 RX ADMIN — SERTRALINE HYDROCHLORIDE 100 MG: 100 TABLET ORAL at 08:18

## 2024-01-14 RX ADMIN — MAGNESIUM OXIDE TAB 400 MG (241.3 MG ELEMENTAL MG) 400 MG: 400 (241.3 MG) TAB at 08:18

## 2024-01-14 RX ADMIN — FLUTICASONE PROPIONATE 1 SPRAY: 50 SPRAY, METERED NASAL at 08:20

## 2024-01-14 RX ADMIN — Medication 10 ML: at 08:19

## 2024-01-14 RX ADMIN — CARVEDILOL 6.25 MG: 6.25 TABLET, FILM COATED ORAL at 08:18

## 2024-01-14 RX ADMIN — HYDRALAZINE HYDROCHLORIDE 50 MG: 50 TABLET, FILM COATED ORAL at 05:31

## 2024-01-14 RX ADMIN — SENNOSIDES AND DOCUSATE SODIUM 2 TABLET: 8.6; 5 TABLET ORAL at 08:20

## 2024-01-14 RX ADMIN — INSULIN LISPRO 4 UNITS: 100 INJECTION, SOLUTION INTRAVENOUS; SUBCUTANEOUS at 12:18

## 2024-01-14 RX ADMIN — PANTOPRAZOLE SODIUM 40 MG: 40 TABLET, DELAYED RELEASE ORAL at 05:31

## 2024-01-14 RX ADMIN — ASPIRIN 81 MG: 81 TABLET, CHEWABLE ORAL at 08:19

## 2024-01-14 RX ADMIN — PRASUGREL 10 MG: 10 TABLET, FILM COATED ORAL at 08:19

## 2024-01-14 RX ADMIN — RANOLAZINE 500 MG: 500 TABLET, EXTENDED RELEASE ORAL at 08:18

## 2024-01-14 NOTE — PROGRESS NOTES
Donny Jerry  [id number]  1945   LOS: 2 days   Patient Care Team:  Anum Alonso APRN as PCP - General  Rubin Bernal MD as Consulting Physician (Urology)  Liz Russo MD as Consulting Physician (Cardiology)    Chief Complaint: Follow-up on syncope    Subjective .  Pleasant 78-year-old gentleman admitted January 11 2023 for syncope in setting of severe anemia, chronic kidney disease and dehydration.  Yesterday he was having increasing shortness of breath which she states is now resolved.  He denies ongoing chest pain chest tightness orthopnea PND worsening peripheral edema.  He has had no recurrent syncope. He had a good breakfast today.   .  Objective     Vital Sign Min/Max for last 24 hours  Temp  Min: 97.5 °F (36.4 °C)  Max: 98.9 °F (37.2 °C)   BP  Min: 119/54  Max: 162/72   Pulse  Min: 64  Max: 87   Resp  Min: 17  Max: 20   SpO2  Min: 94 %  Max: 96 %   No data recorded   No data recorded         01/11/24  0958   Weight: 102 kg (224 lb 13.9 oz)         Intake/Output Summary (Last 24 hours) at 1/14/2024 0933  Last data filed at 1/14/2024 0500  Gross per 24 hour   Intake 930 ml   Output 2300 ml   Net -1370 ml       Physical Exam:     General Appearance:    Alert, cooperative, in no acute distress   Lungs:   Faint crackles to auscultation,respirations regular, even and                unlabored    Heart:    Irregular and normal rate with PVCs, normal S1 and S2, no            murmur, no gallop, no rub, no click   Abdomen:  Extremities:   Soft, nontender, bowel sounds audible x4  1+ edema, normal range of motion   Pulses:   Pulses palpable and equal bilaterally      Results Review:   Results from last 7 days   Lab Units 01/14/24  0526 01/13/24  0802 01/12/24  0458   SODIUM mmol/L 143 141 142   POTASSIUM mmol/L 4.2 4.2 4.6   CHLORIDE mmol/L 106 107 105   CO2 mmol/L 27.0 25.0 28.0   BUN mg/dL 62* 64* 66*   CREATININE mg/dL 2.01* 2.23* 2.50*   GLUCOSE mg/dL 72 65 85   CALCIUM mg/dL 9.0 8.3*  8.7     Results from last 7 days   Lab Units 01/14/24  0526 01/13/24  0802 01/12/24  0458   WBC 10*3/mm3 8.85 7.92 7.49   HEMOGLOBIN g/dL 9.2* 8.4* 8.6*   HEMATOCRIT % 28.3* 25.8* 26.9*   PLATELETS 10*3/mm3 303 256 263             Results from last 7 days   Lab Units 01/11/24  1242 01/11/24  1026 01/09/24  1711   HSTROP T ng/L 172* 178* 207*   Chest x-ray 1/13/2024:  Cardiomegaly and mild pulmonary edema.     No new ECG to review    Echocardiogram 1/12/2024:    Left ventricular systolic function is low normal. Calculated left ventricular EF = 45.8% Left ventricular ejection fraction appears to be 46 - 50%.    Left ventricular wall thickness is consistent with mild concentric hypertrophy.    Left ventricular diastolic function is consistent with (grade II w/high LAP) pseudonormalization.    Left atrial volume is moderately increased.    Mild aortic valve stenosis is present.    Estimated right ventricular systolic pressure from tricuspid regurgitation is normal (<35 mmHg).  No significant change compared to 7/2023 echo    Medication Review: Reviewed    Assessment & Plan   1.;  Syncope in setting of dehydration acute on chronic kidney disease and anemia.  Will plan for M cot monitor at discharge   2.  Chronic kidney disease: Followed by nephrology   3.  Coronary disease: Recent catheter-based intervention of the vein graft to the RCA multivessel disease treated medical therapy July 18, 2023.  No ongoing angina symptoms patient   4.  Severe anemia: Hemoglobin 8.4, followed by hospitalist.  5. Frequent PVC's -Mcot monitor as out patient    Medications adjustment per Nephrology  Continue asa, Coreg 6.25mg bid, Hydrlazine 50mg TID, Lipitor, Ranexa. Hold cardra. .   Will order MCOT and it will be mailed to him  Follow up with Dr. Russo in [facility] as scheduled.     . 01/14/24  09:33 EST     Electronically signed by SCOTT Barillas, 01/14/24, 9:35 AM EST.

## 2024-01-14 NOTE — PLAN OF CARE
Problem: Adult Inpatient Plan of Care  Goal: Plan of Care Review  Outcome: Ongoing, Progressing  Goal: Patient-Specific Goal (Individualized)  Outcome: Ongoing, Progressing  Goal: Absence of Hospital-Acquired Illness or Injury  Outcome: Ongoing, Progressing  Intervention: Identify and Manage Fall Risk  Recent Flowsheet Documentation  Taken 1/14/2024 0600 by Lobo Garces RN  Safety Promotion/Fall Prevention:   assistive device/personal items within reach   clutter free environment maintained   safety round/check completed  Taken 1/14/2024 0400 by Lobo Garces RN  Safety Promotion/Fall Prevention:   clutter free environment maintained   assistive device/personal items within reach   safety round/check completed  Taken 1/14/2024 0200 by Lobo Garces RN  Safety Promotion/Fall Prevention:   clutter free environment maintained   assistive device/personal items within reach   safety round/check completed  Taken 1/14/2024 0000 by Lobo Garces RN  Safety Promotion/Fall Prevention:   clutter free environment maintained   assistive device/personal items within reach   safety round/check completed  Taken 1/13/2024 2200 by Lobo Garces RN  Safety Promotion/Fall Prevention:   clutter free environment maintained   assistive device/personal items within reach   safety round/check completed  Taken 1/13/2024 2000 by Lobo Garces RN  Safety Promotion/Fall Prevention:   assistive device/personal items within reach   clutter free environment maintained   safety round/check completed  Intervention: Prevent Skin Injury  Recent Flowsheet Documentation  Taken 1/14/2024 0600 by Lobo Garces RN  Body Position: position changed independently  Skin Protection: adhesive use limited  Taken 1/14/2024 0400 by Lobo Garces RN  Body Position: position changed independently  Skin Protection: adhesive use limited  Taken 1/14/2024 0200 by Lobo Garces RN  Body Position: position changed independently  Skin  Protection: adhesive use limited  Taken 1/14/2024 0000 by Lobo Garces RN  Body Position: position changed independently  Skin Protection: adhesive use limited  Taken 1/13/2024 2200 by Lobo Garces RN  Body Position: position changed independently  Skin Protection: adhesive use limited  Taken 1/13/2024 2000 by Lobo Garces RN  Body Position: position changed independently  Skin Protection: adhesive use limited  Intervention: Prevent and Manage VTE (Venous Thromboembolism) Risk  Recent Flowsheet Documentation  Taken 1/14/2024 0600 by Lobo Garces RN  Activity Management: up in chair  Taken 1/14/2024 0400 by Lobo Garces RN  Activity Management: up in chair  Taken 1/14/2024 0200 by Lobo Garces RN  Activity Management: activity encouraged  Taken 1/14/2024 0000 by Lobo Garces RN  Activity Management: activity encouraged  Taken 1/13/2024 2200 by Lobo Garces RN  Activity Management: activity encouraged  Taken 1/13/2024 2000 by Lobo Gacres RN  Activity Management: activity encouraged  Intervention: Prevent Infection  Recent Flowsheet Documentation  Taken 1/14/2024 0600 by Lobo Garces RN  Infection Prevention:   environmental surveillance performed   hand hygiene promoted   rest/sleep promoted  Taken 1/14/2024 0400 by Lobo Garces RN  Infection Prevention:   environmental surveillance performed   hand hygiene promoted   rest/sleep promoted  Taken 1/14/2024 0200 by Lobo Garces RN  Infection Prevention:   environmental surveillance performed   hand hygiene promoted   rest/sleep promoted  Taken 1/14/2024 0000 by Lobo Garces RN  Infection Prevention:   environmental surveillance performed   hand hygiene promoted   rest/sleep promoted  Taken 1/13/2024 2200 by Lobo Garces RN  Infection Prevention:   environmental surveillance performed   hand hygiene promoted   rest/sleep promoted  Taken 1/13/2024 2000 by Lobo Garces RN  Infection Prevention:   environmental  surveillance performed   hand hygiene promoted   rest/sleep promoted  Goal: Optimal Comfort and Wellbeing  Outcome: Ongoing, Progressing  Intervention: Provide Person-Centered Care  Recent Flowsheet Documentation  Taken 1/13/2024 2000 by Lobo Garces RN  Trust Relationship/Rapport: care explained  Goal: Readiness for Transition of Care  Outcome: Ongoing, Progressing     Problem: Skin Injury Risk Increased  Goal: Skin Health and Integrity  Outcome: Ongoing, Progressing  Intervention: Optimize Skin Protection  Recent Flowsheet Documentation  Taken 1/14/2024 0600 by Lobo Garces RN  Pressure Reduction Techniques: frequent weight shift encouraged  Head of Bed (HOB) Positioning: [facility] elevated  Pressure Reduction Devices: pressure-redistributing mattress utilized  Skin Protection: adhesive use limited  Taken 1/14/2024 0400 by Lobo Garces RN  Pressure Reduction Techniques: frequent weight shift encouraged  Head of Bed (HOB) Positioning: [facility] elevated  Pressure Reduction Devices: pressure-redistributing mattress utilized  Skin Protection: adhesive use limited  Taken 1/14/2024 0200 by Lobo Garces RN  Pressure Reduction Techniques: frequent weight shift encouraged  Head of Bed (HOB) Positioning: [facility] elevated  Pressure Reduction Devices: pressure-redistributing mattress utilized  Skin Protection: adhesive use limited  Taken 1/14/2024 0000 by Lobo Garces RN  Pressure Reduction Techniques: frequent weight shift encouraged  Head of Bed (HOB) Positioning: HOB elevated  Pressure Reduction Devices: pressure-redistributing mattress utilized  Skin Protection: adhesive use limited  Taken 1/13/2024 2200 by Lobo Garces RN  Pressure Reduction Techniques: frequent weight shift encouraged  Head of Bed (HOB) Positioning: HOB elevated  Pressure Reduction Devices: pressure-redistributing mattress utilized  Skin Protection: adhesive use limited  Taken 1/13/2024 2000 by Lobo Garces RN  Pressure Reduction  Techniques: frequent weight shift encouraged  Head of Bed (HOB) Positioning: HOB elevated  Pressure Reduction Devices: pressure-redistributing mattress utilized  Skin Protection: adhesive use limited     Problem: Diabetes Comorbidity  Goal: Blood Glucose Level Within Targeted Range  Outcome: Ongoing, Progressing     Problem: Heart Failure Comorbidity  Goal: Maintenance of Heart Failure Symptom Control  Outcome: Ongoing, Progressing     Problem: Hypertension Comorbidity  Goal: Blood Pressure in Desired Range  Outcome: Ongoing, Progressing  Intervention: Maintain Blood Pressure Management  Recent Flowsheet Documentation  Taken 1/13/2024 2000 by Lobo Garces RN  Syncope Management: position changed slowly     Problem: Obstructive Sleep Apnea Risk or Actual Comorbidity Management  Goal: Unobstructed Breathing During Sleep  Outcome: Ongoing, Progressing     Problem: Pain Chronic (Persistent) (Comorbidity Management)  Goal: Acceptable Pain Control and Functional Ability  Outcome: Ongoing, Progressing  Intervention: Manage Persistent Pain  Recent Flowsheet Documentation  Taken 1/14/2024 0200 by Lobo Garces RN  Sleep/Rest Enhancement: awakenings minimized  Taken 1/14/2024 0000 by Lobo Garces RN  Sleep/Rest Enhancement: awakenings minimized  Taken 1/13/2024 2000 by Lobo Garces RN  Bowel Elimination Promotion: adequate fluid intake promoted  Sleep/Rest Enhancement: awakenings minimized  Intervention: Optimize Psychosocial Wellbeing  Recent Flowsheet Documentation  Taken 1/13/2024 2000 by Lobo Garces RN  Supportive Measures: active listening utilized     Problem: Fall Injury Risk  Goal: Absence of Fall and Fall-Related Injury  Outcome: Ongoing, Progressing  Intervention: Promote Injury-Free Environment  Recent Flowsheet Documentation  Taken 1/14/2024 0600 by Lobo Garces RN  Safety Promotion/Fall Prevention:   assistive device/personal items within reach   clutter free environment maintained    safety round/check completed  Taken 1/14/2024 0400 by Lobo Garces RN  Safety Promotion/Fall Prevention:   clutter free environment maintained   assistive device/personal items within reach   safety round/check completed  Taken 1/14/2024 0200 by Lobo Garces RN  Safety Promotion/Fall Prevention:   clutter free environment maintained   assistive device/personal items within reach   safety round/check completed  Taken 1/14/2024 0000 by Lobo Garces RN  Safety Promotion/Fall Prevention:   clutter free environment maintained   assistive device/personal items within reach   safety round/check completed  Taken 1/13/2024 2200 by Lobo Garces RN  Safety Promotion/Fall Prevention:   clutter free environment maintained   assistive device/personal items within reach   safety round/check completed  Taken 1/13/2024 2000 by Lobo Garces RN  Safety Promotion/Fall Prevention:   assistive device/personal items within reach   clutter free environment maintained   safety round/check completed   Goal Outcome Evaluation:

## 2024-01-15 LAB
ALBUMIN SERPL ELPH-MCNC: 3 G/DL (ref 2.9–4.4)
ALBUMIN/GLOB SERPL: 1.1 {RATIO} (ref 0.7–1.7)
ALPHA1 GLOB SERPL ELPH-MCNC: 0.3 G/DL (ref 0–0.4)
ALPHA2 GLOB SERPL ELPH-MCNC: 0.8 G/DL (ref 0.4–1)
B-GLOBULIN SERPL ELPH-MCNC: 0.9 G/DL (ref 0.7–1.3)
EPO SERPL-ACNC: 19.6 MIU/ML (ref 2.6–18.5)
GAMMA GLOB SERPL ELPH-MCNC: 0.8 G/DL (ref 0.4–1.8)
GLOBULIN SER CALC-MCNC: 2.7 G/DL (ref 2.2–3.9)
KAPPA LC FREE SER-MCNC: 57.8 MG/L (ref 3.3–19.4)
KAPPA LC FREE/LAMBDA FREE SER: 1.54 {RATIO} (ref 0.26–1.65)
LABORATORY COMMENT REPORT: ABNORMAL
LAMBDA LC FREE SERPL-MCNC: 37.5 MG/L (ref 5.7–26.3)
M PROTEIN SERPL ELPH-MCNC: ABNORMAL G/DL
PROT PATTERN SERPL ELPH-IMP: ABNORMAL
PROT SERPL-MCNC: 5.7 G/DL (ref 6–8.5)

## 2024-01-15 NOTE — OUTREACH NOTE
Prep Survey      Flowsheet Row Responses   [facility] facility patient discharged from? [place]   Is LACE score < 7 ? No   Eligibility Readm Mgmt   Discharge diagnosis syncope, A/C CHF   Does the patient have one of the following disease processes/diagnoses(primary or secondary)? CHF   Does the patient have Home health ordered? No   Is there a DME ordered? No   Prep survey completed? Yes            Natasha PALENCIA - Registered Nurse

## 2024-01-15 NOTE — DISCHARGE SUMMARY
[facility] Medicine Services  DISCHARGE SUMMARY    Patient Name: Donny Jerry  : 1945  MRN: [id number]    Date of Admission: 2024  9:47 AM  Date of Discharge:  24  Primary Care Physician: Anum Alonso APRN    Consults       Date and Time Order Name Status Description    2024  2:15 PM Inpatient Cardiology Consult Completed     2023  8:51 PM Inpatient Nephrology Consult Completed             Hospital Course     Presenting Problem:     Active Hospital Problems    Diagnosis  POA    **Syncope [R55]  Yes    Acute on chronic HFrEF (heart failure with reduced ejection fraction) [I50.23]  Yes    CKD (chronic kidney disease) stage 3, GFR 30-59 ml/min [N18.30]  Yes    Coronary artery disease involving native coronary artery of native heart with angina pectoris [I25.119]  Yes    Essential hypertension [I10]  Yes    Obstructive sleep apnea on CPAP [G47.33]  Yes    Type 2 diabetes mellitus with stage 3 chronic kidney disease [E11.22, N18.30]  Yes      Resolved Hospital Problems   No resolved problems to display.          Hospital Course:  Donny Jerry is a 78 y.o. male with PMHx of CAD s/p CABG, CKDIII, HTN, USHA, DM2, chronic HFrEF who presented to [facility] ED on  secondary to a syncopal episode while walking to his cardiologist's office. Patient had similar episode on  while at chiropractor while being repositioned on the table. Syncope was suspected to be multifactorial with orthostasis, intravascular depletion - had received IV Lasix earlier this week - also possibly secondary to high PVC burden. Cardiology evaluated, recommended to continue carvedilol and hydralazine at reduced dose and to discontinue doxazosin. Patient was discharged home with plan for outpatient cardiac event monitor.     Recurrent syncope   -Unclear etiology but suspect component of orthostasis, intravascular depletion; of note received IV Lasix earlier this week while in the ED  after syncopal episode - perhaps leading to further orthostasis; also possibly secondary to high PVC burden  -orthostatic vital signs positive on admission, negative on 1/12 however SBP decreased to 90s after walking with physical therapy.  -Echo with EF 46-50%, grade 2 diastolic dysfunction, no significant change compared to prior echo  -s/p gentle hydration on admission  -Cardiology evaluated, recommended to continue dose reduced carvedilol and hydralazine. Stopped doxazosin. Recommended 14-day cardiac event monitor on discharge to quantify PVC burden.     Acute on chronic HFmrEF  Hypoxia  -Evidence of extravascular volume overload, however suspect intravascularly depletion  -proBNP 5K on admission  -CXR 1/13 with cardiomegaly and mild pulmonary edema  -s/p IV Lasix 40 mg x 1 with improvement in breathing.   -Continue Lasix 20 mg daily; 40 mg if weight increasing - as outlined by PCP.     Elevated troponin   -suspect demand ischemia in setting of CKD  -troponin 178->172, no chest pain  -EKG with NSR and frequent PVCs     Type 2 DM with Hypoglycemia   -BG 56 on admission, A1c 5.8% on admission (likely falsely low in setting of hypoglycemia)  -Per wife, takes Tresiba 30u AM and 74u PM which is way too much for him.   -Discontinued Tresiba on discharge; continue weekly Trulicity, close follow up with PCP     CKD stage III  -Baseline Cr around 1.9, Cr 2.2 on admission  -Follows with Dr. Wilkes, continue outpatient follow up.      Iron deficiency   Anemia   -previous baseline Hgb ~10, 8.8 on admission   -Iron deficiency on iron studies, initiated IV iron x3 doses.  -Nephrology evaluated, recommended to start Procrit this admission.   -Will need outpatient Procrit 1-2 times per month with outpatient nephrologist.     CAD s/p CABG  -Continue ASA, prasugrel, atorvastatin, carvedilol, ranolazine.     USHA  -CPAP at night      RLS  -Continue pramipexole.       BPH  -Continue tamsulosin.      GERD  -Continue PPI.      Anxiety/depression  -Continue home Zoloft.      Discharge Follow Up Recommendations for outpatient labs/diagnostics:  -PCP in 1 week  -Cardiology in 1 month  -Nephrology in 2-4 weeks    Day of Discharge     HPI:   Evaluated patient this morning. Patient felt well, had no complaints, no chest pain, no shortness of breath. No dizziness with ambulation.       Vital Signs:   Temp:  [97.5 °F (36.4 °C)-97.8 °F (36.6 °C)] 97.8 °F (36.6 °C)  Heart Rate:  [65-84] 72  Resp:  [17-20] 17  BP: (123-162)/(56-72) 129/56      Physical Exam:  Constitutional: Awake, alert, resting comfortably  HENT: NCAT, mucous membranes moist  Respiratory: Aeration improving bilaterally in upper and lower lung fields, no wheezes.  Cardiovascular: RRR, no murmurs, rubs, or gallops  Gastrointestinal: Positive bowel sounds, soft, nontender, nondistended  Musculoskeletal: +1 pitting edema in bilateral lower extremities  Neurologic: Alert and oriented x 3, no focal deficits, speech clear  Skin: No rashes    Pertinent  and/or Most Recent Results     LAB RESULTS:      Lab 01/14/24  0526 01/13/24  0802 01/12/24  0458 01/11/24  1026 01/09/24  1247   WBC 8.85 7.92 7.49 6.81 7.57   HEMOGLOBIN 9.2* 8.4* 8.6* 8.8* 8.8*   HEMATOCRIT 28.3* 25.8* 26.9* 27.4* 26.5*   PLATELETS 303 256 263 249 241   NEUTROS ABS  --   --  5.17 5.12 5.95   IMMATURE GRANS (ABS)  --   --  0.03 0.03 0.02   LYMPHS ABS  --   --  1.23 0.83 0.88   MONOS ABS  --   --  0.70 0.56 0.55   EOS ABS  --   --  0.30 0.21 0.13   MCV 91.6 89.0 91.5 92.6 89.5         Lab 01/14/24  0526 01/13/24  0802 01/12/24  0458 01/11/24  1026 01/09/24  1247   SODIUM 143 141 142 142 138   POTASSIUM 4.2 4.2 4.6 4.5 4.5   CHLORIDE 106 107 105 106 102   CO2 27.0 25.0 28.0 25.0 24.8   ANION GAP 10.0 9.0 9.0 11.0 11.2   BUN 62* 64* 66* 62* 54*   CREATININE 2.01* 2.23* 2.50* 2.20* 2.05*   EGFR 33.5* 29.6* 25.8* 30.1* 32.8*   GLUCOSE 72 65 85 56* 179*   CALCIUM 9.0 8.3* 8.7 8.7 8.3*   MAGNESIUM  --  2.8* 3.0* 2.8* 2.6*    PHOSPHORUS 3.7 3.5  --   --   --    TSH  --   --   --  2.510  --          Lab 01/14/24  0526 01/13/24  0802 01/12/24  0458 01/11/24  1026 01/09/24  1247   TOTAL PROTEIN  --   --  6.0 6.4 7.0   ALBUMIN 3.8 3.4* 3.8 3.6 4.0   GLOBULIN  --   --  2.2 2.8 3.0   ALT (SGPT)  --   --  24 27 26   AST (SGOT)  --   --  18 24 21   BILIRUBIN  --   --  0.3 0.4 0.3   ALK PHOS  --   --  47 45 49         Lab 01/11/24  1242 01/11/24  1026 01/09/24  1711 01/09/24  1434 01/09/24  1247   PROBNP 5,539.0*  --   --   --  4,683.0*   HSTROP T 172* 178* 207* 202* 198*             Lab 01/11/24  1627 01/11/24  1026   IRON 28* 36*   IRON SATURATION (TSAT)  --  12*   TIBC  --  291*   TRANSFERRIN  --  195*   FERRITIN  --  356.20         Brief Urine Lab Results  (Last result in the past 365 days)        Color   Clarity   Blood   Leuk Est   Nitrite   Protein   CREAT   Urine HCG        12/19/23 1142             26.3               Microbiology Results (last 10 days)       ** No results found for the last 240 hours. **            XR Chest 1 View    Result Date: 1/13/2024  XR CHEST 1 VW Date of Exam: 1/13/2024 2:09 AM EST Indication: sob Comparison: Chest radiograph dated January 11, 2024 Findings: There are postoperative changes from midline sternotomy. There is mild pulmonary vascular congestion. There is no pneumothorax. There are no focal consolidations. The pleural spaces appear clear.     Impression: Cardiomegaly and mild pulmonary edema. Electronically Signed: John Moss MD  1/13/2024 2:26 AM EST  Workstation ID: [id number]    Adult Transthoracic Echo Complete w/ Color, Spectral and Contrast if necessary per protocol    Result Date: 1/12/2024    Left ventricular systolic function is low normal. Calculated left ventricular EF = 45.8% Left ventricular ejection fraction appears to be 46 - 50%.   Left ventricular wall thickness is consistent with mild concentric hypertrophy.   Left ventricular diastolic function is consistent with (grade II w/high  LAP) pseudonormalization.   Left atrial volume is moderately increased.   Mild aortic valve stenosis is present.   Estimated right ventricular systolic pressure from tricuspid regurgitation is normal (<35 mmHg). No significant change compared to 7/2023 echo     XR Chest 1 View    Result Date: 1/11/2024  XR CHEST 1 VW Date of Exam: 1/11/2024 9:40 AM CST Indication: syncope Comparison: 1/9/2024 Findings: Cardiomediastinal silhouette is enlarged, similar prior examination. Persistent pulmonary vascular congestion. No airspace disease, pneumothorax, nor pleural effusion. No acute osseous abnormality identified.     Impression: Mild CHF/volume overload features similar to prior exam. Electronically Signed: Prosper Angeles MD  1/11/2024 10:08 AM CST  Workstation ID: [id number]    CT Head Without Contrast    Result Date: 1/11/2024  CT HEAD WO CONTRAST, CT MAXILLOFACIAL WO CONT Date of Exam: 1/11/2024 10:46 AM EST Indication: syncope head injury. Comparison: None available. Technique: Axial CT images were obtained of the head without contrast administration.  Automated exposure control and iterative construction methods were used. Findings: CT brain: There is severe atrophy. There is no acute mass effect or edema. There are no findings suspicious for acute CVA or hemorrhage. There is no skull fracture. CT FACIAL BONES: No facial bone fractures are identified. There are no air-fluid levels in the paranasal sinuses. There is mild mucosal thickening in several bilateral ethmoid air cells and of the maxillary sinuses.     Impression: Atrophy. No acute process of the brain or facial bones. Electronically Signed: Ronda Sanon MD  1/11/2024 10:56 AM EST  Workstation ID: [id number]    CT Maxillofacial Without Contrast    Result Date: 1/11/2024  CT HEAD WO CONTRAST, CT MAXILLOFACIAL WO CONT Date of Exam: 1/11/2024 10:46 AM EST Indication: syncope head injury. Comparison: None available. Technique: Axial CT images were obtained of the  head without contrast administration.  Automated exposure control and iterative construction methods were used. Findings: CT brain: There is severe atrophy. There is no acute mass effect or edema. There are no findings suspicious for acute CVA or hemorrhage. There is no skull fracture. CT FACIAL BONES: No facial bone fractures are identified. There are no air-fluid levels in the paranasal sinuses. There is mild mucosal thickening in several bilateral ethmoid air cells and of the maxillary sinuses.     Impression: Atrophy. No acute process of the brain or facial bones. Electronically Signed: Ronda Sanon MD  1/11/2024 10:56 AM EST  Workstation ID: [id number]    XR Chest 1 View    Result Date: 1/9/2024  PROCEDURE: XR CHEST 1 VW-    HISTORY: syncope  COMPARISON: December 18, 2023.  FINDINGS: The heart is mildly enlarged, but stable. The patient is status post median sternotomy. The lungs are clear. There is no pneumothorax.      No acute cardiopulmonary process.        Images were reviewed, interpreted, and dictated by Dr. rBett Hernandez MD Transcribed by Saba Beltran PA-C.  This report was signed and finalized on 1/9/2024 2:56 PM by Brett Hernandez MD.               Results for orders placed during the hospital encounter of 01/11/24    Adult Transthoracic Echo Complete w/ Color, Spectral and Contrast if necessary per protocol    Interpretation Summary    Left ventricular systolic function is low normal. Calculated left ventricular EF = 45.8% Left ventricular ejection fraction appears to be 46 - 50%.    Left ventricular wall thickness is consistent with mild concentric hypertrophy.    Left ventricular diastolic function is consistent with (grade II w/high LAP) pseudonormalization.    Left atrial volume is moderately increased.    Mild aortic valve stenosis is present.    Estimated right ventricular systolic pressure from tricuspid regurgitation is normal (<35 mmHg).    No significant change compared to 7/2023  echo      Plan for Follow-up of Pending Labs/Results: Nephrology to follow  Pending Labs       Order Current Status    Erythropoietin In process    Immunoglobulin Free LT Chains Blood In process    Protein Elec + Interp, Serum In process          Discharge Details        Discharge Medications        Changes to Medications        Instructions Start Date   carvedilol 6.25 MG tablet  Commonly known as: COREG  What changed:   medication strength  how much to take   6.25 mg, Oral, 2 Times Daily With Meals      hydrALAZINE 50 MG tablet  Commonly known as: APRESOLINE  What changed:   medication strength  how much to take   50 mg, Oral, 3 Times Daily             Continue These Medications        Instructions Start Date   albuterol sulfate  (90 Base) MCG/ACT inhaler  Commonly known as: PROVENTIL HFA;VENTOLIN HFA;PROAIR HFA   2 puffs, Inhalation, Every 4 Hours PRN      aspirin 81 MG chewable tablet   81 mg, Oral, Daily      atorvastatin 40 MG tablet  Commonly known as: LIPITOR   40 mg, Oral, Daily      B-D UF III MINI PEN NEEDLES 31G X 5 MM misc  Generic drug: Insulin Pen Needle   No dose, route, or frequency recorded.      Farxiga 10 MG tablet  Generic drug: dapagliflozin Propanediol   10 mg, Oral, Daily      fexofenadine 180 MG tablet  Commonly known as: ALLEGRA   180 mg, Oral, Daily      finasteride 5 MG tablet  Commonly known as: PROSCAR   5 mg, Oral, Daily      fluticasone 50 MCG/ACT nasal spray  Commonly known as: FLONASE   1 spray, Nasal, Daily      Fluticasone-Umeclidin-Vilant 200-62.5-25 MCG/ACT aerosol powder   Commonly known as: Trelegy Ellipta   1 puff, Inhalation, Daily, Rinse mouth with water after use.      furosemide 20 MG tablet  Commonly known as: LASIX   20 mg, Oral, Daily, Sun Mon Wed FRI SAT      furosemide 40 MG tablet  Commonly known as: LASIX   40 mg, Oral, Weekly, Tues Thurs      magnesium oxide 400 MG tablet  Commonly known as: MAG-OX   400 mg, Oral, Daily      multivitamin tablet  tablet  Commonly known as: THERAGRAN   1 tablet, Oral, Daily      nitroglycerin 0.4 MG SL tablet  Commonly known as: NITROSTAT   0.4 mg, Sublingual, Every 5 Minutes PRN, Take no more than 3 doses in 15 minutes.       pantoprazole 40 MG EC tablet  Commonly known as: PROTONIX   TAKE 1 TABLET BY MOUTH 2 TIMES DAILY (BEFORE MEALS)      Poly-Iron 150 150 MG capsule  Generic drug: iron polysaccharides   150 mg, Oral, Daily      pramipexole 1 MG tablet  Commonly known as: MIRAPEX   1 mg, Oral, Nightly      prasugrel 10 MG tablet  Commonly known as: EFFIENT   10 mg, Oral, Daily      ranolazine 500 MG 12 hr tablet  Commonly known as: RANEXA   500 mg, Oral, Every 12 Hours Scheduled      sertraline 50 MG tablet  Commonly known as: ZOLOFT   100 mg, Oral, Daily      tamsulosin 0.4 MG capsule 24 hr capsule  Commonly known as: FLOMAX   1 capsule, Oral, 2 Times Daily      TRUEplus Lancets 28G misc   No dose, route, or frequency recorded.      Trulicity 0.75 MG/0.5ML solution pen-injector  Generic drug: Dulaglutide   0.75 mg, Injection, Weekly      Vitamin D3 50 MCG (2000 UT) capsule   2 capsules, Oral, Daily             Stop These Medications      doxazosin 8 MG tablet  Commonly known as: CARDURA     Tresiba FlexTouch 200 UNIT/ML solution pen-injector pen injection  Generic drug: Insulin Degludec              Allergies   Allergen Reactions    Zocor [Simvastatin] Myalgia    Bisoprolol Other (See Comments)     Agitation      Byetta 10 Mcg Pen [Exenatide] Nausea Only     nausea    Crestor [Rosuvastatin Calcium] Myalgia    Metformin And Related Nausea Only    Plavix [Clopidogrel Bisulfate] Other (See Comments)     resistance         Discharge Disposition:  Home or Self Care    Diet:  Hospital:No active diet order           Activity:      Restrictions or Other Recommendations:  N/A       CODE STATUS:    Code Status and Medical Interventions:   Ordered at: 01/11/24 6470     Medical Intervention Limits:    NO intubation (DNI)    NO  cardioversion     Level Of Support Discussed With:    Patient    Next of Kin (If No Surrogate)     Code Status (Patient has no pulse and is not breathing):    No CPR (Do Not Attempt to Resuscitate)     Medical Interventions (Patient has pulse or is breathing):    Limited Support       Future Appointments   Date Time Provider Department Center   3/26/2024 10:00 AM Carolann Franco APRN [facility]       Additional Instructions for the Follow-ups that You Need to Schedule       Discharge Follow-up with PCP   As directed       Currently Documented PCP:    Anum Alonso APRN    PCP Phone Number:    [phone]     Follow Up Details: 1 week        Discharge Follow-up with Specified Provider: Dr. Wilkes; 2 Weeks   As directed      To: Dr. Wilkes   Follow Up: 2 Weeks        Discharge Follow-up with Specified Provider: Dr. Russo (Cardiology); 1 Month   As directed      To: Dr. Russo (Cardiology)   Follow Up: 1 Month                      Vicky Pierre DO  01/14/24      Time Spent on Discharge:  I spent  45  minutes on this discharge activity which included: face-to-face encounter with the patient, reviewing the data in the system, coordination of the care with the nursing staff as well as consultants, documentation, and entering orders.

## 2024-01-16 ENCOUNTER — OFFICE VISIT (OUTPATIENT)
Dept: PRIMARY CARE CLINIC | Age: 79
End: 2024-01-16

## 2024-01-16 VITALS
HEART RATE: 70 BPM | DIASTOLIC BLOOD PRESSURE: 65 MMHG | SYSTOLIC BLOOD PRESSURE: 115 MMHG | BODY MASS INDEX: 37.2 KG/M2 | OXYGEN SATURATION: 99 % | WEIGHT: 237 LBS | TEMPERATURE: 98.2 F | HEIGHT: 67 IN | RESPIRATION RATE: 16 BRPM

## 2024-01-16 DIAGNOSIS — F33.1 MODERATE EPISODE OF RECURRENT MAJOR DEPRESSIVE DISORDER (HCC): ICD-10-CM

## 2024-01-16 DIAGNOSIS — I25.119 CORONARY ARTERY DISEASE INVOLVING NATIVE HEART WITH ANGINA PECTORIS, UNSPECIFIED VESSEL OR LESION TYPE (HCC): ICD-10-CM

## 2024-01-16 DIAGNOSIS — I73.9 INTERMITTENT CLAUDICATION (HCC): ICD-10-CM

## 2024-01-16 DIAGNOSIS — N18.4 ANEMIA DUE TO STAGE 4 CHRONIC KIDNEY DISEASE (HCC): ICD-10-CM

## 2024-01-16 DIAGNOSIS — I50.22 CHRONIC SYSTOLIC (CONGESTIVE) HEART FAILURE (HCC): ICD-10-CM

## 2024-01-16 DIAGNOSIS — R09.02 HYPOXIA: ICD-10-CM

## 2024-01-16 DIAGNOSIS — E66.01 SEVERE OBESITY (BMI 35.0-39.9) WITH COMORBIDITY (HCC): ICD-10-CM

## 2024-01-16 DIAGNOSIS — R39.11 BENIGN PROSTATIC HYPERPLASIA WITH URINARY HESITANCY: ICD-10-CM

## 2024-01-16 DIAGNOSIS — R55 SYNCOPE, UNSPECIFIED SYNCOPE TYPE: Primary | ICD-10-CM

## 2024-01-16 DIAGNOSIS — N40.1 BENIGN PROSTATIC HYPERPLASIA WITH URINARY HESITANCY: ICD-10-CM

## 2024-01-16 DIAGNOSIS — D63.1 ANEMIA DUE TO STAGE 4 CHRONIC KIDNEY DISEASE (HCC): ICD-10-CM

## 2024-01-16 DIAGNOSIS — N18.4 CHRONIC RENAL FAILURE, STAGE 4 (SEVERE) (HCC): ICD-10-CM

## 2024-01-16 DIAGNOSIS — R07.81 RIB PAIN: ICD-10-CM

## 2024-01-16 DIAGNOSIS — E11.42 TYPE 2 DIABETES MELLITUS WITH DIABETIC POLYNEUROPATHY, WITHOUT LONG-TERM CURRENT USE OF INSULIN (HCC): ICD-10-CM

## 2024-01-16 DIAGNOSIS — I10 ESSENTIAL HYPERTENSION: ICD-10-CM

## 2024-01-16 RX ORDER — SERTRALINE HYDROCHLORIDE 100 MG/1
100 TABLET, FILM COATED ORAL DAILY
Qty: 30 TABLET | Refills: 0 | OUTPATIENT
Start: 2024-01-16

## 2024-01-16 RX ORDER — FUROSEMIDE 40 MG/1
40 TABLET ORAL DAILY
Qty: 30 TABLET | Refills: 0 | OUTPATIENT
Start: 2024-01-16

## 2024-01-16 RX ORDER — TAMSULOSIN HYDROCHLORIDE 0.4 MG/1
CAPSULE ORAL
Qty: 90 CAPSULE | Refills: 1 | Status: SHIPPED | OUTPATIENT
Start: 2024-01-16

## 2024-01-16 RX ORDER — SERTRALINE HYDROCHLORIDE 100 MG/1
100 TABLET, FILM COATED ORAL DAILY
Qty: 30 TABLET | Refills: 0 | Status: SHIPPED | OUTPATIENT
Start: 2024-01-16

## 2024-01-16 RX ORDER — FUROSEMIDE 40 MG/1
40 TABLET ORAL DAILY
Qty: 30 TABLET | Refills: 0 | Status: SHIPPED | OUTPATIENT
Start: 2024-01-16

## 2024-01-16 RX ORDER — HYDRALAZINE HYDROCHLORIDE 50 MG/1
50 TABLET, FILM COATED ORAL 3 TIMES DAILY
COMMUNITY
Start: 2024-01-15

## 2024-01-16 RX ORDER — CARVEDILOL 6.25 MG/1
6.25 TABLET ORAL 2 TIMES DAILY
COMMUNITY
Start: 2024-01-15

## 2024-01-16 ASSESSMENT — PATIENT HEALTH QUESTIONNAIRE - PHQ9
SUM OF ALL RESPONSES TO PHQ QUESTIONS 1-9: 3
SUM OF ALL RESPONSES TO PHQ QUESTIONS 1-9: 3
7. TROUBLE CONCENTRATING ON THINGS, SUCH AS READING THE NEWSPAPER OR WATCHING TELEVISION: 0
9. THOUGHTS THAT YOU WOULD BE BETTER OFF DEAD, OR OF HURTING YOURSELF: 0
3. TROUBLE FALLING OR STAYING ASLEEP: 0
2. FEELING DOWN, DEPRESSED OR HOPELESS: 0
10. IF YOU CHECKED OFF ANY PROBLEMS, HOW DIFFICULT HAVE THESE PROBLEMS MADE IT FOR YOU TO DO YOUR WORK, TAKE CARE OF THINGS AT HOME, OR GET ALONG WITH OTHER PEOPLE: 0
SUM OF ALL RESPONSES TO PHQ9 QUESTIONS 1 & 2: 0
5. POOR APPETITE OR OVEREATING: 0
SUM OF ALL RESPONSES TO PHQ QUESTIONS 1-9: 3
6. FEELING BAD ABOUT YOURSELF - OR THAT YOU ARE A FAILURE OR HAVE LET YOURSELF OR YOUR FAMILY DOWN: 0
8. MOVING OR SPEAKING SO SLOWLY THAT OTHER PEOPLE COULD HAVE NOTICED. OR THE OPPOSITE, BEING SO FIGETY OR RESTLESS THAT YOU HAVE BEEN MOVING AROUND A LOT MORE THAN USUAL: 0
1. LITTLE INTEREST OR PLEASURE IN DOING THINGS: 0
4. FEELING TIRED OR HAVING LITTLE ENERGY: 3
SUM OF ALL RESPONSES TO PHQ QUESTIONS 1-9: 3

## 2024-01-16 NOTE — PROGRESS NOTES
I walked patient down the mack and back and he became extremely short of breath with oxygen on room air, dropping to 89 and his heart rate went up to 189 with respirations of 24. When he sat back down his oxygen went to 96 and his heart rate went back to 84.   
(PROVENTIL;VENTOLIN;PROAIR) 108 (90 Base) MCG/ACT inhaler INHALE 2 PUFFS BY MOUTH EVERY 6 HOURS AS NEEDED FOR WHEEZING OR SHORTNESS OF BREATH 6.7 g 1    Magnesium 400 MG TABS One tablet daily 30 tablet 3    nitroGLYCERIN (NITROSTAT) 0.4 MG SL tablet Place 1 tablet under the tongue every 5 minutes as needed for Chest pain 25 tablet 3    blood glucose test strips (TRUE METRIX BLOOD GLUCOSE TEST) strip USE TO TEST BLOOD SUGAR THREE TIMES DAILY AND AS NEEDED DX E11.9 300 strip 3    pramipexole (MIRAPEX) 1 MG tablet Take 1 tablet by mouth nightly Take 1 mg by mouth nightly 90 tablet 3    MI-ACID GAS RELIEF 80 MG chewable tablet TAKE 1 TABLET BY MOUTH EVERY 6-8 HOURS FOR 2 DAYS, THEN AS NEEDED FOR FLATULENCE 60 tablet 1    fluticasone (FLONASE) 50 MCG/ACT nasal spray 1 spray by Each Nostril route daily 2 Bottle 1    finasteride (PROSCAR) 5 MG tablet Take 1 tablet by mouth daily 90 tablet 3    Insulin Pen Needle (ADVOCATE INSULIN PEN NEEDLES) 31G X 5 MM MISC USE TO INJECT INSULIN EVERY DAY DX: E11.42  each 3    Microlet Lancets MISC 1 each by Does not apply route 3 times daily DX: E11.42  each 3    Cholecalciferol (VITAMIN D3) 2000 units CAPS Take 2 capsules by mouth daily      therapeutic multivitamin-minerals (THERAGRAN-M) tablet Take 1 tablet by mouth daily           Vitals:    01/16/24 1032   BP: 115/65   Site: Right Upper Arm   Position: Sitting   Cuff Size: Large Adult   Pulse: 70   Resp: 16   Temp: 98.2 °F (36.8 °C)   TempSrc: Temporal   SpO2: 99%   Weight: 107.5 kg (237 lb)   Height: 1.702 m (5' 7.01\")     Body mass index is 37.11 kg/m².     Wt Readings from Last 3 Encounters:   01/16/24 107.5 kg (237 lb)   12/26/23 103.4 kg (228 lb)   12/11/23 106.1 kg (234 lb)     BP Readings from Last 3 Encounters:   01/16/24 115/65   12/26/23 137/65   12/11/23 (!) 139/57        Patient was admitted to [facility] from 01/11/2024 to 01/14/2024 for Syncope.  He has been taking lasix 20 mg     He needs a

## 2024-01-17 ASSESSMENT — ENCOUNTER SYMPTOMS
GASTROINTESTINAL NEGATIVE: 1
EYES NEGATIVE: 1
SHORTNESS OF BREATH: 1

## 2024-01-17 NOTE — PROGRESS NOTES
[facility] Cardiology    Patient ID: Donny Jerry is a 78 y.o. male.  : 1945   Contact: [phone]    Encounter date: 2024    PCP: Anum Alonso APRN      Chief complaint:   Chief Complaint   Patient presents with    Chest Pain    Shortness of Breath    Edema    Coronary artery disease involving coronary bypass graft of        Problem List:  Coronary artery disease/NSTEMI:  CABG x3 in , [facility] by Dr. Peng. LIMA to the LAD, SVG to obtuse marginal, SVG to the PDA.  Stress echo, 2007: No wall motion abnormalities. No evidence of ischemia. Normal EF.  Echo, 2007, mild concentric LVH, trace of MR and TR.    Adenosine Cardiolite, 2009:  Normal LVEF with a stress induced mid and distal inferior defect compatible with  ischemia.  [facility], 2009, Dr. Russo: EF 45-50%. Overlapping Cypher TAWANNA 2.5 x 28 and 2.5 x 18 to the SVG to OM. SVG to PDA had a proximal stenosis of 60% with a distal stenosis of 50-60%.  [facility], 2009: PTCA and Taxus TAWANNA (2.25 x 12 mm) to the mid PDA; Cypher TAWANNA (2.5 x 18mm) to the distal SVG to the PDA;  and Cypher TAWANNA (3.0 x 28mm) to the proximal SVG to the PDA.  [facility] for recurrent chest pain, 2010: Revealing patent stents. Ranexa initiated 500 mg q.12 h.   [facility],  2011: LVEF of 45% to 50% with an occluded SVG to an obtuse marginal branch which could not be revascularized, patent LIMA to the LAD, noncritical graft disease in the SVG to the PDA, multiple small areas of distal vessel disease and collateral flow were seen.   [facility] for recurrent anginal symptoms, 2010, with PTCA and Promus TAWANNA (3.0 x 28mm) to the proximal SVG to the PDA for in-stent restenosis.    [facility], 2010,  Dr. Russo: EF 40-45%. 3.5 x 23 mm Promus TAWANNA in the proximal SVG to OM, 2.5 x 18 mm Promus TAWANNA to distal SVG to PDA due to ISR.    [facility], 2010, with placement of  a 3.0 x 16 mm Taxus TAWANNA to the SVG to  the RCA proximally and a 2.5 x 16 mm paclitaxel stent distally in the SVG to the RCA.  [facility], 3.0 x 24-mm Promus element TAWANNA to a 90% lesion in the mid portion  of the SVG to the PDA.   [facility] for recurrent angina, 06/24/2014, Dr. Russo:  PTCA of the SVG to the PDA using a 3 x 12 mm NC TREK balloon.    Abnormal stress test, 10/07/2021: Mild ST depression with infusion and had a normal hemodynamic response to regadenoson. He was found to have a large posterior lateral stress-induced defect. Severe small fixed anterior defect, EF 45% with CCS class I chest discomfort/NYHA class II dyspnea on exertion symptoms.   [facility], 10/19/2021: EF 50%. LAD occluded following the takeoff of a first diagonal branch and a septal . and LCx occluded after the takeoff of a small-sized to moderate-sized, OM1 branch. RCA dominant for the posterior circulation. RCA was occluded in the mid-segment of the vessel. Collateral flow from one of the RV marginal branches was seen to supply the distal LCx including 2 OM branches. No significant disease appeared to be present within the distal LCx circulation. LIMA to the LAD was widely patent. SVG to LCx occluded at origin. SVG to PDA has been stented extensively. Area of mild in-stent narrowing in the mid segment of up to 40 to 50%. Good retrograde flow into posterolateral branch and into the circumflex vessels.  Echo, 06/30/2023: EF 45-50%. Mild concentric LVH. Grade 1 diastolic dysfunction. Normal right ventricular size and systolic function. Normal left atrial volume index. Mild calcification aortic valve without significant stenosis. Mild PI.  [facility] ED admission, 06/29/2023: Presented with chest pain and shortness of breath due to volume overload from heart failure.   [facility] (07/18/2023): MAXINE Suarez. Dr. Parr. Successful PCI to saphenous vein graft->RCA for multifocal in-stent restenosis. Severe disease and multiple small branch vessels not amenable to any form of revascularization.    Echo (07/18/2023): EF 46-50%.  LV wall thickness is consistent with (grade 2 with high lap) pseudonormalization.  LA cavity is mildly dilated.  LA volume is mildly increased.  Mild AS.  RVSP 15.4 mmHg. Ao pk janee 237 cm/s. Ao max PG 22.5 mmHg. Ao mean PG 12 mmHg. Ao V2 VTI 48.1 cm. CODY 1.34 cm2.  Echo, 01/12/2024: EF 45.8%. Left ventricular diastolic function is consistent with (grade II w/high LAP) pseudonormalization. Left atrial volume is moderately increased. Mild aortic valve stenosis. Normal RVSP.  Chronic diastolic heart failure/HFpEF  Echo (07/18/2023): EF 46-50%.  LV wall thickness is consistent with (grade 2 with high lap) pseudonormalization.   Hypertension  Hypercholesterolemia.  His cholesterol is followed and treated by his PCP.  Aortic stenosis  Echo (07/18/2023): EF 46-50%.  LV wall thickness is consistent with (grade 2 with high lap) pseudonormalization.  LA cavity is mildly dilated.  LA volume is mildly increased.  Mild AS.  RVSP 15.4 mmHg. Ao pk janee 237 cm/s. Ao max PG 22.5 mmHg. Ao mean PG 12 mmHg. Ao V2 VTI 48.1 cm. CODY 1.34 cm2.  Chest pain  [facility] (06/29/2023-07/01/2023): Presented to R shortness of air and chest tightness was deemed to be secondary due to volume overload from his chronic diastolic heart failure.  Diuresed and later stable for discharge home.  [facility] ED (07/14/2023): Presented to [facility] complaining of chest pain and lower extremity edema which was found to be volume overloaded secondary to left heart failure.  Diuresed and given pain medication which resolved his chest pain allowing him to be stable for discharge home.  Type 2 diabetes, diagnosed 2 years ago.  Obstructive sleep apnea, on CPAP.  Chronic iron-deficiency anemia  Enlarged prostate with anticipated TURP with Dr. Bernal with retroperitoneal ultrasound 8/31/2021 showing prostatomegaly (5.4 x 3.9 x 4.2 cm) with mass-effect on the base of the bladder, normal size kidneys  Obesity  Depression  10. Surgical  history:  CABG x3, Dr. Peng, [facility], 2001.  Negative colonoscopy, 2014.    Allergies   Allergen Reactions    Zocor [Simvastatin] Myalgia    Bisoprolol Other (See Comments)     Agitation      Byetta 10 Mcg Pen [Exenatide] Nausea Only     nausea    Crestor [Rosuvastatin Calcium] Myalgia    Metformin And Related Nausea Only    Plavix [Clopidogrel Bisulfate] Other (See Comments)     resistance       Current Medications:    Current Outpatient Medications:     albuterol sulfate  (90 Base) MCG/ACT inhaler, Inhale 2 puffs Every 4 (Four) Hours As Needed for Wheezing., Disp: 18 g, Rfl: 5    aspirin 81 MG chewable tablet, Chew 1 tablet Daily., Disp: , Rfl:     atorvastatin (LIPITOR) 40 MG tablet, Take 1 tablet by mouth Daily., Disp: , Rfl:     B-D UF III MINI PEN NEEDLES 31G X 5 MM misc, , Disp: , Rfl:     carvedilol (COREG) 6.25 MG tablet, Take 1 tablet by mouth 2 (Two) Times a Day With Meals., Disp: 60 tablet, Rfl: 0    Cholecalciferol (VITAMIN D3) 50 MCG (2000 UT) capsule, Take 2 capsules by mouth Daily., Disp: , Rfl:     dapagliflozin Propanediol (Farxiga) 10 MG tablet, Take 10 mg by mouth Daily., Disp: , Rfl:     fexofenadine (ALLEGRA) 180 MG tablet, Take 1 tablet by mouth Daily., Disp: , Rfl:     finasteride (PROSCAR) 5 MG tablet, Take 1 tablet by mouth Daily., Disp: , Rfl:     fluticasone (FLONASE) 50 MCG/ACT nasal spray, 1 spray into the nostril(s) as directed by provider Daily., Disp: 48 g, Rfl: 2    Fluticasone-Umeclidin-Vilant (Trelegy Ellipta) 200-62.5-25 MCG/ACT aerosol powder , Inhale 1 puff Daily. Rinse mouth with water after use., Disp: 90 each, Rfl: 2    furosemide (LASIX) 20 MG tablet, Take 1 tablet by mouth Daily. Sun Mon Wed FRI SAT, Disp: , Rfl:     furosemide (LASIX) 40 MG tablet, Take 1 tablet by mouth 1 (One) Time Per Week. Tues Thurs, Disp: , Rfl:     hydrALAZINE (APRESOLINE) 50 MG tablet, Take 1 tablet by mouth 3 (Three) Times a Day., Disp: 90 tablet, Rfl: 0    iron  polysaccharides (NIFEREX) 150 MG capsule, Take 1 capsule by mouth Daily for 30 days., Disp: 30 capsule, Rfl: 0    magnesium oxide (MAG-OX) 400 MG tablet, Take 1 tablet by mouth Daily., Disp: , Rfl:     Multiple Vitamin (MULTI VITAMIN DAILY PO), Take 1 tablet by mouth Daily., Disp: , Rfl:     nitroglycerin (NITROSTAT) 0.4 MG SL tablet, Place 1 tablet under the tongue Every 5 (Five) Minutes As Needed for Chest Pain. Take no more than 3 doses in 15 minutes., Disp: 25 tablet, Rfl: 11    pantoprazole (PROTONIX) 40 MG EC tablet, TAKE 1 TABLET BY MOUTH 2 TIMES DAILY (BEFORE MEALS), Disp: , Rfl:     pramipexole (MIRAPEX) 1 MG tablet, TAKE 1 TABLET EVERY NIGHT, Disp: 90 tablet, Rfl: 2    prasugrel (EFFIENT) 10 MG tablet, Take 1 tablet by mouth Daily., Disp: , Rfl:     ranolazine (RANEXA) 500 MG 12 hr tablet, Take 1 tablet by mouth Every 12 (Twelve) Hours for 180 days., Disp: 60 tablet, Rfl: 5    sertraline (ZOLOFT) 50 MG tablet, Take 2 tablets by mouth Daily., Disp: , Rfl:     tamsulosin (FLOMAX) 0.4 MG capsule 24 hr capsule, Take 1 capsule by mouth 2 (Two) Times a Day., Disp: , Rfl:     TRUEplus Lancets 28G misc, , Disp: , Rfl:     Trulicity 0.75 MG/0.5ML solution pen-injector, Inject 0.75 mg as directed 1 (One) Time Per Week., Disp: , Rfl:     HPI    Donny Jerry is a 78 y.o. male who presents today for a hospital follow up of CAD, CHF, and cardiac risk factors. Since last visit, patient was recently hospitalized at [facility]. Patient was discharged 4 days ago and overall has been feeling better.  Patient has not had any syncopal episodes since his discharge.  His wife has been checking his blood pressure and heart rate and blood sugar regularly.  His blood sugar has been elevated and his blood pressure has been coming up appropriately after discontinuing doxazosin and reducing his hydralazine.  Patient continues to have lower extremity edema although not worse than normal.  Patient does have a follow-up appointment with his  "nephrologist coming up due to his kidney dysfunction.  Patient did have frequent PVCs on EKG and telemetry while he was inpatient.  He was going to be mailed a Holter monitor upon discharge and has not received it yet.  Patient denies any palpitations, chest pain or increased shortness of breath today.    It was noted during his hospitalization echocardiogram showed him slightly reduced 46-50%, which is prior echo showed 50%.  He was given gentle hydration after receiving IV Lasix prior in the week and his orthostasis improved from inpatient stay.      The following portions of the patient's history were reviewed and updated as appropriate: allergies, current medications and problem list.    Pertinent positives as listed in the HPI.  All other systems reviewed are negative.         Vitals:    01/18/24 0914   BP: 128/74   BP Location: Left arm   Patient Position: Sitting   Pulse: 85   SpO2: 98%   Weight: 108 kg (239 lb)   Height: 170.2 cm (67\")       Physical Exam:  General: Alert and oriented.  No acute distress  Neck: Jugular venous pressure is within normal limits. Carotids have normal upstrokes without bruits.   Cardiovascular: Heart has a nondisplaced focal PMI. Regular rate and rhythm. No murmur, gallop or rub.  Lungs:   Extremities: 1+ pitting edema to bilateral lower extremities mildly reduced breath sounds in bases, no crackles in the auscultation bilaterally.  Skin: Warm and dry.  Neurologic: Nonfocal.     Diagnostic Data (reviewed with patient):  Lab Results   Component Value Date    GLUCOSE 72 01/14/2024    BUN 62 (H) 01/14/2024    CREATININE 2.01 (H) 01/14/2024    BCR 30.8 (H) 01/14/2024     01/14/2024    K 4.2 01/14/2024     01/14/2024    CO2 27.0 01/14/2024    CALCIUM 9.0 01/14/2024    ALBUMIN 3.8 01/14/2024    ALKPHOS 47 01/12/2024    AST 18 01/12/2024    ALT 24 01/12/2024     Lab Results   Component Value Date    CHOL 130 07/19/2023    CHLPL 141 12/07/2023    TRIG 149 12/07/2023    HDL 40 " 12/07/2023    LDL 71 12/07/2023      Lab Results   Component Value Date    WBC 8.85 01/14/2024    RBC 3.09 (L) 01/14/2024    HGB 9.2 (L) 01/14/2024    HCT 28.3 (L) 01/14/2024    MCV 91.6 01/14/2024     01/14/2024      Lab Results   Component Value Date    TSH 2.510 01/11/2024        Advance Care Planning   ACP discussion was declined by the patient. Patient has an advance directive in EMR which is still valid.              Assessment:    ICD-10-CM ICD-9-CM   1. Coronary artery disease involving coronary bypass graft of native heart without angina pectoris  I25.810 414.05   2. Chronic diastolic heart failure  I50.32 428.32   3. Essential hypertension  I10 401.9   4. Hyperlipidemia LDL goal <70  E78.5 272.4   5. Obstructive sleep apnea on CPAP  G47.33 327.23   6. PVC's (premature ventricular contractions)  I49.3 427.69         Plan:  Due to the patient's frequent PVCs on EKG telemetry while inpatient will order Holter monitor today for 7 days to assess patient's PVC burden and she indications if indicated.  Patient also encouraged to follow-up with his PCP regarding his elevated glucose after he was taken off of his Tresiba for hypoglycemia during his last admission.  Continue on aspirin 81 mg and Effient 10 mg daily for antiplatelet therapy.   Continue on atorvastatin 40 mg daily for hyperlipidemia as cholesterol is currently controlled with an LDL of 71.   Continue on carvedilol 6.25 mg BID, hydralazine 50 mg 3 times daily for hypertension  Continue on Lasix  take 20 mg daily and 40 mg as needed for increased fluid retention.  Continue on Ranexa 500 mg BID for chest pain.   Continue all other current medications.  F/up in 1 month, sooner if needed.    Leona Aragon PA-C

## 2024-01-18 ENCOUNTER — HOSPITAL ENCOUNTER (OUTPATIENT)
Facility: HOSPITAL | Age: 79
Discharge: HOME | End: 2024-01-18
Payer: MEDICARE

## 2024-01-18 ENCOUNTER — HOSPITAL ENCOUNTER (OUTPATIENT)
Dept: GENERAL RADIOLOGY | Facility: HOSPITAL | Age: 79
Discharge: HOME | End: 2024-01-18
Payer: MEDICARE

## 2024-01-18 ENCOUNTER — READMISSION MANAGEMENT (OUTPATIENT)
Dept: CALL CENTER | Facility: HOSPITAL | Age: 79
End: 2024-01-18
Payer: MEDICARE

## 2024-01-18 ENCOUNTER — OFFICE VISIT (OUTPATIENT)
Dept: CARDIOLOGY | Facility: CLINIC | Age: 79
End: 2024-01-18
Payer: MEDICARE

## 2024-01-18 VITALS
HEIGHT: 67 IN | OXYGEN SATURATION: 98 % | BODY MASS INDEX: 37.51 KG/M2 | HEART RATE: 85 BPM | SYSTOLIC BLOOD PRESSURE: 128 MMHG | WEIGHT: 239 LBS | DIASTOLIC BLOOD PRESSURE: 74 MMHG

## 2024-01-18 DIAGNOSIS — I49.3 PVC'S (PREMATURE VENTRICULAR CONTRACTIONS): ICD-10-CM

## 2024-01-18 DIAGNOSIS — I25.810 CORONARY ARTERY DISEASE INVOLVING CORONARY BYPASS GRAFT OF NATIVE HEART WITHOUT ANGINA PECTORIS: Primary | ICD-10-CM

## 2024-01-18 DIAGNOSIS — E78.5 HYPERLIPIDEMIA LDL GOAL <70: ICD-10-CM

## 2024-01-18 DIAGNOSIS — I50.32 CHRONIC DIASTOLIC HEART FAILURE: ICD-10-CM

## 2024-01-18 DIAGNOSIS — N18.4 ANEMIA DUE TO STAGE 4 CHRONIC KIDNEY DISEASE (HCC): ICD-10-CM

## 2024-01-18 DIAGNOSIS — G47.33 OBSTRUCTIVE SLEEP APNEA ON CPAP: ICD-10-CM

## 2024-01-18 DIAGNOSIS — I10 ESSENTIAL HYPERTENSION: ICD-10-CM

## 2024-01-18 DIAGNOSIS — D63.1 ANEMIA DUE TO STAGE 4 CHRONIC KIDNEY DISEASE (HCC): ICD-10-CM

## 2024-01-18 DIAGNOSIS — R07.81 RIB PAIN: ICD-10-CM

## 2024-01-18 LAB
ALBUMIN SERPL-MCNC: 3.7 G/DL (ref 3.4–4.8)
ALBUMIN/GLOB SERPL: 1.4 {RATIO} (ref 0.8–2)
ALP SERPL-CCNC: 48 U/L (ref 25–100)
ALT SERPL-CCNC: 26 U/L (ref 4–36)
ANION GAP SERPL CALCULATED.3IONS-SCNC: 11 MMOL/L (ref 3–16)
AST SERPL-CCNC: 16 U/L (ref 8–33)
BASOPHILS # BLD: 0.1 K/UL (ref 0–0.1)
BASOPHILS NFR BLD: 0.9 %
BILIRUB SERPL-MCNC: 0.4 MG/DL (ref 0.3–1.2)
BUN SERPL-MCNC: 54 MG/DL (ref 6–20)
CALCIUM SERPL-MCNC: 8.7 MG/DL (ref 8.5–10.5)
CHLORIDE SERPL-SCNC: 106 MMOL/L (ref 98–107)
CO2 SERPL-SCNC: 24 MMOL/L (ref 20–30)
CREAT SERPL-MCNC: 2 MG/DL (ref 0.4–1.2)
EOSINOPHIL # BLD: 0.2 K/UL (ref 0–0.4)
EOSINOPHIL NFR BLD: 2.3 %
ERYTHROCYTE [DISTWIDTH] IN BLOOD BY AUTOMATED COUNT: 14.3 % (ref 11–16)
GFR SERPLBLD CREATININE-BSD FMLA CKD-EPI: 34 ML/MIN/{1.73_M2}
GLOBULIN SER CALC-MCNC: 2.7 G/DL
GLUCOSE SERPL-MCNC: 221 MG/DL (ref 74–106)
HCT VFR BLD AUTO: 28.6 % (ref 40–54)
HGB BLD-MCNC: 9.1 G/DL (ref 13–18)
IMM GRANULOCYTES # BLD: 0 K/UL
IMM GRANULOCYTES NFR BLD: 0.2 % (ref 0–5)
LYMPHOCYTES # BLD: 0.9 K/UL (ref 1.5–4)
LYMPHOCYTES NFR BLD: 9.3 %
MCH RBC QN AUTO: 28.9 PG (ref 27–32)
MCHC RBC AUTO-ENTMCNC: 31.8 G/DL (ref 31–35)
MCV RBC AUTO: 90.8 FL (ref 80–100)
MONOCYTES # BLD: 0.5 K/UL (ref 0.2–0.8)
MONOCYTES NFR BLD: 5.3 %
NEUTROPHILS # BLD: 7.6 K/UL (ref 2–7.5)
NEUTS SEG NFR BLD: 82 %
PLATELET # BLD AUTO: 281 K/UL (ref 150–400)
PMV BLD AUTO: 10.4 FL (ref 6–10)
POTASSIUM SERPL-SCNC: 4.7 MMOL/L (ref 3.4–5.1)
PROT SERPL-MCNC: 6.4 G/DL (ref 6.4–8.3)
RBC # BLD AUTO: 3.15 M/UL (ref 4.5–6)
SODIUM SERPL-SCNC: 141 MMOL/L (ref 136–145)
WBC # BLD AUTO: 9.2 K/UL (ref 4–11)

## 2024-01-18 PROCEDURE — 36415 COLL VENOUS BLD VENIPUNCTURE: CPT

## 2024-01-18 PROCEDURE — 85025 COMPLETE CBC W/AUTO DIFF WBC: CPT

## 2024-01-18 PROCEDURE — 80053 COMPREHEN METABOLIC PANEL: CPT

## 2024-01-18 PROCEDURE — 93225 XTRNL ECG REC<48 HRS REC: CPT

## 2024-01-18 PROCEDURE — 71101 X-RAY EXAM UNILAT RIBS/CHEST: CPT

## 2024-01-18 NOTE — OUTREACH NOTE
CHF Week 1 Survey      Flowsheet Row Responses   [facility] patient discharged from? [place]   Does the patient have one of the following disease processes/diagnoses(primary or secondary)? CHF   CHF Week 1 attempt successful? Yes   Call start time 1717   Call end time 1725   Discharge diagnosis syncope, A/C CHF   Person spoke with today (if not patient) and relationship wife   Meds reviewed with patient/caregiver? Yes   Is the patient having any side effects they believe may be caused by any medication additions or changes? No   Does the patient have all medications ordered at discharge? Yes   Is the patient taking all medications as directed (includes completed medication regime)? Yes   Does the patient have a primary care provider?  Yes   Does the patient have an appointment with their PCP within 7 days of discharge? Yes   Has the patient kept scheduled appointments due by today? Yes   DME comments wearing CPAP   Pulse Ox monitoring None   Psychosocial issues? No   Did the patient receive a copy of their discharge instructions? Yes   Nursing interventions Reviewed instructions with patient   What is the patient's perception of their health status since discharge? Improving   Nursing interventions Nurse provided patient education   Is the patient able to teach back signs and symptoms of worsening condition? (i.e. weight gain, shortness of air, etc.) Yes   If the patient is a current smoker, are they able to teach back resources for cessation? Not a smoker   Is the patient/caregiver able to teach back the hierarchy of who to call/visit for symptoms/problems? PCP, Specialist, Home health nurse, Urgent Care, ED, 911 Yes   Is the patient able to teach back Heart Failure Zones? Yes   CHF Nursing Interventions Education provided on various zones   CHF Zone this Call Green Zone   Green Zone Patient reports doing well, No new or worsening shortness of breath, Physical activity level is normal for you, No new  swelling -  feet, ankles and legs look normal for you, Weight check stable, No chest pain   Green Zone Interventions Daily weight check, Meds as directed, Follow up visits planned, Low sodium diet    CHF Week 1 call completed? Yes   Is the patient interested in additional calls from an ambulatory ? No   Would this patient benefit from a Referral to [facility] Social Work? No   Wrap up additional comments Spouse states pt is doing better, and reports no weight gain. Reviewed AVS/meds with spouse. Pt had PCP fu appt, and spouse verified cardiology fu appt.   Call end time [id number]            Jenny PALENCIA - Registered Nurse

## 2024-01-19 DIAGNOSIS — N18.32 ANEMIA DUE TO STAGE 3B CHRONIC KIDNEY DISEASE (HCC): Primary | ICD-10-CM

## 2024-01-19 DIAGNOSIS — D63.1 ANEMIA DUE TO STAGE 3B CHRONIC KIDNEY DISEASE (HCC): Primary | ICD-10-CM

## 2024-01-19 RX ORDER — SODIUM CHLORIDE 9 MG/ML
INJECTION, SOLUTION INTRAVENOUS CONTINUOUS
OUTPATIENT
Start: 2024-01-26

## 2024-01-19 RX ORDER — DIPHENHYDRAMINE HYDROCHLORIDE 50 MG/ML
50 INJECTION INTRAMUSCULAR; INTRAVENOUS
OUTPATIENT
Start: 2024-01-26

## 2024-01-19 RX ORDER — EPINEPHRINE 1 MG/ML
0.3 INJECTION, SOLUTION INTRAMUSCULAR; SUBCUTANEOUS PRN
OUTPATIENT
Start: 2024-01-26

## 2024-01-19 RX ORDER — ALBUTEROL SULFATE 2.5 MG/3ML
2.5 SOLUTION RESPIRATORY (INHALATION)
OUTPATIENT
Start: 2024-01-26

## 2024-01-19 RX ORDER — ACETAMINOPHEN 325 MG/1
650 TABLET ORAL
OUTPATIENT
Start: 2024-01-26

## 2024-01-19 RX ORDER — ONDANSETRON 2 MG/ML
8 INJECTION INTRAMUSCULAR; INTRAVENOUS
OUTPATIENT
Start: 2024-01-26

## 2024-01-19 RX ORDER — ALBUTEROL SULFATE 90 UG/1
4 AEROSOL, METERED RESPIRATORY (INHALATION) PRN
OUTPATIENT
Start: 2024-01-26

## 2024-01-24 ENCOUNTER — READMISSION MANAGEMENT (OUTPATIENT)
Dept: CALL CENTER | Facility: HOSPITAL | Age: 79
End: 2024-01-24
Payer: MEDICARE

## 2024-01-24 ENCOUNTER — CARE COORDINATION (OUTPATIENT)
Dept: PRIMARY CARE CLINIC | Age: 79
End: 2024-01-24

## 2024-01-24 NOTE — CARE COORDINATION
Burak Hawkins, RN  Manager Care Coordination  [phone]     I called on Aquilino today per PCP request for help with care coordination.  I spoke with Mrs. Evans.  We discussed many different things that were of concern to his PCP.      Aquilino is wearing his oxygen with cpap every night.  During the day he only uses PRN.      He had a OV with cardiology and saw PA.  He is now wearing a holter monitor until Friday this week.  Wife keeps a check on his BP and HR.  She checked HR while lying in bed before getting up for the day , yesterday.  She reports in the 30s but asymptomatic.  After he got up and moving around heart rate was in the 60s per his norm.  He was wearing Holter monitor during this.  He will have a fu with cardiology on 2/15/24.      I let Mrs Evans know that the [facility] pharmacy has his procrit injections approved by insurance.  The hospital pharmacist Wayne Salmeron will be in contact with them about when to come to the hospital for lab and possible injection.  She verbalized understanding of this plan.      At this time Aquilino remains off of Tresiba insulin and only taking farxiga and trulicity for his blood sugar.  Mrs. Evans gave me the blood sugar readings for past week to share with his PCP.  After review, Marina Dunlap wants him to start Tresiba back but at half his previous dosage.  He will be taking Tresiba 12 units in the morning and 37 units in the evening.  I will contact him for readings in 5 days and determine if we go up again by 5 units.  Mrs. Evans verbalized understanding.  The results she provided me with are below for morning and evening beginning with 1/17/24.    166   317  174   325  151   341  174   358  313   266  246 210   325  204

## 2024-01-24 NOTE — OUTREACH NOTE
CHF Week 2 Survey      Flowsheet Row Responses   [facility] patient discharged from? [place]   Does the patient have one of the following disease processes/diagnoses(primary or secondary)? CHF   Week 2 attempt successful? Yes   Call start time 1114   Call end time 1119   Discharge diagnosis syncope, A/C CHF   Person spoke with today (if not patient) and relationship wife   Meds reviewed with patient/caregiver? Yes   Is the patient taking all medications as directed (includes completed medication regime)? Yes   Does the patient have a primary care provider?  Yes   Does the patient have an appointment with their PCP within 7 days of discharge? Yes   Has the patient kept scheduled appointments due by today? Yes   Comments PCP order O2   Has home health visited the patient within 72 hours of discharge? N/A   Psychosocial issues? No   Did the patient receive a copy of their discharge instructions? Yes   Nursing interventions Reviewed instructions with patient   What is the patient's perception of their health status since discharge? Improving   Nursing interventions Nurse provided patient education   Is the patient able to teach back signs and symptoms of worsening condition? (i.e. weight gain, shortness of air, etc.) Yes   Is the patient/caregiver able to teach back the hierarchy of who to call/visit for symptoms/problems? PCP, Specialist, Home health nurse, Urgent Care, ED, 911 Yes   CHF Week 2 call completed? Yes   Wrap up additional comments Wife reports Pt doing ok. PCP ordered O2 and he may start getting iron inj. States legs are a little swollen but wt stable. Wife is awre to call cardio if increased swelling or increasaed SOA. She repports Pt gets wt daily and wt is stable. She also is aware of when to increase Lasix per orders according to wt.   Call end time 1119            NISHANT GORDON - Registered Nurse

## 2024-01-25 ENCOUNTER — TELEPHONE (OUTPATIENT)
Dept: CARDIOLOGY | Facility: CLINIC | Age: 79
End: 2024-01-25
Payer: MEDICARE

## 2024-01-25 NOTE — TELEPHONE ENCOUNTER
"Overhead page for patient problem.  The caller is Orly at [facility] through patients internal medicine doctor.  She states the patients wife has called them this morning.  Patients wife reported patients heart rate was 35 on a pulse oximetry.  His oxygen saturation was 97%.  He is asymptomatic.  His blood pressure was 165/72 yesterday.  She thought the patients wife was taking him to the ER.  I will call the patient.  Spoke with the patients wife.  She reports as above.  She has been checking his heart rate with a pulse oximeter.  She is instructed how to properly check his pulse at the patients wrist and why the pulse oximeter does not correctly report the patients heart rate.  Patient has a history of PVCs and is currently wearing a holter.  This was placed at [facility] last week at his office visit.  He is due to take it off and return it today.  She states patient does not have any symptoms such as dizziness, syncope/near syncope, or lightheadedness.  His blood pressure has been running high in the evenings - systolics 160-170s.  But mid day it is running WNLs.  She is instructed if she checks the patients heart rate at his wrist and it is <50, she should take him to the ER.  He is to make sure he stays hydrated and eats today.  She is encouraged to call with any questions or concerns.  She verbalizes understanding.  Spoke with Respiratory department at [facility].  Patients holter registered there, but is not a \"live\" monitor.  I will discuss with PW.  "

## 2024-01-29 ENCOUNTER — HOSPITAL ENCOUNTER (OUTPATIENT)
Dept: NURSING | Facility: HOSPITAL | Age: 79
Setting detail: INFUSION SERIES
Discharge: HOME | End: 2024-01-31
Payer: MEDICARE

## 2024-01-29 LAB
ERYTHROCYTE [DISTWIDTH] IN BLOOD BY AUTOMATED COUNT: 14.2 % (ref 11–16)
HCT VFR BLD AUTO: 32.8 % (ref 40–54)
HGB BLD-MCNC: 10.5 G/DL (ref 13–18)
MCH RBC QN AUTO: 28.9 PG (ref 27–32)
MCHC RBC AUTO-ENTMCNC: 32 G/DL (ref 31–35)
MCV RBC AUTO: 90.4 FL (ref 80–100)
PLATELET # BLD AUTO: 249 K/UL (ref 150–400)
PMV BLD AUTO: 10.8 FL (ref 6–10)
RBC # BLD AUTO: 3.63 M/UL (ref 4.5–6)
WBC # BLD AUTO: 8.6 K/UL (ref 4–11)

## 2024-01-29 PROCEDURE — 36415 COLL VENOUS BLD VENIPUNCTURE: CPT

## 2024-01-29 PROCEDURE — 85027 COMPLETE CBC AUTOMATED: CPT

## 2024-01-31 ENCOUNTER — READMISSION MANAGEMENT (OUTPATIENT)
Dept: CALL CENTER | Facility: HOSPITAL | Age: 79
End: 2024-01-31
Payer: MEDICARE

## 2024-01-31 NOTE — OUTREACH NOTE
CHF Week 3 Survey      Flowsheet Row Responses   [facility] patient discharged from? [place]   Does the patient have one of the following disease processes/diagnoses(primary or secondary)? CHF   Week 3 attempt successful? No   Unsuccessful attempts Attempt 1  [Dtr requests nurse call wife's cell, NA when attempted.]   Discharge diagnosis syncope, A/C CHF            Kristine BARBOSA - Registered Nurse

## 2024-02-06 ENCOUNTER — CARE COORDINATION (OUTPATIENT)
Dept: PRIMARY CARE CLINIC | Age: 79
End: 2024-02-06

## 2024-02-06 NOTE — CARE COORDINATION
Burak Hawkins, RN  Manager Care Coordination  [phone]     I called on Aquilino and Mrs. Evans today for fu on blood sugar and holter monitor.  Message left with contact information.

## 2024-02-09 ENCOUNTER — CARE COORDINATION (OUTPATIENT)
Dept: PRIMARY CARE CLINIC | Age: 79
End: 2024-02-09

## 2024-02-09 NOTE — CARE COORDINATION
Burak Hawkins, RN  Manager Care Coordination  [phone]     I spoke to Aquilino concerning the changes in his insulin dosage.  He tells me he has been doing well but he can't see to read what his blood sugar has been past couple of days.

## 2024-02-12 ENCOUNTER — HOSPITAL ENCOUNTER (OUTPATIENT)
Dept: NURSING | Facility: HOSPITAL | Age: 79
Setting detail: INFUSION SERIES
Discharge: HOME | End: 2024-02-14
Payer: MEDICARE

## 2024-02-12 DIAGNOSIS — N18.32 ANEMIA DUE TO STAGE 3B CHRONIC KIDNEY DISEASE (HCC): Primary | ICD-10-CM

## 2024-02-12 DIAGNOSIS — D63.1 ANEMIA DUE TO STAGE 3B CHRONIC KIDNEY DISEASE (HCC): Primary | ICD-10-CM

## 2024-02-12 LAB
ERYTHROCYTE [DISTWIDTH] IN BLOOD BY AUTOMATED COUNT: 13.9 % (ref 11–16)
HCT VFR BLD AUTO: 31 % (ref 40–54)
HGB BLD-MCNC: 10.1 G/DL (ref 13–18)
MCH RBC QN AUTO: 28.4 PG (ref 27–32)
MCHC RBC AUTO-ENTMCNC: 32.6 G/DL (ref 31–35)
MCV RBC AUTO: 87.1 FL (ref 80–100)
PLATELET # BLD AUTO: 262 K/UL (ref 150–400)
PMV BLD AUTO: 10.3 FL (ref 6–10)
RBC # BLD AUTO: 3.56 M/UL (ref 4.5–6)
WBC # BLD AUTO: 6.7 K/UL (ref 4–11)

## 2024-02-12 PROCEDURE — 85027 COMPLETE CBC AUTOMATED: CPT

## 2024-02-12 PROCEDURE — 36415 COLL VENOUS BLD VENIPUNCTURE: CPT

## 2024-02-12 RX ORDER — DIPHENHYDRAMINE HYDROCHLORIDE 50 MG/ML
50 INJECTION INTRAMUSCULAR; INTRAVENOUS
OUTPATIENT
Start: 2024-02-26

## 2024-02-12 RX ORDER — EPINEPHRINE 1 MG/ML
0.3 INJECTION, SOLUTION INTRAMUSCULAR; SUBCUTANEOUS PRN
OUTPATIENT
Start: 2024-02-26

## 2024-02-12 RX ORDER — ALBUTEROL SULFATE 2.5 MG/3ML
2.5 SOLUTION RESPIRATORY (INHALATION)
OUTPATIENT
Start: 2024-02-26

## 2024-02-12 RX ORDER — ACETAMINOPHEN 325 MG/1
650 TABLET ORAL
OUTPATIENT
Start: 2024-02-26

## 2024-02-12 RX ORDER — SODIUM CHLORIDE 9 MG/ML
INJECTION, SOLUTION INTRAVENOUS CONTINUOUS
OUTPATIENT
Start: 2024-02-26

## 2024-02-12 RX ORDER — ONDANSETRON 2 MG/ML
8 INJECTION INTRAMUSCULAR; INTRAVENOUS
OUTPATIENT
Start: 2024-02-26

## 2024-02-12 RX ORDER — ALBUTEROL SULFATE 90 UG/1
4 AEROSOL, METERED RESPIRATORY (INHALATION) PRN
OUTPATIENT
Start: 2024-02-26

## 2024-02-15 ENCOUNTER — OFFICE VISIT (OUTPATIENT)
Dept: CARDIOLOGY | Facility: CLINIC | Age: 79
End: 2024-02-15
Payer: MEDICARE

## 2024-02-15 VITALS
HEART RATE: 69 BPM | WEIGHT: 232 LBS | BODY MASS INDEX: 36.41 KG/M2 | HEIGHT: 67 IN | OXYGEN SATURATION: 96 % | SYSTOLIC BLOOD PRESSURE: 140 MMHG | DIASTOLIC BLOOD PRESSURE: 58 MMHG

## 2024-02-15 DIAGNOSIS — E78.5 HYPERLIPIDEMIA LDL GOAL <70: ICD-10-CM

## 2024-02-15 DIAGNOSIS — I49.3 PVC'S (PREMATURE VENTRICULAR CONTRACTIONS): ICD-10-CM

## 2024-02-15 DIAGNOSIS — I50.32 CHRONIC DIASTOLIC HEART FAILURE: ICD-10-CM

## 2024-02-15 DIAGNOSIS — I25.810 CORONARY ARTERY DISEASE INVOLVING CORONARY BYPASS GRAFT OF NATIVE HEART WITHOUT ANGINA PECTORIS: Primary | ICD-10-CM

## 2024-02-15 DIAGNOSIS — I10 ESSENTIAL HYPERTENSION: ICD-10-CM

## 2024-02-15 DIAGNOSIS — G47.33 OBSTRUCTIVE SLEEP APNEA ON CPAP: ICD-10-CM

## 2024-02-15 RX ORDER — SERTRALINE HYDROCHLORIDE 100 MG/1
100 TABLET, FILM COATED ORAL DAILY
COMMUNITY

## 2024-02-15 RX ORDER — HYDRALAZINE HYDROCHLORIDE 50 MG/1
50 TABLET, FILM COATED ORAL 3 TIMES DAILY
Qty: 270 TABLET | Refills: 3 | Status: SHIPPED | OUTPATIENT
Start: 2024-02-15

## 2024-02-15 RX ORDER — INSULIN DEGLUDEC INJECTION 100 U/ML
INJECTION, SOLUTION SUBCUTANEOUS 2 TIMES DAILY
COMMUNITY

## 2024-02-15 RX ORDER — HYDRALAZINE HYDROCHLORIDE 25 MG/1
25 TABLET, FILM COATED ORAL 3 TIMES DAILY
Qty: 270 TABLET | Refills: 3 | Status: SHIPPED | OUTPATIENT
Start: 2024-02-15

## 2024-02-15 NOTE — CARE COORDINATION
uBrak Hawkins, RN  Manager Care Coordination  [phone]     Silvia tells me that since we talked on 2/6 she did go up to 22 units in the am of [facility] and kept his 37 units in the evening.  He has been down below 200 a couple of times in the evening since that change.  No numbers up over 300.  He has a fu with his PCP in the am and we can make further adjustments then.

## 2024-02-16 ENCOUNTER — OFFICE VISIT (OUTPATIENT)
Dept: PRIMARY CARE CLINIC | Age: 79
End: 2024-02-16
Payer: MEDICARE

## 2024-02-16 VITALS
WEIGHT: 231.2 LBS | HEART RATE: 63 BPM | BODY MASS INDEX: 36.2 KG/M2 | DIASTOLIC BLOOD PRESSURE: 87 MMHG | SYSTOLIC BLOOD PRESSURE: 163 MMHG | TEMPERATURE: 97.4 F | OXYGEN SATURATION: 94 %

## 2024-02-16 DIAGNOSIS — I50.22 CHRONIC SYSTOLIC (CONGESTIVE) HEART FAILURE (HCC): ICD-10-CM

## 2024-02-16 DIAGNOSIS — N18.4 CHRONIC RENAL FAILURE, STAGE 4 (SEVERE) (HCC): ICD-10-CM

## 2024-02-16 DIAGNOSIS — R39.11 BENIGN PROSTATIC HYPERPLASIA WITH URINARY HESITANCY: ICD-10-CM

## 2024-02-16 DIAGNOSIS — E11.42 TYPE 2 DIABETES MELLITUS WITH DIABETIC POLYNEUROPATHY, WITHOUT LONG-TERM CURRENT USE OF INSULIN (HCC): Primary | ICD-10-CM

## 2024-02-16 DIAGNOSIS — I10 ESSENTIAL HYPERTENSION: ICD-10-CM

## 2024-02-16 DIAGNOSIS — N40.1 BENIGN PROSTATIC HYPERPLASIA WITH URINARY HESITANCY: ICD-10-CM

## 2024-02-16 PROCEDURE — 3077F SYST BP >= 140 MM HG: CPT | Performed by: NURSE PRACTITIONER

## 2024-02-16 PROCEDURE — G8484 FLU IMMUNIZE NO ADMIN: HCPCS | Performed by: NURSE PRACTITIONER

## 2024-02-16 PROCEDURE — 1123F ACP DISCUSS/DSCN MKR DOCD: CPT | Performed by: NURSE PRACTITIONER

## 2024-02-16 PROCEDURE — 99214 OFFICE O/P EST MOD 30 MIN: CPT | Performed by: NURSE PRACTITIONER

## 2024-02-16 PROCEDURE — G8427 DOCREV CUR MEDS BY ELIG CLIN: HCPCS | Performed by: NURSE PRACTITIONER

## 2024-02-16 PROCEDURE — 1036F TOBACCO NON-USER: CPT | Performed by: NURSE PRACTITIONER

## 2024-02-16 PROCEDURE — 3079F DIAST BP 80-89 MM HG: CPT | Performed by: NURSE PRACTITIONER

## 2024-02-16 PROCEDURE — G8417 CALC BMI ABV UP PARAM F/U: HCPCS | Performed by: NURSE PRACTITIONER

## 2024-02-16 RX ORDER — INSULIN DEGLUDEC 200 U/ML
22 INJECTION, SOLUTION SUBCUTANEOUS 2 TIMES DAILY
COMMUNITY

## 2024-02-16 NOTE — PROGRESS NOTES
Chief Complaint   Patient presents with    Follow-up    Anemia       Have you seen any other physician or provider since your last visit yes - [facility] and     Have you had any other diagnostic tests since your last visit? yes - Labs, Xrays, Holter Monitor and CT scans    Have you changed or stopped any medications since your last visit? no        SUBJECTIVE:    Patient ID: Aquilino Evans is a 78 y.o.male.    Chief Complaint   Patient presents with    Follow-up    Anemia         HPI:  Patient is here today for follow-up. Patient has been inpatient at the hospital and had several test and iron infusions since his last visit. He states he is feeling better today and reports his last Hemoglobin level was 10.1. Burak the care coordinator has followed with patient on a regular basis since discharge.  did increase his hydralazine to 75 mg daily.  He has been having some elevated blood sugars. He is taking Farxiga, Trulicity and Tresiba has been increased by 5 mg .   Patient's medications, allergies, past medical, surgical, social and family histories were reviewed and updated as appropriate in electronic medical record.        Outpatient Medications Marked as Taking for the 2/16/24 encounter (Office Visit) with Marina Dunlap APRN   Medication Sig Dispense Refill    Insulin Degludec (TRESIBA FLEXTOUCH) 200 UNIT/ML SOPN Inject 22 Units into the skin in the morning and at bedtime Take 22 units in the am and 37 units at bedtime      Insulin Aspart, w/Niacinamide, 100 UNIT/ML SOCT 6 units at supper meal for hyperglycemia 5 each 1    tamsulosin (FLOMAX) 0.4 MG capsule TAKE 1 CAPSULE EVERY       MORNING AND AT BEDTIME 90 capsule 1    carvedilol (COREG) 6.25 MG tablet Take 1 tablet by mouth 2 times daily      hydrALAZINE (APRESOLINE) 50 MG tablet Take 1 tablet by mouth 3 times daily Taking 75 mg daily. Increased by       furosemide (LASIX) 40 MG tablet Take 1 tablet by

## 2024-02-19 DIAGNOSIS — F33.1 MODERATE EPISODE OF RECURRENT MAJOR DEPRESSIVE DISORDER (HCC): ICD-10-CM

## 2024-02-19 DIAGNOSIS — Z79.4 TYPE 2 DIABETES MELLITUS WITHOUT COMPLICATION, WITH LONG-TERM CURRENT USE OF INSULIN (HCC): ICD-10-CM

## 2024-02-19 DIAGNOSIS — E11.9 TYPE 2 DIABETES MELLITUS WITHOUT COMPLICATION, WITH LONG-TERM CURRENT USE OF INSULIN (HCC): ICD-10-CM

## 2024-02-19 RX ORDER — SERTRALINE HYDROCHLORIDE 100 MG/1
100 TABLET, FILM COATED ORAL DAILY
Qty: 90 TABLET | Refills: 1 | Status: SHIPPED | OUTPATIENT
Start: 2024-02-19

## 2024-02-19 RX ORDER — CALCIUM CITRATE/VITAMIN D3 200MG-6.25
TABLET ORAL
Qty: 300 STRIP | Refills: 3 | Status: SHIPPED | OUTPATIENT
Start: 2024-02-19

## 2024-02-22 ENCOUNTER — CARE COORDINATION (OUTPATIENT)
Dept: PRIMARY CARE CLINIC | Age: 79
End: 2024-02-22

## 2024-02-22 NOTE — CARE COORDINATION
Burak Hawkins, RN  Manager Care Coordination  [phone]     I called Mrs. Evans today to follow up on Aquilino's new regime of taking 6 units of fast acting insulin with his supper.  She tells me that they were only able to get this on Monday due to having to order it.  He has been sick a couple of days this week with nausea and possibly a virus.  She has a few numbers with slight improvement but not too much of a time frame to tell.  She reports bed time numbers of 219,179,153.  We made a plan to follow up again next week.  Contact information reviewed.

## 2024-02-23 ENCOUNTER — TELEPHONE (OUTPATIENT)
Dept: CARDIOLOGY | Facility: CLINIC | Age: 79
End: 2024-02-23
Payer: MEDICARE

## 2024-02-23 NOTE — TELEPHONE ENCOUNTER
"Called patient with recommendations. Per SCOTT Ha, \"Increase hydralazine to 100 3 times daily\". Patient's wife verbalizes understanding at this time.      "

## 2024-02-23 NOTE — TELEPHONE ENCOUNTER
Contacted patient about HTN concerns. Patient wife states BP has been elevated since appointment with Dr. Russo on 2/15. At appointment, hydralazine was increased to 75mg TID. Wife states she takes BP twice a day. Patient denies CP, palpitations, HA, swelling in lower extremities, or increased SOA. Here is some of the recordings provided by wife:    2/16: AM- 165/76           PM- 185/79  2/17: AM - 147/70           PM - 180/80  2/18: AM - 140/65           PM - 164/71  Today - 157-71    Patient confirms all prescribed medications including carvedilol 6.25mg BID and hydralazine 75mg TID. Please advise.

## 2024-02-23 NOTE — TELEPHONE ENCOUNTER
Caller: Donny Jerry    Relationship: Self    Best call back number: [phone] (home)        What is the best time to reach you: ANY    Who are you requesting to speak with (clinical staff, provider,  specific staff member): CLINICAL        What was the call regarding: PATIENT CALLED TO ADVISE THAT HIS BLOOD PRESSURE IS STILL STAYING ELEVATED, EVEN AFTER THE INCREASED DOSAGE OF HYDRALAZINE. PLEASE CONTACT PATIENT AND ADVISE HOW YOU WOULD LIKE TO PROCEED.    Is it okay if the provider responds through [facility]: PLEASE CALL

## 2024-02-25 DIAGNOSIS — I50.22 CHRONIC SYSTOLIC (CONGESTIVE) HEART FAILURE (HCC): ICD-10-CM

## 2024-02-26 ENCOUNTER — HOSPITAL ENCOUNTER (OUTPATIENT)
Dept: NURSING | Facility: HOSPITAL | Age: 79
Setting detail: INFUSION SERIES
Discharge: HOME | End: 2024-02-28
Payer: MEDICARE

## 2024-02-26 LAB
ERYTHROCYTE [DISTWIDTH] IN BLOOD BY AUTOMATED COUNT: 13.8 % (ref 11–16)
HCT VFR BLD AUTO: 33.5 % (ref 40–54)
HGB BLD-MCNC: 10.6 G/DL (ref 13–18)
MCH RBC QN AUTO: 28 PG (ref 27–32)
MCHC RBC AUTO-ENTMCNC: 31.6 G/DL (ref 31–35)
MCV RBC AUTO: 88.4 FL (ref 80–100)
PLATELET # BLD AUTO: 259 K/UL (ref 150–400)
PMV BLD AUTO: 10.1 FL (ref 6–10)
RBC # BLD AUTO: 3.79 M/UL (ref 4.5–6)
WBC # BLD AUTO: 6.3 K/UL (ref 4–11)

## 2024-02-26 PROCEDURE — 85027 COMPLETE CBC AUTOMATED: CPT

## 2024-02-26 PROCEDURE — 36415 COLL VENOUS BLD VENIPUNCTURE: CPT

## 2024-02-27 RX ORDER — FUROSEMIDE 20 MG/1
40 TABLET ORAL DAILY
Qty: 90 TABLET | Refills: 1 | Status: SHIPPED | OUTPATIENT
Start: 2024-02-27

## 2024-03-01 ENCOUNTER — LAB (OUTPATIENT)
Dept: LAB | Facility: HOSPITAL | Age: 79
End: 2024-03-01
Payer: MEDICARE

## 2024-03-01 ENCOUNTER — TRANSCRIBE ORDERS (OUTPATIENT)
Dept: LAB | Facility: HOSPITAL | Age: 79
End: 2024-03-01
Payer: MEDICARE

## 2024-03-01 DIAGNOSIS — N30.20 BLADDER INFECTION, CHRONIC: ICD-10-CM

## 2024-03-01 DIAGNOSIS — R97.20 ELEVATED PROSTATE SPECIFIC ANTIGEN (PSA): ICD-10-CM

## 2024-03-01 DIAGNOSIS — R97.20 ELEVATED PROSTATE SPECIFIC ANTIGEN (PSA): Primary | ICD-10-CM

## 2024-03-01 LAB
ALBUMIN SERPL-MCNC: 4.4 G/DL (ref 3.5–5.2)
ALBUMIN/GLOB SERPL: 1.5 G/DL
ALP SERPL-CCNC: 53 U/L (ref 39–117)
ALT SERPL W P-5'-P-CCNC: 26 U/L (ref 1–41)
ANION GAP SERPL CALCULATED.3IONS-SCNC: 11 MMOL/L (ref 5–15)
AST SERPL-CCNC: 22 U/L (ref 1–40)
BACTERIA UR QL AUTO: ABNORMAL /HPF
BILIRUB SERPL-MCNC: 0.3 MG/DL (ref 0–1.2)
BILIRUB UR QL STRIP: NEGATIVE
BILIRUB UR QL STRIP: NEGATIVE
BUN SERPL-MCNC: 44 MG/DL (ref 8–23)
BUN/CREAT SERPL: 23.9 (ref 7–25)
CALCIUM SPEC-SCNC: 9.5 MG/DL (ref 8.6–10.5)
CHLORIDE SERPL-SCNC: 97 MMOL/L (ref 98–107)
CLARITY UR: CLEAR
CLARITY UR: CLEAR
CO2 SERPL-SCNC: 29 MMOL/L (ref 22–29)
COLOR UR: YELLOW
COLOR UR: YELLOW
CREAT SERPL-MCNC: 1.84 MG/DL (ref 0.76–1.27)
EGFRCR SERPLBLD CKD-EPI 2021: 37.1 ML/MIN/1.73
GLOBULIN UR ELPH-MCNC: 2.9 GM/DL
GLUCOSE SERPL-MCNC: 126 MG/DL (ref 65–99)
GLUCOSE UR STRIP-MCNC: ABNORMAL MG/DL
GLUCOSE UR STRIP-MCNC: ABNORMAL MG/DL
HGB UR QL STRIP.AUTO: NEGATIVE
HGB UR QL STRIP.AUTO: NEGATIVE
HOLD SPECIMEN: NORMAL
HYALINE CASTS UR QL AUTO: ABNORMAL /LPF
KETONES UR QL STRIP: NEGATIVE
KETONES UR QL STRIP: NEGATIVE
LEUKOCYTE ESTERASE UR QL STRIP.AUTO: ABNORMAL
LEUKOCYTE ESTERASE UR QL STRIP.AUTO: ABNORMAL
NITRITE UR QL STRIP: NEGATIVE
NITRITE UR QL STRIP: NEGATIVE
PH UR STRIP.AUTO: 6 [PH] (ref 5–8)
PH UR STRIP.AUTO: 6 [PH] (ref 5–8)
POTASSIUM SERPL-SCNC: 4.4 MMOL/L (ref 3.5–5.2)
PROT SERPL-MCNC: 7.3 G/DL (ref 6–8.5)
PROT UR QL STRIP: NEGATIVE
PROT UR QL STRIP: NEGATIVE
PSA SERPL-MCNC: 0.88 NG/ML (ref 0–4)
RBC # UR STRIP: ABNORMAL /HPF
REF LAB TEST METHOD: ABNORMAL
SODIUM SERPL-SCNC: 137 MMOL/L (ref 136–145)
SP GR UR STRIP: 1.01 (ref 1–1.03)
SP GR UR STRIP: 1.01 (ref 1–1.03)
SQUAMOUS #/AREA URNS HPF: ABNORMAL /HPF
UROBILINOGEN UR QL STRIP: ABNORMAL
UROBILINOGEN UR QL STRIP: ABNORMAL
WBC # UR STRIP: ABNORMAL /HPF

## 2024-03-01 PROCEDURE — 36415 COLL VENOUS BLD VENIPUNCTURE: CPT

## 2024-03-01 PROCEDURE — 81001 URINALYSIS AUTO W/SCOPE: CPT

## 2024-03-01 PROCEDURE — 87077 CULTURE AEROBIC IDENTIFY: CPT

## 2024-03-01 PROCEDURE — 87186 SC STD MICRODIL/AGAR DIL: CPT

## 2024-03-01 PROCEDURE — 84153 ASSAY OF PSA TOTAL: CPT

## 2024-03-01 PROCEDURE — 87086 URINE CULTURE/COLONY COUNT: CPT

## 2024-03-01 PROCEDURE — 80053 COMPREHEN METABOLIC PANEL: CPT

## 2024-03-04 LAB — BACTERIA SPEC AEROBE CULT: ABNORMAL

## 2024-03-11 ENCOUNTER — HOSPITAL ENCOUNTER (OUTPATIENT)
Dept: NURSING | Facility: HOSPITAL | Age: 79
Setting detail: INFUSION SERIES
Discharge: HOME | End: 2024-03-11

## 2024-03-20 ENCOUNTER — CARE COORDINATION (OUTPATIENT)
Dept: PRIMARY CARE CLINIC | Age: 79
End: 2024-03-20

## 2024-03-20 NOTE — CARE COORDINATION
Blood pressure has been in acceptable range over the past few visits including Dr. Dasilva about a month ago.  They need to bring their blood pressure machine so we can verify.  May consider switching Flomax to doxazosin 2 or 4 mg to be taken at night in case needed.  But I would first verify machine readings.

## 2024-03-20 NOTE — CARE COORDINATION
I let Ms Evans know that Dr tabares wants to verify that his blood pressure monitor is correct before making any changes to his medication.  Since Aquilino's PCP is out appointment was made for tomorrow with Dr Tabares.

## 2024-03-20 NOTE — CARE COORDINATION
Burak Hawkins, RN  Manager Care Coordination  [phone]     Mrs. Evans calls in today concerned that Aquilino's blood pressure is consistently running higher in the evening.  She tells me that last week she increased his carvedilol to 12.5 mg from 6.25 stating this is the dose Marina used to have him on.  Dr Dasilva increased his hydralazine to 100 mg tid last month.  She is asking for advice however Aquilino's PCP is not in office this week.  I let her know that I would ask advice from Dr rivero.        3/19/24   159/72     180/74  3/18/24   139/56     174/77                 157/71     161/67                 144/59                      164/71     142/68                 157/67     181/80                 120/60     177/77                 150/65     172/77

## 2024-03-21 ENCOUNTER — OFFICE VISIT (OUTPATIENT)
Dept: PRIMARY CARE CLINIC | Age: 79
End: 2024-03-21
Payer: MEDICARE

## 2024-03-21 VITALS
RESPIRATION RATE: 18 BRPM | BODY MASS INDEX: 36.27 KG/M2 | DIASTOLIC BLOOD PRESSURE: 74 MMHG | SYSTOLIC BLOOD PRESSURE: 168 MMHG | WEIGHT: 231.6 LBS

## 2024-03-21 DIAGNOSIS — Z91.81 HISTORY OF FALL: ICD-10-CM

## 2024-03-21 DIAGNOSIS — I10 ESSENTIAL HYPERTENSION: Primary | ICD-10-CM

## 2024-03-21 PROCEDURE — G8484 FLU IMMUNIZE NO ADMIN: HCPCS | Performed by: INTERNAL MEDICINE

## 2024-03-21 PROCEDURE — 3078F DIAST BP <80 MM HG: CPT | Performed by: INTERNAL MEDICINE

## 2024-03-21 PROCEDURE — 99213 OFFICE O/P EST LOW 20 MIN: CPT | Performed by: INTERNAL MEDICINE

## 2024-03-21 PROCEDURE — 1036F TOBACCO NON-USER: CPT | Performed by: INTERNAL MEDICINE

## 2024-03-21 PROCEDURE — G8417 CALC BMI ABV UP PARAM F/U: HCPCS | Performed by: INTERNAL MEDICINE

## 2024-03-21 PROCEDURE — 1123F ACP DISCUSS/DSCN MKR DOCD: CPT | Performed by: INTERNAL MEDICINE

## 2024-03-21 PROCEDURE — 3077F SYST BP >= 140 MM HG: CPT | Performed by: INTERNAL MEDICINE

## 2024-03-21 PROCEDURE — G8427 DOCREV CUR MEDS BY ELIG CLIN: HCPCS | Performed by: INTERNAL MEDICINE

## 2024-03-21 RX ORDER — INSULIN LISPRO 100 U/ML
INJECTION, SOLUTION SUBCUTANEOUS
COMMUNITY

## 2024-03-21 RX ORDER — AMLODIPINE BESYLATE 2.5 MG/1
2.5 TABLET ORAL DAILY
Qty: 30 TABLET | Refills: 3 | Status: SHIPPED | OUTPATIENT
Start: 2024-03-21

## 2024-03-21 SDOH — ECONOMIC STABILITY: FOOD INSECURITY: WITHIN THE PAST 12 MONTHS, YOU WORRIED THAT YOUR FOOD WOULD RUN OUT BEFORE YOU GOT MONEY TO BUY MORE.: NEVER TRUE

## 2024-03-21 SDOH — ECONOMIC STABILITY: INCOME INSECURITY: HOW HARD IS IT FOR YOU TO PAY FOR THE VERY BASICS LIKE FOOD, HOUSING, MEDICAL CARE, AND HEATING?: PATIENT DECLINED

## 2024-03-21 NOTE — PROGRESS NOTES
03/01/2011 12:00 AM    CALCIUM 8.7 01/18/2024 10:15 AM    PROT 6.4 01/18/2024 10:15 AM    LABALBU 3.7 01/18/2024 10:15 AM    LABALBU 4.2 11/14/2011 12:00 AM    BILITOT 0.4 01/18/2024 10:15 AM    BILITOT 0.4 11/14/2011 12:00 AM    ALT 26 01/18/2024 10:15 AM    AST 16 01/18/2024 10:15 AM       Hemoglobin A1C (%)   Date Value   12/07/2023 5.8     LDL Calculated (mg/dL)   Date Value   12/07/2023 71     LDL Direct (mg/dL)   Date Value   10/07/2020 82         Lab Results   Component Value Date/Time    WBC 6.3 02/26/2024 09:55 AM    NEUTROABS 7.6 01/18/2024 10:15 AM    HGB 10.6 02/26/2024 09:55 AM    HCT 33.5 02/26/2024 09:55 AM    MCV 88.4 02/26/2024 09:55 AM     02/26/2024 09:55 AM       Lab Results   Component Value Date    TSH 2.28 12/07/2023         ASSESSMENT/PLAN:     1. Essential hypertension  BP is elevated. I have advised him on low-sodium diet, exercise and weight control.  I am going to add Norvasc.  Patient may take an extra 1 if blood pressure continues to be elevated. Will monitor his renal function every few months, have advised him to check blood pressure frequently and to keep a record of this.     2. History of fall  Lengthy conversation with the patient and his wife was present regarding the potential risk/complication from falling in his age group.  Discussed the importance of increased activity level in a safe way.  Offered him physical therapy but he declined at this time.  Discussed the importance of weight control.      Orders Placed This Encounter   Medications    amLODIPine (NORVASC) 2.5 MG tablet     Sig: Take 1 tablet by mouth daily     Dispense:  30 tablet     Refill:  3        I, Dr. Luís Tabares, personally performed the services described in the documentation as scribed by Meryl Castellon LPN, in my presence and it is both accurate and complete.

## 2024-03-21 NOTE — PROGRESS NOTES
Chief Complaint   Patient presents with    Hypertension       Have you seen any other physician or provider since your last visit yes - kelly      Have you changed or stopped any medications since your last visit? yes - kelly started him back on hydralazine 100 TID and they put his carvedilol back up to 12.5 BID due to bp being high

## 2024-03-25 ENCOUNTER — HOSPITAL ENCOUNTER (OUTPATIENT)
Dept: NURSING | Facility: HOSPITAL | Age: 79
Setting detail: INFUSION SERIES
Discharge: HOME | End: 2024-03-27
Payer: MEDICARE

## 2024-03-25 DIAGNOSIS — D63.1 ANEMIA DUE TO STAGE 3B CHRONIC KIDNEY DISEASE (HCC): Primary | ICD-10-CM

## 2024-03-25 DIAGNOSIS — N18.32 ANEMIA DUE TO STAGE 3B CHRONIC KIDNEY DISEASE (HCC): Primary | ICD-10-CM

## 2024-03-25 LAB
ERYTHROCYTE [DISTWIDTH] IN BLOOD BY AUTOMATED COUNT: 14.1 % (ref 11–16)
HCT VFR BLD AUTO: 34.9 % (ref 40–54)
HGB BLD-MCNC: 11.2 G/DL (ref 13–18)
MCH RBC QN AUTO: 27.6 PG (ref 27–32)
MCHC RBC AUTO-ENTMCNC: 32.1 G/DL (ref 31–35)
MCV RBC AUTO: 86 FL (ref 80–100)
PLATELET # BLD AUTO: 275 K/UL (ref 150–400)
PMV BLD AUTO: 9.3 FL (ref 6–10)
RBC # BLD AUTO: 4.06 M/UL (ref 4.5–6)
WBC # BLD AUTO: 8.5 K/UL (ref 4–11)

## 2024-03-25 PROCEDURE — 36415 COLL VENOUS BLD VENIPUNCTURE: CPT

## 2024-03-25 PROCEDURE — 85027 COMPLETE CBC AUTOMATED: CPT

## 2024-03-25 RX ORDER — ACETAMINOPHEN 325 MG/1
650 TABLET ORAL
OUTPATIENT
Start: 2024-04-08

## 2024-03-25 RX ORDER — DIPHENHYDRAMINE HYDROCHLORIDE 50 MG/ML
50 INJECTION INTRAMUSCULAR; INTRAVENOUS
OUTPATIENT
Start: 2024-04-08

## 2024-03-25 RX ORDER — ALBUTEROL SULFATE 90 UG/1
4 AEROSOL, METERED RESPIRATORY (INHALATION) PRN
OUTPATIENT
Start: 2024-04-08

## 2024-03-25 RX ORDER — EPINEPHRINE 1 MG/ML
0.3 INJECTION, SOLUTION INTRAMUSCULAR; SUBCUTANEOUS PRN
OUTPATIENT
Start: 2024-04-08

## 2024-03-25 RX ORDER — SODIUM CHLORIDE 9 MG/ML
INJECTION, SOLUTION INTRAVENOUS CONTINUOUS
OUTPATIENT
Start: 2024-04-08

## 2024-03-25 RX ORDER — ALBUTEROL SULFATE 2.5 MG/3ML
2.5 SOLUTION RESPIRATORY (INHALATION)
OUTPATIENT
Start: 2024-04-08

## 2024-03-25 RX ORDER — ONDANSETRON 2 MG/ML
8 INJECTION INTRAMUSCULAR; INTRAVENOUS
OUTPATIENT
Start: 2024-04-08

## 2024-03-26 ENCOUNTER — OFFICE VISIT (OUTPATIENT)
Dept: PULMONOLOGY | Facility: CLINIC | Age: 79
End: 2024-03-26
Payer: MEDICARE

## 2024-03-26 VITALS
WEIGHT: 220 LBS | DIASTOLIC BLOOD PRESSURE: 74 MMHG | SYSTOLIC BLOOD PRESSURE: 128 MMHG | OXYGEN SATURATION: 97 % | HEIGHT: 67 IN | BODY MASS INDEX: 34.53 KG/M2 | RESPIRATION RATE: 14 BRPM | HEART RATE: 60 BPM

## 2024-03-26 DIAGNOSIS — G47.33 OSA (OBSTRUCTIVE SLEEP APNEA): ICD-10-CM

## 2024-03-26 DIAGNOSIS — E66.9 OBESITY (BMI 30-39.9): ICD-10-CM

## 2024-03-26 DIAGNOSIS — G25.81 RESTLESS LEGS SYNDROME (RLS): ICD-10-CM

## 2024-03-26 DIAGNOSIS — J45.40 MODERATE PERSISTENT ASTHMA WITHOUT COMPLICATION: Primary | ICD-10-CM

## 2024-03-26 DIAGNOSIS — J30.89 OTHER ALLERGIC RHINITIS: ICD-10-CM

## 2024-03-26 PROCEDURE — 3078F DIAST BP <80 MM HG: CPT | Performed by: NURSE PRACTITIONER

## 2024-03-26 PROCEDURE — 99214 OFFICE O/P EST MOD 30 MIN: CPT | Performed by: NURSE PRACTITIONER

## 2024-03-26 PROCEDURE — 3074F SYST BP LT 130 MM HG: CPT | Performed by: NURSE PRACTITIONER

## 2024-03-26 RX ORDER — INSULIN LISPRO 100 [IU]/ML
INJECTION, SOLUTION INTRAVENOUS; SUBCUTANEOUS
COMMUNITY

## 2024-03-26 RX ORDER — PRAMIPEXOLE DIHYDROCHLORIDE 1 MG/1
1 TABLET ORAL NIGHTLY
Qty: 90 TABLET | Refills: 2 | Status: SHIPPED | OUTPATIENT
Start: 2024-03-26

## 2024-03-26 RX ORDER — DOCUSATE CALCIUM 240 MG
240 CAPSULE ORAL 2 TIMES DAILY
COMMUNITY

## 2024-03-26 RX ORDER — AMLODIPINE BESYLATE 2.5 MG/1
2.5 TABLET ORAL DAILY
COMMUNITY

## 2024-03-26 NOTE — PROGRESS NOTES
"Chief Complaint   Patient presents with    Breathing Problem    Follow-up         Subjective   Donny Jerry is a 79 y.o. male.   Patient is here today for follow up of asthma, shortness of breath, and USHA.     Patient says that since the last office visit he has had no recent exacerbations.     He was in the hospital due to \"fluid\".     He is not using any inhalers. His breathing has been much improved since his hospitalization.     He uses flonase as needed for allergies.     He takes mirapex for RLS and this helps.     He has oxygen bled into CPAP at night. This was ordered by his PCP.       The following portions of the patient's history were reviewed and updated as appropriate: allergies, current medications, past family history, past medical history, past social history, and past surgical history.    Review of Systems   HENT:  Negative for sinus pressure, sneezing and sore throat.    Respiratory:  Negative for cough, chest tightness, shortness of breath and wheezing.        Objective   Visit Vitals  /74   Pulse 60   Resp 14   Ht 170.2 cm (67\") Comment: pt reported   Wt 99.8 kg (220 lb)   SpO2 97%   BMI 34.46 kg/m²         Physical Exam  Vitals reviewed.   HENT:      Head: Atraumatic.      Mouth/Throat:      Mouth: Mucous membranes are moist.      Comments: Crowded oropharynx.   Eyes:      Extraocular Movements: Extraocular movements intact.   Cardiovascular:      Rate and Rhythm: Normal rate and regular rhythm.   Pulmonary:      Effort: Pulmonary effort is normal. No respiratory distress.      Comments: Somewhat decreased A/E without wheezing.   Musculoskeletal:      Cervical back: Neck supple.      Comments: Gait with cane.    Skin:     General: Skin is warm.   Neurological:      Mental Status: He is alert and oriented to person, place, and time.           Assessment & Plan   Diagnoses and all orders for this visit:    1. Moderate persistent asthma without complication (Primary)    2. Other allergic " rhinitis    3. Restless legs syndrome (RLS)    4. USHA (obstructive sleep apnea)    5. Obesity (BMI 30-39.9)    Other orders  -     pramipexole (MIRAPEX) 1 MG tablet; Take 1 tablet by mouth Every Night.  Dispense: 90 tablet; Refill: 2           Return in about 8 months (around 11/26/2024) for Recheck, For Dr. Bob.    DISCUSSION (if any):  PFT 2022 consistent with no obstruction.    His symptoms of asthma are under adequate control at this time. He stopped using the inhalers as he feels he does not need them. He states his breathing has been much better since discharge from the hospital.    Patient's medications for underlying asthma were reviewed with him in great detail.    Any needed adjustments to his pulmonary medications, either for clinical or insurance coverage reasons, have been made and are reflected in the orders.    Side effects of prescribed medications discussed with the patient.    Asthma action plan with discussed with him.    The patient was asked to call this office if the symptoms worsen.     Sleep study performed in June 2012  AHI was 54 / hour.      Latest PAP device provided in June 2022  DME company: [employer]     PAP settings: 15  Mask type: Dream wear FFM.     Continue treatment with CPAP at a pressure of 15, with a full-face mask.    Patient's compliance data was reviewed and the compliance is greater than 80%.    Humidification setup, hose and mask care discussed.    Weight loss advised.    Use every night for at least 4 hours stressed.      DC summary reviewed. 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       Adult Transthoracic Echo Complete w/ Color, Spectral and Contrast if necessary per protocol     Interpretation Summary    Left ventricular systolic function is low normal. Calculated left ventricular EF = 45.8% Left ventricular ejection fraction appears to be 46 - 50%.    Left ventricular wall thickness is consistent with mild concentric  hypertrophy.    Left ventricular diastolic function is consistent with (grade II w/high LAP) pseudonormalization.    Left atrial volume is moderately increased.    Mild aortic valve stenosis is present.    Estimated right ventricular systolic pressure from tricuspid regurgitation is normal (<35 mmHg).     No significant change compared to 7/2023 echo        Patient does not want a pneumococcal vaccine.       Dictated utilizing Dragon dictation.    This document was electronically signed by ASHLEIGH Leyva March 26, 2024  15:57 EDT

## 2024-04-03 ENCOUNTER — CARE COORDINATION (OUTPATIENT)
Dept: PRIMARY CARE CLINIC | Age: 79
End: 2024-04-03

## 2024-04-03 NOTE — CARE COORDINATION
Burak Hawkins, RN  Manager Care Coordination  [phone]     I called on Mrs. Evans this morning to follow up on med changes from last week and blood pressure.  She provided me with his blood pressure , pulse and blood sugar readings since Amlodipine was added.  I let her know that I would share with his PCP and return a call for any changes.  He does have orders for labs to be done within next 2 weeks.     3/22   149/79  58              171/74  61  96%  157  3/23   151/67  56  96%  128            172/75  63  96%  190  3/24   156/57  55  98%  147   186            165/66  63  98%  197  3/25                59  95%  115   162            163/87  67  97%  122  3/26                57  98%  133   184            148/60  58  97%  147  3/27   141/66  54  97%  101   189   went to 6.25 carvedilol            168/59  67  95%  149  3/28   166/61  61  96%  231  3/29   152/72  54  98%   93            169/68  63  97%  205  3/30   117/54  54  96%   111   125/56   307            143/61  66  95%   211  3/31   145/64  50  96%   89   227            150/59  69  97%  176  4/1     151/62  56  96%  127            154/60  60  97%   311  4/2     153/62  62  95%   187   216            157/67  61  96%   189       I spoke to PCP Marina Dunlap and she wants Aquilino to go up to amlodipine 5 mg daily.  She is aware that he will then have a fu visit in 2 weeks.

## 2024-04-04 NOTE — CARE COORDINATION
Burak Hawkins, RN  Manager Care Coordination  [phone]     I notified Mrs. Evans to increase Aquilino's amlodipine to 5 mg daily.  Verbalized understanding. Contact information reviewed.

## 2024-04-05 RX ORDER — AMLODIPINE BESYLATE 5 MG/1
5 TABLET ORAL DAILY
Qty: 90 TABLET | Refills: 0 | Status: SHIPPED | OUTPATIENT
Start: 2024-04-05

## 2024-04-08 ENCOUNTER — HOSPITAL ENCOUNTER (OUTPATIENT)
Dept: NURSING | Facility: HOSPITAL | Age: 79
Setting detail: INFUSION SERIES
Discharge: HOME | End: 2024-04-08

## 2024-04-11 ENCOUNTER — HOSPITAL ENCOUNTER (OUTPATIENT)
Facility: HOSPITAL | Age: 79
Discharge: HOME | End: 2024-04-11
Payer: MEDICARE

## 2024-04-11 ENCOUNTER — TELEPHONE (OUTPATIENT)
Dept: ORTHOPEDIC SURGERY | Facility: CLINIC | Age: 79
End: 2024-04-11

## 2024-04-11 DIAGNOSIS — E55.9 VITAMIN D DEFICIENCY: ICD-10-CM

## 2024-04-11 DIAGNOSIS — E11.42 TYPE 2 DIABETES MELLITUS WITH DIABETIC POLYNEUROPATHY, WITHOUT LONG-TERM CURRENT USE OF INSULIN (HCC): ICD-10-CM

## 2024-04-11 DIAGNOSIS — Z13.29 THYROID DISORDER SCREEN: ICD-10-CM

## 2024-04-11 DIAGNOSIS — I50.22 CHRONIC SYSTOLIC (CONGESTIVE) HEART FAILURE (HCC): ICD-10-CM

## 2024-04-11 DIAGNOSIS — I10 ESSENTIAL HYPERTENSION: ICD-10-CM

## 2024-04-11 LAB
25(OH)D3 SERPL-MCNC: 38.7 NG/ML (ref 32–100)
ALBUMIN SERPL-MCNC: 4.1 G/DL (ref 3.4–4.8)
ALBUMIN/GLOB SERPL: 1.8 {RATIO} (ref 0.8–2)
ALP SERPL-CCNC: 54 U/L (ref 25–100)
ALT SERPL-CCNC: 29 U/L (ref 4–36)
ANION GAP SERPL CALCULATED.3IONS-SCNC: 11 MMOL/L (ref 3–16)
AST SERPL-CCNC: 24 U/L (ref 8–33)
BILIRUB SERPL-MCNC: 0.3 MG/DL (ref 0.3–1.2)
BUN SERPL-MCNC: 43 MG/DL (ref 6–20)
CALCIUM SERPL-MCNC: 8.7 MG/DL (ref 8.5–10.5)
CHLORIDE SERPL-SCNC: 103 MMOL/L (ref 98–107)
CHOLEST SERPL-MCNC: 171 MG/DL (ref 0–200)
CO2 SERPL-SCNC: 27 MMOL/L (ref 20–30)
CREAT SERPL-MCNC: 1.7 MG/DL (ref 0.4–1.2)
ERYTHROCYTE [DISTWIDTH] IN BLOOD BY AUTOMATED COUNT: 14.7 % (ref 11–16)
FOLATE SERPL-MCNC: >20 NG/ML
GFR SERPLBLD CREATININE-BSD FMLA CKD-EPI: 41 ML/MIN/{1.73_M2}
GLOBULIN SER CALC-MCNC: 2.3 G/DL
GLUCOSE SERPL-MCNC: 86 MG/DL (ref 74–106)
HBA1C MFR BLD: 7.3 %
HCT VFR BLD AUTO: 34 % (ref 40–54)
HDLC SERPL-MCNC: 41 MG/DL (ref 40–60)
HGB BLD-MCNC: 10.6 G/DL (ref 13–18)
LDLC SERPL CALC-MCNC: 94 MG/DL
MCH RBC QN AUTO: 26.7 PG (ref 27–32)
MCHC RBC AUTO-ENTMCNC: 31.2 G/DL (ref 31–35)
MCV RBC AUTO: 85.6 FL (ref 80–100)
PLATELET # BLD AUTO: 256 K/UL (ref 150–400)
PMV BLD AUTO: 10.7 FL (ref 6–10)
POTASSIUM SERPL-SCNC: 4.3 MMOL/L (ref 3.4–5.1)
PROT SERPL-MCNC: 6.4 G/DL (ref 6.4–8.3)
RBC # BLD AUTO: 3.97 M/UL (ref 4.5–6)
SODIUM SERPL-SCNC: 141 MMOL/L (ref 136–145)
TRIGL SERPL-MCNC: 178 MG/DL (ref 0–249)
TSH SERPL DL<=0.005 MIU/L-ACNC: 2.25 UIU/ML (ref 0.27–4.2)
VIT B12 SERPL-MCNC: 856 PG/ML (ref 211–911)
VLDLC SERPL CALC-MCNC: 36 MG/DL
WBC # BLD AUTO: 7.8 K/UL (ref 4–11)

## 2024-04-11 PROCEDURE — 80061 LIPID PANEL: CPT

## 2024-04-11 PROCEDURE — 85027 COMPLETE CBC AUTOMATED: CPT

## 2024-04-11 PROCEDURE — 80053 COMPREHEN METABOLIC PANEL: CPT

## 2024-04-11 PROCEDURE — 83036 HEMOGLOBIN GLYCOSYLATED A1C: CPT

## 2024-04-11 PROCEDURE — 82746 ASSAY OF FOLIC ACID SERUM: CPT

## 2024-04-11 PROCEDURE — 82607 VITAMIN B-12: CPT

## 2024-04-11 PROCEDURE — 82306 VITAMIN D 25 HYDROXY: CPT

## 2024-04-11 PROCEDURE — 84443 ASSAY THYROID STIM HORMONE: CPT

## 2024-04-16 ENCOUNTER — OFFICE VISIT (OUTPATIENT)
Dept: PRIMARY CARE CLINIC | Age: 79
End: 2024-04-16
Payer: MEDICARE

## 2024-04-16 VITALS
HEART RATE: 58 BPM | OXYGEN SATURATION: 96 % | WEIGHT: 218.8 LBS | RESPIRATION RATE: 16 BRPM | SYSTOLIC BLOOD PRESSURE: 122 MMHG | TEMPERATURE: 98.2 F | HEIGHT: 67 IN | BODY MASS INDEX: 34.34 KG/M2 | DIASTOLIC BLOOD PRESSURE: 46 MMHG

## 2024-04-16 DIAGNOSIS — I50.22 CHRONIC SYSTOLIC (CONGESTIVE) HEART FAILURE (HCC): ICD-10-CM

## 2024-04-16 DIAGNOSIS — E11.9 TYPE 2 DIABETES MELLITUS WITHOUT COMPLICATION, WITH LONG-TERM CURRENT USE OF INSULIN (HCC): ICD-10-CM

## 2024-04-16 DIAGNOSIS — I10 ESSENTIAL HYPERTENSION: ICD-10-CM

## 2024-04-16 DIAGNOSIS — Z79.4 TYPE 2 DIABETES MELLITUS WITHOUT COMPLICATION, WITH LONG-TERM CURRENT USE OF INSULIN (HCC): ICD-10-CM

## 2024-04-16 DIAGNOSIS — E11.42 TYPE 2 DIABETES MELLITUS WITH DIABETIC POLYNEUROPATHY, WITHOUT LONG-TERM CURRENT USE OF INSULIN (HCC): ICD-10-CM

## 2024-04-16 PROCEDURE — 3051F HG A1C>EQUAL 7.0%<8.0%: CPT | Performed by: NURSE PRACTITIONER

## 2024-04-16 PROCEDURE — 1036F TOBACCO NON-USER: CPT | Performed by: NURSE PRACTITIONER

## 2024-04-16 PROCEDURE — G8427 DOCREV CUR MEDS BY ELIG CLIN: HCPCS | Performed by: NURSE PRACTITIONER

## 2024-04-16 PROCEDURE — 99214 OFFICE O/P EST MOD 30 MIN: CPT | Performed by: NURSE PRACTITIONER

## 2024-04-16 PROCEDURE — 3078F DIAST BP <80 MM HG: CPT | Performed by: NURSE PRACTITIONER

## 2024-04-16 PROCEDURE — 3074F SYST BP LT 130 MM HG: CPT | Performed by: NURSE PRACTITIONER

## 2024-04-16 PROCEDURE — G8417 CALC BMI ABV UP PARAM F/U: HCPCS | Performed by: NURSE PRACTITIONER

## 2024-04-16 PROCEDURE — 1123F ACP DISCUSS/DSCN MKR DOCD: CPT | Performed by: NURSE PRACTITIONER

## 2024-04-16 RX ORDER — FUROSEMIDE 20 MG/1
40 TABLET ORAL DAILY
Qty: 90 TABLET | Refills: 1 | Status: SHIPPED | OUTPATIENT
Start: 2024-04-16

## 2024-04-16 RX ORDER — DOXAZOSIN 8 MG/1
8 TABLET ORAL 2 TIMES DAILY
Qty: 180 TABLET | Refills: 1 | Status: SHIPPED | OUTPATIENT
Start: 2024-04-16

## 2024-04-16 RX ORDER — FLURBIPROFEN SODIUM 0.3 MG/ML
1 SOLUTION/ DROPS OPHTHALMIC 2 TIMES DAILY
Qty: 200 EACH | Refills: 3 | Status: SHIPPED | OUTPATIENT
Start: 2024-04-16

## 2024-04-16 RX ORDER — DAPAGLIFLOZIN 10 MG/1
10 TABLET, FILM COATED ORAL EVERY MORNING
Qty: 90 TABLET | Refills: 1 | Status: SHIPPED | OUTPATIENT
Start: 2024-04-16

## 2024-04-16 RX ORDER — CARVEDILOL 6.25 MG/1
12.5 TABLET ORAL 2 TIMES DAILY
Qty: 180 TABLET | Refills: 1 | Status: SHIPPED | OUTPATIENT
Start: 2024-04-16

## 2024-04-16 RX ORDER — AMLODIPINE BESYLATE 10 MG/1
5 TABLET ORAL DAILY
Qty: 90 TABLET | Refills: 0 | Status: SHIPPED | OUTPATIENT
Start: 2024-04-16

## 2024-04-16 RX ORDER — DOXAZOSIN 8 MG/1
8 TABLET ORAL 2 TIMES DAILY
COMMUNITY
Start: 2024-03-23 | End: 2024-04-16 | Stop reason: SDUPTHER

## 2024-04-16 RX ORDER — FUROSEMIDE 40 MG/1
40 TABLET ORAL DAILY
Qty: 30 TABLET | Refills: 0 | Status: SHIPPED | OUTPATIENT
Start: 2024-04-16

## 2024-04-16 RX ORDER — LANCETS
1 EACH MISCELLANEOUS 3 TIMES DAILY
Qty: 400 EACH | Refills: 3 | Status: SHIPPED | OUTPATIENT
Start: 2024-04-16

## 2024-04-16 SDOH — ECONOMIC STABILITY: FOOD INSECURITY: WITHIN THE PAST 12 MONTHS, THE FOOD YOU BOUGHT JUST DIDN'T LAST AND YOU DIDN'T HAVE MONEY TO GET MORE.: NEVER TRUE

## 2024-04-16 SDOH — ECONOMIC STABILITY: FOOD INSECURITY: WITHIN THE PAST 12 MONTHS, YOU WORRIED THAT YOUR FOOD WOULD RUN OUT BEFORE YOU GOT MONEY TO BUY MORE.: NEVER TRUE

## 2024-04-16 SDOH — ECONOMIC STABILITY: INCOME INSECURITY: HOW HARD IS IT FOR YOU TO PAY FOR THE VERY BASICS LIKE FOOD, HOUSING, MEDICAL CARE, AND HEATING?: NOT HARD AT ALL

## 2024-04-16 NOTE — PROGRESS NOTES
Chief Complaint   Patient presents with    Diabetes    Hypertension    Discuss Labs       Have you seen any other physician or provider since your last visit yes - Rayshawn    Have you had any other diagnostic tests since your last visit? yes - labs    Have you changed or stopped any medications since your last visit? no     I have recommended that this patient have a immunization for pneumonia but he due to refusal reason: not comfortable with test   I have discussed the risks and benefits of this examination with him. The patient verbalizes understanding.  Provider will be informed of refusal.      Diabetic retinal exam completed this year? Yes                       * If yes please have patient sign a records release to obtain record to update Health Maintenance                       * If no, please order referral for patient to be scheduled   SUBJECTIVE:    Patient ID:Aquilino Evans is a 79 y.o. male.    Chief Complaint   Patient presents with    Diabetes    Hypertension    Discuss Labs     HPI:  Patient has had hypertension for several years. He has been compliant with taking medications, without side effects from it. He has been following a low-sodium, is  active and never exercises.  Weight is decreasing steadily, compared to last visit. His blood pressure is stable 122/46 at this time.    Patient has had diabetes for the past several  years.  He has been compliant with the medications and denies any side effects from it. He has been monitoring fingersticks on a daily basis.  His fingerstick range is between . He denies any hypoglycemic symptoms. He has been following a diabetic diet and has been active.  His last eye exam was less than a year ago.  He is using oral meds and Insulin.     He is doing a lot better his diet. His weight is down 13 pounds.      Patient's medications, allergies, past medical, surgical, social and family histories were reviewed and updated as appropriate.          Review of

## 2024-04-22 ENCOUNTER — HOSPITAL ENCOUNTER (OUTPATIENT)
Dept: NURSING | Facility: HOSPITAL | Age: 79
Setting detail: INFUSION SERIES
Discharge: HOME | End: 2024-04-24
Payer: MEDICARE

## 2024-04-22 DIAGNOSIS — D63.1 ANEMIA DUE TO STAGE 3B CHRONIC KIDNEY DISEASE (HCC): Primary | ICD-10-CM

## 2024-04-22 DIAGNOSIS — N18.32 ANEMIA DUE TO STAGE 3B CHRONIC KIDNEY DISEASE (HCC): Primary | ICD-10-CM

## 2024-04-22 LAB
ERYTHROCYTE [DISTWIDTH] IN BLOOD BY AUTOMATED COUNT: 15.1 % (ref 11–16)
HCT VFR BLD AUTO: 33.3 % (ref 40–54)
HGB BLD-MCNC: 10.7 G/DL (ref 13–18)
MCH RBC QN AUTO: 27 PG (ref 27–32)
MCHC RBC AUTO-ENTMCNC: 32.1 G/DL (ref 31–35)
MCV RBC AUTO: 84.1 FL (ref 80–100)
PLATELET # BLD AUTO: 238 K/UL (ref 150–400)
PMV BLD AUTO: 10.2 FL (ref 6–10)
RBC # BLD AUTO: 3.96 M/UL (ref 4.5–6)
WBC # BLD AUTO: 12.8 K/UL (ref 4–11)

## 2024-04-22 PROCEDURE — 85027 COMPLETE CBC AUTOMATED: CPT

## 2024-04-22 PROCEDURE — 36415 COLL VENOUS BLD VENIPUNCTURE: CPT

## 2024-04-22 RX ORDER — DIPHENHYDRAMINE HYDROCHLORIDE 50 MG/ML
50 INJECTION INTRAMUSCULAR; INTRAVENOUS
OUTPATIENT
Start: 2024-05-20

## 2024-04-22 RX ORDER — ALBUTEROL SULFATE 90 UG/1
4 AEROSOL, METERED RESPIRATORY (INHALATION) PRN
OUTPATIENT
Start: 2024-05-20

## 2024-04-22 RX ORDER — EPINEPHRINE 1 MG/ML
0.3 INJECTION, SOLUTION INTRAMUSCULAR; SUBCUTANEOUS PRN
OUTPATIENT
Start: 2024-05-20

## 2024-04-22 RX ORDER — ALBUTEROL SULFATE 2.5 MG/3ML
2.5 SOLUTION RESPIRATORY (INHALATION)
OUTPATIENT
Start: 2024-05-20

## 2024-04-22 RX ORDER — SODIUM CHLORIDE 9 MG/ML
INJECTION, SOLUTION INTRAVENOUS CONTINUOUS
OUTPATIENT
Start: 2024-05-20

## 2024-04-22 RX ORDER — ONDANSETRON 2 MG/ML
8 INJECTION INTRAMUSCULAR; INTRAVENOUS
OUTPATIENT
Start: 2024-05-20

## 2024-04-22 RX ORDER — ACETAMINOPHEN 325 MG/1
650 TABLET ORAL
OUTPATIENT
Start: 2024-05-20

## 2024-04-23 ENCOUNTER — APPOINTMENT (OUTPATIENT)
Dept: GENERAL RADIOLOGY | Facility: HOSPITAL | Age: 79
DRG: 291 | End: 2024-04-23
Payer: MEDICARE

## 2024-04-23 ENCOUNTER — HOSPITAL ENCOUNTER (INPATIENT)
Facility: HOSPITAL | Age: 79
LOS: 2 days | Discharge: HOME | DRG: 291 | End: 2024-04-27
Attending: STUDENT IN AN ORGANIZED HEALTH CARE EDUCATION/TRAINING PROGRAM | Admitting: FAMILY MEDICINE
Payer: MEDICARE

## 2024-04-23 DIAGNOSIS — D72.829 LEUKOCYTOSIS, UNSPECIFIED TYPE: ICD-10-CM

## 2024-04-23 DIAGNOSIS — I50.23 ACUTE ON CHRONIC SYSTOLIC CONGESTIVE HEART FAILURE: ICD-10-CM

## 2024-04-23 DIAGNOSIS — N18.9 CHRONIC KIDNEY DISEASE, UNSPECIFIED CKD STAGE: ICD-10-CM

## 2024-04-23 DIAGNOSIS — I50.9 ACUTE ON CHRONIC CONGESTIVE HEART FAILURE, UNSPECIFIED HEART FAILURE TYPE: ICD-10-CM

## 2024-04-23 DIAGNOSIS — J96.01 ACUTE RESPIRATORY FAILURE WITH HYPOXIA: Primary | ICD-10-CM

## 2024-04-23 LAB
ALBUMIN SERPL-MCNC: 3.9 G/DL (ref 3.5–5.2)
ALBUMIN/GLOB SERPL: 1.4 G/DL
ALP SERPL-CCNC: 54 U/L (ref 39–117)
ALT SERPL W P-5'-P-CCNC: 23 U/L (ref 1–41)
ANION GAP SERPL CALCULATED.3IONS-SCNC: 11.4 MMOL/L (ref 5–15)
ANION GAP SERPL CALCULATED.3IONS-SCNC: 13 MMOL/L (ref 5–15)
AST SERPL-CCNC: 22 U/L (ref 1–40)
ATMOSPHERIC PRESS: 736 MMHG
B PARAPERT DNA SPEC QL NAA+PROBE: NOT DETECTED
B PERT DNA SPEC QL NAA+PROBE: NOT DETECTED
BASE EXCESS BLDV CALC-SCNC: 0 MMOL/L (ref 0–2)
BASOPHILS # BLD AUTO: 0.05 10*3/MM3 (ref 0–0.2)
BASOPHILS NFR BLD AUTO: 0.3 % (ref 0–1.5)
BDY SITE: NORMAL
BILIRUB SERPL-MCNC: 0.7 MG/DL (ref 0–1.2)
BUN SERPL-MCNC: 45 MG/DL (ref 8–23)
BUN SERPL-MCNC: 50 MG/DL (ref 8–23)
BUN/CREAT SERPL: 25.4 (ref 7–25)
BUN/CREAT SERPL: 26 (ref 7–25)
C PNEUM DNA NPH QL NAA+NON-PROBE: NOT DETECTED
CALCIUM SPEC-SCNC: 8.5 MG/DL (ref 8.6–10.5)
CALCIUM SPEC-SCNC: 8.7 MG/DL (ref 8.6–10.5)
CHLORIDE SERPL-SCNC: 102 MMOL/L (ref 98–107)
CHLORIDE SERPL-SCNC: 105 MMOL/L (ref 98–107)
CO2 SERPL-SCNC: 22 MMOL/L (ref 22–29)
CO2 SERPL-SCNC: 23.6 MMOL/L (ref 22–29)
COHGB MFR BLD: 0.7 % (ref 0–5)
CREAT SERPL-MCNC: 1.77 MG/DL (ref 0.76–1.27)
CREAT SERPL-MCNC: 1.92 MG/DL (ref 0.76–1.27)
D DIMER PPP FEU-MCNC: 0.59 MCGFEU/ML (ref 0–0.78)
D-LACTATE SERPL-SCNC: 1.2 MMOL/L (ref 0.5–2)
DEPRECATED RDW RBC AUTO: 46.2 FL (ref 37–54)
DEPRECATED RDW RBC AUTO: 46.4 FL (ref 37–54)
EGFRCR SERPLBLD CKD-EPI 2021: 35.2 ML/MIN/1.73
EGFRCR SERPLBLD CKD-EPI 2021: 38.8 ML/MIN/1.73
EOSINOPHIL # BLD AUTO: 0.02 10*3/MM3 (ref 0–0.4)
EOSINOPHIL NFR BLD AUTO: 0.1 % (ref 0.3–6.2)
ERYTHROCYTE [DISTWIDTH] IN BLOOD BY AUTOMATED COUNT: 15.2 % (ref 12.3–15.4)
ERYTHROCYTE [DISTWIDTH] IN BLOOD BY AUTOMATED COUNT: 15.3 % (ref 12.3–15.4)
FLUAV SUBTYP SPEC NAA+PROBE: NOT DETECTED
FLUBV RNA ISLT QL NAA+PROBE: NOT DETECTED
GEN 5 2HR TROPONIN T REFLEX: 192 NG/L
GLOBULIN UR ELPH-MCNC: 2.8 GM/DL
GLUCOSE BLDC GLUCOMTR-MCNC: 144 MG/DL (ref 70–130)
GLUCOSE BLDC GLUCOMTR-MCNC: 193 MG/DL (ref 70–130)
GLUCOSE BLDC GLUCOMTR-MCNC: 202 MG/DL (ref 70–130)
GLUCOSE BLDC GLUCOMTR-MCNC: 207 MG/DL (ref 70–130)
GLUCOSE SERPL-MCNC: 167 MG/DL (ref 65–99)
GLUCOSE SERPL-MCNC: 203 MG/DL (ref 65–99)
HADV DNA SPEC NAA+PROBE: NOT DETECTED
HCO3 BLDV-SCNC: 25.4 MMOL/L (ref 22–28)
HCOV 229E RNA SPEC QL NAA+PROBE: NOT DETECTED
HCOV HKU1 RNA SPEC QL NAA+PROBE: NOT DETECTED
HCOV NL63 RNA SPEC QL NAA+PROBE: NOT DETECTED
HCOV OC43 RNA SPEC QL NAA+PROBE: NOT DETECTED
HCT VFR BLD AUTO: 30.7 % (ref 37.5–51)
HCT VFR BLD AUTO: 32.1 % (ref 37.5–51)
HGB BLD-MCNC: 10.2 G/DL (ref 13–17.7)
HGB BLD-MCNC: 10.8 G/DL (ref 13–17.7)
HMPV RNA NPH QL NAA+NON-PROBE: NOT DETECTED
HOLD SPECIMEN: NORMAL
HOLD SPECIMEN: NORMAL
HPIV1 RNA ISLT QL NAA+PROBE: NOT DETECTED
HPIV2 RNA SPEC QL NAA+PROBE: NOT DETECTED
HPIV3 RNA NPH QL NAA+PROBE: NOT DETECTED
HPIV4 P GENE NPH QL NAA+PROBE: NOT DETECTED
IMM GRANULOCYTES # BLD AUTO: 0.07 10*3/MM3 (ref 0–0.05)
IMM GRANULOCYTES NFR BLD AUTO: 0.4 % (ref 0–0.5)
INHALED O2 CONCENTRATION: 50 %
LYMPHOCYTES # BLD AUTO: 0.33 10*3/MM3 (ref 0.7–3.1)
LYMPHOCYTES NFR BLD AUTO: 2 % (ref 19.6–45.3)
M PNEUMO IGG SER IA-ACNC: NOT DETECTED
MCH RBC QN AUTO: 27.6 PG (ref 26.6–33)
MCH RBC QN AUTO: 27.7 PG (ref 26.6–33)
MCHC RBC AUTO-ENTMCNC: 33.2 G/DL (ref 31.5–35.7)
MCHC RBC AUTO-ENTMCNC: 33.6 G/DL (ref 31.5–35.7)
MCV RBC AUTO: 82.3 FL (ref 79–97)
MCV RBC AUTO: 83.2 FL (ref 79–97)
METHGB BLD QL: 0.7 % (ref 0–3)
MODALITY: NORMAL
MONOCYTES # BLD AUTO: 0.84 10*3/MM3 (ref 0.1–0.9)
MONOCYTES NFR BLD AUTO: 5.2 % (ref 5–12)
NEUTROPHILS NFR BLD AUTO: 14.87 10*3/MM3 (ref 1.7–7)
NEUTROPHILS NFR BLD AUTO: 92 % (ref 42.7–76)
NRBC BLD AUTO-RTO: 0 /100 WBC (ref 0–0.2)
NT-PROBNP SERPL-MCNC: 3938 PG/ML (ref 0–1800)
OXYHGB MFR BLDV: 69.4 % (ref 40–70)
PCO2 BLDV: 43.4 MM HG (ref 40–50)
PH BLDV: 7.38 PH UNITS (ref 7.32–7.42)
PLATELET # BLD AUTO: 226 10*3/MM3 (ref 140–450)
PLATELET # BLD AUTO: 233 10*3/MM3 (ref 140–450)
PMV BLD AUTO: 10.8 FL (ref 6–12)
PMV BLD AUTO: 11.2 FL (ref 6–12)
PO2 BLDV: 39.1 MM HG (ref 30–50)
POTASSIUM SERPL-SCNC: 3.7 MMOL/L (ref 3.5–5.2)
POTASSIUM SERPL-SCNC: 4.1 MMOL/L (ref 3.5–5.2)
PROCALCITONIN SERPL-MCNC: 0.1 NG/ML (ref 0–0.25)
PROT SERPL-MCNC: 6.7 G/DL (ref 6–8.5)
RBC # BLD AUTO: 3.69 10*6/MM3 (ref 4.14–5.8)
RBC # BLD AUTO: 3.9 10*6/MM3 (ref 4.14–5.8)
RHINOVIRUS RNA SPEC NAA+PROBE: NOT DETECTED
RSV RNA NPH QL NAA+NON-PROBE: NOT DETECTED
SAO2 % BLDCOV: 70.4 % (ref 45–75)
SARS-COV-2 RNA NPH QL NAA+NON-PROBE: NOT DETECTED
SODIUM SERPL-SCNC: 137 MMOL/L (ref 136–145)
SODIUM SERPL-SCNC: 140 MMOL/L (ref 136–145)
TROPONIN T DELTA: 70 NG/L
TROPONIN T SERPL HS-MCNC: 122 NG/L
TROPONIN T SERPL HS-MCNC: 436 NG/L
TSH SERPL DL<=0.05 MIU/L-ACNC: 1.63 UIU/ML (ref 0.27–4.2)
VENTILATOR MODE: NORMAL
WBC NRBC COR # BLD AUTO: 13.78 10*3/MM3 (ref 3.4–10.8)
WBC NRBC COR # BLD AUTO: 16.18 10*3/MM3 (ref 3.4–10.8)
WHOLE BLOOD HOLD COAG: NORMAL
WHOLE BLOOD HOLD SPECIMEN: NORMAL

## 2024-04-23 PROCEDURE — 80053 COMPREHEN METABOLIC PANEL: CPT | Performed by: STUDENT IN AN ORGANIZED HEALTH CARE EDUCATION/TRAINING PROGRAM

## 2024-04-23 PROCEDURE — 25010000002 HEPARIN (PORCINE) PER 1000 UNITS: Performed by: FAMILY MEDICINE

## 2024-04-23 PROCEDURE — 82805 BLOOD GASES W/O2 SATURATION: CPT

## 2024-04-23 PROCEDURE — 84145 PROCALCITONIN (PCT): CPT | Performed by: STUDENT IN AN ORGANIZED HEALTH CARE EDUCATION/TRAINING PROGRAM

## 2024-04-23 PROCEDURE — 82820 HEMOGLOBIN-OXYGEN AFFINITY: CPT

## 2024-04-23 PROCEDURE — 25010000002 FUROSEMIDE PER 20 MG: Performed by: FAMILY MEDICINE

## 2024-04-23 PROCEDURE — 83605 ASSAY OF LACTIC ACID: CPT | Performed by: STUDENT IN AN ORGANIZED HEALTH CARE EDUCATION/TRAINING PROGRAM

## 2024-04-23 PROCEDURE — 85025 COMPLETE CBC W/AUTO DIFF WBC: CPT | Performed by: STUDENT IN AN ORGANIZED HEALTH CARE EDUCATION/TRAINING PROGRAM

## 2024-04-23 PROCEDURE — 85379 FIBRIN DEGRADATION QUANT: CPT | Performed by: STUDENT IN AN ORGANIZED HEALTH CARE EDUCATION/TRAINING PROGRAM

## 2024-04-23 PROCEDURE — 83880 ASSAY OF NATRIURETIC PEPTIDE: CPT | Performed by: STUDENT IN AN ORGANIZED HEALTH CARE EDUCATION/TRAINING PROGRAM

## 2024-04-23 PROCEDURE — 85027 COMPLETE CBC AUTOMATED: CPT | Performed by: FAMILY MEDICINE

## 2024-04-23 PROCEDURE — 99291 CRITICAL CARE FIRST HOUR: CPT

## 2024-04-23 PROCEDURE — 84443 ASSAY THYROID STIM HORMONE: CPT | Performed by: FAMILY MEDICINE

## 2024-04-23 PROCEDURE — G0378 HOSPITAL OBSERVATION PER HR: HCPCS

## 2024-04-23 PROCEDURE — 99223 1ST HOSP IP/OBS HIGH 75: CPT | Performed by: FAMILY MEDICINE

## 2024-04-23 PROCEDURE — 63710000001 INSULIN LISPRO (HUMAN) PER 5 UNITS: Performed by: FAMILY MEDICINE

## 2024-04-23 PROCEDURE — 82948 REAGENT STRIP/BLOOD GLUCOSE: CPT

## 2024-04-23 PROCEDURE — 94799 UNLISTED PULMONARY SVC/PX: CPT

## 2024-04-23 PROCEDURE — 36415 COLL VENOUS BLD VENIPUNCTURE: CPT

## 2024-04-23 PROCEDURE — 0202U NFCT DS 22 TRGT SARS-COV-2: CPT | Performed by: STUDENT IN AN ORGANIZED HEALTH CARE EDUCATION/TRAINING PROGRAM

## 2024-04-23 PROCEDURE — 84484 ASSAY OF TROPONIN QUANT: CPT | Performed by: FAMILY MEDICINE

## 2024-04-23 PROCEDURE — 71045 X-RAY EXAM CHEST 1 VIEW: CPT

## 2024-04-23 PROCEDURE — 93005 ELECTROCARDIOGRAM TRACING: CPT | Performed by: STUDENT IN AN ORGANIZED HEALTH CARE EDUCATION/TRAINING PROGRAM

## 2024-04-23 PROCEDURE — 82948 REAGENT STRIP/BLOOD GLUCOSE: CPT | Performed by: FAMILY MEDICINE

## 2024-04-23 PROCEDURE — 99214 OFFICE O/P EST MOD 30 MIN: CPT | Performed by: INTERNAL MEDICINE

## 2024-04-23 PROCEDURE — 25010000002 FUROSEMIDE PER 20 MG: Performed by: STUDENT IN AN ORGANIZED HEALTH CARE EDUCATION/TRAINING PROGRAM

## 2024-04-23 PROCEDURE — 84484 ASSAY OF TROPONIN QUANT: CPT | Performed by: STUDENT IN AN ORGANIZED HEALTH CARE EDUCATION/TRAINING PROGRAM

## 2024-04-23 PROCEDURE — 87040 BLOOD CULTURE FOR BACTERIA: CPT | Performed by: STUDENT IN AN ORGANIZED HEALTH CARE EDUCATION/TRAINING PROGRAM

## 2024-04-23 PROCEDURE — 63710000001 INSULIN GLARGINE PER 5 UNITS: Performed by: FAMILY MEDICINE

## 2024-04-23 RX ORDER — FUROSEMIDE 10 MG/ML
80 INJECTION INTRAMUSCULAR; INTRAVENOUS ONCE
Status: COMPLETED | OUTPATIENT
Start: 2024-04-23 | End: 2024-04-23

## 2024-04-23 RX ORDER — NICOTINE POLACRILEX 4 MG
15 LOZENGE BUCCAL
Status: DISCONTINUED | OUTPATIENT
Start: 2024-04-23 | End: 2024-04-27 | Stop reason: HOSPADM

## 2024-04-23 RX ORDER — MELATONIN
2000 DAILY
Status: DISCONTINUED | OUTPATIENT
Start: 2024-04-23 | End: 2024-04-27 | Stop reason: HOSPADM

## 2024-04-23 RX ORDER — POLYETHYLENE GLYCOL 3350 17 G/17G
17 POWDER, FOR SOLUTION ORAL DAILY PRN
Status: DISCONTINUED | OUTPATIENT
Start: 2024-04-23 | End: 2024-04-27 | Stop reason: HOSPADM

## 2024-04-23 RX ORDER — ONDANSETRON 4 MG/1
4 TABLET, ORALLY DISINTEGRATING ORAL EVERY 6 HOURS PRN
Status: DISCONTINUED | OUTPATIENT
Start: 2024-04-23 | End: 2024-04-27 | Stop reason: HOSPADM

## 2024-04-23 RX ORDER — SODIUM CHLORIDE 9 MG/ML
40 INJECTION, SOLUTION INTRAVENOUS AS NEEDED
Status: DISCONTINUED | OUTPATIENT
Start: 2024-04-23 | End: 2024-04-27 | Stop reason: HOSPADM

## 2024-04-23 RX ORDER — ISOSORBIDE DINITRATE 10 MG/1
10 TABLET ORAL
Status: DISCONTINUED | OUTPATIENT
Start: 2024-04-23 | End: 2024-04-27 | Stop reason: HOSPADM

## 2024-04-23 RX ORDER — BISACODYL 5 MG/1
5 TABLET, DELAYED RELEASE ORAL DAILY PRN
Status: DISCONTINUED | OUTPATIENT
Start: 2024-04-23 | End: 2024-04-27 | Stop reason: HOSPADM

## 2024-04-23 RX ORDER — INSULIN LISPRO 100 [IU]/ML
2-9 INJECTION, SOLUTION INTRAVENOUS; SUBCUTANEOUS
Status: DISCONTINUED | OUTPATIENT
Start: 2024-04-23 | End: 2024-04-27 | Stop reason: HOSPADM

## 2024-04-23 RX ORDER — CARVEDILOL 6.25 MG/1
6.25 TABLET ORAL 2 TIMES DAILY WITH MEALS
Status: DISCONTINUED | OUTPATIENT
Start: 2024-04-23 | End: 2024-04-27 | Stop reason: HOSPADM

## 2024-04-23 RX ORDER — SODIUM CHLORIDE 0.9 % (FLUSH) 0.9 %
10 SYRINGE (ML) INJECTION AS NEEDED
Status: DISCONTINUED | OUTPATIENT
Start: 2024-04-23 | End: 2024-04-27 | Stop reason: HOSPADM

## 2024-04-23 RX ORDER — AMOXICILLIN 250 MG
2 CAPSULE ORAL 2 TIMES DAILY PRN
Status: DISCONTINUED | OUTPATIENT
Start: 2024-04-23 | End: 2024-04-27 | Stop reason: HOSPADM

## 2024-04-23 RX ORDER — FUROSEMIDE 10 MG/ML
40 INJECTION INTRAMUSCULAR; INTRAVENOUS
Status: DISCONTINUED | OUTPATIENT
Start: 2024-04-23 | End: 2024-04-25

## 2024-04-23 RX ORDER — BISACODYL 10 MG
10 SUPPOSITORY, RECTAL RECTAL DAILY PRN
Status: DISCONTINUED | OUTPATIENT
Start: 2024-04-23 | End: 2024-04-27 | Stop reason: HOSPADM

## 2024-04-23 RX ORDER — ATORVASTATIN CALCIUM 40 MG/1
40 TABLET, FILM COATED ORAL DAILY
Status: DISCONTINUED | OUTPATIENT
Start: 2024-04-23 | End: 2024-04-27 | Stop reason: HOSPADM

## 2024-04-23 RX ORDER — DEXTROSE MONOHYDRATE 25 G/50ML
25 INJECTION, SOLUTION INTRAVENOUS
Status: DISCONTINUED | OUTPATIENT
Start: 2024-04-23 | End: 2024-04-27 | Stop reason: HOSPADM

## 2024-04-23 RX ORDER — TAMSULOSIN HYDROCHLORIDE 0.4 MG/1
0.4 CAPSULE ORAL DAILY
Status: DISCONTINUED | OUTPATIENT
Start: 2024-04-23 | End: 2024-04-27 | Stop reason: HOSPADM

## 2024-04-23 RX ORDER — FINASTERIDE 5 MG/1
5 TABLET, FILM COATED ORAL DAILY
Status: DISCONTINUED | OUTPATIENT
Start: 2024-04-23 | End: 2024-04-27 | Stop reason: HOSPADM

## 2024-04-23 RX ORDER — ONDANSETRON 2 MG/ML
4 INJECTION INTRAMUSCULAR; INTRAVENOUS EVERY 6 HOURS PRN
Status: DISCONTINUED | OUTPATIENT
Start: 2024-04-23 | End: 2024-04-27 | Stop reason: HOSPADM

## 2024-04-23 RX ORDER — ACETAMINOPHEN 325 MG/1
650 TABLET ORAL EVERY 4 HOURS PRN
Status: DISCONTINUED | OUTPATIENT
Start: 2024-04-23 | End: 2024-04-27 | Stop reason: HOSPADM

## 2024-04-23 RX ORDER — PANTOPRAZOLE SODIUM 40 MG/10ML
40 INJECTION, POWDER, LYOPHILIZED, FOR SOLUTION INTRAVENOUS
Status: DISCONTINUED | OUTPATIENT
Start: 2024-04-23 | End: 2024-04-27 | Stop reason: HOSPADM

## 2024-04-23 RX ORDER — ASPIRIN 81 MG/1
81 TABLET, CHEWABLE ORAL DAILY
Status: DISCONTINUED | OUTPATIENT
Start: 2024-04-23 | End: 2024-04-27 | Stop reason: HOSPADM

## 2024-04-23 RX ORDER — HYDRALAZINE HYDROCHLORIDE 25 MG/1
75 TABLET, FILM COATED ORAL 3 TIMES DAILY
Status: DISCONTINUED | OUTPATIENT
Start: 2024-04-23 | End: 2024-04-27 | Stop reason: HOSPADM

## 2024-04-23 RX ORDER — RANOLAZINE 500 MG/1
500 TABLET, EXTENDED RELEASE ORAL EVERY 12 HOURS SCHEDULED
Status: DISCONTINUED | OUTPATIENT
Start: 2024-04-23 | End: 2024-04-27 | Stop reason: HOSPADM

## 2024-04-23 RX ORDER — AMLODIPINE BESYLATE 5 MG/1
2.5 TABLET ORAL DAILY
Status: DISCONTINUED | OUTPATIENT
Start: 2024-04-23 | End: 2024-04-24

## 2024-04-23 RX ORDER — PRASUGREL 10 MG/1
5 TABLET, FILM COATED ORAL DAILY
Status: DISCONTINUED | OUTPATIENT
Start: 2024-04-23 | End: 2024-04-27 | Stop reason: HOSPADM

## 2024-04-23 RX ORDER — BUDESONIDE AND FORMOTEROL FUMARATE DIHYDRATE 160; 4.5 UG/1; UG/1
2 AEROSOL RESPIRATORY (INHALATION)
Status: DISCONTINUED | OUTPATIENT
Start: 2024-04-23 | End: 2024-04-27 | Stop reason: HOSPADM

## 2024-04-23 RX ORDER — NITROGLYCERIN 0.4 MG/1
0.4 TABLET SUBLINGUAL
Status: DISCONTINUED | OUTPATIENT
Start: 2024-04-23 | End: 2024-04-27 | Stop reason: HOSPADM

## 2024-04-23 RX ORDER — SODIUM CHLORIDE 0.9 % (FLUSH) 0.9 %
10 SYRINGE (ML) INJECTION EVERY 12 HOURS SCHEDULED
Status: DISCONTINUED | OUTPATIENT
Start: 2024-04-23 | End: 2024-04-27 | Stop reason: HOSPADM

## 2024-04-23 RX ORDER — ACETAMINOPHEN 160 MG/5ML
650 SOLUTION ORAL EVERY 4 HOURS PRN
Status: DISCONTINUED | OUTPATIENT
Start: 2024-04-23 | End: 2024-04-27 | Stop reason: HOSPADM

## 2024-04-23 RX ORDER — CETIRIZINE HYDROCHLORIDE 10 MG/1
10 TABLET ORAL DAILY
Status: DISCONTINUED | OUTPATIENT
Start: 2024-04-23 | End: 2024-04-27 | Stop reason: HOSPADM

## 2024-04-23 RX ORDER — HEPARIN SODIUM 5000 [USP'U]/ML
5000 INJECTION, SOLUTION INTRAVENOUS; SUBCUTANEOUS EVERY 12 HOURS SCHEDULED
Status: DISCONTINUED | OUTPATIENT
Start: 2024-04-23 | End: 2024-04-27 | Stop reason: HOSPADM

## 2024-04-23 RX ORDER — FLUTICASONE PROPIONATE 50 MCG
1 SPRAY, SUSPENSION (ML) NASAL DAILY
Status: DISCONTINUED | OUTPATIENT
Start: 2024-04-23 | End: 2024-04-27 | Stop reason: HOSPADM

## 2024-04-23 RX ORDER — PANTOPRAZOLE SODIUM 40 MG/1
40 TABLET, DELAYED RELEASE ORAL
Status: DISCONTINUED | OUTPATIENT
Start: 2024-04-23 | End: 2024-04-23 | Stop reason: ALTCHOICE

## 2024-04-23 RX ORDER — PRAMIPEXOLE DIHYDROCHLORIDE 0.25 MG/1
0.5 TABLET ORAL NIGHTLY
Status: DISCONTINUED | OUTPATIENT
Start: 2024-04-23 | End: 2024-04-27 | Stop reason: HOSPADM

## 2024-04-23 RX ORDER — ACETAMINOPHEN 650 MG/1
650 SUPPOSITORY RECTAL EVERY 4 HOURS PRN
Status: DISCONTINUED | OUTPATIENT
Start: 2024-04-23 | End: 2024-04-27 | Stop reason: HOSPADM

## 2024-04-23 RX ADMIN — TIOTROPIUM BROMIDE INHALATION SPRAY 2 PUFF: 3.12 SPRAY, METERED RESPIRATORY (INHALATION) at 08:44

## 2024-04-23 RX ADMIN — INSULIN LISPRO 4 UNITS: 100 INJECTION, SOLUTION INTRAVENOUS; SUBCUTANEOUS at 20:13

## 2024-04-23 RX ADMIN — Medication 10 ML: at 20:06

## 2024-04-23 RX ADMIN — HYDRALAZINE HYDROCHLORIDE 75 MG: 25 TABLET, FILM COATED ORAL at 08:40

## 2024-04-23 RX ADMIN — BUDESONIDE AND FORMOTEROL FUMARATE DIHYDRATE 2 PUFF: 160; 4.5 AEROSOL RESPIRATORY (INHALATION) at 08:44

## 2024-04-23 RX ADMIN — PRASUGREL 5 MG: 10 TABLET, FILM COATED ORAL at 08:41

## 2024-04-23 RX ADMIN — FLUTICASONE PROPIONATE 1 SPRAY: 50 SPRAY, METERED NASAL at 08:44

## 2024-04-23 RX ADMIN — ISOSORBIDE DINITRATE 10 MG: 10 TABLET ORAL at 13:23

## 2024-04-23 RX ADMIN — HEPARIN SODIUM 5000 UNITS: 5000 INJECTION INTRAVENOUS; SUBCUTANEOUS at 08:40

## 2024-04-23 RX ADMIN — CARVEDILOL 6.25 MG: 6.25 TABLET, FILM COATED ORAL at 18:33

## 2024-04-23 RX ADMIN — MAGNESIUM OXIDE TAB 400 MG (241.3 MG ELEMENTAL MG) 400 MG: 400 (241.3 MG) TAB at 08:42

## 2024-04-23 RX ADMIN — FUROSEMIDE 80 MG: 10 INJECTION, SOLUTION INTRAMUSCULAR; INTRAVENOUS at 01:25

## 2024-04-23 RX ADMIN — FINASTERIDE 5 MG: 5 TABLET, FILM COATED ORAL at 08:42

## 2024-04-23 RX ADMIN — CETIRIZINE HYDROCHLORIDE 10 MG: 10 TABLET, FILM COATED ORAL at 08:40

## 2024-04-23 RX ADMIN — PRAMIPEXOLE DIHYDROCHLORIDE 0.5 MG: 0.25 TABLET ORAL at 20:05

## 2024-04-23 RX ADMIN — ATORVASTATIN CALCIUM 40 MG: 40 TABLET, FILM COATED ORAL at 08:40

## 2024-04-23 RX ADMIN — FUROSEMIDE 40 MG: 10 INJECTION, SOLUTION INTRAMUSCULAR; INTRAVENOUS at 18:33

## 2024-04-23 RX ADMIN — ISOSORBIDE DINITRATE 10 MG: 10 TABLET ORAL at 09:44

## 2024-04-23 RX ADMIN — HYDRALAZINE HYDROCHLORIDE 75 MG: 25 TABLET, FILM COATED ORAL at 18:33

## 2024-04-23 RX ADMIN — ISOSORBIDE DINITRATE 10 MG: 10 TABLET ORAL at 18:30

## 2024-04-23 RX ADMIN — PANTOPRAZOLE SODIUM 40 MG: 40 INJECTION, POWDER, FOR SOLUTION INTRAVENOUS at 06:30

## 2024-04-23 RX ADMIN — INSULIN GLARGINE 15 UNITS: 100 INJECTION, SOLUTION SUBCUTANEOUS at 08:40

## 2024-04-23 RX ADMIN — INSULIN LISPRO 4 UNITS: 100 INJECTION, SOLUTION INTRAVENOUS; SUBCUTANEOUS at 18:32

## 2024-04-23 RX ADMIN — INSULIN GLARGINE 15 UNITS: 100 INJECTION, SOLUTION SUBCUTANEOUS at 20:04

## 2024-04-23 RX ADMIN — HYDRALAZINE HYDROCHLORIDE 75 MG: 25 TABLET, FILM COATED ORAL at 13:23

## 2024-04-23 RX ADMIN — EMPAGLIFLOZIN 10 MG: 10 TABLET, FILM COATED ORAL at 08:42

## 2024-04-23 RX ADMIN — TAMSULOSIN HYDROCHLORIDE 0.4 MG: 0.4 CAPSULE ORAL at 08:39

## 2024-04-23 RX ADMIN — ASPIRIN 81 MG CHEWABLE TABLET 81 MG: 81 TABLET CHEWABLE at 08:40

## 2024-04-23 RX ADMIN — Medication 10 ML: at 08:44

## 2024-04-23 RX ADMIN — FUROSEMIDE 40 MG: 10 INJECTION, SOLUTION INTRAMUSCULAR; INTRAVENOUS at 08:40

## 2024-04-23 RX ADMIN — Medication 2000 UNITS: at 08:43

## 2024-04-23 RX ADMIN — RANOLAZINE 500 MG: 500 TABLET, FILM COATED, EXTENDED RELEASE ORAL at 08:42

## 2024-04-23 RX ADMIN — AMLODIPINE BESYLATE 2.5 MG: 5 TABLET ORAL at 08:43

## 2024-04-23 RX ADMIN — INSULIN LISPRO 2 UNITS: 100 INJECTION, SOLUTION INTRAVENOUS; SUBCUTANEOUS at 11:16

## 2024-04-23 RX ADMIN — SERTRALINE HYDROCHLORIDE 100 MG: 50 TABLET ORAL at 08:41

## 2024-04-23 RX ADMIN — HEPARIN SODIUM 5000 UNITS: 5000 INJECTION INTRAVENOUS; SUBCUTANEOUS at 20:04

## 2024-04-23 RX ADMIN — CARVEDILOL 6.25 MG: 6.25 TABLET, FILM COATED ORAL at 08:39

## 2024-04-23 RX ADMIN — RANOLAZINE 500 MG: 500 TABLET, FILM COATED, EXTENDED RELEASE ORAL at 20:05

## 2024-04-23 NOTE — H&P
[facility]      Name:  Donny Jerry   Age:  79 y.o.  Sex:  male  :  1945  MRN:  [id number]   Visit Number:  [id number]  Admission Date:  2024  Date Of Service:  24  Primary Care Physician:  Anum Alonso APRN    Chief Complaint:     Shortness of breath    History Of Presenting Illness:      The patient is a 79-year-old chronically ill gentleman with a history of advanced CAD status post CABG with multiple stents, chronic kidney disease, obstructive sleep apnea, hypertension, COPD, who presented to the emergency room with complaints of shortness of breath.  History obtained from ER record and from the patient.  He states a 3-day history of increasing breathing difficulty, nearly gasping at home.  He specifically denied any chest pain or chest pressure.  He does think he has had some swelling recently.  He feels like he is urinating per normal.  He follows with Dr. Russo as his cardiologist.  Has been told previously he is not a candidate for further intervention.  He is on antianginal therapy.  Currently feeling better.  Requesting something to eat and drink at time of visit.    In the ER, he was noted to be hypoxic on EMS arrival but is currently saturating at 100% on CPAP.  He had a heart rate in the 80s blood pressure 140/70 and afebrile.  Negative respiratory panel.  VBG unremarkable.  Lactic acid 1.2.  proBNP of 3900.  Creatinine 1.77.  Had a white count of 16 hemoglobin 10.8 and platelets of 233.  Procalcitonin within normal limits.  D-dimer within normal limits.  Initial troponin 122, repeat 192.  Chest x-ray per my interpretation with cardiomegaly, interstitial edema, likely left-sided pleural effusion.  The patient was given IV Lasix.  We were asked to admit.    Review Of Systems:    All systems were reviewed and negative except as mentioned in history of presenting illness, assessment and plan.    Past Medical History:  Patient  has a past medical history of Benign hypertension, Coronary artery disease, Depression, Enlarged prostate, GERD (gastroesophageal reflux disease), Heart attack, Hypercholesterolemia, Myocardial infarction, Obesity, Obstructive sleep apnea on CPAP, and Type 2 diabetes mellitus.    Past Surgical History: Patient  has a past surgical history that includes Coronary artery bypass graft; Coronary angioplasty with stent (Left, 06/03/2009); Coronary angioplasty with stent (Left, 07/06/2009); Coronary angioplasty with stent (04/23/2010); Coronary angioplasty with stent (Left, 07/06/2010); Coronary angioplasty with stent (Left, 11/16/2010); Coronary angioplasty with stent (Left); Coronary angioplasty with stent (Left, 06/24/2014); Cardiac catheterization; Colonoscopy w/ polypectomy; Cardiac catheterization (Left, 10/19/2021); Cardiac catheterization (N/A, 7/18/2023); and Cardiac catheterization (N/A, 7/18/2023).    Social History: Patient  reports that he has never smoked. He has never used smokeless tobacco. He reports that he does not drink alcohol and does not use drugs.    Family History:  Patient's family history has been reviewed and found to be noncontributory.     Allergies:      Zocor [simvastatin], Bisoprolol, Byetta 10 mcg pen [exenatide], Crestor [rosuvastatin calcium], Metformin and related, and Plavix [clopidogrel bisulfate]    Home Medications:    Prior to Admission Medications       Prescriptions Last Dose Informant Patient Reported? Taking?    albuterol sulfate  (90 Base) MCG/ACT inhaler   No No    Inhale 2 puffs Every 4 (Four) Hours As Needed for Wheezing.    amLODIPine (NORVASC) 2.5 MG tablet   Yes No    Take 1 tablet by mouth Daily.    aspirin 81 MG chewable tablet  Self, Spouse/Significant Other Yes No    Chew 1 tablet Daily.    atorvastatin (LIPITOR) 40 MG tablet   Yes No    Take 1 tablet by mouth Daily.    B-D UF III MINI PEN NEEDLES 31G X 5 MM misc   Yes No    carvedilol (COREG) 6.25 MG  tablet   No No    Take 1 tablet by mouth 2 (Two) Times a Day With Meals.    Cholecalciferol (VITAMIN D3) 50 MCG (2000 UT) capsule   Yes No    Take 1 capsule by mouth Daily.    dapagliflozin Propanediol (Farxiga) 10 MG tablet  Spouse/Significant Other Yes No    Take 10 mg by mouth Daily.    docusate calcium (SURFAK) 240 MG capsule   Yes No    Take 1 capsule by mouth 2 (Two) Times a Day.    fexofenadine (ALLEGRA) 180 MG tablet  Spouse/Significant Other Yes No    Take 1 tablet by mouth Daily.    finasteride (PROSCAR) 5 MG tablet   Yes No    Take 1 tablet by mouth Daily.    fluticasone (FLONASE) 50 MCG/ACT nasal spray   No No    1 spray into the nostril(s) as directed by provider Daily.    Fluticasone-Umeclidin-Vilant (Trelegy Ellipta) 200-62.5-25 MCG/ACT aerosol powder    No No    Inhale 1 puff Daily. Rinse mouth with water after use.    furosemide (LASIX) 20 MG tablet   Yes No    Take 1 tablet by mouth Daily. Sun Mon Wed FRI SAT    furosemide (LASIX) 40 MG tablet   No No    Take 1 tablet by mouth 1 (One) Time Per Week. Tues Thurs    hydrALAZINE (APRESOLINE) 25 MG tablet   No No    Take 1 tablet by mouth 3 (Three) Times a Day.    Patient not taking:  Reported on 3/26/2024    hydrALAZINE (APRESOLINE) 50 MG tablet   No No    Take 1 tablet by mouth 3 (Three) Times a Day.    Patient taking differently:  Take 2 tablets by mouth 3 (Three) Times a Day.    insulin degludec (Tresiba FlexTouch) 100 UNIT/ML solution pen-injector injection   Yes No    Inject  under the skin into the appropriate area as directed 2 (Two) Times a Day. 22 units in the am   37 units at bedtime    Insulin Lispro (humaLOG) 100 UNIT/ML injection   Yes No    Inject  under the skin into the appropriate area as directed 3 (Three) Times a Day Before Meals.    magnesium oxide (MAG-OX) 400 MG tablet   Yes No    Take 1 tablet by mouth Daily.    Multiple Vitamin (MULTI VITAMIN DAILY PO)   Yes No    Take 1 tablet by mouth Daily.    nitroglycerin (NITROSTAT) 0.4 MG  "SL tablet   No No    Place 1 tablet under the tongue Every 5 (Five) Minutes As Needed for Chest Pain. Take no more than 3 doses in 15 minutes.    pantoprazole (PROTONIX) 40 MG EC tablet   Yes No    TAKE 1 TABLET BY MOUTH 2 TIMES DAILY (BEFORE MEALS)    pramipexole (MIRAPEX) 1 MG tablet   No No    Take 1 tablet by mouth Every Night.    prasugrel (EFFIENT) 10 MG tablet   Yes No    Take 1 tablet by mouth Daily.    ranolazine (RANEXA) 500 MG 12 hr tablet   No No    Take 1 tablet by mouth Every 12 (Twelve) Hours for 180 days.    sertraline (ZOLOFT) 100 MG tablet   Yes No    Take 1 tablet by mouth Daily.    tamsulosin (FLOMAX) 0.4 MG capsule 24 hr capsule   Yes No    Take 1 capsule by mouth 2 (Two) Times a Day.    TRUEplus Lancets 28G misc   Yes No    Trulicity 0.75 MG/0.5ML solution pen-injector   Yes No    Inject 0.75 mg as directed 1 (One) Time Per Week.          ED Medications:    Medications   sodium chloride 0.9 % flush 10 mL (has no administration in time range)   furosemide (LASIX) injection 80 mg (80 mg Intravenous Given 4/23/24 0125)     Vital Signs:  Temp:  [97.1 °F (36.2 °C)-99 °F (37.2 °C)] 97.1 °F (36.2 °C)  Heart Rate:  [78-94] 80  Resp:  [18-26] 18  BP: (124-140)/(61-84) 130/64        04/23/24  0112   Weight: 98.9 kg (218 lb)     Body mass index is 34.14 kg/m².    Physical Exam:     Most recent vital Signs: /64 (BP Location: Left arm, Patient Position: Lying)   Pulse 80   Temp 97.1 °F (36.2 °C) (Axillary)   Resp 18   Ht 170.2 cm (67\")   Wt 98.9 kg (218 lb)   SpO2 99%   BMI 34.14 kg/m²     Physical Exam  Constitutional:       General: He is not in acute distress.     Appearance: He is obese. He is not toxic-appearing.   HENT:      Mouth/Throat:      Mouth: Mucous membranes are moist.      Pharynx: Oropharynx is clear.   Eyes:      Extraocular Movements: Extraocular movements intact.      Pupils: Pupils are equal, round, and reactive to light.   Cardiovascular:      Rate and Rhythm: Normal rate " "and regular rhythm.      Pulses: Normal pulses.      Heart sounds: Murmur heard.   Pulmonary:      Breath sounds: Wheezing and rales present.   Abdominal:      General: Abdomen is flat. Bowel sounds are normal. There is no distension.      Tenderness: There is no abdominal tenderness. There is no guarding.   Musculoskeletal:      Right lower leg: Edema present.      Left lower leg: Edema present.   Skin:     General: Skin is warm.      Capillary Refill: Capillary refill takes less than 2 seconds.      Findings: No bruising or lesion.   Neurological:      General: No focal deficit present.      Mental Status: He is alert. Mental status is at baseline.         Laboratory data:    I have reviewed the labs done in the emergency room.    Results from last 7 days   Lab Units 04/23/24  0025   SODIUM mmol/L 137   POTASSIUM mmol/L 4.1   CHLORIDE mmol/L 102   CO2 mmol/L 22.0   BUN mg/dL 45*   CREATININE mg/dL 1.77*   CALCIUM mg/dL 8.5*   BILIRUBIN mg/dL 0.7   ALK PHOS U/L 54   ALT (SGPT) U/L 23   AST (SGOT) U/L 22   GLUCOSE mg/dL 203*     Results from last 7 days   Lab Units 04/23/24  0025 04/22/24  1000   WBC 10*3/mm3 16.18* 12.8*   HEMOGLOBIN g/dL 10.8* 10.7*   HEMATOCRIT % 32.1* 33.3*   PLATELETS 10*3/mm3 233 238         Results from last 7 days   Lab Units 04/23/24  0229 04/23/24  0025   HSTROP T ng/L 192* 122*     Results from last 7 days   Lab Units 04/23/24  0025   PROBNP pg/mL 3,938.0*                       Invalid input(s): \"USDES\", \"NITRITITE\", \"BACT\", \"EP\"    Pain Management Panel  More data may exist         Latest Ref Rng & Units 12/19/2023 4/25/2023   Pain Management Panel   Creatinine, Urine mg/dL 26.3  56.0  50.3        EKG:      There is to be a sinus rhythm, there is a left bundle branch block which appears similar to prior EKGs.  No obvious acute ischemic changes.    Radiology:    No radiology results for the last 3 days    Assessment:    Suspected acute on chronic heart failure reduced ejection fraction " exacerbation, POA  Acute respiratory failure with hypoxia, POA  Chronic kidney disease stage IIIb, POA  CAD with prior CABG  Insulin-dependent diabetes  Obstructive sleep apnea  Hypertension    Plan:    Admit for workup and treatment    Heart failure reduced ejection fraction exacerbation:  Placed patient on IV diuretic therapy with 40 mg Lasix twice a day.  Will continue his home hydralazine, Lipitor, aspirin, carvedilol, SGLT2 inhibitor and Ranexa.  Not a candidate for Entresto/spironolactone due to renal failure.  Reviewed recent 2D echocardiogram.  Placed on fluid and sodium restrictions.  Monitor intake and output.    Respiratory failure with hypoxia:  Suspect related to volume overload in the setting of severe congestive heart failure and CAD.  Monitor with treatment and diuresis.    Elevated troponin/CAD:  Cannot definitively rule out ACS as he has known obstructive disease, however with no obvious new EKG changes or chest pain complaint, will not treat as ACS.  Of note, has been deemed to have no further vessels amenable to revascularization, previously had CABG.  Medical treatment otherwise recommended..    Diabetes/USHA/hypertension/CKD 3B:  Ordered sliding scale insulin and long-acting insulin.  Blood pressure management as noted above.  Kidney function currently stable.  Follows with nephrology already.    Observation level of care, anticipate less than 2  midnight stay with adequate diuresis.  Further recommendation depend upon clinical course.  He is a DNR.    Risk Assessment: High  DVT Prophylaxis: Heparin  Code Status: DNR  Diet: Carb consistent/renal/cardiac    Advance Care Planning   ACP discussion was held with the patient during this visit. Patient has an advance directive in EMR which is still valid.            Mary Beth Cat DO  04/23/24  03:37 EDT    Dictated utilizing Dragon dictation.

## 2024-04-23 NOTE — CASE MANAGEMENT/SOCIAL WORK
Discharge Planning Assessment  [facility]     Patient Name: Donny Jerry  MRN: [id number]  Today's Date: 4/23/2024    Admit Date: 4/23/2024    Plan: The patient is awake and able to answer questions.  He is a current patient of [facility] and gets his medications from [facility].  He elects to enroll in Meds to Bed.  He uses oxygen and c-pap at home, as well as a cane as needed.  He is unable to recall the name of his O2/C-pap supplier.  He denies the need for additional DME or services at [place].  At the time of DC the patient plans to return to the home he shares with his wife.  Questions and concerns were addressed at the time of this conversation; SAN delivered at earlier time.  Will provide additional resources and information upon patient request.   Discharge Needs Assessment       Row Name 04/23/24 1018       Living Environment    People in Home spouse    Name(s) of People in Home Chely Jerry, Wife    Current Living Arrangements home    Duration at Residence > 30 years    Potentially Unsafe Housing Conditions none    In the past 12 months has the electric, gas, oil, or water company threatened to shut off services in your home? No    Primary Care Provided by self    Provides Primary Care For no one    Family Caregiver if Needed none    Quality of Family Relationships helpful;involved;supportive    Able to Return to Prior Arrangements yes       Resource/Environmental Concerns    Resource/Environmental Concerns none    Transportation Concerns none       Transportation Needs    In the past 12 months, has lack of transportation kept you from medical appointments or from getting medications? no    In the past 12 months, has lack of transportation kept you from meetings, work, or from getting things needed for daily living? No       Food Insecurity    Within the past 12 months, you worried that your food would run out before you got the money to buy more. Never true    Within the  past 12 months, the food you bought just didn't last and you didn't have money to get more. Never true       Transition Planning    Patient/Family Anticipates Transition to home with family    Patient/Family Anticipated Services at Transition none    Transportation Anticipated family or friend will provide       Discharge Needs Assessment    Readmission Within the Last 30 Days no previous admission in last 30 days    Equipment Currently Used at Home oxygen;cpap;cane, straight    Concerns to be Addressed denies needs/concerns at this time    Anticipated Changes Related to Illness none    Equipment Needed After Discharge none    Provided Post Acute Provider List? N/A    N/A Provider List Comment Patient plans to return home; no new DME needs    Provided Post Acute Provider Quality & Resource List? N/A    N/A Quality & Resource List Comment Patient plans to return home; no new DME needs    Current Discharge Risk chronically ill                   Discharge Plan       Row Name 04/23/24 1020       Plan    Plan The patient is awake and able to answer questions.  He is a current patient of [facility] and gets his medications from [facility].  He elects to enroll in Meds to Bed.  He uses oxygen and c-pap at home, as well as a cane as needed.  He is unable to recall the name of his O2/C-pap supplier.  He denies the need for additional DME or services at [place].  At the time of DC the patient plans to return to the home he shares with his wife.  Questions and concerns were addressed at the time of this conversation; SAN delivered at earlier time.  Will provide additional resources and information upon patient request.    Patient/Family in Agreement with Plan unable to assess    Provided Post Acute Provider List? N/A    N/A Provider List Comment Patient plans to return home; no DME needs    Provided Post Acute Provider Quality & Resource List? N/A    N/A Quality & Resource List Comment Patient plans  to return home; no DME needs    Plan Comments Denies needs at this time    Final Discharge Disposition Code 30 - still a patient    Final Note Plans to DC home with wife                  Continued Care and Services - Admitted Since 4/23/2024    No active coordination exists for this encounter.          Demographic Summary       Row Name 04/23/24 1017       General Information    Admission Type observation    Arrived From emergency department    Required Notices Provided Observation Status Notice    Referral Source admission list    Reason for Consult discharge planning    Preferred Language English       Contact Information    Permission Granted to Share Info With                    Functional Status       Row Name 04/23/24 1018       Functional Status    Usual Activity Tolerance good    Current Activity Tolerance moderate       Physical Activity    On average, how many days per week do you engage in moderate to strenuous exercise (like a brisk walk)? 0 days    On average, how many minutes do you engage in exercise at this level? 0 min    Number of minutes of exercise per week 0       Assessment of Health Literacy    How often do you have someone help you read hospital materials? Never    How often do you have problems learning about your medical condition because of difficulty understanding written information? Never    How often do you have a problem understanding what is told to you about your medical condition? Never    How confident are you filling out medical forms by yourself? Extremely    Health Literacy Excellent       Functional Status, IADL    Medications independent    Meal Preparation independent    Housekeeping independent    Laundry independent    Shopping independent       Mental Status    General Appearance WDL WDL       Mental Status Summary    Recent Changes in Mental Status/Cognitive Functioning no changes                   Psychosocial       Row Name 04/23/24 1018        Values/Beliefs    Spiritual, Cultural Beliefs, Islam Practices, Values that Affect Care no       Behavior WDL    Behavior WDL WDL       Emotion Mood WDL    Emotion/Mood/Affect WDL WDL       Speech WDL    Speech WDL WDL       Perceptual State WDL    Perceptual State WDL WDL       Thought Process WDL    Thought Process WDL WDL       Intellectual Performance WDL    Intellectual Performance WDL WDL                   Abuse/Neglect       Row Name 04/23/24 1018       Personal Safety    Feels Unsafe at Home or Work/School no    Feels Threatened by Someone no    Does Anyone Try to Keep You From Having Contact with Others or Doing Things Outside Your Home? no    Physical Signs of Abuse Present no                   Legal       Row Name 04/23/24 1018       Financial Resource Strain    How hard is it for you to pay for the very basics like food, housing, medical care, and heating? Not hard                   Substance Abuse       Row Name 04/23/24 1018       Substance Use    Substance Use Status never used                   Patient Forms       Row Name 04/23/24 1023       Patient Forms    Patient Observation Letter Delivered    Delivered to Patient    Method of delivery In person                      Chayo Marrero RN

## 2024-04-23 NOTE — CONSULTS
"[facility]iology Consult Note    Referring Provider: Ifrah  Reason for Consultation: CHF decompensation    Patient Care Team:  Anum Alonso APRN as PCP - General  Rubin Bernal MD as Consulting Physician (Urology)  Liz Russo MD as Consulting Physician (Cardiology)    Chief complaint : Shortness of breath    Subjective:    History of present illness: This is a 79-year-old male patient with advanced ischemic LV systolic heart failure who presents with worsening shortness of breath and increasing peripheral edema over the last 48 hours.  Incarcerated patient was noted to be severely hypoxemic and initially required BiPAP oxygen administration.  He has been given high-dose parenteral loop diuretics with a favorable response.  His twelve-lead electrocardiogram shows sinus rhythm with left bundle branch block (chronic finding).  Cardiac troponins were elevated and trended upward.  proBNP was elevated but improved over the last 3 to 4 months.  The patient is known to have severe complex coronary artery disease with occlusion of all native coronary arteries and patency of 2 of 3 bypass grafts.  The patient had previously undergone stenting of the saphenous vein graft to the right coronary artery due to in-stent restenosis in the vein graft body in multiple locations.  No other revascularization options existed.  The patient has been deemed \"medical therapy only\" for his remaining CAD.  There are no targets for redo surgical revascularization.  He is known to have heart failure with reduced ejection fraction with progressive decline in his left ventricular ejection fraction.    Review of Systems   Review of Systems   Constitutional: Negative for chills, diaphoresis, fever, malaise/fatigue, weight gain and weight loss.   HENT:  Negative for ear discharge, hearing loss, hoarse voice and nosebleeds.    Eyes:  Negative for discharge, double vision, pain and photophobia.   Cardiovascular:  Positive for " dyspnea on exertion, leg swelling and orthopnea. Negative for cyanosis, near-syncope, palpitations, paroxysmal nocturnal dyspnea and syncope.   Respiratory:  Positive for shortness of breath. Negative for cough, hemoptysis, sputum production and wheezing.    Endocrine: Negative for cold intolerance, heat intolerance, polydipsia, polyphagia and polyuria.   Hematologic/Lymphatic: Negative for adenopathy and bleeding problem. Does not bruise/bleed easily.   Skin:  Negative for color change, flushing, itching and rash.   Musculoskeletal:  Negative for muscle cramps, muscle weakness, myalgias and stiffness.   Gastrointestinal:  Negative for abdominal pain, diarrhea, hematemesis, hematochezia, nausea and vomiting.   Genitourinary:  Negative for dysuria, frequency and nocturia.   Neurological:  Negative for focal weakness, loss of balance, numbness, paresthesias and seizures.   Psychiatric/Behavioral:  Negative for altered mental status, hallucinations and suicidal ideas.    Allergic/Immunologic: Negative for HIV exposure, hives and persistent infections.       History  Past Medical History:   Diagnosis Date    Benign hypertension     Coronary artery disease     Depression     Enlarged prostate     Enlarged prostate with anticipated TURP with Dr. Bernal.    GERD (gastroesophageal reflux disease)     Heart attack     Hypercholesterolemia     Myocardial infarction     Obesity     Obstructive sleep apnea on CPAP     Type 2 diabetes mellitus    ,   Past Surgical History:   Procedure Laterality Date    CARDIAC CATHETERIZATION      CARDIAC CATHETERIZATION Left 10/19/2021    Procedure: Left Heart Cath;  Surgeon: Liz Russo MD;  Location:  [facility];  Service: Cardiology;  Laterality: Left;    CARDIAC CATHETERIZATION N/A 7/18/2023    Procedure: Coronary angiography;  Surgeon: Rupesh Parr MD;  Location:  [facility];  Service: Cardiology;  Laterality: N/A;    CARDIAC  CATHETERIZATION N/A 7/18/2023    Procedure: Percutaneous Coronary Intervention;  Surgeon: Rupesh Parr MD;  Location: [facility] CATH [facility];  Service: Cardiology;  Laterality: N/A;    COLONOSCOPY W/ POLYPECTOMY      CORONARY ANGIOPLASTY WITH STENT PLACEMENT Left 06/03/2009    Left heart catheterization, 06/03/2009, Dr. Russo:  LVEF 45% to 50%.  Placement of overlapping Cypher drug-eluting stent 2.5 x 28 and 2.5 x 18 to the SVG to the obtuse marginal branch.  SVG to the PDA had a proximal stenosis estimated at 60% with a distal stenosis of 50% to 60%.    CORONARY ANGIOPLASTY WITH STENT PLACEMENT Left 07/06/2009    Left heart catheterization, 07/06/2009:  PTCA and stent placement in the mid PDA using a 2.25 x 12 mm Taxus drug-eluting stent with stent placement in the distal SVG to the PDA using a 2.5 x 18 mm Cypher drug-eluting stent and stenting of the proximal SVG to the PDA using a 3.0 x 28 mm Cypher drug-eluting stent.    CORONARY ANGIOPLASTY WITH STENT PLACEMENT  04/23/2010    Cardiac catheterization for recurrent anginal symptoms, 04/23/2010, with PTCA and stent placement in the proximal SVG to the PDA using 3.0 x 28 mm Promus drug-eluting stent for in-stent restenosis.    CORONARY ANGIOPLASTY WITH STENT PLACEMENT Left 07/06/2010    Left heart catheterization, 07/06/2010, Dr. Russo:  EF 40% to 45%.  3.5 x 23 mm Promus drug-eluting stent in the proximal SVG to OM, 2.5 x 18 mm Promus drug-eluting stent to distal SVG to PDA due to in-stent restenosis.      CORONARY ANGIOPLASTY WITH STENT PLACEMENT Left 11/16/2010    Left heart catheterization, 11/16/2010, with placement of a 3.0 x 16 mm Taxus drug-eluting stent to the SVG to the RCA proximally and a 2.5 x 16 mm paclitaxel stent distally in the SVG to the RCA.    CORONARY ANGIOPLASTY WITH STENT PLACEMENT Left     Left heart catheterization, 3.0 x 24-mm Promus element drug-eluting stent to a 90% lesion in the mid portion of the SVG to  "the PDA.     CORONARY ANGIOPLASTY WITH STENT PLACEMENT Left 06/24/2014    Left heart catheterization for recurrent angina, 06/24/2014, Dr. Russo:  PTCA of the SVG to the PDA using a 3 x 12 mm NC TREK balloon.      CORONARY ARTERY BYPASS GRAFT      CABG x3, Dr. Peng, [facility], 2001.   ,   Family History   Problem Relation Age of Onset    Heart attack Mother     Ulcers Father    ,   Social History     Tobacco Use    Smoking status: Never    Smokeless tobacco: Never   Vaping Use    Vaping status: Never Used   Substance Use Topics    Alcohol use: No    Drug use: No   , (Not in a hospital admission)   and Allergies:  Zocor [simvastatin], Bisoprolol, Byetta 10 mcg pen [exenatide], Crestor [rosuvastatin calcium], Metformin and related, and Plavix [clopidogrel bisulfate]    Objective:    Vital Sign Min/Max for last 24 hours  Temp  Min: 97.1 °F (36.2 °C)  Max: 99 °F (37.2 °C)   BP  Min: 123/60  Max: 140/74   Pulse  Min: 69  Max: 94   Resp  Min: 18  Max: 26   SpO2  Min: 94 %  Max: 99 %   Flow (L/min)  Min: 18  Max: 18   Weight  Min: 98.9 kg (218 lb)  Max: 98.9 kg (218 lb)     Flowsheet Rows      Flowsheet Row First Filed Value   Admission Height 170.2 cm (67\") Documented at 04/23/2024 0112   Admission Weight 98.9 kg (218 lb) Documented at 04/23/2024 0112                   Echo EF Estimated  Lab Results   Component Value Date    ECHOEFEST 50.0 07/18/2023         Physical Exam:   Vitals and nursing note reviewed.   Constitutional:       Appearance: Healthy appearance. Not in distress.   Neck:      Vascular: No JVR. JVD normal.   Pulmonary:      Effort: Pulmonary effort is normal.      Breath sounds: Normal breath sounds. Examination of the right-lower field reveals decreased breath sounds and rales. Examination of the left-lower field reveals decreased breath sounds and rales. No wheezing. No rhonchi. No rales.   Chest:      Chest wall: Not tender to palpatation.   Cardiovascular:      PMI at left " midclavicular line. Normal rate. Regular rhythm. Normal S1. Normal S2.       Murmurs: There is a grade 1/6 harsh crescendo-decrescendo midsystolic murmur at the URSB, radiating to the neck.      No gallop.  No click. No rub.   Pulses:     Intact distal pulses.   Edema:     Peripheral edema present.  Abdominal:      General: Bowel sounds are normal.      Palpations: Abdomen is soft.      Tenderness: There is no abdominal tenderness.   Musculoskeletal: Normal range of motion.         General: No tenderness. Skin:     General: Skin is warm and dry.   Neurological:      General: No focal deficit present.      Mental Status: Alert and oriented to person, place and time.         Results Review:   I reviewed the patient's new clinical results.  Results from last 7 days   Lab Units 04/23/24  0632 04/23/24  0025 04/22/24  1000   WBC 10*3/mm3 13.78* 16.18* 12.8*   HEMOGLOBIN g/dL 10.2* 10.8* 10.7*   HEMATOCRIT % 30.7* 32.1* 33.3*   PLATELETS 10*3/mm3 226 233 238     Results from last 7 days   Lab Units 04/23/24  0632 04/23/24  0025   SODIUM mmol/L 140 137   POTASSIUM mmol/L 3.7 4.1   CHLORIDE mmol/L 105 102   CO2 mmol/L 23.6 22.0   BUN mg/dL 50* 45*   CREATININE mg/dL 1.92* 1.77*   GLUCOSE mg/dL 167* 203*   CALCIUM mg/dL 8.7 8.5*     Lab Results   Lab Value Date/Time    TROPONINT 436 (C) 04/23/2024 0632    TROPONINT 192 (C) 04/23/2024 0229    TROPONINT 122 (C) 04/23/2024 0025    TROPONINT 172 (C) 01/11/2024 1242    TROPONINT 178 (C) 01/11/2024 1026    TROPONINT 207 (C) 01/09/2024 1711    TROPONINT 202 (C) 01/09/2024 1434    TROPONINT 198 (C) 01/09/2024 1247    TROPONINT 141 (C) 12/18/2023 1903    TROPONINT 136 (C) 12/18/2023 1641    TROPONINT 1,148 (C) 07/18/2023 0522    TROPONINT 666 (C) 07/18/2023 0035    TROPONINT 605 (C) 07/17/2023 2249    TROPONINT 122 (C) 07/14/2023 2039    TROPONINT 112 (C) 07/14/2023 1849    TROPONINT 126 (C) 06/30/2023 0524    TROPONINT 125 (C) 06/29/2023 2037    TROPONINT 128 (C) 06/29/2023 1806     "TROPONINT 419 (C) 04/23/2023 1619    TROPONINT 418 (C) 04/23/2023 1323             Assessment/Plan:      CHF exacerbation    I have recommended adding ISDN 10 mg orally 3 times per day to be taken in combination with his hydralazine 75 mg orally 3 times per day.    I would recommend continuing parenteral loop diuretics until the patient is \"euvolemic\".    I have recommended a less than 1.5 L/day fluid restriction in combination with the less than 1500 mg/day sodium restriction.    Outpatient follow-up with his established cardiologist Dr. Russo.    I discussed the patient's findings and my recommendations with patient and nursing staff    Rupesh Parr MD  04/23/24  09:04 EDT          "

## 2024-04-23 NOTE — ED PROVIDER NOTES
EMERGENCY DEPARTMENT ENCOUNTER    Pt Name: Donny Jerry  MRN: [id number]  Pt :   1945  Room Number:    Date of encounter:  2024  PCP: Anum Alonso APRN  ED Provider: Chicho Medina MD    Historian: Patient and EMS      HPI:  Chief Complaint: Difficulty breathing        Context: Donny Jerry is a 79 y.o. male who presents to the ED c/o difficulty breathing.  Patient states he has had difficulty breathing all day.  EMS was finally called [facility] and found patient to be satting 70% on room air.  EMS also reported patient was tachypneic and mildly gray in appearance.  Was started on nonrebreather with improvement and had significant improvement once they started him on CPAP.  EMS reports patient has very significant cardiac history and CHF history as well.  No reported fevers.  No reported pain.      PAST MEDICAL HISTORY  Past Medical History:   Diagnosis Date    Benign hypertension     Coronary artery disease     Depression     Enlarged prostate     Enlarged prostate with anticipated TURP with Dr. Bernal.    GERD (gastroesophageal reflux disease)     Heart attack     Hypercholesterolemia     Myocardial infarction     Obesity     Obstructive sleep apnea on CPAP     Type 2 diabetes mellitus          PAST SURGICAL HISTORY  Past Surgical History:   Procedure Laterality Date    CARDIAC CATHETERIZATION      CARDIAC CATHETERIZATION Left 10/19/2021    Procedure: Left Heart Cath;  Surgeon: Liz Russo MD;  Location:  [facility];  Service: Cardiology;  Laterality: Left;    CARDIAC CATHETERIZATION N/A 2023    Procedure: Coronary angiography;  Surgeon: Rupesh Parr MD;  Location:  [facility];  Service: Cardiology;  Laterality: N/A;    CARDIAC CATHETERIZATION N/A 2023    Procedure: Percutaneous Coronary Intervention;  Surgeon: Rupesh Parr MD;  Location:  [facility];  Service: Cardiology;  Laterality: N/A;     COLONOSCOPY W/ POLYPECTOMY      CORONARY ANGIOPLASTY WITH STENT PLACEMENT Left 06/03/2009    Left heart catheterization, 06/03/2009, Dr. Russo:  LVEF 45% to 50%.  Placement of overlapping Cypher drug-eluting stent 2.5 x 28 and 2.5 x 18 to the SVG to the obtuse marginal branch.  SVG to the PDA had a proximal stenosis estimated at 60% with a distal stenosis of 50% to 60%.    CORONARY ANGIOPLASTY WITH STENT PLACEMENT Left 07/06/2009    Left heart catheterization, 07/06/2009:  PTCA and stent placement in the mid PDA using a 2.25 x 12 mm Taxus drug-eluting stent with stent placement in the distal SVG to the PDA using a 2.5 x 18 mm Cypher drug-eluting stent and stenting of the proximal SVG to the PDA using a 3.0 x 28 mm Cypher drug-eluting stent.    CORONARY ANGIOPLASTY WITH STENT PLACEMENT  04/23/2010    Cardiac catheterization for recurrent anginal symptoms, 04/23/2010, with PTCA and stent placement in the proximal SVG to the PDA using 3.0 x 28 mm Promus drug-eluting stent for in-stent restenosis.    CORONARY ANGIOPLASTY WITH STENT PLACEMENT Left 07/06/2010    Left heart catheterization, 07/06/2010, Dr. Russo:  EF 40% to 45%.  3.5 x 23 mm Promus drug-eluting stent in the proximal SVG to OM, 2.5 x 18 mm Promus drug-eluting stent to distal SVG to PDA due to in-stent restenosis.      CORONARY ANGIOPLASTY WITH STENT PLACEMENT Left 11/16/2010    Left heart catheterization, 11/16/2010, with placement of a 3.0 x 16 mm Taxus drug-eluting stent to the SVG to the RCA proximally and a 2.5 x 16 mm paclitaxel stent distally in the SVG to the RCA.    CORONARY ANGIOPLASTY WITH STENT PLACEMENT Left     Left heart catheterization, 3.0 x 24-mm Promus element drug-eluting stent to a 90% lesion in the mid portion of the SVG to the PDA.     CORONARY ANGIOPLASTY WITH STENT PLACEMENT Left 06/24/2014    Left heart catheterization for recurrent angina, 06/24/2014, Dr. Russo:  PTCA of the SVG to the PDA using a 3 x 12 mm  NC TREK balloon.      CORONARY ARTERY BYPASS GRAFT      CABG x3, Dr. Peng, [facility], 2001.         FAMILY HISTORY  Family History   Problem Relation Age of Onset    Heart attack Mother     Ulcers Father          SOCIAL HISTORY  Social History     Socioeconomic History    Marital status:    Tobacco Use    Smoking status: Never    Smokeless tobacco: Never   Vaping Use    Vaping status: Never Used   Substance and Sexual Activity    Alcohol use: No    Drug use: No    Sexual activity: Defer         ALLERGIES  Zocor [simvastatin], Bisoprolol, Byetta 10 mcg pen [exenatide], Crestor [rosuvastatin calcium], Metformin and related, and Plavix [clopidogrel bisulfate]        REVIEW OF SYSTEMS  Review of Systems     All systems reviewed and negative except for those discussed in HPI.       PHYSICAL EXAM    I have reviewed the triage vital signs and nursing notes.    ED Triage Vitals   Temp Pulse Resp BP SpO2   -- -- -- -- --      Temp src Heart Rate Source Patient Position BP Location FiO2 (%)   -- -- -- -- --       Physical Exam    General:  Awake, alert, elderly, no acute distress  HEENT: Atraumatic, normocephalic, EOMI, PERRLA, mucous membranes moist  NECK:  Supple, atraumatic  Cardiovascular:  Regular rate, regular rhythm, no murmurs, rubs, or gallops.  Extremities well perfused   Respiratory: Wearing CPAP from EMS, no significant wheezing.  Mild rhonchi/Rales in lower lung fields bilaterally.  Abdominal:  Soft, nondistended, nontender.  No guarding or rebound.  No palpable masses  Extremity:  No visible bony abnormalities in all 4 extremities.  Full range of motion of all extremities.  Skin:  Warm and dry.  No rashes  Neuro:  AAOx3, GCS 15. Cranial nerves 2-12 grossly intact.  No focal strength or sensation deficits.  Psych:  Mood and affect appropriate.        LAB RESULTS  Recent Results (from the past 24 hour(s))   CBC (NO DIFF)    Collection Time: 04/22/24 10:00 AM   Result Value Ref Range    WBC 12.8  (H) 4.0 - 11.0 K/uL    RBC 3.96 (L) 4.50 - 6.00 M/uL    Hemoglobin 10.7 (L) 13.0 - 18.0 g/dL    Hematocrit 33.3 (L) 40.0 - 54.0 %    MCV 84.1 80.0 - 100.0 fL    MCH 27.0 27.0 - 32.0 pg    MCHC 32.1 31.0 - 35.0 g/dL    RDW 15.1 11.0 - 16.0 %    Platelets 238 150 - 400 K/uL    MPV 10.2 (H) 6.0 - 10.0 fL   Comprehensive Metabolic Panel    Collection Time: 04/23/24 12:25 AM    Specimen: Blood   Result Value Ref Range    Glucose 203 (H) 65 - 99 mg/dL    BUN 45 (H) 8 - 23 mg/dL    Creatinine 1.77 (H) 0.76 - 1.27 mg/dL    Sodium 137 136 - 145 mmol/L    Potassium 4.1 3.5 - 5.2 mmol/L    Chloride 102 98 - 107 mmol/L    CO2 22.0 22.0 - 29.0 mmol/L    Calcium 8.5 (L) 8.6 - 10.5 mg/dL    Total Protein 6.7 6.0 - 8.5 g/dL    Albumin 3.9 3.5 - 5.2 g/dL    ALT (SGPT) 23 1 - 41 U/L    AST (SGOT) 22 1 - 40 U/L    Alkaline Phosphatase 54 39 - 117 U/L    Total Bilirubin 0.7 0.0 - 1.2 mg/dL    Globulin 2.8 gm/dL    A/G Ratio 1.4 g/dL    BUN/Creatinine Ratio 25.4 (H) 7.0 - 25.0    Anion Gap 13.0 5.0 - 15.0 mmol/L    eGFR 38.8 (L) >60.0 mL/min/1.73   BNP    Collection Time: 04/23/24 12:25 AM    Specimen: Blood   Result Value Ref Range    proBNP 3,938.0 (H) 0.0 - 1,800.0 pg/mL   Single High Sensitivity Troponin T    Collection Time: 04/23/24 12:25 AM    Specimen: Blood   Result Value Ref Range    HS Troponin T 122 (C) <22 ng/L   Green Top (Gel)    Collection Time: 04/23/24 12:25 AM   Result Value Ref Range    Extra Tube Hold for add-ons.    Lavender Top    Collection Time: 04/23/24 12:25 AM   Result Value Ref Range    Extra Tube hold for add-on    Gold Top - SST    Collection Time: 04/23/24 12:25 AM   Result Value Ref Range    Extra Tube Hold for add-ons.    Light Blue Top    Collection Time: 04/23/24 12:25 AM   Result Value Ref Range    Extra Tube Hold for add-ons.    CBC Auto Differential    Collection Time: 04/23/24 12:25 AM    Specimen: Blood   Result Value Ref Range    WBC 16.18 (H) 3.40 - 10.80 10*3/mm3    RBC 3.90 (L) 4.14 - 5.80  10*6/mm3    Hemoglobin 10.8 (L) 13.0 - 17.7 g/dL    Hematocrit 32.1 (L) 37.5 - 51.0 %    MCV 82.3 79.0 - 97.0 fL    MCH 27.7 26.6 - 33.0 pg    MCHC 33.6 31.5 - 35.7 g/dL    RDW 15.3 12.3 - 15.4 %    RDW-SD 46.2 37.0 - 54.0 fl    MPV 10.8 6.0 - 12.0 fL    Platelets 233 140 - 450 10*3/mm3    Neutrophil % 92.0 (H) 42.7 - 76.0 %    Lymphocyte % 2.0 (L) 19.6 - 45.3 %    Monocyte % 5.2 5.0 - 12.0 %    Eosinophil % 0.1 (L) 0.3 - 6.2 %    Basophil % 0.3 0.0 - 1.5 %    Immature Grans % 0.4 0.0 - 0.5 %    Neutrophils, Absolute 14.87 (H) 1.70 - 7.00 10*3/mm3    Lymphocytes, Absolute 0.33 (L) 0.70 - 3.10 10*3/mm3    Monocytes, Absolute 0.84 0.10 - 0.90 10*3/mm3    Eosinophils, Absolute 0.02 0.00 - 0.40 10*3/mm3    Basophils, Absolute 0.05 0.00 - 0.20 10*3/mm3    Immature Grans, Absolute 0.07 (H) 0.00 - 0.05 10*3/mm3    nRBC 0.0 0.0 - 0.2 /100 WBC   Procalcitonin    Collection Time: 04/23/24 12:25 AM    Specimen: Blood   Result Value Ref Range    Procalcitonin 0.10 0.00 - 0.25 ng/mL   D-dimer, Quantitative    Collection Time: 04/23/24 12:25 AM    Specimen: Blood   Result Value Ref Range    D-Dimer, Quantitative 0.59 0.00 - 0.78 MCGFEU/mL   Blood Gas, Venous With Co-Ox    Collection Time: 04/23/24 12:36 AM    Specimen: Venous Blood   Result Value Ref Range    Site OTHER     pH, Venous 7.376 7.320 - 7.420 pH Units    pCO2, Venous 43.4 40.0 - 50.0 mm Hg    pO2, Venous 39.1 30.0 - 50.0 mm Hg    HCO3, Venous 25.4 22.0 - 28.0 mmol/L    Base Excess, Venous 0.0 0.0 - 2.0 mmol/L    O2 Saturation, Venous 70.4 45.0 - 75.0 %    Oxyhemoglobin Venous 69.4 40.0 - 70.0 %    Methemoglobin Venous 0.7 0.0 - 3.0 %    Carboxyhemoglobin Venous 0.7 0.0 - 5.0 %    Barometric Pressure for Blood Gas 736 mmHg    Modality BiPap     FIO2 50 %    Ventilator Mode NA    Respiratory Panel PCR w/COVID-19(SARS-CoV-2) [facility], NP Swab in UTM/VTM, 2 HR TAT - Swab, Nasopharynx    Collection Time: 04/23/24 12:41 AM    Specimen: Nasopharynx;  Swab   Result Value Ref Range    ADENOVIRUS, PCR Not Detected Not Detected    Coronavirus 229E Not Detected Not Detected    Coronavirus HKU1 Not Detected Not Detected    Coronavirus NL63 Not Detected Not Detected    Coronavirus OC43 Not Detected Not Detected    COVID19 Not Detected Not Detected - Ref. Range    Human Metapneumovirus Not Detected Not Detected    Human Rhinovirus/Enterovirus Not Detected Not Detected    Influenza A PCR Not Detected Not Detected    Influenza B PCR Not Detected Not Detected    Parainfluenza Virus 1 Not Detected Not Detected    Parainfluenza Virus 2 Not Detected Not Detected    Parainfluenza Virus 3 Not Detected Not Detected    Parainfluenza Virus 4 Not Detected Not Detected    RSV, PCR Not Detected Not Detected    Bordetella pertussis pcr Not Detected Not Detected    Bordetella parapertussis PCR Not Detected Not Detected    Chlamydophila pneumoniae PCR Not Detected Not Detected    Mycoplasma pneumo by PCR Not Detected Not Detected   Lactic Acid, Plasma    Collection Time: 04/23/24 12:54 AM    Specimen: Blood   Result Value Ref Range    Lactate 1.2 0.5 - 2.0 mmol/L       If labs were ordered, I independently reviewed the results and considered them in treating the patient.          PROCEDURES    Critical Care    Performed by: Chicho Medina MD  Authorized by: Chicho Medina MD    Critical care provider statement:     Critical care time (minutes):  32    Critical care time was exclusive of:  Separately billable procedures and treating other patients and teaching time    Critical care was necessary to treat or prevent imminent or life-threatening deterioration of the following conditions:  Respiratory failure and cardiac failure    Critical care was time spent personally by me on the following activities:  Development of treatment plan with patient or surrogate, ordering and performing treatments and interventions, ordering and review of laboratory studies, ordering and review of  radiographic studies, pulse oximetry, re-evaluation of patient's condition, review of old charts, examination of patient, evaluation of patient's response to treatment and obtaining history from patient or surrogate    I assumed direction of critical care for this patient from another provider in my specialty: no      Care discussed with: admitting provider        ECG 12 Lead ED Triage Standing Order; SOA   Final Result          MEDICATIONS GIVEN IN ER    Medications   sodium chloride 0.9 % flush 10 mL (has no administration in time range)   furosemide (LASIX) injection 80 mg (80 mg Intravenous Given 4/23/24 0125)         MEDICAL DECISION MAKING, PROGRESS, and CONSULTS    All labs, if obtained, have been independently reviewed by me.  All radiology studies, if obtained, have been reviewed by me and the radiologist dictating the report.  All EKG's, if obtained, have been independently viewed and interpreted by me.      Discussion below represents my analysis of pertinent findings related to patient's condition, differential diagnosis, treatment plan and final disposition.    Donny Jerry is a 79 y.o. male who presents to the ED c/o difficulty breathing.  Brought in by EMS, mentating appropriately on arrival with GCS of 15.  Differential includes was not limited to CHF exacerbation, pulmonary edema, pneumonia, pneumothorax, myocardial infarction.  Extensive labs ordered along with chest x-ray and EKG.  Patient placed on CPAP by respiratory therapy and remaining comfortable with breathing.  VBG obtained which showed no significant hypercapnia or hypoxia with normal pH.  EKG obtained which was personally reviewed and interpreted showing sinus rhythm with chronic left bundle branch block but no evidence of acute ischemic changes/STEMI.     Chest x-ray personally reviewed and interpreted showing evidence of bilateral opacifications consistent with likely pulmonary edema.  proBNP over 3000.  Initial troponin also above  120.  Leukocytosis of 16.  Due to hypoxia and leukocytosis blood cultures obtained along with lactate to evaluate for possible sepsis.  Lactate normal.  D-dimer obtained and is negative by age-adjusted D-dimer criteria with a value of 0.58.    Patient given 80 mg of IV Lasix for diuresis.  Discussed patient's case with hospitalist who is agreeable with plan for admission and further treatment.                             Orders placed during this visit:  Orders Placed This Encounter   Procedures    Critical Care    Respiratory Panel PCR w/COVID-19(SARS-CoV-2) [facility], NP Swab in UTM/VTM, 2 HR TAT - Swab, Nasopharynx    Blood Culture - Blood,    Blood Culture - Blood,    XR Chest 1 View    [place] Draw    Comprehensive Metabolic Panel    BNP    Single High Sensitivity Troponin T    CBC Auto Differential    Blood Gas, Venous With Co-Ox    Lactic Acid, Plasma    Procalcitonin    D-dimer, Quantitative    Blood Gas, Venous -With Co-Ox Panel: Yes    High Sensitivity Troponin T 2Hr    NPO Diet NPO Type: Strict NPO    Undress & Gown    Continuous Pulse Oximetry    Vital Signs    Oxygen Therapy- Nasal Cannula; Titrate 1-6 LPM Per SpO2; 90 - 95%    ECG 12 Lead ED Triage Standing Order; SOA    Insert Peripheral IV    Initiate Observation Status    CBC & Differential    Green Top (Gel)    Lavender Top    Gold Top - SST    Light Blue Top         ED Course:    Consultants:                  Shared Decision Making:  After my consideration of clinical presentation and any laboratory/radiology studies obtained, I discussed the findings with the patient/patient representative who is in agreement with the treatment plan and the final disposition.   Risks and benefits of discharge and/or observation/admission were discussed.      AS OF 01:43 EDT VITALS:    BP - 131/84  HR - 82  TEMP - 99 °F (37.2 °C) (Axillary)  O2 SATS - 95%                  DIAGNOSIS  Final diagnoses:   Acute respiratory failure with hypoxia    Acute on chronic congestive heart failure, unspecified heart failure type   Leukocytosis, unspecified type   Chronic kidney disease, unspecified CKD stage         DISPOSITION  Admit      Please note that portions of this document were completed with voice recognition software.        Chicho Medina MD  04/23/24 0143       Chicho Medina MD  04/23/24 0143

## 2024-04-24 ENCOUNTER — APPOINTMENT (OUTPATIENT)
Dept: GENERAL RADIOLOGY | Facility: HOSPITAL | Age: 79
DRG: 291 | End: 2024-04-24
Payer: MEDICARE

## 2024-04-24 LAB
ANION GAP SERPL CALCULATED.3IONS-SCNC: 11.6 MMOL/L (ref 5–15)
BILIRUB UR QL STRIP: NEGATIVE
BUN SERPL-MCNC: 55 MG/DL (ref 8–23)
BUN/CREAT SERPL: 26.7 (ref 7–25)
CALCIUM SPEC-SCNC: 8.5 MG/DL (ref 8.6–10.5)
CHLORIDE SERPL-SCNC: 100 MMOL/L (ref 98–107)
CLARITY UR: CLEAR
CO2 SERPL-SCNC: 25.4 MMOL/L (ref 22–29)
COLOR UR: YELLOW
CREAT SERPL-MCNC: 2.06 MG/DL (ref 0.76–1.27)
DEPRECATED RDW RBC AUTO: 46.7 FL (ref 37–54)
EGFRCR SERPLBLD CKD-EPI 2021: 32.4 ML/MIN/1.73
ERYTHROCYTE [DISTWIDTH] IN BLOOD BY AUTOMATED COUNT: 15.6 % (ref 12.3–15.4)
GLUCOSE BLDC GLUCOMTR-MCNC: 119 MG/DL (ref 70–130)
GLUCOSE BLDC GLUCOMTR-MCNC: 154 MG/DL (ref 70–130)
GLUCOSE BLDC GLUCOMTR-MCNC: 195 MG/DL (ref 70–130)
GLUCOSE BLDC GLUCOMTR-MCNC: 272 MG/DL (ref 70–130)
GLUCOSE SERPL-MCNC: 109 MG/DL (ref 65–99)
GLUCOSE UR STRIP-MCNC: ABNORMAL MG/DL
HCT VFR BLD AUTO: 29.3 % (ref 37.5–51)
HGB BLD-MCNC: 9.9 G/DL (ref 13–17.7)
HGB UR QL STRIP.AUTO: NEGATIVE
KETONES UR QL STRIP: NEGATIVE
LEUKOCYTE ESTERASE UR QL STRIP.AUTO: NEGATIVE
MAGNESIUM SERPL-MCNC: 2.2 MG/DL (ref 1.6–2.4)
MCH RBC QN AUTO: 27.6 PG (ref 26.6–33)
MCHC RBC AUTO-ENTMCNC: 33.8 G/DL (ref 31.5–35.7)
MCV RBC AUTO: 81.6 FL (ref 79–97)
NITRITE UR QL STRIP: NEGATIVE
PH UR STRIP.AUTO: <=5 [PH] (ref 5–8)
PLATELET # BLD AUTO: 229 10*3/MM3 (ref 140–450)
PMV BLD AUTO: 11.1 FL (ref 6–12)
POTASSIUM SERPL-SCNC: 3.1 MMOL/L (ref 3.5–5.2)
PROT UR QL STRIP: NEGATIVE
RBC # BLD AUTO: 3.59 10*6/MM3 (ref 4.14–5.8)
SODIUM SERPL-SCNC: 137 MMOL/L (ref 136–145)
SP GR UR STRIP: 1.01 (ref 1–1.03)
UROBILINOGEN UR QL STRIP: ABNORMAL
WBC NRBC COR # BLD AUTO: 12.36 10*3/MM3 (ref 3.4–10.8)

## 2024-04-24 PROCEDURE — 80048 BASIC METABOLIC PNL TOTAL CA: CPT | Performed by: FAMILY MEDICINE

## 2024-04-24 PROCEDURE — 94799 UNLISTED PULMONARY SVC/PX: CPT

## 2024-04-24 PROCEDURE — 82948 REAGENT STRIP/BLOOD GLUCOSE: CPT | Performed by: FAMILY MEDICINE

## 2024-04-24 PROCEDURE — 94761 N-INVAS EAR/PLS OXIMETRY MLT: CPT

## 2024-04-24 PROCEDURE — 87899 AGENT NOS ASSAY W/OPTIC: CPT | Performed by: INTERNAL MEDICINE

## 2024-04-24 PROCEDURE — 99223 1ST HOSP IP/OBS HIGH 75: CPT | Performed by: INTERNAL MEDICINE

## 2024-04-24 PROCEDURE — 82948 REAGENT STRIP/BLOOD GLUCOSE: CPT

## 2024-04-24 PROCEDURE — 25010000002 FUROSEMIDE PER 20 MG: Performed by: FAMILY MEDICINE

## 2024-04-24 PROCEDURE — 63710000001 INSULIN GLARGINE PER 5 UNITS: Performed by: FAMILY MEDICINE

## 2024-04-24 PROCEDURE — 97162 PT EVAL MOD COMPLEX 30 MIN: CPT

## 2024-04-24 PROCEDURE — 97166 OT EVAL MOD COMPLEX 45 MIN: CPT

## 2024-04-24 PROCEDURE — 81003 URINALYSIS AUTO W/O SCOPE: CPT | Performed by: INTERNAL MEDICINE

## 2024-04-24 PROCEDURE — 87449 NOS EACH ORGANISM AG IA: CPT | Performed by: INTERNAL MEDICINE

## 2024-04-24 PROCEDURE — 63710000001 INSULIN LISPRO (HUMAN) PER 5 UNITS: Performed by: FAMILY MEDICINE

## 2024-04-24 PROCEDURE — 83735 ASSAY OF MAGNESIUM: CPT | Performed by: FAMILY MEDICINE

## 2024-04-24 PROCEDURE — 71045 X-RAY EXAM CHEST 1 VIEW: CPT

## 2024-04-24 PROCEDURE — 85027 COMPLETE CBC AUTOMATED: CPT | Performed by: FAMILY MEDICINE

## 2024-04-24 PROCEDURE — G0378 HOSPITAL OBSERVATION PER HR: HCPCS

## 2024-04-24 PROCEDURE — 25010000002 HEPARIN (PORCINE) PER 1000 UNITS: Performed by: FAMILY MEDICINE

## 2024-04-24 PROCEDURE — 94640 AIRWAY INHALATION TREATMENT: CPT

## 2024-04-24 PROCEDURE — 99232 SBSQ HOSP IP/OBS MODERATE 35: CPT | Performed by: FAMILY MEDICINE

## 2024-04-24 PROCEDURE — 94660 CPAP INITIATION&MGMT: CPT

## 2024-04-24 RX ORDER — SPIRONOLACTONE 25 MG/1
25 TABLET ORAL DAILY
Status: DISCONTINUED | OUTPATIENT
Start: 2024-04-24 | End: 2024-04-27 | Stop reason: HOSPADM

## 2024-04-24 RX ORDER — POTASSIUM CHLORIDE 20 MEQ/1
40 TABLET, EXTENDED RELEASE ORAL 2 TIMES DAILY WITH MEALS
Status: COMPLETED | OUTPATIENT
Start: 2024-04-24 | End: 2024-04-24

## 2024-04-24 RX ADMIN — FINASTERIDE 5 MG: 5 TABLET, FILM COATED ORAL at 08:48

## 2024-04-24 RX ADMIN — HEPARIN SODIUM 5000 UNITS: 5000 INJECTION INTRAVENOUS; SUBCUTANEOUS at 08:50

## 2024-04-24 RX ADMIN — ACETAMINOPHEN 650 MG: 325 TABLET ORAL at 05:47

## 2024-04-24 RX ADMIN — POTASSIUM CHLORIDE 40 MEQ: 1500 TABLET, EXTENDED RELEASE ORAL at 18:40

## 2024-04-24 RX ADMIN — RANOLAZINE 500 MG: 500 TABLET, FILM COATED, EXTENDED RELEASE ORAL at 20:04

## 2024-04-24 RX ADMIN — INSULIN LISPRO 2 UNITS: 100 INJECTION, SOLUTION INTRAVENOUS; SUBCUTANEOUS at 17:34

## 2024-04-24 RX ADMIN — Medication 10 ML: at 20:07

## 2024-04-24 RX ADMIN — CARVEDILOL 6.25 MG: 6.25 TABLET, FILM COATED ORAL at 08:49

## 2024-04-24 RX ADMIN — PRASUGREL 5 MG: 10 TABLET, FILM COATED ORAL at 09:56

## 2024-04-24 RX ADMIN — ISOSORBIDE DINITRATE 10 MG: 10 TABLET ORAL at 09:56

## 2024-04-24 RX ADMIN — INSULIN LISPRO 2 UNITS: 100 INJECTION, SOLUTION INTRAVENOUS; SUBCUTANEOUS at 20:09

## 2024-04-24 RX ADMIN — MAGNESIUM OXIDE TAB 400 MG (241.3 MG ELEMENTAL MG) 400 MG: 400 (241.3 MG) TAB at 08:49

## 2024-04-24 RX ADMIN — ISOSORBIDE DINITRATE 10 MG: 10 TABLET ORAL at 14:20

## 2024-04-24 RX ADMIN — ISOSORBIDE DINITRATE 10 MG: 10 TABLET ORAL at 18:40

## 2024-04-24 RX ADMIN — CETIRIZINE HYDROCHLORIDE 10 MG: 10 TABLET, FILM COATED ORAL at 08:49

## 2024-04-24 RX ADMIN — PANTOPRAZOLE SODIUM 40 MG: 40 INJECTION, POWDER, FOR SOLUTION INTRAVENOUS at 05:41

## 2024-04-24 RX ADMIN — PRAMIPEXOLE DIHYDROCHLORIDE 0.5 MG: 0.25 TABLET ORAL at 20:04

## 2024-04-24 RX ADMIN — INSULIN GLARGINE 15 UNITS: 100 INJECTION, SOLUTION SUBCUTANEOUS at 08:48

## 2024-04-24 RX ADMIN — ATORVASTATIN CALCIUM 40 MG: 40 TABLET, FILM COATED ORAL at 08:49

## 2024-04-24 RX ADMIN — HYDRALAZINE HYDROCHLORIDE 75 MG: 25 TABLET, FILM COATED ORAL at 18:40

## 2024-04-24 RX ADMIN — INSULIN LISPRO 6 UNITS: 100 INJECTION, SOLUTION INTRAVENOUS; SUBCUTANEOUS at 11:31

## 2024-04-24 RX ADMIN — POTASSIUM CHLORIDE 40 MEQ: 1500 TABLET, EXTENDED RELEASE ORAL at 09:55

## 2024-04-24 RX ADMIN — BUDESONIDE AND FORMOTEROL FUMARATE DIHYDRATE 2 PUFF: 160; 4.5 AEROSOL RESPIRATORY (INHALATION) at 07:01

## 2024-04-24 RX ADMIN — SERTRALINE HYDROCHLORIDE 100 MG: 50 TABLET ORAL at 08:49

## 2024-04-24 RX ADMIN — EMPAGLIFLOZIN 10 MG: 10 TABLET, FILM COATED ORAL at 08:49

## 2024-04-24 RX ADMIN — HYDRALAZINE HYDROCHLORIDE 75 MG: 25 TABLET, FILM COATED ORAL at 08:49

## 2024-04-24 RX ADMIN — CARVEDILOL 6.25 MG: 6.25 TABLET, FILM COATED ORAL at 18:40

## 2024-04-24 RX ADMIN — Medication 2000 UNITS: at 08:48

## 2024-04-24 RX ADMIN — TAMSULOSIN HYDROCHLORIDE 0.4 MG: 0.4 CAPSULE ORAL at 08:49

## 2024-04-24 RX ADMIN — ASPIRIN 81 MG CHEWABLE TABLET 81 MG: 81 TABLET CHEWABLE at 08:49

## 2024-04-24 RX ADMIN — SPIRONOLACTONE 25 MG: 25 TABLET, FILM COATED ORAL at 18:40

## 2024-04-24 RX ADMIN — Medication 10 ML: at 08:50

## 2024-04-24 RX ADMIN — BUDESONIDE AND FORMOTEROL FUMARATE DIHYDRATE 2 PUFF: 160; 4.5 AEROSOL RESPIRATORY (INHALATION) at 18:23

## 2024-04-24 RX ADMIN — FLUTICASONE PROPIONATE 1 SPRAY: 50 SPRAY, METERED NASAL at 08:51

## 2024-04-24 RX ADMIN — HEPARIN SODIUM 5000 UNITS: 5000 INJECTION INTRAVENOUS; SUBCUTANEOUS at 20:04

## 2024-04-24 RX ADMIN — INSULIN GLARGINE 15 UNITS: 100 INJECTION, SOLUTION SUBCUTANEOUS at 20:04

## 2024-04-24 RX ADMIN — FUROSEMIDE 40 MG: 10 INJECTION, SOLUTION INTRAMUSCULAR; INTRAVENOUS at 18:42

## 2024-04-24 RX ADMIN — AMLODIPINE BESYLATE 2.5 MG: 5 TABLET ORAL at 08:49

## 2024-04-24 RX ADMIN — TIOTROPIUM BROMIDE INHALATION SPRAY 2 PUFF: 3.12 SPRAY, METERED RESPIRATORY (INHALATION) at 07:08

## 2024-04-24 RX ADMIN — FUROSEMIDE 40 MG: 10 INJECTION, SOLUTION INTRAMUSCULAR; INTRAVENOUS at 08:48

## 2024-04-24 RX ADMIN — RANOLAZINE 500 MG: 500 TABLET, FILM COATED, EXTENDED RELEASE ORAL at 08:49

## 2024-04-24 NOTE — CONSULTS
Date of admission:  4/23/2024    Date of consultation:   April 24, 2024    Requested by:   Jono Rg MD    PCP: Anum Alonso APRN    Reason:  Acute hypoxic respiratory failure    History of Present Illness:  79 y.o. male with past medical history significant for asthma and obstructive sleep apnea who was admitted to the hospitalist service after he presented with worsening shortness of breath.  Patient says that over the past 3-4 days he started having increased shortness of breath and actually felt that he was waking up in the middle of the night gasping for breath and also mentioned lower extremity edema.  He denied any fever or chills.  He denied any increased cough or phlegm production.    The patient was brought to the ER and had to be placed on CPAP device.    Pulmonary Consultation was requested for further recommendations.     Review of System: All other review of systems negative except indicated in HPI     Past Medical History: Pertinent history reviewed, as appropriate. Negative, except noted below or in HPI.  If this history could not be obtained due to a reason, the reason is listed in the HPI.  Past Medical History:   Diagnosis Date    Benign hypertension     Coronary artery disease     Depression     Enlarged prostate     Enlarged prostate with anticipated TURP with Dr. Bernal.    GERD (gastroesophageal reflux disease)     Heart attack     Hypercholesterolemia     Myocardial infarction     Obesity     Obstructive sleep apnea on CPAP     Type 2 diabetes mellitus          Past Surgical History: Pertinent history reviewed, as appropriate. Negative, except noted below or in HPI. If this history could not be obtained due to a reason, the reason is listed in the HPI.  Past Surgical History:   Procedure Laterality Date    CARDIAC CATHETERIZATION      CARDIAC CATHETERIZATION Left 10/19/2021    Procedure: Left Heart Cath;  Surgeon: Liz Russo MD;  Location: [facility] INVASIVE  LOCATION;  Service: Cardiology;  Laterality: Left;    CARDIAC CATHETERIZATION N/A 7/18/2023    Procedure: Coronary angiography;  Surgeon: Rupesh Parr MD;  Location:  [facility] INVASIVE LOCATION;  Service: Cardiology;  Laterality: N/A;    CARDIAC CATHETERIZATION N/A 7/18/2023    Procedure: Percutaneous Coronary Intervention;  Surgeon: Rupesh Parr MD;  Location:  [facility] INVASIVE LOCATION;  Service: Cardiology;  Laterality: N/A;    COLONOSCOPY W/ POLYPECTOMY      CORONARY ANGIOPLASTY WITH STENT PLACEMENT Left 06/03/2009    Left heart catheterization, 06/03/2009, Dr. Russo:  LVEF 45% to 50%.  Placement of overlapping Cypher drug-eluting stent 2.5 x 28 and 2.5 x 18 to the SVG to the obtuse marginal branch.  SVG to the PDA had a proximal stenosis estimated at 60% with a distal stenosis of 50% to 60%.    CORONARY ANGIOPLASTY WITH STENT PLACEMENT Left 07/06/2009    Left heart catheterization, 07/06/2009:  PTCA and stent placement in the mid PDA using a 2.25 x 12 mm Taxus drug-eluting stent with stent placement in the distal SVG to the PDA using a 2.5 x 18 mm Cypher drug-eluting stent and stenting of the proximal SVG to the PDA using a 3.0 x 28 mm Cypher drug-eluting stent.    CORONARY ANGIOPLASTY WITH STENT PLACEMENT  04/23/2010    Cardiac catheterization for recurrent anginal symptoms, 04/23/2010, with PTCA and stent placement in the proximal SVG to the PDA using 3.0 x 28 mm Promus drug-eluting stent for in-stent restenosis.    CORONARY ANGIOPLASTY WITH STENT PLACEMENT Left 07/06/2010    Left heart catheterization, 07/06/2010, Dr. Russo:  EF 40% to 45%.  3.5 x 23 mm Promus drug-eluting stent in the proximal SVG to OM, 2.5 x 18 mm Promus drug-eluting stent to distal SVG to PDA due to in-stent restenosis.      CORONARY ANGIOPLASTY WITH STENT PLACEMENT Left 11/16/2010    Left heart catheterization, 11/16/2010, with placement of a 3.0 x 16 mm Taxus drug-eluting stent to the SVG to the RCA  "proximally and a 2.5 x 16 mm paclitaxel stent distally in the SVG to the RCA.    CORONARY ANGIOPLASTY WITH STENT PLACEMENT Left     Left heart catheterization, 3.0 x 24-mm Promus element drug-eluting stent to a 90% lesion in the mid portion of the SVG to the PDA.     CORONARY ANGIOPLASTY WITH STENT PLACEMENT Left 06/24/2014    Left heart catheterization for recurrent angina, 06/24/2014, Dr. Russo:  PTCA of the SVG to the PDA using a 3 x 12 mm NC TREK balloon.      CORONARY ARTERY BYPASS GRAFT      CABG x3, Dr. Peng, [facility], 2001.         Family History: Pertinent history reviewed, as appropriate. Negative, except noted below or in HPI. If this history could not be obtained due to a reason, the reason is listed in the HPI.  Family History   Problem Relation Age of Onset    Heart attack Mother     Ulcers Father          Social History: Pertinent history reviewed, as appropriate. Negative, except noted below or in HPI. If this history could not be obtained due to a reason, the reason is listed in the HPI.  Social History     Socioeconomic History    Marital status:    Tobacco Use    Smoking status: Never    Smokeless tobacco: Never   Vaping Use    Vaping status: Never Used   Substance and Sexual Activity    Alcohol use: No    Drug use: No    Sexual activity: Defer           Physical Exam:  /60   Pulse 76   Temp 98.2 °F (36.8 °C) (Oral)   Resp 24   Ht 170.2 cm (67\")   Wt 98.9 kg (218 lb)   SpO2 93%   BMI 34.14 kg/m²     Constitutional:            Vital signs reviewed                     General: No acute distress noted. Able to communicate appropriately    Eyes:            Extraocular movement was intact.            Pupils appeared equal            Conjunctiva: Pink    ENT:             Hearing was slightly impaired              No nasal erythema noted.              Oropharynx was crowded. No lesions noted.     Neck:             Supple. ? JVD noted.              Thyroid gland " "did not seem to be enlarged    Cardiovascular:              S1 + S2. Regular.  CABG scar noted.    Lungs/Respiratory:            Respiratory effort was mildly labored, especially when trying to talk in full sentences            Good Air Entry Bilaterally with crackles heard.    Abdomen/GI:            Obese             Bowel sounds sluggishly positive            No obvious organomegaly noted     Musculoskeletal/Extremities:             Gait could not be assessed at this time.             No clubbing, cyanosis noted in the upper extremities.             1+ edema noted in the lower extremities bilaterally.    Neurologic:             Awake, alert and oriented x 3.             Able to follow simple commands.    Psychiatric:             Affect appeared fair.             Awake, alert and oriented x 3.    Skin:             No rash noted.             Warm and dry      Labs: Reviewed. Pertinent labs were noted.   Results from last 7 days   Lab Units 04/24/24  0632 04/23/24  0632 04/23/24  0025 04/22/24  1000   WBC 10*3/mm3 12.36* 13.78* 16.18* 12.8*   HEMOGLOBIN g/dL 9.9* 10.2* 10.8* 10.7*   HEMATOCRIT % 29.3* 30.7* 32.1* 33.3*   PLATELETS 10*3/mm3 229 226 233 238   NEUTROPHIL % %  --   --  92.0*  --    NEUTROS ABS 10*3/mm3  --   --  14.87*  --    EOSINOPHIL % %  --   --  0.1*  --    EOS ABS 10*3/mm3  --   --  0.02  --    LYMPHOCYTE % %  --   --  2.0*  --    LYMPHS ABS 10*3/mm3  --   --  0.33*  --        Lab Results   Component Value Date    PROCALCITO 0.10 04/23/2024    PROCALCITO 0.09 12/18/2023    PROCALCITO 0.09 06/29/2023       No results found for: \"CRP\"    No results found for: \"SEDRATE\"    Lab Results   Component Value Date    PROBNP 3,938.0 (H) 04/23/2024    PROBNP 5,539.0 (H) 01/11/2024    PROBNP 4,683.0 (H) 01/09/2024       Results from last 7 days   Lab Units 04/24/24  0632 04/23/24  0632 04/23/24  0025   SODIUM mmol/L 137 140 137   POTASSIUM mmol/L 3.1* 3.7 4.1   CHLORIDE mmol/L 100 105 102   CO2 mmol/L 25.4 23.6 " "22.0   BUN mg/dL 55* 50* 45*   CREATININE mg/dL 2.06* 1.92* 1.77*   CALCIUM mg/dL 8.5* 8.7 8.5*   ANION GAP mmol/L 11.6 11.4 13.0   BILIRUBIN mg/dL  --   --  0.7   ALK PHOS U/L  --   --  54   ALT (SGPT) U/L  --   --  23   AST (SGOT) U/L  --   --  22   GLUCOSE mg/dL 109* 167* 203*   TOTAL PROTEIN g/dL  --   --  6.7   ALBUMIN g/dL  --   --  3.9       Results from last 7 days   Lab Units 04/24/24  0632   MAGNESIUM mg/dL 2.2       Lab Results   Component Value Date    TSH 1.630 04/23/2024    TSH 2.25 04/11/2024    TSH 2.510 01/11/2024       No results found for: \"FREET4\"    Lab Results   Component Value Date    INR 1.00 07/17/2023    INR 1.01 10/19/2021       No results found for: \"CKTOTAL\"    No components found for: \"HSTROPT\"    Lab Results   Component Value Date    TROPONINT 436 (C) 04/23/2024    TROPONINT 192 (C) 04/23/2024    TROPONINT 122 (C) 04/23/2024       No results found for: \"DDIMER\"    No results found for: \"LIPASE\"    Brief Urine Lab Results  (Last result in the past 365 days)        Color   Clarity   Blood   Leuk Est   Nitrite   Protein   CREAT   Urine HCG        03/01/24 1126 Yellow   Clear   Negative   Small (1+)   Negative   Negative           03/01/24 1126 Yellow   Clear   Negative   Small (1+)   Negative   Negative                     Micro: As of April 24, 2024   No results found for: \"RESPCX\"  No results found for: \"BCIDPCR\"  Lab Results   Component Value Date    BLOODCX No growth at 24 hours 04/23/2024    BLOODCX No growth at 24 hours 04/23/2024     Lab Results   Component Value Date    URINECX >100,000 CFU/mL Serratia marcescens (A) 03/01/2024    URINECX No growth 05/18/2023     No results found for: \"MRSACX\"  No results found for: \"MRSAPCR\"  No results found for: \"URCX\"  No components found for: \"LOWRESPCF\"  No results found for: \"THROATCX\"  No results found for: \"CULTURES\"  No components found for: \"STREPBCX\"  No results found for: \"STREPPNEUAG\"  No results found for: \"LEGIONELLA\"  No results " "found for: \"MYCOPLASCX\"  No results found for: \"GCCX\"  No results found for: \"WOUNDCX\"  No results found for: \"BODYFLDCX\"    No results found for: \"FLU\"    Lab Results   Component Value Date    ADENOVIRUS Not Detected 04/23/2024     Lab Results   Component Value Date    JS737C Not Detected 04/23/2024     Lab Results   Component Value Date    CVHKU1 Not Detected 04/23/2024     Lab Results   Component Value Date    CVNL63 Not Detected 04/23/2024     Lab Results   Component Value Date    CVOC43 Not Detected 04/23/2024     Lab Results   Component Value Date    HUMETPNEVS Not Detected 04/23/2024     Lab Results   Component Value Date    HURVEV Not Detected 04/23/2024     Lab Results   Component Value Date    FLUBPCR Not Detected 04/23/2024     Lab Results   Component Value Date    PARAINFLUE Not Detected 04/23/2024     Lab Results   Component Value Date    PARAFLUV2 Not Detected 04/23/2024     Lab Results   Component Value Date    PARAFLUV3 Not Detected 04/23/2024     Lab Results   Component Value Date    PARAFLUV4 Not Detected 04/23/2024     Lab Results   Component Value Date    BPERTPCR Not Detected 04/23/2024     No results found for: \"[id number]\"  Lab Results   Component Value Date    CPNEUPCR Not Detected 04/23/2024     Lab Results   Component Value Date    MPNEUMO Not Detected 04/23/2024     Lab Results   Component Value Date    FLUAPCR Not Detected 04/23/2024     No results found for: \"FLUAH3\"  No results found for: \"FLUAH1\"  Lab Results   Component Value Date    RSV Not Detected 04/23/2024     Lab Results   Component Value Date    BPARAPCR Not Detected 04/23/2024       COVID 19:  Lab Results   Component Value Date    COVID19 Not Detected 04/23/2024           No results found for: \"THCURSCR\"  No results found for: \"PCPUR\"  No results found for: \"COCAINEUR\"  No results found for: \"METAMPSCNUR\"  No results found for: \"LABOPIASCN\"  No results found for: \"AMPHETSCREEN\"  No results found for: \"LABBENZSCN\"  No results " "found for: \"TRICYCLICSCN\"  No results found for: \"LABMETHSCN\"  No results found for: \"BARBITSCNUR\"  No results found for: \"OXYCODONESCN\"  No results found for: \"PROPOXSCN\"  No results found for: \"BUPRENORSCNU\"  No results found for: \"ETHANOLMGDL\"  No results found for: \"ETOHPCT\"      ABG: Reviewed   Lab Results   Component Value Date    PHART 7.442 04/23/2023    JJC7ALD 35.6 04/23/2023    PO2ART 57.8 (L) 04/23/2023    O6LLPWSP 91.7 (L) 04/23/2023    CARBOXYHGB 0.7 04/23/2024    CARBOXYHGB 1.0 04/23/2023       Lab Results   Component Value Date    LACTATE 1.2 04/23/2024    LACTATE 1.1 04/23/2023         Imaging Study: Latest imaging studies was reviewed personally.   Imaging Results (Last 72 Hours)       Procedure Component Value Units Date/Time    XR Chest 1 View [[id number]] Collected: 04/23/24 0917     Updated: 04/23/24 1132    Narrative:      PROCEDURE: XR CHEST 1 VW-     HISTORY: [facility] Triage Protocol     COMPARISON: 01/13/2024     FINDINGS:  Portable view of the chest demonstrates pulmonary vascular  congestion. There is no evidence of effusion, pneumothorax or other  significant pleural disease. Patient is status post CABG.     The heart size is normal.       Impression:      Findings suggestive of mild CHF.           This report was signed and finalized on 4/23/2024 11:30 AM by Brett Hernandez MD.               ECHO:  Results for orders placed during the hospital encounter of 01/11/24    Adult Transthoracic Echo Complete w/ Color, Spectral and Contrast if necessary per protocol    Interpretation Summary    Left ventricular systolic function is low normal. Calculated left ventricular EF = 45.8% Left ventricular ejection fraction appears to be 46 - 50%.    Left ventricular wall thickness is consistent with mild concentric hypertrophy.    Left ventricular diastolic function is consistent with (grade II w/high LAP) pseudonormalization.    Left atrial volume is moderately increased.    Mild aortic valve stenosis is " present.    Estimated right ventricular systolic pressure from tricuspid regurgitation is normal (<35 mmHg).    No significant change compared to 7/2023 echo      Results for orders placed during the hospital encounter of 07/17/23    Adult Transthoracic Echo Complete W/ Cont if Necessary Per Protocol    Interpretation Summary    Left ventricular systolic function is normal. Estimated left ventricular EF = 50% Left ventricular ejection fraction appears to be 46 - 50%.    Left ventricular wall thickness is consistent with mild concentric hypertrophy.    Left ventricular diastolic function is consistent with (grade II w/high LAP) pseudonormalization.    The left atrial cavity is mildly dilated.    Left atrial volume is mildly increased.    Mild aortic valve stenosis is present.    Estimated right ventricular systolic pressure from tricuspid regurgitation is normal (<35 mmHg).      Results for orders placed during the hospital encounter of 06/29/23    Adult Transthoracic Echo Complete W/ Cont if Necessary Per Protocol    Interpretation Summary  1.  Normal left ventricular size with mildly reduced LV systolic function, LVEF 45-50%.  2.  Mild concentric LVH.  3.  Grade 1 diastolic dysfunction.  4.  Normal right ventricular size and systolic function.  5.  Normal left atrial volume index.  6.  Mild calcification aortic valve without significant stenosis.  7.  Mild PI.                 Assessment:  1.  Acute hypoxic respiratory failure  2.  Likely decompensated systolic +/- diastolic congestive heart failure  3.  Acute renal failure  4.  Severe obstructive sleep apnea  5.  Obesity  6.  Moderate persistent asthma    Discussion/Recommendations:   In reviewing patient's history, physical exam findings and radiological and laboratory data, it appears that he had decompensated congestive heart failure as main etiology for respiratory failure.    It is somewhat concerning that the patient is requiring up to 15 L high flow nasal  cannula.    I will asked the nursing staff to decrease FiO2 as tolerated.    The patient does have severe obstructive sleep apnea, with last sleep study in June 2012 confirming the same.    For now I have suggested BiPAP to be adjusted and will recommend using BiPAP at night on a regular basis.    Nephrology has been consulted and I would suggest diuretic therapy under the guidance given slightly worsened renal failure.    Chest x-ray will be repeated in the morning as well.    There is no evidence to suggest asthma exacerbation.    I will continue Symbicort and Spiriva    The plan was discussed with the patient.  I have also discussed the case with the nursing staff.    All relevant recommendations were, and will be, discussed with Jono Rg MD, as indicated    I would like to thank you for the opportunity to participate in the care of this patient.  We will communicate changes and recommendations, if and when necessary.    We have updated the admitting attending and nursing staff, as appropriate, on the patient's current status and plan. I will be going off shift tonight and will be unavailable. Please contact oncall pulmonologist, if available.        This document was electronically signed by Hansel Bob MD on 04/24/24 at 14:30 EDT      Dictated utilizing Dragon dictation.

## 2024-04-24 NOTE — PLAN OF CARE
Goal Outcome Evaluation:  Plan of Care Reviewed With: patient        Progress: no change  Outcome Evaluation: OT eval completed. Patient is supine in bed, denies pain at rest, on 13L HFNC satting 96%. Patient is Ox4, reports he lives with his wife in a single story home with 2 steps to enter. Has a cane, O2 and CPAP. Patient reports he was ind with HH mobility and ADLs. Wife drives and performs IADLs. Patient performed supine to sit with CGA, bed noted to be wet from external catheter leaking. Patient performed sit to stand from EOB with min A, stood supported and required mod A for perineal hygiene and brief change, patient took side steps toward head of bed with min A using RW. Patient desatted to 87%. Patient able to recover to 93% following rest. Patient returned to supine min A and placed in fowlers. Patient is expected to benefit from continued OT services prior to DC. Would benefit from HH services, a BSC and a tub bench.      Anticipated Discharge Disposition (OT): home with assist, home with home health

## 2024-04-24 NOTE — THERAPY EVALUATION
Patient Name: Donny Jerry  : 1945    MRN: [id number]                              Today's Date: 2024       Admit Date: 2024    Visit Dx:     ICD-10-CM ICD-9-CM   1. Acute respiratory failure with hypoxia  J96.01 518.81   2. Acute on chronic congestive heart failure, unspecified heart failure type  I50.9 428.0   3. Leukocytosis, unspecified type  D72.829 288.60   4. Chronic kidney disease, unspecified CKD stage  N18.9 585.9     Patient Active Problem List   Diagnosis    Coronary artery disease involving native coronary artery of native heart with angina pectoris    Essential hypertension    Hyperlipidemia LDL goal <70    Type 2 diabetes mellitus with stage 3 chronic kidney disease    Obstructive sleep apnea on CPAP    Enlarged prostate    Obesity, Class II, BMI 35-39.9    Depression    Acute hypoxemic respiratory failure due to COVID-19    CKD (chronic kidney disease) stage 3, GFR 30-59 ml/min    Elevated troponin level not due myocardial infarction    Post viral debility    Chest pain, unspecified type    Chronic systolic heart failure    Type 1 myocardial infarction    Chronic diastolic heart failure    Bilateral lower extremity edema    Acute exacerbation of congestive heart failure    Acute on chronic HFrEF (heart failure with reduced ejection fraction)    Acute on chronic systolic heart failure    Syncope    CHF exacerbation     Past Medical History:   Diagnosis Date    Benign hypertension     Coronary artery disease     Depression     Enlarged prostate     Enlarged prostate with anticipated TURP with Dr. Bernal.    GERD (gastroesophageal reflux disease)     Heart attack     Hypercholesterolemia     Myocardial infarction     Obesity     Obstructive sleep apnea on CPAP     Type 2 diabetes mellitus      Past Surgical History:   Procedure Laterality Date    CARDIAC CATHETERIZATION      CARDIAC CATHETERIZATION Left 10/19/2021    Procedure: Left Heart Cath;  Surgeon: Liz Russo MD;   Location:  [facility];  Service: Cardiology;  Laterality: Left;    CARDIAC CATHETERIZATION N/A 7/18/2023    Procedure: Coronary angiography;  Surgeon: Rupesh Parr MD;  Location:  [facility];  Service: Cardiology;  Laterality: N/A;    CARDIAC CATHETERIZATION N/A 7/18/2023    Procedure: Percutaneous Coronary Intervention;  Surgeon: Rupesh Parr MD;  Location:  [facility];  Service: Cardiology;  Laterality: N/A;    COLONOSCOPY W/ POLYPECTOMY      CORONARY ANGIOPLASTY WITH STENT PLACEMENT Left 06/03/2009    Left heart catheterization, 06/03/2009, Dr. Russo:  LVEF 45% to 50%.  Placement of overlapping Cypher drug-eluting stent 2.5 x 28 and 2.5 x 18 to the SVG to the obtuse marginal branch.  SVG to the PDA had a proximal stenosis estimated at 60% with a distal stenosis of 50% to 60%.    CORONARY ANGIOPLASTY WITH STENT PLACEMENT Left 07/06/2009    Left heart catheterization, 07/06/2009:  PTCA and stent placement in the mid PDA using a 2.25 x 12 mm Taxus drug-eluting stent with stent placement in the distal SVG to the PDA using a 2.5 x 18 mm Cypher drug-eluting stent and stenting of the proximal SVG to the PDA using a 3.0 x 28 mm Cypher drug-eluting stent.    CORONARY ANGIOPLASTY WITH STENT PLACEMENT  04/23/2010    Cardiac catheterization for recurrent anginal symptoms, 04/23/2010, with PTCA and stent placement in the proximal SVG to the PDA using 3.0 x 28 mm Promus drug-eluting stent for in-stent restenosis.    CORONARY ANGIOPLASTY WITH STENT PLACEMENT Left 07/06/2010    Left heart catheterization, 07/06/2010, Dr. Russo:  EF 40% to 45%.  3.5 x 23 mm Promus drug-eluting stent in the proximal SVG to OM, 2.5 x 18 mm Promus drug-eluting stent to distal SVG to PDA due to in-stent restenosis.      CORONARY ANGIOPLASTY WITH STENT PLACEMENT Left 11/16/2010    Left heart catheterization, 11/16/2010, with placement of a 3.0 x 16 mm Taxus drug-eluting  stent to the SVG to the RCA proximally and a 2.5 x 16 mm paclitaxel stent distally in the SVG to the RCA.    CORONARY ANGIOPLASTY WITH STENT PLACEMENT Left     Left heart catheterization, 3.0 x 24-mm Promus element drug-eluting stent to a 90% lesion in the mid portion of the SVG to the PDA.     CORONARY ANGIOPLASTY WITH STENT PLACEMENT Left 06/24/2014    Left heart catheterization for recurrent angina, 06/24/2014, Dr. Russo:  PTCA of the SVG to the PDA using a 3 x 12 mm NC TREK balloon.      CORONARY ARTERY BYPASS GRAFT      CABG x3, Dr. Peng, [facility], 2001.      General Information       Row Name 04/24/24 1526          OT Time and Intention    Document Type evaluation  -SD     Mode of Treatment occupational therapy  -SD       Row Name 04/24/24 1526          General Information    Patient Profile Reviewed yes  -SD     Prior Level of Function independent:;all household mobility;ADL's  patient lives with his wife in a single story home, 2 steps to enter. Has home O2, CPAP, cane. Patient wife drives and performs IADLs  -SD     Existing Precautions/Restrictions fall;oxygen therapy device and L/min  -SD     Barriers to Rehab medically complex;previous functional deficit  -SD       Row Name 04/24/24 1526          Living Environment    People in Home spouse  -SD       Row Name 04/24/24 1526          Home Main Entrance    Number of Stairs, Main Entrance two  -SD     Stair Railings, Main Entrance none  -SD       Row Name 04/24/24 1526          Stairs Within Home, Primary    Number of Stairs, Within Home, Primary none  -SD       Row Name 04/24/24 1526          Cognition    Orientation Status (Cognition) oriented x 4  -SD       Row Name 04/24/24 1526          Safety Issues, Functional Mobility    Safety Issues Affecting Function (Mobility) safety precautions follow-through/compliance;safety precaution awareness  -SD     Impairments Affecting Function (Mobility) balance;endurance/activity  tolerance;shortness of breath;strength;postural/trunk control  -SD               User Key  (r) = Recorded By, (t) = Taken By, (c) = Cosigned By      Initials Name Provider Type    Celena Blood OT Occupational Therapist                     Mobility/ADL's       Row Name 04/24/24 1527          Bed Mobility    Bed Mobility supine-sit;sit-supine  -SD     Supine-Sit [place] (Bed Mobility) contact guard  -SD     Sit-Supine [place] (Bed Mobility) minimum assist (75% patient effort)  -SD     Assistive Device (Bed Mobility) head of bed elevated;bed rails  -SD       Row Name 04/24/24 1527          Transfers    Transfers sit-stand transfer  -SD       Row Name 04/24/24 1527          Sit-Stand Transfer    Sit-Stand [place] (Transfers) minimum assist (75% patient effort)  -SD     Assistive Device (Sit-Stand Transfers) walker, front-wheeled  -SD       Row Name 04/24/24 1527          Functional Mobility    Functional Mobility- Ind. Level minimum assist (75% patient effort)  -SD     Functional Mobility- Device walker, front-wheeled  -SD     Functional Mobility-Distance (Feet) 3  -SD     Functional Mobility- Safety Issues supplemental O2  -SD     Functional Mobility- Comment toward HOB  -SD       Row Name 04/24/24 1527          Activities of Daily Living    BADL Assessment/Intervention bathing;upper body dressing;lower body dressing;grooming;feeding;toileting  -SD       Row Name 04/24/24 1527          Bathing Assessment/Intervention    [place] Level (Bathing) moderate assist (50% patient effort)  -SD       Row Name 04/24/24 1527          Upper Body Dressing Assessment/Training    [place] Level (Upper Body Dressing) minimum assist (75% patient effort)  -SD       Row Name 04/24/24 1527          Lower Body Dressing Assessment/Training    [place] Level (Lower Body Dressing) moderate assist (50% patient effort);pants/bottoms;maximum assist (25% patient effort);socks  -SD       Row Name 04/24/24 1527           Grooming Assessment/Training    [place] Level (Grooming) minimum assist (75% patient effort)  -SD       Row Name 04/24/24 1527          Self-Feeding Assessment/Training    [place] Level (Feeding) supervision  -SD       [facility] Name 04/24/24 1527          Toileting Assessment/Training    [place] Level (Toileting) change pad/brief;perform perineal hygiene;moderate assist (50% patient effort)  -SD     Comment, (Toileting) patient had male external catheter that had leaked on bed  -SD               User Key  (r) = Recorded By, (t) = Taken By, (c) = Cosigned By      Initials Name Provider Type    SD Celena Hernandez OT Occupational Therapist                   Obj/Interventions       [facility] Name 04/24/24 1530          Range of Motion Comprehensive    General Range of Motion bilateral upper extremity ROM WFL  -SD       Row Name 04/24/24 1530          Strength Comprehensive (MMT)    General Manual Muscle Testing (MMT) Assessment upper extremity strength deficits identified  -SD     Comment, General Manual Muscle Testing (MMT) Assessment UB 4-/5  -SD               User Key  (r) = Recorded By, (t) = Taken By, (c) = Cosigned By      Initials Name Provider Type    Celena Blood OT Occupational Therapist                   Goals/Plan       Row Name 04/24/24 1536          Transfer Goal 1 (OT)    Activity/Assistive Device (Transfer Goal 1, OT) sit-to-stand/stand-to-sit;walker, rolling  -SD     [place] Level/Cues Needed (Transfer Goal 1, OT) contact guard required  -SD     Time Frame (Transfer Goal 1, OT) long term goal (LTG)  -SD     Progress/Outcome (Transfer Goal 1, OT) goal ongoing  -SD       Row Name 04/24/24 1536          Dressing Goal 1 (OT)    Activity/Device (Dressing Goal 1, OT) lower body dressing  -SD     [place]/Cues Needed (Dressing Goal 1, OT) minimum assist (75% or more patient effort)  -SD     Time Frame (Dressing Goal 1, OT) 2 weeks  -SD     Progress/Outcome (Dressing Goal 1, OT)  goal ongoing  -SD       Row Name 04/24/24 1536          Toileting Goal 1 (OT)    Activity/Device (Toileting Goal 1, OT) toileting skills, all;commode, bedside without drop arms;raised toilet seat  -SD     [place] Level/Cues Needed (Toileting Goal 1, OT) minimum assist (75% or more patient effort)  -SD     Time Frame (Toileting Goal 1, OT) 2 weeks  -SD     Progress/Outcome (Toileting Goal 1, OT) goal ongoing  -SD       Row Name 04/24/24 1536          Strength Goal 1 (OT)    Strength Goal 1 (OT) Patient to perform UB ther ex as tolerated  -SD     Time Frame (Strength Goal 1, OT) long term goal (LTG)  -SD     Progress/Outcome (Strength Goal 1, OT) goal ongoing  -SD       Row Name 04/24/24 1536          Therapy Assessment/Plan (OT)    Planned Therapy Interventions (OT) activity tolerance training;adaptive equipment training;BADL retraining;patient/caregiver education/training;ROM/therapeutic exercise;strengthening exercise;transfer/mobility retraining  -SD               User Key  (r) = Recorded By, (t) = Taken By, (c) = Cosigned By      Initials Name Provider Type    SD Celena Hernandez OT Occupational Therapist                   Clinical Impression       Row Name 04/24/24 1530          Pain Assessment    Pretreatment Pain Rating 0/10 - no pain  -SD     Posttreatment Pain Rating 0/10 - no pain  -SD       Row Name 04/24/24 1530          Plan of Care Review    Plan of Care Reviewed With patient  -SD     Progress no change  -SD     Outcome Evaluation OT eval completed. Patient is supine in bed, denies pain at rest, on 13L HFNC satting 96%. Patient is Ox4, reports he lives with his wife in a single story home with 2 steps to enter. Has a cane, O2 and CPAP. Patient reports he was ind with HH mobility and ADLs. Wife drives and performs IADLs. Patient performed supine to sit with CGA, bed noted to be wet from external catheter leaking. Patient performed sit to stand from EOB with min A, stood supported and required mod A  for perineal hygiene and brief change, patient took side steps toward head of bed with min A using RW. Patient desatted to 87%. Patient able to recover to 93% following rest. Patient returned to supine min A and placed in fowlers. Patient is expected to benefit from continued OT services prior to DC. Would benefit from  services, a BSC and a tub bench.  -SD       Row Name 04/24/24 1530          Therapy Assessment/Plan (OT)    Patient/Family Therapy Goal Statement (OT) home  -SD     Rehab Potential (OT) good, to achieve stated therapy goals  -SD     Criteria for Skilled Therapeutic Interventions Met (OT) skilled treatment is necessary  -SD     Therapy Frequency (OT) 3 times/wk  5 times if indicated  -SD       Row Name 04/24/24 1530          Therapy Plan Review/Discharge Plan (OT)    Equipment Needs Upon Discharge (OT) tub bench;commode chair  -SD     Anticipated Discharge Disposition (OT) home with assist;home with home health  -SD       Row Name 04/24/24 1530          Vital Signs    O2 Delivery Pre Treatment hi-flow  -SD     O2 Delivery Intra Treatment hi-flow  -SD     O2 Delivery Post Treatment hi-flow  -SD       Row Name 04/24/24 5363          Positioning and Restraints    Pre-Treatment Position in bed  -SD     Post Treatment Position bed  -SD     In Bed fowlers;call light within reach;encouraged to call for assist;exit alarm on  -SD               User Key  (r) = Recorded By, (t) = Taken By, (c) = Cosigned By      Initials Name Provider Type    Celena Blood OT Occupational Therapist                   Outcome Measures       Row Name 04/24/24 3308          How much help from another is currently needed...    Putting on and taking off regular lower body clothing? 2  -SD     Bathing (including washing, rinsing, and drying) 2  -SD     Toileting (which includes using toilet bed pan or urinal) 2  -SD     Putting on and taking off regular upper body clothing 3  -SD     Taking care of personal grooming (such as  brushing teeth) 3  -SD     Eating meals 3  -SD     AM-PAC 6 Clicks Score (OT) 15  -SD       Row Name 04/24/24 0800          How much help from another person do you currently need...    Turning from your back to your side while in flat bed without using bedrails? 3  -LA     Moving from lying on back to sitting on the side of a flat bed without bedrails? 3  -LA     Moving to and from a bed to a chair (including a wheelchair)? 3  -LA     Standing up from a chair using your arms (e.g., wheelchair, bedside chair)? 2  -LA     Climbing 3-5 steps with a railing? 2  -LA     To walk in hospital room? 3  -LA     AM-PAC 6 Clicks Score (PT) 16  -LA     Highest Level of Mobility Goal 5 --> Static standing  -LA       Row Name 04/24/24 1537          Functional Assessment    Outcome Measure Options AM-PAC 6 Clicks Daily Activity (OT)  -SD               User Key  (r) = Recorded By, (t) = Taken By, (c) = Cosigned By      Initials Name Provider Type    SD Celena Hernandez, OT Occupational Therapist    Elaine Saleem, RN Registered Nurse                    Occupational Therapy Education       Title: PT OT SLP Therapies (In Progress)       Topic: Occupational Therapy (In Progress)       Point: ADL training (Done)       Description:   Instruct learner(s) on proper safety adaptation and remediation techniques during self care or transfers.   Instruct in proper use of assistive devices.                  Learning Progress Summary             Patient Acceptance, E,TB, VU by SD at 4/24/2024 1537    Comment: OT POC                         Point: Home exercise program (Not Started)       Description:   Instruct learner(s) on appropriate technique for monitoring, assisting and/or progressing therapeutic exercises/activities.                  Learner Progress:  Not documented in this visit.              Point: Precautions (Not Started)       Description:   Instruct learner(s) on prescribed precautions during self-care and functional transfers.                   Learner Progress:  Not documented in this visit.              Point: Body mechanics (Not Started)       Description:   Instruct learner(s) on proper positioning and spine alignment during self-care, functional mobility activities and/or exercises.                  Learner Progress:  Not documented in this visit.                              User Key       Initials Effective Dates Name Provider Type Discipline    SD 06/16/21 -  Celena Hernandez OT Occupational Therapist OT                  OT Recommendation and Plan  Planned Therapy Interventions (OT): activity tolerance training, adaptive equipment training, BADL retraining, patient/caregiver education/training, ROM/therapeutic exercise, strengthening exercise, transfer/mobility retraining  Therapy Frequency (OT): 3 times/wk (5 times if indicated)  Plan of Care Review  Plan of Care Reviewed With: patient  Progress: no change  Outcome Evaluation: OT eval completed. Patient is supine in bed, denies pain at rest, on 13L HFNC satting 96%. Patient is Ox4, reports he lives with his wife in a single story home with 2 steps to enter. Has a cane, O2 and CPAP. Patient reports he was ind with HH mobility and ADLs. Wife drives and performs IADLs. Patient performed supine to sit with CGA, bed noted to be wet from external catheter leaking. Patient performed sit to stand from EOB with min A, stood supported and required mod A for perineal hygiene and brief change, patient took side steps toward head of bed with min A using RW. Patient desatted to 87%. Patient able to recover to 93% following rest. Patient returned to supine min A and placed in fowlers. Patient is expected to benefit from continued OT services prior to DC. Would benefit from  services, a BSC and a tub bench.     Time Calculation:   Evaluation Complexity (OT)  Review Occupational Profile/Medical/Therapy History Complexity: expanded/moderate complexity  Assessment, Occupational  Performance/Identification of Deficit Complexity: 3-5 performance deficits  Clinical Decision Making Complexity (OT): detailed assessment/moderate complexity  Overall Complexity of Evaluation (OT): moderate complexity     Time Calculation- OT       Row Name 04/24/24 1538             Time Calculation- OT    OT Start Time 1445  -SD      OT Received On 04/24/24  -SD      OT Goal Re-Cert Due Date 05/06/24  -SD         Untimed Charges    OT Eval/Re-eval Minutes 60  -SD         Total Minutes    Untimed Charges Total Minutes 60  -SD       Total Minutes 60  -SD                User Key  (r) = Recorded By, (t) = Taken By, (c) = Cosigned By      Initials Name Provider Type    SD Celena Hernandez, OT Occupational Therapist                  Therapy Charges for Today       Code Description Service Date Service Provider Modifiers Qty    [id number]  OT EVAL MOD COMPLEXITY 4 4/24/2024 Celena Hernandez OT GO 1                 Celena Hernandez OT  4/24/2024

## 2024-04-24 NOTE — THERAPY EVALUATION
Patient Name: Donny Jerry  : 1945    MRN: [id number]                              Today's Date: 2024       Admit Date: 2024    Visit Dx:     ICD-10-CM ICD-9-CM   1. Acute respiratory failure with hypoxia  J96.01 518.81   2. Acute on chronic congestive heart failure, unspecified heart failure type  I50.9 428.0   3. Leukocytosis, unspecified type  D72.829 288.60   4. Chronic kidney disease, unspecified CKD stage  N18.9 585.9   5. Acute on chronic systolic congestive heart failure  I50.23 428.23     428.0     Patient Active Problem List   Diagnosis    Coronary artery disease involving native coronary artery of native heart with angina pectoris    Essential hypertension    Hyperlipidemia LDL goal <70    Type 2 diabetes mellitus with stage 3 chronic kidney disease    Obstructive sleep apnea on CPAP    Enlarged prostate    Obesity, Class II, BMI 35-39.9    Depression    Acute hypoxemic respiratory failure due to COVID-19    CKD (chronic kidney disease) stage 3, GFR 30-59 ml/min    Elevated troponin level not due myocardial infarction    Post viral debility    Chest pain, unspecified type    Chronic systolic heart failure    Type 1 myocardial infarction    Chronic diastolic heart failure    Bilateral lower extremity edema    Acute exacerbation of congestive heart failure    Acute on chronic HFrEF (heart failure with reduced ejection fraction)    Acute on chronic systolic heart failure    Syncope    CHF exacerbation     Past Medical History:   Diagnosis Date    Benign hypertension     Coronary artery disease     Depression     Enlarged prostate     Enlarged prostate with anticipated TURP with Dr. Bernal.    GERD (gastroesophageal reflux disease)     Heart attack     Hypercholesterolemia     Impaired functional mobility, balance, gait, and endurance     Myocardial infarction     Obesity     Obstructive sleep apnea on CPAP     Type 2 diabetes mellitus      Past Surgical History:   Procedure Laterality  Date    CARDIAC CATHETERIZATION      CARDIAC CATHETERIZATION Left 10/19/2021    Procedure: Left Heart Cath;  Surgeon: Liz Russo MD;  Location:  [facility];  Service: Cardiology;  Laterality: Left;    CARDIAC CATHETERIZATION N/A 7/18/2023    Procedure: Coronary angiography;  Surgeon: Rupesh Parr MD;  Location:  [facility];  Service: Cardiology;  Laterality: N/A;    CARDIAC CATHETERIZATION N/A 7/18/2023    Procedure: Percutaneous Coronary Intervention;  Surgeon: Rupesh Parr MD;  Location:  [facility];  Service: Cardiology;  Laterality: N/A;    COLONOSCOPY W/ POLYPECTOMY      CORONARY ANGIOPLASTY WITH STENT PLACEMENT Left 06/03/2009    Left heart catheterization, 06/03/2009, Dr. Russo:  LVEF 45% to 50%.  Placement of overlapping Cypher drug-eluting stent 2.5 x 28 and 2.5 x 18 to the SVG to the obtuse marginal branch.  SVG to the PDA had a proximal stenosis estimated at 60% with a distal stenosis of 50% to 60%.    CORONARY ANGIOPLASTY WITH STENT PLACEMENT Left 07/06/2009    Left heart catheterization, 07/06/2009:  PTCA and stent placement in the mid PDA using a 2.25 x 12 mm Taxus drug-eluting stent with stent placement in the distal SVG to the PDA using a 2.5 x 18 mm Cypher drug-eluting stent and stenting of the proximal SVG to the PDA using a 3.0 x 28 mm Cypher drug-eluting stent.    CORONARY ANGIOPLASTY WITH STENT PLACEMENT  04/23/2010    Cardiac catheterization for recurrent anginal symptoms, 04/23/2010, with PTCA and stent placement in the proximal SVG to the PDA using 3.0 x 28 mm Promus drug-eluting stent for in-stent restenosis.    CORONARY ANGIOPLASTY WITH STENT PLACEMENT Left 07/06/2010    Left heart catheterization, 07/06/2010, Dr. Russo:  EF 40% to 45%.  3.5 x 23 mm Promus drug-eluting stent in the proximal SVG to OM, 2.5 x 18 mm Promus drug-eluting stent to distal SVG to PDA due to in-stent restenosis.       CORONARY ANGIOPLASTY WITH STENT PLACEMENT Left 11/16/2010    Left heart catheterization, 11/16/2010, with placement of a 3.0 x 16 mm Taxus drug-eluting stent to the SVG to the RCA proximally and a 2.5 x 16 mm paclitaxel stent distally in the SVG to the RCA.    CORONARY ANGIOPLASTY WITH STENT PLACEMENT Left     Left heart catheterization, 3.0 x 24-mm Promus element drug-eluting stent to a 90% lesion in the mid portion of the SVG to the PDA.     CORONARY ANGIOPLASTY WITH STENT PLACEMENT Left 06/24/2014    Left heart catheterization for recurrent angina, 06/24/2014, Dr. Russo:  PTCA of the SVG to the PDA using a 3 x 12 mm NC TREK balloon.      CORONARY ARTERY BYPASS GRAFT      CABG x3, Dr. Peng, [facility], 2001.      General Information       Row Name 04/24/24 1114          Physical Therapy Time and Intention    Document Type evaluation  -JR     Mode of Treatment physical therapy  -JR       Row Name 04/24/24 1114          General Information    Patient Profile Reviewed yes  -JR     Prior Level of Function independent:;all household mobility;community mobility  -JR     Existing Precautions/Restrictions fall;oxygen therapy device and L/min  -JR     Barriers to Rehab medically complex;previous functional deficit  -JR       Row Name 04/24/24 1114          Living Environment    People in Home spouse  -JR       Row Name 04/24/24 1114          Home Main Entrance    Number of Stairs, Main Entrance two  -JR     Stair Railings, Main Entrance none  -JR       Row Name 04/24/24 1114          Stairs Within Home, Primary    Number of Stairs, Within Home, Primary none  -JR       Row Name 04/24/24 1114          Cognition    Orientation Status (Cognition) oriented x 4  -JR       Row Name 04/24/24 1114          Safety Issues, Functional Mobility    Safety Issues Affecting Function (Mobility) safety precautions follow-through/compliance;safety precaution awareness  -JR     Impairments Affecting Function (Mobility)  balance;endurance/activity tolerance;shortness of breath;strength;postural/trunk control  -JR               User Key  (r) = Recorded By, (t) = Taken By, (c) = Cosigned By      Initials Name Provider Type    Ellyn Armando, PT Physical Therapist                   Mobility       Row Name 04/24/24 1114          Bed Mobility    Bed Mobility supine-sit;sit-supine  -JR     Supine-Sit Sharp (Bed Mobility) contact guard  -JR     Sit-Supine Sharp (Bed Mobility) minimum assist (75% patient effort)  -JR     Assistive Device (Bed Mobility) head of bed elevated;bed rails  -       Row Name 04/24/24 1114          Sit-Stand Transfer    Sit-Stand Sharp (Transfers) minimum assist (75% patient effort)  -JR     Assistive Device (Sit-Stand Transfers) other (see comments)  gait belt and HHA  -JR       Row Name 04/24/24 1114          Gait/Stairs (Locomotion)    Sharp Level (Gait) minimum assist (75% patient effort)  -JR     Assistive Device (Gait) other (see comments)  gait belt and HHA  -JR     Patient was able to Ambulate yes  -JR     Distance in Feet (Gait) 12  -JR     Deviations/Abnormal Patterns (Gait) base of support, wide;griselda decreased;festinating/shuffling;stride length decreased  -JR     Bilateral Gait Deviations forward flexed posture;heel strike decreased  -JR               User Key  (r) = Recorded By, (t) = Taken By, (c) = Cosigned By      Initials Name Provider Type    Ellyn Armando, PT Physical Therapist                   Obj/Interventions       Row Name 04/24/24 1114          Range of Motion Comprehensive    General Range of Motion bilateral lower extremity ROM WFL  -       Row Name 04/24/24 1114          Strength Comprehensive (MMT)    Comment, General Manual Muscle Testing (MMT) Assessment BLE grossly 3+/5  -JR       Row Name 04/24/24 1114          Balance    Balance Assessment sitting static balance;sitting dynamic balance;sit to stand dynamic balance;standing static  balance;standing dynamic balance  -JR     Static Sitting Balance contact guard  -JR     Dynamic Sitting Balance contact guard;minimal assist  -JR     Position, Sitting Balance unsupported;sitting edge of bed  -JR     Sit to Stand Dynamic Balance minimal assist  -JR     Static Standing Balance minimal assist  -JR     Dynamic Standing Balance minimal assist  -JR     Position/Device Used, Standing Balance other (see comments)  gait belt and HHA  -JR               User Key  (r) = Recorded By, (t) = Taken By, (c) = Cosigned By      Initials Name Provider Type    Ellyn Armando, PT Physical Therapist                   Goals/Plan       Row Name 04/24/24 1114          Bed Mobility Goal 1 (PT)    Activity/Assistive Device (Bed Mobility Goal 1, PT) bed mobility activities, all  -JR     [place] Level/Cues Needed (Bed Mobility Goal 1, PT) modified independence  -JR     Time Frame (Bed Mobility Goal 1, PT) short term goal (STG)  -JR     Progress/Outcomes (Bed Mobility Goal 1, PT) goal ongoing  -       Row Name 04/24/24 1114          Transfer Goal 1 (PT)    Activity/Assistive Device (Transfer Goal 1, PT) sit-to-stand/stand-to-sit;bed-to-chair/chair-to-bed;walker, rolling  -JR     [place] Level/Cues Needed (Transfer Goal 1, PT) supervision required  -JR     Time Frame (Transfer Goal 1, PT) long term goal (LTG)  -JR     Progress/Outcome (Transfer Goal 1, PT) goal ongoing  -       Row Name 04/24/24 1114          Gait Training Goal 1 (PT)    Activity/Assistive Device (Gait Training Goal 1, PT) gait (walking locomotion);walker, rolling;increase endurance/gait distance;diminish gait deviation;improve balance and speed;increase energy conservation  -JR     [place] Level (Gait Training Goal 1, PT) supervision required  -JR     Distance (Gait Training Goal 1, PT) 200  -JR     Time Frame (Gait Training Goal 1, PT) long term goal (LTG)  -JR     Progress/Outcome (Gait Training Goal 1, PT) goal ongoing  -       Row Name  04/24/24 1114          Patient Education Goal (PT)    Activity (Patient Education Goal, PT) Perform BLE standing exercises x 15  -JR     [place]/Cues/Accuracy (Memory Goal 2, PT) demonstrates adequately  -JR     Time Frame (Patient Education Goal, PT) 5 days  -JR     Progress/Outcome (Patient Education Goal, PT) goal ongoing  -JR       Row Name 04/24/24 1114          Therapy Assessment/Plan (PT)    Planned Therapy Interventions (PT) strengthening;balance training;bed mobility training;transfer training;gait training;home exercise program;patient/family education;neuromuscular re-education  -JR               User Key  (r) = Recorded By, (t) = Taken By, (c) = Cosigned By      Initials Name Provider Type    JR Ellyn Aldana, PT Physical Therapist                   Clinical Impression       Row Name 04/24/24 1114          Pain    Pretreatment Pain Rating 0/10 - no pain  -JR     Posttreatment Pain Rating 0/10 - no pain  -JR       Row Name 04/24/24 1114          Plan of Care Review    Plan of Care Reviewed With patient  -JR     Progress no change  -JR     Outcome Evaluation Patient participated well in PT evaluation and demonstrated decreased overall mobility.  He required no more than minimal assistance to perform balance, transfers, gait x 12 feet with gait belt and HHA.  He was able to maintain oxygen saturation levels of at least 95% throughout evaluation.  He expected to benefit from additional PT services while hospitalzied and upon discharge to home with home health care.  -JR       Row Name 04/24/24 1114          Therapy Assessment/Plan (PT)    Patient/Family Therapy Goals Statement (PT) Patient is eager to go home  -JR     Rehab Potential (PT) good, to achieve stated therapy goals  -JR     Criteria for Skilled Interventions Met (PT) yes;meets criteria;skilled treatment is necessary  -JR     Therapy Frequency (PT) 5 times/wk  -JR       Row Name 04/24/24 1114          Positioning and Restraints    Pre-Treatment  Position in bed  -JR     Post Treatment Position bed  -JR     In Bed sitting EOB;call light within reach;encouraged to call for assist;with family/caregiver;notified nsg  -JR               User Key  (r) = Recorded By, (t) = Taken By, (c) = Cosigned By      Initials Name Provider Type    Ellyn Armando, PT Physical Therapist                   Outcome Measures       Row Name 04/24/24 1114 04/24/24 0800       How much help from another person do you currently need...    Turning from your back to your side while in flat bed without using bedrails? 3  -JR 3  -LA    Moving from lying on back to sitting on the side of a flat bed without bedrails? 3  -JR 3  -LA    Moving to and from a bed to a chair (including a wheelchair)? 3  -JR 3  -LA    Standing up from a chair using your arms (e.g., wheelchair, bedside chair)? 3  -JR 2  -LA    Climbing 3-5 steps with a railing? 2  -JR 2  -LA    To walk in hospital room? 3  -JR 3  -LA    AM-PAC 6 Clicks Score (PT) 17  -JR 16  -LA    Highest Level of Mobility Goal 5 --> Static standing  -JR 5 --> Static standing  -LA      Row Name 04/24/24 1537 04/24/24 1114       Functional Assessment    Outcome Measure Options AM-PAC 6 Clicks Daily Activity (OT)  -SD AM-PAC 6 Clicks Basic Mobility (PT)  -              User Key  (r) = Recorded By, (t) = Taken By, (c) = Cosigned By      Initials Name Provider Type    Ellyn Armando, PT Physical Therapist    Celena Blood OT Occupational Therapist    Elaine Saleem, RN Registered Nurse                                 Physical Therapy Education       Title: PT OT SLP Therapies (In Progress)       Topic: Physical Therapy (In Progress)       Point: Mobility training (Done)       Learning Progress Summary             Patient Acceptance, E,TB, VU by  at 4/24/2024 2103    Comment: Role of PT and POC                         Point: Home exercise program (Not Started)       Learner Progress:  Not documented in this visit.              Point: Body  mechanics (Not Started)       Learner Progress:  Not documented in this visit.              Point: Precautions (Not Started)       Learner Progress:  Not documented in this visit.                              User Key       Initials Effective Dates Name Provider Type [facility] 08/22/23 -  Ellyn Aldana PT Physical Therapist PT                  PT Recommendation and Plan  Planned Therapy Interventions (PT): strengthening, balance training, bed mobility training, transfer training, gait training, home exercise program, patient/family education, neuromuscular re-education  Plan of Care Reviewed With: patient  Progress: no change  Outcome Evaluation: Patient participated well in PT evaluation and demonstrated decreased overall mobility.  He required no more than minimal assistance to perform balance, transfers, gait x 12 feet with gait belt and HHA.  He was able to maintain oxygen saturation levels of at least 95% throughout evaluation.  He expected to benefit from additional PT services while hospitalzied and upon discharge to home with home health care.     Time Calculation:   PT Evaluation Complexity  History, PT Evaluation Complexity: 1-2 personal factors and/or comorbidities  Examination of Body Systems (PT Eval Complexity): total of 3 or more elements  Clinical Presentation (PT Evaluation Complexity): evolving  Clinical Decision Making (PT Evaluation Complexity): moderate complexity  Overall Complexity (PT Evaluation Complexity): moderate complexity     PT Charges       Row Name 04/24/24 1114             Time Calculation    Start Time 1114  -JR      PT Received On 04/24/24  -JR      PT Goal Re-Cert Due Date 05/04/24  -JR         Untimed Charges    PT Eval/Re-eval Minutes 55  -JR         Total Minutes    Untimed Charges Total Minutes 55  -JR       Total Minutes 55  -JR                User Key  (r) = Recorded By, (t) = Taken By, (c) = Cosigned By      Initials Name Provider Type    Ellyn Armando, PT  Physical Therapist                  Therapy Charges for Today       Code Description Service Date Service Provider Modifiers Qty    [id number] HC PT EVAL MOD COMPLEXITY 4 4/24/2024 Ellyn Aldana, PT GP 1            PT G-Codes  Outcome Measure Options: AM-PAC 6 Clicks Daily Activity (OT)  AM-PAC 6 Clicks Score (PT): 17  AM-PAC 6 Clicks Score (OT): 15  PT Discharge Summary  Anticipated Discharge Disposition (PT): home with home health    Ellyn Aldana, PT  4/24/2024

## 2024-04-24 NOTE — PROGRESS NOTES
Dietitian Assessment    Patient Name: Donny Jerry  YOB: 1945  MRN: [id number]  Admission date: 4/23/2024    Comment:    Pt with low K+, will liberalize diet and d/c K+ restriction.     Clinical Nutrition Assessment      Reason for Assessment MST=2   H&P  Past Medical History:   Diagnosis Date    Benign hypertension     Coronary artery disease     Depression     Enlarged prostate     Enlarged prostate with anticipated TURP with Dr. Bernal.    GERD (gastroesophageal reflux disease)     Heart attack     Hypercholesterolemia     Myocardial infarction     Obesity     Obstructive sleep apnea on CPAP     Type 2 diabetes mellitus        Past Surgical History:   Procedure Laterality Date    CARDIAC CATHETERIZATION      CARDIAC CATHETERIZATION Left 10/19/2021    Procedure: Left Heart Cath;  Surgeon: Liz Russo MD;  Location:  [facility];  Service: Cardiology;  Laterality: Left;    CARDIAC CATHETERIZATION N/A 7/18/2023    Procedure: Coronary angiography;  Surgeon: Rupesh Parr MD;  Location:  [facility];  Service: Cardiology;  Laterality: N/A;    CARDIAC CATHETERIZATION N/A 7/18/2023    Procedure: Percutaneous Coronary Intervention;  Surgeon: Rupesh Parr MD;  Location:  [facility];  Service: Cardiology;  Laterality: N/A;    COLONOSCOPY W/ POLYPECTOMY      CORONARY ANGIOPLASTY WITH STENT PLACEMENT Left 06/03/2009    Left heart catheterization, 06/03/2009, Dr. Russo:  LVEF 45% to 50%.  Placement of overlapping Cypher drug-eluting stent 2.5 x 28 and 2.5 x 18 to the SVG to the obtuse marginal branch.  SVG to the PDA had a proximal stenosis estimated at 60% with a distal stenosis of 50% to 60%.    CORONARY ANGIOPLASTY WITH STENT PLACEMENT Left 07/06/2009    Left heart catheterization, 07/06/2009:  PTCA and stent placement in the mid PDA using a 2.25 x 12 mm Taxus drug-eluting stent with stent placement in the distal SVG to the  "PDA using a 2.5 x 18 mm Cypher drug-eluting stent and stenting of the proximal SVG to the PDA using a 3.0 x 28 mm Cypher drug-eluting stent.    CORONARY ANGIOPLASTY WITH STENT PLACEMENT  04/23/2010    Cardiac catheterization for recurrent anginal symptoms, 04/23/2010, with PTCA and stent placement in the proximal SVG to the PDA using 3.0 x 28 mm Promus drug-eluting stent for in-stent restenosis.    CORONARY ANGIOPLASTY WITH STENT PLACEMENT Left 07/06/2010    Left heart catheterization, 07/06/2010, Dr. Russo:  EF 40% to 45%.  3.5 x 23 mm Promus drug-eluting stent in the proximal SVG to OM, 2.5 x 18 mm Promus drug-eluting stent to distal SVG to PDA due to in-stent restenosis.      CORONARY ANGIOPLASTY WITH STENT PLACEMENT Left 11/16/2010    Left heart catheterization, 11/16/2010, with placement of a 3.0 x 16 mm Taxus drug-eluting stent to the SVG to the RCA proximally and a 2.5 x 16 mm paclitaxel stent distally in the SVG to the RCA.    CORONARY ANGIOPLASTY WITH STENT PLACEMENT Left     Left heart catheterization, 3.0 x 24-mm Promus element drug-eluting stent to a 90% lesion in the mid portion of the SVG to the PDA.     CORONARY ANGIOPLASTY WITH STENT PLACEMENT Left 06/24/2014    Left heart catheterization for recurrent angina, 06/24/2014, Dr. Russo:  PTCA of the SVG to the PDA using a 3 x 12 mm NC TREK balloon.      CORONARY ARTERY BYPASS GRAFT      CABG x3, Dr. Peng, [facility], 2001.            Current Problems        Encounter Information        Trending Narrative     4/24: Pt admitted with CHF exacerbation. Pt has had wt loss but is most likely fluid related d/t diuretic use. Will add ONS once PO intake is established. Will d/c K+ diet restriction d/t K+ WNL and previous K+ was low.      Anthropometrics        Current Height, Weight Height: 170.2 cm (67\")  Weight: 98.9 kg (218 lb) (04/23/24 0112)   Trending Weight Hx     This admission:              PTA:     Wt Readings from Last 30 " Encounters:   04/23/24 0112 98.9 kg (218 lb)   03/26/24 1025 99.8 kg (220 lb)   02/15/24 1300 105 kg (232 lb)   01/18/24 0914 108 kg (239 lb)   01/11/24 0958 102 kg (224 lb 13.9 oz)   01/09/24 1244 102 kg (225 lb)   12/21/23 0325 104 kg (228 lb 6.3 oz)   12/19/23 0318 107 kg (236 lb 5.3 oz)   12/18/23 1609 109 kg (240 lb)   08/14/23 1103 104 kg (229 lb)   08/07/23 1104 104 kg (229 lb)   07/31/23 1109 104 kg (229 lb)   07/26/23 1002 104 kg (229 lb 3.2 oz)   07/24/23 1107 107 kg (235 lb)   07/20/23 1138 107 kg (235 lb)   07/19/23 0348 105 kg (231 lb 14.8 oz)   07/18/23 1552 104 kg (229 lb 4.5 oz)   07/18/23 0008 104 kg (229 lb 4.5 oz)   07/17/23 2249 106 kg (234 lb)   07/17/23 1053 106 kg (234 lb 6.4 oz)   07/14/23 1837 104 kg (230 lb)   07/01/23 0430 98.2 kg (216 lb 7.9 oz)   06/30/23 0755 102 kg (224 lb 13.9 oz)   06/30/23 0409 102 kg (224 lb 3.3 oz)   06/29/23 2136 102 kg (225 lb 12 oz)   06/29/23 1755 102 kg (225 lb)   04/26/23 0411 102 kg (225 lb 5 oz)   04/25/23 0321 102 kg (225 lb 12 oz)   04/24/23 0229 101 kg (223 lb 8.7 oz)   04/23/23 1539 102 kg (225 lb 5 oz)   04/23/23 1318 104 kg (230 lb)   03/02/23 1431 104 kg (230 lb)   09/28/22 1342 103 kg (228 lb)   09/21/22 1307 103 kg (228 lb)   09/14/22 1031 103 kg (228 lb)   09/07/22 1038 103 kg (228 lb)   09/01/22 1138 103 kg (228 lb)   08/30/22 1121 103 kg (226 lb)   08/18/22 0857 103 kg (226 lb)   05/26/22 1322 105 kg (231 lb 9.6 oz)   04/18/22 1044 103 kg (228 lb)   03/17/22 1509 105 kg (232 lb)   02/09/22 1041 102 kg (225 lb 3.2 oz)      BMI kg/m2 Body mass index is 34.14 kg/m².     Labs        Pertinent Labs     Results from last 7 days   Lab Units 04/24/24  0632 04/23/24  0632 04/23/24  0025   SODIUM mmol/L 137 140 137   POTASSIUM mmol/L 3.1* 3.7 4.1   CHLORIDE mmol/L 100 105 102   CO2 mmol/L 25.4 23.6 22.0   BUN mg/dL 55* 50* 45*   CREATININE mg/dL 2.06* 1.92* 1.77*   CALCIUM mg/dL 8.5* 8.7 8.5*   BILIRUBIN mg/dL  --   --  0.7   ALK PHOS U/L  --   --  54    ALT (SGPT) U/L  --   --  23   AST (SGOT) U/L  --   --  22   GLUCOSE mg/dL 109* 167* 203*       Results from last 7 days   Lab Units 04/24/24  0632   HEMOGLOBIN g/dL 9.9*   HEMATOCRIT % 29.3*       Lab Results   Component Value Date    HGBA1C 7.3 (H) 04/11/2024            Medications       Scheduled Medications amLODIPine, 2.5 mg, Oral, Daily  aspirin, 81 mg, Oral, Daily  atorvastatin, 40 mg, Oral, Daily  budesonide-formoterol, 2 puff, Inhalation, BID - RT   And  tiotropium bromide monohydrate, 2 puff, Inhalation, Daily - RT  carvedilol, 6.25 mg, Oral, BID With Meals  cetirizine, 10 mg, Oral, Daily  cholecalciferol, 2,000 Units, Oral, Daily  empagliflozin, 10 mg, Oral, Daily  finasteride, 5 mg, Oral, Daily  fluticasone, 1 spray, Nasal, Daily  furosemide, 40 mg, Intravenous, BID  heparin (porcine), 5,000 Units, Subcutaneous, Q12H  hydrALAZINE, 75 mg, Oral, TID  insulin glargine, 15 Units, Subcutaneous, Q12H  insulin lispro, 2-9 Units, Subcutaneous, 4x Daily AC & at Bedtime  isosorbide dinitrate, 10 mg, Oral, TID - Nitrates  magnesium oxide, 400 mg, Oral, Daily  pantoprazole, 40 mg, Intravenous, Q AM  pramipexole, 0.5 mg, Oral, Nightly  prasugrel, 5 mg, Oral, Daily  ranolazine, 500 mg, Oral, Q12H  sertraline, 100 mg, Oral, Daily  sodium chloride, 10 mL, Intravenous, Q12H  tamsulosin, 0.4 mg, Oral, Daily        Infusions       PRN Medications   acetaminophen **OR** acetaminophen **OR** acetaminophen    senna-docusate sodium **AND** polyethylene glycol **AND** bisacodyl **AND** bisacodyl    dextrose    dextrose    glucagon (human recombinant)    nitroglycerin    ondansetron ODT **OR** ondansetron    sodium chloride    sodium chloride    sodium chloride     Physical Findings        Trending Physical   Appearance, NFPE    --  Edema  1+     Bowel Function LBM: 4/22     Tubes Peripheral IV     Chewing/Swallowing WNL     Skin WNL       Estimated/Assessed Needs       Energy Requirements    EST Needs, Method, Wt used  9111-2049 kcal (20-22 kcal/kg CBW)       Protein Requirements    EST Needs, Method, Wt used 78 g pro (.8 g pro/kg CBW)       Fluid Requirements     Estimated Needs (mL/day) 3429-2744 mL       Current Nutrition Orders & Evaluation of Intake       Oral Nutrition     Food Allergies    Current PO Diet Diet: Cardiac, Diabetic, Renal; Healthy Heart (2-3 Na+); Consistent Carbohydrate; Low Sodium (2-3g), Low Potassium, Low Phosphorus; Fluid Consistency: Thin (IDDSI 0)   Supplement    PO Evaluation     Trending % PO Intake        Nutrition Diagnosis         Nutrition Dx Problem 1 Altered nutrition lab value r/t T2DM AEB A1C=7.3.      Nutrition Dx Problem 2        Intervention Goal         Intervention Goal(s) Maintain CBW  PO intake meet >50% of estimated needs  Electrolytes WNL     Nutrition Intervention        RD Action Will liberalize diet and d/c K+ restriction      Nutrition Prescription          Diet Prescription HH, CCD, Renal, Low phosphorus   Supplement Prescription      Monitor/Evaluation        Monitor Per protocol, I&O, PO intake, Pertinent labs, Weight, Skin status, GI status, Symptoms, POC/GOC, Swallow function, Hemodynamic stability     RD to f/up    Electronically signed by:  Radha Brewster RD  04/24/24 08:07 EDT

## 2024-04-24 NOTE — PLAN OF CARE
Problem: Fluid Volume Excess  Goal: Fluid Balance  Outcome: Ongoing, Progressing     Problem: Adult Inpatient Plan of Care  Goal: Plan of Care Review  Outcome: Ongoing, Progressing  Goal: Patient-Specific Goal (Individualized)  Outcome: Ongoing, Progressing  Goal: Absence of Hospital-Acquired Illness or Injury  Outcome: Ongoing, Progressing  Intervention: Identify and Manage Fall Risk  Recent Flowsheet Documentation  Taken 4/23/2024 2300 by Aleida Chowdary, Nursing Student  Safety Promotion/Fall Prevention:   activity supervised   assistive device/personal items within reach   clutter free environment maintained   safety round/check completed   room organization consistent  Taken 4/23/2024 2221 by Aleida Chowdary, Nursing Student  Safety Promotion/Fall Prevention:   activity supervised   assistive device/personal items within reach   clutter free environment maintained   safety round/check completed   room organization consistent  Taken 4/23/2024 2100 by Aleida Chowdary, Nursing Student  Safety Promotion/Fall Prevention:   activity supervised   assistive device/personal items within reach   clutter free environment maintained   safety round/check completed   room organization consistent  Taken 4/23/2024 2000 by Aleida Chowdary, Nursing Student  Safety Promotion/Fall Prevention:   activity supervised   assistive device/personal items within reach   clutter free environment maintained   safety round/check completed   room organization consistent  Intervention: Prevent Skin Injury  Recent Flowsheet Documentation  Taken 4/23/2024 2300 by Aleida Chowdary, Nursing Student  Body Position: supine  Taken 4/23/2024 2221 by Aleida Chowdary, Nursing Student  Body Position: supine  Taken 4/23/2024 2100 by Aleida Chowdary, Nursing Student  Body Position: supine  Taken 4/23/2024 2000 by Aleida Chowdary, Nursing Student  Body Position: sitting up in bed  Intervention: Prevent Infection  Recent  Flowsheet Documentation  Taken 4/23/2024 2100 by Aleida Chowdary, Nursing Student  Infection Prevention:   environmental surveillance performed   single patient room provided   rest/sleep promoted  Taken 4/23/2024 2000 by lAeida Chowdary, Nursing Aramis  Infection Prevention:   rest/sleep promoted   single patient room provided   environmental surveillance performed  Goal: Optimal Comfort and Wellbeing  Outcome: Ongoing, Progressing  Goal: Readiness for Transition of Care  Outcome: Ongoing, Progressing     Problem: Skin Injury Risk Increased  Goal: Skin Health and Integrity  Outcome: Ongoing, Progressing  Intervention: Optimize Skin Protection  Recent Flowsheet Documentation  Taken 4/23/2024 2300 by Aleida Chowdary, Nursing Student  Head of Bed (HOB) Positioning: HOB elevated  Taken 4/23/2024 2221 by Aleida Chowdary, Nursing Student  Head of Bed (HOB) Positioning: HOB elevated  Taken 4/23/2024 2100 by Aleida Chowdary, Nursing Student  Head of Bed (HOB) Positioning: HOB elevated  Taken 4/23/2024 2000 by Aleida Chowdary, Nursing Student  Head of Bed (HOB) Positioning: HOB elevated     Problem: Asthma Comorbidity  Goal: Maintenance of Asthma Control  Outcome: Ongoing, Progressing  Intervention: Maintain Asthma Symptom Control  Recent Flowsheet Documentation  Taken 4/23/2024 2000 by Aleida Chowdary, Nursing Aramis  Medication Review/Management: medications reviewed     Problem: Behavioral Health Comorbidity  Goal: Maintenance of Behavioral Health Symptom Control  Outcome: Ongoing, Progressing  Intervention: Maintain Behavioral Health Symptom Control  Recent Flowsheet Documentation  Taken 4/23/2024 2000 by Aleida Chowdary, Nursing Aramis  Medication Review/Management: medications reviewed     Problem: COPD (Chronic Obstructive Pulmonary Disease) Comorbidity  Goal: Maintenance of COPD Symptom Control  Outcome: Ongoing, Progressing  Intervention: Maintain COPD-Symptom Control  Recent  Flowsheet Documentation  Taken 4/23/2024 2000 by Aleida Chowdary, Nursing Student  Medication Review/Management: medications reviewed     Problem: Diabetes Comorbidity  Goal: Blood Glucose Level Within Targeted Range  Outcome: Ongoing, Progressing     Problem: Heart Failure Comorbidity  Goal: Maintenance of Heart Failure Symptom Control  Outcome: Ongoing, Progressing  Intervention: Maintain Heart Failure-Management  Recent Flowsheet Documentation  Taken 4/23/2024 2000 by Aleida Chowdary, Nursing Student  Medication Review/Management: medications reviewed     Problem: Hypertension Comorbidity  Goal: Blood Pressure in Desired Range  Outcome: Ongoing, Progressing  Intervention: Maintain Blood Pressure Management  Recent Flowsheet Documentation  Taken 4/23/2024 2000 by Aleida Chowdary, Nursing Student  Medication Review/Management: medications reviewed     Problem: Obstructive Sleep Apnea Risk or Actual Comorbidity Management  Goal: Unobstructed Breathing During Sleep  Outcome: Ongoing, Progressing     Problem: Osteoarthritis Comorbidity  Goal: Maintenance of Osteoarthritis Symptom Control  Outcome: Ongoing, Progressing  Intervention: Maintain Osteoarthritis Symptom Control  Recent Flowsheet Documentation  Taken 4/23/2024 2221 by Aleida Chowdary, Nursing Student  Assistive Device Utilized: walker  Taken 4/23/2024 2000 by Aleida Chowdary, Nursing Student  Medication Review/Management: medications reviewed     Problem: Pain Chronic (Persistent) (Comorbidity Management)  Goal: Acceptable Pain Control and Functional Ability  Outcome: Ongoing, Progressing  Intervention: Manage Persistent Pain  Recent Flowsheet Documentation  Taken 4/23/2024 2000 by Aleida Chowdary, Nursing Student  Medication Review/Management: medications reviewed  Intervention: Optimize Psychosocial Wellbeing  Recent Flowsheet Documentation  Taken 4/23/2024 2200 by Aleida Chowdary, Nursing Student  Family/Support System Care:    self-care encouraged   support provided     Problem: Seizure Disorder Comorbidity  Goal: Maintenance of Seizure Control  Outcome: Ongoing, Progressing   Goal Outcome Evaluation:      Plan of care reviewed with patient. Patient was admitted from the ER yesterday with shortness of breath due to CHF exacerbation. Upon admission he has an elevated troponin and proBNP. We are diuresing him with 40 IV lasix BID. Pot has has adequate urine output. On tele he is sinus with a bundle branch block and an inverted T wave. He has several chronic co-morbidities. He has sleep apnea and requires CPAP to maintain O2 sat above 88% but does okay with 10 liters high flow NC when eating or taking breaks. He rested intermittently throughout the night. No significant events occurred during the shift.

## 2024-04-24 NOTE — PLAN OF CARE
Goal Outcome Evaluation:  Plan of Care Reviewed With: patient        Progress: no change  Outcome Evaluation: Patient participated well in PT evaluation and demonstrated decreased overall mobility.  He required no more than minimal assistance to perform balance, transfers, gait x 12 feet with gait belt and HHA.  He was able to maintain oxygen saturation levels of at least 95% throughout evaluation.  He expected to benefit from additional PT services while hospitalzied and upon discharge to home with home health care.      Anticipated Discharge Disposition (PT): home with home health

## 2024-04-24 NOTE — PROGRESS NOTES
[facility]    Name:  Donny Jerry   Age:  79 y.o.  Sex:  male  :  1945  MRN:  [id number]   Visit Number:  [id number]  Admission Date:  2024  Date Of Service:  24  Primary Care Physician:  Anum Alonso APRN     LOS: 0 days :    Chief Complaint:      Shortness of breath     Subjective:    Patient was seen and examined at bedside today.  Patient resting in bed and appears comfortable, is currently on BiPAP with FiO2 of 60%, he was requiring 15 L high flow nasal cannula overnight, patient however denies trouble breathing or shortness of breath.  He denies any pain or discomfort.  He reports feeling overall better today.  Noticed that his oxygen probe was jumping between 95% and 80%, discussed with nursing to change his pulse ox probe.  Other vitals are stable.  Discussed management and treatment plan with the patient and his agreement.    Hospital Course:    The patient is a 79-year-old chronically ill gentleman with a history of advanced CAD status post CABG with multiple stents, chronic kidney disease, obstructive sleep apnea, hypertension, COPD, who presented to the emergency room with complaints of shortness of breath.  History obtained from ER record and from the patient.  He states a 3-day history of increasing breathing difficulty, nearly gasping at home.  He specifically denied any chest pain or chest pressure.  He does think he has had some swelling recently.  He feels like he is urinating per normal.  He follows with Dr. Russo as his cardiologist.  Has been told previously he is not a candidate for further intervention.  He is on antianginal therapy.  Currently feeling better.  Requesting something to eat and drink at time of visit.     In the ER, he was noted to be hypoxic on EMS arrival but is currently saturating at 100% on CPAP.  He had a heart rate in the 80s blood pressure 140/70 and afebrile.  Negative respiratory panel.   VBG unremarkable.  Lactic acid 1.2.  proBNP of 3900.  Creatinine 1.77.  Had a white count of 16 hemoglobin 10.8 and platelets of 233.  Procalcitonin within normal limits.  D-dimer within normal limits.  Initial troponin 122, repeat 192.  Chest x-ray per my interpretation with cardiomegaly, interstitial edema, likely left-sided pleural effusion.  The patient was given IV Lasix.  We were asked to admit.       Review of Systems:     All systems were reviewed and negative except as mentioned in subjective, assessment and plan.    Vital Signs:    Temp:  [97.3 °F (36.3 °C)-100 °F (37.8 °C)] 98.2 °F (36.8 °C)  Heart Rate:  [71-86] 75  Resp:  [18-30] 20  BP: (119-139)/(52-73) 139/62    Intake and output:    I/O last 3 completed shifts:  In: -   Out: 2150 [Urine:2150]  No intake/output data recorded.    Physical Examination:    General Appearance:  Alert and cooperative.  Chronically ill-appearing.   Head:  Atraumatic and normocephalic.   Eyes: Conjunctivae and sclerae normal, no icterus. No pallor.   Throat: No oral lesions, no thrush, oral mucosa moist.   Neck: Supple, trachea midline, no thyromegaly.   Lungs:   Breath sounds heard bilaterally equally.  No wheezing.  Bilateral rhonchi. No Pleural rub or bronchial breathing.  Mildly labored while talking. On supplemental oxygen.   Heart:  Normal S1 and S2, no murmur, no gallop, no rub. No JVD.   Abdomen:   Normal bowel sounds, no masses, no organomegaly. Soft, nontender, nondistended, no rebound tenderness.   Extremities: Supple, +1 bilateral lower extremities edema, no cyanosis, no clubbing.   Skin: No bleeding or rash.   Neurologic: Alert and oriented x 3. No facial asymmetry. Moves all four limbs. No tremors.      Laboratory results:    Results from last 7 days   Lab Units 04/24/24  0632 04/23/24  0632 04/23/24  0025   SODIUM mmol/L 137 140 137   POTASSIUM mmol/L 3.1* 3.7 4.1   CHLORIDE mmol/L 100 105 102   CO2 mmol/L 25.4 23.6 22.0   BUN mg/dL 55* 50* 45*   CREATININE  "mg/dL 2.06* 1.92* 1.77*   CALCIUM mg/dL 8.5* 8.7 8.5*   BILIRUBIN mg/dL  --   --  0.7   ALK PHOS U/L  --   --  54   ALT (SGPT) U/L  --   --  23   AST (SGOT) U/L  --   --  22   GLUCOSE mg/dL 109* 167* 203*     Results from last 7 days   Lab Units 04/24/24  0632 04/23/24  0632 04/23/24  0025   WBC 10*3/mm3 12.36* 13.78* 16.18*   HEMOGLOBIN g/dL 9.9* 10.2* 10.8*   HEMATOCRIT % 29.3* 30.7* 32.1*   PLATELETS 10*3/mm3 229 226 233         Results from last 7 days   Lab Units 04/23/24  0632 04/23/24  0229 04/23/24  0025   HSTROP T ng/L 436* 192* 122*     Results from last 7 days   Lab Units 04/23/24  0100 04/23/24  0054   BLOODCX  No growth at 24 hours No growth at 24 hours     No results for input(s): \"PHART\", \"WSA0YLT\", \"PO2ART\", \"CDO0GMU\", \"BASEEXCESS\" in the last 8760 hours.   I have reviewed the patient's laboratory results.    Radiology results:    XR Chest 1 View    Result Date: 4/23/2024  PROCEDURE: XR CHEST 1 VW-  HISTORY: [facility] Triage Protocol  COMPARISON: 01/13/2024  FINDINGS:  Portable view of the chest demonstrates pulmonary vascular congestion. There is no evidence of effusion, pneumothorax or other significant pleural disease. Patient is status post CABG.  The heart size is normal.      Impression: Findings suggestive of mild CHF.    This report was signed and finalized on 4/23/2024 11:30 AM by Brett Hernandez MD.     I have reviewed the patient's radiology reports.    Medication Review:     I have reviewed the patient's active and prn medications.     Problem List:      CHF exacerbation      Assessment:    Suspected acute on chronic heart failure reduced ejection fraction exacerbation, POA  Acute respiratory failure with hypoxia, POA  Chronic kidney disease stage IIIb, POA  CAD with prior CABG  Insulin-dependent diabetes  Obstructive sleep apnea  Hypertension    Plan:    Heart failure reduced ejection fraction exacerbation:  -Placed patient on IV diuretic therapy with 40 mg Lasix twice a day.    -Will continue " his home hydralazine, Lipitor, aspirin, carvedilol, SGLT2 inhibitor and Ranexa.    -Not a candidate for Entresto/spironolactone due to renal failure.    -Reviewed recent 2D echocardiogram.    -Placed on fluid and sodium restrictions.   -Monitor intake and output.  -Cardiology consulted and recommended close diuretics with fluid restriction.   -ISDN 10 mg orally 3 times per day to be taken in combination with his hydralazine 75 mg orally 3 times per day.   -Outpatient follow-up with his established cardiologist Dr. Russo.      Respiratory failure with hypoxia:  -Suspect related to volume overload in the setting of severe congestive heart failure and CAD.  Monitor with treatment and diuresis.  -Increased oxygen requirement, oxygen pulse ox probe not reading correctly?,  Discussed with nursing  -Will check ABG and chest x-ray  -Pulmonology consulted, appreciate recommendations     Elevated troponin/CAD:  -Cannot definitively rule out ACS as he has known obstructive disease, however with no obvious new EKG changes or chest pain complaint, will not treat as ACS.  Of note, has been deemed to have no further vessels amenable to revascularization, previously had CABG.  Medical treatment otherwise recommended.  -Cardiology has evaluated the patient and recommended outpatient follow-up with his established cardiologist Dr. Russo.     CKD 3B  - Consulted nephrology, appreciate recommendations on diuretics and fluid management  -Avoid nephrotoxic drugs     Diabetes/USHA/hypertension:  -Ordered sliding scale insulin and long-acting insulin.    -Blood pressure management as noted above.       Further recommendation depend upon clinical course.     I have reviewed the copied text and it is accurate as of 4/24/2024      DVT Prophylaxis: Heparin  Code Status: DNR  Diet: Carb consistent/renal/cardiac  Discharge Plan: To be determined, several days    Jono Rg MD  04/24/24  09:13 EDT    Dictated utilizing Dragon  dictation.

## 2024-04-24 NOTE — CONSULTS
[facility] Consult Note      Patient Name: Donny Jerry  : 1945  MRN: [id number]  Primary Care Physician:  Anum Alonso APRN  Referring Physician: No ref. provider found  Date of admission: 2024    Subjective     Reason for Consult:  CKD    HPI:   Donny Jerry is a 79 y.o. male with CKD stage 3B (BL Cr 1.8 to 2), CAD/CABG, HTN, DM2, CHF, COPD who presented yesterday with dyspnea.  CXR showed pulmonary vascular congestion.  Resp viral panel NEG.  Cr 1.8 yesterday and 2.0 today.  Started on lasix 40mg IV BID.  K down to 3.1.  Takes low dose lasix at home (alternates 20 and 40mg once daily).  He's also on farxiga but no ACE/ARB.  Echo in January showed EF 45%.  Been normotensive.  Not weighed here yet.  Recorded UOP fairly robust thus far 2600 cc.  He sees Dr Wilkes for CKD, most recent visit was 24 with Rome Ponce NP.  He was hospitalized in [place] mid January for recurrent syncope in [facility] with orthostatic hypotension.  He's on 2L O2 at home but 10L currently.  Mild swelling.  No n/v or diarrhea.    Review of Systems:   14 point review of systems is otherwise negative except for mentioned above on HPI    Personal History     Past Medical History:   Diagnosis Date    Benign hypertension     Coronary artery disease     Depression     Enlarged prostate     Enlarged prostate with anticipated TURP with Dr. Bernal.    GERD (gastroesophageal reflux disease)     Heart attack     Hypercholesterolemia     Myocardial infarction     Obesity     Obstructive sleep apnea on CPAP     Type 2 diabetes mellitus        Past Surgical History:   Procedure Laterality Date    CARDIAC CATHETERIZATION      CARDIAC CATHETERIZATION Left 10/19/2021    Procedure: Left Heart Cath;  Surgeon: Liz Russo MD;  Location: [facility] CATH INVASIVE LOCATION;  Service: Cardiology;  Laterality: Left;    CARDIAC CATHETERIZATION N/A 2023    Procedure: Coronary angiography;  Surgeon:  Rupesh Parr MD;  Location:  [facility] CATH INVASIVE LOCATION;  Service: Cardiology;  Laterality: N/A;    CARDIAC CATHETERIZATION N/A 7/18/2023    Procedure: Percutaneous Coronary Intervention;  Surgeon: Rupesh Parr MD;  Location:  [facility] CATH INVASIVE LOCATION;  Service: Cardiology;  Laterality: N/A;    COLONOSCOPY W/ POLYPECTOMY      CORONARY ANGIOPLASTY WITH STENT PLACEMENT Left 06/03/2009    Left heart catheterization, 06/03/2009, Dr. Russo:  LVEF 45% to 50%.  Placement of overlapping Cypher drug-eluting stent 2.5 x 28 and 2.5 x 18 to the SVG to the obtuse marginal branch.  SVG to the PDA had a proximal stenosis estimated at 60% with a distal stenosis of 50% to 60%.    CORONARY ANGIOPLASTY WITH STENT PLACEMENT Left 07/06/2009    Left heart catheterization, 07/06/2009:  PTCA and stent placement in the mid PDA using a 2.25 x 12 mm Taxus drug-eluting stent with stent placement in the distal SVG to the PDA using a 2.5 x 18 mm Cypher drug-eluting stent and stenting of the proximal SVG to the PDA using a 3.0 x 28 mm Cypher drug-eluting stent.    CORONARY ANGIOPLASTY WITH STENT PLACEMENT  04/23/2010    Cardiac catheterization for recurrent anginal symptoms, 04/23/2010, with PTCA and stent placement in the proximal SVG to the PDA using 3.0 x 28 mm Promus drug-eluting stent for in-stent restenosis.    CORONARY ANGIOPLASTY WITH STENT PLACEMENT Left 07/06/2010    Left heart catheterization, 07/06/2010, Dr. Russo:  EF 40% to 45%.  3.5 x 23 mm Promus drug-eluting stent in the proximal SVG to OM, 2.5 x 18 mm Promus drug-eluting stent to distal SVG to PDA due to in-stent restenosis.      CORONARY ANGIOPLASTY WITH STENT PLACEMENT Left 11/16/2010    Left heart catheterization, 11/16/2010, with placement of a 3.0 x 16 mm Taxus drug-eluting stent to the SVG to the RCA proximally and a 2.5 x 16 mm paclitaxel stent distally in the SVG to the RCA.    CORONARY ANGIOPLASTY WITH STENT PLACEMENT Left     Left  heart catheterization, 3.0 x 24-mm Promus element drug-eluting stent to a 90% lesion in the mid portion of the SVG to the PDA.     CORONARY ANGIOPLASTY WITH STENT PLACEMENT Left 06/24/2014    Left heart catheterization for recurrent angina, 06/24/2014, Dr. Russo:  PTCA of the SVG to the PDA using a 3 x 12 mm NC TREK balloon.      CORONARY ARTERY BYPASS GRAFT      CABG x3, Dr. Peng, [facility], 2001.       Family History: family history includes Heart attack in his mother; Ulcers in his father.    Social History:  reports that he has never smoked. He has never used smokeless tobacco. He reports that he does not drink alcohol and does not use drugs.    Home Medications:  Prior to Admission medications    Medication Sig Start Date End Date Taking? Authorizing Provider   albuterol sulfate  (90 Base) MCG/ACT inhaler Inhale 2 puffs Every 4 (Four) Hours As Needed for Wheezing. 7/26/23   Hansel Bob MD   amLODIPine (NORVASC) 2.5 MG tablet Take 1 tablet by mouth Daily.    ProviderJacob MD   aspirin 81 MG chewable tablet Chew 1 tablet Daily.    ProviderJacob MD   atorvastatin (LIPITOR) 40 MG tablet Take 1 tablet by mouth Daily. 1/1/22   Jacob Sotelo MD   B-D UF III MINI PEN NEEDLES 31G X 5 MM misc  3/26/23   Jacob Sotelo MD   carvedilol (COREG) 6.25 MG tablet Take 1 tablet by mouth 2 (Two) Times a Day With Meals. 1/14/24   Vicky Pierre,    Cholecalciferol (VITAMIN D3) 50 MCG (2000 UT) capsule Take 1 capsule by mouth Daily.    Jacob Sotelo MD   dapagliflozin Propanediol (Farxiga) 10 MG tablet Take 10 mg by mouth Daily.    Jacob Sotelo MD   docusate calcium (SURFAK) 240 MG capsule Take 1 capsule by mouth 2 (Two) Times a Day.    Jacob Sotelo MD   fexofenadine (ALLEGRA) 180 MG tablet Take 1 tablet by mouth Daily.    Jacob Sotelo MD   finasteride (PROSCAR) 5 MG tablet Take 1 tablet by mouth Daily.    Provider  MD Jacob   fluticasone (FLONASE) 50 MCG/ACT nasal spray 1 spray into the nostril(s) as directed by provider Daily. 7/26/23   Hansel Bob MD   Fluticasone-Umeclidin-Vilant (Trelegy Ellipta) 200-62.5-25 MCG/ACT aerosol powder  Inhale 1 puff Daily. Rinse mouth with water after use. 7/26/23   Hansel Bob MD   furosemide (LASIX) 20 MG tablet Take 1 tablet by mouth Daily. Sun Mon Wed FRI SAT    Jacob Sotelo MD   furosemide (LASIX) 40 MG tablet Take 1 tablet by mouth 1 (One) Time Per Week. Tues Thurs 1/14/24   Vicky Pierre DO   hydrALAZINE (APRESOLINE) 25 MG tablet Take 1 tablet by mouth 3 (Three) Times a Day.  Patient not taking: Reported on 3/26/2024 2/15/24   Liz Russo MD   hydrALAZINE (APRESOLINE) 50 MG tablet Take 1 tablet by mouth 3 (Three) Times a Day.  Patient taking differently: Take 2 tablets by mouth 3 (Three) Times a Day. 2/15/24   Liz Russo MD   insulin degludec (Tresiba FlexTouch) 100 UNIT/ML solution pen-injector injection Inject  under the skin into the appropriate area as directed 2 (Two) Times a Day. 22 units in the am   37 units at bedtime    Jacob Sotelo MD   Insulin Lispro (humaLOG) 100 UNIT/ML injection Inject  under the skin into the appropriate area as directed 3 (Three) Times a Day Before Meals.    Jacob Sotelo MD   magnesium oxide (MAG-OX) 400 MG tablet Take 1 tablet by mouth Daily.    Jacob Sotelo MD   Multiple Vitamin (MULTI VITAMIN DAILY PO) Take 1 tablet by mouth Daily.    Jacob Sotelo MD   nitroglycerin (NITROSTAT) 0.4 MG SL tablet Place 1 tablet under the tongue Every 5 (Five) Minutes As Needed for Chest Pain. Take no more than 3 doses in 15 minutes. 6/15/18   Liz Russo MD   pantoprazole (PROTONIX) 40 MG EC tablet TAKE 1 TABLET BY MOUTH 2 TIMES DAILY (BEFORE MEALS) 12/23/21   Jacob Sotelo MD   pramipexole (MIRAPEX) 1 MG tablet Take 1 tablet by mouth Every Night.  3/26/24   Carolann Franco APRN   prasugrel (EFFIENT) 10 MG tablet Take 1 tablet by mouth Daily.    ProviderJacob MD   ranolazine (RANEXA) 500 MG 12 hr tablet Take 1 tablet by mouth Every 12 (Twelve) Hours for 180 days. 9/25/23 3/23/24  Liz Russo MD   sertraline (ZOLOFT) 100 MG tablet Take 1 tablet by mouth Daily.    Jacob Sotelo MD   tamsulosin (FLOMAX) 0.4 MG capsule 24 hr capsule Take 1 capsule by mouth 2 (Two) Times a Day.    Jacob Sotelo MD   TRUEplus Lancets 28G misc  9/9/22   Jacob Sotelo MD   Trulicity 0.75 MG/0.5ML solution pen-injector Inject 0.75 mg as directed 1 (One) Time Per Week. 5/23/22   Jacob Sotelo MD       Allergies:  Allergies   Allergen Reactions    Zocor [Simvastatin] Myalgia    Bisoprolol Other (See Comments)     Agitation      Byetta 10 Mcg Pen [Exenatide] Nausea Only     nausea    Crestor [Rosuvastatin Calcium] Myalgia    Metformin And Related Nausea Only    Plavix [Clopidogrel Bisulfate] Other (See Comments)     resistance       Objective     Vitals:   Temp:  [97.7 °F (36.5 °C)-100 °F (37.8 °C)] 97.7 °F (36.5 °C)  Heart Rate:  [75-86] 79  Resp:  [18-30] 28  BP: (119-139)/(52-73) 135/61  Flow (L/min):  [6-15] 15    Intake/Output Summary (Last 24 hours) at 4/24/2024 1515  Last data filed at 4/24/2024 1513  Gross per 24 hour   Intake 480 ml   Output 1600 ml   Net -1120 ml       Physical Exam:    General Appearance: ill appearing tachypnic WM in no acute distress on high flow NC O2  Skin: warm and dry  HEENT: oral mucosa normal, nonicteric sclera  Neck: supple, no JVD  Lungs: Bibasilar rales present  Heart: RRR, normal S1 and S2  Abdomen: soft, nontender, nondistended  : no palpable bladder  Extremities: trace BLE edema, no cyanosis or clubbing  Neuro: normal speech and mental status     Scheduled Meds:     amLODIPine, 2.5 mg, Oral, Daily  aspirin, 81 mg, Oral, Daily  atorvastatin, 40 mg, Oral, Daily  budesonide-formoterol,  2 puff, Inhalation, BID - RT   And  tiotropium bromide monohydrate, 2 puff, Inhalation, Daily - RT  carvedilol, 6.25 mg, Oral, BID With Meals  cetirizine, 10 mg, Oral, Daily  cholecalciferol, 2,000 Units, Oral, Daily  empagliflozin, 10 mg, Oral, Daily  finasteride, 5 mg, Oral, Daily  fluticasone, 1 spray, Nasal, Daily  furosemide, 40 mg, Intravenous, BID  heparin (porcine), 5,000 Units, Subcutaneous, Q12H  hydrALAZINE, 75 mg, Oral, TID  insulin glargine, 15 Units, Subcutaneous, Q12H  insulin lispro, 2-9 Units, Subcutaneous, 4x Daily AC & at Bedtime  isosorbide dinitrate, 10 mg, Oral, TID - Nitrates  magnesium oxide, 400 mg, Oral, Daily  pantoprazole, 40 mg, Intravenous, Q AM  potassium chloride, 40 mEq, Oral, BID With Meals  pramipexole, 0.5 mg, Oral, Nightly  prasugrel, 5 mg, Oral, Daily  ranolazine, 500 mg, Oral, Q12H  sertraline, 100 mg, Oral, Daily  sodium chloride, 10 mL, Intravenous, Q12H  tamsulosin, 0.4 mg, Oral, Daily      IV Meds:        Results Reviewed:   I have personally reviewed the results from the time of this admission to 4/24/2024 15:15 EDT     Lab Results   Component Value Date    GLUCOSE 109 (H) 04/24/2024    CALCIUM 8.5 (L) 04/24/2024     04/24/2024    K 3.1 (L) 04/24/2024    CO2 25.4 04/24/2024     04/24/2024    BUN 55 (H) 04/24/2024    CREATININE 2.06 (H) 04/24/2024    EGFRIFNONA 48 (L) 10/19/2021    BCR 26.7 (H) 04/24/2024    ANIONGAP 11.6 04/24/2024      Lab Results   Component Value Date    MG 2.2 04/24/2024    PHOS 3.7 01/14/2024    ALBUMIN 3.9 04/23/2024           Assessment / Plan     ASSESSMENT:  CKD stage 3B - Cr stable 2.0 (BL 1.8 to 2 roughly) and will vary in relation to cardiorenal syn, may need to allow for degree of prerenal azotemia with more aggressive diuresis.  Assess for proteinuria but note normal alb  Vol overload - CXR congested.  Minimal periph edema.  Good response to IV lasix thus far.  Will need to simplify (and intensify) PO diuretic dose in aftermath of  this (been alternating 20 vs 40mg of lasix once daily - perhaps change to torsemide)  Hypokalemia, due to diuresis, K 3.1  HTN - BP controlled, watch for over-correction, as nitrate added and I'm starting aldactone.  Hx orthostasis (+syncope) - will DC tiny dose norvasc 2.5 mg for now.  I wonder if his CKD is reason not on ACE/ARB/entresto?  Will investigate  HFrEF, EF 45%.  Recent echo noted.  On SGLT2 inh (farxiga) and beta blocker.    Acute on chronic hypoxic resp failure, on 10 HFNC  Anemia of CKD, hgb down slightly 9.9    PLAN:  Agree with lasix 40mg IV BID  KCL 40meq BID x2 today  Add aldactone 25 mg  Hold norvasc  Assess for proteinuria  Check weight    Thank you for involving us in the care of Donny Jerry.  Please feel free to call with any questions.    Quang Duke MD  04/24/24  15:15 EDT    [facility]  [phone]

## 2024-04-24 NOTE — CASE MANAGEMENT/SOCIAL WORK
DCP; return home with wife via private car when medically stable for discharge. No DME needs anticipated at this time. CM continues to follow.

## 2024-04-25 ENCOUNTER — APPOINTMENT (OUTPATIENT)
Dept: CT IMAGING | Facility: HOSPITAL | Age: 79
DRG: 291 | End: 2024-04-25
Payer: MEDICARE

## 2024-04-25 ENCOUNTER — APPOINTMENT (OUTPATIENT)
Dept: GENERAL RADIOLOGY | Facility: HOSPITAL | Age: 79
DRG: 291 | End: 2024-04-25
Payer: MEDICARE

## 2024-04-25 PROBLEM — I50.9 HEART FAILURE: Status: ACTIVE | Noted: 2024-04-25

## 2024-04-25 LAB
A-A DO2: 49.5 MMHG
ALBUMIN SERPL-MCNC: 3.4 G/DL (ref 3.5–5.2)
ANION GAP SERPL CALCULATED.3IONS-SCNC: 14.8 MMOL/L (ref 5–15)
ARTERIAL PATENCY WRIST A: POSITIVE
ATMOSPHERIC PRESS: 740 MMHG
BASE EXCESS BLDA CALC-SCNC: 3.1 MMOL/L (ref 0–2)
BASOPHILS # BLD AUTO: 0.05 10*3/MM3 (ref 0–0.2)
BASOPHILS NFR BLD AUTO: 0.4 % (ref 0–1.5)
BDY SITE: ABNORMAL
BUN SERPL-MCNC: 65 MG/DL (ref 8–23)
BUN/CREAT SERPL: 31 (ref 7–25)
CALCIUM SPEC-SCNC: 8.7 MG/DL (ref 8.6–10.5)
CHLORIDE SERPL-SCNC: 97 MMOL/L (ref 98–107)
CO2 SERPL-SCNC: 23.2 MMOL/L (ref 22–29)
COHGB MFR BLD: 0.4 % (ref 0–2)
CREAT SERPL-MCNC: 2.1 MG/DL (ref 0.76–1.27)
DEPRECATED RDW RBC AUTO: 46.4 FL (ref 37–54)
EGFRCR SERPLBLD CKD-EPI 2021: 31.6 ML/MIN/1.73
EOSINOPHIL # BLD AUTO: 0.13 10*3/MM3 (ref 0–0.4)
EOSINOPHIL NFR BLD AUTO: 1.1 % (ref 0.3–6.2)
ERYTHROCYTE [DISTWIDTH] IN BLOOD BY AUTOMATED COUNT: 15.5 % (ref 12.3–15.4)
FERRITIN SERPL-MCNC: 386.2 NG/ML (ref 30–400)
GAS FLOW AIRWAY: 8 LPM
GLUCOSE BLDC GLUCOMTR-MCNC: 149 MG/DL (ref 70–130)
GLUCOSE BLDC GLUCOMTR-MCNC: 188 MG/DL (ref 70–130)
GLUCOSE BLDC GLUCOMTR-MCNC: 191 MG/DL (ref 70–130)
GLUCOSE BLDC GLUCOMTR-MCNC: 292 MG/DL (ref 70–130)
GLUCOSE SERPL-MCNC: 106 MG/DL (ref 65–99)
HCO3 BLDA-SCNC: 27 MMOL/L (ref 22–28)
HCT VFR BLD AUTO: 30 % (ref 37.5–51)
HCT VFR BLD CALC: 30.4 %
HGB BLD-MCNC: 10.2 G/DL (ref 13–17.7)
IMM GRANULOCYTES # BLD AUTO: 0.06 10*3/MM3 (ref 0–0.05)
IMM GRANULOCYTES NFR BLD AUTO: 0.5 % (ref 0–0.5)
IRON 24H UR-MRATE: 14 MCG/DL (ref 59–158)
IRON SATN MFR SERPL: 6 % (ref 20–50)
L PNEUMO1 AG UR QL IA: NEGATIVE
LYMPHOCYTES # BLD AUTO: 0.67 10*3/MM3 (ref 0.7–3.1)
LYMPHOCYTES NFR BLD AUTO: 5.5 % (ref 19.6–45.3)
Lab: ABNORMAL
Lab: ABNORMAL
MCH RBC QN AUTO: 27.7 PG (ref 26.6–33)
MCHC RBC AUTO-ENTMCNC: 34 G/DL (ref 31.5–35.7)
MCV RBC AUTO: 81.5 FL (ref 79–97)
METHGB BLD QL: 0.8 % (ref 0–1.5)
MODALITY: ABNORMAL
MONOCYTES # BLD AUTO: 0.69 10*3/MM3 (ref 0.1–0.9)
MONOCYTES NFR BLD AUTO: 5.6 % (ref 5–12)
NEUTROPHILS NFR BLD AUTO: 10.67 10*3/MM3 (ref 1.7–7)
NEUTROPHILS NFR BLD AUTO: 86.9 % (ref 42.7–76)
NOTIFIED BY: ABNORMAL
NOTIFIED WHO: ABNORMAL
NRBC BLD AUTO-RTO: 0 /100 WBC (ref 0–0.2)
OXYHGB MFR BLDV: 86.6 % (ref 94–99)
PCO2 BLDA: 37.4 MM HG (ref 35–45)
PCO2 TEMP ADJ BLD: ABNORMAL MM[HG]
PH BLDA: 7.47 PH UNITS (ref 7.35–7.45)
PH, TEMP CORRECTED: ABNORMAL
PHOSPHATE SERPL-MCNC: 3.2 MG/DL (ref 2.5–4.5)
PLATELET # BLD AUTO: 245 10*3/MM3 (ref 140–450)
PMV BLD AUTO: 11.4 FL (ref 6–12)
PO2 BLDA: 53.8 MM HG (ref 75–100)
PO2 TEMP ADJ BLD: ABNORMAL MM[HG]
POTASSIUM SERPL-SCNC: 3.6 MMOL/L (ref 3.5–5.2)
PROCALCITONIN SERPL-MCNC: 0.99 NG/ML (ref 0–0.25)
RBC # BLD AUTO: 3.68 10*6/MM3 (ref 4.14–5.8)
S PNEUM AG SPEC QL LA: NEGATIVE
SAO2 % BLDCOA: 87.7 % (ref 94–100)
SODIUM SERPL-SCNC: 135 MMOL/L (ref 136–145)
TIBC SERPL-MCNC: 219 MCG/DL (ref 298–536)
TRANSFERRIN SERPL-MCNC: 147 MG/DL (ref 200–360)
VENTILATOR MODE: ABNORMAL
WBC NRBC COR # BLD AUTO: 12.27 10*3/MM3 (ref 3.4–10.8)

## 2024-04-25 PROCEDURE — 83050 HGB METHEMOGLOBIN QUAN: CPT

## 2024-04-25 PROCEDURE — 25010000002 FUROSEMIDE PER 20 MG: Performed by: INTERNAL MEDICINE

## 2024-04-25 PROCEDURE — 71045 X-RAY EXAM CHEST 1 VIEW: CPT

## 2024-04-25 PROCEDURE — 83540 ASSAY OF IRON: CPT | Performed by: INTERNAL MEDICINE

## 2024-04-25 PROCEDURE — 36600 WITHDRAWAL OF ARTERIAL BLOOD: CPT

## 2024-04-25 PROCEDURE — 85025 COMPLETE CBC W/AUTO DIFF WBC: CPT | Performed by: INTERNAL MEDICINE

## 2024-04-25 PROCEDURE — 25010000002 AZITHROMYCIN PER 500 MG: Performed by: FAMILY MEDICINE

## 2024-04-25 PROCEDURE — 99232 SBSQ HOSP IP/OBS MODERATE 35: CPT | Performed by: INTERNAL MEDICINE

## 2024-04-25 PROCEDURE — 25810000003 SODIUM CHLORIDE 0.9 % SOLUTION: Performed by: FAMILY MEDICINE

## 2024-04-25 PROCEDURE — 82375 ASSAY CARBOXYHB QUANT: CPT

## 2024-04-25 PROCEDURE — 94799 UNLISTED PULMONARY SVC/PX: CPT

## 2024-04-25 PROCEDURE — 97116 GAIT TRAINING THERAPY: CPT

## 2024-04-25 PROCEDURE — 99232 SBSQ HOSP IP/OBS MODERATE 35: CPT | Performed by: FAMILY MEDICINE

## 2024-04-25 PROCEDURE — 63710000001 INSULIN GLARGINE PER 5 UNITS: Performed by: FAMILY MEDICINE

## 2024-04-25 PROCEDURE — 82948 REAGENT STRIP/BLOOD GLUCOSE: CPT

## 2024-04-25 PROCEDURE — 71250 CT THORAX DX C-: CPT

## 2024-04-25 PROCEDURE — 80069 RENAL FUNCTION PANEL: CPT | Performed by: INTERNAL MEDICINE

## 2024-04-25 PROCEDURE — 94664 DEMO&/EVAL PT USE INHALER: CPT

## 2024-04-25 PROCEDURE — 25010000002 HEPARIN (PORCINE) PER 1000 UNITS: Performed by: FAMILY MEDICINE

## 2024-04-25 PROCEDURE — 84145 PROCALCITONIN (PCT): CPT | Performed by: FAMILY MEDICINE

## 2024-04-25 PROCEDURE — 97530 THERAPEUTIC ACTIVITIES: CPT

## 2024-04-25 PROCEDURE — 82805 BLOOD GASES W/O2 SATURATION: CPT

## 2024-04-25 PROCEDURE — 94761 N-INVAS EAR/PLS OXIMETRY MLT: CPT

## 2024-04-25 PROCEDURE — 84466 ASSAY OF TRANSFERRIN: CPT | Performed by: INTERNAL MEDICINE

## 2024-04-25 PROCEDURE — 63710000001 INSULIN LISPRO (HUMAN) PER 5 UNITS: Performed by: FAMILY MEDICINE

## 2024-04-25 PROCEDURE — 82728 ASSAY OF FERRITIN: CPT | Performed by: INTERNAL MEDICINE

## 2024-04-25 PROCEDURE — 25010000002 CEFTRIAXONE PER 250 MG: Performed by: FAMILY MEDICINE

## 2024-04-25 RX ORDER — GUAIFENESIN 600 MG/1
600 TABLET, EXTENDED RELEASE ORAL EVERY 12 HOURS SCHEDULED
Status: DISCONTINUED | OUTPATIENT
Start: 2024-04-25 | End: 2024-04-27 | Stop reason: HOSPADM

## 2024-04-25 RX ORDER — IPRATROPIUM BROMIDE AND ALBUTEROL SULFATE 2.5; .5 MG/3ML; MG/3ML
3 SOLUTION RESPIRATORY (INHALATION) EVERY 4 HOURS PRN
Status: DISCONTINUED | OUTPATIENT
Start: 2024-04-25 | End: 2024-04-27 | Stop reason: HOSPADM

## 2024-04-25 RX ORDER — FUROSEMIDE 10 MG/ML
80 INJECTION INTRAMUSCULAR; INTRAVENOUS
Status: DISCONTINUED | OUTPATIENT
Start: 2024-04-25 | End: 2024-04-26

## 2024-04-25 RX ORDER — POTASSIUM CHLORIDE 20 MEQ/1
20 TABLET, EXTENDED RELEASE ORAL 2 TIMES DAILY WITH MEALS
Status: COMPLETED | OUTPATIENT
Start: 2024-04-25 | End: 2024-04-25

## 2024-04-25 RX ADMIN — Medication 2000 UNITS: at 08:12

## 2024-04-25 RX ADMIN — ISOSORBIDE DINITRATE 10 MG: 10 TABLET ORAL at 17:06

## 2024-04-25 RX ADMIN — RANOLAZINE 500 MG: 500 TABLET, FILM COATED, EXTENDED RELEASE ORAL at 20:18

## 2024-04-25 RX ADMIN — SODIUM CHLORIDE 2000 MG: 9 INJECTION, SOLUTION INTRAVENOUS at 17:48

## 2024-04-25 RX ADMIN — GUAIFENESIN 600 MG: 600 TABLET, EXTENDED RELEASE ORAL at 20:19

## 2024-04-25 RX ADMIN — SERTRALINE HYDROCHLORIDE 100 MG: 50 TABLET ORAL at 08:17

## 2024-04-25 RX ADMIN — RANOLAZINE 500 MG: 500 TABLET, FILM COATED, EXTENDED RELEASE ORAL at 08:12

## 2024-04-25 RX ADMIN — EMPAGLIFLOZIN 10 MG: 10 TABLET, FILM COATED ORAL at 08:12

## 2024-04-25 RX ADMIN — HYDRALAZINE HYDROCHLORIDE 75 MG: 25 TABLET, FILM COATED ORAL at 13:21

## 2024-04-25 RX ADMIN — BUDESONIDE AND FORMOTEROL FUMARATE DIHYDRATE 2 PUFF: 160; 4.5 AEROSOL RESPIRATORY (INHALATION) at 07:21

## 2024-04-25 RX ADMIN — ATORVASTATIN CALCIUM 40 MG: 40 TABLET, FILM COATED ORAL at 08:12

## 2024-04-25 RX ADMIN — INSULIN LISPRO 6 UNITS: 100 INJECTION, SOLUTION INTRAVENOUS; SUBCUTANEOUS at 11:27

## 2024-04-25 RX ADMIN — POTASSIUM CHLORIDE 20 MEQ: 1500 TABLET, EXTENDED RELEASE ORAL at 17:06

## 2024-04-25 RX ADMIN — INSULIN GLARGINE 15 UNITS: 100 INJECTION, SOLUTION SUBCUTANEOUS at 20:18

## 2024-04-25 RX ADMIN — PRASUGREL 5 MG: 10 TABLET, FILM COATED ORAL at 08:12

## 2024-04-25 RX ADMIN — PANTOPRAZOLE SODIUM 40 MG: 40 INJECTION, POWDER, FOR SOLUTION INTRAVENOUS at 05:16

## 2024-04-25 RX ADMIN — CARVEDILOL 6.25 MG: 6.25 TABLET, FILM COATED ORAL at 08:12

## 2024-04-25 RX ADMIN — INSULIN LISPRO 2 UNITS: 100 INJECTION, SOLUTION INTRAVENOUS; SUBCUTANEOUS at 20:18

## 2024-04-25 RX ADMIN — CETIRIZINE HYDROCHLORIDE 10 MG: 10 TABLET, FILM COATED ORAL at 08:12

## 2024-04-25 RX ADMIN — Medication 10 ML: at 20:53

## 2024-04-25 RX ADMIN — BUDESONIDE AND FORMOTEROL FUMARATE DIHYDRATE 2 PUFF: 160; 4.5 AEROSOL RESPIRATORY (INHALATION) at 18:19

## 2024-04-25 RX ADMIN — HEPARIN SODIUM 5000 UNITS: 5000 INJECTION INTRAVENOUS; SUBCUTANEOUS at 08:12

## 2024-04-25 RX ADMIN — FLUTICASONE PROPIONATE 1 SPRAY: 50 SPRAY, METERED NASAL at 08:10

## 2024-04-25 RX ADMIN — MAGNESIUM OXIDE TAB 400 MG (241.3 MG ELEMENTAL MG) 400 MG: 400 (241.3 MG) TAB at 08:12

## 2024-04-25 RX ADMIN — HEPARIN SODIUM 5000 UNITS: 5000 INJECTION INTRAVENOUS; SUBCUTANEOUS at 20:18

## 2024-04-25 RX ADMIN — INSULIN GLARGINE 15 UNITS: 100 INJECTION, SOLUTION SUBCUTANEOUS at 08:10

## 2024-04-25 RX ADMIN — TIOTROPIUM BROMIDE INHALATION SPRAY 2 PUFF: 3.12 SPRAY, METERED RESPIRATORY (INHALATION) at 07:22

## 2024-04-25 RX ADMIN — HYDRALAZINE HYDROCHLORIDE 75 MG: 25 TABLET, FILM COATED ORAL at 08:12

## 2024-04-25 RX ADMIN — CARVEDILOL 6.25 MG: 6.25 TABLET, FILM COATED ORAL at 17:06

## 2024-04-25 RX ADMIN — PRAMIPEXOLE DIHYDROCHLORIDE 0.5 MG: 0.25 TABLET ORAL at 20:19

## 2024-04-25 RX ADMIN — FUROSEMIDE 80 MG: 10 INJECTION, SOLUTION INTRAMUSCULAR; INTRAVENOUS at 17:06

## 2024-04-25 RX ADMIN — ASPIRIN 81 MG CHEWABLE TABLET 81 MG: 81 TABLET CHEWABLE at 08:12

## 2024-04-25 RX ADMIN — ISOSORBIDE DINITRATE 10 MG: 10 TABLET ORAL at 13:21

## 2024-04-25 RX ADMIN — HYDRALAZINE HYDROCHLORIDE 75 MG: 25 TABLET, FILM COATED ORAL at 17:06

## 2024-04-25 RX ADMIN — TAMSULOSIN HYDROCHLORIDE 0.4 MG: 0.4 CAPSULE ORAL at 08:12

## 2024-04-25 RX ADMIN — SODIUM CHLORIDE 500 MG: 900 INJECTION, SOLUTION INTRAVENOUS at 18:39

## 2024-04-25 RX ADMIN — FINASTERIDE 5 MG: 5 TABLET, FILM COATED ORAL at 08:12

## 2024-04-25 RX ADMIN — POTASSIUM CHLORIDE 20 MEQ: 1500 TABLET, EXTENDED RELEASE ORAL at 08:12

## 2024-04-25 RX ADMIN — ISOSORBIDE DINITRATE 10 MG: 10 TABLET ORAL at 08:12

## 2024-04-25 RX ADMIN — SPIRONOLACTONE 25 MG: 25 TABLET, FILM COATED ORAL at 08:12

## 2024-04-25 RX ADMIN — INSULIN LISPRO 2 UNITS: 100 INJECTION, SOLUTION INTRAVENOUS; SUBCUTANEOUS at 17:06

## 2024-04-25 RX ADMIN — FUROSEMIDE 80 MG: 10 INJECTION, SOLUTION INTRAMUSCULAR; INTRAVENOUS at 08:11

## 2024-04-25 RX ADMIN — Medication 10 ML: at 08:11

## 2024-04-25 NOTE — PROGRESS NOTES
[facility] Progress Note      Patient Name: Donny Jerry  : 1945  MRN: [id number]  Primary Care Physician:  Anum Alonso APRN  Date of admission: 2024    Subjective     Interval History:   F/u CKD3    Review of Systems:   UOP 1500+  Wt same 96 kg by bed scale  O2 weaned to 5L  Less dyspnea    Objective     Vitals:   Temp:  [97.7 °F (36.5 °C)-98.4 °F (36.9 °C)] 98.2 °F (36.8 °C)  Heart Rate:  [74-81] 74  Resp:  [20-28] 24  BP: (122-143)/(54-63) 127/61  Flow (L/min):  [8-15] 8    Intake/Output Summary (Last 24 hours) at 2024 0720  Last data filed at 2024 0331  Gross per 24 hour   Intake 600 ml   Output 1500 ml   Net -900 ml       Physical Exam:    General Appearance: more comfortable ; still bit tachypnic; on NC O2  Neck: supple, no JVD  Lungs: Dec BS bilat no accessory muscle use  Heart: RRR, normal S1 and S2  Abdomen: soft, nontender, nondistended  Extremities: no edema, cyanosis or clubbing  Neuro: normal speech and mental status     Scheduled Meds:     aspirin, 81 mg, Oral, Daily  atorvastatin, 40 mg, Oral, Daily  budesonide-formoterol, 2 puff, Inhalation, BID - RT   And  tiotropium bromide monohydrate, 2 puff, Inhalation, Daily - RT  carvedilol, 6.25 mg, Oral, BID With Meals  cetirizine, 10 mg, Oral, Daily  cholecalciferol, 2,000 Units, Oral, Daily  empagliflozin, 10 mg, Oral, Daily  finasteride, 5 mg, Oral, Daily  fluticasone, 1 spray, Nasal, Daily  furosemide, 40 mg, Intravenous, BID  heparin (porcine), 5,000 Units, Subcutaneous, Q12H  hydrALAZINE, 75 mg, Oral, TID  insulin glargine, 15 Units, Subcutaneous, Q12H  insulin lispro, 2-9 Units, Subcutaneous, 4x Daily AC & at Bedtime  isosorbide dinitrate, 10 mg, Oral, TID - Nitrates  magnesium oxide, 400 mg, Oral, Daily  pantoprazole, 40 mg, Intravenous, Q AM  pramipexole, 0.5 mg, Oral, Nightly  prasugrel, 5 mg, Oral, Daily  ranolazine, 500 mg, Oral, Q12H  sertraline, 100 mg, Oral, Daily  sodium  chloride, 10 mL, Intravenous, Q12H  spironolactone, 25 mg, Oral, Daily  tamsulosin, 0.4 mg, Oral, Daily      IV Meds:        Results Reviewed:   I have personally reviewed the results from the time of this admission to 4/25/2024 07:20 EDT     Results from last 7 days   Lab Units 04/25/24  0545 04/24/24  0632 04/23/24  0632 04/23/24  0025   SODIUM mmol/L 135* 137 140 137   POTASSIUM mmol/L 3.6 3.1* 3.7 4.1   CHLORIDE mmol/L 97* 100 105 102   CO2 mmol/L 23.2 25.4 23.6 22.0   BUN mg/dL 65* 55* 50* 45*   CREATININE mg/dL 2.10* 2.06* 1.92* 1.77*   CALCIUM mg/dL 8.7 8.5* 8.7 8.5*   BILIRUBIN mg/dL  --   --   --  0.7   ALK PHOS U/L  --   --   --  54   ALT (SGPT) U/L  --   --   --  23   AST (SGOT) U/L  --   --   --  22   GLUCOSE mg/dL 106* 109* 167* 203*     Estimated Creatinine Clearance: 32 mL/min (A) (by C-G formula based on SCr of 2.1 mg/dL (H)).  Results from last 7 days   Lab Units 04/25/24  0545 04/24/24  0632   MAGNESIUM mg/dL  --  2.2   PHOSPHORUS mg/dL 3.2  --          Results from last 7 days   Lab Units 04/25/24  0545 04/24/24  0632 04/23/24  0632 04/23/24  0025 04/22/24  1000   WBC 10*3/mm3 12.27* 12.36* 13.78* 16.18* 12.8*   HEMOGLOBIN g/dL 10.2* 9.9* 10.2* 10.8* 10.7*   PLATELETS 10*3/mm3 245 229 226 233 238           Assessment / Plan     ASSESSMENT:  CKD stage 3B - Cr up slightly 2.1 with diuresis, cardiorenal syn (BL 1.8 to 2 roughly) but need to allow for degree of prerenal azotemia with more aggressive diuresis.  No proteinuria on UA  Vol overload - CXR congested.  Minimal periph edema.  UOP response not tremendout but less dyspnea and able to wean O2 some.  Still on 5L.  Escalate lasix dose.  Hypokalemia, due to diuresis, K replaced up to 3.6  HTN - BP controlled, watch for over-correction, as nitrate & aldactone added.  I stopped tiny dose norvasc (2.5).  Hx orthostasis (+syncope).  We may need to consider ACE/ARB/entresto later if renal fcn remains stable  HFrEF, EF 45%.  Recent echo noted.  On SGLT2  inh (farxiga) and beta blocker.    Acute on chronic hypoxic resp failure, O2 weaned 10 to 5L  Anemia of CKD, hgb up slightly 10.2.  Iron def, TSAT < 10% but won't give IV iron due to vol overload     PLAN:  Inc lasix 80mg IV BID  Sched KCL 20 meq BID  Continue aldactone 25mg daily   May be able to transition to PO diuretic next 24 to 48h, favor torsemide in place of lasix      Quang Duke MD  04/25/24  07:20 EDT    [facility]  [phone]

## 2024-04-25 NOTE — PROGRESS NOTES
Pharmacokinetic Consult - Ceftriaxone and Azithromycin Dosing  Donny Jerry is a 79 y.o. male who has been consulted to dose Ceftriaxone and Azithromycin for  PNA .    Current Antimicrobial Therapy    Anti-Infectives (From admission, onward)      None            Microbiology Results (last 10 days)       Procedure Component Value - Date/Time    Blood Culture - Blood, Hand, Left [[id number]]  (Normal) Collected: 04/23/24 0100    Lab Status: Preliminary result Specimen: Blood from Hand, Left Updated: 04/25/24 0115     Blood Culture No growth at 2 days    Blood Culture - Blood, Hand, Right [[id number]]  (Normal) Collected: 04/23/24 0054    Lab Status: Preliminary result Specimen: Blood from Hand, Right Updated: 04/25/24 0115     Blood Culture No growth at 2 days    Respiratory Panel PCR w/COVID-19(SARS-CoV-2) [facility] In-House, NP Swab in UTM/VTM, 2 HR TAT - Swab, Nasopharynx [[id number]]  (Normal) Collected: 04/23/24 0041    Lab Status: Final result Specimen: Swab from Nasopharynx Updated: 04/23/24 0131     ADENOVIRUS, PCR Not Detected     Coronavirus 229E Not Detected     Coronavirus HKU1 Not Detected     Coronavirus NL63 Not Detected     Coronavirus OC43 Not Detected     COVID19 Not Detected     Human Metapneumovirus Not Detected     Human Rhinovirus/Enterovirus Not Detected     Influenza A PCR Not Detected     Influenza B PCR Not Detected     Parainfluenza Virus 1 Not Detected     Parainfluenza Virus 2 Not Detected     Parainfluenza Virus 3 Not Detected     Parainfluenza Virus 4 Not Detected     RSV, PCR Not Detected     Bordetella pertussis pcr Not Detected     Bordetella parapertussis PCR Not Detected     Chlamydophila pneumoniae PCR Not Detected     Mycoplasma pneumo by PCR Not Detected    Narrative:      In the setting of a positive respiratory panel with a viral infection PLUS a negative procalcitonin without other underlying concern for bacterial infection, consider observing off antibiotics or  discontinuation of antibiotics and continue supportive care. If the respiratory panel is positive for atypical bacterial infection (Bordetella pertussis, Chlamydophila pneumoniae, or Mycoplasma pneumoniae), consider antibiotic de-escalation to target atypical bacterial infection.             Allergies  Zocor [simvastatin], Bisoprolol, Byetta 10 mcg pen [exenatide], Crestor [rosuvastatin calcium], Metformin and related, and Plavix [clopidogrel bisulfate]    Relevant clinical data and objective history reviewed:  Creatinine   Date Value Ref Range Status   04/25/2024 2.10 (H) 0.76 - 1.27 mg/dL Final   10/19/2021 1.50 (H) 0.60 - 1.30 mg/dL Final     Comment:     Serial Number: [id number]:  [id number]     Estimated Creatinine Clearance: 32 mL/min (A) (by C-G formula based on SCr of 2.1 mg/dL (H)).  I/O last 3 completed shifts:  In: 600 [P.O.:600]  Out: 2650 [Urine:2650]  Patient weight: 96 kg (211 lb 10.3 oz)    Asessment/Plan  Initiate Ceftriaxone 2g IV every 24 hours x 5 days and Azithromycin 500mg IV every 24 hours x 3 days  Pharmacy will monitor Mr. Jerry's renal function and clinical status and adjust the Ceftriaxone and Azithromycin dose and/or frequency as needed.    Thanks,   Natasha Ngo, PharmD  4/25/2024  17:10 EDT

## 2024-04-25 NOTE — PLAN OF CARE
Goal Outcome Evaluation:  Plan of Care Reviewed With: patient, spouse        Progress: improving  Outcome Evaluation: Pt supine in bed and willing to participate with treatment.  Pt performed supine to sit EOB with min a as well as for  sit to stand.  Pt performed gait training 32' cga/min a withv rw. Pt requires vc's for proper body positioning within RW. Pt required 6L for mobility. Pt was able to maintain O2 in 90's with 6 L. Cont PT per POC progressing to goals as pt tolerates.

## 2024-04-25 NOTE — PROGRESS NOTES
[facility]    Name:  Donny Jerry   Age:  79 y.o.  Sex:  male  :  1945  MRN:  [id number]   Visit Number:  [id number]  Admission Date:  2024  Date Of Service:  24  Primary Care Physician:  Anum Alonso APRN     LOS: 0 days :    Chief Complaint:      Shortness of breath     Subjective:    Patient was seen and examined at bedside today.  Patient laying comfortably in bed, on BiPAP with FiO2 of 50%.  Patient reports doing well again today, denies shortness of breath or cough.  Patient without any pain or discomfort.  He denies any acute complaints.  Patient is afebrile, vitals are stable.  Pulmonology following.  Discussed with the patient management and treatment plan, will get CT chest for further evaluation.    Hospital Course:    The patient is a 79-year-old chronically ill gentleman with a history of advanced CAD status post CABG with multiple stents, chronic kidney disease, obstructive sleep apnea, hypertension, COPD, who presented to the emergency room with complaints of shortness of breath.  History obtained from ER record and from the patient.  He states a 3-day history of increasing breathing difficulty, nearly gasping at home.  He specifically denied any chest pain or chest pressure.  He does think he has had some swelling recently.  He feels like he is urinating per normal.  He follows with Dr. Russo as his cardiologist.  Has been told previously he is not a candidate for further intervention.  He is on antianginal therapy.  Currently feeling better.  Requesting something to eat and drink at time of visit.     In the ER, he was noted to be hypoxic on EMS arrival but is currently saturating at 100% on CPAP.  He had a heart rate in the 80s blood pressure 140/70 and afebrile.  Negative respiratory panel.  VBG unremarkable.  Lactic acid 1.2.  proBNP of 3900.  Creatinine 1.77.  Had a white count of 16 hemoglobin 10.8 and platelets  of 233.  Procalcitonin within normal limits.  D-dimer within normal limits.  Initial troponin 122, repeat 192.  Chest x-ray per my interpretation with cardiomegaly, interstitial edema, likely left-sided pleural effusion.  The patient was given IV Lasix.  We were asked to admit.       Review of Systems:     All systems were reviewed and negative except as mentioned in subjective, assessment and plan.    Vital Signs:    Temp:  [97.7 °F (36.5 °C)-98.4 °F (36.9 °C)] 98.1 °F (36.7 °C)  Heart Rate:  [74-81] 77  Resp:  [22-28] 22  BP: (122-143)/(55-63) 131/55    Intake and output:    I/O last 3 completed shifts:  In: 600 [P.O.:600]  Out: 2650 [Urine:2650]  I/O this shift:  In: 360 [P.O.:360]  Out: 700 [Urine:700]    Physical Examination:    General Appearance:  Alert and cooperative.  Chronically ill-appearing.   Head:  Atraumatic and normocephalic.   Eyes: Conjunctivae and sclerae normal, no icterus. No pallor.   Throat: No oral lesions, no thrush, oral mucosa moist.   Neck: Supple, trachea midline, no thyromegaly.   Lungs:   Breath sounds heard bilaterally equally.  No wheezing.  Bilateral rhonchi. No Pleural rub or bronchial breathing.  Mildly labored while talking. On supplemental oxygen.   Heart:  Normal S1 and S2, no murmur, no gallop, no rub. No JVD.   Abdomen:   Normal bowel sounds, no masses, no organomegaly. Soft, nontender, nondistended, no rebound tenderness.   Extremities: Supple, +1 bilateral lower extremities edema, no cyanosis, no clubbing.   Skin: No bleeding or rash.   Neurologic: Alert and oriented x 3. No facial asymmetry. Moves all four limbs. No tremors.      Laboratory results:    Results from last 7 days   Lab Units 04/25/24  0545 04/24/24  0632 04/23/24  0632 04/23/24  0025   SODIUM mmol/L 135* 137 140 137   POTASSIUM mmol/L 3.6 3.1* 3.7 4.1   CHLORIDE mmol/L 97* 100 105 102   CO2 mmol/L 23.2 25.4 23.6 22.0   BUN mg/dL 65* 55* 50* 45*   CREATININE mg/dL 2.10* 2.06* 1.92* 1.77*   CALCIUM mg/dL 8.7  8.5* 8.7 8.5*   BILIRUBIN mg/dL  --   --   --  0.7   ALK PHOS U/L  --   --   --  54   ALT (SGPT) U/L  --   --   --  23   AST (SGOT) U/L  --   --   --  22   GLUCOSE mg/dL 106* 109* 167* 203*     Results from last 7 days   Lab Units 04/25/24  0545 04/24/24  0632 04/23/24  0632   WBC 10*3/mm3 12.27* 12.36* 13.78*   HEMOGLOBIN g/dL 10.2* 9.9* 10.2*   HEMATOCRIT % 30.0* 29.3* 30.7*   PLATELETS 10*3/mm3 245 229 226         Results from last 7 days   Lab Units 04/23/24  0632 04/23/24  0229 04/23/24  0025   HSTROP T ng/L 436* 192* 122*     Results from last 7 days   Lab Units 04/23/24  0100 04/23/24  0054   BLOODCX  No growth at 2 days No growth at 2 days     Recent Labs     04/25/24  0736   PHART 7.466*   EAD3LXN 37.4   PO2ART 53.8*   DGC8IFT 27.0   BASEEXCESS 3.1*      I have reviewed the patient's laboratory results.    Radiology results:    XR Chest 1 View    Result Date: 4/25/2024  PROCEDURE: XR CHEST 1 VW-  HISTORY: Resp Failure.; J96.01-Acute respiratory failure with hypoxia; I50.9-Heart failure, unspecified; D72.829-Elevated white blood cell count, unspecified; N18.9-Chronic kidney disease, unspecified; I50.23-Acute on chronic systolic (congestive) heart failure  COMPARISON: 04/24/2024  FINDINGS:  Portable view of the chest demonstrates persistent rounded opacity of the right upper lobe compatible with pneumonia. This shows minimal improvement. Pulmonary edema has improved. Minimal pleural effusions are noted. Vascular congestion remains. The mediastinum is unremarkable.  The heart size is normal. Patient is status post CABG.      Impression: 1. Minimal improvement right upper lobe pneumonia. 2. Pulmonary edema improved.  This report was signed and finalized on 4/25/2024 8:11 AM by Brett Hernandez MD.      XR Chest 1 View    Result Date: 4/24/2024  FINAL REPORT CLINICAL HISTORY: Hypoxia follow-up FINDINGS: 1 VIEW CHEST  heart is normal in size.  The mediastinum is unremarkable.  There is extensive perihilar with more  focal upper lobe opacity.  The osseous structures are unremarkable. The patient is status post median sternotomy.     Impression: Opacity as above likely secondary to pneumonia.  Follow-up to complete resolution recommended. Authenticated and Electronically Signed by David Arechiga MD on 04/24/2024 05:41:57 PM   I have reviewed the patient's radiology reports.    Medication Review:     I have reviewed the patient's active and prn medications.     Problem List:      CHF exacerbation    Heart failure      Assessment:    Suspected acute on chronic heart failure reduced ejection fraction exacerbation, POA  Acute respiratory failure with hypoxia, POA  Chronic kidney disease stage IIIb, POA  Multifocal pneumonia, unable to specify further 4/25  CAD with prior CABG  Insulin-dependent diabetes  Obstructive sleep apnea  Hypertension    Plan:    Heart failure reduced ejection fraction exacerbation:  -Placed patient on IV diuretic therapy with 40 mg Lasix twice a day.    -Will continue his home hydralazine, Lipitor, aspirin, carvedilol, SGLT2 inhibitor and Ranexa.    -Not a candidate for Entresto/spironolactone due to renal failure.    -Reviewed recent 2D echocardiogram.    -Placed on fluid and sodium restrictions.   -Monitor intake and output.  -Cardiology consulted and recommended close diuretics with fluid restriction.   -ISDN 10 mg orally 3 times per day to be taken in combination with his hydralazine 75 mg orally 3 times per day.   -Outpatient follow-up with his established cardiologist Dr. Russo.      Respiratory failure with hypoxia  Pneumonia:  -Suspect related to volume overload in the setting of severe congestive heart failure and CAD.  Monitor with treatment and diuresis.  -Supplemental oxygen to keep saturation above 90%, titrate down as able to.  -Monitor with chest x-ray and ABG  -Pulmonology consulted, appreciate recommendations  -CT chest without contrast showed multifocal pneumonia greatest right  upper lobe. Small bilateral pleural effusions.  -Discussed with Dr. Styles, appreciate recommendations  -Pro-Virgilio elevated, afebrile  -Will start patient on azithromycin and Rocephin for pneumonia coverage     Elevated troponin/CAD:  -Cannot definitively rule out ACS as he has known obstructive disease, however with no obvious new EKG changes or chest pain complaint, will not treat as ACS.  Of note, has been deemed to have no further vessels amenable to revascularization, previously had CABG.  Medical treatment otherwise recommended.  -Cardiology has evaluated the patient and recommended outpatient follow-up with his established cardiologist Dr. Russo.     CKD 3B  - Consulted nephrology, appreciate recommendations on diuretics and fluid management  -Avoid nephrotoxic drugs     Diabetes/USHA/hypertension:  -Ordered sliding scale insulin and long-acting insulin.    -Blood pressure management as noted above.       Further recommendation depend upon clinical course.     I have reviewed the copied text and it is accurate as of 4/25/2024      DVT Prophylaxis: Heparin  Code Status: DNR  Diet: Carb consistent/renal/cardiac  Discharge Plan: To be determined, several days    Jono Rg MD  04/25/24  11:33 EDT    Dictated utilizing Dragon dictation.

## 2024-04-25 NOTE — PROGRESS NOTES
"  Date of service:   April 25, 2024    PCP: Anum Alonso APRN    Reason:  Acute hypoxic respiratory failure    Subjective: Patient states overall he is feeling much better.  He was able to use NIV yesterday, but currently on nasal cannula.  He does have a cough which is mostly nonproductive.  Denies any fevers or chills or hemoptysis..  His blood gas normalized.  He denies any chest pain or palpitations.    Review of System: All other review of systems negative except indicated in HPI       Social History: Pertinent history reviewed, as appropriate. Negative, except noted below or in HPI. If this history could not be obtained due to a reason, the reason is listed in the HPI.  Social History     Socioeconomic History    Marital status:    Tobacco Use    Smoking status: Never    Smokeless tobacco: Never   Vaping Use    Vaping status: Never Used   Substance and Sexual Activity    Alcohol use: No    Drug use: No    Sexual activity: Defer         Intake/Output Summary (Last 24 hours) at 4/25/2024 1731  Last data filed at 4/25/2024 1300  Gross per 24 hour   Intake 720 ml   Output 1750 ml   Net -1030 ml         Physical Exam:  /73   Pulse 71   Temp 98.1 °F (36.7 °C) (Oral)   Resp 22   Ht 170.2 cm (67\")   Wt 96 kg (211 lb 10.3 oz)   SpO2 100%   BMI 33.15 kg/m²     Constitutional:            Vital signs reviewed                     General: No acute distress noted. Able to speak in complete sentences, appears chronically ill  Eyes:            Extraocular movement was intact.            Pupils appeared equal    ENT:             Hearing was slightly impaired              No nasal erythema noted.              Oropharynx was moist.    Neck:             Supple.  Unable to appreciate significant JVD           Cardiovascular:              S1 + S2. Regular rate.  CABG scar noted.    Lungs/Respiratory:            Good air movement, minimal bibasilar crackles, unable to appreciate rhonchi    Abdomen/GI:     " "       Obese             Bowel sounds positive, nontender to palpation    Musculoskeletal/Extremities:             No clubbing, cyanosis noted in the upper extremities.             Trace pedal edema noted in the lower extremities bilaterally.    Neurologic:             Awake, alert and oriented x 3.             Able to follow simple commands.    Psychiatric:             Affect appeared good.             Awake, alert and oriented x 3.    Skin:             No rash noted.             Warm and dry      Labs: Reviewed. Pertinent labs were noted.   Results from last 7 days   Lab Units 04/25/24  0545 04/24/24  0632 04/23/24  0632 04/23/24  0025 04/22/24  1000   WBC 10*3/mm3 12.27* 12.36* 13.78* 16.18* 12.8*   HEMOGLOBIN g/dL 10.2* 9.9* 10.2* 10.8* 10.7*   HEMATOCRIT % 30.0* 29.3* 30.7* 32.1* 33.3*   PLATELETS 10*3/mm3 245 229 226 233 238   NEUTROPHIL % % 86.9*  --   --  92.0*  --    NEUTROS ABS 10*3/mm3 10.67*  --   --  14.87*  --    EOSINOPHIL % % 1.1  --   --  0.1*  --    EOS ABS 10*3/mm3 0.13  --   --  0.02  --    LYMPHOCYTE % % 5.5*  --   --  2.0*  --    LYMPHS ABS 10*3/mm3 0.67*  --   --  0.33*  --        Lab Results   Component Value Date    PROCALCITO 0.99 (H) 04/25/2024    PROCALCITO 0.10 04/23/2024    PROCALCITO 0.09 12/18/2023       No results found for: \"CRP\"    No results found for: \"SEDRATE\"    Lab Results   Component Value Date    PROBNP 3,938.0 (H) 04/23/2024    PROBNP 5,539.0 (H) 01/11/2024    PROBNP 4,683.0 (H) 01/09/2024       Results from last 7 days   Lab Units 04/25/24  0545 04/24/24  0632 04/23/24  0632 04/23/24  0025   SODIUM mmol/L 135* 137 140 137   POTASSIUM mmol/L 3.6 3.1* 3.7 4.1   CHLORIDE mmol/L 97* 100 105 102   CO2 mmol/L 23.2 25.4 23.6 22.0   BUN mg/dL 65* 55* 50* 45*   CREATININE mg/dL 2.10* 2.06* 1.92* 1.77*   CALCIUM mg/dL 8.7 8.5* 8.7 8.5*   ANION GAP mmol/L 14.8 11.6 11.4 13.0   BILIRUBIN mg/dL  --   --   --  0.7   ALK PHOS U/L  --   --   --  54   ALT (SGPT) U/L  --   --   --  23   AST " "(SGOT) U/L  --   --   --  22   GLUCOSE mg/dL 106* 109* 167* 203*   TOTAL PROTEIN g/dL  --   --   --  6.7   ALBUMIN g/dL 3.4*  --   --  3.9       Results from last 7 days   Lab Units 04/25/24  0545 04/24/24  0632   MAGNESIUM mg/dL  --  2.2   PHOSPHORUS mg/dL 3.2  --        Lab Results   Component Value Date    TSH 1.630 04/23/2024    TSH 2.25 04/11/2024    TSH 2.510 01/11/2024       No results found for: \"FREET4\"    Lab Results   Component Value Date    INR 1.00 07/17/2023    INR 1.01 10/19/2021       No results found for: \"CKTOTAL\"    No components found for: \"HSTROPT\"    Lab Results   Component Value Date    TROPONINT 436 (C) 04/23/2024    TROPONINT 192 (C) 04/23/2024    TROPONINT 122 (C) 04/23/2024       No results found for: \"DDIMER\"    No results found for: \"LIPASE\"    Brief Urine Lab Results  (Last result in the past 365 days)        Color   Clarity   Blood   Leuk Est   Nitrite   Protein   CREAT   Urine HCG        04/24/24 2117 Yellow   Clear   Negative   Negative   Negative   Negative                     Micro: As of April 25, 2024   No results found for: \"RESPCX\"  No results found for: \"BCIDPCR\"  Lab Results   Component Value Date    BLOODCX No growth at 2 days 04/23/2024    BLOODCX No growth at 2 days 04/23/2024     Lab Results   Component Value Date    URINECX >100,000 CFU/mL Serratia marcescens (A) 03/01/2024    URINECX No growth 05/18/2023     No results found for: \"MRSACX\"  No results found for: \"MRSAPCR\"  No results found for: \"URCX\"  No components found for: \"LOWRESPCF\"  No results found for: \"THROATCX\"  No results found for: \"CULTURES\"  No components found for: \"STREPBCX\"  No results found for: \"STREPPNEUAG\"  No results found for: \"LEGIONELLA\"  No results found for: \"MYCOPLASCX\"  No results found for: \"GCCX\"  No results found for: \"WOUNDCX\"  No results found for: \"BODYFLDCX\"    No results found for: \"FLU\"    Lab Results   Component Value Date    ADENOVIRUS Not Detected 04/23/2024     Lab Results " "  Component Value Date    TI738M Not Detected 04/23/2024     Lab Results   Component Value Date    CVHKU1 Not Detected 04/23/2024     Lab Results   Component Value Date    CVNL63 Not Detected 04/23/2024     Lab Results   Component Value Date    CVOC43 Not Detected 04/23/2024     Lab Results   Component Value Date    HUMETPNEVS Not Detected 04/23/2024     Lab Results   Component Value Date    HURVEV Not Detected 04/23/2024     Lab Results   Component Value Date    FLUBPCR Not Detected 04/23/2024     Lab Results   Component Value Date    PARAINFLUE Not Detected 04/23/2024     Lab Results   Component Value Date    PARAFLUV2 Not Detected 04/23/2024     Lab Results   Component Value Date    PARAFLUV3 Not Detected 04/23/2024     Lab Results   Component Value Date    PARAFLUV4 Not Detected 04/23/2024     Lab Results   Component Value Date    BPERTPCR Not Detected 04/23/2024     No results found for: \"[id number]\"  Lab Results   Component Value Date    CPNEUPCR Not Detected 04/23/2024     Lab Results   Component Value Date    MPNEUMO Not Detected 04/23/2024     Lab Results   Component Value Date    FLUAPCR Not Detected 04/23/2024     No results found for: \"FLUAH3\"  No results found for: \"FLUAH1\"  Lab Results   Component Value Date    RSV Not Detected 04/23/2024     Lab Results   Component Value Date    BPARAPCR Not Detected 04/23/2024       COVID 19:  Lab Results   Component Value Date    COVID19 Not Detected 04/23/2024           No results found for: \"THCURSCR\"  No results found for: \"PCPUR\"  No results found for: \"COCAINEUR\"  No results found for: \"METAMPSCNUR\"  No results found for: \"LABOPIASCN\"  No results found for: \"AMPHETSCREEN\"  No results found for: \"LABBENZSCN\"  No results found for: \"TRICYCLICSCN\"  No results found for: \"LABMETHSCN\"  No results found for: \"BARBITSCNUR\"  No results found for: \"OXYCODONESCN\"  No results found for: \"PROPOXSCN\"  No results found for: \"BUPRENORSCNU\"  No results found for: " "\"ETHANOLMGDL\"  No results found for: \"ETOHPCT\"      ABG: Reviewed   Lab Results   Component Value Date    PHART 7.466 (H) 04/25/2024    PHART 7.442 04/23/2023    LYW5HWS 37.4 04/25/2024    KES3VHT 35.6 04/23/2023    PO2ART 53.8 (L) 04/25/2024    PO2ART 57.8 (L) 04/23/2023    Q7EWZZUK 87.7 (L) 04/25/2024    J2HQAKSB 91.7 (L) 04/23/2023    CARBOXYHGB 0.4 04/25/2024    CARBOXYHGB 0.7 04/23/2024    CARBOXYHGB 1.0 04/23/2023       Lab Results   Component Value Date    LACTATE 1.2 04/23/2024    LACTATE 1.1 04/23/2023         Imaging Study: Latest imaging studies was reviewed personally.   Imaging Results (Last 72 Hours)       Procedure Component Value Units Date/Time    CT Chest Without Contrast Diagnostic [[id number]] Collected: 04/25/24 1601     Updated: 04/25/24 1625    Narrative:         PROCEDURE: CT CHEST WO CONTRAST DIAGNOSTIC-     HISTORY: Pneumonia, complication suspected, xray done; J96.01-Acute  respiratory failure with hypoxia; I50.9-Heart failure, unspecified;  D72.829-Elevated white blood cell count, unspecified; N18.9-Chronic  kidney disease, unspecified; I50.23-Acute on chronic systolic  (congestive) heart failure     COMPARISON: 09/06/2022 high-resolution chest exam.     TECHNIQUE: Axial CT without IV contrast administration.     FINDINGS:     Extensive consolidation of the right upper lobe is noted. There is mild  consolidation and patchy airspace disease bilateral lower lobes.  Moderate left lower lobe atelectasis is noted. Left upper lobe is clear.     Small bilateral pleural effusions are noted. Mild right paratracheal  adenopathy is seen probably reactive. Advanced coronary artery calcified  plaque disease is noted..       Impression:      1. Multifocal pneumonia greatest right upper lobe.  2. No evidence of cavitary/necrotizing pneumonia or obvious lung mass.  3. Small bilateral pleural effusions.        This study was performed with techniques to keep radiation doses as low  as reasonably achievable " (ALARA). Individualized dose reduction  techniques using automated exposure control or adjustment of vA and/or  kV according to the patient size were employed.      This report was signed and finalized on 4/25/2024 4:23 PM by Brett Hernandez MD.       XR Chest 1 View [[id number]] Collected: 04/25/24 0724     Updated: 04/25/24 0813    Narrative:      PROCEDURE: XR CHEST 1 VW-     HISTORY: Resp Failure.; J96.01-Acute respiratory failure with hypoxia;  I50.9-Heart failure, unspecified; D72.829-Elevated white blood cell  count, unspecified; N18.9-Chronic kidney disease, unspecified;  I50.23-Acute on chronic systolic (congestive) heart failure     COMPARISON: 04/24/2024     FINDINGS:  Portable view of the chest demonstrates persistent rounded  opacity of the right upper lobe compatible with pneumonia. This shows  minimal improvement. Pulmonary edema has improved. Minimal pleural  effusions are noted. Vascular congestion remains. The mediastinum is  unremarkable.     The heart size is normal. Patient is status post CABG.       Impression:      1. Minimal improvement right upper lobe pneumonia.  2. Pulmonary edema improved.     This report was signed and finalized on 4/25/2024 8:11 AM by Brett Hernandez MD.       XR Chest 1 View [[id number]] Collected: 04/24/24 1741     Updated: 04/24/24 1742    Narrative:      FINAL REPORT    CLINICAL HISTORY:  Hypoxia follow-up    FINDINGS:  1 VIEW CHEST  heart is normal in size.  The mediastinum is  unremarkable.  There is extensive perihilar with more focal  upper lobe opacity.  The osseous structures are unremarkable.  The patient is status post median sternotomy.      Impression:      Opacity as above likely secondary to pneumonia.  Follow-up to  complete resolution recommended.    Authenticated and Electronically Signed by David Arechiga MD  on 04/24/2024 05:41:57 PM    XR Chest 1 View [[id number]] Collected: 04/23/24 0917     Updated: 04/23/24 1132    Narrative:      PROCEDURE: XR  CHEST 1 VW-     HISTORY: [facility] Triage Protocol     COMPARISON: 01/13/2024     FINDINGS:  Portable view of the chest demonstrates pulmonary vascular  congestion. There is no evidence of effusion, pneumothorax or other  significant pleural disease. Patient is status post CABG.     The heart size is normal.       Impression:      Findings suggestive of mild CHF.           This report was signed and finalized on 4/23/2024 11:30 AM by Brett Hernandez MD.               ECHO:  Results for orders placed during the hospital encounter of 01/11/24    Adult Transthoracic Echo Complete w/ Color, Spectral and Contrast if necessary per protocol    Interpretation Summary    Left ventricular systolic function is low normal. Calculated left ventricular EF = 45.8% Left ventricular ejection fraction appears to be 46 - 50%.    Left ventricular wall thickness is consistent with mild concentric hypertrophy.    Left ventricular diastolic function is consistent with (grade II w/high LAP) pseudonormalization.    Left atrial volume is moderately increased.    Mild aortic valve stenosis is present.    Estimated right ventricular systolic pressure from tricuspid regurgitation is normal (<35 mmHg).    No significant change compared to 7/2023 echo      Results for orders placed during the hospital encounter of 07/17/23    Adult Transthoracic Echo Complete W/ Cont if Necessary Per Protocol    Interpretation Summary    Left ventricular systolic function is normal. Estimated left ventricular EF = 50% Left ventricular ejection fraction appears to be 46 - 50%.    Left ventricular wall thickness is consistent with mild concentric hypertrophy.    Left ventricular diastolic function is consistent with (grade II w/high LAP) pseudonormalization.    The left atrial cavity is mildly dilated.    Left atrial volume is mildly increased.    Mild aortic valve stenosis is present.    Estimated right ventricular systolic pressure from tricuspid regurgitation is normal  (<35 mmHg).      Results for orders placed during the hospital encounter of 06/29/23    Adult Transthoracic Echo Complete W/ Cont if Necessary Per Protocol    Interpretation Summary  1.  Normal left ventricular size with mildly reduced LV systolic function, LVEF 45-50%.  2.  Mild concentric LVH.  3.  Grade 1 diastolic dysfunction.  4.  Normal right ventricular size and systolic function.  5.  Normal left atrial volume index.  6.  Mild calcification aortic valve without significant stenosis.  7.  Mild PI.        Pharmacy Consult - Pharmacy to dose,             Assessment:  1.  Acute hypoxic respiratory failure with right-sided pneumonia  Was able to wean oxygen over the last 24 hours, but still with persistent white count and increasing procalcitonin level.  Reviewed chest x-rays with primary service as well as old CT scan.  Despite diuresis right upper lobe appears more prominent opacity.  Decided to go ahead and check CT scan given chest x-ray findings and continued leukocytosis.  Shows multifocal pneumonia with previous consolidation right upper lobe.  Respiratory viral panel negative.  Check Legionella, strep pneumo and sputum cultures to help guide antibiotic therapy.   Currently on Rocephin and azithromycin    2.  Heart failure with reduced ejection fraction exacerbation   Agree with diuresis as tolerated especially given effusions on CT scan  Continue with fluid and sodium restrictions and continue to monitor I's and O's    3.  Acute renal failure  Creatinine trending up.  Appreciate nephrology's input and guidance on diuresis    4.  Severe obstructive sleep apnea  Use NIV nightly and as needed    5.  Obesity  Complicates all aspects of his care    6.  Moderate persistent asthma      All relevant recommendations were, and will be, discussed with Jono Rg MD, as indicated    We have updated the admitting attending and nursing staff, as appropriate, on the patient's current status and plan. I will be  going off shift tonight and will be unavailable. Please contact oncall pulmonologist, if available.        This document was electronically signed by Olivia Styles MD on 04/25/24 at 17:28 EDT      Dictated utilizing Dragon dictation.

## 2024-04-25 NOTE — THERAPY TREATMENT NOTE
Patient Name: Donny Jerry  : 1945    MRN: [id number]                              Today's Date: 2024       Admit Date: 2024    Visit Dx:     ICD-10-CM ICD-9-CM   1. Acute respiratory failure with hypoxia  J96.01 518.81   2. Acute on chronic congestive heart failure, unspecified heart failure type  I50.9 428.0   3. Leukocytosis, unspecified type  D72.829 288.60   4. Chronic kidney disease, unspecified CKD stage  N18.9 585.9   5. Acute on chronic systolic congestive heart failure  I50.23 428.23     428.0     Patient Active Problem List   Diagnosis    Coronary artery disease involving native coronary artery of native heart with angina pectoris    Essential hypertension    Hyperlipidemia LDL goal <70    Type 2 diabetes mellitus with stage 3 chronic kidney disease    Obstructive sleep apnea on CPAP    Enlarged prostate    Obesity, Class II, BMI 35-39.9    Depression    Acute hypoxemic respiratory failure due to COVID-19    CKD (chronic kidney disease) stage 3, GFR 30-59 ml/min    Elevated troponin level not due myocardial infarction    Post viral debility    Chest pain, unspecified type    Chronic systolic heart failure    Type 1 myocardial infarction    Chronic diastolic heart failure    Bilateral lower extremity edema    Acute exacerbation of congestive heart failure    Acute on chronic HFrEF (heart failure with reduced ejection fraction)    Acute on chronic systolic heart failure    Syncope    CHF exacerbation    Heart failure     Past Medical History:   Diagnosis Date    Benign hypertension     Coronary artery disease     Depression     Enlarged prostate     Enlarged prostate with anticipated TURP with Dr. Bernal.    GERD (gastroesophageal reflux disease)     Heart attack     Hypercholesterolemia     Impaired functional mobility, balance, gait, and endurance     Myocardial infarction     Obesity     Obstructive sleep apnea on CPAP     Type 2 diabetes mellitus      Past Surgical History:    Procedure Laterality Date    CARDIAC CATHETERIZATION      CARDIAC CATHETERIZATION Left 10/19/2021    Procedure: Left Heart Cath;  Surgeon: Liz Russo MD;  Location:  [facility];  Service: Cardiology;  Laterality: Left;    CARDIAC CATHETERIZATION N/A 7/18/2023    Procedure: Coronary angiography;  Surgeon: Rupesh Parr MD;  Location:  [facility];  Service: Cardiology;  Laterality: N/A;    CARDIAC CATHETERIZATION N/A 7/18/2023    Procedure: Percutaneous Coronary Intervention;  Surgeon: Rupesh Parr MD;  Location:  [facility];  Service: Cardiology;  Laterality: N/A;    COLONOSCOPY W/ POLYPECTOMY      CORONARY ANGIOPLASTY WITH STENT PLACEMENT Left 06/03/2009    Left heart catheterization, 06/03/2009, Dr. Russo:  LVEF 45% to 50%.  Placement of overlapping Cypher drug-eluting stent 2.5 x 28 and 2.5 x 18 to the SVG to the obtuse marginal branch.  SVG to the PDA had a proximal stenosis estimated at 60% with a distal stenosis of 50% to 60%.    CORONARY ANGIOPLASTY WITH STENT PLACEMENT Left 07/06/2009    Left heart catheterization, 07/06/2009:  PTCA and stent placement in the mid PDA using a 2.25 x 12 mm Taxus drug-eluting stent with stent placement in the distal SVG to the PDA using a 2.5 x 18 mm Cypher drug-eluting stent and stenting of the proximal SVG to the PDA using a 3.0 x 28 mm Cypher drug-eluting stent.    CORONARY ANGIOPLASTY WITH STENT PLACEMENT  04/23/2010    Cardiac catheterization for recurrent anginal symptoms, 04/23/2010, with PTCA and stent placement in the proximal SVG to the PDA using 3.0 x 28 mm Promus drug-eluting stent for in-stent restenosis.    CORONARY ANGIOPLASTY WITH STENT PLACEMENT Left 07/06/2010    Left heart catheterization, 07/06/2010, Dr. Russo:  EF 40% to 45%.  3.5 x 23 mm Promus drug-eluting stent in the proximal SVG to OM, 2.5 x 18 mm Promus drug-eluting stent to distal SVG to PDA due to in-stent  restenosis.      CORONARY ANGIOPLASTY WITH STENT PLACEMENT Left 11/16/2010    Left heart catheterization, 11/16/2010, with placement of a 3.0 x 16 mm Taxus drug-eluting stent to the SVG to the RCA proximally and a 2.5 x 16 mm paclitaxel stent distally in the SVG to the RCA.    CORONARY ANGIOPLASTY WITH STENT PLACEMENT Left     Left heart catheterization, 3.0 x 24-mm Promus element drug-eluting stent to a 90% lesion in the mid portion of the SVG to the PDA.     CORONARY ANGIOPLASTY WITH STENT PLACEMENT Left 06/24/2014    Left heart catheterization for recurrent angina, 06/24/2014, Dr. Russo:  PTCA of the SVG to the PDA using a 3 x 12 mm NC TREK balloon.      CORONARY ARTERY BYPASS GRAFT      CABG x3, Dr. Peng, [facility], 2001.      General Information       Row Name 04/25/24 [facility]          Physical Therapy Time and Intention    Document Type therapy note (daily note)  -RM     Mode of Treatment physical therapy  -RM       Row Name 04/25/24 [facility]          General Information    Patient Profile Reviewed yes  -RM     Existing Precautions/Restrictions fall;oxygen therapy device and L/min  6L  -RM       Row Name 04/25/24 1353          Cognition    Orientation Status (Cognition) oriented x 4  -RM       Row Name 04/25/24 1353          Safety Issues, Functional Mobility    Safety Issues Affecting Function (Mobility) sequencing abilities;safety precaution awareness;safety precautions follow-through/compliance;positioning of assistive device  -RM     Impairments Affecting Function (Mobility) balance;endurance/activity tolerance;shortness of breath;strength  -RM               User Key  (r) = Recorded By, (t) = Taken By, (c) = Cosigned By      Initials Name Provider Type    RM Martin Shukla, PTA Physical Therapist Assistant                   Mobility    No documentation.                  Obj/Interventions    No documentation.                  Goals/Plan    No documentation.                  Clinical  Impression       Row Name 04/25/24 1359          Pain    Pretreatment Pain Rating 0/10 - no pain  -RM     Posttreatment Pain Rating 0/10 - no pain  -RM       Row Name 04/25/24 8022          Plan of Care Review    Plan of Care Reviewed With patient;spouse  -RM     Progress improving  -RM     Outcome Evaluation Pt supine in bed and willing to participate with treatment.  Pt performed supine to sit EOB with min a as well as for  sit to stand.  Pt performed gait training 32' cga/min a withv rw. Pt requires vc's for proper body positioning within RW. Pt required 6L for mobility. Pt was able to maintain O2 in 90's with 6 L. Cont PT per POC progressing to goals as pt tolerates.  -RM       Row Name 04/25/24 0378          Vital Signs    Pre SpO2 (%) 88  -RM     O2 Delivery Pre Treatment supplemental O2  -RM     Intra SpO2 (%) 95  -RM     O2 Delivery Intra Treatment supplemental O2  -RM     Post SpO2 (%) 97  -RM     O2 Delivery Post Treatment supplemental O2  -RM     Pre Patient Position Supine  -RM     Intra Patient Position Standing  -RM     Post Patient Position Sitting  -RM       Row Name 04/25/24 8886          Positioning and Restraints    Pre-Treatment Position in bed  -RM     Post Treatment Position chair  -RM     In Chair sitting;call light within reach;encouraged to call for assist;notified nsg;with family/caregiver  -               User Key  (r) = Recorded By, (t) = Taken By, (c) = Cosigned By      Initials Name Provider Type    RM Martin Shukla, PTA Physical Therapist Assistant                   Outcome Measures       Row Name 04/25/24 1408 04/25/24 0800       How much help from another person do you currently need...    Turning from your back to your side while in flat bed without using bedrails? 3  -RM 3  -LA    Moving from lying on back to sitting on the side of a flat bed without bedrails? 3  -RM 3  -LA    Moving to and from a bed to a chair (including a wheelchair)? 3  -RM 3  -LA    Standing up from a  chair using your arms (e.g., wheelchair, bedside chair)? 3  -RM 3  -LA    Climbing 3-5 steps with a railing? 2  -RM 2  -LA    To walk in hospital room? 3  -RM 3  -LA    AM-PAC 6 Clicks Score (PT) 17  -RM 17  -LA    Highest Level of Mobility Goal 5 --> Static standing  -RM 5 --> Static standing  -LA              User Key  (r) = Recorded By, (t) = Taken By, (c) = Cosigned By      Initials Name Provider Type     Martin Shukla, BJORN Physical Therapist Assistant    Elaine Saleem RN Registered Nurse                                 Physical Therapy Education       Title: PT OT SLP Therapies (Done)       Topic: Physical Therapy (Done)       Point: Mobility training (Done)       Learning Progress Summary             Patient Acceptance, E,TB,D, VU,NR by  at 4/25/2024 1408    Comment: safety   walker placement    Acceptance, E, NR,VU by  at 4/25/2024 0348    Acceptance, E,TB, VU by  at 4/24/2024 1828    Comment: Role of PT and POC                         Point: Home exercise program (Done)       Learning Progress Summary             Patient Acceptance, E, NR,VU by RA at 4/25/2024 0348                         Point: Body mechanics (Done)       Learning Progress Summary             Patient Acceptance, E, NR,VU by RA at 4/25/2024 0348                         Point: Precautions (Done)       Learning Progress Summary             Patient Acceptance, E, NR,VU by  at 4/25/2024 0348                                         User Key       Initials Effective Dates Name Provider Type Discipline     08/22/23 -  Ellyn Aldana, PT Physical Therapist PT     06/16/21 -  Martin Shukla, PTA Physical Therapist Assistant PT     03/05/24 -  Aleida Chowdary, Nursing Student Nursing Student Nurse                  PT Recommendation and Plan     Plan of Care Reviewed With: patient, spouse  Progress: improving  Outcome Evaluation: Pt supine in bed and willing to participate with treatment.  Pt performed supine to sit EOB with  min a as well as for  sit to stand.  Pt performed gait training 32' cga/min a withv rw. Pt requires vc's for proper body positioning within RW. Pt required 6L for mobility. Pt was able to maintain O2 in 90's with 6 L. Cont PT per POC progressing to goals as pt tolerates.     Time Calculation:         PT Charges       Row Name 04/25/24 1409             Time Calculation    Start Time 1005  -RM      Stop Time 1034  -RM      Time Calculation (min) 29 min  -RM      PT Received On 04/25/24  -RM      PT Goal Re-Cert Due Date 05/04/24  -RM         Time Calculation- PT    Total Timed Code Minutes- PT 29 minute(s)  -RM         Timed Charges    33178 - Gait Training Minutes  15  -RM      82135 - PT Therapeutic Activity Minutes 14  -RM         Total Minutes    Timed Charges Total Minutes 29  -RM       Total Minutes 29  -RM                User Key  (r) = Recorded By, (t) = Taken By, (c) = Cosigned By      Initials Name Provider Type     Maritn Shukla, PTA Physical Therapist Assistant                  Therapy Charges for Today       Code Description Service Date Service Provider Modifiers Qty    47942141649 HC GAIT TRAINING EA 15 MIN 4/25/2024 Martin Shukla, PTA GP 1    44563184410 HC PT THERAPEUTIC ACT EA 15 MIN 4/25/2024 Martin Shukla, PTA GP 1            PT G-Codes  Outcome Measure Options: AM-PAC 6 Clicks Daily Activity (OT)  AM-PAC 6 Clicks Score (PT): 17  AM-PAC 6 Clicks Score (OT): 15       Martin Shukla PTA  4/25/2024

## 2024-04-25 NOTE — PLAN OF CARE
Problem: Fluid Volume Excess  Goal: Fluid Balance  Outcome: Ongoing, Progressing  Intervention: Monitor and Manage Hypervolemia  Recent Flowsheet Documentation  Taken 4/24/2024 2100 by Aleida Chowdary, Nursing Student  Skin Protection:   adhesive use limited   tubing/devices free from skin contact     Problem: Adult Inpatient Plan of Care  Goal: Plan of Care Review  Outcome: Ongoing, Progressing  Goal: Patient-Specific Goal (Individualized)  Outcome: Ongoing, Progressing  Goal: Absence of Hospital-Acquired Illness or Injury  Outcome: Ongoing, Progressing  Intervention: Identify and Manage Fall Risk  Recent Flowsheet Documentation  Taken 4/25/2024 0100 by Aleida Chowdary, Nursing Student  Safety Promotion/Fall Prevention:   activity supervised   assistive device/personal items within reach   clutter free environment maintained   safety round/check completed  Taken 4/25/2024 0000 by Aleida Chowdary, Nursing Student  Safety Promotion/Fall Prevention:   activity supervised   assistive device/personal items within reach   clutter free environment maintained   safety round/check completed  Taken 4/24/2024 2300 by Aleida Chowdary, Nursing Aramis  Safety Promotion/Fall Prevention:   activity supervised   assistive device/personal items within reach   clutter free environment maintained   safety round/check completed  Taken 4/24/2024 2200 by Aleida Chowdary, Nursing Aramis  Safety Promotion/Fall Prevention:   activity supervised   assistive device/personal items within reach   clutter free environment maintained   safety round/check completed  Taken 4/24/2024 2100 by Aleida Chowdary, Nursing Aramis  Safety Promotion/Fall Prevention:   activity supervised   assistive device/personal items within reach   clutter free environment maintained   safety round/check completed  Taken 4/24/2024 2000 by Aleida Chowdary, Nursing Student  Safety Promotion/Fall Prevention:   activity supervised   assistive  device/personal items within reach   clutter free environment maintained   safety round/check completed  Intervention: Prevent Skin Injury  Recent Flowsheet Documentation  Taken 4/25/2024 0100 by Aleida Chowdary, Nursing Student  Body Position:   supine   patient/family refused  Taken 4/25/2024 0000 by Aleida Chowdary, Nursing Student  Body Position:   supine   patient/family refused  Taken 4/24/2024 2300 by Aleida Chowdary, Nursing Student  Body Position: supine  Taken 4/24/2024 2200 by Aleida Chowdary, Nursing Student  Body Position: sitting up in bed  Taken 4/24/2024 2100 by Aleida Chowdary, Nursing Student  Body Position: sitting up in bed  Skin Protection:   adhesive use limited   tubing/devices free from skin contact  Taken 4/24/2024 2000 by Aleida Chowdary, Nursing Student  Body Position:   sitting up in bed   patient/family refused  Intervention: Prevent and Manage VTE (Venous Thromboembolism) Risk  Recent Flowsheet Documentation  Taken 4/25/2024 0100 by Aleida Chowdary, Nursing Student  Activity Management: activity minimized  Taken 4/25/2024 0000 by Aleida Chowdary, Nursing Student  Activity Management: activity minimized  Taken 4/24/2024 2200 by Aleida Chowdary, Nursing Aramis  Activity Management: activity minimized  Taken 4/24/2024 2100 by Aleida Chowdary, Nursing Student  Activity Management: activity minimized  Range of Motion: active ROM (range of motion) encouraged  Taken 4/24/2024 2000 by Aleida Chowdary, Nursing Student  Activity Management: activity minimized  Intervention: Prevent Infection  Recent Flowsheet Documentation  Taken 4/25/2024 0100 by Aleida Chowdary, Nursing Student  Infection Prevention:   environmental surveillance performed   single patient room provided   rest/sleep promoted  Taken 4/25/2024 0000 by Aleida Chowdary, Nursing Student  Infection Prevention:   environmental surveillance performed   single patient room provided    rest/sleep promoted  Taken 4/24/2024 2300 by Aleida Chowdary, Nursing Student  Infection Prevention:   environmental surveillance performed   single patient room provided   rest/sleep promoted  Taken 4/24/2024 2200 by Aleida Chowdary Nursing Student  Infection Prevention:   environmental surveillance performed   single patient room provided   rest/sleep promoted  Taken 4/24/2024 2100 by Aleida Chowdary, Nursing Aramis  Infection Prevention:   environmental surveillance performed   single patient room provided   rest/sleep promoted  Taken 4/24/2024 2000 by Aleida Chowdary, Nursing Aramis  Infection Prevention:   environmental surveillance performed   single patient room provided   rest/sleep promoted  Goal: Optimal Comfort and Wellbeing  Outcome: Ongoing, Progressing  Intervention: Provide Person-Centered Care  Recent Flowsheet Documentation  Taken 4/24/2024 2100 by Aleida Chowdary Nursing Student  Trust Relationship/Rapport:   care explained   choices provided   emotional support provided   reassurance provided  Goal: Readiness for Transition of Care  Outcome: Ongoing, Progressing     Problem: Skin Injury Risk Increased  Goal: Skin Health and Integrity  Outcome: Ongoing, Progressing  Intervention: Optimize Skin Protection  Recent Flowsheet Documentation  Taken 4/25/2024 0100 by Aleida Chowdary Nursing Student  Head of Bed (HOB) Positioning: HOB lowered  Taken 4/25/2024 0000 by Aleida Chowdary Nursing Aramis  Head of Bed (HOB) Positioning: HOB lowered  Taken 4/24/2024 2300 by Aleida Chowdary Nursing Aramis  Head of Bed (HOB) Positioning: HOB lowered  Taken 4/24/2024 2200 by Aleida Chowdary Nursing Aramis  Head of Bed (HOB) Positioning: HOB elevated  Taken 4/24/2024 2100 by Aleida Chowdary Nursing Aramis  Pressure Reduction Techniques: frequent weight shift encouraged  Head of Bed (HOB) Positioning: HOB elevated  Pressure Reduction Devices:   positioning supports  utilized   pressure-redistributing mattress utilized   specialty bed utilized  Skin Protection:   adhesive use limited   tubing/devices free from skin contact  Taken 4/24/2024 2000 by Aleida Chowdary Nursing Student  Head of Bed (HOB) Positioning: HOB elevated     Problem: Asthma Comorbidity  Goal: Maintenance of Asthma Control  Outcome: Ongoing, Progressing     Problem: Behavioral Health Comorbidity  Goal: Maintenance of Behavioral Health Symptom Control  Outcome: Ongoing, Progressing     Problem: COPD (Chronic Obstructive Pulmonary Disease) Comorbidity  Goal: Maintenance of COPD Symptom Control  Outcome: Ongoing, Progressing     Problem: Diabetes Comorbidity  Goal: Blood Glucose Level Within Targeted Range  Outcome: Ongoing, Progressing     Problem: Heart Failure Comorbidity  Goal: Maintenance of Heart Failure Symptom Control  Outcome: Ongoing, Progressing     Problem: Hypertension Comorbidity  Goal: Blood Pressure in Desired Range  Outcome: Ongoing, Progressing     Problem: Obstructive Sleep Apnea Risk or Actual Comorbidity Management  Goal: Unobstructed Breathing During Sleep  Outcome: Ongoing, Progressing     Problem: Osteoarthritis Comorbidity  Goal: Maintenance of Osteoarthritis Symptom Control  Outcome: Ongoing, Progressing  Intervention: Maintain Osteoarthritis Symptom Control  Recent Flowsheet Documentation  Taken 4/25/2024 0100 by Aleida Chowdary, Nursing Aramis  Activity Management: activity minimized  Taken 4/25/2024 0000 by Aleida Chowdary, Nursing Student  Activity Management: activity minimized  Taken 4/24/2024 2200 by Aleida Chowdary, Nursing Aramis  Activity Management: activity minimized  Taken 4/24/2024 2100 by Aleida Chowdary Nursing Aramis  Activity Management: activity minimized  Assistive Device Utilized: walker  Taken 4/24/2024 2000 by Aleida Chowdary, Nursing Student  Activity Management: activity minimized     Problem: Pain Chronic (Persistent) (Comorbidity  Management)  Goal: Acceptable Pain Control and Functional Ability  Outcome: Ongoing, Progressing  Intervention: Optimize Psychosocial Wellbeing  Recent Flowsheet Documentation  Taken 4/24/2024 2100 by Aleida Chowdary, Nursing Student  Diversional Activities: television  Family/Support System Care:   self-care encouraged   support provided     Problem: Seizure Disorder Comorbidity  Goal: Maintenance of Seizure Control  Outcome: Ongoing, Progressing     Problem: Fall Injury Risk  Goal: Absence of Fall and Fall-Related Injury  Outcome: Ongoing, Progressing  Intervention: Promote Injury-Free Environment  Recent Flowsheet Documentation  Taken 4/25/2024 0100 by Aleida Chowdary, Nursing Student  Safety Promotion/Fall Prevention:   activity supervised   assistive device/personal items within reach   clutter free environment maintained   safety round/check completed  Taken 4/25/2024 0000 by Aleida Chowdary, Nursing Student  Safety Promotion/Fall Prevention:   activity supervised   assistive device/personal items within reach   clutter free environment maintained   safety round/check completed  Taken 4/24/2024 2300 by Aleida Chowdary, Nursing Student  Safety Promotion/Fall Prevention:   activity supervised   assistive device/personal items within reach   clutter free environment maintained   safety round/check completed  Taken 4/24/2024 2200 by Aleida Chowdary, Nursing Student  Safety Promotion/Fall Prevention:   activity supervised   assistive device/personal items within reach   clutter free environment maintained   safety round/check completed  Taken 4/24/2024 2100 by Aleida Chowdary, Nursing Student  Safety Promotion/Fall Prevention:   activity supervised   assistive device/personal items within reach   clutter free environment maintained   safety round/check completed  Taken 4/24/2024 2000 by Aleida Chowdary, Nursing Student  Safety Promotion/Fall Prevention:   activity supervised   assistive  device/personal items within reach   clutter free environment maintained   safety round/check completed     Problem: Adjustment to Illness (Sepsis/Septic Shock)  Goal: Optimal Coping  Outcome: Ongoing, Progressing  Intervention: Optimize Psychosocial Adjustment to Illness  Recent Flowsheet Documentation  Taken 4/24/2024 2100 by Aleida Chowdary, Nursing Student  Family/Support System Care:   self-care encouraged   support provided     Problem: Bleeding (Sepsis/Septic Shock)  Goal: Absence of Bleeding  Outcome: Ongoing, Progressing     Problem: Glycemic Control Impaired (Sepsis/Septic Shock)  Goal: Blood Glucose Level Within Desired Range  Outcome: Ongoing, Progressing     Problem: Infection Progression (Sepsis/Septic Shock)  Goal: Absence of Infection Signs and Symptoms  Outcome: Ongoing, Progressing  Intervention: Initiate Sepsis Management  Recent Flowsheet Documentation  Taken 4/25/2024 0100 by Aleida Chowdary, Nursing Student  Infection Prevention:   environmental surveillance performed   single patient room provided   rest/sleep promoted  Taken 4/25/2024 0000 by Aleida Chowdary, Nursing Student  Infection Prevention:   environmental surveillance performed   single patient room provided   rest/sleep promoted  Taken 4/24/2024 2300 by Aleida Chowdary, Nursing Student  Infection Prevention:   environmental surveillance performed   single patient room provided   rest/sleep promoted  Taken 4/24/2024 2200 by Aleida Chowdary, Nursing Student  Infection Prevention:   environmental surveillance performed   single patient room provided   rest/sleep promoted  Taken 4/24/2024 2100 by Aleida Chowdary, Nursing Student  Infection Prevention:   environmental surveillance performed   single patient room provided   rest/sleep promoted  Taken 4/24/2024 2000 by Aleida Chowdary, Nursing Student  Infection Prevention:   environmental surveillance performed   single patient room provided   rest/sleep  promoted  Intervention: Promote Recovery  Recent Flowsheet Documentation  Taken 4/25/2024 0100 by Aleida Chowdary, Nursing Student  Activity Management: activity minimized  Taken 4/25/2024 0000 by Aleida Chowdary, Nursing Aramis  Activity Management: activity minimized  Taken 4/24/2024 2200 by Aleida Chowdary, Nursing Student  Activity Management: activity minimized  Taken 4/24/2024 2100 by Aleida Chowdary, Nursing Aramis  Activity Management: activity minimized  Taken 4/24/2024 2000 by Aleida Chowdary, Nursing Student  Activity Management: activity minimized     Problem: Nutrition Impaired (Sepsis/Septic Shock)  Goal: Optimal Nutrition Intake  Outcome: Ongoing, Progressing   Goal Outcome Evaluation:         Plan of care reviewed with patient. Patient alternated between CPAP and high flow NC throughout the shift. High flow NC titrated down to 8 L. Patient rested well throughout the night.. Patient responding well to diuresis aeb urine output. Urine sample collected and resulted. No other significant changes or events during the shift.

## 2024-04-25 NOTE — CASE MANAGEMENT/SOCIAL WORK
Pt changed to inpatient status today, IMM signed at the bedside. DCP; home with wife when medically stable. Wife will bring portable oxygen 4/26 in case pt needs it for discharge. Home oxygen supplied by [facility] in [place] and his CPAP is provided by [employer].CM continues to follow for any needs.

## 2024-04-25 NOTE — PLAN OF CARE
Problem: Fluid Volume Excess  Goal: Fluid Balance  Intervention: Monitor and Manage Hypervolemia  Recent Flowsheet Documentation  Taken 4/25/2024 0800 by Elaine Patterson RN  Skin Protection:   adhesive use limited   tubing/devices free from skin contact   skin-to-device areas padded   skin sealant/moisture barrier applied   skin-to-skin areas padded   pulse oximeter probe site changed   incontinence pads utilized     Problem: Adult Inpatient Plan of Care  Goal: Absence of Hospital-Acquired Illness or Injury  Intervention: Identify and Manage Fall Risk  Recent Flowsheet Documentation  Taken 4/25/2024 1600 by Elaine Patterson RN  Safety Promotion/Fall Prevention:   activity supervised   assistive device/personal items within reach   clutter free environment maintained   fall prevention program maintained   gait belt   lighting adjusted   nonskid shoes/slippers when out of bed   room organization consistent   safety round/check completed  Taken 4/25/2024 1500 by Elaine Patterson RN  Safety Promotion/Fall Prevention:   clutter free environment maintained   assistive device/personal items within reach   activity supervised   fall prevention program maintained   gait belt   lighting adjusted   nonskid shoes/slippers when out of bed   room organization consistent   safety round/check completed  Taken 4/25/2024 1400 by Elaine Patterson RN  Safety Promotion/Fall Prevention:   clutter free environment maintained   assistive device/personal items within reach   activity supervised   gait belt   fall prevention program maintained   lighting adjusted   nonskid shoes/slippers when out of bed   room organization consistent   safety round/check completed  Taken 4/25/2024 1300 by Elaine Patterson RN  Safety Promotion/Fall Prevention:   clutter free environment maintained   assistive device/personal items within reach   activity supervised   fall prevention program maintained   gait belt   lighting adjusted   nonskid shoes/slippers when out of  bed   room organization consistent   safety round/check completed  Taken 4/25/2024 1200 by Elaine Patterson RN  Safety Promotion/Fall Prevention:   clutter free environment maintained   assistive device/personal items within reach   activity supervised   fall prevention program maintained   gait belt   lighting adjusted   nonskid shoes/slippers when out of bed   room organization consistent   safety round/check completed  Taken 4/25/2024 1100 by Elaine Patterson RN  Safety Promotion/Fall Prevention:   clutter free environment maintained   assistive device/personal items within reach   activity supervised   fall prevention program maintained   lighting adjusted   gait belt   nonskid shoes/slippers when out of bed   room organization consistent   safety round/check completed  Taken 4/25/2024 1000 by Elaine Patterson RN  Safety Promotion/Fall Prevention:   assistive device/personal items within reach   activity supervised   clutter free environment maintained   fall prevention program maintained   gait belt   lighting adjusted   nonskid shoes/slippers when out of bed   room organization consistent   safety round/check completed  Taken 4/25/2024 0900 by Elaine Patterson RN  Safety Promotion/Fall Prevention:   assistive device/personal items within reach   activity supervised   clutter free environment maintained   gait belt   fall prevention program maintained   lighting adjusted   nonskid shoes/slippers when out of bed   room organization consistent   safety round/check completed  Taken 4/25/2024 0800 by Elaine Patterson RN  Safety Promotion/Fall Prevention:   clutter free environment maintained   assistive device/personal items within reach   activity supervised   fall prevention program maintained   gait belt   lighting adjusted   nonskid shoes/slippers when out of bed   room organization consistent   safety round/check completed  Taken 4/25/2024 0700 by Elaine Patterson RN  Safety Promotion/Fall Prevention:   clutter free environment  maintained   activity supervised   assistive device/personal items within reach   fall prevention program maintained   gait belt   lighting adjusted   nonskid shoes/slippers when out of bed   room organization consistent   safety round/check completed  Intervention: Prevent Skin Injury  Recent Flowsheet Documentation  Taken 4/25/2024 1600 by Elaine Patterson RN  Body Position:   turned   left   sitting up in bed  Taken 4/25/2024 1500 by Elaine Patterson RN  Body Position:   turned   right   sitting up in bed  Taken 4/25/2024 1400 by Elaine Patterson RN  Body Position:   turned   left   sitting up in bed  Taken 4/25/2024 1300 by Elaine Patterson RN  Body Position:   turned   right   sitting up in bed  Taken 4/25/2024 1200 by Elaine Patterson RN  Body Position:   turned   left   sitting up in bed  Taken 4/25/2024 1000 by Elaine Patterson RN  Body Position:   turned   left   sitting up in bed  Taken 4/25/2024 0900 by Elaine Patterson RN  Body Position:   sitting up in bed   neutral body alignment   neutral head position  Taken 4/25/2024 0800 by Elaine Patterson RN  Body Position:   turned   right   sitting up in bed  Skin Protection:   adhesive use limited   tubing/devices free from skin contact   skin-to-device areas padded   skin sealant/moisture barrier applied   skin-to-skin areas padded   pulse oximeter probe site changed   incontinence pads utilized  Taken 4/25/2024 0700 by Elaine Patterson RN  Body Position:   left   turned   sitting up in bed  Intervention: Prevent and Manage VTE (Venous Thromboembolism) Risk  Recent Flowsheet Documentation  Taken 4/25/2024 1600 by Elaine Patterson RN  Activity Management: activity encouraged  Taken 4/25/2024 1500 by Elaine Patterson RN  Activity Management: activity encouraged  Taken 4/25/2024 1400 by Elaine Patterson RN  Activity Management: activity encouraged  Taken 4/25/2024 1300 by Elaine Patterson RN  Activity Management: activity encouraged  Taken 4/25/2024 1200 by Elaine Patterson RN  Activity Management:  activity encouraged  Taken 4/25/2024 1100 by Elaine Patterson RN  Activity Management: activity encouraged  Taken 4/25/2024 1000 by Elaine Patterson RN  Activity Management: activity encouraged  Taken 4/25/2024 0900 by Elaine Patterson RN  Activity Management: activity encouraged  Taken 4/25/2024 0800 by Elaine Patterson RN  Activity Management: activity encouraged  Range of Motion: active ROM (range of motion) encouraged  Taken 4/25/2024 0700 by Elaine Patterson RN  Activity Management: activity encouraged  Intervention: Prevent Infection  Recent Flowsheet Documentation  Taken 4/25/2024 1600 by Elaine Patterson RN  Infection Prevention:   environmental surveillance performed   single patient room provided  Taken 4/25/2024 1500 by Elaine Patterson RN  Infection Prevention:   environmental surveillance performed   single patient room provided  Taken 4/25/2024 1400 by Elaine Patterson RN  Infection Prevention:   environmental surveillance performed   single patient room provided  Taken 4/25/2024 1300 by Elaine Patterson RN  Infection Prevention:   environmental surveillance performed   single patient room provided  Taken 4/25/2024 1200 by Elaine Patterson RN  Infection Prevention:   environmental surveillance performed   single patient room provided  Taken 4/25/2024 1100 by Elaine Patterson RN  Infection Prevention:   environmental surveillance performed   single patient room provided  Taken 4/25/2024 1000 by Elaine Patterson RN  Infection Prevention:   environmental surveillance performed   single patient room provided  Taken 4/25/2024 0900 by Elaine Patterson RN  Infection Prevention:   environmental surveillance performed   single patient room provided  Taken 4/25/2024 0800 by Elaine Patterson RN  Infection Prevention:   environmental surveillance performed   single patient room provided  Taken 4/25/2024 0700 by Elaine Patterson RN  Infection Prevention:   environmental surveillance performed   single patient room provided  Goal: Optimal Comfort and  Wellbeing  Intervention: Provide Person-Centered Care  Recent Flowsheet Documentation  Taken 4/25/2024 0800 by Elaine Patterson RN  Trust Relationship/Rapport:   care explained   thoughts/feelings acknowledged   reassurance provided   questions encouraged   questions answered     Problem: Skin Injury Risk Increased  Goal: Skin Health and Integrity  Intervention: Optimize Skin Protection  Recent Flowsheet Documentation  Taken 4/25/2024 1600 by Elaine Patterson RN  Head of Bed (HOB) Positioning:   HOB elevated   HOB at 60-90 degrees  Taken 4/25/2024 1500 by Elaine Patterson RN  Head of Bed (HOB) Positioning:   HOB elevated   HOB at 45 degrees  Taken 4/25/2024 1400 by Elaine Patterson RN  Head of Bed (HOB) Positioning:   HOB elevated   HOB at 30-45 degrees  Taken 4/25/2024 1300 by Elaine Patterson RN  Head of Bed (HOB) Positioning:   HOB elevated   HOB at 60-90 degrees  Taken 4/25/2024 1200 by Elaine Patterson RN  Head of Bed (HOB) Positioning:   HOB elevated   HOB at 45 degrees  Taken 4/25/2024 1000 by Elaine Patterson RN  Head of Bed (HOB) Positioning:   HOB elevated   HOB at 30-45 degrees  Taken 4/25/2024 0900 by Elaine Patterson RN  Head of Bed (HOB) Positioning:   HOB elevated   HOB at 45 degrees  Taken 4/25/2024 0800 by Elaine Patterson RN  Pressure Reduction Techniques:   frequent weight shift encouraged   weight shift assistance provided   pressure points protected  Head of Bed (HOB) Positioning:   HOB elevated   HOB at 60-90 degrees  Pressure Reduction Devices: positioning supports utilized  Skin Protection:   adhesive use limited   tubing/devices free from skin contact   skin-to-device areas padded   skin sealant/moisture barrier applied   skin-to-skin areas padded   pulse oximeter probe site changed   incontinence pads utilized  Taken 4/25/2024 0700 by Elaine Patterson RN  Head of Bed (HOB) Positioning:   HOB elevated   HOB at 30-45 degrees     Problem: Asthma Comorbidity  Goal: Maintenance of Asthma Control  Intervention: Maintain  Asthma Symptom Control  Recent Flowsheet Documentation  Taken 4/25/2024 1600 by Ealine Patterson RN  Medication Review/Management: medications reviewed  Taken 4/25/2024 1500 by Elaine Patterson RN  Medication Review/Management: medications reviewed  Taken 4/25/2024 1400 by Elaine Patterson RN  Medication Review/Management: medications reviewed  Taken 4/25/2024 1300 by Elaine Patterson RN  Medication Review/Management: medications reviewed  Taken 4/25/2024 1200 by Elaine Patterson RN  Medication Review/Management: medications reviewed  Taken 4/25/2024 1100 by Elaine Patterson RN  Medication Review/Management: medications reviewed  Taken 4/25/2024 1000 by Elaine Patterson RN  Medication Review/Management: medications reviewed  Taken 4/25/2024 0900 by Elaine Patterson RN  Medication Review/Management: medications reviewed  Taken 4/25/2024 0800 by Elaine Patterson RN  Medication Review/Management: medications reviewed  Taken 4/25/2024 0700 by Elaine Patterson RN  Medication Review/Management: medications reviewed     Problem: Behavioral Health Comorbidity  Goal: Maintenance of Behavioral Health Symptom Control  Intervention: Maintain Behavioral Health Symptom Control  Recent Flowsheet Documentation  Taken 4/25/2024 1600 by Elaine Patterson RN  Medication Review/Management: medications reviewed  Taken 4/25/2024 1500 by Elaine Patterson RN  Medication Review/Management: medications reviewed  Taken 4/25/2024 1400 by Elaine Patterson RN  Medication Review/Management: medications reviewed  Taken 4/25/2024 1300 by Elaine Patterson RN  Medication Review/Management: medications reviewed  Taken 4/25/2024 1200 by Elaine Patterson RN  Medication Review/Management: medications reviewed  Taken 4/25/2024 1100 by Elaine Patterson RN  Medication Review/Management: medications reviewed  Taken 4/25/2024 1000 by Elaine Patterson RN  Medication Review/Management: medications reviewed  Taken 4/25/2024 0900 by Elaine Patterson RN  Medication Review/Management: medications reviewed  Taken  4/25/2024 0800 by Elaine Patterson RN  Medication Review/Management: medications reviewed  Taken 4/25/2024 0700 by Elaine Patterson RN  Medication Review/Management: medications reviewed     Problem: COPD (Chronic Obstructive Pulmonary Disease) Comorbidity  Goal: Maintenance of COPD Symptom Control  Intervention: Maintain COPD-Symptom Control  Recent Flowsheet Documentation  Taken 4/25/2024 1600 by Elaine Patterson RN  Medication Review/Management: medications reviewed  Taken 4/25/2024 1500 by Elaine Patterson RN  Medication Review/Management: medications reviewed  Taken 4/25/2024 1400 by Elaine Patterson RN  Medication Review/Management: medications reviewed  Taken 4/25/2024 1300 by Elaine Patterson RN  Medication Review/Management: medications reviewed  Taken 4/25/2024 1200 by Elaine Patterson RN  Medication Review/Management: medications reviewed  Taken 4/25/2024 1100 by Elaine Patterson RN  Medication Review/Management: medications reviewed  Taken 4/25/2024 1000 by Elaine Patterson RN  Medication Review/Management: medications reviewed  Taken 4/25/2024 0900 by Elaine Patterson RN  Medication Review/Management: medications reviewed  Taken 4/25/2024 0800 by Elaine Patterson RN  Medication Review/Management: medications reviewed  Taken 4/25/2024 0700 by Elaine Patterson RN  Medication Review/Management: medications reviewed     Problem: Heart Failure Comorbidity  Goal: Maintenance of Heart Failure Symptom Control  Intervention: Maintain Heart Failure-Management  Recent Flowsheet Documentation  Taken 4/25/2024 1600 by Elaine Patterson RN  Medication Review/Management: medications reviewed  Taken 4/25/2024 1500 by Elaine Patterson RN  Medication Review/Management: medications reviewed  Taken 4/25/2024 1400 by Elaine Patterson RN  Medication Review/Management: medications reviewed  Taken 4/25/2024 1300 by Elaine Patterson RN  Medication Review/Management: medications reviewed  Taken 4/25/2024 1200 by Elaine Patterson RN  Medication Review/Management: medications  reviewed  Taken 4/25/2024 1100 by Elaine Patterson RN  Medication Review/Management: medications reviewed  Taken 4/25/2024 1000 by Elaine Patterson RN  Medication Review/Management: medications reviewed  Taken 4/25/2024 0900 by Elaine Patterson RN  Medication Review/Management: medications reviewed  Taken 4/25/2024 0800 by Elaine Patterson RN  Medication Review/Management: medications reviewed  Taken 4/25/2024 0700 by Elaine Patterson RN  Medication Review/Management: medications reviewed     Problem: Hypertension Comorbidity  Goal: Blood Pressure in Desired Range  Intervention: Maintain Blood Pressure Management  Recent Flowsheet Documentation  Taken 4/25/2024 1600 by Elaine Patterson RN  Medication Review/Management: medications reviewed  Taken 4/25/2024 1500 by Elaine Patterson RN  Medication Review/Management: medications reviewed  Taken 4/25/2024 1400 by Elaine Patterson RN  Medication Review/Management: medications reviewed  Taken 4/25/2024 1300 by Elaine Patterson RN  Medication Review/Management: medications reviewed  Taken 4/25/2024 1200 by Elaine Patterson RN  Medication Review/Management: medications reviewed  Taken 4/25/2024 1100 by Elaine Patterson RN  Medication Review/Management: medications reviewed  Taken 4/25/2024 1000 by Elaine Patterson RN  Medication Review/Management: medications reviewed  Taken 4/25/2024 0900 by Elaine Patterson RN  Medication Review/Management: medications reviewed  Taken 4/25/2024 0800 by Elaine Patterson RN  Medication Review/Management: medications reviewed  Taken 4/25/2024 0700 by Elaine Patterson RN  Medication Review/Management: medications reviewed     Problem: Osteoarthritis Comorbidity  Goal: Maintenance of Osteoarthritis Symptom Control  Intervention: Maintain Osteoarthritis Symptom Control  Recent Flowsheet Documentation  Taken 4/25/2024 1600 by Elaine Patterson RN  Activity Management: activity encouraged  Assistive Device Utilized:   walker   gait belt  Medication Review/Management: medications reviewed  Taken  4/25/2024 1500 by Elaine Patterson RN  Activity Management: activity encouraged  Assistive Device Utilized:   walker   gait belt  Medication Review/Management: medications reviewed  Taken 4/25/2024 1400 by Elaine Patterson RN  Activity Management: activity encouraged  Medication Review/Management: medications reviewed  Taken 4/25/2024 1300 by Elaine Patterson RN  Activity Management: activity encouraged  Assistive Device Utilized:   walker   gait belt  Medication Review/Management: medications reviewed  Taken 4/25/2024 1200 by Elaine Patterson RN  Activity Management: activity encouraged  Assistive Device Utilized:   walker   gait belt  Medication Review/Management: medications reviewed  Taken 4/25/2024 1100 by Elaine Patterson RN  Activity Management: activity encouraged  Assistive Device Utilized:   walker   gait belt  Medication Review/Management: medications reviewed  Taken 4/25/2024 1000 by Elaine Patterson RN  Activity Management: activity encouraged  Assistive Device Utilized:   gait belt   walker  Medication Review/Management: medications reviewed  Taken 4/25/2024 0900 by Elaine Patterson RN  Activity Management: activity encouraged  Assistive Device Utilized:   walker   gait belt  Medication Review/Management: medications reviewed  Taken 4/25/2024 0800 by Elaine Patterson RN  Activity Management: activity encouraged  Assistive Device Utilized:   walker   gait belt  Medication Review/Management: medications reviewed  Taken 4/25/2024 0700 by Elaine Patterson RN  Activity Management: activity encouraged  Assistive Device Utilized:   walker   gait belt  Medication Review/Management: medications reviewed     Problem: Pain Chronic (Persistent) (Comorbidity Management)  Goal: Acceptable Pain Control and Functional Ability  Intervention: Manage Persistent Pain  Recent Flowsheet Documentation  Taken 4/25/2024 1600 by Elaine Patterson RN  Medication Review/Management: medications reviewed  Taken 4/25/2024 1500 by Elaine Patterson RN  Medication  Review/Management: medications reviewed  Taken 4/25/2024 1400 by Elaine Patterson RN  Medication Review/Management: medications reviewed  Taken 4/25/2024 1300 by Elaine Patterson RN  Medication Review/Management: medications reviewed  Taken 4/25/2024 1200 by Elaine Patterson RN  Medication Review/Management: medications reviewed  Taken 4/25/2024 1100 by Elaine Patterson RN  Medication Review/Management: medications reviewed  Taken 4/25/2024 1000 by Elaine Patterson RN  Medication Review/Management: medications reviewed  Taken 4/25/2024 0900 by Elaine Patterson RN  Medication Review/Management: medications reviewed  Taken 4/25/2024 0800 by Elaine Patterson RN  Medication Review/Management: medications reviewed  Taken 4/25/2024 0700 by Elaine Patterson RN  Medication Review/Management: medications reviewed  Intervention: Optimize Psychosocial Wellbeing  Recent Flowsheet Documentation  Taken 4/25/2024 0800 by Elaine Patterson RN  Diversional Activities: television  Family/Support System Care: self-care encouraged     Problem: Fall Injury Risk  Goal: Absence of Fall and Fall-Related Injury  Intervention: Identify and Manage Contributors  Recent Flowsheet Documentation  Taken 4/25/2024 1600 by Elaine Patterson RN  Medication Review/Management: medications reviewed  Taken 4/25/2024 1500 by Elaine Patterson RN  Medication Review/Management: medications reviewed  Taken 4/25/2024 1400 by Elaine Patterson RN  Medication Review/Management: medications reviewed  Taken 4/25/2024 1300 by Elaine Patterson RN  Medication Review/Management: medications reviewed  Taken 4/25/2024 1200 by Elaine Patterson RN  Medication Review/Management: medications reviewed  Taken 4/25/2024 1100 by Elaine Patterson RN  Medication Review/Management: medications reviewed  Taken 4/25/2024 1000 by Elaine Patterson RN  Medication Review/Management: medications reviewed  Taken 4/25/2024 0900 by Elaine Patterson RN  Medication Review/Management: medications reviewed  Taken 4/25/2024 0800 by Elaine Patterson  RN  Medication Review/Management: medications reviewed  Self-Care Promotion: BADL personal objects within reach  Taken 4/25/2024 0700 by Elaine Patterson RN  Medication Review/Management: medications reviewed  Intervention: Promote Injury-Free Environment  Recent Flowsheet Documentation  Taken 4/25/2024 1600 by Elaine Patterson RN  Safety Promotion/Fall Prevention:   activity supervised   assistive device/personal items within reach   clutter free environment maintained   fall prevention program maintained   gait belt   lighting adjusted   nonskid shoes/slippers when out of bed   room organization consistent   safety round/check completed  Taken 4/25/2024 1500 by Elaine Patterson RN  Safety Promotion/Fall Prevention:   clutter free environment maintained   assistive device/personal items within reach   activity supervised   fall prevention program maintained   gait belt   lighting adjusted   nonskid shoes/slippers when out of bed   room organization consistent   safety round/check completed  Taken 4/25/2024 1400 by Elaine Patterson RN  Safety Promotion/Fall Prevention:   clutter free environment maintained   assistive device/personal items within reach   activity supervised   gait belt   fall prevention program maintained   lighting adjusted   nonskid shoes/slippers when out of bed   room organization consistent   safety round/check completed  Taken 4/25/2024 1300 by Elaine Patterson RN  Safety Promotion/Fall Prevention:   clutter free environment maintained   assistive device/personal items within reach   activity supervised   fall prevention program maintained   gait belt   lighting adjusted   nonskid shoes/slippers when out of bed   room organization consistent   safety round/check completed  Taken 4/25/2024 1200 by Elaine Patterson RN  Safety Promotion/Fall Prevention:   clutter free environment maintained   assistive device/personal items within reach   activity supervised   fall prevention program maintained   gait belt    lighting adjusted   nonskid shoes/slippers when out of bed   room organization consistent   safety round/check completed  Taken 4/25/2024 1100 by Elaine Patterson RN  Safety Promotion/Fall Prevention:   clutter free environment maintained   assistive device/personal items within reach   activity supervised   fall prevention program maintained   lighting adjusted   gait belt   nonskid shoes/slippers when out of bed   room organization consistent   safety round/check completed  Taken 4/25/2024 1000 by Elaine Patterson RN  Safety Promotion/Fall Prevention:   assistive device/personal items within reach   activity supervised   clutter free environment maintained   fall prevention program maintained   gait belt   lighting adjusted   nonskid shoes/slippers when out of bed   room organization consistent   safety round/check completed  Taken 4/25/2024 0900 by Elaine Patterson RN  Safety Promotion/Fall Prevention:   assistive device/personal items within reach   activity supervised   clutter free environment maintained   gait belt   fall prevention program maintained   lighting adjusted   nonskid shoes/slippers when out of bed   room organization consistent   safety round/check completed  Taken 4/25/2024 0800 by Elaine Patterson RN  Safety Promotion/Fall Prevention:   clutter free environment maintained   assistive device/personal items within reach   activity supervised   fall prevention program maintained   gait belt   lighting adjusted   nonskid shoes/slippers when out of bed   room organization consistent   safety round/check completed  Taken 4/25/2024 0700 by Elaine Patterson RN  Safety Promotion/Fall Prevention:   clutter free environment maintained   activity supervised   assistive device/personal items within reach   fall prevention program maintained   gait belt   lighting adjusted   nonskid shoes/slippers when out of bed   room organization consistent   safety round/check completed     Problem: Adjustment to Illness  (Sepsis/Septic Shock)  Goal: Optimal Coping  Intervention: Optimize Psychosocial Adjustment to Illness  Recent Flowsheet Documentation  Taken 4/25/2024 0800 by Elaine Patterson RN  Family/Support System Care: self-care encouraged     Problem: Infection Progression (Sepsis/Septic Shock)  Goal: Absence of Infection Signs and Symptoms  Intervention: Initiate Sepsis Management  Recent Flowsheet Documentation  Taken 4/25/2024 1600 by Elaine Patterson RN  Infection Prevention:   environmental surveillance performed   single patient room provided  Taken 4/25/2024 1500 by Elaine Patterson RN  Infection Prevention:   environmental surveillance performed   single patient room provided  Taken 4/25/2024 1400 by Elaine Patterson RN  Infection Prevention:   environmental surveillance performed   single patient room provided  Taken 4/25/2024 1300 by Elaine Patterson RN  Infection Prevention:   environmental surveillance performed   single patient room provided  Taken 4/25/2024 1200 by Elaine Patterson RN  Infection Prevention:   environmental surveillance performed   single patient room provided  Taken 4/25/2024 1100 by Elaine Patterson RN  Infection Prevention:   environmental surveillance performed   single patient room provided  Taken 4/25/2024 1000 by Elaine Patterson RN  Infection Prevention:   environmental surveillance performed   single patient room provided  Taken 4/25/2024 0900 by Elaine Patterson RN  Infection Prevention:   environmental surveillance performed   single patient room provided  Taken 4/25/2024 0800 by Elaine Patterson RN  Infection Prevention:   environmental surveillance performed   single patient room provided  Taken 4/25/2024 0700 by Elaine Patterson RN  Infection Prevention:   environmental surveillance performed   single patient room provided  Intervention: Promote Recovery  Recent Flowsheet Documentation  Taken 4/25/2024 1600 by Elaine Patterson RN  Activity Management: activity encouraged  Taken 4/25/2024 1500 by Elaine Patterson  RN  Activity Management: activity encouraged  Taken 4/25/2024 1400 by Elaine Patterson RN  Activity Management: activity encouraged  Taken 4/25/2024 1300 by Elaine Patterson RN  Activity Management: activity encouraged  Taken 4/25/2024 1200 by Elaine Patterson RN  Activity Management: activity encouraged  Taken 4/25/2024 1100 by Elaine Patterson RN  Activity Management: activity encouraged  Taken 4/25/2024 1000 by Elaine Patterson RN  Activity Management: activity encouraged  Taken 4/25/2024 0900 by Elaine Patterson RN  Activity Management: activity encouraged  Taken 4/25/2024 0800 by Elaine Patterson RN  Activity Management: activity encouraged  Taken 4/25/2024 0700 by Elaine Patterson RN  Activity Management: activity encouraged   Goal Outcome Evaluation:  Plan of Care Reviewed With: patient        Progress: improving  Outcome Evaluation: Patient oxygen titrated during shift. FSBG monitored and coverage given as needed. Patient had an xray this morning; see results.

## 2024-04-26 LAB
ALBUMIN SERPL-MCNC: 3.3 G/DL (ref 3.5–5.2)
ANION GAP SERPL CALCULATED.3IONS-SCNC: 13.5 MMOL/L (ref 5–15)
BASOPHILS # BLD AUTO: 0.05 10*3/MM3 (ref 0–0.2)
BASOPHILS NFR BLD AUTO: 0.5 % (ref 0–1.5)
BUN SERPL-MCNC: 76 MG/DL (ref 8–23)
BUN/CREAT SERPL: 33.2 (ref 7–25)
CALCIUM SPEC-SCNC: 8.7 MG/DL (ref 8.6–10.5)
CHLORIDE SERPL-SCNC: 99 MMOL/L (ref 98–107)
CO2 SERPL-SCNC: 25.5 MMOL/L (ref 22–29)
CREAT SERPL-MCNC: 2.29 MG/DL (ref 0.76–1.27)
CREAT UR-MCNC: 35.5 MG/DL
DEPRECATED RDW RBC AUTO: 46.8 FL (ref 37–54)
EGFRCR SERPLBLD CKD-EPI 2021: 28.5 ML/MIN/1.73
EOSINOPHIL # BLD AUTO: 0.43 10*3/MM3 (ref 0–0.4)
EOSINOPHIL NFR BLD AUTO: 4.4 % (ref 0.3–6.2)
ERYTHROCYTE [DISTWIDTH] IN BLOOD BY AUTOMATED COUNT: 15.7 % (ref 12.3–15.4)
GLUCOSE BLDC GLUCOMTR-MCNC: 125 MG/DL (ref 70–130)
GLUCOSE BLDC GLUCOMTR-MCNC: 187 MG/DL (ref 70–130)
GLUCOSE BLDC GLUCOMTR-MCNC: 229 MG/DL (ref 70–130)
GLUCOSE BLDC GLUCOMTR-MCNC: 241 MG/DL (ref 70–130)
GLUCOSE SERPL-MCNC: 122 MG/DL (ref 65–99)
HCT VFR BLD AUTO: 28.3 % (ref 37.5–51)
HGB BLD-MCNC: 9.7 G/DL (ref 13–17.7)
IMM GRANULOCYTES # BLD AUTO: 0.03 10*3/MM3 (ref 0–0.05)
IMM GRANULOCYTES NFR BLD AUTO: 0.3 % (ref 0–0.5)
LYMPHOCYTES # BLD AUTO: 0.78 10*3/MM3 (ref 0.7–3.1)
LYMPHOCYTES NFR BLD AUTO: 8 % (ref 19.6–45.3)
MCH RBC QN AUTO: 27.9 PG (ref 26.6–33)
MCHC RBC AUTO-ENTMCNC: 34.3 G/DL (ref 31.5–35.7)
MCV RBC AUTO: 81.3 FL (ref 79–97)
MONOCYTES # BLD AUTO: 0.62 10*3/MM3 (ref 0.1–0.9)
MONOCYTES NFR BLD AUTO: 6.4 % (ref 5–12)
NEUTROPHILS NFR BLD AUTO: 7.81 10*3/MM3 (ref 1.7–7)
NEUTROPHILS NFR BLD AUTO: 80.4 % (ref 42.7–76)
NRBC BLD AUTO-RTO: 0 /100 WBC (ref 0–0.2)
PHOSPHATE SERPL-MCNC: 3.9 MG/DL (ref 2.5–4.5)
PLATELET # BLD AUTO: 294 10*3/MM3 (ref 140–450)
PMV BLD AUTO: 11 FL (ref 6–12)
POTASSIUM SERPL-SCNC: 3.4 MMOL/L (ref 3.5–5.2)
PROT ?TM UR-MCNC: 4.5 MG/DL
PROT/CREAT UR: 126.8 MG/G CREA (ref 0–200)
RBC # BLD AUTO: 3.48 10*6/MM3 (ref 4.14–5.8)
SODIUM SERPL-SCNC: 138 MMOL/L (ref 136–145)
WBC NRBC COR # BLD AUTO: 9.72 10*3/MM3 (ref 3.4–10.8)

## 2024-04-26 PROCEDURE — 25010000002 AZITHROMYCIN PER 500 MG: Performed by: FAMILY MEDICINE

## 2024-04-26 PROCEDURE — 99232 SBSQ HOSP IP/OBS MODERATE 35: CPT | Performed by: FAMILY MEDICINE

## 2024-04-26 PROCEDURE — 63710000001 INSULIN GLARGINE PER 5 UNITS: Performed by: FAMILY MEDICINE

## 2024-04-26 PROCEDURE — 85025 COMPLETE CBC W/AUTO DIFF WBC: CPT | Performed by: INTERNAL MEDICINE

## 2024-04-26 PROCEDURE — 84156 ASSAY OF PROTEIN URINE: CPT | Performed by: INTERNAL MEDICINE

## 2024-04-26 PROCEDURE — 94799 UNLISTED PULMONARY SVC/PX: CPT

## 2024-04-26 PROCEDURE — 82570 ASSAY OF URINE CREATININE: CPT | Performed by: INTERNAL MEDICINE

## 2024-04-26 PROCEDURE — 25010000002 HEPARIN (PORCINE) PER 1000 UNITS: Performed by: FAMILY MEDICINE

## 2024-04-26 PROCEDURE — 97116 GAIT TRAINING THERAPY: CPT

## 2024-04-26 PROCEDURE — 25810000003 SODIUM CHLORIDE 0.9 % SOLUTION: Performed by: FAMILY MEDICINE

## 2024-04-26 PROCEDURE — 25010000002 CEFTRIAXONE PER 250 MG: Performed by: FAMILY MEDICINE

## 2024-04-26 PROCEDURE — 94761 N-INVAS EAR/PLS OXIMETRY MLT: CPT

## 2024-04-26 PROCEDURE — 97110 THERAPEUTIC EXERCISES: CPT

## 2024-04-26 PROCEDURE — 25010000002 FUROSEMIDE PER 20 MG: Performed by: INTERNAL MEDICINE

## 2024-04-26 PROCEDURE — 94760 N-INVAS EAR/PLS OXIMETRY 1: CPT

## 2024-04-26 PROCEDURE — 82948 REAGENT STRIP/BLOOD GLUCOSE: CPT

## 2024-04-26 PROCEDURE — 80069 RENAL FUNCTION PANEL: CPT | Performed by: INTERNAL MEDICINE

## 2024-04-26 PROCEDURE — 63710000001 INSULIN LISPRO (HUMAN) PER 5 UNITS: Performed by: FAMILY MEDICINE

## 2024-04-26 RX ORDER — DOXAZOSIN 8 MG/1
8 TABLET ORAL NIGHTLY
COMMUNITY
End: 2024-04-27 | Stop reason: HOSPADM

## 2024-04-26 RX ORDER — INSULIN DEGLUDEC 200 U/ML
200 INJECTION, SOLUTION SUBCUTANEOUS WEEKLY
Status: ON HOLD | COMMUNITY
End: 2024-04-26

## 2024-04-26 RX ORDER — POTASSIUM CHLORIDE 20 MEQ/1
40 TABLET, EXTENDED RELEASE ORAL ONCE
Status: COMPLETED | OUTPATIENT
Start: 2024-04-26 | End: 2024-04-26

## 2024-04-26 RX ORDER — TORSEMIDE 20 MG/1
40 TABLET ORAL DAILY
Status: DISCONTINUED | OUTPATIENT
Start: 2024-04-27 | End: 2024-04-27 | Stop reason: HOSPADM

## 2024-04-26 RX ORDER — POTASSIUM CHLORIDE 750 MG/1
40 CAPSULE, EXTENDED RELEASE ORAL ONCE
Status: COMPLETED | OUTPATIENT
Start: 2024-04-26 | End: 2024-04-26

## 2024-04-26 RX ADMIN — INSULIN GLARGINE 15 UNITS: 100 INJECTION, SOLUTION SUBCUTANEOUS at 21:00

## 2024-04-26 RX ADMIN — ATORVASTATIN CALCIUM 40 MG: 40 TABLET, FILM COATED ORAL at 08:11

## 2024-04-26 RX ADMIN — PRAMIPEXOLE DIHYDROCHLORIDE 0.5 MG: 0.25 TABLET ORAL at 20:55

## 2024-04-26 RX ADMIN — SERTRALINE HYDROCHLORIDE 100 MG: 50 TABLET ORAL at 08:10

## 2024-04-26 RX ADMIN — CARVEDILOL 6.25 MG: 6.25 TABLET, FILM COATED ORAL at 17:44

## 2024-04-26 RX ADMIN — PRASUGREL 5 MG: 10 TABLET, FILM COATED ORAL at 08:11

## 2024-04-26 RX ADMIN — BUDESONIDE AND FORMOTEROL FUMARATE DIHYDRATE 2 PUFF: 160; 4.5 AEROSOL RESPIRATORY (INHALATION) at 19:33

## 2024-04-26 RX ADMIN — Medication 10 ML: at 21:00

## 2024-04-26 RX ADMIN — TAMSULOSIN HYDROCHLORIDE 0.4 MG: 0.4 CAPSULE ORAL at 08:11

## 2024-04-26 RX ADMIN — ISOSORBIDE DINITRATE 10 MG: 10 TABLET ORAL at 17:44

## 2024-04-26 RX ADMIN — ISOSORBIDE DINITRATE 10 MG: 10 TABLET ORAL at 12:09

## 2024-04-26 RX ADMIN — HEPARIN SODIUM 5000 UNITS: 5000 INJECTION INTRAVENOUS; SUBCUTANEOUS at 08:10

## 2024-04-26 RX ADMIN — Medication 10 ML: at 08:10

## 2024-04-26 RX ADMIN — GUAIFENESIN 600 MG: 600 TABLET, EXTENDED RELEASE ORAL at 20:55

## 2024-04-26 RX ADMIN — SODIUM CHLORIDE 2000 MG: 9 INJECTION, SOLUTION INTRAVENOUS at 17:45

## 2024-04-26 RX ADMIN — HYDRALAZINE HYDROCHLORIDE 75 MG: 25 TABLET, FILM COATED ORAL at 17:44

## 2024-04-26 RX ADMIN — RANOLAZINE 500 MG: 500 TABLET, FILM COATED, EXTENDED RELEASE ORAL at 20:55

## 2024-04-26 RX ADMIN — Medication 2000 UNITS: at 08:11

## 2024-04-26 RX ADMIN — INSULIN LISPRO 4 UNITS: 100 INJECTION, SOLUTION INTRAVENOUS; SUBCUTANEOUS at 12:09

## 2024-04-26 RX ADMIN — BUDESONIDE AND FORMOTEROL FUMARATE DIHYDRATE 2 PUFF: 160; 4.5 AEROSOL RESPIRATORY (INHALATION) at 07:35

## 2024-04-26 RX ADMIN — ISOSORBIDE DINITRATE 10 MG: 10 TABLET ORAL at 08:11

## 2024-04-26 RX ADMIN — FLUTICASONE PROPIONATE 1 SPRAY: 50 SPRAY, METERED NASAL at 08:09

## 2024-04-26 RX ADMIN — RANOLAZINE 500 MG: 500 TABLET, FILM COATED, EXTENDED RELEASE ORAL at 08:11

## 2024-04-26 RX ADMIN — CARVEDILOL 6.25 MG: 6.25 TABLET, FILM COATED ORAL at 08:11

## 2024-04-26 RX ADMIN — POTASSIUM CHLORIDE 40 MEQ: 10 CAPSULE, COATED, EXTENDED RELEASE ORAL at 17:44

## 2024-04-26 RX ADMIN — HYDRALAZINE HYDROCHLORIDE 75 MG: 25 TABLET, FILM COATED ORAL at 08:11

## 2024-04-26 RX ADMIN — PANTOPRAZOLE SODIUM 40 MG: 40 INJECTION, POWDER, FOR SOLUTION INTRAVENOUS at 05:14

## 2024-04-26 RX ADMIN — POTASSIUM CHLORIDE 40 MEQ: 1500 TABLET, EXTENDED RELEASE ORAL at 13:52

## 2024-04-26 RX ADMIN — FINASTERIDE 5 MG: 5 TABLET, FILM COATED ORAL at 08:11

## 2024-04-26 RX ADMIN — HEPARIN SODIUM 5000 UNITS: 5000 INJECTION INTRAVENOUS; SUBCUTANEOUS at 20:55

## 2024-04-26 RX ADMIN — INSULIN GLARGINE 15 UNITS: 100 INJECTION, SOLUTION SUBCUTANEOUS at 08:10

## 2024-04-26 RX ADMIN — TIOTROPIUM BROMIDE INHALATION SPRAY 2 PUFF: 3.12 SPRAY, METERED RESPIRATORY (INHALATION) at 07:35

## 2024-04-26 RX ADMIN — EMPAGLIFLOZIN 10 MG: 10 TABLET, FILM COATED ORAL at 08:11

## 2024-04-26 RX ADMIN — MAGNESIUM OXIDE TAB 400 MG (241.3 MG ELEMENTAL MG) 400 MG: 400 (241.3 MG) TAB at 08:11

## 2024-04-26 RX ADMIN — CETIRIZINE HYDROCHLORIDE 10 MG: 10 TABLET, FILM COATED ORAL at 08:11

## 2024-04-26 RX ADMIN — FUROSEMIDE 80 MG: 10 INJECTION, SOLUTION INTRAMUSCULAR; INTRAVENOUS at 08:10

## 2024-04-26 RX ADMIN — SPIRONOLACTONE 25 MG: 25 TABLET, FILM COATED ORAL at 08:11

## 2024-04-26 RX ADMIN — ASPIRIN 81 MG CHEWABLE TABLET 81 MG: 81 TABLET CHEWABLE at 08:11

## 2024-04-26 RX ADMIN — GUAIFENESIN 600 MG: 600 TABLET, EXTENDED RELEASE ORAL at 08:11

## 2024-04-26 RX ADMIN — INSULIN LISPRO 6 UNITS: 100 INJECTION, SOLUTION INTRAVENOUS; SUBCUTANEOUS at 21:00

## 2024-04-26 RX ADMIN — SODIUM CHLORIDE 500 MG: 900 INJECTION, SOLUTION INTRAVENOUS at 20:53

## 2024-04-26 NOTE — PROGRESS NOTES
[facility]    Name:  Donny Jerry   Age:  79 y.o.  Sex:  male  :  1945  MRN:  [id number]   Visit Number:  [id number]  Admission Date:  2024  Date Of Service:  24  Primary Care Physician:  Anum Alonso APRN     LOS: 1 day :    Chief Complaint:      Shortness of breath     Subjective:    Patient was seen and examined at bedside today.  Patient laying comfortably in bed, on 2 L nasal cannula.  Patient reports doing well again today, denies shortness of breath or cough.  Patient without any pain or discomfort.  Patient denies cough.  He denies any acute complaints.  Patient is afebrile, vitals are stable. Discussed with the patient management and treatment plan and he is in agreement.    Hospital Course:    The patient is a 79-year-old chronically ill gentleman with a history of advanced CAD status post CABG with multiple stents, chronic kidney disease, obstructive sleep apnea, hypertension, COPD, who presented to the emergency room with complaints of shortness of breath.  History obtained from ER record and from the patient.  He states a 3-day history of increasing breathing difficulty, nearly gasping at home.  He specifically denied any chest pain or chest pressure.  He does think he has had some swelling recently.  He feels like he is urinating per normal.  He follows with Dr. Russo as his cardiologist.  Has been told previously he is not a candidate for further intervention.  He is on antianginal therapy.  Currently feeling better.  Requesting something to eat and drink at time of visit.     In the ER, he was noted to be hypoxic on EMS arrival but is currently saturating at 100% on CPAP.  He had a heart rate in the 80s blood pressure 140/70 and afebrile.  Negative respiratory panel.  VBG unremarkable.  Lactic acid 1.2.  proBNP of 3900.  Creatinine 1.77.  Had a white count of 16 hemoglobin 10.8 and platelets of 233.  Procalcitonin  within normal limits.  D-dimer within normal limits.  Initial troponin 122, repeat 192.  Chest x-ray per my interpretation with cardiomegaly, interstitial edema, likely left-sided pleural effusion.  The patient was given IV Lasix.  We were asked to admit.       Review of Systems:     All systems were reviewed and negative except as mentioned in subjective, assessment and plan.    Vital Signs:    Temp:  [98 °F (36.7 °C)-98.5 °F (36.9 °C)] 98 °F (36.7 °C)  Heart Rate:  [68-77] 73  Resp:  [18-24] 22  BP: (121-147)/(55-73) 147/60    Intake and output:    I/O last 3 completed shifts:  In: 960 [P.O.:960]  Out: 3100 [Urine:3100]  I/O this shift:  In: 480 [P.O.:480]  Out: 350 [Urine:350]    Physical Examination:    General Appearance:  Alert and cooperative.  Chronically ill-appearing.   Head:  Atraumatic and normocephalic.   Eyes: Conjunctivae and sclerae normal, no icterus. No pallor.   Throat: No oral lesions, no thrush, oral mucosa moist.   Neck: Supple, trachea midline, no thyromegaly.   Lungs:   Breath sounds heard bilaterally equally.  No wheezing.  Bilateral crackles heard. no Pleural rub or bronchial breathing.  Unlabored.  On supplemental oxygen.  Speaks in complete sentences with no difficulty.   Heart:  Normal S1 and S2, no murmur, no gallop, no rub. No JVD.   Abdomen:   Normal bowel sounds, no masses, no organomegaly. Soft, nontender, nondistended, no rebound tenderness.   Extremities: Supple, +1 bilateral lower extremities edema, no cyanosis, no clubbing.   Skin: No bleeding or rash.   Neurologic: Alert and oriented x 3. No facial asymmetry. Moves all four limbs. No tremors.      Laboratory results:    Results from last 7 days   Lab Units 04/26/24  0637 04/25/24  0545 04/24/24  0632 04/23/24  0632 04/23/24  0025   SODIUM mmol/L 138 135* 137   < > 137   POTASSIUM mmol/L 3.4* 3.6 3.1*   < > 4.1   CHLORIDE mmol/L 99 97* 100   < > 102   CO2 mmol/L 25.5 23.2 25.4   < > 22.0   BUN mg/dL 76* 65* 55*   < > 45*    CREATININE mg/dL 2.29* 2.10* 2.06*   < > 1.77*   CALCIUM mg/dL 8.7 8.7 8.5*   < > 8.5*   BILIRUBIN mg/dL  --   --   --   --  0.7   ALK PHOS U/L  --   --   --   --  54   ALT (SGPT) U/L  --   --   --   --  23   AST (SGOT) U/L  --   --   --   --  22   GLUCOSE mg/dL 122* 106* 109*   < > 203*    < > = values in this interval not displayed.     Results from last 7 days   Lab Units 04/26/24  0637 04/25/24  0545 04/24/24  0632   WBC 10*3/mm3 9.72 12.27* 12.36*   HEMOGLOBIN g/dL 9.7* 10.2* 9.9*   HEMATOCRIT % 28.3* 30.0* 29.3*   PLATELETS 10*3/mm3 294 245 229         Results from last 7 days   Lab Units 04/23/24  0632 04/23/24  0229 04/23/24  0025   HSTROP T ng/L 436* 192* 122*     Results from last 7 days   Lab Units 04/23/24  0100 04/23/24  0054   BLOODCX  No growth at 3 days No growth at 3 days     Recent Labs     04/25/24  0736   PHART 7.466*   [id number] 37.4   PO2ART 53.8*   [id number] 27.0   BASEEXCESS 3.1*      I have reviewed the patient's laboratory results.    Radiology results:    CT Chest Without Contrast Diagnostic    Result Date: 4/25/2024   PROCEDURE: CT CHEST WO CONTRAST DIAGNOSTIC-  HISTORY: Pneumonia, complication suspected, xray done; J96.01-Acute respiratory failure with hypoxia; I50.9-Heart failure, unspecified; D72.829-Elevated white blood cell count, unspecified; N18.9-Chronic kidney disease, unspecified; I50.23-Acute on chronic systolic (congestive) heart failure  COMPARISON: 09/06/2022 high-resolution chest exam.  TECHNIQUE: Axial CT without IV contrast administration.  FINDINGS:  Extensive consolidation of the right upper lobe is noted. There is mild consolidation and patchy airspace disease bilateral lower lobes. Moderate left lower lobe atelectasis is noted. Left upper lobe is clear.  Small bilateral pleural effusions are noted. Mild right paratracheal adenopathy is seen probably reactive. Advanced coronary artery calcified plaque disease is noted..      Impression: 1. Multifocal pneumonia greatest  right upper lobe. 2. No evidence of cavitary/necrotizing pneumonia or obvious lung mass. 3. Small bilateral pleural effusions.   This study was performed with techniques to keep radiation doses as low as reasonably achievable (ALARA). Individualized dose reduction techniques using automated exposure control or adjustment of vA and/or kV according to the patient size were employed.  This report was signed and finalized on 4/25/2024 4:23 PM by Brett Hernandez MD.      XR Chest 1 View    Result Date: 4/25/2024  PROCEDURE: XR CHEST 1 VW-  HISTORY: Resp Failure.; J96.01-Acute respiratory failure with hypoxia; I50.9-Heart failure, unspecified; D72.829-Elevated white blood cell count, unspecified; N18.9-Chronic kidney disease, unspecified; I50.23-Acute on chronic systolic (congestive) heart failure  COMPARISON: 04/24/2024  FINDINGS:  Portable view of the chest demonstrates persistent rounded opacity of the right upper lobe compatible with pneumonia. This shows minimal improvement. Pulmonary edema has improved. Minimal pleural effusions are noted. Vascular congestion remains. The mediastinum is unremarkable.  The heart size is normal. Patient is status post CABG.      Impression: 1. Minimal improvement right upper lobe pneumonia. 2. Pulmonary edema improved.  This report was signed and finalized on 4/25/2024 8:11 AM by Brett Hernandez MD.      XR Chest 1 View    Result Date: 4/24/2024  FINAL REPORT CLINICAL HISTORY: Hypoxia follow-up FINDINGS: 1 VIEW CHEST  heart is normal in size.  The mediastinum is unremarkable.  There is extensive perihilar with more focal upper lobe opacity.  The osseous structures are unremarkable. The patient is status post median sternotomy.     Impression: Opacity as above likely secondary to pneumonia.  Follow-up to complete resolution recommended. Authenticated and Electronically Signed by David Arechiga MD on 04/24/2024 05:41:57 PM   I have reviewed the patient's radiology reports.    Medication  Review:     I have reviewed the patient's active and prn medications.     Problem List:      CHF exacerbation    Heart failure      Assessment:    Suspected acute on chronic heart failure reduced ejection fraction exacerbation, POA  Acute respiratory failure with hypoxia, POA  Chronic kidney disease stage IIIb, POA  Multifocal pneumonia, unable to specify further 4/25  CAD with prior CABG  Insulin-dependent diabetes  Obstructive sleep apnea  Hypertension    Plan:    Heart failure reduced ejection fraction exacerbation:  -Placed patient on IV diuretic therapy with 40 mg Lasix twice a day.    -Will continue his home hydralazine, Lipitor, aspirin, carvedilol, SGLT2 inhibitor and Ranexa.    -Not a candidate for Entresto/spironolactone due to renal failure.    -Reviewed recent 2D echocardiogram.    -Placed on fluid and sodium restrictions.   -Monitor intake and output.  -Cardiology consulted and recommended close diuretics with fluid restriction.   -ISDN 10 mg orally 3 times per day to be taken in combination with his hydralazine 75 mg orally 3 times per day.   -Outpatient follow-up with his established cardiologist Dr. Russo.      Respiratory failure with hypoxia  Pneumonia:  -Suspect related to volume overload in the setting of severe congestive heart failure and CAD.  Monitor with treatment and diuresis.  -Supplemental oxygen to keep saturation above 90%, titrate down as able to.  -Monitor with chest x-ray and ABG  -Pulmonology consulted, appreciate recommendations  -CT chest without contrast showed multifocal pneumonia greatest right upper lobe. Small bilateral pleural effusions.  -Discussed with Dr. Styles, appreciate recommendations  -Pro-Virgilio elevated, afebrile  -Started patient on azithromycin and Rocephin for pneumonia coverage     Elevated troponin/CAD:  -Cannot definitively rule out ACS as he has known obstructive disease, however with no obvious new EKG changes or chest pain complaint, will not treat  as ACS.  Of note, has been deemed to have no further vessels amenable to revascularization, previously had CABG.  Medical treatment otherwise recommended.  -Cardiology has evaluated the patient and recommended outpatient follow-up with his established cardiologist Dr. Russo.     CKD 3B  - Consulted nephrology, appreciate recommendations on diuretics and fluid management  -Avoid nephrotoxic drugs  -Cleared for discharge by nephrologist.  -Outpatient nephrology follow-up     Diabetes/USHA/hypertension:  -Ordered sliding scale insulin and long-acting insulin.    -Blood pressure management as noted above.       Further recommendation depend upon clinical course.     I have reviewed the copied text and it is accurate as of 4/26/2024      DVT Prophylaxis: Heparin  Code Status: DNR  Diet: Carb consistent/renal/cardiac  Discharge Plan: likely discharge in 1 to 2 days.    Jono Rg MD  04/26/24  09:30 EDT    Dictated utilizing Dragon dictation.

## 2024-04-26 NOTE — CASE MANAGEMENT/SOCIAL WORK
DCP; return home with wife when medically stable, likely tomorrow. Pt states he feels much better and is looking forward to getting home. Tolerating room air. No new DME needs at this time. CM continues to follow. IMM signed 4/25.

## 2024-04-26 NOTE — PROGRESS NOTES
RT EQUIPMENT DEVICE RELATED - SKIN ASSESSMENT    Win Score:  Win Score: 16     RT Medical Equipment/Device:     NIV Mask:  Under-the-nose   size:       Skin Assessment:      Nose:  Intact    Device Skin Pressure Protection:  Pressure points protected    Nurse Notification:  Rosalinda Mesa, RRT

## 2024-04-26 NOTE — THERAPY TREATMENT NOTE
Patient Name: Donny Jerry  : 1945    MRN: [id number]                              Today's Date: 2024       Admit Date: 2024    Visit Dx:     ICD-10-CM ICD-9-CM   1. Acute respiratory failure with hypoxia  J96.01 518.81   2. Acute on chronic congestive heart failure, unspecified heart failure type  I50.9 428.0   3. Leukocytosis, unspecified type  D72.829 288.60   4. Chronic kidney disease, unspecified CKD stage  N18.9 585.9   5. Acute on chronic systolic congestive heart failure  I50.23 428.23     428.0     Patient Active Problem List   Diagnosis    Coronary artery disease involving native coronary artery of native heart with angina pectoris    Essential hypertension    Hyperlipidemia LDL goal <70    Type 2 diabetes mellitus with stage 3 chronic kidney disease    Obstructive sleep apnea on CPAP    Enlarged prostate    Obesity, Class II, BMI 35-39.9    Depression    Acute hypoxemic respiratory failure due to COVID-19    CKD (chronic kidney disease) stage 3, GFR 30-59 ml/min    Elevated troponin level not due myocardial infarction    Post viral debility    Chest pain, unspecified type    Chronic systolic heart failure    Type 1 myocardial infarction    Chronic diastolic heart failure    Bilateral lower extremity edema    Acute exacerbation of congestive heart failure    Acute on chronic HFrEF (heart failure with reduced ejection fraction)    Acute on chronic systolic heart failure    Syncope    CHF exacerbation    Heart failure     Past Medical History:   Diagnosis Date    Benign hypertension     Coronary artery disease     Depression     Enlarged prostate     Enlarged prostate with anticipated TURP with Dr. Bernal.    GERD (gastroesophageal reflux disease)     Heart attack     Hypercholesterolemia     Impaired functional mobility, balance, gait, and endurance     Myocardial infarction     Obesity     Obstructive sleep apnea on CPAP     Type 2 diabetes mellitus      Past Surgical History:    Procedure Laterality Date    CARDIAC CATHETERIZATION      CARDIAC CATHETERIZATION Left 10/19/2021    Procedure: Left Heart Cath;  Surgeon: Liz Russo MD;  Location:  [facility];  Service: Cardiology;  Laterality: Left;    CARDIAC CATHETERIZATION N/A 7/18/2023    Procedure: Coronary angiography;  Surgeon: Rupesh Parr MD;  Location:  [facility];  Service: Cardiology;  Laterality: N/A;    CARDIAC CATHETERIZATION N/A 7/18/2023    Procedure: Percutaneous Coronary Intervention;  Surgeon: Rupesh Parr MD;  Location:  [facility];  Service: Cardiology;  Laterality: N/A;    COLONOSCOPY W/ POLYPECTOMY      CORONARY ANGIOPLASTY WITH STENT PLACEMENT Left 06/03/2009    Left heart catheterization, 06/03/2009, Dr. Russo:  LVEF 45% to 50%.  Placement of overlapping Cypher drug-eluting stent 2.5 x 28 and 2.5 x 18 to the SVG to the obtuse marginal branch.  SVG to the PDA had a proximal stenosis estimated at 60% with a distal stenosis of 50% to 60%.    CORONARY ANGIOPLASTY WITH STENT PLACEMENT Left 07/06/2009    Left heart catheterization, 07/06/2009:  PTCA and stent placement in the mid PDA using a 2.25 x 12 mm Taxus drug-eluting stent with stent placement in the distal SVG to the PDA using a 2.5 x 18 mm Cypher drug-eluting stent and stenting of the proximal SVG to the PDA using a 3.0 x 28 mm Cypher drug-eluting stent.    CORONARY ANGIOPLASTY WITH STENT PLACEMENT  04/23/2010    Cardiac catheterization for recurrent anginal symptoms, 04/23/2010, with PTCA and stent placement in the proximal SVG to the PDA using 3.0 x 28 mm Promus drug-eluting stent for in-stent restenosis.    CORONARY ANGIOPLASTY WITH STENT PLACEMENT Left 07/06/2010    Left heart catheterization, 07/06/2010, Dr. Russo:  EF 40% to 45%.  3.5 x 23 mm Promus drug-eluting stent in the proximal SVG to OM, 2.5 x 18 mm Promus drug-eluting stent to distal SVG to PDA due to in-stent  restenosis.      CORONARY ANGIOPLASTY WITH STENT PLACEMENT Left 11/16/2010    Left heart catheterization, 11/16/2010, with placement of a 3.0 x 16 mm Taxus drug-eluting stent to the SVG to the RCA proximally and a 2.5 x 16 mm paclitaxel stent distally in the SVG to the RCA.    CORONARY ANGIOPLASTY WITH STENT PLACEMENT Left     Left heart catheterization, 3.0 x 24-mm Promus element drug-eluting stent to a 90% lesion in the mid portion of the SVG to the PDA.     CORONARY ANGIOPLASTY WITH STENT PLACEMENT Left 06/24/2014    Left heart catheterization for recurrent angina, 06/24/2014, Dr. Russo:  PTCA of the SVG to the PDA using a 3 x 12 mm NC TREK balloon.      CORONARY ARTERY BYPASS GRAFT      CABG x3, Dr. Peng, [facility], 2001.      General Information       Row Name 04/26/24 1253          OT Time and Intention    Document Type therapy note (daily note)  -DB     Mode of Treatment occupational therapy  -DB               User Key  (r) = Recorded By, (t) = Taken By, (c) = Cosigned By      Initials Name Provider Type    DB Valeria Chance, OT Occupational Therapist                     Mobility/ADL's    No documentation.                  Obj/Interventions       Row Name 04/26/24 1253          Shoulder (Therapeutic Exercise)    Shoulder (Therapeutic Exercise) strengthening exercise  -DB     Shoulder Strengthening (Therapeutic Exercise) 10 repetitions;2 sets;red;resistance band;bilateral;flexion;aBduction;horizontal aBduction/aDduction  -DB       Row Name 04/26/24 1253          Elbow/Forearm (Therapeutic Exercise)    Elbow/Forearm (Therapeutic Exercise) strengthening exercise  -DB     Elbow/Forearm Strengthening (Therapeutic Exercise) 10 repetitions;2 sets;red;resistance band;bilateral;flexion;extension  -DB       Row Name 04/26/24 1253          Motor Skills    Therapeutic Exercise shoulder;elbow/forearm  -DB               User Key  (r) = Recorded By, (t) = Taken By, (c) = Cosigned By      Initials  Name Provider Type    Valeria Pedro OT Occupational Therapist                   Goals/Plan    No documentation.                  Clinical Impression       Row Name 04/26/24 1254          Pain Assessment    Pretreatment Pain Rating 0/10 - no pain  -DB     Posttreatment Pain Rating 0/10 - no pain  -DB       Row Name 04/26/24 1254          Plan of Care Review    Progress improving  -DB     Outcome Evaluation OT tx completed. Pt initially declines therapy due to fatigue but agreeable to bed exercises only. OT instructed pt in BUE ther ex to increase strength and endurance needed for ADL mgmt. Pt tolerates [id number] x2sets of shoulder abduction, shoulder flexion, horizontal abduction, elbow flexion/extension, scapular protraction/retraction using red resistance band. Pt requires rest breaks as needed due to fatigue. O2 monitored at 95% with activity. OT to continue per POC.  -DB       Row Name 04/26/24 1254          Vital Signs    Pre SpO2 (%) 95  -DB     O2 Delivery Pre Treatment supplemental O2  -DB     Intra SpO2 (%) 95  -DB     O2 Delivery Intra Treatment supplemental O2  -DB     Post SpO2 (%) 96  -DB     O2 Delivery Post Treatment supplemental O2  -DB     Pre Patient Position Supine  -DB     Intra Patient Position Supine  -DB     Post Patient Position Supine  -DB       Row Name 04/26/24 1254          Positioning and Restraints    Pre-Treatment Position in bed  -DB     Post Treatment Position bed  -DB     In Bed call light within reach;encouraged to call for assist;exit alarm on;with family/caregiver;supine  -DB               User Key  (r) = Recorded By, (t) = Taken By, (c) = Cosigned By      Initials Name Provider Type    Valeria Pedro OT Occupational Therapist                   Outcome Measures       Row Name 04/26/24 1257          How much help from another is currently needed...    Putting on and taking off regular lower body clothing? 2  -DB     Bathing (including washing, rinsing, and  drying) 2  -DB     Toileting (which includes using toilet bed pan or urinal) 2  -DB     Putting on and taking off regular upper body clothing 3  -DB     Taking care of personal grooming (such as brushing teeth) 3  -DB     Eating meals 3  -DB     AM-PAC 6 Clicks Score (OT) 15  -DB       Row Name 04/26/24 0800          How much help from another person do you currently need...    Turning from your back to your side while in flat bed without using bedrails? 3  -LA     Moving from lying on back to sitting on the side of a flat bed without bedrails? 3  -LA     Moving to and from a bed to a chair (including a wheelchair)? 3  -LA     Standing up from a chair using your arms (e.g., wheelchair, bedside chair)? 3  -LA     Climbing 3-5 steps with a railing? 2  -LA     To walk in hospital room? 3  -LA     AM-PAC 6 Clicks Score (PT) 17  -LA     Highest Level of Mobility Goal 5 --> Static standing  -LA       Row Name 04/26/24 1257          Functional Assessment    Outcome Measure Options AM-PAC 6 Clicks Daily Activity (OT)  -DB               User Key  (r) = Recorded By, (t) = Taken By, (c) = Cosigned By      Initials Name Provider Type    Elaine Saleem, RN Registered Nurse    Valeria Pedro OT Occupational Therapist                    Occupational Therapy Education       Title: PT OT SLP Therapies (Done)       Topic: Occupational Therapy (Done)       Point: ADL training (Done)       Description:   Instruct learner(s) on proper safety adaptation and remediation techniques during self care or transfers.   Instruct in proper use of assistive devices.                  Learning Progress Summary             Patient Acceptance, E, NR,VU by RA at 4/25/2024 0348    Acceptance, E,TB, VU by SD at 4/24/2024 1537    Comment: OT POC                         Point: Home exercise program (Done)       Description:   Instruct learner(s) on appropriate technique for monitoring, assisting and/or progressing therapeutic  exercises/activities.                  Learning Progress Summary             Patient Acceptance, E,TB, VU by DB at 4/26/2024 1257    Comment: OT educated pt on BUE HEP. Pt receptive this date.    Acceptance, E, NR,VU by RA at 4/25/2024 0348                         Point: Precautions (Done)       Description:   Instruct learner(s) on prescribed precautions during self-care and functional transfers.                  Learning Progress Summary             Patient Acceptance, E, NR,VU by RA at 4/25/2024 0348                         Point: Body mechanics (Done)       Description:   Instruct learner(s) on proper positioning and spine alignment during self-care, functional mobility activities and/or exercises.                  Learning Progress Summary             Patient Acceptance, E, NR,VU by RA at 4/25/2024 0348                                         User Key       Initials Effective Dates Name Provider Type Discipline    SD 06/16/21 -  Celena Hernandez OT Occupational Therapist OT    RA 03/05/24 -  Aleida Chowdary, Nursing Student Nursing Student Nurse    DB 07/06/23 -  Valeria Chance OT Occupational Therapist OT                  OT Recommendation and Plan     Plan of Care Review  Progress: improving  Outcome Evaluation: OT tx completed. Pt initially declines therapy due to fatigue but agreeable to bed exercises only. OT instructed pt in BUE ther ex to increase strength and endurance needed for ADL mgmt. Pt tolerates [id number] x2sets of shoulder abduction, shoulder flexion, horizontal abduction, elbow flexion/extension, scapular protraction/retraction using red resistance band. Pt requires rest breaks as needed due to fatigue. O2 monitored at 95% with activity. OT to continue per POC.     Time Calculation:         Time Calculation- OT       Row Name 04/26/24 1257             Time Calculation- OT    OT Start Time 1112  -DB      OT Stop Time 1127  -DB      OT Time Calculation (min) 15 min  -DB      OT  Received On 04/26/24  -DB      OT Goal Re-Cert Due Date 05/06/24  -DB         Timed Charges    65486 - OT Therapeutic Exercise Minutes 15  -DB         Total Minutes    Timed Charges Total Minutes 15  -DB       Total Minutes 15  -DB                User Key  (r) = Recorded By, (t) = Taken By, (c) = Cosigned By      Initials Name Provider Type    DB Valeria Chance OT Occupational Therapist                  Therapy Charges for Today       Code Description Service Date Service Provider Modifiers Qty    [id number] HC OT THER PROC EA 15 MIN 4/26/2024 Valeria Chance OT GO 1                 Valeria Chance OT  4/26/2024

## 2024-04-26 NOTE — PLAN OF CARE
Goal Outcome Evaluation:           Progress: improving  Outcome Evaluation: OT tx completed. Pt initially declines therapy due to fatigue but agreeable to bed exercises only. OT instructed pt in BUE ther ex to increase strength and endurance needed for ADL mgmt. Pt tolerates [id number] x2sets of shoulder abduction, shoulder flexion, horizontal abduction, elbow flexion/extension, scapular protraction/retraction using red resistance band. Pt requires rest breaks as needed due to fatigue. O2 monitored at 95% with activity. OT to continue per POC.

## 2024-04-26 NOTE — PROGRESS NOTES
"Dietitian Follow-up    Patient Name: Donny Jerry  YOB: 1945  MRN: [id number]  Admission date: 4/23/2024    Comment:      Clinical Nutrition Follow-up   Encounter Information        Trending Narrative     4/26: Pt with decreased oral intake. Will add Mighty shake daily to promote PO intake.     4/24: Pt admitted with CHF exacerbation. Pt has had wt loss but is most likely fluid related d/t diuretic use. Will add ONS once PO intake is established. Will d/c K+ diet restriction d/t K+ WNL and previous K+ was low.      Anthropometrics        Current Height, Weight Height: 170.2 cm (67\")  Weight: 94.1 kg (207 lb 7.3 oz) (04/26/24 0517)       Trending Weight Hx     This admission:              PTA:     Wt Readings from Last 30 Encounters:   04/26/24 0517 94.1 kg (207 lb 7.3 oz)   04/25/24 0333 96 kg (211 lb 10.3 oz)   04/24/24 1616 95.9 kg (211 lb 6.7 oz)   04/23/24 0112 98.9 kg (218 lb)   03/26/24 1025 99.8 kg (220 lb)   02/15/24 1300 105 kg (232 lb)   01/18/24 0914 108 kg (239 lb)   01/11/24 0958 102 kg (224 lb 13.9 oz)   01/09/24 1244 102 kg (225 lb)   12/21/23 0325 104 kg (228 lb 6.3 oz)   12/19/23 0318 107 kg (236 lb 5.3 oz)   12/18/23 1609 109 kg (240 lb)   08/14/23 1103 104 kg (229 lb)   08/07/23 1104 104 kg (229 lb)   07/31/23 1109 104 kg (229 lb)   07/26/23 1002 104 kg (229 lb 3.2 oz)   07/24/23 1107 107 kg (235 lb)   07/20/23 1138 107 kg (235 lb)   07/19/23 0348 105 kg (231 lb 14.8 oz)   07/18/23 1552 104 kg (229 lb 4.5 oz)   07/18/23 0008 104 kg (229 lb 4.5 oz)   07/17/23 2249 106 kg (234 lb)   07/17/23 1053 106 kg (234 lb 6.4 oz)   07/14/23 1837 104 kg (230 lb)   07/01/23 0430 98.2 kg (216 lb 7.9 oz)   06/30/23 0755 102 kg (224 lb 13.9 oz)   06/30/23 0409 102 kg (224 lb 3.3 oz)   06/29/23 2136 102 kg (225 lb 12 oz)   06/29/23 1755 102 kg (225 lb)   04/26/23 0411 102 kg (225 lb 5 oz)   04/25/23 0321 102 kg (225 lb 12 oz)   04/24/23 0229 101 kg (223 lb 8.7 oz)   04/23/23 1539 102 kg (225 lb 5 " oz)   04/23/23 1318 104 kg (230 lb)   03/02/23 1431 104 kg (230 lb)   09/28/22 1342 103 kg (228 lb)   09/21/22 1307 103 kg (228 lb)   09/14/22 1031 103 kg (228 lb)   09/07/22 1038 103 kg (228 lb)   09/01/22 1138 103 kg (228 lb)   08/30/22 1121 103 kg (226 lb)   08/18/22 0857 103 kg (226 lb)   05/26/22 1322 105 kg (231 lb 9.6 oz)   04/18/22 1044 103 kg (228 lb)   03/17/22 1509 105 kg (232 lb)   02/09/22 1041 102 kg (225 lb 3.2 oz)      BMI kg/m2 Body mass index is 32.49 kg/m².     Labs        Pertinent Labs Results from last 7 days   Lab Units 04/26/24  0637 04/25/24  0545 04/24/24  0632 04/23/24  0632 04/23/24  0025   SODIUM mmol/L 138 135* 137   < > 137   POTASSIUM mmol/L 3.4* 3.6 3.1*   < > 4.1   CHLORIDE mmol/L 99 97* 100   < > 102   CO2 mmol/L 25.5 23.2 25.4   < > 22.0   BUN mg/dL 76* 65* 55*   < > 45*   CREATININE mg/dL 2.29* 2.10* 2.06*   < > 1.77*   CALCIUM mg/dL 8.7 8.7 8.5*   < > 8.5*   BILIRUBIN mg/dL  --   --   --   --  0.7   ALK PHOS U/L  --   --   --   --  54   ALT (SGPT) U/L  --   --   --   --  23   AST (SGOT) U/L  --   --   --   --  22   GLUCOSE mg/dL 122* 106* 109*   < > 203*    < > = values in this interval not displayed.     Results from last 7 days   Lab Units 04/26/24  0637 04/25/24  0545 04/24/24  0632   MAGNESIUM mg/dL  --   --  2.2   PHOSPHORUS mg/dL 3.9   < >  --    HEMOGLOBIN g/dL 9.7*   < > 9.9*   HEMATOCRIT % 28.3*   < > 29.3*    < > = values in this interval not displayed.         Medications    Scheduled Medications aspirin, 81 mg, Oral, Daily  atorvastatin, 40 mg, Oral, Daily  azithromycin, 500 mg, Intravenous, Q24H  budesonide-formoterol, 2 puff, Inhalation, BID - RT   And  tiotropium bromide monohydrate, 2 puff, Inhalation, Daily - RT  carvedilol, 6.25 mg, Oral, BID With Meals  cefTRIAXone, 2,000 mg, Intravenous, Q24H  cetirizine, 10 mg, Oral, Daily  cholecalciferol, 2,000 Units, Oral, Daily  empagliflozin, 10 mg, Oral, Daily  finasteride, 5 mg, Oral, Daily  fluticasone, 1 spray,  Nasal, Daily  furosemide, 80 mg, Intravenous, BID  guaiFENesin, 600 mg, Oral, Q12H  heparin (porcine), 5,000 Units, Subcutaneous, Q12H  hydrALAZINE, 75 mg, Oral, TID  insulin glargine, 15 Units, Subcutaneous, Q12H  insulin lispro, 2-9 Units, Subcutaneous, 4x Daily AC & at Bedtime  isosorbide dinitrate, 10 mg, Oral, TID - Nitrates  magnesium oxide, 400 mg, Oral, Daily  pantoprazole, 40 mg, Intravenous, Q AM  pramipexole, 0.5 mg, Oral, Nightly  prasugrel, 5 mg, Oral, Daily  ranolazine, 500 mg, Oral, Q12H  sertraline, 100 mg, Oral, Daily  sodium chloride, 10 mL, Intravenous, Q12H  spironolactone, 25 mg, Oral, Daily  tamsulosin, 0.4 mg, Oral, Daily        Infusions Pharmacy Consult - Pharmacy to dose,         PRN Medications   acetaminophen **OR** acetaminophen **OR** acetaminophen    senna-docusate sodium **AND** polyethylene glycol **AND** bisacodyl **AND** bisacodyl    dextrose    dextrose    glucagon (human recombinant)    ipratropium-albuterol    nitroglycerin    ondansetron ODT **OR** ondansetron    Pharmacy Consult - Pharmacy to dose    sodium chloride    sodium chloride    sodium chloride     Physical Findings        Trending Physical   Appearance, NFPE    --  Edema  2+ ankle      Bowel Function LBM: 4/25     Tubes Peripheral IV     Chewing/Swallowing WNL     Skin WNL     --  Current Nutrition Orders & Evaluation of Intake       Oral Nutrition     Food Allergies NKFA   Current PO Diet Diet: Cardiac, Diabetic, Renal; Healthy Heart (2-3 Na+); Consistent Carbohydrate; Low Sodium (2-3g), Low Phosphorus; Fluid Consistency: Thin (IDDSI 0)   Supplement    PO Evaluation     Trending % PO Intake 60% x 5 meals     Nutrition Diagnosis         Nutrition Dx Problem 1 Decreased oral intake r/t clinical condition AEB average PO intake 60%.       Nutrition Dx Problem 2        Intervention Goal         Intervention Goal(s) Maintain CBW  PO intake meet >50% of estimated needs  Adhere to ONS     Nutrition Intervention        RD  Action Will order Mighty zhanna daily     Nutrition Prescription          Diet Prescription HH, Renal, CCD   Supplement Prescription Mighty shake daily   Enteral Nutrition Prescription     TPN Prescription       Monitor/Evaluation        Monitor Per protocol, I&O, PO intake, Supplement intake, Pertinent labs, Weight, Skin status, GI status, Symptoms, POC/GOC, Swallow function     RD to f/up    Electronically signed by:  Radha Brewster RD  04/26/24 08:52 EDT

## 2024-04-26 NOTE — PLAN OF CARE
Goal Outcome Evaluation:  Plan of Care Reviewed With: patient        Progress: improving  Outcome Evaluation: Pt supine in bed and willing to participate with treatment. Pt performs supine to sit EOB with min a and vc 's.  Pt sit to stand transfer required min a with rw. Pt advanced gait distance to 60' cga with rw.  Pt with improved activity tolerance this treatement. Cont PT per POC prgressing to goals as pt tolerates.

## 2024-04-26 NOTE — PROGRESS NOTES
[facility] Progress Note      Patient Name: Donny Jerry  : 1945  MRN: [id number]  Primary Care Physician:  Anum Alonso APRN  Date of admission: 2024    Subjective     Interval History:   F/u CKD vol overload     Review of Systems:   UOP 2L/24h  Wt down 4 lbs to 207 by bed scale  Feels much better; O2 weaned to 1L; no dyspnea    Objective     Vitals:   Temp:  [98 °F (36.7 °C)-98.5 °F (36.9 °C)] 98 °F (36.7 °C)  Heart Rate:  [67-73] 67  Resp:  [18-24] 22  BP: (121-147)/(52-73) 128/52  Flow (L/min):  [2-3] 2    Intake/Output Summary (Last 24 hours) at 2024 1323  Last data filed at 2024 1200  Gross per 24 hour   Intake 1080 ml   Output 1700 ml   Net -620 ml       Physical Exam:    General Appearance: alert, comfortable on NC O2  Neck: supple, no JVD  Lungs: CTA bilat no rales  Heart: RRR, normal S1 and S2  Abdomen: soft, nontender, nondistended  : no palpable bladder  Extremities: no edema, cyanosis or clubbing      Scheduled Meds:     aspirin, 81 mg, Oral, Daily  atorvastatin, 40 mg, Oral, Daily  azithromycin, 500 mg, Intravenous, Q24H  budesonide-formoterol, 2 puff, Inhalation, BID - RT   And  tiotropium bromide monohydrate, 2 puff, Inhalation, Daily - RT  carvedilol, 6.25 mg, Oral, BID With Meals  cefTRIAXone, 2,000 mg, Intravenous, Q24H  cetirizine, 10 mg, Oral, Daily  cholecalciferol, 2,000 Units, Oral, Daily  empagliflozin, 10 mg, Oral, Daily  finasteride, 5 mg, Oral, Daily  fluticasone, 1 spray, Nasal, Daily  furosemide, 80 mg, Intravenous, BID  guaiFENesin, 600 mg, Oral, Q12H  heparin (porcine), 5,000 Units, Subcutaneous, Q12H  hydrALAZINE, 75 mg, Oral, TID  insulin glargine, 15 Units, Subcutaneous, Q12H  insulin lispro, 2-9 Units, Subcutaneous, 4x Daily AC & at Bedtime  isosorbide dinitrate, 10 mg, Oral, TID - Nitrates  magnesium oxide, 400 mg, Oral, Daily  pantoprazole, 40 mg, Intravenous, Q AM  pramipexole, 0.5 mg, Oral, Nightly  prasugrel, 5  mg, Oral, Daily  ranolazine, 500 mg, Oral, Q12H  sertraline, 100 mg, Oral, Daily  sodium chloride, 10 mL, Intravenous, Q12H  spironolactone, 25 mg, Oral, Daily  tamsulosin, 0.4 mg, Oral, Daily      IV Meds:   Pharmacy Consult - Pharmacy to dose,         Results Reviewed:   I have personally reviewed the results from the time of this admission to 4/26/2024 13:23 EDT     Results from last 7 days   Lab Units 04/26/24 0637 04/25/24  0545 04/24/24  0632 04/23/24  0632 04/23/24  0025   SODIUM mmol/L 138 135* 137   < > 137   POTASSIUM mmol/L 3.4* 3.6 3.1*   < > 4.1   CHLORIDE mmol/L 99 97* 100   < > 102   CO2 mmol/L 25.5 23.2 25.4   < > 22.0   BUN mg/dL 76* 65* 55*   < > 45*   CREATININE mg/dL 2.29* 2.10* 2.06*   < > 1.77*   CALCIUM mg/dL 8.7 8.7 8.5*   < > 8.5*   BILIRUBIN mg/dL  --   --   --   --  0.7   ALK PHOS U/L  --   --   --   --  54   ALT (SGPT) U/L  --   --   --   --  23   AST (SGOT) U/L  --   --   --   --  22   GLUCOSE mg/dL 122* 106* 109*   < > 203*    < > = values in this interval not displayed.     Estimated Creatinine Clearance: 29.1 mL/min (A) (by C-G formula based on SCr of 2.29 mg/dL (H)).  Results from last 7 days   Lab Units 04/26/24 0637 04/25/24  0545 04/24/24  0632   MAGNESIUM mg/dL  --   --  2.2   PHOSPHORUS mg/dL 3.9 3.2  --          Results from last 7 days   Lab Units 04/26/24 0637 04/25/24  0545 04/24/24  0632 04/23/24  0632 04/23/24  0025   WBC 10*3/mm3 9.72 12.27* 12.36* 13.78* 16.18*   HEMOGLOBIN g/dL 9.7* 10.2* 9.9* 10.2* 10.8*   PLATELETS 10*3/mm3 294 245 229 226 233           Assessment / Plan     ASSESSMENT:  CKD stage 3B - Cr up slightly 2.3 (& BUN 76) with diuresis, cardiorenal syn (BL 1.8 to 2 roughly) but need to allow for degree of prerenal azotemia with more aggressive diuresis.  No proteinuria on UA  Vol overload - improved, escalated IV lasix dose yesterday, no dyspnea/edema today  Hypokalemia, due to diuresis, K replaced up to 3.6  HTN - BP controlled, watch for  over-correction, as nitrate & aldactone added.  I stopped tiny dose norvasc (2.5).  Hx orthostasis (+syncope).  We may need to consider ACE/ARB/entresto later if renal fcn remains stable  HFrEF, EF 45%.  Recent echo noted.  On SGLT2 inh (farxiga) and beta blocker.    Acute on chronic hypoxic resp failure, O2 weaned further with diuresis  Anemia of CKD, hgb down slightly 9.7.  Iron def, TSAT < 10% but won't give IV iron due to vol overload     PLAN:  Replace K  DC IV lasix (received dose this AM)  Torsemide 40mg daily starting tomorrow (in place of lasix was taking before)  Continue aldactone 25 mg  No objection to discharge tomorrow      Quang Duke MD  04/26/24  13:23 EDT    [facility]  [phone]

## 2024-04-26 NOTE — THERAPY TREATMENT NOTE
Patient Name: Donny Jerry  : 1945    MRN: [id number]                              Today's Date: 2024       Admit Date: 2024    Visit Dx:     ICD-10-CM ICD-9-CM   1. Acute respiratory failure with hypoxia  J96.01 518.81   2. Acute on chronic congestive heart failure, unspecified heart failure type  I50.9 428.0   3. Leukocytosis, unspecified type  D72.829 288.60   4. Chronic kidney disease, unspecified CKD stage  N18.9 585.9   5. Acute on chronic systolic congestive heart failure  I50.23 428.23     428.0     Patient Active Problem List   Diagnosis    Coronary artery disease involving native coronary artery of native heart with angina pectoris    Essential hypertension    Hyperlipidemia LDL goal <70    Type 2 diabetes mellitus with stage 3 chronic kidney disease    Obstructive sleep apnea on CPAP    Enlarged prostate    Obesity, Class II, BMI 35-39.9    Depression    Acute hypoxemic respiratory failure due to COVID-19    CKD (chronic kidney disease) stage 3, GFR 30-59 ml/min    Elevated troponin level not due myocardial infarction    Post viral debility    Chest pain, unspecified type    Chronic systolic heart failure    Type 1 myocardial infarction    Chronic diastolic heart failure    Bilateral lower extremity edema    Acute exacerbation of congestive heart failure    Acute on chronic HFrEF (heart failure with reduced ejection fraction)    Acute on chronic systolic heart failure    Syncope    CHF exacerbation    Heart failure     Past Medical History:   Diagnosis Date    Benign hypertension     Coronary artery disease     Depression     Enlarged prostate     Enlarged prostate with anticipated TURP with Dr. Bernal.    GERD (gastroesophageal reflux disease)     Heart attack     Hypercholesterolemia     Impaired functional mobility, balance, gait, and endurance     Myocardial infarction     Obesity     Obstructive sleep apnea on CPAP     Type 2 diabetes mellitus      Past Surgical History:    Procedure Laterality Date    CARDIAC CATHETERIZATION      CARDIAC CATHETERIZATION Left 10/19/2021    Procedure: Left Heart Cath;  Surgeon: Liz Russo MD;  Location:  [facility];  Service: Cardiology;  Laterality: Left;    CARDIAC CATHETERIZATION N/A 7/18/2023    Procedure: Coronary angiography;  Surgeon: Rupesh Parr MD;  Location:  [facility];  Service: Cardiology;  Laterality: N/A;    CARDIAC CATHETERIZATION N/A 7/18/2023    Procedure: Percutaneous Coronary Intervention;  Surgeon: Rupesh Parr MD;  Location:  [facility];  Service: Cardiology;  Laterality: N/A;    COLONOSCOPY W/ POLYPECTOMY      CORONARY ANGIOPLASTY WITH STENT PLACEMENT Left 06/03/2009    Left heart catheterization, 06/03/2009, Dr. Russo:  LVEF 45% to 50%.  Placement of overlapping Cypher drug-eluting stent 2.5 x 28 and 2.5 x 18 to the SVG to the obtuse marginal branch.  SVG to the PDA had a proximal stenosis estimated at 60% with a distal stenosis of 50% to 60%.    CORONARY ANGIOPLASTY WITH STENT PLACEMENT Left 07/06/2009    Left heart catheterization, 07/06/2009:  PTCA and stent placement in the mid PDA using a 2.25 x 12 mm Taxus drug-eluting stent with stent placement in the distal SVG to the PDA using a 2.5 x 18 mm Cypher drug-eluting stent and stenting of the proximal SVG to the PDA using a 3.0 x 28 mm Cypher drug-eluting stent.    CORONARY ANGIOPLASTY WITH STENT PLACEMENT  04/23/2010    Cardiac catheterization for recurrent anginal symptoms, 04/23/2010, with PTCA and stent placement in the proximal SVG to the PDA using 3.0 x 28 mm Promus drug-eluting stent for in-stent restenosis.    CORONARY ANGIOPLASTY WITH STENT PLACEMENT Left 07/06/2010    Left heart catheterization, 07/06/2010, Dr. Russo:  EF 40% to 45%.  3.5 x 23 mm Promus drug-eluting stent in the proximal SVG to OM, 2.5 x 18 mm Promus drug-eluting stent to distal SVG to PDA due to in-stent  restenosis.      CORONARY ANGIOPLASTY WITH STENT PLACEMENT Left 11/16/2010    Left heart catheterization, 11/16/2010, with placement of a 3.0 x 16 mm Taxus drug-eluting stent to the SVG to the RCA proximally and a 2.5 x 16 mm paclitaxel stent distally in the SVG to the RCA.    CORONARY ANGIOPLASTY WITH STENT PLACEMENT Left     Left heart catheterization, 3.0 x 24-mm Promus element drug-eluting stent to a 90% lesion in the mid portion of the SVG to the PDA.     CORONARY ANGIOPLASTY WITH STENT PLACEMENT Left 06/24/2014    Left heart catheterization for recurrent angina, 06/24/2014, Dr. Russo:  PTCA of the SVG to the PDA using a 3 x 12 mm NC TREK balloon.      CORONARY ARTERY BYPASS GRAFT      CABG x3, Dr. Peng, [facility], 2001.      General Information       Row Name 04/26/24 1609          Physical Therapy Time and Intention    Document Type therapy note (daily note)  -RM     Mode of Treatment physical therapy  -RM       Row Name 04/26/24 160          General Information    Patient Profile Reviewed yes  -RM     Existing Precautions/Restrictions fall;oxygen therapy device and L/min  2L  -RM       Row Name 04/26/24 1600          Safety Issues, Functional Mobility    Safety Issues Affecting Function (Mobility) safety precaution awareness;safety precautions follow-through/compliance;sequencing abilities  -RM     Impairments Affecting Function (Mobility) balance;endurance/activity tolerance;shortness of breath;strength  -RM               User Key  (r) = Recorded By, (t) = Taken By, (c) = Cosigned By      Initials Name Provider Type    RM Martin Shulka, PTA Physical Therapist Assistant                   Mobility    No documentation.                  Obj/Interventions    No documentation.                  Goals/Plan    No documentation.                  Clinical Impression       Row Name 04/26/24 1608          Pain    Pretreatment Pain Rating 0/10 - no pain  -RM     Posttreatment Pain Rating 0/10 -  no pain  -RM       Row Name 04/26/24 1609          Plan of Care Review    Plan of Care Reviewed With patient  -RM     Progress improving  -RM     Outcome Evaluation Pt supine in bed and willing to participate with treatment. Pt performs supine to sit EOB with min a and vc 's.  Pt sit to stand transfer required min a with rw. Pt advanced gait distance to 60' cga with rw.  Pt with improved activity tolerance this treatement. Cont PT per POC prgressing to goals as pt tolerates.  -RM       Row Name 04/26/24 1609          Vital Signs    Pre SpO2 (%) 98  -RM     O2 Delivery Pre Treatment supplemental O2  -RM     Intra SpO2 (%) 97  -RM     O2 Delivery Intra Treatment supplemental O2  -RM     Post SpO2 (%) 98  -RM     O2 Delivery Post Treatment supplemental O2  -RM     Pre Patient Position Supine  -RM     Intra Patient Position Standing  -RM     Post Patient Position Sitting  -RM       Row Name 04/26/24 1609          Positioning and Restraints    Pre-Treatment Position in bed  -RM     Post Treatment Position bed  -RM     In Bed sitting EOB;call light within reach;encouraged to call for assist;exit alarm on;notified [facility]  -               User Key  (r) = Recorded By, (t) = Taken By, (c) = Cosigned By      Initials Name Provider Type    RM Martin Shukla, PTA Physical Therapist Assistant                   Outcome Measures       Row Name 04/26/24 1612 04/26/24 0800       How much help from another person do you currently need...    Turning from your back to your side while in flat bed without using bedrails? 3  -RM 3  -LA    Moving from lying on back to sitting on the side of a flat bed without bedrails? -- 3  -LA    Moving to and from a bed to a chair (including a wheelchair)? 3  -RM 3  -LA    Standing up from a chair using your arms (e.g., wheelchair, bedside chair)? 3  -RM 3  -LA    Climbing 3-5 steps with a railing? 2  -RM 2  -LA    To walk in hospital room? 3  -RM 3  -LA    AM-PAC 6 Clicks Score (PT) -- 17  -LA     Highest Level of Mobility Goal -- 5 --> Static standing  -LA      Row Name 04/26/24 1257          Functional Assessment    Outcome Measure Options AM-PAC 6 Clicks Daily Activity (OT)  -DB               User Key  (r) = Recorded By, (t) = Taken By, (c) = Cosigned By      Initials Name Provider Type     Martin Shukla, BJORN Physical Therapist Assistant    Elaine Saleem, RN Registered Nurse    Valeria Pedro OT Occupational Therapist                                 Physical Therapy Education       Title: PT OT SLP Therapies (Done)       Topic: Physical Therapy (Done)       Point: Mobility training (Done)       Learning Progress Summary             Patient Acceptance, E,TB,D, VU,NR by  at 4/26/2024 1612    Comment: Importance of activity with advancing daily as pt tolerates.    Acceptance, E,TB,D, VU,NR by  at 4/25/2024 1408    Comment: safety   walker placement    Acceptance, E, NR,VU by  at 4/25/2024 0348    Acceptance, E,TB, VU by  at 4/24/2024 1828    Comment: Role of PT and POC                         Point: Home exercise program (Done)       Learning Progress Summary             Patient Acceptance, E, NR,VU by RA at 4/25/2024 0348                         Point: Body mechanics (Done)       Learning Progress Summary             Patient Acceptance, E, NR,VU by RA at 4/25/2024 0348                         Point: Precautions (Done)       Learning Progress Summary             Patient Acceptance, E, NR,VU by  at 4/25/2024 0348                                         User Key       Initials Effective Dates Name Provider Type Discipline     08/22/23 -  Ellyn Aldana, PT Physical Therapist PT     06/16/21 -  Martin Shukla, PTA Physical Therapist Assistant PT     03/05/24 -  Aleida Chowdary, Nursing Student Nursing Student Nurse                  PT Recommendation and Plan     Plan of Care Reviewed With: patient  Progress: improving  Outcome Evaluation: Pt supine in bed and willing to  participate with treatment. Pt performs supine to sit EOB with min a and vc 's.  Pt sit to stand transfer required min a with rw. Pt advanced gait distance to 60' cga with rw.  Pt with improved activity tolerance this treatement. Cont PT per POC prgressing to goals as pt tolerates.     Time Calculation:         PT Charges       Row Name 04/26/24 1613             Time Calculation    Start Time 1425  -RM      Stop Time 1436  -RM      Time Calculation (min) 11 min  -RM      PT Received On 04/26/24  -RM      PT Goal Re-Cert Due Date 05/04/24  -RM         Time Calculation- PT    Total Timed Code Minutes- PT 11 minute(s)  -RM         Timed Charges    09907 - Gait Training Minutes  11  -RM         Total Minutes    Timed Charges Total Minutes 11  -RM       Total Minutes 11  -RM                User Key  (r) = Recorded By, (t) = Taken By, (c) = Cosigned By      Initials Name Provider Type    Martin Mullins, PTA Physical Therapist Assistant                  Therapy Charges for Today       Code Description Service Date Service Provider Modifiers Qty    23273764033 HC GAIT TRAINING EA 15 MIN 4/25/2024 Martin Shukla, PTA GP 1    53505531855 HC PT THERAPEUTIC ACT EA 15 MIN 4/25/2024 Martin Shukla, PTA GP 1    16368215640 HC GAIT TRAINING EA 15 MIN 4/26/2024 Martin Shukla, PTA GP 1            PT G-Codes  Outcome Measure Options: AM-PAC 6 Clicks Daily Activity (OT)  AM-PAC 6 Clicks Score (PT): 17  AM-PAC 6 Clicks Score (OT): 15       Martin Shukla PTA  4/26/2024

## 2024-04-26 NOTE — PLAN OF CARE
Problem: Fluid Volume Excess  Goal: Fluid Balance  Outcome: Ongoing, Progressing  Intervention: Monitor and Manage Hypervolemia  Recent Flowsheet Documentation  Taken 4/26/2024 0200 by Kori Ramirez RN  Skin Protection:   adhesive use limited   incontinence pads utilized   tubing/devices free from skin contact     Problem: Adult Inpatient Plan of Care  Goal: Plan of Care Review  Outcome: Ongoing, Progressing  Goal: Patient-Specific Goal (Individualized)  Outcome: Ongoing, Progressing  Goal: Absence of Hospital-Acquired Illness or Injury  Outcome: Ongoing, Progressing  Intervention: Identify and Manage Fall Risk  Recent Flowsheet Documentation  Taken 4/26/2024 0200 by Kori Ramirez RN  Safety Promotion/Fall Prevention:   activity supervised   assistive device/personal items within reach   clutter free environment maintained   safety round/check completed  Taken 4/26/2024 0000 by Kori Ramirez RN  Safety Promotion/Fall Prevention:   activity supervised   assistive device/personal items within reach   clutter free environment maintained   safety round/check completed  Taken 4/25/2024 2200 by Kori Ramirez RN  Safety Promotion/Fall Prevention:   activity supervised   assistive device/personal items within reach   clutter free environment maintained   safety round/check completed  Taken 4/25/2024 2000 by Kori Ramirez RN  Safety Promotion/Fall Prevention:   activity supervised   assistive device/personal items within reach   clutter free environment maintained   safety round/check completed  Intervention: Prevent Skin Injury  Recent Flowsheet Documentation  Taken 4/26/2024 0200 by Kori Ramirez RN  Body Position: position changed independently  Skin Protection:   adhesive use limited   incontinence pads utilized   tubing/devices free from skin contact  Taken 4/26/2024 0000 by Kori Ramirez RN  Body Position:   patient/family refused   supine, legs elevated  Taken 4/25/2024 2200 by  Kori Ramirez RN  Body Position:   supine, legs elevated   patient/family refused  Taken 4/25/2024 2000 by Kori Ramirez RN  Body Position: sitting up in bed  Intervention: Prevent and Manage VTE (Venous Thromboembolism) Risk  Recent Flowsheet Documentation  Taken 4/26/2024 0200 by Kori Ramirez RN  Activity Management: activity encouraged  Taken 4/26/2024 0000 by Kori Ramirez RN  Activity Management: activity minimized  Taken 4/25/2024 2200 by Kori Ramirez RN  Activity Management: activity minimized  Taken 4/25/2024 2000 by Kori Ramirez RN  Activity Management: activity encouraged  Intervention: Prevent Infection  Recent Flowsheet Documentation  Taken 4/26/2024 0200 by Kori Ramirez RN  Infection Prevention: environmental surveillance performed  Taken 4/26/2024 0000 by Kori Ramirez RN  Infection Prevention:   environmental surveillance performed   rest/sleep promoted  Taken 4/25/2024 2200 by Kori Ramirez RN  Infection Prevention:   environmental surveillance performed   rest/sleep promoted  Taken 4/25/2024 2000 by Kori Ramirez RN  Infection Prevention: environmental surveillance performed  Goal: Optimal Comfort and Wellbeing  Outcome: Ongoing, Progressing  Goal: Readiness for Transition of Care  Outcome: Ongoing, Progressing     Problem: Skin Injury Risk Increased  Goal: Skin Health and Integrity  Outcome: Ongoing, Progressing  Intervention: Optimize Skin Protection  Recent Flowsheet Documentation  Taken 4/26/2024 0200 by Kori Ramirez RN  Head of Bed (HOB) Positioning: HOB elevated  Skin Protection:   adhesive use limited   incontinence pads utilized   tubing/devices free from skin contact  Taken 4/26/2024 0000 by Kori Ramirez RN  Head of Bed (HOB) Positioning: HOB elevated  Taken 4/25/2024 2200 by Kori Ramirez RN  Head of Bed (HOB) Positioning: HOB elevated  Taken 4/25/2024 2000 by Kori Ramirez RN  Head of Bed (HOB) Positioning: HOB  elevated     Problem: Asthma Comorbidity  Goal: Maintenance of Asthma Control  Outcome: Ongoing, Progressing  Intervention: Maintain Asthma Symptom Control  Recent Flowsheet Documentation  Taken 4/26/2024 0200 by Kori Ramirez RN  Medication Review/Management: medications reviewed  Taken 4/25/2024 2000 by Kori Ramirez RN  Medication Review/Management: medications reviewed     Problem: Behavioral Health Comorbidity  Goal: Maintenance of Behavioral Health Symptom Control  Outcome: Ongoing, Progressing  Intervention: Maintain Behavioral Health Symptom Control  Recent Flowsheet Documentation  Taken 4/26/2024 0200 by Kori Ramirez RN  Medication Review/Management: medications reviewed  Taken 4/25/2024 2000 by Kori Ramirez RN  Medication Review/Management: medications reviewed     Problem: COPD (Chronic Obstructive Pulmonary Disease) Comorbidity  Goal: Maintenance of COPD Symptom Control  Outcome: Ongoing, Progressing  Intervention: Maintain COPD-Symptom Control  Recent Flowsheet Documentation  Taken 4/26/2024 0200 by Kori Ramirez RN  Medication Review/Management: medications reviewed  Taken 4/25/2024 2000 by Kori Ramirez RN  Medication Review/Management: medications reviewed     Problem: Diabetes Comorbidity  Goal: Blood Glucose Level Within Targeted Range  Outcome: Ongoing, Progressing     Problem: Heart Failure Comorbidity  Goal: Maintenance of Heart Failure Symptom Control  Outcome: Ongoing, Progressing  Intervention: Maintain Heart Failure-Management  Recent Flowsheet Documentation  Taken 4/26/2024 0200 by Kori Ramirez RN  Medication Review/Management: medications reviewed  Taken 4/25/2024 2000 by Kori Ramirez RN  Medication Review/Management: medications reviewed     Problem: Hypertension Comorbidity  Goal: Blood Pressure in Desired Range  Outcome: Ongoing, Progressing  Intervention: Maintain Blood Pressure Management  Recent Flowsheet Documentation  Taken 4/26/2024 0200  by Kori Ramirez RN  Medication Review/Management: medications reviewed  Taken 4/25/2024 2000 by Kori Ramirez RN  Medication Review/Management: medications reviewed     Problem: Obstructive Sleep Apnea Risk or Actual Comorbidity Management  Goal: Unobstructed Breathing During Sleep  Outcome: Ongoing, Progressing     Problem: Osteoarthritis Comorbidity  Goal: Maintenance of Osteoarthritis Symptom Control  Outcome: Ongoing, Progressing  Intervention: Maintain Osteoarthritis Symptom Control  Recent Flowsheet Documentation  Taken 4/26/2024 0200 by Kori Ramirez RN  Activity Management: activity encouraged  Medication Review/Management: medications reviewed  Taken 4/26/2024 0000 by Kori Ramirez RN  Activity Management: activity minimized  Taken 4/25/2024 2200 by Kori Ramirez RN  Activity Management: activity minimized  Taken 4/25/2024 2000 by Kori Ramirez RN  Activity Management: activity encouraged  Assistive Device Utilized:   walker   gait belt  Medication Review/Management: medications reviewed     Problem: Pain Chronic (Persistent) (Comorbidity Management)  Goal: Acceptable Pain Control and Functional Ability  Outcome: Ongoing, Progressing  Intervention: Manage Persistent Pain  Recent Flowsheet Documentation  Taken 4/26/2024 0200 by Kori Ramirez RN  Medication Review/Management: medications reviewed  Taken 4/25/2024 2000 by Kori Ramirez RN  Medication Review/Management: medications reviewed     Problem: Seizure Disorder Comorbidity  Goal: Maintenance of Seizure Control  Outcome: Ongoing, Progressing     Problem: Fall Injury Risk  Goal: Absence of Fall and Fall-Related Injury  Outcome: Ongoing, Progressing  Intervention: Identify and Manage Contributors  Recent Flowsheet Documentation  Taken 4/26/2024 0200 by Kori Ramirez RN  Medication Review/Management: medications reviewed  Taken 4/25/2024 2000 by Kori Ramirez RN  Medication Review/Management: medications  reviewed  Intervention: Promote Injury-Free Environment  Recent Flowsheet Documentation  Taken 4/26/2024 0200 by Kori Ramirez RN  Safety Promotion/Fall Prevention:   activity supervised   assistive device/personal items within reach   clutter free environment maintained   safety round/check completed  Taken 4/26/2024 0000 by Kori Ramirez RN  Safety Promotion/Fall Prevention:   activity supervised   assistive device/personal items within reach   clutter free environment maintained   safety round/check completed  Taken 4/25/2024 2200 by Kori Ramirez RN  Safety Promotion/Fall Prevention:   activity supervised   assistive device/personal items within reach   clutter free environment maintained   safety round/check completed  Taken 4/25/2024 2000 by Kori Ramirez RN  Safety Promotion/Fall Prevention:   activity supervised   assistive device/personal items within reach   clutter free environment maintained   safety round/check completed     Problem: Adjustment to Illness (Sepsis/Septic Shock)  Goal: Optimal Coping  Outcome: Ongoing, Progressing     Problem: Bleeding (Sepsis/Septic Shock)  Goal: Absence of Bleeding  Outcome: Ongoing, Progressing     Problem: Glycemic Control Impaired (Sepsis/Septic Shock)  Goal: Blood Glucose Level Within Desired Range  Outcome: Ongoing, Progressing     Problem: Infection Progression (Sepsis/Septic Shock)  Goal: Absence of Infection Signs and Symptoms  Outcome: Ongoing, Progressing  Intervention: Initiate Sepsis Management  Recent Flowsheet Documentation  Taken 4/26/2024 0200 by Kori Ramirez RN  Infection Prevention: environmental surveillance performed  Taken 4/26/2024 0000 by Kori Ramirez RN  Infection Prevention:   environmental surveillance performed   rest/sleep promoted  Taken 4/25/2024 2200 by Kori Ramirez RN  Infection Prevention:   environmental surveillance performed   rest/sleep promoted  Taken 4/25/2024 2000 by Kori Ramirez  RN  Infection Prevention: environmental surveillance performed  Intervention: Promote Recovery  Recent Flowsheet Documentation  Taken 4/26/2024 0200 by Kori Ramirez RN  Activity Management: activity encouraged  Taken 4/26/2024 0000 by Kori Ramirez RN  Activity Management: activity minimized  Taken 4/25/2024 2200 by Kori Ramirez RN  Activity Management: activity minimized  Taken 4/25/2024 2000 by Kori Ramirez RN  Activity Management: activity encouraged     Problem: Nutrition Impaired (Sepsis/Septic Shock)  Goal: Optimal Nutrition Intake  Outcome: Ongoing, Progressing     Problem: Noninvasive Ventilation Acute  Goal: Effective Unassisted Ventilation and Oxygenation  Outcome: Ongoing, Progressing   Goal Outcome Evaluation:      Plan of care reviewed with patient. Patient has not slept much this shift. He did wear bipap for short time frame. While not on bipap he is tolerating 3L NC without any issues. No other significant changes this shift.

## 2024-04-27 ENCOUNTER — READMISSION MANAGEMENT (OUTPATIENT)
Dept: CALL CENTER | Facility: HOSPITAL | Age: 79
End: 2024-04-27
Payer: MEDICARE

## 2024-04-27 ENCOUNTER — APPOINTMENT (OUTPATIENT)
Dept: GENERAL RADIOLOGY | Facility: HOSPITAL | Age: 79
DRG: 291 | End: 2024-04-27
Payer: MEDICARE

## 2024-04-27 VITALS
HEART RATE: 94 BPM | WEIGHT: 209.44 LBS | HEIGHT: 67 IN | SYSTOLIC BLOOD PRESSURE: 129 MMHG | RESPIRATION RATE: 18 BRPM | OXYGEN SATURATION: 94 % | DIASTOLIC BLOOD PRESSURE: 97 MMHG | TEMPERATURE: 97.9 F | BODY MASS INDEX: 32.87 KG/M2

## 2024-04-27 LAB
ALBUMIN SERPL-MCNC: 3.4 G/DL (ref 3.5–5.2)
ANION GAP SERPL CALCULATED.3IONS-SCNC: 18.3 MMOL/L (ref 5–15)
BASOPHILS # BLD AUTO: 0.08 10*3/MM3 (ref 0–0.2)
BASOPHILS NFR BLD AUTO: 0.9 % (ref 0–1.5)
BUN SERPL-MCNC: 79 MG/DL (ref 8–23)
BUN/CREAT SERPL: 38.7 (ref 7–25)
CALCIUM SPEC-SCNC: 9.2 MG/DL (ref 8.6–10.5)
CHLORIDE SERPL-SCNC: 104 MMOL/L (ref 98–107)
CO2 SERPL-SCNC: 21.7 MMOL/L (ref 22–29)
CREAT SERPL-MCNC: 2.04 MG/DL (ref 0.76–1.27)
DEPRECATED RDW RBC AUTO: 46.7 FL (ref 37–54)
EGFRCR SERPLBLD CKD-EPI 2021: 32.7 ML/MIN/1.73
EOSINOPHIL # BLD AUTO: 0.53 10*3/MM3 (ref 0–0.4)
EOSINOPHIL NFR BLD AUTO: 5.9 % (ref 0.3–6.2)
ERYTHROCYTE [DISTWIDTH] IN BLOOD BY AUTOMATED COUNT: 15.5 % (ref 12.3–15.4)
GLUCOSE BLDC GLUCOMTR-MCNC: 129 MG/DL (ref 70–130)
GLUCOSE BLDC GLUCOMTR-MCNC: 229 MG/DL (ref 70–130)
GLUCOSE SERPL-MCNC: 120 MG/DL (ref 65–99)
HCT VFR BLD AUTO: 31.5 % (ref 37.5–51)
HGB BLD-MCNC: 10.4 G/DL (ref 13–17.7)
IMM GRANULOCYTES # BLD AUTO: 0.03 10*3/MM3 (ref 0–0.05)
IMM GRANULOCYTES NFR BLD AUTO: 0.3 % (ref 0–0.5)
LYMPHOCYTES # BLD AUTO: 0.9 10*3/MM3 (ref 0.7–3.1)
LYMPHOCYTES NFR BLD AUTO: 10.1 % (ref 19.6–45.3)
MCH RBC QN AUTO: 27.1 PG (ref 26.6–33)
MCHC RBC AUTO-ENTMCNC: 33 G/DL (ref 31.5–35.7)
MCV RBC AUTO: 82 FL (ref 79–97)
MONOCYTES # BLD AUTO: 0.65 10*3/MM3 (ref 0.1–0.9)
MONOCYTES NFR BLD AUTO: 7.3 % (ref 5–12)
NEUTROPHILS NFR BLD AUTO: 6.72 10*3/MM3 (ref 1.7–7)
NEUTROPHILS NFR BLD AUTO: 75.5 % (ref 42.7–76)
NRBC BLD AUTO-RTO: 0 /100 WBC (ref 0–0.2)
PHOSPHATE SERPL-MCNC: 3.8 MG/DL (ref 2.5–4.5)
PLATELET # BLD AUTO: 356 10*3/MM3 (ref 140–450)
PMV BLD AUTO: 10.8 FL (ref 6–12)
POTASSIUM SERPL-SCNC: 3.9 MMOL/L (ref 3.5–5.2)
RBC # BLD AUTO: 3.84 10*6/MM3 (ref 4.14–5.8)
SODIUM SERPL-SCNC: 144 MMOL/L (ref 136–145)
WBC NRBC COR # BLD AUTO: 8.91 10*3/MM3 (ref 3.4–10.8)

## 2024-04-27 PROCEDURE — 82948 REAGENT STRIP/BLOOD GLUCOSE: CPT | Performed by: FAMILY MEDICINE

## 2024-04-27 PROCEDURE — 63710000001 INSULIN GLARGINE PER 5 UNITS: Performed by: FAMILY MEDICINE

## 2024-04-27 PROCEDURE — 94799 UNLISTED PULMONARY SVC/PX: CPT

## 2024-04-27 PROCEDURE — 85025 COMPLETE CBC W/AUTO DIFF WBC: CPT | Performed by: INTERNAL MEDICINE

## 2024-04-27 PROCEDURE — 99239 HOSP IP/OBS DSCHRG MGMT >30: CPT | Performed by: FAMILY MEDICINE

## 2024-04-27 PROCEDURE — 71045 X-RAY EXAM CHEST 1 VIEW: CPT

## 2024-04-27 PROCEDURE — 80069 RENAL FUNCTION PANEL: CPT | Performed by: INTERNAL MEDICINE

## 2024-04-27 PROCEDURE — 94761 N-INVAS EAR/PLS OXIMETRY MLT: CPT

## 2024-04-27 PROCEDURE — 25010000002 HEPARIN (PORCINE) PER 1000 UNITS: Performed by: FAMILY MEDICINE

## 2024-04-27 PROCEDURE — 63710000001 INSULIN LISPRO (HUMAN) PER 5 UNITS: Performed by: FAMILY MEDICINE

## 2024-04-27 RX ORDER — ISOSORBIDE DINITRATE 10 MG/1
10 TABLET ORAL
Qty: 90 TABLET | Refills: 0 | Status: SHIPPED | OUTPATIENT
Start: 2024-04-27 | End: 2024-05-27

## 2024-04-27 RX ORDER — HYDRALAZINE HYDROCHLORIDE 25 MG/1
75 TABLET, FILM COATED ORAL 3 TIMES DAILY
Qty: 270 TABLET | Refills: 0 | Status: SHIPPED | OUTPATIENT
Start: 2024-04-27 | End: 2024-05-27

## 2024-04-27 RX ORDER — SPIRONOLACTONE 25 MG/1
25 TABLET ORAL DAILY
Qty: 30 TABLET | Refills: 0 | Status: SHIPPED | OUTPATIENT
Start: 2024-04-28 | End: 2024-05-28

## 2024-04-27 RX ORDER — CEFDINIR 300 MG/1
300 CAPSULE ORAL 2 TIMES DAILY
Qty: 10 CAPSULE | Refills: 0 | Status: SHIPPED | OUTPATIENT
Start: 2024-04-27 | End: 2024-05-02

## 2024-04-27 RX ORDER — DOXYCYCLINE HYCLATE 100 MG/1
100 CAPSULE ORAL 2 TIMES DAILY
Qty: 10 CAPSULE | Refills: 0 | Status: SHIPPED | OUTPATIENT
Start: 2024-04-27 | End: 2024-05-02

## 2024-04-27 RX ORDER — GUAIFENESIN 600 MG/1
600 TABLET, EXTENDED RELEASE ORAL EVERY 12 HOURS SCHEDULED
Qty: 20 TABLET | Refills: 0 | Status: SHIPPED | OUTPATIENT
Start: 2024-04-27 | End: 2024-05-07

## 2024-04-27 RX ADMIN — SERTRALINE HYDROCHLORIDE 100 MG: 50 TABLET ORAL at 10:39

## 2024-04-27 RX ADMIN — TIOTROPIUM BROMIDE INHALATION SPRAY 2 PUFF: 3.12 SPRAY, METERED RESPIRATORY (INHALATION) at 07:30

## 2024-04-27 RX ADMIN — INSULIN LISPRO 4 UNITS: 100 INJECTION, SOLUTION INTRAVENOUS; SUBCUTANEOUS at 12:20

## 2024-04-27 RX ADMIN — CETIRIZINE HYDROCHLORIDE 10 MG: 10 TABLET, FILM COATED ORAL at 10:36

## 2024-04-27 RX ADMIN — TORSEMIDE 40 MG: 20 TABLET ORAL at 10:38

## 2024-04-27 RX ADMIN — ASPIRIN 81 MG CHEWABLE TABLET 81 MG: 81 TABLET CHEWABLE at 10:37

## 2024-04-27 RX ADMIN — Medication 10 ML: at 10:39

## 2024-04-27 RX ADMIN — GUAIFENESIN 600 MG: 600 TABLET, EXTENDED RELEASE ORAL at 10:38

## 2024-04-27 RX ADMIN — RANOLAZINE 500 MG: 500 TABLET, FILM COATED, EXTENDED RELEASE ORAL at 10:36

## 2024-04-27 RX ADMIN — INSULIN GLARGINE 15 UNITS: 100 INJECTION, SOLUTION SUBCUTANEOUS at 10:36

## 2024-04-27 RX ADMIN — HYDRALAZINE HYDROCHLORIDE 75 MG: 25 TABLET, FILM COATED ORAL at 12:37

## 2024-04-27 RX ADMIN — EMPAGLIFLOZIN 10 MG: 10 TABLET, FILM COATED ORAL at 10:38

## 2024-04-27 RX ADMIN — ISOSORBIDE DINITRATE 10 MG: 10 TABLET ORAL at 12:37

## 2024-04-27 RX ADMIN — PRASUGREL 5 MG: 10 TABLET, FILM COATED ORAL at 12:20

## 2024-04-27 RX ADMIN — SPIRONOLACTONE 25 MG: 25 TABLET, FILM COATED ORAL at 10:38

## 2024-04-27 RX ADMIN — PANTOPRAZOLE SODIUM 40 MG: 40 INJECTION, POWDER, FOR SOLUTION INTRAVENOUS at 05:24

## 2024-04-27 RX ADMIN — ISOSORBIDE DINITRATE 10 MG: 10 TABLET ORAL at 10:37

## 2024-04-27 RX ADMIN — CARVEDILOL 6.25 MG: 6.25 TABLET, FILM COATED ORAL at 10:37

## 2024-04-27 RX ADMIN — ATORVASTATIN CALCIUM 40 MG: 40 TABLET, FILM COATED ORAL at 10:38

## 2024-04-27 RX ADMIN — TAMSULOSIN HYDROCHLORIDE 0.4 MG: 0.4 CAPSULE ORAL at 10:38

## 2024-04-27 RX ADMIN — FLUTICASONE PROPIONATE 1 SPRAY: 50 SPRAY, METERED NASAL at 10:39

## 2024-04-27 RX ADMIN — FINASTERIDE 5 MG: 5 TABLET, FILM COATED ORAL at 10:37

## 2024-04-27 RX ADMIN — HYDRALAZINE HYDROCHLORIDE 75 MG: 25 TABLET, FILM COATED ORAL at 10:38

## 2024-04-27 RX ADMIN — HEPARIN SODIUM 5000 UNITS: 5000 INJECTION INTRAVENOUS; SUBCUTANEOUS at 10:36

## 2024-04-27 RX ADMIN — BUDESONIDE AND FORMOTEROL FUMARATE DIHYDRATE 2 PUFF: 160; 4.5 AEROSOL RESPIRATORY (INHALATION) at 07:29

## 2024-04-27 RX ADMIN — Medication 2000 UNITS: at 10:37

## 2024-04-27 RX ADMIN — MAGNESIUM OXIDE TAB 400 MG (241.3 MG ELEMENTAL MG) 400 MG: 400 (241.3 MG) TAB at 10:38

## 2024-04-27 NOTE — PLAN OF CARE
Goal Outcome Evaluation:      Pt. Adequate and stable for discharge home per Dr. Rg orders.

## 2024-04-27 NOTE — DISCHARGE INSTRUCTIONS
-Take antibiotics as prescribed until finished.  -Take torsemide and Aldactone as prescribed  -Please continue taking Lasix  -Take isosorbide mononitrate and Hydralazine as prescribed  -Follow-up with your cardiologist and pulmonologist as scheduled  -Follow-up with nephrology Dr. Wilkes  -Follow-up with PCP in 2 to 3 days for recheck and continued care.

## 2024-04-27 NOTE — DISCHARGE SUMMARY
[facility]      Name:  Donny Jerry   Age:  79 y.o.  Sex:  male  :  1945  MRN:  [id number]   Visit Number:  [id number]    Admission Date:  2024  Date of Discharge:  2024  Primary Care Physician:  Anum Alonso APRN    Important issues to note:    -Prescribe doxycycline and Omnicef on discharge to finish treatment of pneumonia  -Continued on torsemide and Aldactone  -Started ISDN 10 mg orally 3 times per day to be taken in combination with his hydralazine 75 mg orally 3 times per day.  -Discontinued Lasix  -Follow-up with cardiology, pulmonology and nephrology  -Follow-up with PCP    Discharge Diagnoses:     Acute on chronic heart failure reduced ejection fraction exacerbation, POA  Acute respiratory failure with hypoxia, POA  Chronic kidney disease stage IIIb, POA  Multifocal pneumonia, unable to specify further   CAD with prior CABG  Insulin-dependent diabetes  Obstructive sleep apnea  Hypertension    Problem List:     Active Hospital Problems    Diagnosis  POA    **CHF exacerbation [I50.9]  Yes    Heart failure [I50.9]  Yes    Acute on chronic HFrEF (heart failure with reduced ejection fraction) [I50.23]  Yes      Resolved Hospital Problems   No resolved problems to display.     Presenting Problem:    Chief Complaint   Patient presents with    Respiratory Distress      Consults:     Consulting Physician(s)         Provider   Role Rupesh Trinidad MD      Consulting Physician Cardiology     Hansel Bob MD      Consulting Physician Pulmonary Disease     Quang Duke MD      Consulting Physician Nephrology          Procedures Performed:        History of presenting illness/Hospital Course:    The patient is a 79-year-old chronically ill gentleman with a history of advanced CAD status post CABG with multiple stents, chronic kidney disease, obstructive sleep apnea, hypertension, COPD, who presented to  the emergency room with complaints of shortness of breath.  History obtained from ER record and from the patient.  He states a 3-day history of increasing breathing difficulty, nearly gasping at home.  He specifically denied any chest pain or chest pressure.  He does think he has had some swelling recently.  He feels like he is urinating per normal.  He follows with Dr. Russo as his cardiologist.  Has been told previously he is not a candidate for further intervention.  He is on antianginal therapy.  Currently feeling better.  Requesting something to eat and drink at time of visit.     In the ER, he was noted to be hypoxic on EMS arrival but is currently saturating at 100% on CPAP.  He had a heart rate in the 80s blood pressure 140/70 and afebrile.  Negative respiratory panel.  VBG unremarkable.  Lactic acid 1.2.  proBNP of 3900.  Creatinine 1.77.  Had a white count of 16 hemoglobin 10.8 and platelets of 233.  Procalcitonin within normal limits.  D-dimer within normal limits.  Initial troponin 122, repeat 192.  Chest x-ray per my interpretation with cardiomegaly, interstitial edema, likely left-sided pleural effusion.  The patient was given IV Lasix.  We were asked to admit.     Heart failure reduced ejection fraction exacerbation, resolved  -Placed patient on IV diuretic therapy with Lasix    -continued his home hydralazine, Lipitor, aspirin, carvedilol, SGLT2 inhibitor and Ranexa.    -Not a candidate for Entresto/spironolactone due to renal failure.    -Reviewed recent 2D echocardiogram.    -Placed on fluid and sodium restrictions.   -Monitor intake and output.  -Cardiology consulted and recommended close diuretics with fluid restriction.   -ISDN 10 mg orally 3 times per day to be taken in combination with his hydralazine 75 mg orally 3 times per day.   -Outpatient follow-up with his established cardiologist Dr. Russo.      Respiratory failure with hypoxia, resolved  Pneumonia, improved  -Suspect  "related to volume overload in the setting of severe congestive heart failure and CAD.  Monitor with treatment and diuresis.  -Supplemental oxygen to keep saturation above 90%, titrate down as able to.  -Monitor with chest x-ray and ABG  -Pulmonology consulted, appreciate recommendations  -CT chest without contrast showed multifocal pneumonia greatest right upper lobe. Small bilateral pleural effusions.  -Pro-Virgilio elevated, afebrile  -Started patient on azithromycin and Rocephin for pneumonia coverage  -Outpatient pulmonology follow-up     Elevated troponin/CAD:  -Cannot definitively rule out ACS as he has known obstructive disease, however with no obvious new EKG changes or chest pain complaint, will not treat as ACS.  Of note, has been deemed to have no further vessels amenable to revascularization, previously had CABG.  Medical treatment otherwise recommended.  -Cardiology has evaluated the patient and recommended outpatient follow-up with his established cardiologist Dr. Russo.      CKD 3B  - Consulted nephrology, appreciate recommendations on diuretics and fluid management  -Avoid nephrotoxic drugs  -Cleared for discharge by nephrologist.  -Lasix discontinued, started on Aldactone and torsemide  -Outpatient nephrology follow-up     Diabetes/USHA/hypertension:  -Ordered sliding scale insulin and long-acting insulin.    -Blood pressure management as noted above.       Patient was seen and examined on the day of discharge.  Wife at bedside.  Patient sitting up comfortably in the chair today with no distress.  Patient reports feeling better than how he \"felt over the past 10 years\" is eager to go home.  Wife also agrees that patient looks much better today.  He denies any pain, shortness of breath or discomfort.  No acute complaints today.  Patient actually saturating around 94 to 95% on room air and not requiring supplemental oxygen currently.  Wife reports that patient has oxygen at home which he uses at " night with his CPAP.  Patient ambulatory with no difficulty.  Vitals are stable, afebrile, on room air.  Nephrology cleared patient for discharge.  Patient instructed on management and plan in details.  Discussed medication changes.  Prescribed antibiotics.  Patient follow-up with his cardiologist, pulmonologist and nephrologist.  Patient to follow-up with PCP in 2 to 3 days for recheck and continued care. Clear return precautions discussed.  Patient verbalized understanding and agreeable to plan.  All questions were answered to the patient's satisfaction.    Vital Signs:    Temp:  [97.3 °F (36.3 °C)-97.9 °F (36.6 °C)] 97.9 °F (36.6 °C)  Heart Rate:  [66-94] 94  Resp:  [18] 18  BP: (109-149)/(57-97) 129/97    Physical Exam:    General Appearance:  Alert and cooperative.  No acute distress.   Head:  Atraumatic and normocephalic.   Eyes: Conjunctivae and sclerae normal, no icterus. No pallor.   Ears:  Ears with no abnormalities noted.   Throat: No oral lesions, no thrush, oral mucosa moist.   Neck: Supple, trachea midline, no thyromegaly.   Back:   No kyphoscoliosis present. No tenderness to palpation.   Lungs:   Breath sounds heard bilaterally equally.  No crackles or wheezing. No Pleural rub or bronchial breathing. Unlabored.  On room air with saturation around 95% .speaks in complete sentences with no difficulty.    Heart:  Normal S1 and S2, no murmur, no gallop, no rub. No JVD.   Abdomen:   Normal bowel sounds, no masses, no organomegaly. Soft, nontender, nondistended, no rebound tenderness.   Extremities: Supple, no edema, no cyanosis, no clubbing.   Pulses: Pulses palpable bilaterally.   Skin: No bleeding or rash.   Neurologic: Alert and oriented x 3. No facial asymmetry. Moves all four limbs. No tremors.     Pertinent Lab Results:     Results from last 7 days   Lab Units 04/27/24  0726 04/26/24  0637 04/25/24  0545 04/23/24  0632 04/23/24  0025   SODIUM mmol/L 144 138 135*   < > 137   POTASSIUM mmol/L 3.9 3.4*  3.6   < > 4.1   CHLORIDE mmol/L 104 99 97*   < > 102   CO2 mmol/L 21.7* 25.5 23.2   < > 22.0   BUN mg/dL 79* 76* 65*   < > 45*   CREATININE mg/dL 2.04* 2.29* 2.10*   < > 1.77*   CALCIUM mg/dL 9.2 8.7 8.7   < > 8.5*   BILIRUBIN mg/dL  --   --   --   --  0.7   ALK PHOS U/L  --   --   --   --  54   ALT (SGPT) U/L  --   --   --   --  23   AST (SGOT) U/L  --   --   --   --  22   GLUCOSE mg/dL 120* 122* 106*   < > 203*    < > = values in this interval not displayed.     Results from last 7 days   Lab Units 04/27/24  0726 04/26/24  0637 04/25/24  0545   WBC 10*3/mm3 8.91 9.72 12.27*   HEMOGLOBIN g/dL 10.4* 9.7* 10.2*   HEMATOCRIT % 31.5* 28.3* 30.0*   PLATELETS 10*3/mm3 356 294 245         Results from last 7 days   Lab Units 04/23/24  0632 04/23/24  0229 04/23/24  0025   HSTROP T ng/L 436* 192* 122*     Results from last 7 days   Lab Units 04/23/24  0025   PROBNP pg/mL 3,938.0*             Results from last 7 days   Lab Units 04/25/24  0736   PH, ARTERIAL pH units 7.466*   PO2 ART mm Hg 53.8*   PCO2, ARTERIAL mm Hg 37.4   HCO3 ART mmol/L 27.0     Results from last 7 days   Lab Units 04/23/24  0100 04/23/24  0054   BLOODCX  No growth at 4 days No growth at 4 days       Pertinent Radiology Results:    Imaging Results (All)       Procedure Component Value Units Date/Time    XR Chest 1 View [[id number]] Collected: 04/27/24 1135     Updated: 04/27/24 1135    Narrative:      PROCEDURE: XR CHEST 1 VW-     INDICATION:  Pneumonia follow-up; J96.01-Acute respiratory failure with  hypoxia; I50.9-Heart failure, unspecified; D72.829-Elevated white blood  cell count, unspecified; N18.9-Chronic kidney disease, unspecified;  I50.23-Acute on chronic systolic (congestive) heart failure     FINDINGS:  A portable view of the chest was obtained.  Comparison is  made to a prior exam dated 04/25/2024.   Patient is again noted to be  status post median sternotomy. Heart size is normal. Right upper lobe  opacity is not significantly changed. No  new focal airspace opacity. No  pleural effusion or pneumothorax.       Impression:      No significant interval change in right upper lobe opacity.          CT Chest Without Contrast Diagnostic [[id number]] Collected: 04/25/24 1601     Updated: 04/25/24 1625    Narrative:         PROCEDURE: CT CHEST WO CONTRAST DIAGNOSTIC-     HISTORY: Pneumonia, complication suspected, xray done; J96.01-Acute  respiratory failure with hypoxia; I50.9-Heart failure, unspecified;  D72.829-Elevated white blood cell count, unspecified; N18.9-Chronic  kidney disease, unspecified; I50.23-Acute on chronic systolic  (congestive) heart failure     COMPARISON: 09/06/2022 high-resolution chest exam.     TECHNIQUE: Axial CT without IV contrast administration.     FINDINGS:     Extensive consolidation of the right upper lobe is noted. There is mild  consolidation and patchy airspace disease bilateral lower lobes.  Moderate left lower lobe atelectasis is noted. Left upper lobe is clear.     Small bilateral pleural effusions are noted. Mild right paratracheal  adenopathy is seen probably reactive. Advanced coronary artery calcified  plaque disease is noted..       Impression:      1. Multifocal pneumonia greatest right upper lobe.  2. No evidence of cavitary/necrotizing pneumonia or obvious lung mass.  3. Small bilateral pleural effusions.        This study was performed with techniques to keep radiation doses as low  as reasonably achievable (ALARA). Individualized dose reduction  techniques using automated exposure control or adjustment of vA and/or  kV according to the patient size were employed.      This report was signed and finalized on 4/25/2024 4:23 PM by Brett Hernandez MD.       XR Chest 1 View [[id number]] Collected: 04/25/24 0724     Updated: 04/25/24 0813    Narrative:      PROCEDURE: XR CHEST 1 VW-     HISTORY: Resp Failure.; J96.01-Acute respiratory failure with hypoxia;  I50.9-Heart failure, unspecified; D72.829-Elevated white blood  cell  count, unspecified; N18.9-Chronic kidney disease, unspecified;  I50.23-Acute on chronic systolic (congestive) heart failure     COMPARISON: 04/24/2024     FINDINGS:  Portable view of the chest demonstrates persistent rounded  opacity of the right upper lobe compatible with pneumonia. This shows  minimal improvement. Pulmonary edema has improved. Minimal pleural  effusions are noted. Vascular congestion remains. The mediastinum is  unremarkable.     The heart size is normal. Patient is status post CABG.       Impression:      1. Minimal improvement right upper lobe pneumonia.  2. Pulmonary edema improved.     This report was signed and finalized on 4/25/2024 8:11 AM by Brett Hernandez MD.       XR Chest 1 View [[id number]] Collected: 04/24/24 1741     Updated: 04/24/24 1742    Narrative:      FINAL REPORT    CLINICAL HISTORY:  Hypoxia follow-up    FINDINGS:  1 VIEW CHEST  heart is normal in size.  The mediastinum is  unremarkable.  There is extensive perihilar with more focal  upper lobe opacity.  The osseous structures are unremarkable.  The patient is status post median sternotomy.      Impression:      Opacity as above likely secondary to pneumonia.  Follow-up to  complete resolution recommended.    Authenticated and Electronically Signed by David Arechiga MD  on 04/24/2024 05:41:57 PM    XR Chest 1 View [[id number]] Collected: 04/23/24 0917     Updated: 04/23/24 1132    Narrative:      PROCEDURE: XR CHEST 1 VW-     HISTORY: SOA Triage Protocol     COMPARISON: 01/13/2024     FINDINGS:  Portable view of the chest demonstrates pulmonary vascular  congestion. There is no evidence of effusion, pneumothorax or other  significant pleural disease. Patient is status post CABG.     The heart size is normal.       Impression:      Findings suggestive of mild CHF.           This report was signed and finalized on 4/23/2024 11:30 AM by Brett Hernandez MD.               Echo:    Results for orders placed during the  hospital encounter of 01/11/24    Adult Transthoracic Echo Complete w/ Color, Spectral and Contrast if necessary per protocol    Interpretation Summary    Left ventricular systolic function is low normal. Calculated left ventricular EF = 45.8% Left ventricular ejection fraction appears to be 46 - 50%.    Left ventricular wall thickness is consistent with mild concentric hypertrophy.    Left ventricular diastolic function is consistent with (grade II w/high LAP) pseudonormalization.    Left atrial volume is moderately increased.    Mild aortic valve stenosis is present.    Estimated right ventricular systolic pressure from tricuspid regurgitation is normal (<35 mmHg).    No significant change compared to 7/2023 echo    Condition on Discharge:      Stable.    Code status during the hospital stay:    Code Status and Medical Interventions:   Ordered at: 04/23/24 0340     Medical Intervention Limits:    NO intubation (DNI)    NO cardioversion     Code Status (Patient has no pulse and is not breathing):    No CPR (Do Not Attempt to Resuscitate)     Medical Interventions (Patient has pulse or is breathing):    Limited Support     Discharge Disposition:    Home or Self Care    Discharge Medications:       Discharge Medications        New Medications        Instructions Start Date   cefdinir 300 MG capsule  Commonly known as: OMNICEF   300 mg, Oral, 2 Times Daily      doxycycline 100 MG capsule  Commonly known as: VIBRAMYCIN   100 mg, Oral, 2 Times Daily      guaiFENesin 600 MG 12 hr tablet  Commonly known as: MUCINEX   600 mg, Oral, Every 12 Hours Scheduled      spironolactone 25 MG tablet  Commonly known as: ALDACTONE   25 mg, Oral, Daily   Start Date: April 28, 2024     Torsemide 40 MG tablet   40 mg, Oral, Daily   Start Date: April 28, 2024            Changes to Medications        Instructions Start Date   hydrALAZINE 25 MG tablet  Commonly known as: APRESOLINE  What changed:   medication strength  how much to  take  Another medication with the same name was removed. Continue taking this medication, and follow the directions you see here.   75 mg, Oral, 3 Times Daily             Continue These Medications        Instructions Start Date   albuterol sulfate  (90 Base) MCG/ACT inhaler  Commonly known as: PROVENTIL HFA;VENTOLIN HFA;PROAIR HFA   2 puffs, Inhalation, Every 4 Hours PRN      aspirin 81 MG chewable tablet   81 mg, Oral, Daily      atorvastatin 40 MG tablet  Commonly known as: LIPITOR   40 mg, Oral, Daily      B-D UF III MINI PEN NEEDLES 31G X 5 MM misc  Generic drug: Insulin Pen Needle   No dose, route, or frequency recorded.      carvedilol 6.25 MG tablet  Commonly known as: COREG   6.25 mg, Oral, 2 Times Daily With Meals      docusate calcium 240 MG capsule  Commonly known as: SURFAK   240 mg, Oral, 2 Times Daily      Farxiga 10 MG tablet  Generic drug: dapagliflozin Propanediol   10 mg, Oral, Daily      fexofenadine 180 MG tablet  Commonly known as: ALLEGRA   180 mg, Oral, Daily PRN      finasteride 5 MG tablet  Commonly known as: PROSCAR   5 mg, Oral, Daily      fluticasone 50 MCG/ACT nasal spray  Commonly known as: FLONASE   1 spray, Nasal, Daily      Fluticasone-Umeclidin-Vilant 200-62.5-25 MCG/ACT inhaler  Commonly known as: Trelegy Ellipta   1 puff, Inhalation, Daily, Rinse mouth with water after use.      Insulin Lispro 100 UNIT/ML injection  Commonly known as: humaLOG   6 Units, Subcutaneous, Daily, Daily with supper      magnesium oxide 400 MG tablet  Commonly known as: MAG-OX   400 mg, Oral, Daily      multivitamin tablet tablet  Commonly known as: THERAGRAN   1 tablet, Oral, Daily      nitroglycerin 0.4 MG SL tablet  Commonly known as: NITROSTAT   0.4 mg, Sublingual, Every 5 Minutes PRN, Take no more than 3 doses in 15 minutes.       pantoprazole 40 MG EC tablet  Commonly known as: PROTONIX   TAKE 1 TABLET BY MOUTH 2 TIMES DAILY (BEFORE MEALS)      pramipexole 1 MG tablet  Commonly known as:  MIRAPEX   1 mg, Oral, Nightly      prasugrel 10 MG tablet  Commonly known as: EFFIENT   10 mg, Oral, Daily      ranolazine 500 MG 12 hr tablet  Commonly known as: RANEXA   500 mg, Oral, Every 12 Hours Scheduled      sertraline 100 MG tablet  Commonly known as: ZOLOFT   100 mg, Oral, Daily      tamsulosin 0.4 MG capsule 24 hr capsule  Commonly known as: FLOMAX   1 capsule, Oral, 2 Times Daily      Tresiba FlexTouch 100 UNIT/ML solution pen-injector injection  Generic drug: insulin degludec   Subcutaneous, 2 Times Daily, 22 units in the am  37 units at bedtime      TRUEplus Lancets 28G misc   No dose, route, or frequency recorded.      Vitamin D3 50 MCG (2000 UT) capsule   2,000 Units, Oral, Daily             Stop These Medications      amLODIPine 2.5 MG tablet  Commonly known as: NORVASC     doxazosin 8 MG tablet  Commonly known as: CARDURA     furosemide 20 MG tablet  Commonly known as: LASIX     furosemide 40 MG tablet  Commonly known as: LASIX            ASK your doctor about these medications        Instructions Start Date   Trulicity 0.75 MG/0.5ML solution pen-injector  Generic drug: Dulaglutide   0.75 mg, Weekly             Discharge Diet:   Cardiac/renal    Activity at Discharge:   As tolerated    Follow-up Appointments:    Additional Instructions for the Follow-ups that You Need to Schedule       Ambulatory Referral to Disease State Management   As directed      To dept: [facility] [[id number]]   What program(s) are you referring for?: Heart Failure   Follow-up needed: Yes               Follow-up Information       [facility] Follow up on 5/6/2024.    Specialty: Pharmacy  Why: @ 10:00 am  Contact information:  [address]  [facility] [address] [phone]             Anum Alonso APRN Follow up in 3 day(s).    Specialty: Family Medicine  Contact information:  [address] [place] [address] [phone]                            Future Appointments   Date Time Provider Department [place]   5/6/2024 10:00 AM Sarah Kessler APRN [facility]   5/16/2024  2:30 PM Liz Russo MD [facility] IRVN [facility]   11/26/2024 10:15 AM Hansel Bob MD [facility] GENARO     Test Results Pending at Discharge:    Pending Labs       Order Current Status    Blood Culture - Blood, Hand, Left Preliminary result    Blood Culture - Blood, Hand, Right Preliminary result               Jono Rg MD  04/27/24  12:18 EDT    Time: I spent 34 minutes on this discharge activity which included: face-to-face encounter with the patient, reviewing the data in the system, coordination of the care with the nursing staff as well as consultants, documentation, and entering orders.     Dictated utilizing Dragon dictation.

## 2024-04-27 NOTE — PROGRESS NOTES
RT EQUIPMENT DEVICE RELATED - SKIN ASSESSMENT    Win Score:  Win Score: 17     RT Medical Equipment/Device:     NIV Mask:  Under-the-nose   size:  B    Skin Assessment:      NOSE: INTACT    Device Skin Pressure Protection:        Nurse Notification:  No    Mick Tamez, RRT

## 2024-04-27 NOTE — OUTREACH NOTE
Prep Survey      Flowsheet Row Responses   [facility] facility patient discharged from? [facility]   Is LACE score < 7 ? No   Eligibility Readm Mgmt   Discharge diagnosis Acute on chronic heart failure reduced ejection fraction exacerbation, POA  Acute respiratory failure with hypoxia, POA   Does the patient have one of the following disease processes/diagnoses(primary or secondary)? CHF   Does the patient have Home health ordered? No   Is there a DME ordered? No   Prep survey completed? Yes            DANYEL HINOJOSA - Registered Nurse

## 2024-04-27 NOTE — CASE MANAGEMENT/SOCIAL WORK
Case Management Discharge Note      Final Note: Plans to DC home with wife    Provided Post Acute Provider List?: N/A  N/A Provider List Comment: Patient plans to return home; no DME needs  Provided Post Acute Provider Quality & Resource List?: N/A  N/A Quality & Resource List Comment: Patient plans to return home; no DME needs    Selected Continued Care - Discharged on 4/27/2024 Admission date: 4/23/2024 - Discharge disposition: Home or Self Care      Destination    No services have been selected for the patient.                Durable Medical Equipment    No services have been selected for the patient.                Dialysis/Infusion    No services have been selected for the patient.                Home Medical Care    No services have been selected for the patient.                Therapy    No services have been selected for the patient.                Community Resources    No services have been selected for the patient.                Community & DME    No services have been selected for the patient.                    Transportation Services  Private: Car    Final Discharge Disposition Code: 01 - home or self-care

## 2024-04-27 NOTE — PLAN OF CARE
Goal Outcome Evaluation:   VSS. No acute changes this shift. Patient on room air and oxygen saturation WDL. Sheets and linens changed. Patient using urinal and up to bedside commode.

## 2024-04-27 NOTE — PROGRESS NOTES
[facility] Progress Note      Patient Name: Donny Jerry  : 1945  MRN: [id number]  Primary Care Physician:  Anum Alonso APRN  Date of admission: 2024    Subjective     Interval History:   F/u CKD vol overload     Review of Systems:   UOP 2L/24h  Patient was doing much better.  Denies nausea or vomiting.  No fever no chills.  Output not recorded but on 900 cc per 24 hours.  Currently comfortable denies any shortness of breath    Objective     Vitals:   Temp:  [97.3 °F (36.3 °C)-97.9 °F (36.6 °C)] 97.9 °F (36.6 °C)  Heart Rate:  [66-76] 69  Resp:  [18] 18  BP: (109-149)/(52-71) 144/65  Flow (L/min):  [2] 2    Intake/Output Summary (Last 24 hours) at 2024 1024  Last data filed at 2024 0900  Gross per 24 hour   Intake 480 ml   Output 550 ml   Net -70 ml       Physical Exam:    General Appearance: alert, comfortable on NC O2  Neck: supple, no JVD  Lungs: CTA bilat no rales  Heart: RRR, normal S1 and S2  Abdomen: soft, nontender, nondistended  : no palpable bladder  Extremities: no edema, cyanosis or clubbing      Scheduled Meds:     aspirin, 81 mg, Oral, Daily  atorvastatin, 40 mg, Oral, Daily  azithromycin, 500 mg, Intravenous, Q24H  budesonide-formoterol, 2 puff, Inhalation, BID - RT   And  tiotropium bromide monohydrate, 2 puff, Inhalation, Daily - RT  carvedilol, 6.25 mg, Oral, BID With Meals  cefTRIAXone, 2,000 mg, Intravenous, Q24H  cetirizine, 10 mg, Oral, Daily  cholecalciferol, 2,000 Units, Oral, Daily  empagliflozin, 10 mg, Oral, Daily  finasteride, 5 mg, Oral, Daily  fluticasone, 1 spray, Nasal, Daily  guaiFENesin, 600 mg, Oral, Q12H  heparin (porcine), 5,000 Units, Subcutaneous, Q12H  hydrALAZINE, 75 mg, Oral, TID  insulin glargine, 15 Units, Subcutaneous, Q12H  insulin lispro, 2-9 Units, Subcutaneous, 4x Daily AC & at Bedtime  isosorbide dinitrate, 10 mg, Oral, TID - Nitrates  magnesium oxide, 400 mg, Oral, Daily  pantoprazole, 40 mg,  Intravenous, Q AM  pramipexole, 0.5 mg, Oral, Nightly  prasugrel, 5 mg, Oral, Daily  ranolazine, 500 mg, Oral, Q12H  sertraline, 100 mg, Oral, Daily  sodium chloride, 10 mL, Intravenous, Q12H  spironolactone, 25 mg, Oral, Daily  tamsulosin, 0.4 mg, Oral, Daily  torsemide, 40 mg, Oral, Daily      IV Meds:   Pharmacy Consult - Pharmacy to dose,         Results Reviewed:   I have personally reviewed the results from the time of this admission to 4/27/2024 10:24 EDT     Results from last 7 days   Lab Units 04/27/24  0726 04/26/24  0637 04/25/24  0545 04/23/24  0632 04/23/24  0025   SODIUM mmol/L 144 138 135*   < > 137   POTASSIUM mmol/L 3.9 3.4* 3.6   < > 4.1   CHLORIDE mmol/L 104 99 97*   < > 102   CO2 mmol/L 21.7* 25.5 23.2   < > 22.0   BUN mg/dL 79* 76* 65*   < > 45*   CREATININE mg/dL 2.04* 2.29* 2.10*   < > 1.77*   CALCIUM mg/dL 9.2 8.7 8.7   < > 8.5*   BILIRUBIN mg/dL  --   --   --   --  0.7   ALK PHOS U/L  --   --   --   --  54   ALT (SGPT) U/L  --   --   --   --  23   AST (SGOT) U/L  --   --   --   --  22   GLUCOSE mg/dL 120* 122* 106*   < > 203*    < > = values in this interval not displayed.     Estimated Creatinine Clearance: 32.8 mL/min (A) (by C-G formula based on SCr of 2.04 mg/dL (H)).  Results from last 7 days   Lab Units 04/27/24  0726 04/26/24  0637 04/25/24  0545 04/24/24  0632   MAGNESIUM mg/dL  --   --   --  2.2   PHOSPHORUS mg/dL 3.8 3.9 3.2  --          Results from last 7 days   Lab Units 04/27/24  0726 04/26/24  0637 04/25/24  0545 04/24/24  0632 04/23/24  0632   WBC 10*3/mm3 8.91 9.72 12.27* 12.36* 13.78*   HEMOGLOBIN g/dL 10.4* 9.7* 10.2* 9.9* 10.2*   PLATELETS 10*3/mm3 356 294 245 229 226           Assessment / Plan     ASSESSMENT:  CKD stage 3B - Cr up slightly 2.3 (& BUN 76) with diuresis, cardiorenal syn (BL 1.8 to 2 roughly) but need to allow for degree of prerenal azotemia with more aggressive diuresis.  No proteinuria on UA  Vol overload - improved, escalated IV lasix dose yesterday, no  dyspnea/edema today  Hypokalemia, due to diuresis, K replaced up to 3.6  HTN - BP controlled, watch for over-correction, as nitrate & aldactone added.  I stopped tiny dose norvasc (2.5).  Hx orthostasis (+syncope).  We may need to consider ACE/ARB/entresto later if renal fcn remains stable  HFrEF, EF 45%.  Recent echo noted.  On SGLT2 inh (farxiga) and beta blocker.    Acute on chronic hypoxic resp failure, O2 weaned further with diuresis  Anemia of CKD,  Iron def, TSAT < 10%.  Started on supplementation    PLAN:  Kidney function is improving.  Will continue current diuretic dose.  Okay to discharge home from nephrology standpoint    Edwina Vidal MD  04/27/24  10:24 EDT    [facility]  [phone]

## 2024-04-28 LAB
BACTERIA SPEC AEROBE CULT: NORMAL
BACTERIA SPEC AEROBE CULT: NORMAL

## 2024-04-29 ENCOUNTER — OFFICE VISIT (OUTPATIENT)
Dept: PRIMARY CARE CLINIC | Age: 79
End: 2024-04-29

## 2024-04-29 ENCOUNTER — HOSPITAL ENCOUNTER (OUTPATIENT)
Facility: HOSPITAL | Age: 79
Discharge: HOME | End: 2024-04-29
Payer: MEDICARE

## 2024-04-29 VITALS
WEIGHT: 203 LBS | DIASTOLIC BLOOD PRESSURE: 79 MMHG | BODY MASS INDEX: 31.79 KG/M2 | SYSTOLIC BLOOD PRESSURE: 131 MMHG | HEART RATE: 61 BPM | OXYGEN SATURATION: 95 %

## 2024-04-29 DIAGNOSIS — I50.23 ACUTE ON CHRONIC SYSTOLIC CONGESTIVE HEART FAILURE (HCC): ICD-10-CM

## 2024-04-29 DIAGNOSIS — J18.9 PNEUMONIA DUE TO INFECTIOUS ORGANISM, UNSPECIFIED LATERALITY, UNSPECIFIED PART OF LUNG: Primary | ICD-10-CM

## 2024-04-29 DIAGNOSIS — R53.81 DEBILITY: ICD-10-CM

## 2024-04-29 DIAGNOSIS — E11.9 TYPE 2 DIABETES MELLITUS WITHOUT COMPLICATION, WITH LONG-TERM CURRENT USE OF INSULIN (HCC): ICD-10-CM

## 2024-04-29 DIAGNOSIS — Z79.4 TYPE 2 DIABETES MELLITUS WITHOUT COMPLICATION, WITH LONG-TERM CURRENT USE OF INSULIN (HCC): ICD-10-CM

## 2024-04-29 PROCEDURE — 85027 COMPLETE CBC AUTOMATED: CPT

## 2024-04-29 PROCEDURE — 36415 COLL VENOUS BLD VENIPUNCTURE: CPT

## 2024-04-29 PROCEDURE — 80053 COMPREHEN METABOLIC PANEL: CPT

## 2024-04-29 PROCEDURE — 83036 HEMOGLOBIN GLYCOSYLATED A1C: CPT

## 2024-04-29 RX ORDER — DOXYCYCLINE HYCLATE 100 MG/1
100 CAPSULE ORAL 2 TIMES DAILY
COMMUNITY
Start: 2024-04-27 | End: 2024-05-02

## 2024-04-29 RX ORDER — ISOSORBIDE DINITRATE 10 MG/1
10 TABLET ORAL
COMMUNITY
Start: 2024-04-27 | End: 2024-05-27

## 2024-04-29 RX ORDER — GUAIFENESIN 600 MG/1
600 TABLET, EXTENDED RELEASE ORAL EVERY 12 HOURS SCHEDULED
COMMUNITY
Start: 2024-04-27 | End: 2024-05-07

## 2024-04-29 RX ORDER — SPIRONOLACTONE 25 MG/1
25 TABLET ORAL DAILY
COMMUNITY
Start: 2024-04-28 | End: 2024-05-28

## 2024-04-29 RX ORDER — DOCUSATE SODIUM 100 MG/1
100 CAPSULE, LIQUID FILLED ORAL 2 TIMES DAILY
COMMUNITY

## 2024-04-29 RX ORDER — CEFDINIR 300 MG/1
300 CAPSULE ORAL 2 TIMES DAILY
COMMUNITY

## 2024-04-29 ASSESSMENT — ENCOUNTER SYMPTOMS
EYE DISCHARGE: 0
NAUSEA: 0
SINUS PRESSURE: 0
ABDOMINAL PAIN: 0
COUGH: 0
BACK PAIN: 0
VOMITING: 0
WHEEZING: 0
SHORTNESS OF BREATH: 0

## 2024-04-29 NOTE — PROGRESS NOTES
nervous/anxious.        Past Medical History:   Diagnosis Date    Anemia 2019    Anxiety     Bleeding stomach ulcer 2019    Dr Akhtar    CAD (coronary artery disease)     Cancer (HCC)     malignant colon polyp    Depression     Enlarged prostate     GERD (gastroesophageal reflux disease)     Hyperlipidemia     Hypertension     Sleep apnea     Type II or unspecified type diabetes mellitus without mention of complication, not stated as uncontrolled      Past Surgical History:   Procedure Laterality Date    CORONARY ANGIOPLASTY WITH STENT PLACEMENT  04/2013    Dr. Dasilva, stents in 2009, 2011, 2013, ptca 2014    CORONARY ARTERY BYPASS GRAFT  2001    x3    CORONARY STENT PLACEMENT  07/2023    2 heart stents placed    EYE SURGERY Left     POLYPECTOMY  1999    colon ca     Family History   Problem Relation Age of Onset    Heart Attack Mother     Heart Disease Father     Diabetes Sister       Social History     Tobacco Use   Smoking Status Never   Smokeless Tobacco Never       OBJECTIVE:   Wt Readings from Last 3 Encounters:   04/29/24 92.1 kg (203 lb)   04/16/24 99.2 kg (218 lb 12.8 oz)   03/21/24 105.1 kg (231 lb 9.6 oz)     BP Readings from Last 3 Encounters:   04/29/24 131/79   04/16/24 (!) 122/46   03/21/24 (!) 168/74       /79 (Site: Right Upper Arm, Position: Sitting, Cuff Size: Large Adult)   Pulse 61   Wt 92.1 kg (203 lb) Comment: Patient reported  SpO2 95% Comment: room air  BMI 31.79 kg/m²      Physical Exam  Vitals and nursing note reviewed.   Constitutional:       Appearance: Normal appearance. He is well-developed.      Comments: Using wheelchair weak   HENT:      Head: Normocephalic and atraumatic.      Right Ear: External ear normal.      Left Ear: External ear normal.      Nose: Nose normal.      Mouth/Throat:      Mouth: Mucous membranes are moist.      Pharynx: Oropharynx is clear.   Eyes:      Conjunctiva/sclera: Conjunctivae normal.      Pupils: Pupils are equal, round, and reactive to

## 2024-04-30 LAB
ALBUMIN SERPL-MCNC: 4 G/DL (ref 3.4–4.8)
ALBUMIN/GLOB SERPL: 1.4 {RATIO} (ref 0.8–2)
ALP SERPL-CCNC: 66 U/L (ref 25–100)
ALT SERPL-CCNC: 57 U/L (ref 4–36)
ANION GAP SERPL CALCULATED.3IONS-SCNC: 18 MMOL/L (ref 3–16)
AST SERPL-CCNC: 34 U/L (ref 8–33)
BILIRUB SERPL-MCNC: 0.3 MG/DL (ref 0.3–1.2)
BUN SERPL-MCNC: 76 MG/DL (ref 6–20)
CALCIUM SERPL-MCNC: 9.5 MG/DL (ref 8.5–10.5)
CHLORIDE SERPL-SCNC: 99 MMOL/L (ref 98–107)
CO2 SERPL-SCNC: 23 MMOL/L (ref 20–30)
CREAT SERPL-MCNC: 2.1 MG/DL (ref 0.4–1.2)
ERYTHROCYTE [DISTWIDTH] IN BLOOD BY AUTOMATED COUNT: 15.9 % (ref 11–16)
GFR SERPLBLD CREATININE-BSD FMLA CKD-EPI: 32 ML/MIN/{1.73_M2}
GLOBULIN SER CALC-MCNC: 2.9 G/DL
GLUCOSE SERPL-MCNC: 215 MG/DL (ref 74–106)
HBA1C MFR BLD: 7.4 %
HCT VFR BLD AUTO: 34.2 % (ref 40–54)
HGB BLD-MCNC: 10.9 G/DL (ref 13–18)
MCH RBC QN AUTO: 26.6 PG (ref 27–32)
MCHC RBC AUTO-ENTMCNC: 31.9 G/DL (ref 31–35)
MCV RBC AUTO: 83.4 FL (ref 80–100)
PLATELET # BLD AUTO: 461 K/UL (ref 150–400)
PMV BLD AUTO: 11.3 FL (ref 6–10)
POTASSIUM SERPL-SCNC: 4.6 MMOL/L (ref 3.4–5.1)
PROT SERPL-MCNC: 6.9 G/DL (ref 6.4–8.3)
RBC # BLD AUTO: 4.1 M/UL (ref 4.5–6)
SODIUM SERPL-SCNC: 140 MMOL/L (ref 136–145)
WBC # BLD AUTO: 9.5 K/UL (ref 4–11)

## 2024-05-01 ENCOUNTER — READMISSION MANAGEMENT (OUTPATIENT)
Dept: CALL CENTER | Facility: HOSPITAL | Age: 79
End: 2024-05-01
Payer: MEDICARE

## 2024-05-01 ENCOUNTER — CARE COORDINATION (OUTPATIENT)
Dept: PRIMARY CARE CLINIC | Age: 79
End: 2024-05-01

## 2024-05-01 NOTE — CARE COORDINATION
Burak Hawkins, RN  Manager Care Coordination  [phone]     I talked with Mrs. Evans this morning.  She tells me that Aquilino seems to be getting a little bit stronger and his appetite is starting to come back.  This morning his BP is 135/68 and pulse 57.  His oxygen saturation is 99%.  His fasting blood sugar was 149.  His weight has been stable for past couple of days.  No issues with medications.  We discussed his upcoming appointments.  A home health referral was placed for PT but no word yet on when that may start.  My contact information was reviewed.

## 2024-05-01 NOTE — OUTREACH NOTE
CHF Week 1 Survey      Flowsheet Row Responses   [facility] patient discharged from? [facility]   Does the patient have one of the following disease processes/diagnoses(primary or secondary)? CHF   CHF Week 1 attempt successful? Yes   Call start time 0838   Call end time 0841   Discharge diagnosis Acute on chronic heart failure reduced ejection fraction exacerbation, POA  Acute respiratory failure with hypoxia, POA   Meds reviewed with patient/caregiver? Yes   Is the patient having any side effects they believe may be caused by any medication additions or changes? No   Does the patient have all medications ordered at discharge? Yes   Is the patient taking all medications as directed (includes completed medication regime)? Yes   Does the patient have a primary care provider?  Yes   Does the patient have an appointment with their PCP within 7 days of discharge? Yes   Has the patient kept scheduled appointments due by today? Yes   Has home health visited the patient within 72 hours of discharge? N/A   Pulse Ox monitoring Intermittent   O2 Sat comments Pt wasnt sure of Oxygen reading.   O2 Sat: education provided Sat levels, When to seek care   O2 Sat education comments Maintain oxygen of 90% or above. If drops below 90% and does not rebound with supplemental oxygen, relaxation and deep breathing needs to seek medical attention immediately.   Psychosocial issues? No   What is the patient's perception of their health status since discharge? Improving    CHF Week 1 call completed? Yes   Call end time 0841            Viktoria PALENCIA - Registered Nurse

## 2024-05-15 ENCOUNTER — CARE COORDINATION (OUTPATIENT)
Dept: PRIMARY CARE CLINIC | Age: 79
End: 2024-05-15

## 2024-05-15 DIAGNOSIS — J45.40 MODERATE PERSISTENT ASTHMA WITHOUT COMPLICATION: ICD-10-CM

## 2024-05-15 RX ORDER — FLUTICASONE FUROATE, UMECLIDINIUM BROMIDE AND VILANTEROL TRIFENATATE 200; 62.5; 25 UG/1; UG/1; UG/1
POWDER RESPIRATORY (INHALATION)
Qty: 60 EACH | Refills: 2 | Status: SHIPPED | OUTPATIENT
Start: 2024-05-15

## 2024-05-15 NOTE — CARE COORDINATION
Burak Hawkins, RN  Manager Care Coordination  [phone]     Mrs. Evans reports today that PT, OT, and speech therapy have begun and it seems to be going well.  Weight, BP and blood sugar have been stable.  Aquilino has an appointment tomorrow with Dr Dasilva.  We made a plan to talk on Friday for report on his appointment and possible need for refills.

## 2024-05-16 ENCOUNTER — OFFICE VISIT (OUTPATIENT)
Dept: CARDIOLOGY | Facility: CLINIC | Age: 79
End: 2024-05-16
Payer: MEDICARE

## 2024-05-16 VITALS
HEART RATE: 72 BPM | HEIGHT: 67 IN | BODY MASS INDEX: 32.18 KG/M2 | DIASTOLIC BLOOD PRESSURE: 50 MMHG | OXYGEN SATURATION: 97 % | WEIGHT: 205 LBS | SYSTOLIC BLOOD PRESSURE: 134 MMHG

## 2024-05-16 DIAGNOSIS — I50.32 CHRONIC DIASTOLIC HEART FAILURE: ICD-10-CM

## 2024-05-16 DIAGNOSIS — E78.5 HYPERLIPIDEMIA LDL GOAL <70: ICD-10-CM

## 2024-05-16 DIAGNOSIS — I10 ESSENTIAL HYPERTENSION: ICD-10-CM

## 2024-05-16 DIAGNOSIS — G47.33 OBSTRUCTIVE SLEEP APNEA ON CPAP: ICD-10-CM

## 2024-05-16 DIAGNOSIS — I25.810 CORONARY ARTERY DISEASE INVOLVING CORONARY BYPASS GRAFT OF NATIVE HEART WITHOUT ANGINA PECTORIS: Primary | ICD-10-CM

## 2024-05-16 RX ORDER — ATORVASTATIN CALCIUM 80 MG/1
80 TABLET, FILM COATED ORAL DAILY
Qty: 90 TABLET | Refills: 3 | Status: SHIPPED | OUTPATIENT
Start: 2024-05-16

## 2024-05-16 RX ORDER — ISOSORBIDE MONONITRATE 30 MG/1
30 TABLET, EXTENDED RELEASE ORAL DAILY
Qty: 90 TABLET | Refills: 3 | Status: SHIPPED | OUTPATIENT
Start: 2024-05-16

## 2024-05-16 RX ORDER — PEN NEEDLE, DIABETIC 32GX 5/32"
NEEDLE, DISPOSABLE MISCELLANEOUS
Qty: 100 EACH | Refills: 1 | Status: SHIPPED | OUTPATIENT
Start: 2024-05-16

## 2024-05-16 NOTE — PROGRESS NOTES
[facility] Cardiology    Patient ID: Donny Jerry is a 79 y.o. male.  : 1945   Contact: [phone]    Encounter date: 2024    PCP: Anum Alonso APRN      Chief complaint:   Chief Complaint   Patient presents with    Coronary Artery Disease       Problem List:  Coronary artery disease/NSTEMI:  CABG x3 in , [facility] by Dr. Peng. LIMA to the LAD, SVG to obtuse marginal, SVG to the PDA.  Stress echo, 2007: No wall motion abnormalities. No evidence of ischemia. Normal EF.  Echo, 2007, mild concentric LVH, trace of MR and TR.    Adenosine Cardiolite, 2009:  Normal LVEF with a stress induced mid and distal inferior defect compatible with  ischemia.  [facility], 2009, Dr. Russo: EF 45-50%. Overlapping Cypher TAWANNA 2.5 x 28 and 2.5 x 18 to the SVG to OM. SVG to PDA had a proximal stenosis of 60% with a distal stenosis of 50-60%.  [facility], 2009: PTCA and Taxus TAWANNA (2.25 x 12 mm) to the mid PDA; Cypher TAWANNA (2.5 x 18mm) to the distal SVG to the PDA;  and Cypher TAWANNA (3.0 x 28mm) to the proximal SVG to the PDA.  [facility] for recurrent chest pain, 2010: Revealing patent stents. Ranexa initiated 500 mg q.12 h.   [facility],  2011: LVEF of 45% to 50% with an occluded SVG to an obtuse marginal branch which could not be revascularized, patent LIMA to the LAD, noncritical graft disease in the SVG to the PDA, multiple small areas of distal vessel disease and collateral flow were seen.   [facility] for recurrent anginal symptoms, 2010, with PTCA and Promus TAWANNA (3.0 x 28mm) to the proximal SVG to the PDA for in-stent restenosis.    [facility], 2010,  Dr. Russo: EF 40-45%. 3.5 x 23 mm Promus TAWANNA in the proximal SVG to OM, 2.5 x 18 mm Promus TAWANNA to distal SVG to PDA due to ISR.    [facility], 2010, with placement of  a 3.0 x 16 mm Taxus TAWANNA to the SVG to the RCA proximally and a 2.5 x 16 mm paclitaxel stent distally in the SVG to the  RCA.  [facility], 3.0 x 24-mm Promus element TAWANNA to a 90% lesion in the mid portion  of the SVG to the PDA.   [facility] for recurrent angina, 06/24/2014, Dr. Russo:  PTCA of the SVG to the PDA using a 3 x 12 mm NC TREK balloon.    Abnormal stress test, 10/07/2021: Mild ST depression with infusion and had a normal hemodynamic response to regadenoson. He was found to have a large posterior lateral stress-induced defect. Severe small fixed anterior defect, EF 45% with CCS class I chest discomfort/NYHA class II dyspnea on exertion symptoms.   [facility], 10/19/2021: EF 50%. LAD occluded following the takeoff of a first diagonal branch and a septal . and LCx occluded after the takeoff of a small-sized to moderate-sized, OM1 branch. RCA dominant for the posterior circulation. RCA was occluded in the mid-segment of the vessel. Collateral flow from one of the RV marginal branches was seen to supply the distal LCx including 2 OM branches. No significant disease appeared to be present within the distal LCx circulation. LIMA to the LAD was widely patent. SVG to LCx occluded at origin. SVG to PDA has been stented extensively. Area of mild in-stent narrowing in the mid segment of up to 40 to 50%. Good retrograde flow into posterolateral branch and into the circumflex vessels.  Echo, 06/30/2023: EF 45-50%. Mild concentric LVH. Grade 1 diastolic dysfunction. Normal right ventricular size and systolic function. Normal left atrial volume index. Mild calcification aortic valve without significant stenosis. Mild PI.  [facility] ED admission, 06/29/2023: Presented with chest pain and shortness of breath due to volume overload from heart failure.   [facility] (07/18/2023): MAXINE Suarez. Dr. Parr. Successful PCI to saphenous vein graft->RCA for multifocal in-stent restenosis. Severe disease and multiple small branch vessels not amenable to any form of revascularization.   Echo (07/18/2023): EF 46-50%.  LV wall thickness is consistent with (grade 2  with high lap) pseudonormalization.  LA cavity is mildly dilated.  LA volume is mildly increased.  Mild AS.  RVSP 15.4 mmHg. Ao pk janee 237 cm/s. Ao max PG 22.5 mmHg. Ao mean PG 12 mmHg. Ao V2 VTI 48.1 cm. CODY 1.34 cm2.  Echo, 01/12/2024: EF 45.8%. Left ventricular diastolic function is consistent with (grade II w/high LAP) pseudonormalization. Left atrial volume is moderately increased. Mild aortic valve stenosis. Normal RVSP.  Chronic diastolic heart failure/HFpEF  Echo (07/18/2023): EF 46-50%.  LV wall thickness is consistent with (grade 2 with high lap) pseudonormalization.   PVC's  [facility], 01/18/2024: SB/SR/ST. 23% PVC's. Occ PAC's. HR drops are due to PVC's.   Hypertension  Hypercholesterolemia.  His cholesterol is followed and treated by his PCP.  Aortic stenosis  Echo (07/18/2023): EF 46-50%.  LV wall thickness is consistent with (grade 2 with high lap) pseudonormalization.  LA cavity is mildly dilated.  LA volume is mildly increased.  Mild AS.  RVSP 15.4 mmHg. Ao pk janee 237 cm/s. Ao max PG 22.5 mmHg. Ao mean PG 12 mmHg. Ao V2 VTI 48.1 cm. CODY 1.34 cm2.  Chest pain  [facility] (06/29/2023-07/01/2023): Presented to R shortness of air and chest tightness was deemed to be secondary due to volume overload from his chronic diastolic heart failure.  Diuresed and later stable for discharge home.  [facility] ED (07/14/2023): Presented to [facility] complaining of chest pain and lower extremity edema which was found to be volume overloaded secondary to left heart failure.  Diuresed and given pain medication which resolved his chest pain allowing him to be stable for discharge home.  Type 2 diabetes, diagnosed 2 years ago.  Obstructive sleep apnea, on CPAP.  Chronic iron-deficiency anemia  Enlarged prostate with anticipated TURP with Dr. Bernal with retroperitoneal ultrasound 8/31/2021 showing prostatomegaly (5.4 x 3.9 x 4.2 cm) with mass-effect on the base of the bladder, normal size kidneys  Obesity  Depression  10.  Surgical history:  CABG x3, Dr. Peng, [facility], 2001.  Negative colonoscopy, 2014.    Allergies   Allergen Reactions    Zocor [Simvastatin] Myalgia    Bisoprolol Other (See Comments)     Agitation      Byetta 10 Mcg Pen [Exenatide] Nausea Only     nausea    Crestor [Rosuvastatin Calcium] Myalgia    Metformin And Related Nausea Only    Plavix [Clopidogrel Bisulfate] Other (See Comments)     resistance       Current Medications:    Current Outpatient Medications:     albuterol sulfate  (90 Base) MCG/ACT inhaler, Inhale 2 puffs Every 4 (Four) Hours As Needed for Wheezing., Disp: 18 g, Rfl: 5    aspirin 81 MG chewable tablet, Chew 1 tablet Daily., Disp: , Rfl:     atorvastatin (LIPITOR) 40 MG tablet, Take 1 tablet by mouth Daily., Disp: , Rfl:     B-D UF III MINI PEN NEEDLES 31G X 5 MM misc, , Disp: , Rfl:     carvedilol (COREG) 6.25 MG tablet, Take 1 tablet by mouth 2 (Two) Times a Day With Meals., Disp: 60 tablet, Rfl: 0    Cholecalciferol (VITAMIN D3) 50 MCG (2000 UT) capsule, Take 1 capsule by mouth Daily., Disp: , Rfl:     dapagliflozin Propanediol (Farxiga) 10 MG tablet, Take 10 mg by mouth Daily., Disp: , Rfl:     docusate calcium (SURFAK) 240 MG capsule, Take 1 capsule by mouth 2 (Two) Times a Day., Disp: , Rfl:     fexofenadine (ALLEGRA) 180 MG tablet, Take 1 tablet by mouth Daily As Needed., Disp: , Rfl:     finasteride (PROSCAR) 5 MG tablet, Take 1 tablet by mouth Daily., Disp: , Rfl:     fluticasone (FLONASE) 50 MCG/ACT nasal spray, 1 spray into the nostril(s) as directed by provider Daily. (Patient taking differently: 1 spray into the nostril(s) as directed by provider Daily As Needed.), Disp: 48 g, Rfl: 2    hydrALAZINE (APRESOLINE) 25 MG tablet, Take 3 tablets by mouth 3 (Three) Times a Day for 30 days., Disp: 270 tablet, Rfl: 0    insulin degludec (Tresiba FlexTouch) 100 UNIT/ML solution pen-injector injection, Inject  under the skin into the appropriate area as directed 2 (Two)  Times a Day. 22 units in the am  37 units at bedtime, Disp: , Rfl:     Insulin Lispro (humaLOG) 100 UNIT/ML injection, Inject 8 Units under the skin into the appropriate area as directed Daily. Daily with supper, Disp: , Rfl:     isosorbide dinitrate (ISORDIL) 10 MG tablet, Take 1 tablet by mouth 3 (Three) Times a Day for 30 days., Disp: 90 tablet, Rfl: 0    magnesium oxide (MAG-OX) 400 MG tablet, Take 1 tablet by mouth Daily., Disp: , Rfl:     Multiple Vitamin (MULTI VITAMIN DAILY PO), Take 1 tablet by mouth Daily., Disp: , Rfl:     nitroglycerin (NITROSTAT) 0.4 MG SL tablet, Place 1 tablet under the tongue Every 5 (Five) Minutes As Needed for Chest Pain. Take no more than 3 doses in 15 minutes., Disp: 25 tablet, Rfl: 11    pantoprazole (PROTONIX) 40 MG EC tablet, TAKE 1 TABLET BY MOUTH 2 TIMES DAILY (BEFORE MEALS), Disp: , Rfl:     pramipexole (MIRAPEX) 1 MG tablet, Take 1 tablet by mouth Every Night., Disp: 90 tablet, Rfl: 2    prasugrel (EFFIENT) 10 MG tablet, Take 1 tablet by mouth Daily., Disp: , Rfl:     ranolazine (RANEXA) 500 MG 12 hr tablet, Take 1 tablet by mouth Every 12 (Twelve) Hours for 180 days., Disp: 60 tablet, Rfl: 5    sertraline (ZOLOFT) 100 MG tablet, Take 1 tablet by mouth Daily., Disp: , Rfl:     spironolactone (ALDACTONE) 25 MG tablet, Take 1 tablet by mouth Daily for 30 days., Disp: 30 tablet, Rfl: 0    tamsulosin (FLOMAX) 0.4 MG capsule 24 hr capsule, Take 1 capsule by mouth 2 (Two) Times a Day., Disp: , Rfl:     torsemide 40 MG tablet, Take 40 mg by mouth Daily for 30 days., Disp: 30 tablet, Rfl: 0    Trelegy Ellipta 200-62.5-25 MCG/ACT inhaler, INHALE 1 PUFF BY MOUTH EVERY DAY, RINSE MOUTH WITH WATER AFTER USE, Disp: 60 each, Rfl: 2    TRUEplus Lancets 28G misc, , Disp: , Rfl:     Trulicity 0.75 MG/0.5ML solution pen-injector, Inject 0.75 mg as directed 1 (One) Time Per Week., Disp: , Rfl:     HPI    Donny Jerry is a 79 y.o. male who presents today for a 3 month follow up of CAD, CHF,  "and cardiac risk factors. Since last visit, patient has been doing well overall from a cardiovascular standpoint. He has an upcoming appointment with his nephrologist, Dr. Rueda.   Recently hospitalized with CHF at [place].  Changed from lasix to torsemide.  Patient is visited by Home Health regularly throughout the week. His wife states he has attended PT and OT recently along with speech therapy. Patient monitors his blood pressure regularly at home and presents with a log that shows it is regularly 120-130 mmHg systolic. He denies chest pain, shortness of breath, orthopnea, palpitations, edema, dizziness, and syncope.       The following portions of the patient's history were reviewed and updated as appropriate: allergies, current medications and problem list.    Pertinent positives as listed in the HPI.  All other systems reviewed are negative.         Vitals:    05/16/24 1429   BP: 134/50   BP Location: Right arm   Patient Position: Sitting   Pulse: 72   SpO2: 97%   Weight: 93 kg (205 lb)   Height: 170.2 cm (67\")       Physical Exam:  General: Alert and oriented.  Neck: Jugular venous pressure is within normal limits. Carotids have normal upstrokes without bruits.   Cardiovascular: Heart has a nondisplaced focal PMI. Regular rate and rhythm. No murmur, gallop or rub.  Lungs: Clear, no rales or wheezes. Equal expansion is noted.   Extremities: Show trace edema.  Skin: Warm and dry.  Neurologic: Nonfocal.     Diagnostic Data (reviewed with patient):  Lab Results   Component Value Date    GLUCOSE 120 (H) 04/27/2024    BUN 79 (H) 04/27/2024    CREATININE 2.04 (H) 04/27/2024    BCR 38.7 (H) 04/27/2024     04/27/2024    K 3.9 04/27/2024     04/27/2024    CO2 21.7 (L) 04/27/2024    CALCIUM 9.2 04/27/2024    ALBUMIN 3.4 (L) 04/27/2024    ALKPHOS 54 04/23/2024    AST 22 04/23/2024    ALT 23 04/23/2024     Lab Results   Component Value Date    CHLPL 171 04/11/2024    TRIG 178 04/11/2024    HDL 41 04/11/2024 "    LDL 94 04/11/2024      Lab Results   Component Value Date    WBC 9.5 04/29/2024    RBC 4.10 (L) 04/29/2024    HGB 10.9 (L) 04/29/2024    HCT 34.2 (L) 04/29/2024    MCV 83.4 04/29/2024     (H) 04/29/2024      Lab Results   Component Value Date    TSH 1.630 04/23/2024        Advance Care Planning   ACP discussion was held with the patient during this visit. Patient has an advance directive in EMR which is still valid.          Procedures      Assessment:    ICD-10-CM ICD-9-CM   1. Coronary artery disease involving coronary bypass graft of native heart without angina pectoris  I25.810 414.05   2. Chronic diastolic heart failure  I50.32 428.32   3. Essential hypertension  I10 401.9   4. Hyperlipidemia LDL goal <70  E78.5 272.4   5. Obstructive sleep apnea on CPAP  G47.33 327.23         Plan:  Start on Imdur 30 mg daily for chest pain. Stop ISDN 10mg TID  Continue on aspirin 81 mg for antiplatelet therapy.   Continue on atorvastatin 40 mg daily for hyperlipidemia.   Continue on carvedilol 6.25 mg BID for hypertension.  Continue on nitroglycerin 0.4 mg PRN for chest pain.  Continue on Ranexa 500 mg BID for chest pain.   Continue on spironolactone 25 mg daily for fluid retention and hypertension.   Continue on torsemide 40 mg daily for fluid retention.  Continue all other current medications.  F/up in 6 months, sooner if needed.      Scribed for Liz Russo MD by Mojgan Cole. 5/16/2024 14:43 EDT    I Liz Russo MD personally performed the services described in this documentation as scribed by the above individual in my presence, and it is both accurate and complete.    Liz Russo MD, [facility]

## 2024-05-17 ENCOUNTER — CARE COORDINATION (OUTPATIENT)
Dept: PRIMARY CARE CLINIC | Age: 79
End: 2024-05-17

## 2024-05-17 RX ORDER — HYDRALAZINE HYDROCHLORIDE 25 MG/1
75 TABLET, FILM COATED ORAL 3 TIMES DAILY
COMMUNITY
End: 2024-05-17 | Stop reason: SDUPTHER

## 2024-05-17 RX ORDER — ISOSORBIDE MONONITRATE 30 MG/1
30 TABLET, EXTENDED RELEASE ORAL DAILY
Qty: 90 TABLET | Refills: 0 | Status: SHIPPED | OUTPATIENT
Start: 2024-05-17

## 2024-05-17 RX ORDER — TORSEMIDE 20 MG/1
40 TABLET ORAL DAILY
COMMUNITY
Start: 2024-04-27 | End: 2024-05-17

## 2024-05-17 RX ORDER — ISOSORBIDE MONONITRATE 30 MG/1
30 TABLET, EXTENDED RELEASE ORAL DAILY
COMMUNITY
Start: 2024-05-16 | End: 2024-05-17 | Stop reason: SDUPTHER

## 2024-05-17 RX ORDER — HYDRALAZINE HYDROCHLORIDE 25 MG/1
25 TABLET, FILM COATED ORAL 3 TIMES DAILY
COMMUNITY
Start: 2024-04-27 | End: 2024-05-17 | Stop reason: DRUGHIGH

## 2024-05-17 RX ORDER — SPIRONOLACTONE 25 MG/1
25 TABLET ORAL DAILY
Qty: 90 TABLET | Refills: 0 | Status: SHIPPED | OUTPATIENT
Start: 2024-05-17 | End: 2024-06-16

## 2024-05-17 RX ORDER — HYDRALAZINE HYDROCHLORIDE 25 MG/1
75 TABLET, FILM COATED ORAL 3 TIMES DAILY
Qty: 810 TABLET | Refills: 0 | Status: SHIPPED | OUTPATIENT
Start: 2024-05-17

## 2024-05-17 NOTE — CARE COORDINATION
Burak Hawkins, RN  Manager Care Coordination  [phone]     I spoke to Marina Dunlap PCP regarding Mrs. Evans's reluctance to go up on Aquilino's lipitor to 80 mg per Dr Dasilva's wishes.  I explained that one option Marina offered was to take a daily Co Q 10 supplement with his cholesterol medication and perhaps that would alleviate the myalgia's he has experienced in the past.  She explained that his lipid panel is good but that with his heart disease, cardiology would like to see his numbers lower.  It is really their choice.  Silvia verbalized understanding of this and they will consider that option.  My contact information was reviewed.

## 2024-05-20 ENCOUNTER — HOSPITAL ENCOUNTER (OUTPATIENT)
Dept: NURSING | Facility: HOSPITAL | Age: 79
Setting detail: INFUSION SERIES
Discharge: HOME | End: 2024-05-22
Payer: MEDICARE

## 2024-05-20 DIAGNOSIS — K21.9 GASTROESOPHAGEAL REFLUX DISEASE, UNSPECIFIED WHETHER ESOPHAGITIS PRESENT: ICD-10-CM

## 2024-05-20 LAB
ERYTHROCYTE [DISTWIDTH] IN BLOOD BY AUTOMATED COUNT: 16.1 % (ref 11–16)
HCT VFR BLD AUTO: 35 % (ref 40–54)
HGB BLD-MCNC: 11.3 G/DL (ref 13–18)
MCH RBC QN AUTO: 27.6 PG (ref 27–32)
MCHC RBC AUTO-ENTMCNC: 32.3 G/DL (ref 31–35)
MCV RBC AUTO: 85.6 FL (ref 80–100)
PLATELET # BLD AUTO: 231 K/UL (ref 150–400)
PMV BLD AUTO: 10 FL (ref 6–10)
RBC # BLD AUTO: 4.09 M/UL (ref 4.5–6)
WBC # BLD AUTO: 7.9 K/UL (ref 4–11)

## 2024-05-20 PROCEDURE — 36415 COLL VENOUS BLD VENIPUNCTURE: CPT

## 2024-05-20 PROCEDURE — 85027 COMPLETE CBC AUTOMATED: CPT

## 2024-05-20 RX ORDER — PANTOPRAZOLE SODIUM 40 MG/1
TABLET, DELAYED RELEASE ORAL
Qty: 180 TABLET | Refills: 1 | Status: SHIPPED | OUTPATIENT
Start: 2024-05-20

## 2024-05-27 DIAGNOSIS — N40.1 BENIGN PROSTATIC HYPERPLASIA WITH URINARY HESITANCY: ICD-10-CM

## 2024-05-27 DIAGNOSIS — R39.11 BENIGN PROSTATIC HYPERPLASIA WITH URINARY HESITANCY: ICD-10-CM

## 2024-05-28 RX ORDER — TAMSULOSIN HYDROCHLORIDE 0.4 MG/1
CAPSULE ORAL
Qty: 90 CAPSULE | Refills: 1 | Status: SHIPPED | OUTPATIENT
Start: 2024-05-28

## 2024-05-31 ENCOUNTER — OFFICE VISIT (OUTPATIENT)
Dept: PRIMARY CARE CLINIC | Age: 79
End: 2024-05-31
Payer: MEDICARE

## 2024-05-31 VITALS
SYSTOLIC BLOOD PRESSURE: 125 MMHG | BODY MASS INDEX: 32.36 KG/M2 | HEART RATE: 67 BPM | TEMPERATURE: 97.5 F | WEIGHT: 206.2 LBS | RESPIRATION RATE: 14 BRPM | OXYGEN SATURATION: 98 % | HEIGHT: 67 IN | DIASTOLIC BLOOD PRESSURE: 67 MMHG

## 2024-05-31 DIAGNOSIS — N18.4 ANEMIA DUE TO STAGE 4 CHRONIC KIDNEY DISEASE (HCC): ICD-10-CM

## 2024-05-31 DIAGNOSIS — D63.1 ANEMIA DUE TO STAGE 4 CHRONIC KIDNEY DISEASE (HCC): ICD-10-CM

## 2024-05-31 DIAGNOSIS — E11.42 TYPE 2 DIABETES MELLITUS WITH DIABETIC POLYNEUROPATHY, WITHOUT LONG-TERM CURRENT USE OF INSULIN (HCC): Primary | ICD-10-CM

## 2024-05-31 DIAGNOSIS — K21.9 GASTROESOPHAGEAL REFLUX DISEASE, UNSPECIFIED WHETHER ESOPHAGITIS PRESENT: ICD-10-CM

## 2024-05-31 DIAGNOSIS — I50.22 CHRONIC SYSTOLIC (CONGESTIVE) HEART FAILURE (HCC): ICD-10-CM

## 2024-05-31 PROCEDURE — 99214 OFFICE O/P EST MOD 30 MIN: CPT | Performed by: NURSE PRACTITIONER

## 2024-05-31 PROCEDURE — 3078F DIAST BP <80 MM HG: CPT | Performed by: NURSE PRACTITIONER

## 2024-05-31 PROCEDURE — 1036F TOBACCO NON-USER: CPT | Performed by: NURSE PRACTITIONER

## 2024-05-31 PROCEDURE — 1123F ACP DISCUSS/DSCN MKR DOCD: CPT | Performed by: NURSE PRACTITIONER

## 2024-05-31 PROCEDURE — 3074F SYST BP LT 130 MM HG: CPT | Performed by: NURSE PRACTITIONER

## 2024-05-31 PROCEDURE — G8427 DOCREV CUR MEDS BY ELIG CLIN: HCPCS | Performed by: NURSE PRACTITIONER

## 2024-05-31 PROCEDURE — 3051F HG A1C>EQUAL 7.0%<8.0%: CPT | Performed by: NURSE PRACTITIONER

## 2024-05-31 PROCEDURE — G8417 CALC BMI ABV UP PARAM F/U: HCPCS | Performed by: NURSE PRACTITIONER

## 2024-05-31 RX ORDER — ATORVASTATIN CALCIUM 80 MG/1
80 TABLET, FILM COATED ORAL DAILY
COMMUNITY
Start: 2024-05-16

## 2024-05-31 RX ORDER — PANTOPRAZOLE SODIUM 40 MG/1
TABLET, DELAYED RELEASE ORAL
Qty: 180 TABLET | Refills: 1 | Status: SHIPPED | OUTPATIENT
Start: 2024-05-31

## 2024-05-31 RX ORDER — NITROGLYCERIN 0.4 MG/1
0.4 TABLET SUBLINGUAL EVERY 5 MIN PRN
Qty: 25 TABLET | Refills: 3 | Status: SHIPPED | OUTPATIENT
Start: 2024-05-31

## 2024-05-31 RX ORDER — FINASTERIDE 5 MG/1
5 TABLET, FILM COATED ORAL DAILY
Qty: 90 TABLET | Refills: 3 | Status: SHIPPED | OUTPATIENT
Start: 2024-05-31

## 2024-05-31 RX ORDER — INSULIN DEGLUDEC 200 U/ML
37 INJECTION, SOLUTION SUBCUTANEOUS 2 TIMES DAILY
Qty: 6 ADJUSTABLE DOSE PRE-FILLED PEN SYRINGE | Refills: 2 | Status: SHIPPED | OUTPATIENT
Start: 2024-05-31

## 2024-05-31 RX ORDER — CARVEDILOL 6.25 MG/1
12.5 TABLET ORAL 2 TIMES DAILY
Qty: 180 TABLET | Refills: 1 | Status: SHIPPED | OUTPATIENT
Start: 2024-05-31

## 2024-05-31 RX ORDER — DAPAGLIFLOZIN 10 MG/1
10 TABLET, FILM COATED ORAL EVERY MORNING
Qty: 90 TABLET | Refills: 1 | Status: SHIPPED | OUTPATIENT
Start: 2024-05-31

## 2024-05-31 NOTE — PROGRESS NOTES
Negative.     Eyes: Negative.    Respiratory: Negative.     Cardiovascular: Negative.    Gastrointestinal: Negative.    Endocrine: Negative.    Genitourinary: Negative.    Musculoskeletal:  Positive for gait problem.   Skin: Negative.    Allergic/Immunologic: Negative.    Neurological:  Positive for weakness.   Hematological: Negative.    Psychiatric/Behavioral: Negative.         OBJECTIVE:  /67 (Site: Left Upper Arm, Position: Sitting, Cuff Size: Medium Adult)   Pulse 67   Temp 97.5 °F (36.4 °C) (Temporal)   Resp 14   Ht 1.702 m (5' 7.01\")   Wt 93.5 kg (206 lb 3.2 oz)   SpO2 98% Comment: room air  BMI 32.29 kg/m²    Physical Exam    No results found for requested labs within last 30 days.       Hemoglobin A1C (%)   Date Value   04/29/2024 7.4 (H)     LDL Direct (mg/dL)   Date Value   10/07/2020 82         Lab Results   Component Value Date/Time    WBC 7.9 05/20/2024 09:55 AM    NEUTROABS 7.6 01/18/2024 10:15 AM    HGB 11.3 05/20/2024 09:55 AM    HCT 35.0 05/20/2024 09:55 AM    MCV 85.6 05/20/2024 09:55 AM     05/20/2024 09:55 AM       Lab Results   Component Value Date    TSH 2.25 04/11/2024         ASSESSMENT/PLAN:     1. Type 2 diabetes mellitus with diabetic polyneuropathy, without long-term current use of insulin (HCC)    - Insulin Degludec (TRESIBA FLEXTOUCH) 200 UNIT/ML SOPN; Inject 37 Units into the skin in the morning and at bedtime  Dispense: 6 Adjustable Dose Pre-filled Pen Syringe; Refill: 2  - Insulin Aspart, w/Niacinamide, 100 UNIT/ML SOCT; 6 units at supper meal for hyperglycemia  Dispense: 5 each; Refill: 1  - dapagliflozin (FARXIGA) 10 MG tablet; Take 1 tablet by mouth every morning  Dispense: 90 tablet; Refill: 1  - Hemoglobin A1C; Future    2. Gastroesophageal reflux disease, unspecified whether esophagitis present    - pantoprazole (PROTONIX) 40 MG tablet; TAKE 1 TABLET TWICE DAILY  BEFORE MEALS  Dispense: 180 tablet; Refill: 1    3. Chronic systolic (congestive) heart failure

## 2024-06-17 RX ORDER — INSULIN LISPRO 100 U/ML
INJECTION, SOLUTION SUBCUTANEOUS
Qty: 15 ML | Refills: 1 | Status: SHIPPED | OUTPATIENT
Start: 2024-06-17

## 2024-07-09 ENCOUNTER — OFFICE VISIT (OUTPATIENT)
Dept: PRIMARY CARE CLINIC | Age: 79
End: 2024-07-09
Payer: MEDICARE

## 2024-07-09 VITALS
WEIGHT: 205 LBS | HEART RATE: 73 BPM | SYSTOLIC BLOOD PRESSURE: 101 MMHG | DIASTOLIC BLOOD PRESSURE: 61 MMHG | OXYGEN SATURATION: 97 % | BODY MASS INDEX: 32.1 KG/M2

## 2024-07-09 DIAGNOSIS — E11.9 TYPE 2 DIABETES MELLITUS WITHOUT COMPLICATION, WITH LONG-TERM CURRENT USE OF INSULIN (HCC): Primary | ICD-10-CM

## 2024-07-09 DIAGNOSIS — Z79.4 TYPE 2 DIABETES MELLITUS WITHOUT COMPLICATION, WITH LONG-TERM CURRENT USE OF INSULIN (HCC): Primary | ICD-10-CM

## 2024-07-09 DIAGNOSIS — R42 DIZZINESS: ICD-10-CM

## 2024-07-09 DIAGNOSIS — I95.1 ORTHOSTATIC HYPOTENSION: ICD-10-CM

## 2024-07-09 LAB
CHP ED QC CHECK: NORMAL
GLUCOSE BLD-MCNC: 260 MG/DL

## 2024-07-09 PROCEDURE — 3074F SYST BP LT 130 MM HG: CPT | Performed by: PHYSICIAN ASSISTANT

## 2024-07-09 PROCEDURE — 3078F DIAST BP <80 MM HG: CPT | Performed by: PHYSICIAN ASSISTANT

## 2024-07-09 PROCEDURE — 1036F TOBACCO NON-USER: CPT | Performed by: PHYSICIAN ASSISTANT

## 2024-07-09 PROCEDURE — G8417 CALC BMI ABV UP PARAM F/U: HCPCS | Performed by: PHYSICIAN ASSISTANT

## 2024-07-09 PROCEDURE — 99213 OFFICE O/P EST LOW 20 MIN: CPT | Performed by: PHYSICIAN ASSISTANT

## 2024-07-09 PROCEDURE — G8427 DOCREV CUR MEDS BY ELIG CLIN: HCPCS | Performed by: PHYSICIAN ASSISTANT

## 2024-07-09 PROCEDURE — 82962 GLUCOSE BLOOD TEST: CPT | Performed by: PHYSICIAN ASSISTANT

## 2024-07-09 PROCEDURE — 1123F ACP DISCUSS/DSCN MKR DOCD: CPT | Performed by: PHYSICIAN ASSISTANT

## 2024-07-09 PROCEDURE — 3051F HG A1C>EQUAL 7.0%<8.0%: CPT | Performed by: PHYSICIAN ASSISTANT

## 2024-07-09 ASSESSMENT — ENCOUNTER SYMPTOMS
GASTROINTESTINAL NEGATIVE: 1
RESPIRATORY NEGATIVE: 1

## 2024-07-09 NOTE — PROGRESS NOTES
Chief Complaint   Patient presents with    Dizziness     Pt says he has been dizzy for a week or longer. No other symptoms          Have you seen any other physician or provider since your last visit no    Have you had any other diagnostic tests since your last visit? no    Have you changed or stopped any medications since your last visit? no     
Skin is warm and dry.   Neurological:      General: No focal deficit present.      Mental Status: He is alert and oriented to person, place, and time. Mental status is at baseline.      GCS: GCS eye subscore is 4. GCS verbal subscore is 5. GCS motor subscore is 6.      Cranial Nerves: Cranial nerves 2-12 are intact.      Sensory: Sensation is intact.      Motor: Motor function is intact. No abnormal muscle tone or pronator drift.      Coordination: Coordination is intact. Finger-Nose-Finger Test and Heel to Shin Test normal.   Psychiatric:         Mood and Affect: Mood normal.         Behavior: Behavior normal.       EKG interpreted by me     EKG - NSR, unchanged from previous EKGs  Rate - 70  AL - 177  QRS - 126  QT - 436        Assessment and plan:      1. Type 2 diabetes mellitus without complication, with long-term current use of insulin ([facility])  -     POCT Glucose  BG in clinic was 266.    2. Dizziness  Seems to be related to orthostatic hypotension    3. Orthostatic hypotension  EKG appears unchanged from previous EKGs.  Blood sugar 266.  Systolic does drop greater than 20 mmHg on orthostatics.  This is likely the cause of his dizziness.  Will increase fluids.  Change positions slowly.  Follow-up with PCP if no improvement.                   Orders Placed This Encounter   Procedures    POCT Glucose      No orders of the defined types were placed in this encounter.      DDx includes but is not limited to -hypo-/hyperglycemia, arrhythmia, CHF, orthostatic hypotension, CVA    Discussed ddx/dx and tx plan with pt. Discussed risks vs benefits of tx. Pt and/or Guardian is/are A&Ox3 and verbally acknowledges understanding. Pt agrees with tx plan. Pt agrees if acute worsening to go to the ER for evaluation and tx, or return to clinic for re-evaluation. Otherwise, pt should f/u with PCP. Pt verbally acknowledges understanding of results of any tests, procedures and/or imaging done at this visit.    Parts of this

## 2024-07-11 ENCOUNTER — CARE COORDINATION (OUTPATIENT)
Dept: PRIMARY CARE CLINIC | Age: 79
End: 2024-07-11

## 2024-07-11 NOTE — CARE COORDINATION
Burak Hawkins, RN  Manager Care Coordination  [phone]     I called on Aquilino today for fu from his acute care visit.  Mrs. Evans tells me that Aquilino has been using flonase for the past 2 days and seems to be feeling better.  He hasn't complained of dizziness.  She offered that at his visit they did discuss fall precautions and increasing his fluids.  Aquilino has his next appointment at end of July.  I did offer to make fu appointment with is PCP next week but she declines for now.

## 2024-07-15 NOTE — CARE COORDINATION
Burak Hawkins, RN  Manager Care Coordination  [phone]     Mrs. Evans calls in today asking for assistance with refills.  Aquilino had been to cardiology and some changes were made by Dr Dasilva.  She is asking for refills to go to [facility].     23:00 - - -

## 2024-07-23 ENCOUNTER — HOSPITAL ENCOUNTER (OUTPATIENT)
Facility: HOSPITAL | Age: 79
Discharge: HOME | End: 2024-07-23
Payer: MEDICARE

## 2024-07-23 DIAGNOSIS — I50.22 CHRONIC SYSTOLIC (CONGESTIVE) HEART FAILURE (HCC): ICD-10-CM

## 2024-07-23 DIAGNOSIS — N18.4 ANEMIA DUE TO STAGE 4 CHRONIC KIDNEY DISEASE (HCC): ICD-10-CM

## 2024-07-23 DIAGNOSIS — Z79.4 TYPE 2 DIABETES MELLITUS WITHOUT COMPLICATION, WITH LONG-TERM CURRENT USE OF INSULIN (HCC): ICD-10-CM

## 2024-07-23 DIAGNOSIS — E11.9 TYPE 2 DIABETES MELLITUS WITHOUT COMPLICATION, WITH LONG-TERM CURRENT USE OF INSULIN (HCC): ICD-10-CM

## 2024-07-23 DIAGNOSIS — D63.1 ANEMIA DUE TO STAGE 4 CHRONIC KIDNEY DISEASE (HCC): ICD-10-CM

## 2024-07-23 LAB
ALBUMIN SERPL-MCNC: 4 G/DL (ref 3.4–4.8)
ALBUMIN/GLOB SERPL: 1.6 {RATIO} (ref 0.8–2)
ALP SERPL-CCNC: 45 U/L (ref 25–100)
ALT SERPL-CCNC: 29 U/L (ref 4–36)
ANION GAP SERPL CALCULATED.3IONS-SCNC: 11 MMOL/L (ref 3–16)
AST SERPL-CCNC: 24 U/L (ref 8–33)
BILIRUB SERPL-MCNC: 0.4 MG/DL (ref 0.3–1.2)
BUN SERPL-MCNC: 58 MG/DL (ref 6–20)
CALCIUM SERPL-MCNC: 9.1 MG/DL (ref 8.5–10.5)
CHLORIDE SERPL-SCNC: 100 MMOL/L (ref 98–107)
CO2 SERPL-SCNC: 30 MMOL/L (ref 20–30)
CREAT SERPL-MCNC: 2.1 MG/DL (ref 0.4–1.2)
ERYTHROCYTE [DISTWIDTH] IN BLOOD BY AUTOMATED COUNT: 12.8 % (ref 11–16)
FERRITIN SERPL IA-MCNC: 145.8 NG/ML (ref 22–322)
GFR SERPLBLD CREATININE-BSD FMLA CKD-EPI: 32 ML/MIN/{1.73_M2}
GLOBULIN SER CALC-MCNC: 2.5 G/DL
GLUCOSE SERPL-MCNC: 105 MG/DL (ref 74–106)
HCT VFR BLD AUTO: 36.8 % (ref 40–54)
HGB BLD-MCNC: 11.9 G/DL (ref 13–18)
IRON SATN MFR SERPL: 26 % (ref 20–50)
IRON SERPL-MCNC: 72 UG/DL (ref 59–158)
MCH RBC QN AUTO: 29 PG (ref 27–32)
MCHC RBC AUTO-ENTMCNC: 32.3 G/DL (ref 31–35)
MCV RBC AUTO: 89.8 FL (ref 80–100)
PLATELET # BLD AUTO: 269 K/UL (ref 150–400)
PMV BLD AUTO: 10.9 FL (ref 6–10)
POTASSIUM SERPL-SCNC: 4.2 MMOL/L (ref 3.4–5.1)
PROT SERPL-MCNC: 6.5 G/DL (ref 6.4–8.3)
RBC # BLD AUTO: 4.1 M/UL (ref 4.5–6)
SODIUM SERPL-SCNC: 141 MMOL/L (ref 136–145)
TIBC SERPL-MCNC: 276 UG/DL (ref 250–450)
WBC # BLD AUTO: 8.8 K/UL (ref 4–11)

## 2024-07-23 PROCEDURE — 85027 COMPLETE CBC AUTOMATED: CPT

## 2024-07-23 PROCEDURE — 80053 COMPREHEN METABOLIC PANEL: CPT

## 2024-07-23 PROCEDURE — 83550 IRON BINDING TEST: CPT

## 2024-07-23 PROCEDURE — 82728 ASSAY OF FERRITIN: CPT

## 2024-07-23 PROCEDURE — 83540 ASSAY OF IRON: CPT

## 2024-07-30 ENCOUNTER — OFFICE VISIT (OUTPATIENT)
Dept: PRIMARY CARE CLINIC | Age: 79
End: 2024-07-30
Payer: MEDICARE

## 2024-07-30 VITALS
HEART RATE: 61 BPM | TEMPERATURE: 97.3 F | SYSTOLIC BLOOD PRESSURE: 139 MMHG | HEIGHT: 67 IN | OXYGEN SATURATION: 96 % | RESPIRATION RATE: 16 BRPM | DIASTOLIC BLOOD PRESSURE: 73 MMHG | WEIGHT: 206.6 LBS | BODY MASS INDEX: 32.43 KG/M2

## 2024-07-30 DIAGNOSIS — E11.9 TYPE 2 DIABETES MELLITUS WITHOUT COMPLICATION, WITH LONG-TERM CURRENT USE OF INSULIN (HCC): ICD-10-CM

## 2024-07-30 DIAGNOSIS — Z13.29 THYROID DISORDER SCREEN: ICD-10-CM

## 2024-07-30 DIAGNOSIS — Z00.00 MEDICARE ANNUAL WELLNESS VISIT, SUBSEQUENT: Primary | ICD-10-CM

## 2024-07-30 DIAGNOSIS — Z79.4 TYPE 2 DIABETES MELLITUS WITHOUT COMPLICATION, WITH LONG-TERM CURRENT USE OF INSULIN (HCC): ICD-10-CM

## 2024-07-30 PROCEDURE — 3078F DIAST BP <80 MM HG: CPT | Performed by: NURSE PRACTITIONER

## 2024-07-30 PROCEDURE — 3051F HG A1C>EQUAL 7.0%<8.0%: CPT | Performed by: NURSE PRACTITIONER

## 2024-07-30 PROCEDURE — 3075F SYST BP GE 130 - 139MM HG: CPT | Performed by: NURSE PRACTITIONER

## 2024-07-30 PROCEDURE — G0439 PPPS, SUBSEQ VISIT: HCPCS | Performed by: NURSE PRACTITIONER

## 2024-07-30 PROCEDURE — 1123F ACP DISCUSS/DSCN MKR DOCD: CPT | Performed by: NURSE PRACTITIONER

## 2024-07-30 ASSESSMENT — PATIENT HEALTH QUESTIONNAIRE - PHQ9
4. FEELING TIRED OR HAVING LITTLE ENERGY: NOT AT ALL
SUM OF ALL RESPONSES TO PHQ QUESTIONS 1-9: 1
7. TROUBLE CONCENTRATING ON THINGS, SUCH AS READING THE NEWSPAPER OR WATCHING TELEVISION: NOT AT ALL
SUM OF ALL RESPONSES TO PHQ QUESTIONS 1-9: 1
SUM OF ALL RESPONSES TO PHQ QUESTIONS 1-9: 1
6. FEELING BAD ABOUT YOURSELF - OR THAT YOU ARE A FAILURE OR HAVE LET YOURSELF OR YOUR FAMILY DOWN: NOT AT ALL
3. TROUBLE FALLING OR STAYING ASLEEP: NOT AT ALL
1. LITTLE INTEREST OR PLEASURE IN DOING THINGS: SEVERAL DAYS
9. THOUGHTS THAT YOU WOULD BE BETTER OFF DEAD, OR OF HURTING YOURSELF: NOT AT ALL
2. FEELING DOWN, DEPRESSED OR HOPELESS: NOT AT ALL
SUM OF ALL RESPONSES TO PHQ QUESTIONS 1-9: 1
8. MOVING OR SPEAKING SO SLOWLY THAT OTHER PEOPLE COULD HAVE NOTICED. OR THE OPPOSITE, BEING SO FIGETY OR RESTLESS THAT YOU HAVE BEEN MOVING AROUND A LOT MORE THAN USUAL: NOT AT ALL
SUM OF ALL RESPONSES TO PHQ9 QUESTIONS 1 & 2: 1
5. POOR APPETITE OR OVEREATING: NOT AT ALL
10. IF YOU CHECKED OFF ANY PROBLEMS, HOW DIFFICULT HAVE THESE PROBLEMS MADE IT FOR YOU TO DO YOUR WORK, TAKE CARE OF THINGS AT HOME, OR GET ALONG WITH OTHER PEOPLE: NOT DIFFICULT AT ALL

## 2024-07-30 ASSESSMENT — LIFESTYLE VARIABLES: HOW OFTEN DO YOU HAVE A DRINK CONTAINING ALCOHOL: NEVER

## 2024-07-30 NOTE — PROGRESS NOTES
Chief Complaint   Patient presents with    Medicare AWV       Have you seen any other physician or provider since your last visit no    Have you had any other diagnostic tests since your last visit? yes - labs    Have you changed or stopped any medications since your last visit? no     I have recommended that this patient have a immunization for pneumonia but he due to refusal reason: not comfortable with test   I have discussed the risks and benefits of this examination with him. The patient verbalizes understanding.  Provider will be informed of refusal.      Patient is here for his AWV.        
                Objective   Vitals:    07/30/24 1055   BP: 139/73   Site: Right Upper Arm   Position: Sitting   Cuff Size: Medium Adult   Pulse: 61   Resp: 16   Temp: 97.3 °F (36.3 °C)   TempSrc: Temporal   SpO2: 96%   Weight: 93.7 kg (206 lb 9.6 oz)   Height: 1.702 m (5' 7.01\")      Body mass index is 32.35 kg/m².                  Allergies   Allergen Reactions    Rosuvastatin Myalgia and Other (See Comments)    Rosuvastatin Calcium Myalgia    Bisoprolol Other (See Comments)     Agitation    Clopidogrel Bisulfate Other (See Comments)     resistance    Exenatide      nausea    Glucophage [Metformin Hydrochloride]      nausea    Rosuvastatin Calcium Other (See Comments)    Zocor [Simvastatin]      Muscle pain     Prior to Visit Medications    Medication Sig Taking? Authorizing Provider   ADMELOG SOLOSTAR 100 UNIT/ML SOPN INJECT 6 UNITS AT SUPPER MEAL FOR HYPERGLYCEMIA (DISCARD CARTRIDGE AFTER 28 DAYS) Yes Marina Dunlap APRN   Insulin Degludec (TRESIBA FLEXTOUCH) 200 UNIT/ML SOPN Inject 37 Units into the skin in the morning and at bedtime Yes Marina Dunlap APRN   finasteride (PROSCAR) 5 MG tablet Take 1 tablet by mouth daily Yes Marina Dunlap APRN   carvedilol (COREG) 6.25 MG tablet Take 2 tablets by mouth 2 times daily Yes Marina Dunlap APRN   nitroGLYCERIN (NITROSTAT) 0.4 MG SL tablet Place 1 tablet under the tongue every 5 minutes as needed for Chest pain Yes Marina Dunlap APRN   pantoprazole (PROTONIX) 40 MG tablet TAKE 1 TABLET TWICE DAILY  BEFORE MEALS Yes Marina Dunlap APRN   Insulin Aspart, w/Niacinamide, 100 UNIT/ML SOCT 6 units at supper meal for hyperglycemia Yes Marina uDnlap APRN   dapagliflozin (FARXIGA) 10 MG tablet Take 1 tablet by mouth every morning Yes Marina Dunlap APRN   atorvastatin (LIPITOR) 80 MG tablet Take 1 tablet by mouth daily Yes Provider, MD Acacia   tamsulosin (FLOMAX) 0.4 MG capsule TAKE 1 CAPSULE EVERY       MORNING AND AT BEDTIME Yes Fabi

## 2024-08-02 RX ORDER — TORSEMIDE 40 MG/1
1 TABLET, FILM COATED ORAL DAILY
Qty: 90 TABLET | Refills: 0 | Status: SHIPPED | OUTPATIENT
Start: 2024-08-02

## 2024-08-02 RX ORDER — SPIRONOLACTONE 25 MG/1
25 TABLET ORAL DAILY
Qty: 90 TABLET | Refills: 0 | Status: SHIPPED | OUTPATIENT
Start: 2024-08-02

## 2024-08-02 RX ORDER — HYDRALAZINE HYDROCHLORIDE 25 MG/1
TABLET, FILM COATED ORAL
Qty: 810 TABLET | Refills: 0 | Status: SHIPPED | OUTPATIENT
Start: 2024-08-02

## 2024-08-07 RX ORDER — ISOSORBIDE MONONITRATE 30 MG/1
30 TABLET, EXTENDED RELEASE ORAL DAILY
Qty: 90 TABLET | Refills: 0 | Status: SHIPPED | OUTPATIENT
Start: 2024-08-07 | End: 2024-08-08 | Stop reason: SDUPTHER

## 2024-08-08 ENCOUNTER — OFFICE VISIT (OUTPATIENT)
Dept: ORTHOPEDIC SURGERY | Facility: CLINIC | Age: 79
End: 2024-08-08
Payer: MEDICARE

## 2024-08-08 VITALS
WEIGHT: 205 LBS | DIASTOLIC BLOOD PRESSURE: 60 MMHG | SYSTOLIC BLOOD PRESSURE: 142 MMHG | HEIGHT: 67 IN | BODY MASS INDEX: 32.18 KG/M2

## 2024-08-08 DIAGNOSIS — I50.22 CHRONIC SYSTOLIC (CONGESTIVE) HEART FAILURE (HCC): ICD-10-CM

## 2024-08-08 DIAGNOSIS — M17.0 PRIMARY OSTEOARTHRITIS OF BOTH KNEES: Primary | ICD-10-CM

## 2024-08-08 DIAGNOSIS — K21.9 GASTROESOPHAGEAL REFLUX DISEASE, UNSPECIFIED WHETHER ESOPHAGITIS PRESENT: ICD-10-CM

## 2024-08-08 DIAGNOSIS — E11.42 TYPE 2 DIABETES MELLITUS WITH DIABETIC POLYNEUROPATHY, WITHOUT LONG-TERM CURRENT USE OF INSULIN (HCC): ICD-10-CM

## 2024-08-08 DIAGNOSIS — N40.1 BENIGN PROSTATIC HYPERPLASIA WITH URINARY HESITANCY: ICD-10-CM

## 2024-08-08 DIAGNOSIS — R39.11 BENIGN PROSTATIC HYPERPLASIA WITH URINARY HESITANCY: ICD-10-CM

## 2024-08-08 DIAGNOSIS — I10 ESSENTIAL HYPERTENSION: ICD-10-CM

## 2024-08-08 DIAGNOSIS — E11.9 TYPE 2 DIABETES MELLITUS WITHOUT COMPLICATION, WITH LONG-TERM CURRENT USE OF INSULIN (HCC): ICD-10-CM

## 2024-08-08 DIAGNOSIS — F33.1 MODERATE EPISODE OF RECURRENT MAJOR DEPRESSIVE DISORDER (HCC): ICD-10-CM

## 2024-08-08 DIAGNOSIS — Z79.4 TYPE 2 DIABETES MELLITUS WITHOUT COMPLICATION, WITH LONG-TERM CURRENT USE OF INSULIN (HCC): ICD-10-CM

## 2024-08-08 RX ORDER — TAMSULOSIN HYDROCHLORIDE 0.4 MG/1
CAPSULE ORAL
Qty: 90 CAPSULE | Refills: 1 | Status: SHIPPED | OUTPATIENT
Start: 2024-08-08

## 2024-08-08 RX ORDER — INSULIN LISPRO 100 [IU]/ML
INJECTION, SOLUTION INTRAVENOUS; SUBCUTANEOUS
Qty: 15 ML | Refills: 1 | Status: SHIPPED | OUTPATIENT
Start: 2024-08-08

## 2024-08-08 RX ORDER — SERTRALINE HYDROCHLORIDE 100 MG/1
100 TABLET, FILM COATED ORAL DAILY
Qty: 90 TABLET | Refills: 1 | Status: SHIPPED | OUTPATIENT
Start: 2024-08-08

## 2024-08-08 RX ORDER — TORSEMIDE 40 MG/1
1 TABLET, FILM COATED ORAL DAILY
Qty: 90 TABLET | Refills: 1 | Status: SHIPPED | OUTPATIENT
Start: 2024-08-08

## 2024-08-08 RX ORDER — LANCETS
1 EACH MISCELLANEOUS 3 TIMES DAILY
Qty: 400 EACH | Refills: 3 | Status: SHIPPED | OUTPATIENT
Start: 2024-08-08

## 2024-08-08 RX ORDER — FLURBIPROFEN SODIUM 0.3 MG/ML
1 SOLUTION/ DROPS OPHTHALMIC 2 TIMES DAILY
Qty: 200 EACH | Refills: 3 | Status: SHIPPED | OUTPATIENT
Start: 2024-08-08

## 2024-08-08 RX ORDER — PANTOPRAZOLE SODIUM 40 MG/1
TABLET, DELAYED RELEASE ORAL
Qty: 180 TABLET | Refills: 1 | Status: SHIPPED | OUTPATIENT
Start: 2024-08-08

## 2024-08-08 RX ORDER — DAPAGLIFLOZIN 10 MG/1
10 TABLET, FILM COATED ORAL EVERY MORNING
Qty: 90 TABLET | Refills: 1 | Status: SHIPPED | OUTPATIENT
Start: 2024-08-08

## 2024-08-08 RX ORDER — ATORVASTATIN CALCIUM 80 MG/1
80 TABLET, FILM COATED ORAL DAILY
Qty: 90 TABLET | Refills: 1 | Status: SHIPPED | OUTPATIENT
Start: 2024-08-08

## 2024-08-08 RX ORDER — SPIRONOLACTONE 25 MG/1
25 TABLET ORAL DAILY
Qty: 90 TABLET | Refills: 1 | Status: SHIPPED | OUTPATIENT
Start: 2024-08-08

## 2024-08-08 RX ORDER — PRASUGREL 10 MG/1
10 TABLET, FILM COATED ORAL DAILY
Qty: 90 TABLET | Refills: 1 | Status: SHIPPED | OUTPATIENT
Start: 2024-08-08

## 2024-08-08 RX ORDER — PEN NEEDLE, DIABETIC 32GX 5/32"
NEEDLE, DISPOSABLE MISCELLANEOUS
Qty: 100 EACH | Refills: 1 | Status: SHIPPED | OUTPATIENT
Start: 2024-08-08

## 2024-08-08 RX ORDER — CALCIUM CITRATE/VITAMIN D3 200MG-6.25
TABLET ORAL
Qty: 300 STRIP | Refills: 3 | Status: SHIPPED | OUTPATIENT
Start: 2024-08-08

## 2024-08-08 RX ORDER — ISOSORBIDE MONONITRATE 30 MG/1
30 TABLET, EXTENDED RELEASE ORAL DAILY
Qty: 90 TABLET | Refills: 1 | Status: SHIPPED | OUTPATIENT
Start: 2024-08-08

## 2024-08-08 RX ORDER — HYDRALAZINE HYDROCHLORIDE 25 MG/1
TABLET, FILM COATED ORAL
Qty: 810 TABLET | Refills: 0 | Status: SHIPPED | OUTPATIENT
Start: 2024-08-08

## 2024-08-08 RX ORDER — CARVEDILOL 6.25 MG/1
12.5 TABLET ORAL 2 TIMES DAILY
Qty: 180 TABLET | Refills: 1 | Status: SHIPPED | OUTPATIENT
Start: 2024-08-08

## 2024-08-08 RX ORDER — FINASTERIDE 5 MG/1
5 TABLET, FILM COATED ORAL DAILY
Qty: 90 TABLET | Refills: 1 | Status: SHIPPED | OUTPATIENT
Start: 2024-08-08

## 2024-08-08 RX ORDER — RANOLAZINE 500 MG/1
TABLET, EXTENDED RELEASE ORAL
Qty: 180 TABLET | Refills: 1 | Status: SHIPPED | OUTPATIENT
Start: 2024-08-08

## 2024-08-08 NOTE — PROGRESS NOTES
Subjective   Donny Jerry is a 79 y.o. male here today for injection therapy.    Chief Complaint   Patient presents with    Right Knee - Injections     Supartz #1    Left Knee - Injections   Patient presents for bilateral knee Visco injection #1.    Past Medical History:   Diagnosis Date    Benign hypertension     Coronary artery disease     Depression     Enlarged prostate     Enlarged prostate with anticipated TURP with Dr. Bernal.    GERD (gastroesophageal reflux disease)     Heart attack     Hypercholesterolemia     Impaired functional mobility, balance, gait, and endurance     Myocardial infarction     Obesity     Obstructive sleep apnea on CPAP     Type 2 diabetes mellitus          Past Surgical History:   Procedure Laterality Date    CARDIAC CATHETERIZATION      CARDIAC CATHETERIZATION Left 10/19/2021    Procedure: Left Heart Cath;  Surgeon: Liz Russo MD;  Location:  [facility];  Service: Cardiology;  Laterality: Left;    CARDIAC CATHETERIZATION N/A 7/18/2023    Procedure: Coronary angiography;  Surgeon: Rupesh Parr MD;  Location:  [facility];  Service: Cardiology;  Laterality: N/A;    CARDIAC CATHETERIZATION N/A 7/18/2023    Procedure: Percutaneous Coronary Intervention;  Surgeon: Rupesh Parr MD;  Location:  [facility];  Service: Cardiology;  Laterality: N/A;    COLONOSCOPY W/ POLYPECTOMY      CORONARY ANGIOPLASTY WITH STENT PLACEMENT Left 06/03/2009    Left heart catheterization, 06/03/2009, Dr. Russo:  LVEF 45% to 50%.  Placement of overlapping Cypher drug-eluting stent 2.5 x 28 and 2.5 x 18 to the SVG to the obtuse marginal branch.  SVG to the PDA had a proximal stenosis estimated at 60% with a distal stenosis of 50% to 60%.    CORONARY ANGIOPLASTY WITH STENT PLACEMENT Left 07/06/2009    Left heart catheterization, 07/06/2009:  PTCA and stent placement in the mid PDA using a 2.25 x 12 mm Taxus drug-eluting stent  "with stent placement in the distal SVG to the PDA using a 2.5 x 18 mm Cypher drug-eluting stent and stenting of the proximal SVG to the PDA using a 3.0 x 28 mm Cypher drug-eluting stent.    CORONARY ANGIOPLASTY WITH STENT PLACEMENT  04/23/2010    Cardiac catheterization for recurrent anginal symptoms, 04/23/2010, with PTCA and stent placement in the proximal SVG to the PDA using 3.0 x 28 mm Promus drug-eluting stent for in-stent restenosis.    CORONARY ANGIOPLASTY WITH STENT PLACEMENT Left 07/06/2010    Left heart catheterization, 07/06/2010, Dr. Russo:  EF 40% to 45%.  3.5 x 23 mm Promus drug-eluting stent in the proximal SVG to OM, 2.5 x 18 mm Promus drug-eluting stent to distal SVG to PDA due to in-stent restenosis.      CORONARY ANGIOPLASTY WITH STENT PLACEMENT Left 11/16/2010    Left heart catheterization, 11/16/2010, with placement of a 3.0 x 16 mm Taxus drug-eluting stent to the SVG to the RCA proximally and a 2.5 x 16 mm paclitaxel stent distally in the SVG to the RCA.    CORONARY ANGIOPLASTY WITH STENT PLACEMENT Left     Left heart catheterization, 3.0 x 24-mm Promus element drug-eluting stent to a 90% lesion in the mid portion of the SVG to the PDA.     CORONARY ANGIOPLASTY WITH STENT PLACEMENT Left 06/24/2014    Left heart catheterization for recurrent angina, 06/24/2014, Dr. Russo:  PTCA of the SVG to the PDA using a 3 x 12 mm NC TREK balloon.      CORONARY ARTERY BYPASS GRAFT      CABG x3, Dr. Peng, [facility], 2001.       Allergies   Allergen Reactions    Zocor [Simvastatin] Myalgia    Bisoprolol Other (See Comments)     Agitation      Byetta 10 Mcg Pen [Exenatide] Nausea Only     nausea    Crestor [Rosuvastatin Calcium] Myalgia    Metformin And Related Nausea Only    Plavix [Clopidogrel Bisulfate] Other (See Comments)     resistance       Objective   /60 (BP Location: Right arm, Patient Position: Sitting, Cuff Size: Adult)   Ht 170.2 cm (67\")   Wt 93 kg (205 lb)   " BMI 32.11 kg/m²    Physical Exam    Skin exam stable with no erythema, ecchymosis or rash.  No new swelling.  No motor or sensory changes.  Distal pulse intact.    Large Joint Arthrocentesis: R knee  Date/Time: 8/8/2024 10:51 AM  Consent given by: patient  Site marked: site marked  Timeout: Immediately prior to procedure a time out was called to verify the correct patient, procedure, equipment, support staff and site/side marked as required   Supporting Documentation  Indications: pain   Procedure Details  Location: knee - R knee  Preparation: Patient was prepped and draped in the usual sterile fashion  Needle gauge: 21g.  Approach: anterolateral  Medications administered: 25 mg sodium hyaluronate 25 MG/2.5ML  Patient tolerance: patient tolerated the procedure well with no immediate complications      Large Joint Arthrocentesis: L knee  Date/Time: 8/8/2024 10:51 AM  Consent given by: patient  Site marked: site marked  Timeout: Immediately prior to procedure a time out was called to verify the correct patient, procedure, equipment, support staff and site/side marked as required   Supporting Documentation  Indications: pain   Procedure Details  Location: knee - L knee  Preparation: Patient was prepped and draped in the usual sterile fashion  Needle gauge: 21g.  Approach: anterolateral  Medications administered: 25 mg sodium hyaluronate 25 MG/2.5ML  Patient tolerance: patient tolerated the procedure well with no immediate complications        Assessment & Plan      Diagnosis Plan   1. Primary osteoarthritis of both knees  Large Joint Arthrocentesis: R knee    Large Joint Arthrocentesis: L knee          Regular exercise as tolerated  Ice, heat, and/or modalities as beneficial  Watch for signs and symptoms of infection  Call or notify for any adverse effect from injection therapy    Recommendations:  Follow-up in 1 week.    Patient agreeable to call or return sooner for any concerns.

## 2024-08-15 ENCOUNTER — OFFICE VISIT (OUTPATIENT)
Dept: ORTHOPEDIC SURGERY | Facility: CLINIC | Age: 79
End: 2024-08-15
Payer: MEDICARE

## 2024-08-15 VITALS
SYSTOLIC BLOOD PRESSURE: 144 MMHG | BODY MASS INDEX: 32.18 KG/M2 | WEIGHT: 205 LBS | HEIGHT: 67 IN | DIASTOLIC BLOOD PRESSURE: 60 MMHG

## 2024-08-15 DIAGNOSIS — M17.0 PRIMARY OSTEOARTHRITIS OF BOTH KNEES: Primary | ICD-10-CM

## 2024-08-15 NOTE — PROGRESS NOTES
Subjective   Donny Jerry is a 79 y.o. male here today for injection therapy.    Chief Complaint   Patient presents with    Right Knee - Injections     Supartz #2    Left Knee - Injections   Patient presents for bilateral knee visco injection # 2      Past Medical History:   Diagnosis Date    Benign hypertension     Coronary artery disease     Depression     Enlarged prostate     Enlarged prostate with anticipated TURP with Dr. Bernal.    GERD (gastroesophageal reflux disease)     Heart attack     Hypercholesterolemia     Impaired functional mobility, balance, gait, and endurance     Myocardial infarction     Obesity     Obstructive sleep apnea on CPAP     Type 2 diabetes mellitus          Past Surgical History:   Procedure Laterality Date    CARDIAC CATHETERIZATION      CARDIAC CATHETERIZATION Left 10/19/2021    Procedure: Left Heart Cath;  Surgeon: Liz Russo MD;  Location:  [facility];  Service: Cardiology;  Laterality: Left;    CARDIAC CATHETERIZATION N/A 7/18/2023    Procedure: Coronary angiography;  Surgeon: Rupesh Parr MD;  Location:  [facility];  Service: Cardiology;  Laterality: N/A;    CARDIAC CATHETERIZATION N/A 7/18/2023    Procedure: Percutaneous Coronary Intervention;  Surgeon: Rupesh Parr MD;  Location:  [facility];  Service: Cardiology;  Laterality: N/A;    COLONOSCOPY W/ POLYPECTOMY      CORONARY ANGIOPLASTY WITH STENT PLACEMENT Left 06/03/2009    Left heart catheterization, 06/03/2009, Dr. Russo:  LVEF 45% to 50%.  Placement of overlapping Cypher drug-eluting stent 2.5 x 28 and 2.5 x 18 to the SVG to the obtuse marginal branch.  SVG to the PDA had a proximal stenosis estimated at 60% with a distal stenosis of 50% to 60%.    CORONARY ANGIOPLASTY WITH STENT PLACEMENT Left 07/06/2009    Left heart catheterization, 07/06/2009:  PTCA and stent placement in the mid PDA using a 2.25 x 12 mm Taxus drug-eluting stent  "with stent placement in the distal SVG to the PDA using a 2.5 x 18 mm Cypher drug-eluting stent and stenting of the proximal SVG to the PDA using a 3.0 x 28 mm Cypher drug-eluting stent.    CORONARY ANGIOPLASTY WITH STENT PLACEMENT  04/23/2010    Cardiac catheterization for recurrent anginal symptoms, 04/23/2010, with PTCA and stent placement in the proximal SVG to the PDA using 3.0 x 28 mm Promus drug-eluting stent for in-stent restenosis.    CORONARY ANGIOPLASTY WITH STENT PLACEMENT Left 07/06/2010    Left heart catheterization, 07/06/2010, Dr. Russo:  EF 40% to 45%.  3.5 x 23 mm Promus drug-eluting stent in the proximal SVG to OM, 2.5 x 18 mm Promus drug-eluting stent to distal SVG to PDA due to in-stent restenosis.      CORONARY ANGIOPLASTY WITH STENT PLACEMENT Left 11/16/2010    Left heart catheterization, 11/16/2010, with placement of a 3.0 x 16 mm Taxus drug-eluting stent to the SVG to the RCA proximally and a 2.5 x 16 mm paclitaxel stent distally in the SVG to the RCA.    CORONARY ANGIOPLASTY WITH STENT PLACEMENT Left     Left heart catheterization, 3.0 x 24-mm Promus element drug-eluting stent to a 90% lesion in the mid portion of the SVG to the PDA.     CORONARY ANGIOPLASTY WITH STENT PLACEMENT Left 06/24/2014    Left heart catheterization for recurrent angina, 06/24/2014, Dr. Russo:  PTCA of the SVG to the PDA using a 3 x 12 mm NC TREK balloon.      CORONARY ARTERY BYPASS GRAFT      CABG x3, Dr. Peng, [facility], 2001.       Allergies   Allergen Reactions    Zocor [Simvastatin] Myalgia    Bisoprolol Other (See Comments)     Agitation      Byetta 10 Mcg Pen [Exenatide] Nausea Only     nausea    Crestor [Rosuvastatin Calcium] Myalgia    Metformin And Related Nausea Only    Plavix [Clopidogrel Bisulfate] Other (See Comments)     resistance       Objective   /60 (BP Location: Left arm, Patient Position: Sitting, Cuff Size: Adult)   Ht 170.2 cm (67\")   Wt 93 kg (205 lb)   " BMI 32.11 kg/m²    Physical Exam    Skin exam stable with no erythema, ecchymosis or rash.  No new swelling.  No motor or sensory changes.  Distal pulse intact.    Large Joint Arthrocentesis: R knee  Date/Time: 8/15/2024 3:34 PM  Consent given by: patient  Site marked: site marked  Timeout: Immediately prior to procedure a time out was called to verify the correct patient, procedure, equipment, support staff and site/side marked as required   Supporting Documentation  Indications: pain   Procedure Details  Location: knee - R knee  Preparation: Patient was prepped and draped in the usual sterile fashion  Needle gauge: 21g.  Approach: anterolateral  Medications administered: 25 mg sodium hyaluronate 25 MG/2.5ML  Patient tolerance: patient tolerated the procedure well with no immediate complications      Large Joint Arthrocentesis: L knee  Date/Time: 8/15/2024 3:34 PM  Consent given by: patient  Site marked: site marked  Timeout: Immediately prior to procedure a time out was called to verify the correct patient, procedure, equipment, support staff and site/side marked as required   Supporting Documentation  Indications: pain   Procedure Details  Location: knee - L knee  Preparation: Patient was prepped and draped in the usual sterile fashion  Needle gauge: 21g.  Approach: anterolateral  Medications administered: 25 mg sodium hyaluronate 25 MG/2.5ML  Patient tolerance: patient tolerated the procedure well with no immediate complications        Assessment & Plan      Diagnosis Plan   1. Primary osteoarthritis of both knees            Regular exercise as tolerated  Ice, heat, and/or modalities as beneficial  Watch for signs and symptoms of infection  Call or notify for any adverse effect from injection therapy    Recommendations:    Follow up in 1 week    Patient agreeable to call or return sooner for any concerns.

## 2024-08-19 DIAGNOSIS — F33.1 MODERATE EPISODE OF RECURRENT MAJOR DEPRESSIVE DISORDER (HCC): ICD-10-CM

## 2024-08-19 RX ORDER — SERTRALINE HYDROCHLORIDE 100 MG/1
100 TABLET, FILM COATED ORAL DAILY
Qty: 90 TABLET | Refills: 1 | Status: SHIPPED | OUTPATIENT
Start: 2024-08-19

## 2024-08-19 RX ORDER — PEN NEEDLE, DIABETIC 32GX 5/32"
NEEDLE, DISPOSABLE MISCELLANEOUS
Qty: 100 EACH | Refills: 1 | Status: SHIPPED | OUTPATIENT
Start: 2024-08-19

## 2024-08-22 ENCOUNTER — OFFICE VISIT (OUTPATIENT)
Dept: ORTHOPEDIC SURGERY | Facility: CLINIC | Age: 79
End: 2024-08-22
Payer: MEDICARE

## 2024-08-22 VITALS
BODY MASS INDEX: 32.18 KG/M2 | DIASTOLIC BLOOD PRESSURE: 64 MMHG | WEIGHT: 205 LBS | SYSTOLIC BLOOD PRESSURE: 130 MMHG | HEIGHT: 67 IN

## 2024-08-22 DIAGNOSIS — M17.0 PRIMARY OSTEOARTHRITIS OF BOTH KNEES: Primary | ICD-10-CM

## 2024-08-22 NOTE — PROGRESS NOTES
Subjective   Donny Jerry is a 79 y.o. male here today for injection therapy.    Chief Complaint   Patient presents with    Right Knee - Injections    Left Knee - Injections   Patient presents for bilateral knee Visco injection #3      Past Medical History:   Diagnosis Date    Benign hypertension     Coronary artery disease     Depression     Enlarged prostate     Enlarged prostate with anticipated TURP with Dr. Bernal.    GERD (gastroesophageal reflux disease)     Heart attack     Hypercholesterolemia     Impaired functional mobility, balance, gait, and endurance     Myocardial infarction     Obesity     Obstructive sleep apnea on CPAP     Type 2 diabetes mellitus          Past Surgical History:   Procedure Laterality Date    CARDIAC CATHETERIZATION      CARDIAC CATHETERIZATION Left 10/19/2021    Procedure: Left Heart Cath;  Surgeon: Liz Russo MD;  Location:  [facility];  Service: Cardiology;  Laterality: Left;    CARDIAC CATHETERIZATION N/A 7/18/2023    Procedure: Coronary angiography;  Surgeon: Rupesh Parr MD;  Location:  [facility];  Service: Cardiology;  Laterality: N/A;    CARDIAC CATHETERIZATION N/A 7/18/2023    Procedure: Percutaneous Coronary Intervention;  Surgeon: Rupesh Parr MD;  Location:  [facility];  Service: Cardiology;  Laterality: N/A;    COLONOSCOPY W/ POLYPECTOMY      CORONARY ANGIOPLASTY WITH STENT PLACEMENT Left 06/03/2009    Left heart catheterization, 06/03/2009, Dr. Russo:  LVEF 45% to 50%.  Placement of overlapping Cypher drug-eluting stent 2.5 x 28 and 2.5 x 18 to the SVG to the obtuse marginal branch.  SVG to the PDA had a proximal stenosis estimated at 60% with a distal stenosis of 50% to 60%.    CORONARY ANGIOPLASTY WITH STENT PLACEMENT Left 07/06/2009    Left heart catheterization, 07/06/2009:  PTCA and stent placement in the mid PDA using a 2.25 x 12 mm Taxus drug-eluting stent with stent  "placement in the distal SVG to the PDA using a 2.5 x 18 mm Cypher drug-eluting stent and stenting of the proximal SVG to the PDA using a 3.0 x 28 mm Cypher drug-eluting stent.    CORONARY ANGIOPLASTY WITH STENT PLACEMENT  04/23/2010    Cardiac catheterization for recurrent anginal symptoms, 04/23/2010, with PTCA and stent placement in the proximal SVG to the PDA using 3.0 x 28 mm Promus drug-eluting stent for in-stent restenosis.    CORONARY ANGIOPLASTY WITH STENT PLACEMENT Left 07/06/2010    Left heart catheterization, 07/06/2010, Dr. Russo:  EF 40% to 45%.  3.5 x 23 mm Promus drug-eluting stent in the proximal SVG to OM, 2.5 x 18 mm Promus drug-eluting stent to distal SVG to PDA due to in-stent restenosis.      CORONARY ANGIOPLASTY WITH STENT PLACEMENT Left 11/16/2010    Left heart catheterization, 11/16/2010, with placement of a 3.0 x 16 mm Taxus drug-eluting stent to the SVG to the RCA proximally and a 2.5 x 16 mm paclitaxel stent distally in the SVG to the RCA.    CORONARY ANGIOPLASTY WITH STENT PLACEMENT Left     Left heart catheterization, 3.0 x 24-mm Promus element drug-eluting stent to a 90% lesion in the mid portion of the SVG to the PDA.     CORONARY ANGIOPLASTY WITH STENT PLACEMENT Left 06/24/2014    Left heart catheterization for recurrent angina, 06/24/2014, Dr. Russo:  PTCA of the SVG to the PDA using a 3 x 12 mm NC TREK balloon.      CORONARY ARTERY BYPASS GRAFT      CABG x3, Dr. Peng, [facility], 2001.       Allergies   Allergen Reactions    Zocor [Simvastatin] Myalgia    Bisoprolol Other (See Comments)     Agitation      Byetta 10 Mcg Pen [Exenatide] Nausea Only     nausea    Crestor [Rosuvastatin Calcium] Myalgia    Metformin And Related Nausea Only    Plavix [Clopidogrel Bisulfate] Other (See Comments)     resistance       Objective   /64 (BP Location: Left arm, Patient Position: Sitting, Cuff Size: Adult)   Ht 170.2 cm (67\")   Wt 93 kg (205 lb)   BMI 32.11 " kg/m²    Physical Exam    Skin exam stable with no erythema, ecchymosis or rash.  No new swelling.  No motor or sensory changes.  Distal pulse intact.    Large Joint Arthrocentesis: R knee  Date/Time: 8/22/2024 10:59 AM  Consent given by: patient  Site marked: site marked  Timeout: Immediately prior to procedure a time out was called to verify the correct patient, procedure, equipment, support staff and site/side marked as required   Supporting Documentation  Indications: pain   Procedure Details  Location: knee - R knee  Preparation: Patient was prepped and draped in the usual sterile fashion  Needle gauge: 21g.  Approach: anterolateral  Medications administered: 25 mg sodium hyaluronate 25 MG/2.5ML  Patient tolerance: patient tolerated the procedure well with no immediate complications      Large Joint Arthrocentesis: L knee  Date/Time: 8/22/2024 11:00 AM  Consent given by: patient  Site marked: site marked  Timeout: Immediately prior to procedure a time out was called to verify the correct patient, procedure, equipment, support staff and site/side marked as required   Supporting Documentation  Indications: pain   Procedure Details  Location: knee - L knee  Preparation: Patient was prepped and draped in the usual sterile fashion  Needle gauge: 21g.  Approach: anterolateral  Medications administered: 25 mg sodium hyaluronate 25 MG/2.5ML  Patient tolerance: patient tolerated the procedure well with no immediate complications        Assessment & Plan      Diagnosis Plan   1. Primary osteoarthritis of both knees            Regular exercise as tolerated  Ice, heat, and/or modalities as beneficial  Watch for signs and symptoms of infection  Call or notify for any adverse effect from injection therapy    Recommendations:    Follow up in 1 week    Patient agreeable to call or return sooner for any concerns.

## 2024-08-28 ENCOUNTER — CARE COORDINATION (OUTPATIENT)
Dept: PRIMARY CARE CLINIC | Age: 79
End: 2024-08-28

## 2024-08-28 NOTE — CARE COORDINATION
Burak Hawkins, RN  Manager Care Coordination  [phone]     I called on Aquilino and Mrs. Evans today for HR follow up.  Aquilino did well 1 month ago with his MAW exam and doesn't have fu until end of October.  Silvia tells me that he is doing well.  He has one visit left from home health and will complete his therapy with them.  She continues to monitor his blood sugar and blood pressure/ oxygen level daily.  This morning Aquilino's blood sugar was 116 fasting.  She reports he has some occasional \"high\" numbers, usually related to diet.  She specifically mentions Aquilino's love for bread.  We discussed some recently discovered options of keto friendly bread that offers 1 carb per slice.  I asked about dizziness or falls of late.  Silvia tells me that Aquilino does occasionally lose his balance and has fallen.  He has been able to get himself up and no injury.  We talked about fall precautions.

## 2024-08-29 ENCOUNTER — OFFICE VISIT (OUTPATIENT)
Dept: ORTHOPEDIC SURGERY | Facility: CLINIC | Age: 79
End: 2024-08-29
Payer: MEDICARE

## 2024-08-29 VITALS
DIASTOLIC BLOOD PRESSURE: 70 MMHG | HEIGHT: 67 IN | WEIGHT: 205 LBS | BODY MASS INDEX: 32.18 KG/M2 | SYSTOLIC BLOOD PRESSURE: 126 MMHG

## 2024-08-29 DIAGNOSIS — M17.0 PRIMARY OSTEOARTHRITIS OF BOTH KNEES: Primary | ICD-10-CM

## 2024-08-29 NOTE — PROGRESS NOTES
Subjective   Donny Jerry is a 79 y.o. male here today for injection therapy.    Chief Complaint   Patient presents with    Right Knee - Injections     Supartz #4    Left Knee - Injections       Patient presents for bilateral knee Visco injection # 4    Past Medical History:   Diagnosis Date    Benign hypertension     Coronary artery disease     Depression     Enlarged prostate     Enlarged prostate with anticipated TURP with Dr. Bernal.    GERD (gastroesophageal reflux disease)     Heart attack     Hypercholesterolemia     Impaired functional mobility, balance, gait, and endurance     Myocardial infarction     Obesity     Obstructive sleep apnea on CPAP     Type 2 diabetes mellitus          Past Surgical History:   Procedure Laterality Date    CARDIAC CATHETERIZATION      CARDIAC CATHETERIZATION Left 10/19/2021    Procedure: Left Heart Cath;  Surgeon: Liz Russo MD;  Location:  [facility];  Service: Cardiology;  Laterality: Left;    CARDIAC CATHETERIZATION N/A 7/18/2023    Procedure: Coronary angiography;  Surgeon: Rupesh Parr MD;  Location:  [facility];  Service: Cardiology;  Laterality: N/A;    CARDIAC CATHETERIZATION N/A 7/18/2023    Procedure: Percutaneous Coronary Intervention;  Surgeon: Rupesh Parr MD;  Location:  [facility];  Service: Cardiology;  Laterality: N/A;    COLONOSCOPY W/ POLYPECTOMY      CORONARY ANGIOPLASTY WITH STENT PLACEMENT Left 06/03/2009    Left heart catheterization, 06/03/2009, Dr. Russo:  LVEF 45% to 50%.  Placement of overlapping Cypher drug-eluting stent 2.5 x 28 and 2.5 x 18 to the SVG to the obtuse marginal branch.  SVG to the PDA had a proximal stenosis estimated at 60% with a distal stenosis of 50% to 60%.    CORONARY ANGIOPLASTY WITH STENT PLACEMENT Left 07/06/2009    Left heart catheterization, 07/06/2009:  PTCA and stent placement in the mid PDA using a 2.25 x 12 mm Taxus drug-eluting  "stent with stent placement in the distal SVG to the PDA using a 2.5 x 18 mm Cypher drug-eluting stent and stenting of the proximal SVG to the PDA using a 3.0 x 28 mm Cypher drug-eluting stent.    CORONARY ANGIOPLASTY WITH STENT PLACEMENT  04/23/2010    Cardiac catheterization for recurrent anginal symptoms, 04/23/2010, with PTCA and stent placement in the proximal SVG to the PDA using 3.0 x 28 mm Promus drug-eluting stent for in-stent restenosis.    CORONARY ANGIOPLASTY WITH STENT PLACEMENT Left 07/06/2010    Left heart catheterization, 07/06/2010, Dr. Russo:  EF 40% to 45%.  3.5 x 23 mm Promus drug-eluting stent in the proximal SVG to OM, 2.5 x 18 mm Promus drug-eluting stent to distal SVG to PDA due to in-stent restenosis.      CORONARY ANGIOPLASTY WITH STENT PLACEMENT Left 11/16/2010    Left heart catheterization, 11/16/2010, with placement of a 3.0 x 16 mm Taxus drug-eluting stent to the SVG to the RCA proximally and a 2.5 x 16 mm paclitaxel stent distally in the SVG to the RCA.    CORONARY ANGIOPLASTY WITH STENT PLACEMENT Left     Left heart catheterization, 3.0 x 24-mm Promus element drug-eluting stent to a 90% lesion in the mid portion of the SVG to the PDA.     CORONARY ANGIOPLASTY WITH STENT PLACEMENT Left 06/24/2014    Left heart catheterization for recurrent angina, 06/24/2014, Dr. Russo:  PTCA of the SVG to the PDA using a 3 x 12 mm NC TREK balloon.      CORONARY ARTERY BYPASS GRAFT      CABG x3, Dr. Peng, [facility], 2001.       Allergies   Allergen Reactions    Zocor [Simvastatin] Myalgia    Bisoprolol Other (See Comments)     Agitation      Byetta 10 Mcg Pen [Exenatide] Nausea Only     nausea    Crestor [Rosuvastatin Calcium] Myalgia    Metformin And Related Nausea Only    Plavix [Clopidogrel Bisulfate] Other (See Comments)     resistance       Objective   /70 (BP Location: Left arm, Patient Position: Sitting, Cuff Size: Adult)   Ht 170.2 cm (67\")   Wt 93 kg (205 " lb)   BMI 32.11 kg/m²    Physical Exam    Skin exam stable with no erythema, ecchymosis or rash.  No new swelling.  No motor or sensory changes.  Distal pulse intact.    Large Joint Arthrocentesis: R knee  Date/Time: 8/29/2024 11:16 AM  Consent given by: patient  Site marked: site marked  Supporting Documentation  Indications: pain   Procedure Details  Location: knee - R knee  Preparation: Patient was prepped and draped in the usual sterile fashion  Needle gauge: 21g.  Approach: anterolateral  Medications administered: 25 mg sodium hyaluronate 25 MG/2.5ML  Patient tolerance: patient tolerated the procedure well with no immediate complications      Large Joint Arthrocentesis: L knee  Date/Time: 8/29/2024 11:16 AM  Consent given by: patient  Site marked: site marked  Timeout: Immediately prior to procedure a time out was called to verify the correct patient, procedure, equipment, support staff and site/side marked as required   Supporting Documentation  Indications: pain   Procedure Details  Location: knee - L knee  Preparation: Patient was prepped and draped in the usual sterile fashion  Needle gauge: 21g.  Approach: anterolateral  Medications administered: 25 mg sodium hyaluronate 25 MG/2.5ML  Patient tolerance: patient tolerated the procedure well with no immediate complications        Assessment & Plan      Diagnosis Plan   1. Primary osteoarthritis of both knees            Regular exercise as tolerated  Ice, heat, and/or modalities as beneficial  Watch for signs and symptoms of infection  Call or notify for any adverse effect from injection therapy    Recommendations:      Patient agreeable to call or return sooner for any concerns.

## 2024-08-30 DIAGNOSIS — J45.40 MODERATE PERSISTENT ASTHMA WITHOUT COMPLICATION: ICD-10-CM

## 2024-08-30 RX ORDER — ATORVASTATIN CALCIUM 80 MG/1
80 TABLET, FILM COATED ORAL DAILY
Qty: 90 TABLET | Refills: 3 | Status: SHIPPED | OUTPATIENT
Start: 2024-08-30

## 2024-08-30 RX ORDER — FLUTICASONE FUROATE, UMECLIDINIUM BROMIDE AND VILANTEROL TRIFENATATE 200; 62.5; 25 UG/1; UG/1; UG/1
1 POWDER RESPIRATORY (INHALATION)
Qty: 60 EACH | Refills: 2 | Status: SHIPPED | OUTPATIENT
Start: 2024-08-30

## 2024-08-30 RX ORDER — CARVEDILOL 6.25 MG/1
12.5 TABLET ORAL 2 TIMES DAILY
Qty: 180 TABLET | Refills: 1 | Status: SHIPPED | OUTPATIENT
Start: 2024-08-30

## 2024-08-30 NOTE — TELEPHONE ENCOUNTER
Caller: Donny Jerry    Relationship: Self    Best call back number:     [phone] (Home)       Requested Prescriptions:   Requested Prescriptions     Pending Prescriptions Disp Refills    Fluticasone-Umeclidin-Vilant (Trelegy Ellipta) 200-62.5-25 MCG/ACT inhaler 60 each 2        Pharmacy where request should be sent: [facility] - [place] [facility] - [address] [facility] AT [address] TO [facility] - [phone] [facility] [phone]      Last office visit with prescribing clinician: 7/26/2023   Last telemedicine visit with prescribing clinician: Visit date not found   Next office visit with prescribing clinician: 11/26/2024     Additional details provided by patient: NEEDS ASAP     Does the patient have less than a 3 day supply:  [] Yes  [] No    Would you like a call back once the refill request has been completed: [] Yes [] No    If the office needs to give you a call back, can they leave a voicemail: [] Yes [] No    Hamilton De Luna Rep   08/30/24 12:14 EDT

## 2024-09-04 DIAGNOSIS — F33.1 MODERATE EPISODE OF RECURRENT MAJOR DEPRESSIVE DISORDER (HCC): ICD-10-CM

## 2024-09-04 RX ORDER — SERTRALINE HYDROCHLORIDE 100 MG/1
100 TABLET, FILM COATED ORAL DAILY
Qty: 90 TABLET | Refills: 3 | Status: SHIPPED | OUTPATIENT
Start: 2024-09-04

## 2024-09-05 ENCOUNTER — OFFICE VISIT (OUTPATIENT)
Dept: ORTHOPEDIC SURGERY | Facility: CLINIC | Age: 79
End: 2024-09-05
Payer: MEDICARE

## 2024-09-05 VITALS
HEIGHT: 67 IN | BODY MASS INDEX: 32.18 KG/M2 | DIASTOLIC BLOOD PRESSURE: 62 MMHG | SYSTOLIC BLOOD PRESSURE: 130 MMHG | WEIGHT: 205 LBS

## 2024-09-05 DIAGNOSIS — M17.0 PRIMARY OSTEOARTHRITIS OF BOTH KNEES: Primary | ICD-10-CM

## 2024-09-05 NOTE — PROGRESS NOTES
Subjective   Donny Jerry is a 79 y.o. male here today for injection therapy.    Chief Complaint   Patient presents with    Left Knee - Injections     Supartz #5.    Right Knee - Injections   Patient presents for repeat bilateral knee Visco injection #5    Past Medical History:   Diagnosis Date    Benign hypertension     Coronary artery disease     Depression     Enlarged prostate     Enlarged prostate with anticipated TURP with Dr. Bernal.    GERD (gastroesophageal reflux disease)     Heart attack     Hypercholesterolemia     Impaired functional mobility, balance, gait, and endurance     Myocardial infarction     Obesity     Obstructive sleep apnea on CPAP     Type 2 diabetes mellitus          Past Surgical History:   Procedure Laterality Date    CARDIAC CATHETERIZATION      CARDIAC CATHETERIZATION Left 10/19/2021    Procedure: Left Heart Cath;  Surgeon: Liz Russo MD;  Location:  [facility];  Service: Cardiology;  Laterality: Left;    CARDIAC CATHETERIZATION N/A 7/18/2023    Procedure: Coronary angiography;  Surgeon: Rupesh Parr MD;  Location:  [facility];  Service: Cardiology;  Laterality: N/A;    CARDIAC CATHETERIZATION N/A 7/18/2023    Procedure: Percutaneous Coronary Intervention;  Surgeon: Rupesh Parr MD;  Location:  [facility];  Service: Cardiology;  Laterality: N/A;    COLONOSCOPY W/ POLYPECTOMY      CORONARY ANGIOPLASTY WITH STENT PLACEMENT Left 06/03/2009    Left heart catheterization, 06/03/2009, Dr. Russo:  LVEF 45% to 50%.  Placement of overlapping Cypher drug-eluting stent 2.5 x 28 and 2.5 x 18 to the SVG to the obtuse marginal branch.  SVG to the PDA had a proximal stenosis estimated at 60% with a distal stenosis of 50% to 60%.    CORONARY ANGIOPLASTY WITH STENT PLACEMENT Left 07/06/2009    Left heart catheterization, 07/06/2009:  PTCA and stent placement in the mid PDA using a 2.25 x 12 mm Taxus drug-eluting  "stent with stent placement in the distal SVG to the PDA using a 2.5 x 18 mm Cypher drug-eluting stent and stenting of the proximal SVG to the PDA using a 3.0 x 28 mm Cypher drug-eluting stent.    CORONARY ANGIOPLASTY WITH STENT PLACEMENT  04/23/2010    Cardiac catheterization for recurrent anginal symptoms, 04/23/2010, with PTCA and stent placement in the proximal SVG to the PDA using 3.0 x 28 mm Promus drug-eluting stent for in-stent restenosis.    CORONARY ANGIOPLASTY WITH STENT PLACEMENT Left 07/06/2010    Left heart catheterization, 07/06/2010, Dr. Russo:  EF 40% to 45%.  3.5 x 23 mm Promus drug-eluting stent in the proximal SVG to OM, 2.5 x 18 mm Promus drug-eluting stent to distal SVG to PDA due to in-stent restenosis.      CORONARY ANGIOPLASTY WITH STENT PLACEMENT Left 11/16/2010    Left heart catheterization, 11/16/2010, with placement of a 3.0 x 16 mm Taxus drug-eluting stent to the SVG to the RCA proximally and a 2.5 x 16 mm paclitaxel stent distally in the SVG to the RCA.    CORONARY ANGIOPLASTY WITH STENT PLACEMENT Left     Left heart catheterization, 3.0 x 24-mm Promus element drug-eluting stent to a 90% lesion in the mid portion of the SVG to the PDA.     CORONARY ANGIOPLASTY WITH STENT PLACEMENT Left 06/24/2014    Left heart catheterization for recurrent angina, 06/24/2014, Dr. Russo:  PTCA of the SVG to the PDA using a 3 x 12 mm NC TREK balloon.      CORONARY ARTERY BYPASS GRAFT      CABG x3, Dr. Peng, [facility], 2001.       Allergies   Allergen Reactions    Zocor [Simvastatin] Myalgia    Bisoprolol Other (See Comments)     Agitation      Byetta 10 Mcg Pen [Exenatide] Nausea Only     nausea    Crestor [Rosuvastatin Calcium] Myalgia    Metformin And Related Nausea Only    Plavix [Clopidogrel Bisulfate] Other (See Comments)     resistance       Objective   /62   Ht 170.2 cm (67.01\")   Wt 93 kg (205 lb)   BMI 32.10 kg/m²    Physical Exam    Skin exam stable with no " erythema, ecchymosis or rash.  No new swelling.  No motor or sensory changes.  Distal pulse intact.    Large Joint Arthrocentesis: R knee  Date/Time: 9/5/2024 11:15 AM  Consent given by: patient  Site marked: site marked  Timeout: Immediately prior to procedure a time out was called to verify the correct patient, procedure, equipment, support staff and site/side marked as required   Supporting Documentation  Indications: pain   Procedure Details  Location: knee - R knee  Preparation: Patient was prepped and draped in the usual sterile fashion  Needle size: 22 G  Approach: anterolateral  Medications administered: 25 mg sodium hyaluronate 25 MG/2.5ML  Patient tolerance: patient tolerated the procedure well with no immediate complications      Large Joint Arthrocentesis: L knee  Date/Time: 9/5/2024 11:16 AM  Consent given by: patient  Site marked: site marked  Timeout: Immediately prior to procedure a time out was called to verify the correct patient, procedure, equipment, support staff and site/side marked as required   Supporting Documentation  Indications: pain   Procedure Details  Location: knee - L knee  Preparation: Patient was prepped and draped in the usual sterile fashion  Needle size: 22 G  Approach: anterolateral  Medications administered: 25 mg sodium hyaluronate 25 MG/2.5ML  Patient tolerance: patient tolerated the procedure well with no immediate complications        Assessment & Plan     No diagnosis found.    Regular exercise as tolerated  Ice, heat, and/or modalities as beneficial  Watch for signs and symptoms of infection  Call or notify for any adverse effect from injection therapy    Recommendations:    Follow up in 6 months or prn  Patient agreeable to call or return sooner for any concerns.

## 2024-10-16 ENCOUNTER — CARE COORDINATION (OUTPATIENT)
Dept: PRIMARY CARE CLINIC | Age: 79
End: 2024-10-16

## 2024-10-16 NOTE — CARE COORDINATION
Burak Hawkins, RN  Manager Care Coordination  [phone]     Call placed to [facility] and message left with contact information to follow up on care management.

## 2024-10-24 RX ORDER — HYDRALAZINE HYDROCHLORIDE 25 MG/1
TABLET, FILM COATED ORAL
Qty: 810 TABLET | Refills: 0 | Status: SHIPPED | OUTPATIENT
Start: 2024-10-24

## 2024-10-24 RX ORDER — SPIRONOLACTONE 25 MG/1
25 TABLET ORAL DAILY
Qty: 90 TABLET | Refills: 0 | Status: SHIPPED | OUTPATIENT
Start: 2024-10-24

## 2024-10-25 ENCOUNTER — HOSPITAL ENCOUNTER (OUTPATIENT)
Facility: HOSPITAL | Age: 79
Discharge: HOME | End: 2024-10-25
Payer: MEDICARE

## 2024-10-25 DIAGNOSIS — E11.9 TYPE 2 DIABETES MELLITUS WITHOUT COMPLICATION, WITH LONG-TERM CURRENT USE OF INSULIN (HCC): ICD-10-CM

## 2024-10-25 DIAGNOSIS — Z13.29 THYROID DISORDER SCREEN: ICD-10-CM

## 2024-10-25 DIAGNOSIS — Z79.4 TYPE 2 DIABETES MELLITUS WITHOUT COMPLICATION, WITH LONG-TERM CURRENT USE OF INSULIN (HCC): ICD-10-CM

## 2024-10-25 LAB
ALBUMIN SERPL-MCNC: 3.9 G/DL (ref 3.4–4.8)
ALBUMIN/GLOB SERPL: 1.7 {RATIO} (ref 0.8–2)
ALP SERPL-CCNC: 40 U/L (ref 25–100)
ALT SERPL-CCNC: 21 U/L (ref 4–36)
ANION GAP SERPL CALCULATED.3IONS-SCNC: 10 MMOL/L (ref 3–16)
AST SERPL-CCNC: 20 U/L (ref 8–33)
BILIRUB SERPL-MCNC: 0.4 MG/DL (ref 0.3–1.2)
BUN SERPL-MCNC: 58 MG/DL (ref 6–20)
CALCIUM SERPL-MCNC: 9.3 MG/DL (ref 8.5–10.5)
CHLORIDE SERPL-SCNC: 99 MMOL/L (ref 98–107)
CO2 SERPL-SCNC: 31 MMOL/L (ref 20–30)
CREAT SERPL-MCNC: 1.8 MG/DL (ref 0.4–1.2)
ERYTHROCYTE [DISTWIDTH] IN BLOOD BY AUTOMATED COUNT: 12.3 % (ref 11–16)
GFR SERPLBLD CREATININE-BSD FMLA CKD-EPI: 38 ML/MIN/{1.73_M2}
GLOBULIN SER CALC-MCNC: 2.3 G/DL
GLUCOSE SERPL-MCNC: 109 MG/DL (ref 74–106)
HBA1C MFR BLD: 8.8 %
HCT VFR BLD AUTO: 33.3 % (ref 40–54)
HGB BLD-MCNC: 11.2 G/DL (ref 13–18)
MCH RBC QN AUTO: 30.3 PG (ref 27–32)
MCHC RBC AUTO-ENTMCNC: 33.6 G/DL (ref 31–35)
MCV RBC AUTO: 90 FL (ref 80–100)
PLATELET # BLD AUTO: 255 K/UL (ref 150–400)
PMV BLD AUTO: 10 FL (ref 6–10)
POTASSIUM SERPL-SCNC: 4.2 MMOL/L (ref 3.4–5.1)
PROT SERPL-MCNC: 6.2 G/DL (ref 6.4–8.3)
RBC # BLD AUTO: 3.7 M/UL (ref 4.5–6)
SODIUM SERPL-SCNC: 140 MMOL/L (ref 136–145)
TSH SERPL DL<=0.005 MIU/L-ACNC: 2.1 UIU/ML (ref 0.27–4.2)
WBC # BLD AUTO: 7.6 K/UL (ref 4–11)

## 2024-10-25 PROCEDURE — 36415 COLL VENOUS BLD VENIPUNCTURE: CPT

## 2024-10-25 PROCEDURE — 84443 ASSAY THYROID STIM HORMONE: CPT

## 2024-10-25 PROCEDURE — 80053 COMPREHEN METABOLIC PANEL: CPT

## 2024-10-25 PROCEDURE — 85027 COMPLETE CBC AUTOMATED: CPT

## 2024-10-25 PROCEDURE — 83036 HEMOGLOBIN GLYCOSYLATED A1C: CPT

## 2024-10-30 DIAGNOSIS — F33.1 MODERATE EPISODE OF RECURRENT MAJOR DEPRESSIVE DISORDER (HCC): ICD-10-CM

## 2024-10-30 RX ORDER — ATORVASTATIN CALCIUM 80 MG/1
80 TABLET, FILM COATED ORAL DAILY
Qty: 90 TABLET | Refills: 0 | Status: SHIPPED | OUTPATIENT
Start: 2024-10-30

## 2024-10-30 RX ORDER — SERTRALINE HYDROCHLORIDE 100 MG/1
100 TABLET, FILM COATED ORAL DAILY
Qty: 90 TABLET | Refills: 0 | Status: SHIPPED | OUTPATIENT
Start: 2024-10-30

## 2024-10-30 RX ORDER — INSULIN LISPRO 100 [IU]/ML
INJECTION, SOLUTION INTRAVENOUS; SUBCUTANEOUS
Qty: 15 ML | Refills: 1 | Status: SHIPPED | OUTPATIENT
Start: 2024-10-30

## 2024-10-31 NOTE — CARE COORDINATION
Burak Hawkins, RN  Manager Care Coordination  [phone]     Attempting to fu with Aquilino, unsuccessful.  Message left with contact information and reminder for appointment next week.

## 2024-11-07 ENCOUNTER — OFFICE VISIT (OUTPATIENT)
Dept: PRIMARY CARE CLINIC | Age: 79
End: 2024-11-07
Payer: MEDICARE

## 2024-11-07 VITALS
OXYGEN SATURATION: 96 % | DIASTOLIC BLOOD PRESSURE: 72 MMHG | WEIGHT: 203 LBS | BODY MASS INDEX: 31.79 KG/M2 | HEART RATE: 61 BPM | SYSTOLIC BLOOD PRESSURE: 146 MMHG

## 2024-11-07 DIAGNOSIS — E11.22 TYPE 2 DIABETES MELLITUS WITH CHRONIC KIDNEY DISEASE, WITH LONG-TERM CURRENT USE OF INSULIN, UNSPECIFIED CKD STAGE (HCC): Primary | ICD-10-CM

## 2024-11-07 DIAGNOSIS — I10 PRIMARY HYPERTENSION: ICD-10-CM

## 2024-11-07 DIAGNOSIS — I50.22 CHRONIC SYSTOLIC (CONGESTIVE) HEART FAILURE (HCC): ICD-10-CM

## 2024-11-07 DIAGNOSIS — N18.32 STAGE 3B CHRONIC KIDNEY DISEASE (HCC): ICD-10-CM

## 2024-11-07 DIAGNOSIS — N18.32 ANEMIA DUE TO STAGE 3B CHRONIC KIDNEY DISEASE (HCC): ICD-10-CM

## 2024-11-07 DIAGNOSIS — E78.5 HYPERLIPIDEMIA, UNSPECIFIED HYPERLIPIDEMIA TYPE: ICD-10-CM

## 2024-11-07 DIAGNOSIS — D63.1 ANEMIA DUE TO STAGE 3B CHRONIC KIDNEY DISEASE (HCC): ICD-10-CM

## 2024-11-07 DIAGNOSIS — Z79.4 TYPE 2 DIABETES MELLITUS WITH CHRONIC KIDNEY DISEASE, WITH LONG-TERM CURRENT USE OF INSULIN, UNSPECIFIED CKD STAGE (HCC): Primary | ICD-10-CM

## 2024-11-07 PROCEDURE — 99214 OFFICE O/P EST MOD 30 MIN: CPT | Performed by: PHYSICIAN ASSISTANT

## 2024-11-07 PROCEDURE — 3078F DIAST BP <80 MM HG: CPT | Performed by: PHYSICIAN ASSISTANT

## 2024-11-07 PROCEDURE — G8417 CALC BMI ABV UP PARAM F/U: HCPCS | Performed by: PHYSICIAN ASSISTANT

## 2024-11-07 PROCEDURE — 3077F SYST BP >= 140 MM HG: CPT | Performed by: PHYSICIAN ASSISTANT

## 2024-11-07 PROCEDURE — 1036F TOBACCO NON-USER: CPT | Performed by: PHYSICIAN ASSISTANT

## 2024-11-07 PROCEDURE — 3052F HG A1C>EQUAL 8.0%<EQUAL 9.0%: CPT | Performed by: PHYSICIAN ASSISTANT

## 2024-11-07 PROCEDURE — 1123F ACP DISCUSS/DSCN MKR DOCD: CPT | Performed by: PHYSICIAN ASSISTANT

## 2024-11-07 PROCEDURE — G8484 FLU IMMUNIZE NO ADMIN: HCPCS | Performed by: PHYSICIAN ASSISTANT

## 2024-11-07 PROCEDURE — G8428 CUR MEDS NOT DOCUMENT: HCPCS | Performed by: PHYSICIAN ASSISTANT

## 2024-11-07 RX ORDER — DULAGLUTIDE 0.75 MG/.5ML
0.75 INJECTION, SOLUTION SUBCUTANEOUS WEEKLY
Qty: 1 ADJUSTABLE DOSE PRE-FILLED PEN SYRINGE | Refills: 2 | Status: SHIPPED | OUTPATIENT
Start: 2024-11-07

## 2024-11-07 ASSESSMENT — ENCOUNTER SYMPTOMS
RESPIRATORY NEGATIVE: 1
GASTROINTESTINAL NEGATIVE: 1

## 2024-11-07 NOTE — PROGRESS NOTES
Subjective:     Aquilino Evans is a 79 y.o. male.    Chief Complaint   Patient presents with    Follow-up     Pt here for regular follow up, normally see's Marina for PCP but couldn't get in until December and needed to discus Trulicity and DM shoes.        HPI    Pt here with h/o HTN, DM2, CAD, GERD, RLS, BPH, vit d def, constipation, COPD, CHF, CKD, HLD, depression in need of follow up on chronic medical conditions. His COPD appears well controlled. He is on O2 at night with his CPAP. He does not use trelegy every day, only PRN. He does not use his albuterol except rarely. O2 today is 96% on RA. Tolerating statin well. BP has been a little elevated lately at night. Typically controlled during the day, runs around 140-150 SBP in the evening. BG has been up and down. Fasting BG appears to be running 120-160. PP BG appears to be 200-300 in the evening. He does have BPH, he states he is urinating well with a good stream. Nephrology changed his f/u to every 6 months. Has not had to use NTG in over a year. RLS is improving. Wt has been trending down from 237 in Jan 2024 to 203# today.     He is on lispro 8U nightly with supper, Tresiba 22U qAM and 37U qPM. Has not had his trulicity this week due to running out. Needs refill on this.     He does need DM shoe rx today. 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 He does see podiatry.       Current Outpatient Medications:     dulaglutide (TRULICITY) 0.75 MG/0.5ML SOAJ SC injection, Inject 0.5 mLs into the skin once a week, Disp: 1 Adjustable Dose Pre-filled Pen Syringe, Rfl: 2    sertraline (ZOLOFT) 100 MG tablet, Take 1 tablet by mouth daily, Disp: 90 tablet, Rfl: 0    atorvastatin (LIPITOR) 80 MG tablet, Take 1 tablet by mouth daily, Disp: 90 tablet, Rfl: 0    insulin lispro, 1 Unit Dial, (ADMELOG SOLOSTAR) 100 UNIT/ML SOPN, INJECT 6 UNITS AT SUPPER MEAL FOR HYPERGLYCEMIA (DISCARD CARTRIDGE AFTER 28 DAYS), Disp: 15 mL, Rfl: 1    hydrALAZINE (APRESOLINE) 25 MG tablet, TAKE 3 TABLETS 3

## 2024-11-07 NOTE — PROGRESS NOTES
Chief Complaint   Patient presents with    Follow-up     Pt here for regular follow up, normally see's Marina for PCP but couldn't get in until December and needed to discus Trulicity and DM shoes.        Have you seen any other physician or provider since your last visit no    Have you had any other diagnostic tests since your last visit? no    Have you changed or stopped any medications since your last visit? no

## 2024-11-11 ENCOUNTER — CARE COORDINATION (OUTPATIENT)
Dept: PRIMARY CARE CLINIC | Age: 79
End: 2024-11-11

## 2024-11-11 NOTE — CARE COORDINATION
Burak Hawkins, RN  Manager Care Coordination  [phone]     Mrs. Evans calls in today to ask if I could communicate about Aquilino's blood sugar and blood pressure to his PCP.  She tells me that his last labs had A1C at 8.8 which is a little bit higher.  He had a follow up with another provider in the clinic and he didn't want to make any changes.  They do twice daily BP and BS readings.  Silvia gives me the following for past week.      143/64   145/67  145/72   152/74  144/72   141/68  139/69   139/66  141/71   153/73    124   200  125   197  193   185  119   185  147   229    Aquilino was out of Trulicity for a week but resumed 3 days ago at .75.  He is also on farxiga, Tresiba 37 u, and regular insuline 8-10 u with meals.      Aquilino is on 75 mg hydralazine tid, carvedilol, isosorbide,     We made a plan for me to discuss with PCP tomorrow in clinic and return to her any changes.  He has follow up in December.

## 2024-11-12 ENCOUNTER — CARE COORDINATION (OUTPATIENT)
Age: 79
End: 2024-11-12

## 2024-11-12 NOTE — CARE COORDINATION
Burak Hawkins, RN  Manager Care Coordination  [phone]     I spoke to Marina Dunlap concerning Aquilino's blood sugar and blood pressure.  She doesn't want to make any changes right now .  She did ask that if Aquilino needs to refill his trulicity before his December appointment to let her know and she will probably increase the dosage.  I did talk to Silvia and she verbalized understanding of this.  She will call if his blood pressure were to get consistently any higher.  She states that Aquilino is feeling well and has not had any falls of late.

## 2024-11-13 ENCOUNTER — TELEPHONE (OUTPATIENT)
Dept: PULMONOLOGY | Facility: CLINIC | Age: 79
End: 2024-11-13
Payer: MEDICARE

## 2024-11-13 DIAGNOSIS — G47.33 OSA (OBSTRUCTIVE SLEEP APNEA): Primary | ICD-10-CM

## 2024-11-13 NOTE — TELEPHONE ENCOUNTER
Caller: Donny Jerry    Relationship to patient: Self    Best call back number:     [phone] (Home)       Patient is needing: PTS CPAP BROKE AND ROTDEY[facility] SAID THEY NEED A NEW ORDER OF R A NEW MACHINE

## 2024-11-15 DIAGNOSIS — Z79.4 TYPE 2 DIABETES MELLITUS WITHOUT COMPLICATION, WITH LONG-TERM CURRENT USE OF INSULIN (HCC): ICD-10-CM

## 2024-11-15 DIAGNOSIS — K21.9 GASTROESOPHAGEAL REFLUX DISEASE, UNSPECIFIED WHETHER ESOPHAGITIS PRESENT: ICD-10-CM

## 2024-11-15 DIAGNOSIS — I10 ESSENTIAL HYPERTENSION: ICD-10-CM

## 2024-11-15 DIAGNOSIS — E11.42 TYPE 2 DIABETES MELLITUS WITH DIABETIC POLYNEUROPATHY, WITHOUT LONG-TERM CURRENT USE OF INSULIN (HCC): ICD-10-CM

## 2024-11-15 DIAGNOSIS — E11.9 TYPE 2 DIABETES MELLITUS WITHOUT COMPLICATION, WITH LONG-TERM CURRENT USE OF INSULIN (HCC): ICD-10-CM

## 2024-11-16 DIAGNOSIS — J45.40 MODERATE PERSISTENT ASTHMA WITHOUT COMPLICATION: ICD-10-CM

## 2024-11-18 RX ORDER — FLUTICASONE FUROATE, UMECLIDINIUM BROMIDE AND VILANTEROL TRIFENATATE 200; 62.5; 25 UG/1; UG/1; UG/1
POWDER RESPIRATORY (INHALATION)
Refills: 2 | OUTPATIENT
Start: 2024-11-18

## 2024-11-18 RX ORDER — HYDRALAZINE HYDROCHLORIDE 25 MG/1
TABLET, FILM COATED ORAL
Qty: 810 TABLET | Refills: 0 | Status: SHIPPED | OUTPATIENT
Start: 2024-11-18

## 2024-11-18 RX ORDER — PANTOPRAZOLE SODIUM 40 MG/1
TABLET, DELAYED RELEASE ORAL
Qty: 180 TABLET | Refills: 1 | Status: SHIPPED | OUTPATIENT
Start: 2024-11-18

## 2024-11-18 RX ORDER — INSULIN DEGLUDEC 200 U/ML
37 INJECTION, SOLUTION SUBCUTANEOUS 2 TIMES DAILY
Qty: 6 ADJUSTABLE DOSE PRE-FILLED PEN SYRINGE | Refills: 2 | Status: SHIPPED | OUTPATIENT
Start: 2024-11-18

## 2024-11-18 RX ORDER — SPIRONOLACTONE 25 MG/1
25 TABLET ORAL DAILY
Qty: 90 TABLET | Refills: 0 | Status: SHIPPED | OUTPATIENT
Start: 2024-11-18

## 2024-11-18 RX ORDER — PRASUGREL 10 MG/1
10 TABLET, FILM COATED ORAL DAILY
Qty: 90 TABLET | Refills: 1 | Status: SHIPPED | OUTPATIENT
Start: 2024-11-18

## 2024-11-18 RX ORDER — CALCIUM CITRATE/VITAMIN D3 200MG-6.25
TABLET ORAL
Qty: 300 STRIP | Refills: 3 | Status: SHIPPED | OUTPATIENT
Start: 2024-11-18

## 2024-11-18 RX ORDER — DAPAGLIFLOZIN 10 MG/1
10 TABLET, FILM COATED ORAL EVERY MORNING
Qty: 90 TABLET | Refills: 1 | Status: SHIPPED | OUTPATIENT
Start: 2024-11-18

## 2024-11-25 ENCOUNTER — CARE COORDINATION (OUTPATIENT)
Age: 79
End: 2024-11-25

## 2024-11-25 NOTE — CARE COORDINATION
Burak Hawkins, RN  Manager Care Coordination  [phone]     Mrs. Evans states that Aquilino's blood pressure is doing a little bit better.  She continues to records but in general no worse or a little lower.  She states that his blood sugar is also about the same.  Generally this depends on is diet that day but has been ok.  We talked about his next OV.

## 2024-11-26 ENCOUNTER — OFFICE VISIT (OUTPATIENT)
Dept: PULMONOLOGY | Facility: CLINIC | Age: 79
End: 2024-11-26
Payer: MEDICARE

## 2024-11-26 VITALS
SYSTOLIC BLOOD PRESSURE: 124 MMHG | DIASTOLIC BLOOD PRESSURE: 72 MMHG | OXYGEN SATURATION: 97 % | BODY MASS INDEX: 31.96 KG/M2 | RESPIRATION RATE: 18 BRPM | WEIGHT: 203.6 LBS | HEART RATE: 66 BPM | HEIGHT: 67 IN

## 2024-11-26 DIAGNOSIS — E66.811 OBESITY, CLASS I, BMI 30.0-34.9 (SEE ACTUAL BMI): ICD-10-CM

## 2024-11-26 DIAGNOSIS — G47.33 OSA (OBSTRUCTIVE SLEEP APNEA): ICD-10-CM

## 2024-11-26 DIAGNOSIS — J45.40 MODERATE PERSISTENT ASTHMA WITHOUT COMPLICATION: Primary | ICD-10-CM

## 2024-11-26 DIAGNOSIS — G25.81 RESTLESS LEGS SYNDROME (RLS): ICD-10-CM

## 2024-11-26 DIAGNOSIS — J30.89 OTHER ALLERGIC RHINITIS: ICD-10-CM

## 2024-11-26 PROCEDURE — 3078F DIAST BP <80 MM HG: CPT | Performed by: INTERNAL MEDICINE

## 2024-11-26 PROCEDURE — 99214 OFFICE O/P EST MOD 30 MIN: CPT | Performed by: INTERNAL MEDICINE

## 2024-11-26 PROCEDURE — 3074F SYST BP LT 130 MM HG: CPT | Performed by: INTERNAL MEDICINE

## 2024-11-26 RX ORDER — PRAMIPEXOLE DIHYDROCHLORIDE 1 MG/1
1 TABLET ORAL NIGHTLY
Qty: 90 TABLET | Refills: 2 | Status: SHIPPED | OUTPATIENT
Start: 2024-11-26

## 2024-11-26 RX ORDER — FLUTICASONE FUROATE, UMECLIDINIUM BROMIDE AND VILANTEROL TRIFENATATE 200; 62.5; 25 UG/1; UG/1; UG/1
1 POWDER RESPIRATORY (INHALATION)
Qty: 180 EACH | Refills: 2 | Status: SHIPPED | OUTPATIENT
Start: 2024-11-26

## 2024-11-26 NOTE — PROGRESS NOTES
"  Chief Complaint   Patient presents with    Shortness of Breath    Follow-up         Subjective   Donny Jerry is a 79 y.o. male.   Patient was evaluated today for follow up of asthma, Sleep apnea and RLS.     Patient does not report any recent exacerbations requiring emergency room visits or hospitalizations.     Patient is compliant with pulmonary medicines, as prescribed.     he is currently on Trelegy. he is using the rescue inhalers minimally.     Patient says that he has been using his nasal sprays on a seasonal basis and describes no significant ongoing issues other than occasional congestion.     Patient doesn't report any issues with the PAP device. The patient describes no significant issues with his mask either.     Patient says that the compliance with the use of the equipment is good but lately it has been malfunctioning.     Patient says that his symptoms of fatigue & daytime sleepiness have been helped greatly with the use of PAP, as prescribed.     he had a recalled device and was given a replacement device instead of the recalled device in mid 2022.       The following portions of the patient's history were reviewed and updated as appropriate: allergies, current medications, past family history, past medical history, past social history, and past surgical history.    Review of Systems   HENT:  Positive for sinus pressure. Negative for sneezing and sore throat.    Respiratory:  Positive for cough and wheezing. Negative for chest tightness and shortness of breath.    Psychiatric/Behavioral:  Negative for sleep disturbance.        Objective   Visit Vitals  /72   Pulse 66   Resp 18   Ht 170.2 cm (67.01\") Comment: pt reported   Wt 92.4 kg (203 lb 9.6 oz)   SpO2 97%   BMI 31.88 kg/m²       BMI Readings from Last 8 Encounters:   11/26/24 31.88 kg/m²   09/05/24 32.10 kg/m²   08/29/24 32.11 kg/m²   08/22/24 32.11 kg/m²   08/15/24 32.11 kg/m²   08/08/24 32.11 kg/m²   05/16/24 32.11 kg/m²   04/27/24 " 32.80 kg/m²       Physical Exam  Vitals reviewed.   HENT:      Head: Atraumatic.      Mouth/Throat:      Mouth: Mucous membranes are moist.      Comments: Crowded oropharynx.   Eyes:      Extraocular Movements: Extraocular movements intact.   Cardiovascular:      Rate and Rhythm: Normal rate and regular rhythm.   Pulmonary:      Effort: Pulmonary effort is normal. No respiratory distress.      Comments: Somewhat decreased A/E without wheezing.   Musculoskeletal:      Cervical back: Neck supple.      Comments: Gait with cane.    Skin:     General: Skin is warm.   Neurological:      Mental Status: He is alert and oriented to person, place, and time.         Assessment & Plan   Diagnoses and all orders for this visit:    1. Moderate persistent asthma without complication (Primary)  -     Fluticasone-Umeclidin-Vilant (Trelegy Ellipta) 200-62.5-25 MCG/ACT inhaler; Inhale 1 puff Daily. Rinse mouth with water after use  Dispense: 180 each; Refill: 2  -     Spirometry With Bronchodilator; Future    2. USHA (obstructive sleep apnea)  -     BIPAP / CPAP Adjustment; Future    3. Other allergic rhinitis    4. Restless legs syndrome (RLS)    5. Obesity, Class I, BMI 30.0-34.9 (see actual BMI)    Other orders  -     pramipexole (MIRAPEX) 1 MG tablet; Take 1 tablet by mouth Every Night.  Dispense: 90 tablet; Refill: 2           Return in about 3 months (around 3/6/2025) for Recheck, Spirometry F/U, For [facility] ([place]), ....Also 9-10 mths w/ Dr. Bob.    DISCUSSION (if any):  It appears that his symptoms of asthma are under good control with the current regimen.    Patient's medications for underlying asthma were reviewed in great detail.    Compliance with medications stressed.     Side effects of prescribed medications discussed with the patient.    The need to continue to be aware of triggers that may cause asthma exacerbation versus progression of disease, was also discussed.    Patient was advised to continue his nasal spray  on a seasonal basis, since his symptoms are consistent with seasonal allergic rhinitis.    Sleep study performed in June 2012  AHI was 54 / hour.     Latest PAP device provided in June 2022  DME company: [employer]    Current PAP settings: 15  Current mask type: AirFit F20 FFM    Compliance data was obtained from the his device/DME company.    Continue PAP device on a regular basis, at least 4 hours or more per night.    Sleep hygiene measures were discussed    We will arrange NEW AutoPAP at 14-18 since his current device seems to be malfunctioning.     We will continue the patient on Mirapex, due to symptomatic improvement.  Further adjustments to the dose may need to be made, based on symptoms.     he was informed about the side effects in great detail.    Vaccination status addressed.    Declines influenza vaccinations.     Declines pneumonia vaccinations.     For the vaccines, that he refused today, I have asked him to discuss this further with his PCP.      Dictated utilizing Dragon dictation.    This document was electronically signed by Hansel Bob MD on 11/26/24 at 11:06 EST

## 2024-12-04 NOTE — PROGRESS NOTES
[facility] Cardiology    Patient ID: Donny Jerry is a 79 y.o. male.  : 1945   Contact: [phone]    Encounter date: 2024    PCP: Anum Alonso APRN      Chief complaint:   Chief Complaint   Patient presents with    Coronary Artery Disease     Coronary artery disease involving coronary bypass graft of native heart without angina pectoris           Problem List:  Coronary artery disease/NSTEMI:  CABG x3 in , [facility] by Dr. Peng. LIMA to the LAD, SVG to obtuse marginal, SVG to the PDA.  Stress echo, 2007: No wall motion abnormalities. No evidence of ischemia. Normal EF.  Echo, 2007, mild concentric LVH, trace of MR and TR.    Adenosine Cardiolite, 2009:  Normal LVEF with a stress induced mid and distal inferior defect compatible with  ischemia.  [facility], 2009, Dr. Russo: EF 45-50%. Overlapping Cypher TAWANNA 2.5 x 28 and 2.5 x 18 to the SVG to OM. SVG to PDA had a proximal stenosis of 60% with a distal stenosis of 50-60%.  [facility], 2009: PTCA and Taxus TAWANNA (2.25 x 12 mm) to the mid PDA; Cypher TAWANNA (2.5 x 18mm) to the distal SVG to the PDA;  and Cypher TAWANNA (3.0 x 28mm) to the proximal SVG to the PDA.  [facility] for recurrent chest pain, 2010: Revealing patent stents. Ranexa initiated 500 mg q.12 h.   [facility],  2011: LVEF of 45% to 50% with an occluded SVG to an obtuse marginal branch which could not be revascularized, patent LIMA to the LAD, noncritical graft disease in the SVG to the PDA, multiple small areas of distal vessel disease and collateral flow were seen.   [facility] for recurrent anginal symptoms, 2010, with PTCA and Promus TAWANNA (3.0 x 28mm) to the proximal SVG to the PDA for in-stent restenosis.    [facility], 2010,  Dr. Russo: EF 40-45%. 3.5 x 23 mm Promus TAWANNA in the proximal SVG to OM, 2.5 x 18 mm Promus TAWANNA to distal SVG to PDA due to ISR.    [facility], 2010, with placement of  a 3.0 x 16 mm  Taxus TAWANNA to the SVG to the RCA proximally and a 2.5 x 16 mm paclitaxel stent distally in the SVG to the RCA.  [facility], 3.0 x 24-mm Promus element TAWANNA to a 90% lesion in the mid portion  of the SVG to the PDA.   [facility] for recurrent angina, 06/24/2014, Dr. Russo:  PTCA of the SVG to the PDA using a 3 x 12 mm NC TREK balloon.    Abnormal stress test, 10/07/2021: Mild ST depression with infusion and had a normal hemodynamic response to regadenoson. He was found to have a large posterior lateral stress-induced defect. Severe small fixed anterior defect, EF 45% with CCS class I chest discomfort/NYHA class II dyspnea on exertion symptoms.   [facility], 10/19/2021: EF 50%. LAD occluded following the takeoff of a first diagonal branch and a septal . and LCx occluded after the takeoff of a small-sized to moderate-sized, OM1 branch. RCA dominant for the posterior circulation. RCA was occluded in the mid-segment of the vessel. Collateral flow from one of the RV marginal branches was seen to supply the distal LCx including 2 OM branches. No significant disease appeared to be present within the distal LCx circulation. LIMA to the LAD was widely patent. SVG to LCx occluded at origin. SVG to PDA has been stented extensively. Area of mild in-stent narrowing in the mid segment of up to 40 to 50%. Good retrograde flow into posterolateral branch and into the circumflex vessels.  Echo, 06/30/2023: EF 45-50%. Mild concentric LVH. Grade 1 diastolic dysfunction. Normal right ventricular size and systolic function. Normal left atrial volume index. Mild calcification aortic valve without significant stenosis. Mild PI.  [facility] ED admission, 06/29/2023: Presented with chest pain and shortness of breath due to volume overload from heart failure.   [facility] (07/18/2023): [facility]. Dr. Parr. Successful PCI to saphenous vein graft->RCA for multifocal in-stent restenosis. Severe disease and multiple small branch vessels not amenable to any  form of revascularization.   Echo (07/18/2023): EF 46-50%.  LV wall thickness is consistent with (grade 2 with high lap) pseudonormalization.  LA cavity is mildly dilated.  LA volume is mildly increased.  Mild AS.  RVSP 15.4 mmHg. Ao pk janee 237 cm/s. Ao max PG 22.5 mmHg. Ao mean PG 12 mmHg. Ao V2 VTI 48.1 cm. CODY 1.34 cm2.  Echo, 01/12/2024: EF 45.8%. Left ventricular diastolic function is consistent with (grade II w/high LAP) pseudonormalization. Left atrial volume is moderately increased. Mild aortic valve stenosis. Normal RVSP.  Chronic diastolic heart failure/HFpEF  Echo (07/18/2023): EF 46-50%.  LV wall thickness is consistent with (grade 2 with high lap) pseudonormalization.   PVC's  Holter, 01/18/2024: SB/SR/ST. 23% PVC's. Occ PAC's. HR drops are due to PVC's.   Hypertension  Hypercholesterolemia.  His cholesterol is followed and treated by his PCP.  Aortic stenosis  Echo (07/18/2023): EF 46-50%.  LV wall thickness is consistent with (grade 2 with high lap) pseudonormalization.  LA cavity is mildly dilated.  LA volume is mildly increased.  Mild AS.  RVSP 15.4 mmHg. Ao pk janee 237 cm/s. Ao max PG 22.5 mmHg. Ao mean PG 12 mmHg. Ao V2 VTI 48.1 cm. CODY 1.34 cm2.  Chest pain   [facility] (06/29/2023-07/01/2023): Presented to [facility] shortness of air and chest tightness was deemed to be secondary due to volume overload from his chronic diastolic heart failure.  Diuresed and later stable for discharge home.   [facility] (07/14/2023): Present[facility] to [facility] complaining of chest pain and lower extremity edema which was found to be volume overloaded secondary to left heart failure.  Diuresed and given pain medication which resolved his chest pain allowing him to be stable for discharge home.  Type 2 diabetes, diagnosed 2 years ago.  Obstructive sleep apnea, on CPAP.  Chronic iron-deficiency anemia  Enlarged prostate with anticipated TURP with Dr. Bernal with retroperitoneal ultrasound 8/31/2021 showing prostatomegaly (5.4 x  3.9 x 4.2 cm) with mass-effect on the base of the bladder, normal size kidneys  Obesity  Depression  10. Surgical history:  CABG x3, Dr. Peng, [facility], 2001.  Negative colonoscopy, 2014.    Allergies   Allergen Reactions    Zocor [Simvastatin] Myalgia    Bisoprolol Other (See Comments)     Agitation      Byetta 10 Mcg Pen [Exenatide] Nausea Only     nausea    Crestor [Rosuvastatin Calcium] Myalgia    Metformin And Related Nausea Only    Plavix [Clopidogrel Bisulfate] Other (See Comments)     resistance       Current Medications:    Current Outpatient Medications:     albuterol sulfate  (90 Base) MCG/ACT inhaler, Inhale 2 puffs Every 4 (Four) Hours As Needed for Wheezing., Disp: 18 g, Rfl: 5    aspirin 81 MG chewable tablet, Chew 1 tablet Daily., Disp: , Rfl:     atorvastatin (LIPITOR) 80 MG tablet, Take 1 tablet by mouth Daily., Disp: 90 tablet, Rfl: 3    B-D UF III MINI PEN NEEDLES 31G X 5 MM misc, , Disp: , Rfl:     carvedilol (COREG) 12.5 MG tablet, Take 1 tablet by mouth 2 (Two) Times a Day With Meals., Disp: 180 tablet, Rfl: 3    Cholecalciferol (VITAMIN D3) 50 MCG (2000 UT) capsule, Take 1 capsule by mouth Daily., Disp: , Rfl:     dapagliflozin Propanediol (Farxiga) 10 MG tablet, Take 10 mg by mouth Daily., Disp: , Rfl:     docusate calcium (SURFAK) 240 MG capsule, Take 1 capsule by mouth 2 (Two) Times a Day., Disp: , Rfl:     fexofenadine (ALLEGRA) 180 MG tablet, Take 1 tablet by mouth Daily As Needed., Disp: , Rfl:     finasteride (PROSCAR) 5 MG tablet, Take 1 tablet by mouth Daily., Disp: , Rfl:     fluticasone (FLONASE) 50 MCG/ACT nasal spray, 1 spray into the nostril(s) as directed by provider Daily. (Patient taking differently: Administer 1 spray into the nostril(s) as directed by provider Daily As Needed.), Disp: 48 g, Rfl: 2    Fluticasone-Umeclidin-Vilant (Trelegy Ellipta) 200-62.5-25 MCG/ACT inhaler, Inhale 1 puff Daily. Rinse mouth with water after use, Disp: 180 each, Rfl: 2     insulin degludec (Tresiba FlexTouch) 100 UNIT/ML solution pen-injector injection, Inject  under the skin into the appropriate area as directed 2 (Two) Times a Day. 22 units in the am  37 units at bedtime, Disp: , Rfl:     Insulin Lispro (humaLOG) 100 UNIT/ML injection, Inject 8 Units under the skin into the appropriate area as directed Daily. Daily with supper, Disp: , Rfl:     isosorbide mononitrate (IMDUR) 30 MG 24 hr tablet, Take 1 tablet by mouth Daily., Disp: 90 tablet, Rfl: 3    magnesium oxide (MAG-OX) 400 MG tablet, Take 1 tablet by mouth Daily., Disp: , Rfl:     Multiple Vitamin (MULTI VITAMIN DAILY PO), Take 1 tablet by mouth Daily., Disp: , Rfl:     nitroglycerin (NITROSTAT) 0.4 MG SL tablet, Place 1 tablet under the tongue Every 5 (Five) Minutes As Needed for Chest Pain. Take no more than 3 doses in 15 minutes., Disp: 25 tablet, Rfl: 11    pantoprazole (PROTONIX) 40 MG EC tablet, TAKE 1 TABLET BY MOUTH 2 TIMES DAILY (BEFORE MEALS), Disp: , Rfl:     pramipexole (MIRAPEX) 1 MG tablet, Take 1 tablet by mouth Every Night., Disp: 90 tablet, Rfl: 2    prasugrel (EFFIENT) 10 MG tablet, Take 1 tablet by mouth Daily., Disp: , Rfl:     sertraline (ZOLOFT) 100 MG tablet, Take 1 tablet by mouth Daily., Disp: , Rfl:     tamsulosin (FLOMAX) 0.4 MG capsule 24 hr capsule, Take 1 capsule by mouth 2 (Two) Times a Day., Disp: , Rfl:     TRUEplus Lancets 28G misc, , Disp: , Rfl:     Trulicity 0.75 MG/0.5ML solution pen-injector, Inject 0.75 mg as directed 1 (One) Time Per Week., Disp: , Rfl:     hydrALAZINE (APRESOLINE) 25 MG tablet, Take 3 tablets by mouth 3 (Three) Times a Day for 30 days., Disp: 270 tablet, Rfl: 0    ranolazine (RANEXA) 500 MG 12 hr tablet, Take 1 tablet by mouth Every 12 (Twelve) Hours for 180 days., Disp: 60 tablet, Rfl: 5    spironolactone (ALDACTONE) 25 MG tablet, Take 1 tablet by mouth Daily for 30 days., Disp: 30 tablet, Rfl: 0    torsemide 40 MG tablet, Take 40 mg by mouth Daily for 30 days.,  "Disp: 30 tablet, Rfl: 0    HPI    Donny Jerry is a 79 y.o. male who presents today for a 6 month follow up of coronary artery disease, congestive heart failure and cardiac risk factors. Since last visit, patient was placed on Imdur for some chronic chest pain and has been tolerating it well.  Blood pressure has been well-controlled.  Patient has been stable from a heart failure perspective with no overt lower extremity edema or orthopnea.  Patient also has not had any lightheadedness or syncope and his blood pressure has been well-controlled.  Overall he is feeling very well at this time.      The following portions of the patient's history were reviewed and updated as appropriate: allergies, current medications and problem list.    Pertinent positives as listed in the HPI.  All other systems reviewed are negative.         Vitals:    12/05/24 1109   BP: 116/52   BP Location: Right arm   Patient Position: Sitting   Cuff Size: Adult   Pulse: 65   SpO2: 99%   Weight: 89.8 kg (198 lb)   Height: 170.2 cm (67\")       Physical Exam:  General: Alert and oriented.  Neck: Jugular venous pressure is within normal limits. Carotids have normal upstrokes without bruits.   Cardiovascular: Heart has a nondisplaced focal PMI. Regular rate and rhythm. No murmur, gallop or rub.  Lungs: Clear, no rales or wheezes. Equal expansion is noted.   Extremities: Show no edema.  Skin: Warm and dry.  Neurologic: Nonfocal.     Diagnostic Data (reviewed with patient):    Lab Results   Component Value Date    WBC 7.6 10/25/2024    RBC 3.70 (L) 10/25/2024    HGB 11.2 (L) 10/25/2024    HCT 33.3 (L) 10/25/2024    MCV 90.0 10/25/2024     10/25/2024      Lab Results   Component Value Date    TSH 2.10 10/25/2024      Lab date: 10/25/2024  CMP: Glu 109, BUN 58, Creat 1.8, eGFR 38, Na 140, K 4.2, Cl 99, CO2 31, Ca 9.3, Alk Phos 40, AST 20, ALT 21     Advance Care Planning   ACP discussion was held with the patient during this visit. Patient has " an advance directive in EMR which is still valid.        Procedures      Assessment:    ICD-10-CM ICD-9-CM   1. Coronary artery disease involving coronary bypass graft of native heart without angina pectoris  I25.810 414.05   2. Chronic diastolic heart failure  I50.32 428.32   3. Essential hypertension  I10 401.9   4. Hyperlipidemia LDL goal <70  E78.5 272.4   5. Obstructive sleep apnea on CPAP  G47.33 327.23         Plan:  Stable cardiac status with no overt acute heart failure or angina.  Continue on aspirin 81 mg daily for antiplatelet therapy.   Continue on atorvastatin 80 mg daily for hyperlipidemia.    Continue on GDMT with carvedilol 12.5 mg twice daily, Farxiga 10 mg daily, spironolactone 25 mg daily.  And patient will remain on torsemide 40 mg daily.  Patient will also remain on hydralazine 75 mg 3 times daily for hypertension.  Continue Imdur and Ranexa for chronic angina.  Continue all other current medications.  F/up in 6 months, sooner if needed.

## 2024-12-05 ENCOUNTER — OFFICE VISIT (OUTPATIENT)
Dept: CARDIOLOGY | Facility: CLINIC | Age: 79
End: 2024-12-05
Payer: MEDICARE

## 2024-12-05 VITALS
HEART RATE: 65 BPM | OXYGEN SATURATION: 99 % | SYSTOLIC BLOOD PRESSURE: 116 MMHG | HEIGHT: 67 IN | DIASTOLIC BLOOD PRESSURE: 52 MMHG | WEIGHT: 198 LBS | BODY MASS INDEX: 31.08 KG/M2

## 2024-12-05 DIAGNOSIS — G47.33 OBSTRUCTIVE SLEEP APNEA ON CPAP: ICD-10-CM

## 2024-12-05 DIAGNOSIS — I50.32 CHRONIC DIASTOLIC HEART FAILURE: ICD-10-CM

## 2024-12-05 DIAGNOSIS — E78.5 HYPERLIPIDEMIA LDL GOAL <70: ICD-10-CM

## 2024-12-05 DIAGNOSIS — I10 ESSENTIAL HYPERTENSION: ICD-10-CM

## 2024-12-05 DIAGNOSIS — I25.810 CORONARY ARTERY DISEASE INVOLVING CORONARY BYPASS GRAFT OF NATIVE HEART WITHOUT ANGINA PECTORIS: Primary | ICD-10-CM

## 2024-12-05 RX ORDER — CARVEDILOL 12.5 MG/1
12.5 TABLET ORAL 2 TIMES DAILY WITH MEALS
Qty: 180 TABLET | Refills: 3 | Status: SHIPPED | OUTPATIENT
Start: 2024-12-05

## 2024-12-16 ENCOUNTER — OFFICE VISIT (OUTPATIENT)
Age: 79
End: 2024-12-16
Payer: MEDICARE

## 2024-12-16 VITALS
WEIGHT: 204 LBS | DIASTOLIC BLOOD PRESSURE: 58 MMHG | BODY MASS INDEX: 31.94 KG/M2 | TEMPERATURE: 98.1 F | OXYGEN SATURATION: 96 % | HEART RATE: 64 BPM | SYSTOLIC BLOOD PRESSURE: 130 MMHG

## 2024-12-16 DIAGNOSIS — E53.9 VITAMIN B DEFICIENCY: ICD-10-CM

## 2024-12-16 DIAGNOSIS — Z79.4 TYPE 2 DIABETES MELLITUS WITHOUT COMPLICATION, WITH LONG-TERM CURRENT USE OF INSULIN (HCC): Primary | ICD-10-CM

## 2024-12-16 DIAGNOSIS — Z13.29 THYROID DISORDER SCREEN: ICD-10-CM

## 2024-12-16 DIAGNOSIS — I10 ESSENTIAL HYPERTENSION: ICD-10-CM

## 2024-12-16 DIAGNOSIS — I10 PRIMARY HYPERTENSION: ICD-10-CM

## 2024-12-16 DIAGNOSIS — K21.9 GASTROESOPHAGEAL REFLUX DISEASE, UNSPECIFIED WHETHER ESOPHAGITIS PRESENT: ICD-10-CM

## 2024-12-16 DIAGNOSIS — Z23 NEED FOR PROPHYLACTIC VACCINATION AGAINST STREPTOCOCCUS PNEUMONIAE (PNEUMOCOCCUS): ICD-10-CM

## 2024-12-16 DIAGNOSIS — E11.9 TYPE 2 DIABETES MELLITUS WITHOUT COMPLICATION, WITH LONG-TERM CURRENT USE OF INSULIN (HCC): Primary | ICD-10-CM

## 2024-12-16 DIAGNOSIS — E11.42 TYPE 2 DIABETES MELLITUS WITH DIABETIC POLYNEUROPATHY, WITHOUT LONG-TERM CURRENT USE OF INSULIN (HCC): ICD-10-CM

## 2024-12-16 DIAGNOSIS — E55.9 VITAMIN D DEFICIENCY: ICD-10-CM

## 2024-12-16 PROCEDURE — 99214 OFFICE O/P EST MOD 30 MIN: CPT | Performed by: NURSE PRACTITIONER

## 2024-12-16 PROCEDURE — 3078F DIAST BP <80 MM HG: CPT | Performed by: NURSE PRACTITIONER

## 2024-12-16 PROCEDURE — 90677 PCV20 VACCINE IM: CPT | Performed by: NURSE PRACTITIONER

## 2024-12-16 PROCEDURE — 3052F HG A1C>EQUAL 8.0%<EQUAL 9.0%: CPT | Performed by: NURSE PRACTITIONER

## 2024-12-16 PROCEDURE — 1123F ACP DISCUSS/DSCN MKR DOCD: CPT | Performed by: NURSE PRACTITIONER

## 2024-12-16 PROCEDURE — G8427 DOCREV CUR MEDS BY ELIG CLIN: HCPCS | Performed by: NURSE PRACTITIONER

## 2024-12-16 PROCEDURE — G0009 ADMIN PNEUMOCOCCAL VACCINE: HCPCS | Performed by: NURSE PRACTITIONER

## 2024-12-16 PROCEDURE — G8417 CALC BMI ABV UP PARAM F/U: HCPCS | Performed by: NURSE PRACTITIONER

## 2024-12-16 PROCEDURE — 1036F TOBACCO NON-USER: CPT | Performed by: NURSE PRACTITIONER

## 2024-12-16 PROCEDURE — 1159F MED LIST DOCD IN RCRD: CPT | Performed by: NURSE PRACTITIONER

## 2024-12-16 PROCEDURE — 3075F SYST BP GE 130 - 139MM HG: CPT | Performed by: NURSE PRACTITIONER

## 2024-12-16 PROCEDURE — G8484 FLU IMMUNIZE NO ADMIN: HCPCS | Performed by: NURSE PRACTITIONER

## 2024-12-16 RX ORDER — INSULIN DEGLUDEC 200 U/ML
INJECTION, SOLUTION SUBCUTANEOUS
COMMUNITY
Start: 2024-05-02

## 2024-12-16 RX ORDER — DULAGLUTIDE 0.75 MG/.5ML
0.75 INJECTION, SOLUTION SUBCUTANEOUS WEEKLY
Qty: 12 ADJUSTABLE DOSE PRE-FILLED PEN SYRINGE | Refills: 2 | Status: SHIPPED | OUTPATIENT
Start: 2024-12-16

## 2024-12-16 NOTE — PROGRESS NOTES
dysuria, frequency and urgency.   Musculoskeletal:  Negative for arthralgias and back pain.   Skin:  Negative for pallor and rash.   Allergic/Immunologic: Negative for food allergies and immunocompromised state.   Neurological:  Negative for dizziness, numbness and headaches.   Hematological:  Negative for adenopathy. Does not bruise/bleed easily.   Psychiatric/Behavioral:  Negative for agitation and sleep disturbance. The patient is not nervous/anxious.        Past Medical History:   Diagnosis Date    Anemia 2019    Anxiety     Bleeding stomach ulcer 2019    Dr Akhtar    CAD (coronary artery disease)     Cancer (HCC)     malignant colon polyp    Depression     Enlarged prostate     GERD (gastroesophageal reflux disease)     Hyperlipidemia     Hypertension     Sleep apnea     Type II or unspecified type diabetes mellitus without mention of complication, not stated as uncontrolled      Past Surgical History:   Procedure Laterality Date    CORONARY ANGIOPLASTY WITH STENT PLACEMENT  04/2013    Dr. Dasilva, stents in 2009, 2011, 2013, ptca 2014    CORONARY ARTERY BYPASS GRAFT  2001    x3    CORONARY STENT PLACEMENT  07/2023    2 heart stents placed    EYE SURGERY Left     POLYPECTOMY  1999    colon ca     Family History   Problem Relation Age of Onset    Heart Attack Mother     Heart Disease Father     Diabetes Sister       Social History     Tobacco Use   Smoking Status Never   Smokeless Tobacco Never       OBJECTIVE:   Wt Readings from Last 3 Encounters:   12/16/24 92.5 kg (204 lb)   11/07/24 92.1 kg (203 lb)   07/30/24 93.7 kg (206 lb 9.6 oz)     BP Readings from Last 3 Encounters:   12/16/24 (!) 130/58   11/07/24 (!) 146/72   07/30/24 139/73       BP (!) 130/58 (Site: Right Upper Arm, Position: Sitting, Cuff Size: Medium Adult)   Pulse 64   Temp 98.1 °F (36.7 °C) (Temporal)   Wt 92.5 kg (204 lb)   SpO2 96% Comment: room air  BMI 31.94 kg/m²      Physical Exam  Vitals and nursing note reviewed.

## 2024-12-20 RX ORDER — PEN NEEDLE, DIABETIC 32GX 5/32"
NEEDLE, DISPOSABLE MISCELLANEOUS
Qty: 100 EACH | Refills: 1 | Status: SHIPPED | OUTPATIENT
Start: 2024-12-20

## 2024-12-26 DIAGNOSIS — E11.22 TYPE 2 DIABETES MELLITUS WITH CHRONIC KIDNEY DISEASE, WITH LONG-TERM CURRENT USE OF INSULIN, UNSPECIFIED CKD STAGE (HCC): ICD-10-CM

## 2024-12-26 DIAGNOSIS — Z79.4 TYPE 2 DIABETES MELLITUS WITH CHRONIC KIDNEY DISEASE, WITH LONG-TERM CURRENT USE OF INSULIN, UNSPECIFIED CKD STAGE (HCC): ICD-10-CM

## 2024-12-27 RX ORDER — DULAGLUTIDE 0.75 MG/.5ML
0.75 INJECTION, SOLUTION SUBCUTANEOUS WEEKLY
Qty: 2 ML | Refills: 2 | OUTPATIENT
Start: 2024-12-27

## 2025-01-01 ENCOUNTER — APPOINTMENT (OUTPATIENT)
Dept: GENERAL RADIOLOGY | Facility: HOSPITAL | Age: 80
DRG: 309 | End: 2025-01-01
Payer: MEDICARE

## 2025-01-01 ENCOUNTER — APPOINTMENT (OUTPATIENT)
Dept: CT IMAGING | Facility: HOSPITAL | Age: 80
DRG: 309 | End: 2025-01-01
Payer: MEDICARE

## 2025-01-01 ENCOUNTER — READMISSION MANAGEMENT (OUTPATIENT)
Dept: CALL CENTER | Facility: HOSPITAL | Age: 80
End: 2025-01-01
Payer: MEDICARE

## 2025-01-01 ENCOUNTER — APPOINTMENT (OUTPATIENT)
Dept: CARDIOLOGY | Facility: HOSPITAL | Age: 80
DRG: 309 | End: 2025-01-01
Payer: MEDICARE

## 2025-01-01 ENCOUNTER — HOSPITAL ENCOUNTER (INPATIENT)
Facility: HOSPITAL | Age: 80
LOS: 1 days | DRG: 309 | End: 2025-02-09
Attending: STUDENT IN AN ORGANIZED HEALTH CARE EDUCATION/TRAINING PROGRAM | Admitting: FAMILY MEDICINE
Payer: MEDICARE

## 2025-01-01 VITALS
HEART RATE: 77 BPM | DIASTOLIC BLOOD PRESSURE: 64 MMHG | OXYGEN SATURATION: 95 % | TEMPERATURE: 97.6 F | BODY MASS INDEX: 32.6 KG/M2 | WEIGHT: 202.82 LBS | RESPIRATION RATE: 18 BRPM | HEIGHT: 66 IN | SYSTOLIC BLOOD PRESSURE: 85 MMHG

## 2025-01-01 DIAGNOSIS — I47.20 VENTRICULAR TACHYCARDIA: Primary | ICD-10-CM

## 2025-01-01 LAB
ALBUMIN SERPL-MCNC: 3.2 G/DL (ref 3.5–5.2)
ALBUMIN/GLOB SERPL: 0.9 G/DL
ALP SERPL-CCNC: 80 U/L (ref 39–117)
ALT SERPL W P-5'-P-CCNC: 29 U/L (ref 1–41)
ANION GAP SERPL CALCULATED.3IONS-SCNC: 13.5 MMOL/L (ref 5–15)
ANION GAP SERPL CALCULATED.3IONS-SCNC: 13.9 MMOL/L (ref 5–15)
AST SERPL-CCNC: 30 U/L (ref 1–40)
BASOPHILS # BLD AUTO: 0.04 10*3/MM3 (ref 0–0.2)
BASOPHILS # BLD AUTO: 0.04 10*3/MM3 (ref 0–0.2)
BASOPHILS NFR BLD AUTO: 0.4 % (ref 0–1.5)
BASOPHILS NFR BLD AUTO: 0.5 % (ref 0–1.5)
BILIRUB SERPL-MCNC: 0.6 MG/DL (ref 0–1.2)
BILIRUB UR QL STRIP: NEGATIVE
BUN SERPL-MCNC: 79 MG/DL (ref 8–23)
BUN SERPL-MCNC: 84 MG/DL (ref 8–23)
BUN/CREAT SERPL: 37.2 (ref 7–25)
BUN/CREAT SERPL: 38.2 (ref 7–25)
CALCIUM SPEC-SCNC: 8.4 MG/DL (ref 8.6–10.5)
CALCIUM SPEC-SCNC: 8.4 MG/DL (ref 8.6–10.5)
CHLORIDE SERPL-SCNC: 96 MMOL/L (ref 98–107)
CHLORIDE SERPL-SCNC: 98 MMOL/L (ref 98–107)
CK SERPL-CCNC: 56 U/L (ref 20–200)
CLARITY UR: CLEAR
CO2 SERPL-SCNC: 25.1 MMOL/L (ref 22–29)
CO2 SERPL-SCNC: 27.5 MMOL/L (ref 22–29)
COLOR UR: YELLOW
CREAT SERPL-MCNC: 2.07 MG/DL (ref 0.76–1.27)
CREAT SERPL-MCNC: 2.26 MG/DL (ref 0.76–1.27)
CRP SERPL-MCNC: 7.91 MG/DL (ref 0–0.5)
D-LACTATE SERPL-SCNC: 1.3 MMOL/L (ref 0.5–2)
DEPRECATED RDW RBC AUTO: 43.5 FL (ref 37–54)
DEPRECATED RDW RBC AUTO: 44.4 FL (ref 37–54)
EGFRCR SERPLBLD CKD-EPI 2021: 29 ML/MIN/1.73
EGFRCR SERPLBLD CKD-EPI 2021: 32.2 ML/MIN/1.73
EOSINOPHIL # BLD AUTO: 0.07 10*3/MM3 (ref 0–0.4)
EOSINOPHIL # BLD AUTO: 0.09 10*3/MM3 (ref 0–0.4)
EOSINOPHIL NFR BLD AUTO: 0.7 % (ref 0.3–6.2)
EOSINOPHIL NFR BLD AUTO: 1.2 % (ref 0.3–6.2)
ERYTHROCYTE [DISTWIDTH] IN BLOOD BY AUTOMATED COUNT: 13.8 % (ref 12.3–15.4)
ERYTHROCYTE [DISTWIDTH] IN BLOOD BY AUTOMATED COUNT: 13.9 % (ref 12.3–15.4)
FLUAV RNA RESP QL NAA+PROBE: NOT DETECTED
FLUBV RNA RESP QL NAA+PROBE: NOT DETECTED
GEN 5 1HR TROPONIN T REFLEX: 71 NG/L
GLOBULIN UR ELPH-MCNC: 3.4 GM/DL
GLUCOSE BLDC GLUCOMTR-MCNC: 163 MG/DL (ref 70–130)
GLUCOSE BLDC GLUCOMTR-MCNC: 227 MG/DL (ref 70–130)
GLUCOSE BLDC GLUCOMTR-MCNC: 239 MG/DL (ref 70–130)
GLUCOSE BLDC GLUCOMTR-MCNC: 253 MG/DL (ref 70–130)
GLUCOSE BLDC GLUCOMTR-MCNC: 76 MG/DL (ref 70–130)
GLUCOSE BLDC GLUCOMTR-MCNC: 94 MG/DL (ref 70–130)
GLUCOSE SERPL-MCNC: 175 MG/DL (ref 65–99)
GLUCOSE SERPL-MCNC: 222 MG/DL (ref 65–99)
GLUCOSE UR STRIP-MCNC: ABNORMAL MG/DL
HCT VFR BLD AUTO: 30.6 % (ref 37.5–51)
HCT VFR BLD AUTO: 40.3 % (ref 37.5–51)
HGB BLD-MCNC: 13.3 G/DL (ref 13–17.7)
HGB BLD-MCNC: 9.9 G/DL (ref 13–17.7)
HGB UR QL STRIP.AUTO: NEGATIVE
IMM GRANULOCYTES # BLD AUTO: 0.03 10*3/MM3 (ref 0–0.05)
IMM GRANULOCYTES # BLD AUTO: 0.03 10*3/MM3 (ref 0–0.05)
IMM GRANULOCYTES NFR BLD AUTO: 0.3 % (ref 0–0.5)
IMM GRANULOCYTES NFR BLD AUTO: 0.4 % (ref 0–0.5)
KETONES UR QL STRIP: NEGATIVE
LEUKOCYTE ESTERASE UR QL STRIP.AUTO: NEGATIVE
LYMPHOCYTES # BLD AUTO: 0.66 10*3/MM3 (ref 0.7–3.1)
LYMPHOCYTES # BLD AUTO: 0.79 10*3/MM3 (ref 0.7–3.1)
LYMPHOCYTES NFR BLD AUTO: 10.8 % (ref 19.6–45.3)
LYMPHOCYTES NFR BLD AUTO: 6.7 % (ref 19.6–45.3)
MAGNESIUM SERPL-MCNC: 2.7 MG/DL (ref 1.6–2.4)
MAGNESIUM SERPL-MCNC: 2.7 MG/DL (ref 1.6–2.4)
MCH RBC QN AUTO: 28.4 PG (ref 26.6–33)
MCH RBC QN AUTO: 28.5 PG (ref 26.6–33)
MCHC RBC AUTO-ENTMCNC: 32.4 G/DL (ref 31.5–35.7)
MCHC RBC AUTO-ENTMCNC: 33 G/DL (ref 31.5–35.7)
MCV RBC AUTO: 86.5 FL (ref 79–97)
MCV RBC AUTO: 87.9 FL (ref 79–97)
MONOCYTES # BLD AUTO: 0.5 10*3/MM3 (ref 0.1–0.9)
MONOCYTES # BLD AUTO: 0.7 10*3/MM3 (ref 0.1–0.9)
MONOCYTES NFR BLD AUTO: 6.9 % (ref 5–12)
MONOCYTES NFR BLD AUTO: 7.1 % (ref 5–12)
NEUTROPHILS NFR BLD AUTO: 5.84 10*3/MM3 (ref 1.7–7)
NEUTROPHILS NFR BLD AUTO: 8.33 10*3/MM3 (ref 1.7–7)
NEUTROPHILS NFR BLD AUTO: 80.2 % (ref 42.7–76)
NEUTROPHILS NFR BLD AUTO: 84.8 % (ref 42.7–76)
NITRITE UR QL STRIP: NEGATIVE
NRBC BLD AUTO-RTO: 0 /100 WBC (ref 0–0.2)
NRBC BLD AUTO-RTO: 0 /100 WBC (ref 0–0.2)
NT-PROBNP SERPL-MCNC: ABNORMAL PG/ML (ref 0–1800)
PH UR STRIP.AUTO: 5.5 [PH] (ref 5–8)
PHOSPHATE SERPL-MCNC: 3.2 MG/DL (ref 2.5–4.5)
PLATELET # BLD AUTO: 231 10*3/MM3 (ref 140–450)
PLATELET # BLD AUTO: 280 10*3/MM3 (ref 140–450)
PMV BLD AUTO: 11.1 FL (ref 6–12)
PMV BLD AUTO: 11.2 FL (ref 6–12)
POTASSIUM SERPL-SCNC: 3.6 MMOL/L (ref 3.5–5.2)
POTASSIUM SERPL-SCNC: 4.1 MMOL/L (ref 3.5–5.2)
POTASSIUM SERPL-SCNC: 4.4 MMOL/L (ref 3.5–5.2)
PROCALCITONIN SERPL-MCNC: 0.22 NG/ML (ref 0–0.25)
PROT SERPL-MCNC: 6.6 G/DL (ref 6–8.5)
PROT UR QL STRIP: NEGATIVE
RBC # BLD AUTO: 3.48 10*6/MM3 (ref 4.14–5.8)
RBC # BLD AUTO: 4.66 10*6/MM3 (ref 4.14–5.8)
SARS-COV-2 RNA RESP QL NAA+PROBE: NOT DETECTED
SODIUM SERPL-SCNC: 137 MMOL/L (ref 136–145)
SODIUM SERPL-SCNC: 137 MMOL/L (ref 136–145)
SP GR UR STRIP: 1.01 (ref 1–1.03)
TROPONIN T % DELTA: -4
TROPONIN T NUMERIC DELTA: -3 NG/L
TROPONIN T SERPL HS-MCNC: 74 NG/L
TSH SERPL DL<=0.05 MIU/L-ACNC: 2.35 UIU/ML (ref 0.27–4.2)
UROBILINOGEN UR QL STRIP: ABNORMAL
WBC NRBC COR # BLD AUTO: 7.29 10*3/MM3 (ref 3.4–10.8)
WBC NRBC COR # BLD AUTO: 9.83 10*3/MM3 (ref 3.4–10.8)

## 2025-01-01 PROCEDURE — 93005 ELECTROCARDIOGRAM TRACING: CPT | Performed by: STUDENT IN AN ORGANIZED HEALTH CARE EDUCATION/TRAINING PROGRAM

## 2025-01-01 PROCEDURE — 85025 COMPLETE CBC W/AUTO DIFF WBC: CPT | Performed by: FAMILY MEDICINE

## 2025-01-01 PROCEDURE — 82948 REAGENT STRIP/BLOOD GLUCOSE: CPT

## 2025-01-01 PROCEDURE — 25010000002 ENOXAPARIN PER 10 MG: Performed by: FAMILY MEDICINE

## 2025-01-01 PROCEDURE — 82550 ASSAY OF CK (CPK): CPT | Performed by: STUDENT IN AN ORGANIZED HEALTH CARE EDUCATION/TRAINING PROGRAM

## 2025-01-01 PROCEDURE — G0378 HOSPITAL OBSERVATION PER HR: HCPCS

## 2025-01-01 PROCEDURE — 72125 CT NECK SPINE W/O DYE: CPT

## 2025-01-01 PROCEDURE — 70450 CT HEAD/BRAIN W/O DYE: CPT

## 2025-01-01 PROCEDURE — 71045 X-RAY EXAM CHEST 1 VIEW: CPT

## 2025-01-01 PROCEDURE — 84484 ASSAY OF TROPONIN QUANT: CPT | Performed by: STUDENT IN AN ORGANIZED HEALTH CARE EDUCATION/TRAINING PROGRAM

## 2025-01-01 PROCEDURE — 83880 ASSAY OF NATRIURETIC PEPTIDE: CPT | Performed by: STUDENT IN AN ORGANIZED HEALTH CARE EDUCATION/TRAINING PROGRAM

## 2025-01-01 PROCEDURE — 63710000001 INSULIN GLARGINE PER 5 UNITS: Performed by: FAMILY MEDICINE

## 2025-01-01 PROCEDURE — 83605 ASSAY OF LACTIC ACID: CPT | Performed by: STUDENT IN AN ORGANIZED HEALTH CARE EDUCATION/TRAINING PROGRAM

## 2025-01-01 PROCEDURE — 25010000002 FUROSEMIDE PER 20 MG: Performed by: FAMILY MEDICINE

## 2025-01-01 PROCEDURE — 81003 URINALYSIS AUTO W/O SCOPE: CPT | Performed by: STUDENT IN AN ORGANIZED HEALTH CARE EDUCATION/TRAINING PROGRAM

## 2025-01-01 PROCEDURE — 25010000002 AMIODARONE IN DEXTROSE 5% 360-4.14 MG/200ML-% SOLUTION: Performed by: STUDENT IN AN ORGANIZED HEALTH CARE EDUCATION/TRAINING PROGRAM

## 2025-01-01 PROCEDURE — 94660 CPAP INITIATION&MGMT: CPT

## 2025-01-01 PROCEDURE — 87636 SARSCOV2 & INF A&B AMP PRB: CPT | Performed by: STUDENT IN AN ORGANIZED HEALTH CARE EDUCATION/TRAINING PROGRAM

## 2025-01-01 PROCEDURE — 84443 ASSAY THYROID STIM HORMONE: CPT | Performed by: STUDENT IN AN ORGANIZED HEALTH CARE EDUCATION/TRAINING PROGRAM

## 2025-01-01 PROCEDURE — 83735 ASSAY OF MAGNESIUM: CPT | Performed by: STUDENT IN AN ORGANIZED HEALTH CARE EDUCATION/TRAINING PROGRAM

## 2025-01-01 PROCEDURE — 94799 UNLISTED PULMONARY SVC/PX: CPT

## 2025-01-01 PROCEDURE — 99222 1ST HOSP IP/OBS MODERATE 55: CPT | Performed by: FAMILY MEDICINE

## 2025-01-01 PROCEDURE — 63710000001 INSULIN LISPRO (HUMAN) PER 5 UNITS: Performed by: FAMILY MEDICINE

## 2025-01-01 PROCEDURE — 99232 SBSQ HOSP IP/OBS MODERATE 35: CPT | Performed by: INTERNAL MEDICINE

## 2025-01-01 PROCEDURE — 86140 C-REACTIVE PROTEIN: CPT | Performed by: STUDENT IN AN ORGANIZED HEALTH CARE EDUCATION/TRAINING PROGRAM

## 2025-01-01 PROCEDURE — 25010000002 AMIODARONE IN DEXTROSE 5% 150-4.21 MG/100ML-% SOLUTION: Performed by: STUDENT IN AN ORGANIZED HEALTH CARE EDUCATION/TRAINING PROGRAM

## 2025-01-01 PROCEDURE — 5A09357 ASSISTANCE WITH RESPIRATORY VENTILATION, LESS THAN 24 CONSECUTIVE HOURS, CONTINUOUS POSITIVE AIRWAY PRESSURE: ICD-10-PCS | Performed by: FAMILY MEDICINE

## 2025-01-01 PROCEDURE — 84100 ASSAY OF PHOSPHORUS: CPT | Performed by: FAMILY MEDICINE

## 2025-01-01 PROCEDURE — 85025 COMPLETE CBC W/AUTO DIFF WBC: CPT | Performed by: STUDENT IN AN ORGANIZED HEALTH CARE EDUCATION/TRAINING PROGRAM

## 2025-01-01 PROCEDURE — 84145 PROCALCITONIN (PCT): CPT | Performed by: STUDENT IN AN ORGANIZED HEALTH CARE EDUCATION/TRAINING PROGRAM

## 2025-01-01 PROCEDURE — 80053 COMPREHEN METABOLIC PANEL: CPT | Performed by: STUDENT IN AN ORGANIZED HEALTH CARE EDUCATION/TRAINING PROGRAM

## 2025-01-01 PROCEDURE — 99291 CRITICAL CARE FIRST HOUR: CPT | Performed by: STUDENT IN AN ORGANIZED HEALTH CARE EDUCATION/TRAINING PROGRAM

## 2025-01-01 PROCEDURE — 83735 ASSAY OF MAGNESIUM: CPT | Performed by: FAMILY MEDICINE

## 2025-01-01 PROCEDURE — 84132 ASSAY OF SERUM POTASSIUM: CPT | Performed by: INTERNAL MEDICINE

## 2025-01-01 PROCEDURE — 99223 1ST HOSP IP/OBS HIGH 75: CPT | Performed by: INTERNAL MEDICINE

## 2025-01-01 PROCEDURE — 80048 BASIC METABOLIC PNL TOTAL CA: CPT | Performed by: FAMILY MEDICINE

## 2025-01-01 PROCEDURE — 99238 HOSP IP/OBS DSCHRG MGMT 30/<: CPT | Performed by: INTERNAL MEDICINE

## 2025-01-01 RX ORDER — ACETAMINOPHEN 325 MG/1
650 TABLET ORAL EVERY 4 HOURS PRN
Status: DISCONTINUED | OUTPATIENT
Start: 2025-01-01 | End: 2025-01-01 | Stop reason: HOSPADM

## 2025-01-01 RX ORDER — AMOXICILLIN 250 MG
2 CAPSULE ORAL 2 TIMES DAILY PRN
Status: DISCONTINUED | OUTPATIENT
Start: 2025-01-01 | End: 2025-01-01 | Stop reason: HOSPADM

## 2025-01-01 RX ORDER — SODIUM CHLORIDE 0.9 % (FLUSH) 0.9 %
10 SYRINGE (ML) INJECTION EVERY 12 HOURS SCHEDULED
Status: DISCONTINUED | OUTPATIENT
Start: 2025-01-01 | End: 2025-01-01 | Stop reason: HOSPADM

## 2025-01-01 RX ORDER — FLUTICASONE PROPIONATE 50 MCG
2 SPRAY, SUSPENSION (ML) NASAL DAILY
Status: DISCONTINUED | OUTPATIENT
Start: 2025-01-01 | End: 2025-01-01 | Stop reason: HOSPADM

## 2025-01-01 RX ORDER — SODIUM CHLORIDE 0.9 % (FLUSH) 0.9 %
10 SYRINGE (ML) INJECTION AS NEEDED
Status: DISCONTINUED | OUTPATIENT
Start: 2025-01-01 | End: 2025-01-01 | Stop reason: HOSPADM

## 2025-01-01 RX ORDER — ACETAMINOPHEN 650 MG/1
650 SUPPOSITORY RECTAL EVERY 4 HOURS PRN
Status: DISCONTINUED | OUTPATIENT
Start: 2025-01-01 | End: 2025-01-01 | Stop reason: HOSPADM

## 2025-01-01 RX ORDER — BISACODYL 10 MG
10 SUPPOSITORY, RECTAL RECTAL DAILY PRN
Status: DISCONTINUED | OUTPATIENT
Start: 2025-01-01 | End: 2025-01-01 | Stop reason: HOSPADM

## 2025-01-01 RX ORDER — SODIUM CHLORIDE 9 MG/ML
40 INJECTION, SOLUTION INTRAVENOUS AS NEEDED
Status: DISCONTINUED | OUTPATIENT
Start: 2025-01-01 | End: 2025-01-01 | Stop reason: HOSPADM

## 2025-01-01 RX ORDER — ACETAMINOPHEN 160 MG/5ML
650 SOLUTION ORAL EVERY 4 HOURS PRN
Status: DISCONTINUED | OUTPATIENT
Start: 2025-01-01 | End: 2025-01-01 | Stop reason: HOSPADM

## 2025-01-01 RX ORDER — POTASSIUM CHLORIDE 1500 MG/1
40 TABLET, EXTENDED RELEASE ORAL EVERY 4 HOURS
Status: COMPLETED | OUTPATIENT
Start: 2025-01-01 | End: 2025-01-01

## 2025-01-01 RX ORDER — DEXTROSE MONOHYDRATE 25 G/50ML
25 INJECTION, SOLUTION INTRAVENOUS
Status: DISCONTINUED | OUTPATIENT
Start: 2025-01-01 | End: 2025-01-01 | Stop reason: HOSPADM

## 2025-01-01 RX ORDER — FUROSEMIDE 10 MG/ML
80 INJECTION INTRAMUSCULAR; INTRAVENOUS 2 TIMES DAILY
Status: DISCONTINUED | OUTPATIENT
Start: 2025-01-01 | End: 2025-01-01 | Stop reason: HOSPADM

## 2025-01-01 RX ORDER — INSULIN LISPRO 100 [IU]/ML
8 INJECTION, SOLUTION INTRAVENOUS; SUBCUTANEOUS
Status: DISCONTINUED | OUTPATIENT
Start: 2025-01-01 | End: 2025-01-01 | Stop reason: HOSPADM

## 2025-01-01 RX ORDER — ENOXAPARIN SODIUM 100 MG/ML
30 INJECTION SUBCUTANEOUS DAILY
Status: DISCONTINUED | OUTPATIENT
Start: 2025-01-01 | End: 2025-01-01 | Stop reason: HOSPADM

## 2025-01-01 RX ORDER — METOPROLOL SUCCINATE 25 MG/1
25 TABLET, EXTENDED RELEASE ORAL
Status: DISCONTINUED | OUTPATIENT
Start: 2025-01-01 | End: 2025-01-01 | Stop reason: HOSPADM

## 2025-01-01 RX ORDER — BISACODYL 5 MG/1
5 TABLET, DELAYED RELEASE ORAL DAILY PRN
Status: DISCONTINUED | OUTPATIENT
Start: 2025-01-01 | End: 2025-01-01 | Stop reason: HOSPADM

## 2025-01-01 RX ORDER — POLYETHYLENE GLYCOL 3350 17 G/17G
17 POWDER, FOR SOLUTION ORAL DAILY PRN
Status: DISCONTINUED | OUTPATIENT
Start: 2025-01-01 | End: 2025-01-01 | Stop reason: HOSPADM

## 2025-01-01 RX ORDER — NITROGLYCERIN 0.4 MG/1
0.4 TABLET SUBLINGUAL
Status: DISCONTINUED | OUTPATIENT
Start: 2025-01-01 | End: 2025-01-01 | Stop reason: HOSPADM

## 2025-01-01 RX ORDER — NICOTINE POLACRILEX 4 MG
15 LOZENGE BUCCAL
Status: DISCONTINUED | OUTPATIENT
Start: 2025-01-01 | End: 2025-01-01 | Stop reason: HOSPADM

## 2025-01-01 RX ADMIN — AMIODARONE HYDROCHLORIDE 0.5 MG/MIN: 1.8 INJECTION, SOLUTION INTRAVENOUS at 10:15

## 2025-01-01 RX ADMIN — FLUTICASONE PROPIONATE 2 SPRAY: 50 SPRAY, METERED NASAL at 15:37

## 2025-01-01 RX ADMIN — POTASSIUM CHLORIDE 40 MEQ: 1500 TABLET, EXTENDED RELEASE ORAL at 08:17

## 2025-01-01 RX ADMIN — INSULIN LISPRO 8 UNITS: 100 INJECTION, SOLUTION INTRAVENOUS; SUBCUTANEOUS at 11:44

## 2025-01-01 RX ADMIN — FUROSEMIDE 80 MG: 10 INJECTION, SOLUTION INTRAMUSCULAR; INTRAVENOUS at 08:16

## 2025-01-01 RX ADMIN — METOPROLOL SUCCINATE 25 MG: 25 TABLET, EXTENDED RELEASE ORAL at 11:44

## 2025-01-01 RX ADMIN — AMIODARONE HYDROCHLORIDE 0.5 MG/MIN: 1.8 INJECTION, SOLUTION INTRAVENOUS at 23:25

## 2025-01-01 RX ADMIN — FUROSEMIDE 80 MG: 10 INJECTION, SOLUTION INTRAMUSCULAR; INTRAVENOUS at 22:11

## 2025-01-01 RX ADMIN — AMIODARONE HYDROCHLORIDE 0.5 MG/MIN: 1.8 INJECTION, SOLUTION INTRAVENOUS at 22:29

## 2025-01-01 RX ADMIN — INSULIN LISPRO 8 UNITS: 100 INJECTION, SOLUTION INTRAVENOUS; SUBCUTANEOUS at 08:17

## 2025-01-01 RX ADMIN — Medication 10 ML: at 22:12

## 2025-01-01 RX ADMIN — INSULIN GLARGINE 15 UNITS: 100 INJECTION, SOLUTION SUBCUTANEOUS at 22:11

## 2025-01-01 RX ADMIN — POTASSIUM CHLORIDE 40 MEQ: 1500 TABLET, EXTENDED RELEASE ORAL at 11:44

## 2025-01-01 RX ADMIN — INSULIN GLARGINE 15 UNITS: 100 INJECTION, SOLUTION SUBCUTANEOUS at 08:17

## 2025-01-01 RX ADMIN — Medication 10 ML: at 20:09

## 2025-01-01 RX ADMIN — FUROSEMIDE 80 MG: 10 INJECTION, SOLUTION INTRAMUSCULAR; INTRAVENOUS at 20:08

## 2025-01-01 RX ADMIN — ENOXAPARIN SODIUM 30 MG: 100 INJECTION SUBCUTANEOUS at 08:17

## 2025-01-01 RX ADMIN — Medication 10 ML: at 08:17

## 2025-01-01 RX ADMIN — AMIODARONE HYDROCHLORIDE 150 MG: 1.5 INJECTION, SOLUTION INTRAVENOUS at 17:20

## 2025-01-01 RX ADMIN — AMIODARONE HYDROCHLORIDE 1 MG/MIN: 1.8 INJECTION, SOLUTION INTRAVENOUS at 17:34

## 2025-01-02 ENCOUNTER — APPOINTMENT (OUTPATIENT)
Dept: GENERAL RADIOLOGY | Facility: HOSPITAL | Age: 80
End: 2025-01-02
Payer: MEDICARE

## 2025-01-02 ENCOUNTER — HOSPITAL ENCOUNTER (OUTPATIENT)
Facility: HOSPITAL | Age: 80
Setting detail: OBSERVATION
Discharge: HOME | End: 2025-01-04
Attending: EMERGENCY MEDICINE | Admitting: FAMILY MEDICINE
Payer: MEDICARE

## 2025-01-02 ENCOUNTER — APPOINTMENT (OUTPATIENT)
Dept: CT IMAGING | Facility: HOSPITAL | Age: 80
End: 2025-01-02
Payer: MEDICARE

## 2025-01-02 ENCOUNTER — OFFICE VISIT (OUTPATIENT)
Age: 80
End: 2025-01-02

## 2025-01-02 VITALS
TEMPERATURE: 97.5 F | SYSTOLIC BLOOD PRESSURE: 88 MMHG | WEIGHT: 207 LBS | DIASTOLIC BLOOD PRESSURE: 54 MMHG | HEART RATE: 78 BPM | BODY MASS INDEX: 32.41 KG/M2 | OXYGEN SATURATION: 98 %

## 2025-01-02 DIAGNOSIS — Z86.39 HISTORY OF DIABETES MELLITUS: ICD-10-CM

## 2025-01-02 DIAGNOSIS — Z86.79 HISTORY OF CONGESTIVE HEART FAILURE: ICD-10-CM

## 2025-01-02 DIAGNOSIS — I95.9 HYPOTENSION, UNSPECIFIED HYPOTENSION TYPE: ICD-10-CM

## 2025-01-02 DIAGNOSIS — Z86.79 HISTORY OF HYPERTENSION: ICD-10-CM

## 2025-01-02 DIAGNOSIS — R53.1 WEAKNESS: Primary | ICD-10-CM

## 2025-01-02 DIAGNOSIS — N17.9 ACUTE KIDNEY INJURY: Primary | ICD-10-CM

## 2025-01-02 DIAGNOSIS — Z86.39 HISTORY OF HYPERLIPIDEMIA: ICD-10-CM

## 2025-01-02 DIAGNOSIS — I21.4 NSTEMI (NON-ST ELEVATED MYOCARDIAL INFARCTION): ICD-10-CM

## 2025-01-02 DIAGNOSIS — Z87.448 HISTORY OF CHRONIC KIDNEY DISEASE: ICD-10-CM

## 2025-01-02 DIAGNOSIS — Z86.79 HISTORY OF CORONARY ARTERY DISEASE: ICD-10-CM

## 2025-01-02 LAB
ALBUMIN SERPL-MCNC: 3.8 G/DL (ref 3.5–5.2)
ALBUMIN/GLOB SERPL: 1.3 G/DL
ALP SERPL-CCNC: 57 U/L (ref 39–117)
ALT SERPL W P-5'-P-CCNC: 38 U/L (ref 1–41)
ANION GAP SERPL CALCULATED.3IONS-SCNC: 12.8 MMOL/L (ref 5–15)
AST SERPL-CCNC: 27 U/L (ref 1–40)
B PARAPERT DNA SPEC QL NAA+PROBE: NOT DETECTED
B PERT DNA SPEC QL NAA+PROBE: NOT DETECTED
BASOPHILS # BLD AUTO: 0.04 10*3/MM3 (ref 0–0.2)
BASOPHILS NFR BLD AUTO: 0.4 % (ref 0–1.5)
BILIRUB SERPL-MCNC: 0.5 MG/DL (ref 0–1.2)
BILIRUB UR QL STRIP: NEGATIVE
BUN SERPL-MCNC: 79 MG/DL (ref 8–23)
BUN/CREAT SERPL: 32 (ref 7–25)
C PNEUM DNA NPH QL NAA+NON-PROBE: NOT DETECTED
CALCIUM SPEC-SCNC: 8.5 MG/DL (ref 8.6–10.5)
CHLORIDE SERPL-SCNC: 100 MMOL/L (ref 98–107)
CLARITY UR: CLEAR
CO2 SERPL-SCNC: 27.2 MMOL/L (ref 22–29)
COLOR UR: YELLOW
CREAT SERPL-MCNC: 2.47 MG/DL (ref 0.76–1.27)
CRP SERPL-MCNC: 7.76 MG/DL (ref 0–0.5)
D-LACTATE SERPL-SCNC: 0.7 MMOL/L (ref 0.5–2)
DEPRECATED RDW RBC AUTO: 43.1 FL (ref 37–54)
EGFRCR SERPLBLD CKD-EPI 2021: 26 ML/MIN/1.73
EOSINOPHIL # BLD AUTO: 0.11 10*3/MM3 (ref 0–0.4)
EOSINOPHIL NFR BLD AUTO: 1.1 % (ref 0.3–6.2)
ERYTHROCYTE [DISTWIDTH] IN BLOOD BY AUTOMATED COUNT: 12.5 % (ref 12.3–15.4)
FLUAV RNA RESP QL NAA+PROBE: NOT DETECTED
FLUAV SUBTYP SPEC NAA+PROBE: NOT DETECTED
FLUBV RNA ISLT QL NAA+PROBE: NOT DETECTED
FLUBV RNA RESP QL NAA+PROBE: NOT DETECTED
GEN 5 1HR TROPONIN T REFLEX: 135 NG/L
GLOBULIN UR ELPH-MCNC: 3 GM/DL
GLUCOSE BLDC GLUCOMTR-MCNC: 113 MG/DL (ref 70–130)
GLUCOSE SERPL-MCNC: 174 MG/DL (ref 65–99)
GLUCOSE UR STRIP-MCNC: ABNORMAL MG/DL
HADV DNA SPEC NAA+PROBE: NOT DETECTED
HCOV 229E RNA SPEC QL NAA+PROBE: NOT DETECTED
HCOV HKU1 RNA SPEC QL NAA+PROBE: NOT DETECTED
HCOV NL63 RNA SPEC QL NAA+PROBE: NOT DETECTED
HCOV OC43 RNA SPEC QL NAA+PROBE: NOT DETECTED
HCT VFR BLD AUTO: 31.2 % (ref 37.5–51)
HGB BLD-MCNC: 10.2 G/DL (ref 13–17.7)
HGB UR QL STRIP.AUTO: NEGATIVE
HMPV RNA NPH QL NAA+NON-PROBE: NOT DETECTED
HOLD SPECIMEN: NORMAL
HOLD SPECIMEN: NORMAL
HPIV1 RNA ISLT QL NAA+PROBE: NOT DETECTED
HPIV2 RNA SPEC QL NAA+PROBE: NOT DETECTED
HPIV3 RNA NPH QL NAA+PROBE: NOT DETECTED
HPIV4 P GENE NPH QL NAA+PROBE: NOT DETECTED
IMM GRANULOCYTES # BLD AUTO: 0.05 10*3/MM3 (ref 0–0.05)
IMM GRANULOCYTES NFR BLD AUTO: 0.5 % (ref 0–0.5)
KETONES UR QL STRIP: NEGATIVE
LEUKOCYTE ESTERASE UR QL STRIP.AUTO: NEGATIVE
LYMPHOCYTES # BLD AUTO: 0.74 10*3/MM3 (ref 0.7–3.1)
LYMPHOCYTES NFR BLD AUTO: 7.3 % (ref 19.6–45.3)
M PNEUMO IGG SER IA-ACNC: NOT DETECTED
MAGNESIUM SERPL-MCNC: 2.7 MG/DL (ref 1.6–2.4)
MCH RBC QN AUTO: 30.5 PG (ref 26.6–33)
MCHC RBC AUTO-ENTMCNC: 32.7 G/DL (ref 31.5–35.7)
MCV RBC AUTO: 93.4 FL (ref 79–97)
MONOCYTES # BLD AUTO: 0.71 10*3/MM3 (ref 0.1–0.9)
MONOCYTES NFR BLD AUTO: 7 % (ref 5–12)
NEUTROPHILS NFR BLD AUTO: 8.54 10*3/MM3 (ref 1.7–7)
NEUTROPHILS NFR BLD AUTO: 83.7 % (ref 42.7–76)
NITRITE UR QL STRIP: NEGATIVE
NRBC BLD AUTO-RTO: 0 /100 WBC (ref 0–0.2)
PH UR STRIP.AUTO: 5.5 [PH] (ref 5–8)
PLATELET # BLD AUTO: 227 10*3/MM3 (ref 140–450)
PMV BLD AUTO: 11.2 FL (ref 6–12)
POTASSIUM SERPL-SCNC: 4.1 MMOL/L (ref 3.5–5.2)
PROCALCITONIN SERPL-MCNC: 0.13 NG/ML (ref 0–0.25)
PROT SERPL-MCNC: 6.8 G/DL (ref 6–8.5)
PROT UR QL STRIP: NEGATIVE
RBC # BLD AUTO: 3.34 10*6/MM3 (ref 4.14–5.8)
RHINOVIRUS RNA SPEC NAA+PROBE: NOT DETECTED
RSV RNA NPH QL NAA+NON-PROBE: NOT DETECTED
RSV RNA RESP QL NAA+PROBE: NOT DETECTED
SARS-COV-2 RNA NPH QL NAA+NON-PROBE: NOT DETECTED
SARS-COV-2 RNA RESP QL NAA+PROBE: NOT DETECTED
SODIUM SERPL-SCNC: 140 MMOL/L (ref 136–145)
SP GR UR STRIP: 1.01 (ref 1–1.03)
TROPONIN T % DELTA: 11 %
TROPONIN T NUMERIC DELTA: 13 NG/L
TROPONIN T SERPL HS-MCNC: 122 NG/L
UROBILINOGEN UR QL STRIP: ABNORMAL
WBC NRBC COR # BLD AUTO: 10.19 10*3/MM3 (ref 3.4–10.8)
WHOLE BLOOD HOLD COAG: NORMAL
WHOLE BLOOD HOLD SPECIMEN: NORMAL

## 2025-01-02 PROCEDURE — 71250 CT THORAX DX C-: CPT

## 2025-01-02 PROCEDURE — 93005 ELECTROCARDIOGRAM TRACING: CPT | Performed by: EMERGENCY MEDICINE

## 2025-01-02 PROCEDURE — G0378 HOSPITAL OBSERVATION PER HR: HCPCS

## 2025-01-02 PROCEDURE — 99223 1ST HOSP IP/OBS HIGH 75: CPT | Performed by: FAMILY MEDICINE

## 2025-01-02 PROCEDURE — 84145 PROCALCITONIN (PCT): CPT | Performed by: FAMILY MEDICINE

## 2025-01-02 PROCEDURE — 83735 ASSAY OF MAGNESIUM: CPT | Performed by: EMERGENCY MEDICINE

## 2025-01-02 PROCEDURE — 86140 C-REACTIVE PROTEIN: CPT | Performed by: FAMILY MEDICINE

## 2025-01-02 PROCEDURE — 84484 ASSAY OF TROPONIN QUANT: CPT | Performed by: EMERGENCY MEDICINE

## 2025-01-02 PROCEDURE — 25810000003 SODIUM CHLORIDE 0.9 % SOLUTION: Performed by: PHYSICIAN ASSISTANT

## 2025-01-02 PROCEDURE — 81003 URINALYSIS AUTO W/O SCOPE: CPT | Performed by: EMERGENCY MEDICINE

## 2025-01-02 PROCEDURE — 99285 EMERGENCY DEPT VISIT HI MDM: CPT | Performed by: EMERGENCY MEDICINE

## 2025-01-02 PROCEDURE — 71045 X-RAY EXAM CHEST 1 VIEW: CPT

## 2025-01-02 PROCEDURE — 87637 SARSCOV2&INF A&B&RSV AMP PRB: CPT | Performed by: EMERGENCY MEDICINE

## 2025-01-02 PROCEDURE — 85025 COMPLETE CBC W/AUTO DIFF WBC: CPT | Performed by: EMERGENCY MEDICINE

## 2025-01-02 PROCEDURE — 83605 ASSAY OF LACTIC ACID: CPT | Performed by: FAMILY MEDICINE

## 2025-01-02 PROCEDURE — 0202U NFCT DS 22 TRGT SARS-COV-2: CPT | Performed by: FAMILY MEDICINE

## 2025-01-02 PROCEDURE — 80053 COMPREHEN METABOLIC PANEL: CPT | Performed by: EMERGENCY MEDICINE

## 2025-01-02 PROCEDURE — 82948 REAGENT STRIP/BLOOD GLUCOSE: CPT

## 2025-01-02 PROCEDURE — 87040 BLOOD CULTURE FOR BACTERIA: CPT | Performed by: FAMILY MEDICINE

## 2025-01-02 RX ORDER — FINASTERIDE 5 MG/1
5 TABLET, FILM COATED ORAL DAILY
Status: DISCONTINUED | OUTPATIENT
Start: 2025-01-03 | End: 2025-01-04 | Stop reason: HOSPADM

## 2025-01-02 RX ORDER — SODIUM CHLORIDE 9 MG/ML
40 INJECTION, SOLUTION INTRAVENOUS AS NEEDED
Status: DISCONTINUED | OUTPATIENT
Start: 2025-01-02 | End: 2025-01-04 | Stop reason: HOSPADM

## 2025-01-02 RX ORDER — BISACODYL 5 MG/1
5 TABLET, DELAYED RELEASE ORAL DAILY PRN
Status: DISCONTINUED | OUTPATIENT
Start: 2025-01-02 | End: 2025-01-04 | Stop reason: HOSPADM

## 2025-01-02 RX ORDER — NICOTINE POLACRILEX 4 MG
15 LOZENGE BUCCAL
Status: DISCONTINUED | OUTPATIENT
Start: 2025-01-02 | End: 2025-01-04 | Stop reason: HOSPADM

## 2025-01-02 RX ORDER — BUDESONIDE AND FORMOTEROL FUMARATE DIHYDRATE 160; 4.5 UG/1; UG/1
2 AEROSOL RESPIRATORY (INHALATION)
Status: DISCONTINUED | OUTPATIENT
Start: 2025-01-02 | End: 2025-01-04 | Stop reason: HOSPADM

## 2025-01-02 RX ORDER — ATORVASTATIN CALCIUM 80 MG/1
80 TABLET, FILM COATED ORAL DAILY
Status: DISCONTINUED | OUTPATIENT
Start: 2025-01-03 | End: 2025-01-04 | Stop reason: HOSPADM

## 2025-01-02 RX ORDER — SODIUM CHLORIDE 0.9 % (FLUSH) 0.9 %
10 SYRINGE (ML) INJECTION AS NEEDED
Status: DISCONTINUED | OUTPATIENT
Start: 2025-01-02 | End: 2025-01-04 | Stop reason: HOSPADM

## 2025-01-02 RX ORDER — PRAMIPEXOLE DIHYDROCHLORIDE 1 MG/1
1 TABLET ORAL NIGHTLY
Status: DISCONTINUED | OUTPATIENT
Start: 2025-01-02 | End: 2025-01-04 | Stop reason: HOSPADM

## 2025-01-02 RX ORDER — ISOSORBIDE MONONITRATE 30 MG/1
30 TABLET, EXTENDED RELEASE ORAL DAILY
Status: DISCONTINUED | OUTPATIENT
Start: 2025-01-03 | End: 2025-01-04 | Stop reason: HOSPADM

## 2025-01-02 RX ORDER — ACETAMINOPHEN 650 MG/1
650 SUPPOSITORY RECTAL EVERY 4 HOURS PRN
Status: DISCONTINUED | OUTPATIENT
Start: 2025-01-02 | End: 2025-01-04 | Stop reason: HOSPADM

## 2025-01-02 RX ORDER — NITROGLYCERIN 0.4 MG/1
0.4 TABLET SUBLINGUAL
Status: DISCONTINUED | OUTPATIENT
Start: 2025-01-02 | End: 2025-01-04 | Stop reason: HOSPADM

## 2025-01-02 RX ORDER — ASPIRIN 81 MG/1
81 TABLET, CHEWABLE ORAL DAILY
Status: DISCONTINUED | OUTPATIENT
Start: 2025-01-03 | End: 2025-01-04 | Stop reason: HOSPADM

## 2025-01-02 RX ORDER — PANTOPRAZOLE SODIUM 40 MG/1
40 TABLET, DELAYED RELEASE ORAL
Status: DISCONTINUED | OUTPATIENT
Start: 2025-01-03 | End: 2025-01-04 | Stop reason: HOSPADM

## 2025-01-02 RX ORDER — BISACODYL 10 MG
10 SUPPOSITORY, RECTAL RECTAL DAILY PRN
Status: DISCONTINUED | OUTPATIENT
Start: 2025-01-02 | End: 2025-01-04 | Stop reason: HOSPADM

## 2025-01-02 RX ORDER — ONDANSETRON 4 MG/1
4 TABLET, ORALLY DISINTEGRATING ORAL EVERY 6 HOURS PRN
Status: DISCONTINUED | OUTPATIENT
Start: 2025-01-02 | End: 2025-01-04 | Stop reason: HOSPADM

## 2025-01-02 RX ORDER — CARVEDILOL 12.5 MG/1
12.5 TABLET ORAL 2 TIMES DAILY WITH MEALS
Status: DISCONTINUED | OUTPATIENT
Start: 2025-01-03 | End: 2025-01-04 | Stop reason: HOSPADM

## 2025-01-02 RX ORDER — RANOLAZINE 500 MG/1
500 TABLET, EXTENDED RELEASE ORAL EVERY 12 HOURS SCHEDULED
Status: DISCONTINUED | OUTPATIENT
Start: 2025-01-03 | End: 2025-01-04 | Stop reason: HOSPADM

## 2025-01-02 RX ORDER — PRASUGREL 10 MG/1
10 TABLET, FILM COATED ORAL DAILY
Status: DISCONTINUED | OUTPATIENT
Start: 2025-01-03 | End: 2025-01-04 | Stop reason: HOSPADM

## 2025-01-02 RX ORDER — ONDANSETRON 2 MG/ML
4 INJECTION INTRAMUSCULAR; INTRAVENOUS EVERY 6 HOURS PRN
Status: DISCONTINUED | OUTPATIENT
Start: 2025-01-02 | End: 2025-01-04 | Stop reason: HOSPADM

## 2025-01-02 RX ORDER — HYDRALAZINE HYDROCHLORIDE 25 MG/1
25 TABLET, FILM COATED ORAL 3 TIMES DAILY
Status: DISCONTINUED | OUTPATIENT
Start: 2025-01-03 | End: 2025-01-04 | Stop reason: HOSPADM

## 2025-01-02 RX ORDER — SODIUM CHLORIDE 0.9 % (FLUSH) 0.9 %
10 SYRINGE (ML) INJECTION EVERY 12 HOURS SCHEDULED
Status: DISCONTINUED | OUTPATIENT
Start: 2025-01-02 | End: 2025-01-04 | Stop reason: HOSPADM

## 2025-01-02 RX ORDER — HYDRALAZINE HYDROCHLORIDE 25 MG/1
75 TABLET, FILM COATED ORAL 3 TIMES DAILY
Status: DISCONTINUED | OUTPATIENT
Start: 2025-01-03 | End: 2025-01-02

## 2025-01-02 RX ORDER — AMOXICILLIN 250 MG
2 CAPSULE ORAL 2 TIMES DAILY PRN
Status: DISCONTINUED | OUTPATIENT
Start: 2025-01-02 | End: 2025-01-04 | Stop reason: HOSPADM

## 2025-01-02 RX ORDER — ACETAMINOPHEN 325 MG/1
650 TABLET ORAL EVERY 4 HOURS PRN
Status: DISCONTINUED | OUTPATIENT
Start: 2025-01-02 | End: 2025-01-04 | Stop reason: HOSPADM

## 2025-01-02 RX ORDER — DEXTROSE MONOHYDRATE 25 G/50ML
25 INJECTION, SOLUTION INTRAVENOUS
Status: DISCONTINUED | OUTPATIENT
Start: 2025-01-02 | End: 2025-01-04 | Stop reason: HOSPADM

## 2025-01-02 RX ORDER — ACETAMINOPHEN 160 MG/5ML
650 SOLUTION ORAL EVERY 4 HOURS PRN
Status: DISCONTINUED | OUTPATIENT
Start: 2025-01-02 | End: 2025-01-04 | Stop reason: HOSPADM

## 2025-01-02 RX ORDER — POLYETHYLENE GLYCOL 3350 17 G/17G
17 POWDER, FOR SOLUTION ORAL DAILY PRN
Status: DISCONTINUED | OUTPATIENT
Start: 2025-01-02 | End: 2025-01-04 | Stop reason: HOSPADM

## 2025-01-02 RX ORDER — INSULIN LISPRO 100 [IU]/ML
2-7 INJECTION, SOLUTION INTRAVENOUS; SUBCUTANEOUS
Status: DISCONTINUED | OUTPATIENT
Start: 2025-01-03 | End: 2025-01-04 | Stop reason: HOSPADM

## 2025-01-02 RX ADMIN — SODIUM CHLORIDE 1000 ML: 9 INJECTION, SOLUTION INTRAVENOUS at 17:20

## 2025-01-02 ASSESSMENT — ENCOUNTER SYMPTOMS
SHORTNESS OF BREATH: 0
NAUSEA: 0
VOMITING: 0
COUGH: 0
ABDOMINAL DISTENTION: 1

## 2025-01-02 ASSESSMENT — PATIENT HEALTH QUESTIONNAIRE - PHQ9: DEPRESSION UNABLE TO ASSESS: URGENT/EMERGENT SITUATION

## 2025-01-02 NOTE — ED PROVIDER NOTES
EMERGENCY DEPARTMENT ENCOUNTER    Pt Name: Donny Jerry  MRN: [id number]  Pt :   1945  Room Number:  19SF/19  Date of encounter:  2025  PCP: Anum Alonso APRN  ED Provider: SCOTT Mabry    Historian: Patient    HPI:  Chief Complaint: Generalized Weakness, Pain    Context: Donny Jerry is a 79 y.o. male who presents to the ED c/o generalized pain. Patient reports generalized pain but no localized pain in any specific area of the body. No pain in the chest or neck, abdomen or pekvis was reported. No recent injuries, falls, fever, nausea, vomiting, diarrhea, or constipation were noted. He confirmed normal urination and adequate eating and drinking. There was a mention of slight hypotension at visit with primary care earlier today, but EMS observed normal vital signs at the time. He reported feeling better at the time of assessment and requested water, which was provided.  HPI     REVIEW OF SYSTEMS  A chief complaint appropriate review of systems was completed and is negative except as noted in the HPI.     PAST MEDICAL HISTORY  Past Medical History:   Diagnosis Date    Benign hypertension     Coronary artery disease     Depression     Enlarged prostate     Enlarged prostate with anticipated TURP with Dr. Bernal.    GERD (gastroesophageal reflux disease)     Heart attack     Hypercholesterolemia     Impaired functional mobility, balance, gait, and endurance     Myocardial infarction     Obesity     Obstructive sleep apnea on CPAP     Type 2 diabetes mellitus        PAST SURGICAL HISTORY  Past Surgical History:   Procedure Laterality Date    CARDIAC CATHETERIZATION      CARDIAC CATHETERIZATION Left 10/19/2021    Procedure: Left Heart Cath;  Surgeon: Liz Russo MD;  Location:  [facility] C[facility] INVASIVE LOC[facility];  Service: Cardiology;  Laterality: Left;    CARDIAC CATHETERIZATION N/A 2023    Procedure: Coronary angiography;  Surgeon: Rupesh Parr MD;  Location:  [facility]  INVASIVE LOCATION;  Service: Cardiology;  Laterality: N/A;    CARDIAC CATHETERIZATION N/A 7/18/2023    Procedure: Percutaneous Coronary Intervention;  Surgeon: Rupesh Parr MD;  Location: [facility] CATH INVASIVE LOCATION;  Service: Cardiology;  Laterality: N/A;    COLONOSCOPY W/ POLYPECTOMY      CORONARY ANGIOPLASTY WITH STENT PLACEMENT Left 06/03/2009    Left heart catheterization, 06/03/2009, Dr. Russo:  LVEF 45% to 50%.  Placement of overlapping Cypher drug-eluting stent 2.5 x 28 and 2.5 x 18 to the SVG to the obtuse marginal branch.  SVG to the PDA had a proximal stenosis estimated at 60% with a distal stenosis of 50% to 60%.    CORONARY ANGIOPLASTY WITH STENT PLACEMENT Left 07/06/2009    Left heart catheterization, 07/06/2009:  PTCA and stent placement in the mid PDA using a 2.25 x 12 mm Taxus drug-eluting stent with stent placement in the distal SVG to the PDA using a 2.5 x 18 mm Cypher drug-eluting stent and stenting of the proximal SVG to the PDA using a 3.0 x 28 mm Cypher drug-eluting stent.    CORONARY ANGIOPLASTY WITH STENT PLACEMENT  04/23/2010    Cardiac catheterization for recurrent anginal symptoms, 04/23/2010, with PTCA and stent placement in the proximal SVG to the PDA using 3.0 x 28 mm Promus drug-eluting stent for in-stent restenosis.    CORONARY ANGIOPLASTY WITH STENT PLACEMENT Left 07/06/2010    Left heart catheterization, 07/06/2010, Dr. Russo:  EF 40% to 45%.  3.5 x 23 mm Promus drug-eluting stent in the proximal SVG to OM, 2.5 x 18 mm Promus drug-eluting stent to distal SVG to PDA due to in-stent restenosis.      CORONARY ANGIOPLASTY WITH STENT PLACEMENT Left 11/16/2010    Left heart catheterization, 11/16/2010, with placement of a 3.0 x 16 mm Taxus drug-eluting stent to the SVG to the RCA proximally and a 2.5 x 16 mm paclitaxel stent distally in the SVG to the RCA.    CORONARY ANGIOPLASTY WITH STENT PLACEMENT Left     Left heart catheterization, 3.0 x 24-mm Promus element  drug-eluting stent to a 90% lesion in the mid portion of the SVG to the PDA.     CORONARY ANGIOPLASTY WITH STENT PLACEMENT Left 06/24/2014    Left heart catheterization for recurrent angina, 06/24/2014, Dr. Russo:  PTCA of the SVG to the PDA using a 3 x 12 mm NC TREK balloon.      CORONARY ARTERY BYPASS GRAFT      CABG x3, Dr. Peng, [facility], 2001.       FAMILY HISTORY  Family History   Problem Relation Age of Onset    Heart attack Mother     Ulcers Father        SOCIAL HISTORY  Social History     Socioeconomic History    Marital status:    Tobacco Use    Smoking status: Never    Smokeless tobacco: Never   Vaping Use    Vaping status: Never Used   Substance and Sexual Activity    Alcohol use: No    Drug use: No    Sexual activity: Defer       ALLERGIES  Zocor [simvastatin], Bisoprolol, Byetta 10 mcg pen [exenatide], Crestor [rosuvastatin calcium], Metformin and related, and Plavix [clopidogrel bisulfate]    PHYSICAL EXAM  Physical Exam  GENERAL:   Appears in no acute distress. Non toxic in appearance  HENT: Nares patent.  EYES: No scleral icterus.  CV: Regular rhythm, regular rate.  RESPIRATORY: Normal effort.  No audible wheezes, rales or rhonchi.  ABDOMEN: Soft, nontender  MUSCULOSKELETAL: No deformities.   NEURO: Alert, moves all extremities, follows commands.  SKIN: Warm, dry, no rash visualized.  I have reviewed the triage vital signs and nursing notes.     LAB RESULTS  Results for orders placed or performed during the hospital encounter of 01/02/25   COVID-19, FLU A/B, RSV PCR 1 HR TAT - Swab, Nasopharynx    Collection Time: 01/02/25  4:07 PM    Specimen: Nasopharynx; Swab   Result Value Ref Range    COVID19 Not Detected Not Detected - Ref. Range    Influenza A PCR Not Detected Not Detected    Influenza B PCR Not Detected Not Detected    RSV, PCR Not Detected Not Detected   Comprehensive Metabolic Panel    Collection Time: 01/02/25  4:07 PM    Specimen: Blood   Result Value Ref  Range    Glucose 174 (H) 65 - 99 mg/dL    BUN 79 (H) 8 - 23 mg/dL    Creatinine 2.47 (H) 0.76 - 1.27 mg/dL    Sodium 140 136 - 145 mmol/L    Potassium 4.1 3.5 - 5.2 mmol/L    Chloride 100 98 - 107 mmol/L    CO2 27.2 22.0 - 29.0 mmol/L    Calcium 8.5 (L) 8.6 - 10.5 mg/dL    Total Protein 6.8 6.0 - 8.5 g/dL    Albumin 3.8 3.5 - 5.2 g/dL    ALT (SGPT) 38 1 - 41 U/L    AST (SGOT) 27 1 - 40 U/L    Alkaline Phosphatase 57 39 - 117 U/L    Total Bilirubin 0.5 0.0 - 1.2 mg/dL    Globulin 3.0 gm/dL    A/G Ratio 1.3 g/dL    BUN/Creatinine Ratio 32.0 (H) 7.0 - 25.0    Anion Gap 12.8 5.0 - 15.0 mmol/L    eGFR 26.0 (L) >60.0 mL/min/1.73   High Sensitivity Troponin T    Collection Time: 01/02/25  4:07 PM    Specimen: Blood   Result Value Ref Range    HS Troponin T 122 (C) <22 ng/L   Magnesium    Collection Time: 01/02/25  4:07 PM    Specimen: Blood   Result Value Ref Range    Magnesium 2.7 (H) 1.6 - 2.4 mg/dL   CBC Auto Differential    Collection Time: 01/02/25  4:07 PM    Specimen: Blood   Result Value Ref Range    WBC 10.19 3.40 - 10.80 10*3/mm3    RBC 3.34 (L) 4.14 - 5.80 10*6/mm3    Hemoglobin 10.2 (L) 13.0 - 17.7 g/dL    Hematocrit 31.2 (L) 37.5 - 51.0 %    MCV 93.4 79.0 - 97.0 fL    MCH 30.5 26.6 - 33.0 pg    MCHC 32.7 31.5 - 35.7 g/dL    RDW 12.5 12.3 - 15.4 %    RDW-SD 43.1 37.0 - 54.0 fl    MPV 11.2 6.0 - 12.0 fL    Platelets 227 140 - 450 10*3/mm3    Neutrophil % 83.7 (H) 42.7 - 76.0 %    Lymphocyte % 7.3 (L) 19.6 - 45.3 %    Monocyte % 7.0 5.0 - 12.0 %    Eosinophil % 1.1 0.3 - 6.2 %    Basophil % 0.4 0.0 - 1.5 %    Immature Grans % 0.5 0.0 - 0.5 %    Neutrophils, Absolute 8.54 (H) 1.70 - 7.00 10*3/mm3    Lymphocytes, Absolute 0.74 0.70 - 3.10 10*3/mm3    Monocytes, Absolute 0.71 0.10 - 0.90 10*3/mm3    Eosinophils, Absolute 0.11 0.00 - 0.40 10*3/mm3    Basophils, Absolute 0.04 0.00 - 0.20 10*3/mm3    Immature Grans, Absolute 0.05 0.00 - 0.05 10*3/mm3    nRBC 0.0 0.0 - 0.2 /100 WBC   Green Top (Gel)    Collection Time:  01/02/25  4:07 PM   Result Value Ref Range    Extra Tube Hold for add-ons.    Lavender Top    Collection Time: 01/02/25  4:07 PM   Result Value Ref Range    Extra Tube hold for add-on    Gold Top - SST    Collection Time: 01/02/25  4:07 PM   Result Value Ref Range    Extra Tube Hold for add-ons.    Light Blue Top    Collection Time: 01/02/25  4:07 PM   Result Value Ref Range    Extra Tube Hold for add-ons.    High Sensitivity Troponin T 1Hr    Collection Time: 01/02/25  5:41 PM    Specimen: Blood   Result Value Ref Range    HS Troponin T 135 (C) <22 ng/L    Troponin T Numeric Delta 13 ng/L    Troponin T % Delta 11 Abnormal if >/= 20% %   Urinalysis With Microscopic If Indicated (No Culture) - Urine, Clean Catch    Collection Time: 01/02/25  7:00 PM    Specimen: Urine, Clean Catch   Result Value Ref Range    Color, UA Yellow Yellow, Straw    Appearance, UA Clear Clear    pH, UA 5.5 5.0 - 8.0    Specific Gravity, UA 1.012 1.005 - 1.030    Glucose,  mg/dL (2+) (A) Negative    Ketones, UA Negative Negative    Bilirubin, UA Negative Negative    Blood, UA Negative Negative    Protein, UA Negative Negative    Leuk Esterase, UA Negative Negative    Nitrite, UA Negative Negative    Urobilinogen, UA 0.2 E.U./dL 0.2 - 1.0 E.U./dL       If labs were ordered, I independently reviewed the results and considered them in treating the patient.    RADIOLOGY  XR Chest 1 View   Final Result   Cardiomegaly without acute infiltrate..                   This report was signed and finalized on 1/2/2025 4:53 PM by Mayra Da Silva MD.          CT Chest Without Contrast Diagnostic    (Results Pending)     [x] Radiologist's Report Reviewed:  I ordered and independently interpreted the above noted radiographic studies.  See radiologist's dictation for official interpretation.      PROCEDURES    Procedures    ECG 12 Lead Other; fatigue   Final Result      ECG 12 Lead ED Triage Standing Order; Weak / Dizzy / AMS   Final Result           MEDICATIONS GIVEN IN ER    Medications   sodium chloride 0.9 % flush 10 mL (has no administration in time range)   sodium chloride 0.9 % bolus 1,000 mL (0 mL Intravenous Stopped 1/2/25 1750)       MEDICAL DECISION MAKING, PROGRESS, and CONSULTS   Medical Decision Making  79 year old male with CKD who presents from outpatient clinic where he was seen by Dr. Bautista with very vague complaints of pain. Has IMANI here with reported baseline Cr around 1.8 after discussion with Dr. Thomas. Patient with history of elevated Troponin with positive delta in the ER today in the setting of pain. Treated with fluids for IMANI. Chest X-ray without acute Cardiopulmonary process and initial and repeat EKG without evidence of acute ischemic changes.  Hospital medicine consulted for admission agreeable to accept patient.  Patient and family agreeable to plan of care and hospital admission for further evaluation and treatment.    Problems Addressed:  Acute kidney injury: complicated acute illness or injury  History of chronic kidney disease: complicated acute illness or injury  History of congestive heart failure: complicated acute illness or injury  History of coronary artery disease: complicated acute illness or injury  History of diabetes mellitus: complicated acute illness or injury  History of hyperlipidemia: complicated acute illness or injury  History of hypertension: complicated acute illness or injury  NSTEMI (non-ST elevated myocardial infarction): complicated acute illness or injury    Amount and/or Complexity of Data Reviewed  External Data Reviewed: labs.  Labs: ordered. Decision-making details documented in ED Course.  Radiology: ordered and independent interpretation performed. Decision-making details documented in ED Course.  ECG/medicine tests: ordered and independent interpretation performed. Decision-making details documented in ED Course.  Discussion of management or test interpretation with external provider(s):  I personally from patient's primary care provider Dr. Bautista and discussed the patient's presentation to her office today.    Risk  Prescription drug management.  Decision regarding hospitalization.      Discussion below represents my analysis of pertinent findings related to patient's condition, differential diagnosis, treatment plan and final disposition.    Differential diagnosis: Infection, infarction/ischemia, trauma, inflammation, ACS, arrhythmia, syncope, sepsis, viral syndrome, electrolyte abnormality, respiratory failure, anemia, dehydration, hypothyroidism, polypharmacy, substance abuse and others.      Additional sources  Discussed/ obtained information from independent historians:   [x] Spouse  [] Parent  [] Family member  [] Friend  [] EMS   [] Other:  External (non-ED) record review:   [] Inpatient record:   [] Office record:   [] Outpatient record:   [] Prior Outpatient labs:   [] Prior Outpatient radiology:   [x] Primary Care record: I personally reviewed patient's visit with Dr. Bautista prior to arrival in the ED today   [] Outside ED record:   [] Other:   Patient's care impacted by:   [x] Diabetes  [x] Hypertension  [x] Hyperlipidemia  [] Hypothyroidism   [x] Coronary Artery Disease  [x] Congestive Heart Failure   [] COPD   [] Cancer   [x] Obesity  [] GERD   [] Tobacco Abuse   [] Substance Abuse    [] Anxiety   [x] Depression   [x] Other: History of chronic kidney disease  Care significantly affected by Social Determinants of Health (housing and economic circumstances, unemployment)    [] Yes     [x] No   If yes, Patient's care significantly limited by  Social Determinants of Health including:   [] Inadequate housing   [] Low income   [] Alcoholism and drug addiction in family   [] Problems related to primary support group   [] Unemployment   [] Problems related to employment   [] Other Social Determinants of Health:   Shared decision making: I reviewed workup performed in ED including labs and  imaging. Based on findings, recommendation made for admission. Patient is agreeable to plan of care and hospital admission.      Orders placed during this visit:  Orders Placed This Encounter   Procedures    COVID-19, FLU A/B, RSV PCR 1 HR TAT - Swab, Nasopharynx    Blood Culture - Blood,    Blood Culture - Blood,    Respiratory Panel PCR w/COVID-19(SARS-CoV-2) [facility] In-House, NP Swab in UTM/VTM, 2 HR TAT - Swab, Nasopharynx    XR Chest 1 View    CT Chest Without Contrast Diagnostic    [facility] Draw    Comprehensive Metabolic Panel    High Sensitivity Troponin T    Magnesium    Urinalysis With Microscopic If Indicated (No Culture) - Urine, Clean Catch    CBC Auto Differential    High Sensitivity Troponin T 1Hr    C-reactive Protein    Procalcitonin    Lactic Acid, Plasma    NPO Diet NPO Type: Strict NPO    Undress & Gown    Continuous Pulse Oximetry    Vital Signs    Orthostatic Blood Pressure    Oxygen Therapy- Nasal Cannula; Titrate 1-6 LPM Per SpO2; 90 - 95%    POC Glucose Once    ECG 12 Lead ED Triage Standing Order; Weak / Dizzy / AMS    ECG 12 Lead Other; fatigue    Insert Peripheral IV    Initiate Observation Status    Fall Precautions    CBC & Differential    Green Top (Gel)    Lavender Top    Gold Top - SST    Light Blue Top     ED Course:    ED Course as of 01/02/25 2032   Thu Jan 02, 2025   1634 EKG independently interpreted by myself shows sinus tachycardia, first-degree AV block, frequent PVCs.  Motion artifact.  No acute ischemic changes.QRS widened relative to previous comparison in April. [NS]   1640 EKG personally interpreted by myself in addition to interpretation by attending without evidence of acute ischemic changes; sinus tachycardia [JG]   1651 I have contacted patient's primary care physician who shared the patient's baseline serum creatinine has been running around 2.0 with most recent serum creatinine on October 25, 2024 of 1.8 with a GFR of 30 [JG]   1656 Patient workup so  far in the ED reviewed with Dr. Rossi with recommendation made for trending of troponins.  On further review of patient's prior troponin they have been elevated in the past. [JG]   1843 ER workup reviewed with attending Dr. Rossi, based on positive troponin will admit patient.  We will obtain second EKG for comparison [JG]   1848 Imaging of chest personally interpreted by myself with official read provided by radiology demonstrated no acute cardiopulmonary process; cardiomegaly without acute infiltrate.. [JG]   1957 Hospital medicine Dr. Cat consulted for admission.  [JG]   2010 Hospital medicine agreeable to accept patient for observation [JG]   2027 Labs studies were reviewed and directly interpreted by myself and demonstrated initial high-sensitivity troponin of 122 with repeat of 135, delta of 13.  Blood glucose 174.  Serum creatinine 2.47 with BUN 79 remainder of labs without acute abnormalities. [JG]   2028 HS Troponin T(!!): 122 [JG]   2028 HS Troponin T(!!): 135 [JG]   2028 Glucose(!): 174 [JG]   2028 Creatinine(!): 2.47 [JG]   2028 BUN(!): 79 [JG]      ED Course User Index  [JG] Brett Sadler PA  [NS] Eliud Rossi MD          DIAGNOSIS  Final diagnoses:   Acute kidney injury   NSTEMI (non-ST elevated myocardial infarction)   History of diabetes mellitus   History of hypertension   History of hyperlipidemia   History of coronary artery disease   History of congestive heart failure   History of chronic kidney disease       DISPOSITION    ED Disposition       ED Disposition   Decision to Admit    Condition   --    Comment   Level of Care: Telemetry [5]   Diagnosis: IMANI (acute kidney injury) [012350]   Admitting Physician: MEL CAT [[id number]]   Attending Physician: MEL CAT [[id number]]               Please note that portions of this document were completed with voice recognition software.        Brett Sadler PA  01/02/25 2032

## 2025-01-02 NOTE — PROGRESS NOTES
Chief Complaint   Patient presents with    Pain     Patient is having pain all over his body for head to toe. He has been very weak and its hard for him to walk.          Have you seen any other physician or provider since your last visit no    Have you had any other diagnostic tests since your last visit? no    Have you changed or stopped any medications since your last visit? no         
Alcohol use: No    Drug use: No    Sexual activity: Not Currently     Partners: Female     Social Determinants of Health     Financial Resource Strain: Low Risk  (4/23/2024)    [facility]    Overall Financial Resource Strain (CARDIA)     Difficulty of Paying Living Expenses: Not hard at all   Food Insecurity: No Food Insecurity (4/23/2024)    [facility]    Hunger Vital Sign     Worried About Running Out of Food in the Last Year: Never true     Ran Out of Food in the Last Year: Never true   Transportation Needs: No Transportation Needs (4/23/2024)    [facility]    PRAPARE - Transportation     In the past 12 months, has lack of transportation kept you from medical appointments or from getting medications?: No     In the past 12 months, has lack of transportation kept you from meetings, work, or from getting things needed for daily living?: No   Physical Activity: Inactive (7/30/2024)    Exercise Vital Sign     Days of Exercise per Week: 0 days     Minutes of Exercise per Session: 0 min   Stress: No Stress Concern Present (4/23/2024)    Received from [facility], [facility] [employer] [facility] - Occupational Stress Questionnaire     Feeling of Stress : Not at all   Social Connections: Not At Risk (4/23/2024)    [facility]    Family and Community Support     If for any reason you need help with day-to-day activities such as bathing, preparing meals, shopping, managing finances, etc., do you get the help you need?: I get all the help I need     How often do you feel lonely or isolated from those around you?: Never   Intimate Partner Violence: Not At Risk (4/23/2024)    Received from [facility]    Abuse

## 2025-01-03 LAB
ANION GAP SERPL CALCULATED.3IONS-SCNC: 14.3 MMOL/L (ref 5–15)
BUN SERPL-MCNC: 72 MG/DL (ref 8–23)
BUN/CREAT SERPL: 32.4 (ref 7–25)
CALCIUM SPEC-SCNC: 8.7 MG/DL (ref 8.6–10.5)
CHLORIDE SERPL-SCNC: 105 MMOL/L (ref 98–107)
CO2 SERPL-SCNC: 23.7 MMOL/L (ref 22–29)
CREAT SERPL-MCNC: 2.22 MG/DL (ref 0.76–1.27)
EGFRCR SERPLBLD CKD-EPI 2021: 29.6 ML/MIN/1.73
GLUCOSE BLDC GLUCOMTR-MCNC: 140 MG/DL (ref 70–130)
GLUCOSE BLDC GLUCOMTR-MCNC: 152 MG/DL (ref 70–130)
GLUCOSE BLDC GLUCOMTR-MCNC: 172 MG/DL (ref 70–130)
GLUCOSE BLDC GLUCOMTR-MCNC: 235 MG/DL (ref 70–130)
GLUCOSE SERPL-MCNC: 107 MG/DL (ref 65–99)
POTASSIUM SERPL-SCNC: 3.7 MMOL/L (ref 3.5–5.2)
SODIUM SERPL-SCNC: 143 MMOL/L (ref 136–145)

## 2025-01-03 PROCEDURE — 80048 BASIC METABOLIC PNL TOTAL CA: CPT | Performed by: FAMILY MEDICINE

## 2025-01-03 PROCEDURE — 94760 N-INVAS EAR/PLS OXIMETRY 1: CPT

## 2025-01-03 PROCEDURE — 94762 N-INVAS EAR/PLS OXIMTRY CONT: CPT

## 2025-01-03 PROCEDURE — 94640 AIRWAY INHALATION TREATMENT: CPT

## 2025-01-03 PROCEDURE — 94660 CPAP INITIATION&MGMT: CPT

## 2025-01-03 PROCEDURE — 82948 REAGENT STRIP/BLOOD GLUCOSE: CPT

## 2025-01-03 PROCEDURE — 82948 REAGENT STRIP/BLOOD GLUCOSE: CPT | Performed by: FAMILY MEDICINE

## 2025-01-03 PROCEDURE — 94799 UNLISTED PULMONARY SVC/PX: CPT

## 2025-01-03 PROCEDURE — 63710000001 INSULIN GLARGINE PER 5 UNITS: Performed by: FAMILY MEDICINE

## 2025-01-03 PROCEDURE — 63710000001 INSULIN LISPRO (HUMAN) PER 5 UNITS: Performed by: FAMILY MEDICINE

## 2025-01-03 PROCEDURE — G0378 HOSPITAL OBSERVATION PER HR: HCPCS

## 2025-01-03 PROCEDURE — 99232 SBSQ HOSP IP/OBS MODERATE 35: CPT | Performed by: STUDENT IN AN ORGANIZED HEALTH CARE EDUCATION/TRAINING PROGRAM

## 2025-01-03 PROCEDURE — 97161 PT EVAL LOW COMPLEX 20 MIN: CPT

## 2025-01-03 PROCEDURE — 94761 N-INVAS EAR/PLS OXIMETRY MLT: CPT

## 2025-01-03 RX ADMIN — CARVEDILOL 12.5 MG: 12.5 TABLET, FILM COATED ORAL at 09:11

## 2025-01-03 RX ADMIN — INSULIN LISPRO 2 UNITS: 100 INJECTION, SOLUTION INTRAVENOUS; SUBCUTANEOUS at 09:11

## 2025-01-03 RX ADMIN — INSULIN LISPRO 2 UNITS: 100 INJECTION, SOLUTION INTRAVENOUS; SUBCUTANEOUS at 17:55

## 2025-01-03 RX ADMIN — RANOLAZINE 500 MG: 500 TABLET, FILM COATED, EXTENDED RELEASE ORAL at 09:11

## 2025-01-03 RX ADMIN — PRAMIPEXOLE DIHYDROCHLORIDE 1 MG: 1 TABLET ORAL at 21:03

## 2025-01-03 RX ADMIN — INSULIN LISPRO 3 UNITS: 100 INJECTION, SOLUTION INTRAVENOUS; SUBCUTANEOUS at 12:27

## 2025-01-03 RX ADMIN — BUDESONIDE AND FORMOTEROL FUMARATE DIHYDRATE 2 PUFF: 160; 4.5 AEROSOL RESPIRATORY (INHALATION) at 07:20

## 2025-01-03 RX ADMIN — Medication 400 MG: at 09:12

## 2025-01-03 RX ADMIN — INSULIN GLARGINE 15 UNITS: 100 INJECTION, SOLUTION SUBCUTANEOUS at 09:11

## 2025-01-03 RX ADMIN — Medication 10 ML: at 00:01

## 2025-01-03 RX ADMIN — BUDESONIDE AND FORMOTEROL FUMARATE DIHYDRATE 2 PUFF: 160; 4.5 AEROSOL RESPIRATORY (INHALATION) at 19:10

## 2025-01-03 RX ADMIN — CARVEDILOL 12.5 MG: 12.5 TABLET, FILM COATED ORAL at 17:55

## 2025-01-03 RX ADMIN — RANOLAZINE 500 MG: 500 TABLET, FILM COATED, EXTENDED RELEASE ORAL at 21:03

## 2025-01-03 RX ADMIN — Medication 10 ML: at 21:04

## 2025-01-03 RX ADMIN — Medication 10 ML: at 09:13

## 2025-01-03 RX ADMIN — ISOSORBIDE MONONITRATE 30 MG: 30 TABLET, EXTENDED RELEASE ORAL at 09:12

## 2025-01-03 RX ADMIN — PANTOPRAZOLE SODIUM 40 MG: 40 TABLET, DELAYED RELEASE ORAL at 06:43

## 2025-01-03 RX ADMIN — ASPIRIN 81 MG CHEWABLE TABLET 81 MG: 81 TABLET CHEWABLE at 09:11

## 2025-01-03 RX ADMIN — HYDRALAZINE HYDROCHLORIDE 25 MG: 25 TABLET ORAL at 09:12

## 2025-01-03 RX ADMIN — SERTRALINE HYDROCHLORIDE 100 MG: 50 TABLET ORAL at 09:12

## 2025-01-03 RX ADMIN — INSULIN GLARGINE 15 UNITS: 100 INJECTION, SOLUTION SUBCUTANEOUS at 21:03

## 2025-01-03 RX ADMIN — ACETAMINOPHEN 650 MG: 325 TABLET, FILM COATED ORAL at 23:20

## 2025-01-03 RX ADMIN — FINASTERIDE 5 MG: 5 TABLET, FILM COATED ORAL at 09:12

## 2025-01-03 RX ADMIN — TIOTROPIUM BROMIDE INHALATION SPRAY 2 PUFF: 3.12 SPRAY, METERED RESPIRATORY (INHALATION) at 07:20

## 2025-01-03 RX ADMIN — HYDRALAZINE HYDROCHLORIDE 25 MG: 25 TABLET ORAL at 17:55

## 2025-01-03 RX ADMIN — ATORVASTATIN CALCIUM 80 MG: 80 TABLET, FILM COATED ORAL at 09:11

## 2025-01-03 RX ADMIN — PRASUGREL 10 MG: 10 TABLET, FILM COATED ORAL at 09:13

## 2025-01-03 NOTE — PLAN OF CARE
Goal Outcome Evaluation:      Interventions implemented as appropriate

## 2025-01-03 NOTE — CONSULTS
[facility]      Nephrology Consultation      Referring Provider:   Kerley, Brian Joseph, DO    Reason for Consultation:  Acute Kidney Injury on chronic kidney disease 3b and associated problems.    Subjective:  Chief complaint   Chief Complaint   Patient presents with    Generalized Body Aches     History of present illness:    Patient is 79-year-old male with multimedical problems including chronic kidney disease stage IIIb, type 2 diabetes, sleep apnea, COPD, history of coronary artery disease and prior MIs, he also has BPH.  He was sent to the ER after he was noted to have low blood pressure and nonspecific complaints of pains and aches as well as mild headache.  He denies having any fever or chills.  In the ER he was noted to have significant worsening of renal function and blood pressure was noted to be on the low side patient received 1 L of fluid and was admitted for further evaluation and treatment.  Currently patient is lying in the bed  Awake alert and interactive denies having any chest pain or shortness of breath no nausea vomiting.  He said he does not do a whole lot at home can walk around in the house to the bathroom and kitchen.  Denies any sick contacts.  No significant worsening of lower extremity edema.  I have reviewed labs/imaging/records from this hospitalization, including ER staff and admitting/attending physicians H/P's and progress notes to establish a comprehensive understanding of this patient's clinical hospital course, as well as to establish plan of care appropriately.     Past Medical History:   Diagnosis Date    Benign hypertension     Coronary artery disease     Depression     Enlarged prostate     Enlarged prostate with anticipated TURP with Dr. Bernal.    GERD (gastroesophageal reflux disease)     Heart attack     Hypercholesterolemia     Impaired functional mobility, balance, gait, and endurance     Myocardial infarction     Obesity     Obstructive sleep  apnea on CPAP     Type 2 diabetes mellitus        Past Surgical History:   Procedure Laterality Date    CARDIAC CATHETERIZATION      CARDIAC CATHETERIZATION Left 10/19/2021    Procedure: Left Heart Cath;  Surgeon: Liz Russo MD;  Location:  [facility];  Service: Cardiology;  Laterality: Left;    CARDIAC CATHETERIZATION N/A 7/18/2023    Procedure: Coronary angiography;  Surgeon: Rupesh Parr MD;  Location:  [facility];  Service: Cardiology;  Laterality: N/A;    CARDIAC CATHETERIZATION N/A 7/18/2023    Procedure: Percutaneous Coronary Intervention;  Surgeon: Rupesh Parr MD;  Location:  [facility];  Service: Cardiology;  Laterality: N/A;    COLONOSCOPY W/ POLYPECTOMY      CORONARY ANGIOPLASTY WITH STENT PLACEMENT Left 06/03/2009    Left heart catheterization, 06/03/2009, Dr. Russo:  LVEF 45% to 50%.  Placement of overlapping Cypher drug-eluting stent 2.5 x 28 and 2.5 x 18 to the SVG to the obtuse marginal branch.  SVG to the PDA had a proximal stenosis estimated at 60% with a distal stenosis of 50% to 60%.    CORONARY ANGIOPLASTY WITH STENT PLACEMENT Left 07/06/2009    Left heart catheterization, 07/06/2009:  PTCA and stent placement in the mid PDA using a 2.25 x 12 mm Taxus drug-eluting stent with stent placement in the distal SVG to the PDA using a 2.5 x 18 mm Cypher drug-eluting stent and stenting of the proximal SVG to the PDA using a 3.0 x 28 mm Cypher drug-eluting stent.    CORONARY ANGIOPLASTY WITH STENT PLACEMENT  04/23/2010    Cardiac catheterization for recurrent anginal symptoms, 04/23/2010, with PTCA and stent placement in the proximal SVG to the PDA using 3.0 x 28 mm Promus drug-eluting stent for in-stent restenosis.    CORONARY ANGIOPLASTY WITH STENT PLACEMENT Left 07/06/2010    Left heart catheterization, 07/06/2010, Dr. Russo:  EF 40% to 45%.  3.5 x 23 mm Promus drug-eluting stent in the proximal SVG to OM,  2.5 x 18 mm Promus drug-eluting stent to distal SVG to PDA due to in-stent restenosis.      CORONARY ANGIOPLASTY WITH STENT PLACEMENT Left 11/16/2010    Left heart catheterization, 11/16/2010, with placement of a 3.0 x 16 mm Taxus drug-eluting stent to the SVG to the RCA proximally and a 2.5 x 16 mm paclitaxel stent distally in the SVG to the RCA.    CORONARY ANGIOPLASTY WITH STENT PLACEMENT Left     Left heart catheterization, 3.0 x 24-mm Promus element drug-eluting stent to a 90% lesion in the mid portion of the SVG to the PDA.     CORONARY ANGIOPLASTY WITH STENT PLACEMENT Left 06/24/2014    Left heart catheterization for recurrent angina, 06/24/2014, Dr. Russo:  PTCA of the SVG to the PDA using a 3 x 12 mm NC TREK balloon.      CORONARY ARTERY BYPASS GRAFT      CABG x3, Dr. Peng, [facility], 2001.     Family History   Problem Relation Age of Onset    Heart attack Mother     Ulcers Father        Social History     Tobacco Use    Smoking status: Never    Smokeless tobacco: Never   Vaping Use    Vaping status: Never Used   Substance Use Topics    Alcohol use: No    Drug use: No     Home medications:   Prior to Admission Medications       Prescriptions Last Dose Informant Patient Reported? Taking?    albuterol sulfate  (90 Base) MCG/ACT inhaler 1/2/2025  No Yes    Inhale 2 puffs Every 4 (Four) Hours As Needed for Wheezing.    aspirin 81 MG chewable tablet 1/2/2025 Self, Spouse/Significant Other Yes Yes    Chew 1 tablet Daily.    atorvastatin (LIPITOR) 80 MG tablet 1/1/2025  No Yes    Take 1 tablet by mouth Daily.    B-D UF III MINI PEN NEEDLES 31G X 5 MM misc 1/2/2025  Yes Yes    carvedilol (COREG) 12.5 MG tablet 1/2/2025  No Yes    Take 1 tablet by mouth 2 (Two) Times a Day With Meals.    Cholecalciferol (VITAMIN D3) 50 MCG (2000 UT) capsule 1/2/2025  Yes Yes    Take 1 capsule by mouth Daily.    dapagliflozin Propanediol (Farxiga) 10 MG tablet 1/2/2025 Spouse/Significant Other Yes Yes     Take 10 mg by mouth Daily.    docusate calcium (SURFAK) 240 MG capsule 1/2/2025  Yes Yes    Take 1 capsule by mouth 2 (Two) Times a Day.    fexofenadine (ALLEGRA) 180 MG tablet 1/2/2025 Spouse/Significant Other Yes Yes    Take 1 tablet by mouth Daily As Needed.    finasteride (PROSCAR) 5 MG tablet 1/2/2025  Yes Yes    Take 1 tablet by mouth Daily.    Fluticasone-Umeclidin-Vilant (Trelegy Ellipta) 200-62.5-25 MCG/ACT inhaler 1/1/2025  No Yes    Inhale 1 puff Daily. Rinse mouth with water after use    hydrALAZINE (APRESOLINE) 25 MG tablet   No Yes    Take 3 tablets by mouth 3 (Three) Times a Day for 30 days.    insulin degludec (Tresiba FlexTouch) 100 UNIT/ML solution pen-injector injection 1/2/2025  Yes Yes    Inject  under the skin into the appropriate area as directed 2 (Two) Times a Day. 22 units in the am   37 units at bedtime    Insulin Lispro (humaLOG) 100 UNIT/ML injection 1/2/2025  Yes Yes    Inject 8 Units under the skin into the appropriate area as directed Daily. Daily with supper    isosorbide mononitrate (IMDUR) 30 MG 24 hr tablet 1/2/2025  No Yes    Take 1 tablet by mouth Daily.    magnesium oxide (MAG-OX) 400 MG tablet 1/1/2025  Yes Yes    Take 1 tablet by mouth Daily.    Multiple Vitamin (MULTI VITAMIN DAILY PO) 1/2/2025  Yes Yes    Take 1 tablet by mouth Daily.    pantoprazole (PROTONIX) 40 MG EC tablet 1/2/2025  Yes Yes    TAKE 1 TABLET BY MOUTH 2 TIMES DAILY (BEFORE MEALS)    pramipexole (MIRAPEX) 1 MG tablet 1/1/2025  No Yes    Take 1 tablet by mouth Every Night.    prasugrel (EFFIENT) 10 MG tablet 1/2/2025  Yes Yes    Take 1 tablet by mouth Daily.    ranolazine (RANEXA) 500 MG 12 hr tablet   No Yes    Take 1 tablet by mouth Every 12 (Twelve) Hours for 180 days.    sertraline (ZOLOFT) 100 MG tablet 1/2/2025  Yes Yes    Take 1 tablet by mouth Daily.    spironolactone (ALDACTONE) 25 MG tablet   No Yes    Take 1 tablet by mouth Daily for 30 days.    tamsulosin (FLOMAX) 0.4 MG capsule 24 hr capsule  1/2/2025  Yes Yes    Take 1 capsule by mouth 2 (Two) Times a Day.    torsemide 40 MG tablet   No Yes    Take 40 mg by mouth Daily for 30 days.    TRUEplus Lancets 28G misc   Yes Yes    Trulicity 0.75 MG/0.5ML solution pen-injector   Yes Yes    Inject 0.75 mg as directed 1 (One) Time Per Week.    fluticasone (FLONASE) 50 MCG/ACT nasal spray More than a month  No No    1 spray into the nostril(s) as directed by provider Daily.    Patient taking differently:  Administer 1 spray into the nostril(s) as directed by provider Daily As Needed.    nitroglycerin (NITROSTAT) 0.4 MG SL tablet More than a month  No No    Place 1 tablet under the tongue Every 5 (Five) Minutes As Needed for Chest Pain. Take no more than 3 doses in 15 minutes.          Emergency department medications:   Medications   sodium chloride 0.9 % flush 10 mL (has no administration in time range)   aspirin chewable tablet 81 mg (has no administration in time range)   atorvastatin (LIPITOR) tablet 80 mg (has no administration in time range)   carvedilol (COREG) tablet 12.5 mg (has no administration in time range)   finasteride (PROSCAR) tablet 5 mg (has no administration in time range)   budesonide-formoterol (SYMBICORT) 160-4.5 MCG/ACT inhaler 2 puff ( Inhalation Canceled Entry 1/3/25 0930)     And   tiotropium (SPIRIVA RESPIMAT) 2.5 mcg/act aerosol solution inhaler ( Inhalation Canceled Entry 1/3/25 0930)   insulin glargine (LANTUS, SEMGLEE) injection 15 Units (has no administration in time range)   isosorbide mononitrate (IMDUR) 24 hr tablet 30 mg (has no administration in time range)   magnesium oxide (MAG-OX) tablet 400 mg (has no administration in time range)   pantoprazole (PROTONIX) EC tablet 40 mg (40 mg Oral Given 1/3/25 0643)   prasugrel (EFFIENT) tablet 10 mg (has no administration in time range)   sertraline (ZOLOFT) tablet 100 mg (has no administration in time range)   pramipexole (MIRAPEX) tablet 1 mg (1 mg Oral Not Given 1/3/25 0001)    ranolazine (RANEXA) 12 hr tablet 500 mg (has no administration in time range)   sodium chloride 0.9 % flush 10 mL (10 mL Intravenous Given 1/3/25 0001)   sodium chloride 0.9 % flush 10 mL (has no administration in time range)   sodium chloride 0.9 % infusion 40 mL (has no administration in time range)   dextrose (GLUTOSE) oral gel 15 g (has no administration in time range)   dextrose (D50W) (25 g/50 mL) IV injection 25 g (has no administration in time range)   glucagon (GLUCAGEN) injection 1 mg (has no administration in time range)   Insulin Lispro (humaLOG) injection 2-7 Units (has no administration in time range)   nitroglycerin (NITROSTAT) SL tablet 0.4 mg (has no administration in time range)   acetaminophen (TYLENOL) tablet 650 mg (has no administration in time range)     Or   acetaminophen (TYLENOL) 160 MG/5ML oral solution 650 mg (has no administration in time range)     Or   acetaminophen (TYLENOL) suppository 650 mg (has no administration in time range)   sennosides-docusate (PERICOLACE) 8.6-50 MG per tablet 2 tablet (has no administration in time range)     And   polyethylene glycol (MIRALAX) packet 17 g (has no administration in time range)     And   bisacodyl (DULCOLAX) EC tablet 5 mg (has no administration in time range)     And   bisacodyl (DULCOLAX) suppository 10 mg (has no administration in time range)   ondansetron ODT (ZOFRAN-ODT) disintegrating tablet 4 mg (has no administration in time range)     Or   ondansetron (ZOFRAN) injection 4 mg (has no administration in time range)   hydrALAZINE (APRESOLINE) tablet 25 mg (has no administration in time range)   sodium chloride 0.9 % bolus 1,000 mL (0 mL Intravenous Stopped 1/2/25 1750)       Allergies:  Zocor [simvastatin], Bisoprolol, Byetta 10 mcg pen [exenatide], Crestor [rosuvastatin calcium], Metformin and related, and Plavix [clopidogrel bisulfate]    Review of Systems  14 point review of system were done and are negative except as mentioned in  "the history of present illness and assessment and plan.    Physical Exam:  Objective:  Vital Signs  /65 (BP Location: Left arm, Patient Position: Lying)   Pulse 85   Temp 98.4 °F (36.9 °C) (Oral)   Resp 18   Ht 167.6 cm (66\")   Wt 93.5 kg (206 lb 2.1 oz)   SpO2 96%   BMI 33.27 kg/m²   Objective    I/O this shift:  In: 480 [P.O.:480]  Out: -     Intake/Output Summary (Last 24 hours) at 1/3/2025 0833  Last data filed at 1/3/2025 0827  Gross per 24 hour   Intake 1480 ml   Output 650 ml   Net 830 ml        Physical Exam     Constitutional: Awake, alert  Eyes: sclerae anicteric, no conjunctival injection  HEENT: mucous membranes dry  Neck: Supple, no thyromegaly, no lymphadenopathy, trachea midline, No JVD  Respiratory: Decreased breath sounds all over the chest, occasional rhonchi.  Cardiovascular: RRR, no murmurs, rubs, or gallops.  Gastrointestinal: Positive bowel sounds, obese, soft, nontender, nondistended  Musculoskeletal: Trace edema, no clubbing or cyanosis  Psychiatric: Appropriate affect, cooperative  Neurologic: Oriented x 3, moving all extremities, Cranial Nerves grossly intact, speech clear  Skin: warm and dry, no rashes            Results Review:   Results from last 7 days   Lab Units 01/03/25  0617 01/02/25  1607   SODIUM mmol/L 143 140   POTASSIUM mmol/L 3.7 4.1   CHLORIDE mmol/L 105 100   CO2 mmol/L 23.7 27.2   BUN mg/dL 72* 79*   CREATININE mg/dL 2.22* 2.47*   CALCIUM mg/dL 8.7 8.5*   ALBUMIN g/dL  --  3.8   BILIRUBIN mg/dL  --  0.5   ALK PHOS U/L  --  57   ALT (SGPT) U/L  --  38   AST (SGOT) U/L  --  27   GLUCOSE mg/dL 107* 174*     Estimated Creatinine Clearance: 29.4 mL/min (A) (by C-G formula based on SCr of 2.22 mg/dL (H)).  Results from last 7 days   Lab Units 01/02/25  1607   MAGNESIUM mg/dL 2.7*         Results from last 7 days   Lab Units 01/02/25  1607   WBC 10*3/mm3 10.19   HEMOGLOBIN g/dL 10.2*   PLATELETS 10*3/mm3 227         Brief Urine Lab Results  (Last result in the past " "365 days)        Color   Clarity   Blood   Leuk Est   Nitrite   Protein   CREAT   Urine HCG        01/02/25 1900 Yellow   Clear   Negative   Negative   Negative   Negative                 No results found for: \"UTPCR\"  Imaging Results (Last 24 Hours)       Procedure Component Value Units Date/Time    CT Chest Without Contrast Diagnostic [[id number]] Collected: 01/02/25 2054     Updated: 01/02/25 2055    Narrative:      FINAL REPORT    TECHNIQUE:  null    CLINICAL HISTORY:  cough, neg cxr    COMPARISON:  null    FINDINGS:  CT chest without contrast    Comparison: None    Findings:    There is borderline cardiomegaly. There is no pericardial effusion. There is severe coronary artery calcification. Patient is status post CABG. Thoracic aorta is normal in diameter. Small calcified mediastinal and hilar lymph nodes are consistent with   old granulomatous disease.    There are several small bilateral calcified granulomas. There is a 10 mm solid well-circumscribed nodule in the right upper lobe just above the minor fissure with adjacent scarring. There is faint hyperdensity in the nodule suggestive of developing   calcification. Trachea and central bronchi are widely patent.    There is a mild chronic appearing T12 compression fracture. There is no suspicious lytic or sclerotic lesion.    Limited images of the upper abdomen demonstrate no acute findings.      Impression:      IMPRESSION:    1. No acute abnormality in the chest.    2. 10 mm nodule in the right upper lobe with adjacent scarring and faint hyperdensity within the nodule suggestive of developing calcification within a granuloma, but indeterminate. Comparison with prior imaging if available, follow-up chest CT in 3   months, or PET-CT is recommended.    3. Additional chronic findings as above.    Authenticated and Electronically Signed by Jos Hayes MD on  01/02/2025 08:54:21 PM    XR Chest 1 View [[id number]] Collected: 01/02/25 1652     Updated: 01/02/25 " 3127    Narrative:      PROCEDURE: XR CHEST 1 VW-     HISTORY: Weak/Dizzy/AMS triage protocol     COMPARISON: April 27, 2024..     FINDINGS: The heart is mildly enlarged, similar to prior.. Previously  described right upper lobe disease has resolved. There is evidence of  old calcified granulomatous disease. No new area of consolidation seen..  The mediastinum is unremarkable. There is no pneumothorax.  There are no  acute osseous abnormalities. Status post median sternotomy. Apical  lordotic positioning noted.        Impression:      Cardiomegaly without acute infiltrate..              This report was signed and finalized on 1/2/2025 4:53 PM by Mayra Da Silva MD.             aspirin, 81 mg, Oral, Daily  atorvastatin, 80 mg, Oral, Daily  budesonide-formoterol, 2 puff, Inhalation, BID - RT   And  tiotropium bromide monohydrate, 2 puff, Inhalation, Daily - RT  carvedilol, 12.5 mg, Oral, BID With Meals  finasteride, 5 mg, Oral, Daily  hydrALAZINE, 25 mg, Oral, TID  insulin glargine, 15 Units, Subcutaneous, Q12H  insulin lispro, 2-7 Units, Subcutaneous, 4x Daily AC & at Bedtime  isosorbide mononitrate, 30 mg, Oral, Daily  magnesium oxide, 400 mg, Oral, Daily  pantoprazole, 40 mg, Oral, Q AM  pramipexole, 1 mg, Oral, Nightly  prasugrel, 10 mg, Oral, Daily  ranolazine, 500 mg, Oral, Q12H  sertraline, 100 mg, Oral, Daily  sodium chloride, 10 mL, Intravenous, Q12H           Assessment/Plan:      IMANI (acute kidney injury)    Acute kidney injury: Patient does have significant worsening of his renal function from the baseline.  He has been on multiple blood pressure medications as well as diuretics likely leading to worsening renal function.  Chronic kidney disease stage IIIb: Patient has diabetic and hypertensive glomerulosclerosis with last creatinine of 2.1 with a EGFR of 32 mL/min.  CAD: Denies having any significant issues with chest pains.  She is on optimize medications.  Type 2 diabetes: He has been on insulin at home,  continue sliding scale for now.  Lung nodule: He has stable follow-up pulmonary, Dr. Bob.  Anemia: Patient has a history of anemia, hemoglobin has been stable around 10.2.      Risk and complexity: High       Plan:  Patient was given 1 L of fluid with significant improvement in his volume status as well as blood pressure and renal function.  He has been eating and drinking fairly well we will hold off giving any more IV fluids.  Patient's home dose of diuretics listed as spironolactone 25 mg daily and torsemide 40 mg daily, they are both on hold.  He is also been taking Farxiga.  If he is clinically stable in the morning may be able to go back home.  May need to decrease the diuretics, can switch torsemide to every other day alternating with spironolactone.  We can still use the same dose.  Continue with rest of the current treatment plan and surveillance labs.  Details were discussed with the patient no family in the room.    Details were also discussed with the hospitalist service.  Patient will need close follow-up with the nephrology clinic, he will need 1 week follow-up after discharge.  Further recommendations will depend on clinical course of the patient during the current hospitalization.    I also discussed the details with the nursing staff.  Rest as ordered.    In closing, I sincerely appreciate opportunity to participate in care of this patient. If I can be of any further assistance with the management of this patient, please don’t hesitate to contact me.    Destin Wilkes MD, FASN    01/03/25  08:33 EST    Dictated using Dragon.

## 2025-01-03 NOTE — PLAN OF CARE
Goal Outcome Evaluation:  Plan of Care Reviewed With: patient, spouse        Progress: no change  Outcome Evaluation: Pt participated in PT initial evaluation this date. Pt presents supine in bed, pleasant and agreeable to PT evaluation. Pt AOx4, denies reports of pain at rest. Pt reports at baseline, he lives at home with his wife in a H with 2 MATTEO.  Pt reports he is normally (I) with household ambulation without AD, states he uses a SPC as needed. Pt's wife reports that he does not walk much throughout the day. Pt denies reports of falls at home. Pt performed supine > sit on EOB with min A, required assistance to position trunk upright. Pt performed STS transfer with CGA and use of a RW. Pt ambulated x225' with RW and CGA for safety. Pt demonstrated increased FF posture, decreased griselda, increased WBing through UE's onto RW and short (B) step length. Pt required frequent VC for proper management of RW during gait and transfers. Following evaluation, pt left seated in bedside chair with chair alarm active, call light and all needs within reach. Recommend skilled PT intervention during IP admission to address identified deficits, reduce risk of falls and prevent functional decline. Once medically stable, recommend pt to d/c home with support from family and HHPT.    Anticipated Discharge Disposition (PT): home with assist, home with home health

## 2025-01-03 NOTE — THERAPY EVALUATION
Patient Name: Donny Jerry  : 1945    MRN: [id number]                              Today's Date: 1/3/2025       Admit Date: 2025    Visit Dx:     ICD-10-CM ICD-9-CM   1. Acute kidney injury  N17.9 584.9   2. NSTEMI (non-ST elevated myocardial infarction)  I21.4 410.70   3. History of diabetes mellitus  Z86.39 V12.29   4. History of hypertension  Z86.79 V12.59   5. History of hyperlipidemia  Z86.39 V12.29   6. History of coronary artery disease  Z86.79 V12.59   7. History of congestive heart failure  Z86.79 V12.59   8. 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 pectoris    Essential hypertension    Hyperlipidemia LDL goal <70    Type 2 diabetes mellitus with stage 3 chronic kidney disease    Obstructive sleep apnea on CPAP    Enlarged prostate    Obesity, Class II, BMI 35-39.9    Depression    Acute hypoxemic respiratory failure due to COVID-19    CKD (chronic kidney disease) stage 3, GFR 30-59 ml/min    Elevated troponin level not due myocardial infarction    Post viral debility    Chest pain, unspecified type    Chronic systolic heart failure    Type 1 myocardial infarction    Chronic diastolic heart failure    Bilateral lower extremity edema    Acute exacerbation of congestive heart failure    Acute on chronic HFrEF (heart failure with reduced ejection fraction)    Acute on chronic systolic heart failure    Syncope    CHF exacerbation    Heart failure    IMANI (acute kidney injury)     Past Medical History:   Diagnosis Date    Benign hypertension     Coronary artery disease     Depression     Enlarged prostate     Enlarged prostate with anticipated TURP with Dr. Bernal.    GERD (gastroesophageal reflux disease)     Heart attack     Hypercholesterolemia     Impaired functional mobility, balance, gait, and endurance     Myocardial infarction     Obesity      Obstructive sleep apnea on CPAP     Type 2 diabetes mellitus      Past Surgical History:   Procedure Laterality Date    CARDIAC CATHETERIZATION      CARDIAC CATHETERIZATION Left 10/19/2021    Procedure: Left Heart Cath;  Surgeon: Liz Russo MD;  Location:  [facility];  Service: Cardiology;  Laterality: Left;    CARDIAC CATHETERIZATION N/A 7/18/2023    Procedure: Coronary angiography;  Surgeon: Rupesh Parr MD;  Location:  [facility];  Service: Cardiology;  Laterality: N/A;    CARDIAC CATHETERIZATION N/A 7/18/2023    Procedure: Percutaneous Coronary Intervention;  Surgeon: Rupesh Parr MD;  Location:  [facility];  Service: Cardiology;  Laterality: N/A;    COLONOSCOPY W/ POLYPECTOMY      CORONARY ANGIOPLASTY WITH STENT PLACEMENT Left 06/03/2009    Left heart catheterization, 06/03/2009, Dr. Russo:  LVEF 45% to 50%.  Placement of overlapping Cypher drug-eluting stent 2.5 x 28 and 2.5 x 18 to the SVG to the obtuse marginal branch.  SVG to the PDA had a proximal stenosis estimated at 60% with a distal stenosis of 50% to 60%.    CORONARY ANGIOPLASTY WITH STENT PLACEMENT Left 07/06/2009    Left heart catheterization, 07/06/2009:  PTCA and stent placement in the mid PDA using a 2.25 x 12 mm Taxus drug-eluting stent with stent placement in the distal SVG to the PDA using a 2.5 x 18 mm Cypher drug-eluting stent and stenting of the proximal SVG to the PDA using a 3.0 x 28 mm Cypher drug-eluting stent.    CORONARY ANGIOPLASTY WITH STENT PLACEMENT  04/23/2010    Cardiac catheterization for recurrent anginal symptoms, 04/23/2010, with PTCA and stent placement in the proximal SVG to the PDA using 3.0 x 28 mm Promus drug-eluting stent for in-stent restenosis.    CORONARY ANGIOPLASTY WITH STENT PLACEMENT Left 07/06/2010    Left heart catheterization, 07/06/2010, Dr. Russo:  EF 40% to 45%.  3.5 x 23 mm Promus drug-eluting stent in the  proximal SVG to OM, 2.5 x 18 mm Promus drug-eluting stent to distal SVG to PDA due to in-stent restenosis.      CORONARY ANGIOPLASTY WITH STENT PLACEMENT Left 11/16/2010    Left heart catheterization, 11/16/2010, with placement of a 3.0 x 16 mm Taxus drug-eluting stent to the SVG to the RCA proximally and a 2.5 x 16 mm paclitaxel stent distally in the SVG to the RCA.    CORONARY ANGIOPLASTY WITH STENT PLACEMENT Left     Left heart catheterization, 3.0 x 24-mm Promus element drug-eluting stent to a 90% lesion in the mid portion of the SVG to the PDA.     CORONARY ANGIOPLASTY WITH STENT PLACEMENT Left 06/24/2014    Left heart catheterization for recurrent angina, 06/24/2014, Dr. Russo:  PTCA of the SVG to the PDA using a 3 x 12 mm NC TREK balloon.      CORONARY ARTERY BYPASS GRAFT      CABG x3, Dr. Peng, [facility], 2001.      General Information       Row Name 01/03/25 1153          Physical Therapy Time and Intention    Document Type evaluation  -     Mode of Treatment physical therapy  -       Row Name 01/03/25 1153          General Information    Patient Profile Reviewed yes  -     Prior Level of Function independent:;all household mobility  uses a SPC as needed  -     Existing Precautions/Restrictions fall  -     Barriers to Rehab medically complex  -       Row Name 01/03/25 1153          Living Environment    People in Home spouse  -       Row Name 01/03/25 1153          Home Main Entrance    Number of Stairs, Main Entrance two  -     Stair Railings, Main Entrance railings safe and in good condition  -       Row Name 01/03/25 1153          Stairs Within Home, Primary    Number of Stairs, Within Home, Primary none  -       Row Name 01/03/25 1153          Cognition    Orientation Status (Cognition) oriented x 4  -       Row Name 01/03/25 1153          Safety Issues/Impairments Affecting Functional Mobility    Safety Issues Affecting Function (Mobility) awareness of need  for assistance;insight into deficits/self-awareness;positioning of assistive device;problem-solving;safety precaution awareness  -     Impairments Affecting Function (Mobility) balance;endurance/activity tolerance;postural/trunk control;strength;motor control  -               User Key  (r) = Recorded By, (t) = Taken By, (c) = Cosigned By      Initials Name Provider Type     Kati Abel PT Physical Therapist                   Mobility       Row Name 01/03/25 1156          Bed Mobility    Bed Mobility supine-sit  -     Supine-Sit [place] (Bed Mobility) minimum assist (75% patient effort);verbal cues;nonverbal cues (demo/gesture)  -     Assistive Device (Bed Mobility) head of bed elevated;bed rails  -       Row Name 01/03/25 1156          Sit-Stand Transfer    Sit-Stand [place] (Transfers) contact guard  -     Assistive Device (Sit-Stand Transfers) walker, front-wheeled  -       Row Name 01/03/25 1156          Gait/Stairs (Locomotion)    [place] Level (Gait) contact guard  -     Assistive Device (Gait) walker, front-wheeled  -     Patient was able to Ambulate yes  -     Distance in Feet (Gait) 225  -     Deviations/Abnormal Patterns (Gait) bilateral deviations;base of support, wide;griselda decreased;festinating/shuffling;gait speed decreased  -     Bilateral Gait Deviations forward flexed posture;heel strike decreased  -     [place] Level (Stairs) not tested  -               User Key  (r) = Recorded By, (t) = Taken By, (c) = Cosigned By      Initials Name Provider Type     Kati Abel PT Physical Therapist                   Obj/Interventions       Row Name 01/03/25 1157          Range of Motion Comprehensive    General Range of Motion bilateral lower extremity ROM WFL  -       Row Name 01/03/25 1157          Strength Comprehensive (MMT)    General Manual Muscle Testing (MMT) Assessment lower extremity strength deficits identified  -     Comment, General Manual  Muscle Testing (MMT) Assessment BLE MMT grossly 4-/5  -HW       Row Name 01/03/25 1157          Balance    Balance Assessment sitting static balance;sitting dynamic balance;standing static balance;standing dynamic balance  -     Static Sitting Balance standby assist  -     Dynamic Sitting Balance standby assist  -HW     Position, Sitting Balance unsupported;sitting edge of bed  -     Static Standing Balance contact guard  -HW     Dynamic Standing Balance contact guard  -HW     Position/Device Used, Standing Balance supported;walker, front-wheeled  -       Row Name 01/03/25 1157          Sensory Assessment (Somatosensory)    Sensory Assessment (Somatosensory) LE sensation intact  -               User Key  (r) = Recorded By, (t) = Taken By, (c) = Cosigned By      Initials Name Provider Type     Kati Abel, PT Physical Therapist                   Goals/Plan       Row Name 01/03/25 1215          Bed Mobility Goal 1 (PT)    Activity/Assistive Device (Bed Mobility Goal 1, PT) bed mobility activities, all  -HW     [place] Level/Cues Needed (Bed Mobility Goal 1, PT) modified independence  -HW     Time Frame (Bed Mobility Goal 1, PT) long term goal (LTG);10 days  -HW     Progress/Outcomes (Bed Mobility Goal 1, PT) new goal  -       Row Name 01/03/25 1215          Transfer Goal 1 (PT)    Activity/Assistive Device (Transfer Goal 1, PT) transfers, all  -HW     [place] Level/Cues Needed (Transfer Goal 1, PT) supervision required  -HW     Time Frame (Transfer Goal 1, PT) long term goal (LTG);10 days  -HW     Progress/Outcome (Transfer Goal 1, PT) new goal  -       Row Name 01/03/25 1215          Gait Training Goal 1 (PT)    Activity/Assistive Device (Gait Training Goal 1, PT) gait (walking locomotion);walker, rolling  -HW     [place] Level (Gait Training Goal 1, PT) supervision required  -HW     Distance (Gait Training Goal 1, PT) 300  -HW     Time Frame (Gait Training Goal 1, PT) long term goal  (LTG);10 days  -     Progress/Outcome (Gait Training Goal 1, PT) new goal  -       Row Name 01/03/25 1215          Patient Education Goal (PT)    Activity (Patient Education Goal, PT) Pt will perform 1x10 BLE therex with mod (I) to allow for improved BLE strength and endurance  -     [place]/Cues/Accuracy (Memory Goal 2, PT) demonstrates adequately  -HW     Time Frame (Patient Education Goal, PT) short term goal (STG);5 days  -HW     Progress/Outcome (Patient Education Goal, PT) new goal  -       Row Name 01/03/25 1215          Therapy Assessment/Plan (PT)    Planned Therapy Interventions (PT) balance training;bed mobility training;gait training;home exercise program;patient/family education;postural re-education;stretching;strengthening;ROM (range of motion);transfer training;neuromuscular re-education  -               User Key  (r) = Recorded By, (t) = Taken By, (c) = Cosigned By      Initials Name Provider Type     Kati Abel, PT Physical Therapist                   Clinical Impression       Row Name 01/03/25 1159          Pain    Pretreatment Pain Rating 0/10 - no pain  -     Posttreatment Pain Rating 0/10 - no pain  -       Row Name 01/03/25 1159          Plan of Care Review    Plan of Care Reviewed With patient;spouse  -     Progress no change  -     Outcome Evaluation Pt participated in PT initial evaluation this date. Pt presents supine in bed, pleasant and agreeable to PT evaluation. Pt AOx4, denies reports of pain at rest. Pt reports at baseline, he lives at home with his wife in a [facility] with 2 MATTEO.  Pt reports he is normally (I) with household ambulation without AD, states he uses a SPC as needed. Pt's wife reports that he does not walk much throughout the day. Pt denies reports of falls at home. Pt performed supine > sit on EOB with min A, required assistance to position trunk upright. Pt performed STS transfer with CGA and use of a RW. Pt ambulated x225' with RW and CGA for  safety. Pt demonstrated increased FF posture, decreased griselda, increased WBing through UE's onto RW and short (B) step length. Pt required frequent VC for proper management of RW during gait and transfers. Following evaluation, pt left seated in bedside chair with chair alarm active, call light and all needs within reach. Recommend skilled PT intervention during IP admission to address identified deficits, reduce risk of falls and prevent functional decline. Once medically stable, recommend pt to d/c home with support from family and HHPT.  -       Row Name 01/03/25 1159          Therapy Assessment/Plan (PT)    Patient/Family Therapy Goals Statement (PT) Pt is eager to return to [facility]  -     Rehab Potential (PT) good  -     Criteria for Skilled Interventions Met (PT) yes  -     Therapy Frequency (PT) 5 times/wk  -     Predicted Duration of Therapy Intervention (PT) 10 days  -       Row Name 01/03/25 1159          Vital Signs    Pretreatment Heart Rate (beats/min) 81  -HW     Pre SpO2 (%) 97  -     O2 Delivery Pre Treatment room air  -     O2 Delivery Intra Treatment room air  -     O2 Delivery Post Treatment room air  -     Pre Patient Position Supine  -     Intra Patient Position Standing  -     Post Patient Position Sitting  -       Row Name 01/03/25 1159          Positioning and Restraints    Pre-Treatment Position in bed  -     Post Treatment Position chair  -HW     In Chair sitting;exit alarm on;encouraged to call for assist;notified nsg;call light within reach;with family/caregiver  -               User Key  (r) = Recorded By, (t) = Taken By, (c) = Cosigned By      Initials Name Provider Type     Kati Abel, PT Physical Therapist                   Outcome Measures       Row Name 01/03/25 1216 01/03/25 0830       How much help from another person do you currently need...    Turning from your back to your side while in flat bed without using bedrails? 3  - 3  -MM    Moving  from lying on back to sitting on the side of a flat bed without bedrails? 3  -HW 3  -MM    Moving to and from a bed to a chair (including a wheelchair)? 3  -HW 2  -MM    Standing up from a chair using your arms (e.g., wheelchair, bedside chair)? 3  -HW 2  -MM    Climbing 3-5 steps with a railing? 3  -HW 1  -MM    To walk in hospital room? 3  - 2  -MM    AM-PAC 6 Clicks Score (PT) 18  - 13  -MM    Highest Level of Mobility Goal 6 --> Walk 10 steps or more  - 4 --> Transfer to chair/commode  -MM      Row Name 01/03/25 1216          Functional Assessment    Outcome Measure Options AM-PAC 6 Clicks Basic Mobility (PT)  -               User Key  (r) = Recorded By, (t) = Taken By, (c) = Cosigned By      Initials Name Provider Type    MM Mari Birch, RN Registered Nurse     Kati Abel PT Physical Therapist                                 Physical Therapy Education       Title: PT OT SLP Therapies (In Progress)       Topic: Physical Therapy (In Progress)       Point: Mobility training (Done)       Learning Progress Summary            Patient Acceptance, E,TB, VU by  at 1/3/2025 1217    Comment: Role of PT during IP admission                      Point: Home exercise program (Not Started)       Learner Progress:  Not documented in this visit.              Point: Body mechanics (Not Started)       Learner Progress:  Not documented in this visit.              Point: Precautions (Not Started)       Learner Progress:  Not documented in this visit.                              User Key       Initials Effective Dates Name Provider Type Discipline     11/29/23 -  Kati Abel PT Physical Therapist PT                  PT Recommendation and Plan  Planned Therapy Interventions (PT): balance training, bed mobility training, gait training, home exercise program, patient/family education, postural re-education, stretching, strengthening, ROM (range of motion), transfer training, neuromuscular re-education  Progress:  no change  Outcome Evaluation: Pt participated in PT initial evaluation this date. Pt presents supine in bed, pleasant and agreeable to PT evaluation. Pt AOx4, denies reports of pain at rest. Pt reports at baseline, he lives at home with his wife in a [facility] with 2 MATTEO.  Pt reports he is normally (I) with household ambulation without AD, states he uses a SPC as needed. Pt's wife reports that he does not walk much throughout the day. Pt denies reports of falls at home. Pt performed supine > sit on EOB with min A, required assistance to position trunk upright. Pt performed STS transfer with CGA and use of a RW. Pt ambulated x225' with RW and CGA for safety. Pt demonstrated increased FF posture, decreased griselda, increased WBing through UE's onto RW and short (B) step length. Pt required frequent VC for proper management of RW during gait and transfers. Following evaluation, pt left seated in bedside chair with chair alarm active, call light and all needs within reach. Recommend skilled PT intervention during IP admission to address identified deficits, reduce risk of falls and prevent functional decline. Once medically stable, recommend pt to d/c home with support from family and HHPT.     Time Calculation:   PT Evaluation Complexity  History, PT Evaluation Complexity: 1-2 personal factors and/or comorbidities  Examination of Body Systems (PT Eval Complexity): 1-2 elements  Clinical Presentation (PT Evaluation Complexity): stable  Clinical Decision Making (PT Evaluation Complexity): low complexity  Overall Complexity (PT Evaluation Complexity): low complexity     PT Charges       Row Name 01/03/25 1218             Time Calculation    Start Time 1016  -HW      PT Received On 01/03/25  -HW      PT Goal Re-Cert Due Date 01/13/25  -HW         Untimed Charges    PT Eval/Re-eval Minutes 45  -HW         Total Minutes    Untimed Charges Total Minutes 45  -HW       Total Minutes 45  -HW                User Key  (r) = Recorded  By, (t) = Taken By, (c) = Cosigned By      Initials Name Provider Type     Kati Abel, PT Physical Therapist                  Therapy Charges for Today       Code Description Service Date Service Provider Modifiers Qty    [id number] HC PT EVAL LOW COMPLEXITY 3 1/3/2025 Kati Abel, NONA GP 1            PT G-Codes  Outcome Measure Options: AM-PAC 6 Clicks Basic Mobility (PT)  AM-PAC 6 Clicks Score (PT): 18  PT Discharge Summary  Anticipated Discharge Disposition (PT): home with assist, home with home health    Kati Abel, PT  1/3/2025

## 2025-01-03 NOTE — CASE MANAGEMENT/SOCIAL WORK
Discharge Planning Assessment   Erick     Patient Name: Donny Jerry  MRN: [id number]  Today's Date: 1/3/2025    Admit Date: 1/2/2025    Plan: DCP Home with wife. Will consider home health if needed.   Discharge Needs Assessment       Row Name 01/03/25 1258       Living Environment    People in Home spouse    Name(s) of People in Home Chely/Wife    Current Living Arrangements home    Duration at Residence 42 years    Potentially Unsafe Housing Conditions unable to assess    In the past 12 months has the electric, gas, oil, or water company threatened to shut off services in your home? No    Primary Care Provided by self    Provides Primary Care For no one    Family Caregiver if Needed spouse    Family Caregiver Names Chely/Wife    Quality of Family Relationships unable to assess    Able to Return to Prior Arrangements yes       Resource/Environmental Concerns    Resource/Environmental Concerns none    Transportation Concerns none       Transportation Needs    In the past 12 months, has lack of transportation kept you from medical appointments or from getting medications? no    In the past 12 months, has lack of transportation kept you from meetings, work, or from getting things needed for daily living? No       Food Insecurity    Within the past 12 months, you worried that your food would run out before you got the money to buy more. Never true    Within the past 12 months, the food you bought just didn't last and you didn't have money to get more. Never true       Transition Planning    Patient/Family Anticipates Transition to home with family    Patient/Family Anticipated Services at Transition home health care    Transportation Anticipated family or friend will provide       Discharge Needs Assessment    Readmission Within the Last 30 Days no previous admission in last 30 days    Current Outpatient/Agency/Support Group homecare agency    Equipment Currently Used at Home glucometer;walker,  "rolling;oxygen;cpap;bp cuff;pulse ox;scales;wheelchair;shower chair;grab bar    Concerns to be Addressed discharge planning    Do you want help finding or keeping work or a job? I do not need or want help    Do you want help with school or training? For example, starting or completing job training or getting a high school diploma, GED or equivalent No    Anticipated Changes Related to Illness none    Equipment Needed After Discharge other (see comments)  To be determined.    Outpatient/Agency/Support Group Needs homecare agency    Provided Post Acute Provider List? N/A                   Discharge Plan       Row Name [id number]       Plan    Plan DCP Home with wife. Will consider home health if needed.    Plan Comments CM spoke with pt and wife/Chely at bedside. Permission given to discuss DCP with both. Pt confirmed name,,address,and wife confirmed insurance. Yes LW, Yes to POA, and No  service. PCP confirmed as TIANNA Alonso, last seen \" 1 month ago\". Pharmacy used [facility].  Wife declined to meds to bed. DME listed above. Denies any additional DME needs. Has 02 at home at night 2 liters. Lives with wife and is retired. Pt States was  (I) with ADL's and (I) with mobility. Pt still  Drives self to appointments. Denies any financial or transportation issues. DCP return home with family to transport. Denies any needs. Will consider Home Health if needed.                  Continued Care and Services - Admitted Since 2025    No active coordination exists for this encounter.          Demographic Summary       Row Name 25 0052       General Information    Admission Type observation    Arrived From emergency department    Required Notices Provided Observation Status Notice    Referral Source admission list    Reason for Consult discharge planning    Preferred Language English       Contact Information    Permission Granted to Share Info With                    Functional " Status       Row Name 01/03/25 1254       Functional Status    Usual Activity Tolerance good    Current Activity Tolerance moderate       Physical Activity    On average, how many days per week do you engage in moderate to strenuous exercise (like a brisk walk)? 3 days    On average, how many minutes do you engage in exercise at this level? 20 min    Number of minutes of exercise per week 60       Assessment of Health Literacy    How often do you have someone help you read hospital materials? Never    How often do you have problems learning about your medical condition because of difficulty understanding written information? Never    How often do you have a problem understanding what is told to you about your medical condition? Never    How confident are you filling out medical forms by yourself? Extremely    Health Literacy Excellent       Functional Status, IADL    Medications independent    Meal Preparation assistive person    Housekeeping assistive person    Laundry assistive person    Shopping assistive person    If for any reason you need help with day-to-day activities such as bathing, preparing meals, shopping, managing finances, etc., do you get the help you need? I get all the help I need    IADL Comments Wife assist with IADL's.       Mental Status    General Appearance WDL WDL       Mental Status Summary    Recent Changes in Mental Status/Cognitive Functioning no changes       Employment/    Employment Status retired    Current or Previous Occupation [employer] work    Employment/ Comments Retired from [employer]                   Psychosocial       Row Name 01/03/25 1257       Developmental Stage (Eriksson's Stages of Development)    Developmental Stage Stage 8 (65 years-death/Late Adulthood) Integrity vs. Despair      Row Name 01/03/25 1255       Developmental Stage (Eriksson's Stages of Development)    Developmental Stage Stage 8 (65 years-death/Late Adulthood) Integrity vs. Despair                    Abuse/Neglect    No documentation.                  Legal    No documentation.                  Substance Abuse    No documentation.                  Patient Forms    No documentation.                     Kera Tolbert RN

## 2025-01-03 NOTE — H&P
[facility]      Name:  Donny Jerry   Age:  79 y.o.  Sex:  male  :  1945  MRN:  [id number]   Visit Number:  [id number]  Admission Date:  2025  Date Of Service:  25  Primary Care Physician:  Anum Alonso APRN    Chief Complaint:     Weakness, low blood pressure per report    History Of Presenting Illness:      Patient is a 79-year-old known to me from prior hospitalizations with a history of CAD with prior MI, chronic kidney disease stage IIIb, diabetes, sleep apnea, COPD, BPH, who presented to the emergency room from his PCPs office due to multiple complaints.  At time of visit patient is awake alert and oriented and overall feels well.  He notes when he woke up this morning he was feeling like he was having pain from his head to his toes.  Just felt odd.  He has a normal appetite no nausea or vomiting.  No fevers chills cough or shortness of breath.  He notes no changes in urination.  He has had no falls or traumas.  He did feel a mild headache the last couple days for which the wife states he took Tylenol.  At PCPs office apparently his blood pressure was low and they were concerned and he came to the emergency room via EMS.  He follows with Dr. Wilkes as his nephrologist.    In the ER he was hemodynamically stable.  He does have chronic elevated troponins notable.  No EKG changes.  He did have elevated creatinine from baseline in the 2.5 range, baseline 1.8 or so.  Other workup unremarkable.  No obvious infectious source.  We are asked to admit for acute kidney injury and workup    Review Of Systems:    All systems were reviewed and negative except as mentioned in history of presenting illness, assessment and plan.    Past Medical History: Patient  has a past medical history of Benign hypertension, Coronary artery disease, Depression, Enlarged prostate, GERD (gastroesophageal reflux disease), Heart attack, Hypercholesterolemia,  Impaired functional mobility, balance, gait, and endurance, Myocardial infarction, Obesity, Obstructive sleep apnea on CPAP, and Type 2 diabetes mellitus.    Past Surgical History: Patient  has a past surgical history that includes Coronary artery bypass graft; Coronary angioplasty with stent (Left, 06/03/2009); Coronary angioplasty with stent (Left, 07/06/2009); Coronary angioplasty with stent (04/23/2010); Coronary angioplasty with stent (Left, 07/06/2010); Coronary angioplasty with stent (Left, 11/16/2010); Coronary angioplasty with stent (Left); Coronary angioplasty with stent (Left, 06/24/2014); Cardiac catheterization; Colonoscopy w/ polypectomy; Cardiac catheterization (Left, 10/19/2021); Cardiac catheterization (N/A, 7/18/2023); and Cardiac catheterization (N/A, 7/18/2023).    Social History: Patient  reports that he has never smoked. He has never used smokeless tobacco. He reports that he does not drink alcohol and does not use drugs.    Family History:  Patient's family history has been reviewed and found to be noncontributory.     Allergies:      Zocor [simvastatin], Bisoprolol, Byetta 10 mcg pen [exenatide], Crestor [rosuvastatin calcium], Metformin and related, and Plavix [clopidogrel bisulfate]    Home Medications:    Prior to Admission Medications       Prescriptions Last Dose Informant Patient Reported? Taking?    albuterol sulfate  (90 Base) MCG/ACT inhaler   No No    Inhale 2 puffs Every 4 (Four) Hours As Needed for Wheezing.    aspirin 81 MG chewable tablet  Self, Spouse/Significant Other Yes No    Chew 1 tablet Daily.    atorvastatin (LIPITOR) 80 MG tablet   No No    Take 1 tablet by mouth Daily.    B-D UF III MINI PEN NEEDLES 31G X 5 MM misc   Yes No    carvedilol (COREG) 12.5 MG tablet   No No    Take 1 tablet by mouth 2 (Two) Times a Day With Meals.    Cholecalciferol (VITAMIN D3) 50 MCG (2000 UT) capsule   Yes No    Take 1 capsule by mouth Daily.    dapagliflozin Propanediol (Farxiga)  10 MG tablet  Spouse/Significant Other Yes No    Take 10 mg by mouth Daily.    docusate calcium (SURFAK) 240 MG capsule   Yes No    Take 1 capsule by mouth 2 (Two) Times a Day.    fexofenadine (ALLEGRA) 180 MG tablet  Spouse/Significant Other Yes No    Take 1 tablet by mouth Daily As Needed.    finasteride (PROSCAR) 5 MG tablet   Yes No    Take 1 tablet by mouth Daily.    fluticasone (FLONASE) 50 MCG/ACT nasal spray   No No    1 spray into the nostril(s) as directed by provider Daily.    Patient taking differently:  Administer 1 spray into the nostril(s) as directed by provider Daily As Needed.    Fluticasone-Umeclidin-Vilant (Trelegy Ellipta) 200-62.5-25 MCG/ACT inhaler   No No    Inhale 1 puff Daily. Rinse mouth with water after use    hydrALAZINE (APRESOLINE) 25 MG tablet   No No    Take 3 tablets by mouth 3 (Three) Times a Day for 30 days.    insulin degludec (Tresiba FlexTouch) 100 UNIT/ML solution pen-injector injection   Yes No    Inject  under the skin into the appropriate area as directed 2 (Two) Times a Day. 22 units in the am   37 units at bedtime    Insulin Lispro (humaLOG) 100 UNIT/ML injection   Yes No    Inject 8 Units under the skin into the appropriate area as directed Daily. Daily with supper    isosorbide mononitrate (IMDUR) 30 MG 24 hr tablet   No No    Take 1 tablet by mouth Daily.    magnesium oxide (MAG-OX) 400 MG tablet   Yes No    Take 1 tablet by mouth Daily.    Multiple Vitamin (MULTI VITAMIN DAILY PO)   Yes No    Take 1 tablet by mouth Daily.    nitroglycerin (NITROSTAT) 0.4 MG SL tablet   No No    Place 1 tablet under the tongue Every 5 (Five) Minutes As Needed for Chest Pain. Take no more than 3 doses in 15 minutes.    pantoprazole (PROTONIX) 40 MG EC tablet   Yes No    TAKE 1 TABLET BY MOUTH 2 TIMES DAILY (BEFORE MEALS)    pramipexole (MIRAPEX) 1 MG tablet   No No    Take 1 tablet by mouth Every Night.    prasugrel (EFFIENT) 10 MG tablet   Yes No    Take 1 tablet by mouth Daily.     "ranolazine (RANEXA) 500 MG 12 hr tablet   No No    Take 1 tablet by mouth Every 12 (Twelve) Hours for 180 days.    sertraline (ZOLOFT) 100 MG tablet   Yes No    Take 1 tablet by mouth Daily.    spironolactone (ALDACTONE) 25 MG tablet   No No    Take 1 tablet by mouth Daily for 30 days.    tamsulosin (FLOMAX) 0.4 MG capsule 24 hr capsule   Yes No    Take 1 capsule by mouth 2 (Two) Times a Day.    torsemide 40 MG tablet   No No    Take 40 mg by mouth Daily for 30 days.    TRUEplus Lancets 28G misc   Yes No    Trulicity 0.75 MG/0.5ML solution pen-injector   Yes No    Inject 0.75 mg as directed 1 (One) Time Per Week.          ED Medications:    Medications   sodium chloride 0.9 % flush 10 mL (has no administration in time range)   sodium chloride 0.9 % bolus 1,000 mL (0 mL Intravenous Stopped 1/2/25 1750)     Vital Signs:  Temp:  [98.8 °F (37.1 °C)] 98.8 °F (37.1 °C)  Heart Rate:  [] 86  Resp:  [18] 18  BP: ()/(49-98) 111/71        01/02/25  1552   Weight: 89.8 kg (198 lb)     Body mass index is 31.01 kg/m².    Physical Exam:     Most recent vital Signs: /71   Pulse 86   Temp 98.8 °F (37.1 °C) (Oral)   Resp 18   Ht 170.2 cm (67\")   Wt 89.8 kg (198 lb)   SpO2 97%   BMI 31.01 kg/m²     Physical Exam  Constitutional:       General: He is not in acute distress.     Appearance: He is not toxic-appearing.   HENT:      Head: Normocephalic.      Mouth/Throat:      Mouth: Mucous membranes are moist.      Pharynx: Oropharynx is clear.   Cardiovascular:      Rate and Rhythm: Normal rate and regular rhythm.      Pulses: Normal pulses.      Heart sounds: No murmur heard.     No gallop.   Pulmonary:      Effort: No respiratory distress.      Breath sounds: No wheezing or rales.   Abdominal:      General: Bowel sounds are normal. There is no distension.      Tenderness: There is no abdominal tenderness. There is no guarding.   Musculoskeletal:         General: Normal range of motion.      Right lower leg: No " edema.      Left lower leg: No edema.   Skin:     General: Skin is warm.      Capillary Refill: Capillary refill takes less than 2 seconds.   Neurological:      General: No focal deficit present.      Mental Status: He is alert. Mental status is at baseline.   Psychiatric:         Mood and Affect: Mood normal.         Thought Content: Thought content normal.         Laboratory data:    I have reviewed the labs done in the emergency room.    Results from last 7 days   Lab Units 01/02/25  1607   SODIUM mmol/L 140   POTASSIUM mmol/L 4.1   CHLORIDE mmol/L 100   CO2 mmol/L 27.2   BUN mg/dL 79*   CREATININE mg/dL 2.47*   CALCIUM mg/dL 8.5*   BILIRUBIN mg/dL 0.5   ALK PHOS U/L 57   ALT (SGPT) U/L 38   AST (SGOT) U/L 27   GLUCOSE mg/dL 174*     Results from last 7 days   Lab Units 01/02/25  1607   WBC 10*3/mm3 10.19   HEMOGLOBIN g/dL 10.2*   HEMATOCRIT % 31.2*   PLATELETS 10*3/mm3 227         Results from last 7 days   Lab Units 01/02/25  1741 01/02/25  1607   HSTROP T ng/L 135* 122*                     Results from last 7 days   Lab Units 01/02/25  1900   COLOR UA  Yellow   GLUCOSE UA  500 mg/dL (2+)*   KETONES UA  Negative   BLOOD UA  Negative   LEUKOCYTES UA  Negative   PH, URINE  5.5   BILIRUBIN UA  Negative   UROBILINOGEN UA  0.2 E.U./dL       Pain Management Panel  More data exists         Latest Ref Rng & Units 4/26/2024 12/19/2023   Pain Management Panel   Creatinine, Urine mg/dL 35.5  26.3       Details                   EKG:      Sinus rhythm, tachycardia, first-degree AV block noted.    Radiology:    XR Chest 1 View    Result Date: 1/2/2025  PROCEDURE: XR CHEST 1 VW-  HISTORY: Weak/Dizzy/AMS triage protocol  COMPARISON: April 27, 2024..  FINDINGS: The heart is mildly enlarged, similar to prior.. Previously described right upper lobe disease has resolved. There is evidence of old calcified granulomatous disease. No new area of consolidation seen.. The mediastinum is unremarkable. There is no pneumothorax.  There  are no acute osseous abnormalities. Status post median sternotomy. Apical lordotic positioning noted.      Cardiomegaly without acute infiltrate..     This report was signed and finalized on 1/2/2025 4:53 PM by Mayra Da Silva MD.       Assessment:    Acute on chronic renal failure, POA  Generalized weakness  Chronic troponin elevation likely demand ischemia  CAD  Diabetes    Plan:    Admit for observation    Renal failure:  Patient received fluid bolus in the ER will hold on further fluids.  He does not appear volume overloaded, appears euvolemic currently.  Urine studies will be sent.  Will have nephrology see tomorrow, he follows with Dr. Wilkes.  He may have had lower blood pressures last few days which could have contributed to the IMANI, he is on significant blood pressure regimen.  He is also on diuretics with torsemide.    Considered infectious source based off his vague symptoms, however no obvious finding on workup.    Lung nodule:  Discussed with wife will need outpatient follow-up, he already follows with Dr. Bob.    Troponin elevation:  Patient has no anginal symptoms including shortness of breath, chest pain or other symptoms at this time.  He has flat troponins, lower than previous.  Telemetry monitoring ordered.  Will continue his prasugrel.    CAD/diabetes:  Resume home medications.    I suspect he can go home tomorrow if kidney function and symptoms improve    Risk Assessment: High  DVT Prophylaxis: SCDs  Code Status: DNR  Diet: Renal cardiac    Advance Care Planning   ACP discussion was held with the patient during this visit. Patient has an advance directive in EMR which is still valid.            Mary Beth Cat,   01/02/25  20:11 EST    Dictated utilizing Dragon dictation.

## 2025-01-04 ENCOUNTER — READMISSION MANAGEMENT (OUTPATIENT)
Dept: CALL CENTER | Facility: HOSPITAL | Age: 80
End: 2025-01-04
Payer: MEDICARE

## 2025-01-04 VITALS
RESPIRATION RATE: 18 BRPM | SYSTOLIC BLOOD PRESSURE: 136 MMHG | TEMPERATURE: 98 F | BODY MASS INDEX: 33.13 KG/M2 | HEIGHT: 66 IN | DIASTOLIC BLOOD PRESSURE: 69 MMHG | HEART RATE: 68 BPM | OXYGEN SATURATION: 100 % | WEIGHT: 206.13 LBS

## 2025-01-04 LAB
ALBUMIN SERPL-MCNC: 3.5 G/DL (ref 3.5–5.2)
ANION GAP SERPL CALCULATED.3IONS-SCNC: 9 MMOL/L (ref 5–15)
BUN SERPL-MCNC: 63 MG/DL (ref 8–23)
BUN/CREAT SERPL: 32.3 (ref 7–25)
CALCIUM SPEC-SCNC: 8.1 MG/DL (ref 8.6–10.5)
CHLORIDE SERPL-SCNC: 101 MMOL/L (ref 98–107)
CO2 SERPL-SCNC: 26 MMOL/L (ref 22–29)
CREAT SERPL-MCNC: 1.95 MG/DL (ref 0.76–1.27)
EGFRCR SERPLBLD CKD-EPI 2021: 34.6 ML/MIN/1.73
GLUCOSE BLDC GLUCOMTR-MCNC: 93 MG/DL (ref 70–130)
GLUCOSE SERPL-MCNC: 152 MG/DL (ref 65–99)
PHOSPHATE SERPL-MCNC: 2.7 MG/DL (ref 2.5–4.5)
POTASSIUM SERPL-SCNC: 3.9 MMOL/L (ref 3.5–5.2)
SODIUM SERPL-SCNC: 136 MMOL/L (ref 136–145)

## 2025-01-04 PROCEDURE — 80069 RENAL FUNCTION PANEL: CPT | Performed by: STUDENT IN AN ORGANIZED HEALTH CARE EDUCATION/TRAINING PROGRAM

## 2025-01-04 PROCEDURE — 63710000001 INSULIN GLARGINE PER 5 UNITS: Performed by: FAMILY MEDICINE

## 2025-01-04 PROCEDURE — G0378 HOSPITAL OBSERVATION PER HR: HCPCS

## 2025-01-04 PROCEDURE — 94664 DEMO&/EVAL PT USE INHALER: CPT

## 2025-01-04 PROCEDURE — 82948 REAGENT STRIP/BLOOD GLUCOSE: CPT | Performed by: FAMILY MEDICINE

## 2025-01-04 PROCEDURE — 94799 UNLISTED PULMONARY SVC/PX: CPT

## 2025-01-04 PROCEDURE — 94761 N-INVAS EAR/PLS OXIMETRY MLT: CPT

## 2025-01-04 PROCEDURE — 99239 HOSP IP/OBS DSCHRG MGMT >30: CPT | Performed by: STUDENT IN AN ORGANIZED HEALTH CARE EDUCATION/TRAINING PROGRAM

## 2025-01-04 RX ORDER — SPIRONOLACTONE 25 MG/1
25 TABLET ORAL EVERY OTHER DAY
Qty: 15 TABLET | Refills: 0 | Status: SHIPPED | OUTPATIENT
Start: 2025-01-04 | End: 2025-02-03

## 2025-01-04 RX ORDER — FLUTICASONE PROPIONATE 50 MCG
2 SPRAY, SUSPENSION (ML) NASAL DAILY
Status: DISCONTINUED | OUTPATIENT
Start: 2025-01-04 | End: 2025-01-04 | Stop reason: HOSPADM

## 2025-01-04 RX ORDER — FLUTICASONE PROPIONATE 50 MCG
1 SPRAY, SUSPENSION (ML) NASAL DAILY PRN
Start: 2025-01-04

## 2025-01-04 RX ORDER — HYDRALAZINE HYDROCHLORIDE 25 MG/1
25 TABLET, FILM COATED ORAL 3 TIMES DAILY
Start: 2025-01-04

## 2025-01-04 RX ADMIN — ATORVASTATIN CALCIUM 80 MG: 80 TABLET, FILM COATED ORAL at 08:35

## 2025-01-04 RX ADMIN — ASPIRIN 81 MG CHEWABLE TABLET 81 MG: 81 TABLET CHEWABLE at 08:36

## 2025-01-04 RX ADMIN — ISOSORBIDE MONONITRATE 30 MG: 30 TABLET, EXTENDED RELEASE ORAL at 08:36

## 2025-01-04 RX ADMIN — RANOLAZINE 500 MG: 500 TABLET, FILM COATED, EXTENDED RELEASE ORAL at 08:35

## 2025-01-04 RX ADMIN — Medication 400 MG: at 08:36

## 2025-01-04 RX ADMIN — TIOTROPIUM BROMIDE INHALATION SPRAY 2 PUFF: 3.12 SPRAY, METERED RESPIRATORY (INHALATION) at 07:39

## 2025-01-04 RX ADMIN — PRASUGREL 10 MG: 10 TABLET, FILM COATED ORAL at 08:36

## 2025-01-04 RX ADMIN — PANTOPRAZOLE SODIUM 40 MG: 40 TABLET, DELAYED RELEASE ORAL at 06:19

## 2025-01-04 RX ADMIN — FINASTERIDE 5 MG: 5 TABLET, FILM COATED ORAL at 08:36

## 2025-01-04 RX ADMIN — INSULIN GLARGINE 15 UNITS: 100 INJECTION, SOLUTION SUBCUTANEOUS at 08:34

## 2025-01-04 RX ADMIN — FLUTICASONE PROPIONATE 2 SPRAY: 50 SPRAY, METERED NASAL at 08:35

## 2025-01-04 RX ADMIN — SERTRALINE HYDROCHLORIDE 100 MG: 50 TABLET ORAL at 08:35

## 2025-01-04 RX ADMIN — CARVEDILOL 12.5 MG: 12.5 TABLET, FILM COATED ORAL at 08:35

## 2025-01-04 RX ADMIN — HYDRALAZINE HYDROCHLORIDE 25 MG: 25 TABLET ORAL at 08:36

## 2025-01-04 RX ADMIN — BUDESONIDE AND FORMOTEROL FUMARATE DIHYDRATE 2 PUFF: 160; 4.5 AEROSOL RESPIRATORY (INHALATION) at 07:40

## 2025-01-04 RX ADMIN — Medication 10 ML: at 08:35

## 2025-01-04 NOTE — DISCHARGE SUMMARY
[facility]   DISCHARGE SUMMARY      Name:  Donny Jerry   Age:  79 y.o.  Sex:  male  :  1945  MRN:  [id number]   Visit Number:  [id number]    Admission Date:  2025  Date of Discharge:  2025  Primary Care Physician:  Anum Alonso APRN      Problem List:     Active Hospital Problems    Diagnosis  POA    **IAMNI (acute kidney injury) [N17.9]  Yes      Resolved Hospital Problems   No resolved problems to display.     Presenting Problem:    Chief Complaint   Patient presents with    Generalized Body Aches      Consults:     Consulting Physician(s)         Provider   Role Specialty     Destin Wilkes MD, FARHANA      Consulting Physician Nephrology          History of presenting illness/Hospital Course:    Donny Jerry is a 79-year-old man with past medical history of CAD with prior MI, chronic kidney disease stage IIIb, diabetes, hypertension, sleep apnea, COPD, BPH, lung nodule, who presented to the emergency room from his PCPs office due to multiple concerns.  At time of admission patient was awake alert and oriented and overall feels well.  He notes when he woke up morning day of presentation he was feeling like he was having pain from his head to his toes.  Just felt odd.  He has a normal appetite no nausea or vomiting.  No fevers chills cough or shortness of breath.  He notes no changes in urination.  He has had no falls or traumas.  He did feel a mild headache the last couple days for which the wife states he took Tylenol.  At PCPs office apparently his blood pressure was low and they were concerned and he came to the emergency room via EMS.  He follows with Dr. Wilkes as his nephrologist.     In the ER he was hemodynamically stable.  He does have chronic elevated troponins notable.  No EKG changes.  He did have elevated creatinine from baseline in the 2.5 range, baseline 1.8 or so.  Other workup unremarkable.  No obvious infectious source.      Observation  general floor admission 1/2/2025 with IMANI on CKD.  Clinically improved during course.    Stable for discharge 1/4/2025.     IMANI on CKD, improved  Per nephrology, continue home doses of spironolactone and Lasix, alternate 1 every other day.  Outpatient follow-up with nephrology.  Likely was exacerbated by low blood pressures, iatrogenic from antihypertensives.   Lung nodule  Follow up outpatient.       CAD with prior MI, chronic kidney disease stage IIIb, diabetes, hypertension, sleep apnea, COPD, BPH, lung nodule  Hydralazine reduced to 25mg TID.   Continue home medications.    Vital Signs:    Temp:  [97.8 °F (36.6 °C)-98.5 °F (36.9 °C)] 98 °F (36.7 °C)  Heart Rate:  [56-71] 68  Resp:  [18] 18  BP: (114-140)/(53-69) 136/69    Physical Exam:    General Appearance:  Alert and cooperative.    Head:  Atraumatic and normocephalic.   Eyes: Conjunctivae and sclerae normal, no icterus. No pallor.   Ears:  Ears with no abnormalities noted.   Throat: No oral lesions, no thrush, oral mucosa moist.   Neck: Supple, trachea midline, no thyromegaly.   Back:   No kyphoscoliosis present. No tenderness to palpation.   Lungs:   Breath sounds heard bilaterally equally.  No crackles or wheezing. No Pleural rub or bronchial breathing.   Heart:  Normal S1 and S2, no murmur, no gallop, no rub. No JVD.   Abdomen:   Normal bowel sounds, no masses, no organomegaly. Soft, nontender, nondistended, no rebound tenderness.   Extremities: Supple, no edema, no cyanosis, no clubbing.   Pulses: Pulses palpable bilaterally.   Skin: Warm.   Neurologic: Alert and oriented x 3. No facial asymmetry. Moves all four limbs. No tremors.     Pertinent Lab Results:     Results from last 7 days   Lab Units 01/04/25  0828 01/03/25  0617 01/02/25  1607   SODIUM mmol/L 136 143 140   POTASSIUM mmol/L 3.9 3.7 4.1   CHLORIDE mmol/L 101 105 100   CO2 mmol/L 26.0 23.7 27.2   BUN mg/dL 63* 72* 79*   CREATININE mg/dL 1.95* 2.22* 2.47*   CALCIUM mg/dL 8.1* 8.7 8.5*    BILIRUBIN mg/dL  --   --  0.5   ALK PHOS U/L  --   --  57   ALT (SGPT) U/L  --   --  38   AST (SGOT) U/L  --   --  27   GLUCOSE mg/dL 152* 107* 174*     Results from last 7 days   Lab Units 01/02/25  1607   WBC 10*3/mm3 10.19   HEMOGLOBIN g/dL 10.2*   HEMATOCRIT % 31.2*   PLATELETS 10*3/mm3 227         Results from last 7 days   Lab Units 01/02/25  1741 01/02/25  1607   HSTROP T ng/L 135* 122*                     Results from last 7 days   Lab Units 01/02/25  2146 01/02/25  2135   BLOODCX  No growth at 24 hours No growth at 24 hours       Pertinent Radiology Results:    Imaging Results (All)       Procedure Component Value Units Date/Time    CT Chest Without Contrast Diagnostic [[id number]] Collected: 01/02/25 2054     Updated: 01/02/25 2055    Narrative:      FINAL REPORT    TECHNIQUE:  null    CLINICAL HISTORY:  cough, neg cxr    COMPARISON:  null    FINDINGS:  CT chest without contrast    Comparison: None    Findings:    There is borderline cardiomegaly. There is no pericardial effusion. There is severe coronary artery calcification. Patient is status post CABG. Thoracic aorta is normal in diameter. Small calcified mediastinal and hilar lymph nodes are consistent with   old granulomatous disease.    There are several small bilateral calcified granulomas. There is a 10 mm solid well-circumscribed nodule in the right upper lobe just above the minor fissure with adjacent scarring. There is faint hyperdensity in the nodule suggestive of developing   calcification. Trachea and central bronchi are widely patent.    There is a mild chronic appearing T12 compression fracture. There is no suspicious lytic or sclerotic lesion.    Limited images of the upper abdomen demonstrate no acute findings.      Impression:      IMPRESSION:    1. No acute abnormality in the chest.    2. 10 mm nodule in the right upper lobe with adjacent scarring and faint hyperdensity within the nodule suggestive of developing calcification within a  granuloma, but indeterminate. Comparison with prior imaging if available, follow-up chest CT in 3   months, or PET-CT is recommended.    3. Additional chronic findings as above.    Authenticated and Electronically Signed by Jos Hayes MD on  01/02/2025 08:54:21 PM    XR Chest 1 View [[id number]] Collected: 01/02/25 1652     Updated: 01/02/25 1655    Narrative:      PROCEDURE: XR CHEST 1 VW-     HISTORY: Weak/Dizzy/AMS triage protocol     COMPARISON: April 27, 2024..     FINDINGS: The heart is mildly enlarged, similar to prior.. Previously  described right upper lobe disease has resolved. There is evidence of  old calcified granulomatous disease. No new area of consolidation seen..  The mediastinum is unremarkable. There is no pneumothorax.  There are no  acute osseous abnormalities. Status post median sternotomy. Apical  lordotic positioning noted.        Impression:      Cardiomegaly without acute infiltrate..              This report was signed and finalized on 1/2/2025 4:53 PM by Mayra Da Silva MD.               Echo:    Results for orders placed during the hospital encounter of 01/11/24    Adult Transthoracic Echo Complete w/ Color, Spectral and Contrast if necessary per protocol    Interpretation Summary    Left ventricular systolic function is low normal. Calculated left ventricular EF = 45.8% Left ventricular ejection fraction appears to be 46 - 50%.    Left ventricular wall thickness is consistent with mild concentric hypertrophy.    Left ventricular diastolic function is consistent with (grade II w/high LAP) pseudonormalization.    Left atrial volume is moderately increased.    Mild aortic valve stenosis is present.    Estimated right ventricular systolic pressure from tricuspid regurgitation is normal (<35 mmHg).    No significant change compared to 7/2023 echo    Condition on Discharge:      Stable.    Code status during the hospital stay:    Code Status and Medical Interventions: No CPR (Do Not  Attempt to Resuscitate); Limited Support; No intubation (DNI), No cardioversion   Ordered at: 01/02/25 5181     Medical Intervention Limits:    No intubation (DNI)    No cardioversion     Code Status (Patient has no pulse and is not breathing):    No CPR (Do Not Attempt to Resuscitate)     Medical Interventions (Patient has pulse or is breathing):    Limited Support     Discharge Disposition:    Home or Self Care    Discharge Medications:       Discharge Medications        Changes to Medications        Instructions Start Date   fluticasone 50 MCG/ACT nasal spray  Commonly known as: FLONASE  What changed:   when to take this  reasons to take this   1 spray, Nasal, Daily PRN      spironolactone 25 MG tablet  Commonly known as: ALDACTONE  What changed:   when to take this  additional instructions   25 mg, Oral, Every Other Day, Alternate days with lasix      Torsemide 40 MG tablet  What changed:   when to take this  additional instructions   40 mg, Oral, Every Other Day, Alternate days with spinorolactone             Continue These Medications        Instructions Start Date   albuterol sulfate  (90 Base) MCG/ACT inhaler  Commonly known as: PROVENTIL HFA;VENTOLIN HFA;PROAIR HFA   2 puffs, Inhalation, Every 4 Hours PRN      aspirin 81 MG chewable tablet   81 mg, Daily      atorvastatin 80 MG tablet  Commonly known as: LIPITOR   80 mg, Oral, Daily      B-D UF III MINI PEN NEEDLES 31G X 5 MM misc  Generic drug: Insulin Pen Needle   No dose, route, or frequency recorded.      carvedilol 12.5 MG tablet  Commonly known as: COREG   12.5 mg, Oral, 2 Times Daily With Meals      docusate calcium 240 MG capsule  Commonly known as: SURFAK   240 mg, 2 Times Daily      Farxiga 10 MG tablet  Generic drug: dapagliflozin Propanediol   10 mg, Daily      fexofenadine 180 MG tablet  Commonly known as: ALLEGRA   180 mg, Daily PRN      finasteride 5 MG tablet  Commonly known as: PROSCAR   5 mg, Daily      hydrALAZINE 25 MG  tablet  Commonly known as: APRESOLINE   75 mg, Oral, 3 Times Daily      Insulin Lispro 100 UNIT/ML injection  Commonly known as: humaLOG   8 Units, Daily      isosorbide mononitrate 30 MG 24 hr tablet  Commonly known as: IMDUR   30 mg, Oral, Daily      magnesium oxide 400 MG tablet  Commonly known as: MAG-OX   400 mg, Daily      multivitamin tablet tablet  Commonly known as: THERAGRAN   1 tablet, Daily      nitroglycerin 0.4 MG SL tablet  Commonly known as: NITROSTAT   0.4 mg, Sublingual, Every 5 Minutes PRN, Take no more than 3 doses in 15 minutes.       pantoprazole 40 MG EC tablet  Commonly known as: PROTONIX   TAKE 1 TABLET BY MOUTH 2 TIMES DAILY (BEFORE MEALS)      pramipexole 1 MG tablet  Commonly known as: MIRAPEX   1 mg, Oral, Nightly      prasugrel 10 MG tablet  Commonly known as: EFFIENT   10 mg, Daily      ranolazine 500 MG 12 hr tablet  Commonly known as: RANEXA   500 mg, Oral, Every 12 Hours Scheduled      sertraline 100 MG tablet  Commonly known as: ZOLOFT   100 mg, Daily      tamsulosin 0.4 MG capsule 24 hr capsule  Commonly known as: FLOMAX   1 capsule, 2 Times Daily      Trelegy Ellipta 200-62.5-25 MCG/ACT inhaler  Generic drug: Fluticasone-Umeclidin-Vilant   1 puff, Inhalation, Daily - RT, Rinse mouth with water after use      Tresiba FlexTouch 100 UNIT/ML solution pen-injector injection  Generic drug: insulin degludec   2 Times Daily      TRUEplus Lancets 28G misc   No dose, route, or frequency recorded.      Trulicity 0.75 MG/0.5ML solution pen-injector  Generic drug: Dulaglutide   0.75 mg, Weekly      Vitamin D3 50 MCG (2000 UT) capsule   2,000 Units, Daily             Discharge Diet:     Diet Instructions       Diet: Cardiac Diets, Diabetic Diets, Renal Diets, Fluid Restriction (240 mL/tray) Diets; Healthy Heart (2-3 Na+); Regular (IDDSI 7); Thin (IDDSI 0); Consistent Carbohydrate; Low Phosphorus; 2000 mL/day      Discharge Diet:  Cardiac Diets  Diabetic Diets  Renal Diets  Fluid Restriction  (240 mL/tray) Diets       Cardiac Diet: Healthy Heart (2-3 Na+)    Texture: Regular (IDDSI 7)    Fluid Consistency: Thin (IDDSI 0)    Diabetic Diet: Consistent Carbohydrate    Renal Diet: Low Phosphorus    Fluid Restriction Diet (240 mL/tray): 2000 mL/day          Activity at Discharge:     Activity Instructions       Activity as Tolerated            Follow-up Appointments:     Follow-up Information       Anum Alonso APRN Follow up in 3 day(s).    Specialty: Family Medicine  Contact information:  [address] [place] [address]  [phone]               Destin Wilkes MD, FASN Follow up in 2 week(s).    Specialty: Nephrology  Contact information:  [facility] [place] [address]  [phone]                           Future Appointments   Date Time Provider Department Center   4/3/2025 10:30 AM Carolann Franco APRN [facility]   8/26/2025 10:45 AM Hansel Bob MD [facility]   9/4/2025  2:30 PM Quinten Marrero MD [facility]     Test Results Pending at Discharge:    Pending Results       None                 Brian Joseph Kerley, DO  01/04/25  10:53 EST    Time: I spent 35 minutes on this discharge activity which included: face-to-face encounter with the patient, reviewing the data in the system, coordination of the care with the nursing staff as well as consultants, documentation, and entering orders.     Dictated utilizing Dragon dictation.    I have reviewed the copied text and it is accurate as of 1/4/2025

## 2025-01-04 NOTE — PROGRESS NOTES
[facility] Progress Note      Patient Name: Donny Jerry  : 1945  MRN: [id number]  Primary Care Physician:  Anum Alonso APRN  Date of admission: 2025    Subjective     Interval History:     Overnight no events  Patient lying in bed comfortable denies any chest pain, shortness of breath, palpitations    Currently watching TV    Tolerating oral intake without any abdominal pain    Urinating spontaneously    Review of Systems:   As noted above    Objective     Vitals:   Temp:  [97.8 °F (36.6 °C)-98.5 °F (36.9 °C)] 98 °F (36.7 °C)  Heart Rate:  [56-71] 68  Resp:  [18] 18  BP: (114-140)/(53-69) 136/69    Intake/Output Summary (Last 24 hours) at 2025 0936  Last data filed at 2025 0914  Gross per 24 hour   Intake 720 ml   Output 300 ml   Net 420 ml       Physical Exam:    General Appearance: alert, oriented x 3, no acute distress   Skin: warm and dry  HEENT: oral mucosa normal, nonicteric sclera  Neck: supple, no JVD  Lungs: CTA  Heart: RRR, normal S1 and S2  Abdomen: soft, nontender, nondistended  : no palpable bladder  Extremities: no edema, cyanosis or clubbing  Neuro: normal speech and mental status     Scheduled Meds:     aspirin, 81 mg, Oral, Daily  atorvastatin, 80 mg, Oral, Daily  budesonide-formoterol, 2 puff, Inhalation, BID - RT   And  tiotropium bromide monohydrate, 2 puff, Inhalation, Daily - RT  carvedilol, 12.5 mg, Oral, BID With Meals  finasteride, 5 mg, Oral, Daily  fluticasone, 2 spray, Each Nare, Daily  hydrALAZINE, 25 mg, Oral, TID  insulin glargine, 15 Units, Subcutaneous, Q12H  insulin lispro, 2-7 Units, Subcutaneous, 4x Daily AC & at Bedtime  isosorbide mononitrate, 30 mg, Oral, Daily  magnesium oxide, 400 mg, Oral, Daily  pantoprazole, 40 mg, Oral, Q AM  pramipexole, 1 mg, Oral, Nightly  prasugrel, 10 mg, Oral, Daily  ranolazine, 500 mg, Oral, Q12H  sertraline, 100 mg, Oral, Daily  sodium chloride, 10 mL, Intravenous, Q12H      IV Meds:         Results Reviewed:   I have personally reviewed the results from the time of this admission to 1/4/2025 09:36 EST     Results from last 7 days   Lab Units 01/04/25  0828 01/03/25  0617 01/02/25  1607   SODIUM mmol/L 136 143 140   POTASSIUM mmol/L 3.9 3.7 4.1   CHLORIDE mmol/L 101 105 100   CO2 mmol/L 26.0 23.7 27.2   BUN mg/dL 63* 72* 79*   CREATININE mg/dL 1.95* 2.22* 2.47*   CALCIUM mg/dL 8.1* 8.7 8.5*   BILIRUBIN mg/dL  --   --  0.5   ALK PHOS U/L  --   --  57   ALT (SGPT) U/L  --   --  38   AST (SGOT) U/L  --   --  27   GLUCOSE mg/dL 152* 107* 174*       Estimated Creatinine Clearance: 33.4 mL/min (A) (by C-G formula based on SCr of 1.95 mg/dL (H)).    Results from last 7 days   Lab Units 01/04/25  0828 01/02/25  1607   MAGNESIUM mg/dL  --  2.7*   PHOSPHORUS mg/dL 2.7  --              Results from last 7 days   Lab Units 01/02/25  1607   WBC 10*3/mm3 10.19   HEMOGLOBIN g/dL 10.2*   PLATELETS 10*3/mm3 227             Assessment / Plan     ASSESSMENT:    Chronic kidney disease stage IIIb: Patient has diabetic and hypertensive glomerulosclerosis with last creatinine of 2.1 with a EGFR of 32 mL/min.  CAD: Denies having any significant issues with chest pains.  She is on optimize medications.  Type 2 diabetes: He has been on insulin at home, continue sliding scale for now.  Lung nodule: He has stable follow-up pulmonary, Dr. Bob.  Anemia: Patient has a history of anemia, hemoglobin has been stable around 10.2.       PLAN:  Patient's home diuretics include  spironolactone 25 mg daily and torsemide 40 mg daily, they are both on hold.   May need to decrease the diuretics, can switch torsemide to every other day alternating with spironolactone.  We can still use the same dose,  upon discharge   Continue with rest of the current treatment plan  Will make further enuretics arrangements as an outpatient    Thank you for involving us in the care of Donny Jerry.  Please feel free to call with any  questions.    Jose M Sansd MD  01/04/25  09:36 EST    [facility]  [phone]    Please note that portions of this note were completed with a voice recognition program.

## 2025-01-04 NOTE — PLAN OF CARE
Problem: Noninvasive Ventilation Acute  Goal: Effective Unassisted Ventilation and Oxygenation  Outcome: Progressing  Intervention: Monitor and Manage Noninvasive Ventilation  Recent Flowsheet Documentation  Taken 1/4/2025 0711 by Mick Tamez, RRT  Airway/Ventilation Management: airway patency maintained   Goal Outcome Evaluation:      RT EQUIPMENT DEVICE RELATED - SKIN ASSESSMENT    Win Score:  Win Score: 18     RT Medical Equipment/Device:     NIV Mask:  Full-face    size: l    Skin Assessment:      Nose:  Intact    Device Skin Pressure Protection:        Nurse Notification:  No    Mick Tamez, RRT

## 2025-01-04 NOTE — CASE MANAGEMENT/SOCIAL WORK
Case Management Discharge Note           Provided Post Acute Provider List?: N/A    Selected Continued Care - Admitted Since 1/2/2025       Destination    No services have been selected for the patient.                Durable Medical Equipment    No services have been selected for the patient.                Dialysis/Infusion    No services have been selected for the patient.                Home Medical Care    No services have been selected for the patient.                Therapy    No services have been selected for the patient.                Community Resources    No services have been selected for the patient.                Community & DME    No services have been selected for the patient.                    Transportation Services  Private: Car    Final Discharge Disposition Code: 01 - home or self-care

## 2025-01-05 ENCOUNTER — NURSE TRIAGE (OUTPATIENT)
Dept: CALL CENTER | Facility: HOSPITAL | Age: 80
End: 2025-01-05
Payer: MEDICARE

## 2025-01-05 NOTE — OUTREACH NOTE
Prep Survey      Flowsheet Row Responses   [facility] patient discharged from? [facility]   Is LACE score < 7 ? No   Eligibility Readm Mgmt   Discharge diagnosis IMANI (acute kidney injury   Does the patient have one of the following disease processes/diagnoses(primary or secondary)? Other   Does the patient have Home health ordered? No   Is there a DME ordered? No   Prep survey completed? Yes            DANYEL HINOJOSA - Registered Nurse

## 2025-01-05 NOTE — TELEPHONE ENCOUNTER
"DCd from hospital yesterday. Stated last night he felt like he was dying due to pain. This morning, pain is intense with dark urine. CBG 86. 10/10 and constant. Pain is now worse than it was when he originally was admitted to hospital. Advised they need to be seen at hospital. No vomiting or fever or other known symptoms. Verbalized understanding.     Reason for Disposition   [1] SEVERE pain (e.g., excruciating, scale 8-10) AND [2] present > 1 hour    Additional Information   Negative: Passed out (i.e., lost consciousness, collapsed and was not responding)   Negative: Shock suspected (e.g., cold/pale/clammy skin, too weak to stand, low BP, rapid pulse)   Negative: Difficult to awaken or acting confused (e.g., disoriented, slurred speech)   Negative: Sounds like a life-threatening emergency to the triager   Negative: Followed a major injury to the back (e.g., MVA, fall > 10 feet or 3 meters, penetrating injury, etc.)   Negative: Back pain or flank pain during pregnancy   Negative: Upper, mid or lower back pain that occurs mainly in the midline   Negative: [1] SEVERE pain (e.g., excruciating, scale 8-10) AND [2] not improved after pain medicine   Negative: [1] Sudden onset of severe flank pain AND [2] age > 60 years   Negative: [1] Abdominal pain AND [2] age > 60 years   Negative: [1] Unable to urinate (or only a few drops) > 4 hours AND [2] bladder feels very full (e.g., palpable bladder or strong urge to urinate)    Answer Assessment - Initial Assessment Questions  1. LOCATION: \"Where does it hurt?\" (e.g., left, right)      DCd from hospital yesterday. Stated last night he felt like he was dying due to pain. This morning, pain is intense with dark urine. CBG 86.  2. ONSET: \"When did the pain start?\"      Started last night after home from hospital   3. SEVERITY: \"How bad is the pain?\" (e.g., Scale 1-10; mild, moderate, or severe)    - MILD (1-3): doesn't interfere with normal activities     - MODERATE (4-7): " "interferes with normal activities or awakens from sleep     - SEVERE (8-10): excruciating pain and patient unable to do normal activities (stays in bed)        10/10  4. PATTERN: \"Does the pain come and go, or is it constant?\"       Constant   5. CAUSE: \"What do you think is causing the pain?\"      Kidney problems at baseline   6. OTHER SYMPTOMS:  \"Do you have any other symptoms?\" (e.g., fever, abdomen pain, vomiting, leg weakness, burning with urination, blood in urine)      no  7. PREGNANCY:  \"Is there any chance you are pregnant?\" \"When was your last menstrual period?\"      na    Protocols used: Flank Pain-ADULT-AH    "

## 2025-01-07 ENCOUNTER — CARE COORDINATION (OUTPATIENT)
Age: 80
End: 2025-01-07

## 2025-01-07 LAB
BACTERIA SPEC AEROBE CULT: NORMAL
BACTERIA SPEC AEROBE CULT: NORMAL

## 2025-01-08 ENCOUNTER — READMISSION MANAGEMENT (OUTPATIENT)
Dept: CALL CENTER | Facility: HOSPITAL | Age: 80
End: 2025-01-08
Payer: MEDICARE

## 2025-01-08 NOTE — OUTREACH NOTE
Medical Week 1 Survey      Flowsheet Row Responses   [facility] [place] patient discharged from? [facility]   Does the patient have one of the following disease processes/diagnoses(primary or secondary)? Other   Week 1 attempt successful? Yes   Call start time 0844   Call end time 0852   Discharge diagnosis IMANI (acute kidney injury   Person spoke with today (if not patient) and relationship wife   Meds reviewed with patient/caregiver? Yes   Does the patient have all medications ordered at discharge? N/A   Prescription comments changes were made to diuretics as now alternating dosing   Is the patient taking all medications as directed (includes completed medication regime)? Yes   Does the patient have a primary care provider?  Yes   Does the patient have an appointment with their PCP within 7 days of discharge? No   What is preventing the patient from scheduling follow up appointments within 7 days of discharge? --  [offices have been closed due to inclement weather,  wife to schedule with PCP, nephro and cardio]   Has home health visited the patient within 72 hours of discharge? N/A   Psychosocial issues? No   Did the patient receive a copy of their discharge instructions? Yes   Nursing interventions Reviewed instructions with patient   What is the patient's perception of their health status since discharge? Improving  [Wife reports pt has improved--denies any fruther issues with painful urination and urine is now clear.  Wife has no immediate questions or concerns and will schedule all appts when offices reopen.]   Is the patient/caregiver able to teach back signs and symptoms related to disease process for when to call PCP? Yes   Is the patient/caregiver able to teach back signs and symptoms related to disease process for when to call 911? Yes   Week 1 call completed? Yes   Call end time 0852            SLOANE GAYLE - Registered Nurse

## 2025-01-09 NOTE — CARE COORDINATION
Care Transitions Initial Follow Up Call    Call within 2 business days of discharge: Yes     Patient: Aquilino Evans Patient : 1945 MRN: [id number]    Last Discharge Facility       Date Complaint Diagnosis Description Type Department Provider    3/3/22 Chest Pain Coronary artery disease involving native heart with unstable angina pectoris, unspecified vessel or lesion type (HCC) ... ED ([facility]) [facility] Jeff Boles, DO; Alfonso Ocampo,...            RARS: No data recorded     Spoke with: Attempting HFU call, unsuccessful.  Message left with contact information.     Discharge department/facility: [facility]    Non-face-to-face services provided:  Scheduled appointment with PCP-Fabi  Obtained and reviewed discharge summary and/or continuity of care documents    Follow Up  Future Appointments   Date Time Provider Department Center   2025  1:30 PM Marina Dunlap APRN [facility] DEP   3/17/2025 10:00 AM [facility]ap [facility]   2025 10:15 AM Marina Dunlap APRN [facility] DEP       Burak Hawkins RN

## 2025-01-15 ENCOUNTER — CARE COORDINATION (OUTPATIENT)
Age: 80
End: 2025-01-15

## 2025-01-15 DIAGNOSIS — F33.1 MODERATE EPISODE OF RECURRENT MAJOR DEPRESSIVE DISORDER (HCC): ICD-10-CM

## 2025-01-15 RX ORDER — SERTRALINE HYDROCHLORIDE 100 MG/1
100 TABLET, FILM COATED ORAL DAILY
Qty: 90 TABLET | Refills: 0 | Status: SHIPPED | OUTPATIENT
Start: 2025-01-15

## 2025-01-15 NOTE — CARE COORDINATION
Burak Hawkins, RN  Manager Care Coordination  [phone]     I called Silvia to let her know how to handle bringing Aquilino in a side door for his HFU appointment to accommodate his being weak.  She verbalized understanding.

## 2025-01-17 ENCOUNTER — APPOINTMENT (OUTPATIENT)
Dept: ULTRASOUND IMAGING | Facility: HOSPITAL | Age: 80
End: 2025-01-17
Payer: MEDICARE

## 2025-01-17 ENCOUNTER — APPOINTMENT (OUTPATIENT)
Dept: CT IMAGING | Facility: HOSPITAL | Age: 80
End: 2025-01-17
Payer: MEDICARE

## 2025-01-17 ENCOUNTER — READMISSION MANAGEMENT (OUTPATIENT)
Dept: CALL CENTER | Facility: HOSPITAL | Age: 80
End: 2025-01-17
Payer: MEDICARE

## 2025-01-17 ENCOUNTER — HOSPITAL ENCOUNTER (OUTPATIENT)
Facility: HOSPITAL | Age: 80
Setting detail: OBSERVATION
Discharge: HOME | End: 2025-01-19
Attending: EMERGENCY MEDICINE | Admitting: INTERNAL MEDICINE
Payer: MEDICARE

## 2025-01-17 ENCOUNTER — APPOINTMENT (OUTPATIENT)
Dept: GENERAL RADIOLOGY | Facility: HOSPITAL | Age: 80
End: 2025-01-17
Payer: MEDICARE

## 2025-01-17 DIAGNOSIS — R55 SYNCOPE AND COLLAPSE: Primary | ICD-10-CM

## 2025-01-17 DIAGNOSIS — I95.1 ORTHOSTATIC HYPOTENSION: ICD-10-CM

## 2025-01-17 LAB
ALBUMIN SERPL-MCNC: 3.7 G/DL (ref 3.5–5.2)
ALBUMIN/GLOB SERPL: 1.3 G/DL
ALP SERPL-CCNC: 64 U/L (ref 39–117)
ALT SERPL W P-5'-P-CCNC: 43 U/L (ref 1–41)
ANION GAP SERPL CALCULATED.3IONS-SCNC: 11.8 MMOL/L (ref 5–15)
AST SERPL-CCNC: 34 U/L (ref 1–40)
BASOPHILS # BLD AUTO: 0.06 10*3/MM3 (ref 0–0.2)
BASOPHILS NFR BLD AUTO: 0.5 % (ref 0–1.5)
BILIRUB SERPL-MCNC: 0.6 MG/DL (ref 0–1.2)
BUN SERPL-MCNC: 77 MG/DL (ref 8–23)
BUN/CREAT SERPL: 34.4 (ref 7–25)
CALCIUM SPEC-SCNC: 8.4 MG/DL (ref 8.6–10.5)
CHLORIDE SERPL-SCNC: 103 MMOL/L (ref 98–107)
CO2 SERPL-SCNC: 26.2 MMOL/L (ref 22–29)
CREAT SERPL-MCNC: 2.24 MG/DL (ref 0.76–1.27)
DEPRECATED RDW RBC AUTO: 45.2 FL (ref 37–54)
EGFRCR SERPLBLD CKD-EPI 2021: 29.3 ML/MIN/1.73
EOSINOPHIL # BLD AUTO: 0.17 10*3/MM3 (ref 0–0.4)
EOSINOPHIL NFR BLD AUTO: 1.5 % (ref 0.3–6.2)
ERYTHROCYTE [DISTWIDTH] IN BLOOD BY AUTOMATED COUNT: 13.3 % (ref 12.3–15.4)
GEN 5 1HR TROPONIN T REFLEX: 151 NG/L
GLOBULIN UR ELPH-MCNC: 2.8 GM/DL
GLUCOSE SERPL-MCNC: 104 MG/DL (ref 65–99)
HCT VFR BLD AUTO: 32.7 % (ref 37.5–51)
HGB BLD-MCNC: 10.5 G/DL (ref 13–17.7)
HOLD SPECIMEN: NORMAL
HOLD SPECIMEN: NORMAL
IMM GRANULOCYTES # BLD AUTO: 0.04 10*3/MM3 (ref 0–0.05)
IMM GRANULOCYTES NFR BLD AUTO: 0.4 % (ref 0–0.5)
LYMPHOCYTES # BLD AUTO: 0.85 10*3/MM3 (ref 0.7–3.1)
LYMPHOCYTES NFR BLD AUTO: 7.7 % (ref 19.6–45.3)
MAGNESIUM SERPL-MCNC: 2.5 MG/DL (ref 1.6–2.4)
MCH RBC QN AUTO: 29.9 PG (ref 26.6–33)
MCHC RBC AUTO-ENTMCNC: 32.1 G/DL (ref 31.5–35.7)
MCV RBC AUTO: 93.2 FL (ref 79–97)
MONOCYTES # BLD AUTO: 0.81 10*3/MM3 (ref 0.1–0.9)
MONOCYTES NFR BLD AUTO: 7.3 % (ref 5–12)
NEUTROPHILS NFR BLD AUTO: 82.6 % (ref 42.7–76)
NEUTROPHILS NFR BLD AUTO: 9.16 10*3/MM3 (ref 1.7–7)
NRBC BLD AUTO-RTO: 0 /100 WBC (ref 0–0.2)
PLATELET # BLD AUTO: 280 10*3/MM3 (ref 140–450)
PMV BLD AUTO: 10.5 FL (ref 6–12)
POTASSIUM SERPL-SCNC: 4.8 MMOL/L (ref 3.5–5.2)
PROT SERPL-MCNC: 6.5 G/DL (ref 6–8.5)
RBC # BLD AUTO: 3.51 10*6/MM3 (ref 4.14–5.8)
SODIUM SERPL-SCNC: 141 MMOL/L (ref 136–145)
TROPONIN T % DELTA: -11
TROPONIN T NUMERIC DELTA: -18 NG/L
TROPONIN T SERPL HS-MCNC: 169 NG/L
WBC NRBC COR # BLD AUTO: 11.09 10*3/MM3 (ref 3.4–10.8)
WHOLE BLOOD HOLD COAG: NORMAL
WHOLE BLOOD HOLD SPECIMEN: NORMAL

## 2025-01-17 PROCEDURE — 93880 EXTRACRANIAL BILAT STUDY: CPT

## 2025-01-17 PROCEDURE — G0378 HOSPITAL OBSERVATION PER HR: HCPCS

## 2025-01-17 PROCEDURE — 70450 CT HEAD/BRAIN W/O DYE: CPT

## 2025-01-17 PROCEDURE — 99223 1ST HOSP IP/OBS HIGH 75: CPT | Performed by: FAMILY MEDICINE

## 2025-01-17 PROCEDURE — 99285 EMERGENCY DEPT VISIT HI MDM: CPT | Performed by: EMERGENCY MEDICINE

## 2025-01-17 PROCEDURE — 80053 COMPREHEN METABOLIC PANEL: CPT | Performed by: EMERGENCY MEDICINE

## 2025-01-17 PROCEDURE — 93005 ELECTROCARDIOGRAM TRACING: CPT | Performed by: EMERGENCY MEDICINE

## 2025-01-17 PROCEDURE — 71045 X-RAY EXAM CHEST 1 VIEW: CPT

## 2025-01-17 PROCEDURE — 85025 COMPLETE CBC W/AUTO DIFF WBC: CPT | Performed by: EMERGENCY MEDICINE

## 2025-01-17 PROCEDURE — 83735 ASSAY OF MAGNESIUM: CPT | Performed by: EMERGENCY MEDICINE

## 2025-01-17 PROCEDURE — 36415 COLL VENOUS BLD VENIPUNCTURE: CPT

## 2025-01-17 PROCEDURE — 84484 ASSAY OF TROPONIN QUANT: CPT | Performed by: EMERGENCY MEDICINE

## 2025-01-17 PROCEDURE — 25810000003 SODIUM CHLORIDE 0.9 % SOLUTION: Performed by: EMERGENCY MEDICINE

## 2025-01-17 RX ORDER — SODIUM CHLORIDE 0.9 % (FLUSH) 0.9 %
10 SYRINGE (ML) INJECTION EVERY 12 HOURS SCHEDULED
Status: DISCONTINUED | OUTPATIENT
Start: 2025-01-17 | End: 2025-01-19 | Stop reason: HOSPADM

## 2025-01-17 RX ORDER — BISACODYL 10 MG
10 SUPPOSITORY, RECTAL RECTAL DAILY PRN
Status: DISCONTINUED | OUTPATIENT
Start: 2025-01-17 | End: 2025-01-19 | Stop reason: HOSPADM

## 2025-01-17 RX ORDER — SODIUM CHLORIDE 9 MG/ML
40 INJECTION, SOLUTION INTRAVENOUS AS NEEDED
Status: DISCONTINUED | OUTPATIENT
Start: 2025-01-17 | End: 2025-01-19 | Stop reason: HOSPADM

## 2025-01-17 RX ORDER — NITROGLYCERIN 0.4 MG/1
0.4 TABLET SUBLINGUAL
Status: DISCONTINUED | OUTPATIENT
Start: 2025-01-17 | End: 2025-01-19 | Stop reason: HOSPADM

## 2025-01-17 RX ORDER — AMOXICILLIN 250 MG
2 CAPSULE ORAL 2 TIMES DAILY PRN
Status: DISCONTINUED | OUTPATIENT
Start: 2025-01-17 | End: 2025-01-19 | Stop reason: HOSPADM

## 2025-01-17 RX ORDER — SODIUM CHLORIDE 0.9 % (FLUSH) 0.9 %
10 SYRINGE (ML) INJECTION AS NEEDED
Status: DISCONTINUED | OUTPATIENT
Start: 2025-01-17 | End: 2025-01-19 | Stop reason: HOSPADM

## 2025-01-17 RX ORDER — MIDODRINE HYDROCHLORIDE 5 MG/1
5 TABLET ORAL
Status: DISCONTINUED | OUTPATIENT
Start: 2025-01-17 | End: 2025-01-19 | Stop reason: HOSPADM

## 2025-01-17 RX ORDER — BISACODYL 5 MG/1
5 TABLET, DELAYED RELEASE ORAL DAILY PRN
Status: DISCONTINUED | OUTPATIENT
Start: 2025-01-17 | End: 2025-01-19 | Stop reason: HOSPADM

## 2025-01-17 RX ORDER — POLYETHYLENE GLYCOL 3350 17 G/17G
17 POWDER, FOR SOLUTION ORAL DAILY PRN
Status: DISCONTINUED | OUTPATIENT
Start: 2025-01-17 | End: 2025-01-19 | Stop reason: HOSPADM

## 2025-01-17 RX ADMIN — Medication 10 ML: at 14:09

## 2025-01-17 RX ADMIN — MIDODRINE HYDROCHLORIDE 5 MG: 5 TABLET ORAL at 17:46

## 2025-01-17 RX ADMIN — SODIUM CHLORIDE 500 ML: 9 INJECTION, SOLUTION INTRAVENOUS at 10:44

## 2025-01-17 RX ADMIN — Medication 10 ML: at 20:27

## 2025-01-17 NOTE — Clinical Note
Level of Care: Telemetry [5]   Diagnosis: Orthostatic syncope [809321]   Admitting Physician: JANAE MCCOLLUM [[id number]]   Attending Physician: JANAE MCCOLLUM [[id number]]

## 2025-01-17 NOTE — CONSULTS
[facility]      Nephrology Consultation      Referring Provider:   No ref. provider found    Reason for Consultation:  Acute Kidney Injury on chronic kidney disease 3b and associated problems.      Subjective:  Chief complaint   Chief Complaint   Patient presents with    Syncope     History of present illness:    Patient is 79-year-old male with multimedical problems including coronary artery disease status post multiple stents, chronic kidney disease stage IIIb with a GFR in the 30s, recurrent episodes of systolic congestive heart failure, hypertension, type 2 diabetes, obstructive sleep apnea, and anemia.  Patient was scheduled to be seen in the office today by my nurse practitioner, he was called to the waiting area where patient almost passed out, his lips and fingertips were blue.  EMS was called and patient was transferred to the ER for further evaluation and treatment.   Currently patient is laying in the bed awake alert and interactive, he feels like that he is doing much better since his receive the IV fluids and has significant improvement in his blood pressure.  He denies having any chest pain shortness of breath at this significantly better, edema is also noted, appears to have generalized anasarca.  Denies any nausea vomiting abdominal pain.  He is eating lunch when I saw him.  He did mention that he has been compliant with his medications as well as BiPAP. No fever or chills.  Patient's wife at the bedside, she said it has been very hard to keep his volume status stable.  She did mention that he goes out to [employer] every day to at least she knows of have a cheeseburger she does not think that he eats fries.  She said she does not give him any salty stuff in the house.  I have reviewed labs/imaging/records from this hospitalization, including ER staff and admitting/attending physicians H/P's and progress notes to establish a comprehensive understanding of this patient's clinical  hospital course, as well as to establish plan of care appropriately.     Past Medical History:   Diagnosis Date    Benign hypertension     Coronary artery disease     Depression     Enlarged prostate     Enlarged prostate with anticipated TURP with Dr. Bernal.    GERD (gastroesophageal reflux disease)     Heart attack     Hypercholesterolemia     Impaired functional mobility, balance, gait, and endurance     Myocardial infarction     Obesity     Obstructive sleep apnea on CPAP     Type 2 diabetes mellitus        Past Surgical History:   Procedure Laterality Date    CARDIAC CATHETERIZATION      CARDIAC CATHETERIZATION Left 10/19/2021    Procedure: Left Heart Cath;  Surgeon: Liz Russo MD;  Location:  [facility];  Service: Cardiology;  Laterality: Left;    CARDIAC CATHETERIZATION N/A 7/18/2023    Procedure: Coronary angiography;  Surgeon: Rupesh Parr MD;  Location:  [facility];  Service: Cardiology;  Laterality: N/A;    CARDIAC CATHETERIZATION N/A 7/18/2023    Procedure: Percutaneous Coronary Intervention;  Surgeon: Rupesh Parr MD;  Location:  [facility];  Service: Cardiology;  Laterality: N/A;    COLONOSCOPY W/ POLYPECTOMY      CORONARY ANGIOPLASTY WITH STENT PLACEMENT Left 06/03/2009    Left heart catheterization, 06/03/2009, Dr. Russo:  LVEF 45% to 50%.  Placement of overlapping Cypher drug-eluting stent 2.5 x 28 and 2.5 x 18 to the SVG to the obtuse marginal branch.  SVG to the PDA had a proximal stenosis estimated at 60% with a distal stenosis of 50% to 60%.    CORONARY ANGIOPLASTY WITH STENT PLACEMENT Left 07/06/2009    Left heart catheterization, 07/06/2009:  PTCA and stent placement in the mid PDA using a 2.25 x 12 mm Taxus drug-eluting stent with stent placement in the distal SVG to the PDA using a 2.5 x 18 mm Cypher drug-eluting stent and stenting of the proximal SVG to the PDA using a 3.0 x 28 mm Cypher drug-eluting  stent.    CORONARY ANGIOPLASTY WITH STENT PLACEMENT  04/23/2010    Cardiac catheterization for recurrent anginal symptoms, 04/23/2010, with PTCA and stent placement in the proximal SVG to the PDA using 3.0 x 28 mm Promus drug-eluting stent for in-stent restenosis.    CORONARY ANGIOPLASTY WITH STENT PLACEMENT Left 07/06/2010    Left heart catheterization, 07/06/2010, Dr. Russo:  EF 40% to 45%.  3.5 x 23 mm Promus drug-eluting stent in the proximal SVG to OM, 2.5 x 18 mm Promus drug-eluting stent to distal SVG to PDA due to in-stent restenosis.      CORONARY ANGIOPLASTY WITH STENT PLACEMENT Left 11/16/2010    Left heart catheterization, 11/16/2010, with placement of a 3.0 x 16 mm Taxus drug-eluting stent to the SVG to the RCA proximally and a 2.5 x 16 mm paclitaxel stent distally in the SVG to the RCA.    CORONARY ANGIOPLASTY WITH STENT PLACEMENT Left     Left heart catheterization, 3.0 x 24-mm Promus element drug-eluting stent to a 90% lesion in the mid portion of the SVG to the PDA.     CORONARY ANGIOPLASTY WITH STENT PLACEMENT Left 06/24/2014    Left heart catheterization for recurrent angina, 06/24/2014, Dr. Russo:  PTCA of the SVG to the PDA using a 3 x 12 mm NC TREK balloon.      CORONARY ARTERY BYPASS GRAFT      CABG x3, Dr. Peng, [facility], 2001.     Family History   Problem Relation Age of Onset    Heart attack Mother     Ulcers Father      negative h/o ESRD     Social History     Tobacco Use    Smoking status: Never    Smokeless tobacco: Never   Vaping Use    Vaping status: Never Used   Substance Use Topics    Alcohol use: No    Drug use: No     Home medications:   Prior to Admission Medications       Prescriptions Last Dose Informant Patient Reported? Taking?    albuterol sulfate  (90 Base) MCG/ACT inhaler   No No    Inhale 2 puffs Every 4 (Four) Hours As Needed for Wheezing.    aspirin 81 MG chewable tablet  Self, Spouse/Significant Other Yes No    Chew 1 tablet Daily.     atorvastatin (LIPITOR) 80 MG tablet   No No    Take 1 tablet by mouth Daily.    B-D UF III MINI PEN NEEDLES 31G X 5 MM misc   Yes No    carvedilol (COREG) 12.5 MG tablet   No No    Take 1 tablet by mouth 2 (Two) Times a Day With Meals.    Cholecalciferol (VITAMIN D3) 50 MCG (2000 UT) capsule   Yes No    Take 1 capsule by mouth Daily.    dapagliflozin Propanediol (Farxiga) 10 MG tablet  Spouse/Significant Other Yes No    Take 10 mg by mouth Daily.    docusate calcium (SURFAK) 240 MG capsule   Yes No    Take 1 capsule by mouth 2 (Two) Times a Day.    fexofenadine (ALLEGRA) 180 MG tablet  Spouse/Significant Other Yes No    Take 1 tablet by mouth Daily As Needed.    finasteride (PROSCAR) 5 MG tablet   Yes No    Take 1 tablet by mouth Daily.    fluticasone (FLONASE) 50 MCG/ACT nasal spray   No No    Administer 1 spray into the nostril(s) as directed by provider Daily As Needed for Allergies or Rhinitis.    Fluticasone-Umeclidin-Vilant (Trelegy Ellipta) 200-62.5-25 MCG/ACT inhaler   No No    Inhale 1 puff Daily. Rinse mouth with water after use    hydrALAZINE (APRESOLINE) 25 MG tablet   No No    Take 1 tablet by mouth 3 (Three) Times a Day.    insulin degludec (Tresiba FlexTouch) 100 UNIT/ML solution pen-injector injection   Yes No    Inject  under the skin into the appropriate area as directed 2 (Two) Times a Day. 22 units in the am   37 units at bedtime    Insulin Lispro (humaLOG) 100 UNIT/ML injection   Yes No    Inject 8 Units under the skin into the appropriate area as directed Daily. Daily with supper    isosorbide mononitrate (IMDUR) 30 MG 24 hr tablet   No No    Take 1 tablet by mouth Daily.    magnesium oxide (MAG-OX) 400 MG tablet   Yes No    Take 1 tablet by mouth Daily.    Multiple Vitamin (MULTI VITAMIN DAILY PO)   Yes No    Take 1 tablet by mouth Daily.    nitroglycerin (NITROSTAT) 0.4 MG SL tablet   No No    Place 1 tablet under the tongue Every 5 (Five) Minutes As Needed for Chest Pain. Take no more  than 3 doses in 15 minutes.    pantoprazole (PROTONIX) 40 MG EC tablet   Yes No    TAKE 1 TABLET BY MOUTH 2 TIMES DAILY (BEFORE MEALS)    pramipexole (MIRAPEX) 1 MG tablet   No No    Take 1 tablet by mouth Every Night.    prasugrel (EFFIENT) 10 MG tablet   Yes No    Take 1 tablet by mouth Daily.    ranolazine (RANEXA) 500 MG 12 hr tablet   No No    Take 1 tablet by mouth Every 12 (Twelve) Hours for 180 days.    sertraline (ZOLOFT) 100 MG tablet   Yes No    Take 1 tablet by mouth Daily.    spironolactone (ALDACTONE) 25 MG tablet   No No    Take 1 tablet by mouth Every Other Day for 30 days. Alternate days with lasix    tamsulosin (FLOMAX) 0.4 MG capsule 24 hr capsule   Yes No    Take 1 capsule by mouth 2 (Two) Times a Day.    Torsemide 40 MG tablet   No No    Take 40 mg by mouth Every Other Day for 30 days. Alternate days with spinorolactone    TRUEplus Lancets 28G misc   Yes No    Trulicity 0.75 MG/0.5ML solution pen-injector   Yes No    Inject 0.75 mg as directed 1 (One) Time Per Week.          Emergency department medications:   Medications   sodium chloride 0.9 % flush 10 mL (has no administration in time range)   sodium chloride 0.9 % flush 10 mL (10 mL Intravenous Given 1/17/25 1409)   sodium chloride 0.9 % flush 10 mL (has no administration in time range)   sodium chloride 0.9 % infusion 40 mL (has no administration in time range)   nitroglycerin (NITROSTAT) SL tablet 0.4 mg (has no administration in time range)   Potassium Replacement - Follow Nurse / BPA Driven Protocol (has no administration in time range)   Magnesium Standard Dose Replacement - Follow Nurse / BPA Driven Protocol (has no administration in time range)   Phosphorus Replacement - Follow Nurse / BPA Driven Protocol (has no administration in time range)   Calcium Replacement - Follow Nurse / BPA Driven Protocol (has no administration in time range)   sennosides-docusate (PERICOLACE) 8.6-50 MG per tablet 2 tablet (has no administration in time  "range)     And   polyethylene glycol (MIRALAX) packet 17 g (has no administration in time range)     And   bisacodyl (DULCOLAX) EC tablet 5 mg (has no administration in time range)     And   bisacodyl (DULCOLAX) suppository 10 mg (has no administration in time range)   midodrine (PROAMATINE) tablet 5 mg (has no administration in time range)   sodium chloride 0.9 % bolus 500 mL (0 mL Intravenous Stopped 1/17/25 1153)       Allergies:  Zocor [simvastatin], Bisoprolol, Byetta 10 mcg pen [exenatide], Crestor [rosuvastatin calcium], Metformin and related, and Plavix [clopidogrel bisulfate]    Review of Systems  14 point review of system were done and are negative except as mentioned in the history of present illness and assessment and plan.      Physical Exam:  Objective:  Vital Signs  /85 (BP Location: Left arm, Patient Position: Sitting)   Pulse 75   Temp 97.7 °F (36.5 °C) (Axillary)   Resp 18   Ht 167.6 cm (66\")   Wt 93.4 kg (206 lb)   SpO2 96%   BMI 33.25 kg/m²   Objective    I/O this shift:  In: 240 [P.O.:240]  Out: -     Intake/Output Summary (Last 24 hours) at 1/17/2025 1727  Last data filed at 1/17/2025 1700  Gross per 24 hour   Intake 240 ml   Output --   Net 240 ml        Physical Exam     Constitutional: Awake, alert  Eyes: sclerae anicteric, no conjunctival injection  HEENT: mucous membranes dry  Neck: Supple, no thyromegaly, no lymphadenopathy, trachea midline, No JVD  Respiratory: Decreased breath sounds with bibasilar crackles.  Cardiovascular: RRR, systolic murmurs, no rubs, or gallops.  Gastrointestinal: Positive bowel sounds, obese, soft, nontender, nondistended  Musculoskeletal: 2+ edema, no clubbing or cyanosis  Psychiatric: Appropriate affect, cooperative  Neurologic: Oriented x 3, moving all extremities, Cranial Nerves grossly intact, speech clear  Skin: warm and dry, no rashes            Results Review:   Results from last 7 days   Lab Units 01/17/25  1031   SODIUM mmol/L 141 " "  POTASSIUM mmol/L 4.8   CHLORIDE mmol/L 103   CO2 mmol/L 26.2   BUN mg/dL 77*   CREATININE mg/dL 2.24*   CALCIUM mg/dL 8.4*   ALBUMIN g/dL 3.7   BILIRUBIN mg/dL 0.6   ALK PHOS U/L 64   ALT (SGPT) U/L 43*   AST (SGOT) U/L 34   GLUCOSE mg/dL 104*     Estimated Creatinine Clearance: 29.1 mL/min (A) (by C-G formula based on SCr of 2.24 mg/dL (H)).  Results from last 7 days   Lab Units 01/17/25  1031   MAGNESIUM mg/dL 2.5*         Results from last 7 days   Lab Units 01/17/25  1031   WBC 10*3/mm3 11.09*   HEMOGLOBIN g/dL 10.5*   PLATELETS 10*3/mm3 280         Brief Urine Lab Results  (Last result in the past 365 days)        Color   Clarity   Blood   Leuk Est   Nitrite   Protein   CREAT   Urine HCG        01/02/25 1900 Yellow   Clear   Negative   Negative   Negative   Negative                 No results found for: \"UTPCR\"  Imaging Results (Last 24 Hours)       Procedure Component Value Units Date/Time    US Carotid Bilateral [[id number]] Resulted: 01/17/25 1725     Updated: 01/17/25 1725    CT Head Without Contrast [[id number]] Collected: 01/17/25 1131     Updated: 01/17/25 1135    Narrative:      PROCEDURE: CT HEAD WO CONTRAST-     HISTORY: syncope, convulsions     COMPARISON: January 11, 2024..     TECHNIQUE: Multiple axial CT images were performed from the foramen  magnum to the vertex. Individualized dose reduction techniques using  automated exposure control or adjustment of mA and/or kV according to  the patient size were employed.     FINDINGS: There is moderate, age-appropriate, stable generalized  cerebral atrophy. The ventricles are enlarged. There is no evidence of  edema or hemorrhage. There are small areas of encephalomalacia in both  basal ganglia consistent with remote CVA. No masses are identified. No  extra-axial fluid is seen. The paranasal sinuses are unremarkable.       Impression:      Atrophy  without acute process.              CTDI: 40.8 mGy  DLP:821.89 mGy.cm     This report was signed and " finalized on 1/17/2025 11:33 AM by Mayra Da Silva MD.       XR Chest 1 View [[id number]] Collected: 01/17/25 1120     Updated: 01/17/25 1124    Narrative:      PROCEDURE: XR CHEST 1 VW-        HISTORY: weakness     COMPARISON: January 2, 2025.     FINDINGS: Apical lordotic view. The heart is enlarged, but stable. The  patient is status post median sternotomy. There is left basilar  atelectasis or infiltrate. There is evidence of old calcified  granulomatous disease. . There is no pneumothorax. There are no acute  osseous abnormalities.       Impression:      Left basilar atelectasis or infiltrate. Continued follow-up  is recommended.           Images were reviewed, interpreted, and dictated by Dr. Mayra Da Silva MD  Transcribed by Saba Beltran PA-C.     This report was signed and finalized on 1/17/2025 11:22 AM by Mayra Da Silva MD.             midodrine, 5 mg, Oral, TID AC  sodium chloride, 10 mL, Intravenous, Q12H           Assessment/Plan:      Orthostatic syncope    Syncope and collapse    Acute kidney injury: Patient does appear to have some worsening of his renal function.  It is likely all hemodynamically mediated secondary to volume issues, hypertension may have some worsening of underlying chronic kidney disease as well. Continue to optimize volume status and medications.  Chronic kidney disease stage IIIb: Baseline diabetic and hypertensive glomerulosclerosis with minimal proteinuria.  He has been on optimize medications.  I will recheck spot protein creatinine ratio.  Syncope: Most likely secondary to volume issues, responded well to 500 mL bolus.  Workup ordered as per hospitalist service  Type 2 diabetes: Continue with the sliding scale most recent hemoglobin A1c of 8.4 noted.  Hypertension: Optimize volume status before putting him on any antihypertensives.  Anemia: Check iron stores, may end up requiring PAOLA.  CHF/CAD: Optimize volume status  COPD: Continue home medications.  Obstructive sleep apnea:  Patient said he has been compliant with his BiPAP.  Dyslipidemia: Continue statin  BPH: Continue home medications.      Risk and complexity: High       Plan:  I will go ahead and start him on midodrine, hold off giving any more fluids.  He may end up requiring another echo to see what his cardiac status is.  He still appears to have significant volume issues, once blood pressure is stabilized we will go ahead and aggressively diurese him.  Office note from the primary care reviewed patient is on 12.5 of carvedilol 25 of hydralazine 3 times a day, these are the medications that we will lower his blood pressure significantly and will not be able to optimize his volume status.  All these medications may end up requiring low-dose beta-blockers like metoprolol for cardioprotection and optimizing the volume status with aggressive diuresis.  Continue with rest of the current treatment plan and surveillance labs.  We need to get his home medication list so we can optimize the volume status as well as medications.  Details were discussed with the patient as well as family in the room.  Wife did mention that he eats out almost on daily basis, she is not sure we will what he eats at least she does that he eats a cheeseburger at [employer] but sometimes may have other food as well.  Details were also discussed with the hospitalist service.   Further recommendations will depend on clinical course of the patient during the current hospitalization.    I also discussed the details with the nursing staff.  Rest as ordered.    In closing, I sincerely appreciate opportunity to participate in care of this patient. If I can be of any further assistance with the management of this patient, please don’t hesitate to contact me.    Destin Wilkes MD, FASN    01/17/25  17:27 EST    Dictated using Dragon.

## 2025-01-17 NOTE — ED PROVIDER NOTES
EMERGENCY DEPARTMENT ENCOUNTER    Pt Name: Donny Jerry  MRN: [id number]  Pt :   1945  Room Number:    Date of encounter:  2025  PCP: Anum Alonso APRN  ED Provider: Quinten Mtz MD    Historian: Patient      HPI:  Chief Complaint   Patient presents with    Syncope          Context: Donny Jerry is a 79 y.o. male who presents to the ED c/o syncopal episode, the patient was at a follow-up appointment at his nephrologist office, when he became weak and lost consciousness.  Apparently there was a quick period of convulsion on the ground.  The patient woke up shortly afterwards without complaint.  Currently he denies any complaints, denies chest pain, shortness of breath, weakness.  He has had medication changes recently including going off and back on his diuretics.      PAST MEDICAL HISTORY  Past Medical History:   Diagnosis Date    Benign hypertension     Coronary artery disease     Depression     Enlarged prostate     Enlarged prostate with anticipated TURP with Dr. Bernal.    GERD (gastroesophageal reflux disease)     Heart attack     Hypercholesterolemia     Impaired functional mobility, balance, gait, and endurance     Myocardial infarction     Obesity     Obstructive sleep apnea on CPAP     Type 2 diabetes mellitus          PAST SURGICAL HISTORY  Past Surgical History:   Procedure Laterality Date    CARDIAC CATHETERIZATION      CARDIAC CATHETERIZATION Left 10/19/2021    Procedure: Left Heart Cath;  Surgeon: Liz Russo MD;  Location: [facility] CATH INVASIVE LOCATION;  Service: Cardiology;  Laterality: Left;    CARDIAC CATHETERIZATION N/A 2023    Procedure: Coronary angiography;  Surgeon: Rupesh Parr MD;  Location: [facility] CATH INVASIVE LOCATION;  Service: Cardiology;  Laterality: N/A;    CARDIAC CATHETERIZATION N/A 2023    Procedure: Percutaneous Coronary Intervention;  Surgeon: Rupesh Parr MD;  Location: [facility] CATH INVASIVE LOCATION;   Service: Cardiology;  Laterality: N/A;    COLONOSCOPY W/ POLYPECTOMY      CORONARY ANGIOPLASTY WITH STENT PLACEMENT Left 06/03/2009    Left heart catheterization, 06/03/2009, Dr. Russo:  LVEF 45% to 50%.  Placement of overlapping Cypher drug-eluting stent 2.5 x 28 and 2.5 x 18 to the SVG to the obtuse marginal branch.  SVG to the PDA had a proximal stenosis estimated at 60% with a distal stenosis of 50% to 60%.    CORONARY ANGIOPLASTY WITH STENT PLACEMENT Left 07/06/2009    Left heart catheterization, 07/06/2009:  PTCA and stent placement in the mid PDA using a 2.25 x 12 mm Taxus drug-eluting stent with stent placement in the distal SVG to the PDA using a 2.5 x 18 mm Cypher drug-eluting stent and stenting of the proximal SVG to the PDA using a 3.0 x 28 mm Cypher drug-eluting stent.    CORONARY ANGIOPLASTY WITH STENT PLACEMENT  04/23/2010    Cardiac catheterization for recurrent anginal symptoms, 04/23/2010, with PTCA and stent placement in the proximal SVG to the PDA using 3.0 x 28 mm Promus drug-eluting stent for in-stent restenosis.    CORONARY ANGIOPLASTY WITH STENT PLACEMENT Left 07/06/2010    Left heart catheterization, 07/06/2010, Dr. Russo:  EF 40% to 45%.  3.5 x 23 mm Promus drug-eluting stent in the proximal SVG to OM, 2.5 x 18 mm Promus drug-eluting stent to distal SVG to PDA due to in-stent restenosis.      CORONARY ANGIOPLASTY WITH STENT PLACEMENT Left 11/16/2010    Left heart catheterization, 11/16/2010, with placement of a 3.0 x 16 mm Taxus drug-eluting stent to the SVG to the RCA proximally and a 2.5 x 16 mm paclitaxel stent distally in the SVG to the RCA.    CORONARY ANGIOPLASTY WITH STENT PLACEMENT Left     Left heart catheterization, 3.0 x 24-mm Promus element drug-eluting stent to a 90% lesion in the mid portion of the SVG to the PDA.     CORONARY ANGIOPLASTY WITH STENT PLACEMENT Left 06/24/2014    Left heart catheterization for recurrent angina, 06/24/2014, Dr. Russo:   PTCA of the SVG to the PDA using a 3 x 12 mm NC TREK balloon.      CORONARY ARTERY BYPASS GRAFT      CABG x3, Dr. Peng, [facility], 2001.         FAMILY HISTORY  Family History   Problem Relation Age of Onset    Heart attack Mother     Ulcers Father          SOCIAL HISTORY  Social History     Socioeconomic History    Marital status:    Tobacco Use    Smoking status: Never    Smokeless tobacco: Never   Vaping Use    Vaping status: Never Used   Substance and Sexual Activity    Alcohol use: No    Drug use: No    Sexual activity: Defer         ALLERGIES  Zocor [simvastatin], Bisoprolol, Byetta 10 mcg pen [exenatide], Crestor [rosuvastatin calcium], Metformin and related, and Plavix [clopidogrel bisulfate]        REVIEW OF SYSTEMS  Review of Systems   Constitutional:  Negative for chills and fever.   HENT:  Negative for sore throat and trouble swallowing.    Eyes:  Negative for pain and redness.   Respiratory:  Negative for cough and shortness of breath.    Cardiovascular:  Negative for chest pain and leg swelling.   Gastrointestinal:  Negative for abdominal pain, nausea and vomiting.   Genitourinary:  Negative for dysuria and urgency.   Musculoskeletal:  Negative for back pain and neck pain.   Skin:  Negative for rash and wound.   Neurological:  Positive for syncope. Negative for dizziness and weakness.        All systems reviewed and negative except for those discussed in HPI.       PHYSICAL EXAM    I have reviewed the triage vital signs and nursing notes.    ED Triage Vitals   Temp Heart Rate Resp BP SpO2   01/17/25 1026 01/17/25 1026 01/17/25 1026 01/17/25 1026 01/17/25 1026   98.3 °F (36.8 °C) 90 20 91/61 96 %      Temp src Heart Rate Source Patient Position BP Location FiO2 (%)   01/17/25 1026 -- 01/17/25 1108 -- --   Oral  Lying         Physical Exam  Constitutional:       Appearance: Normal appearance. He is not ill-appearing.   HENT:      Head: Normocephalic and atraumatic.      Right Ear:  External ear normal.      Left Ear: External ear normal.      Nose: Nose normal.      Mouth/Throat:      Mouth: Mucous membranes are moist.      Pharynx: Oropharynx is clear.   Eyes:      Extraocular Movements: Extraocular movements intact.      Conjunctiva/sclera: Conjunctivae normal.      Pupils: Pupils are equal, round, and reactive to light.   Cardiovascular:      Rate and Rhythm: Normal rate and regular rhythm.      Pulses:           Radial pulses are 2+ on the right side and 2+ on the left side.      Heart sounds: No murmur heard.  Pulmonary:      Effort: Pulmonary effort is normal.      Breath sounds: Normal breath sounds.   Abdominal:      General: There is no distension.      Tenderness: There is no abdominal tenderness. There is no guarding.   Musculoskeletal:         General: No swelling or deformity.      Cervical back: Normal range of motion and neck supple.   Skin:     General: Skin is warm and dry.      Capillary Refill: Capillary refill takes less than 2 seconds.      Findings: No rash.   Neurological:      General: No focal deficit present.      Mental Status: He is alert and oriented to person, place, and time.            LAB RESULTS  Recent Results (from the past 24 hours)   Comprehensive Metabolic Panel    Collection Time: 01/17/25 10:31 AM    Specimen: Blood   Result Value Ref Range    Glucose 104 (H) 65 - 99 mg/dL    BUN 77 (H) 8 - 23 mg/dL    Creatinine 2.24 (H) 0.76 - 1.27 mg/dL    Sodium 141 136 - 145 mmol/L    Potassium 4.8 3.5 - 5.2 mmol/L    Chloride 103 98 - 107 mmol/L    CO2 26.2 22.0 - 29.0 mmol/L    Calcium 8.4 (L) 8.6 - 10.5 mg/dL    Total Protein 6.5 6.0 - 8.5 g/dL    Albumin 3.7 3.5 - 5.2 g/dL    ALT (SGPT) 43 (H) 1 - 41 U/L    AST (SGOT) 34 1 - 40 U/L    Alkaline Phosphatase 64 39 - 117 U/L    Total Bilirubin 0.6 0.0 - 1.2 mg/dL    Globulin 2.8 gm/dL    A/G Ratio 1.3 g/dL    BUN/Creatinine Ratio 34.4 (H) 7.0 - 25.0    Anion Gap 11.8 5.0 - 15.0 mmol/L    eGFR 29.3 (L) >60.0  mL/min/1.73   Magnesium    Collection Time: 01/17/25 10:31 AM    Specimen: Blood   Result Value Ref Range    Magnesium 2.5 (H) 1.6 - 2.4 mg/dL   High Sensitivity Troponin T    Collection Time: 01/17/25 10:31 AM    Specimen: Blood   Result Value Ref Range    HS Troponin T 169 (C) <22 ng/L   Green Top (Gel)    Collection Time: 01/17/25 10:31 AM   Result Value Ref Range    Extra Tube Hold for add-ons.    Lavender Top    Collection Time: 01/17/25 10:31 AM   Result Value Ref Range    Extra Tube hold for add-on    Gold Top - SST    Collection Time: 01/17/25 10:31 AM   Result Value Ref Range    Extra Tube Hold for add-ons.    Light Blue Top    Collection Time: 01/17/25 10:31 AM   Result Value Ref Range    Extra Tube Hold for add-ons.    CBC Auto Differential    Collection Time: 01/17/25 10:31 AM    Specimen: Blood   Result Value Ref Range    WBC 11.09 (H) 3.40 - 10.80 10*3/mm3    RBC 3.51 (L) 4.14 - 5.80 10*6/mm3    Hemoglobin 10.5 (L) 13.0 - 17.7 g/dL    Hematocrit 32.7 (L) 37.5 - 51.0 %    MCV 93.2 79.0 - 97.0 fL    MCH 29.9 26.6 - 33.0 pg    MCHC 32.1 31.5 - 35.7 g/dL    RDW 13.3 12.3 - 15.4 %    RDW-SD 45.2 37.0 - 54.0 fl    MPV 10.5 6.0 - 12.0 fL    Platelets 280 140 - 450 10*3/mm3    Neutrophil % 82.6 (H) 42.7 - 76.0 %    Lymphocyte % 7.7 (L) 19.6 - 45.3 %    Monocyte % 7.3 5.0 - 12.0 %    Eosinophil % 1.5 0.3 - 6.2 %    Basophil % 0.5 0.0 - 1.5 %    Immature Grans % 0.4 0.0 - 0.5 %    Neutrophils, Absolute 9.16 (H) 1.70 - 7.00 10*3/mm3    Lymphocytes, Absolute 0.85 0.70 - 3.10 10*3/mm3    Monocytes, Absolute 0.81 0.10 - 0.90 10*3/mm3    Eosinophils, Absolute 0.17 0.00 - 0.40 10*3/mm3    Basophils, Absolute 0.06 0.00 - 0.20 10*3/mm3    Immature Grans, Absolute 0.04 0.00 - 0.05 10*3/mm3    nRBC 0.0 0.0 - 0.2 /100 WBC   High Sensitivity Troponin T 1Hr    Collection Time: 01/17/25 11:54 AM    Specimen: Blood   Result Value Ref Range    HS Troponin T 151 (C) <22 ng/L    Troponin T Numeric Delta -18 ng/L    Troponin T %  Delta -11 Abnormal if >/= 20%       If labs were ordered, I independently reviewed the results and considered them in treating the patient.        RADIOLOGY  CT Head Without Contrast    Result Date: 1/17/2025  PROCEDURE: CT HEAD WO CONTRAST-  HISTORY: syncope, convulsions  COMPARISON: January 11, 2024..  TECHNIQUE: Multiple axial CT images were performed from the foramen magnum to the vertex. Individualized dose reduction techniques using automated exposure control or adjustment of mA and/or kV according to the patient size were employed.  FINDINGS: There is moderate, age-appropriate, stable generalized cerebral atrophy. The ventricles are enlarged. There is no evidence of edema or hemorrhage. There are small areas of encephalomalacia in both basal ganglia consistent with remote CVA. No masses are identified. No extra-axial fluid is seen. The paranasal sinuses are unremarkable.      Atrophy  without acute process.     CTDI: 40.8 mGy DLP:821.89 mGy.cm  This report was signed and finalized on 1/17/2025 11:33 AM by Mayra Da Silva MD.      XR Chest 1 View    Result Date: 1/17/2025  PROCEDURE: XR CHEST 1 VW-    HISTORY: weakness  COMPARISON: January 2, 2025.  FINDINGS: Apical lordotic view. The heart is enlarged, but stable. The patient is status post median sternotomy. There is left basilar atelectasis or infiltrate. There is evidence of old calcified granulomatous disease. . There is no pneumothorax. There are no acute osseous abnormalities.      Left basilar atelectasis or infiltrate. Continued follow-up is recommended.    Images were reviewed, interpreted, and dictated by Dr. Mayra Da Silva MD Transcribed by Saba Beltran PA-C.  This report was signed and finalized on 1/17/2025 11:22 AM by Mayra Da Silva MD.           PROCEDURES    Procedures    Interpretations    O2 Sat: The patients oxygen saturation was 94% on Room Air.  This was independently interpreted by me as Normal    EKG: I reviewed and independently  interpreted the EKG as sinus rhythm at 51 bpm, right axis deviation, elevated HI interval first-degree AV block, mildly elevated QRS duration, left bundle branch block, negative scar Bosa criteria    Cardiac Monitoring: I reviewed and independently interpreted the Rhythm Strip as Normal Sinus rhythm rate of 70    Radiology: I ordered and independently reviewed the above noted radiographic studies.  I viewed images of Chest Xray which showed No pulmonary process per my independent interpretation. See radiologist's dictation for official interpretation.     Radiology: I ordered and independently reviewed the above noted radiographic studies.  I viewed images of CT Head which showed No intracranial hemorrhage per my independent interpretation. See radiologist's dictation for official interpretation    MEDICATIONS GIVEN IN ER    Medications   sodium chloride 0.9 % flush 10 mL (has no administration in time range)   sodium chloride 0.9 % bolus 500 mL (0 mL Intravenous Stopped 1/17/25 1153)         MEDICAL DECISION MAKING, PROGRESS, and CONSULTS    All labs, if obtained, have been independently reviewed by me.  All radiology studies, if obtained, have been reviewed by me and the radiologist dictating the report.  All EKG's, if obtained, have been independently viewed and interpreted by me      Discussion below represents my analysis of pertinent findings related to patient's condition, differential diagnosis, treatment plan and final disposition.      Differential diagnosis:    79-year-old male presented ED after syncopal episode at his nephrologist office, the patient apparently collapsed, there was some convulsions noted but the patient came to immediately, no postictal state.  She has a convulsive syncope, likely orthostatic, versus vasovagal in nature.  Patient has had some changes in diuretics recently.  Will obtain laboratory workup, orthostatic vital signs, give small fluid bolus, obtain EKG.    Additional  Sources:  Discussed/ obtained information from independent historians:  Family at bedside  External (non-ED) record review:  Discharge summary 1/4/2025 after admission for orthostatics, [facility]       Orders placed during this visit:  Orders Placed This Encounter   Procedures    XR Chest 1 View    CT Head Without Contrast    Dexter Draw    Comprehensive Metabolic Panel    Magnesium    High Sensitivity Troponin T    CBC Auto Differential    High Sensitivity Troponin T 1Hr    NPO Diet NPO Type: Strict NPO    Undress & Gown    Continuous Pulse Oximetry    Vital Signs    Orthostatic Blood Pressure    Orthostatic Vitals (Blood Pressure & Heart Rate)    Inpatient Nephrology Consult    Oxygen Therapy- Nasal Cannula; Titrate 1-6 LPM Per SpO2; 90 - 95%    POC Glucose Once    ECG 12 Lead ED Triage Standing Order; Syncope    Insert Peripheral IV    CBC & Differential    Green Top (Gel)    Lavender Top    Gold Top - SST    Light Blue Top         Additional orders considered but not ordered:  None    ED Course:    Consultants:  Hospitalist and Dr Wilkes    ED Course as of 01/17/25 1324   Fri Jan 17, 2025   1106 Comprehensive Metabolic Panel(!)  CMP with elevated creatinine but fairly similar to recent [CS]   1150 High Sensitivity Troponin T(!!)  Troponin remains chronically elevated [CS]   1230 BP: 111/71  Blood pressure has improved significantly after small fluid bolus [CS]   1240 High Sensitivity Troponin T 1Hr(!!)  Repeat troponin is improved remains chronically elevated [CS]   1240 Patient ambulated several 100 feet in the ER, he had no further syncope, his blood pressures improved, will consult with his nephrologist to discuss possibly further decreasing his diuretics versus admission for observation [CS]   1246 Spoke to Dr Wilkes, suggests observation admission to better manage meds, and orthostasis, will contact hospitalist for admission [CS]   1318 Spoke directly to Dr. Lewis who agrees to evaluate the patient for  admission [CS]      ED Course User Index  [CS] Quinten Mtz MD           After my consideration of clinical presentation and any laboratory/radiology studies obtained, I discussed the findings with the patient/patient representative who is in agreement with the treatment plan and the final disposition. Risks and benefits of admission was discussed     AS OF 13:24 EST VITALS:    BP - 111/71  HR - 75  TEMP - 98.3 °F (36.8 °C) (Oral)  O2 SATS - 97%    I reviewed the patients prescription monitoring report if available prior to discharge    DIAGNOSIS  Final diagnoses:   Syncope and collapse   Orthostatic hypotension         DISPOSITION  ED Disposition       ED Disposition   Intended Admit    Condition   --    Comment   --                   Please note that portions of this document were completed with voice recognition software.        Quinten Mtz MD  01/17/25 1190

## 2025-01-17 NOTE — CASE MANAGEMENT/SOCIAL WORK
Discharge Planning Assessment  [facility]     Patient Name: Donny Jerry  MRN: [id number]  Today's Date: 1/17/2025    Admit Date: 1/17/2025    Plan: The patient is awake and able to answer questions.  He requests that I ask his wife the majority of the assessment questions.  He is a current patient of Anum Alonso and gets his medications from [facility].  He opts out of Meds to Bed.  He has the following DME at home:  cane, walker, wheelchair, oxygen & c-pap (Rotec), and shower chair.  He denies the need for additional DME or services at [place].  At the time of DC the patient plans to return home with his wife. Questions and concerns were addressed at the time of this conversation; SAN delivered at earlier time.  Will provide additional resources and information upon patient request.   Discharge Needs Assessment       Row Name 01/17/25 1422       Living Environment    People in Home spouse    Name(s) of People in Home Chely Jerry, Wife    Current Living Arrangements home    Duration at Residence 40+ years    Potentially Unsafe Housing Conditions none    In the past 12 months has the electric, gas, oil, or water company threatened to shut off services in your home? No    Primary Care Provided by self;spouse/significant other    Provides Primary Care For no one, unable/limited ability to care for self    Family Caregiver if Needed none    Quality of Family Relationships helpful;involved;supportive    Able to Return to Prior Arrangements yes       Resource/Environmental Concerns    Resource/Environmental Concerns none    Transportation Concerns none       Transportation Needs    In the past 12 months, has lack of transportation kept you from medical appointments or from getting medications? no    In the past 12 months, has lack of transportation kept you from meetings, work, or from getting things needed for daily living? No       Food Insecurity    Within the past 12 months, you worried that your  food would run out before you got the money to buy more. Never true    Within the past 12 months, the food you bought just didn't last and you didn't have money to get more. Never true       Transition Planning    Patient/Family Anticipates Transition to home with family    Patient/Family Anticipated Services at Transition none    Transportation Anticipated family or friend will provide       Discharge Needs Assessment    Readmission Within the Last 30 Days no previous admission in last 30 days    Equipment Currently Used at Home cane, straight;walker, rolling;wheelchair;oxygen;cpap;shower chair    Concerns to be Addressed denies needs/concerns at this time    Do you want help finding or keeping work or a job? I do not need or want help    Do you want help with school or training? For example, starting or completing job training or getting a high school diploma, GED or equivalent No    Anticipated Changes Related to Illness inability to care for self    Equipment Needed After Discharge none    Provided Post Acute Provider List? N/A    N/A Provider List Comment Patient plans to return home; no new DME needs    Provided Post Acute Provider Quality & Resource List? N/A    N/A Quality & Resource List Comment Patient plans to return home; no new DME needs    Offered/Gave Vendor List no                   Discharge Plan       Row Name 01/17/25 1424       Plan    Plan The patient is awake and able to answer questions.  He requests that I ask his wife the majority of the assessment questions.  He is a current patient of Anum Alonso and gets his medications from [facility].  He opts out of Meds to Bed.  He has the following DME at home:  cane, walker, wheelchair, oxygen & c-pap (Rotec), and shower chair.  He denies the need for additional DME or services at [place].  At the time of DC the patient plans to return home with his wife. Questions and concerns were addressed at the time of this conversation; JASWINDER  delivered at earlier time.  Will provide additional resources and information upon patient request.    Patient/Family in Agreement with Plan yes    Provided Post Acute Provider List? N/A    N/A Provider List Comment Patient plans to return home; no new DME needs    Provided Post Acute Provider Quality & Resource List? N/A    N/A Quality & Resource List Comment Patient plans to return home; no new DME needs    Plan Comments No needs at this time    Final Note Plans to DC home with wife                  Continued Care and Services - Admitted Since 1/17/2025    No active coordination exists for this encounter.          Demographic Summary       Row Name 01/17/25 1421       General Information    Admission Type observation    Arrived From emergency department    Required Notices Provided Observation Status Notice    Referral Source admission list    Reason for Consult discharge planning    Preferred Language English       Contact Information    Permission Granted to Share Info With ;family/designee    Contact Information Comments Patient requests I ask his wife the assessment questions                   Functional Status       Row Name 01/17/25 1422       Functional Status    Usual Activity Tolerance good    Current Activity Tolerance moderate       Physical Activity    On average, how many days per week do you engage in moderate to strenuous exercise (like a brisk walk)? 0 days    On average, how many minutes do you engage in exercise at this level? 0 min    Number of minutes of exercise per week 0       Assessment of Health Literacy    How often do you have someone help you read hospital materials? Occasionally    How often do you have problems learning about your medical condition because of difficulty understanding written information? Occasionally    How often do you have a problem understanding what is told to you about your medical condition? Occasionally    How confident are you filling out medical  forms by yourself? Quite a bit    Health Literacy Good       Functional Status, IADL    Medications assistive person    Meal Preparation completely dependent    Housekeeping completely dependent    Laundry completely dependent    Shopping completely dependent    If for any reason you need help with day-to-day activities such as bathing, preparing meals, shopping, managing finances, etc., do you get the help you need? I get all the help I need       Mental Status    General Appearance WDL WDL       Mental Status Summary    Recent Changes in Mental Status/Cognitive Functioning no changes                   Psychosocial       Row Name 01/17/25 1422       Values/Beliefs    Spiritual, Cultural Beliefs, Yazidism Practices, Values that Affect Care no       Behavior WDL    Behavior WDL WDL       Emotion Mood WDL    Emotion/Mood/Affect WDL WDL       Speech WDL    Speech WDL WDL       Perceptual State WDL    Perceptual State WDL WDL       Thought Process WDL    Thought Process WDL WDL       Intellectual Performance WDL    Intellectual Performance WDL WDL                   Abuse/Neglect       Row Name 01/17/25 1422       Personal Safety    Feels Unsafe at Home or Work/School no    Feels Threatened by Someone no    Does Anyone Try to Keep You From Having Contact with Others or Doing Things Outside Your Home? no    Physical Signs of Abuse Present no                   Legal       Row Name 01/17/25 1422       Financial Resource Strain    How hard is it for you to pay for the very basics like food, housing, medical care, and heating? Not hard                   Substance Abuse       Row Name 01/17/25 1422       Substance Use    Substance Use Status never used                   Patient Forms       Row Name 01/17/25 1427       Patient Forms    Patient Observation Letter Delivered    Delivered to Support person  Chely Claritza, Wife    Method of delivery In person                      Chayo Marrero RN

## 2025-01-17 NOTE — H&P
[facility]   HISTORY AND PHYSICAL    Patient Name: Donny Jerry  : 1945  MRN: [id number]  Primary Care Physician:  Anum Alonso APRN  Date of admission: 2025    Subjective   Subjective     Chief Complaint:   Syncope  History of Present Illness  Patient is a pleasant 79-year-old male with past medical history of CAD, chronic kidney disease stage IIIb, diabetes, sleep apnea, COPD, BPH who presented to the emergency department because of a syncopal episode that he had while going to his nephrology appointment today.  Patient was standing when a bout of dizziness yet and he had to slowly ease himself down onto the ground.  Patient's wife noted that his lips were a bit blue and his blood pressure at the office was a systolic less than 100.  Patient denied any chest pain, palpitations or shortness of breath.  Here in the ED his blood pressures have done better after an initial blood pressure of 91/61.  His laboratory data shows that his BUN and creatinine remain elevated at 77 and 2.24.  His troponin was also elevated at 169 but is trending down currently at 151.  Because of all this the ED provider spoke with Dr. Vera the patient's nephrologist who felt he should be admitted overnight for monitoring and some medication adjustments  Review of Systems   As stated above in his present illness all other systems were reviewed and subsequently negative  Personal History     Past Medical History:   Diagnosis Date    Benign hypertension     Coronary artery disease     Depression     Enlarged prostate     Enlarged prostate with anticipated TURP with Dr. Bernal.    GERD (gastroesophageal reflux disease)     Heart attack     Hypercholesterolemia     Impaired functional mobility, balance, gait, and endurance     Myocardial infarction     Obesity     Obstructive sleep apnea on CPAP     Type 2 diabetes mellitus        Past Surgical History:   Procedure Laterality Date    CARDIAC CATHETERIZATION       CARDIAC CATHETERIZATION Left 10/19/2021    Procedure: Left Heart Cath;  Surgeon: Liz Russo MD;  Location:  [facility];  Service: Cardiology;  Laterality: Left;    CARDIAC CATHETERIZATION N/A 7/18/2023    Procedure: Coronary angiography;  Surgeon: Rupesh Parr MD;  Location:  [facility];  Service: Cardiology;  Laterality: N/A;    CARDIAC CATHETERIZATION N/A 7/18/2023    Procedure: Percutaneous Coronary Intervention;  Surgeon: Rupesh Parr MD;  Location:  [facility];  Service: Cardiology;  Laterality: N/A;    COLONOSCOPY W/ POLYPECTOMY      CORONARY ANGIOPLASTY WITH STENT PLACEMENT Left 06/03/2009    Left heart catheterization, 06/03/2009, Dr. Russo:  LVEF 45% to 50%.  Placement of overlapping Cypher drug-eluting stent 2.5 x 28 and 2.5 x 18 to the SVG to the obtuse marginal branch.  SVG to the PDA had a proximal stenosis estimated at 60% with a distal stenosis of 50% to 60%.    CORONARY ANGIOPLASTY WITH STENT PLACEMENT Left 07/06/2009    Left heart catheterization, 07/06/2009:  PTCA and stent placement in the mid PDA using a 2.25 x 12 mm Taxus drug-eluting stent with stent placement in the distal SVG to the PDA using a 2.5 x 18 mm Cypher drug-eluting stent and stenting of the proximal SVG to the PDA using a 3.0 x 28 mm Cypher drug-eluting stent.    CORONARY ANGIOPLASTY WITH STENT PLACEMENT  04/23/2010    Cardiac catheterization for recurrent anginal symptoms, 04/23/2010, with PTCA and stent placement in the proximal SVG to the PDA using 3.0 x 28 mm Promus drug-eluting stent for in-stent restenosis.    CORONARY ANGIOPLASTY WITH STENT PLACEMENT Left 07/06/2010    Left heart catheterization, 07/06/2010, Dr. Russo:  EF 40% to 45%.  3.5 x 23 mm Promus drug-eluting stent in the proximal SVG to OM, 2.5 x 18 mm Promus drug-eluting stent to distal SVG to PDA due to in-stent restenosis.      CORONARY ANGIOPLASTY WITH STENT PLACEMENT  Left 11/16/2010    Left heart catheterization, 11/16/2010, with placement of a 3.0 x 16 mm Taxus drug-eluting stent to the SVG to the RCA proximally and a 2.5 x 16 mm paclitaxel stent distally in the SVG to the RCA.    CORONARY ANGIOPLASTY WITH STENT PLACEMENT Left     Left heart catheterization, 3.0 x 24-mm Promus element drug-eluting stent to a 90% lesion in the mid portion of the SVG to the PDA.     CORONARY ANGIOPLASTY WITH STENT PLACEMENT Left 06/24/2014    Left heart catheterization for recurrent angina, 06/24/2014, Dr. Russo:  PTCA of the SVG to the PDA using a 3 x 12 mm NC TREK balloon.      CORONARY ARTERY BYPASS GRAFT      CABG x3, Dr. Peng, [facility], 2001.       Family History: family history includes Heart attack in his mother; Ulcers in his father. Otherwise pertinent FHx was reviewed and not pertinent to current issue.    Social History:  reports that he has never smoked. He has never used smokeless tobacco. He reports that he does not drink alcohol and does not use drugs.    Home Medications:  Dulaglutide, Fluticasone-Umeclidin-Vilant, Insulin Lispro, Insulin Pen Needle, TRUEplus Lancets 28G, Torsemide, Vitamin D3, albuterol sulfate HFA, aspirin, atorvastatin, carvedilol, dapagliflozin Propanediol, docusate calcium, fexofenadine, finasteride, fluticasone, hydrALAZINE, insulin degludec, isosorbide mononitrate, magnesium oxide, multivitamin, nitroglycerin, pantoprazole, pramipexole, prasugrel, ranolazine, sertraline, spironolactone, and tamsulosin    Allergies:  Allergies   Allergen Reactions    Zocor [Simvastatin] Myalgia    Bisoprolol Other (See Comments)     Agitation      Byetta 10 Mcg Pen [Exenatide] Nausea Only     nausea    Crestor [Rosuvastatin Calcium] Myalgia    Metformin And Related Nausea Only    Plavix [Clopidogrel Bisulfate] Other (See Comments)     resistance       Objective    Objective     Vitals:   Temp:  [98.3 °F (36.8 °C)] 98.3 °F (36.8 °C)  Heart Rate:  [67-90]  75  Resp:  [20] 20  BP: ()/(53-71) 118/69  Flow (L/min) (Oxygen Therapy):  [2] 2    Physical Exam  Constitutional:  Well-developed and well-nourished.  No acute distress.   Patient appears paleHENT:  Head:  Normocephalic and atraumatic.  Mouth:  Moist mucous membranes.    Eyes:  Conjunctivae and EOM are normal.  Right eye scleral bubbleNeck:  Neck supple.  No JVD present.    Cardiovascular:  Normal rate, regular rhythm and normal heart sounds with no murmur.  Pulmonary/Chest:  No respiratory distress, no wheezes, no crackles, with normal breath sounds and good air movement.  Abdominal:  Soft. No distension and no tenderness.   Musculoskeletal:  No tenderness, and no deformity.  No red or swollen joints anywhere.    Neurological:  Alert and oriented to person, place, and time.  No cranial nerve deficit.    Skin:  Skin is warm and dry. No rash noted. No pallor.   Peripheral vascular:  No clubbing, no cyanosis, no edema.  Psychiatric: Appropriate mood and affect  : Deferred    Result Review    Result Review:  I have personally reviewed the results from the time of this admission to 1/17/2025 14:25 EST and agree with these findings:  [x]  Laboratory list / accordion  []  Microbiology  [x]  Radiology  []  EKG/Telemetry   []  Cardiology/Vascular   []  Pathology  []  Old records  []  Other:  Most notable findings include:   WBC   Date Value Ref Range Status   01/17/2025 11.09 (H) 3.40 - 10.80 10*3/mm3 Final     RBC   Date Value Ref Range Status   01/17/2025 3.51 (L) 4.14 - 5.80 10*6/mm3 Final     Hemoglobin   Date Value Ref Range Status   01/17/2025 10.5 (L) 13.0 - 17.7 g/dL Final     Hematocrit   Date Value Ref Range Status   01/17/2025 32.7 (L) 37.5 - 51.0 % Final     MCV   Date Value Ref Range Status   01/17/2025 93.2 79.0 - 97.0 fL Final     MCH   Date Value Ref Range Status   01/17/2025 29.9 26.6 - 33.0 pg Final     MCHC   Date Value Ref Range Status   01/17/2025 32.1 31.5 - 35.7 g/dL Final     RDW   Date  Value Ref Range Status   01/17/2025 13.3 12.3 - 15.4 % Final     RDW-SD   Date Value Ref Range Status   01/17/2025 45.2 37.0 - 54.0 fl Final     MPV   Date Value Ref Range Status   01/17/2025 10.5 6.0 - 12.0 fL Final     Platelets   Date Value Ref Range Status   01/17/2025 280 140 - 450 10*3/mm3 Final     Neutrophil %   Date Value Ref Range Status   01/17/2025 82.6 (H) 42.7 - 76.0 % Final     Lymphocyte %   Date Value Ref Range Status   01/17/2025 7.7 (L) 19.6 - 45.3 % Final     Monocyte %   Date Value Ref Range Status   01/17/2025 7.3 5.0 - 12.0 % Final     Eosinophil %   Date Value Ref Range Status   01/17/2025 1.5 0.3 - 6.2 % Final     Basophil %   Date Value Ref Range Status   01/17/2025 0.5 0.0 - 1.5 % Final     Immature Grans %   Date Value Ref Range Status   01/17/2025 0.4 0.0 - 0.5 % Final     Neutrophils, Absolute   Date Value Ref Range Status   01/17/2025 9.16 (H) 1.70 - 7.00 10*3/mm3 Final     Lymphocytes, Absolute   Date Value Ref Range Status   01/17/2025 0.85 0.70 - 3.10 10*3/mm3 Final     Monocytes, Absolute   Date Value Ref Range Status   01/17/2025 0.81 0.10 - 0.90 10*3/mm3 Final     Eosinophils, Absolute   Date Value Ref Range Status   01/17/2025 0.17 0.00 - 0.40 10*3/mm3 Final     Basophils, Absolute   Date Value Ref Range Status   01/17/2025 0.06 0.00 - 0.20 10*3/mm3 Final     Immature Grans, Absolute   Date Value Ref Range Status   01/17/2025 0.04 0.00 - 0.05 10*3/mm3 Final     nRBC   Date Value Ref Range Status   01/17/2025 0.0 0.0 - 0.2 /100 WBC Final        Assessment & Plan   Assessment / Plan     Brief Patient Summary:  Donny Jerry is a 79 y.o. male who presents to the emergency department with syncope and collapse.  Found to have some orthostatic hypotension.  There is also question if the patient had become hypoxic.  We will admit for further evaluation and treatment    Active Hospital Problems:  Syncope with collapse  Orthostatic hypotension  Acute on chronic renal failure stage  IIIb  -Patient will be admitted to the hospitalist service  - Nephrology has been consulted and are making changes in the patient's blood pressure medications.  As always I very much appreciate their assessment and input  - We will keep the patient on telemetry for closer monitoring  - Patient will definitely be a fall precaution and I have asked him to stay in bed until there is somebody there to help him if he needs to get up  - We will continue to monitor orthostatics  - Bilateral carotid Doppler as this appears to be the only thing that has not been performed during last week's admission  - Continue fluid restriction for now        VTE Prophylaxis:  Mechanical VTE prophylaxis orders are present.        CODE STATUS:    Medical Intervention Limits: No intubation (DNI)  Code Status (Patient has no pulse and is not breathing): No CPR (Do Not Attempt to Resuscitate)  Medical Interventions (Patient has pulse or is breathing): Limited Support    Admission Status:  I believe this patient meets inpatient status.    Torres Lewis, DO

## 2025-01-17 NOTE — H&P
[facility]   HISTORY AND PHYSICAL    FOR STUDENT EDUCATIONAL PURPOSES ONLY    Name:  Donny Jerry   Age:  79 y.o.  Sex:  male  :  1945  MRN:  [id number]   Visit Number:  [id number]  Admission Date:  2025  Date Of Service:  25  Primary Care Physician:  Anum Alonso APRN    Chief Complaint:     Syncope    History Of Presenting Illness:      Patient is a 79 year old male with a pertinent medical history of MI, T2D, Sleep apnea, and CABG presenting today due to Syncope. Mr Jerry slowly went down while at a nephrology appointment outpatient. He became dizzy, struggled to breath, and then slowly went down to the floor without hitting his head or collapsing. His wife noted that his lips were blue and that his systolic blood pressure at the nephrology appointment was less than 100.   Upon arrival to the ER, patient was given IV fluids and reported feeling much better. Due to the patient meaning to see nephrology out patient for medication management, it was decided that it would be best for the patient to be admitted for better management.    Review Of Systems:    All systems were reviewed and negative except as mentioned in history of presenting illness, assessment and plan.    Past Medical History: Patient  has a past medical history of Benign hypertension, Coronary artery disease, Depression, Enlarged prostate, GERD (gastroesophageal reflux disease), Heart attack, Hypercholesterolemia, Impaired functional mobility, balance, gait, and endurance, Myocardial infarction, Obesity, Obstructive sleep apnea on CPAP, and Type 2 diabetes mellitus.    Past Surgical History: Patient  has a past surgical history that includes Coronary artery bypass graft; Coronary angioplasty with stent (Left, 2009); Coronary angioplasty with stent (Left, 2009); Coronary angioplasty with stent (2010); Coronary angioplasty with stent (Left, 2010); Coronary angioplasty  with stent (Left, 11/16/2010); Coronary angioplasty with stent (Left); Coronary angioplasty with stent (Left, 06/24/2014); Cardiac catheterization; Colonoscopy w/ polypectomy; Cardiac catheterization (Left, 10/19/2021); Cardiac catheterization (N/A, 7/18/2023); and Cardiac catheterization (N/A, 7/18/2023).    Social History: Patient  reports that he has never smoked. He has never used smokeless tobacco. He reports that he does not drink alcohol and does not use drugs.    Family History:  Patient's family history has been reviewed and found to be noncontributory. ***    Allergies:      Zocor [simvastatin], Bisoprolol, Byetta 10 mcg pen [exenatide], Crestor [rosuvastatin calcium], Metformin and related, and Plavix [clopidogrel bisulfate]    Home Medications:    Prior to Admission Medications       Prescriptions Last Dose Informant Patient Reported? Taking?    albuterol sulfate  (90 Base) MCG/ACT inhaler   No No    Inhale 2 puffs Every 4 (Four) Hours As Needed for Wheezing.    aspirin 81 MG chewable tablet  Self, Spouse/Significant Other Yes No    Chew 1 tablet Daily.    atorvastatin (LIPITOR) 80 MG tablet   No No    Take 1 tablet by mouth Daily.    B-D UF III MINI PEN NEEDLES 31G X 5 MM misc   Yes No    carvedilol (COREG) 12.5 MG tablet   No No    Take 1 tablet by mouth 2 (Two) Times a Day With Meals.    Cholecalciferol (VITAMIN D3) 50 MCG (2000 UT) capsule   Yes No    Take 1 capsule by mouth Daily.    dapagliflozin Propanediol (Farxiga) 10 MG tablet  Spouse/Significant Other Yes No    Take 10 mg by mouth Daily.    docusate calcium (SURFAK) 240 MG capsule   Yes No    Take 1 capsule by mouth 2 (Two) Times a Day.    fexofenadine (ALLEGRA) 180 MG tablet  Spouse/Significant Other Yes No    Take 1 tablet by mouth Daily As Needed.    finasteride (PROSCAR) 5 MG tablet   Yes No    Take 1 tablet by mouth Daily.    fluticasone (FLONASE) 50 MCG/ACT nasal spray   No No    Administer 1 spray into the nostril(s) as directed  by provider Daily As Needed for Allergies or Rhinitis.    Fluticasone-Umeclidin-Vilant (Trelegy Ellipta) 200-62.5-25 MCG/ACT inhaler   No No    Inhale 1 puff Daily. Rinse mouth with water after use    hydrALAZINE (APRESOLINE) 25 MG tablet   No No    Take 1 tablet by mouth 3 (Three) Times a Day.    insulin degludec (Tresiba FlexTouch) 100 UNIT/ML solution pen-injector injection   Yes No    Inject  under the skin into the appropriate area as directed 2 (Two) Times a Day. 22 units in the am   37 units at bedtime    Insulin Lispro (humaLOG) 100 UNIT/ML injection   Yes No    Inject 8 Units under the skin into the appropriate area as directed Daily. Daily with supper    isosorbide mononitrate (IMDUR) 30 MG 24 hr tablet   No No    Take 1 tablet by mouth Daily.    magnesium oxide (MAG-OX) 400 MG tablet   Yes No    Take 1 tablet by mouth Daily.    Multiple Vitamin (MULTI VITAMIN DAILY PO)   Yes No    Take 1 tablet by mouth Daily.    nitroglycerin (NITROSTAT) 0.4 MG SL tablet   No No    Place 1 tablet under the tongue Every 5 (Five) Minutes As Needed for Chest Pain. Take no more than 3 doses in 15 minutes.    pantoprazole (PROTONIX) 40 MG EC tablet   Yes No    TAKE 1 TABLET BY MOUTH 2 TIMES DAILY (BEFORE MEALS)    pramipexole (MIRAPEX) 1 MG tablet   No No    Take 1 tablet by mouth Every Night.    prasugrel (EFFIENT) 10 MG tablet   Yes No    Take 1 tablet by mouth Daily.    ranolazine (RANEXA) 500 MG 12 hr tablet   No No    Take 1 tablet by mouth Every 12 (Twelve) Hours for 180 days.    sertraline (ZOLOFT) 100 MG tablet   Yes No    Take 1 tablet by mouth Daily.    spironolactone (ALDACTONE) 25 MG tablet   No No    Take 1 tablet by mouth Every Other Day for 30 days. Alternate days with lasix    tamsulosin (FLOMAX) 0.4 MG capsule 24 hr capsule   Yes No    Take 1 capsule by mouth 2 (Two) Times a Day.    Torsemide 40 MG tablet   No No    Take 40 mg by mouth Every Other Day for 30 days. Alternate days with spinorolactone     "TRUEplus Lancets 28G misc   Yes No    Trulicity 0.75 MG/0.5ML solution pen-injector   Yes No    Inject 0.75 mg as directed 1 (One) Time Per Week.          ED Medications:    Medications   sodium chloride 0.9 % flush 10 mL (has no administration in time range)   sodium chloride 0.9 % flush 10 mL (10 mL Intravenous Given 1/17/25 1409)   sodium chloride 0.9 % flush 10 mL (has no administration in time range)   sodium chloride 0.9 % infusion 40 mL (has no administration in time range)   nitroglycerin (NITROSTAT) SL tablet 0.4 mg (has no administration in time range)   Potassium Replacement - Follow Nurse / BPA Driven Protocol (has no administration in time range)   Magnesium Standard Dose Replacement - Follow Nurse / BPA Driven Protocol (has no administration in time range)   Phosphorus Replacement - Follow Nurse / BPA Driven Protocol (has no administration in time range)   Calcium Replacement - Follow Nurse / BPA Driven Protocol (has no administration in time range)   sennosides-docusate (PERICOLACE) 8.6-50 MG per tablet 2 tablet (has no administration in time range)     And   polyethylene glycol (MIRALAX) packet 17 g (has no administration in time range)     And   bisacodyl (DULCOLAX) EC tablet 5 mg (has no administration in time range)     And   bisacodyl (DULCOLAX) suppository 10 mg (has no administration in time range)   sodium chloride 0.9 % bolus 500 mL (0 mL Intravenous Stopped 1/17/25 1153)     Vital Signs:  Temp:  [98.3 °F (36.8 °C)] 98.3 °F (36.8 °C)  Heart Rate:  [67-90] 75  Resp:  [20] 20  BP: ()/(53-71) 118/69        01/17/25  1036   Weight: 93.4 kg (206 lb)     Body mass index is 33.25 kg/m².    Physical Exam:     Most recent vital Signs: /69   Pulse 75   Temp 98.3 °F (36.8 °C) (Oral)   Resp 20   Ht 167.6 cm (66\")   Wt 93.4 kg (206 lb)   SpO2 98%   BMI 33.25 kg/m²     Physical Exam  Constitutional:       Appearance: He is obese.   HENT:      Head: Normocephalic.      Mouth/Throat:      " "Mouth: Mucous membranes are moist.   Eyes:      Extraocular Movements: Extraocular movements intact.      Pupils: Pupils are equal, round, and reactive to light.      Comments: Patient has a fat pad of some sort on outer portion of R eye that limits lateral ocular movement.   Cardiovascular:      Rate and Rhythm: Normal rate.      Heart sounds: S1 normal and S2 normal. No murmur heard.     No friction rub. No gallop.   Pulmonary:      Breath sounds: Decreased air movement present. Wheezing present.   Abdominal:      General: Bowel sounds are normal.      Palpations: Abdomen is soft.      Tenderness: There is no abdominal tenderness.      Hernia: A hernia is present.   Musculoskeletal:      Cervical back: Normal range of motion and neck supple.      Right lower le+ Pitting Edema present.      Left lower le+ Pitting Edema present.   Neurological:      Mental Status: He is alert.      Cranial Nerves: Cranial nerves 2-12 are intact.      Sensory: Sensation is intact.      Motor: Motor function is intact.       Laboratory data:    I have reviewed the labs done in the emergency room.    Results from last 7 days   Lab Units 25  1031   SODIUM mmol/L 141   POTASSIUM mmol/L 4.8   CHLORIDE mmol/L 103   CO2 mmol/L 26.2   BUN mg/dL 77*   CREATININE mg/dL 2.24*   CALCIUM mg/dL 8.4*   BILIRUBIN mg/dL 0.6   ALK PHOS U/L 64   ALT (SGPT) U/L 43*   AST (SGOT) U/L 34   GLUCOSE mg/dL 104*     Results from last 7 days   Lab Units 25  1031   WBC 10*3/mm3 11.09*   HEMOGLOBIN g/dL 10.5*   HEMATOCRIT % 32.7*   PLATELETS 10*3/mm3 280         Results from last 7 days   Lab Units 25  1154 25  1031   HSTROP T ng/L 151* 169*                           Invalid input(s): \"USDES\", \"NITRITITE\", \"BACT\", \"EP\"    Pain Management Panel  More data exists         Latest Ref Rng & Units 2023   Pain Management Panel   Creatinine, Urine mg/dL 35.5  26.3       Details                   EKG:  "     ***    Radiology:    CT Head Without Contrast    Result Date: 1/17/2025  PROCEDURE: CT HEAD WO CONTRAST-  HISTORY: syncope, convulsions  COMPARISON: January 11, 2024..  TECHNIQUE: Multiple axial CT images were performed from the foramen magnum to the vertex. Individualized dose reduction techniques using automated exposure control or adjustment of mA and/or kV according to the patient size were employed.  FINDINGS: There is moderate, age-appropriate, stable generalized cerebral atrophy. The ventricles are enlarged. There is no evidence of edema or hemorrhage. There are small areas of encephalomalacia in both basal ganglia consistent with remote CVA. No masses are identified. No extra-axial fluid is seen. The paranasal sinuses are unremarkable.      Atrophy  without acute process.     CTDI: 40.8 mGy DLP:821.89 mGy.cm  This report was signed and finalized on 1/17/2025 11:33 AM by Mayra Da Silva MD.      XR Chest 1 View    Result Date: 1/17/2025  PROCEDURE: XR CHEST 1 VW-    HISTORY: weakness  COMPARISON: January 2, 2025.  FINDINGS: Apical lordotic view. The heart is enlarged, but stable. The patient is status post median sternotomy. There is left basilar atelectasis or infiltrate. There is evidence of old calcified granulomatous disease. . There is no pneumothorax. There are no acute osseous abnormalities.      Left basilar atelectasis or infiltrate. Continued follow-up is recommended.    Images were reviewed, interpreted, and dictated by Dr. Mayra Da Silva MD Transcribed by Saba Beltran PA-C.  This report was signed and finalized on 1/17/2025 11:22 AM by Mayra Da Silva MD.       Assessment:    Orthostatic hypotension  CHF exacerbation    Plan:    Orthostatic hypotension  CHF exacerbation  Monitor blood pressure and be weary of fluid overload  Consult Nephrology regarding management of diuretics, consider lowering amount of loop diuretic  Continue home medication  Take serial troponin and BNP measurements    Risk  Assessment: Moderate  DVT Prophylaxis: Heparin  Code Status: Limited Support  Diet: Renal    Advance Care Planning   {Advance Directive Status:59882}       FOR STUDENT EDUCATIONAL PURPOSES ONLY    Dennis [place], Medical Student  01/17/25  14:36 EST

## 2025-01-17 NOTE — PLAN OF CARE
Problem: Adult Inpatient Plan of Care  Goal: Plan of Care Review  Outcome: Progressing  Goal: Patient-Specific Goal (Individualized)  Outcome: Progressing  Goal: Absence of Hospital-Acquired Illness or Injury  Outcome: Progressing  Intervention: Identify and Manage Fall Risk  Recent Flowsheet Documentation  Taken 1/17/2025 1700 by Sky Nolasco RN  Safety Promotion/Fall Prevention: safety round/check completed  Intervention: Prevent Skin Injury  Recent Flowsheet Documentation  Taken 1/17/2025 1700 by Sky Nolasco RN  Body Position: dangle, side of bed  Skin Protection: incontinence pads utilized  Goal: Optimal Comfort and Wellbeing  Outcome: Progressing  Intervention: Provide Person-Centered Care  Recent Flowsheet Documentation  Taken 1/17/2025 1700 by Sky Nolasco RN  Trust Relationship/Rapport:   care explained   choices provided   emotional support provided   empathic listening provided   questions answered   questions encouraged   reassurance provided   thoughts/feelings acknowledged  Goal: Readiness for Transition of Care  Outcome: Progressing  Intervention: Mutually Develop Transition Plan  Recent Flowsheet Documentation  Taken 1/17/2025 1652 by Sky Nolasco RN  Equipment Currently Used at Home:   cane, straight   walker, rolling   wheelchair   oxygen   cpap   shower chair     Problem: Comorbidity Management  Goal: Blood Pressure in Desired Range  Outcome: Progressing  Intervention: Maintain Blood Pressure Management  Recent Flowsheet Documentation  Taken 1/17/2025 1700 by Sky Nolasco RN  Medication Review/Management: medications reviewed     Problem: Syncope  Goal: Absence of Syncopal Symptoms  Outcome: Progressing  Intervention: Manage Effect of Syncopal Symptoms  Recent Flowsheet Documentation  Taken 1/17/2025 1700 by Sky Nolasco RN  Safety Promotion/Fall Prevention: safety round/check completed     Problem: Acute Kidney Injury/Impairment  Goal: Fluid and Electrolyte Balance  Outcome:  Progressing  Goal: Improved Oral Intake  Outcome: Progressing  Goal: Effective Renal Function  Outcome: Progressing  Intervention: Monitor and Support Renal Function  Recent Flowsheet Documentation  Taken 1/17/2025 1700 by Sky Nolasco RN  Medication Review/Management: medications reviewed     Problem: Adult Inpatient Plan of Care  Goal: Plan of Care Review  Outcome: Progressing  Goal: Patient-Specific Goal (Individualized)  Outcome: Progressing  Goal: Absence of Hospital-Acquired Illness or Injury  Outcome: Progressing  Intervention: Identify and Manage Fall Risk  Recent Flowsheet Documentation  Taken 1/17/2025 1700 by Sky Nolasco RN  Safety Promotion/Fall Prevention: safety round/check completed  Intervention: Prevent Skin Injury  Recent Flowsheet Documentation  Taken 1/17/2025 1700 by Sky Nolasco RN  Body Position: dangle, side of bed  Skin Protection: incontinence pads utilized  Goal: Optimal Comfort and Wellbeing  Outcome: Progressing  Intervention: Provide Person-Centered Care  Recent Flowsheet Documentation  Taken 1/17/2025 1700 by Sky Nolasco RN  Trust Relationship/Rapport:   care explained   choices provided   emotional support provided   empathic listening provided   questions answered   questions encouraged   reassurance provided   thoughts/feelings acknowledged  Goal: Readiness for Transition of Care  Outcome: Progressing  Intervention: Mutually Develop Transition Plan  Recent Flowsheet Documentation  Taken 1/17/2025 1652 by Sky Nolasco RN  Equipment Currently Used at Home:   cane, straight   walker, rolling   wheelchair   oxygen   cpap   shower chair     Problem: Comorbidity Management  Goal: Blood Pressure in Desired Range  Outcome: Progressing  Intervention: Maintain Blood Pressure Management  Recent Flowsheet Documentation  Taken 1/17/2025 1700 by Sky Nolasco RN  Medication Review/Management: medications reviewed     Problem: Syncope  Goal: Absence of Syncopal Symptoms  Outcome:  Progressing  Intervention: Manage Effect of Syncopal Symptoms  Recent Flowsheet Documentation  Taken 1/17/2025 1700 by Sky Nolasco RN  Safety Promotion/Fall Prevention: safety round/check completed     Problem: Acute Kidney Injury/Impairment  Goal: Fluid and Electrolyte Balance  Outcome: Progressing  Goal: Improved Oral Intake  Outcome: Progressing  Goal: Effective Renal Function  Outcome: Progressing  Intervention: Monitor and Support Renal Function  Recent Flowsheet Documentation  Taken 1/17/2025 1700 by Sky Nolasco RN  Medication Review/Management: medications reviewed   Goal Outcome Evaluation:   New ED admit. VSS on RA. Patient admitted for observation. Nephrology consult. Plan of care ongoing.

## 2025-01-18 LAB
ALBUMIN SERPL-MCNC: 3.7 G/DL (ref 3.5–5.2)
ANION GAP SERPL CALCULATED.3IONS-SCNC: 13.8 MMOL/L (ref 5–15)
BASOPHILS # BLD AUTO: 0.06 10*3/MM3 (ref 0–0.2)
BASOPHILS NFR BLD AUTO: 0.6 % (ref 0–1.5)
BILIRUB UR QL STRIP: NEGATIVE
BUN SERPL-MCNC: 68 MG/DL (ref 8–23)
BUN/CREAT SERPL: 32.7 (ref 7–25)
CALCIUM SPEC-SCNC: 8.4 MG/DL (ref 8.6–10.5)
CHLORIDE SERPL-SCNC: 106 MMOL/L (ref 98–107)
CLARITY UR: CLEAR
CO2 SERPL-SCNC: 24.2 MMOL/L (ref 22–29)
COLOR UR: YELLOW
CREAT SERPL-MCNC: 2.08 MG/DL (ref 0.76–1.27)
CREAT UR-MCNC: 81.6 MG/DL
DEPRECATED RDW RBC AUTO: 45.1 FL (ref 37–54)
EGFRCR SERPLBLD CKD-EPI 2021: 32 ML/MIN/1.73
EOSINOPHIL # BLD AUTO: 0.21 10*3/MM3 (ref 0–0.4)
EOSINOPHIL NFR BLD AUTO: 2 % (ref 0.3–6.2)
ERYTHROCYTE [DISTWIDTH] IN BLOOD BY AUTOMATED COUNT: 13.6 % (ref 12.3–15.4)
GLUCOSE SERPL-MCNC: 77 MG/DL (ref 65–99)
GLUCOSE UR STRIP-MCNC: ABNORMAL MG/DL
HCT VFR BLD AUTO: 32.4 % (ref 37.5–51)
HGB BLD-MCNC: 10.6 G/DL (ref 13–17.7)
HGB UR QL STRIP.AUTO: NEGATIVE
IMM GRANULOCYTES # BLD AUTO: 0.05 10*3/MM3 (ref 0–0.05)
IMM GRANULOCYTES NFR BLD AUTO: 0.5 % (ref 0–0.5)
KETONES UR QL STRIP: NEGATIVE
LEUKOCYTE ESTERASE UR QL STRIP.AUTO: NEGATIVE
LYMPHOCYTES # BLD AUTO: 1.12 10*3/MM3 (ref 0.7–3.1)
LYMPHOCYTES NFR BLD AUTO: 10.6 % (ref 19.6–45.3)
MCH RBC QN AUTO: 30.1 PG (ref 26.6–33)
MCHC RBC AUTO-ENTMCNC: 32.7 G/DL (ref 31.5–35.7)
MCV RBC AUTO: 92 FL (ref 79–97)
MONOCYTES # BLD AUTO: 0.78 10*3/MM3 (ref 0.1–0.9)
MONOCYTES NFR BLD AUTO: 7.4 % (ref 5–12)
NEUTROPHILS NFR BLD AUTO: 78.9 % (ref 42.7–76)
NEUTROPHILS NFR BLD AUTO: 8.32 10*3/MM3 (ref 1.7–7)
NITRITE UR QL STRIP: NEGATIVE
NRBC BLD AUTO-RTO: 0 /100 WBC (ref 0–0.2)
PH UR STRIP.AUTO: 5.5 [PH] (ref 5–8)
PHOSPHATE SERPL-MCNC: 3.2 MG/DL (ref 2.5–4.5)
PLATELET # BLD AUTO: 307 10*3/MM3 (ref 140–450)
PMV BLD AUTO: 11.1 FL (ref 6–12)
POTASSIUM SERPL-SCNC: 3.8 MMOL/L (ref 3.5–5.2)
PROT ?TM UR-MCNC: 18 MG/DL
PROT UR QL STRIP: ABNORMAL
PROT/CREAT UR: 220.6 MG/G CREA (ref 0–200)
RBC # BLD AUTO: 3.52 10*6/MM3 (ref 4.14–5.8)
SODIUM SERPL-SCNC: 144 MMOL/L (ref 136–145)
SP GR UR STRIP: 1.02 (ref 1–1.03)
UROBILINOGEN UR QL STRIP: ABNORMAL
WBC NRBC COR # BLD AUTO: 10.54 10*3/MM3 (ref 3.4–10.8)

## 2025-01-18 PROCEDURE — 84156 ASSAY OF PROTEIN URINE: CPT | Performed by: INTERNAL MEDICINE

## 2025-01-18 PROCEDURE — G0378 HOSPITAL OBSERVATION PER HR: HCPCS

## 2025-01-18 PROCEDURE — 97161 PT EVAL LOW COMPLEX 20 MIN: CPT

## 2025-01-18 PROCEDURE — 82570 ASSAY OF URINE CREATININE: CPT | Performed by: INTERNAL MEDICINE

## 2025-01-18 PROCEDURE — 80069 RENAL FUNCTION PANEL: CPT | Performed by: INTERNAL MEDICINE

## 2025-01-18 PROCEDURE — 85025 COMPLETE CBC W/AUTO DIFF WBC: CPT | Performed by: FAMILY MEDICINE

## 2025-01-18 PROCEDURE — 99231 SBSQ HOSP IP/OBS SF/LOW 25: CPT | Performed by: INTERNAL MEDICINE

## 2025-01-18 PROCEDURE — 81003 URINALYSIS AUTO W/O SCOPE: CPT | Performed by: INTERNAL MEDICINE

## 2025-01-18 RX ORDER — CARVEDILOL 6.25 MG/1
3.12 TABLET ORAL 2 TIMES DAILY WITH MEALS
Status: DISCONTINUED | OUTPATIENT
Start: 2025-01-18 | End: 2025-01-19 | Stop reason: HOSPADM

## 2025-01-18 RX ORDER — FUROSEMIDE 20 MG/1
40 TABLET ORAL EVERY 4 HOURS
Status: COMPLETED | OUTPATIENT
Start: 2025-01-18 | End: 2025-01-18

## 2025-01-18 RX ADMIN — MIDODRINE HYDROCHLORIDE 5 MG: 5 TABLET ORAL at 17:17

## 2025-01-18 RX ADMIN — Medication 10 ML: at 08:17

## 2025-01-18 RX ADMIN — FUROSEMIDE 40 MG: 20 TABLET ORAL at 12:30

## 2025-01-18 RX ADMIN — MIDODRINE HYDROCHLORIDE 5 MG: 5 TABLET ORAL at 08:50

## 2025-01-18 RX ADMIN — Medication 10 ML: at 20:02

## 2025-01-18 RX ADMIN — CARVEDILOL 3.12 MG: 6.25 TABLET, FILM COATED ORAL at 17:18

## 2025-01-18 RX ADMIN — Medication 10 ML: at 19:54

## 2025-01-18 RX ADMIN — FUROSEMIDE 40 MG: 20 TABLET ORAL at 17:17

## 2025-01-18 RX ADMIN — FUROSEMIDE 40 MG: 20 TABLET ORAL at 08:50

## 2025-01-18 NOTE — PLAN OF CARE
Goal Outcome Evaluation:  Plan of Care Reviewed With: patient           Outcome Evaluation: PT evaluation completed this pm with pt presenting sitting on EOB watching television. Pt had no c/o pain, was O x 4, and was on room air (O2 sat 97%). Pt was able to come to stand without an assistive device but was noted to reach for solid support when taking steps. A FWW was provided for pt and he amb 62 ft when he asked to sit due to shortness of breath. Pt's O2 90%. After 4 minutes, pt amb 62 ft back to his room with cues on energy conservation. Pt's O2 sat 92% post second walk. Pt presents with deficits in endurance and balance. He is expected to improve his functional mobility with continued PT services prior to d/c.    Anticipated Discharge Disposition (PT): home with assist

## 2025-01-18 NOTE — THERAPY EVALUATION
Patient Name: Donny Jerry  : 1945    MRN: [id number]                              Today's Date: 2025       Admit Date: 2025    Visit Dx:     ICD-10-CM ICD-9-CM   1. Syncope and collapse  R55 780.2   2. Orthostatic hypotension  I95.1 458.0     Patient Active Problem List   Diagnosis    Coronary artery disease involving native coronary artery of native heart with angina pectoris    Essential hypertension    Hyperlipidemia LDL goal <70    Type 2 diabetes mellitus with stage 3 chronic kidney disease    Obstructive sleep apnea on CPAP    Enlarged prostate    Obesity, Class II, BMI 35-39.9    Depression    Acute hypoxemic respiratory failure due to COVID-19    CKD (chronic kidney disease) stage 3, GFR 30-59 ml/min    Elevated troponin level not due myocardial infarction    Post viral debility    Chest pain, unspecified type    Chronic systolic heart failure    Type 1 myocardial infarction    Chronic diastolic heart failure    Bilateral lower extremity edema    Acute exacerbation of congestive heart failure    Acute on chronic HFrEF (heart failure with reduced ejection fraction)    Acute on chronic systolic heart failure    Syncope    CHF exacerbation    Heart failure    IMANI (acute kidney injury)    Orthostatic syncope    Syncope and collapse     Past Medical History:   Diagnosis Date    Benign hypertension     Coronary artery disease     Depression     Enlarged prostate     Enlarged prostate with anticipated TURP with Dr. Bernal.    GERD (gastroesophageal reflux disease)     Heart attack     Hypercholesterolemia     Impaired functional mobility, balance, gait, and endurance     Myocardial infarction     Obesity     Obstructive sleep apnea on CPAP     Type 2 diabetes mellitus      Past Surgical History:   Procedure Laterality Date    CARDIAC CATHETERIZATION      CARDIAC CATHETERIZATION Left 10/19/2021    Procedure: Left Heart Cath;  Surgeon: Liz Russo MD;  Location: [facility] CATH INVASIVE  LOCATION;  Service: Cardiology;  Laterality: Left;    CARDIAC CATHETERIZATION N/A 7/18/2023    Procedure: Coronary angiography;  Surgeon: Rupesh Parr MD;  Location:  [facility] CATH INVASIVE LOCATION;  Service: Cardiology;  Laterality: N/A;    CARDIAC CATHETERIZATION N/A 7/18/2023    Procedure: Percutaneous Coronary Intervention;  Surgeon: Rupesh Parr MD;  Location:  [facility] CATH INVASIVE LOCATION;  Service: Cardiology;  Laterality: N/A;    COLONOSCOPY W/ POLYPECTOMY      CORONARY ANGIOPLASTY WITH STENT PLACEMENT Left 06/03/2009    Left heart catheterization, 06/03/2009, Dr. Russo:  LVEF 45% to 50%.  Placement of overlapping Cypher drug-eluting stent 2.5 x 28 and 2.5 x 18 to the SVG to the obtuse marginal branch.  SVG to the PDA had a proximal stenosis estimated at 60% with a distal stenosis of 50% to 60%.    CORONARY ANGIOPLASTY WITH STENT PLACEMENT Left 07/06/2009    Left heart catheterization, 07/06/2009:  PTCA and stent placement in the mid PDA using a 2.25 x 12 mm Taxus drug-eluting stent with stent placement in the distal SVG to the PDA using a 2.5 x 18 mm Cypher drug-eluting stent and stenting of the proximal SVG to the PDA using a 3.0 x 28 mm Cypher drug-eluting stent.    CORONARY ANGIOPLASTY WITH STENT PLACEMENT  04/23/2010    Cardiac catheterization for recurrent anginal symptoms, 04/23/2010, with PTCA and stent placement in the proximal SVG to the PDA using 3.0 x 28 mm Promus drug-eluting stent for in-stent restenosis.    CORONARY ANGIOPLASTY WITH STENT PLACEMENT Left 07/06/2010    Left heart catheterization, 07/06/2010, Dr. Russo:  EF 40% to 45%.  3.5 x 23 mm Promus drug-eluting stent in the proximal SVG to OM, 2.5 x 18 mm Promus drug-eluting stent to distal SVG to PDA due to in-stent restenosis.      CORONARY ANGIOPLASTY WITH STENT PLACEMENT Left 11/16/2010    Left heart catheterization, 11/16/2010, with placement of a 3.0 x 16 mm Taxus drug-eluting stent to the SVG to the RCA  proximally and a 2.5 x 16 mm paclitaxel stent distally in the SVG to the RCA.    CORONARY ANGIOPLASTY WITH STENT PLACEMENT Left     Left heart catheterization, 3.0 x 24-mm Promus element drug-eluting stent to a 90% lesion in the mid portion of the SVG to the PDA.     CORONARY ANGIOPLASTY WITH STENT PLACEMENT Left 06/24/2014    Left heart catheterization for recurrent angina, 06/24/2014, Dr. Russo:  PTCA of the SVG to the PDA using a 3 x 12 mm NC TREK balloon.      CORONARY ARTERY BYPASS GRAFT      CABG x3, Dr. Peng, [facility], 2001.      General Information       Row Name 01/18/25 1542          Physical Therapy Time and Intention    Document Type evaluation  -TW     Mode of Treatment physical therapy  -TW       Row Name 01/18/25 1542          General Information    Patient Profile Reviewed yes  -TW     Prior Level of Function independent:  Pt has but doesn't always use an assistive device for mobility. He has both FWW and rollator, shower chair, home O2 at night, and w/c.  -TW     Existing Precautions/Restrictions fall;orthostatic hypotension  -TW     Barriers to Rehab hearing deficit  -TW       Row Name 01/18/25 1542          Living Environment    People in Home spouse  -TW       Row Name 01/18/25 1542          Home Main Entrance    Number of Stairs, Main Entrance two  -TW     Stair Railings, Main Entrance railings on both sides of stairs  -TW       Row Name 01/18/25 1542          Stairs Within Home, Primary    Number of Stairs, Within Home, Primary none  -TW       Row Name 01/18/25 1542          Cognition    Orientation Status (Cognition) oriented x 4  -TW       Row Name 01/18/25 1542          Safety Issues/Impairments Affecting Functional Mobility    Safety Issues Affecting Function (Mobility) safety precaution awareness;safety precautions follow-through/compliance  -TW     Impairments Affecting Function (Mobility) endurance/activity tolerance;shortness of breath;balance  -TW                User Key  (r) = Recorded By, (t) = Taken By, (c) = Cosigned By      Initials Name Provider Type    TW Zee Mendez PT Physical Therapist                   Mobility       Row Name 01/18/25 1542          Bed Mobility    Bed Mobility other (see comments)  -TW     Comment, (Bed Mobility) pt presented sitting on EOB watching television. Pt's nurse states pt has been able to come to EOB independently.  -       Row Name 01/18/25 1542          Bed-Chair Transfer    Bed-Chair [place] (Transfers) not tested;other (see comments)  -TW     Comment, (Bed-Chair Transfer) no chair in room. Pt states he has had no difficulty sitting on EOB for eating and watching television.  -       Row Name 01/18/25 1542          Sit-Stand Transfer    Sit-Stand [place] (Transfers) standby assist  -TW     Assistive Device (Sit-Stand Transfers) walker, front-wheeled  -TW       Row Name 01/18/25 1542          Gait/Stairs (Locomotion)    [place] Level (Gait) contact guard  -     Assistive Device (Gait) walker, front-wheeled  -TW     Patient was able to Ambulate yes  -TW     Distance in Feet (Gait) 62  62 ft x 2. Pt needed a seated rest of 4 min between due to shortness of breath. Pt's O2 sat 90% on room air.  -TW     Deviations/Abnormal Patterns (Gait) gait speed decreased  -TW     Bilateral Gait Deviations forward flexed posture  -TW               User Key  (r) = Recorded By, (t) = Taken By, (c) = Cosigned By      Initials Name Provider Type    TW Zee Mendez PT Physical Therapist                   Obj/Interventions       Row Name 01/18/25 1542          Range of Motion Comprehensive    Comment, General Range of Motion BLE grossly WFLS  -TW       Row Name 01/18/25 1542          Strength Comprehensive (MMT)    Comment, General Manual Muscle Testing (MMT) Assessment BLE grossly 4-/5 to 4+/5  -TW       Row Name 01/18/25 1542          Balance    Balance Assessment sitting static balance;standing static balance;sitting dynamic  balance;standing dynamic balance  -TW     Static Sitting Balance modified independence  -TW     Dynamic Sitting Balance modified independence  -TW     Position, Sitting Balance unsupported;sitting edge of bed  -TW     Static Standing Balance standby assist  -TW     Dynamic Standing Balance contact guard  -TW     Position/Device Used, Standing Balance walker, rolling  -TW               User Key  (r) = Recorded By, (t) = Taken By, (c) = Cosigned By      Initials Name Provider Type    TW Andrea, Zee, PT Physical Therapist                   Goals/Plan       Row Name 01/18/25 1542          Transfer Goal 1 (PT)    Activity/Assistive Device (Transfer Goal 1, PT) sit-to-stand/stand-to-sit;bed-to-chair/chair-to-bed;toilet;walker, rolling  -TW     [place] Level/Cues Needed (Transfer Goal 1, PT) modified independence  -TW     Time Frame (Transfer Goal 1, PT) short term goal (STG);1 day  -TW     Progress/Outcome (Transfer Goal 1, PT) goal ongoing  -TW       Row Name 01/18/25 1542          Gait Training Goal 1 (PT)    Activity/Assistive Device (Gait Training Goal 1, PT) gait (walking locomotion);increase endurance/gait distance;increase energy conservation;decrease fall risk;assistive device use  -TW     [place] Level (Gait Training Goal 1, PT) standby assist  -TW     Distance (Gait Training Goal 1, PT) 100 ft  -TW     Time Frame (Gait Training Goal 1, PT) long term goal (LTG);5 days  -TW     Progress/Outcome (Gait Training Goal 1, PT) goal ongoing  -TW       Row Name 01/18/25 1542          Patient Education Goal (PT)    Activity (Patient Education Goal, PT) Side stepping to each side with one hand held 5 steps with toes forward.  -TW     [place]/Cues/Accuracy (Memory Goal 2, PT) demonstrates adequately  -TW     Time Frame (Patient Education Goal, PT) long term goal (LTG);5 days  -TW     Progress/Outcome (Patient Education Goal, PT) goal ongoing  -TW       Row Name 01/18/25 4452          Therapy Assessment/Plan  (PT)    Planned Therapy Interventions (PT) balance training;gait training;transfer training;patient/family education  -TW               User Key  (r) = Recorded By, (t) = Taken By, (c) = Cosigned By      Initials Name Provider Type    Zee Brewer, NONA Physical Therapist                   Clinical Impression       Row Name 01/18/25 1542          Pain    Pretreatment Pain Rating 0/10 - no pain  -TW     Posttreatment Pain Rating 0/10 - no pain  -TW     Pre/Posttreatment Pain Comment Pt had no c/o pain. His only c/o was of being short of breath during gait.  -TW       Row Name 01/18/25 1542          Plan of Care Review    Plan of Care Reviewed With patient  -TW     Outcome Evaluation PT evaluation completed this pm with pt presenting sitting on EOB watching television. Pt had no c/o pain, was O x 4, and was on room air (O2 sat 97%). Pt was able to come to stand without an assistive device but was noted to reach for solid support when taking steps. A FWW was provided for pt and he amb 62 ft when he asked to sit due to shortness of breath. Pt's O2 90%. After 4 minutes, pt amb 62 ft back to his room with cues on energy conservation. Pt's O2 sat 92% post second walk. Pt presents with deficits in endurance and balance. He is expected to improve his functional mobility with continued PT services prior to d/c.  -TW       Row Name 01/18/25 154          Therapy Assessment/Plan (PT)    Rehab Potential (PT) good  -TW     Criteria for Skilled Interventions Met (PT) meets criteria;yes  -TW     Therapy Frequency (PT) 5 times/wk  -TW     Predicted Duration of Therapy Intervention (PT) 5 days  -TW       Row Name 01/18/25 1542          Vital Signs    Pre SpO2 (%) 97  -TW     O2 Delivery Pre Treatment room air  -TW     Intra SpO2 (%) 90  -TW     O2 Delivery Intra Treatment room air  -TW     Post SpO2 (%) 92  -TW     O2 Delivery Post Treatment room air  -TW     Pre Patient Position Sitting  -TW     Intra Patient Position Standing   -TW     Post Patient Position Sitting  -TW       Row Name 01/18/25 1542          Positioning and Restraints    Pre-Treatment Position in bed  -TW     Post Treatment Position bed  -TW     In Bed notified nsg;sitting EOB;encouraged to call for assist;call light within reach  -TW               User Key  (r) = Recorded By, (t) = Taken By, (c) = Cosigned By      Initials Name Provider Type     Zee Mendez, PT Physical Therapist                   Outcome Measures       Row Name 01/18/25 1542 01/18/25 0850       How much help from another person do you currently need...    Turning from your back to your side while in flat bed without using bedrails? 4  -TW 4  -CS    Moving from lying on back to sitting on the side of a flat bed without bedrails? 4  -TW 4  -CS    Moving to and from a bed to a chair (including a wheelchair)? 3  -TW 3  -CS    Standing up from a chair using your arms (e.g., wheelchair, bedside chair)? 4  -TW 4  -CS    Climbing 3-5 steps with a railing? 3  -TW 3  -CS    To walk in hospital room? 3  -TW 3  -CS    AM-PAC 6 Clicks Score (PT) 21  -TW 21  -CS    Highest Level of Mobility Goal 6 --> Walk 10 steps or more  -TW 6 --> Walk 10 steps or more  -CS      Row Name 01/18/25 1542          Functional Assessment    Outcome Measure Options AM-PAC 6 Clicks Basic Mobility (PT)  -TW               User Key  (r) = Recorded By, (t) = Taken By, (c) = Cosigned By      Initials Name Provider Type    TW Zee Mendez, PT Physical Therapist    Kelley Blackburn LPN Licensed Nurse                                 Physical Therapy Education       Title: PT OT SLP Therapies (In Progress)       Topic: Physical Therapy (In Progress)       Point: Mobility training (Done)       Learning Progress Summary            Patient Acceptance, E,D, VU,DU,NR by  at 1/18/2025 1542    Comment: Pt education on energy conservation techniques during gait.                      Point: Body mechanics (Done)       Learning Progress Summary             Patient Acceptance, E,D, VU,DU,NR by TW at 1/18/2025 1542    Comment: Pt education on energy conservation techniques during gait.                      Point: Precautions (Not Started)       Learner Progress:  Not documented in this visit.                              User Key       Initials Effective Dates Name Provider Type Discipline    TW 06/16/21 -  Zee Mendez, NONA Physical Therapist PT                  PT Recommendation and Plan  Planned Therapy Interventions (PT): balance training, gait training, transfer training, patient/family education  Outcome Evaluation: PT evaluation completed this pm with pt presenting sitting on EOB watching television. Pt had no c/o pain, was O x 4, and was on room air (O2 sat 97%). Pt was able to come to stand without an assistive device but was noted to reach for solid support when taking steps. A FWW was provided for pt and he amb 62 ft when he asked to sit due to shortness of breath. Pt's O2 90%. After 4 minutes, pt amb 62 ft back to his room with cues on energy conservation. Pt's O2 sat 92% post second walk. Pt presents with deficits in endurance and balance. He is expected to improve his functional mobility with continued PT services prior to d/c.     Time Calculation:   PT Evaluation Complexity  History, PT Evaluation Complexity: 3 or more personal factors and/or comorbidities  Examination of Body Systems (PT Eval Complexity): total of 3 or more elements  Clinical Presentation (PT Evaluation Complexity): stable  Clinical Decision Making (PT Evaluation Complexity): low complexity  Overall Complexity (PT Evaluation Complexity): low complexity     PT Charges       Row Name 01/18/25 1542             Time Calculation    Stop Time 1542  -TW      PT Received On 01/18/25  -TW      PT Goal Re-Cert Due Date 01/23/25  -TW                User Key  (r) = Recorded By, (t) = Taken By, (c) = Cosigned By      Initials Name Provider Type    TW Zee Mendez, NONA Physical Therapist                   Therapy Charges for Today       Code Description Service Date Service Provider Modifiers Qty    [id number] HC PT EVAL LOW COMPLEXITY 3 1/18/2025 Zee Mendez, PT GP 1            PT G-Codes  Outcome Measure Options: AM-PAC 6 Clicks Basic Mobility (PT)  AM-PAC 6 Clicks Score (PT): 21  PT Discharge Summary  Anticipated Discharge Disposition (PT): home with assist    Zee Mendez PT  1/18/2025

## 2025-01-18 NOTE — OUTREACH NOTE
Medical Week 2 Survey      Flowsheet Row Responses   [facility] patient discharged from? [facility]   Does the patient have one of the following disease processes/diagnoses(primary or secondary)? Other   Week 2 attempt successful? No   Unsuccessful attempts Attempt 1   Revoke Readmitted            Natasha PALENCIA - Registered Nurse

## 2025-01-18 NOTE — PROGRESS NOTES
[facility] Progress Note  [facility]. [place]        Patient Name: Donny Jerry  : 1945  MRN: [id number]   LOS: 0 days    Patient Care Team:  Anum Alonso APRN as PCP - Rubin Sal MD as Consulting Physician (Urology)  Liz Russo MD as Consulting Physician (Cardiology)    Chief Complaint:    Chief Complaint   Patient presents with    Syncope     Primary Care Physician:  Anum Alonso APRN  Date of admission: 2025    Subjective     Interval History:   Follow-up acute on chronic kidney disease stage IIIb.  Patient is laying in the bed awake alert and interactive.  Denies having any chest pain or shortness of breath.  Wife is at the bedside.  He feels that he is doing slightly better.  Physical therapy helped him get up, he became short of breath fairly quickly.  Events noted from last 24 hours.  I reviewed the chart and other providers notes, labs and procedures done since my last note.    Review of Systems:   As noted above.    Objective     Vitals:   Temp:  [97.7 °F (36.5 °C)-98.9 °F (37.2 °C)] 98.3 °F (36.8 °C)  Heart Rate:  [70-93] 93  Resp:  [16-18] 16  BP: (126-154)/(68-95) 126/71  Flow (L/min) (Oxygen Therapy):  [2] 2    Intake/Output Summary (Last 24 hours) at 2025 1851  Last data filed at 2025 1700  Gross per 24 hour   Intake 1560 ml   Output 500 ml   Net 1060 ml       Physical Exam:    General Appearance: alert, oriented x 3, no acute distress   Skin: warm and dry  HEENT: oral mucosa normal, nonicteric sclera  Neck: supple, no JVD  Lungs: Decreased breath sounds with bibasilar crackles.  Heart: RRR, normal S1 and S2  Abdomen: obese, soft, nontender, non distended and positive bowel sounds.  : no palpable bladder  Extremities: Trace to 1+ edema, no cyanosis or clubbing  Neuro: normal speech and mental status     Scheduled Meds:     Current Facility-Administered Medications   Medication Dose Route  Frequency Provider Last Rate Last Admin    sennosides-docusate (PERICOLACE) 8.6-50 MG per tablet 2 tablet  2 tablet Oral BID PRN Torres Lewis DO        And    polyethylene glycol (MIRALAX) packet 17 g  17 g Oral Daily PRN Torres Lewis DO        And    bisacodyl (DULCOLAX) EC tablet 5 mg  5 mg Oral Daily PRN Torres Lewis DO        And    bisacodyl (DULCOLAX) suppository 10 mg  10 mg Rectal Daily PRN Torres Lewis DO        Calcium Replacement - Follow Nurse / BPA Driven Protocol   Not Applicable PRN Torres Lewis DO        carvedilol (COREG) tablet 3.125 mg  3.125 mg Oral BID With Meals Destin Wilkes MD, FASN   3.125 mg at 01/18/25 1718    Magnesium Standard Dose Replacement - Follow Nurse / BPA Driven Protocol   Not Applicable PRN Torres Lewis DO        midodrine (PROAMATINE) tablet 5 mg  5 mg Oral TID AC Destin Wilkes MD, FASN   5 mg at 01/18/25 1717    nitroglycerin (NITROSTAT) SL tablet 0.4 mg  0.4 mg Sublingual Q5 Min PRN Torres Lewis DO        Phosphorus Replacement - Follow Nurse / BPA Driven Protocol   Not Applicable PRN Torres Lewis DO        Potassium Replacement - Follow Nurse / BPA Driven Protocol   Not Applicable PRN Torres Lewis DO        sodium chloride 0.9 % flush 10 mL  10 mL Intravenous PRN Quinten Mtz MD        sodium chloride 0.9 % flush 10 mL  10 mL Intravenous Q12H Torres Lewis DO   10 mL at 01/18/25 0817    sodium chloride 0.9 % flush 10 mL  10 mL Intravenous PRN Torres Lewis DO        sodium chloride 0.9 % infusion 40 mL  40 mL Intravenous PRN Torres Lewis DO           carvedilol, 3.125 mg, Oral, BID With Meals  midodrine, 5 mg, Oral, TID AC  sodium chloride, 10 mL, Intravenous, Q12H        IV Meds:        Results Reviewed:   I have personally reviewed the results from the time of this admission to 1/18/2025 18:51 EST     Results from last 7 days   Lab Units 01/18/25  0619 01/17/25  1031   SODIUM mmol/L 144 141   POTASSIUM mmol/L 3.8 4.8    CHLORIDE mmol/L 106 103   CO2 mmol/L 24.2 26.2   BUN mg/dL 68* 77*   CREATININE mg/dL 2.08* 2.24*   CALCIUM mg/dL 8.4* 8.4*   BILIRUBIN mg/dL  --  0.6   ALK PHOS U/L  --  64   ALT (SGPT) U/L  --  43*   AST (SGOT) U/L  --  34   GLUCOSE mg/dL 77 104*       Estimated Creatinine Clearance: 31.3 mL/min (A) (by C-G formula based on SCr of 2.08 mg/dL (H)).    Results from last 7 days   Lab Units 01/18/25  0619 01/17/25  1031   MAGNESIUM mg/dL  --  2.5*   PHOSPHORUS mg/dL 3.2  --              Results from last 7 days   Lab Units 01/18/25  0619 01/17/25  1031   WBC 10*3/mm3 10.54 11.09*   HEMOGLOBIN g/dL 10.6* 10.5*   PLATELETS 10*3/mm3 307 280             Brief Urine Lab Results  (Last result in the past 365 days)        Color   Clarity   Blood   Leuk Est   Nitrite   Protein   CREAT   Urine HCG        01/18/25 0343             81.6         01/18/25 0343 Yellow   Clear   Negative   Negative   Negative   Trace                   Protein/Creatinine Ratio, Urine   Date Value Ref Range Status   01/18/2025 220.6 (H) 0.0 - 200.0 mg/G Crea Final       Imaging Results (Last 24 Hours)       Procedure Component Value Units Date/Time    US Carotid Bilateral [[id number]] Collected: 01/18/25 0939     Updated: 01/18/25 1147    Narrative:      PROCEDURE: US CAROTID BILATERAL-     HISTORY: Syncope; R55-Syncope and collapse; I95.1-Orthostatic  hypotension     Technique: Real-time imaging was performed of the extracranial carotid  arteries in transverse and longitudinal planes, with color duplex  evaluation of blood flow velocity. Spectral analysis was performed. The  cervicovertebral arteries were also examined.     NASCET technique utilized for stenosis evaluation.     Right carotid system:  CCA: -34.7 cm/s  ICA: 107 cm/s  Vertebral artery: Antegrade  ICA/CCA ratio: 3.08  Mild to moderate plaque is identified at the bifurcation .     Left carotid system:  CCA: 64.6 cm/s  ICA:  127 cm/s  Vertebral artery: Antegrade  ICA/CCA ratio: 1.97  Mild  plaque is identified at the bifurcation .       Impression:      Less than 50 % RIGHT ICA stenosis.  Less than 50 % LEFT ICA stenosis.        If clinical concern persists, consider CT angiogram.     This report was signed and finalized on 1/18/2025 11:45 AM by Radha Moreland MD.                   Assessment / Plan     ASSESSMENT:    Orthostatic syncope    Syncope and collapse    Acute kidney injury: Patient does appear to have some worsening of his renal function.  It is likely all hemodynamically mediated secondary to volume issues, hypertension may have some worsening of underlying chronic kidney disease as well. Continue to optimize volume status and medications.  Chronic kidney disease stage IIIb: Baseline diabetic and hypertensive glomerulosclerosis with minimal proteinuria.  He has been on optimize medications.  I will recheck spot protein creatinine ratio.  Syncope: Most likely secondary to volume issues, responded well to 500 mL bolus.  Workup ordered as per hospitalist service  Type 2 diabetes: Continue with the sliding scale most recent hemoglobin A1c of 8.4 noted.  Hypertension: Optimize volume status before putting him on any antihypertensives.  Anemia: Check iron stores, may end up requiring PAOLA.  CHF/CAD: Optimize volume status  COPD: Continue home medications.  Obstructive sleep apnea: Patient said he has been compliant with his BiPAP.  Dyslipidemia: Continue statin  BPH: Continue home medications.      PLAN:  Blood pressure is more stable with low-dose midodrine as well as off all the blood pressure medications.  I will go ahead and give him Lasix 40 mg every 6 hours x 3 doses today.  Renal function is starting to improve as well.  Details were discussed with the patient as well as family in the room.    Details were also discussed with the hospitalist service and or other providers as needed.   Continue with rest of the current treatment plan, and monitor with surveillance labs.  Further  recommendations will depend on clinical course of the patient during the current hospitalization.   I have reviewed the copied text to this note, it was edited and the changes made as needed.  It is accurate to the point, when the note was signed today.     Thank you for involving us in the care of Donny Jerry.  Please feel free to call with any questions.    Destin Wilkes MD, FASN  01/18/25  18:51 EST    [facility]  [phone]      Part of this note may be an electronic transcription/translation of spoken language to printed text using the Dragon Dictation System.

## 2025-01-18 NOTE — PLAN OF CARE
Goal Outcome Evaluation:      VSS. IV diuretics continued this shift, patient has had adequate UOP. Plan for possible dc home tomorrow.       Problem: Adult Inpatient Plan of Care  Goal: Plan of Care Review  Outcome: Progressing  Goal: Patient-Specific Goal (Individualized)  Outcome: Progressing  Goal: Absence of Hospital-Acquired Illness or Injury  Outcome: Progressing  Intervention: Identify and Manage Fall Risk  Recent Flowsheet Documentation  Taken 1/18/2025 1600 by Kelley De La O LPN  Safety Promotion/Fall Prevention:   safety round/check completed   room organization consistent  Taken 1/18/2025 1200 by Kelley De La O LPN  Safety Promotion/Fall Prevention:   safety round/check completed   room organization consistent  Taken 1/18/2025 1000 by Kelley De La O LPN  Safety Promotion/Fall Prevention:   safety round/check completed   room organization consistent  Taken 1/18/2025 0850 by Kelley De La O LPN  Safety Promotion/Fall Prevention:   safety round/check completed   room organization consistent  Intervention: Prevent Skin Injury  Recent Flowsheet Documentation  Taken 1/18/2025 1600 by Kelley De La O LPN  Body Position: dangle, side of bed  Taken 1/18/2025 1200 by Kelley De La O LPN  Body Position: sitting up in bed  Taken 1/18/2025 1000 by Kelley De La O LPN  Body Position: sitting up in bed  Taken 1/18/2025 0850 by Kelley De La O LPN  Body Position: sitting up in bed  Skin Protection: incontinence pads utilized  Goal: Optimal Comfort and Wellbeing  Outcome: Progressing  Intervention: Provide Person-Centered Care  Recent Flowsheet Documentation  Taken 1/18/2025 0850 by Kelley De La O LPN  Trust Relationship/Rapport:   care explained   choices provided  Goal: Readiness for Transition of Care  Outcome: Progressing     Problem: Comorbidity Management  Goal: Blood Pressure in Desired Range  Outcome: Progressing  Intervention: Maintain Blood Pressure Management  Recent Flowsheet  Documentation  Taken 1/18/2025 1600 by Kelley D eLa O LPN  Medication Review/Management: medications reviewed  Taken 1/18/2025 1200 by Kelley De La O LPN  Medication Review/Management: medications reviewed  Taken 1/18/2025 0850 by Kelley De La O LPN  Medication Review/Management: medications reviewed     Problem: Syncope  Goal: Absence of Syncopal Symptoms  Outcome: Progressing  Intervention: Manage Effect of Syncopal Symptoms  Recent Flowsheet Documentation  Taken 1/18/2025 1600 by Kelley De La O LPN  Safety Promotion/Fall Prevention:   safety round/check completed   room organization consistent  Taken 1/18/2025 1200 by Kelley De La O LPN  Safety Promotion/Fall Prevention:   safety round/check completed   room organization consistent  Taken 1/18/2025 1000 by Kelley De La O LPN  Safety Promotion/Fall Prevention:   safety round/check completed   room organization consistent  Taken 1/18/2025 0850 by Kelley De La O LPN  Safety Promotion/Fall Prevention:   safety round/check completed   room organization consistent     Problem: Acute Kidney Injury/Impairment  Goal: Fluid and Electrolyte Balance  Outcome: Progressing  Goal: Improved Oral Intake  Outcome: Progressing  Goal: Effective Renal Function  Outcome: Progressing  Intervention: Monitor and Support Renal Function  Recent Flowsheet Documentation  Taken 1/18/2025 1600 by Kelley De La O LPN  Medication Review/Management: medications reviewed  Taken 1/18/2025 1200 by Kelley De La O LPN  Medication Review/Management: medications reviewed  Taken 1/18/2025 0850 by Kelley De La O LPN  Medication Review/Management: medications reviewed     Problem: Fall Injury Risk  Goal: Absence of Fall and Fall-Related Injury  Outcome: Progressing  Intervention: Identify and Manage Contributors  Recent Flowsheet Documentation  Taken 1/18/2025 1600 by Kelley De La O LPN  Medication Review/Management: medications reviewed  Taken 1/18/2025 1200 by  Kelley De La O LPN  Medication Review/Management: medications reviewed  Taken 1/18/2025 0850 by Kelley De La O LPN  Medication Review/Management: medications reviewed  Intervention: Promote Injury-Free Environment  Recent Flowsheet Documentation  Taken 1/18/2025 1600 by Kelley De La O LPN  Safety Promotion/Fall Prevention:   safety round/check completed   room organization consistent  Taken 1/18/2025 1200 by Kelley De La O LPN  Safety Promotion/Fall Prevention:   safety round/check completed   room organization consistent  Taken 1/18/2025 1000 by Kelley De La O LPN  Safety Promotion/Fall Prevention:   safety round/check completed   room organization consistent  Taken 1/18/2025 0850 by Kelley De La O LPN  Safety Promotion/Fall Prevention:   safety round/check completed   room organization consistent

## 2025-01-18 NOTE — PLAN OF CARE
Problem: Adult Inpatient Plan of Care  Goal: Plan of Care Review  Outcome: Progressing  Flowsheets (Taken 1/18/2025 0323)  Progress: improving  Plan of Care Reviewed With: patient  Goal: Patient-Specific Goal (Individualized)  Outcome: Progressing  Goal: Absence of Hospital-Acquired Illness or Injury  Outcome: Progressing  Intervention: Identify and Manage Fall Risk  Recent Flowsheet Documentation  Taken 1/18/2025 0200 by Aida Higuera, RN  Safety Promotion/Fall Prevention:   safety round/check completed   nonskid shoes/slippers when out of bed   fall prevention program maintained   assistive device/personal items within reach  Taken 1/18/2025 0000 by Aida Higuera, RN  Safety Promotion/Fall Prevention:   safety round/check completed   nonskid shoes/slippers when out of bed   fall prevention program maintained   assistive device/personal items within reach  Taken 1/17/2025 2200 by Aida Higuera RN  Safety Promotion/Fall Prevention:   safety round/check completed   nonskid shoes/slippers when out of bed   fall prevention program maintained   assistive device/personal items within reach  Taken 1/17/2025 2027 by Aida Higuera RN  Safety Promotion/Fall Prevention:   safety round/check completed   nonskid shoes/slippers when out of bed   fall prevention program maintained   assistive device/personal items within reach  Intervention: Prevent Skin Injury  Recent Flowsheet Documentation  Taken 1/18/2025 0200 by Aida Higuera RN  Body Position:   side-lying   left  Taken 1/18/2025 0000 by Aida Higuera RN  Body Position: supine  Taken 1/17/2025 2200 by Aida Higuera RN  Body Position:   right   side-lying  Taken 1/17/2025 2027 by Aida Higuera RN  Body Position: dangle, side of bed  Skin Protection: incontinence pads utilized  Goal: Optimal Comfort and Wellbeing  Outcome: Progressing  Intervention: Provide Person-Centered Care  Recent Flowsheet Documentation  Taken 1/17/2025 2027 by Kalen  Aida GAYLE RN  Trust Relationship/Rapport: care explained  Goal: Readiness for Transition of Care  Outcome: Progressing     Problem: Comorbidity Management  Goal: Blood Pressure in Desired Range  Outcome: Progressing     Problem: Syncope  Goal: Absence of Syncopal Symptoms  Outcome: Progressing  Intervention: Manage Effect of Syncopal Symptoms  Recent Flowsheet Documentation  Taken 1/18/2025 0200 by Aida Higuera, RN  Safety Promotion/Fall Prevention:   safety round/check completed   nonskid shoes/slippers when out of bed   fall prevention program maintained   assistive device/personal items within reach  Taken 1/18/2025 0000 by Aida Higuera, RN  Safety Promotion/Fall Prevention:   safety round/check completed   nonskid shoes/slippers when out of bed   fall prevention program maintained   assistive device/personal items within reach  Taken 1/17/2025 2200 by Aida Higuera, RN  Safety Promotion/Fall Prevention:   safety round/check completed   nonskid shoes/slippers when out of bed   fall prevention program maintained   assistive device/personal items within reach  Taken 1/17/2025 2027 by Aida Higuera, RN  Safety Promotion/Fall Prevention:   safety round/check completed   nonskid shoes/slippers when out of bed   fall prevention program maintained   assistive device/personal items within reach     Problem: Acute Kidney Injury/Impairment  Goal: Fluid and Electrolyte Balance  Outcome: Progressing  Goal: Improved Oral Intake  Outcome: Progressing  Goal: Effective Renal Function  Outcome: Progressing   Goal Outcome Evaluation:  Plan of Care Reviewed With: patient        Progress: improving

## 2025-01-18 NOTE — PROGRESS NOTES
[facility]  INTERNAL MEDICINE PROGRESS NOTE    Name:  Donny Jerry   Age:  79 y.o.  Sex:  male  :  1945  MRN:  [id number]   Visit Number:  [id number]  Admission Date:  2025  Date Of Service:  25  Primary Care Physician:  Anum Alonso APRN     LOS: 0 days :  Patient Care Team:  Anum Alonso APRN as PCP - Rubin Sal MD as Consulting Physician (Urology)  Liz Russo MD as Consulting Physician (Cardiology):      Subjective / Interval History:     Patient admitted because of near syncopal episode yesterday.  After admission he has been started on midodrine and a bolus of fluid was given which has helped.  Patient has been seen today and he does feel better.  He denies any dizziness      Vital Signs:    Temp:  [97.7 °F (36.5 °C)-98.9 °F (37.2 °C)] 98.9 °F (37.2 °C)  Heart Rate:  [67-90] 77  Resp:  [16-18] 16  BP: (108-154)/(68-95) 154/95    Intake and output:    I/O last 3 completed shifts:  In: 1320 [P.O.:1320]  Out: -   I/O this shift:  In: 360 [P.O.:360]  Out: -     Physical Examination:    General Appearance:    Alert and cooperative, not in any acute distress.   Head:    Atraumatic and normocephalic, without obvious abnormality.   Eyes:            PERRLA,  No pallor. Extraocular movements are within normal limits.   Neck:   Supple,  No lymph glands, no bruit.   Lungs:     Chest shape is normal. Breath sounds heard bilaterally equally.  No crackles or wheezing.     Heart:    Normal S1 and S2, no murmur, no JVD.   Abdomen:     Normal bowel sounds, no masses, no organomegaly. Soft     nontender, no guarding, no rebound tenderness.   Extremities:   Moves all extremities well, no edema, no cyanosis,    Skin:   No  bruising or rash.   Neurologic:   Grossly nonfocal and moves all extremities.      Laboratory results:  Results from last 7 days   Lab Units 25  0619 25  1031   SODIUM mmol/L 144 141   POTASSIUM mmol/L 3.8 4.8    CHLORIDE mmol/L 106 103   CO2 mmol/L 24.2 26.2   BUN mg/dL 68* 77*   CREATININE mg/dL 2.08* 2.24*   CALCIUM mg/dL 8.4* 8.4*   BILIRUBIN mg/dL  --  0.6   ALK PHOS U/L  --  64   ALT (SGPT) U/L  --  43*   AST (SGOT) U/L  --  34   GLUCOSE mg/dL 77 104*     Results from last 7 days   Lab Units 01/18/25  0619 01/17/25  1031   WBC 10*3/mm3 10.54 11.09*   HEMOGLOBIN g/dL 10.6* 10.5*   HEMATOCRIT % 32.4* 32.7*   PLATELETS 10*3/mm3 307 280         Results from last 7 days   Lab Units 01/17/25  1154 01/17/25  1031   HSTROP T ng/L 151* 169*           Radiology results:    Imaging Results (Last 24 Hours)       Procedure Component Value Units Date/Time    US Carotid Bilateral [[id number]] Collected: 01/18/25 0939     Updated: 01/18/25 1147    Narrative:      PROCEDURE: US CAROTID BILATERAL-     HISTORY: Syncope; R55-Syncope and collapse; I95.1-Orthostatic  hypotension     Technique: Real-time imaging was performed of the extracranial carotid  arteries in transverse and longitudinal planes, with color duplex  evaluation of blood flow velocity. Spectral analysis was performed. The  cervicovertebral arteries were also examined.     NASCET technique utilized for stenosis evaluation.     Right carotid system:  CCA: -34.7 cm/s  ICA: 107 cm/s  Vertebral artery: Antegrade  ICA/CCA ratio: 3.08  Mild to moderate plaque is identified at the bifurcation .     Left carotid system:  CCA: 64.6 cm/s  ICA:  127 cm/s  Vertebral artery: Antegrade  ICA/CCA ratio: 1.97  Mild plaque is identified at the bifurcation .       Impression:      Less than 50 % RIGHT ICA stenosis.  Less than 50 % LEFT ICA stenosis.        If clinical concern persists, consider CT angiogram.     This report was signed and finalized on 1/18/2025 11:45 AM by Radha Moreland MD.               I have reviewed the patient's radiology reports.    Medication Review:     I have reviewed the patient's active and prn medications.     Assessment:      Orthostatic syncope    Syncope and  collapse  Chronic kidney disease stage IIIb  History of CHF  BPH  Diabetes        Plan:    Patient was admitted because of orthostatic syncope and has been off his hypertension medicine.  He has been started on midodrine which has helped and we will restart a very low-dose today.  Encourage oral intake of fluids but avoid IV fluid because of his history of CHF.  At present if he continues to improve and stable by adding the low-dose of Coreg then he will be stable to be discharged tomorrow and follow-up as outpatient    Richard Balderas MD  01/18/25  12:54 EST      Please note that portions of this note were completed with a voice recognition program.

## 2025-01-19 ENCOUNTER — READMISSION MANAGEMENT (OUTPATIENT)
Dept: CALL CENTER | Facility: HOSPITAL | Age: 80
End: 2025-01-19
Payer: MEDICARE

## 2025-01-19 VITALS
WEIGHT: 206 LBS | HEART RATE: 92 BPM | SYSTOLIC BLOOD PRESSURE: 138 MMHG | RESPIRATION RATE: 16 BRPM | HEIGHT: 66 IN | TEMPERATURE: 97.8 F | BODY MASS INDEX: 33.11 KG/M2 | OXYGEN SATURATION: 95 % | DIASTOLIC BLOOD PRESSURE: 69 MMHG

## 2025-01-19 LAB
ALBUMIN SERPL-MCNC: 3.6 G/DL (ref 3.5–5.2)
ALBUMIN/GLOB SERPL: 1.3 G/DL
ALP SERPL-CCNC: 57 U/L (ref 39–117)
ALT SERPL W P-5'-P-CCNC: 35 U/L (ref 1–41)
ANION GAP SERPL CALCULATED.3IONS-SCNC: 11.4 MMOL/L (ref 5–15)
AST SERPL-CCNC: 24 U/L (ref 1–40)
BILIRUB SERPL-MCNC: 0.6 MG/DL (ref 0–1.2)
BUN SERPL-MCNC: 62 MG/DL (ref 8–23)
BUN/CREAT SERPL: 32.1 (ref 7–25)
CALCIUM SPEC-SCNC: 8.5 MG/DL (ref 8.6–10.5)
CHLORIDE SERPL-SCNC: 107 MMOL/L (ref 98–107)
CO2 SERPL-SCNC: 27.6 MMOL/L (ref 22–29)
CREAT SERPL-MCNC: 1.93 MG/DL (ref 0.76–1.27)
DEPRECATED RDW RBC AUTO: 44.1 FL (ref 37–54)
EGFRCR SERPLBLD CKD-EPI 2021: 35 ML/MIN/1.73
ERYTHROCYTE [DISTWIDTH] IN BLOOD BY AUTOMATED COUNT: 13.4 % (ref 12.3–15.4)
GLOBULIN UR ELPH-MCNC: 2.8 GM/DL
GLUCOSE SERPL-MCNC: 125 MG/DL (ref 65–99)
HCT VFR BLD AUTO: 31 % (ref 37.5–51)
HGB BLD-MCNC: 10.1 G/DL (ref 13–17.7)
MCH RBC QN AUTO: 29.7 PG (ref 26.6–33)
MCHC RBC AUTO-ENTMCNC: 32.6 G/DL (ref 31.5–35.7)
MCV RBC AUTO: 91.2 FL (ref 79–97)
PHOSPHATE SERPL-MCNC: 3.4 MG/DL (ref 2.5–4.5)
PLATELET # BLD AUTO: 303 10*3/MM3 (ref 140–450)
PMV BLD AUTO: 11.2 FL (ref 6–12)
POTASSIUM SERPL-SCNC: 3.6 MMOL/L (ref 3.5–5.2)
PROT SERPL-MCNC: 6.4 G/DL (ref 6–8.5)
RBC # BLD AUTO: 3.4 10*6/MM3 (ref 4.14–5.8)
SODIUM SERPL-SCNC: 146 MMOL/L (ref 136–145)
WBC NRBC COR # BLD AUTO: 10.2 10*3/MM3 (ref 3.4–10.8)

## 2025-01-19 PROCEDURE — 99239 HOSP IP/OBS DSCHRG MGMT >30: CPT | Performed by: INTERNAL MEDICINE

## 2025-01-19 PROCEDURE — G0378 HOSPITAL OBSERVATION PER HR: HCPCS

## 2025-01-19 PROCEDURE — 80053 COMPREHEN METABOLIC PANEL: CPT | Performed by: INTERNAL MEDICINE

## 2025-01-19 PROCEDURE — 84100 ASSAY OF PHOSPHORUS: CPT | Performed by: INTERNAL MEDICINE

## 2025-01-19 PROCEDURE — 85027 COMPLETE CBC AUTOMATED: CPT | Performed by: INTERNAL MEDICINE

## 2025-01-19 RX ORDER — POTASSIUM CHLORIDE 1.5 G/1.58G
40 POWDER, FOR SOLUTION ORAL EVERY 4 HOURS
Status: DISCONTINUED | OUTPATIENT
Start: 2025-01-19 | End: 2025-01-19

## 2025-01-19 RX ORDER — TAMSULOSIN HYDROCHLORIDE 0.4 MG/1
1 CAPSULE ORAL DAILY
Qty: 30 CAPSULE | Refills: 0 | Status: SHIPPED | OUTPATIENT
Start: 2025-01-19

## 2025-01-19 RX ORDER — TORSEMIDE 20 MG/1
40 TABLET ORAL DAILY
Status: DISCONTINUED | OUTPATIENT
Start: 2025-01-20 | End: 2025-01-19 | Stop reason: HOSPADM

## 2025-01-19 RX ORDER — SPIRONOLACTONE 25 MG/1
25 TABLET ORAL DAILY
Status: DISCONTINUED | OUTPATIENT
Start: 2025-01-20 | End: 2025-01-19 | Stop reason: HOSPADM

## 2025-01-19 RX ORDER — MIDODRINE HYDROCHLORIDE 5 MG/1
5 TABLET ORAL
Qty: 90 TABLET | Refills: 0 | Status: SHIPPED | OUTPATIENT
Start: 2025-01-19

## 2025-01-19 RX ORDER — SPIRONOLACTONE 25 MG/1
25 TABLET ORAL DAILY
Qty: 30 TABLET | Refills: 0 | Status: SHIPPED | OUTPATIENT
Start: 2025-01-20

## 2025-01-19 RX ORDER — SPIRONOLACTONE 25 MG/1
50 TABLET ORAL ONCE
Status: COMPLETED | OUTPATIENT
Start: 2025-01-19 | End: 2025-01-19

## 2025-01-19 RX ORDER — CARVEDILOL 3.12 MG/1
3.12 TABLET ORAL 2 TIMES DAILY WITH MEALS
Qty: 60 TABLET | Refills: 0 | Status: SHIPPED | OUTPATIENT
Start: 2025-01-19

## 2025-01-19 RX ADMIN — Medication 10 ML: at 08:06

## 2025-01-19 RX ADMIN — CARVEDILOL 3.12 MG: 6.25 TABLET, FILM COATED ORAL at 08:17

## 2025-01-19 RX ADMIN — SPIRONOLACTONE 50 MG: 25 TABLET, FILM COATED ORAL at 11:45

## 2025-01-19 NOTE — PLAN OF CARE
"Goal Outcome Evaluation:              Outcome Evaluation: on telemetry, sv stable, using o2 while \"sleeping\" but did not sleep throughout the shift. up anbulating to toilet with stand by assistence.  denies feeling dizzy. planning on going home later today.                             "

## 2025-01-19 NOTE — PROGRESS NOTES
[facility] Progress Note  [facility]. [place]        Patient Name: Donny Jerry  : 1945  MRN: [id number]   LOS: 0 days    Patient Care Team:  Anum Alonso APRN as PCP - Rubin Sal MD as Consulting Physician (Urology)  Liz Russo MD as Consulting Physician (Cardiology)    Chief Complaint:    Chief Complaint   Patient presents with    Syncope     Primary Care Physician:  Anum Alonso APRN  Date of admission: 2025    Subjective     Interval History:   Follow-up acute on chronic kidney disease stage IIIb.  Patient is laying in the bed awake alert and interactive.  Denies having any chest pain or shortness of breath.  He said he is not sure if his wife would be able to make it because of the weather condition.  But he still wants to go home.  Physical therapy helped him get up, he became short of breath fairly quickly.  Events noted from last 24 hours.  I reviewed the chart and other providers notes, labs and procedures done since my last note.    Review of Systems:   As noted above.    Objective     Vitals:   Temp:  [97.8 °F (36.6 °C)-98.9 °F (37.2 °C)] 97.8 °F (36.6 °C)  Heart Rate:  [74-93] 87  Resp:  [13-18] 16  BP: (124-154)/(71-95) 143/79  Flow (L/min) (Oxygen Therapy):  [2] 2    Intake/Output Summary (Last 24 hours) at 2025 0934  Last data filed at 2025 1700  Gross per 24 hour   Intake 360 ml   Output 500 ml   Net -140 ml       Physical Exam:    General Appearance: alert, oriented x 3, no acute distress   Skin: warm and dry  HEENT: oral mucosa normal, nonicteric sclera  Neck: supple, no JVD  Lungs: Decreased breath sounds with bibasilar crackles.  Heart: RRR, normal S1 and S2  Abdomen: obese, soft, nontender, non distended and positive bowel sounds.  : no palpable bladder  Extremities: Trace to 1+ edema, no cyanosis or clubbing  Neuro: normal speech and mental status     Scheduled Meds:     Current  Facility-Administered Medications   Medication Dose Route Frequency Provider Last Rate Last Admin    sennosides-docusate (PERICOLACE) 8.6-50 MG per tablet 2 tablet  2 tablet Oral BID PRN Torres Lewis DO        And    polyethylene glycol (MIRALAX) packet 17 g  17 g Oral Daily PRN Torres Lewis DO        And    bisacodyl (DULCOLAX) EC tablet 5 mg  5 mg Oral Daily PRN Torres Lewis DO        And    bisacodyl (DULCOLAX) suppository 10 mg  10 mg Rectal Daily PRN Torres Lewis DO        Calcium Replacement - Follow Nurse / BPA Driven Protocol   Not Applicable PRN Torres Lewis DO        carvedilol (COREG) tablet 3.125 mg  3.125 mg Oral BID With Meals Destin Wilkes MD, FASN   3.125 mg at 01/19/25 0817    Magnesium Standard Dose Replacement - Follow Nurse / BPA Driven Protocol   Not Applicable PRN Torres Lewis DO        midodrine (PROAMATINE) tablet 5 mg  5 mg Oral TID AC Destin Wilkes MD, FASN   5 mg at 01/18/25 1717    nitroglycerin (NITROSTAT) SL tablet 0.4 mg  0.4 mg Sublingual Q5 Min PRN Torres Lewis DO        Phosphorus Replacement - Follow Nurse / BPA Driven Protocol   Not Applicable PRN Torres Lewis DO        Potassium Replacement - Follow Nurse / BPA Driven Protocol   Not Applicable PRN Torres Lewis DO        sodium chloride 0.9 % flush 10 mL  10 mL Intravenous PRN Quinten Mtz MD   10 mL at 01/18/25 1954    sodium chloride 0.9 % flush 10 mL  10 mL Intravenous Q12H Torres Lewis DO   10 mL at 01/19/25 0806    sodium chloride 0.9 % flush 10 mL  10 mL Intravenous PRN Torres Lewis DO        sodium chloride 0.9 % infusion 40 mL  40 mL Intravenous PRN Torres Lewis DO           carvedilol, 3.125 mg, Oral, BID With Meals  midodrine, 5 mg, Oral, TID AC  sodium chloride, 10 mL, Intravenous, Q12H        IV Meds:        Results Reviewed:   I have personally reviewed the results from the time of this admission to 1/19/2025 09:34 EST     Results from last 7 days   Lab Units  01/19/25  0524 01/18/25  0619 01/17/25  1031   SODIUM mmol/L 146* 144 141   POTASSIUM mmol/L 3.6 3.8 4.8   CHLORIDE mmol/L 107 106 103   CO2 mmol/L 27.6 24.2 26.2   BUN mg/dL 62* 68* 77*   CREATININE mg/dL 1.93* 2.08* 2.24*   CALCIUM mg/dL 8.5* 8.4* 8.4*   BILIRUBIN mg/dL 0.6  --  0.6   ALK PHOS U/L 57  --  64   ALT (SGPT) U/L 35  --  43*   AST (SGOT) U/L 24  --  34   GLUCOSE mg/dL 125* 77 104*       Estimated Creatinine Clearance: 33.7 mL/min (A) (by C-G formula based on SCr of 1.93 mg/dL (H)).    Results from last 7 days   Lab Units 01/19/25  0524 01/18/25  0619 01/17/25  1031   MAGNESIUM mg/dL  --   --  2.5*   PHOSPHORUS mg/dL 3.4 3.2  --              Results from last 7 days   Lab Units 01/19/25  0524 01/18/25  0619 01/17/25  1031   WBC 10*3/mm3 10.20 10.54 11.09*   HEMOGLOBIN g/dL 10.1* 10.6* 10.5*   PLATELETS 10*3/mm3 303 307 280             Brief Urine Lab Results  (Last result in the past 365 days)        Color   Clarity   Blood   Leuk Est   Nitrite   Protein   CREAT   Urine HCG        01/18/25 0343             81.6         01/18/25 0343 Yellow   Clear   Negative   Negative   Negative   Trace                   Protein/Creatinine Ratio, Urine   Date Value Ref Range Status   01/18/2025 220.6 (H) 0.0 - 200.0 mg/G Crea Final       Imaging Results (Last 24 Hours)       Procedure Component Value Units Date/Time    US Carotid Bilateral [[id number]] Collected: 01/18/25 0939     Updated: 01/18/25 1147    Narrative:      PROCEDURE: US CAROTID BILATERAL-     HISTORY: Syncope; R55-Syncope and collapse; I95.1-Orthostatic  hypotension     Technique: Real-time imaging was performed of the extracranial carotid  arteries in transverse and longitudinal planes, with color duplex  evaluation of blood flow velocity. Spectral analysis was performed. The  cervicovertebral arteries were also examined.     NASCET technique utilized for stenosis evaluation.     Right carotid system:  CCA: -34.7 cm/s  ICA: 107 cm/s  Vertebral artery:  Antegrade  ICA/CCA ratio: 3.08  Mild to moderate plaque is identified at the bifurcation .     Left carotid system:  CCA: 64.6 cm/s  ICA:  127 cm/s  Vertebral artery: Antegrade  ICA/CCA ratio: 1.97  Mild plaque is identified at the bifurcation .       Impression:      Less than 50 % RIGHT ICA stenosis.  Less than 50 % LEFT ICA stenosis.        If clinical concern persists, consider CT angiogram.     This report was signed and finalized on 1/18/2025 11:45 AM by Radha Moreland MD.                   Assessment / Plan     ASSESSMENT:    Orthostatic syncope    Syncope and collapse    Acute kidney injury: Patient does appear to have some worsening of his renal function.  It is likely all hemodynamically mediated secondary to volume issues, hypertension may have some worsening of underlying chronic kidney disease as well. Continue to optimize volume status and medications.  Chronic kidney disease stage IIIb: Baseline diabetic and hypertensive glomerulosclerosis with minimal proteinuria.  He has been on optimize medications.  I will recheck spot protein creatinine ratio.  Syncope: Most likely secondary to volume issues, responded well to 500 mL bolus.  Workup ordered as per hospitalist service  Type 2 diabetes: Continue with the sliding scale most recent hemoglobin A1c of 8.4 noted.  Hypertension: Optimize volume status before putting him on any antihypertensives.  Anemia: Check iron stores, may end up requiring PAOLA.  CHF/CAD: Optimize volume status  COPD: Continue home medications.  Obstructive sleep apnea: Patient said he has been compliant with his BiPAP.  Dyslipidemia: Continue statin  BPH: Continue home medications.      PLAN:  Slightly low potassium noted he already got 1 dose of 40 mEq.  Will give him 50 mg of Aldactone x 1 dose today and will start him on his torsemide 40 mg daily along with spironolactone 25 mg daily.  I think he will need midodrine 5 mg 3 times a day to make sure that he has a stable blood  pressure with that.  Details were discussed with the patient no family in the room.    Details were also discussed with the hospitalist service and or other providers as needed.  I think would be okay for him to go home, if his wife can come and get him today.  Continue with rest of the current treatment plan, and monitor with surveillance labs.  Further recommendations will depend on clinical course of the patient during the current hospitalization.   I have reviewed the copied text to this note, it was edited and the changes made as needed.  It is accurate to the point, when the note was signed today.     Thank you for involving us in the care of Donny Jerry.  Please feel free to call with any questions.    Destin Wilkes MD, FASN  01/19/25  09:34 EST    [facility]  [phone]      Part of this note may be an electronic transcription/translation of spoken language to printed text using the Dragon Dictation System.

## 2025-01-19 NOTE — DISCHARGE SUMMARY
[facility]   INTERNAL MEDICINE DISCHARGE SUMMARY      Name:  Donny Jerry   Age:  79 y.o.  Sex:  male  :  1945  MRN:  [id number]   Visit Number:  [id number]  Primary Care Physician:  Anum Alonso APRN  Date of Discharge:  2025  Admission Date:  2025      Discharge Diagnosis:         Orthostatic syncope    Coronary artery disease involving native coronary artery of native heart with angina pectoris    Type 2 diabetes mellitus with stage 3 chronic kidney disease    Obstructive sleep apnea on CPAP    Enlarged prostate    Chronic systolic heart failure    Syncope and collapse          Consults:     Consults       Date and Time Order Name Status Description    2025  1:05 PM Inpatient Nephrology Consult Completed     2025 10:19 PM Inpatient Nephrology Consult Completed             Procedures Performed:                 Hospital Course:   The patient was admitted on 2025  Please see H&P for details on admission HPI and ROS.  79-year-old male with past medical history of CAD, chronic kidney disease stage IIIb, diabetes, sleep apnea, COPD, BPH who presented to the emergency department because of a syncopal episode that he had while going to his nephrology appointment today.  Patient was standing when a bout of dizziness yet and he had to slowly ease himself down onto the ground.  Patient's wife noted that his lips were a bit blue and his blood pressure at the office was a systolic less than 100.  Patient denied any chest pain, palpitations or shortness of breath.  Here in the ED his blood pressures have done better after an initial blood pressure of 91/61.  His laboratory data shows that his BUN and creatinine remain elevated at 77 and 2.24.  His troponin was also elevated at 169 but is trending down currently at 151.  Because of all this the ED provider spoke with Dr. Vera the patient's nephrologist who felt he should be admitted overnight for monitoring and some  medication adjustments     After admission his Coreg and hydralazine a higher dose was stopped and he was actually started on midodrine.  Initially he was given IV fluids and he improved and his pressures stayed improvement.  Dr. Vera was consulted and he has adjusted his diuretics as well as started him back on the Coreg 3.125 twice daily.  He will be discharged home with follow-up with Dr. Wilkes and the PCP to be done within a week to 2.  He is ambulating well no dizziness and workup besides that has been unremarkable    Vital Signs:     Temp:  [97.8 °F (36.6 °C)-98.8 °F (37.1 °C)] 97.8 °F (36.6 °C)  Heart Rate:  [74-93] 92  Resp:  [13-18] 16  BP: (124-148)/(69-90) 138/69    Physical Exam:     General Appearance:    Alert and cooperative, not in any acute distress.   Head:    Atraumatic and normocephalic, without obvious abnormality.   Eyes:            PERRLA.  No pallor. Extraocular movements are within normal limits.   Neck:   Supple. No lymph glands, no bruit.   Lungs:     Chest shape is normal. Breath sounds heard bilaterally equally.  No crackles or wheezing.     Heart:    Normal S1 and S2, no murmur,  no JVD.   Abdomen:     Normal bowel sounds, no masses, no organomegaly. Soft     nontender, no guarding, no rebound tenderness.   Extremities:   Moves all extremities well. No edema, no cyanosis.   Skin:   No bruising or rash.   Neurologic:   Grossly nonfocal and moves all extremities.        Pertinent Lab Results:     Results from last 7 days   Lab Units 01/19/25  0524 01/18/25  0619 01/17/25  1031   SODIUM mmol/L 146* 144 141   POTASSIUM mmol/L 3.6 3.8 4.8   CHLORIDE mmol/L 107 106 103   CO2 mmol/L 27.6 24.2 26.2   BUN mg/dL 62* 68* 77*   CREATININE mg/dL 1.93* 2.08* 2.24*   CALCIUM mg/dL 8.5* 8.4* 8.4*   BILIRUBIN mg/dL 0.6  --  0.6   ALK PHOS U/L 57  --  64   ALT (SGPT) U/L 35  --  43*   AST (SGOT) U/L 24  --  34   GLUCOSE mg/dL 125* 77 104*     Results from last 7 days   Lab Units 01/19/25  0576  "01/18/25  0619 01/17/25  1031   WBC 10*3/mm3 10.20 10.54 11.09*   HEMOGLOBIN g/dL 10.1* 10.6* 10.5*   HEMATOCRIT % 31.0* 32.4* 32.7*   PLATELETS 10*3/mm3 303 307 280         No results found for: \"BLOODCX\", \"URINECX\", \"WOUNDCX\", \"MRSACX\"    Pertinent Radiology Results:    Imaging Results (Most Recent)       Procedure Component Value Units Date/Time    US Carotid Bilateral [[id number]] Collected: 01/18/25 0939     Updated: 01/18/25 1147    Narrative:      PROCEDURE: US CAROTID BILATERAL-     HISTORY: Syncope; R55-Syncope and collapse; I95.1-Orthostatic  hypotension     Technique: Real-time imaging was performed of the extracranial carotid  arteries in transverse and longitudinal planes, with color duplex  evaluation of blood flow velocity. Spectral analysis was performed. The  cervicovertebral arteries were also examined.     NASCET technique utilized for stenosis evaluation.     Right carotid system:  CCA: -34.7 cm/s  ICA: 107 cm/s  Vertebral artery: Antegrade  ICA/CCA ratio: 3.08  Mild to moderate plaque is identified at the bifurcation .     Left carotid system:  CCA: 64.6 cm/s  ICA:  127 cm/s  Vertebral artery: Antegrade  ICA/CCA ratio: 1.97  Mild plaque is identified at the bifurcation .       Impression:      Less than 50 % RIGHT ICA stenosis.  Less than 50 % LEFT ICA stenosis.        If clinical concern persists, consider CT angiogram.     This report was signed and finalized on 1/18/2025 11:45 AM by Radha Moreland MD.       CT Head Without Contrast [[id number]] Collected: 01/17/25 1131     Updated: 01/17/25 1135    Narrative:      PROCEDURE: CT HEAD WO CONTRAST-     HISTORY: syncope, convulsions     COMPARISON: January 11, 2024..     TECHNIQUE: Multiple axial CT images were performed from the foramen  magnum to the vertex. Individualized dose reduction techniques using  automated exposure control or adjustment of mA and/or kV according to  the patient size were employed.     FINDINGS: There is moderate, " age-appropriate, stable generalized  cerebral atrophy. The ventricles are enlarged. There is no evidence of  edema or hemorrhage. There are small areas of encephalomalacia in both  basal ganglia consistent with remote CVA. No masses are identified. No  extra-axial fluid is seen. The paranasal sinuses are unremarkable.       Impression:      Atrophy  without acute process.              CTDI: 40.8 mGy  DLP:821.89 mGy.cm     This report was signed and finalized on 1/17/2025 11:33 AM by Mayra Da Silva MD.       XR Chest 1 View [[id number]] Collected: 01/17/25 1120     Updated: 01/17/25 1124    Narrative:      PROCEDURE: XR CHEST 1 VW-        HISTORY: weakness     COMPARISON: January 2, 2025.     FINDINGS: Apical lordotic view. The heart is enlarged, but stable. The  patient is status post median sternotomy. There is left basilar  atelectasis or infiltrate. There is evidence of old calcified  granulomatous disease. . There is no pneumothorax. There are no acute  osseous abnormalities.       Impression:      Left basilar atelectasis or infiltrate. Continued follow-up  is recommended.           Images were reviewed, interpreted, and dictated by Dr. Mayra Da Silva MD  Transcribed by Saba Beltran PA-C.     This report was signed and finalized on 1/17/2025 11:22 AM by Mayra Da Silva MD.                       Discharge Disposition:      Home or Self Care    Discharge Medication:         Discharge Medications        New Medications        Instructions Start Date   midodrine 5 MG tablet  Commonly known as: PROAMATINE   5 mg, Oral, 3 Times Daily Before Meals             Changes to Medications        Instructions Start Date   carvedilol 3.125 MG tablet  Commonly known as: COREG  What changed:   medication strength  how much to take   3.125 mg, Oral, 2 Times Daily With Meals      spironolactone 25 MG tablet  Commonly known as: ALDACTONE  What changed:   when to take this  additional instructions   25 mg, Oral, Daily   Start Date:  January 20, 2025     tamsulosin 0.4 MG capsule 24 hr capsule  Commonly known as: FLOMAX  What changed: when to take this   0.4 mg, Oral, Daily      Torsemide 40 MG tablet  What changed:   when to take this  additional instructions   40 mg, Oral, Daily   Start Date: January 20, 2025            Continue These Medications        Instructions Start Date   albuterol sulfate  (90 Base) MCG/ACT inhaler  Commonly known as: PROVENTIL HFA;VENTOLIN HFA;PROAIR HFA   2 puffs, Inhalation, Every 4 Hours PRN      aspirin 81 MG chewable tablet   81 mg, Daily      atorvastatin 80 MG tablet  Commonly known as: LIPITOR   80 mg, Oral, Daily      B-D UF III MINI PEN NEEDLES 31G X 5 MM misc  Generic drug: Insulin Pen Needle   No dose, route, or frequency recorded.      docusate calcium 240 MG capsule  Commonly known as: SURFAK   240 mg, 2 Times Daily      Farxiga 10 MG tablet  Generic drug: dapagliflozin Propanediol   10 mg, Daily      fexofenadine 180 MG tablet  Commonly known as: ALLEGRA   180 mg, Daily PRN      finasteride 5 MG tablet  Commonly known as: PROSCAR   5 mg, Daily      fluticasone 50 MCG/ACT nasal spray  Commonly known as: FLONASE   1 spray, Nasal, Daily PRN      Insulin Lispro 100 UNIT/ML injection  Commonly known as: humaLOG   8 Units, Daily      isosorbide mononitrate 30 MG 24 hr tablet  Commonly known as: IMDUR   30 mg, Oral, Daily      magnesium oxide 400 MG tablet  Commonly known as: MAG-OX   400 mg, Daily      multivitamin tablet tablet  Commonly known as: THERAGRAN   1 tablet, Daily      nitroglycerin 0.4 MG SL tablet  Commonly known as: NITROSTAT   0.4 mg, Sublingual, Every 5 Minutes PRN, Take no more than 3 doses in 15 minutes.       pantoprazole 40 MG EC tablet  Commonly known as: PROTONIX   TAKE 1 TABLET BY MOUTH 2 TIMES DAILY (BEFORE MEALS)      pramipexole 1 MG tablet  Commonly known as: MIRAPEX   1 mg, Oral, Nightly      prasugrel 10 MG tablet  Commonly known as: EFFIENT   10 mg, Daily      ranolazine  500 MG 12 hr tablet  Commonly known as: RANEXA   500 mg, Oral, Every 12 Hours Scheduled      sertraline 100 MG tablet  Commonly known as: ZOLOFT   100 mg, Daily      Trelegy Ellipta 200-62.5-25 MCG/ACT inhaler  Generic drug: Fluticasone-Umeclidin-Vilant   1 puff, Inhalation, Daily - RT, Rinse mouth with water after use      Tresiba FlexTouch 100 UNIT/ML solution pen-injector injection  Generic drug: insulin degludec   2 Times Daily      TRUEplus Lancets 28G misc   No dose, route, or frequency recorded.      Trulicity 0.75 MG/0.5ML solution pen-injector  Generic drug: Dulaglutide   0.75 mg, Weekly      Vitamin D3 50 MCG (2000 UT) capsule   2,000 Units, Daily             Stop These Medications      hydrALAZINE 25 MG tablet  Commonly known as: APRESOLINE              Discharge Diet:             Follow-up Appointments:      Future Appointments   Date Time Provider Department Center   4/3/2025 10:30 AM Carolann Franco APRN [facility]   6/19/2025  3:15 PM Quinten Marrero MD [facility]   8/26/2025 10:45 AM Hansel Bob MD [facility]     Follow-up with Dr. Vera in 2 weeks and follow-up with PCP in a week    Test Results Pending at Discharge:             Richard Balderas MD  01/19/25  13:08 EST    Time:  Discharge 32 min    Please note that portions of this note were completed with a voice recognition program.

## 2025-01-19 NOTE — OUTREACH NOTE
Prep Survey      Flowsheet Row Responses   [facility] facility patient discharged from? [facility]   Is LACE score < 7 ? No   Eligibility Readm Mgmt   Discharge diagnosis Orthostatic syncope   Does the patient have one of the following disease processes/diagnoses(primary or secondary)? Other   Does the patient have Home health ordered? No   Is there a DME ordered? No   Prep survey completed? Yes            Agnes PALENCIA - Registered Nurse

## 2025-01-20 ENCOUNTER — CARE COORDINATION (OUTPATIENT)
Age: 80
End: 2025-01-20

## 2025-01-20 DIAGNOSIS — Z79.4 TYPE 2 DIABETES MELLITUS WITHOUT COMPLICATION, WITH LONG-TERM CURRENT USE OF INSULIN (HCC): ICD-10-CM

## 2025-01-20 DIAGNOSIS — E11.9 TYPE 2 DIABETES MELLITUS WITHOUT COMPLICATION, WITH LONG-TERM CURRENT USE OF INSULIN (HCC): ICD-10-CM

## 2025-01-20 RX ORDER — ATORVASTATIN CALCIUM 80 MG/1
80 TABLET, FILM COATED ORAL DAILY
Qty: 90 TABLET | Refills: 0 | Status: SHIPPED | OUTPATIENT
Start: 2025-01-20

## 2025-01-20 RX ORDER — MIDODRINE HYDROCHLORIDE 5 MG/1
5 TABLET ORAL
COMMUNITY
Start: 2025-01-19

## 2025-01-20 RX ORDER — CALCIUM CITRATE/VITAMIN D3 200MG-6.25
TABLET ORAL
Qty: 300 STRIP | Refills: 3 | Status: SHIPPED | OUTPATIENT
Start: 2025-01-20

## 2025-01-20 NOTE — CARE COORDINATION
related to their healthcare.     Advance Care Planning:   Does patient have an Advance Directive: not on file; education provided.    Medication reconciliation was performed with spouse/partner, who verbalizes understanding of administration of home medications. Medications reviewed, 1111F entered: no    Was patient discharged with a pulse oximeter? no    Non-face-to-face services provided:  Scheduled appointment with PCPDominik  Obtained and reviewed discharge summary and/or continuity of care documents    Offered patient enrollment in the Remote Patient Monitoring (RPM) program for in-home monitoring: Patient is not eligible for RPM program because: na .    Care Transitions 24 Hour Call    Schedule Follow Up Appointment with PCP: Completed  Do you have all of your prescriptions and are they filled?: Yes  Patient Home Equipment: Oxygen, CPAP  Do you have support at home?: Partner/Spouse/SO  Are you an active caregiver in your home?: No  Care Transitions Interventions         Discussed follow-up appointments. If no appointment was previously scheduled, appointment scheduling offered: Yes.   Is follow up appointment scheduled within 7 days of discharge? Yes.    Follow Up  Future Appointments   Date Time Provider Department Center   1/22/2025  8:45 AM Marina Dunlap APRN [facility]   3/17/2025 10:00 AM Marina Dunlap APRN [facility]   8/4/2025 10:15 AM Marina Dunlap APRN [facility]       Care Transition Nurse provided contact information.  Plan for follow-up call in 5-7 days based on severity of symptoms and risk factors.  Plan for next call: self management-BP    Burak Hawkins RN

## 2025-01-22 ENCOUNTER — HOSPITAL ENCOUNTER (OUTPATIENT)
Facility: HOSPITAL | Age: 80
Discharge: HOME | End: 2025-01-22
Payer: MEDICARE

## 2025-01-22 ENCOUNTER — HOSPITAL ENCOUNTER (OUTPATIENT)
Dept: GENERAL RADIOLOGY | Facility: HOSPITAL | Age: 80
Discharge: HOME | End: 2025-01-22
Payer: MEDICARE

## 2025-01-22 ENCOUNTER — OFFICE VISIT (OUTPATIENT)
Age: 80
End: 2025-01-22

## 2025-01-22 VITALS
TEMPERATURE: 96.9 F | DIASTOLIC BLOOD PRESSURE: 76 MMHG | SYSTOLIC BLOOD PRESSURE: 127 MMHG | WEIGHT: 210 LBS | HEART RATE: 68 BPM | RESPIRATION RATE: 20 BRPM | HEIGHT: 67 IN | OXYGEN SATURATION: 93 % | BODY MASS INDEX: 32.96 KG/M2

## 2025-01-22 DIAGNOSIS — J98.11 ATELECTASIS OF LEFT LUNG: ICD-10-CM

## 2025-01-22 DIAGNOSIS — J18.9 PNEUMONIA OF LEFT LOWER LOBE DUE TO INFECTIOUS ORGANISM: ICD-10-CM

## 2025-01-22 DIAGNOSIS — I50.22 CHRONIC SYSTOLIC (CONGESTIVE) HEART FAILURE (HCC): ICD-10-CM

## 2025-01-22 DIAGNOSIS — D63.1 ANEMIA DUE TO STAGE 4 CHRONIC KIDNEY DISEASE (HCC): ICD-10-CM

## 2025-01-22 DIAGNOSIS — E11.42 TYPE 2 DIABETES MELLITUS WITH DIABETIC POLYNEUROPATHY, WITHOUT LONG-TERM CURRENT USE OF INSULIN (HCC): ICD-10-CM

## 2025-01-22 DIAGNOSIS — N40.1 BENIGN PROSTATIC HYPERPLASIA WITH URINARY HESITANCY: ICD-10-CM

## 2025-01-22 DIAGNOSIS — N18.4 ANEMIA DUE TO STAGE 4 CHRONIC KIDNEY DISEASE (HCC): ICD-10-CM

## 2025-01-22 DIAGNOSIS — F33.1 MODERATE EPISODE OF RECURRENT MAJOR DEPRESSIVE DISORDER (HCC): ICD-10-CM

## 2025-01-22 DIAGNOSIS — I95.9 HYPOTENSION, UNSPECIFIED HYPOTENSION TYPE: Primary | ICD-10-CM

## 2025-01-22 DIAGNOSIS — I50.23 ACUTE ON CHRONIC SYSTOLIC CONGESTIVE HEART FAILURE (HCC): ICD-10-CM

## 2025-01-22 DIAGNOSIS — R39.11 BENIGN PROSTATIC HYPERPLASIA WITH URINARY HESITANCY: ICD-10-CM

## 2025-01-22 LAB
ALBUMIN SERPL-MCNC: 3.6 G/DL (ref 3.4–4.8)
ALBUMIN/GLOB SERPL: 1.3 {RATIO} (ref 0.8–2)
ALP SERPL-CCNC: 61 U/L (ref 25–100)
ALT SERPL-CCNC: 48 U/L (ref 4–36)
ANION GAP SERPL CALCULATED.3IONS-SCNC: 16 MMOL/L (ref 3–16)
AST SERPL-CCNC: 33 U/L (ref 8–33)
BASOPHILS # BLD: 0.1 K/UL (ref 0–0.1)
BASOPHILS NFR BLD: 0.6 %
BILIRUB SERPL-MCNC: 0.6 MG/DL (ref 0.3–1.2)
BUN SERPL-MCNC: 54 MG/DL (ref 6–20)
CALCIUM SERPL-MCNC: 9.3 MG/DL (ref 8.5–10.5)
CHLORIDE SERPL-SCNC: 101 MMOL/L (ref 98–107)
CO2 SERPL-SCNC: 25 MMOL/L (ref 20–30)
CREAT SERPL-MCNC: 2.1 MG/DL (ref 0.4–1.2)
EOSINOPHIL # BLD: 0.2 K/UL (ref 0–0.4)
EOSINOPHIL NFR BLD: 1.9 %
ERYTHROCYTE [DISTWIDTH] IN BLOOD BY AUTOMATED COUNT: 13.8 % (ref 11–16)
GFR SERPLBLD CREATININE-BSD FMLA CKD-EPI: 31 ML/MIN/{1.73_M2}
GLOBULIN SER CALC-MCNC: 2.8 G/DL
GLUCOSE SERPL-MCNC: 123 MG/DL (ref 74–106)
HCT VFR BLD AUTO: 33.7 % (ref 40–54)
HGB BLD-MCNC: 10.6 G/DL (ref 13–18)
IMM GRANULOCYTES # BLD: 0 K/UL
IMM GRANULOCYTES NFR BLD: 0.4 % (ref 0–5)
LYMPHOCYTES # BLD: 0.9 K/UL (ref 1.5–4)
LYMPHOCYTES NFR BLD: 9.1 %
MCH RBC QN AUTO: 29.4 PG (ref 27–32)
MCHC RBC AUTO-ENTMCNC: 31.5 G/DL (ref 31–35)
MCV RBC AUTO: 93.6 FL (ref 80–100)
MONOCYTES # BLD: 0.8 K/UL (ref 0.2–0.8)
MONOCYTES NFR BLD: 8.2 %
NEUTROPHILS # BLD: 7.7 K/UL (ref 2–7.5)
NEUTS SEG NFR BLD: 79.8 %
PLATELET # BLD AUTO: 292 K/UL (ref 150–400)
PMV BLD AUTO: 11 FL (ref 6–10)
POTASSIUM SERPL-SCNC: 5.1 MMOL/L (ref 3.4–5.1)
PROT SERPL-MCNC: 6.4 G/DL (ref 6.4–8.3)
RBC # BLD AUTO: 3.6 M/UL (ref 4.5–6)
SODIUM SERPL-SCNC: 142 MMOL/L (ref 136–145)
WBC # BLD AUTO: 9.6 K/UL (ref 4–11)

## 2025-01-22 PROCEDURE — 85025 COMPLETE CBC W/AUTO DIFF WBC: CPT

## 2025-01-22 PROCEDURE — 80053 COMPREHEN METABOLIC PANEL: CPT

## 2025-01-22 PROCEDURE — 71046 X-RAY EXAM CHEST 2 VIEWS: CPT

## 2025-01-22 RX ORDER — TORSEMIDE 20 MG/1
20 TABLET ORAL DAILY
Qty: 30 TABLET | Refills: 3 | Status: SHIPPED | OUTPATIENT
Start: 2025-01-22

## 2025-01-22 RX ORDER — DOXYCYCLINE HYCLATE 100 MG
100 TABLET ORAL 2 TIMES DAILY
Qty: 20 TABLET | Refills: 0 | Status: SHIPPED | OUTPATIENT
Start: 2025-01-22 | End: 2025-02-01

## 2025-01-22 RX ORDER — SPIRONOLACTONE 25 MG/1
25 TABLET ORAL DAILY
Qty: 90 TABLET | Refills: 0 | Status: SHIPPED | OUTPATIENT
Start: 2025-01-22

## 2025-01-22 RX ORDER — TORSEMIDE 40 MG/1
1 TABLET, FILM COATED ORAL DAILY
Qty: 90 TABLET | Refills: 1 | Status: SHIPPED | OUTPATIENT
Start: 2025-01-22

## 2025-01-22 RX ORDER — CARVEDILOL 3.12 MG/1
3.12 TABLET ORAL 2 TIMES DAILY WITH MEALS
COMMUNITY
Start: 2025-01-19

## 2025-01-22 SDOH — ECONOMIC STABILITY: FOOD INSECURITY: WITHIN THE PAST 12 MONTHS, THE FOOD YOU BOUGHT JUST DIDN'T LAST AND YOU DIDN'T HAVE MONEY TO GET MORE.: NEVER TRUE

## 2025-01-22 SDOH — ECONOMIC STABILITY: FOOD INSECURITY: WITHIN THE PAST 12 MONTHS, YOU WORRIED THAT YOUR FOOD WOULD RUN OUT BEFORE YOU GOT MONEY TO BUY MORE.: NEVER TRUE

## 2025-01-22 ASSESSMENT — PATIENT HEALTH QUESTIONNAIRE - PHQ9: DEPRESSION UNABLE TO ASSESS: URGENT/EMERGENT SITUATION

## 2025-01-22 NOTE — PROGRESS NOTES
Chief Complaint   Patient presents with    Follow-Up from Hospital       Have you seen any other physician or provider since your last visit yes - [facility]    Have you had any other diagnostic tests since your last visit? yes     Have you changed or stopped any medications since your last visit? yes - stopped Hydralazine and started Midodrine and increased Torsemide              Post-Discharge Transitional Care Management Services      Aquilino Evans   YOB: 1945    Date of Visit:  1/22/2025  30 Day Post-Discharge Date: 02/17/2025    Allergies   Allergen Reactions    Rosuvastatin Myalgia and Other (See Comments)    Rosuvastatin Calcium Myalgia    Bisoprolol Other (See Comments)     Agitation    Clopidogrel Bisulfate Other (See Comments)     resistance    Exenatide      nausea    Glucophage [Metformin Hydrochloride]      nausea    Rosuvastatin Calcium Other (See Comments)    Zocor [Simvastatin]      Muscle pain     Outpatient Medications Marked as Taking for the 1/22/25 encounter (Office Visit) with Marina Dunlap APRN   Medication Sig Dispense Refill    carvedilol (COREG) 3.125 MG tablet Take 1 tablet by mouth 2 times daily (with meals)      torsemide (DEMADEX) 20 MG tablet Take 1 tablet by mouth daily Add with the 40 mg when weight over 3 pounds increase 30 tablet 3    spironolactone (ALDACTONE) 25 MG tablet Take 1 tablet by mouth daily 90 tablet 0    Torsemide (SOAANZ) 40 MG TABS Take 1 tablet by mouth daily 90 tablet 1    doxycycline hyclate (VIBRA-TABS) 100 MG tablet Take 1 tablet by mouth 2 times daily for 10 days 20 tablet 0    atorvastatin (LIPITOR) 80 MG tablet TAKE 1 TABLET DAILY 90 tablet 0    blood glucose test strips (TRUE METRIX BLOOD GLUCOSE TEST) strip USE TO TEST BLOOD SUGAR 3  TIMES A DAY AND AS NEEDED 300 strip 3    midodrine (PROAMATINE) 5 MG tablet Take 1 tablet by mouth 3 times daily (before meals)      sertraline (ZOLOFT) 100 MG tablet TAKE 1 TABLET DAILY 90 tablet 0    BD PEN

## 2025-01-24 ENCOUNTER — READMISSION MANAGEMENT (OUTPATIENT)
Dept: CALL CENTER | Facility: HOSPITAL | Age: 80
End: 2025-01-24
Payer: MEDICARE

## 2025-01-24 NOTE — OUTREACH NOTE
Medical Week 1 Survey      Flowsheet Row Responses   [facility] patient discharged from? [facility]   Does the patient have one of the following disease processes/diagnoses(primary or secondary)? Other   Week 1 attempt successful? Yes   Call start time 0946   Call end time 0954   Discharge diagnosis Orthostatic syncope   Person spoke with today (if not patient) and relationship Chely, wife   Meds reviewed with patient/caregiver? Yes   Is the patient having any side effects they believe may be caused by any medication additions or changes? No   Does the patient have all medications ordered at discharge? Yes   Prescription comments antibiotics added for pneumonia   Is the patient taking all medications as directed (includes completed medication regime)? Yes   Does the patient have a primary care provider?  Yes   Does the patient have an appointment with their PCP within 7 days of discharge? Yes   Has the patient kept scheduled appointments due by today? Yes   Has home health visited the patient within 72 hours of discharge? N/A   Psychosocial issues? No   Did the patient receive a copy of their discharge instructions? Yes   Nursing interventions Reviewed instructions with patient   What is the patient's perception of their health status since discharge? Improving   Is the patient/caregiver able to teach back signs and symptoms related to disease process for when to call PCP? Yes   Is the patient/caregiver able to teach back signs and symptoms related to disease process for when to call 911? Yes   Is the patient/caregiver able to teach back the hierarchy of who to call/visit for symptoms/problems? PCP, Specialist, Home health nurse, Urgent Care, ED, 911 Yes   Additional teach back comments states pt is currently being treated for pneumonia with antibiotics by PCP since discharge   Week 1 call completed? Yes   Call end time 0954            Agnes PALENCIA - Registered Nurse

## 2025-02-03 NOTE — OUTREACH NOTE
Medical Week 2 Survey      Flowsheet Row Responses   [facility] patient discharged from? [facility]   Does the patient have one of the following disease processes/diagnoses(primary or secondary)? Other   Week 2 attempt successful? No   Unsuccessful attempts Attempt 2   Revoke Other transitional program            Margarita Kennedy Registered Nurse

## 2025-02-06 ENCOUNTER — CARE COORDINATION (OUTPATIENT)
Age: 80
End: 2025-02-06

## 2025-02-06 NOTE — CARE COORDINATION
Burak Hawkins, RN  Manager Care Coordination  [phone]     Mrs. Evans calls in stating she wanted to give Aquilino's PCP an update about some changes in him.  She tells me that for past 2 weeks he has started to hallucinate, visual and auditory.  He sees bugs crawling all over the floor or says he hears someone calling.  He has fallen multiple times and has various bruises etc.  She says his appetite isn't his usual but he is eating and drinking what he is allowed.  He hasn't required much insulin because his BS has been running lower in general.  Fasting blood sugar for past 3 days was 109/105/150.  He had to take 1/2 of extra torsemide for his weight.  Past 3 days were 198, 201, 200.  Aquilino's blood pressure , pulse, and oxygen have been good.  This morning he is 122/73, pulse 55-60, pulse ox 94%.  I asked about any changes in medications or if any provider had discussed any changes in his mentation like dementia and she says no.  I offered an appointment for Aquilino tomorrow since his PCP is out of office today.  Mrs. Evans agrees that he should be seen sooner than his next appointment a week out.  I did advise that any changes or concerns don't hesitate to call EMS or go to hospital.  She verbalized understanding.

## 2025-02-07 ENCOUNTER — OFFICE VISIT (OUTPATIENT)
Age: 80
End: 2025-02-07
Payer: MEDICARE

## 2025-02-07 VITALS
DIASTOLIC BLOOD PRESSURE: 70 MMHG | HEART RATE: 66 BPM | SYSTOLIC BLOOD PRESSURE: 137 MMHG | TEMPERATURE: 97.2 F | RESPIRATION RATE: 16 BRPM | WEIGHT: 199 LBS | BODY MASS INDEX: 31.23 KG/M2 | HEIGHT: 67 IN | OXYGEN SATURATION: 97 %

## 2025-02-07 DIAGNOSIS — R41.0 CONFUSION: ICD-10-CM

## 2025-02-07 DIAGNOSIS — I50.22 CHRONIC SYSTOLIC (CONGESTIVE) HEART FAILURE (HCC): ICD-10-CM

## 2025-02-07 DIAGNOSIS — I49.9 IRREGULAR HEART RATE: ICD-10-CM

## 2025-02-07 DIAGNOSIS — R53.81 DEBILITY: ICD-10-CM

## 2025-02-07 DIAGNOSIS — R29.6 FREQUENT FALLS: ICD-10-CM

## 2025-02-07 DIAGNOSIS — R44.3 HALLUCINATIONS: Primary | ICD-10-CM

## 2025-02-07 PROBLEM — I47.20 VENTRICULAR TACHYARRHYTHMIA: Status: ACTIVE | Noted: 2025-01-01

## 2025-02-07 LAB
BILIRUBIN, POC: NORMAL
BLOOD URINE, POC: NORMAL
CLARITY, POC: CLEAR
COLOR, POC: NORMAL
GLUCOSE URINE, POC: NORMAL MG/DL
KETONES, POC: NORMAL MG/DL
LEUKOCYTE EST, POC: NORMAL
NITRITE, POC: NORMAL
PH, POC: 6
PROTEIN, POC: NORMAL MG/DL
SPECIFIC GRAVITY, POC: 1.01
UROBILINOGEN, POC: 0.2 MG/DL

## 2025-02-07 PROCEDURE — G8417 CALC BMI ABV UP PARAM F/U: HCPCS | Performed by: NURSE PRACTITIONER

## 2025-02-07 PROCEDURE — 3078F DIAST BP <80 MM HG: CPT | Performed by: NURSE PRACTITIONER

## 2025-02-07 PROCEDURE — 81002 URINALYSIS NONAUTO W/O SCOPE: CPT | Performed by: NURSE PRACTITIONER

## 2025-02-07 PROCEDURE — 99214 OFFICE O/P EST MOD 30 MIN: CPT | Performed by: NURSE PRACTITIONER

## 2025-02-07 PROCEDURE — 1159F MED LIST DOCD IN RCRD: CPT | Performed by: NURSE PRACTITIONER

## 2025-02-07 PROCEDURE — 1123F ACP DISCUSS/DSCN MKR DOCD: CPT | Performed by: NURSE PRACTITIONER

## 2025-02-07 PROCEDURE — G8427 DOCREV CUR MEDS BY ELIG CLIN: HCPCS | Performed by: NURSE PRACTITIONER

## 2025-02-07 PROCEDURE — 1036F TOBACCO NON-USER: CPT | Performed by: NURSE PRACTITIONER

## 2025-02-07 PROCEDURE — 3075F SYST BP GE 130 - 139MM HG: CPT | Performed by: NURSE PRACTITIONER

## 2025-02-07 ASSESSMENT — PATIENT HEALTH QUESTIONNAIRE - PHQ9: DEPRESSION UNABLE TO ASSESS: URGENT/EMERGENT SITUATION

## 2025-02-07 NOTE — ED PROVIDER NOTES
Subjective:  History of Present Illness:    Patient is a 79-year-old male with history of CAD, GERD, MI, obesity, USHA, type 2 diabetes who presents today with altered mental status, frequent falls and concerns for an abnormal EKG.  Wife at bedside states that patient has been having worsening falls, weakness at home, complaining of intermittent chest pain and altered mental status.  Reportedly talking to people who are not there, responding to stimuli that is not present.      Nurses Notes reviewed and agree, including vitals, allergies, social history and prior medical history.     REVIEW OF SYSTEMS: All systems reviewed and not pertinent unless noted.  Review of Systems    Past Medical History:   Diagnosis Date    Benign hypertension     Coronary artery disease     Depression     Enlarged prostate     Enlarged prostate with anticipated TURP with Dr. Bernal.    GERD (gastroesophageal reflux disease)     Heart attack     Hypercholesterolemia     Impaired functional mobility, balance, gait, and endurance     Myocardial infarction     Obesity     Obstructive sleep apnea on CPAP     Type 2 diabetes mellitus        Allergies:    Zocor [simvastatin], Bisoprolol, Byetta 10 mcg pen [exenatide], Crestor [rosuvastatin calcium], Metformin and related, and Plavix [clopidogrel bisulfate]      Past Surgical History:   Procedure Laterality Date    CARDIAC CATHETERIZATION      CARDIAC CATHETERIZATION Left 10/19/2021    Procedure: Left Heart Cath;  Surgeon: Liz Russo MD;  Location:  [facility] C[facility] INVASIVE [facility];  Service: Cardiology;  Laterality: Left;    CARDIAC CATHETERIZATION N/A 7/18/2023    Procedure: Coronary angiography;  Surgeon: Rupesh Parr MD;  Location: [facility] CATH INVASIVE LOCATION;  Service: Cardiology;  Laterality: N/A;    CARDIAC CATHETERIZATION N/A 7/18/2023    Procedure: Percutaneous Coronary Intervention;  Surgeon: Rupesh Parr MD;  Location: [facility] CATH INVASIVE LOCATION;  Service:  Cardiology;  Laterality: N/A;    COLONOSCOPY W/ POLYPECTOMY      CORONARY ANGIOPLASTY WITH STENT PLACEMENT Left 06/03/2009    Left heart catheterization, 06/03/2009, Dr. Russo:  LVEF 45% to 50%.  Placement of overlapping Cypher drug-eluting stent 2.5 x 28 and 2.5 x 18 to the SVG to the obtuse marginal branch.  SVG to the PDA had a proximal stenosis estimated at 60% with a distal stenosis of 50% to 60%.    CORONARY ANGIOPLASTY WITH STENT PLACEMENT Left 07/06/2009    Left heart catheterization, 07/06/2009:  PTCA and stent placement in the mid PDA using a 2.25 x 12 mm Taxus drug-eluting stent with stent placement in the distal SVG to the PDA using a 2.5 x 18 mm Cypher drug-eluting stent and stenting of the proximal SVG to the PDA using a 3.0 x 28 mm Cypher drug-eluting stent.    CORONARY ANGIOPLASTY WITH STENT PLACEMENT  04/23/2010    Cardiac catheterization for recurrent anginal symptoms, 04/23/2010, with PTCA and stent placement in the proximal SVG to the PDA using 3.0 x 28 mm Promus drug-eluting stent for in-stent restenosis.    CORONARY ANGIOPLASTY WITH STENT PLACEMENT Left 07/06/2010    Left heart catheterization, 07/06/2010, Dr. Russo:  EF 40% to 45%.  3.5 x 23 mm Promus drug-eluting stent in the proximal SVG to OM, 2.5 x 18 mm Promus drug-eluting stent to distal SVG to PDA due to in-stent restenosis.      CORONARY ANGIOPLASTY WITH STENT PLACEMENT Left 11/16/2010    Left heart catheterization, 11/16/2010, with placement of a 3.0 x 16 mm Taxus drug-eluting stent to the SVG to the RCA proximally and a 2.5 x 16 mm paclitaxel stent distally in the SVG to the RCA.    CORONARY ANGIOPLASTY WITH STENT PLACEMENT Left     Left heart catheterization, 3.0 x 24-mm Promus element drug-eluting stent to a 90% lesion in the mid portion of the SVG to the PDA.     CORONARY ANGIOPLASTY WITH STENT PLACEMENT Left 06/24/2014    Left heart catheterization for recurrent angina, 06/24/2014, Dr. Russo:  PTCA of the  SVG to the PDA using a 3 x 12 mm NC TREK balloon.      CORONARY ARTERY BYPASS GRAFT      CABG x3, Dr. Peng, [facility], 2001.         Social History     Socioeconomic History    Marital status:    Tobacco Use    Smoking status: Never    Smokeless tobacco: Never   Vaping Use    Vaping status: Never Used   Substance and Sexual Activity    Alcohol use: No    Drug use: No    Sexual activity: Defer         Family History   Problem Relation Age of Onset    Heart attack Mother     Ulcers Father        Objective  Physical Exam:  /68   Pulse 89   Temp 97.9 °F (36.6 °C)   Resp 18   SpO2 92%      Physical Exam    Critical Care    Performed by: Oscar Bass MD  Authorized by: Rome Kurtz DO    Critical care provider statement:     Critical care time (minutes):  45    Critical care time was exclusive of:  Separately billable procedures and treating other patients    Critical care was necessary to treat or prevent imminent or life-threatening deterioration of the following conditions:  Cardiac failure (Ventricular tachycardia)    Critical care was time spent personally by me on the following activities:  Blood draw for specimens, development of treatment plan with patient or surrogate, discussions with consultants, discussions with primary provider, evaluation of patient's response to treatment, examination of patient, obtaining history from patient or surrogate, ordering and performing treatments and interventions, ordering and review of laboratory studies, ordering and review of radiographic studies, pulse oximetry, re-evaluation of patient's condition and review of old charts    Care discussed with: admitting provider        ED Course:    ED Course as of 02/07/25 2018 Fri Feb 07, 2025   1708 EKG interpreted by me, normal sinus rhythm with left bundle branch block, nonsustained run of VT noted in lead, no concerning ST changes noted, rate of 113, abnormal EKG [JE]   1726 CT head  independently interpreted by me showed no acute process [JE]   1756 Cardiomegaly with no acute process per my independent interpretation of chest x-ray prior to radiology overread [JE]      ED Course User Index  [JE] Oscar Bass MD       Lab Results (last 24 hours)       Procedure Component Value Units Date/Time    CBC & Differential [[id number]] Collected: 02/07/25 1705    Specimen: Blood Updated: 02/07/25 1709    Narrative:      The following orders were created for panel order CBC & Differential.  Procedure                               Abnormality         Status                     ---------                               -----------         ------                     CBC Auto Differential[[id number]]                            In process                   Please view results for these tests on the individual orders.    Comprehensive Metabolic Panel [[id number]] Collected: 02/07/25 1705    Specimen: Blood Updated: 02/07/25 1709    COVID-19 and FLU A/B PCR, 1 HR TAT - Swab, Nasopharynx [[id number]] Collected: 02/07/25 1705    Specimen: Swab from Nasopharynx Updated: 02/07/25 1709    High Sensitivity Troponin T [[id number]] Collected: 02/07/25 1705    Specimen: Blood Updated: 02/07/25 1709    BNP [[id number]] Collected: 02/07/25 1705    Specimen: Blood Updated: 02/07/25 1709    C-reactive Protein [[id number]] Collected: 02/07/25 1705    Specimen: Blood Updated: 02/07/25 1709    Procalcitonin [[id number]] Collected: 02/07/25 1705    Specimen: Blood Updated: 02/07/25 1709    Lactic Acid, Plasma [[id number]] Collected: 02/07/25 1705    Specimen: Blood Updated: 02/07/25 1709    Magnesium [[id number]] Collected: 02/07/25 1705    Specimen: Blood Updated: 02/07/25 1709    CK [[id number]] Collected: 02/07/25 1705    Specimen: Blood Updated: 02/07/25 1709    TSH Rfx On Abnormal To Free T4 [[id number]] Collected: 02/07/25 1705    Specimen: Blood Updated: 02/07/25 1709    CBC Auto Differential [[id number]] Collected: 02/07/25  1705    Specimen: Blood Updated: 02/07/25 1709             No radiology results from the last 24 hrs       MDM     Amount and/or Complexity of Data Reviewed  Decide to obtain previous medical records or to obtain history from someone other than the patient: yes  Independent visualization of images, tracings, or specimens: yes        Initial impression of presenting illness: Weakness, fall, altered mental status    DDX: includes but is not limited to: Ventricular arrhythmia, intracranial hemorrhage, sepsis, ACS, MI, UTI    Patient arrives guarded with vitals interpreted by myself.     Pertinent features from physical exam: Irregular rhythm on exam, no murmur, nontender to abdominal palpation, clear to auscultation, patient ANO x 3 on exam, at his mental baseline per wife at bedside.    Initial diagnostic plan: CBC, CMP, troponin, BNP, D-dimer, EKG, chest x-ray, CRP, Pro-Virgilio, magnesium    Results from initial plan were reviewed and interpreted by me revealing patient with nonsustained runs of VT on EKG, electrolytes unremarkable, significantly elevated BNP, patient's kidney function at baseline for CKD    Diagnostic information from other sources: Discussed with wife at bedside    Interventions / Re-evaluation: Given my concerns for VT, started on amiodarone    Results/clinical rationale were discussed with patient at bedside    Consultations/Discussion of results with other physicians: Given my concerns for nonsustained runs of ventricular tachycardia as etiology of patient's presentation today discussed with Dr. Kurtz, hospitalist.  He request that I speak with cardiology prior to admission.  I spoke with Dr. Beck, cardiology.  He agrees with current management and will consult on the patient while inpatient.  Dr. Kurtz then admit the patient to his service for further management.    Disposition plan: Admit  -----        Final diagnoses:   Ventricular tachycardia          Oscar Bass MD  02/07/25 2021

## 2025-02-07 NOTE — PROGRESS NOTES
Chief Complaint   Patient presents with    Fall    Hallucinations       Have you seen any other physician or provider since your last visit yes - Nephrology    Have you had any other diagnostic tests since your last visit? yes - labs,CXR    Have you changed or stopped any medications since your last visit? no        SUBJECTIVE:    Patient ID:Aquilino Evans is a 79 y.o. male.    Chief Complaint   Patient presents with    Fall    Hallucinations     HPI:  Patient has had 2 falls in the past week. He is using the walker at present. He has a wound on his left knee and he hit his head the last fall.     Patient was having some hallucinations,per wife.  History of Present Illness  The patient presents for evaluation of confusion, hallucinations, falls, and blood glucose management.    He has been experiencing episodes of confusion and hallucinations, including visual disturbances such as seeing objects on the floor that are not present and auditory hallucinations like hearing someone call his name. He also reported an incident where he perceived a person sitting in a chair when there was no one present. Additionally, he has had memory lapses, such as forgetting about a recent haircut. His appetite has decreased, particularly during dinner, although he continues to consume hamburgers daily. His breakfast typically consists of egg beaters, oats, and occasionally pancakes. He reports no fever or expectoration. His urinary output is normal, with no reported dysuria. He is permitted a fluid intake of 2000 mL but does not consume this amount, although his fluid intake has increased recently.    He has experienced two falls, one on Sunday and another on Wednesday, resulting in knee injuries. He also sustained a shoulder bruise from a fall.    His blood glucose levels have been within the normal range, and he has not been administered insulin due to hypoglycemic episodes. His blood glucose level today was recorded at 112. He is on

## 2025-02-08 NOTE — CONSULTS
Referring Provider: Luisito Hawkins DO    Reason for Consultation:   Nonsustained VT      Patient Care Team:  Anum Alonso APRN as PCP - Rubin Sal MD as Consulting Physician (Urology)  Liz Russo MD as Consulting Physician (Cardiology)      SUBJECTIVE     Chief Complaint:   Altered mental state    History of present illness:  Donny Jerry is a 79 y.o. male  with complicated cardiac history which includes hypertension, hyperlipidemia, diabetes, coronary artery disease with previous CABG, multiple PCI's, heart failure with midrange ejection fraction/ischemic cardiomyopathy, history of PVCs who presents to the hospital with altered mental state and frequent falls.  ECG in the ER showed sinus rhythm with multiple  PVCs and right bundle branch block pattern.  Telemetry also showed nonsustained VT.  Cardiology was consulted for further evaluation and management.  Patient has reportedly been having hallucinations, episodes of confusion, difficulty walking and has been passing out for the last 3 weeks.    Review of systems:  Unable to reliably obtain    Personal History:      Past Medical History:   Diagnosis Date    Benign hypertension     CHF (congestive heart failure)     Coronary artery disease     Depression     Enlarged prostate     Enlarged prostate with anticipated TURP with Dr. Bernal.    GERD (gastroesophageal reflux disease)     Heart attack     Hypercholesterolemia     Impaired functional mobility, balance, gait, and endurance     Myocardial infarction     Obesity     Obstructive sleep apnea on CPAP     Type 2 diabetes mellitus        Past Surgical History:   Procedure Laterality Date    CARDIAC CATHETERIZATION      CARDIAC CATHETERIZATION Left 10/19/2021    Procedure: Left Heart Cath;  Surgeon: Liz Russo MD;  Location: Replaced by [facility] CATH INVASIVE LOCATION;  Service: Cardiology;  Laterality: Left;    CARDIAC CATHETERIZATION N/A 7/18/2023    Procedure: Coronary  angiography;  Surgeon: Rupesh Parr MD;  Location:  [facility] CATH INVASIVE LOCATION;  Service: Cardiology;  Laterality: N/A;    CARDIAC CATHETERIZATION N/A 7/18/2023    Procedure: Percutaneous Coronary Intervention;  Surgeon: Rupesh Parr MD;  Location:  [facility] CATH INVASIVE LOCATION;  Service: Cardiology;  Laterality: N/A;    COLONOSCOPY W/ POLYPECTOMY      CORONARY ANGIOPLASTY WITH STENT PLACEMENT Left 06/03/2009    Left heart catheterization, 06/03/2009, Dr. Russo:  LVEF 45% to 50%.  Placement of overlapping Cypher drug-eluting stent 2.5 x 28 and 2.5 x 18 to the SVG to the obtuse marginal branch.  SVG to the PDA had a proximal stenosis estimated at 60% with a distal stenosis of 50% to 60%.    CORONARY ANGIOPLASTY WITH STENT PLACEMENT Left 07/06/2009    Left heart catheterization, 07/06/2009:  PTCA and stent placement in the mid PDA using a 2.25 x 12 mm Taxus drug-eluting stent with stent placement in the distal SVG to the PDA using a 2.5 x 18 mm Cypher drug-eluting stent and stenting of the proximal SVG to the PDA using a 3.0 x 28 mm Cypher drug-eluting stent.    CORONARY ANGIOPLASTY WITH STENT PLACEMENT  04/23/2010    Cardiac catheterization for recurrent anginal symptoms, 04/23/2010, with PTCA and stent placement in the proximal SVG to the PDA using 3.0 x 28 mm Promus drug-eluting stent for in-stent restenosis.    CORONARY ANGIOPLASTY WITH STENT PLACEMENT Left 07/06/2010    Left heart catheterization, 07/06/2010, Dr. Russo:  EF 40% to 45%.  3.5 x 23 mm Promus drug-eluting stent in the proximal SVG to OM, 2.5 x 18 mm Promus drug-eluting stent to distal SVG to PDA due to in-stent restenosis.      CORONARY ANGIOPLASTY WITH STENT PLACEMENT Left 11/16/2010    Left heart catheterization, 11/16/2010, with placement of a 3.0 x 16 mm Taxus drug-eluting stent to the SVG to the RCA proximally and a 2.5 x 16 mm paclitaxel stent distally in the SVG to the RCA.    CORONARY ANGIOPLASTY WITH STENT  PLACEMENT Left     Left heart catheterization, 3.0 x 24-mm Promus element drug-eluting stent to a 90% lesion in the mid portion of the SVG to the PDA.     CORONARY ANGIOPLASTY WITH STENT PLACEMENT Left 06/24/2014    Left heart catheterization for recurrent angina, 06/24/2014, Dr. Russo:  PTCA of the SVG to the PDA using a 3 x 12 mm NC TREK balloon.      CORONARY ARTERY BYPASS GRAFT      CABG x3, Dr. Peng, [facility], 2001.       Family History   Problem Relation Age of Onset    Heart attack Mother     Ulcers Father        Social History     Tobacco Use    Smoking status: Never    Smokeless tobacco: Never   Vaping Use    Vaping status: Never Used   Substance Use Topics    Alcohol use: No    Drug use: No        Home meds:  Prior to Admission medications    Medication Sig Start Date End Date Taking? Authorizing Provider   albuterol sulfate  (90 Base) MCG/ACT inhaler Inhale 2 puffs Every 4 (Four) Hours As Needed for Wheezing. 7/26/23   Hansel Bob MD   aspirin 81 MG chewable tablet Chew 1 tablet Daily.    ProviderJacob MD   atorvastatin (LIPITOR) 80 MG tablet Take 1 tablet by mouth Daily. 8/30/24   Liz Russo MD   B-D UF III MINI PEN NEEDLES 31G X 5 MM misc  3/26/23   ProviderJacob MD   carvedilol (COREG) 3.125 MG tablet Take 1 tablet by mouth 2 (Two) Times a Day With Meals. 1/19/25   Richard Balderas MD   Cholecalciferol (VITAMIN D3) 50 MCG (2000 UT) capsule Take 1 capsule by mouth Daily.    ProviderJacob MD   dapagliflozin Propanediol (Farxiga) 10 MG tablet Take 10 mg by mouth Daily.    Jacob Sotelo MD   docusate calcium (SURFAK) 240 MG capsule Take 1 capsule by mouth 2 (Two) Times a Day.    Jacob Sotelo MD   fexofenadine (ALLEGRA) 180 MG tablet Take 1 tablet by mouth Daily As Needed.    Jacob Sotelo MD   finasteride (PROSCAR) 5 MG tablet Take 1 tablet by mouth Daily.    Jacob Sotelo MD   fluticasone (FLONASE) 50  MCG/ACT nasal spray Administer 1 spray into the nostril(s) as directed by provider Daily As Needed for Allergies or Rhinitis. 1/4/25   Kerley, Brian Joseph,    Fluticasone-Umeclidin-Vilant (Trelegy Ellipta) 200-62.5-25 MCG/ACT inhaler Inhale 1 puff Daily. Rinse mouth with water after use 11/26/24   Hansel Bob MD   insulin degludec (Tresiba FlexTouch) 100 UNIT/ML solution pen-injector injection Inject  under the skin into the appropriate area as directed 2 (Two) Times a Day. 22 units in the am   37 units at bedtime    Jacob Sotelo MD   Insulin Lispro (humaLOG) 100 UNIT/ML injection Inject 8 Units under the skin into the appropriate area as directed Daily. Daily with supper    Jacob Sotelo MD   isosorbide mononitrate (IMDUR) 30 MG 24 hr tablet Take 1 tablet by mouth Daily. 5/16/24   Liz Russo MD   magnesium oxide (MAG-OX) 400 MG tablet Take 1 tablet by mouth Daily.    Jacob Sotelo MD   midodrine (PROAMATINE) 5 MG tablet Take 1 tablet by mouth 3 (Three) Times a Day Before Meals. 1/19/25   Richard Balderas MD   Multiple Vitamin (MULTI VITAMIN DAILY PO) Take 1 tablet by mouth Daily.    Jacob Sotelo MD   nitroglycerin (NITROSTAT) 0.4 MG SL tablet Place 1 tablet under the tongue Every 5 (Five) Minutes As Needed for Chest Pain. Take no more than 3 doses in 15 minutes. 6/15/18   Liz Russo MD   pantoprazole (PROTONIX) 40 MG EC tablet TAKE 1 TABLET BY MOUTH 2 TIMES DAILY (BEFORE MEALS) 12/23/21   Jacob Sotelo MD   pramipexole (MIRAPEX) 1 MG tablet Take 1 tablet by mouth Every Night. 11/26/24   Hansel Bob MD   prasugrel (EFFIENT) 10 MG tablet Take 1 tablet by mouth Daily.    Jacob Sotelo MD   ranolazine (RANEXA) 500 MG 12 hr tablet Take 1 tablet by mouth Every 12 (Twelve) Hours for 180 days. 9/25/23 1/2/25  Liz Russo MD   sertraline (ZOLOFT) 100 MG tablet Take 1 tablet by mouth Daily.    Provider, MD Jacob    spironolactone (ALDACTONE) 25 MG tablet Take 1 tablet by mouth Daily. 1/20/25   Richard Balderas MD   tamsulosin (FLOMAX) 0.4 MG capsule 24 hr capsule Take 1 capsule by mouth Daily. 1/19/25   Richard Balderas MD   torsemide 40 MG tablet Take 40 mg by mouth Daily. 1/20/25   Richard Balderas MD   TRUEplus Lancets 28G misc  9/9/22   ProviderJacob MD   Trulicity 0.75 MG/0.5ML solution pen-injector Inject 0.75 mg as directed 1 (One) Time Per Week. 5/23/22   Provider, MD Jacob       Allergies:     Zocor [simvastatin], Bisoprolol, Byetta 10 mcg pen [exenatide], Crestor [rosuvastatin calcium], Metformin and related, and Plavix [clopidogrel bisulfate]    Scheduled Meds:enoxaparin, 30 mg, Subcutaneous, Daily  furosemide, 80 mg, Intravenous, BID  insulin glargine, 15 Units, Subcutaneous, Q12H  insulin lispro, 8 Units, Subcutaneous, TID With Meals  potassium chloride ER, 40 mEq, Oral, Q4H  sodium chloride, 10 mL, Intravenous, Q12H      Continuous Infusions:amiodarone, 0.5 mg/min, Last Rate: 0.5 mg/min (02/07/25 7155)      PRN Meds:  acetaminophen **OR** acetaminophen **OR** acetaminophen    senna-docusate sodium **AND** polyethylene glycol **AND** bisacodyl **AND** bisacodyl    Calcium Replacement - Follow Nurse / BPA Driven Protocol    dextrose    dextrose    glucagon (human recombinant)    Magnesium Cardiology Dose Replacement - Follow Nurse / BPA Driven Protocol    nitroglycerin    Phosphorus Replacement - Follow Nurse / BPA Driven Protocol    Potassium Replacement - Follow Nurse / BPA Driven Protocol    sodium chloride    sodium chloride      OBJECTIVE    Vital Signs  Vitals:    02/08/25 0430 02/08/25 0500 02/08/25 0530 02/08/25 0731   BP: 111/67 109/64 102/54 127/63   BP Location:    Left arm   Patient Position:    Lying   Pulse: 87 86 99    Resp:    18   Temp:    98.1 °F (36.7 °C)   TempSrc:    Oral   SpO2: 94% 91% 97%    Weight:       Height:           Flowsheet Rows      Flowsheet Row First Filed Value  "  Admission Height 170.2 cm (67\") Documented at 02/07/2025 1712   Admission Weight 90.3 kg (199 lb) Documented at 02/07/2025 1712              Intake/Output Summary (Last 24 hours) at 2/8/2025 0825  Last data filed at 2/8/2025 0534  Gross per 24 hour   Intake 240 ml   Output 725 ml   Net -485 ml        Telemetry: Sinus rhythm with PVCs    Physical Exam:  The patient is alert, oriented and in no distress.  Vital signs as noted above.  Head and neck revealed no carotid bruits or jugular venous distention.  No thyromegaly or lymphadenopathy is present  Lungs clear.  No wheezing.  Breath sounds are normal bilaterally.  Heart: Normal first and second heart sounds. No murmur.  No precordial rub is present.  No gallop is present.  Abdomen: Soft and nontender.  No organomegaly is present.  Extremities with good peripheral pulses without any pedal edema.  Skin: Warm and dry.  Musculoskeletal system is grossly normal.  CNS grossly normal.       Results Review:  I have personally reviewed the results from the time of this admission to 2/8/2025 08:25 EST and agree with these findings:  []  Laboratory  []  Microbiology  []  Radiology  []  EKG/Telemetry   []  Cardiology/Vascular   []  Pathology  []  Old records  []  Other:    Most notable findings include:     Lab Results (last 24 hours)       Procedure Component Value Units Date/Time    POC Glucose Once [[id number]]  (Abnormal) Collected: 02/08/25 0758    Specimen: Blood Updated: 02/08/25 0800     Glucose 163 mg/dL      Comment: Serial Number: [id number]:  [id number]       Basic Metabolic Panel [[id number]]  (Abnormal) Collected: 02/08/25 0552    Specimen: Blood Updated: 02/08/25 0628     Glucose 175 mg/dL      BUN 79 mg/dL      Creatinine 2.07 mg/dL      Sodium 137 mmol/L      Potassium 3.6 mmol/L      Chloride 98 mmol/L      CO2 25.1 mmol/L      Calcium 8.4 mg/dL      BUN/Creatinine Ratio 38.2     Anion Gap 13.9 mmol/L      eGFR 32.2 mL/min/1.73     Narrative:      GFR " Categories in Chronic Kidney Disease (CKD)      GFR Category          GFR (mL/min/1.73)    Interpretation  G1                     90 or greater         Normal or high (1)  G2                      60-89                Mild decrease (1)  G3a                   45-59                Mild to moderate decrease  G3b                   30-44                Moderate to severe decrease  G4                    15-29                Severe decrease  G5                    14 or less           Kidney failure          (1)In the absence of evidence of kidney disease, neither GFR category G1 or G2 fulfill the criteria for CKD.    eGFR calculation 2021 CKD-EPI creatinine equation, which does not include race as a factor    Magnesium [[id number]]  (Abnormal) Collected: 02/08/25 0552    Specimen: Blood Updated: 02/08/25 0628     Magnesium 2.7 mg/dL     Phosphorus [[id number]]  (Normal) Collected: 02/08/25 0552    Specimen: Blood Updated: 02/08/25 0628     Phosphorus 3.2 mg/dL     CBC & Differential [[id number]]  (Abnormal) Collected: 02/08/25 0552    Specimen: Blood Updated: 02/08/25 0626    Narrative:      The following orders were created for panel order CBC & Differential.  Procedure                               Abnormality         Status                     ---------                               -----------         ------                     CBC Auto Differential[[id number]]        Abnormal            Final result                 Please view results for these tests on the individual orders.    CBC Auto Differential [[id number]]  (Abnormal) Collected: 02/08/25 0552    Specimen: Blood Updated: 02/08/25 0626     WBC 7.29 10*3/mm3      RBC 4.66 10*6/mm3      Hemoglobin 13.3 g/dL      Hematocrit 40.3 %      MCV 86.5 fL      MCH 28.5 pg      MCHC 33.0 g/dL      RDW 13.8 %      RDW-SD 43.5 fl      MPV 11.1 fL      Platelets 231 10*3/mm3      Neutrophil % 80.2 %      Lymphocyte % 10.8 %      Monocyte % 6.9 %      Eosinophil % 1.2 %      Basophil  % 0.5 %      Immature Grans % 0.4 %      Neutrophils, Absolute 5.84 10*3/mm3      Lymphocytes, Absolute 0.79 10*3/mm3      Monocytes, Absolute 0.50 10*3/mm3      Eosinophils, Absolute 0.09 10*3/mm3      Basophils, Absolute 0.04 10*3/mm3      Immature Grans, Absolute 0.03 10*3/mm3      nRBC 0.0 /100 WBC     POC Glucose Once [[id number]]  (Abnormal) Collected: 02/07/25 2123    Specimen: Blood Updated: 02/07/25 2126     Glucose 227 mg/dL      Comment: Serial Number: [id number]:  [id number]       High Sensitivity Troponin T 1Hr [[id number]]  (Abnormal) Collected: 02/07/25 1810    Specimen: Blood Updated: 02/07/25 1856     HS Troponin T 71 ng/L      Troponin T Numeric Delta -3 ng/L      Troponin T % Delta -4    Narrative:      High Sensitive Troponin T Reference Range:  <14.0 ng/L- Negative Female for AMI  <22.0 ng/L- Negative Male for AMI  >=14 - Abnormal Female indicating possible myocardial injury.  >=22 - Abnormal Male indicating possible myocardial injury.   Clinicians would have to utilize clinical acumen, EKG, Troponin, and serial changes to determine if it is an Acute Myocardial Infarction or myocardial injury due to an underlying chronic condition.         Urinalysis With Culture If Indicated - Urine, Clean Catch [[id number]]  (Abnormal) Collected: 02/07/25 1806    Specimen: Urine, Clean Catch Updated: 02/07/25 1811     Color, UA Yellow     Appearance, UA Clear     pH, UA 5.5     Specific Gravity, UA 1.012     Glucose,  mg/dL (2+)     Ketones, UA Negative     Bilirubin, UA Negative     Blood, UA Negative     Protein, UA Negative     Leuk Esterase, UA Negative     Nitrite, UA Negative     Urobilinogen, UA 0.2 E.U./dL    Narrative:      In absence of clinical symptoms, the presence of pyuria, bacteria, and/or nitrites on the urinalysis result does not correlate with infection.  Urine microscopic not indicated.    High Sensitivity Troponin T [[id number]]  (Abnormal) Collected: 02/07/25 1705    Specimen: Blood  Updated: 02/07/25 1800     HS Troponin T 74 ng/L     Narrative:      High Sensitive Troponin T Reference Range:  <14.0 ng/L- Negative Female for AMI  <22.0 ng/L- Negative Male for AMI  >=14 - Abnormal Female indicating possible myocardial injury.  >=22 - Abnormal Male indicating possible myocardial injury.   Clinicians would have to utilize clinical acumen, EKG, Troponin, and serial changes to determine if it is an Acute Myocardial Infarction or myocardial injury due to an underlying chronic condition.         TSH Rfx On Abnormal To Free T4 [[id number]]  (Normal) Collected: 02/07/25 1705    Specimen: Blood Updated: 02/07/25 1750     TSH 2.350 uIU/mL     BNP [[id number]]  (Abnormal) Collected: 02/07/25 1705    Specimen: Blood Updated: 02/07/25 1750     proBNP 27,345.0 pg/mL     Narrative:      This assay is used as an aid in the diagnosis of individuals suspected of having heart failure. It can be used as an aid in the diagnosis of acute decompensated heart failure (ADHF) in patients presenting with signs and symptoms of ADHF to the emergency department (ED). In addition, NT-proBNP of <300 pg/mL indicates ADHF is not likely.    Age Range Result Interpretation  NT-proBNP Concentration (pg/mL:      <50             Positive            >450                   Gray                 300-450                    Negative             <300    50-75           Positive            >900                  Gray                300-900                  Negative            <300      >75             Positive            >1800                  Gray                300-1800                  Negative            <300    Procalcitonin [[id number]]  (Normal) Collected: 02/07/25 1705    Specimen: Blood Updated: 02/07/25 1745     Procalcitonin 0.22 ng/mL     Narrative:      As a Marker for Sepsis (Non-Neonates):    1. <0.5 ng/mL represents a low risk of severe sepsis and/or septic shock.  2. >2 ng/mL represents a high risk of severe sepsis and/or septic  "shock.    As a Marker for Lower Respiratory Tract Infections that require antibiotic therapy:    PCT on Admission    Antibiotic Therapy       6-12 Hrs later    >0.5                Strongly Recommended  >0.25 - <0.5        Recommended   0.1 - 0.25          Discouraged              Remeasure/reassess PCT  <0.1                Strongly Discouraged     Remeasure/reassess PCT    As 28 day mortality risk marker: \"Change in Procalcitonin Result\" (>80% or <=80%) if Day 0 (or Day 1) and Day 4 values are available. Refer to http://[email or web address] [email or web address]-[email or web address]    Change in PCT <=80%  A decrease of PCT levels below or equal to 80% defines a positive change in PCT test result representing a higher risk for 28-day all-cause mortality of patients diagnosed with severe sepsis for septic shock.    Change in PCT >80%  A decrease of PCT levels of more than 80% defines a negative change in PCT result representing a lower risk for 28-day all-cause mortality of patients diagnosed with severe sepsis or septic shock.       Magnesium [[id number]]  (Abnormal) Collected: 02/07/25 1705    Specimen: Blood Updated: 02/07/25 1736     Magnesium 2.7 mg/dL     CK [[id number]]  (Normal) Collected: 02/07/25 1705    Specimen: Blood Updated: 02/07/25 1736     Creatine Kinase 56 U/L     Comprehensive Metabolic Panel [[id number]]  (Abnormal) Collected: 02/07/25 1705    Specimen: Blood Updated: 02/07/25 1736     Glucose 222 mg/dL      Comment: Glucose >180, Hemoglobin A1C recommended.        BUN 84 mg/dL      Creatinine 2.26 mg/dL      Sodium 137 mmol/L      Potassium 4.1 mmol/L      Chloride 96 mmol/L      CO2 27.5 mmol/L      Calcium 8.4 mg/dL      Total Protein 6.6 g/dL      Albumin 3.2 g/dL      ALT (SGPT) 29 U/L      AST (SGOT) 30 U/L      Alkaline Phosphatase 80 U/L      Total Bilirubin 0.6 mg/dL      Globulin 3.4 gm/dL      A/G Ratio 0.9 g/dL      BUN/Creatinine Ratio 37.2     Anion Gap 13.5 mmol/L      eGFR 29.0 mL/min/1.73     Narrative:      GFR " Categories in Chronic Kidney Disease (CKD)      GFR Category          GFR (mL/min/1.73)    Interpretation  G1                     90 or greater         Normal or high (1)  G2                      60-89                Mild decrease (1)  G3a                   45-59                Mild to moderate decrease  G3b                   30-44                Moderate to severe decrease  G4                    15-29                Severe decrease  G5                    14 or less           Kidney failure          (1)In the absence of evidence of kidney disease, neither GFR category G1 or G2 fulfill the criteria for CKD.    eGFR calculation 2021 CKD-EPI creatinine equation, which does not include race as a factor    C-reactive Protein [[id number]]  (Abnormal) Collected: 02/07/25 1705    Specimen: Blood Updated: 02/07/25 1736     C-Reactive Protein 7.91 mg/dL     Lactic Acid, Plasma [[id number]]  (Normal) Collected: 02/07/25 1705    Specimen: Blood Updated: 02/07/25 1732     Lactate 1.3 mmol/L     COVID-19 and FLU A/B PCR, 1 HR TAT - Swab, Nasopharynx [[id number]]  (Normal) Collected: 02/07/25 1705    Specimen: Swab from Nasopharynx Updated: 02/07/25 1731     COVID19 Not Detected     Influenza A PCR Not Detected     Influenza B PCR Not Detected    Narrative:      Fact sheet for providers: https://www.fda.gov/media/092866/download    Fact sheet for patients: https://www.fda.gov/media/280003/download    Test performed by PCR.    CBC & Differential [[id number]]  (Abnormal) Collected: 02/07/25 1705    Specimen: Blood Updated: 02/07/25 1712    Narrative:      The following orders were created for panel order CBC & Differential.  Procedure                               Abnormality         Status                     ---------                               -----------         ------                     CBC Auto Differential[[id number]]        Abnormal            Final result                 Please view results for these tests on the individual  orders.    CBC Auto Differential [[id number]]  (Abnormal) Collected: 02/07/25 1705    Specimen: Blood Updated: 02/07/25 1712     WBC 9.83 10*3/mm3      RBC 3.48 10*6/mm3      Hemoglobin 9.9 g/dL      Hematocrit 30.6 %      MCV 87.9 fL      MCH 28.4 pg      MCHC 32.4 g/dL      RDW 13.9 %      RDW-SD 44.4 fl      MPV 11.2 fL      Platelets 280 10*3/mm3      Neutrophil % 84.8 %      Lymphocyte % 6.7 %      Monocyte % 7.1 %      Eosinophil % 0.7 %      Basophil % 0.4 %      Immature Grans % 0.3 %      Neutrophils, Absolute 8.33 10*3/mm3      Lymphocytes, Absolute 0.66 10*3/mm3      Monocytes, Absolute 0.70 10*3/mm3      Eosinophils, Absolute 0.07 10*3/mm3      Basophils, Absolute 0.04 10*3/mm3      Immature Grans, Absolute 0.03 10*3/mm3      nRBC 0.0 /100 WBC             Imaging Results (Last 24 Hours)       Procedure Component Value Units Date/Time    CT Cervical Spine Without Contrast [[id number]] Collected: 02/07/25 1817     Updated: 02/07/25 1818    Narrative:      FINAL REPORT    TECHNIQUE:  null    CLINICAL HISTORY:  Altered mental status, fall, eval C-spine fracture    COMPARISON:  null    FINDINGS:  CT cervical spine without contrast    Comparison: None    Findings:    Normal vertebral body alignment.    Multilevel degenerative disc disease and facet osteoarthritis. No central canal stenosis.    No acute fractures or dislocations.    No acute findings on limited view of the intracranial contents.    No cervical fluid collections or masses.    Lung apices are clear.      Impression:      IMPRESSION:    No acute cervical spine fracture.    Authenticated and Electronically Signed by Prema Kurtz MD on  02/07/2025 06:17:20 PM    CT Head Without Contrast [[id number]] Collected: 02/07/25 1815     Updated: 02/07/25 1816    Narrative:      FINAL REPORT    TECHNIQUE:  null    CLINICAL HISTORY:  Altered mental status, fall, eval intracranial hemorrhage    COMPARISON:  null    FINDINGS:  CT head without  contrast    Comparison: None    Findings:    Involutional change and nonspecific white matter hypodensity.    No intracranial mass, midline shift, hydrocephalus, or acute hemorrhage.    Orbits, paranasal sinuses, and mastoid air cells are unremarkable.    No skull fracture      Impression:      Impression:    1. No acute findings    Authenticated and Electronically Signed by Prema Kurtz MD on  02/07/2025 06:15:41 PM    XR Chest 1 View [[id number]] Resulted: 02/07/25 1757     Updated: 02/07/25 1757            LAB RESULTS (LAST 7 DAYS)    CBC  Results from last 7 days   Lab Units 02/08/25  0552 02/07/25  1705   WBC 10*3/mm3 7.29 9.83   RBC 10*6/mm3 4.66 3.48*   HEMOGLOBIN g/dL 13.3 9.9*   HEMATOCRIT % 40.3 30.6*   MCV fL 86.5 87.9   PLATELETS 10*3/mm3 231 280       BMP  Results from last 7 days   Lab Units 02/08/25  0552 02/07/25  1705   SODIUM mmol/L 137 137   POTASSIUM mmol/L 3.6 4.1   CHLORIDE mmol/L 98 96*   CO2 mmol/L 25.1 27.5   BUN mg/dL 79* 84*   CREATININE mg/dL 2.07* 2.26*   GLUCOSE mg/dL 175* 222*   MAGNESIUM mg/dL 2.7* 2.7*   PHOSPHORUS mg/dL 3.2  --        CMP   Results from last 7 days   Lab Units 02/08/25  0552 02/07/25  1705   SODIUM mmol/L 137 137   POTASSIUM mmol/L 3.6 4.1   CHLORIDE mmol/L 98 96*   CO2 mmol/L 25.1 27.5   BUN mg/dL 79* 84*   CREATININE mg/dL 2.07* 2.26*   GLUCOSE mg/dL 175* 222*   ALBUMIN g/dL  --  3.2*   BILIRUBIN mg/dL  --  0.6   ALK PHOS U/L  --  80   AST (SGOT) U/L  --  30   ALT (SGPT) U/L  --  29       BNP        TROPONIN  Results from last 7 days   Lab Units 02/07/25  1810 02/07/25  1705   CK TOTAL U/L  --  56   HSTROP T ng/L 71* 74*       CoAg        Creatinine Clearance  Estimated Creatinine Clearance: 31.1 mL/min (A) (by C-G formula based on SCr of 2.07 mg/dL (H)).    ABG          Radiology  CT Cervical Spine Without Contrast    Result Date: 2/7/2025  IMPRESSION: No acute cervical spine fracture. Authenticated and Electronically Signed by Prema Kurtz MD on 02/07/2025  06:17:20 PM    CT Head Without Contrast    Result Date: 2/7/2025  Impression: 1. No acute findings Authenticated and Electronically Signed by Prema Kurtz MD on 02/07/2025 06:15:41 PM       EKG  I personally viewed and interpreted the patient's EKG/Telemetry data:  ECG 12 Lead Altered Mental Status   Final Result      Telemetry Scan   Final Result      Telemetry Scan   Final Result            Echocardiogram:    Results for orders placed during the hospital encounter of 01/11/24    Adult Transthoracic Echo Complete w/ Color, Spectral and Contrast if necessary per protocol    Interpretation Summary    Left ventricular systolic function is low normal. Calculated left ventricular EF = 45.8% Left ventricular ejection fraction appears to be 46 - 50%.    Left ventricular wall thickness is consistent with mild concentric hypertrophy.    Left ventricular diastolic function is consistent with (grade II w/high LAP) pseudonormalization.    Left atrial volume is moderately increased.    Mild aortic valve stenosis is present.    Estimated right ventricular systolic pressure from tricuspid regurgitation is normal (<35 mmHg).    No significant change compared to 7/2023 echo        Stress Test:        Cardiac Catheterization:  Results for orders placed during the hospital encounter of 07/17/23    Cardiac Catheterization/Vascular Study    Conclusion  Successful PCI to saphenous vein graft->RCA for multifocal in-stent restenosis.  Severe disease and multiple small branch vessels not amenable to any form of revascularization.        Other:      ASSESSMENT & PLAN:    Principal Problem:    Ventricular tachyarrhythmia    PVCs/nonsustained VT  Keep potassium more than 4 and magnesium more than 2.  Repeat echocardiogram.  Started on amiodarone.  Monitor QTc  Resume carvedilol    Ischemic cardiomyopathy  Previous echocardiogram showed EF of 46%.  Repeat echocardiogram to evaluate EF and aortic valve stenosis  Unable to tolerate ACE  inhibitor/Arni or ARB due to renal dysfunction  Resume beta-blocker, Farxiga and Aldactone  Will switch from Coreg to Toprol-XL to allow room for blood pressure.  proBNP is 27,000 with evidence of volume overload on exam  Acute on chronic heart failure with midrange ejection fraction exacerbation  Start IV diuretics  Closely monitor I's and O's and replace electrolytes as needed    Coronary artery disease  History of CABG and multiple PCI's  Reviewed cardiac catheterization from 2023 which shows patent LIMA to LAD and PCI to vein graft supplying the RCA.   Essentially severe native three-vessel disease with multiple previous stents.  Minimally elevated high-sensitivity troponin of 74 and 71  Troponin elevation secondary to known underlying coronary disease, renal dysfunction and heart failure exacerbation  Recommend resuming Effient, high intensity statin and beta-blocker  Resume Imdur and Ranexa    Chronic kidney disease  Creatinine 2.07, GFR is 32.2.  Closely monitor renal function while on IV diuretics  Consult nephrology    Hyperlipidemia/diabetes  LDL 94, HDL 41, triglycerides 178 and total cholesterol 171.  A1c is 8.8  Continue high intensity statin and insulin    Obesity/obstructive sleep apnea  BMI 32.5.  He weighs 201 pounds.  Lifestyle modifications recommended to the patient    Falls  Deconditioned  PT/rehab.  Fall precautions    CODE STATUS is DNI DNR        Kevan Beck MD  02/08/25  08:25 EST

## 2025-02-08 NOTE — PROGRESS NOTES
RT EQUIPMENT DEVICE RELATED - SKIN ASSESSMENT    Win Score:  Win Score: 17     RT Medical Equipment/Device:     NIV Mask:  Under-the-nose   size:  c    Skin Assessment:      Nose:  Intact    Device Skin Pressure Protection:   none    Nurse Notification:  No    Argelia Silver, CRT

## 2025-02-08 NOTE — H&P
"  [facility]      Name:  Donny Jerry   Age:  79 y.o.  Sex:  male  :  1945  MRN:  [id number]   Visit Number:  [id number]  Admission Date:  2025  Date Of Service:  25  Primary Care Physician:  Anum Alonso APRN    Chief Complaint:     Altered mental status    History Of Presenting Illness:      Patient is a 79-year-old male brought to the emergency department for evaluation of altered mental status, frequent falls.  Patient's altered mentation has been waxing and waning.  Patient has reportedly been talking to people who are not there and responding to stimuli that is not present.  Patient's wife unfortunate is not at bedside at the time of evaluation.  Patient reports that he has been having difficulties walking and passing out for approximately 3 weeks duration.  He denies seeking treatment until now.  Patient has a significant cardiac history, reportedly had seen a cardiologist that \"up and quit\" and has not been following with cardiology.  He reports that his cardiologist was at [facility].  Denies any recent sick contacts, recent travel history.    Review Of Systems:    All systems were reviewed and negative except as mentioned in history of presenting illness, assessment and plan.    Past Medical History: Patient  has a past medical history of Benign hypertension, CHF (congestive heart failure), Coronary artery disease, Depression, Enlarged prostate, GERD (gastroesophageal reflux disease), Heart attack, Hypercholesterolemia, Impaired functional mobility, balance, gait, and endurance, Myocardial infarction, Obesity, Obstructive sleep apnea on CPAP, and Type 2 diabetes mellitus.    Past Surgical History: Patient  has a past surgical history that includes Coronary artery bypass graft; Coronary angioplasty with stent (Left, 2009); Coronary angioplasty with stent (Left, 2009); Coronary angioplasty with stent " (04/23/2010); Coronary angioplasty with stent (Left, 07/06/2010); Coronary angioplasty with stent (Left, 11/16/2010); Coronary angioplasty with stent (Left); Coronary angioplasty with stent (Left, 06/24/2014); Cardiac catheterization; Colonoscopy w/ polypectomy; Cardiac catheterization (Left, 10/19/2021); Cardiac catheterization (N/A, 7/18/2023); and Cardiac catheterization (N/A, 7/18/2023).    Social History: Patient  reports that he has never smoked. He has never used smokeless tobacco. He reports that he does not drink alcohol and does not use drugs.    Family History:  Patient's family history has been reviewed and found to be noncontributory.     Allergies:      Zocor [simvastatin], Bisoprolol, Byetta 10 mcg pen [exenatide], Crestor [rosuvastatin calcium], Metformin and related, and Plavix [clopidogrel bisulfate]    Home Medications:    Prior to Admission Medications       Prescriptions Last Dose Informant Patient Reported? Taking?    albuterol sulfate  (90 Base) MCG/ACT inhaler   No No    Inhale 2 puffs Every 4 (Four) Hours As Needed for Wheezing.    aspirin 81 MG chewable tablet  Self, Spouse/Significant Other Yes No    Chew 1 tablet Daily.    atorvastatin (LIPITOR) 80 MG tablet   No No    Take 1 tablet by mouth Daily.    B-D UF III MINI PEN NEEDLES 31G X 5 MM misc   Yes No    carvedilol (COREG) 3.125 MG tablet   No No    Take 1 tablet by mouth 2 (Two) Times a Day With Meals.    Cholecalciferol (VITAMIN D3) 50 MCG (2000 UT) capsule   Yes No    Take 1 capsule by mouth Daily.    dapagliflozin Propanediol (Farxiga) 10 MG tablet  Spouse/Significant Other Yes No    Take 10 mg by mouth Daily.    docusate calcium (SURFAK) 240 MG capsule   Yes No    Take 1 capsule by mouth 2 (Two) Times a Day.    fexofenadine (ALLEGRA) 180 MG tablet  Spouse/Significant Other Yes No    Take 1 tablet by mouth Daily As Needed.    finasteride (PROSCAR) 5 MG tablet   Yes No    Take 1 tablet by mouth Daily.    fluticasone (FLONASE)  50 MCG/ACT nasal spray   No No    Administer 1 spray into the nostril(s) as directed by provider Daily As Needed for Allergies or Rhinitis.    Fluticasone-Umeclidin-Vilant (Trelegy Ellipta) 200-62.5-25 MCG/ACT inhaler   No No    Inhale 1 puff Daily. Rinse mouth with water after use    insulin degludec (Tresiba FlexTouch) 100 UNIT/ML solution pen-injector injection   Yes No    Inject  under the skin into the appropriate area as directed 2 (Two) Times a Day. 22 units in the am   37 units at bedtime    Insulin Lispro (humaLOG) 100 UNIT/ML injection   Yes No    Inject 8 Units under the skin into the appropriate area as directed Daily. Daily with supper    isosorbide mononitrate (IMDUR) 30 MG 24 hr tablet   No No    Take 1 tablet by mouth Daily.    magnesium oxide (MAG-OX) 400 MG tablet   Yes No    Take 1 tablet by mouth Daily.    midodrine (PROAMATINE) 5 MG tablet   No No    Take 1 tablet by mouth 3 (Three) Times a Day Before Meals.    Multiple Vitamin (MULTI VITAMIN DAILY PO)   Yes No    Take 1 tablet by mouth Daily.    nitroglycerin (NITROSTAT) 0.4 MG SL tablet   No No    Place 1 tablet under the tongue Every 5 (Five) Minutes As Needed for Chest Pain. Take no more than 3 doses in 15 minutes.    pantoprazole (PROTONIX) 40 MG EC tablet   Yes No    TAKE 1 TABLET BY MOUTH 2 TIMES DAILY (BEFORE MEALS)    pramipexole (MIRAPEX) 1 MG tablet   No No    Take 1 tablet by mouth Every Night.    prasugrel (EFFIENT) 10 MG tablet   Yes No    Take 1 tablet by mouth Daily.    ranolazine (RANEXA) 500 MG 12 hr tablet   No No    Take 1 tablet by mouth Every 12 (Twelve) Hours for 180 days.    sertraline (ZOLOFT) 100 MG tablet   Yes No    Take 1 tablet by mouth Daily.    spironolactone (ALDACTONE) 25 MG tablet   No No    Take 1 tablet by mouth Daily.    tamsulosin (FLOMAX) 0.4 MG capsule 24 hr capsule   No No    Take 1 capsule by mouth Daily.    torsemide 40 MG tablet   No No    Take 40 mg by mouth Daily.    TRUEplus Lancets 28G misc   Yes  "No    Trulicity 0.75 MG/0.5ML solution pen-injector   Yes No    Inject 0.75 mg as directed 1 (One) Time Per Week.          ED Medications:    Medications   amiodarone 150 mg in 100 mL D5W (loading dose) (0 mg Intravenous Stopped 2/7/25 1734)     Followed by   amiodarone 360 mg in 200 mL D5W infusion (1 mg/min Intravenous New Bag 2/7/25 1734)     Followed by   amiodarone 360 mg in 200 mL D5W infusion (has no administration in time range)     Vital Signs:  Temp:  [97.9 °F (36.6 °C)] 97.9 °F (36.6 °C)  Heart Rate:  [] 98  Resp:  [18] 18  BP: ()/(36-96) 114/64        02/07/25  1712   Weight: 90.3 kg (199 lb)     Body mass index is 31.17 kg/m².    Physical Exam:     Most recent vital Signs: /64   Pulse 98   Temp 97.9 °F (36.6 °C)   Resp 18   Ht 170.2 cm (67\")   Wt 90.3 kg (199 lb)   SpO2 96%   BMI 31.17 kg/m²     Physical Exam  Constitutional: Awake, alert, chronically ill-appearing.  Obese.  Eyes: PERRLA, sclerae anicteric, no conjunctival injection  HENT: NCAT, mucous membranes moist  Neck: Supple, no thyromegaly, no lymphadenopathy, trachea midline  Respiratory: Clear to auscultation bilaterally, nonlabored respirations   Cardiovascular: RRR, no murmurs, rubs, or gallops, palpable pedal pulses bilaterally  Gastrointestinal: Positive bowel sounds, soft, nontender, nondistended  Musculoskeletal: 2+ bilateral ankle edema, no clubbing or cyanosis to extremities  Psychiatric: Appropriate affect, cooperative  Neurologic: Oriented x 3, strength symmetric in all extremities, Cranial Nerves grossly intact to confrontation, speech clear  Skin: No rashes     Laboratory data:    I have reviewed the labs done in the emergency room.    Results from last 7 days   Lab Units 02/07/25  1705   SODIUM mmol/L 137   POTASSIUM mmol/L 4.1   CHLORIDE mmol/L 96*   CO2 mmol/L 27.5   BUN mg/dL 84*   CREATININE mg/dL 2.26*   CALCIUM mg/dL 8.4*   BILIRUBIN mg/dL 0.6   ALK PHOS U/L 80   ALT (SGPT) U/L 29   AST (SGOT) U/L 30 "   GLUCOSE mg/dL 222*     Results from last 7 days   Lab Units 02/07/25  1705   WBC 10*3/mm3 9.83   HEMOGLOBIN g/dL 9.9*   HEMATOCRIT % 30.6*   PLATELETS 10*3/mm3 280         Results from last 7 days   Lab Units 02/07/25  1810 02/07/25  1705   CK TOTAL U/L  --  56   HSTROP T ng/L 71* 74*     Results from last 7 days   Lab Units 02/07/25  1705   PROBNP pg/mL 27,345.0*     Results from last 7 days   Lab Units 02/07/25  1806   COLOR UA  Yellow   GLUCOSE UA  500 mg/dL (2+)*   KETONES UA  Negative   BLOOD UA  Negative   LEUKOCYTES UA  Negative   PH, URINE  5.5   BILIRUBIN UA  Negative   UROBILINOGEN UA  0.2 E.U./dL     Radiology:    CT Cervical Spine Without Contrast    Result Date: 2/7/2025  FINAL REPORT TECHNIQUE: null CLINICAL HISTORY: Altered mental status, fall, eval C-spine fracture COMPARISON: null FINDINGS: CT cervical spine without contrast Comparison: None Findings: Normal vertebral body alignment. Multilevel degenerative disc disease and facet osteoarthritis. No central canal stenosis. No acute fractures or dislocations. No acute findings on limited view of the intracranial contents. No cervical fluid collections or masses. Lung apices are clear.     IMPRESSION: No acute cervical spine fracture. Authenticated and Electronically Signed by Prema Kurtz MD on 02/07/2025 06:17:20 PM    CT Head Without Contrast    Result Date: 2/7/2025  FINAL REPORT TECHNIQUE: null CLINICAL HISTORY: Altered mental status, fall, eval intracranial hemorrhage COMPARISON: null FINDINGS: CT head without contrast Comparison: None Findings: Involutional change and nonspecific white matter hypodensity. No intracranial mass, midline shift, hydrocephalus, or acute hemorrhage. Orbits, paranasal sinuses, and mastoid air cells are unremarkable. No skull fracture     Impression: 1. No acute findings Authenticated and Electronically Signed by Prema Kurtz MD on 02/07/2025 06:15:41 PM     Assessment:    Nonsustained ventricular  tachycardia  Syncope  Systolic congestive heart failure  Coronary artery disease status post CABG  Type 2 diabetes mellitus  Obstructive sleep apnea  Hypertension  Hyperlipidemia    Plan:    Nonsustained ventricular tachycardia  Syncope  Chronic systolic congestive heart failure  Cardiology consulted in ED, aware of the patient.  Maintain potassium greater than 4, magnesium greater than 2 to reduce risk of arrhythmias.  Update 2D echo.  Patient appears slightly hypervolemic, will proceed with IV diuretics.  Monitor electrolytes while on diuretics.  A.m. labs.  Chronically elevated BNP around 2146-1792, currently 27,000  Type 2 diabetes mellitus  Insulin therapy  Obesity  Complicates all aspects of care.  Coronary artery disease status post CABG  CKD, stage III  Obstructive sleep apnea  Hypertension  Hyperlipidemia  DNR/DNI  Resume home medications as appropriate.    Risk Assessment: High  DVT Prophylaxis: Lovenox  Code Status: DNR/DNI  Diet: Cardiac    Rome Kurtz DO  02/07/25  20:58 EST    Dictated utilizing Dragon dictation.

## 2025-02-08 NOTE — CASE MANAGEMENT/SOCIAL WORK
Discharge Planning Assessment   [facility]     Patient Name: Donny Jerry  MRN: [id number]  Today's Date: 2/8/2025    Admit Date: 2/7/2025    Plan: Return Home with Home Health   Discharge Needs Assessment       Row Name 02/08/25 1152       Living Environment    People in Home spouse    Current Living Arrangements home    Potentially Unsafe Housing Conditions none    In the past 12 months has the electric, gas, oil, or water company threatened to shut off services in your home? No    Primary Care Provided by self    Provides Primary Care For no one    Family Caregiver if Needed child(lazara), adult;spouse    Quality of Family Relationships involved    Able to Return to Prior Arrangements yes       Resource/Environmental Concerns    Resource/Environmental Concerns none    Transportation Concerns none       Transportation Needs    In the past 12 months, has lack of transportation kept you from medical appointments or from getting medications? no    In the past 12 months, has lack of transportation kept you from meetings, work, or from getting things needed for daily living? No       Food Insecurity    Within the past 12 months, you worried that your food would run out before you got the money to buy more. Never true    Within the past 12 months, the food you bought just didn't last and you didn't have money to get more. Never true       Transition Planning    Patient/Family Anticipates Transition to home with help/services    Patient/Family Anticipated Services at Transition none    Transportation Anticipated family or friend will provide       Discharge Needs Assessment    Readmission Within the Last 30 Days no previous admission in last 30 days    Equipment Currently Used at Home cpap;walker, rolling;cane, straight;oxygen    Concerns to be Addressed discharge planning    Do you want help finding or keeping work or a job? I do not need or want help    Do you want help with school or training? For example, starting or  completing job training or getting a high school diploma, GED or equivalent No    Anticipated Changes Related to Illness inability to care for self                   Discharge Plan       Row Name 02/08/25 1154       Plan    Plan Return Home with Home Health    Patient/Family in Agreement with Plan yes    Plan Comments Spoke to pt regarding discharge  plans .Confirmed  address,phone number and primary  care provider as being correct on face sheet .He lives with his wife Uses  walker or cane  Has oxygen 2  LNC Nocturnal and  with activity and CPAP DME [employer] ,Has had home health in the past unsure of provider has agreed to Home Health if needed Case Manageent to follow                  Continued Care and Services - Admitted Since 2/7/2025    No active coordination exists for this encounter.          Demographic Summary       Row Name 02/08/25 1151       General Information    Admission Type inpatient    Arrived From emergency department    Required Notices Provided Important Message from Medicare    Reason for Consult discharge planning    Preferred Language English                   Functional Status       Row Name 02/08/25 1151       Functional Status    Usual Activity Tolerance fair    Current Activity Tolerance poor       Physical Activity    On average, how many days per week do you engage in moderate to strenuous exercise (like a brisk walk)? 0 days    On average, how many minutes do you engage in exercise at this level? 0 min    Number of minutes of exercise per week 0       Functional Status, IADL    Medications completely dependent    Meal Preparation completely dependent    Housekeeping completely dependent    Laundry completely dependent    Shopping completely dependent    If for any reason you need help with day-to-day activities such as bathing, preparing meals, shopping, managing finances, etc., do you get the help you need? I get all the help I need                   Psychosocial    No  documentation.                  Abuse/Neglect    No documentation.                  Legal    No documentation.                  Substance Abuse    No documentation.                  Patient Forms    No documentation.                     Isabelle Pena RN

## 2025-02-08 NOTE — PROGRESS NOTES
[facility]    Name:  Donny Jerry   Age:  79 y.o.  Sex:  male  :  1945  MRN:  [id number]   Visit Number:  [id number]  Admission Date:  2025  Date Of Service:  25  Primary Care Physician:  Anum Alnoso APRN     LOS: 0 days :    Chief Complaint:      Follow-up tachyarrhythmia    Subjective:    Patient examined today.  Remains on amiodarone drip.  Daughter and wife at bedside.  Patient is alert and oriented x 3.  Very pleasant.  Denies chest pain.  No acute events overnight.    Hospital Course:    Patient is a 79-year-old male brought to the emergency department for evaluation of altered mental status, frequent falls. Patient's altered mentation has been waxing and waning. Patient has reportedly been talking to people who are not there and responding to stimuli that is not present. Patient's wife unfortunate is not at bedside at the time of evaluation. Patient reports that he has been having difficulties walking and passing out for approximately 3 weeks duration. He denies seeking treatment until now.     Review of Systems:     All systems were reviewed and negative except as mentioned in subjective, assessment and plan.    Vital Signs:    Temp:  [97.3 °F (36.3 °C)-98.3 °F (36.8 °C)] 97.8 °F (36.6 °C)  Heart Rate:  [] 77  Resp:  [18-20] 18  BP: ()/(36-96) 111/79    Intake and output:    I/O last 3 completed shifts:  In: 240 [P.O.:240]  Out: 725 [Urine:725]  I/O this shift:  In: 240 [P.O.:240]  Out: 800 [Urine:800]    Physical Examination:    General Appearance:  Alert and cooperative.    Head:  Atraumatic and normocephalic.   Eyes: Conjunctivae and sclerae normal, no icterus. No pallor.   Throat: No oral lesions, no thrush, oral mucosa moist.   Neck: Supple, trachea midline, no thyromegaly.   Lungs:   Nonlabored breathing.  No wheezing or crackles.    Heart:  Normal S1 and S2, no murmur, No JVD.   Abdomen:   Normal bowel sounds,   Soft, nontender, nondistended, no rebound tenderness.   Extremities: Supple, no edema, no cyanosis, no clubbing.   Skin: No bleeding or rash.   Neurologic: Alert and oriented x 3. Generalized weakness. No gross focal deficits.      Laboratory results:    Results from last 7 days   Lab Units 02/08/25  0552 02/07/25  1705   SODIUM mmol/L 137 137   POTASSIUM mmol/L 3.6 4.1   CHLORIDE mmol/L 98 96*   CO2 mmol/L 25.1 27.5   BUN mg/dL 79* 84*   CREATININE mg/dL 2.07* 2.26*   CALCIUM mg/dL 8.4* 8.4*   BILIRUBIN mg/dL  --  0.6   ALK PHOS U/L  --  80   ALT (SGPT) U/L  --  29   AST (SGOT) U/L  --  30   GLUCOSE mg/dL 175* 222*     Results from last 7 days   Lab Units 02/08/25  0552 02/07/25  1705   WBC 10*3/mm3 7.29 9.83   HEMOGLOBIN g/dL 13.3 9.9*   HEMATOCRIT % 40.3 30.6*   PLATELETS 10*3/mm3 231 280         Results from last 7 days   Lab Units 02/07/25  1810 02/07/25  1705   CK TOTAL U/L  --  56   HSTROP T ng/L 71* 74*         Recent Labs     04/25/24  0736   PHART 7.466*   DTF5KCM 37.4   PO2ART 53.8*   TQC1KMZ 27.0   BASEEXCESS 3.1*      I have reviewed the patient's laboratory results.    Radiology results:    XR Chest 1 View    Result Date: 2/8/2025  PROCEDURE: XR CHEST 1 VW-  HISTORY: Altered mental status, eval pneumonia; I47.20-Ventricular tachycardia, unspecified, abnormal cardiovascular exam.  COMPARISON: 01/17/2025  FINDINGS: No acute pulmonary opacity is present. There is no evidence of effusion or pneumothorax.    There is evidence of prior median sternotomy, presumably from CABG. Otherwise, mediastinum is unremarkable.   Heart size is normal.      Impression: Unremarkable chest, status post CABG.    This report was signed and finalized on 2/8/2025 8:34 AM by Brett Hernandez MD.      CT Cervical Spine Without Contrast    Result Date: 2/7/2025  FINAL REPORT TECHNIQUE: null CLINICAL HISTORY: Altered mental status, fall, eval C-spine fracture COMPARISON: null FINDINGS: CT cervical spine without contrast Comparison:  None Findings: Normal vertebral body alignment. Multilevel degenerative disc disease and facet osteoarthritis. No central canal stenosis. No acute fractures or dislocations. No acute findings on limited view of the intracranial contents. No cervical fluid collections or masses. Lung apices are clear.     Impression: IMPRESSION: No acute cervical spine fracture. Authenticated and Electronically Signed by Prema Kurtz MD on 02/07/2025 06:17:20 PM    CT Head Without Contrast    Result Date: 2/7/2025  FINAL REPORT TECHNIQUE: null CLINICAL HISTORY: Altered mental status, fall, eval intracranial hemorrhage COMPARISON: null FINDINGS: CT head without contrast Comparison: None Findings: Involutional change and nonspecific white matter hypodensity. No intracranial mass, midline shift, hydrocephalus, or acute hemorrhage. Orbits, paranasal sinuses, and mastoid air cells are unremarkable. No skull fracture     Impression: Impression: 1. No acute findings Authenticated and Electronically Signed by Prema Kurtz MD on 02/07/2025 06:15:41 PM   I have reviewed the patient's radiology reports.    Medication Review:     I have reviewed the patient's active and prn medications.     Problem List:      Ventricular tachyarrhythmia      Assessment:    Nonsustained ventricular tachycardia  Syncope  Systolic congestive heart failure  Coronary artery disease status post CABG  Type 2 diabetes mellitus  Obstructive sleep apnea  Hypertension  Hyperlipidemia    Plan:    Nonsustained ventricular tachycardia  Syncope  Chronic systolic congestive heart failure  Cardiology consulted - started amiodarone  Maintain potassium greater than 4, magnesium greater than 2 to reduce risk of arrhythmias.  Pending 2D echo.  Continue IV diuresis   Monitor electrolytes while on diuretics.   Chronically elevated BNP around 2870-9466, currently 27,000  Type 2 diabetes mellitus  Insulin therapy  Obesity  Complicates all aspects of care.  Coronary artery disease  status post CABG  CKD, stage III  Obstructive sleep apnea  Hypertension  Hyperlipidemia  DNR/DNI  Resume home medications as appropriate.    Further orders as clinical course dictates.     DVT Prophylaxis: Lovenox  Code Status: DNR/DNI  Diet: Cardiac/Diabetic  Discharge Plan: Pending    Luisito Hawkins DO  02/08/25  16:05 EST    Dictated utilizing Dragon dictation.

## 2025-02-08 NOTE — PLAN OF CARE
Problem: Noninvasive Ventilation Acute  Goal: Effective Unassisted Ventilation and Oxygenation  Outcome: Progressing   Goal Outcome Evaluation:      RT EQUIPMENT DEVICE RELATED - SKIN ASSESSMENT    Win Score:  Win Score: 17     RT Medical Equipment/Device:     NIV Mask:  Under-the-nose   size:  c    Skin Assessment:      Nose:  Intact    Device Skin Pressure Protection:   c    Nurse Notification:  No    Mick Tamez, RRT

## 2025-02-09 NOTE — DISCHARGE SUMMARY
[facility]      Name:  Donny Jerry   Age:  79 y.o.  Sex:  male  :  1945  MRN:  [id number]   Visit Number:  [id number]    Admission Date:  2025  Date and Time of Death:  Date and Time of Death: 2025 at 0806  Primary Care Physician:  Anum Alonso APRN    Final Diagnoses:     Nonsustained ventricular tachycardia  Syncope  Systolic congestive heart failure  Coronary artery disease status post CABG  Type 2 diabetes mellitus  Obstructive sleep apnea  Hypertension  Hyperlipidemia    Problem List:     Active Hospital Problems    Diagnosis  POA    **Ventricular tachyarrhythmia [I47.20]  Yes      Resolved Hospital Problems   No resolved problems to display.     Presenting Problem:    Chief Complaint   Patient presents with    Fall    Abnormal ECG    Hallucinations      Consults:     Consulting Physician(s)         Provider   Role Specialty     Kevan Beck MD      Consulting Physician Cardiology          Procedures Performed:        History of presenting illness/Hospital Course:    Patient is a 79-year-old male brought to the emergency department for evaluation of altered mental status, frequent falls. Patient's altered mentation has been waxing and waning. Patient has reportedly been talking to people who are not there and responding to stimuli that is not present.  Patient's wife also reported significant worsening of weakness.  States that he has had frequent falls over 3 weeks duration with passing out and falling, unprovoked.  Patient found to be in nonsustained ventricular tachycardia.  Also with known history of systolic heart failure.  Cardiology consultation obtained.  Dr. Beck initiated amiodarone.  Patient continued to have ventricular arrhythmia.  Upon shift change, nursing staff noted patient unresponsive and pulseless V. tach.  Family at bedside confirmed DNR/DNI status and that they did not want further interventions.  Patient pronounced   at 8:06 AM    Physical Exam:    General appearance: Unresponsive  Eyes: No pupillary reflex, no corneal reflex  Cardiac: No heart sounds auscultated, no radial pulses palpable  Pulm: No breath sounds auscultated  Neuro: No response to pain or verbal stimuli, GCS 3    Pertinent Lab Results:     Results from last 7 days   Lab Units 25  1656 25  0552 25  1705   SODIUM mmol/L  --  137 137   POTASSIUM mmol/L 4.4 3.6 4.1   CHLORIDE mmol/L  --  98 96*   CO2 mmol/L  --  25.1 27.5   BUN mg/dL  --  79* 84*   CREATININE mg/dL  --  2.07* 2.26*   CALCIUM mg/dL  --  8.4* 8.4*   BILIRUBIN mg/dL  --   --  0.6   ALK PHOS U/L  --   --  80   ALT (SGPT) U/L  --   --  29   AST (SGOT) U/L  --   --  30   GLUCOSE mg/dL  --  175* 222*     Results from last 7 days   Lab Units 25  0552 25  1705   WBC 10*3/mm3 7.29 9.83   HEMOGLOBIN g/dL 13.3 9.9*   HEMATOCRIT % 40.3 30.6*   PLATELETS 10*3/mm3 231 280         Results from last 7 days   Lab Units 25  1810 25  1705   CK TOTAL U/L  --  56   HSTROP T ng/L 71* 74*     Results from last 7 days   Lab Units 25  1705   PROBNP pg/mL 27,345.0*                       Pertinent Radiology Results:    Imaging Results (All)       Procedure Component Value Units Date/Time    XR Chest 1 View [[id number]] Collected: 25     Updated: 25    Narrative:      PROCEDURE: XR CHEST 1 VW-     HISTORY: Altered mental status, eval pneumonia; I47.20-Ventricular  tachycardia, unspecified, abnormal cardiovascular exam.     COMPARISON: 2025     FINDINGS: No acute pulmonary opacity is present. There is no evidence of  effusion or pneumothorax.        There is evidence of prior median sternotomy, presumably from CABG.   Otherwise, mediastinum is unremarkable.        Heart size is normal.       Impression:      Unremarkable chest, status post CABG.           This report was signed and finalized on 2025 8:34 AM by Brett Hernandez MD.       CT  Cervical Spine Without Contrast [[id number]] Collected: 02/07/25 1817     Updated: 02/07/25 1818    Narrative:      FINAL REPORT    TECHNIQUE:  null    CLINICAL HISTORY:  Altered mental status, fall, eval C-spine fracture    COMPARISON:  null    FINDINGS:  CT cervical spine without contrast    Comparison: None    Findings:    Normal vertebral body alignment.    Multilevel degenerative disc disease and facet osteoarthritis. No central canal stenosis.    No acute fractures or dislocations.    No acute findings on limited view of the intracranial contents.    No cervical fluid collections or masses.    Lung apices are clear.      Impression:      IMPRESSION:    No acute cervical spine fracture.    Authenticated and Electronically Signed by Prema Kurtz MD on  02/07/2025 06:17:20 PM    CT Head Without Contrast [[id number]] Collected: 02/07/25 1815     Updated: 02/07/25 1816    Narrative:      FINAL REPORT    TECHNIQUE:  null    CLINICAL HISTORY:  Altered mental status, fall, eval intracranial hemorrhage    COMPARISON:  null    FINDINGS:  CT head without contrast    Comparison: None    Findings:    Involutional change and nonspecific white matter hypodensity.    No intracranial mass, midline shift, hydrocephalus, or acute hemorrhage.    Orbits, paranasal sinuses, and mastoid air cells are unremarkable.    No skull fracture      Impression:      Impression:    1. No acute findings    Authenticated and Electronically Signed by Prema Kurtz MD on  02/07/2025 06:15:41 PM            Echo:    Results for orders placed during the hospital encounter of 01/11/24    Adult Transthoracic Echo Complete w/ Color, Spectral and Contrast if necessary per protocol    Interpretation Summary    Left ventricular systolic function is low normal. Calculated left ventricular EF = 45.8% Left ventricular ejection fraction appears to be 46 - 50%.    Left ventricular wall thickness is consistent with mild concentric hypertrophy.    Left  ventricular diastolic function is consistent with (grade II w/high LAP) pseudonormalization.    Left atrial volume is moderately increased.    Mild aortic valve stenosis is present.    Estimated right ventricular systolic pressure from tricuspid regurgitation is normal (<35 mmHg).    No significant change compared to 2023 echo      Code status during the hospital stay:    Code Status and Medical Interventions: No CPR (Do Not Attempt to Resuscitate); Limited Support; No intubation (DNI); Per advanced directive.   Ordered at: 25     Medical Intervention Limits:    No intubation (DNI)     Level Of Support Discussed With:    Patient     Code Status (Patient has no pulse and is not breathing):    No CPR (Do Not Attempt to Resuscitate)     Medical Interventions (Patient has pulse or is breathing):    Limited Support     Comments:    Per advanced directive.       Discharge Disposition:        Test Results Pending at Discharge:           Luisito Hawkins DO  25  13:59 EST    Time: I spent <30 minutes on this discharge activity which included: face-to-face encounter with the patient and/or family, reviewing the data in the system, coordination of the care with the nursing staff as well as consultants, documentation, and entering orders.     Dictated utilizing Dragon dictation.

## 2025-02-09 NOTE — PLAN OF CARE
Goal Outcome Evaluation:  Plan of Care Reviewed With: patient        Progress: improving  Outcome Evaluation: Patient wore CPAP for most of the night

## 2025-02-09 NOTE — PLAN OF CARE
Goal Outcome Evaluation:                                            Problem: Adult Inpatient Plan of Care  Goal: Plan of Care Review  Outcome: Unable to Meet  Goal: Patient-Specific Goal (Individualized)  Outcome: Unable to Meet  Goal: Absence of Hospital-Acquired Illness or Injury  Outcome: Unable to Meet  Goal: Optimal Comfort and Wellbeing  Outcome: Unable to Meet  Goal: Readiness for Transition of Care  Outcome: Unable to Meet     Problem: Heart Failure  Goal: Optimal Coping  Outcome: Unable to Meet  Goal: Optimal Cardiac Output and Blood Flow  Outcome: Unable to Meet  Goal: Stable Heart Rate and Rhythm  Outcome: Unable to Meet  Goal: Fluid and Electrolyte Balance  Outcome: Unable to Meet  Goal: Optimal Functional Ability  Outcome: Unable to Meet  Goal: Improved Oral Intake  Outcome: Unable to Meet  Goal: Effective Oxygenation and Ventilation  Outcome: Unable to Meet  Goal: Effective Breathing Pattern During Sleep  Outcome: Unable to Meet     Problem: Skin Injury Risk Increased  Goal: Skin Health and Integrity  Outcome: Unable to Meet     Problem: Noninvasive Ventilation Acute  Goal: Effective Unassisted Ventilation and Oxygenation  Outcome: Unable to Meet     Problem: Fall Injury Risk  Goal: Absence of Fall and Fall-Related Injury  Outcome: Unable to Meet       Problem: Adult Inpatient Plan of Care  Goal: Plan of Care Review  Outcome: Unable to Meet  Goal: Patient-Specific Goal (Individualized)  Outcome: Unable to Meet  Goal: Absence of Hospital-Acquired Illness or Injury  Outcome: Unable to Meet  Intervention: Identify and Manage Fall Risk  Description: Perform standard risk assessment on admission using a validated tool or comprehensive approach appropriate to the patient; reassess fall risk frequently, with change in status or transfer to another level of care.  Communicate risk to interprofessional healthcare team; ensure fall risk visible cue.  Determine need for increased observation, equipment and  environmental modification, as well as use of supportive, nonskid footwear.  Adjust safety measures to individual needs and identified risk factors.  Reinforce the importance of active participation with fall risk prevention, safety, and physical activity with the patient and family.  Perform regular intentional rounding to assess need for position change, pain assessment and personal needs, including assistance with toileting.  Recent Flowsheet Documentation  Taken 2/9/2025 0800 by Dolores Hoffmann RN  Safety Promotion/Fall Prevention: safety round/check completed  Taken 2/9/2025 0715 by Dolores Hoffmann RN  Safety Promotion/Fall Prevention:   safety round/check completed   room organization consistent   nonskid shoes/slippers when out of bed   fall prevention program maintained   clutter free environment maintained   assistive device/personal items within reach   activity supervised  Intervention: Prevent Skin Injury  Description: Perform a screening for skin injury risk, such as pressure or moisture-associated skin damage on admission and at regular intervals throughout hospital stay.  Keep all areas of skin (especially folds) clean and dry.  Maintain adequate skin hydration.  Relieve and redistribute pressure and protect bony prominences and skin at risk for injury; implement measures based on patient-specific risk factors.  Match turning and repositioning schedule to clinical condition.  Encourage weight shift frequently; assist with reposition if unable to complete independently.  Float heels off bed; avoid pressure on the Achilles tendon.  Keep skin free from extended contact with medical devices.  Optimize nutrition and hydration.  Encourage functional activity and mobility, as early as tolerated.  Use aids (e.g., slide boards, mechanical lift) during transfer.  Recent Flowsheet Documentation  Taken 2/9/2025 0800 by Dolores Hoffmann RN  Body Position: supine  Intervention: Prevent  Infection  Description: Maintain skin and mucous membrane integrity; promote hand, oral and pulmonary hygiene.  Optimize fluid balance, nutrition, sleep and glycemic control to maximize infection resistance.  Identify potential sources of infection early to prevent or mitigate progression of infection (e.g., wound, lines, devices).  Evaluate ongoing need for invasive devices; remove promptly when no longer indicated.  Review vaccination status.  Recent Flowsheet Documentation  Taken 2/9/2025 0715 by Dolores Hoffmann RN  Infection Prevention:   environmental surveillance performed   equipment surfaces disinfected   hand hygiene promoted   personal protective equipment utilized   rest/sleep promoted   visitors restricted/screened   single patient room provided  Goal: Optimal Comfort and Wellbeing  Outcome: Unable to Meet  Goal: Readiness for Transition of Care  Outcome: Unable to Meet     Problem: Heart Failure  Goal: Optimal Coping  Outcome: Unable to Meet  Goal: Optimal Cardiac Output and Blood Flow  Outcome: Unable to Meet  Goal: Stable Heart Rate and Rhythm  Outcome: Unable to Meet  Goal: Fluid and Electrolyte Balance  Outcome: Unable to Meet  Goal: Optimal Functional Ability  Outcome: Unable to Meet  Intervention: Optimize Functional Ability  Description: Assess functional ability, such as ADL (activities of daily living), mobility safety and independence; involve patient and caregiver/family in goal-setting.  Facilitate physical activity and exercise to improve functional ability, cognition and quality of life, as well as to minimize functional decline associated with inactivity; encourage optimal functional mobility.  Consider NMES (neuromuscular electrical stimulation) of the lower extremities for patients with limitations in ability to participate in active exercise.  Monitor physiologic response to activity or exercise and adjust accordingly; encourage and support gradual increase in activity  level.  Cluster, coordinate and organize care schedule per patient preference, priorities and tolerance.  Preplan and pace activity; balance activity with periods of rest; incorporate energy-conservation techniques.  Maintain an accessible environment to facilitate safe activity; position for optimal comfort and activity tolerance (e.g., sitting for self-care).  Encourage self-care performance to promote maximum independence in daily activity; provide adaptive equipment and rest periods if needed.  Identify and address body system and performance deficits affecting function, such as cognitive, balance, sensorimotor, activity tolerance, hearing and visual perception impairments.  Recent Flowsheet Documentation  Taken 2/9/2025 0800 by Dolores Hoffmann RN  Activity Management: bedrest  Goal: Improved Oral Intake  Outcome: Unable to Meet  Goal: Effective Oxygenation and Ventilation  Outcome: Unable to Meet  Intervention: Promote Airway Secretion Clearance  Description: Assess the effectiveness of pulmonary hygiene and ability to perform airway-clearance techniques.  Promote early mobility or ambulation; match activity to ability and tolerance.  Encourage deep breathing and lung expansion therapy to prevent atelectasis; adjust treatment to patient's response.  Initiate cough-enhancement and airway-clearance techniques with instruction.  Consider pharmacologic therapy that may improve mucus clearance, cough response and air flow.  Consider inspiratory muscle training to decrease the risk of pulmonary complications.  Recent Flowsheet Documentation  Taken 2/9/2025 0800 by Dolores Hoffmann RN  Activity Management: bedrest  Goal: Effective Breathing Pattern During Sleep  Outcome: Unable to Meet  Intervention: Monitor and Manage Obstructive Sleep Apnea  Description: Use a validated screening tool to identify risk for USHA (obstructive sleep apnea).  Implement continuous pulse oximetry to detect apnea-related oxygen  desaturation events.  Encourage a nonsupine sleep position to prevent airway collapse, such as side-lying or head of bed elevated.  Minimize use of sedative, opioid and benzodiazepine medication that may contribute to respiratory depression.  Provide oxygen therapy during sleep to reduce apnea-related oxygen desaturation events.  Continue use of home prescribed continuous PAP (positive airway pressure) or other non-PAP therapies during sleep; utilize home device and settings if available.  Implement continuous PAP during sleep for high-risk of USHA; consider auto-titrating PAP, humidification and mask choice (including fit and style) to improve comfort and tolerance.  Provide behavioral and supportive interventions individualized to the patient needs and preferences to improve PAP adherence.  Facilitate polysomnography (sleep study) referral if needed.  Recent Flowsheet Documentation  Taken 2/9/2025 9519 by Dolores Hoffmann RN  Medication Review/Management: medications reviewed     Problem: Skin Injury Risk Increased  Goal: Skin Health and Integrity  Outcome: Unable to Meet  Intervention: Optimize Skin Protection  Description: Perform a full pressure injury risk assessment, as indicated by screening, upon admission to care unit.  Reassess skin (full inspection and injury risk, including skin temperature, consistency and color) frequently (e.g., scheduled interval, with change in condition) to provide optimal early detection and prevention.  Maintain adequate tissue perfusion (e.g., encourage fluid balance; avoid crossing legs, constrictive clothing or devices) to promote tissue oxygenation.  Maintain head of bed at lowest degree of elevation tolerated, considering medical condition and other restrictions. Use positioning supports to prevent sliding and friction. Consider low friction textiles.  Avoid positioning onto an area that remains reddened or on bony prominences.  Minimize incontinence and moisture (e.g.,  toileting schedule; moisture-wicking pad, diaper or incontinence collection device; skin moisture barrier).  Cleanse skin promptly and gently, when soiled, utilizing a pH-balanced cleanser.  Relieve and redistribute pressure (e.g., scheduled position changes, weight shifts, use of support surface, medical device repositioning, protective dressing application, use of positioning device, microclimate control, use of pressure-injury-monitor  Encourage increased activity, such as sitting in a chair at the bedside or early mobilization, when able to tolerate. Avoid prolonged sitting.  Recent Flowsheet Documentation  Taken 2/9/2025 0800 by Dolores Hoffmann RN  Activity Management: bedrest     Problem: Noninvasive Ventilation Acute  Goal: Effective Unassisted Ventilation and Oxygenation  Outcome: Unable to Meet     Problem: Fall Injury Risk  Goal: Absence of Fall and Fall-Related Injury  Outcome: Unable to Meet  Intervention: Identify and Manage Contributors  Description: Develop a fall prevention plan, considering patient-centered interventions and family/caregiver involvement; identify and address patient's facilitators and barriers.  Provide reorientation, appropriate sensory stimulation and routines with changes in mental status to decrease risk of fall.  Promote use of personal vision and auditory aids.  Assess assistance level required for safe and effective self-care; provide support as needed, such as toileting and mobilization. For age 65 and older, implement timed toileting with assistance.  Encourage physical activity, such as performance of mobility and self-care at highest level of patient ability, multicomponent exercise program and provision of appropriate assistive devices.  If fall occurs, assess the severity of injury; implement fall injury protocol. Determine the cause and revise fall injury prevention plan.  Regularly review and advocate for medication adjustment to decrease fall risk; consider  administration times, polypharmacy and age.  Balance adequate pain management with potential for oversedation.  Recent Flowsheet Documentation  Taken 2/9/2025 0715 by Dolores Hoffmann, RN  Medication Review/Management: medications reviewed  Intervention: Promote Injury-Free Environment  Description: Provide a safe, barrier-free environment that encourages independent activity.  Keep care area uncluttered and well-lighted.  Determine need for increased observation or monitoring.  Avoid use of devices that minimize mobility, such as restraints or indwelling urinary catheter.  Recent Flowsheet Documentation  Taken 2/9/2025 0800 by Dolores Hoffmann, RN  Safety Promotion/Fall Prevention: safety round/check completed  Taken 2/9/2025 0715 by Dolores Hoffmann, RN  Safety Promotion/Fall Prevention:   safety round/check completed   room organization consistent   nonskid shoes/slippers when out of bed   fall prevention program maintained   clutter free environment maintained   assistive device/personal items within reach   activity supervised

## 2025-02-21 ENCOUNTER — CARE COORDINATION (OUTPATIENT)
Age: 80
End: 2025-02-21

## 2025-07-07 NOTE — TELEPHONE ENCOUNTER
Clinic RN: Please investigate patient's chart or contact patient if the information cannot be found because please clarify dosing of both medications. Both medications has two different dosing.    Lori WELLER RN  [facility] Triage Team       No meds to hold for procedure

## (undated) DEVICE — CATH DIAG EXPO M/ PK 6FR FL4/FR4 PIG 3PK

## (undated) DEVICE — INFLATION DEVICE KIT: Brand: ENCORE™ 26 ADVANTAGE KIT

## (undated) DEVICE — CATH DIAG EXPO .056 IMT 6F 100CM

## (undated) DEVICE — CATH DIAG EXPO .056 ARMOD 6F 100CM

## (undated) DEVICE — CATH F6 ST JL 4 100CM: Brand: SUPERTORQUE

## (undated) DEVICE — CATH F6 ST JR 4 100CM: Brand: SUPERTORQUE

## (undated) DEVICE — GW INQWIRE FC PTFE STD J/1.5 .035 260

## (undated) DEVICE — KT MANIFOLD CATHLAB CUST

## (undated) DEVICE — GW EMR FIX EXCHG J STD .035 3MM 260CM

## (undated) DEVICE — GLIDESHEATH SLENDER STAINLESS STEEL KIT: Brand: GLIDESHEATH SLENDER

## (undated) DEVICE — PK CATH CARD 10

## (undated) DEVICE — RUNTHROUGH NS EXTRA FLOPPY PTCA GUIDEWIRE: Brand: RUNTHROUGH

## (undated) DEVICE — GUIDE CATHETER: Brand: MACH1™

## (undated) DEVICE — PINNACLE INTRODUCER SHEATH: Brand: PINNACLE